# Patient Record
Sex: FEMALE | Race: BLACK OR AFRICAN AMERICAN | NOT HISPANIC OR LATINO | URBAN - METROPOLITAN AREA
[De-identification: names, ages, dates, MRNs, and addresses within clinical notes are randomized per-mention and may not be internally consistent; named-entity substitution may affect disease eponyms.]

---

## 2021-11-28 ENCOUNTER — INPATIENT (INPATIENT)
Facility: HOSPITAL | Age: 66
LOS: 4 days | Discharge: HOME | End: 2021-12-03
Attending: HOSPITALIST | Admitting: HOSPITALIST
Payer: MEDICARE

## 2021-11-28 VITALS
HEART RATE: 87 BPM | OXYGEN SATURATION: 100 % | RESPIRATION RATE: 16 BRPM | DIASTOLIC BLOOD PRESSURE: 88 MMHG | TEMPERATURE: 99 F | SYSTOLIC BLOOD PRESSURE: 156 MMHG

## 2021-11-28 DIAGNOSIS — Z89.411 ACQUIRED ABSENCE OF RIGHT GREAT TOE: Chronic | ICD-10-CM

## 2021-11-28 DIAGNOSIS — E78.5 HYPERLIPIDEMIA, UNSPECIFIED: ICD-10-CM

## 2021-11-28 DIAGNOSIS — H91.90 UNSPECIFIED HEARING LOSS, UNSPECIFIED EAR: ICD-10-CM

## 2021-11-28 DIAGNOSIS — R63.8 OTHER SYMPTOMS AND SIGNS CONCERNING FOOD AND FLUID INTAKE: ICD-10-CM

## 2021-11-28 DIAGNOSIS — I10 ESSENTIAL (PRIMARY) HYPERTENSION: ICD-10-CM

## 2021-11-28 DIAGNOSIS — K86.2 CYST OF PANCREAS: ICD-10-CM

## 2021-11-28 DIAGNOSIS — E11.9 TYPE 2 DIABETES MELLITUS WITHOUT COMPLICATIONS: ICD-10-CM

## 2021-11-28 LAB
ALBUMIN SERPL ELPH-MCNC: 3 G/DL — LOW (ref 3.3–5)
ALP SERPL-CCNC: 118 U/L — SIGNIFICANT CHANGE UP (ref 40–120)
ALT FLD-CCNC: 10 U/L — SIGNIFICANT CHANGE UP (ref 4–33)
ANION GAP SERPL CALC-SCNC: 9 MMOL/L — SIGNIFICANT CHANGE UP (ref 7–14)
APTT BLD: 31.4 SEC — SIGNIFICANT CHANGE UP (ref 27–36.3)
AST SERPL-CCNC: 14 U/L — SIGNIFICANT CHANGE UP (ref 4–32)
BILIRUB SERPL-MCNC: 0.3 MG/DL — SIGNIFICANT CHANGE UP (ref 0.2–1.2)
BUN SERPL-MCNC: 23 MG/DL — SIGNIFICANT CHANGE UP (ref 7–23)
CALCIUM SERPL-MCNC: 8.8 MG/DL — SIGNIFICANT CHANGE UP (ref 8.4–10.5)
CHLORIDE SERPL-SCNC: 106 MMOL/L — SIGNIFICANT CHANGE UP (ref 98–107)
CO2 SERPL-SCNC: 24 MMOL/L — SIGNIFICANT CHANGE UP (ref 22–31)
CREAT SERPL-MCNC: 1.11 MG/DL — SIGNIFICANT CHANGE UP (ref 0.5–1.3)
GLUCOSE SERPL-MCNC: 115 MG/DL — HIGH (ref 70–99)
HCT VFR BLD CALC: 24.7 % — LOW (ref 34.5–45)
HGB BLD-MCNC: 7.9 G/DL — LOW (ref 11.5–15.5)
INR BLD: 1.12 RATIO — SIGNIFICANT CHANGE UP (ref 0.88–1.16)
MCHC RBC-ENTMCNC: 24.8 PG — LOW (ref 27–34)
MCHC RBC-ENTMCNC: 32 GM/DL — SIGNIFICANT CHANGE UP (ref 32–36)
MCV RBC AUTO: 77.7 FL — LOW (ref 80–100)
NRBC # BLD: 0 /100 WBCS — SIGNIFICANT CHANGE UP
NRBC # FLD: 0 K/UL — SIGNIFICANT CHANGE UP
PLATELET # BLD AUTO: 283 K/UL — SIGNIFICANT CHANGE UP (ref 150–400)
POTASSIUM SERPL-MCNC: 3.5 MMOL/L — SIGNIFICANT CHANGE UP (ref 3.5–5.3)
POTASSIUM SERPL-SCNC: 3.5 MMOL/L — SIGNIFICANT CHANGE UP (ref 3.5–5.3)
PROT SERPL-MCNC: 6.9 G/DL — SIGNIFICANT CHANGE UP (ref 6–8.3)
PROTHROM AB SERPL-ACNC: 12.8 SEC — SIGNIFICANT CHANGE UP (ref 10.6–13.6)
RBC # BLD: 3.18 M/UL — LOW (ref 3.8–5.2)
RBC # FLD: 17 % — HIGH (ref 10.3–14.5)
SODIUM SERPL-SCNC: 139 MMOL/L — SIGNIFICANT CHANGE UP (ref 135–145)
WBC # BLD: 4.22 K/UL — SIGNIFICANT CHANGE UP (ref 3.8–10.5)
WBC # FLD AUTO: 4.22 K/UL — SIGNIFICANT CHANGE UP (ref 3.8–10.5)

## 2021-11-28 PROCEDURE — 93010 ELECTROCARDIOGRAM REPORT: CPT

## 2021-11-28 PROCEDURE — 70498 CT ANGIOGRAPHY NECK: CPT | Mod: 26,MA

## 2021-11-28 PROCEDURE — 99223 1ST HOSP IP/OBS HIGH 75: CPT

## 2021-11-28 PROCEDURE — 70496 CT ANGIOGRAPHY HEAD: CPT | Mod: 26,MA

## 2021-11-28 PROCEDURE — 99285 EMERGENCY DEPT VISIT HI MDM: CPT | Mod: 25

## 2021-11-28 RX ORDER — SODIUM CHLORIDE 9 MG/ML
1000 INJECTION, SOLUTION INTRAVENOUS
Refills: 0 | Status: DISCONTINUED | OUTPATIENT
Start: 2021-11-28 | End: 2021-12-03

## 2021-11-28 RX ORDER — METOPROLOL TARTRATE 50 MG
25 TABLET ORAL EVERY 12 HOURS
Refills: 0 | Status: DISCONTINUED | OUTPATIENT
Start: 2021-11-28 | End: 2021-12-03

## 2021-11-28 RX ORDER — ASPIRIN/CALCIUM CARB/MAGNESIUM 324 MG
81 TABLET ORAL DAILY
Refills: 0 | Status: DISCONTINUED | OUTPATIENT
Start: 2021-11-28 | End: 2021-12-03

## 2021-11-28 RX ORDER — DEXTROSE 50 % IN WATER 50 %
25 SYRINGE (ML) INTRAVENOUS ONCE
Refills: 0 | Status: DISCONTINUED | OUTPATIENT
Start: 2021-11-28 | End: 2021-12-03

## 2021-11-28 RX ORDER — ATORVASTATIN CALCIUM 80 MG/1
20 TABLET, FILM COATED ORAL AT BEDTIME
Refills: 0 | Status: DISCONTINUED | OUTPATIENT
Start: 2021-11-28 | End: 2021-12-03

## 2021-11-28 RX ORDER — HYDRALAZINE HCL 50 MG
25 TABLET ORAL EVERY 8 HOURS
Refills: 0 | Status: DISCONTINUED | OUTPATIENT
Start: 2021-11-28 | End: 2021-12-03

## 2021-11-28 RX ORDER — DEXTROSE 50 % IN WATER 50 %
12.5 SYRINGE (ML) INTRAVENOUS ONCE
Refills: 0 | Status: DISCONTINUED | OUTPATIENT
Start: 2021-11-28 | End: 2021-12-03

## 2021-11-28 RX ORDER — ENOXAPARIN SODIUM 100 MG/ML
40 INJECTION SUBCUTANEOUS DAILY
Refills: 0 | Status: DISCONTINUED | OUTPATIENT
Start: 2021-11-28 | End: 2021-12-03

## 2021-11-28 RX ORDER — THIAMINE MONONITRATE (VIT B1) 100 MG
100 TABLET ORAL DAILY
Refills: 0 | Status: DISCONTINUED | OUTPATIENT
Start: 2021-11-28 | End: 2021-12-03

## 2021-11-28 RX ORDER — POLYETHYLENE GLYCOL 3350 17 G/17G
17 POWDER, FOR SOLUTION ORAL DAILY
Refills: 0 | Status: DISCONTINUED | OUTPATIENT
Start: 2021-11-28 | End: 2021-12-03

## 2021-11-28 RX ORDER — ACETAMINOPHEN 500 MG
650 TABLET ORAL EVERY 6 HOURS
Refills: 0 | Status: DISCONTINUED | OUTPATIENT
Start: 2021-11-28 | End: 2021-12-03

## 2021-11-28 RX ORDER — INSULIN GLARGINE 100 [IU]/ML
10 INJECTION, SOLUTION SUBCUTANEOUS AT BEDTIME
Refills: 0 | Status: DISCONTINUED | OUTPATIENT
Start: 2021-11-28 | End: 2021-11-30

## 2021-11-28 RX ORDER — INSULIN LISPRO 100/ML
VIAL (ML) SUBCUTANEOUS
Refills: 0 | Status: DISCONTINUED | OUTPATIENT
Start: 2021-11-28 | End: 2021-11-30

## 2021-11-28 RX ORDER — GLUCAGON INJECTION, SOLUTION 0.5 MG/.1ML
1 INJECTION, SOLUTION SUBCUTANEOUS ONCE
Refills: 0 | Status: DISCONTINUED | OUTPATIENT
Start: 2021-11-28 | End: 2021-12-03

## 2021-11-28 RX ORDER — AMLODIPINE BESYLATE 2.5 MG/1
10 TABLET ORAL DAILY
Refills: 0 | Status: DISCONTINUED | OUTPATIENT
Start: 2021-11-28 | End: 2021-12-03

## 2021-11-28 RX ORDER — PANTOPRAZOLE SODIUM 20 MG/1
40 TABLET, DELAYED RELEASE ORAL
Refills: 0 | Status: DISCONTINUED | OUTPATIENT
Start: 2021-11-28 | End: 2021-12-03

## 2021-11-28 RX ORDER — DEXTROSE 50 % IN WATER 50 %
15 SYRINGE (ML) INTRAVENOUS ONCE
Refills: 0 | Status: DISCONTINUED | OUTPATIENT
Start: 2021-11-28 | End: 2021-12-03

## 2021-11-28 RX ORDER — FAMOTIDINE 10 MG/ML
20 INJECTION INTRAVENOUS DAILY
Refills: 0 | Status: DISCONTINUED | OUTPATIENT
Start: 2021-11-28 | End: 2021-12-03

## 2021-11-28 NOTE — H&P ADULT - PROBLEM SELECTOR PLAN 1
-bilateral hearing loss, tinnitus prior  -external ear exam benign, otoscopic exam w/out acute findings  -differentials include drug induced, possibly due to antibiotics for pancreatic drainage (daughter and pt unsure which antibiotic), vibratory hearing more intact than sensinoneural.   -obtain MR head w/ w/o iv and IAC w/ w/o iv (no metals in body creatinine clearance acceptable, patient aware) to rule out lesions.   -will need outpatient f/u w/ ENT for official audiogram and steroid use  -lisinopril can cause tinnitus 1-10% of cases, will hold for now -bilateral hearing loss, tinnitus prior  -external ear exam benign, otoscopic exam w/out acute findings  -differentials include drug induced, possibly due to antibiotics for pancreatic drainage (daughter and pt unsure which antibiotic), vibratory hearing more intact than sensinoneural.   -obtain MR head w/ w/o iv and IAC w/ w/o iv (no metals in body creatinine clearance acceptable, patient aware) to rule out lesions.   -will need outpatient f/u w/ ENT for official audiogram and steroid use  -lisinopril can cause tinnitus 1-10% of cases, will hold for now  -ordered for viral/rheumatologic workup per neurology, consent obtained for HIV/RPR

## 2021-11-28 NOTE — CONSULT NOTE ADULT - SUBJECTIVE AND OBJECTIVE BOX
Neurology Consultation  Thiago Vo, PGY-2      HPI: Patient is a 65yo F PMHx of DM       Otherwise denies fever, chills, headaches, vision changes, blurry vision, double vision, nausea, vomiting, hearing change, focal weakness, focal numbness, parasthesias, bowel/ bladder incontinence.      (Stroke only)  NIHSS:   MRS:   ICH:   ABCD2:    PAST MEDICAL & SURGICAL HISTORY:    FAMILY HISTORY:    SOCIAL HISTORY: no smoking, alcohol, drug use hx    MEDICATIONS (HOME):  Home Medications:    MEDICATIONS  (STANDING):    MEDICATIONS  (PRN):    ALLERGIES/INTOLERANCES:  Allergies  penicillin ([facility])    Intolerances    VITALS & EXAMINATION:  Vital Signs Last 24 Hrs  T(C): 37.1 (28 Nov 2021 11:59), Max: 37.1 (28 Nov 2021 11:59)  T(F): 98.7 (28 Nov 2021 11:59), Max: 98.7 (28 Nov 2021 11:59)  HR: 87 (28 Nov 2021 11:59) (87 - 87)  BP: 156/88 (28 Nov 2021 11:59) (156/88 - 156/88)  BP(mean): --  RR: 16 (28 Nov 2021 11:59) (16 - 16)  SpO2: 100% (28 Nov 2021 11:59) (100% - 100%)    General:  Constitutional: Female, appears stated age, in no apparent distress including pain  Head: Normocephalic & atraumatic; Eyes: clear sclera; Neck: supple  Extremities: No cyanosis, clubbing, or edema; Skin: No rashes, bruising, or discoloration.  Resp: breathing comfortably, normal rate    Neurological (>12):  MS: Awake, alert, oriented to person, place, situation, time. Normal affect. Follows all commands. Cooperative.   Language: Speech is clear, fluent, intact with normal tone/rate/ volume and good repetition,  comprehension, registration of words. No perseverance.     CNs: PERRL (R = 3mm, L = 3mm). VFF. EOMI no nystagmus. V1-3 intact to LT, well developed masseter muscles b/l. No facial asymmetry b/l, full eye closure strength b/l. Hearing grossly normal (rubbing fingers) b/l.  Gag reflex deferred.     Motor: Normal muscle bulk & tone. No noticeable tremor or seizure. No pronator drift.              Deltoid	Biceps	Triceps	   R	5	5	5	5		 	  L	5	5	5	5			  	H-Flex	K-Ext	D-Flex	P-Flex  R	5	5	5	5			 	   L	5	5	5	5		     Sensation: Intact to LT/PP/Temp/Vibration/Position b/l., Cortical: Extinction on DSS (neglect): none  Reflexes:              Biceps(C5)       BR(C6)     Triceps(C7)               Patellar(L4)        Plantar Resp  R	2	          2	             2		        2		    	Down   L	2	          2	             2		        2		 	Down     Coordination: No dysmetria to FTN  Gait: Normal Romberg. No postural instability. Normal stance and tandem gait.     LABORATORY:  CBC   Chem       LFTs   Coagulopathy   Lipid Panel   A1c   Cardiac enzymes     U/A   CSF  Other    STUDIES & IMAGING: (EEG, CT, MR, U/S, TTE/LATA): Neurology Consultation  Thiago Vo, PGY-2      Communicated via writing on phone  HPI: Patient is a 67yo F PMHx of DM, HTN, HLD, on ASAS 81mg.  possible hx pancreatitis/ HF? who presents to ED for acute onset hearing loss bilaterally. States on friday had a loud party with loud music. Was fine yesterday (LKN 9:30 PM 11/27/21 prior to going to bed when she was her baseline) and today AM woke up at 10 AM with bilateral hearing loss. No current ringing in ear, popping sound or fullness. No associated vertiginous symptoms. Has mild headache but denies nausea/ vomiting. No recent illnesses or ear infections. Had an episode of tinnitus in L ear 1 month ago that resolved. In addition reports having mild numbness of her fingertips that also started today AM when she woke up. However, denies focal weakness/ numbness of individual arms/legs.     (Stroke only)  NIHSS:   MRS: 0      PAST MEDICAL & SURGICAL HISTORY: HTN, DM     FAMILY HISTORY:    SOCIAL HISTORY: no smoking, alcohol, drug use hx    MEDICATIONS (HOME):  Home Medications:    MEDICATIONS  (STANDING):    MEDICATIONS  (PRN):    ALLERGIES/INTOLERANCES:  Allergies  penicillin ([facility])    Intolerances    VITALS & EXAMINATION:  Vital Signs Last 24 Hrs  T(C): 37.1 (28 Nov 2021 11:59), Max: 37.1 (28 Nov 2021 11:59)  T(F): 98.7 (28 Nov 2021 11:59), Max: 98.7 (28 Nov 2021 11:59)  HR: 87 (28 Nov 2021 11:59) (87 - 87)  BP: 156/88 (28 Nov 2021 11:59) (156/88 - 156/88)  BP(mean): --  RR: 16 (28 Nov 2021 11:59) (16 - 16)  SpO2: 100% (28 Nov 2021 11:59) (100% - 100%)    General:  Constitutional: Female, appears stated age, in no apparent distress including pain  Head: Normocephalic & atraumatic; Eyes: clear sclera; Neck: supple  Extremities: No cyanosis, clubbing, or edema; Skin: No rashes, bruising, or discoloration.  Resp: breathing comfortably, normal rate    Neurological (>12):  MS: Awake, alert, oriented to person, place, situation, time. Normal affect. Follows all commands. Cooperative.   Language: Speech is clear, fluent, intact with normal tone/rate/ volume and good repetition,  comprehension, registration of words. No perseverance.     CNs: PERRL (R = 3mm, L = 3mm). VFF. EOMI no nystagmus. V1-3 intact to LT, well developed masseter muscles b/l. No facial asymmetry b/l, full eye closure strength b/l. Hearing grossly normal (rubbing fingers) b/l.  Gag reflex deferred.     Motor: Normal muscle bulk & tone. No noticeable tremor or seizure. No pronator drift.              Deltoid	Biceps	Triceps	   R	5	5	5	5		 	  L	5	5	5	5			  	H-Flex	K-Ext	D-Flex	P-Flex  R	5	5	5	5			 	   L	5	5	5	5		     Sensation: Intact to LT/PP/Temp/Vibration/Position b/l., Cortical: Extinction on DSS (neglect): none  Reflexes:              Biceps(C5)       BR(C6)     Triceps(C7)               Patellar(L4)        Plantar Resp  R	2	          2	             2		        2		    	Down   L	2	          2	             2		        2		 	Down     Coordination: No dysmetria to FTN  Gait: Normal Romberg. No postural instability. Normal stance and tandem gait.     LABORATORY:  CBC   Chem       LFTs   Coagulopathy   Lipid Panel   A1c   Cardiac enzymes     U/A   CSF  Other    STUDIES & IMAGING: (EEG, CT, MR, U/S, TTE/LATA): Neurology Consultation  Thiago Vo, PGY-2      Communicated via writing on phone  HPI: Patient is a 67yo F PMHx of DM, HTN, HLD, on ASAS 81mg who presents to ED for acute onset hearing loss bilaterally. States on friday had a loud party with loud music. Was fine yesterday (LKN 9:30 PM 11/27/21 prior to going to bed when she was her baseline) and today AM woke up at 10 AM with bilateral hearing loss. No current ringing in ear, popping sound or fullness. No associated vertiginous symptoms. Has mild headache but denies nausea/ vomiting. No recent illnesses or ear infections. Had an episode of tinnitus in L ear 1 month ago that resolved. In addition reports having mild numbness of her fingertips that also started today AM when she woke up. However, denies focal weakness/ numbness of individual arms/legs.     (Stroke only)  NIHSS: 0  MRS: 0      PAST MEDICAL & SURGICAL HISTORY: HTN, DM     FAMILY HISTORY:     SOCIAL HISTORY: no smoking, alcohol, drug use hx    MEDICATIONS (HOME):  Home Medications: amlodipine, aspirin, atorvastatin, famotidine, hydralazine, insulin, lisinopril, metoprolol tart, omeprazole, terbinafine.     MEDICATIONS  (STANDING):    MEDICATIONS  (PRN):    ALLERGIES/INTOLERANCES:  Allergies  penicillin ([facility])    Intolerances    VITALS & EXAMINATION:  Vital Signs Last 24 Hrs  T(C): 37.1 (28 Nov 2021 11:59), Max: 37.1 (28 Nov 2021 11:59)  T(F): 98.7 (28 Nov 2021 11:59), Max: 98.7 (28 Nov 2021 11:59)  HR: 87 (28 Nov 2021 11:59) (87 - 87)  BP: 156/88 (28 Nov 2021 11:59) (156/88 - 156/88)  BP(mean): --  RR: 16 (28 Nov 2021 11:59) (16 - 16)  SpO2: 100% (28 Nov 2021 11:59) (100% - 100%)    General:  Constitutional: Female, appears stated age, in no apparent distress but very hard of hearing b/l   Head: Normocephalic & atraumatic; Eyes: clear sclera;    Extremities: No cyanosis, clubbing, or edema, amputated big toe R foot,   Resp: breathing comfortably, normal rate    Neurological (>12):  MS: Awake, alert, oriented to person, place, situation, time. Normal affect. Follows all commands. Cooperative.   Language: Speech is clear, fluent, loud due to being deaf. Good comprehension, registration of words. No perseverance.     CNs: PERRL (R = 3mm, L = 3mm). VFF to blink to threat. EOMI no nystagmus. V1-3 intact to LT, well developed masseter muscles b/l. No facial asymmetry b/l, full eye closure strength b/l.   Hearing absent bilaterally. With tuning fork on vertex of head and placed on mastoid on both sides, patient able to appreciate vibration and was within 1 second of noticing the vibration stopping correctly. Can barely appreciate the vibration when tuning fork placed 2 inches outside of outer ear.       Motor: Normal muscle bulk & tone. No noticeable tremor or seizure. No pronator drift.              Deltoid	Biceps	Triceps	   R	5	5	5	5		 	  L	5	5	5	5			  	H-Flex	K-Ext	D-Flex	P-Flex  R	5	5	5	5			 	   L	5	5	5	5		     Vibration intact on extremities particularly big toe on L. Proprioception appears possibly diminished.   Sensation: Intact to LT  b/l   Cortical: Extinction on DSS (neglect): none  Reflexes:              Biceps(C5)       BR(C6)     Triceps(C7)               Patellar(L4)        Plantar Resp  R	1	          1	             1		        0		    	Missing toe  L	1	          1             2		        0		 	Down   Coordination: No dysmetria to FTN     LABORATORY:                        7.9    4.22  )-----------( 283      ( 28 Nov 2021 15:34 )             24.7   11-28    139  |  106  |  23  ----------------------------<  115<H>  3.5   |  24  |  1.11    Ca    8.8      28 Nov 2021 15:34    TPro  6.9  /  Alb  3.0<L>  /  TBili  0.3  /  DBili  x   /  AST  14  /  ALT  10  /  AlkPhos  118  11-28    LFTs   Coagulopathy   Lipid Panel   A1c   Cardiac enzymes     U/A   CSF  Other    STUDIES & IMAGING: (EEG, CT, MR, U/S, TTE/LATA):     Neurology Consultation  Thiago Vo, PGY-2      Communicated via writing on phone  HPI: Patient is a 67yo F PMHx of DM, HTN, HLD, on ASAS 81mg who presents to ED for acute onset hearing loss bilaterally. States on friday had a loud party with loud music. Was fine yesterday (LKN 9:30 PM 11/27/21 prior to going to bed when she was her baseline) and today AM woke up at 10 AM with bilateral hearing loss. No current ringing in ear, popping sound or fullness. No associated vertiginous symptoms. Has mild headache but denies nausea/ vomiting. No recent illnesses or ear infections. Had an episode of tinnitus in L ear 1 month ago that resolved. In addition reports having mild numbness of her fingertips that also started today AM when she woke up. However, denies focal weakness/ numbness of individual arms/legs.     (Stroke only)  NIHSS: 0  MRS: 0      PAST MEDICAL & SURGICAL HISTORY: HTN, DM     SOCIAL HISTORY:  smoking hx 30 yrs ago, social drinking    MEDICATIONS (HOME):  Home Medications: amlodipine, aspirin, atorvastatin, famotidine, hydralazine, insulin, lisinopril, metoprolol tart, omeprazole, terbinafine.     MEDICATIONS  (STANDING):    MEDICATIONS  (PRN):    ALLERGIES/INTOLERANCES:  Allergies  penicillin ([facility])    Intolerances    VITALS & EXAMINATION:  Vital Signs Last 24 Hrs  T(C): 37.1 (28 Nov 2021 11:59), Max: 37.1 (28 Nov 2021 11:59)  T(F): 98.7 (28 Nov 2021 11:59), Max: 98.7 (28 Nov 2021 11:59)  HR: 87 (28 Nov 2021 11:59) (87 - 87)  BP: 156/88 (28 Nov 2021 11:59) (156/88 - 156/88)  BP(mean): --  RR: 16 (28 Nov 2021 11:59) (16 - 16)  SpO2: 100% (28 Nov 2021 11:59) (100% - 100%)    General:  Constitutional: Female, appears stated age, in no apparent distress but very hard of hearing b/l   Head: Normocephalic & atraumatic; Eyes: clear sclera;    Extremities: No cyanosis, clubbing, or edema, amputated big toe R foot,   Resp: breathing comfortably, normal rate    Neurological (>12):  MS: Awake, alert, oriented to person, place, situation, time. Normal affect. Follows all commands. Cooperative.   Language: Speech is clear, fluent, loud due to being deaf. Good comprehension, registration of words. No perseverance.     CNs: PERRL (R = 3mm, L = 3mm). VFF to blink to threat. EOMI no nystagmus. V1-3 intact to LT, well developed masseter muscles b/l. No facial asymmetry b/l, full eye closure strength b/l.   Hearing absent bilaterally. With tuning fork on vertex of head and placed on mastoid on both sides, patient able to appreciate vibration and was within 1 second of noticing the vibration stopping correctly. Can barely appreciate the vibration when tuning fork placed 2 inches outside of outer ear.       Motor: Normal muscle bulk & tone. No noticeable tremor or seizure. No pronator drift.              Deltoid	Biceps	Triceps	   R	5	5	5	5		 	  L	5	5	5	5			  	H-Flex	K-Ext	D-Flex	P-Flex  R	5	5	5	5			 	   L	5	5	5	5		     Vibration intact on extremities particularly big toe on L. Proprioception appears possibly diminished.   Sensation: Intact to LT  b/l   Cortical: Extinction on DSS (neglect): none  Reflexes:              Biceps(C5)       BR(C6)     Triceps(C7)               Patellar(L4)        Plantar Resp  R	1	          1	             1		        0		    	Missing toe  L	1	          1             2		        0		 	Down   Coordination: No dysmetria to FTN     LABORATORY:                        7.9    4.22  )-----------( 283      ( 28 Nov 2021 15:34 )             24.7   11-28    139  |  106  |  23  ----------------------------<  115<H>  3.5   |  24  |  1.11    Ca    8.8      28 Nov 2021 15:34    TPro  6.9  /  Alb  3.0<L>  /  TBili  0.3  /  DBili  x   /  AST  14  /  ALT  10  /  AlkPhos  118  11-28    LFTs   Coagulopathy   Lipid Panel   A1c   Cardiac enzymes     U/A   CSF  Other    STUDIES & IMAGING: (EEG, CT, MR, U/S, TTE/LATA):  < from: CT Venogram Brain w/ IV Cont (11.28.21 @ 16:32) >    EXAM:  CT IAC      EXAM:  CT ANGIO NECK (W)AW IC      EXAM:  CT ANGIO BRAIN (W)AW IC      EXAM:  CT VENOGRAM BRAIN (W)AW IC      PROCEDURE DATE:  Nov 28 2021     INTERPRETATION:  INDICATION: Bilateral hearing loss.    TECHNIQUE:  A thin section CT study of the head and  neck was conducted during rapid infusion of intravenous contrast (power injected).  In addition to the axial data, reformatted images were generated using a 3D workstation. Rapid AI was used to screen for hemorrhage.  100 cc Omnipaque 350 was administered. In addition, thin section posterior fossa imaging was conducted. Also CT venography was performed.    FINDINGS:    AORTIC ARCH no dissection or aneurysm dilatation is noted. Major vessel origins are patent.  COMMON CAROTID ARTERIES: Bilaterally patent with tortuosity  RIGHT BIFURCATION: There is posterior calcified plaque in the bulb with mild narrowing of the proximal internal carotid artery.  LEFT BIFURCATION: There is posterior wall plaque in the bulb and proximal internal carotid artery with mild narrowing of the internal carotid.  INTERNAL CAROTID ARTERIES: Bilaterally patent with tortuosity  VERTEBRALS bilaterally patent  MISCELLANEOUS:  Degenerative changes noted in the cervical spine    BRAIN   there are involutional changes in both hemispheres with mild volume loss. No acute infarct, hemorrhage, or space-occupying lesion is noted. No hydrocephalus or collections are identified. Brain stem and cerebellum are unremarkable. The internal auditory canals are unremarkable in appearance. No middle ear and mastoid disease is noted.    The cavernous and supraclinoid carotid arteries are bilaterally patent.    Both anterior cerebral arteries are patent with tortuosity. There is divergence of the proximal anterior cerebral arteries, but there is no visible aneurysm or space-occupying lesion.    Both middle cerebral arteries are patent. No M1 lesion is noted.    The vertebrobasilar system is widely patent. Posterior cerebral arteries are patent.    No enhancing mass is identified in the CP angle or IAC's.    Sagittal sinus is widely patent. Transverse and sigmoid sinuses are bilaterally widely patent. Deep venous system and cortical veins are unremarkable. There is no evidence of venous occlusive disease.    IMPRESSION:    1. Mild involutional changes in both hemispheres, with no acute abnormality, hemorrhage, or space-occupying lesion. No posterior fossa abnormality noted.  2. No IAC abnormality suggested. No middle ear and mastoid disease noted.  3. Atherosclerotic changes both carotid bifurcations in the neck. No significant stenosis, dissection, or occlusion noted. Patent vertebrals.  4. Widely patent arterial vasculature noted intracranially  5. There are no evidence of venous sinus occlusive disease. No sinus thrombosis suggested...    --- End of Report ---    MAIA PRESTON MD; Attending Radiologist  This document has been electronically signed. Nov 28 2021  4:48PM    < end of copied text >

## 2021-11-28 NOTE — H&P ADULT - HISTORY OF PRESENT ILLNESS
Patient is a 66 year old female hx of insulin dependant DM2, HTN, HLD, pancreatic pseudocyst w/ recent admission and drainage, iron deficiency anemia, who presents due to decreased hearing. She was at a loud party on Friday, and she noticed ringing and then decreased hearing (first left then right) 11/28/21 in the morning. She had ringing in the ear one month ago as well. 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 She doesn't have any recent changes in medications, and only occasionally takes aspirin. She was recently hospitalized due to pancreatic pseudocyst w/ drainage, anemia w/ endoscopy/c-scope per pt and daughter, and antibiotic use as well (family unsure). Denies fevers, chills, nausea, vomiting, diarrhea, sob, cp, visual changes, headaches, LE swelling, or skin rashes.     ED course: afebrile, HR 84, /88, RR 18, 100%.  CT head angio, CT auditory canal noncon negative for acute findings, no masses or fractures noted, no clinically important closure of vasculature noted

## 2021-11-28 NOTE — H&P ADULT - ATTENDING COMMENTS
66W hx of insulin dependant DM2, HTN, HLD, pancreatic pseudocyst w/ recent admission and drainage, iron deficiency anemia, who presents due to decreased hearing.     Pt seen and exmained    Hearing loss: Unclear etiology brain pathology vs CVA vs Acquired CMV and ?Terbinafine ( if pt uses). f/u MRI.     CMV infection: +IgM CMV, ID consult for recs

## 2021-11-28 NOTE — ED PROVIDER NOTE - CLINICAL SUMMARY MEDICAL DECISION MAKING FREE TEXT BOX
Patient presents to ed with sudden onset b/l hearing loss, no trauma no exacerbating event patient or family member can recall, no other neuro deficits, no URI symptoms. Patient well appearing, no hearing in either ear-otherwise neuro intact on exam, b/l ear canals and tms wnl, will check labs, ct brain/iac, neuro c/s reass.

## 2021-11-28 NOTE — ED PROVIDER NOTE - PROGRESS NOTE DETAILS
neuro has seen pt at bedside and suspects conductive hearing loss. Requesting ENT consult and specialized imaging. ENT paged and will see pt in ED. Renzo Noble MD. ent repaged. Renzo Noble MD. ent repaged. they state they will see the pt as soon as they can. pt still cannot hear. ct imaging noted.

## 2021-11-28 NOTE — H&P ADULT - NSHPLABSRESULTS_GEN_ALL_CORE
LABS:                         7.9    4.22  )-----------( 283      ( 28 Nov 2021 15:34 )             24.7     11-28    139  |  106  |  23  ----------------------------<  115<H>  3.5   |  24  |  1.11    Ca    8.8      28 Nov 2021 15:34    TPro  6.9  /  Alb  3.0<L>  /  TBili  0.3  /  DBili  x   /  AST  14  /  ALT  10  /  AlkPhos  118  11-28    PT/INR - ( 28 Nov 2021 15:35 )   PT: 12.8 sec;   INR: 1.12 ratio      PTT - ( 28 Nov 2021 15:35 )  PTT:31.4 sec      RADIOLOGY, EKG & ADDITIONAL TESTS: Reviewed.

## 2021-11-28 NOTE — H&P ADULT - ASSESSMENT
Patient is a 66 year old female hx of insulin dependant DM2, HTN, HLD, pancreatic pseudocyst w/ recent admission and drainage, iron deficiency anemia, who presents due to decreased hearing.

## 2021-11-28 NOTE — ED PROVIDER NOTE - NSICDXPASTMEDICALHX_GEN_ALL_CORE_FT
PAST MEDICAL HISTORY:  Bilateral cataracts     Fluid in pleural cavity associated with pancreatitis     Pancreatic pseudocyst/cyst s/p drain placement and removal    Type 2 diabetes mellitus

## 2021-11-28 NOTE — ED ADULT NURSE REASSESSMENT NOTE - NS ED NURSE REASSESS COMMENT FT1
At bedside with Neurologist, patient is neurologically intact expect for hearing, still can not hear. Patient has right toe amputated.

## 2021-11-28 NOTE — H&P ADULT - PROBLEM SELECTOR PLAN 2
-s/p drainage at prior hospital, and stent placement 1 month prior  -no leukocytosis or fevers  -abdomen soft, mildly distended, mild discomfort

## 2021-11-28 NOTE — H&P ADULT - PROBLEM SELECTOR PLAN 5
-is on lantus 14 units, can give 10 units here  -is on 4-6 units premeal, can order for moderate sliding scale  -CC diet    #MAGALY  -continue to monitor  -per pt and daughter egd/cscope unremarkable  -had 1 unit transfusion at prior hospitalization

## 2021-11-28 NOTE — H&P ADULT - NSHPPHYSICALEXAM_GEN_ALL_CORE
PHYSICAL EXAM:  GENERAL: NAD, well-developed  HEENT: ANGUS present, EOMI, no abnormalities noted on otoscopic exam, cone of light intact  Vibratory hearing> sensorineural  HEAD:  Atraumatic, Normocephalic  EYES: EOMI, PERRLA, conjunctiva and sclera clear  NECK: Supple, No JVD  CHEST/LUNG: Clear to auscultation bilaterally; No wheeze  HEART: Regular rate and rhythm; No murmurs, rubs, or gallops  ABDOMEN: Soft, Nontender, Nondistended; Bowel sounds present  EXTREMITIES:  2+ Peripheral Pulses, No clubbing, cyanosis, or edema  PSYCH: AAOx3  NEUROLOGY: non-focal  SKIN: first toe amputated

## 2021-11-28 NOTE — CONSULT NOTE ADULT - ATTENDING COMMENTS
Patient seen and examined - history as documented above - to our discussion this am - may have had ringing in her left ear beginning about one month ago but unable to hear since yesterday - Weber and Rinne as documented.  Await MRI brain.  Steroids for acute hearing loss.

## 2021-11-28 NOTE — ED ADULT NURSE NOTE - OBJECTIVE STATEMENT
Patient received to room 22. Patient family member at bedside stated she woke up this morning with a buzzing and could not hear. Patient can not hear a thing, has been using an IPAD to write. Patient denies chest pain, n/v. Patient PMH of stents. Patient VSS. Patient eyes are PERRLA. MD at [facility] for eval. Patient received to room 22. Patient family member at bedside stated she woke up this morning with a buzzing and could not hear. Patient can not hear a thing, has been using an IPAD to write. Patient denies chest pain, n/v. Patient PMH of stents. Patient VSS. Patient eyes are PERRLA. MD at beside for eval. denies ever, chills, headaches, vision changes, blurry vision, double vision, nausea, vomiting, hearing change, focal weakness, focal numbness, parasthesias, bowel/ bladder incontinence. Radha stated she went to a loud party with music on friday.

## 2021-11-28 NOTE — ED PROVIDER NOTE - NS ED ROS FT
ROS:  GENERAL: No fever, no chills  EYES: no change in vision  HEENT: no trouble swallowing, no trouble speaking, +hearing loss  CARDIAC: no chest pain  PULMONARY: no cough, no shortness of breath  GI: no abdominal pain, no nausea, no vomiting, no diarrhea, no constipation  : No dysuria, no frequency, no change in appearance, or odor of urine  SKIN: no rashes  NEURO: no headache, no weakness  MSK: No joint pain ROS:  GENERAL: No fever, no chills  EYES: no change in vision  HEENT: no trouble swallowing, no trouble speaking, +hearing loss +h/o tinnitus  CARDIAC: no chest pain  PULMONARY: no cough, no shortness of breath  GI: no abdominal pain, no nausea, no vomiting, no diarrhea, no constipation  : No dysuria, no frequency, no change in appearance, or odor of urine  SKIN: no rashes  NEURO: no headache, no weakness  MSK: No joint pain

## 2021-11-28 NOTE — CONSULT NOTE ADULT - ASSESSMENT
Vs 98.7F, HR87, /88, RR16, 100%RA           Vs 98.7F, HR87, /88, RR16, 100%RA      Initial recommendations:  [ ] CT head, CTA head/neck, CTV head  [ ] ENT evaluation  [ ] MRI brain and MR IAC  [ ] CBC,   INCOMPLETE, will update shortly    Patient is a 67yo F PMHx of DM, HTN, HLD, on ASAS 81mg who presents to ED for acute onset hearing loss bilaterally. LKN 11/27 9:30PM and woke up at 10AM with b/l hearing loss. Notes she attending party with loud music on friday, 2 days ago. Denies current tinnitus, popping sound, ear fullness, vertiginous symptoms, nausea, vomiting, recent illnesses/ ear infections. Has mild headache and mild numbness of her fingertips that also started today AM when she woke up. VS 98.7F, HR87, /88, RR16, 100%RA. Exam notable for hearing significantly diminished bilaterally. With tuning fork on vertex of head and placed on mastoid on both sides, patient able to appreciate vibration and was within 1 second of noticing the vibration stopping correctly. Can barely appreciate the vibration when tuning fork placed 2 inches outside of outer ear.       Impression: Acute onset of moderately to severely reduced hearing bilaterally particularly to reduced air conduction b/l rather than bone conduction. Lower suspicion for bilateral sensorineuronal hearing loss but still a consideration. Low suspicion for compressive mass or stroke as etiology given symptoms are bilateral and would be difficult to localize. Also in consideration is noise induced hearing loss and neuropathy from diabetes. Other differentials include infectious etiologies, ototoxic drugs, autoimmune).      Initial recommendations:  [ ] CT head, CTA head/neck, CTV head to urgently rule out a stroke although low clinical suspicion since patient otherwise has nonfocal deficits.   [ ] ENT evaluation  [ ] MRI brain and MR IAC w/ wo contrast  [ ] CBC w/ diff, CMP, ESR, CRP, a1c, lipid panel, ACE HIV, syphilis, lyme, TSH, FT4, TT3, ACE, Sjogren Ab, MINOO, ANCA, SPEP, UPEP, HSV, CMV     To be discussed with neurology attending and will be formally staffed during morning rounds. Recommendations will be finalized once signed by attending. Patient is a 65yo F PMHx of DM, HTN, HLD, on ASAS 81mg who presents to ED for acute onset hearing loss bilaterally. LKN 11/27 9:30PM and woke up at 10AM with b/l hearing loss. Notes she attending party with loud music on friday, 2 days ago. Denies current tinnitus, popping sound, ear fullness, vertiginous symptoms, nausea, vomiting, recent illnesses/ ear infections. Has mild headache and mild numbness of her fingertips that also started today AM when she woke up. VS 98.7F, HR87, /88, RR16, 100%RA. Exam notable for hearing significantly diminished bilaterally. With tuning fork on vertex of head and placed on mastoid on both sides, patient able to appreciate vibration and was within 1 second of noticing the vibration stopping correctly. Can barely appreciate the vibration when tuning fork placed 2 inches outside of outer ear.       Impression: Acute onset of moderately to severely reduced hearing bilaterally particularly to reduced air conduction b/l rather than bone conduction. Lower suspicion for bilateral sensorineuronal hearing loss but still a consideration. Low suspicion for compressive mass or stroke as etiology given symptoms are bilateral and would be difficult to localize. Also in consideration is noise induced hearing loss and neuropathy from diabetes. Other differentials include infectious etiologies, ototoxic drugs, autoimmune).      Recommendations:   [x] CTH, CTA H/N, CTV H unremarkable   [ ] ENT evaluation  [ ] MRI brain and MR IAC w/ wo contrast  [ ] CBC w/ diff, CMP, ESR, CRP, a1c, lipid panel, ACE HIV, syphilis, lyme, TSH, FT4, TT3, ACE, Sjogren Ab, MINOO, ANCA, SPEP, UPEP, HSV, CMV     To be discussed with neurology attending and will be formally staffed during morning rounds. Recommendations will be finalized once signed by attending.

## 2021-11-28 NOTE — ED PROVIDER NOTE - OBJECTIVE STATEMENT
65 y/o f presents to the ed with acute onset hearing loss. History taken from daughter at bedside and patient. Per daughter-this morning patient was at baseline soh-then noticed difficulty hearing in her left ear, followed by her right-with complete loss of hearing. Has been unable to hear at all since this morning. No other associated symptoms. Had ringing in her left ear approx 1m ago but nothing was found at the time. No fevers, chills, ha, cp, vision changes, focal weakness, numbness, imbalance, confusion, nasal congestion, coughing. no barotrauma, no inciting events. 65 y/o f presents to the ed with acute onset hearing loss. History taken from daughter at bedside and patient. Per daughter-this morning patient was at baseline soh-then noticed difficulty hearing in her left ear, followed by her right-with complete loss of hearing. Has been unable to hear at all since this morning. No other associated symptoms. Had ringing in her left ear approx 1m ago but nothing was found at the time. No fevers, chills, ha, cp, vision changes, focal weakness, numbness, imbalance, confusion, nasal congestion, coughing. no barotrauma, no inciting events. No current use of ototoxic medications.

## 2021-11-28 NOTE — ED PROVIDER NOTE - PHYSICAL EXAMINATION
GEN - NAD; well appearing; A+O x3   HEAD - NC/AT   EYES- PERRL, EOMI  ENT: Airway patent, mmm, Oral cavity and pharynx normal. No inflammation, swelling, exudate, or lesions.    NECK: Neck supple, non-tender without lymphadenopathy, no masses.  PULMONARY - CTA b/l, symmetric breath sounds.   CARDIAC -s1s2, RRR, no M,G,R  ABDOMEN - +BS, ND, NT, soft, no guarding, no rebound, no masses   BACK - no CVA tenderness, Normal  spine   EXTREMITIES - FROM, symmetric pulses, capillary refill < 2 seconds, no edema, foot with well healed surgical site s/p toe amputation  SKIN - no rash or bruising   NEUROLOGIC - alert, speech clear, oriented x 3, has no hearing at all in b/l ears, otherwise cns intact, 5/5 strength in all ext, sensation intact, f-n nl.  PSYCH -nl mood/affect, nl insight. GEN - NAD; well appearing; A+O x3   HEAD - NC/AT   EYES- PERRL, EOMI  ENT: Airway patent, mmm, Oral cavity and pharynx normal. No inflammation, swelling, exudate, or lesions.  Tms and ext canal wnl-no erythema, effusions, unable to hear b/l.  NECK: Neck supple, non-tender without lymphadenopathy, no masses.  PULMONARY - CTA b/l, symmetric breath sounds.   CARDIAC -s1s2, RRR, no M,G,R  ABDOMEN - +BS, ND, NT, soft, no guarding, no rebound, no masses   BACK - no CVA tenderness, Normal  spine   EXTREMITIES - FROM, symmetric pulses, capillary refill < 2 seconds, no edema, foot with well healed surgical site s/p toe amputation  SKIN - no rash or bruising   NEUROLOGIC - alert, speech clear, oriented x 3, has no hearing at all in b/l ears, otherwise cns intact, 5/5 strength in all ext, sensation intact, f-n nl.  PSYCH -nl mood/affect, nl insight.

## 2021-11-28 NOTE — H&P ADULT - PROBLEM SELECTOR PLAN 3
-c/w amlodipine and HLZ  -hold lisinopril in case this could be the cause of her hearing issues (though less likely) until collateral about inpatient antibiotic regimen is obtained

## 2021-11-28 NOTE — CONSULT NOTE ADULT - ASSESSMENT
65yo F PMHx of DM, HTN, HLD presents to ED with acute onset bilateral hearing loss which started today. Of note she attended a part with loud music recently. Ear exam unremarkable, no lesions seen on CT. Stroke workup negative.   - can follow up in ENT clinic with Dr. Samson within a week for formal audiogram; will determine need for steroids after audiogram  - we will coordinate appt  - discussed with chief resident 67yo F PMHx of DM, HTN, HLD presents to ED with acute onset bilateral hearing loss which started today. Of note she attended a part with loud music recently. Ear exam unremarkable, no lesions seen on CT. Stroke workup negative.   - can follow up in ENT clinic with Dr. Samson within a week for formal audiogram; will determine need for steroids after audiogram  - we will coordinate appt  - dispo per ED  - discussed with chief resident

## 2021-11-28 NOTE — CONSULT NOTE ADULT - SUBJECTIVE AND OBJECTIVE BOX
Reason for Consultation:  Requested by:    Patient is a 66y old  Female who presents with a chief complaint of   HPI:  67 y/o F PMHx of DM, HTN, HLD, on ASAS 81mg presents to the ed with acute onset hearing loss. Per daughter-this morning patient was at baseline soh-then noticed difficulty hearing in her left ear, followed by her right-with complete loss of hearing. Has been unable to hear at all since this morning. No other associated symptoms. Had ringing in her left ear approx 1m ago but nothing was found at the time. No fevers, chills, ha, cp, vision changes, focal weakness, numbness, confusion, nasal congestion, coughing. no barotrauma, no inciting events. No current use of ototoxic medications.    Per daughter, patient was at a party on friday night with loud music. Did not have any hearing loss on friday or saturday. Does report some dizziness when she walked to the bathroom earlier. No previous episodes of hearing loss. No surgeries in head/neck, no previous ear surgery, no instrumentation in ear. No bleeding or liquid discharge from ears.     Birth History:  PAST MEDICAL & SURGICAL HISTORY:  Type 2 diabetes mellitus    Bilateral cataracts    Fluid in pleural cavity associated with pancreatitis    Pancreatic pseudocyst/cyst  s/p drain placement and removal    History of amputation of right great toe      FAMILY HISTORY:      MEDICATIONS  (STANDING):    MEDICATIONS  (PRN):    Allergies    penicillin (Red Man Synd)    Intolerances        ROS negative except as noted above                          7.9    4.22  )-----------( 283      ( 28 Nov 2021 15:34 )             24.7     11-28    139  |  106  |  23  ----------------------------<  115<H>  3.5   |  24  |  1.11    Ca    8.8      28 Nov 2021 15:34    TPro  6.9  /  Alb  3.0<L>  /  TBili  0.3  /  DBili  x   /  AST  14  /  ALT  10  /  AlkPhos  118  11-28    Vital Signs Last 24 Hrs  T(C): 36.8 (28 Nov 2021 14:26), Max: 37.1 (28 Nov 2021 11:59)  T(F): 98.2 (28 Nov 2021 14:26), Max: 98.7 (28 Nov 2021 11:59)  HR: 84 (28 Nov 2021 20:30) (84 - 87)  BP: 159/88 (28 Nov 2021 20:30) (127/75 - 159/88)  BP(mean): --  RR: 18 (28 Nov 2021 20:30) (16 - 18)  SpO2: 100% (28 Nov 2021 20:30) (100% - 100%)      PHYSICAL EXAM:  Constitutional Normal: well nourished, well developed, non-dysmorphic, no acute distress    Psychiatric: age appropriate behavior, cooperative    Skin: no obvious skin lesions    Lymphatic: no cervical lymphadenopathy    Left EAC: Normal, No cerumen, no exostoses   Right EAC: Normal, No cerumen, no exostoses     Left Tympanic Membrane: small area posteroinferiorly with thinning of TM; otherwise Normal, Clear, No effusion or perforation  Right Tympanic Membrane: Normal, Clear, No effusion or perforation  [place]: unable to hear any sound  [place]in[place]: unable to hear any sound on right; air>bone on left    External Nose:  Normal, no structural deformities  		  Anterior Nasal Cavity:	Normal mucosa, no turbinate hypertrophy, straight septum  					  Oral Cavity:  Good dentition, tongue midline, no lesions or ulcerations    Tonsilar Size: 2+ bilaterally    Neck: No palpable lymphadenopathy    Pulmonary: No Acute Distress.     Neurologic: awake and alert; CN II-XII normal except for hearing loss; no nystagmus    CT head/IAC without contrast without any middle ear/inner ear pathology

## 2021-11-28 NOTE — ED ADULT NURSE NOTE - CHIEF COMPLAINT QUOTE
Pt c/o loss of hearing since this am.  Denies injury.  Pt with hx of DM Type 2.  Pt took 6 units of insulin at 930am without a meal.  Recent dosage change.  Apple Juice given at triage

## 2021-11-28 NOTE — ED PROVIDER NOTE - RECEIVING PHYSICIAN
“You can access the Follow[facility] Patient Portal, offered by [facility], by registering with the following website: [email or web address]”
dr. reeves

## 2021-11-29 LAB
A1C WITH ESTIMATED AVERAGE GLUCOSE RESULT: 9.2 % — HIGH (ref 4–5.6)
ALBUMIN SERPL ELPH-MCNC: 3.2 G/DL — LOW (ref 3.3–5)
ALP SERPL-CCNC: 124 U/L — HIGH (ref 40–120)
ALT FLD-CCNC: 9 U/L — SIGNIFICANT CHANGE UP (ref 4–33)
ANION GAP SERPL CALC-SCNC: 11 MMOL/L — SIGNIFICANT CHANGE UP (ref 7–14)
AST SERPL-CCNC: 14 U/L — SIGNIFICANT CHANGE UP (ref 4–32)
B BURGDOR C6 AB SER-ACNC: NEGATIVE — SIGNIFICANT CHANGE UP
B BURGDOR IGG+IGM SER-ACNC: 0.11 INDEX — SIGNIFICANT CHANGE UP (ref 0.01–0.89)
BASOPHILS # BLD AUTO: 0.02 K/UL — SIGNIFICANT CHANGE UP (ref 0–0.2)
BASOPHILS NFR BLD AUTO: 0.7 % — SIGNIFICANT CHANGE UP (ref 0–2)
BILIRUB SERPL-MCNC: 0.3 MG/DL — SIGNIFICANT CHANGE UP (ref 0.2–1.2)
BUN SERPL-MCNC: 20 MG/DL — SIGNIFICANT CHANGE UP (ref 7–23)
CALCIUM SERPL-MCNC: 9 MG/DL — SIGNIFICANT CHANGE UP (ref 8.4–10.5)
CHLORIDE SERPL-SCNC: 102 MMOL/L — SIGNIFICANT CHANGE UP (ref 98–107)
CHOLEST SERPL-MCNC: 236 MG/DL — HIGH
CMV IGM FLD-ACNC: 40.5 AU/ML — HIGH
CMV IGM SERPL QL: POSITIVE
CO2 SERPL-SCNC: 25 MMOL/L — SIGNIFICANT CHANGE UP (ref 22–31)
COVID-19 SPIKE DOMAIN AB INTERP: POSITIVE
COVID-19 SPIKE DOMAIN ANTIBODY RESULT: >250 U/ML — HIGH
CREAT SERPL-MCNC: 1.01 MG/DL — SIGNIFICANT CHANGE UP (ref 0.5–1.3)
CRP SERPL-MCNC: 18.3 MG/L — HIGH
DSDNA AB FLD-ACNC: <0.2 AI — SIGNIFICANT CHANGE UP
ENA SS-A AB FLD IA-ACNC: <0.2 AI — SIGNIFICANT CHANGE UP
EOSINOPHIL # BLD AUTO: 0.19 K/UL — SIGNIFICANT CHANGE UP (ref 0–0.5)
EOSINOPHIL NFR BLD AUTO: 7 % — HIGH (ref 0–6)
ERYTHROCYTE [SEDIMENTATION RATE] IN BLOOD: 105 MM/HR — HIGH (ref 4–25)
ESTIMATED AVERAGE GLUCOSE: 217 — SIGNIFICANT CHANGE UP
GLUCOSE SERPL-MCNC: 192 MG/DL — HIGH (ref 70–99)
HCT VFR BLD CALC: 24.2 % — LOW (ref 34.5–45)
HCV AB S/CO SERPL IA: 0.24 S/CO — SIGNIFICANT CHANGE UP (ref 0–0.99)
HCV AB SERPL-IMP: SIGNIFICANT CHANGE UP
HDLC SERPL-MCNC: 37 MG/DL — LOW
HGB BLD-MCNC: 7.6 G/DL — LOW (ref 11.5–15.5)
HIV 1+2 AB+HIV1 P24 AG SERPL QL IA: SIGNIFICANT CHANGE UP
HSV1 IGG SER-ACNC: 32 INDEX — HIGH
HSV1 IGG SERPL QL IA: POSITIVE
HSV2 IGG FLD-ACNC: 0.58 INDEX — SIGNIFICANT CHANGE UP
HSV2 IGG SERPL QL IA: NEGATIVE — SIGNIFICANT CHANGE UP
IANC: 1.19 K/UL — LOW (ref 1.5–8.5)
IMM GRANULOCYTES NFR BLD AUTO: 1.1 % — SIGNIFICANT CHANGE UP (ref 0–1.5)
LIPID PNL WITH DIRECT LDL SERPL: 161 MG/DL — HIGH
LYMPHOCYTES # BLD AUTO: 0.92 K/UL — LOW (ref 1–3.3)
LYMPHOCYTES # BLD AUTO: 34.1 % — SIGNIFICANT CHANGE UP (ref 13–44)
MCHC RBC-ENTMCNC: 24.4 PG — LOW (ref 27–34)
MCHC RBC-ENTMCNC: 31.4 GM/DL — LOW (ref 32–36)
MCV RBC AUTO: 77.6 FL — LOW (ref 80–100)
MONOCYTES # BLD AUTO: 0.35 K/UL — SIGNIFICANT CHANGE UP (ref 0–0.9)
MONOCYTES NFR BLD AUTO: 13 % — SIGNIFICANT CHANGE UP (ref 2–14)
NEUTROPHILS # BLD AUTO: 1.19 K/UL — LOW (ref 1.8–7.4)
NEUTROPHILS NFR BLD AUTO: 44.1 % — SIGNIFICANT CHANGE UP (ref 43–77)
NON HDL CHOLESTEROL: 199 MG/DL — HIGH
NRBC # BLD: 0 /100 WBCS — SIGNIFICANT CHANGE UP
NRBC # FLD: 0 K/UL — SIGNIFICANT CHANGE UP
PLATELET # BLD AUTO: 267 K/UL — SIGNIFICANT CHANGE UP (ref 150–400)
POTASSIUM SERPL-MCNC: 3.5 MMOL/L — SIGNIFICANT CHANGE UP (ref 3.5–5.3)
POTASSIUM SERPL-SCNC: 3.5 MMOL/L — SIGNIFICANT CHANGE UP (ref 3.5–5.3)
PROT SERPL-MCNC: 7 G/DL — SIGNIFICANT CHANGE UP (ref 6–8.3)
PROT SERPL-MCNC: 7 G/DL — SIGNIFICANT CHANGE UP (ref 6–8.3)
RBC # BLD: 3.12 M/UL — LOW (ref 3.8–5.2)
RBC # FLD: 17.2 % — HIGH (ref 10.3–14.5)
SARS-COV-2 IGG+IGM SERPL QL IA: >250 U/ML — HIGH
SARS-COV-2 IGG+IGM SERPL QL IA: POSITIVE
SARS-COV-2 RNA SPEC QL NAA+PROBE: SIGNIFICANT CHANGE UP
SODIUM SERPL-SCNC: 138 MMOL/L — SIGNIFICANT CHANGE UP (ref 135–145)
T PALLIDUM AB TITR SER: NEGATIVE — SIGNIFICANT CHANGE UP
T4 FREE SERPL-MCNC: 1 NG/DL — SIGNIFICANT CHANGE UP (ref 0.9–1.8)
T4/T3 UPTAKE INDEX SERPL: 1.03 TBI — SIGNIFICANT CHANGE UP (ref 0.8–1.3)
TRIGL SERPL-MCNC: 191 MG/DL — HIGH
TSH SERPL-MCNC: 3.31 UIU/ML — SIGNIFICANT CHANGE UP (ref 0.27–4.2)
WBC # BLD: 2.7 K/UL — LOW (ref 3.8–10.5)
WBC # FLD AUTO: 2.7 K/UL — LOW (ref 3.8–10.5)

## 2021-11-29 PROCEDURE — 99222 1ST HOSP IP/OBS MODERATE 55: CPT

## 2021-11-29 PROCEDURE — 84165 PROTEIN E-PHORESIS SERUM: CPT | Mod: 26

## 2021-11-29 PROCEDURE — 99233 SBSQ HOSP IP/OBS HIGH 50: CPT | Mod: GC

## 2021-11-29 RX ORDER — INFLUENZA VIRUS VACCINE 15; 15; 15; 15 UG/.5ML; UG/.5ML; UG/.5ML; UG/.5ML
0.7 SUSPENSION INTRAMUSCULAR ONCE
Refills: 0 | Status: DISCONTINUED | OUTPATIENT
Start: 2021-11-29 | End: 2021-12-03

## 2021-11-29 RX ADMIN — Medication 25 MILLIGRAM(S): at 13:47

## 2021-11-29 RX ADMIN — Medication 81 MILLIGRAM(S): at 12:50

## 2021-11-29 RX ADMIN — ENOXAPARIN SODIUM 40 MILLIGRAM(S): 100 INJECTION SUBCUTANEOUS at 12:51

## 2021-11-29 RX ADMIN — Medication 25 MILLIGRAM(S): at 21:55

## 2021-11-29 RX ADMIN — AMLODIPINE BESYLATE 10 MILLIGRAM(S): 2.5 TABLET ORAL at 05:40

## 2021-11-29 RX ADMIN — FAMOTIDINE 20 MILLIGRAM(S): 10 INJECTION INTRAVENOUS at 12:50

## 2021-11-29 RX ADMIN — Medication 6: at 17:52

## 2021-11-29 RX ADMIN — INSULIN GLARGINE 10 UNIT(S): 100 INJECTION, SOLUTION SUBCUTANEOUS at 00:40

## 2021-11-29 RX ADMIN — Medication 4: at 09:26

## 2021-11-29 RX ADMIN — Medication 25 MILLIGRAM(S): at 17:52

## 2021-11-29 RX ADMIN — Medication 100 MILLIGRAM(S): at 12:51

## 2021-11-29 RX ADMIN — Medication 2: at 13:42

## 2021-11-29 RX ADMIN — ATORVASTATIN CALCIUM 20 MILLIGRAM(S): 80 TABLET, FILM COATED ORAL at 21:55

## 2021-11-29 RX ADMIN — INSULIN GLARGINE 10 UNIT(S): 100 INJECTION, SOLUTION SUBCUTANEOUS at 21:55

## 2021-11-29 RX ADMIN — Medication 60 MILLIGRAM(S): at 12:46

## 2021-11-29 RX ADMIN — PANTOPRAZOLE SODIUM 40 MILLIGRAM(S): 20 TABLET, DELAYED RELEASE ORAL at 06:19

## 2021-11-29 RX ADMIN — POLYETHYLENE GLYCOL 3350 17 GRAM(S): 17 POWDER, FOR SOLUTION ORAL at 12:47

## 2021-11-29 RX ADMIN — Medication 25 MILLIGRAM(S): at 05:40

## 2021-11-29 NOTE — CONSULT NOTE ADULT - SUBJECTIVE AND OBJECTIVE BOX
HPI:  Patient is a 66 year old female hx of insulin dependant DM2, HTN, HLD, pancreatic pseudocyst w/ recent admission and drainage, iron deficiency anemia, who presents due to decreased hearing. She was at a loud party on Friday, and she noticed ringing and then decreased hearing (first left then right) 11/28/21 in the morning. She had ringing in the ear one month ago as well. 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 She doesn't have any recent changes in medications, and only occasionally takes aspirin. She was recently hospitalized due to pancreatic pseudocyst w/ drainage, anemia w/ endoscopy/c-scope per pt and daughter, and antibiotic use as well (family unsure). Denies fevers, chills, nausea, vomiting, diarrhea, sob, cp, visual changes, headaches, LE swelling, or skin rashes.     ED course: afebrile, HR 84, /88, RR 18, 100%.  CT head angio, CT auditory canal noncon negative for acute findings, no masses or fractures noted, no clinically important closure of vasculature noted       (28 Nov 2021 23:00)    Awoke yesterday hearing daughter listen to music but lost all hearing shortly after walked into living room.  No headache, fever, chills.    Hospitalized last month for endoscopy, stent (reports 3 procedures requiring anasthia).  received ceftriaxone (per discharge summary.      PAST MEDICAL & SURGICAL HISTORY:  Type 2 diabetes mellitus    Bilateral cataracts    Fluid in pleural cavity associated with pancreatitis    Pancreatic pseudocyst/cyst  s/p drain placement and removal    History of amputation of right great toe        Allergies    penicillin (Red Man Synd)    Intolerances        ANTIMICROBIALS:      OTHER MEDS:  acetaminophen     Tablet .. 650 milliGRAM(s) Oral every 6 hours PRN  amLODIPine   Tablet 10 milliGRAM(s) Oral daily  aspirin enteric coated 81 milliGRAM(s) Oral daily  atorvastatin 20 milliGRAM(s) Oral at bedtime  dextrose 40% Gel 15 Gram(s) Oral once  dextrose 5%. 1000 milliLiter(s) IV Continuous <Continuous>  dextrose 5%. 1000 milliLiter(s) IV Continuous <Continuous>  dextrose 50% Injectable 25 Gram(s) IV Push once  dextrose 50% Injectable 12.5 Gram(s) IV Push once  dextrose 50% Injectable 25 Gram(s) IV Push once  enoxaparin Injectable 40 milliGRAM(s) SubCutaneous daily  famotidine    Tablet 20 milliGRAM(s) Oral daily  glucagon  Injectable 1 milliGRAM(s) IntraMuscular once  hydrALAZINE 25 milliGRAM(s) Oral every 8 hours  influenza  Vaccine (HIGH DOSE) 0.7 milliLiter(s) IntraMuscular once  insulin glargine Injectable (LANTUS) 10 Unit(s) SubCutaneous at bedtime  insulin lispro (ADMELOG) corrective regimen sliding scale   SubCutaneous three times a day before meals  metoprolol tartrate 25 milliGRAM(s) Oral every 12 hours  pantoprazole    Tablet 40 milliGRAM(s) Oral before breakfast  polyethylene glycol 3350 17 Gram(s) Oral daily  predniSONE   Tablet 60 milliGRAM(s) Oral daily  thiamine 100 milliGRAM(s) Oral daily      SOCIAL HISTORY:    FAMILY HISTORY:      REVIEW OF SYSTEMS  [  ] ROS unobtainable because:    [x  ] All other systems negative except as noted below:	    Constitutional:  [ ] fever [ ] chills  [ ] weight loss  [ ] weakness  Skin:  [ ] rash [ ] phlebitis	  Eyes: [ ] icterus [ ] pain  [ ] discharge	  ENMT: [ ] sore throat  [ ] thrush [ ] ulcers [ ] exudates   hearing loss  Respiratory: [ ] dyspnea [ ] hemoptysis [ ] cough [ ] sputum	  Cardiovascular:  [ ] chest pain [ ] palpitations [ ] edema	  Gastrointestinal:  [ ] nausea [ ] vomiting [ ] diarrhea [ ] constipation [ ] pain	  Genitourinary:  [ ] dysuria [ ] frequency [ ] hematuria [ ] discharge [ ] flank pain  [ ] incontinence  Musculoskeletal:  [ ] myalgias [ ] arthralgias [ ] arthritis  [ ] back pain  Neurological:  [ ] headache [ ] seizures  [ ] confusion/altered mental status  Psychiatric:  [ ] anxiety [ ] depression	  Hematology/Lymphatics:  [ ] lymphadenopathy  Endocrine:  [ ] adrenal [ ] thyroid  Allergic/Immunologic:	 [ ] transplant [ ] seasonal    PHYSICAL EXAM:  Constitutional: Not in acute distress, sitting side of bed eating lunch, answering questions after reading on notepaper   Eyes: No icterus.  Neck: Supple  RS: Chest clear   CVS: S1, S2 heard.  Abdomen: Soft. No guarding/rigidity/tenderness.  Neuro: Alert, oriented to time/place/person   No focal abnormalities    Drug Dosing Weight  Height (cm): 162.6 (29 Nov 2021 05:34)  Weight (kg): 80.5 (29 Nov 2021 05:34)  BMI (kg/m2): 30.4 (29 Nov 2021 05:34)  BSA (m2): 1.86 (29 Nov 2021 05:34)    Vital Signs Last 24 Hrs  T(F): 98.4 (11-29-21 @ 17:54), Max: 98.7 (11-28-21 @ 11:59)    Vital Signs Last 24 Hrs  HR: 77 (11-29-21 @ 17:54) (75 - 88)  BP: 144/78 (11-29-21 @ 17:54) (134/75 - 168/98)  RR: 18 (11-29-21 @ 17:54)  SpO2: 98% (11-29-21 @ 17:54) (98% - 100%)  Wt(kg): --                          7.6    2.70  )-----------( 267      ( 29 Nov 2021 06:47 )             24.2       11-29    138  |  102  |  20  ----------------------------<  192<H>  3.5   |  25  |  1.01    Ca    9.0      29 Nov 2021 06:47    TPro  7.0  /  Alb  3.2<L>  /  TBili  0.3  /  DBili  x   /  AST  14  /  ALT  9   /  AlkPhos  124<H>  11-29          MICROBIOLOGY:    Cytomegalovirus IgM Antibody, Serum (11.29.21 @ 09:17)   CMV IgM Antibody: 40.5 AU/mL   CMV IgM Interpretation: Positive:HSV I Antibody IgG (11.29.21 @ 09:17)   Herpes simplex 1 IgG Ab Result: 32.00 Index   Herpes simplex 1 IgG Ab Interp: PositiveBorrelia burgdorferi IgG/IgM Antibodies (11.29.21 @ 09:17)   LYME IgG/IgM Antibodies Result: 0.11 Index   Lyme C6 Interpretation: Negative:Syphilis Screen (11.29.21 @ 09:17)   Treponema Pallidum Antibody Interpretation: NegativeHIV-1/2 Antigen/Antibody Screen by [facility] (11.29.21 @ 06:47)   HIV-1/2 Combo Result: Nonreact            RADIOLOGY:
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
## 2021-11-29 NOTE — CONSULT NOTE ADULT - ASSESSMENT
65 yo woman with diabetes recent hospitalization for w/u pancreatic cyst s/p endoscopic procedure x 3 who awoke 11/28 with sudden bilateral hearing loss.  Etiology unclear- medication related, autoimmune, vascular, malignant,  infectious (less likely).  HIV, syphilis screen, Lyme screen negative.  Serum CMV IgM + non specific, normal LFTs.  CMV is common cause of hearing loss in congenital infections, not in adults who are not immunocompromised.  Suggest:  await MRI  check serum CMV PCR    will follow    Yanci Garcia MD  Pager: [phone]  After 5 PM or weekends please call fellow on call or office [phone]

## 2021-11-29 NOTE — PROGRESS NOTE ADULT - ASSESSMENT
Patient is a 66 year old female hx of insulin dependant DM2, HTN, HLD, pancreatic pseudocyst w/ recent admission and drainage, iron deficiency anemia, who presents due to decreased hearing.  Patient is a 66 year old female hx of insulin dependant DM2, HTN, HLD, pancreatic pseudocyst w/ recent admission and drainage, iron deficiency anemia, who presents due to decreased hearing potentially 2/2 viral vs mass lesion vs stroke. CTA negative, pending MRI of head and IAC.

## 2021-11-29 NOTE — PROGRESS NOTE ADULT - PROBLEM SELECTOR PLAN 1
-bilateral hearing loss, tinnitus prior  -external ear exam benign, otoscopic exam w/out acute findings  -differentials include drug induced, possibly due to antibiotics for pancreatic drainage (daughter and pt unsure which antibiotic), vibratory hearing more intact than sensinoneural.   -obtain MR head w/ w/o iv and IAC w/ w/o iv (no metals in body creatinine clearance acceptable, patient aware) to rule out lesions.   -will need outpatient f/u w/ ENT for official audiogram and steroid use  -lisinopril can cause tinnitus 1-10% of cases, will hold for now  -ordered for viral/rheumatologic workup per neurology, consent obtained for HIV/RPR -bilateral hearing loss, tinnitus prior  -external ear exam benign, otoscopic exam w/out acute findings  -differentials include drug induced, possibly due to antibiotics for pancreatic drainage (daughter and pt unsure which antibiotic), vibratory hearing more intact than sensinoneural.   -obtain MR head w/ w/o iv and IAC w/ w/o iv (no metals in body creatinine clearance acceptable, patient aware) to rule out lesions.   -will need outpatient f/u w/ ENT for official audiogram and steroid use  -lisinopril can cause tinnitus 1-10% of cases, will hold for now  -ordered for viral/rheumatologic workup per neurology, consent obtained for HIV/RPR    PLAN  - HSV IgG and CMV IgM positive - ID consult obtained  - MRI of brain and IAC pending  - Started prednisone 60 (day 1/5) - per neurology recommendations, will adjust if necessary pending ID response

## 2021-11-29 NOTE — CHART NOTE - NSCHARTNOTEFT_GEN_A_CORE
Please see Neurology Attending Attestation to original consult note.    Patient seen and examined at bedside by neurology resident and Attending.    Recommend 5 days of prednisone 60mg daily with GI ppx.     d/w Dr. Ryder Please see Neurology Attending Attestation to original consult note.    Patient seen and examined at bedside by neurology resident and Attending.    Recommend 5 days of prednisone 60mg daily with GI ppx.     d/w Dr. Ryder    _______________    notified by team that patient + CMV IgM.    notified Dr. Ryder Neurology Attending.     At this time would continue steroids and would recommend ID consult for treatment CMV (ganciclovir, steroids, etc.).    Pending neuroimaging.     d/w primary team, Dr. Ryder Neurology Attending

## 2021-11-29 NOTE — PATIENT PROFILE ADULT - NSASFALLATTEMPTOOB_GEN_A_NUR
Attempted to call patients Camryn Shell and her  picked up the phone. Left a message with her  for POA to call the clinic back regarding the test results. Message Contents   Tequila Pruitt MD sent to [facility] Nurse Msg Pool      Â       Urine culture shows Escherichia coli UTI sensitive to Cipro. Â Please advise patient to complete the antibiotic course as prescribed at her urgent care visit. Associated Results     Result Notes for URINE, BACTERIAL CULTURE     Notes recorded by Tequila Pruitt MD on 6/26/2020 at 9:40 AM CDT  Urine culture shows Escherichia coli UTI sensitive to Cipro. Â Please advise patient to complete the antibiotic course as prescribed at her urgent care visit. Contains abnormal data URINE, BACTERIAL CULTURE   Order: [id number]   Status:  Final result   Visible to patient:  No (Not Released)   Dx:  Dizziness   Specimen Information: Urine, Catheterized - Straight   Â     REFLEXIVE URINE CULTURE >100,000 CFU/mL Escherichia coliAbnormal           Resulting Agency: [facility]   Susceptibility      Escherichia coli     TIFFANIE     AMOXICIL/CLAVULANATE 4 ug/mL Susceptible     AMPICILLIN 16 ug/mL Intermediate     AMPICILLIN/SULBACTAM 4 ug/mL Susceptible     AZTREONAM <=1 ug/mL Susceptible     CEFAZOLIN <=4 ug/mL1      CEFAZOLIN (URINE) <=4 ug/mL Susceptible2     CEFOXITIN <=4 ug/mL Susceptible     CEFTRIAXONE <=1 ug/mL Susceptible     CEFUROXIME - AXETIL 4 ug/mL Susceptible     CIPROFLOXACIN <=0.25 ug/mL Susceptible     GENTAMICIN <=1 ug/mL Susceptible     NITROFURANTOIN <=16 ug/mL Susceptible     PIPERACILLIN/TAZOBAC <=4 ug/mL Susceptible     TRIMETH / SULFA <=20 ug/mL Susceptible           1 A cefazolin TIFFANIE result of <=4 cannot be used to differentiate between susceptible and intermediate isolates. Â Consider alternative therapy based on clinical response and severity of infection. Â This result should be used when considering treatment for any infection other than an uncomplicated UTI.    2 If susceptible, cefazolin predicts activity for other oral cephalosporins (cefaclor, cefdinir, cefpodoxime, cefprozil, cefuroxime, cephalexin, and loracarbef) when used for uncomplicated UTIs due to E. coli, K. pneumo, and P. mirabilis. Specimen Collected: 06/24/20 17:54   Last Resulted: 06/26/20 09:13        Â Lab Flowsheet Â      Order Details Â      View Encounter Â      Lab and Collection Details Â      Routing Â      Result History Â              Other Results from This Encounter     Contains abnormal data COMPREHENSIVE METABOLIC PANEL   Order: [id number]   Status:  Final result   Visible to patient:  No (Inaccessible in Patient Portal)   Dx:  Dizziness    Ref Range & Units 2d ago 2mo ago   Fasting Status Hours 0  0    Sodium 135 - 145 mmol/L 136  137    Potassium 3.4 - 5.1 mmol/L 4.7  4.3    Chloride 98 - 107 mmol/L 101  102    Carbon Dioxide 21 - 32 mmol/L 24  28    Anion Gap 10 - 20 mmol/L 16  11    Glucose 65 - 99 mg/dL 228High   274High     BUN 6 - 20 mg/dL 26High   23High     Creatinine 0.51 - 0.95 mg/dL 1. 30High   1. 42High     Glomerular Filtration Rate >90 38Low   34Low  CM   Comment: eGFR 30-59 mL/min/1.73m2 = Moderate decrease in kidney function. Stage 3 CKD (chronic kidney disease) or moderate kidney disease. BUN/ Creatinine Ratio 7 - 25 20  16    Bilirubin, Total 0.2 - 1.0 mg/dL 0.9     GOT/AST <=37 Units/L 27     Alkaline Phosphatase 45 - 117 Units/L 41Low      Comment: Low Alkaline Phosphatase results may indicate hypophosphatasia, malnutrition, hypothyroidism, or other disease states. Correlate with clinical symptoms.    Albumin 3.6 - 5.1 g/dL 3.9     Protein, Total 6.4 - 8.2 g/dL 6.3Low      Globulin 2.0 - 4.0 g/dL 2.4     A/G Ratio 1.0 - 2.4 1.6     GPT/ALT <64 Units/L 20     Calcium 8.4 - 10.2 mg/dL 9.5  9.2    Resulting Agency  [facility]         Specimen Collected: 06/24/20 16:50   Last Resulted: 06/24/20 17:16        Â Lab Flowsheet Â      Order Details Â      View Encounter Â      Lab and Collection Details Â      Routing Â      Result History Â        CM=Additional commentsÂ Â          Contains abnormal data CBC WITH AUTOMATED DIFFERENTIAL (PERFORMABLE ONLY)   Order: [id number] - Part of Panel Order [id number]   Status:  Final result   Visible to patient:  No (Inaccessible in Patient Portal)   Dx:  Dizziness    Ref Range & Units 2d ago 3mo ago   WBC 4.2 - 11.0 K/mcL 2.7Low   4.9    RBC 4.00 - 5.20 mil/mcL 4.01  3.53Low     HGB 12.0 - 15.5 g/dL 12.1  10.0Low     HCT 36.0 - 46.5 % 37.8  30.9Low     MCV 78.0 - 100.0 fl 94.3  87.5    MCH 26.0 - 34.0 pg 30.2  28.3    MCHC 32.0 - 36.5 g/dL 32.0  32.4    RDW-CV 11.0 - 15.0 % 14.1  14.7     - 450 K/mcL 85Low   89Low     Neutrophil, Percent % 63  82    Lymphocytes, Percent % 27  8    Absolute Neutrophils 1.8 - 7.7 K/mcL 1.7Low   4.0    Absolute Lymphocytes 1.0 - 4.0 K/mcL 0.7Low   0.4Low     Absolute Mid Cells 0.1 - 1.1 K/mcL 0.3     Percent Mid Cells % 10     RDW-SD 39.0 - 50.0 fL 48.7  47.2    NRBC   0 R   Mono, Percent   9 R   Eosinophils, Percent   0 R   Basophils, Percent   0 R   Immature Granulocytes   1 R   Absolute Monocytes   0.4 R   Absolute Eosinophils    0.0Low  R   Absolute Basophils   0.0 R   Absolute Immmature Granulocytes   0.1 R   Resulting [address] [place] [address]      Narrative   Performed by: Neda Hickey   This is an appended report. Â These results have been appended to a previously verified report.       Specimen Collected: 06/24/20 16:50   Last Resulted: 06/24/20 17:07        Â Lab Flowsheet Â      Order Details Â      View Encounter Â      Lab and Collection Details Â      Routing Â      Result History Â        R=Reference range differs from displayed rangeÂ Â          Status of Other Orders     Expected   INSERT NON-INDWEL BLADDER CATH   06/24/20      Completed     POCT URINE DIP NON-AUTO  06/24/20   CBC WITH DIFFERENTIAL AbnormalAbnormal   06/24/20   ELECTROCARDIOGRAM 12-LEAD  06/24/20   POCT BLOOD SUGAR REAGENT STRIP AbnormalAbnormal   06/24/20      Canceled   POCT URINE DIP NON-AUTO   06/24/20   Reason: Error      Â    Current Medications      Disp Refills   donepezil (ARICEPT) 10 MG tablet 30 tablet 1   Start: 6/24/2020   Sig: Take 1 tablet by mouth at bedtime. Class: Eprescribe   Route: Oral   ciprofloxacin (CIPRO) 250 MG tablet 14 tablet 0   Start: 6/24/2020   End: 7/1/2020   Sig: Take 1 tablet by mouth 2 times daily for 7 days. Class: Eprescribe   Route: Oral   Insulin Lispro, 1 Unit Dial, (HUMALOG KWIKPEN) 100 UNIT/ML pen-injector 15 mL 3   Start: 4/14/2020   Sig: Inject 4 Units into the skin 3 times daily (before meals). Class: Script Not Printed   Route: Subcutaneous   insulin glargine (LANTUS SOLOSTAR) 100 UNIT/ML pen-injector 30 mL 1   Start: 4/14/2020   Sig: Inject 19 Units into the skin nightly. Class: Script Not Printed   Route: Subcutaneous   Cosign for Ordering: Accepted by Gibson Kidd MD on 4/14/2020 Â 1:31 PM   dextrose (GLUTOSE) 40 % Gel 5 Package 0   Start: 3/4/2020   Sig: Take 5.7944 Tubes by mouth as needed for Hypoglycemia. Route: Oral   nystatin-triamcinolone (MYCOLOG II) 868759-2.1 UNIT/GM-% ointment 30 g 0   Start: 3/4/2020   Sig: Apply topically 2 times daily. Apply affected skin folds   Route: Topical   Cyanocobalamin (VITAMIN B12 PO)     Sig: Take 2,500 mcg by mouth daily. Class: Historical Med   Route: Oral   Magnesium 400 MG Tab     Sig: Take 400 mg by mouth daily. Class: Historical Med   Route: Oral   BIOTIN PO     Sig: Take 1 tablet by mouth daily. Class: Historical Med   Route: Oral   Cholecalciferol (VITAMIN D3) 50 mcg (2,000 units) tablet     Sig: Take 50 mcg by mouth daily. Class: Historical Med   Notes to Pharmacy: 50 mcg = 2,000 units   Route: Oral   Bioflavonoid Products (BIOFLEX PO)     Sig: Take 1 capsule by mouth daily.    Class: Historical Med   Route: Oral   methIMAzole (TAPAZOLE) 5 MG tablet 90 tablet 3   Start: 2/25/2020   Sig: Take 1 tablet by mouth daily.   Class: Eprescribe   Notes to Pharmacy: New dose. Route: Oral   metoPROLOL tartrate (LOPRESSOR) 25 MG tablet 90 tablet 3   Start: 1/7/2020   Sig: Take 0.5 tablets by mouth every 12 hours. Class: Eprescribe   Route: Oral   atorvastatin (LIPITOR) 80 MG tablet 90 tablet 3   Start: 1/7/2020   Sig: Take 1 tablet by mouth daily. Class: Eprescribe   Route: Oral   furosemide (LASIX) 20 MG tablet 90 tablet 3   Start: 1/7/2020   Sig: Take 1 tablet by mouth as needed (for leg swelling). Class: Script Not Printed   Route: Oral   losartan (COZAAR) 50 MG tablet 90 tablet 3   Start: 1/7/2020   Sig: Take 1 tablet by mouth daily. Class: Eprescribe   Route: Oral   clindamycin (CLEOCIN) 300 MG capsule 6 capsule 2   Start: 8/30/2019   Sig: Take 600 mg 1 hour prior to dental procedures   Class: Eprescribe   vitamin E 400 UNIT capsule     Sig: Take 400 Units by mouth daily. Class: Historical Med   Route: Oral   aspirin (ASPIR-LOW) 81 MG EC tablet 30 tablet    Start: 2/6/2013   Sig: Take 1 tablet by mouth daily. Class: Historical Med   Route: Oral   Ferrous Sulfate (IRON) 325 (65 FE) MG TABS     Sig: Take 1 tablet by mouth daily as needed.     Class: Historical Med   Route: Oral no

## 2021-11-30 LAB
% ALBUMIN: 36.9 % — SIGNIFICANT CHANGE UP
% ALPHA 1: 4.9 % — SIGNIFICANT CHANGE UP
% ALPHA 2: 15.3 % — SIGNIFICANT CHANGE UP
% BETA: 14.7 % — SIGNIFICANT CHANGE UP
% GAMMA: 28.2 % — SIGNIFICANT CHANGE UP
ACE SERPL-CCNC: 50 U/L — SIGNIFICANT CHANGE UP (ref 14–82)
ALBUMIN SERPL ELPH-MCNC: 2.58 G/DL — LOW (ref 3.3–4.4)
ALBUMIN/GLOB SERPL ELPH: 0.6 RATIO — SIGNIFICANT CHANGE UP
ALPHA1 GLOB SERPL ELPH-MCNC: 0.34 G/DL — HIGH (ref 0.1–0.3)
ALPHA2 GLOB SERPL ELPH-MCNC: 1.1 G/DL — HIGH (ref 0.6–1)
ANION GAP SERPL CALC-SCNC: 15 MMOL/L — HIGH (ref 7–14)
B-GLOBULIN SERPL ELPH-MCNC: 1.03 G/DL — SIGNIFICANT CHANGE UP (ref 0.6–1.1)
BUN SERPL-MCNC: 33 MG/DL — HIGH (ref 7–23)
CALCIUM SERPL-MCNC: 9.2 MG/DL — SIGNIFICANT CHANGE UP (ref 8.4–10.5)
CHLORIDE SERPL-SCNC: 98 MMOL/L — SIGNIFICANT CHANGE UP (ref 98–107)
CO2 SERPL-SCNC: 21 MMOL/L — LOW (ref 22–31)
CREAT SERPL-MCNC: 1.16 MG/DL — SIGNIFICANT CHANGE UP (ref 0.5–1.3)
GAMMA GLOBULIN: 1.97 G/DL — HIGH (ref 0.7–1.7)
GLUCOSE SERPL-MCNC: 447 MG/DL — HIGH (ref 70–99)
HCT VFR BLD CALC: 30.2 % — LOW (ref 34.5–45)
HGB BLD-MCNC: 9.6 G/DL — LOW (ref 11.5–15.5)
MAGNESIUM SERPL-MCNC: 1.6 MG/DL — SIGNIFICANT CHANGE UP (ref 1.6–2.6)
MCHC RBC-ENTMCNC: 24.4 PG — LOW (ref 27–34)
MCHC RBC-ENTMCNC: 31.8 GM/DL — LOW (ref 32–36)
MCV RBC AUTO: 76.8 FL — LOW (ref 80–100)
NRBC # BLD: 0 /100 WBCS — SIGNIFICANT CHANGE UP
NRBC # FLD: 0 K/UL — SIGNIFICANT CHANGE UP
PHOSPHATE SERPL-MCNC: 4.9 MG/DL — HIGH (ref 2.5–4.5)
PLATELET # BLD AUTO: 337 K/UL — SIGNIFICANT CHANGE UP (ref 150–400)
POTASSIUM SERPL-MCNC: 4.1 MMOL/L — SIGNIFICANT CHANGE UP (ref 3.5–5.3)
POTASSIUM SERPL-SCNC: 4.1 MMOL/L — SIGNIFICANT CHANGE UP (ref 3.5–5.3)
PROT PATTERN SERPL ELPH-IMP: SIGNIFICANT CHANGE UP
PROT SERPL-MCNC: 7 G/DL — SIGNIFICANT CHANGE UP
RBC # BLD: 3.93 M/UL — SIGNIFICANT CHANGE UP (ref 3.8–5.2)
RBC # FLD: 16.9 % — HIGH (ref 10.3–14.5)
SODIUM SERPL-SCNC: 134 MMOL/L — LOW (ref 135–145)
WBC # BLD: 3.41 K/UL — LOW (ref 3.8–10.5)
WBC # FLD AUTO: 3.41 K/UL — LOW (ref 3.8–10.5)

## 2021-11-30 PROCEDURE — 99232 SBSQ HOSP IP/OBS MODERATE 35: CPT | Mod: GC

## 2021-11-30 RX ORDER — INSULIN LISPRO 100/ML
18 VIAL (ML) SUBCUTANEOUS ONCE
Refills: 0 | Status: COMPLETED | OUTPATIENT
Start: 2021-11-30 | End: 2021-11-30

## 2021-11-30 RX ORDER — INSULIN GLARGINE 100 [IU]/ML
12 INJECTION, SOLUTION SUBCUTANEOUS AT BEDTIME
Refills: 0 | Status: DISCONTINUED | OUTPATIENT
Start: 2021-11-30 | End: 2021-11-30

## 2021-11-30 RX ORDER — INSULIN LISPRO 100/ML
6 VIAL (ML) SUBCUTANEOUS EVERY 6 HOURS
Refills: 0 | Status: DISCONTINUED | OUTPATIENT
Start: 2021-11-30 | End: 2021-11-30

## 2021-11-30 RX ORDER — INSULIN LISPRO 100/ML
3 VIAL (ML) SUBCUTANEOUS
Refills: 0 | Status: DISCONTINUED | OUTPATIENT
Start: 2021-11-30 | End: 2021-11-30

## 2021-11-30 RX ORDER — INSULIN LISPRO 100/ML
15 VIAL (ML) SUBCUTANEOUS ONCE
Refills: 0 | Status: COMPLETED | OUTPATIENT
Start: 2021-11-30 | End: 2021-11-30

## 2021-11-30 RX ORDER — INSULIN LISPRO 100/ML
VIAL (ML) SUBCUTANEOUS AT BEDTIME
Refills: 0 | Status: DISCONTINUED | OUTPATIENT
Start: 2021-11-30 | End: 2021-11-30

## 2021-11-30 RX ORDER — INSULIN GLARGINE 100 [IU]/ML
20 INJECTION, SOLUTION SUBCUTANEOUS AT BEDTIME
Refills: 0 | Status: DISCONTINUED | OUTPATIENT
Start: 2021-11-30 | End: 2021-12-01

## 2021-11-30 RX ORDER — SODIUM CHLORIDE 9 MG/ML
1000 INJECTION INTRAMUSCULAR; INTRAVENOUS; SUBCUTANEOUS ONCE
Refills: 0 | Status: COMPLETED | OUTPATIENT
Start: 2021-11-30 | End: 2021-11-30

## 2021-11-30 RX ORDER — SODIUM CHLORIDE 9 MG/ML
1000 INJECTION INTRAMUSCULAR; INTRAVENOUS; SUBCUTANEOUS
Refills: 0 | Status: DISCONTINUED | OUTPATIENT
Start: 2021-11-30 | End: 2021-12-02

## 2021-11-30 RX ORDER — INSULIN GLARGINE 100 [IU]/ML
14 INJECTION, SOLUTION SUBCUTANEOUS AT BEDTIME
Refills: 0 | Status: DISCONTINUED | OUTPATIENT
Start: 2021-11-30 | End: 2021-11-30

## 2021-11-30 RX ORDER — INSULIN LISPRO 100/ML
VIAL (ML) SUBCUTANEOUS
Refills: 0 | Status: DISCONTINUED | OUTPATIENT
Start: 2021-11-30 | End: 2021-11-30

## 2021-11-30 RX ORDER — INSULIN LISPRO 100/ML
VIAL (ML) SUBCUTANEOUS EVERY 6 HOURS
Refills: 0 | Status: DISCONTINUED | OUTPATIENT
Start: 2021-11-30 | End: 2021-11-30

## 2021-11-30 RX ORDER — INSULIN LISPRO 100/ML
8 VIAL (ML) SUBCUTANEOUS
Refills: 0 | Status: DISCONTINUED | OUTPATIENT
Start: 2021-11-30 | End: 2021-12-01

## 2021-11-30 RX ORDER — INSULIN LISPRO 100/ML
VIAL (ML) SUBCUTANEOUS
Refills: 0 | Status: DISCONTINUED | OUTPATIENT
Start: 2021-11-30 | End: 2021-12-03

## 2021-11-30 RX ORDER — INSULIN LISPRO 100/ML
VIAL (ML) SUBCUTANEOUS AT BEDTIME
Refills: 0 | Status: DISCONTINUED | OUTPATIENT
Start: 2021-11-30 | End: 2021-12-03

## 2021-11-30 RX ADMIN — SODIUM CHLORIDE 75 MILLILITER(S): 9 INJECTION INTRAMUSCULAR; INTRAVENOUS; SUBCUTANEOUS at 22:47

## 2021-11-30 RX ADMIN — ENOXAPARIN SODIUM 40 MILLIGRAM(S): 100 INJECTION SUBCUTANEOUS at 12:07

## 2021-11-30 RX ADMIN — INSULIN GLARGINE 20 UNIT(S): 100 INJECTION, SOLUTION SUBCUTANEOUS at 22:46

## 2021-11-30 RX ADMIN — ATORVASTATIN CALCIUM 20 MILLIGRAM(S): 80 TABLET, FILM COATED ORAL at 22:47

## 2021-11-30 RX ADMIN — FAMOTIDINE 20 MILLIGRAM(S): 10 INJECTION INTRAVENOUS at 12:06

## 2021-11-30 RX ADMIN — Medication 25 MILLIGRAM(S): at 05:53

## 2021-11-30 RX ADMIN — Medication 18 UNIT(S): at 12:56

## 2021-11-30 RX ADMIN — Medication 2: at 22:47

## 2021-11-30 RX ADMIN — SODIUM CHLORIDE 75 MILLILITER(S): 9 INJECTION INTRAMUSCULAR; INTRAVENOUS; SUBCUTANEOUS at 16:19

## 2021-11-30 RX ADMIN — SODIUM CHLORIDE 1000 MILLILITER(S): 9 INJECTION INTRAMUSCULAR; INTRAVENOUS; SUBCUTANEOUS at 12:07

## 2021-11-30 RX ADMIN — Medication 15 UNIT(S): at 09:39

## 2021-11-30 RX ADMIN — Medication 81 MILLIGRAM(S): at 12:06

## 2021-11-30 RX ADMIN — AMLODIPINE BESYLATE 10 MILLIGRAM(S): 2.5 TABLET ORAL at 05:54

## 2021-11-30 RX ADMIN — Medication 100 MILLIGRAM(S): at 12:06

## 2021-11-30 RX ADMIN — PANTOPRAZOLE SODIUM 40 MILLIGRAM(S): 20 TABLET, DELAYED RELEASE ORAL at 06:15

## 2021-11-30 RX ADMIN — Medication 25 MILLIGRAM(S): at 16:20

## 2021-11-30 RX ADMIN — Medication 60 MILLIGRAM(S): at 05:53

## 2021-11-30 NOTE — PROVIDER CONTACT NOTE (MEDICATION) - ACTION/TREATMENT ORDERED:
Ongoing monitoring for this patient
Ongoing monitoring for this for this patient
Ongoing monitoring for this patient

## 2021-11-30 NOTE — PROGRESS NOTE ADULT - PROBLEM SELECTOR PLAN 1
-bilateral hearing loss, tinnitus prior  -external ear exam benign, otoscopic exam w/out acute findings  -differentials include drug induced, possibly due to antibiotics for pancreatic drainage (daughter and pt unsure which antibiotic), vibratory hearing more intact than sensinoneural.   -obtain MR head w/ w/o iv and IAC w/ w/o iv (no metals in body creatinine clearance acceptable, patient aware) to rule out lesions.   -will need outpatient f/u w/ ENT for official audiogram and steroid use  -lisinopril can cause tinnitus 1-10% of cases, will hold for now  -ordered for viral/rheumatologic workup per neurology, consent obtained for HIV/RPR    PLAN  - HSV IgG and CMV IgM positive - ID consult obtained  - MRI of brain and IAC pending  - Started prednisone 60 (day 1/5) - per neurology recommendations, will adjust if necessary pending ID response -bilateral hearing loss, tinnitus prior  -external ear exam benign, otoscopic exam w/out acute findings  -differentials include drug induced, possibly due to antibiotics for pancreatic drainage (daughter and pt unsure which antibiotic), vibratory hearing more intact than sensinoneural.   -obtain MR head w/ w/o iv and IAC w/ w/o iv (no metals in body creatinine clearance acceptable, patient aware) to rule out lesions.   -will need outpatient f/u w/ ENT for official audiogram and steroid use  -lisinopril can cause tinnitus 1-10% of cases, will hold for now  -ordered for viral/rheumatologic workup per neurology, consent obtained for HIV/RPR    PLAN  - HSV IgG and CMV IgM positive - ID consult obtained    -- pending MRI from ID; CMV serum level to be obtained with next labs  - MRI of brain and IAC pending  - c/w prednisone 60 (day 2/5) - per neurology recommendations, will adjust if necessary pending ID response

## 2021-11-30 NOTE — PROGRESS NOTE ADULT - PROBLEM SELECTOR PLAN 5
-is on lantus 14 units, can give 10 units here  -is on 4-6 units premeal, can order for moderate sliding scale  -CC diet    #MAGALY  -continue to monitor  -per pt and daughter egd/cscope unremarkable  -had 1 unit transfusion at prior hospitalization -is on lantus 14 units, can give 10 units here  -is on 4-6 units premeal, can order for moderate sliding scale  -CC diet    #MAGALY  -continue to monitor  -per pt and daughter egd/cscope unremarkable  -had 1 unit transfusion at prior hospitalization    PLAN  - sugars elevated to 400s today; likely i/s/o new steroid use as well  - increased Lantus to 12 units; adding on premeal Admelog 3 units   - continue moderate correctional scale

## 2021-11-30 NOTE — PROGRESS NOTE ADULT - PROBLEM SELECTOR PLAN 3
-c/w amlodipine and HLZ  -hold lisinopril in case this could be the cause of her hearing issues (though less likely) until collateral about inpatient antibiotic regimen is obtained -c/w amlodipine and HLZ  -hold lisinopril in case this could be the cause of her hearing issues (though less likely) until collateral about inpatient antibiotic regimen is obtained    PLAN  - blood pressures stable, but slightly uptrending, SBP 160s today; has room to increase Hydral if necessary

## 2021-11-30 NOTE — PROVIDER CONTACT NOTE (MEDICATION) - RECOMMENDATIONS
Dr Woods made aware. Awaiting Insulin order fo cover. Patient Now NPO with IV fluids
Dr Lindsay made aware. New order received to hold BP med. Patient educated on safety precautions
Dr Woods made aware. Awaiting Insulin order fo cover BS and meals

## 2021-11-30 NOTE — PROGRESS NOTE ADULT - PROBLEM SELECTOR PLAN 2
-s/p drainage at prior hospital, and stent placement 1 month prior  -no leukocytosis or fevers  -abdomen soft, mildly distended, mild discomfort -s/p drainage at prior hospital, and stent placement 1 month prior  -no leukocytosis or fevers  -abdomen soft, mildly distended, mild discomfort    PLAN  - pain mild, has PRN tylenol if needed

## 2021-11-30 NOTE — PROGRESS NOTE ADULT - SUBJECTIVE AND OBJECTIVE BOX
Mookie Márquez MD  PGY-1 Internal Medicine  Pager:  [phone] (ns) [phone] ([facility])  Available on Microsoft Teams  11-30-21 @ 07:35  _________________________________________________________________________________________________________________________________________    CC:      Patient is a 66y old  Female who presents with a chief complaint of Bilateral hearing loss (29 Nov 2021 19:52)        SUBJECTIVE:    NAEO.      _________________________________________________________________________________________________________________________________________    OBJECTIVE:    Vital Signs Last 24 Hrs  T(C): 36.6 (30 Nov 2021 05:46), Max: 36.9 (29 Nov 2021 13:45)  T(F): 97.8 (30 Nov 2021 05:46), Max: 98.5 (29 Nov 2021 13:45)  HR: 70 (30 Nov 2021 05:46) (70 - 77)  BP: 164/79 (30 Nov 2021 05:46) (134/75 - 164/79)  BP(mean): --  RR: 17 (30 Nov 2021 05:46) (17 - 18)  SpO2: 100% (30 Nov 2021 05:46) (98% - 100%)    I&O's Summary    29 Nov 2021 07:01  -  30 Nov 2021 07:00  --------------------------------------------------------  IN: 760 mL / OUT: 3 mL / NET: 757 mL        MEDICATIONS  (STANDING):  amLODIPine   Tablet 10 milliGRAM(s) Oral daily  aspirin enteric coated 81 milliGRAM(s) Oral daily  atorvastatin 20 milliGRAM(s) Oral at bedtime  dextrose 40% Gel 15 Gram(s) Oral once  dextrose 5%. 1000 milliLiter(s) (50 mL/Hr) IV Continuous <Continuous>  dextrose 5%. 1000 milliLiter(s) (100 mL/Hr) IV Continuous <Continuous>  dextrose 50% Injectable 25 Gram(s) IV Push once  dextrose 50% Injectable 12.5 Gram(s) IV Push once  dextrose 50% Injectable 25 Gram(s) IV Push once  enoxaparin Injectable 40 milliGRAM(s) SubCutaneous daily  famotidine    Tablet 20 milliGRAM(s) Oral daily  glucagon  Injectable 1 milliGRAM(s) IntraMuscular once  hydrALAZINE 25 milliGRAM(s) Oral every 8 hours  influenza  Vaccine (HIGH DOSE) 0.7 milliLiter(s) IntraMuscular once  insulin glargine Injectable (LANTUS) 10 Unit(s) SubCutaneous at bedtime  insulin lispro (ADMELOG) corrective regimen sliding scale   SubCutaneous three times a day before meals  metoprolol tartrate 25 milliGRAM(s) Oral every 12 hours  pantoprazole    Tablet 40 milliGRAM(s) Oral before breakfast  polyethylene glycol 3350 17 Gram(s) Oral daily  predniSONE   Tablet 60 milliGRAM(s) Oral daily  thiamine 100 milliGRAM(s) Oral daily    MEDICATIONS  (PRN):  acetaminophen     Tablet .. 650 milliGRAM(s) Oral every 6 hours PRN Temp greater or equal to 38C (100.4F), Mild Pain (1 - 3)        PHYSICAL EXAM:    GENERAL: Laying comfortably, NAD  EYES: EOMI, PERRL, no scleral icterus  NECK: No JVD  LUNG: Clear to auscultation bilaterally; No wheeze, crackles or rhonci  HEART: Regular rate and rhythm; No murmurs, rubs, or gallops  ABDOMEN: Soft, Nontender, Nondistended  EXTREMITIES:  No LE edema, 2+ Peripheral Pulses, No clubbing, cyanosis, or edema  PSYCH: AAOx3  NEUROLOGY: non-focal, strength 5/5 in all extremities, sensation intact  SKIN: No rashes or lesions      LABS:                            7.6    2.70  )-----------( 267      ( 29 Nov 2021 06:47 )             24.2     Auto Eosinophil # 0.19  / Auto Eosinophil % 7.0   / Auto Neutrophil # 1.19  / Auto Neutrophil % 44.1  / BANDS % x                            7.9    4.22  )-----------( 283      ( 28 Nov 2021 15:34 )             24.7     Auto Eosinophil # x     / Auto Eosinophil % x     / Auto Neutrophil # x     / Auto Neutrophil % x     / BANDS % x        11-29    138  |  102  |  20  ----------------------------<  192<H>  3.5   |  25  |  1.01  11-28    139  |  106  |  23  ----------------------------<  115<H>  3.5   |  24  |  1.11    Ca    9.0      29 Nov 2021 06:47  TPro  7.0  /  Alb  3.2<L>  /  TBili  0.3  /  DBili  x   /  AST  14  /  ALT  9   /  AlkPhos  124<H>  11-29  TPro  6.9  /  Alb  3.0<L>  /  TBili  0.3  /  DBili  x   /  AST  14  /  ALT  10  /  AlkPhos  118  11-28    PT/INR - ( 28 Nov 2021 15:35 )   PT: 12.8 sec;   INR: 1.12 ratio         PTT - ( 28 Nov 2021 15:35 )  PTT:31.4 sec          RADIOLOGY & ADDITIONAL TESTS:    Imaging Personally Reviewed:    Consultant(s) Notes Reviewed:      Care Discussed with Consultants/Other Providers:      _________________________________________________________________________________________________________________________________________  Mookie Márquez MD  PGY-1 Internal Medicine  Pager: [phone] (NS) [phone] ([facility])  Available on Microsoft Teams  30 Nov 2021 07:35         Mookie Márquez MD  PGY-1 Internal Medicine  Pager:  [phone] (ns) [phone] ([facility])  Available on [employer] Teams  11-30-21 @ 07:35  _________________________________________________________________________________________________________________________________________    CC:      Patient is a 66y old  Female who presents with a chief complaint of Bilateral hearing loss (29 Nov 2021 19:52)        SUBJECTIVE:    NAEO.    Patient states that she still cannot hear, has had no improvement. Is aware that her sugars are elevated and that we are adjusting her insulin doses and awaiting MRI. She has not had any new focal neurological symptoms, but has mild epigastric pain. This could be due to her prior pseudocyst drainage vs her GERD, whichshe is on a regimen for. Lantus and Admelog doses were adjusted this Am. Denies any f/c, no n/v/d/c, no SOB, no chest pain, no other pain except the epigastric as noted.    _________________________________________________________________________________________________________________________________________    OBJECTIVE:    Vital Signs Last 24 Hrs  T(C): 36.6 (30 Nov 2021 05:46), Max: 36.9 (29 Nov 2021 13:45)  T(F): 97.8 (30 Nov 2021 05:46), Max: 98.5 (29 Nov 2021 13:45)  HR: 70 (30 Nov 2021 05:46) (70 - 77)  BP: 164/79 (30 Nov 2021 05:46) (134/75 - 164/79)  BP(mean): --  RR: 17 (30 Nov 2021 05:46) (17 - 18)  SpO2: 100% (30 Nov 2021 05:46) (98% - 100%)    I&O's Summary    29 Nov 2021 07:01  -  30 Nov 2021 07:00  --------------------------------------------------------  IN: 760 mL / OUT: 3 mL / NET: 757 mL        MEDICATIONS  (STANDING):  amLODIPine   Tablet 10 milliGRAM(s) Oral daily  aspirin enteric coated 81 milliGRAM(s) Oral daily  atorvastatin 20 milliGRAM(s) Oral at bedtime  dextrose 40% Gel 15 Gram(s) Oral once  dextrose 5%. 1000 milliLiter(s) (50 mL/Hr) IV Continuous <Continuous>  dextrose 5%. 1000 milliLiter(s) (100 mL/Hr) IV Continuous <Continuous>  dextrose 50% Injectable 25 Gram(s) IV Push once  dextrose 50% Injectable 12.5 Gram(s) IV Push once  dextrose 50% Injectable 25 Gram(s) IV Push once  enoxaparin Injectable 40 milliGRAM(s) SubCutaneous daily  famotidine    Tablet 20 milliGRAM(s) Oral daily  glucagon  Injectable 1 milliGRAM(s) IntraMuscular once  hydrALAZINE 25 milliGRAM(s) Oral every 8 hours  influenza  Vaccine (HIGH DOSE) 0.7 milliLiter(s) IntraMuscular once  insulin glargine Injectable (LANTUS) 10 Unit(s) SubCutaneous at bedtime  insulin lispro (ADMELOG) corrective regimen sliding scale   SubCutaneous three times a day before meals  metoprolol tartrate 25 milliGRAM(s) Oral every 12 hours  pantoprazole    Tablet 40 milliGRAM(s) Oral before breakfast  polyethylene glycol 3350 17 Gram(s) Oral daily  predniSONE   Tablet 60 milliGRAM(s) Oral daily  thiamine 100 milliGRAM(s) Oral daily    MEDICATIONS  (PRN):  acetaminophen     Tablet .. 650 milliGRAM(s) Oral every 6 hours PRN Temp greater or equal to 38C (100.4F), Mild Pain (1 - 3)        PHYSICAL EXAM:    GENERAL: Laying comfortably, NAD  EYES: EOMI, PERRL, no scleral icterus  NECK: No JVD  LUNG: Clear to auscultation bilaterally; No wheeze, crackles or rhonci  HEART: Regular rate and rhythm; No murmurs, rubs, or gallops  ABDOMEN: Soft, Nontender, Nondistended, mild epigastric tenderness  EXTREMITIES:  No LE edema, 2+ Peripheral Pulses, No clubbing, cyanosis, or edema  PSYCH: AAOx3  NEUROLOGY: non-focal, strength 5/5 in all extremities, sensation intact, deafness bilaterally, mildly picks up if spoke directly into left ear  SKIN: No rashes or lesions      LABS:                            7.6    2.70  )-----------( 267      ( 29 Nov 2021 06:47 )             24.2     Auto Eosinophil # 0.19  / Auto Eosinophil % 7.0   / Auto Neutrophil # 1.19  / Auto Neutrophil % 44.1  / BANDS % x                            7.9    4.22  )-----------( 283      ( 28 Nov 2021 15:34 )             24.7     Auto Eosinophil # x     / Auto Eosinophil % x     / Auto Neutrophil # x     / Auto Neutrophil % x     / BANDS % x        11-29    138  |  102  |  20  ----------------------------<  192<H>  3.5   |  25  |  1.01  11-28    139  |  106  |  23  ----------------------------<  115<H>  3.5   |  24  |  1.11    Ca    9.0      29 Nov 2021 06:47  TPro  7.0  /  Alb  3.2<L>  /  TBili  0.3  /  DBili  x   /  AST  14  /  ALT  9   /  AlkPhos  124<H>  11-29  TPro  6.9  /  Alb  3.0<L>  /  TBili  0.3  /  DBili  x   /  AST  14  /  ALT  10  /  AlkPhos  118  11-28    PT/INR - ( 28 Nov 2021 15:35 )   PT: 12.8 sec;   INR: 1.12 ratio         PTT - ( 28 Nov 2021 15:35 )  PTT:31.4 sec          RADIOLOGY & ADDITIONAL TESTS:    MRI pending    _________________________________________________________________________________________________________________________________________  Mookie Márquez MD  PGY-1 Internal Medicine  Pager: [phone] (NS) [phone] ([facility])  Available on [employer] Teams  30 Nov 2021 07:35

## 2021-11-30 NOTE — PROVIDER CONTACT NOTE (MEDICATION) - ASSESSMENT
Patient complains of dizziness with BP medication on order
Patient alert and oriented x 4. No s/s of acute distress
Patient alert and oriented x 4. No s/s of acute distress

## 2021-11-30 NOTE — PROGRESS NOTE ADULT - ASSESSMENT
Patient is a 66 year old female hx of insulin dependant DM2, HTN, HLD, pancreatic pseudocyst w/ recent admission and drainage, iron deficiency anemia, who presents due to decreased hearing potentially 2/2 viral vs mass lesion vs stroke. CTA negative, pending MRI of head and IAC.

## 2021-12-01 LAB
ANA TITR SER: NEGATIVE — SIGNIFICANT CHANGE UP
AUTO DIFF PNL BLD: NEGATIVE — SIGNIFICANT CHANGE UP
C-ANCA SER-ACNC: NEGATIVE — SIGNIFICANT CHANGE UP
MPO AB + PR3 PNL SER: SIGNIFICANT CHANGE UP
P-ANCA SER-ACNC: NEGATIVE — SIGNIFICANT CHANGE UP

## 2021-12-01 PROCEDURE — 99223 1ST HOSP IP/OBS HIGH 75: CPT

## 2021-12-01 PROCEDURE — 99232 SBSQ HOSP IP/OBS MODERATE 35: CPT | Mod: GC

## 2021-12-01 PROCEDURE — 70553 MRI BRAIN STEM W/O & W/DYE: CPT | Mod: 26

## 2021-12-01 PROCEDURE — 99231 SBSQ HOSP IP/OBS SF/LOW 25: CPT

## 2021-12-01 RX ORDER — INSULIN LISPRO 100/ML
15 VIAL (ML) SUBCUTANEOUS
Refills: 0 | Status: DISCONTINUED | OUTPATIENT
Start: 2021-12-01 | End: 2021-12-02

## 2021-12-01 RX ORDER — INSULIN GLARGINE 100 [IU]/ML
28 INJECTION, SOLUTION SUBCUTANEOUS AT BEDTIME
Refills: 0 | Status: DISCONTINUED | OUTPATIENT
Start: 2021-12-01 | End: 2021-12-02

## 2021-12-01 RX ORDER — INSULIN GLARGINE 100 [IU]/ML
26 INJECTION, SOLUTION SUBCUTANEOUS AT BEDTIME
Refills: 0 | Status: DISCONTINUED | OUTPATIENT
Start: 2021-12-01 | End: 2021-12-01

## 2021-12-01 RX ORDER — INSULIN LISPRO 100/ML
12 VIAL (ML) SUBCUTANEOUS
Refills: 0 | Status: DISCONTINUED | OUTPATIENT
Start: 2021-12-01 | End: 2021-12-01

## 2021-12-01 RX ADMIN — FAMOTIDINE 20 MILLIGRAM(S): 10 INJECTION INTRAVENOUS at 11:40

## 2021-12-01 RX ADMIN — Medication 8 UNIT(S): at 09:08

## 2021-12-01 RX ADMIN — PANTOPRAZOLE SODIUM 40 MILLIGRAM(S): 20 TABLET, DELAYED RELEASE ORAL at 06:51

## 2021-12-01 RX ADMIN — Medication 25 MILLIGRAM(S): at 19:15

## 2021-12-01 RX ADMIN — ENOXAPARIN SODIUM 40 MILLIGRAM(S): 100 INJECTION SUBCUTANEOUS at 11:41

## 2021-12-01 RX ADMIN — AMLODIPINE BESYLATE 10 MILLIGRAM(S): 2.5 TABLET ORAL at 06:51

## 2021-12-01 RX ADMIN — Medication 81 MILLIGRAM(S): at 11:40

## 2021-12-01 RX ADMIN — Medication 25 MILLIGRAM(S): at 14:47

## 2021-12-01 RX ADMIN — Medication 15 UNIT(S): at 19:14

## 2021-12-01 RX ADMIN — ATORVASTATIN CALCIUM 20 MILLIGRAM(S): 80 TABLET, FILM COATED ORAL at 21:17

## 2021-12-01 RX ADMIN — INSULIN GLARGINE 28 UNIT(S): 100 INJECTION, SOLUTION SUBCUTANEOUS at 22:10

## 2021-12-01 RX ADMIN — Medication 10: at 09:08

## 2021-12-01 RX ADMIN — Medication 25 MILLIGRAM(S): at 06:51

## 2021-12-01 RX ADMIN — Medication 60 MILLIGRAM(S): at 06:51

## 2021-12-01 RX ADMIN — Medication 25 MILLIGRAM(S): at 21:17

## 2021-12-01 RX ADMIN — Medication 8: at 13:17

## 2021-12-01 RX ADMIN — POLYETHYLENE GLYCOL 3350 17 GRAM(S): 17 POWDER, FOR SOLUTION ORAL at 11:41

## 2021-12-01 RX ADMIN — Medication 100 MILLIGRAM(S): at 11:41

## 2021-12-01 RX ADMIN — Medication 12 UNIT(S): at 13:16

## 2021-12-01 NOTE — PROGRESS NOTE ADULT - PROBLEM SELECTOR PLAN 4
-c/w lipitor    #GERD  c/w famotidine and protonix (therapeutic interchange omeprazole) -c/w amlodipine and HLZ  -hold lisinopril in case this could be the cause of her hearing issues (though less likely) until collateral about inpatient antibiotic regimen is obtained    PLAN  - blood pressures stable, but slightly uptrending, SBP 160s today; has room to increase Hydral if necessary

## 2021-12-01 NOTE — PROGRESS NOTE ADULT - SUBJECTIVE AND OBJECTIVE BOX
Follow Up:  bilateral hearing loss    Interval History/ROS: no improvement in hearing    Allergies  penicillin ([facility])        ANTIMICROBIALS:      OTHER MEDS:  acetaminophen     Tablet .. 650 milliGRAM(s) Oral every 6 hours PRN  amLODIPine   Tablet 10 milliGRAM(s) Oral daily  aspirin enteric coated 81 milliGRAM(s) Oral daily  atorvastatin 20 milliGRAM(s) Oral at bedtime  dextrose 40% Gel 15 Gram(s) Oral once  dextrose 5%. 1000 milliLiter(s) IV Continuous <Continuous>  dextrose 5%. 1000 milliLiter(s) IV Continuous <Continuous>  dextrose 50% Injectable 25 Gram(s) IV Push once  dextrose 50% Injectable 12.5 Gram(s) IV Push once  dextrose 50% Injectable 25 Gram(s) IV Push once  enoxaparin Injectable 40 milliGRAM(s) SubCutaneous daily  famotidine    Tablet 20 milliGRAM(s) Oral daily  glucagon  Injectable 1 milliGRAM(s) IntraMuscular once  hydrALAZINE 25 milliGRAM(s) Oral every 8 hours  influenza  Vaccine (HIGH DOSE) 0.7 milliLiter(s) IntraMuscular once  insulin glargine Injectable (LANTUS) 28 Unit(s) SubCutaneous at bedtime  insulin lispro (ADMELOG) corrective regimen sliding scale   SubCutaneous three times a day before meals  insulin lispro (ADMELOG) corrective regimen sliding scale   SubCutaneous at bedtime  insulin lispro Injectable (ADMELOG) 15 Unit(s) SubCutaneous three times a day before meals  metoprolol tartrate 25 milliGRAM(s) Oral every 12 hours  pantoprazole    Tablet 40 milliGRAM(s) Oral before breakfast  polyethylene glycol 3350 17 Gram(s) Oral daily  predniSONE   Tablet 60 milliGRAM(s) Oral daily  sodium chloride 0.9%. 1000 milliLiter(s) IV Continuous <Continuous>  thiamine 100 milliGRAM(s) Oral daily      Vital Signs Last 24 Hrs  T(C): 36.8 (01 Dec 2021 15:07), Max: 36.9 (01 Dec 2021 06:51)  T(F): 98.3 (01 Dec 2021 15:07), Max: 98.4 (01 Dec 2021 06:51)  HR: 67 (01 Dec 2021 15:07) (67 - 117)  BP: 129/75 (01 Dec 2021 15:07) (88/57 - 129/75)  BP(mean): --  RR: 18 (01 Dec 2021 15:07) (17 - 18)  SpO2: 99% (01 Dec 2021 15:07) (99% - 100%)    PHYSICAL EXAM:  Constitutional: Not in acute distress  Eyes: No icterus.  Oral cavity: Clear, no lesions  Neck: Supple  RS: Chest clear to auscultation bilaterally. No wheeze/rhonchi/crepitations.  CVS: S1, S2 heard. Regular rate and rhythm. No murmurs/rubs/gallops.  Abdomen: Soft. No guarding/rigidity/tenderness.  : no perdomo  Skin: No lesions noted  Vascular: No evidence of phlebitis  Neuro: Alert, oriented to time/place/person   No focal abnormalities                          9.6    3.41  )-----------( 337      ( 30 Nov 2021 07:22 )             30.2       11-30    134<L>  |  98  |  33<H>  ----------------------------<  447<H>  4.1   |  21<L>  |  1.16    Ca    9.2      30 Nov 2021 07:22  Phos  4.9     11-30  Mg     1.60     11-30            MICROBIOLOGY:    CMV PCR ordered            RADIOLOGY:

## 2021-12-01 NOTE — PROGRESS NOTE ADULT - PROBLEM SELECTOR PLAN 5
-is on lantus 14 units, can give 10 units here  -is on 4-6 units premeal, can order for moderate sliding scale  -CC diet    #MAGALY  -continue to monitor  -per pt and daughter egd/cscope unremarkable  -had 1 unit transfusion at prior hospitalization    PLAN  - sugars elevated to 400s today; likely i/s/o new steroid use as well  - increased Lantus to 12 units; adding on premeal Admelog 3 units   - continue moderate correctional scale -c/w lipitor    #GERD  c/w famotidine and protonix (therapeutic interchange omeprazole)

## 2021-12-01 NOTE — PROGRESS NOTE ADULT - ASSESSMENT
67 yo woman with diabetes recent hospitalization for w/u pancreatic cyst s/p endoscopic procedure x 3 who awoke 11/28 with sudden bilateral hearing loss.  Etiology unclear- medication related, autoimmune, vascular, malignant,  infectious (less likely).    HIV, syphilis screen, Lyme screen negative.  Serum CMV IgM + non specific, normal LFTs.  CMV is common cause of hearing loss in congenital infections, not in adults who are not immunocompromised.    Suggest:  follow MRI (planned today)  follow serum CMV PCR (ordered)    will follow    Yanci Garcia MD  Pager: [phone]  After 5 PM or weekends please call fellow on call or office [phone]

## 2021-12-01 NOTE — PROGRESS NOTE ADULT - SUBJECTIVE AND OBJECTIVE BOX
Subjective:    INTERVAL HPI/OVERNIGHT EVENTS:   - Pending MRI, consent in chart  - endocrine consulted for uncontrolled hypergylcemia  - Communicated with patient via pencil and paper  - Patient Denies Fevers, Chills, Headache, CP, Palpitations, SOB, Abdominal Pain, Dysuria, N/V/D  No acute events overnight  Afebrile, hemodynamically stable     Telemetry:    T(F): 98.4 (12-01-21 @ 06:51), Max: 98.4 (12-01-21 @ 06:51)  HR: 117 (12-01-21 @ 06:51) (72 - 117)  BP: 115/75 (12-01-21 @ 06:51) (88/57 - 138/68)  RR: 17 (12-01-21 @ 06:51) (17 - 18)  SpO2: 100% (12-01-21 @ 06:51) (99% - 100%)  I&O's Summary    30 Nov 2021 07:01  -  01 Dec 2021 07:00  --------------------------------------------------------  IN: 1975 mL / OUT: 0 mL / NET: 1975 mL      Weight (kg): 80.5 (11-29-21 @ 05:34)    Medications:  acetaminophen     Tablet .. 650 milliGRAM(s) Oral every 6 hours PRN  amLODIPine   Tablet 10 milliGRAM(s) Oral daily  aspirin enteric coated 81 milliGRAM(s) Oral daily  atorvastatin 20 milliGRAM(s) Oral at bedtime  dextrose 40% Gel 15 Gram(s) Oral once  dextrose 5%. 1000 milliLiter(s) (50 mL/Hr) IV Continuous <Continuous>  dextrose 5%. 1000 milliLiter(s) (100 mL/Hr) IV Continuous <Continuous>  dextrose 50% Injectable 25 Gram(s) IV Push once  dextrose 50% Injectable 12.5 Gram(s) IV Push once  dextrose 50% Injectable 25 Gram(s) IV Push once  enoxaparin Injectable 40 milliGRAM(s) SubCutaneous daily  famotidine    Tablet 20 milliGRAM(s) Oral daily  glucagon  Injectable 1 milliGRAM(s) IntraMuscular once  hydrALAZINE 25 milliGRAM(s) Oral every 8 hours  influenza  Vaccine (HIGH DOSE) 0.7 milliLiter(s) IntraMuscular once  insulin glargine Injectable (LANTUS) 28 Unit(s) SubCutaneous at bedtime  insulin lispro (ADMELOG) corrective regimen sliding scale   SubCutaneous three times a day before meals  insulin lispro (ADMELOG) corrective regimen sliding scale   SubCutaneous at bedtime  insulin lispro Injectable (ADMELOG) 12 Unit(s) SubCutaneous three times a day before meals  metoprolol tartrate 25 milliGRAM(s) Oral every 12 hours  pantoprazole    Tablet 40 milliGRAM(s) Oral before breakfast  polyethylene glycol 3350 17 Gram(s) Oral daily  predniSONE   Tablet 60 milliGRAM(s) Oral daily  sodium chloride 0.9%. 1000 milliLiter(s) (75 mL/Hr) IV Continuous <Continuous>  thiamine 100 milliGRAM(s) Oral daily    PHYSICAL EXAM:  GENERAL: Laying comfortably, NAD  EYES: EOMI, PERRL, no scleral icterus  NECK: No JVD  LUNG: Clear to auscultation bilaterally; No wheeze, crackles or rhonci  HEART: Regular rate and rhythm; No murmurs, rubs, or gallops  ABDOMEN: Soft, Nontender, Nondistended  EXTREMITIES:  No LE edema, 2+ Peripheral Pulses, No clubbing, cyanosis, or edema  PSYCH: AAOx3  NEUROLOGY: non-focal, strength 5/5 in all extremities, sensation intact, deafness bilaterally,   SKIN: No rashes or lesions    Notable Labs:    Labs:                          9.6    3.41  )-----------( 337      ( 30 Nov 2021 07:22 )             30.2     11-30    134<L>  |  98  |  33<H>  ----------------------------<  447<H>  4.1   |  21<L>  |  1.16    Ca    9.2      30 Nov 2021 07:22  Phos  4.9     11-30  Mg     1.60     11-30          CAPILLARY BLOOD GLUCOSE      POCT Blood Glucose.: 336 mg/dL (01 Dec 2021 13:07)  POCT Blood Glucose.: 399 mg/dL (01 Dec 2021 08:55)  POCT Blood Glucose.: 265 mg/dL (30 Nov 2021 22:45)  POCT Blood Glucose.: 141 mg/dL (30 Nov 2021 17:56)                Micro:      RADIOLOGY & ADDITIONAL TESTS:  [facility]

## 2021-12-01 NOTE — CONSULT NOTE ADULT - ASSESSMENT
Patient is a 66 year old female hx of insulin dependant DM2, HTN, HLD, pancreatic pseudocyst w/ recent admission and drainage, iron deficiency anemia, who presents due to decreased hearing.  now on prednisone   a1c 9.2  pt with marked hyperglycemia inpt  endocrine called for further assistance     1. DM type 2  home regimen: Metformin, ?lirglutide (though recently stopped per the daughter), Lantus and lispro (per med recc, daughter confirms pt is on MDI and metformin)  currently pt is on steroids: prednisone 60mg  Assessment DM with marked hyperglycemia exacerbated by steroids   Plan:  check FSBG TID qac and hs  carb consistent diet   - Lantus  28 u qhs  -Humalog 15-15-15 units with meals. hold if NPO  -moderate dose SS TIDAC/qhs  -nutrition consult       Please note, if predinner glucose >300, hold dinner and therefore premeal insulin., give correction only at that time, check glucose in 2 hrs. if less <250 she can eat with premeal coverage only.  this will allow her to break the glucotoxicity     inform endocrine of hypoglycemia or persistent hyperglycemia episodes as changes in pts insulin regimen will need to be made.   notify endocrine if any plans to be NPO/diet changes as this will also affect insulin regimen.  *inform endocrine of any change or decrease in steroids as this will affect her insulin regimen*      dc planning:  pt is on basal bolus +metfomrin at home   final doses of insulin to be stermined based on inpt trend and steroid course  please note, pt was on lirglutide but potentially off, would hold this at dc given pancreatic psedocyst and assess outpt  per daughter pt was to set up care with endocrine soon (please confirm with daughter toni and reschedule appt if pt remains in the hospital)    2. HTN  goal BP in DM <130/80  management as per the primary team      3. HLD  pt is on liptor 20mg, , not at goal, consider increase outpt to higher intensity statin   low fat carb controlled diet       Clementine Masterson MD  Attending Physician   Department of Endocrinology, Diabetes and Metabolism     Pager  [phone] [please provide 10 digit call back number]  JODIE 9-5  [email or web address]  Nights and weekends: [phone]  Please note that this patient may be followed by a different provider tomorrow.   If no answer or after hours, please contact [phone].  For final dc reccomendations, please call [phone] or page the endocrine fellow on call.

## 2021-12-01 NOTE — PROGRESS NOTE ADULT - PROBLEM SELECTOR PLAN 1
-bilateral hearing loss, tinnitus prior  -external ear exam benign, otoscopic exam w/out acute findings  -differentials include drug induced, possibly due to antibiotics for pancreatic drainage (daughter and pt unsure which antibiotic), vibratory hearing more intact than sensinoneural.   -obtain MR head w/ w/o iv and IAC w/ w/o iv (no metals in body creatinine clearance acceptable, patient aware) to rule out lesions.   -will need outpatient f/u w/ ENT for official audiogram and steroid use  -lisinopril can cause tinnitus 1-10% of cases, will hold for now  -ordered for viral/rheumatologic workup per neurology, consent obtained for HIV/RPR    PLAN  - HSV IgG and CMV IgM positive - ID consult obtained    -- pending MRI from ID; CMV serum level to be obtained with next labs  - MRI of brain and IAC pending  - c/w prednisone 60 (day 2/5) - per neurology recommendations, will adjust if necessary pending ID response

## 2021-12-01 NOTE — PROGRESS NOTE ADULT - PROBLEM SELECTOR PLAN 3
-c/w amlodipine and HLZ  -hold lisinopril in case this could be the cause of her hearing issues (though less likely) until collateral about inpatient antibiotic regimen is obtained    PLAN  - blood pressures stable, but slightly uptrending, SBP 160s today; has room to increase Hydral if necessary -s/p drainage at prior hospital, and stent placement 1 month prior  -no leukocytosis or fevers  -abdomen soft, mildly distended, mild discomfort    PLAN  - pain mild, has PRN tylenol if needed

## 2021-12-01 NOTE — PROGRESS NOTE ADULT - PROBLEM SELECTOR PLAN 2
-s/p drainage at prior hospital, and stent placement 1 month prior  -no leukocytosis or fevers  -abdomen soft, mildly distended, mild discomfort    PLAN  - pain mild, has PRN tylenol if needed -is on lantus 20 units, Admelog 8 + Sliding scale    #MAGALY  -continue to monitor  -per pt and daughter egd/cscope unremarkable  -had 1 unit transfusion at prior hospitalization    PLAN  - sugars elevated to 400s today; likely i/s/o new steroid use as well  - increased Lantus to 20 units; adding on premeal Admelog 8 units   - continue moderate correctional scale    -Endocrine consulted for persistent hyperglycemia difficult to control. Will f/u recommendations -is on lantus 28 units, Admelog 15 + Mod Sliding scale    #MAGALY  -continue to monitor  -per pt and daughter egd/cscope unremarkable  -had 1 unit transfusion at prior hospitalization    PLAN  - sugars elevated to 400s today; likely i/s/o new steroid use as well  - increased Lantus to 28 units; adding on premeal Admelog 15 units   - continue moderate correctional scale    -Endocrine consulted for persistent hyperglycemia difficult to control. Will f/u recommendations  - Recs: since on steroids, this is a prandial issue re hyperglycemia.   - Patient's glucose is high due to increased snacking, encourage patient to stick to the meal, possibly avoid the carbs if possible while on steroids  - Will put in for 3am FS on 12/2 and NF can enter one time admelog based on bedtime corrective sliding scale.

## 2021-12-01 NOTE — CONSULT NOTE ADULT - SUBJECTIVE AND OBJECTIVE BOX
Reason For Consult: DM    HPI:  Patient is a 66 year old female hx of insulin dependant DM2, HTN, HLD, pancreatic pseudocyst w/ recent admission and drainage, iron deficiency anemia, who presents due to decreased hearing. She was at a loud party on Friday, and she noticed ringing and then decreased hearing (first left then right) 11/28/21 in the morning. She had ringing in the ear one month ago as well. 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 She doesn't have any recent changes in medications, and only occasionally takes aspirin. She was recently hospitalized due to pancreatic pseudocyst w/ drainage, anemia w/ endoscopy/c-scope per pt and daughter, and antibiotic use as well (family unsure). Denies fevers, chills, nausea, vomiting, diarrhea, sob, cp, visual changes, headaches, LE swelling, or skin rashes.     ED course: afebrile, HR 84, /88, RR 18, 100%.  CT head angio, CT auditory canal noncon negative for acute findings, no masses or fractures noted, no clinically important closure of vasculature noted    endocrine hx:  Patient is unable to take part in a history as she is very hard of hearing unable to answer questions related to her diabetes.  Called her daughter Renae for collateral information regarding her diabetes.  Patient has a history of diabetes since 2006 she follows at [facility] she was recently referred to endocrinology however has not seen them as of yet.  Per the daughter patient is on multiple day injection of insulin appears to be a basal bolus (per the med rec she is on Lantus 14 and lispro 4 to 6 units premeals), Metformin, liraglutide (this was recently discontinued per the daughter).  Patient checks fingersticks multiple times a day before meals also follows a carb controlled diet as per the daughter.  Inpatient her glucoses have ranged from 100s to 400s currently she is on prednisone.  Labs are notable for A1c of 9.2 TSH 3.31       PAST MEDICAL & SURGICAL HISTORY:  Type 2 diabetes mellitus    Bilateral cataracts    Fluid in pleural cavity associated with pancreatitis    Pancreatic pseudocyst/cyst  s/p drain placement and removal    History of amputation of right great toe        FAMILY HISTORY:  unable to ascertain     Social History:  lives with daughter     Outpatient Medications:  Home Medications:   * Patient Currently Takes Medications as of 28-Nov-2021 22:59 documented in Structured Notes  · 	amLODIPine 10 mg oral tablet: Last Dose Taken:  , 1 tab(s) orally once a day  · 	Lipitor 20 mg oral tablet: Last Dose Taken:  , 1 tab(s) orally once a day  · 	Lantus 100 units/mL subcutaneous solution: Last Dose Taken:  , 14 unit(s) subcutaneous once a day (at bedtime)  · 	insulin lispro 100 units/mL subcutaneous solution: Last Dose Taken:  , 4-6 unit(s) subcutaneous 3 times a day before meals  · 	omeprazole 40 mg oral delayed release capsule: Last Dose Taken:  , 1 cap(s) orally once a day  · 	famotidine 20 mg oral tablet: Last Dose Taken:  , 1 tab(s) orally once a day  · 	Metoprolol Tartrate 25 mg oral tablet: Last Dose Taken:  , 1 tab(s) orally 2 times a day  · 	MiraLax oral powder for reconstitution: Last Dose Taken:  , 17 gram(s) orally once a day  · 	Senna 8.6 mg oral tablet: Last Dose Taken:  , 1 tab(s) orally once a day (at bedtime)  · 	hydrALAZINE 25 mg oral tablet: Last Dose Taken:  , 1 tab(s) orally 3 times a day  · 	liraglutide 18 mg/3 mL subcutaneous solution: Last Dose Taken:  , 1 application subcutaneous once a day  · 	terbinafine 250 mg oral tablet: Last Dose Taken:  , 1 tab(s) orally once a day for 10 days then 20 days off repeat cycle 4 times  · 	metFORMIN 500 mg oral tablet, extended release: Last Dose Taken:  , 1 tab(s) orally once a day  · 	lisinopril 10 mg oral tablet: 1 tab(s) orally once a day      MEDICATIONS  (STANDING):  amLODIPine   Tablet 10 milliGRAM(s) Oral daily  aspirin enteric coated 81 milliGRAM(s) Oral daily  atorvastatin 20 milliGRAM(s) Oral at bedtime  dextrose 40% Gel 15 Gram(s) Oral once  dextrose 5%. 1000 milliLiter(s) (50 mL/Hr) IV Continuous <Continuous>  dextrose 5%. 1000 milliLiter(s) (100 mL/Hr) IV Continuous <Continuous>  dextrose 50% Injectable 25 Gram(s) IV Push once  dextrose 50% Injectable 12.5 Gram(s) IV Push once  dextrose 50% Injectable 25 Gram(s) IV Push once  enoxaparin Injectable 40 milliGRAM(s) SubCutaneous daily  famotidine    Tablet 20 milliGRAM(s) Oral daily  glucagon  Injectable 1 milliGRAM(s) IntraMuscular once  hydrALAZINE 25 milliGRAM(s) Oral every 8 hours  influenza  Vaccine (HIGH DOSE) 0.7 milliLiter(s) IntraMuscular once  insulin glargine Injectable (LANTUS) 28 Unit(s) SubCutaneous at bedtime  insulin lispro (ADMELOG) corrective regimen sliding scale   SubCutaneous three times a day before meals  insulin lispro (ADMELOG) corrective regimen sliding scale   SubCutaneous at bedtime  insulin lispro Injectable (ADMELOG) 12 Unit(s) SubCutaneous three times a day before meals  metoprolol tartrate 25 milliGRAM(s) Oral every 12 hours  pantoprazole    Tablet 40 milliGRAM(s) Oral before breakfast  polyethylene glycol 3350 17 Gram(s) Oral daily  predniSONE   Tablet 60 milliGRAM(s) Oral daily  sodium chloride 0.9%. 1000 milliLiter(s) (75 mL/Hr) IV Continuous <Continuous>  thiamine 100 milliGRAM(s) Oral daily    MEDICATIONS  (PRN):  acetaminophen     Tablet .. 650 milliGRAM(s) Oral every 6 hours PRN Temp greater or equal to 38C (100.4F), Mild Pain (1 - 3)      Allergies    penicillin ([place] [facility])    Intolerances      Review of Systems:      UNABLE TO OBTAIN    PHYSICAL EXAM:  VITALS: T(C): 36.9 (12-01-21 @ 06:51)  T(F): 98.4 (12-01-21 @ 06:51), Max: 98.4 (12-01-21 @ 06:51)  HR: 117 (12-01-21 @ 06:51) (72 - 117)  BP: 115/75 (12-01-21 @ 06:51) (88/57 - 138/68)  RR:  (17 - 18)  SpO2:  (99% - 100%)  Wt(kg): --  GENERAL: NAD, well-groomed, well-developed  EYES: No proptosis, no lid lag, anicteric  HEENT:  Atraumatic, Normocephalic, moist mucous membranes  THYROID: Normal size, no palpable nodules  RESPIRATORY: Clear to auscultation bilaterally; No rales, rhonchi, wheezing, or rubs  CARDIOVASCULAR: Regular rate and rhythm; No murmurs; no peripheral edema  GI: Soft, nontender, non distended, normal bowel sounds  SKIN: Dry, intact, No rashes or lesions  MUSCULOSKELETAL: Full range of motion, normal strength  NEURO: sensation intact, extraocular movements intact, no tremor, normal reflexes  PSYCH: Alert and oriented x 3, normal affect, normal mood    POCT Blood Glucose.: 336 mg/dL (12-01-21 @ 13:07)  POCT Blood Glucose.: 399 mg/dL (12-01-21 @ 08:55)  POCT Blood Glucose.: 265 mg/dL (11-30-21 @ 22:45)  POCT Blood Glucose.: 141 mg/dL (11-30-21 @ 17:56)  POCT Blood Glucose.: 429 mg/dL (11-30-21 @ 12:26)  POCT Blood Glucose.: 428 mg/dL (11-30-21 @ 12:20)  POCT Blood Glucose.: 426 mg/dL (11-30-21 @ 09:12)  POCT Blood Glucose.: 402 mg/dL (11-30-21 @ 09:09)  POCT Blood Glucose.: 303 mg/dL (11-29-21 @ 21:28)  POCT Blood Glucose.: 255 mg/dL (11-29-21 @ 17:47)  POCT Blood Glucose.: 181 mg/dL (11-29-21 @ 13:38)  POCT Blood Glucose.: 211 mg/dL (11-29-21 @ 09:20)  POCT Blood Glucose.: 207 mg/dL (11-29-21 @ 05:32)  POCT Blood Glucose.: 218 mg/dL (11-29-21 @ 03:25)  POCT Blood Glucose.: 219 mg/dL (11-28-21 @ 23:39)  POCT Blood Glucose.: 164 mg/dL (11-28-21 @ 20:29)                            9.6    3.41  )-----------( 337      ( 30 Nov 2021 07:22 )             30.2       11-30    134<L>  |  98  |  33<H>  ----------------------------<  447<H>  4.1   |  21<L>  |  1.16    EGFR if : 56<L>  EGFR if non : 49<L>    Ca    9.2      11-30  Mg     1.60     11-30  Phos  4.9     11-30    TPro  7.0  /  Alb  3.2<L>  /  TBili  0.3  /  DBili  x   /  AST  14  /  ALT  9   /  AlkPhos  124<H>  11-29      Thyroid Function Tests:  11-29 @ 06:47 TSH 3.31 FreeT4 1.0 T3 -- Anti TPO -- Anti Thyroglobulin Ab -- TSI --          11-29 Chol 236<H> Direct LDL -- LDL calculated 161<H> HDL 37<L> Trig 191<H>    Radiology:

## 2021-12-02 ENCOUNTER — TRANSCRIPTION ENCOUNTER (OUTPATIENT)
Age: 66
End: 2021-12-02

## 2021-12-02 DIAGNOSIS — N17.9 ACUTE KIDNEY FAILURE, UNSPECIFIED: ICD-10-CM

## 2021-12-02 LAB
ANION GAP SERPL CALC-SCNC: 9 MMOL/L — SIGNIFICANT CHANGE UP (ref 7–14)
B BURGDOR DNA SPEC QL NAA+PROBE: NEGATIVE — SIGNIFICANT CHANGE UP
BUN SERPL-MCNC: 48 MG/DL — HIGH (ref 7–23)
CALCIUM SERPL-MCNC: 8.7 MG/DL — SIGNIFICANT CHANGE UP (ref 8.4–10.5)
CHLORIDE SERPL-SCNC: 101 MMOL/L — SIGNIFICANT CHANGE UP (ref 98–107)
CO2 SERPL-SCNC: 22 MMOL/L — SIGNIFICANT CHANGE UP (ref 22–31)
CREAT SERPL-MCNC: 1.54 MG/DL — HIGH (ref 0.5–1.3)
GLUCOSE SERPL-MCNC: 223 MG/DL — HIGH (ref 70–99)
HCT VFR BLD CALC: 27.2 % — LOW (ref 34.5–45)
HGB BLD-MCNC: 8.5 G/DL — LOW (ref 11.5–15.5)
MAGNESIUM SERPL-MCNC: 1.6 MG/DL — SIGNIFICANT CHANGE UP (ref 1.6–2.6)
MCHC RBC-ENTMCNC: 24.3 PG — LOW (ref 27–34)
MCHC RBC-ENTMCNC: 31.3 GM/DL — LOW (ref 32–36)
MCV RBC AUTO: 77.7 FL — LOW (ref 80–100)
NRBC # BLD: 0 /100 WBCS — SIGNIFICANT CHANGE UP
NRBC # FLD: 0 K/UL — SIGNIFICANT CHANGE UP
OSMOLALITY UR: 564 MOSM/KG — SIGNIFICANT CHANGE UP (ref 50–1200)
PHOSPHATE SERPL-MCNC: 3.3 MG/DL — SIGNIFICANT CHANGE UP (ref 2.5–4.5)
PLATELET # BLD AUTO: 332 K/UL — SIGNIFICANT CHANGE UP (ref 150–400)
POTASSIUM SERPL-MCNC: 4.4 MMOL/L — SIGNIFICANT CHANGE UP (ref 3.5–5.3)
POTASSIUM SERPL-SCNC: 4.4 MMOL/L — SIGNIFICANT CHANGE UP (ref 3.5–5.3)
RBC # BLD: 3.5 M/UL — LOW (ref 3.8–5.2)
RBC # FLD: 17.3 % — HIGH (ref 10.3–14.5)
SODIUM SERPL-SCNC: 132 MMOL/L — LOW (ref 135–145)
WBC # BLD: 6.35 K/UL — SIGNIFICANT CHANGE UP (ref 3.8–10.5)
WBC # FLD AUTO: 6.35 K/UL — SIGNIFICANT CHANGE UP (ref 3.8–10.5)

## 2021-12-02 PROCEDURE — 99232 SBSQ HOSP IP/OBS MODERATE 35: CPT

## 2021-12-02 PROCEDURE — 99232 SBSQ HOSP IP/OBS MODERATE 35: CPT | Mod: GC

## 2021-12-02 RX ORDER — INSULIN GLARGINE 100 [IU]/ML
33 INJECTION, SOLUTION SUBCUTANEOUS AT BEDTIME
Refills: 0 | Status: DISCONTINUED | OUTPATIENT
Start: 2021-12-02 | End: 2021-12-03

## 2021-12-02 RX ORDER — INSULIN LISPRO 100/ML
20 VIAL (ML) SUBCUTANEOUS
Refills: 0 | Status: DISCONTINUED | OUTPATIENT
Start: 2021-12-02 | End: 2021-12-03

## 2021-12-02 RX ADMIN — Medication 15 UNIT(S): at 12:31

## 2021-12-02 RX ADMIN — Medication 25 MILLIGRAM(S): at 21:45

## 2021-12-02 RX ADMIN — INSULIN GLARGINE 33 UNIT(S): 100 INJECTION, SOLUTION SUBCUTANEOUS at 22:30

## 2021-12-02 RX ADMIN — Medication 650 MILLIGRAM(S): at 17:03

## 2021-12-02 RX ADMIN — ENOXAPARIN SODIUM 40 MILLIGRAM(S): 100 INJECTION SUBCUTANEOUS at 11:54

## 2021-12-02 RX ADMIN — Medication 20 UNIT(S): at 18:33

## 2021-12-02 RX ADMIN — ATORVASTATIN CALCIUM 20 MILLIGRAM(S): 80 TABLET, FILM COATED ORAL at 21:45

## 2021-12-02 RX ADMIN — Medication 6: at 09:31

## 2021-12-02 RX ADMIN — Medication 15 UNIT(S): at 09:31

## 2021-12-02 RX ADMIN — Medication 8: at 18:33

## 2021-12-02 RX ADMIN — Medication 650 MILLIGRAM(S): at 18:00

## 2021-12-02 RX ADMIN — Medication 81 MILLIGRAM(S): at 11:54

## 2021-12-02 RX ADMIN — Medication 25 MILLIGRAM(S): at 05:57

## 2021-12-02 RX ADMIN — FAMOTIDINE 20 MILLIGRAM(S): 10 INJECTION INTRAVENOUS at 11:55

## 2021-12-02 RX ADMIN — Medication 6: at 12:31

## 2021-12-02 RX ADMIN — Medication 25 MILLIGRAM(S): at 13:52

## 2021-12-02 RX ADMIN — Medication 60 MILLIGRAM(S): at 05:57

## 2021-12-02 RX ADMIN — PANTOPRAZOLE SODIUM 40 MILLIGRAM(S): 20 TABLET, DELAYED RELEASE ORAL at 07:04

## 2021-12-02 RX ADMIN — Medication 100 MILLIGRAM(S): at 12:04

## 2021-12-02 RX ADMIN — AMLODIPINE BESYLATE 10 MILLIGRAM(S): 2.5 TABLET ORAL at 05:57

## 2021-12-02 RX ADMIN — Medication 25 MILLIGRAM(S): at 18:28

## 2021-12-02 NOTE — DISCHARGE NOTE PROVIDER - PROVIDER TOKENS
PROVIDER:[TOKEN:[[id number]],FOLLOWUP:[1 week]] PROVIDER:[TOKEN:[[id number]],FOLLOWUP:[1 week]],PROVIDER:[TOKEN:[[id number]],FOLLOWUP:[Routine]]

## 2021-12-02 NOTE — PROVIDER CONTACT NOTE (OTHER) - ACTION/TREATMENT ORDERED:
Morning hydralazine held as per parameters. No new orders at this time. Will continue to monitor.
MD made aware. Discussed with MD, will retry labs in AM. Discussed pt still voiding.

## 2021-12-02 NOTE — PROGRESS NOTE ADULT - SUBJECTIVE AND OBJECTIVE BOX
Chief Complaint/Follow-up on: DM    Subjective:  pt is eating well  i wrote to her regarding her steroids, hyperglycemia and insulin  she states she is not snacking       MEDICATIONS  (STANDING):  amLODIPine   Tablet 10 milliGRAM(s) Oral daily  aspirin enteric coated 81 milliGRAM(s) Oral daily  atorvastatin 20 milliGRAM(s) Oral at bedtime  dextrose 40% Gel 15 Gram(s) Oral once  dextrose 5%. 1000 milliLiter(s) (50 mL/Hr) IV Continuous <Continuous>  dextrose 5%. 1000 milliLiter(s) (100 mL/Hr) IV Continuous <Continuous>  dextrose 50% Injectable 25 Gram(s) IV Push once  dextrose 50% Injectable 12.5 Gram(s) IV Push once  dextrose 50% Injectable 25 Gram(s) IV Push once  enoxaparin Injectable 40 milliGRAM(s) SubCutaneous daily  famotidine    Tablet 20 milliGRAM(s) Oral daily  glucagon  Injectable 1 milliGRAM(s) IntraMuscular once  hydrALAZINE 25 milliGRAM(s) Oral every 8 hours  influenza  Vaccine (HIGH DOSE) 0.7 milliLiter(s) IntraMuscular once  insulin glargine Injectable (LANTUS) 33 Unit(s) SubCutaneous at bedtime  insulin lispro (ADMELOG) corrective regimen sliding scale   SubCutaneous at bedtime  insulin lispro (ADMELOG) corrective regimen sliding scale   SubCutaneous three times a day before meals  insulin lispro Injectable (ADMELOG) 20 Unit(s) SubCutaneous three times a day before meals  metoprolol tartrate 25 milliGRAM(s) Oral every 12 hours  pantoprazole    Tablet 40 milliGRAM(s) Oral before breakfast  polyethylene glycol 3350 17 Gram(s) Oral daily  predniSONE   Tablet 60 milliGRAM(s) Oral daily  thiamine 100 milliGRAM(s) Oral daily    MEDICATIONS  (PRN):  acetaminophen     Tablet .. 650 milliGRAM(s) Oral every 6 hours PRN Temp greater or equal to 38C (100.4F), Mild Pain (1 - 3)      PHYSICAL EXAM:  VITALS: T(C): 36.6 (12-02-21 @ 15:18)  T(F): 97.8 (12-02-21 @ 15:18), Max: 98.5 (12-01-21 @ 21:01)  HR: 66 (12-02-21 @ 15:18) (63 - 69)  BP: 135/68 (12-02-21 @ 15:18) (130/68 - 142/72)  RR:  (17 - 18)  SpO2:  (99% - 100%)  Wt(kg): --  GENERAL: NAD, well-groomed, well-developed  RESPIRATORY: Clear to auscultation bilaterally; No rales, rhonchi, wheezing, or rubs  CARDIOVASCULAR: Regular rate and rhythm; No murmurs; no peripheral edema  GI: Soft, nontender, non distended, normal bowel sounds      POCT Blood Glucose.: 275 mg/dL (12-02-21 @ 12:22)  POCT Blood Glucose.: 286 mg/dL (12-02-21 @ 09:21)  POCT Blood Glucose.: 194 mg/dL (12-02-21 @ 03:22)  POCT Blood Glucose.: 127 mg/dL (12-01-21 @ 22:03)  POCT Blood Glucose.: 139 mg/dL (12-01-21 @ 19:07)  POCT Blood Glucose.: 336 mg/dL (12-01-21 @ 13:07)  POCT Blood Glucose.: 399 mg/dL (12-01-21 @ 08:55)  POCT Blood Glucose.: 265 mg/dL (11-30-21 @ 22:45)  POCT Blood Glucose.: 141 mg/dL (11-30-21 @ 17:56)  POCT Blood Glucose.: 429 mg/dL (11-30-21 @ 12:26)  POCT Blood Glucose.: 428 mg/dL (11-30-21 @ 12:20)  POCT Blood Glucose.: 426 mg/dL (11-30-21 @ 09:12)  POCT Blood Glucose.: 402 mg/dL (11-30-21 @ 09:09)  POCT Blood Glucose.: 303 mg/dL (11-29-21 @ 21:28)  POCT Blood Glucose.: 255 mg/dL (11-29-21 @ 17:47)    12-02    132<L>  |  101  |  48<H>  ----------------------------<  223<H>  4.4   |  22  |  1.54<H>    EGFR if : 40<L>  EGFR if non : 35<L>    Ca    8.7      12-02  Mg     1.60     12-02  Phos  3.3     12-02            Thyroid Function Tests:  11-29 @ 06:47 TSH 3.31 FreeT4 1.0 T3 -- Anti TPO -- Anti Thyroglobulin Ab -- TSI --

## 2021-12-02 NOTE — PROGRESS NOTE ADULT - PROBLEM SELECTOR PLAN 4
-c/w amlodipine and HLZ  -hold lisinopril in case this could be the cause of her hearing issues (though less likely) until collateral about inpatient antibiotic regimen is obtained    PLAN  - blood pressures stable, but slightly uptrending, SBP 160s today; has room to increase Hydral if necessary -c/w amlodipine and HLZ  -hold lisinopril in case this could be the cause of her hearing issues (though less likely) until collateral about inpatient antibiotic regimen is obtained    PLAN  - blood pressures stable, SBP 140s -s/p drainage at prior hospital, and stent placement 1 month prior  -no leukocytosis or fevers  -abdomen soft, mildly distended, mild discomfort    PLAN  - pain mild, has PRN tylenol if needed

## 2021-12-02 NOTE — DISCHARGE NOTE PROVIDER - NSDCMRMEDTOKEN_GEN_ALL_CORE_FT
amLODIPine 10 mg oral tablet: 1 tab(s) orally once a day  famotidine 20 mg oral tablet: 1 tab(s) orally once a day  hydrALAZINE 25 mg oral tablet: 1 tab(s) orally 3 times a day  insulin lispro 100 units/mL subcutaneous solution: 4-6 unit(s) subcutaneous 3 times a day before meals  Lantus 100 units/mL subcutaneous solution: 14 unit(s) subcutaneous once a day (at bedtime)  Lipitor 20 mg oral tablet: 1 tab(s) orally once a day  liraglutide 18 mg/3 mL subcutaneous solution: 1 application subcutaneous once a day  lisinopril 10 mg oral tablet: 1 tab(s) orally once a day  metFORMIN 500 mg oral tablet, extended release: 1 tab(s) orally once a day  Metoprolol Tartrate 25 mg oral tablet: 1 tab(s) orally 2 times a day  MiraLax oral powder for reconstitution: 17 gram(s) orally once a day  omeprazole 40 mg oral delayed release capsule: 1 cap(s) orally once a day  Senna 8.6 mg oral tablet: 1 tab(s) orally once a day (at bedtime)  terbinafine 250 mg oral tablet: 1 tab(s) orally once a day for 10 days then 20 days off repeat cycle 4 times   amLODIPine 10 mg oral tablet: 1 tab(s) orally once a day  aspirin 81 mg oral delayed release tablet: 1 tab(s) orally once a day  famotidine 20 mg oral tablet: 1 tab(s) orally once a day  HumaLOG 100 units/mL injectable solution: 8 unit(s) injectable 3 times a day BEFORE MEALS  hydrALAZINE 25 mg oral tablet: 1 tab(s) orally 3 times a day  Lantus 100 units/mL subcutaneous solution: 24 unit(s) subcutaneous once a day (at bedtime)   Lipitor 40 mg oral tablet: 1 tab(s) orally once a day  Metoprolol Tartrate 25 mg oral tablet: 1 tab(s) orally 2 times a day  MiraLax oral powder for reconstitution: 17 gram(s) orally once a day  omeprazole 40 mg oral delayed release capsule: 1 cap(s) orally once a day  Senna 8.6 mg oral tablet: 1 tab(s) orally once a day (at bedtime)  thiamine 100 mg oral tablet: 1 tab(s) orally once a day

## 2021-12-02 NOTE — DISCHARGE NOTE PROVIDER - CARE PROVIDER_API CALL
Maria Elena Samson)  Otolaryngology  [address] [place] [address]  Phone: [phone]  Fax: [phone]  Follow Up Time: 1 week   Maria Elena Samson)  Otolaryngology  [address] [place] [address]  Phone: [phone]  Fax: [phone]  Follow Up Time: 1 week    Russell Jimenes)  Neurology; Vascular Neurology  [address] [facility], [address]  [place] [address]  Phone: [phone]  Fax: [phone]  Follow Up Time: Routine

## 2021-12-02 NOTE — DISCHARGE NOTE PROVIDER - NSDCCPCAREPLAN_GEN_ALL_CORE_FT
PRINCIPAL DISCHARGE DIAGNOSIS  Diagnosis: Auditory impairment  Assessment and Plan of Treatment: You came into the hospital because you were experiencing new hearing loss in both ears. You were evaluated by our neurology, ENT, and infectious disease teams. You had CT scans which did not show any acute processes. You had an MRI of your brain which showed _____. Your infectious disease workup was _____. You were advised to follow up with Dr. Guerra / ENT for an outpatient audiogram. While you were in the hospital you were started on a 5 day course of steroids (11/30 - 12/4) in order to treat any inflammatory process that may have been occurring. Please continue to follow up with ENT for your audiogram. Please return to the hospital should you experience any altered mental status, changes in your vision, hearing, loss of consciousness, falls due to imbalance, or shortness of breath or chest pain. Please continue to take all your medications as prescribed.      SECONDARY DISCHARGE DIAGNOSES  Diagnosis: Steroid-induced hyperglycemia  Assessment and Plan of Treatment: You came into the hospital due to your hearing loss. While you were here, you received steroids as part of your treatment. This can cause an increase in your blood sugars, which we observed, and you had your insulin regimen while you were in the hospital adjusted in order to better manage your sugars. You were followed by our endocrinology team who made the final recommendations of ______. Please continue to take your medications as prescribed. Please continue to make appropriate lifestyle decision in order to manage your diabetes and sugars. Please return to the hospital should you experience severe low blood sugar, loss of consciousness, increase in urination combined with nausea, vomiting, sweating, or if you should have any chest pain, shortness of breath, or fevers >100.4     PRINCIPAL DISCHARGE DIAGNOSIS  Diagnosis: Auditory impairment  Assessment and Plan of Treatment: You came into the hospital because you were experiencing new hearing loss in both ears. You were evaluated by our neurology, ENT, and infectious disease teams. You had CT scans which did not show any acute processes. You had an MRI of your brain which showed no acute processes but a chronic lacunar infarction.. Your infectious disease workup showed a virus called CMV and a blood test was obtained to see if you had active infection. The ID team advised and you will be able to receive those final results from your outpatient physician for follow up.. You were advised to follow up with Dr. Guerra / ENT for an outpatient audiogram. While you were in the hospital you were started on a 5 day course of steroids (11/30 - 12/4) in order to treat any inflammatory process that may have been occurring. Please continue to follow up with ENT for your audiogram. Please return to the hospital should you experience any altered mental status, changes in your vision, hearing, loss of consciousness, falls due to imbalance, or shortness of breath or chest pain. Please continue to take all your medications as prescribed.      SECONDARY DISCHARGE DIAGNOSES  Diagnosis: Steroid-induced hyperglycemia  Assessment and Plan of Treatment: You came into the hospital due to your hearing loss. While you were here, you received steroids as part of your treatment. This can cause an increase in your blood sugars, which we observed, and you had your insulin regimen while you were in the hospital adjusted in order to better manage your sugars. You were followed by our endocrinology team who made the final recommendations of stopping your Metformin and Victoza, and also stopping your Lisinopril due to your kidney function. You are to start taking Lantus 24 units once a day and Admelog 8 units with your meals. Please continue to take your medications as prescribed. Please continue to make appropriate lifestyle decision in order to manage your diabetes and sugars. Please return to the hospital should you experience severe low blood sugar, loss of consciousness, increase in urination combined with nausea, vomiting, sweating, or if you should have any chest pain, shortness of breath, or fevers >100.4     PRINCIPAL DISCHARGE DIAGNOSIS  Diagnosis: Auditory impairment  Assessment and Plan of Treatment: You came into the hospital because you were experiencing new hearing loss in both ears. You were evaluated by our neurology, ENT, and infectious disease teams. You had CT scans which did not show any acute processes. You had an MRI of your brain which showed no acute processes but a chronic lacunar infarction (possible stroke). You were evaluated by neurology and advised to continue aspirin and atorvastatin to help reduce your cholesterol which can be a risk for strokes. Your atorvastatin has been increased from 20mg to 40mg. Please follow up with your primary care doctor for further recommendations regarding medication uptitration based on your toleratance. Please follow up with neurology following your discharge for further management. Your infectious disease workup showed a virus called CMV and a blood test was obtained to see if you had active infection. The ID team advised and you will be able to receive those final results from your outpatient physician for follow up.. You were advised to follow up with Dr. Guerra / ENT for an outpatient audiogram. While you were in the hospital you were started on a 5 day course of steroids (11/30 - 12/4) in order to treat any inflammatory process that may have been occurring. Please continue to follow up with ENT for your audiogram. Please return to the hospital should you experience any altered mental status, changes in your vision, hearing, loss of consciousness, falls due to imbalance, or shortness of breath or chest pain. Please continue to take all your medications as prescribed.      SECONDARY DISCHARGE DIAGNOSES  Diagnosis: Steroid-induced hyperglycemia  Assessment and Plan of Treatment: You came into the hospital due to your hearing loss. While you were here, you received steroids as part of your treatment. This can cause an increase in your blood sugars, which we observed, and you had your insulin regimen while you were in the hospital adjusted in order to better manage your sugars. You were followed by our endocrinology team who made the final recommendations of stopping your Metformin and Victoza, and also stopping your Lisinopril due to your kidney function. You are to start taking Lantus 24 units once a day and Admelog 8 units with your meals. Please continue to take your medications as prescribed. Please continue to make appropriate lifestyle decision in order to manage your diabetes and sugars. Please return to the hospital should you experience severe low blood sugar, loss of consciousness, increase in urination combined with nausea, vomiting, sweating, or if you should have any chest pain, shortness of breath, or fevers >100.4     PRINCIPAL DISCHARGE DIAGNOSIS  Diagnosis: Auditory impairment  Assessment and Plan of Treatment: You came into the hospital because you were experiencing new hearing loss in both ears. You were evaluated by our neurology, ENT, and infectious disease teams. You had CT scans which did not show any acute processes. You had an MRI of your brain which showed no acute processes but a chronic lacunar infarction (possible stroke). You were evaluated by neurology and advised to continue aspirin and atorvastatin to help reduce your cholesterol which can be a risk for strokes. Your atorvastatin has been increased from 20mg to 40mg. Please follow up with your primary care doctor for further recommendations regarding medication uptitration based on your toleratance. Please follow up with neurology following your discharge for further management. Your infectious disease workup showed a virus called CMV and a blood test was obtained to see if you had active infection. The ID team advised and you will be able to receive those final results from your outpatient physician for follow up. Please call Dr. Garcia for the results of your CMV testing and viral panel results. The phone number for her is [phone]. You were advised to follow up with Dr. Guerra / ENT for an outpatient audiogram. While you were in the hospital you were started on a 5 day course of steroids (11/30 - 12/4) in order to treat any inflammatory process that may have been occurring. Please continue to follow up with ENT for your audiogram. Please return to the hospital should you experience any altered mental status, changes in your vision, hearing, loss of consciousness, falls due to imbalance, or shortness of breath or chest pain. Please continue to take all your medications as prescribed.      SECONDARY DISCHARGE DIAGNOSES  Diagnosis: Steroid-induced hyperglycemia  Assessment and Plan of Treatment: You came into the hospital due to your hearing loss. While you were here, you received steroids as part of your treatment. This can cause an increase in your blood sugars, which we observed, and you had your insulin regimen while you were in the hospital adjusted in order to better manage your sugars. You were followed by our endocrinology team who made the final recommendations of stopping your Metformin and Victoza, and also stopping your Lisinopril due to your kidney function. You are to start taking Lantus 24 units once a day and Admelog 8 units with your meals. Please continue to take your medications as prescribed. Please continue to make appropriate lifestyle decision in order to manage your diabetes and sugars. Please return to the hospital should you experience severe low blood sugar, loss of consciousness, increase in urination combined with nausea, vomiting, sweating, or if you should have any chest pain, shortness of breath, or fevers >100.4

## 2021-12-02 NOTE — PROGRESS NOTE ADULT - PROBLEM SELECTOR PLAN 6
F-none  E-replete K<4 and Mag <2  N-CC/DASH diet  lovenox for DVT prophylaxis -c/w lipitor    #GERD  c/w famotidine and protonix (therapeutic interchange omeprazole)

## 2021-12-02 NOTE — DISCHARGE NOTE PROVIDER - NSDCCPTREATMENT_GEN_ALL_CORE_FT
PRINCIPAL PROCEDURE  Procedure: MRI brain  Findings and Treatment:        PRINCIPAL PROCEDURE  Procedure: MRI brain  Findings and Treatment: MRI BRAIN: No acute intracranial hemorrhage, acute ischemia, or abnormal intracranial enhancement.  Chronic lacunar infarct within the right medial cerebellar hemisphere and mild chronic white matter microvascular type changes.  MRI IAC: Unremarkable study.

## 2021-12-02 NOTE — CHART NOTE - NSCHARTNOTEFT_GEN_A_CORE
Discussed MRI findings with Dr. Almanzar. Poor quality MRI, no acute findings.    To likely repeat MRI study for better quality.    d/w Dr. Ryder Neurology Attending

## 2021-12-02 NOTE — PROGRESS NOTE ADULT - PROBLEM SELECTOR PLAN 1
-bilateral hearing loss, tinnitus prior  -external ear exam benign, otoscopic exam w/out acute findings  -differentials include drug induced, possibly due to antibiotics for pancreatic drainage (daughter and pt unsure which antibiotic), vibratory hearing more intact than sensinoneural.   -obtain MR head w/ w/o iv and IAC w/ w/o iv (no metals in body creatinine clearance acceptable, patient aware) to rule out lesions.   -will need outpatient f/u w/ ENT for official audiogram and steroid use  -lisinopril can cause tinnitus 1-10% of cases, will hold for now  -ordered for viral/rheumatologic workup per neurology, consent obtained for HIV/RPR    PLAN  - HSV IgG and CMV IgM positive - ID consult obtained    -- pending MRI from ID; CMV serum level to be obtained with next labs  - MRI of brain and IAC pending  - c/w prednisone 60 (day 2/5) - per neurology recommendations, will adjust if necessary pending ID response -bilateral hearing loss, tinnitus prior  -external ear exam benign, otoscopic exam w/out acute findings  -differentials include drug induced, possibly due to antibiotics for pancreatic drainage (daughter and pt unsure which antibiotic), vibratory hearing more intact than sensinoneural.   -obtain MR head w/ w/o iv and IAC w/ w/o iv (no metals in body creatinine clearance acceptable, patient aware) to rule out lesions.   -will need outpatient f/u w/ ENT for official audiogram and steroid use  -lisinopril can cause tinnitus 1-10% of cases, will hold for now  -ordered for viral/rheumatologic workup per neurology, consent obtained for HIV/RPR    PLAN  - HSV IgG and CMV IgM positive - ID consult obtained    -- pending MRI reading    -- pending read will determine ID and neuro input  - c/w prednisone 60 (day 3/5) - per neurology recommendations, will adjust if necessary pending ID response Patient has creatinine 1.54 increased from baseline ~1.0. Qualifying for MANDY. Patient voiding appropriately does not appear to be obstructive?    PLAN  - f/u urine Na, creatinine, osmolality  - f/u bladder scan to check for retention

## 2021-12-02 NOTE — PROGRESS NOTE ADULT - PROBLEM SELECTOR PLAN 5
-c/w lipitor    #GERD  c/w famotidine and protonix (therapeutic interchange omeprazole) -c/w amlodipine and HLZ  -hold lisinopril in case this could be the cause of her hearing issues (though less likely) until collateral about inpatient antibiotic regimen is obtained    PLAN  - blood pressures stable, SBP 140s

## 2021-12-02 NOTE — PROGRESS NOTE ADULT - ASSESSMENT
Patient is a 66 year old female hx of insulin dependant DM2, HTN, HLD, pancreatic pseudocyst w/ recent admission and drainage, iron deficiency anemia, who presents due to decreased hearing.  now on prednisone   a1c 9.2  pt with marked hyperglycemia inpt  endocrine called for further assistance     1. DM type 2  home regimen: Metformin, ?lirglutide (though recently stopped per the daughter), Lantus and lispro (per med recc, daughter confirms pt is on MDI and metformin)  currently pt is on steroids: prednisone 60mg  Assessment DM with marked hyperglycemia exacerbated by steroids   Plan:  check FSBG TID qac and hs  carb consistent diet   - Lantus  33 u qhs  -Humalog 20-20-20 units with meals. hold if NPO  -moderate dose SS TIDAC/qhs  -nutrition consult       Please note, if predinner glucose >300, hold dinner and therefore premeal insulin., give correction only at that time, check glucose in 2 hrs. if less <250 she can eat with premeal coverage only.  this will allow her to break the glucotoxicity     inform endocrine of hypoglycemia or persistent hyperglycemia episodes as changes in pts insulin regimen will need to be made.   notify endocrine if any plans to be NPO/diet changes as this will also affect insulin regimen.  *inform endocrine of any change or decrease in steroids as this will affect her insulin regimen*      dc planning:  pt is on basal bolus +metfomrin at home   final doses of insulin to be stermined based on inpt trend and steroid course  please note, pt was on lirglutide but potentially off, would hold this at dc given pancreatic psedocyst and assess outpt  per daughter pt was to set up care with endocrine soon (please confirm with daughter toni and reschedule appt if pt remains in the hospital)    2. HTN  goal BP in DM <130/80  management as per the primary team      3. HLD  pt is on liptor 20mg, , not at goal, consider increase outpt to higher intensity statin   low fat carb controlled diet       Clementine Masterson MD  Attending Physician   Department of Endocrinology, Diabetes and Metabolism     Pager  [phone] [please provide 10 digit call back number]  JODIE 9-5  [email or web address]  Nights and weekends: [phone]  Please note that this patient may be followed by a different provider tomorrow.   If no answer or after hours, please contact [phone].  For final dc reccomendations, please call [phone] or page the endocrine fellow on call.

## 2021-12-02 NOTE — DISCHARGE NOTE PROVIDER - CARE PROVIDERS DIRECT ADDRESSES
,[email or web address]_Unknown

## 2021-12-02 NOTE — PROVIDER CONTACT NOTE (OTHER) - BACKGROUND
Pt came in for b/l haering loss, but needed BMP for checking creatnine. Unable to get pt hardstick and refusing
Pt Fouzia admitted for hearing loss. Pmhx HTN, DM, pancreatic pseudocyst

## 2021-12-02 NOTE — PROGRESS NOTE ADULT - PROBLEM SELECTOR PLAN 3
-s/p drainage at prior hospital, and stent placement 1 month prior  -no leukocytosis or fevers  -abdomen soft, mildly distended, mild discomfort    PLAN  - pain mild, has PRN tylenol if needed -is on lantus 28 units, Admelog 15 + Mod Sliding scale    #MAGALY  -continue to monitor  -per pt and daughter egd/scope unremarkable  -had 1 unit transfusion at prior hospitalization    PLAN  - sugars elevated to 400s today; likely i/s/o new steroid use as well  - increased Lantus to 28 units; adding on premeal Admelog 15 units   - continue moderate correctional scale    -Endocrine consulted for persistent hyperglycemia difficult to control. Will f/u recommendations  - Recs: since on steroids, this is a prandial issue re hyperglycemia.   - Patient's glucose is high due to increased snacking, encourage patient to stick to the meal, possibly avoid the carbs if possible while on steroids  - 3 AM , BMP still has slightyl elevated sugars, endocrine is following  - confirm about endocrine outpatient appointment

## 2021-12-02 NOTE — PROGRESS NOTE ADULT - PROBLEM SELECTOR PLAN 2
-is on lantus 28 units, Admelog 15 + Mod Sliding scale    #MAGALY  -continue to monitor  -per pt and daughter egd/cscope unremarkable  -had 1 unit transfusion at prior hospitalization    PLAN  - sugars elevated to 400s today; likely i/s/o new steroid use as well  - increased Lantus to 28 units; adding on premeal Admelog 15 units   - continue moderate correctional scale    -Endocrine consulted for persistent hyperglycemia difficult to control. Will f/u recommendations  - Recs: since on steroids, this is a prandial issue re hyperglycemia.   - Patient's glucose is high due to increased snacking, encourage patient to stick to the meal, possibly avoid the carbs if possible while on steroids  - Will put in for 3am FS on 12/2 and NF can enter one time admelog based on bedtime corrective sliding scale. -is on lantus 28 units, Admelog 15 + Mod Sliding scale    #MAGALY  -continue to monitor  -per pt and daughter egd/cscope unremarkable  -had 1 unit transfusion at prior hospitalization    PLAN  - sugars elevated to 400s today; likely i/s/o new steroid use as well  - increased Lantus to 28 units; adding on premeal Admelog 15 units   - continue moderate correctional scale    -Endocrine consulted for persistent hyperglycemia difficult to control. Will f/u recommendations  - Recs: since on steroids, this is a prandial issue re hyperglycemia.   - Patient's glucose is high due to increased snacking, encourage patient to stick to the meal, possibly avoid the carbs if possible while on steroids  - 3 AM , BMP still has slightyl elevated sugars, endocrine is following -bilateral hearing loss, tinnitus prior  -external ear exam benign, otoscopic exam w/out acute findings  -differentials include drug induced, possibly due to antibiotics for pancreatic drainage (daughter and pt unsure which antibiotic), vibratory hearing more intact than sensinoneural.   -obtain MR head w/ w/o iv and IAC w/ w/o iv (no metals in body creatinine clearance acceptable, patient aware) to rule out lesions.   -will need outpatient f/u w/ ENT for official audiogram and steroid use  -lisinopril can cause tinnitus 1-10% of cases, will hold for now  -ordered for viral/rheumatologic workup per neurology, consent obtained for HIV/RPR    PLAN  - HSV IgG and CMV IgM positive - ID consult obtained    -- pending MRI reading    -- pending read will determine ID and neuro input - if negative can likely d/c if MANDY resolves/stable  - c/w prednisone 60 (day 3/5) - per neurology recommendations, will adjust if necessary pending ID response

## 2021-12-02 NOTE — PROGRESS NOTE ADULT - SUBJECTIVE AND OBJECTIVE BOX
Mookie Márquez MD  PGY-1 Internal Medicine  Pager:  [phone] (ns) [phone] ([facility])  Available on Microsoft Teams  12-02-21 @ 06:34  _________________________________________________________________________________________________________________________________________    CC:      Patient is a 66y old  Female who presents with a chief complaint of Bilateral hearing loss (01 Dec 2021 18:07)        SUBJECTIVE:    NAEO.      _________________________________________________________________________________________________________________________________________    OBJECTIVE:    Vital Signs Last 24 Hrs  T(C): 36.6 (02 Dec 2021 05:58), Max: 36.9 (01 Dec 2021 06:51)  T(F): 97.9 (02 Dec 2021 05:58), Max: 98.5 (01 Dec 2021 21:01)  HR: 69 (02 Dec 2021 05:58) (63 - 117)  BP: 142/72 (02 Dec 2021 05:58) (115/75 - 142/72)  BP(mean): --  RR: 17 (02 Dec 2021 05:58) (17 - 18)  SpO2: 100% (02 Dec 2021 05:58) (99% - 100%)    I&O's Summary    30 Nov 2021 07:01  -  01 Dec 2021 07:00  --------------------------------------------------------  IN: 1975 mL / OUT: 0 mL / NET: 1975 mL    01 Dec 2021 07:01  -  02 Dec 2021 06:34  --------------------------------------------------------  IN: 480 mL / OUT: 0 mL / NET: 480 mL        MEDICATIONS  (STANDING):  amLODIPine   Tablet 10 milliGRAM(s) Oral daily  aspirin enteric coated 81 milliGRAM(s) Oral daily  atorvastatin 20 milliGRAM(s) Oral at bedtime  dextrose 40% Gel 15 Gram(s) Oral once  dextrose 5%. 1000 milliLiter(s) (50 mL/Hr) IV Continuous <Continuous>  dextrose 5%. 1000 milliLiter(s) (100 mL/Hr) IV Continuous <Continuous>  dextrose 50% Injectable 25 Gram(s) IV Push once  dextrose 50% Injectable 12.5 Gram(s) IV Push once  dextrose 50% Injectable 25 Gram(s) IV Push once  enoxaparin Injectable 40 milliGRAM(s) SubCutaneous daily  famotidine    Tablet 20 milliGRAM(s) Oral daily  glucagon  Injectable 1 milliGRAM(s) IntraMuscular once  hydrALAZINE 25 milliGRAM(s) Oral every 8 hours  influenza  Vaccine (HIGH DOSE) 0.7 milliLiter(s) IntraMuscular once  insulin glargine Injectable (LANTUS) 28 Unit(s) SubCutaneous at bedtime  insulin lispro (ADMELOG) corrective regimen sliding scale   SubCutaneous three times a day before meals  insulin lispro (ADMELOG) corrective regimen sliding scale   SubCutaneous at bedtime  insulin lispro Injectable (ADMELOG) 15 Unit(s) SubCutaneous three times a day before meals  metoprolol tartrate 25 milliGRAM(s) Oral every 12 hours  pantoprazole    Tablet 40 milliGRAM(s) Oral before breakfast  polyethylene glycol 3350 17 Gram(s) Oral daily  predniSONE   Tablet 60 milliGRAM(s) Oral daily  sodium chloride 0.9%. 1000 milliLiter(s) (75 mL/Hr) IV Continuous <Continuous>  thiamine 100 milliGRAM(s) Oral daily    MEDICATIONS  (PRN):  acetaminophen     Tablet .. 650 milliGRAM(s) Oral every 6 hours PRN Temp greater or equal to 38C (100.4F), Mild Pain (1 - 3)        PHYSICAL EXAM:    GENERAL: Laying comfortably, NAD  EYES: EOMI, PERRL, no scleral icterus  NECK: No JVD  LUNG: Clear to auscultation bilaterally; No wheeze, crackles or rhonci  HEART: Regular rate and rhythm; No murmurs, rubs, or gallops  ABDOMEN: Soft, Nontender, Nondistended  EXTREMITIES:  No LE edema, 2+ Peripheral Pulses, No clubbing, cyanosis, or edema  PSYCH: AAOx3  NEUROLOGY: non-focal, strength 5/5 in all extremities, sensation intact  SKIN: No rashes or lesions      LABS:                            9.6    3.41  )-----------( 337      ( 30 Nov 2021 07:22 )             30.2     Auto Eosinophil # x     / Auto Eosinophil % x     / Auto Neutrophil # x     / Auto Neutrophil % x     / BANDS % x        11-30    134<L>  |  98  |  33<H>  ----------------------------<  447<H>  4.1   |  21<L>  |  1.16    Ca    9.2      30 Nov 2021 07:22  Mg     1.60     11-30  Phos  4.9     11-30              RADIOLOGY & ADDITIONAL TESTS:    Imaging Personally Reviewed:    Consultant(s) Notes Reviewed:      Care Discussed with Consultants/Other Providers:      _________________________________________________________________________________________________________________________________________  Mookie Márquez MD  PGY-1 Internal Medicine  Pager: [phone] (NS) [phone] ([facility])  Available on Microsoft Teams  02 Dec 2021 06:34         Mookie Márquez MD  PGY-1 Internal Medicine  Pager:  [phone] (ns) [phone] ([facility])  Available on Microsoft Teams  12-02-21 @ 06:34  _________________________________________________________________________________________________________________________________________    CC:      Patient is a 66y old  Female who presents with a chief complaint of Bilateral hearing loss (01 Dec 2021 18:07)        SUBJECTIVE:    NAEO.    Patient is doing well this morning. No acute complaints. Feels that her left ear is having more echoing and that she can hear herself now, and she is able to hear some words when I speak at moderate volume directly into the left ear. Right ear is not having any sensation still. Patient denies any f/c, no n/v/d/c, no SOB, no chest pain, no edema, no abdominal pain. Feels like she is doing well overall.   _________________________________________________________________________________________________________________________________________    OBJECTIVE:    Vital Signs Last 24 Hrs  T(C): 36.6 (02 Dec 2021 05:58), Max: 36.9 (01 Dec 2021 06:51)  T(F): 97.9 (02 Dec 2021 05:58), Max: 98.5 (01 Dec 2021 21:01)  HR: 69 (02 Dec 2021 05:58) (63 - 117)  BP: 142/72 (02 Dec 2021 05:58) (115/75 - 142/72)  BP(mean): --  RR: 17 (02 Dec 2021 05:58) (17 - 18)  SpO2: 100% (02 Dec 2021 05:58) (99% - 100%)    I&O's Summary    30 Nov 2021 07:01  -  01 Dec 2021 07:00  --------------------------------------------------------  IN: 1975 mL / OUT: 0 mL / NET: 1975 mL    01 Dec 2021 07:01  -  02 Dec 2021 06:34  --------------------------------------------------------  IN: 480 mL / OUT: 0 mL / NET: 480 mL        MEDICATIONS  (STANDING):  amLODIPine   Tablet 10 milliGRAM(s) Oral daily  aspirin enteric coated 81 milliGRAM(s) Oral daily  atorvastatin 20 milliGRAM(s) Oral at bedtime  dextrose 40% Gel 15 Gram(s) Oral once  dextrose 5%. 1000 milliLiter(s) (50 mL/Hr) IV Continuous <Continuous>  dextrose 5%. 1000 milliLiter(s) (100 mL/Hr) IV Continuous <Continuous>  dextrose 50% Injectable 25 Gram(s) IV Push once  dextrose 50% Injectable 12.5 Gram(s) IV Push once  dextrose 50% Injectable 25 Gram(s) IV Push once  enoxaparin Injectable 40 milliGRAM(s) SubCutaneous daily  famotidine    Tablet 20 milliGRAM(s) Oral daily  glucagon  Injectable 1 milliGRAM(s) IntraMuscular once  hydrALAZINE 25 milliGRAM(s) Oral every 8 hours  influenza  Vaccine (HIGH DOSE) 0.7 milliLiter(s) IntraMuscular once  insulin glargine Injectable (LANTUS) 28 Unit(s) SubCutaneous at bedtime  insulin lispro (ADMELOG) corrective regimen sliding scale   SubCutaneous three times a day before meals  insulin lispro (ADMELOG) corrective regimen sliding scale   SubCutaneous at bedtime  insulin lispro Injectable (ADMELOG) 15 Unit(s) SubCutaneous three times a day before meals  metoprolol tartrate 25 milliGRAM(s) Oral every 12 hours  pantoprazole    Tablet 40 milliGRAM(s) Oral before breakfast  polyethylene glycol 3350 17 Gram(s) Oral daily  predniSONE   Tablet 60 milliGRAM(s) Oral daily  sodium chloride 0.9%. 1000 milliLiter(s) (75 mL/Hr) IV Continuous <Continuous>  thiamine 100 milliGRAM(s) Oral daily    MEDICATIONS  (PRN):  acetaminophen     Tablet .. 650 milliGRAM(s) Oral every 6 hours PRN Temp greater or equal to 38C (100.4F), Mild Pain (1 - 3)        PHYSICAL EXAM:    GENERAL: Laying comfortably, NAD  EYES: EOMI, PERRL, no scleral icterus  NECK: No JVD  LUNG: Clear to auscultation bilaterally; No wheeze, crackles or rhonci  HEART: Regular rate and rhythm; No murmurs, rubs, or gallops  ABDOMEN: Soft, Nontender, Nondistended  EXTREMITIES:  No LE edema, 2+ Peripheral Pulses, No clubbing, cyanosis, or edema  PSYCH: AAOx3  NEUROLOGY: non-focal, strength 5/5 in all extremities, sensation intact, hearing impaired R > L, no hearing in the R ear, L ear moderately picking up sounds if spoken directly into her ear  SKIN: No rashes or lesions      Labs:                        8.5    6.35  )-----------( 332      ( 02 Dec 2021 07:24 )             27.2     Auto Eosinophil # x     / Auto Eosinophil % x     / Auto Neutrophil # x     / Auto Neutrophil % x     / BANDS % x        Hgb Trend: 8.5<--, 9.6<--, 7.6<--, 7.9<--    12-02    132<L>  |  101  |  48<H>  ----------------------------<  223<H>  4.4   |  22  |  1.54<H>    Ca    8.7      02 Dec 2021 07:24  Mg     1.60     12-02  Phos  3.3     12-02    Creatinine Trend: 1.54<--, 1.16<--, 1.01<--, 1.11<--      Labs result reviewed by me.  12-02-21 @ 11:22        RADIOLOGY & ADDITIONAL TESTS:    MRI read pending: prelim read small area of hyperattenuation noted centrally    _________________________________________________________________________________________________________________________________________  Mookie Márquez MD  PGY-1 Internal Medicine  Pager: [phone] (NS) [phone] ([facility])  Available on [employer] Teams  02 Dec 2021 06:34

## 2021-12-02 NOTE — DISCHARGE NOTE PROVIDER - HOSPITAL COURSE
Patient is a 66 year old female hx of insulin dependant DM2, HTN, HLD, pancreatic pseudocyst w/ recent admission and drainage, iron deficiency anemia, who presents due to decreased hearing. She was at a loud party on Friday, and she noticed ringing and then decreased hearing (first left then right) 11/28/21 in the morning. She had ringing in the ear one month ago as well. 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 She doesn't have any recent changes in medications, and only occasionally takes aspirin. She was recently hospitalized due to pancreatic pseudocyst w/ drainage, anemia w/ endoscopy/c-scope per pt and daughter, and antibiotic use as well (family unsure). Denies fevers, chills, nausea, vomiting, diarrhea, sob, cp, visual changes, headaches, LE swelling, or skin rashes.     ED course: afebrile, HR 84, /88, RR 18, 100%.  CT head angio, CT auditory canal noncon negative for acute findings, no masses or fractures noted, no clinically important closure of vasculature noted    Patient was admitted in order to pursue further workup with MRI. Neurology consult was obtained and recommended MRI along with autoimmune and infectious workup and ENT evaluation. ENT consult examined and determined it was unremarkable from their perspective and to pursue audiogram following discharge. Neurology decided to start prednisone 60mg for 5 day course on second day of admission. Additionally, infectious workup returned a positive CMV IgM and serum CMW showed _____. Patient was afebrile and had no white count throughout the admission. Patient was able to tolerate MRI, and imaging showed ________.    Her course was c/b steroid induced hyperglycaemia, with a mild DKA and elevated sugars. Patient insulin regimen was adjusted on the floors and patient FS improved. She completed her course of steroids on 12/4 and was stable from her insulin standpoint. Endocrinology was consulted in order to assist with management and made final recommendations of _______. Patient was advised to follow up outpatient with her endocrinologist for further maangement. Patient additionally had a mild MANDY noted on HD4. Urine studies showed _____.    It was at this point that patient was determined to be hemodynamically stable and pharmacologically optimised for discharge. Patient is a 66 year old female hx of insulin dependant DM2, HTN, HLD, pancreatic pseudocyst w/ recent admission and drainage, iron deficiency anemia, who presents due to decreased hearing. She was at a loud party on Friday, and she noticed ringing and then decreased hearing (first left then right) 11/28/21 in the morning. She had ringing in the ear one month ago as well. 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 She doesn't have any recent changes in medications, and only occasionally takes aspirin. She was recently hospitalized due to pancreatic pseudocyst w/ drainage, anemia w/ endoscopy/c-scope per pt and daughter, and antibiotic use as well (family unsure). Denies fevers, chills, nausea, vomiting, diarrhea, sob, cp, visual changes, headaches, LE swelling, or skin rashes.     ED course: afebrile, HR 84, /88, RR 18, 100%.  CT head angio, CT auditory canal noncon negative for acute findings, no masses or fractures noted, no clinically important closure of vasculature noted    Patient was admitted in order to pursue further workup with MRI. Neurology consult was obtained and recommended MRI along with autoimmune and infectious workup and ENT evaluation. ENT consult examined and determined it was unremarkable from their perspective and to pursue audiogram following discharge. Neurology decided to start prednisone 60mg for 5 day course on second day of admission. Additionally, infectious workup returned a positive CMV IgM and serum CMW was obtained. Your outpatient doctor will be notified of the results, as they are still pending as of your discharge. Patient was afebrile and had no white count throughout the admission. Patient was able to tolerate MRI, and imaging showed a chronic lacunar infarction, but not acute process that may have caused your hearing loss. You were advised to follow up with an outpatient audiologist as well after this MRI.    Her course was c/b steroid induced hyperglycaemia, with a mild DKA and elevated sugars. Patient insulin regimen was adjusted on the floors and patient FS improved. She completed her course of steroids on 12/4 and was stable from her insulin standpoint. Endocrinology was consulted in order to assist with management and made final recommendations of stopping your Metformin, Victoza, and starting insulin, with Lantus 24 units at night for long acting insulin and Admelog 8 units with meals for your short acting. Patient was advised to follow up outpatient with her endocrinologist for further maangement. Patient additionally had a mild MANDY noted on HD4. Urine studies were indicating that elevation in kidney lab values was likely due to a contrast induced kidney injury, and this resolved with fluids..    It was at this point that patient was determined to be hemodynamically stable and pharmacologically optimised for discharge.

## 2021-12-02 NOTE — DISCHARGE NOTE PROVIDER - NSDCFUADDAPPT_GEN_ALL_CORE_FT
Please continue to follow up with your primary care physician at [facility] for further management of your disease.     Please schedule an appointment with Dr. Samson for an audiogram to assess your hearing within a week of your discharge or as soon as possible.    Please continue to follow up with your endocrinologist outpatient in order to manage your diabetes medications. You have an appointment scheduled at [facility] clinic per your daughter 12/11. Please continue to follow up with your primary care physician at [facility], [place] for further management of your disease.     Please schedule an appointment with Dr. Samson for an audiogram to assess your hearing within a week of your discharge or as soon as possible. Please schedule an appointment with Dr. Jimenes's office to follow up regarding your infarct that was noted on your MRI.    Please continue to follow up with your endocrinologist outpatient in order to manage your diabetes medications. You have an appointment scheduled at [facility], [place] clinic per your daughter 12/11. Please continue to follow up with your primary care physician at [facility] for further management of your disease.     Please schedule an appointment with Dr. Samson for an audiogram to assess your hearing within a week of your discharge or as soon as possible. Please schedule an appointment with Dr. Jimenes's office to follow up regarding your infarct that was noted on your MRI.    Please continue to follow up with your endocrinologist outpatient in order to manage your diabetes medications. You have an appointment scheduled at [facility] clinic per your daughter 12/11.    Please call Dr. Garcia for the results of your CMV testing and viral panel results. The phone number for her is [phone]

## 2021-12-03 ENCOUNTER — TRANSCRIPTION ENCOUNTER (OUTPATIENT)
Age: 66
End: 2021-12-03

## 2021-12-03 VITALS
SYSTOLIC BLOOD PRESSURE: 116 MMHG | DIASTOLIC BLOOD PRESSURE: 68 MMHG | RESPIRATION RATE: 17 BRPM | HEART RATE: 65 BPM | OXYGEN SATURATION: 100 % | TEMPERATURE: 98 F

## 2021-12-03 DIAGNOSIS — E11.65 TYPE 2 DIABETES MELLITUS WITH HYPERGLYCEMIA: ICD-10-CM

## 2021-12-03 DIAGNOSIS — I10 ESSENTIAL (PRIMARY) HYPERTENSION: ICD-10-CM

## 2021-12-03 LAB
ANION GAP SERPL CALC-SCNC: 10 MMOL/L — SIGNIFICANT CHANGE UP (ref 7–14)
BUN SERPL-MCNC: 42 MG/DL — HIGH (ref 7–23)
CALCIUM SERPL-MCNC: 8.6 MG/DL — SIGNIFICANT CHANGE UP (ref 8.4–10.5)
CHLORIDE SERPL-SCNC: 105 MMOL/L — SIGNIFICANT CHANGE UP (ref 98–107)
CMV DNA CSF QL NAA+PROBE: SIGNIFICANT CHANGE UP
CO2 SERPL-SCNC: 21 MMOL/L — LOW (ref 22–31)
CREAT SERPL-MCNC: 1.38 MG/DL — HIGH (ref 0.5–1.3)
GLUCOSE SERPL-MCNC: 113 MG/DL — HIGH (ref 70–99)
MAGNESIUM SERPL-MCNC: 1.6 MG/DL — SIGNIFICANT CHANGE UP (ref 1.6–2.6)
PHOSPHATE SERPL-MCNC: 2.9 MG/DL — SIGNIFICANT CHANGE UP (ref 2.5–4.5)
POTASSIUM SERPL-MCNC: 4.3 MMOL/L — SIGNIFICANT CHANGE UP (ref 3.5–5.3)
POTASSIUM SERPL-SCNC: 4.3 MMOL/L — SIGNIFICANT CHANGE UP (ref 3.5–5.3)
SODIUM SERPL-SCNC: 136 MMOL/L — SIGNIFICANT CHANGE UP (ref 135–145)

## 2021-12-03 PROCEDURE — 99239 HOSP IP/OBS DSCHRG MGMT >30: CPT | Mod: GC

## 2021-12-03 PROCEDURE — 99232 SBSQ HOSP IP/OBS MODERATE 35: CPT

## 2021-12-03 RX ORDER — INSULIN LISPRO 100/ML
4 VIAL (ML) SUBCUTANEOUS
Qty: 0 | Refills: 0 | DISCHARGE

## 2021-12-03 RX ORDER — LIRAGLUTIDE 6 MG/ML
1 INJECTION SUBCUTANEOUS
Qty: 0 | Refills: 0 | DISCHARGE

## 2021-12-03 RX ORDER — INSULIN GLARGINE 100 [IU]/ML
14 INJECTION, SOLUTION SUBCUTANEOUS
Qty: 0 | Refills: 0 | DISCHARGE

## 2021-12-03 RX ORDER — ATORVASTATIN CALCIUM 80 MG/1
1 TABLET, FILM COATED ORAL
Qty: 0 | Refills: 0 | DISCHARGE

## 2021-12-03 RX ORDER — LISINOPRIL 2.5 MG/1
1 TABLET ORAL
Qty: 0 | Refills: 0 | DISCHARGE

## 2021-12-03 RX ORDER — INSULIN GLARGINE 100 [IU]/ML
24 INJECTION, SOLUTION SUBCUTANEOUS
Qty: 8 | Refills: 0
Start: 2021-12-03 | End: 2022-01-01

## 2021-12-03 RX ORDER — INSULIN LISPRO 100/ML
8 VIAL (ML) SUBCUTANEOUS
Qty: 8 | Refills: 0
Start: 2021-12-03 | End: 2022-01-01

## 2021-12-03 RX ORDER — THIAMINE MONONITRATE (VIT B1) 100 MG
1 TABLET ORAL
Qty: 0 | Refills: 0 | DISCHARGE
Start: 2021-12-03

## 2021-12-03 RX ORDER — ASPIRIN/CALCIUM CARB/MAGNESIUM 324 MG
1 TABLET ORAL
Qty: 0 | Refills: 0 | DISCHARGE
Start: 2021-12-03

## 2021-12-03 RX ORDER — ATORVASTATIN CALCIUM 80 MG/1
1 TABLET, FILM COATED ORAL
Qty: 30 | Refills: 0
Start: 2021-12-03 | End: 2022-01-01

## 2021-12-03 RX ORDER — TERBINAFINE HCL 250 MG
1 TABLET ORAL
Qty: 0 | Refills: 0 | DISCHARGE

## 2021-12-03 RX ORDER — SODIUM CHLORIDE 9 MG/ML
1000 INJECTION INTRAMUSCULAR; INTRAVENOUS; SUBCUTANEOUS ONCE
Refills: 0 | Status: COMPLETED | OUTPATIENT
Start: 2021-12-03 | End: 2021-12-03

## 2021-12-03 RX ORDER — METFORMIN HYDROCHLORIDE 850 MG/1
1 TABLET ORAL
Qty: 0 | Refills: 0 | DISCHARGE

## 2021-12-03 RX ADMIN — Medication 20 UNIT(S): at 13:16

## 2021-12-03 RX ADMIN — POLYETHYLENE GLYCOL 3350 17 GRAM(S): 17 POWDER, FOR SOLUTION ORAL at 13:18

## 2021-12-03 RX ADMIN — Medication 25 MILLIGRAM(S): at 13:18

## 2021-12-03 RX ADMIN — Medication 25 MILLIGRAM(S): at 06:11

## 2021-12-03 RX ADMIN — Medication 25 MILLIGRAM(S): at 06:12

## 2021-12-03 RX ADMIN — SODIUM CHLORIDE 1000 MILLILITER(S): 9 INJECTION INTRAMUSCULAR; INTRAVENOUS; SUBCUTANEOUS at 13:32

## 2021-12-03 RX ADMIN — FAMOTIDINE 20 MILLIGRAM(S): 10 INJECTION INTRAVENOUS at 11:38

## 2021-12-03 RX ADMIN — Medication 6: at 13:18

## 2021-12-03 RX ADMIN — Medication 60 MILLIGRAM(S): at 06:12

## 2021-12-03 RX ADMIN — AMLODIPINE BESYLATE 10 MILLIGRAM(S): 2.5 TABLET ORAL at 06:12

## 2021-12-03 RX ADMIN — PANTOPRAZOLE SODIUM 40 MILLIGRAM(S): 20 TABLET, DELAYED RELEASE ORAL at 06:12

## 2021-12-03 RX ADMIN — ENOXAPARIN SODIUM 40 MILLIGRAM(S): 100 INJECTION SUBCUTANEOUS at 11:38

## 2021-12-03 RX ADMIN — Medication 81 MILLIGRAM(S): at 11:38

## 2021-12-03 RX ADMIN — Medication 100 MILLIGRAM(S): at 11:38

## 2021-12-03 NOTE — PROGRESS NOTE ADULT - PROBLEM SELECTOR PLAN 2
-bilateral hearing loss, tinnitus prior  -external ear exam benign, otoscopic exam w/out acute findings  -differentials include drug induced, possibly due to antibiotics for pancreatic drainage (daughter and pt unsure which antibiotic), vibratory hearing more intact than sensinoneural.   -obtain MR head w/ w/o iv and IAC w/ w/o iv (no metals in body creatinine clearance acceptable, patient aware) to rule out lesions.   -will need outpatient f/u w/ ENT for official audiogram and steroid use  -lisinopril can cause tinnitus 1-10% of cases, will hold for now  -ordered for viral/rheumatologic workup per neurology, consent obtained for HIV/RPR    PLAN  - HSV IgG and CMV IgM positive - ID consult obtained    -- pending MRI reading    -- pending read will determine ID and neuro input - if negative can likely d/c if MANDY resolves/stable  - c/w prednisone 60 (day 3/5) - per neurology recommendations, will adjust if necessary pending ID response -bilateral hearing loss, tinnitus prior  -external ear exam benign, otoscopic exam w/out acute findings  -differentials include drug induced, possibly due to antibiotics for pancreatic drainage (daughter and pt unsure which antibiotic), vibratory hearing more intact than sensinoneural.   -obtain MR head w/ w/o iv and IAC w/ w/o iv (no metals in body creatinine clearance acceptable, patient aware) to rule out lesions.   -will need outpatient f/u w/ ENT for official audiogram and steroid use  -lisinopril can cause tinnitus 1-10% of cases, will hold for now  -ordered for viral/rheumatologic workup per neurology, consent obtained for HIV/RPR    PLAN  - HSV IgG and CMV IgM positive - ID consult obtained    -- MRI read shows chronic lacunar infarct - neurology advises to start atorvastatin and aspirin outpatient for elevated cholesterol and reduce risk    -- ID recommendation of RVP panel prior to d/c  - c/w prednisone 60 (day 5/5) - per neurology recommendations, will adjust if necessary pending ID response

## 2021-12-03 NOTE — PROGRESS NOTE ADULT - PROBLEM SELECTOR PLAN 5
-c/w amlodipine and HLZ  -hold lisinopril in case this could be the cause of her hearing issues (though less likely) until collateral about inpatient antibiotic regimen is obtained    PLAN  - blood pressures stable, SBP 140s -c/w amlodipine and HLZ  -hold lisinopril in case this could be the cause of her hearing issues (though less likely) until collateral about inpatient antibiotic regimen is obtained    PLAN  - blood pressures stable,  - 160s

## 2021-12-03 NOTE — PROGRESS NOTE ADULT - ATTENDING COMMENTS
66W hx of insulin dependant DM2, HTN, HLD, pancreatic pseudocyst w/ recent admission and drainage, iron deficiency anemia, who presents due to decreased hearing.       Hearing loss: Unclear etiology brain pathology vs CVA vs Acquired CMV and ?Terbinafine ( if pt uses). f/u MRI.     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
66W hx of insulin dependant DM2, HTN, HLD, pancreatic pseudocyst w/ recent admission and drainage, iron deficiency anemia, who presents due to decreased hearing.     MANDY: contrast induced, Cr improving    Hearing loss: Unclear etiology brain pathology vs CVA vs Acquired CMV. MRI shows abnormal enhancement, f/u NEuro recs. 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    Plan for  dc with f/u hearing office if not acute intervention per neurology  Spent 40 mins on dc .
Pt seen and examined 11/29    66W hx of insulin dependant DM2, HTN, HLD, pancreatic pseudocyst w/ recent admission and drainage, iron deficiency anemia, who presents due to decreased hearing.       Hearing loss: Unclear etiology brain pathology vs CVA vs Acquired CMV and ?Terbinafine ( if pt uses). f/u MRI.     CMV infection: +IgM CMV, ID consult for recs .
66W hx of insulin dependant DM2, HTN, HLD, pancreatic pseudocyst w/ recent admission and drainage, iron deficiency anemia, who presents due to decreased hearing.       Hearing loss: Unclear etiology brain pathology vs CVA vs Acquired CMV and ?Terbinafine ( if pt uses). f/u MRI.     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
66W hx of insulin dependant DM2, HTN, HLD, pancreatic pseudocyst w/ recent admission and drainage, iron deficiency anemia, who presents due to decreased hearing.     MANDY: ?contrast induced, will f/u repeat BMP to confirm elevation,    Hearing loss: Unclear etiology brain pathology vs CVA vs Acquired CMV and ?Terbinafine ( if pt uses). 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    Plan for  dc with hearing f/u if no acute findings on MRI and Cr is normal  Spent 40 mins on dc

## 2021-12-03 NOTE — DISCHARGE NOTE NURSING/CASE MANAGEMENT/SOCIAL WORK - NSDCFUADDAPPT_GEN_ALL_CORE_FT
Please continue to follow up with your primary care physician at [facility] for further management of your disease.     Please schedule an appointment with Dr. Samson for an audiogram to assess your hearing within a week of your discharge or as soon as possible. Please schedule an appointment with Dr. Jimenes's office to follow up regarding your infarct that was noted on your MRI.    Please continue to follow up with your endocrinologist outpatient in order to manage your diabetes medications. You have an appointment scheduled at [facility] clinic per your daughter 12/11.

## 2021-12-03 NOTE — PROGRESS NOTE ADULT - PROBLEM SELECTOR PLAN 1
Patient has creatinine 1.54 increased from baseline ~1.0. Qualifying for MANDY. Patient voiding appropriately does not appear to be obstructive?    PLAN  - f/u urine Na, creatinine, osmolality  - f/u bladder scan to check for retention Patient has creatinine 1.54 increased from baseline ~1.0. Qualifying for MANDY. Patient voiding appropriately does not appear to be obstructive?    PLAN  - FeNa 1.9% - intrinsic  - Mild elevation in Creatinine is likely 2/2 to contrast received from imaging  - 1L Bolus of fluid given  - Improvement in Cr seen 1.38 < 1.54 - stable  - no lisinopril on d/c

## 2021-12-03 NOTE — PROGRESS NOTE ADULT - ASSESSMENT
65 yo woman with diabetes recent hospitalization for w/u pancreatic cyst s/p endoscopic procedure x 3 who awoke 11/28 with sudden bilateral hearing loss.  Etiology unclear- medication related, autoimmune, vascular, malignant,  infectious (less likely).    HIV, syphilis screen, Lyme screen negative.  Serum CMV IgM + non specific, normal LFTs.  CMV is common cause of hearing loss in congenital infections, not in adults who are not immunocompromised.    Suggest:  check RVP for other viral etiologies  repeat CMV serology with IgM and IgG  follow serum CMV PCR (ordered)      Yanci Garcia MD  Pager: [phone]  After 5 PM or weekends please call fellow on call or office [phone]

## 2021-12-03 NOTE — PROGRESS NOTE ADULT - PROBLEM SELECTOR PLAN 7
F-none  E-replete K<4 and Mag <2  N-CC/DASH diet  lovenox for DVT prophylaxis
F-none  E-replete K<4 and Mag <2  N-CC/DASH diet  lovenox for DVT prophylaxis

## 2021-12-03 NOTE — PROGRESS NOTE ADULT - ASSESSMENT
Patient is a 65yo F PMHx of DM, HTN, HLD, on ASAS 81mg who presents to ED for acute onset hearing loss bilaterally. LKN 11/27 9:30PM and woke up at 10AM with b/l hearing loss. Notes she attending party with loud music on friday, 2 days ago. Denies current tinnitus, popping sound, ear fullness, vertiginous symptoms, nausea, vomiting, recent illnesses/ ear infections. Has mild headache and mild numbness of her fingertips that also started today AM when she woke up. VS 98.7F, HR87, /88, RR16, 100%RA. Exam notable for hearing significantly diminished bilaterally. With tuning fork on vertex of head and placed on mastoid on both sides, patient able to appreciate vibration and was within 1 second of noticing the vibration stopping correctly. Can barely appreciate the vibration when tuning fork placed 2 inches outside of outer ear.       Impression: bilateral hearing loss potentially in setting of + CMV infection, ruled out structural or other inflammatory causes rom MRI imaging.      Recommendations:   [x] CTH, CTA H/N, CTV H unremarkable   [ ] ENT evaluation  [x] MRI brain and MR IAC w/ wo contrast  [ ] ID follow up for CMV infection  [ ] aspirin 81mg daily if no contraindication  [ ] atorvastatin 80mg daily to titrate for LDL < 70  [ ] a1c, lipid profile  [ ] PT/OT as tolerated; ENT/appropriate therapy for hearing loss  [ ] upon discharge patient should follow up with ENT Dr. Maria Elena Samson   [phone]  [ ] may follow up with Dr. Jimenes for chronic infarct  [phone]    will sign off. please reconsult PRN.    pt seen and case d/w Neurology Attending Dr. Ryder

## 2021-12-03 NOTE — PROGRESS NOTE ADULT - REASON FOR ADMISSION
Bilateral hearing loss
No deformity or limitation of movement

## 2021-12-03 NOTE — DISCHARGE NOTE NURSING/CASE MANAGEMENT/SOCIAL WORK - PATIENT PORTAL LINK FT
You can access the FollowMyHealth Patient Portal offered by [facility] by registering at the following website: [email or web address]. By joining [employer]’s FollowMyHealth portal, you will also be able to view your health information using other applications (apps) compatible with our system.

## 2021-12-03 NOTE — PROGRESS NOTE ADULT - SUBJECTIVE AND OBJECTIVE BOX
Follow Up:  bilateral hearing loss    Interval History/ROS:  slight improvement in hearing left ear    Allergies  penicillin ([place] Synd)        ANTIMICROBIALS:      OTHER MEDS:  acetaminophen     Tablet .. 650 milliGRAM(s) Oral every 6 hours PRN  amLODIPine   Tablet 10 milliGRAM(s) Oral daily  aspirin enteric coated 81 milliGRAM(s) Oral daily  atorvastatin 20 milliGRAM(s) Oral at bedtime  dextrose 40% Gel 15 Gram(s) Oral once  dextrose 5%. 1000 milliLiter(s) IV Continuous <Continuous>  dextrose 5%. 1000 milliLiter(s) IV Continuous <Continuous>  dextrose 50% Injectable 25 Gram(s) IV Push once  dextrose 50% Injectable 12.5 Gram(s) IV Push once  dextrose 50% Injectable 25 Gram(s) IV Push once  enoxaparin Injectable 40 milliGRAM(s) SubCutaneous daily  famotidine    Tablet 20 milliGRAM(s) Oral daily  glucagon  Injectable 1 milliGRAM(s) IntraMuscular once  hydrALAZINE 25 milliGRAM(s) Oral every 8 hours  influenza  Vaccine (HIGH DOSE) 0.7 milliLiter(s) IntraMuscular once  insulin glargine Injectable (LANTUS) 28 Unit(s) SubCutaneous at bedtime  insulin lispro (ADMELOG) corrective regimen sliding scale   SubCutaneous three times a day before meals  insulin lispro (ADMELOG) corrective regimen sliding scale   SubCutaneous at bedtime  insulin lispro Injectable (ADMELOG) 15 Unit(s) SubCutaneous three times a day before meals  metoprolol tartrate 25 milliGRAM(s) Oral every 12 hours  pantoprazole    Tablet 40 milliGRAM(s) Oral before breakfast  polyethylene glycol 3350 17 Gram(s) Oral daily  predniSONE   Tablet 60 milliGRAM(s) Oral daily  sodium chloride 0.9%. 1000 milliLiter(s) IV Continuous <Continuous>  thiamine 100 milliGRAM(s) Oral daily    Vital Signs Last 24 Hrs  T(F): 98.5 (12-03-21 @ 15:19), Max: 98.6 (12-02-21 @ 21:51)  HR: 65 (12-03-21 @ 15:19)  BP: 116/68 (12-03-21 @ 15:19)  RR: 17 (12-03-21 @ 15:19)  SpO2: 100% (12-03-21 @ 15:19) (98% - 100%)    PHYSICAL EXAM:  Constitutional: Not in acute distress  Eyes: No icterus.  Oral cavity: Clear, no lesions  Neck: Supple  RS: Chest clear to auscultation bilaterally. No wheeze/rhonchi/crepitations.  CVS: S1, S2 heard. Regular rate and rhythm. No murmurs/rubs/gallops.  Abdomen: Soft. No guarding/rigidity/tenderness.  : no pedromo  Skin: No lesions noted  Neuro: Alert, oriented to time/place/person   No focal abnormalities                          9.6    3.41  )-----------( 337      ( 30 Nov 2021 07:22 )             30.2       11-30    134<L>  |  98  |  33<H>  ----------------------------<  447<H>  4.1   |  21<L>  |  1.16    Ca    9.2      30 Nov 2021 07:22  Phos  4.9     11-30  Mg     1.60     11-30        Cytomegalovirus IgM Antibody, Serum (11.29.21 @ 09:17)   CMV IgM Antibody: 40.5 AU/mL   CMV IgM Interpretation: Positive:     MICROBIOLOGY:    CMV PCR testing        RADIOLOGY:    ra< from: MR IAC w/wo IV Cont (12.01.21 @ 19:26) >    IMPRESSION:    MRI BRAIN: No acute intracranial hemorrhage, acute ischemia, or abnormal intracranial enhancement.    Chronic lacunar infarct within the right medial cerebellar hemisphere and mild chronic white matter microvascular type changes.    MRI IAC: Unremarkable study.    --- End of Report ---      < end of copied text >

## 2021-12-03 NOTE — PROGRESS NOTE ADULT - ASSESSMENT
66 year old female with DM2 (A1c 9.2%), HTN, HLD, pancreatic pseudocyst w/ recent admission and drainage, iron deficiency anemia, who presents due to decreased hearing. Patient with marked hyperglycemia inpatient, exacerbated by steroids. Endocrine called for further assistance     1. DM 2  A1c 9.2%, goal 7%  Home regimen: Metformin, Liraglutide (though recently stopped per the daughter), Lantus 14 units at bedtime and lispro (Humalog?) 4-6 units TID.  Daughter confirms pt is on MDI and Metformin  On steroid course inpatient - Prednisone 60mg daily - last dose today 12/3/21. Per primary team, no taper planned    While inpatient:  BG target 100-180 mg/dl  Continue Lantus 33 units SQ qHS  Continue Admelog 20 units TID before meals (Hold if NPO/not eating meal)  Admelog MODERATE dose correctional scales before meals and bedtime  Consistent carbohydrate diet, RD consult if possible while admitted   Check BG before meals and bedtime  Hypoglycemia protocol     Discharge Plan:  Patient on high doses of basal/bolus insulin here, due to hyperglycemia exacerbated by steroids. She will NOT be on steroids after discharge, per primary team. Home dosing is Lantus 14 units and Humalog 4-6 units TID, plus Metformin and Victoza (recently stopped), A1c above target at 9.2%, GFR 39  Recommend to STOP Metformin, STOP Victoza  Resume basal/bolus insulin PENS: Recommend Lantus 24 units SQ qHS and Humalog 8 units TID before meals (Hold if not eating meal)   Per daughter pt was to set up care with endocrine soon, if desiring to followup with [address], [address], [facility] [address], [phone]    2. HTN  Goal BP in DM <130/80  Management as per the primary team    3. HLD  Inpatient on Liptor 20mg, , not at goal  Per primary team, patient prescribed Lipitor 20 mg at home but is non adherent, poor tolerance. She has been tolerating here  Continue with this dose for now, continue low fat carb controlled diet, followup outpatient for repeat lipids and potential increase in statin dose    Tracey Alejo  Nurse Practitioner  Division of Endocrinology & Diabetes  In house pager #[phone]/long range pager #[phone]    If before 9AM or after 6PM, or on weekends/holidays, please call endocrine answering service for assistance ([phone]).  For nonurgent matters email [email or web address] for assistance.

## 2021-12-03 NOTE — PROGRESS NOTE ADULT - PROVIDER SPECIALTY LIST ADULT
Infectious Disease
Neurology
Endocrinology
Infectious Disease
Endocrinology
Internal Medicine

## 2021-12-03 NOTE — PROGRESS NOTE ADULT - SUBJECTIVE AND OBJECTIVE BOX
Chief Complaint: DM 2 with hyperglycemia exacerbated by steroids     History: Patient seen at bedside. Communicated through writing due to patient [place]. Patient is eating meals, no complaints   Last dose of Prednisone 60 mg given today  Per primary team, discharge planning    MEDICATIONS  (STANDING):  amLODIPine   Tablet 10 milliGRAM(s) Oral daily  aspirin enteric coated 81 milliGRAM(s) Oral daily  atorvastatin 20 milliGRAM(s) Oral at bedtime  dextrose 40% Gel 15 Gram(s) Oral once  dextrose 5%. 1000 milliLiter(s) (50 mL/Hr) IV Continuous <Continuous>  dextrose 5%. 1000 milliLiter(s) (100 mL/Hr) IV Continuous <Continuous>  dextrose 50% Injectable 25 Gram(s) IV Push once  dextrose 50% Injectable 12.5 Gram(s) IV Push once  dextrose 50% Injectable 25 Gram(s) IV Push once  enoxaparin Injectable 40 milliGRAM(s) SubCutaneous daily  famotidine    Tablet 20 milliGRAM(s) Oral daily  glucagon  Injectable 1 milliGRAM(s) IntraMuscular once  hydrALAZINE 25 milliGRAM(s) Oral every 8 hours  influenza  Vaccine (HIGH DOSE) 0.7 milliLiter(s) IntraMuscular once  insulin glargine Injectable (LANTUS) 33 Unit(s) SubCutaneous at bedtime  insulin lispro (ADMELOG) corrective regimen sliding scale   SubCutaneous three times a day before meals  insulin lispro (ADMELOG) corrective regimen sliding scale   SubCutaneous at bedtime  insulin lispro Injectable (ADMELOG) 20 Unit(s) SubCutaneous three times a day before meals  metoprolol tartrate 25 milliGRAM(s) Oral every 12 hours  pantoprazole    Tablet 40 milliGRAM(s) Oral before breakfast  polyethylene glycol 3350 17 Gram(s) Oral daily  thiamine 100 milliGRAM(s) Oral daily    MEDICATIONS  (PRN):  acetaminophen     Tablet .. 650 milliGRAM(s) Oral every 6 hours PRN Temp greater or equal to 38C (100.4F), Mild Pain (1 - 3)    Allergies  penicillin ([facility] Synd)    Review of Systems:  Cardiovascular: No chest pain  Respiratory: No SOB  GI: No nausea, vomiting  Endocrine: no hypoglycemia     PHYSICAL EXAM:  VITALS: T(C): 36.9 (12-03-21 @ 05:06)  T(F): 98.4 (12-03-21 @ 05:06), Max: 98.6 (12-02-21 @ 21:51)  HR: 68 (12-03-21 @ 05:06) (64 - 74)  BP: 151/78 (12-03-21 @ 05:06) (135/68 - 160/72)  RR:  (18 - 18)  SpO2:  (98% - 99%)  Wt(kg): --  GENERAL: NAD  EYES: No proptosis, no lid lag, anicteric  HEENT:  Atraumatic, Normocephalic  RESPIRATORY: unlabored respirations     CAPILLARY BLOOD GLUCOSE    POCT Blood Glucose.: 251 mg/dL (03 Dec 2021 12:24)  POCT Blood Glucose.: 121 mg/dL (03 Dec 2021 09:03)  POCT Blood Glucose.: 179 mg/dL (02 Dec 2021 22:26)  POCT Blood Glucose.: 302 mg/dL (02 Dec 2021 18:12)      12-03    136  |  105  |  42<H>  ----------------------------<  113<H>  4.3   |  21<L>  |  1.38<H>    EGFR if : 46<L>  EGFR if non : 39<L>    Ca    8.6      12-03  Mg     1.60     12-03  Phos  2.9     12-03        Thyroid Function Tests:  11-29 @ 06:47 TSH 3.31 FreeT4 1.0 T3 -- Anti TPO -- Anti Thyroglobulin Ab -- TSI --      A1C with Estimated Average Glucose Result: 9.2 % (11-29-21 @ 06:47)    Diet, Consistent Carbohydrate w/Evening Snack (11-30-21 @ 18:21)

## 2021-12-03 NOTE — PROGRESS NOTE ADULT - SUBJECTIVE AND OBJECTIVE BOX
SUBJECTIVE: patient seen and examined at bedside by Neurology Resident and Attending. still has hearing loss right worse than left. MRI brain and IAC reperformed.     MEDICATIONS (HOME):  Home Medications:  amLODIPine 10 mg oral tablet: 1 tab(s) orally once a day (28 Nov 2021 22:59)  aspirin 81 mg oral delayed release tablet: 1 tab(s) orally once a day (03 Dec 2021 12:36)  famotidine 20 mg oral tablet: 1 tab(s) orally once a day (28 Nov 2021 22:59)  hydrALAZINE 25 mg oral tablet: 1 tab(s) orally 3 times a day (28 Nov 2021 22:59)  Metoprolol Tartrate 25 mg oral tablet: 1 tab(s) orally 2 times a day (28 Nov 2021 22:59)  MiraLax oral powder for reconstitution: 17 gram(s) orally once a day (28 Nov 2021 22:59)  omeprazole 40 mg oral delayed release capsule: 1 cap(s) orally once a day (28 Nov 2021 22:59)  Senna 8.6 mg oral tablet: 1 tab(s) orally once a day (at bedtime) (28 Nov 2021 22:59)  thiamine 100 mg oral tablet: 1 tab(s) orally once a day (03 Dec 2021 12:36)    MEDICATIONS  (STANDING):  amLODIPine   Tablet 10 milliGRAM(s) Oral daily  aspirin enteric coated 81 milliGRAM(s) Oral daily  atorvastatin 20 milliGRAM(s) Oral at bedtime  dextrose 40% Gel 15 Gram(s) Oral once  dextrose 5%. 1000 milliLiter(s) (50 mL/Hr) IV Continuous <Continuous>  dextrose 5%. 1000 milliLiter(s) (100 mL/Hr) IV Continuous <Continuous>  dextrose 50% Injectable 25 Gram(s) IV Push once  dextrose 50% Injectable 12.5 Gram(s) IV Push once  dextrose 50% Injectable 25 Gram(s) IV Push once  enoxaparin Injectable 40 milliGRAM(s) SubCutaneous daily  famotidine    Tablet 20 milliGRAM(s) Oral daily  glucagon  Injectable 1 milliGRAM(s) IntraMuscular once  hydrALAZINE 25 milliGRAM(s) Oral every 8 hours  influenza  Vaccine (HIGH DOSE) 0.7 milliLiter(s) IntraMuscular once  insulin glargine Injectable (LANTUS) 33 Unit(s) SubCutaneous at bedtime  insulin lispro (ADMELOG) corrective regimen sliding scale   SubCutaneous three times a day before meals  insulin lispro (ADMELOG) corrective regimen sliding scale   SubCutaneous at bedtime  insulin lispro Injectable (ADMELOG) 20 Unit(s) SubCutaneous three times a day before meals  metoprolol tartrate 25 milliGRAM(s) Oral every 12 hours  pantoprazole    Tablet 40 milliGRAM(s) Oral before breakfast  polyethylene glycol 3350 17 Gram(s) Oral daily  thiamine 100 milliGRAM(s) Oral daily    MEDICATIONS  (PRN):  acetaminophen     Tablet .. 650 milliGRAM(s) Oral every 6 hours PRN Temp greater or equal to 38C (100.4F), Mild Pain (1 - 3)    ALLERGIES/INTOLERANCES:  Allergies  penicillin ([facility] Synd)    VITALS & EXAMINATION:  Vital Signs Last 24 Hrs  T(C): 36.9 (03 Dec 2021 05:06), Max: 37 (02 Dec 2021 21:51)  T(F): 98.4 (03 Dec 2021 05:06), Max: 98.6 (02 Dec 2021 21:51)  HR: 68 (03 Dec 2021 05:06) (64 - 74)  BP: 151/78 (03 Dec 2021 05:06) (135/68 - 160/72)  BP(mean): --  RR: 18 (03 Dec 2021 05:06) (18 - 18)  SpO2: 98% (03 Dec 2021 05:06) (98% - 99%)    Neurological (>12):  MS: Awake, alert, oriented to person, place, situation, time. Normal affect. Follows all commands. Cooperative.   Language: Speech is clear, fluent, loud due to being deaf. Good comprehension, registration of words. No perseverance.     CNs: PERRL (R = 3mm, L = 3mm). VFF to blink to threat. EOMI no nystagmus. V1-3 intact to LT, well developed masseter muscles b/l. No facial asymmetry b/l, full eye closure strength b/l.   Hearing absent bilaterally. With tuning fork on vertex of head and placed on mastoid on both sides, patient able to appreciate vibration and was within 1 second of noticing the vibration stopping correctly. Can barely appreciate the vibration when tuning fork placed 2 inches outside of outer ear.       Motor: no drift in any extremity    Sensation: Intact to LT  b/l     Coordination: No dysmetria to FTN     LABORATORY:  CBC                       8.5    6.35  )-----------( 332      ( 02 Dec 2021 07:24 )             27.2     Chem 12-03    136  |  105  |  42<H>  ----------------------------<  113<H>  4.3   |  21<L>  |  1.38<H>    Ca    8.6      03 Dec 2021 07:44  Phos  2.9     12-03  Mg     1.60     12-03      LFTs   Coagulopathy   Lipid Panel 11-29 Chol 236<H> LDL -- HDL 37<L> Trig 191<H>    STUDIES & IMAGING:  Studies (EKG, EEG, EMG, etc):     Radiology (XR, CT, MR, U/S, TTE/LATA):    MR Head and IAC w/wo IV Cont (12.01.21 @ 19:26)  IMPRESSION:    MRI BRAIN: No acute intracranial hemorrhage, acute ischemia, or abnormal intracranial enhancement.    Chronic lacunar infarct within the right medial cerebellar hemisphere and mild chronic white matter microvascular type changes.    MRI IAC: Unremarkable study.     CTH noncon, Angio Head & Neck, CTV w/ IV Cont (11.28.21 @ 16:32)   IMPRESSION:      1. Mild involutional changes in both hemispheres, with no acute abnormality, hemorrhage, or space-occupying lesion. No posterior fossa abnormality noted.  2. No IAC abnormality suggested. No middle ear and mastoid disease noted.  3. Atherosclerotic changes both carotid bifurcations in the neck. No significant stenosis, dissection, or occlusion noted. Patent vertebrals.  4. Widely patent arterial vasculature noted intracranially  5. There are no evidence of venous sinus occlusive disease. No sinus thrombosis suggested...

## 2021-12-03 NOTE — PROGRESS NOTE ADULT - PROBLEM SELECTOR PLAN 4
-s/p drainage at prior hospital, and stent placement 1 month prior  -no leukocytosis or fevers  -abdomen soft, mildly distended, mild discomfort    PLAN  - pain mild, has PRN tylenol if needed

## 2021-12-03 NOTE — DISCHARGE NOTE NURSING/CASE MANAGEMENT/SOCIAL WORK - NSDCPEFALRISK_GEN_ALL_CORE
For information on Fall & Injury Prevention, visit: [email or web address] OR  [email or web address] OR  https://www.cdc.gov/steadi/patient.html

## 2021-12-03 NOTE — PROGRESS NOTE ADULT - SUBJECTIVE AND OBJECTIVE BOX
Mookie Márquez MD  PGY-1 Internal Medicine  Pager:  [phone] (ns) [phone] ([facility])  Available on Microsoft Teams  12-03-21 @ 07:03  _________________________________________________________________________________________________________________________________________    CC:      Patient is a 66y old  Female who presents with a chief complaint of Bilateral hearing loss (02 Dec 2021 16:41)        SUBJECTIVE:    NAEO.      _________________________________________________________________________________________________________________________________________    OBJECTIVE:    Vital Signs Last 24 Hrs  T(C): 36.9 (03 Dec 2021 05:06), Max: 37 (02 Dec 2021 21:51)  T(F): 98.4 (03 Dec 2021 05:06), Max: 98.6 (02 Dec 2021 21:51)  HR: 68 (03 Dec 2021 05:06) (64 - 74)  BP: 151/78 (03 Dec 2021 05:06) (135/68 - 160/72)  BP(mean): --  RR: 18 (03 Dec 2021 05:06) (18 - 18)  SpO2: 98% (03 Dec 2021 05:06) (98% - 99%)    I&O's Summary      MEDICATIONS  (STANDING):  amLODIPine   Tablet 10 milliGRAM(s) Oral daily  aspirin enteric coated 81 milliGRAM(s) Oral daily  atorvastatin 20 milliGRAM(s) Oral at bedtime  dextrose 40% Gel 15 Gram(s) Oral once  dextrose 5%. 1000 milliLiter(s) (50 mL/Hr) IV Continuous <Continuous>  dextrose 5%. 1000 milliLiter(s) (100 mL/Hr) IV Continuous <Continuous>  dextrose 50% Injectable 25 Gram(s) IV Push once  dextrose 50% Injectable 12.5 Gram(s) IV Push once  dextrose 50% Injectable 25 Gram(s) IV Push once  enoxaparin Injectable 40 milliGRAM(s) SubCutaneous daily  famotidine    Tablet 20 milliGRAM(s) Oral daily  glucagon  Injectable 1 milliGRAM(s) IntraMuscular once  hydrALAZINE 25 milliGRAM(s) Oral every 8 hours  influenza  Vaccine (HIGH DOSE) 0.7 milliLiter(s) IntraMuscular once  insulin glargine Injectable (LANTUS) 33 Unit(s) SubCutaneous at bedtime  insulin lispro (ADMELOG) corrective regimen sliding scale   SubCutaneous at bedtime  insulin lispro (ADMELOG) corrective regimen sliding scale   SubCutaneous three times a day before meals  insulin lispro Injectable (ADMELOG) 20 Unit(s) SubCutaneous three times a day before meals  metoprolol tartrate 25 milliGRAM(s) Oral every 12 hours  pantoprazole    Tablet 40 milliGRAM(s) Oral before breakfast  polyethylene glycol 3350 17 Gram(s) Oral daily  predniSONE   Tablet 60 milliGRAM(s) Oral daily  thiamine 100 milliGRAM(s) Oral daily    MEDICATIONS  (PRN):  acetaminophen     Tablet .. 650 milliGRAM(s) Oral every 6 hours PRN Temp greater or equal to 38C (100.4F), Mild Pain (1 - 3)        PHYSICAL EXAM:    GENERAL: Laying comfortably, NAD  EYES: EOMI, PERRL, no scleral icterus  NECK: No JVD  LUNG: Clear to auscultation bilaterally; No wheeze, crackles or rhonci  HEART: Regular rate and rhythm; No murmurs, rubs, or gallops  ABDOMEN: Soft, Nontender, Nondistended  EXTREMITIES:  No LE edema, 2+ Peripheral Pulses, No clubbing, cyanosis, or edema  PSYCH: AAOx3  NEUROLOGY: non-focal, strength 5/5 in all extremities, sensation intact  SKIN: No rashes or lesions      LABS:                            8.5    6.35  )-----------( 332      ( 02 Dec 2021 07:24 )             27.2     Auto Eosinophil # x     / Auto Eosinophil % x     / Auto Neutrophil # x     / Auto Neutrophil % x     / BANDS % x        12-02    132<L>  |  101  |  48<H>  ----------------------------<  223<H>  4.4   |  22  |  1.54<H>    Ca    8.7      02 Dec 2021 07:24  Mg     1.60     12-02  Phos  3.3     12-02              RADIOLOGY & ADDITIONAL TESTS:    Imaging Personally Reviewed:    Consultant(s) Notes Reviewed:      Care Discussed with Consultants/Other Providers:      _________________________________________________________________________________________________________________________________________  Mookie Márquez MD  PGY-1 Internal Medicine  Pager: [phone] (NS) [phone] ([facility])  Available on Microsoft Teams  03 Dec 2021 07:03         Mookie Márquez MD  PGY-1 Internal Medicine  Pager:  [phone] (ns) [phone] ([facility])  Available on [employer] Teams  12-03-21 @ 07:03  _________________________________________________________________________________________________________________________________________    CC:      Patient is a 66y old  Female who presents with a chief complaint of Bilateral hearing loss (02 Dec 2021 16:41)        SUBJECTIVE:    NAEO.    Patient feels about the same today as she did yesterday. Minimal improve in the left ear, still able to partially hear loud voice if spoken directly into ear. Patient denies any f/c, no n/v/d/c, no SOB, no abdominal pain, no chest pain, no edema eating and drinking well, having appropriate urination and bowel movements.    _________________________________________________________________________________________________________________________________________    OBJECTIVE:    Vital Signs Last 24 Hrs  T(C): 36.9 (03 Dec 2021 05:06), Max: 37 (02 Dec 2021 21:51)  T(F): 98.4 (03 Dec 2021 05:06), Max: 98.6 (02 Dec 2021 21:51)  HR: 68 (03 Dec 2021 05:06) (64 - 74)  BP: 151/78 (03 Dec 2021 05:06) (135/68 - 160/72)  BP(mean): --  RR: 18 (03 Dec 2021 05:06) (18 - 18)  SpO2: 98% (03 Dec 2021 05:06) (98% - 99%)    I&O's Summary      MEDICATIONS  (STANDING):  amLODIPine   Tablet 10 milliGRAM(s) Oral daily  aspirin enteric coated 81 milliGRAM(s) Oral daily  atorvastatin 20 milliGRAM(s) Oral at bedtime  dextrose 40% Gel 15 Gram(s) Oral once  dextrose 5%. 1000 milliLiter(s) (50 mL/Hr) IV Continuous <Continuous>  dextrose 5%. 1000 milliLiter(s) (100 mL/Hr) IV Continuous <Continuous>  dextrose 50% Injectable 25 Gram(s) IV Push once  dextrose 50% Injectable 12.5 Gram(s) IV Push once  dextrose 50% Injectable 25 Gram(s) IV Push once  enoxaparin Injectable 40 milliGRAM(s) SubCutaneous daily  famotidine    Tablet 20 milliGRAM(s) Oral daily  glucagon  Injectable 1 milliGRAM(s) IntraMuscular once  hydrALAZINE 25 milliGRAM(s) Oral every 8 hours  influenza  Vaccine (HIGH DOSE) 0.7 milliLiter(s) IntraMuscular once  insulin glargine Injectable (LANTUS) 33 Unit(s) SubCutaneous at bedtime  insulin lispro (ADMELOG) corrective regimen sliding scale   SubCutaneous at bedtime  insulin lispro (ADMELOG) corrective regimen sliding scale   SubCutaneous three times a day before meals  insulin lispro Injectable (ADMELOG) 20 Unit(s) SubCutaneous three times a day before meals  metoprolol tartrate 25 milliGRAM(s) Oral every 12 hours  pantoprazole    Tablet 40 milliGRAM(s) Oral before breakfast  polyethylene glycol 3350 17 Gram(s) Oral daily  predniSONE   Tablet 60 milliGRAM(s) Oral daily  thiamine 100 milliGRAM(s) Oral daily    MEDICATIONS  (PRN):  acetaminophen     Tablet .. 650 milliGRAM(s) Oral every 6 hours PRN Temp greater or equal to 38C (100.4F), Mild Pain (1 - 3)        PHYSICAL EXAM:    GENERAL: Sitting comfortably, NAD  EYES: EOMI, PERRL, no scleral icterus  NECK: No JVD  LUNG: Clear to auscultation bilaterally; No wheeze, crackles or rhonci  HEART: Regular rate and rhythm; No murmurs, rubs, or gallops  ABDOMEN: Soft, Nontender, Nondistended  EXTREMITIES:  No LE edema, 2+ Peripheral Pulses, No clubbing, cyanosis, or edema  PSYCH: AAOx3  NEUROLOGY: non-focal, strength 5/5 in all extremities, sensation intact, limited hearing to loud stimuli in the left ear, no sound appreciation in the right ear  SKIN: No rashes or lesions    Labs:                        8.5    6.35  )-----------( 332      ( 02 Dec 2021 07:24 )             27.2     Auto Eosinophil # x     / Auto Eosinophil % x     / Auto Neutrophil # x     / Auto Neutrophil % x     / BANDS % x        Hgb Trend: 8.5<--, 9.6<--, 7.6<--, 7.9<--    12-03    136  |  105  |  42<H>  ----------------------------<  113<H>  4.3   |  21<L>  |  1.38<H>  12-02    132<L>  |  101  |  48<H>  ----------------------------<  223<H>  4.4   |  22  |  1.54<H>    Ca    8.6      03 Dec 2021 07:44  Mg     1.60     12-03  Phos  2.9     12-03    Creatinine Trend: 1.38<--, 1.54<--, 1.16<--, 1.01<--, 1.11<--      Labs result reviewed by me.  12-03-21 @ 16:34          RADIOLOGY & ADDITIONAL TESTS:    < from: MR IAC w/wo IV Cont (12.01.21 @ 19:26) >    MRI BRAIN: No acute intracranial hemorrhage, acute ischemia, or abnormal intracranial enhancement.    Chronic lacunar infarct within the right medial cerebellar hemisphere and mild chronic white matter microvascular type changes.    MRI IAC: Unremarkable study.    < end of copied text >        _________________________________________________________________________________________________________________________________________  Mookie Márquez MD  PGY-1 Internal Medicine  Pager: [phone] (NS) [phone] ([facility])  Available on [employer] Teams  03 Dec 2021 07:03

## 2021-12-03 NOTE — PROGRESS NOTE ADULT - PROBLEM SELECTOR PLAN 3
-is on lantus 28 units, Admelog 15 + Mod Sliding scale    #MAGALY  -continue to monitor  -per pt and daughter egd/scope unremarkable  -had 1 unit transfusion at prior hospitalization    PLAN  - sugars elevated to 400s today; likely i/s/o new steroid use as well  - increased Lantus to 28 units; adding on premeal Admelog 15 units   - continue moderate correctional scale    -Endocrine consulted for persistent hyperglycemia difficult to control. Will f/u recommendations  - Recs: since on steroids, this is a prandial issue re hyperglycemia.   - Patient's glucose is high due to increased snacking, encourage patient to stick to the meal, possibly avoid the carbs if possible while on steroids  - 3 AM , BMP still has slightyl elevated sugars, endocrine is following  - confirm about endocrine outpatient appointment -is on lantus 28 units, Admelog 15 + Mod Sliding scale    #MAGALY  -continue to monitor  -per pt and daughter egd/scope unremarkable  -had 1 unit transfusion at prior hospitalization    PLAN  - sugars improved today; final endo recommendations have been placed 28 units of Lantus with Admelog 8/8/8 for meals; do not take metformin at home, no more Victoza  - increased Lantus to 28 units; adding on premeal Admelog 15 units   - continue moderate correctional scale    -Endocrine consulted for persistent hyperglycemia difficult to control. Will f/u recommendations  - Recs: since on steroids, this is a prandial issue re hyperglycemia.   - Patient's glucose is high due to increased snacking, encourage patient to stick to the meal, possibly avoid the carbs if possible while on steroids  - 3 AM , BMP still has slightyl elevated sugars, endocrine is following  - confirm about endocrine outpatient appointment

## 2021-12-03 NOTE — PROGRESS NOTE ADULT - PROBLEM SELECTOR PLAN 6
-c/w lipitor    #GERD  c/w famotidine and protonix (therapeutic interchange omeprazole) -c/w lipitor --> increased dose from 20 to 40 for d/c    #GERD  c/w famotidine and protonix (therapeutic interchange omeprazole)

## 2021-12-04 LAB
CMV IGG FLD QL: >10 U/ML — HIGH
CMV IGG SERPL-IMP: POSITIVE
CMV IGM FLD-ACNC: 50 AU/ML — HIGH
CMV IGM SERPL QL: POSITIVE

## 2021-12-07 PROBLEM — E11.9 TYPE 2 DIABETES MELLITUS WITHOUT COMPLICATIONS: Chronic | Status: ACTIVE | Noted: 2021-11-28

## 2021-12-07 PROBLEM — K85.90 ACUTE PANCREATITIS WITHOUT NECROSIS OR INFECTION, UNSPECIFIED: Chronic | Status: ACTIVE | Noted: 2021-11-28

## 2021-12-07 PROBLEM — Z00.00 ENCOUNTER FOR PREVENTIVE HEALTH EXAMINATION: Status: ACTIVE | Noted: 2021-12-07

## 2021-12-07 PROBLEM — K86.2 CYST OF PANCREAS: Chronic | Status: ACTIVE | Noted: 2021-11-28

## 2021-12-07 PROBLEM — H26.9 UNSPECIFIED CATARACT: Chronic | Status: ACTIVE | Noted: 2021-11-28

## 2021-12-08 ENCOUNTER — APPOINTMENT (OUTPATIENT)
Dept: OTOLARYNGOLOGY | Facility: CLINIC | Age: 66
End: 2021-12-08
Payer: MEDICARE

## 2021-12-08 VITALS
BODY MASS INDEX: 29.37 KG/M2 | SYSTOLIC BLOOD PRESSURE: 188 MMHG | DIASTOLIC BLOOD PRESSURE: 89 MMHG | TEMPERATURE: 98 F | HEART RATE: 81 BPM | WEIGHT: 172 LBS | HEIGHT: 64 IN

## 2021-12-08 DIAGNOSIS — J34.2 DEVIATED NASAL SEPTUM: ICD-10-CM

## 2021-12-08 DIAGNOSIS — H93.12 TINNITUS, LEFT EAR: ICD-10-CM

## 2021-12-08 PROCEDURE — 31231 NASAL ENDOSCOPY DX: CPT

## 2021-12-08 PROCEDURE — 92557 COMPREHENSIVE HEARING TEST: CPT

## 2021-12-08 PROCEDURE — 92567 TYMPANOMETRY: CPT

## 2021-12-08 PROCEDURE — 99204 OFFICE O/P NEW MOD 45 MIN: CPT | Mod: 25

## 2021-12-08 RX ORDER — OMEPRAZOLE 40 MG/1
40 CAPSULE, DELAYED RELEASE ORAL
Refills: 0 | Status: ACTIVE | COMMUNITY

## 2021-12-08 RX ORDER — FAMOTIDINE 20 MG/1
20 TABLET, FILM COATED ORAL
Refills: 0 | Status: ACTIVE | COMMUNITY

## 2021-12-08 NOTE — HISTORY OF PRESENT ILLNESS
[No] : patient does not have a  history of radiation therapy [de-identified] : 67 yo female insukln dependent diabetic\par Patient here with her daughter complains that she woke up Sunday morning with hearing loss in both ears. She went to the ED, had a MRI and CT scan which came back wnl as per patients daughter. She was complaining a week before that she had tinnitus in the left ear. Pt has no ear pain, ear drainage, vertigo, nasal congestion, nasal discharge, epistaxis, sinus infections, facial pain, facial pressure, throat pain, dysphagia or fevers\par poss hearing loss prior to this acute progression of hearing loss\par \par  [Tinnitus] : tinnitus [Dizziness] : no dizziness [Hearing Loss] : hearing loss [Presbycusis] : presbycusis [Eustachian Tube Dysfunction] : no eustachian tube dysfunction [Cholesteatoma] : no cholesteatoma [Early Onset Hearing Loss] : no early onset hearing loss [Stroke] : no stroke [Nasal Congestion] : nasal congestion [Allergic Rhinitis] : no allergic rhinitis [Adenoidectomy] : no adenoidectomy [Allergies] : no allergies [Asthma] : no asthma [Hyperthyroidism] : no hyperthyroidism [Sialadenitis] : no sialadenitis [Hodgkin Disease] : no hodgkin disease [Non-Hodgkin Lymphoma] : no non-hodgkin lymphoma [None] : No risk factors have been identified. [Graves Disease] : no graves disease [Thyroid Cancer] : no thyroid cancer

## 2021-12-08 NOTE — DATA REVIEWED
[de-identified] : Hearing Test performed to evaluate the extent of hearing loss and  to explain pt's symptoms\par today's hearing test was personally reviewed and revealed\par RT: profound SNHL LT: severe to profound SNHL\par Type A tymps bilat [de-identified] : reported-wnl

## 2021-12-08 NOTE — REASON FOR VISIT
[Hearing Loss] : hearing loss [Tinnitus] : tinnitus [Vertigo] : vertigo [Initial Evaluation] : an initial evaluation for

## 2021-12-08 NOTE — END OF VISIT
[FreeTextEntry3] : I personally saw and examined BEVERLY MEJIA in detail. I spoke to HERMINIA Flores regarding the assessment and plan of care. I reviewed the above assessment and plan of care, and agree. I have made changes in changes in the body of the note where appropriate.I personally reviewed the HPI, PMH, FH, SH, ROS and medications/allergies. I have spoken to HERMINIA Flores regarding the history and have personally determined the assessment and plan of care, and documented this myself. I was present and participated in all key portions of the encounter and all procedures noted above. I have made changes in the body of the note where appropriate.\par \par Attesting Faculty: See Attending Signature Below \par \par \par  [Time Spent: ___ minutes] : I have spent [unfilled] minutes of time on the encounter.

## 2021-12-08 NOTE — PHYSICAL EXAM
[Nasal Endoscopy Performed] : nasal endoscopy was performed, see procedure section for findings [] : septum deviated to the right [Midline] : trachea located in midline position [Normal] : tympanic membranes are normal in both ears

## 2021-12-08 NOTE — ASSESSMENT
[FreeTextEntry1] : Patient wit hx of DM here with daughter complains that she lost hearing in both ears Sunday morning.\par  A week before she was complaining of tinnitus in the left ear. \par Had a workup in the ED which came back wnl.  \par -Hearing Test performed to evaluate the extent of hearing loss and to explain pt's symptoms\par Bilat sudden progression of SNHL L>R\par Rec IT steroids and refer to Dr Beyer for poss further w/ and tx\par Pt may need CI\par \par f/u in AM for IT steroids

## 2021-12-09 ENCOUNTER — APPOINTMENT (OUTPATIENT)
Dept: OTOLARYNGOLOGY | Facility: CLINIC | Age: 66
End: 2021-12-09
Payer: MEDICARE

## 2021-12-09 PROCEDURE — 69801 INCISE INNER EAR: CPT | Mod: 50

## 2021-12-09 PROCEDURE — 92504 EAR MICROSCOPY EXAMINATION: CPT | Mod: 59

## 2021-12-09 PROCEDURE — 69433 CREATE EARDRUM OPENING: CPT | Mod: 50

## 2021-12-09 PROCEDURE — 99213 OFFICE O/P EST LOW 20 MIN: CPT | Mod: 25

## 2021-12-09 NOTE — HISTORY OF PRESENT ILLNESS
[No] : patient does not have a  history of radiation therapy [Tinnitus] : tinnitus [Hearing Loss] : hearing loss [Presbycusis] : presbycusis [Nasal Congestion] : nasal congestion [None] : No risk factors have been identified. [de-identified] : 67 yo female insulin dependent diabetic\par Patient here with her daughter complains that she woke up Sunday morning with hearing loss in both ears. She went to the ED, had a MRI and CT scan which came back wnl as per patients daughter. She was complaining a week before that she had tinnitus in the left ear. Pt has no ear pain, ear drainage, vertigo, nasal congestion, nasal discharge, epistaxis, sinus infections, facial pain, facial pressure, throat pain, dysphagia or fevers\par poss hearing loss prior to this acute progression of hearing loss\par \par  [FreeTextEntry1] : 12/9/2021\par Patient here for intratympanic steroid injections for sudden hearing loss.  [Dizziness] : no dizziness [Eustachian Tube Dysfunction] : no eustachian tube dysfunction [Cholesteatoma] : no cholesteatoma [Early Onset Hearing Loss] : no early onset hearing loss [Stroke] : no stroke [Allergic Rhinitis] : no allergic rhinitis [Adenoidectomy] : no adenoidectomy [Allergies] : no allergies [Asthma] : no asthma [Hyperthyroidism] : no hyperthyroidism [Sialadenitis] : no sialadenitis [Hodgkin Disease] : no hodgkin disease [Non-Hodgkin Lymphoma] : no non-hodgkin lymphoma [Graves Disease] : no graves disease [Thyroid Cancer] : no thyroid cancer

## 2021-12-09 NOTE — REASON FOR VISIT
[Hearing Loss] : hearing loss [Tinnitus] : tinnitus [Vertigo] : vertigo [Subsequent Evaluation] : a subsequent evaluation for [Family Member] : family member

## 2021-12-09 NOTE — PROCEDURE
[Risk and Benefits Discussed] : The purpose, risks, discomforts, benefits and alternatives of the procedure have been explained to the patient including no treatment. [facility] [Cerumen Impaction] : Cerumen Impaction [Same] : same as the Pre Op Dx. [] : Removal of Cerumen [FreeTextEntry1] : Sudden Bilat SNHL x 6 days\par Insulin Dependent Diabetic [FreeTextEntry6] : the microscope was necessary for illumination and magnification\par wax removed bilat\par 1/2 cc Decadron instilled in both middle ear spaces\par tympanostomy tube-straight shank placed upside -down

## 2022-01-26 ENCOUNTER — APPOINTMENT (OUTPATIENT)
Dept: OTOLARYNGOLOGY | Facility: CLINIC | Age: 67
End: 2022-01-26
Payer: MEDICARE

## 2022-01-26 VITALS
HEIGHT: 64 IN | WEIGHT: 170 LBS | HEART RATE: 87 BPM | TEMPERATURE: 97.3 F | BODY MASS INDEX: 29.02 KG/M2 | SYSTOLIC BLOOD PRESSURE: 188 MMHG | DIASTOLIC BLOOD PRESSURE: 93 MMHG

## 2022-01-26 PROCEDURE — 99213 OFFICE O/P EST LOW 20 MIN: CPT

## 2022-01-26 PROCEDURE — 92557 COMPREHENSIVE HEARING TEST: CPT

## 2022-01-26 PROCEDURE — 92567 TYMPANOMETRY: CPT

## 2022-01-26 NOTE — ASSESSMENT
[FreeTextEntry1] : Patient wit hx of DM here with daughter for intratympanic steroid injections for bilateral sudden SNHL. On exam BMT in place and functional. \par \par Plan:\par -gave pt Blood work rx, strongly advised to get it done \par -Hearing Test performed to evaluate the extent of hearing loss and to explain pt's symptoms-\par sl improvement in SNHL in Left ear\par cont steroid otic drops\par -f/u Dr Beyer\par -possible  CI\par \par

## 2022-01-26 NOTE — END OF VISIT
[Time Spent: ___ minutes] : I have spent [unfilled] minutes of time on the encounter. [FreeTextEntry3] : I personally saw and examined BEVERLY MEJIA in detail. I spoke to HERMINIA Flores regarding the assessment and plan of care. I reviewed the above assessment and plan of care, and agree. I have made changes in changes in the body of the note where appropriate.I personally reviewed the HPI, PMH, FH, SH, ROS and medications/allergies. I have spoken to HERMINIA Flores regarding the history and have personally determined the assessment and plan of care, and documented this myself. I was present and participated in all key portions of the encounter and all procedures noted above. I have made changes in the body of the note where appropriate.\par \par Attesting Faculty: See Attending Signature Below \par \par \par

## 2022-01-26 NOTE — DATA REVIEWED
[de-identified] : Hearing Test performed to evaluate the extent of hearing loss and  to explain pt's symptoms\par today's hearing test was personally reviewed and revealed\par RT: stable/no change and LT: slight 10dB improvement seen\par Large ECV c/w patent PE tubes AU [de-identified] : reported-wnl

## 2022-01-26 NOTE — HISTORY OF PRESENT ILLNESS
[No] : patient does not have a  history of radiation therapy [Tinnitus] : tinnitus [Hearing Loss] : hearing loss [Presbycusis] : presbycusis [Nasal Congestion] : nasal congestion [None] : No risk factors have been identified. [de-identified] : 67 yo female insulin dependent diabetic\par Patient here with her daughter complains that she woke up Sunday morning with hearing loss in both ears. She went to the ED, had a MRI and CT scan which came back wnl as per patients daughter. She was complaining a week before that she had tinnitus in the left ear. Pt has no ear pain, ear drainage, vertigo, nasal congestion, nasal discharge, epistaxis, sinus infections, facial pain, facial pressure, throat pain, dysphagia or fevers\par poss hearing loss prior to this acute progression of hearing loss\par \par  [FreeTextEntry1] : 1/26/22:\par Pt presents for follow up with daughter, States there is minor improvement in hearing. She hears beats to music and certain sounds. When she talks she hears an echo. She is using steroid drops daily.  [Dizziness] : no dizziness [Eustachian Tube Dysfunction] : no eustachian tube dysfunction [Cholesteatoma] : no cholesteatoma [Early Onset Hearing Loss] : no early onset hearing loss [Stroke] : no stroke [Allergic Rhinitis] : no allergic rhinitis [Adenoidectomy] : no adenoidectomy [Allergies] : no allergies [Asthma] : no asthma [Hyperthyroidism] : no hyperthyroidism [Sialadenitis] : no sialadenitis [Hodgkin Disease] : no hodgkin disease [Non-Hodgkin Lymphoma] : no non-hodgkin lymphoma [Graves Disease] : no graves disease [Thyroid Cancer] : no thyroid cancer

## 2022-01-26 NOTE — PHYSICAL EXAM
[Nasal Endoscopy Performed] : nasal endoscopy was performed, see procedure section for findings [] : septum deviated to the right [Midline] : trachea located in midline position [Normal] : no rashes [de-identified] : BMT in place and functional

## 2022-01-26 NOTE — REASON FOR VISIT
[Subsequent Evaluation] : a subsequent evaluation for [Hearing Loss] : hearing loss [Tinnitus] : tinnitus [Vertigo] : vertigo [Family Member] : family member

## 2022-02-03 ENCOUNTER — INPATIENT (INPATIENT)
Facility: HOSPITAL | Age: 67
LOS: 7 days | Discharge: HOME | End: 2022-02-11
Attending: STUDENT IN AN ORGANIZED HEALTH CARE EDUCATION/TRAINING PROGRAM | Admitting: STUDENT IN AN ORGANIZED HEALTH CARE EDUCATION/TRAINING PROGRAM
Payer: MEDICARE

## 2022-02-03 VITALS
OXYGEN SATURATION: 100 % | HEART RATE: 89 BPM | TEMPERATURE: 100 F | SYSTOLIC BLOOD PRESSURE: 193 MMHG | HEIGHT: 64 IN | RESPIRATION RATE: 16 BRPM | DIASTOLIC BLOOD PRESSURE: 110 MMHG

## 2022-02-03 DIAGNOSIS — I10 ESSENTIAL (PRIMARY) HYPERTENSION: ICD-10-CM

## 2022-02-03 DIAGNOSIS — R05.9 COUGH, UNSPECIFIED: ICD-10-CM

## 2022-02-03 DIAGNOSIS — R06.00 DYSPNEA, UNSPECIFIED: ICD-10-CM

## 2022-02-03 DIAGNOSIS — Z89.411 ACQUIRED ABSENCE OF RIGHT GREAT TOE: Chronic | ICD-10-CM

## 2022-02-03 DIAGNOSIS — K21.9 GASTRO-ESOPHAGEAL REFLUX DISEASE WITHOUT ESOPHAGITIS: ICD-10-CM

## 2022-02-03 DIAGNOSIS — D64.9 ANEMIA, UNSPECIFIED: ICD-10-CM

## 2022-02-03 DIAGNOSIS — E11.9 TYPE 2 DIABETES MELLITUS WITHOUT COMPLICATIONS: ICD-10-CM

## 2022-02-03 DIAGNOSIS — Z29.9 ENCOUNTER FOR PROPHYLACTIC MEASURES, UNSPECIFIED: ICD-10-CM

## 2022-02-03 DIAGNOSIS — E78.5 HYPERLIPIDEMIA, UNSPECIFIED: ICD-10-CM

## 2022-02-03 LAB
ALBUMIN SERPL ELPH-MCNC: 2.9 G/DL — LOW (ref 3.3–5)
ALP SERPL-CCNC: 90 U/L — SIGNIFICANT CHANGE UP (ref 40–120)
ALT FLD-CCNC: 7 U/L — SIGNIFICANT CHANGE UP (ref 4–33)
ANION GAP SERPL CALC-SCNC: 11 MMOL/L — SIGNIFICANT CHANGE UP (ref 7–14)
APPEARANCE UR: CLEAR — SIGNIFICANT CHANGE UP
APTT BLD: 32.4 SEC — SIGNIFICANT CHANGE UP (ref 27–36.3)
AST SERPL-CCNC: 12 U/L — SIGNIFICANT CHANGE UP (ref 4–32)
BACTERIA # UR AUTO: ABNORMAL
BASOPHILS # BLD AUTO: 0.01 K/UL — SIGNIFICANT CHANGE UP (ref 0–0.2)
BASOPHILS NFR BLD AUTO: 0.2 % — SIGNIFICANT CHANGE UP (ref 0–2)
BILIRUB SERPL-MCNC: 0.2 MG/DL — SIGNIFICANT CHANGE UP (ref 0.2–1.2)
BILIRUB UR-MCNC: NEGATIVE — SIGNIFICANT CHANGE UP
BLD GP AB SCN SERPL QL: NEGATIVE — SIGNIFICANT CHANGE UP
BUN SERPL-MCNC: 25 MG/DL — HIGH (ref 7–23)
CALCIUM SERPL-MCNC: 8.4 MG/DL — SIGNIFICANT CHANGE UP (ref 8.4–10.5)
CHLORIDE SERPL-SCNC: 103 MMOL/L — SIGNIFICANT CHANGE UP (ref 98–107)
CK MB BLD-MCNC: 2.2 % — SIGNIFICANT CHANGE UP (ref 0–2.5)
CK MB CFR SERPL CALC: 1.7 NG/ML — SIGNIFICANT CHANGE UP
CK SERPL-CCNC: 78 U/L — SIGNIFICANT CHANGE UP (ref 25–170)
CO2 SERPL-SCNC: 24 MMOL/L — SIGNIFICANT CHANGE UP (ref 22–31)
COLOR SPEC: YELLOW — SIGNIFICANT CHANGE UP
COMMENT - URINE: SIGNIFICANT CHANGE UP
CREAT SERPL-MCNC: 1.08 MG/DL — SIGNIFICANT CHANGE UP (ref 0.5–1.3)
DIFF PNL FLD: ABNORMAL
EOSINOPHIL # BLD AUTO: 0.28 K/UL — SIGNIFICANT CHANGE UP (ref 0–0.5)
EOSINOPHIL NFR BLD AUTO: 5.8 % — SIGNIFICANT CHANGE UP (ref 0–6)
EPI CELLS # UR: 7 /HPF — HIGH (ref 0–5)
FLUAV AG NPH QL: SIGNIFICANT CHANGE UP
FLUBV AG NPH QL: SIGNIFICANT CHANGE UP
GLUCOSE BLDC GLUCOMTR-MCNC: 211 MG/DL — HIGH (ref 70–99)
GLUCOSE SERPL-MCNC: 75 MG/DL — SIGNIFICANT CHANGE UP (ref 70–99)
GLUCOSE UR QL: NEGATIVE — SIGNIFICANT CHANGE UP
HCT VFR BLD CALC: 21.9 % — LOW (ref 34.5–45)
HCT VFR BLD CALC: 23.2 % — LOW (ref 34.5–45)
HGB BLD-MCNC: 6.7 G/DL — CRITICAL LOW (ref 11.5–15.5)
HGB BLD-MCNC: 7.3 G/DL — LOW (ref 11.5–15.5)
HYALINE CASTS # UR AUTO: 1 /LPF — SIGNIFICANT CHANGE UP (ref 0–7)
IANC: 2.24 K/UL — SIGNIFICANT CHANGE UP (ref 1.5–8.5)
IMM GRANULOCYTES NFR BLD AUTO: 2.9 % — HIGH (ref 0–1.5)
INR BLD: 1.12 RATIO — SIGNIFICANT CHANGE UP (ref 0.88–1.16)
KETONES UR-MCNC: NEGATIVE — SIGNIFICANT CHANGE UP
LEUKOCYTE ESTERASE UR-ACNC: NEGATIVE — SIGNIFICANT CHANGE UP
LYMPHOCYTES # BLD AUTO: 1.69 K/UL — SIGNIFICANT CHANGE UP (ref 1–3.3)
LYMPHOCYTES # BLD AUTO: 34.8 % — SIGNIFICANT CHANGE UP (ref 13–44)
MCHC RBC-ENTMCNC: 23.8 PG — LOW (ref 27–34)
MCHC RBC-ENTMCNC: 24.5 PG — LOW (ref 27–34)
MCHC RBC-ENTMCNC: 30.6 GM/DL — LOW (ref 32–36)
MCHC RBC-ENTMCNC: 31.5 GM/DL — LOW (ref 32–36)
MCV RBC AUTO: 77.7 FL — LOW (ref 80–100)
MCV RBC AUTO: 77.9 FL — LOW (ref 80–100)
MONOCYTES # BLD AUTO: 0.5 K/UL — SIGNIFICANT CHANGE UP (ref 0–0.9)
MONOCYTES NFR BLD AUTO: 10.3 % — SIGNIFICANT CHANGE UP (ref 2–14)
NEUTROPHILS # BLD AUTO: 2.24 K/UL — SIGNIFICANT CHANGE UP (ref 1.8–7.4)
NEUTROPHILS NFR BLD AUTO: 46 % — SIGNIFICANT CHANGE UP (ref 43–77)
NITRITE UR-MCNC: NEGATIVE — SIGNIFICANT CHANGE UP
NRBC # BLD: 0 /100 WBCS — SIGNIFICANT CHANGE UP
NRBC # BLD: 0 /100 WBCS — SIGNIFICANT CHANGE UP
NRBC # FLD: 0 K/UL — SIGNIFICANT CHANGE UP
NRBC # FLD: 0 K/UL — SIGNIFICANT CHANGE UP
NT-PROBNP SERPL-SCNC: 4081 PG/ML — HIGH
PH UR: 6.5 — SIGNIFICANT CHANGE UP (ref 5–8)
PLATELET # BLD AUTO: 318 K/UL — SIGNIFICANT CHANGE UP (ref 150–400)
PLATELET # BLD AUTO: 327 K/UL — SIGNIFICANT CHANGE UP (ref 150–400)
POTASSIUM SERPL-MCNC: 3.5 MMOL/L — SIGNIFICANT CHANGE UP (ref 3.5–5.3)
POTASSIUM SERPL-SCNC: 3.5 MMOL/L — SIGNIFICANT CHANGE UP (ref 3.5–5.3)
PROT SERPL-MCNC: 7.8 G/DL — SIGNIFICANT CHANGE UP (ref 6–8.3)
PROT UR-MCNC: ABNORMAL
PROTHROM AB SERPL-ACNC: 12.7 SEC — SIGNIFICANT CHANGE UP (ref 10.6–13.6)
RBC # BLD: 2.82 M/UL — LOW (ref 3.8–5.2)
RBC # BLD: 2.98 M/UL — LOW (ref 3.8–5.2)
RBC # FLD: 19.5 % — HIGH (ref 10.3–14.5)
RBC # FLD: 19.5 % — HIGH (ref 10.3–14.5)
RBC CASTS # UR COMP ASSIST: 5 /HPF — HIGH (ref 0–4)
RH IG SCN BLD-IMP: POSITIVE — SIGNIFICANT CHANGE UP
RSV RNA NPH QL NAA+NON-PROBE: SIGNIFICANT CHANGE UP
SARS-COV-2 RNA SPEC QL NAA+PROBE: SIGNIFICANT CHANGE UP
SODIUM SERPL-SCNC: 138 MMOL/L — SIGNIFICANT CHANGE UP (ref 135–145)
SP GR SPEC: 1.02 — SIGNIFICANT CHANGE UP (ref 1–1.05)
TROPONIN T, HIGH SENSITIVITY RESULT: 35 NG/L — SIGNIFICANT CHANGE UP
TROPONIN T, HIGH SENSITIVITY RESULT: 44 NG/L — SIGNIFICANT CHANGE UP
UROBILINOGEN FLD QL: SIGNIFICANT CHANGE UP
WBC # BLD: 4.11 K/UL — SIGNIFICANT CHANGE UP (ref 3.8–10.5)
WBC # BLD: 4.86 K/UL — SIGNIFICANT CHANGE UP (ref 3.8–10.5)
WBC # FLD AUTO: 4.11 K/UL — SIGNIFICANT CHANGE UP (ref 3.8–10.5)
WBC # FLD AUTO: 4.86 K/UL — SIGNIFICANT CHANGE UP (ref 3.8–10.5)
WBC UR QL: 4 /HPF — SIGNIFICANT CHANGE UP (ref 0–5)

## 2022-02-03 PROCEDURE — 99285 EMERGENCY DEPT VISIT HI MDM: CPT

## 2022-02-03 PROCEDURE — 99223 1ST HOSP IP/OBS HIGH 75: CPT | Mod: GC

## 2022-02-03 PROCEDURE — 71046 X-RAY EXAM CHEST 2 VIEWS: CPT | Mod: 26

## 2022-02-03 RX ORDER — DEXTROSE 50 % IN WATER 50 %
25 SYRINGE (ML) INTRAVENOUS ONCE
Refills: 0 | Status: DISCONTINUED | OUTPATIENT
Start: 2022-02-03 | End: 2022-02-11

## 2022-02-03 RX ORDER — INSULIN LISPRO 100/ML
VIAL (ML) SUBCUTANEOUS AT BEDTIME
Refills: 0 | Status: DISCONTINUED | OUTPATIENT
Start: 2022-02-03 | End: 2022-02-11

## 2022-02-03 RX ORDER — INSULIN GLARGINE 100 [IU]/ML
20 INJECTION, SOLUTION SUBCUTANEOUS AT BEDTIME
Refills: 0 | Status: DISCONTINUED | OUTPATIENT
Start: 2022-02-03 | End: 2022-02-11

## 2022-02-03 RX ORDER — GLUCAGON INJECTION, SOLUTION 0.5 MG/.1ML
1 INJECTION, SOLUTION SUBCUTANEOUS ONCE
Refills: 0 | Status: DISCONTINUED | OUTPATIENT
Start: 2022-02-03 | End: 2022-02-11

## 2022-02-03 RX ORDER — FUROSEMIDE 40 MG
20 TABLET ORAL EVERY 12 HOURS
Refills: 0 | Status: DISCONTINUED | OUTPATIENT
Start: 2022-02-03 | End: 2022-02-04

## 2022-02-03 RX ORDER — PANTOPRAZOLE SODIUM 20 MG/1
40 TABLET, DELAYED RELEASE ORAL
Refills: 0 | Status: DISCONTINUED | OUTPATIENT
Start: 2022-02-03 | End: 2022-02-11

## 2022-02-03 RX ORDER — DEXTROSE 50 % IN WATER 50 %
15 SYRINGE (ML) INTRAVENOUS ONCE
Refills: 0 | Status: DISCONTINUED | OUTPATIENT
Start: 2022-02-03 | End: 2022-02-11

## 2022-02-03 RX ORDER — SODIUM CHLORIDE 9 MG/ML
1000 INJECTION, SOLUTION INTRAVENOUS
Refills: 0 | Status: DISCONTINUED | OUTPATIENT
Start: 2022-02-03 | End: 2022-02-11

## 2022-02-03 RX ORDER — AMLODIPINE BESYLATE 2.5 MG/1
10 TABLET ORAL DAILY
Refills: 0 | Status: DISCONTINUED | OUTPATIENT
Start: 2022-02-03 | End: 2022-02-11

## 2022-02-03 RX ORDER — DEXTROSE 50 % IN WATER 50 %
12.5 SYRINGE (ML) INTRAVENOUS ONCE
Refills: 0 | Status: DISCONTINUED | OUTPATIENT
Start: 2022-02-03 | End: 2022-02-11

## 2022-02-03 RX ORDER — THIAMINE MONONITRATE (VIT B1) 100 MG
100 TABLET ORAL DAILY
Refills: 0 | Status: DISCONTINUED | OUTPATIENT
Start: 2022-02-03 | End: 2022-02-11

## 2022-02-03 RX ORDER — IPRATROPIUM/ALBUTEROL SULFATE 18-103MCG
3 AEROSOL WITH ADAPTER (GRAM) INHALATION EVERY 6 HOURS
Refills: 0 | Status: DISCONTINUED | OUTPATIENT
Start: 2022-02-03 | End: 2022-02-11

## 2022-02-03 RX ORDER — ASPIRIN/CALCIUM CARB/MAGNESIUM 324 MG
81 TABLET ORAL DAILY
Refills: 0 | Status: DISCONTINUED | OUTPATIENT
Start: 2022-02-03 | End: 2022-02-11

## 2022-02-03 RX ORDER — HYDRALAZINE HCL 50 MG
25 TABLET ORAL THREE TIMES A DAY
Refills: 0 | Status: DISCONTINUED | OUTPATIENT
Start: 2022-02-03 | End: 2022-02-11

## 2022-02-03 RX ORDER — INSULIN LISPRO 100/ML
VIAL (ML) SUBCUTANEOUS
Refills: 0 | Status: DISCONTINUED | OUTPATIENT
Start: 2022-02-03 | End: 2022-02-11

## 2022-02-03 RX ORDER — LABETALOL HCL 100 MG
10 TABLET ORAL ONCE
Refills: 0 | Status: COMPLETED | OUTPATIENT
Start: 2022-02-03 | End: 2022-02-03

## 2022-02-03 RX ORDER — METOPROLOL TARTRATE 50 MG
25 TABLET ORAL
Refills: 0 | Status: DISCONTINUED | OUTPATIENT
Start: 2022-02-03 | End: 2022-02-11

## 2022-02-03 RX ADMIN — PANTOPRAZOLE SODIUM 40 MILLIGRAM(S): 20 TABLET, DELAYED RELEASE ORAL at 19:15

## 2022-02-03 RX ADMIN — Medication 25 MILLIGRAM(S): at 19:15

## 2022-02-03 RX ADMIN — Medication 10 MILLIGRAM(S): at 13:52

## 2022-02-03 RX ADMIN — INSULIN GLARGINE 20 UNIT(S): 100 INJECTION, SOLUTION SUBCUTANEOUS at 22:22

## 2022-02-03 RX ADMIN — AMLODIPINE BESYLATE 10 MILLIGRAM(S): 2.5 TABLET ORAL at 19:15

## 2022-02-03 RX ADMIN — Medication 25 MILLIGRAM(S): at 22:22

## 2022-02-03 NOTE — H&P ADULT - PROBLEM SELECTOR PLAN 3
- patient takes lantus 24U at bedtime and 8U humalog at home  - start with 20U lantus at bedtime and mod SSI  - titrate as needed

## 2022-02-03 NOTE — ED PROVIDER NOTE - EKG ADDITIONAL QUESTION - PERFORMED INDEPENDENT VISUALIZATION
Assessment/Plan:       Problem List Items Addressed This Visit        Respiratory    COPD (chronic obstructive pulmonary disease) (HCC)    MARRY (obstructive sleep apnea)       Cardiovascular and Mediastinum    CHF, chronic (HCC)    HTN (hypertension)    Peripheral arterial disease (HCC)    Relevant Orders    VAS lower limb venous duplex study, complete bilateral    Dilated cardiomyopathy ([facility] [address] )       Other    Alcohol abuse    Tobacco abuse      Other Visit Diagnoses     Hypothyroidism, unspecified type    -  Primary    Relevant Orders    TSH, 3rd generation    Pre-diabetes        Relevant Orders    HEMOGLOBIN A1C W/ EAG ESTIMATION    Comprehensive metabolic panel            Subjective:      Patient ID: Domi Rojas is a 45 y o  male  HPI     The patient presents today for follow-up, with problems addressed as outlined below:     Hypothyroidism- Currently taking levothyroxine 25 mcg daily, recent TSH 3 790  Will order repeat  Lab Results   Component Value Date    [id number] 3 790 (H) 09/04/2020      COPD/MARRY/tobacco use- Following closely with pulmonology, has a follow-up appointment scheduled 4/23  Breathing has been okay, has been having some days where he feels like he has phlegm in the chest, worse in the morning, once he coughs everything up feels better  No chest pain  Down to a pack a day, working on it  No chest pressure, no leg swelling  Systolic CHF- Following closely with cardiology, follow-up appointment 3/12  Baseline weight as per last hospitalization 265 lb, weight today 294 lb gradual weight gain but admits to poor diet and junk food, very sedentary  Weight 281 lb 1/21/2021  No recent swelling  He does admit to gaining weight in the winter typically  Otherwise feeling well  Doesn't weight himself on daily basis   He will double up on Lasix when he feels more short of breath, last time he did this was 2 weeks ago, not often but when he does this it occurs every 2-5 weeks, he admits to skipping the Lasix (due to it causing him to go to the bathroom frequently) skips a dose 1-2 per week and he notices that catches up with him 1-2 days later  Discussed importance of compliance with medication regimen and cardiology follow-up  EKG performed today which was discussed with cardiology, shows pacing and no acute changes  Per discussion with cardiology after appointment, he is overdue for device check  Will let patient know  - Alcohol use- Currently drinking 3 times per week, more than a 6 pack at a time  Encouraged to quit/cut back  - Pre-diabetes- A1c 5 8, will repeat  - History of peripheral vascular disease, in 2018 R CFA 50-75% stenosis, LLE normal, history of Buerger's disease? He has no leg pain with walking  Will repeat testing  The following portions of the patient's history were reviewed and updated as appropriate: allergies, current medications, past family history, past medical history, past social history, past surgical history and problem list     Review of Systems   Constitutional: Negative for chills and fever  HENT: Negative for congestion and sore throat  Respiratory: Negative for cough and shortness of breath  Cardiovascular: Negative for chest pain, palpitations and leg swelling  Neurological: Negative for dizziness, light-headedness and headaches  Objective:      /94   Pulse 95   Temp (!) 97 3 °F (36 3 °C)   Resp 20   Ht 6' 1" (1 854 m)   Wt 134 kg (294 lb 12 8 oz)   SpO2 98%   BMI 38 89 kg/m²          Physical Exam  Vitals signs reviewed  Constitutional:       General: He is not in acute distress  Appearance: Normal appearance  He is not ill-appearing, toxic-appearing or diaphoretic  HENT:      Head: Normocephalic and atraumatic  Mouth/Throat:      Comments: Mask in place  Eyes:      General:         Right eye: No discharge  Left eye: No discharge  Extraocular Movements: Extraocular movements intact  Conjunctiva/sclera: Conjunctivae normal    Neck:      Musculoskeletal: Normal range of motion and neck supple  No neck rigidity or muscular tenderness  Cardiovascular:      Rate and Rhythm: Normal rate and regular rhythm  Heart sounds: Normal heart sounds  No murmur  No friction rub  No gallop  Pulmonary:      Effort: Pulmonary effort is normal  No respiratory distress  Breath sounds: Normal breath sounds  No stridor  No wheezing or rhonchi  Skin:     General: Skin is warm  Findings: No erythema or rash  Neurological:      Mental Status: He is alert and oriented to person, place, and time     Psychiatric:         Mood and Affect: Mood normal          Behavior: Behavior normal            DO [facility] Yes

## 2022-02-03 NOTE — H&P ADULT - NSHPSOCIALHISTORY_GEN_ALL_CORE
Patient lives at home with her daughter. No tobacco use 30+ years. No alcohol use, no illicit drug use. Patient is independent with ADLs. Uses walker to assist her.

## 2022-02-03 NOTE — ED PROVIDER NOTE - NS ED ROS FT
CONSTITUTIONAL: No fever, weight loss, or fatigue  EYES: No eye pain, visual disturbances, or discharge  ENMT:  No difficulty hearing, tinnitus, vertigo; No sinus or throat pain  NECK: No pain or stiffness  RESPIRATORY: + cough, no wheezing, chills or hemoptysis; + shortness of breath  CARDIOVASCULAR: +chest pain, no palpitations, dizziness, or leg swelling  GASTROINTESTINAL: No abdominal or epigastric pain. No nausea, vomiting, or hematemesis; No diarrhea or constipation. No melena or hematochezia.  GENITOURINARY: No dysuria, frequency, hematuria, or incontinence  NEUROLOGICAL: No headaches, memory loss, loss of strength, numbness, or tremors  SKIN: No itching, burning, rashes, or lesions

## 2022-02-03 NOTE — ED ADULT NURSE NOTE - OBJECTIVE STATEMENT
Pt received to room 7 presents with SOB and cough that began a few days ago. Pt a&ox3, ambulatory with walker at baseline, pt hard of hearing in b/l ears. Pt skin intact, respirations even and unlabored, pt O2 sat is 100% RA, pt NSR on CM. Pt has hx of PNA, reports being unvaccinated. Pt daughter at bedside. 18g IV placed in left arm, labs drawn and sent, will continue to monitor.

## 2022-02-03 NOTE — H&P ADULT - NSHPREVIEWOFSYSTEMS_GEN_ALL_CORE
CONSTITUTIONAL:  No weight loss, fever, chills, weakness or fatigue.  HEENT:  Eyes:  No visual loss, blurred vision, double vision or yellow sclerae. Ears, Nose, Throat:  No hearing loss, sneezing, congestion, runny nose or sore throat.  SKIN:  No rash or itching.  CARDIOVASCULAR:  No chest pain, chest pressure or chest discomfort. No palpitations or edema.  RESPIRATORY:  No shortness of breath, cough or sputum.  GASTROINTESTINAL:  No anorexia, nausea, vomiting or diarrhea. No abdominal pain or blood.  GENITOURINARY:  No dysuria, hematuria, or increased urinary frequency.  NEUROLOGICAL:  No headache, dizziness, syncope, paralysis, ataxia, numbness or tingling in the extremities. No change in bowel or bladder control.  MUSCULOSKELETAL:  No muscle, back pain, joint pain or stiffness.  HEMATOLOGIC:  No anemia, bleeding or bruising.  LYMPHATICS:  No enlarged nodes. No history of splenectomy.  PSYCHIATRIC:  No history of depression or anxiety.  ENDOCRINOLOGIC:  No reports of sweating, cold or heat intolerance. No polyuria or polydipsia.  ALLERGIES:  No history of asthma, hives, eczema or rhinitis. CONSTITUTIONAL:  No weight loss. + fever, weakness/fatigue.  HEENT:  Eyes:  No visual loss, blurred vision, double vision or yellow sclerae. Ears, Nose, Throat:  + hearing loss bilaterally, + sore throat last week.  SKIN:  No rash or itching.  CARDIOVASCULAR:  + chest pain- mid sternal when she coughs. No palpitations or edema.  RESPIRATORY:  +shortness of breath, +cough, +sputum.  GASTROINTESTINAL:  No anorexia, nausea, vomiting or diarrhea. Patient noted blood in her BM last week. +abd pain occasionally on right side of abdomen   GENITOURINARY:  No dysuria, hematuria, or increased urinary frequency.  NEUROLOGICAL:  No headache, dizziness, syncope, paralysis, ataxia, numbness or tingling in the extremities. No change in bowel or bladder control. +light-headedness  MUSCULOSKELETAL:  No muscle, back pain, joint pain or stiffness.  HEMATOLOGIC:  +anemia  LYMPHATICS:  No enlarged nodes. No history of splenectomy.  PSYCHIATRIC:  No history of depression or anxiety.  ENDOCRINOLOGIC:  No reports of sweating, cold or heat intolerance. No polyuria or polydipsia.  ALLERGIES:  No history of asthma, hives, eczema or rhinitis.

## 2022-02-03 NOTE — H&P ADULT - PROBLEM SELECTOR PLAN 2
- likely in setting of dark blood in stool last week  - GI consulted, follow up recs  - IV protonix 40mg BID  - active type and screen

## 2022-02-03 NOTE — H&P ADULT - HISTORY OF PRESENT ILLNESS
65yo F with PMH of DM (on insulin), HTN, HLD, pancreatic pseudocyst (s/p stent placement and drainage late 2021), iron deficiency anemia, GERD who presents with progressive dyspnea and cough. Patient is hard of hearing so phone was used to write out questions. Patient's daughter also at bedside.     Patient had been progressively short of breath with cough over the past few days. Last week, she had a sore throat and was febrile to 101. She took Robitussin and cough drops to relieve sore throat. Shortness of breath has gotten worse over the past few days. Patient gets out of breath walking to bathroom, prior to last week she was able to walk more. Patient and daughter note that patient coughs more when laying down or sleeping. Slight chest pain mid-sternally that is worse with cough. No radiation.     Additionally, patient notes that she noticed some blood in her stool last week, but not this week.

## 2022-02-03 NOTE — H&P ADULT - PROBLEM SELECTOR PLAN 1
- Differential dx includes CHF exacerbation vs symptomatic anemia vs ACS vs post-viral. less likely PE, but would be a concern if patient develops hypoxemia  - TTE  - IV lasix 20 BID  - strict I/Os  - daily weights  - trend troponin, repeat EKG  - transfuse Hgb > 8, given concern for ?ACS  - robutussin and tessol pearls - Differential dx includes CHF exacerbation vs symptomatic anemia vs ACS vs post-viral. less likely PE, but would be a concern if patient develops hypoxemia  - TTE  - IV lasix 20 BID  - strict I/Os  - daily weights  - trend troponin, repeat EKG  - transfuse Hgb > 8, given concern for ?ACS  - robutussin and tessalon pearls for cough; PRN duoneb

## 2022-02-03 NOTE — ED PROVIDER NOTE - OBJECTIVE STATEMENT
Patient is a 67 y/o female w/ PMH of insulin dependant DM2, HTN, HLD, pancreatic pseudocyst w/ recent admission and drainage, iron deficiency anemia c/o few day history of increasing cough and SOB. Diagnosed w/ PNA 2 months ago. Patient is a 67 y/o female w/ PMH of insulin dependant DM2, HTN, HLD, pancreatic pseudocyst w/ recent admission and drainage, iron deficiency anemia c/o few day history of increasing cough and SOB. Pt also admits to mild localized chest pain worsened w/ coughing. Diagnosed w/ PNA 2 months ago. Patient is a 65 y/o female w/ PMH of insulin dependant DM2, HTN, HLD, pancreatic pseudocyst w/ recent admission and drainage, iron deficiency anemia c/o few day history of increasing cough and SOB, worsened w/ lying supine and movement. Pt also admits to mild localized chest pressure associated w/ coughing. Diagnosed w/ PNA 2 months ago.

## 2022-02-03 NOTE — H&P ADULT - NSHPPHYSICALEXAM_GEN_ALL_CORE
Vital Signs Last 24 Hrs  T(C): 36.3 (03 Feb 2022 15:56), Max: 37.6 (03 Feb 2022 10:16)  T(F): 97.4 (03 Feb 2022 15:56), Max: 99.6 (03 Feb 2022 10:16)  HR: 74 (03 Feb 2022 15:56) (72 - 89)  BP: 211/100 (03 Feb 2022 15:56) (184/86 - 211/100)  BP(mean): --  RR: 17 (03 Feb 2022 15:56) (16 - 18)  SpO2: 99% (03 Feb 2022 15:56) (99% - 100%)  CAPILLARY BLOOD GLUCOSE      POCT Blood Glucose.: 100 mg/dL (03 Feb 2022 10:19)    I&O's Summary      PHYSICAL EXAM: *************************  GENERAL: NAD, well-developed  HEAD:  Atraumatic, Normocephalic  EYES: EOMI, PERRLA, conjunctiva and sclera clear  NECK: Supple, No JVD  CHEST/LUNG: Clear to auscultation bilaterally; No wheeze  HEART: Regular rate and rhythm; No murmurs, rubs, or gallops  ABDOMEN: Soft, Nontender, Nondistended; Bowel sounds present  EXTREMITIES:  2+ Peripheral Pulses, No clubbing, cyanosis, or edema  PSYCH: AAOx3  NEUROLOGY: non-focal  SKIN: No rashes or lesions Vital Signs Last 24 Hrs  T(C): 36.3 (03 Feb 2022 15:56), Max: 37.6 (03 Feb 2022 10:16)  T(F): 97.4 (03 Feb 2022 15:56), Max: 99.6 (03 Feb 2022 10:16)  HR: 74 (03 Feb 2022 15:56) (72 - 89)  BP: 211/100 (03 Feb 2022 15:56) (184/86 - 211/100)  BP(mean): --  RR: 17 (03 Feb 2022 15:56) (16 - 18)  SpO2: 99% (03 Feb 2022 15:56) (99% - 100%)  CAPILLARY BLOOD GLUCOSE      POCT Blood Glucose.: 100 mg/dL (03 Feb 2022 10:19)    I&O's Summary      PHYSICAL EXAM:   GENERAL: NAD, sitting comfortably in bed  HEAD:  Atraumatic, Normocephalic; bilateral hearing loss  EYES: EOMI, PERRLA, anicteric pale sclera  NECK: Supple, No JVD appreciated  CHEST/LUNG: wheezes diffusely  HEART: normal rate and regular rhythm; No murmurs, rubs, or gallops  ABDOMEN: Soft, Nontender, slightly distended; Bowel sounds present  EXTREMITIES:  2+ Peripheral Pulses, No clubbing, cyanosis, or edema  PSYCH: AAOx3  NEUROLOGY: non-focal  SKIN: No rashes or lesions Vital Signs Last 24 Hrs  T(C): 36.3 (03 Feb 2022 15:56), Max: 37.6 (03 Feb 2022 10:16)  T(F): 97.4 (03 Feb 2022 15:56), Max: 99.6 (03 Feb 2022 10:16)  HR: 74 (03 Feb 2022 15:56) (72 - 89)  BP: 211/100 (03 Feb 2022 15:56) (184/86 - 211/100)  BP(mean): --  RR: 17 (03 Feb 2022 15:56) (16 - 18)  SpO2: 99% (03 Feb 2022 15:56) (99% - 100%)  CAPILLARY BLOOD GLUCOSE      POCT Blood Glucose.: 100 mg/dL (03 Feb 2022 10:19)    I&O's Summary      PHYSICAL EXAM:   GENERAL: NAD, sitting comfortably in bed  HEAD:  Atraumatic, Normocephalic; bilateral hearing loss  EYES: EOMI, PERRLA, anicteric pale sclera  NECK: Supple, No JVD appreciated  CHEST/LUNG: wheezes diffusely  HEART: normal rate and regular rhythm; No murmurs, rubs, or gallops  ABDOMEN: Soft, Nontender, slightly distended; Bowel sounds present  EXTREMITIES:  2+ Peripheral Pulses, No clubbing, cyanosis, or edema, toe amputation on right foot  PSYCH: AAOx3  NEUROLOGY: non-focal  SKIN: No rashes or lesions

## 2022-02-03 NOTE — ED PROVIDER NOTE - ATTENDING CONTRIBUTION TO CARE
Patient is a 67 y/o female w/ PMH of insulin dependant DM2, HTN, HLD, pancreatic pseudocyst w/ recent admission and drainage, iron deficiency anemia c/o few day history of increasing cough and SOB, worsened w/ lying supine and movement. Pt also admits to mild localized chest pressure associated w/ coughing. Diagnosed w/ PNA 2 months ago.

## 2022-02-03 NOTE — ED PROVIDER NOTE - PHYSICAL EXAMINATION
GENERAL: well appearing in no acute distress, non-toxic appearing  HEAD: normocephalic, atraumatic  HEENT: normal conjunctiva, oral mucosa moist, uvula midline, no tonsilar exudates, neck supple, no JVD  CARDIAC: regular rate and rhythm, normal S1S2, no appreciable murmurs, 2+ pulses in UE/LE b/l  PULM: Expiratory crackles and wheezes in all lung fields.  GI: abdomen nondistended, soft, nontender, no guarding, rebound tenderness  : no CVA tenderness b/l, no suprapubic tenderness  NEURO: no focal motor or sensory deficits, CN2-12 intact, normal speech, PERRLA, EOMI, normal gait, AAOx3  MSK: no peripheral edema, no calf tenderness b/l  SKIN: well-perfused, extremities warm, no visible rashes  PSYCH: appropriate mood and affect

## 2022-02-03 NOTE — H&P ADULT - PROBLEM SELECTOR PLAN 7
- DVT ppx: SCDs for now  - Diet: dash/carb controlled  - dispo: admitted to medicine for dyspnea and possible GI bleed

## 2022-02-03 NOTE — H&P ADULT - ASSESSMENT
67yo F with PMH of DM (on insulin), HTN, HLD, pancreatic pseudocyst (s/p stent placement and drainage late 2021), iron deficiency anemia, GERD who presents with progressive dyspnea and cough. Patient found to be anemic with Hgb 6.7 on admission s/p 1U PRBC transfusion.

## 2022-02-03 NOTE — H&P ADULT - NSHPLABSRESULTS_GEN_ALL_CORE
LABS:                        6.7    4.86  )-----------( 327      ( 2022 11:24 )             21.9         138  |  103  |  25<H>  ----------------------------<  75  3.5   |  24  |  1.08    Ca    8.4      2022 11:40    TPro  7.8  /  Alb  2.9<L>  /  TBili  0.2  /  DBili  x   /  AST  12  /  ALT  7   /  AlkPhos  90      PT/INR - ( 2022 12:32 )   PT: 12.7 sec;   INR: 1.12 ratio         PTT - ( 2022 12:32 )  PTT:32.4 sec      Urinalysis Basic - ( 2022 11:40 )    Color: Yellow / Appearance: Clear / S.018 / pH: x  Gluc: x / Ketone: Negative  / Bili: Negative / Urobili: <2 mg/dL   Blood: x / Protein: >600 mg/dL / Nitrite: Negative   Leuk Esterase: Negative / RBC: 5 /HPF / WBC 4 /HPF   Sq Epi: x / Non Sq Epi: 7 /HPF / Bacteria: Few        RADIOLOGY & ADDITIONAL TESTS:    Imaging Personally Reviewed:    Consultant(s) Notes Reviewed:      Care Discussed with Consultants/Other Providers: LABS:                        6.7    4.86  )-----------( 327      ( 2022 11:24 )             21.9         138  |  103  |  25<H>  ----------------------------<  75  3.5   |  24  |  1.08    Ca    8.4      2022 11:40    TPro  7.8  /  Alb  2.9<L>  /  TBili  0.2  /  DBili  x   /  AST  12  /  ALT  7   /  AlkPhos  90      PT/INR - ( 2022 12:32 )   PT: 12.7 sec;   INR: 1.12 ratio         PTT - ( 2022 12:32 )  PTT:32.4 sec      Urinalysis Basic - ( 2022 11:40 )    Color: Yellow / Appearance: Clear / S.018 / pH: x  Gluc: x / Ketone: Negative  / Bili: Negative / Urobili: <2 mg/dL   Blood: x / Protein: >600 mg/dL / Nitrite: Negative   Leuk Esterase: Negative / RBC: 5 /HPF / WBC 4 /HPF   Sq Epi: x / Non Sq Epi: 7 /HPF / Bacteria: Few        RADIOLOGY & ADDITIONAL TESTS:    Imaging Personally Reviewed:  2/3/22 CXR  FINDINGS:  No focal consolidation  There is no pneumothorax or pleural effusion.  The heart is normal in size.  No acute osseous abnormality.    IMPRESSION:  No focal consolidation    Consultant(s) Notes Reviewed:      Care Discussed with Consultants/Other Providers:

## 2022-02-03 NOTE — ED PROVIDER NOTE - CLINICAL SUMMARY MEDICAL DECISION MAKING FREE TEXT BOX
Patient is a 66 year old female w/ PMH PNA 2 months ago c/o few day history of SOB and cough. Will screen for PNA w/ CXR, screen for ACS w/ trops, screen for CHF w/ BNP. Will reassess. Patient is a 66 year old female w/ PMH PNA 2 months ago c/o few day history of SOB and cough. Will screen for PNA w/ CXR, screen for STEMI w/ EKG and CXR, screen for CHF w/ BNP. Will reassess.

## 2022-02-03 NOTE — H&P ADULT - ATTENDING COMMENTS
66 year old woman w/ history of HTN and diabetes presenting w/ dyspnea and cough. DDx includes CHF vs post viral infection vs ACS vs symptomatic anemia. Patient w/ hemoglobin 6.7 in setting of recent gastrointestinal bleeding. Exam notable for diffuse wheezes and bi-basilar crackles. Concern for new CHF. TTE pending. Diuresing w/ lasix 20mg IV BID. Transfusing to hemoglobin of 8. Resuming home antihypertensives in setting of uncontrolled BP, will up-titrate prn. GI Consulted recs pending. Will trend trops for ACS rule out and monitor on tele.     Rest of Plan As Detailed Above

## 2022-02-04 LAB
A1C WITH ESTIMATED AVERAGE GLUCOSE RESULT: 7.7 % — HIGH (ref 4–5.6)
ALBUMIN SERPL ELPH-MCNC: 2.7 G/DL — LOW (ref 3.3–5)
ALP SERPL-CCNC: 80 U/L — SIGNIFICANT CHANGE UP (ref 40–120)
ALT FLD-CCNC: 7 U/L — SIGNIFICANT CHANGE UP (ref 4–33)
ANION GAP SERPL CALC-SCNC: 11 MMOL/L — SIGNIFICANT CHANGE UP (ref 7–14)
AST SERPL-CCNC: 14 U/L — SIGNIFICANT CHANGE UP (ref 4–32)
BASOPHILS # BLD AUTO: 0.02 K/UL — SIGNIFICANT CHANGE UP (ref 0–0.2)
BASOPHILS NFR BLD AUTO: 0.5 % — SIGNIFICANT CHANGE UP (ref 0–2)
BILIRUB SERPL-MCNC: 0.3 MG/DL — SIGNIFICANT CHANGE UP (ref 0.2–1.2)
BUN SERPL-MCNC: 19 MG/DL — SIGNIFICANT CHANGE UP (ref 7–23)
CALCIUM SERPL-MCNC: 8.2 MG/DL — LOW (ref 8.4–10.5)
CHLORIDE SERPL-SCNC: 104 MMOL/L — SIGNIFICANT CHANGE UP (ref 98–107)
CO2 SERPL-SCNC: 23 MMOL/L — SIGNIFICANT CHANGE UP (ref 22–31)
CREAT SERPL-MCNC: 0.98 MG/DL — SIGNIFICANT CHANGE UP (ref 0.5–1.3)
CULTURE RESULTS: SIGNIFICANT CHANGE UP
EOSINOPHIL # BLD AUTO: 0.3 K/UL — SIGNIFICANT CHANGE UP (ref 0–0.5)
EOSINOPHIL NFR BLD AUTO: 8 % — HIGH (ref 0–6)
ESTIMATED AVERAGE GLUCOSE: 174 — SIGNIFICANT CHANGE UP
GLUCOSE BLDC GLUCOMTR-MCNC: 144 MG/DL — HIGH (ref 70–99)
GLUCOSE BLDC GLUCOMTR-MCNC: 178 MG/DL — HIGH (ref 70–99)
GLUCOSE BLDC GLUCOMTR-MCNC: 198 MG/DL — HIGH (ref 70–99)
GLUCOSE BLDC GLUCOMTR-MCNC: 199 MG/DL — HIGH (ref 70–99)
GLUCOSE BLDC GLUCOMTR-MCNC: 199 MG/DL — HIGH (ref 70–99)
GLUCOSE SERPL-MCNC: 174 MG/DL — HIGH (ref 70–99)
HCT VFR BLD CALC: 27.3 % — LOW (ref 34.5–45)
HGB BLD-MCNC: 8.5 G/DL — LOW (ref 11.5–15.5)
IANC: 1.67 K/UL — SIGNIFICANT CHANGE UP (ref 1.5–8.5)
IMM GRANULOCYTES NFR BLD AUTO: 2.4 % — HIGH (ref 0–1.5)
LYMPHOCYTES # BLD AUTO: 1.16 K/UL — SIGNIFICANT CHANGE UP (ref 1–3.3)
LYMPHOCYTES # BLD AUTO: 30.8 % — SIGNIFICANT CHANGE UP (ref 13–44)
MAGNESIUM SERPL-MCNC: 1.4 MG/DL — LOW (ref 1.6–2.6)
MCHC RBC-ENTMCNC: 24.6 PG — LOW (ref 27–34)
MCHC RBC-ENTMCNC: 31.1 GM/DL — LOW (ref 32–36)
MCV RBC AUTO: 78.9 FL — LOW (ref 80–100)
MONOCYTES # BLD AUTO: 0.53 K/UL — SIGNIFICANT CHANGE UP (ref 0–0.9)
MONOCYTES NFR BLD AUTO: 14.1 % — HIGH (ref 2–14)
NEUTROPHILS # BLD AUTO: 1.67 K/UL — LOW (ref 1.8–7.4)
NEUTROPHILS NFR BLD AUTO: 44.2 % — SIGNIFICANT CHANGE UP (ref 43–77)
NRBC # BLD: 0 /100 WBCS — SIGNIFICANT CHANGE UP
NRBC # FLD: 0 K/UL — SIGNIFICANT CHANGE UP
PHOSPHATE SERPL-MCNC: 3.7 MG/DL — SIGNIFICANT CHANGE UP (ref 2.5–4.5)
PLATELET # BLD AUTO: 294 K/UL — SIGNIFICANT CHANGE UP (ref 150–400)
POTASSIUM SERPL-MCNC: 4.1 MMOL/L — SIGNIFICANT CHANGE UP (ref 3.5–5.3)
POTASSIUM SERPL-SCNC: 4.1 MMOL/L — SIGNIFICANT CHANGE UP (ref 3.5–5.3)
PROT SERPL-MCNC: 7.1 G/DL — SIGNIFICANT CHANGE UP (ref 6–8.3)
RBC # BLD: 3.46 M/UL — LOW (ref 3.8–5.2)
RBC # FLD: 18.3 % — HIGH (ref 10.3–14.5)
SODIUM SERPL-SCNC: 138 MMOL/L — SIGNIFICANT CHANGE UP (ref 135–145)
SPECIMEN SOURCE: SIGNIFICANT CHANGE UP
TROPONIN T, HIGH SENSITIVITY RESULT: 36 NG/L — SIGNIFICANT CHANGE UP
WBC # BLD: 3.77 K/UL — LOW (ref 3.8–10.5)
WBC # FLD AUTO: 3.77 K/UL — LOW (ref 3.8–10.5)

## 2022-02-04 PROCEDURE — 93306 TTE W/DOPPLER COMPLETE: CPT | Mod: 26

## 2022-02-04 PROCEDURE — 99233 SBSQ HOSP IP/OBS HIGH 50: CPT | Mod: GC

## 2022-02-04 PROCEDURE — 99222 1ST HOSP IP/OBS MODERATE 55: CPT | Mod: GC

## 2022-02-04 RX ORDER — MAGNESIUM SULFATE 500 MG/ML
2 VIAL (ML) INJECTION ONCE
Refills: 0 | Status: COMPLETED | OUTPATIENT
Start: 2022-02-04 | End: 2022-02-04

## 2022-02-04 RX ORDER — INFLUENZA VIRUS VACCINE 15; 15; 15; 15 UG/.5ML; UG/.5ML; UG/.5ML; UG/.5ML
0.7 SUSPENSION INTRAMUSCULAR ONCE
Refills: 0 | Status: DISCONTINUED | OUTPATIENT
Start: 2022-02-04 | End: 2022-02-11

## 2022-02-04 RX ORDER — FUROSEMIDE 40 MG
40 TABLET ORAL EVERY 12 HOURS
Refills: 0 | Status: DISCONTINUED | OUTPATIENT
Start: 2022-02-04 | End: 2022-02-05

## 2022-02-04 RX ORDER — HYDRALAZINE HCL 50 MG
25 TABLET ORAL ONCE
Refills: 0 | Status: COMPLETED | OUTPATIENT
Start: 2022-02-04 | End: 2022-02-04

## 2022-02-04 RX ADMIN — Medication 25 MILLIGRAM(S): at 21:19

## 2022-02-04 RX ADMIN — Medication 81 MILLIGRAM(S): at 12:10

## 2022-02-04 RX ADMIN — Medication 25 MILLIGRAM(S): at 17:02

## 2022-02-04 RX ADMIN — Medication 25 MILLIGRAM(S): at 12:09

## 2022-02-04 RX ADMIN — Medication 3 MILLILITER(S): at 11:16

## 2022-02-04 RX ADMIN — Medication 100 MILLIGRAM(S): at 12:10

## 2022-02-04 RX ADMIN — Medication 20 MILLIGRAM(S): at 05:37

## 2022-02-04 RX ADMIN — PANTOPRAZOLE SODIUM 40 MILLIGRAM(S): 20 TABLET, DELAYED RELEASE ORAL at 17:02

## 2022-02-04 RX ADMIN — PANTOPRAZOLE SODIUM 40 MILLIGRAM(S): 20 TABLET, DELAYED RELEASE ORAL at 05:37

## 2022-02-04 RX ADMIN — Medication 25 MILLIGRAM(S): at 05:37

## 2022-02-04 RX ADMIN — AMLODIPINE BESYLATE 10 MILLIGRAM(S): 2.5 TABLET ORAL at 05:36

## 2022-02-04 RX ADMIN — Medication 2: at 12:09

## 2022-02-04 RX ADMIN — Medication 40 MILLIGRAM(S): at 17:01

## 2022-02-04 RX ADMIN — Medication 25 MILLIGRAM(S): at 02:58

## 2022-02-04 RX ADMIN — Medication 2: at 08:15

## 2022-02-04 RX ADMIN — Medication 25 GRAM(S): at 08:30

## 2022-02-04 RX ADMIN — INSULIN GLARGINE 20 UNIT(S): 100 INJECTION, SOLUTION SUBCUTANEOUS at 21:19

## 2022-02-04 NOTE — CONSULT NOTE ADULT - SUBJECTIVE AND OBJECTIVE BOX
Chief Complaint:  Patient is a 66y old  Female who presents with a chief complaint of Dyspnea (04 Feb 2022 07:06)      HPI:BEVERLY MEJIA is a 66y Female    PMHX/PSHX:  Type 2 diabetes mellitus    Bilateral cataracts    Fluid in pleural cavity associated with pancreatitis    Pancreatic pseudocyst/cyst    No significant past surgical history    History of amputation of right great toe      Allergies:  penicillin ([facility])      Home Medications: reviewed  Hospital Medications:  albuterol/ipratropium for Nebulization 3 milliLiter(s) Nebulizer every 6 hours PRN  amLODIPine   Tablet 10 milliGRAM(s) Oral daily  aspirin enteric coated 81 milliGRAM(s) Oral daily  dextrose 40% Gel 15 Gram(s) Oral once  dextrose 5%. 1000 milliLiter(s) IV Continuous <Continuous>  dextrose 5%. 1000 milliLiter(s) IV Continuous <Continuous>  dextrose 50% Injectable 25 Gram(s) IV Push once  dextrose 50% Injectable 12.5 Gram(s) IV Push once  dextrose 50% Injectable 25 Gram(s) IV Push once  furosemide   Injectable 40 milliGRAM(s) IV Push every 12 hours  glucagon  Injectable 1 milliGRAM(s) IntraMuscular once  guaiFENesin Oral Liquid (Sugar-Free) 100 milliGRAM(s) Oral every 6 hours PRN  hydrALAZINE 25 milliGRAM(s) Oral three times a day  influenza  Vaccine (HIGH DOSE) 0.7 milliLiter(s) IntraMuscular once  insulin glargine Injectable (LANTUS) 20 Unit(s) SubCutaneous at bedtime  insulin lispro (ADMELOG) corrective regimen sliding scale   SubCutaneous three times a day before meals  insulin lispro (ADMELOG) corrective regimen sliding scale   SubCutaneous at bedtime  metoprolol tartrate 25 milliGRAM(s) Oral two times a day  pantoprazole  Injectable 40 milliGRAM(s) IV Push two times a day  thiamine 100 milliGRAM(s) Oral daily      Social History:   Tob: Denies  EtOH: Denies  Illicit Drugs: Denies    Family history:    Denies family history of colon cancer/polyps, stomach cancer/polyps, pancreatic cancer/masses, liver cancer/disease, ovarian cancer and endometrial cancer.    ROS:   General:  No  fevers, chills, night sweats, fatigue  Eyes:  Good vision, no reported pain  ENT:  No sore throat, pain, runny nose  CV:  No pain, palpitations  Pulm:  No dyspnea, cough  GI:  See HPI, otherwise negative  :  No  incontinence, nocturia  Muscle:  No pain, weakness  Neuro:  No memory problems  Psych:  No insomnia, mood problems, depression  Endocrine:  No polyuria, polydipsia, cold/heat intolerance  Heme:  No petechiae, ecchymosis, easy bruisability  Skin:  No rash    PHYSICAL EXAM:   Vital Signs:  Vital Signs Last 24 Hrs  T(C): 36.9 (04 Feb 2022 05:34), Max: 37.3 (03 Feb 2022 19:00)  T(F): 98.4 (04 Feb 2022 05:34), Max: 99.2 (03 Feb 2022 19:00)  HR: 82 (04 Feb 2022 11:16) (72 - 84)  BP: 181/85 (04 Feb 2022 05:34) (180/95 - 211/100)  BP(mean): --  RR: 17 (04 Feb 2022 05:34) (16 - 19)  SpO2: 100% (04 Feb 2022 11:16) (98% - 100%)  Daily     Daily     GENERAL: no acute distress  NEURO: alert  HEENT: anicteric sclera, no conjunctival pallor appreciated  CHEST: no respiratory distress, no accessory muscle use  CARDIAC: regular rate, rhythm  ABDOMEN: soft, non-tender, non-distended, no rebound or guarding  EXTREMITIES: warm, well perfused, no edema  SKIN: no lesions noted    LABS: reviewed                        8.5    3.77  )-----------( 294      ( 04 Feb 2022 05:37 )             27.3     02-04    138  |  104  |  19  ----------------------------<  174<H>  4.1   |  23  |  0.98    Ca    8.2<L>      04 Feb 2022 05:37  Phos  3.7     02-04  Mg     1.40     02-04    TPro  7.1  /  Alb  2.7<L>  /  TBili  0.3  /  DBili  x   /  AST  14  /  ALT  7   /  AlkPhos  80  02-04    LIVER FUNCTIONS - ( 04 Feb 2022 05:37 )  Alb: 2.7 g/dL / Pro: 7.1 g/dL / ALK PHOS: 80 U/L / ALT: 7 U/L / AST: 14 U/L / GGT: x             Culture - Urine (collected 03 Feb 2022 11:19)  Source: Clean Catch Clean Catch (Midstream)  Final Report (04 Feb 2022 11:35):    <10,000 CFU/mL Normal Urogenital Elle        Diagnostic Studies: see sunrise for full report         Chief Complaint:  Patient is a 66y old  Female who presents with a chief complaint of Dyspnea (04 Feb 2022 07:06)      HPI:BEVERLY MEJIA is a 65yo F with PMH of DM (on insulin), HTN, HLD, pancreatic pseudocyst (s/p stent placement and drainage late 2021), iron deficiency anemia, GERD who presents with progressive dyspnea and cough fever 101 at home, noted to have hgb 6.7 and reportedly mentioned having dark stool 1 week PTA, patient denying dark stool, GI consulted for anemia evaluation in setting of dark stool. Note patient hard of hearing, so utilized writing to communicate.    Patient denying bloody red/black stools, no n/v/d or constipation, no vomiting bloody/black substance. No abdominal pain. Takes daily ASA, but otherwise denying nsaid use, drugs or alcohol. No fhx of cancer or GI disease. Reporting EGD/colon 2 mos PTA at [facility] ?[place] and says they were fine.     She's HDS with Hgb 6.7 on admission s/p 2 pRBCs which corrected to 8.5. No bleeding reported while inpatient. GI consulted for further evaluation. She is declining a MAMADOU and is not interested in any additional procedures, no EGD or colonoscopy. States she only came to the hospital because of her sob, cough and fever and does not want any GI evaluation.    PMHX/PSHX:  Type 2 diabetes mellitus    Bilateral cataracts    Fluid in pleural cavity associated with pancreatitis    Pancreatic pseudocyst/cyst    No significant past surgical history    History of amputation of right great toe      Allergies:  penicillin ([facility] Synd)      Home Medications: reviewed  Hospital Medications:  albuterol/ipratropium for Nebulization 3 milliLiter(s) Nebulizer every 6 hours PRN  amLODIPine   Tablet 10 milliGRAM(s) Oral daily  aspirin enteric coated 81 milliGRAM(s) Oral daily  dextrose 40% Gel 15 Gram(s) Oral once  dextrose 5%. 1000 milliLiter(s) IV Continuous <Continuous>  dextrose 5%. 1000 milliLiter(s) IV Continuous <Continuous>  dextrose 50% Injectable 25 Gram(s) IV Push once  dextrose 50% Injectable 12.5 Gram(s) IV Push once  dextrose 50% Injectable 25 Gram(s) IV Push once  furosemide   Injectable 40 milliGRAM(s) IV Push every 12 hours  glucagon  Injectable 1 milliGRAM(s) IntraMuscular once  guaiFENesin Oral Liquid (Sugar-Free) 100 milliGRAM(s) Oral every 6 hours PRN  hydrALAZINE 25 milliGRAM(s) Oral three times a day  influenza  Vaccine (HIGH DOSE) 0.7 milliLiter(s) IntraMuscular once  insulin glargine Injectable (LANTUS) 20 Unit(s) SubCutaneous at bedtime  insulin lispro (ADMELOG) corrective regimen sliding scale   SubCutaneous three times a day before meals  insulin lispro (ADMELOG) corrective regimen sliding scale   SubCutaneous at bedtime  metoprolol tartrate 25 milliGRAM(s) Oral two times a day  pantoprazole  Injectable 40 milliGRAM(s) IV Push two times a day  thiamine 100 milliGRAM(s) Oral daily      Social History:   Tob: former  EtOH: Denies  Illicit Drugs: Denies    Family history:    Denies family history of colon cancer/polyps, stomach cancer/polyps, pancreatic cancer/masses, liver cancer/disease, ovarian cancer and endometrial cancer.    ROS:   Complete and normal except as mentioned above.    PHYSICAL EXAM:   Vital Signs:  Vital Signs Last 24 Hrs  T(C): 36.9 (04 Feb 2022 05:34), Max: 37.3 (03 Feb 2022 19:00)  T(F): 98.4 (04 Feb 2022 05:34), Max: 99.2 (03 Feb 2022 19:00)  HR: 82 (04 Feb 2022 11:16) (72 - 84)  BP: 181/85 (04 Feb 2022 05:34) (180/95 - 211/100)  BP(mean): --  RR: 17 (04 Feb 2022 05:34) (16 - 19)  SpO2: 100% (04 Feb 2022 11:16) (98% - 100%)  Daily     Daily     GENERAL: no acute distress  NEURO: alert  HEENT: anicteric sclera, no conjunctival pallor appreciated  CHEST: no respiratory distress, no accessory muscle use  CARDIAC: regular rate, rhythm  ABDOMEN: soft, non-tender, non-distended, no rebound or guarding  EXTREMITIES: warm, well perfused, no edema  SKIN: no lesions noted    LABS: reviewed                        8.5    3.77  )-----------( 294      ( 04 Feb 2022 05:37 )             27.3     02-04    138  |  104  |  19  ----------------------------<  174<H>  4.1   |  23  |  0.98    Ca    8.2<L>      04 Feb 2022 05:37  Phos  3.7     02-04  Mg     1.40     02-04    TPro  7.1  /  Alb  2.7<L>  /  TBili  0.3  /  DBili  x   /  AST  14  /  ALT  7   /  AlkPhos  80  02-04    LIVER FUNCTIONS - ( 04 Feb 2022 05:37 )  Alb: 2.7 g/dL / Pro: 7.1 g/dL / ALK PHOS: 80 U/L / ALT: 7 U/L / AST: 14 U/L / GGT: x             Culture - Urine (collected 03 Feb 2022 11:19)  Source: Clean Catch Clean Catch (Midstream)  Final Report (04 Feb 2022 11:35):    <10,000 CFU/mL Normal Urogenital Elle        Diagnostic Studies: see sunrise for full report         HPI:BEVERLY MEJIA is a 67yo F with PMH of DM (on insulin), HTN, HLD, pancreatic pseudocyst (s/p stent placement and drainage late 2021), iron deficiency anemia, GERD who presents with progressive dyspnea and cough fever 101 at home, noted to have hgb 6.7 and reportedly mentioned having dark stool 1 week PTA, patient denying dark stool, GI consulted for anemia evaluation in setting of dark stool. Note patient hard of hearing, so utilized writing to communicate.    Patient denying bloody red/black stools, no n/v/d or constipation, no vomiting bloody/black substance. No abdominal pain. Takes daily ASA, but otherwise denying nsaid use, drugs or alcohol. No fhx of cancer or GI disease. Reporting EGD/colon 2 mos PTA at [facility] ?[place] and says they were fine.     She's HDS with Hgb 6.7 on admission s/p 2 pRBCs which corrected to 8.5. No bleeding reported while inpatient. GI consulted for further evaluation. She is declining a MAMADOU and is not interested in any additional procedures, no EGD or colonoscopy. States she only came to the hospital because of her sob, cough and fever and does not want any GI evaluation.    PMHX/PSHX:  Type 2 diabetes mellitus    Bilateral cataracts    Fluid in pleural cavity associated with pancreatitis    Pancreatic pseudocyst/cyst    No significant past surgical history    History of amputation of right great toe      Allergies:  penicillin ([facility] Synd)      Home Medications: reviewed  Hospital Medications:  albuterol/ipratropium for Nebulization 3 milliLiter(s) Nebulizer every 6 hours PRN  amLODIPine   Tablet 10 milliGRAM(s) Oral daily  aspirin enteric coated 81 milliGRAM(s) Oral daily  dextrose 40% Gel 15 Gram(s) Oral once  dextrose 5%. 1000 milliLiter(s) IV Continuous <Continuous>  dextrose 5%. 1000 milliLiter(s) IV Continuous <Continuous>  dextrose 50% Injectable 25 Gram(s) IV Push once  dextrose 50% Injectable 12.5 Gram(s) IV Push once  dextrose 50% Injectable 25 Gram(s) IV Push once  furosemide   Injectable 40 milliGRAM(s) IV Push every 12 hours  glucagon  Injectable 1 milliGRAM(s) IntraMuscular once  guaiFENesin Oral Liquid (Sugar-Free) 100 milliGRAM(s) Oral every 6 hours PRN  hydrALAZINE 25 milliGRAM(s) Oral three times a day  influenza  Vaccine (HIGH DOSE) 0.7 milliLiter(s) IntraMuscular once  insulin glargine Injectable (LANTUS) 20 Unit(s) SubCutaneous at bedtime  insulin lispro (ADMELOG) corrective regimen sliding scale   SubCutaneous three times a day before meals  insulin lispro (ADMELOG) corrective regimen sliding scale   SubCutaneous at bedtime  metoprolol tartrate 25 milliGRAM(s) Oral two times a day  pantoprazole  Injectable 40 milliGRAM(s) IV Push two times a day  thiamine 100 milliGRAM(s) Oral daily      Social History:   Tob: former  EtOH: Denies  Illicit Drugs: Denies    Family history:    Denies family history of colon cancer/polyps, stomach cancer/polyps, pancreatic cancer/masses, liver cancer/disease, ovarian cancer and endometrial cancer.    ROS:   Complete and normal except as mentioned above.    PHYSICAL EXAM:   Vital Signs:  Vital Signs Last 24 Hrs  T(C): 36.9 (04 Feb 2022 05:34), Max: 37.3 (03 Feb 2022 19:00)  T(F): 98.4 (04 Feb 2022 05:34), Max: 99.2 (03 Feb 2022 19:00)  HR: 82 (04 Feb 2022 11:16) (72 - 84)  BP: 181/85 (04 Feb 2022 05:34) (180/95 - 211/100)  BP(mean): --  RR: 17 (04 Feb 2022 05:34) (16 - 19)  SpO2: 100% (04 Feb 2022 11:16) (98% - 100%)  Daily     Daily     GENERAL: no acute distress  NEURO: alert  HEENT: anicteric sclera, no conjunctival pallor appreciated  CHEST: no respiratory distress, no accessory muscle use  CARDIAC: regular rate, rhythm  ABDOMEN: soft, non-tender, non-distended, no rebound or guarding  EXTREMITIES: warm, well perfused, no edema  SKIN: no lesions noted    LABS: reviewed                        8.5    3.77  )-----------( 294      ( 04 Feb 2022 05:37 )             27.3     02-04    138  |  104  |  19  ----------------------------<  174<H>  4.1   |  23  |  0.98    Ca    8.2<L>      04 Feb 2022 05:37  Phos  3.7     02-04  Mg     1.40     02-04    TPro  7.1  /  Alb  2.7<L>  /  TBili  0.3  /  DBili  x   /  AST  14  /  ALT  7   /  AlkPhos  80  02-04    LIVER FUNCTIONS - ( 04 Feb 2022 05:37 )  Alb: 2.7 g/dL / Pro: 7.1 g/dL / ALK PHOS: 80 U/L / ALT: 7 U/L / AST: 14 U/L / GGT: x             Culture - Urine (collected 03 Feb 2022 11:19)  Source: Clean Catch Clean Catch (Midstream)  Final Report (04 Feb 2022 11:35):    <10,000 CFU/mL Normal Urogenital Elle        Diagnostic Studies: see sunrise for full report

## 2022-02-04 NOTE — CONSULT NOTE ADULT - ASSESSMENT
#. Anemia, questionable dark stool - patient HDS, denying any bloody/black stools and declining MAMADOU and any endoscopic evaluation. States she had a normal EGD/colon 2 mos PTA at [facility] ?[place] and is not interested in any additional evaluation.   #. GERD  #. Reported fever of 101 at home - defer evaluation to primary    Recommendations:  - Patient declining endoscopic evaluation and is not interested GI evaluation of her, reporting recent EGD/colon 2 mos PTA  - Obtain OSH EGD/colon records [facility]  - Pursue medical management of anemia  - Consider other potential causes of anemia  - Check Ferritin/iron panel  - Trend Hgb, Transfuse if Hgb < 7  - Maintain active T&S  - If develops overt GIB/becomes HD unstable, obtain stat CTA and consult IR  - Rest of care per primary    Thank you for involving us in this patient's care. Please contact GI if have any questions or concerns.    Case discussed with Attending, Dr. Joy Flores MD  Gastroenterology/Hepatology Fellow, PGY-V    NON-URGENT CONSULTS:  Please email [email or web address]  [email or web address]  AT NIGHT AND ON WEEKENDS:  Contact on-call GI fellow via answering service ([phone]) from 5pm-8am and on weekends/holidays  MONDAY-FRIDAY 8AM-5PM:  Pager# [phone] ([facility])  GI Phone# [phone] ([facility]) BEVERLY MEJIA is a 67yo F with PMH of DM (on insulin), HTN, HLD, pancreatic pseudocyst (s/p stent placement and drainage late 2021), iron deficiency anemia, GERD who presents with progressive dyspnea and cough fever 101 at home, noted to have hgb 6.7 and reportedly mentioned having dark stool 1 week PTA, patient denying dark stool, GI consulted for anemia evaluation in setting of dark stool. Note patient hard of hearing, so utilized writing to communicate.    #. Anemia, questionable dark stool - patient HDS, denying any bloody/black stools and declining MAMADOU and any endoscopic evaluation. States she had a normal EGD/colon 2 mos PTA at [facility] ?[place] and is not interested in any additional GI evaluation.   #. GERD  #. Reported fever of 101 at home - defer evaluation to primary    Recommendations:  - Patient declining endoscopic evaluation and is not interested GI evaluation of her, reporting recent EGD/colon 2 mos PTA  - Obtain OSH EGD/colon records [facility] ?[place]  - Pursue medical management of anemia  - Consider other potential causes of anemia  - Check Ferritin/iron panel  - Trend Hgb, Transfuse if Hgb < 7  - Maintain active T&S  - Consider PPI IV BID while inpatient and transition to po daily in case of any potential gastric lesions  - If develops overt GIB/becomes HD unstable, obtain stat CTA and consult IR  - Rest of care per primary    Thank you for involving us in this patient's care. Please contact GI if have any questions or concerns.    Case discussed with Attending, Dr. Joy Flores MD  Gastroenterology/Hepatology Fellow, PGY-V    NON-URGENT CONSULTS:  Please email [email or web address]  [email or web address]  AT NIGHT AND ON WEEKENDS:  Contact on-call GI fellow via answering service ([phone]) from 5pm-8am and on weekends/holidays  MONDAY-FRIDAY 8AM-5PM:  Pager# [phone] ([facility])  GI Phone# [phone] ([facility]) BEVERLY MEJIA is a 67yo F with PMH of DM (on insulin), HTN, HLD, pancreatic pseudocyst (s/p stent placement and drainage late 2021), iron deficiency anemia, GERD who presents with progressive dyspnea and cough fever 101 at home, noted to have hgb 6.7 and reportedly mentioned having dark stool 1 week PTA, patient denying dark stool, GI consulted for anemia evaluation in setting of dark stool. Note patient hard of hearing, so utilized writing to communicate.    #. Anemia, questionable dark stool - patient HDS, denying any bloody/black stools and declining MAMADOU and any endoscopic evaluation. States she had a normal EGD/colon 2 mos PTA at [facility] ?[place] and is not interested in any additional GI evaluation.   #. GERD  #. Reported fever of 101 at home - defer evaluation to primary    Recommendations:  - Would confirm if/when patient had endoscopic evaluation given inconsistent reports  - Obtain OSH EGD/colon records [facility] ?[place]  - Pursue medical management of anemia  - Consider other potential causes of anemia  - Check Ferritin/iron panel  - Trend Hgb, Transfuse if Hgb < 7  - Maintain active T&S  - Consider PPI IV BID while inpatient and transition to po daily in case of any potential gastric lesions  - If develops overt GIB/becomes HD unstable, obtain stat CTA and consult IR  - Rest of care per primary    Thank you for involving us in this patient's care. Please contact GI if have any questions or concerns.    Case discussed with Attending, Dr. Joy Flores MD  Gastroenterology/Hepatology Fellow, PGY-V    NON-URGENT CONSULTS:  Please email [email or web address]  [email or web address]  AT NIGHT AND ON WEEKENDS:  Contact on-call GI fellow via answering service ([phone]) from 5pm-8am and on weekends/holidays  MONDAY-FRIDAY 8AM-5PM:  Pager# [phone] ([facility])  GI Phone# [phone] ([facility])

## 2022-02-04 NOTE — PROGRESS NOTE ADULT - SUBJECTIVE AND OBJECTIVE BOX
Charbel Morillo, PGY1    DATE OF SERVICE: 22 @ 07:07    Patient is a 66y old  Female who presents with a chief complaint of Dyspnea (2022 16:14)      SUBJECTIVE / OVERNIGHT EVENTS: Patient was hypertensive overnight and given extra dose of PO hydralazine 25.     MEDICATIONS  (STANDING):  amLODIPine   Tablet 10 milliGRAM(s) Oral daily  aspirin enteric coated 81 milliGRAM(s) Oral daily  dextrose 40% Gel 15 Gram(s) Oral once  dextrose 5%. 1000 milliLiter(s) (50 mL/Hr) IV Continuous <Continuous>  dextrose 5%. 1000 milliLiter(s) (100 mL/Hr) IV Continuous <Continuous>  dextrose 50% Injectable 25 Gram(s) IV Push once  dextrose 50% Injectable 12.5 Gram(s) IV Push once  dextrose 50% Injectable 25 Gram(s) IV Push once  furosemide   Injectable 20 milliGRAM(s) IV Push every 12 hours  glucagon  Injectable 1 milliGRAM(s) IntraMuscular once  hydrALAZINE 25 milliGRAM(s) Oral three times a day  influenza  Vaccine (HIGH DOSE) 0.7 milliLiter(s) IntraMuscular once  insulin glargine Injectable (LANTUS) 20 Unit(s) SubCutaneous at bedtime  insulin lispro (ADMELOG) corrective regimen sliding scale   SubCutaneous three times a day before meals  insulin lispro (ADMELOG) corrective regimen sliding scale   SubCutaneous at bedtime  metoprolol tartrate 25 milliGRAM(s) Oral two times a day  pantoprazole  Injectable 40 milliGRAM(s) IV Push two times a day  thiamine 100 milliGRAM(s) Oral daily    MEDICATIONS  (PRN):  albuterol/ipratropium for Nebulization 3 milliLiter(s) Nebulizer every 6 hours PRN Shortness of Breath and/or Wheezing  guaiFENesin Oral Liquid (Sugar-Free) 100 milliGRAM(s) Oral every 6 hours PRN Cough      Vital Signs Last 24 Hrs  T(C): 36.9 (2022 05:34), Max: 37.6 (2022 10:16)  T(F): 98.4 (2022 05:34), Max: 99.6 (2022 10:16)  HR: 82 (2022 05:34) (72 - 89)  BP: 181/85 (2022 05:34) (180/95 - 211/100)  BP(mean): --  RR: 17 (2022 05:34) (16 - 19)  SpO2: 100% (2022 05:34) (98% - 100%)  CAPILLARY BLOOD GLUCOSE      POCT Blood Glucose.: 199 mg/dL (2022 02:49)  POCT Blood Glucose.: 211 mg/dL (2022 21:47)  POCT Blood Glucose.: 100 mg/dL (2022 10:19)    I&O's Summary      PHYSICAL EXAM:  GENERAL: NAD, sitting comfortably in bed  HEAD:  Atraumatic, Normocephalic; bilateral hearing loss  EYES: EOMI, PERRLA, anicteric pale sclera  NECK: Supple, No JVD appreciated  CHEST/LUNG: wheezes diffusely  HEART: normal rate and regular rhythm; No murmurs, rubs, or gallops  ABDOMEN: Soft, Nontender, slightly distended; Bowel sounds present  EXTREMITIES:  2+ Peripheral Pulses, No clubbing, cyanosis, or edema, toe amputation on right foot  PSYCH: AAOx3  NEUROLOGY: non-focal  SKIN: No rashes or lesions    LABS:                        8.5    3.77  )-----------( 294      ( 2022 05:37 )             27.3     02-04    138  |  104  |  x   ----------------------------<  x   4.1   |  x   |  x     Ca    8.4      2022 11:40  Phos  3.7     02-04  Mg     1.40     02-04    TPro  7.8  /  Alb  2.9<L>  /  TBili  0.2  /  DBili  x   /  AST  12  /  ALT  7   /  AlkPhos  90  02-03    PT/INR - ( 2022 12:32 )   PT: 12.7 sec;   INR: 1.12 ratio         PTT - ( 2022 12:32 )  PTT:32.4 sec  CARDIAC MARKERS ( 2022 19:38 )  x     / x     / 78 U/L / x     / 1.7 ng/mL      Urinalysis Basic - ( 2022 11:40 )    Color: Yellow / Appearance: Clear / S.018 / pH: x  Gluc: x / Ketone: Negative  / Bili: Negative / Urobili: <2 mg/dL   Blood: x / Protein: >600 mg/dL / Nitrite: Negative   Leuk Esterase: Negative / RBC: 5 /HPF / WBC 4 /HPF   Sq Epi: x / Non Sq Epi: 7 /HPF / Bacteria: Few        RADIOLOGY & ADDITIONAL TESTS:    Imaging Personally Reviewed:    Consultant(s) Notes Reviewed:      Care Discussed with Consultants/Other Providers:   Charbel Morillo, PGY1    DATE OF SERVICE: 22 @ 07:07    Patient is a 66y old  Female who presents with a chief complaint of Dyspnea (2022 16:14)      SUBJECTIVE / OVERNIGHT EVENTS: Patient was hypertensive overnight and given extra dose of PO hydralazine 25. Patient notes that she feels little better today. No chest pain. Shortness of breath improving. Coughing up some white phlegm. Sometimes notes slight abd pain. No n/v.     MEDICATIONS  (STANDING):  amLODIPine   Tablet 10 milliGRAM(s) Oral daily  aspirin enteric coated 81 milliGRAM(s) Oral daily  dextrose 40% Gel 15 Gram(s) Oral once  dextrose 5%. 1000 milliLiter(s) (50 mL/Hr) IV Continuous <Continuous>  dextrose 5%. 1000 milliLiter(s) (100 mL/Hr) IV Continuous <Continuous>  dextrose 50% Injectable 25 Gram(s) IV Push once  dextrose 50% Injectable 12.5 Gram(s) IV Push once  dextrose 50% Injectable 25 Gram(s) IV Push once  furosemide   Injectable 20 milliGRAM(s) IV Push every 12 hours  glucagon  Injectable 1 milliGRAM(s) IntraMuscular once  hydrALAZINE 25 milliGRAM(s) Oral three times a day  influenza  Vaccine (HIGH DOSE) 0.7 milliLiter(s) IntraMuscular once  insulin glargine Injectable (LANTUS) 20 Unit(s) SubCutaneous at bedtime  insulin lispro (ADMELOG) corrective regimen sliding scale   SubCutaneous three times a day before meals  insulin lispro (ADMELOG) corrective regimen sliding scale   SubCutaneous at bedtime  metoprolol tartrate 25 milliGRAM(s) Oral two times a day  pantoprazole  Injectable 40 milliGRAM(s) IV Push two times a day  thiamine 100 milliGRAM(s) Oral daily    MEDICATIONS  (PRN):  albuterol/ipratropium for Nebulization 3 milliLiter(s) Nebulizer every 6 hours PRN Shortness of Breath and/or Wheezing  guaiFENesin Oral Liquid (Sugar-Free) 100 milliGRAM(s) Oral every 6 hours PRN Cough      Vital Signs Last 24 Hrs  T(C): 36.9 (2022 05:34), Max: 37.6 (2022 10:16)  T(F): 98.4 (2022 05:34), Max: 99.6 (2022 10:16)  HR: 82 (2022 05:34) (72 - 89)  BP: 181/85 (2022 05:34) (180/95 - 211/100)  BP(mean): --  RR: 17 (2022 05:34) (16 - 19)  SpO2: 100% (2022 05:34) (98% - 100%)  CAPILLARY BLOOD GLUCOSE      POCT Blood Glucose.: 199 mg/dL (2022 02:49)  POCT Blood Glucose.: 211 mg/dL (2022 21:47)  POCT Blood Glucose.: 100 mg/dL (2022 10:19)    I&O's Summary      PHYSICAL EXAM:  GENERAL: NAD, sitting comfortably in bed  HEAD:  Atraumatic, Normocephalic; bilateral hearing loss  EYES: EOMI, PERRLA, anicteric pale sclera  NECK: Supple, No JVD appreciated  CHEST/LUNG: wheezes diffusely  HEART: normal rate and regular rhythm; No murmurs, rubs, or gallops  ABDOMEN: Soft, Nontender, slightly distended; Bowel sounds present  EXTREMITIES:  2+ Peripheral Pulses, No clubbing, cyanosis, or edema, toe amputation on right foot  PSYCH: AAOx3  NEUROLOGY: non-focal  SKIN: No rashes or lesions    LABS:                        8.5    3.77  )-----------( 294      ( 2022 05:37 )             27.3     02-04    138  |  104  |  x   ----------------------------<  x   4.1   |  x   |  x     Ca    8.4      2022 11:40  Phos  3.7     02-04  Mg     1.40     02-04    TPro  7.8  /  Alb  2.9<L>  /  TBili  0.2  /  DBili  x   /  AST  12  /  ALT  7   /  AlkPhos  90  02-03    PT/INR - ( 2022 12:32 )   PT: 12.7 sec;   INR: 1.12 ratio         PTT - ( 2022 12:32 )  PTT:32.4 sec  CARDIAC MARKERS ( 2022 19:38 )  x     / x     / 78 U/L / x     / 1.7 ng/mL      Urinalysis Basic - ( 2022 11:40 )    Color: Yellow / Appearance: Clear / S.018 / pH: x  Gluc: x / Ketone: Negative  / Bili: Negative / Urobili: <2 mg/dL   Blood: x / Protein: >600 mg/dL / Nitrite: Negative   Leuk Esterase: Negative / RBC: 5 /HPF / WBC 4 /HPF   Sq Epi: x / Non Sq Epi: 7 /HPF / Bacteria: Few        RADIOLOGY & ADDITIONAL TESTS:    Imaging Personally Reviewed:    Consultant(s) Notes Reviewed:      Care Discussed with Consultants/Other Providers:

## 2022-02-04 NOTE — PATIENT PROFILE ADULT - FUNCTIONAL ASSESSMENT - DAILY ACTIVITY 5.
Render Post-Care Instructions In Note?: no Number Of Freeze-Thaw Cycles: 2 freeze-thaw cycles Post-Care Instructions: I reviewed with the patient in detail post-care instructions. Patient is to wear sunprotection, and avoid picking at any of the treated lesions. Pt may apply Vaseline to crusted or scabbing areas. Detail Level: Simple Consent: The patient's consent was obtained including but not limited to risks of crusting, scabbing, blistering, scarring, darker or lighter pigmentary change, recurrence, incomplete removal and infection. Duration Of Freeze Thaw-Cycle (Seconds): 3 4 = No assist / stand by assistance

## 2022-02-04 NOTE — PHYSICAL THERAPY INITIAL EVALUATION ADULT - PERTINENT HX OF CURRENT PROBLEM, REHAB EVAL
65 y/o Female with PMHx of DM (on insulin), HTN, HLD, pancreatic pseudocyst (s/p stent placement and drainage late 2021), iron deficiency anemia, GERD who presented with progressive dyspnea and cough. Patient found to be anemic with Hgb 6.7 on admission s/p 1U PRBC transfusion.

## 2022-02-04 NOTE — PHYSICAL THERAPY INITIAL EVALUATION ADULT - GAIT DISTANCE, PT EVAL
85 feet ;  pt also ambulated few steps without device and demonstrated mild imbalance- showed improved stability with walker, steady with walker 85 feet ;  pt also ambulated few steps without device and demonstrated mild imbalance- showed improved stability with walker, steady with walker. To assess gait with cane upon f/u session if appropriate.

## 2022-02-04 NOTE — CONSULT NOTE ADULT - ATTENDING COMMENTS
#Rectal bleeding: Reports of rectal bleeding week prior to admission. No recurrent or overt bleeding since admission. Patient inconsistent with her history; possibly had recent colonoscopy at OSH vs not in many years.   #Anemia  #SOB, cough  #Fever  #HTN: Poorly controlled with SBP 200s during hospitalization, slowly improving    --No immediate plans for endoscopic evaluation, but reasonable if not recently performed to evaluate reported bleeding and anemia. GI team will readdress endoscopic evaluation with patient as inconsistencies in follow up conversations this afternoon (initially declined endoscopic evaluation due to recent procedures, later in day stated she would consider colonoscopy +/- EGD if needed)  --Continue PPI for now  --Check iron studies  --Infectious workup per primary team  --Once patient medically optimized, endoscopic evaluation can be arranged if within patient's wishes

## 2022-02-04 NOTE — PROGRESS NOTE ADULT - PROBLEM SELECTOR PLAN 1
- Differential dx includes CHF exacerbation vs symptomatic anemia vs ACS vs post-viral. less likely PE, but would be a concern if patient develops hypoxemia  - TTE  - IV lasix 20 BID  - strict I/Os  - daily weights  - trend troponin, repeat EKG  - transfuse Hgb > 8, given concern for ?ACS  - robutussin and tessalon pearls for cough; PRN duoneb - Differential dx includes CHF exacerbation vs symptomatic anemia vs ACS vs post-viral. less likely PE, but would be a concern if patient develops hypoxemia  - TTE  - IV lasix 40 BID  - strict I/Os  - daily weights  - trend troponin, repeat EKG  - transfuse Hgb > 8, given concern for ?ACS  - Robitussin and tessalon pearls for cough; PRN duoneb

## 2022-02-05 ENCOUNTER — TRANSCRIPTION ENCOUNTER (OUTPATIENT)
Age: 67
End: 2022-02-05

## 2022-02-05 LAB
ALBUMIN SERPL ELPH-MCNC: 2.8 G/DL — LOW (ref 3.3–5)
ALP SERPL-CCNC: 80 U/L — SIGNIFICANT CHANGE UP (ref 40–120)
ALT FLD-CCNC: <5 U/L — SIGNIFICANT CHANGE UP (ref 4–33)
ANION GAP SERPL CALC-SCNC: 12 MMOL/L — SIGNIFICANT CHANGE UP (ref 7–14)
AST SERPL-CCNC: 12 U/L — SIGNIFICANT CHANGE UP (ref 4–32)
BASOPHILS # BLD AUTO: 0.02 K/UL — SIGNIFICANT CHANGE UP (ref 0–0.2)
BASOPHILS NFR BLD AUTO: 0.5 % — SIGNIFICANT CHANGE UP (ref 0–2)
BILIRUB SERPL-MCNC: 0.4 MG/DL — SIGNIFICANT CHANGE UP (ref 0.2–1.2)
BUN SERPL-MCNC: 24 MG/DL — HIGH (ref 7–23)
CALCIUM SERPL-MCNC: 8.9 MG/DL — SIGNIFICANT CHANGE UP (ref 8.4–10.5)
CHLORIDE SERPL-SCNC: 101 MMOL/L — SIGNIFICANT CHANGE UP (ref 98–107)
CO2 SERPL-SCNC: 24 MMOL/L — SIGNIFICANT CHANGE UP (ref 22–31)
CREAT SERPL-MCNC: 1.5 MG/DL — HIGH (ref 0.5–1.3)
EOSINOPHIL # BLD AUTO: 0.36 K/UL — SIGNIFICANT CHANGE UP (ref 0–0.5)
EOSINOPHIL NFR BLD AUTO: 8.8 % — HIGH (ref 0–6)
GLUCOSE BLDC GLUCOMTR-MCNC: 159 MG/DL — HIGH (ref 70–99)
GLUCOSE BLDC GLUCOMTR-MCNC: 165 MG/DL — HIGH (ref 70–99)
GLUCOSE BLDC GLUCOMTR-MCNC: 174 MG/DL — HIGH (ref 70–99)
GLUCOSE BLDC GLUCOMTR-MCNC: 191 MG/DL — HIGH (ref 70–99)
GLUCOSE SERPL-MCNC: 140 MG/DL — HIGH (ref 70–99)
HCT VFR BLD CALC: 28.6 % — LOW (ref 34.5–45)
HGB BLD-MCNC: 8.9 G/DL — LOW (ref 11.5–15.5)
IANC: 1.77 K/UL — SIGNIFICANT CHANGE UP (ref 1.5–8.5)
IMM GRANULOCYTES NFR BLD AUTO: 2 % — HIGH (ref 0–1.5)
LYMPHOCYTES # BLD AUTO: 1.13 K/UL — SIGNIFICANT CHANGE UP (ref 1–3.3)
LYMPHOCYTES # BLD AUTO: 27.8 % — SIGNIFICANT CHANGE UP (ref 13–44)
MAGNESIUM SERPL-MCNC: 1.6 MG/DL — SIGNIFICANT CHANGE UP (ref 1.6–2.6)
MCHC RBC-ENTMCNC: 24.7 PG — LOW (ref 27–34)
MCHC RBC-ENTMCNC: 31.1 GM/DL — LOW (ref 32–36)
MCV RBC AUTO: 79.2 FL — LOW (ref 80–100)
MONOCYTES # BLD AUTO: 0.71 K/UL — SIGNIFICANT CHANGE UP (ref 0–0.9)
MONOCYTES NFR BLD AUTO: 17.4 % — HIGH (ref 2–14)
NEUTROPHILS # BLD AUTO: 1.77 K/UL — LOW (ref 1.8–7.4)
NEUTROPHILS NFR BLD AUTO: 43.5 % — SIGNIFICANT CHANGE UP (ref 43–77)
NRBC # BLD: 0 /100 WBCS — SIGNIFICANT CHANGE UP
NRBC # FLD: 0 K/UL — SIGNIFICANT CHANGE UP
PHOSPHATE SERPL-MCNC: 4 MG/DL — SIGNIFICANT CHANGE UP (ref 2.5–4.5)
PLATELET # BLD AUTO: 315 K/UL — SIGNIFICANT CHANGE UP (ref 150–400)
POTASSIUM SERPL-MCNC: 4.1 MMOL/L — SIGNIFICANT CHANGE UP (ref 3.5–5.3)
POTASSIUM SERPL-SCNC: 4.1 MMOL/L — SIGNIFICANT CHANGE UP (ref 3.5–5.3)
PROT SERPL-MCNC: 6.9 G/DL — SIGNIFICANT CHANGE UP (ref 6–8.3)
RBC # BLD: 3.61 M/UL — LOW (ref 3.8–5.2)
RBC # FLD: 19 % — HIGH (ref 10.3–14.5)
SODIUM SERPL-SCNC: 137 MMOL/L — SIGNIFICANT CHANGE UP (ref 135–145)
WBC # BLD: 4.07 K/UL — SIGNIFICANT CHANGE UP (ref 3.8–10.5)
WBC # FLD AUTO: 4.07 K/UL — SIGNIFICANT CHANGE UP (ref 3.8–10.5)

## 2022-02-05 PROCEDURE — 71250 CT THORAX DX C-: CPT | Mod: 26

## 2022-02-05 PROCEDURE — 99233 SBSQ HOSP IP/OBS HIGH 50: CPT | Mod: GC

## 2022-02-05 PROCEDURE — 99222 1ST HOSP IP/OBS MODERATE 55: CPT

## 2022-02-05 RX ORDER — SODIUM CHLORIDE 9 MG/ML
1000 INJECTION, SOLUTION INTRAVENOUS
Refills: 0 | Status: DISCONTINUED | OUTPATIENT
Start: 2022-02-05 | End: 2022-02-09

## 2022-02-05 RX ORDER — MAGNESIUM SULFATE 500 MG/ML
2 VIAL (ML) INJECTION ONCE
Refills: 0 | Status: COMPLETED | OUTPATIENT
Start: 2022-02-05 | End: 2022-02-05

## 2022-02-05 RX ADMIN — Medication 25 MILLIGRAM(S): at 12:33

## 2022-02-05 RX ADMIN — SODIUM CHLORIDE 40 MILLILITER(S): 9 INJECTION, SOLUTION INTRAVENOUS at 21:04

## 2022-02-05 RX ADMIN — Medication 100 MILLIGRAM(S): at 12:32

## 2022-02-05 RX ADMIN — Medication 2: at 07:50

## 2022-02-05 RX ADMIN — Medication 2: at 17:08

## 2022-02-05 RX ADMIN — SODIUM CHLORIDE 40 MILLILITER(S): 9 INJECTION, SOLUTION INTRAVENOUS at 12:31

## 2022-02-05 RX ADMIN — Medication 25 MILLIGRAM(S): at 17:08

## 2022-02-05 RX ADMIN — AMLODIPINE BESYLATE 10 MILLIGRAM(S): 2.5 TABLET ORAL at 05:18

## 2022-02-05 RX ADMIN — PANTOPRAZOLE SODIUM 40 MILLIGRAM(S): 20 TABLET, DELAYED RELEASE ORAL at 17:08

## 2022-02-05 RX ADMIN — Medication 81 MILLIGRAM(S): at 12:32

## 2022-02-05 RX ADMIN — Medication 25 MILLIGRAM(S): at 05:18

## 2022-02-05 RX ADMIN — Medication 25 MILLIGRAM(S): at 21:04

## 2022-02-05 RX ADMIN — Medication 2: at 12:17

## 2022-02-05 RX ADMIN — Medication 25 MILLIGRAM(S): at 05:17

## 2022-02-05 RX ADMIN — Medication 25 GRAM(S): at 12:31

## 2022-02-05 RX ADMIN — Medication 100 MILLIGRAM(S): at 17:09

## 2022-02-05 RX ADMIN — Medication 100 MILLIGRAM(S): at 08:09

## 2022-02-05 RX ADMIN — Medication 40 MILLIGRAM(S): at 05:18

## 2022-02-05 RX ADMIN — INSULIN GLARGINE 20 UNIT(S): 100 INJECTION, SOLUTION SUBCUTANEOUS at 21:04

## 2022-02-05 RX ADMIN — PANTOPRAZOLE SODIUM 40 MILLIGRAM(S): 20 TABLET, DELAYED RELEASE ORAL at 05:18

## 2022-02-05 NOTE — PROGRESS NOTE ADULT - PROBLEM SELECTOR PLAN 2
- likely in setting of dark blood in stool last week  - GI consulted, follow up recs  - IV protonix 40mg BID  - active type and screen - likely in setting of dark blood in stool last week; Hgb stable after 2U PRBC day of admission  - GI consulted, follow up recs  - patient refusing GI scope  - IV protonix 40mg BID  - active type and screen

## 2022-02-05 NOTE — PROGRESS NOTE ADULT - SUBJECTIVE AND OBJECTIVE BOX
Charbel Morillo, PGY1    DATE OF SERVICE: 22 @ 06:49    Patient is a 66y old  Female who presents with a chief complaint of Dyspnea (2022 11:55)      SUBJECTIVE / OVERNIGHT EVENTS: No acute events overnight.     MEDICATIONS  (STANDING):  amLODIPine   Tablet 10 milliGRAM(s) Oral daily  aspirin enteric coated 81 milliGRAM(s) Oral daily  dextrose 40% Gel 15 Gram(s) Oral once  dextrose 5%. 1000 milliLiter(s) (50 mL/Hr) IV Continuous <Continuous>  dextrose 5%. 1000 milliLiter(s) (100 mL/Hr) IV Continuous <Continuous>  dextrose 50% Injectable 25 Gram(s) IV Push once  dextrose 50% Injectable 12.5 Gram(s) IV Push once  dextrose 50% Injectable 25 Gram(s) IV Push once  furosemide   Injectable 40 milliGRAM(s) IV Push every 12 hours  glucagon  Injectable 1 milliGRAM(s) IntraMuscular once  hydrALAZINE 25 milliGRAM(s) Oral three times a day  influenza  Vaccine (HIGH DOSE) 0.7 milliLiter(s) IntraMuscular once  insulin glargine Injectable (LANTUS) 20 Unit(s) SubCutaneous at bedtime  insulin lispro (ADMELOG) corrective regimen sliding scale   SubCutaneous three times a day before meals  insulin lispro (ADMELOG) corrective regimen sliding scale   SubCutaneous at bedtime  metoprolol tartrate 25 milliGRAM(s) Oral two times a day  pantoprazole  Injectable 40 milliGRAM(s) IV Push two times a day  thiamine 100 milliGRAM(s) Oral daily    MEDICATIONS  (PRN):  albuterol/ipratropium for Nebulization 3 milliLiter(s) Nebulizer every 6 hours PRN Shortness of Breath and/or Wheezing  guaiFENesin Oral Liquid (Sugar-Free) 100 milliGRAM(s) Oral every 6 hours PRN Cough      Vital Signs Last 24 Hrs  T(C): 37.2 (2022 05:16), Max: 37.4 (2022 12:08)  T(F): 98.9 (2022 05:16), Max: 99.3 (2022 12:08)  HR: 80 (2022 05:28) (75 - 84)  BP: 148/79 (2022 05:28) (142/76 - 168/79)  BP(mean): --  RR: 18 (2022 05:28) (17 - 19)  SpO2: 100% (2022 05:28) (97% - 100%)  CAPILLARY BLOOD GLUCOSE      POCT Blood Glucose.: 198 mg/dL (2022 20:55)  POCT Blood Glucose.: 144 mg/dL (2022 16:53)  POCT Blood Glucose.: 199 mg/dL (2022 11:48)  POCT Blood Glucose.: 178 mg/dL (2022 08:09)    I&O's Summary    2022 07:01  -  2022 06:49  --------------------------------------------------------  IN: 2140 mL / OUT: 1465 mL / NET: 675 mL        PHYSICAL EXAM:  GENERAL: NAD, sitting comfortably in bed  HEAD:  Atraumatic, Normocephalic; bilateral hearing loss  EYES: EOMI, PERRLA, anicteric pale sclera  NECK: Supple, No JVD appreciated  CHEST/LUNG: wheezes diffusely  HEART: normal rate and regular rhythm; No murmurs, rubs, or gallops  ABDOMEN: Soft, Nontender, slightly distended; Bowel sounds present  EXTREMITIES:  2+ Peripheral Pulses, No clubbing, cyanosis, or edema, toe amputation on right foot  PSYCH: AAOx3  NEUROLOGY: non-focal  SKIN: No rashes or lesions    LABS:                        8.5    3.77  )-----------( 294      ( 2022 05:37 )             27.3     02-04    138  |  104  |  19  ----------------------------<  174<H>  4.1   |  23  |  0.98    Ca    8.2<L>      2022 05:37  Phos  3.7     02-04  Mg     1.40     02-04    TPro  7.1  /  Alb  2.7<L>  /  TBili  0.3  /  DBili  x   /  AST  14  /  ALT  7   /  AlkPhos  80  02-04    PT/INR - ( 2022 12:32 )   PT: 12.7 sec;   INR: 1.12 ratio         PTT - ( 2022 12:32 )  PTT:32.4 sec  CARDIAC MARKERS ( 2022 19:38 )  x     / x     / 78 U/L / x     / 1.7 ng/mL      Urinalysis Basic - ( 2022 11:40 )    Color: Yellow / Appearance: Clear / S.018 / pH: x  Gluc: x / Ketone: Negative  / Bili: Negative / Urobili: <2 mg/dL   Blood: x / Protein: >600 mg/dL / Nitrite: Negative   Leuk Esterase: Negative / RBC: 5 /HPF / WBC 4 /HPF   Sq Epi: x / Non Sq Epi: 7 /HPF / Bacteria: Few        RADIOLOGY & ADDITIONAL TESTS:    Imaging Personally Reviewed:    Consultant(s) Notes Reviewed:      Care Discussed with Consultants/Other Providers:   Charbel Morillo, PGY1    DATE OF SERVICE: 22 @ 06:49    Patient is a 66y old  Female who presents with a chief complaint of Dyspnea (2022 11:55)      SUBJECTIVE / OVERNIGHT EVENTS: No acute events overnight. Patient notes that she was coughing overnight and had more phlegm. Felt like she was straining to cough. Patient denies chest pain, shortness of breath, n/v. Slight abd pain, but able to eat.     MEDICATIONS  (STANDING):  amLODIPine   Tablet 10 milliGRAM(s) Oral daily  aspirin enteric coated 81 milliGRAM(s) Oral daily  dextrose 40% Gel 15 Gram(s) Oral once  dextrose 5%. 1000 milliLiter(s) (50 mL/Hr) IV Continuous <Continuous>  dextrose 5%. 1000 milliLiter(s) (100 mL/Hr) IV Continuous <Continuous>  dextrose 50% Injectable 25 Gram(s) IV Push once  dextrose 50% Injectable 12.5 Gram(s) IV Push once  dextrose 50% Injectable 25 Gram(s) IV Push once  furosemide   Injectable 40 milliGRAM(s) IV Push every 12 hours  glucagon  Injectable 1 milliGRAM(s) IntraMuscular once  hydrALAZINE 25 milliGRAM(s) Oral three times a day  influenza  Vaccine (HIGH DOSE) 0.7 milliLiter(s) IntraMuscular once  insulin glargine Injectable (LANTUS) 20 Unit(s) SubCutaneous at bedtime  insulin lispro (ADMELOG) corrective regimen sliding scale   SubCutaneous three times a day before meals  insulin lispro (ADMELOG) corrective regimen sliding scale   SubCutaneous at bedtime  metoprolol tartrate 25 milliGRAM(s) Oral two times a day  pantoprazole  Injectable 40 milliGRAM(s) IV Push two times a day  thiamine 100 milliGRAM(s) Oral daily    MEDICATIONS  (PRN):  albuterol/ipratropium for Nebulization 3 milliLiter(s) Nebulizer every 6 hours PRN Shortness of Breath and/or Wheezing  guaiFENesin Oral Liquid (Sugar-Free) 100 milliGRAM(s) Oral every 6 hours PRN Cough      Vital Signs Last 24 Hrs  T(C): 37.2 (2022 05:16), Max: 37.4 (2022 12:08)  T(F): 98.9 (2022 05:16), Max: 99.3 (2022 12:08)  HR: 80 (2022 05:28) (75 - 84)  BP: 148/79 (2022 05:28) (142/76 - 168/79)  BP(mean): --  RR: 18 (2022 05:28) (17 - 19)  SpO2: 100% (2022 05:28) (97% - 100%)  CAPILLARY BLOOD GLUCOSE      POCT Blood Glucose.: 198 mg/dL (2022 20:55)  POCT Blood Glucose.: 144 mg/dL (2022 16:53)  POCT Blood Glucose.: 199 mg/dL (2022 11:48)  POCT Blood Glucose.: 178 mg/dL (2022 08:09)    I&O's Summary    2022 07:01  -  2022 06:49  --------------------------------------------------------  IN: 2140 mL / OUT: 1465 mL / NET: 675 mL        PHYSICAL EXAM:  GENERAL: NAD, sitting comfortably in bed  HEAD:  Atraumatic, Normocephalic; bilateral hearing loss  EYES: EOMI, PERRLA, anicteric pale sclera  NECK: Supple, No JVD appreciated  CHEST/LUNG: slight wheezes diffusely  HEART: normal rate and regular rhythm; No murmurs, rubs, or gallops  ABDOMEN: Soft, Nontender, slightly distended; Bowel sounds present  EXTREMITIES:  2+ Peripheral Pulses, No clubbing, cyanosis, or edema, toe amputation on right foot  PSYCH: AAOx3  NEUROLOGY: non-focal  SKIN: No rashes or lesions    LABS:                        8.5    3.77  )-----------( 294      ( 2022 05:37 )             27.3     02-    138  |  104  |  19  ----------------------------<  174<H>  4.1   |  23  |  0.98    Ca    8.2<L>      2022 05:37  Phos  3.7     02-04  Mg     1.40     02-04    TPro  7.1  /  Alb  2.7<L>  /  TBili  0.3  /  DBili  x   /  AST  14  /  ALT  7   /  AlkPhos  80  02-04    PT/INR - ( 2022 12:32 )   PT: 12.7 sec;   INR: 1.12 ratio         PTT - ( 2022 12:32 )  PTT:32.4 sec  CARDIAC MARKERS ( 2022 19:38 )  x     / x     / 78 U/L / x     / 1.7 ng/mL      Urinalysis Basic - ( 2022 11:40 )    Color: Yellow / Appearance: Clear / S.018 / pH: x  Gluc: x / Ketone: Negative  / Bili: Negative / Urobili: <2 mg/dL   Blood: x / Protein: >600 mg/dL / Nitrite: Negative   Leuk Esterase: Negative / RBC: 5 /HPF / WBC 4 /HPF   Sq Epi: x / Non Sq Epi: 7 /HPF / Bacteria: Few        RADIOLOGY & ADDITIONAL TESTS:    Imaging Personally Reviewed:    Consultant(s) Notes Reviewed:      Care Discussed with Consultants/Other Providers:   Charbel Morillo, PGY1    DATE OF SERVICE: 22 @ 06:49    Patient is a 66y old  Female who presents with a chief complaint of Dyspnea (2022 11:55)      SUBJECTIVE / OVERNIGHT EVENTS: No acute events overnight. Patient notes that she was coughing overnight and had more phlegm. Felt like she was straining to cough. Patient denies chest pain, shortness of breath, n/v. Slight abd pain, but able to eat.     Tele reviewed: NSR 70-80s w/RBBB    MEDICATIONS  (STANDING):  amLODIPine   Tablet 10 milliGRAM(s) Oral daily  aspirin enteric coated 81 milliGRAM(s) Oral daily  dextrose 40% Gel 15 Gram(s) Oral once  dextrose 5%. 1000 milliLiter(s) (50 mL/Hr) IV Continuous <Continuous>  dextrose 5%. 1000 milliLiter(s) (100 mL/Hr) IV Continuous <Continuous>  dextrose 50% Injectable 25 Gram(s) IV Push once  dextrose 50% Injectable 12.5 Gram(s) IV Push once  dextrose 50% Injectable 25 Gram(s) IV Push once  furosemide   Injectable 40 milliGRAM(s) IV Push every 12 hours  glucagon  Injectable 1 milliGRAM(s) IntraMuscular once  hydrALAZINE 25 milliGRAM(s) Oral three times a day  influenza  Vaccine (HIGH DOSE) 0.7 milliLiter(s) IntraMuscular once  insulin glargine Injectable (LANTUS) 20 Unit(s) SubCutaneous at bedtime  insulin lispro (ADMELOG) corrective regimen sliding scale   SubCutaneous three times a day before meals  insulin lispro (ADMELOG) corrective regimen sliding scale   SubCutaneous at bedtime  metoprolol tartrate 25 milliGRAM(s) Oral two times a day  pantoprazole  Injectable 40 milliGRAM(s) IV Push two times a day  thiamine 100 milliGRAM(s) Oral daily    MEDICATIONS  (PRN):  albuterol/ipratropium for Nebulization 3 milliLiter(s) Nebulizer every 6 hours PRN Shortness of Breath and/or Wheezing  guaiFENesin Oral Liquid (Sugar-Free) 100 milliGRAM(s) Oral every 6 hours PRN Cough      Vital Signs Last 24 Hrs  T(C): 37.2 (2022 05:16), Max: 37.4 (2022 12:08)  T(F): 98.9 (2022 05:16), Max: 99.3 (2022 12:08)  HR: 80 (2022 05:28) (75 - 84)  BP: 148/79 (2022 05:28) (142/76 - 168/79)  BP(mean): --  RR: 18 (2022 05:28) (17 - 19)  SpO2: 100% (2022 05:28) (97% - 100%)  CAPILLARY BLOOD GLUCOSE      POCT Blood Glucose.: 198 mg/dL (2022 20:55)  POCT Blood Glucose.: 144 mg/dL (2022 16:53)  POCT Blood Glucose.: 199 mg/dL (2022 11:48)  POCT Blood Glucose.: 178 mg/dL (2022 08:09)    I&O's Summary    2022 07:01  -  2022 06:49  --------------------------------------------------------  IN: 2140 mL / OUT: 1465 mL / NET: 675 mL        PHYSICAL EXAM:  GENERAL: NAD, sitting comfortably in bed  HEAD:  Atraumatic, Normocephalic; bilateral hearing loss  EYES: EOMI, PERRLA, anicteric pale sclera  NECK: Supple, No JVD appreciated  CHEST/LUNG: slight wheezes diffusely  HEART: normal rate and regular rhythm; No murmurs, rubs, or gallops  ABDOMEN: Soft, Nontender, slightly distended; Bowel sounds present  EXTREMITIES:  2+ Peripheral Pulses, No clubbing, cyanosis, or edema, toe amputation on right foot  PSYCH: AAOx3  NEUROLOGY: non-focal  SKIN: No rashes or lesions    LABS:                        8.5    3.77  )-----------( 294      ( 2022 05:37 )             27.3     02-04    138  |  104  |  19  ----------------------------<  174<H>  4.1   |  23  |  0.98    Ca    8.2<L>      2022 05:37  Phos  3.7     02-04  Mg     1.40     02-04    TPro  7.1  /  Alb  2.7<L>  /  TBili  0.3  /  DBili  x   /  AST  14  /  ALT  7   /  AlkPhos  80  02-04    PT/INR - ( 2022 12:32 )   PT: 12.7 sec;   INR: 1.12 ratio         PTT - ( 2022 12:32 )  PTT:32.4 sec  CARDIAC MARKERS ( 2022 19:38 )  x     / x     / 78 U/L / x     / 1.7 ng/mL      Urinalysis Basic - ( 2022 11:40 )    Color: Yellow / Appearance: Clear / S.018 / pH: x  Gluc: x / Ketone: Negative  / Bili: Negative / Urobili: <2 mg/dL   Blood: x / Protein: >600 mg/dL / Nitrite: Negative   Leuk Esterase: Negative / RBC: 5 /HPF / WBC 4 /HPF   Sq Epi: x / Non Sq Epi: 7 /HPF / Bacteria: Few        RADIOLOGY & ADDITIONAL TESTS:    Imaging Personally Reviewed:    Consultant(s) Notes Reviewed:      Care Discussed with Consultants/Other Providers:

## 2022-02-05 NOTE — CONSULT NOTE ADULT - ATTENDING COMMENTS
As above.  CT findings suggestive of sarcoidosis.  Arrange for bronchoscopy/EBUS for diagnosis.  Does not need to remain inpatient - if being planned for discharge, can be arranged as outpatient.

## 2022-02-05 NOTE — PROGRESS NOTE ADULT - PROBLEM SELECTOR PLAN 1
- Differential dx includes CHF exacerbation vs symptomatic anemia vs ACS vs post-viral. less likely PE, but would be a concern if patient develops hypoxemia  - TTE  - IV lasix 40 BID  - strict I/Os  - daily weights  - transfuse Hgb > 8, given concern for ?ACS  - Robitussin and tessalon pearls for cough; PRN duoneb - Differential dx includes CHF exacerbation vs symptomatic anemia vs ACS vs post-viral. less likely PE, but would be a concern if patient develops hypoxemia  - TTE with EF 61%, mild diastolic dysfunction  - hold lasix in setting of MANDY, Creatinine elvis to 1.5 from near 1.0  - strict I/Os  - daily weights  - transfuse Hgb > 8, given concern for ?ACS  - Robitussin and tessalon pearls for cough; PRN duoneb

## 2022-02-05 NOTE — CONSULT NOTE ADULT - SUBJECTIVE AND OBJECTIVE BOX
CHIEF COMPLAINT: dyspnea on exertion    HISTORY OF PRESENT ILLNESS    67 yo woman with past medical history of diabetes mellitus, hypertension, dyslipidemia, iron deficiency anemia and pancreatic pseudocyst s/p drainage 2021 presenting with progressive dyspnea on exertion, cough and sputum production. Found to have Hb 6.7 s/p transfusion of 2 U of packed red blood cells. Pulmonary service consulted given findings suggestive of sarcoidosis on chest CT. Denies fever, chills, weight loss, night sweats or exposure to sick contacts.       REVIEW OF SYSTEMS  Constitutional: no fever or chills, no weight loss or night sweats  Respiratory: as above  Cardiovascular: no chest pain, orthopnea, paroxysmal nocturnal dyspnea, lower extremity edema or palpitations  Gastrointestinal: no abdominal pain, nausea or vomiting  Genitourinary: no dysuria  Musculoskeletal: no joint pain or swelling  Skin: no rash  Neurologic: no focal weakness    PAST MEDICAL & SURGICAL HISTORY:  Type 2 diabetes mellitus  Bilateral cataracts  Fluid in pleural cavity associated with pancreatitis  Pancreatic pseudocyst/cyst s/p drain placement and removal  History of amputation of right great toe    FAMILY HISTORY  Negative for sarcoidosis    SOCIAL HISTORY  Tobacco use: former smoker, quit > 30 years ago  Alcohol use: occasional  Drug use: denies  No significant occupational exposures    MEDICATIONS  (STANDING):  amLODIPine   Tablet 10 milliGRAM(s) Oral daily  aspirin enteric coated 81 milliGRAM(s) Oral daily  dextrose 40% Gel 15 Gram(s) Oral once  dextrose 5%. 1000 milliLiter(s) (100 mL/Hr) IV Continuous <Continuous>  dextrose 5%. 1000 milliLiter(s) (50 mL/Hr) IV Continuous <Continuous>  dextrose 50% Injectable 25 Gram(s) IV Push once  dextrose 50% Injectable 12.5 Gram(s) IV Push once  dextrose 50% Injectable 25 Gram(s) IV Push once  glucagon  Injectable 1 milliGRAM(s) IntraMuscular once  hydrALAZINE 25 milliGRAM(s) Oral three times a day  influenza  Vaccine (HIGH DOSE) 0.7 milliLiter(s) IntraMuscular once  insulin glargine Injectable (LANTUS) 20 Unit(s) SubCutaneous at bedtime  insulin lispro (ADMELOG) corrective regimen sliding scale   SubCutaneous three times a day before meals  insulin lispro (ADMELOG) corrective regimen sliding scale   SubCutaneous at bedtime  lactated ringers. 1000 milliLiter(s) (40 mL/Hr) IV Continuous <Continuous>  metoprolol tartrate 25 milliGRAM(s) Oral two times a day  pantoprazole  Injectable 40 milliGRAM(s) IV Push two times a day  thiamine 100 milliGRAM(s) Oral daily    MEDICATIONS  (PRN):  albuterol/ipratropium for Nebulization 3 milliLiter(s) Nebulizer every 6 hours PRN Shortness of Breath and/or Wheezing  guaiFENesin Oral Liquid (Sugar-Free) 100 milliGRAM(s) Oral every 6 hours PRN Cough      ALLERGIES  Penicillin    OBJECTIVE    Vital Signs Last 24 Hrs  T(C): 36.7 (05 Feb 2022 20:29), Max: 37.2 (05 Feb 2022 04:38)  T(F): 98.1 (05 Feb 2022 20:29), Max: 98.9 (05 Feb 2022 04:38)  HR: 89 (05 Feb 2022 20:29) (67 - 89)  BP: 141/74 (05 Feb 2022 20:29) (136/65 - 162/76)  RR: 17 (05 Feb 2022 20:29) (17 - 19)  SpO2: 100% (05 Feb 2022 20:29) (99% - 100%)    PHYSICAL EXAM:  General: no acute distress  HEENT: normocephalic  Eyes: extraocular movements intact, pupils equal and reactive to light  Neck: supple  Respiratory: non labored breathing, decreased breath sounds in bases  Cardiovascular: normal rate, regular rhythm  Gastrointestinal: abdomen soft, non tender, non distended  Extremities: no lower extremity edema  Neurological: alert, oriented, no focal deficits  Psychiatric: cooperative    LABS                          8.9    4.07  )-----------( 315      ( 05 Feb 2022 07:05 )             28.6     02-05    137  |  101  |  24<H>  ----------------------------<  140<H>  4.1   |  24  |  1.50<H>    Ca    8.9      05 Feb 2022 07:05  Phos  4.0     02-05  Mg     1.60     02-05    TPro  6.9  /  Alb  2.8<L>  /  TBili  0.4  /  DBili  x   /  AST  12  /  ALT  <5  /  AlkPhos  80  02-05      RADIOLOGY    CT Chest No Cont (02.05.22 @ 09:00) >  Findings suggestive of sarcoidosis with micronodules in a perilymphatic  distribution and calcified lymph nodes in the mediastinum. A few other  solid nodules measuring up to 6 mm are indeterminate. Attention on follow-up.    Minimal patchy groundglass abnormality in the right apex, right middle lobe and lower lobe is nonspecific but may be indicative of infection in the right clinical setting. Recommend follow-up in 4-6 weeks to ensure resolution.    Partially imaged peripherally calcified 5.9 x 4.1 cm focus with internal  air in the region of the left upper quadrant. It is unclear if this is related to the pancreas or stomach. No prior imaging is available to assess for change. If clinically indicated, consider CT abdomen and pelvis with oral and IV contrast.    --- End of Report ---    < end of copied text >      Transthoracic Echocardiogram (02.04.22 @ 14:21) >  1. Mitral annular calcification, otherwise normal mitral valve. Minimal mitral regurgitation.  2. Normal left ventricular internal dimensions and wall thicknesses.  3. Normal left ventricular systolic function. No segmental wall motion abnormalities.  4. Mild diastolic dysfunction (Stage I).  5. The right ventricle is not well visualized; grossly normal right ventricular systolic function.    < end of copied text >

## 2022-02-05 NOTE — CONSULT NOTE ADULT - ASSESSMENT
65 yo woman with past medical history of diabetes mellitus, hypertension, dyslipidemia, iron deficiency anemia and pancreatic pseudocyst s/p drainage 2021 presenting with progressive dyspnea on exertion, cough and sputum production. Found to have Hb 6.7 s/p transfusion of 2 U of packed red blood cells. Pulmonary service consulted given findings suggestive of sarcoidosis on chest CT.    Imaging reviewed. She has pulmonary nodules in peribronchovascular distribution as well as calcified lymph nodes which can be seen in sarcoidosis. Calcified lymph nodes can also represent prior granulomatous infection.     Recommendations:  - Continue duonebs Q6h as needed  - Check quantiferon and ACE level  - Will discuss on Monday to arrange for bronchoscopy with EBUS   - Pulmonary function tests as an outpatient    --------------------------------------------------------  Bar Whaley, PGY-6  Pulmonary/Critical Care Fellow  Pager: [phone] (JODIE), [phone] (NS)

## 2022-02-06 LAB
ALBUMIN SERPL ELPH-MCNC: 2.6 G/DL — LOW (ref 3.3–5)
ALP SERPL-CCNC: 83 U/L — SIGNIFICANT CHANGE UP (ref 40–120)
ALT FLD-CCNC: 5 U/L — SIGNIFICANT CHANGE UP (ref 4–33)
ANION GAP SERPL CALC-SCNC: 8 MMOL/L — SIGNIFICANT CHANGE UP (ref 7–14)
AST SERPL-CCNC: 10 U/L — SIGNIFICANT CHANGE UP (ref 4–32)
BASOPHILS # BLD AUTO: 0.03 K/UL — SIGNIFICANT CHANGE UP (ref 0–0.2)
BASOPHILS NFR BLD AUTO: 0.8 % — SIGNIFICANT CHANGE UP (ref 0–2)
BILIRUB SERPL-MCNC: 0.3 MG/DL — SIGNIFICANT CHANGE UP (ref 0.2–1.2)
BUN SERPL-MCNC: 30 MG/DL — HIGH (ref 7–23)
CALCIUM SERPL-MCNC: 8.9 MG/DL — SIGNIFICANT CHANGE UP (ref 8.4–10.5)
CHLORIDE SERPL-SCNC: 103 MMOL/L — SIGNIFICANT CHANGE UP (ref 98–107)
CO2 SERPL-SCNC: 25 MMOL/L — SIGNIFICANT CHANGE UP (ref 22–31)
CREAT SERPL-MCNC: 1.24 MG/DL — SIGNIFICANT CHANGE UP (ref 0.5–1.3)
EOSINOPHIL # BLD AUTO: 0.35 K/UL — SIGNIFICANT CHANGE UP (ref 0–0.5)
EOSINOPHIL NFR BLD AUTO: 9.2 % — HIGH (ref 0–6)
GLUCOSE BLDC GLUCOMTR-MCNC: 132 MG/DL — HIGH (ref 70–99)
GLUCOSE BLDC GLUCOMTR-MCNC: 144 MG/DL — HIGH (ref 70–99)
GLUCOSE BLDC GLUCOMTR-MCNC: 176 MG/DL — HIGH (ref 70–99)
GLUCOSE BLDC GLUCOMTR-MCNC: 94 MG/DL — SIGNIFICANT CHANGE UP (ref 70–99)
GLUCOSE SERPL-MCNC: 92 MG/DL — SIGNIFICANT CHANGE UP (ref 70–99)
HCT VFR BLD CALC: 30.1 % — LOW (ref 34.5–45)
HGB BLD-MCNC: 9.4 G/DL — LOW (ref 11.5–15.5)
IANC: 1.58 K/UL — SIGNIFICANT CHANGE UP (ref 1.5–8.5)
IMM GRANULOCYTES NFR BLD AUTO: 1.3 % — SIGNIFICANT CHANGE UP (ref 0–1.5)
LYMPHOCYTES # BLD AUTO: 1.19 K/UL — SIGNIFICANT CHANGE UP (ref 1–3.3)
LYMPHOCYTES # BLD AUTO: 31.2 % — SIGNIFICANT CHANGE UP (ref 13–44)
MAGNESIUM SERPL-MCNC: 2 MG/DL — SIGNIFICANT CHANGE UP (ref 1.6–2.6)
MCHC RBC-ENTMCNC: 24.5 PG — LOW (ref 27–34)
MCHC RBC-ENTMCNC: 31.2 GM/DL — LOW (ref 32–36)
MCV RBC AUTO: 78.6 FL — LOW (ref 80–100)
MONOCYTES # BLD AUTO: 0.61 K/UL — SIGNIFICANT CHANGE UP (ref 0–0.9)
MONOCYTES NFR BLD AUTO: 16 % — HIGH (ref 2–14)
NEUTROPHILS # BLD AUTO: 1.58 K/UL — LOW (ref 1.8–7.4)
NEUTROPHILS NFR BLD AUTO: 41.5 % — LOW (ref 43–77)
NRBC # BLD: 0 /100 WBCS — SIGNIFICANT CHANGE UP
NRBC # FLD: 0 K/UL — SIGNIFICANT CHANGE UP
PHOSPHATE SERPL-MCNC: 4.2 MG/DL — SIGNIFICANT CHANGE UP (ref 2.5–4.5)
PLATELET # BLD AUTO: 307 K/UL — SIGNIFICANT CHANGE UP (ref 150–400)
POTASSIUM SERPL-MCNC: 3.8 MMOL/L — SIGNIFICANT CHANGE UP (ref 3.5–5.3)
POTASSIUM SERPL-SCNC: 3.8 MMOL/L — SIGNIFICANT CHANGE UP (ref 3.5–5.3)
PROT SERPL-MCNC: 7.3 G/DL — SIGNIFICANT CHANGE UP (ref 6–8.3)
RBC # BLD: 3.83 M/UL — SIGNIFICANT CHANGE UP (ref 3.8–5.2)
RBC # FLD: 19.4 % — HIGH (ref 10.3–14.5)
SODIUM SERPL-SCNC: 136 MMOL/L — SIGNIFICANT CHANGE UP (ref 135–145)
WBC # BLD: 3.81 K/UL — SIGNIFICANT CHANGE UP (ref 3.8–10.5)
WBC # FLD AUTO: 3.81 K/UL — SIGNIFICANT CHANGE UP (ref 3.8–10.5)

## 2022-02-06 PROCEDURE — 99233 SBSQ HOSP IP/OBS HIGH 50: CPT

## 2022-02-06 RX ADMIN — PANTOPRAZOLE SODIUM 40 MILLIGRAM(S): 20 TABLET, DELAYED RELEASE ORAL at 18:21

## 2022-02-06 RX ADMIN — Medication 3 MILLILITER(S): at 21:29

## 2022-02-06 RX ADMIN — Medication 25 MILLIGRAM(S): at 15:53

## 2022-02-06 RX ADMIN — Medication 25 MILLIGRAM(S): at 21:23

## 2022-02-06 RX ADMIN — Medication 25 MILLIGRAM(S): at 05:14

## 2022-02-06 RX ADMIN — Medication 100 MILLIGRAM(S): at 21:26

## 2022-02-06 RX ADMIN — PANTOPRAZOLE SODIUM 40 MILLIGRAM(S): 20 TABLET, DELAYED RELEASE ORAL at 05:14

## 2022-02-06 RX ADMIN — Medication 100 MILLIGRAM(S): at 11:58

## 2022-02-06 RX ADMIN — INSULIN GLARGINE 20 UNIT(S): 100 INJECTION, SOLUTION SUBCUTANEOUS at 21:23

## 2022-02-06 RX ADMIN — Medication 25 MILLIGRAM(S): at 18:21

## 2022-02-06 RX ADMIN — Medication 81 MILLIGRAM(S): at 11:58

## 2022-02-06 RX ADMIN — AMLODIPINE BESYLATE 10 MILLIGRAM(S): 2.5 TABLET ORAL at 05:14

## 2022-02-06 RX ADMIN — Medication 100 MILLIGRAM(S): at 11:56

## 2022-02-06 NOTE — PROGRESS NOTE ADULT - PROBLEM SELECTOR PLAN 1
- Differential dx includes CHF exacerbation vs symptomatic anemia vs ACS vs post-viral. less likely PE, but would be a concern if patient develops hypoxemia  - TTE with EF 61%, mild diastolic dysfunction  - hold lasix in setting of MANDY, Creatinine elvis to 1.5 from near 1.0  - strict I/Os  - daily weights  - transfuse Hgb > 8, given concern for ?ACS  - Robitussin and tessalon pearls for cough; PRN duoneb - Inititally, thought to be 2/2 CHF, though TTE not convincing  - CT Chest c/f sarcoidosis  - TTE with EF 61%, mild diastolic dysfunction  - hold lasix in setting of MANDY, Creatinine elvis to 1.5 from near 1.0  - strict I/Os  - daily weights  - transfuse Hgb > 8, given concern for ?ACS  - Robitussin and tessalon pearls for cough; PRN duoneb  - Pulm recs appreciated, will f/u add'l recs, possible EBUS this week - Inititally, thought to be 2/2 CHF, though TTE not convincing  - CT Chest c/f sarcoidosis  - TTE with EF 61%, mild diastolic dysfunction  - hold lasix in setting of MANDY, Creatinine elvis to 1.5 from near 1.0, now 1.2  - strict I/Os  - daily weights  - transfuse Hgb > 8  - Robitussin and tessalon pearls for cough; PRN duoneb  - Pulm recs appreciated, will f/u add'l recs, possible EBUS this week

## 2022-02-06 NOTE — DISCHARGE NOTE PROVIDER - NSDCCPCAREPLAN_GEN_ALL_CORE_FT
PRINCIPAL DISCHARGE DIAGNOSIS  Diagnosis: Dyspnea  Assessment and Plan of Treatment: You were admitted to the hospital due to your cough and shortness of breath. Initially, you were treated for possible heart failure and given IV diuretics (medications that help you urinate more fluid off). You were also given cough medicine and inhaler treatments. An ultrasound of your heart showed that your heart's pumping function was normal. We got a CT of your chest that showed findings consistent with sarcoidosis (a lung disease). We contacted the pulmonary team (lung experts) who recommended ....      SECONDARY DISCHARGE DIAGNOSES  Diagnosis: Anemia  Assessment and Plan of Treatment: When you were admitted, your blood counts (hemoglobin) was low. You were given 2 units of blood. The Gastroenterology (stomach doctors) saw you and you did not want to pursue any further imaging or scopes as inpatient. Your blood counts remained stable in the hospital and you did not have any more blood in your bowel movements. Please follow up with your Primary care provider and gastroenterologist.     PRINCIPAL DISCHARGE DIAGNOSIS  Diagnosis: Dyspnea  Assessment and Plan of Treatment: You were admitted to the hospital due to your cough and shortness of breath. Initially, you were treated for possible heart failure and given IV diuretics (medications that help you urinate more fluid off). You were also given cough medicine and inhaler treatments. An ultrasound of your heart showed that your heart's pumping function was normal. We got a CT of your chest that showed findings consistent with sarcoidosis (a lung disease). We contacted the pulmonary team (lung experts) who recommended a bronchoscopy as an outpatient. Dr Malone's pulmonary team will call you to schedule an appointment for the Bronchoscopy. If you do not hear from them by monday afternoon, please call the number provided to schedule an appointment.      SECONDARY DISCHARGE DIAGNOSES  Diagnosis: Type II diabetes mellitus  Assessment and Plan of Treatment: continue insulin. NOTE dose changes to insulin. Keep close record of fingersticks before meals and at bedtime and bring this log to your PCP follow up appt in 1-2 weeks    Diagnosis: Anemia  Assessment and Plan of Treatment: When you were admitted, your blood counts (hemoglobin) were low for which you were given 2 units of blood. The Gastroenterology (stomach doctors) saw you and you did not want to pursue any further imaging or scopes as inpatient. Your blood counts remained stable in the hospital and you did not have any more blood in your bowel movements. Please follow up with your Primary care provider and gastroenterologist in 1-2 weeks

## 2022-02-06 NOTE — DISCHARGE NOTE PROVIDER - NSDCFUSCHEDAPPT_GEN_ALL_CORE_FT
BEVERLY MEJIA ; 02/24/2022 ; NPP Otolaryng [address] [facility]  BEVERLY MEJIA ; 03/30/2022 ; NPP OtoLaryng [facility] BEVERLY MEJIA ; 02/17/2022 ; NPP Med Pulm [address] [facility]  BEVERLY MEJIA ; 02/24/2022 ; NPP Otolaryng [address] [facility]  BEVERLY MEJIA ; 03/30/2022 ; NPP OtoLaryng [address] [facility]

## 2022-02-06 NOTE — DISCHARGE NOTE PROVIDER - NSDCCPTREATMENT_GEN_ALL_CORE_FT
PRINCIPAL PROCEDURE  Procedure: CT chest wo con  Findings and Treatment: LUNGS AND AIRWAYS: Minimal tracheobronchial secretions.  Multiple   micronodules seen along the major and minor fissure suggestive of a   perilymphatic distribution, likely sarcoid given the concurrent finding   of calcified lymph nodes. A few other solitary nodules measuring up to 6   mm are seen, for example in the left lower lobe on series 2 image 54   which are indeterminate.  PLEURA: No pleural effusion.  MEDIASTINUM AND ANTONIO: Calcified mediastinal and hilar lymph nodes.  VESSELS: Coronary artery calcifications. Main pulmonary artery measures   up to 3.7 cm, finding which may reflect pulmonary hypertension.  HEART: Cardiomegaly. No pericardial effusion. Hypodense cardiac blood   pool, consistent with anemia.  CHEST WALL AND LOWER NECK: Within normal limits.  VISUALIZED UPPER ABDOMEN: Splenomegaly. Partially imaged peripherally   calcified 5.9 x 4.1 cm focus with internal air in the region of the left   upper quadrant. It is unclear if this is related to the pancreas or   stomach.  BONES: Dextrorotary scoliotic curvature of the thoracic spine. Multilevel   degenerative changes of the spine.  IMPRESSION:  Findings suggestive of sarcoidosis with micronodules in a perilymphatic   distribution and calcified lymph nodes in the mediastinum. A few other   solid nodules measuring up to 6 mm are indeterminate. Attention on   follow-up.  Minimal patchy groundglass abnormality in the right apex, right middle   lobe and lower lobe is nonspecific but may be indicative of infection in   the right clinical setting. Recommend follow-up in 4-6 weeks to ensure   resolution.  Partially imaged peripherally calcified 5.9 x 4.1 cm focus with internal   air in the region of the left upper quadrant. It is unclear if this is   related to the pancreas or stomach. No prior imaging is available to   assess for change. If clinically indicated, consider CT abdomen and   pelvis with oral and IV contrast.        SECONDARY PROCEDURE  Procedure: Transthoracic echo  Findings and Treatment: DIMENSIONS:  Dimensions:     Normal Values:  LA:     3.4 cm    2.0 - 4.0 cm  Ao:     3.1 cm    2.0 - 3.8 cm  SEPTUM: 0.8 cm    0.6 - 1.2 cm  PWT:    0.8 cm    0.6 - 1.1 cm  LVIDd:  5.4 cm    3.0 - 5.6 cm  LVIDs:  3.6 cm    1.8 - 4.0 cm  Derived Variables:  LVMI: 75 g/m2  RWT: 0.29  Fractional short: 33 %  Ejection Fraction (Mosher Rule): 61 %  ------------------------------------------------------------------------  OBSERVATIONS:  Mitral Valve: Mitral annular calcification, otherwise  normal mitral valve. Minimal mitral regurgitation.  Aortic Root: Normal aortic root.  Aortic Valve: Calcified trileaflet aortic valve with normal  opening.  Left Atrium: Normal left atrium.  LA volume index = 18  cc/m2.  Left Ventricle: Normal left ventricular systolic function.  No segmental wall motion abnormalities. Normal left  ventricular internal dimensions and wall thicknesses. Mild  diastolic dysfunction (Stage I).  Right Heart: Normal right atrium. The right ventricle is  not well visualized; grossly normal right ventricular  systolic function. Normal tricuspid valve.  Minimal  tricuspid regurgitation. Normal pulmonic valve.  Pericardium/PleuraNormal pericardium with no pericardial  effusion.  ------------------------------------------------------------------------  CONCLUSIONS:  1. Mitral annular calcification, otherwise normal mitral  valve. Minimal mitral regurgitation.  2. Normal left ventricular internal dimensions and wall  thicknesses.  3. Normal left ventricular systolic function. No segmental  wall motion abnormalities.  4. Mild diastolic dysfunction (Stage I).  5. The right ventricle is not well visualized; grossly  normal right ventricular systolic function.

## 2022-02-06 NOTE — DISCHARGE NOTE PROVIDER - CARE PROVIDER_API CALL
Pola Robles)  Critical Care Medicine; Internal Medicine; Pulmonary Disease  [address] [place] [address]  Phone: [phone]  Fax: [phone]  Follow Up Time:     Dr. Orozco, PCP, n  Phone: (   )    -  Fax: (   )    -  Follow Up Time:

## 2022-02-06 NOTE — PROGRESS NOTE ADULT - PROBLEM SELECTOR PLAN 2
- likely in setting of dark blood in stool last week; Hgb stable after 2U PRBC day of admission  - GI consulted, follow up recs  - patient refusing GI scope  - IV protonix 40mg BID  - active type and screen

## 2022-02-06 NOTE — DISCHARGE NOTE PROVIDER - NSDCMRMEDTOKEN_GEN_ALL_CORE_FT
amLODIPine 10 mg oral tablet: 1 tab(s) orally once a day  aspirin 81 mg oral delayed release tablet: 1 tab(s) orally once a day  famotidine 20 mg oral tablet: 1 tab(s) orally once a day  HumaLOG 100 units/mL injectable solution: 8 unit(s) injectable 3 times a day BEFORE MEALS  hydrALAZINE 25 mg oral tablet: 1 tab(s) orally 3 times a day  Lantus 100 units/mL subcutaneous solution: 24 unit(s) subcutaneous once a day (at bedtime)   Lipitor 40 mg oral tablet: 1 tab(s) orally once a day  Metoprolol Tartrate 25 mg oral tablet: 1 tab(s) orally 2 times a day  MiraLax oral powder for reconstitution: 17 gram(s) orally once a day  omeprazole 40 mg oral delayed release capsule: 1 cap(s) orally once a day  Senna 8.6 mg oral tablet: 1 tab(s) orally once a day (at bedtime)  thiamine 100 mg oral tablet: 1 tab(s) orally once a day   amLODIPine 10 mg oral tablet: 1 tab(s) orally once a day  aspirin 81 mg oral delayed release tablet: 1 tab(s) orally once a day  famotidine 20 mg oral tablet: 1 tab(s) orally once a day  guaiFENesin 100 mg/5 mL oral liquid: 5 milliliter(s) orally every 6 hours, As needed, Cough  HumaLOG 100 units/mL injectable solution: 4 unit(s) injectable 3 times a day  BEFORE MEALS   hydrALAZINE 25 mg oral tablet: 1 tab(s) orally 3 times a day  Lantus 100 units/mL subcutaneous solution: 20 unit(s) subcutaneous once a day (at bedtime)   Lipitor 40 mg oral tablet: 1 tab(s) orally once a day  Metoprolol Tartrate 25 mg oral tablet: 1 tab(s) orally 2 times a day  MiraLax oral powder for reconstitution: 17 gram(s) orally once a day  omeprazole 40 mg oral delayed release capsule: 1 cap(s) orally once a day  Senna 8.6 mg oral tablet: 1 tab(s) orally once a day (at bedtime)  thiamine 100 mg oral tablet: 1 tab(s) orally once a day   amLODIPine 10 mg oral tablet: 1 tab(s) orally once a day  aspirin 81 mg oral delayed release tablet: 1 tab(s) orally once a day  famotidine 20 mg oral tablet: 1 tab(s) orally once a day  guaiFENesin 100 mg/5 mL oral liquid: 5 milliliter(s) orally every 6 hours, As needed, Cough  HumaLOG 100 units/mL injectable solution: 4 unit(s) injectable 3 times a day  BEFORE MEALS   hydrALAZINE 25 mg oral tablet: 1 tab(s) orally 3 times a day  lancets: 1 application subcutaneous 4 times a day   Lantus 100 units/mL subcutaneous solution: 20 unit(s) subcutaneous once a day (at bedtime)   Lipitor 40 mg oral tablet: 1 tab(s) orally once a day  Metoprolol Tartrate 25 mg oral tablet: 1 tab(s) orally 2 times a day  MiraLax oral powder for reconstitution: 17 gram(s) orally once a day  omeprazole 40 mg oral delayed release capsule: 1 cap(s) orally once a day  Senna 8.6 mg oral tablet: 1 tab(s) orally once a day (at bedtime)  thiamine 100 mg oral tablet: 1 tab(s) orally once a day

## 2022-02-06 NOTE — DISCHARGE NOTE PROVIDER - HOSPITAL COURSE
HPI:  65yo F with PMH of DM (on insulin), HTN, HLD, pancreatic pseudocyst (s/p stent placement and drainage late 2021), iron deficiency anemia, GERD who presents with progressive dyspnea and cough. Patient is hard of hearing so phone was used to write out questions. Patient's daughter also at bedside.     Patient had been progressively short of breath with cough over the past few days. Last week, she had a sore throat and was febrile to 101. She took Robitussin and cough drops to relieve sore throat. Shortness of breath has gotten worse over the past few days. Patient gets out of breath walking to bathroom, prior to last week she was able to walk more. Patient and daughter note that patient coughs more when laying down or sleeping. Slight chest pain mid-sternally that is worse with cough. No radiation.     Additionally, patient notes that she noticed some blood in her stool last week, but not this week.      (03 Feb 2022 16:14)    Hospital course:  Patient was admitted to hospital for dyspnea and anemia. She was transfused with 2U PRBC. GI was consulted, but patient refused scope. Hgb remained stable after transfusion. FOr dyspnea, patient was initially started on diuretics. TTE showed normal ejection fraction. Diuresis was held in setting of developing MANDY. CT chest showed findings consistent with sarcoidosis. Pulmonary team was consulted and recommended .......     Dyspnea.   ·  Plan: - Inititally, thought to be 2/2 CHF, though TTE not convincing  - CT Chest c/f sarcoidosis  - TTE with EF 61%, mild diastolic dysfunction  - hold lasix in setting of MANDY, Creatinine elvis to 1.5, now at 1.3  - strict I/Os  - daily weights  - transfuse Hgb > 8  - Robitussin and tessalon pearls for cough; PRN duoneb  - Pulm recs appreciated  -EBUS as outpt    Anemia.    - likely in setting of dark blood in stool last week; Hgb stable after 2U PRBC day of admission  - GI consulted, follow up recs  - patient refusing GI scope  - IV protonix 40mg BID  - active type and screen.    Type II diabetes mellitus.    - patient takes lantus 24U at bedtime and 8U humalog at home  - On 20U lantus at bedtime and mod SSI  - titrate as needed.    Hypertension.   - c/w home hydralazine 25 TID  - c/w home amlodipine 10mg daily  - c/w metoprolol tartrate 25mg BID.    Hyperlipidemia.    - c/w atorvastatin 40mg daily.    GERD (gastroesophageal reflux disease).   - IV protonix 40mg BID.    Prophylactic measure.   - DVT ppx: SCDs for now      Dispo- home

## 2022-02-06 NOTE — PROGRESS NOTE ADULT - SUBJECTIVE AND OBJECTIVE BOX
Progress Note    BEVERLY MEJIA 66y (1955) Female [id number]  02-03-22 (3d)    Dr. Abhinav Shoemaker, EM/IM PGY2  Pager# [phone]    Chief Complaint: Dyspnea    Subjective:  No acute events overnight. Patient seen and examined at bedside.    PAST MEDICAL & SURGICAL HISTORY:  Type 2 diabetes mellitus [E11.9]    Bilateral cataracts [H26.9]    Fluid in pleural cavity associated with pancreatitis [K85.90]    Pancreatic pseudocyst/cyst [K86.2]  s/p drain placement and removal    No significant past surgical history [095830188]    History of amputation of right great toe [Z89.411]      albuterol/ipratropium for Nebulization 3 milliLiter(s) Nebulizer every 6 hours PRN  amLODIPine   Tablet 10 milliGRAM(s) Oral daily  aspirin enteric coated 81 milliGRAM(s) Oral daily  dextrose 40% Gel 15 Gram(s) Oral once  dextrose 5%. 1000 milliLiter(s) IV Continuous <Continuous>  dextrose 5%. 1000 milliLiter(s) IV Continuous <Continuous>  dextrose 50% Injectable 25 Gram(s) IV Push once  dextrose 50% Injectable 12.5 Gram(s) IV Push once  dextrose 50% Injectable 25 Gram(s) IV Push once  glucagon  Injectable 1 milliGRAM(s) IntraMuscular once  guaiFENesin Oral Liquid (Sugar-Free) 100 milliGRAM(s) Oral every 6 hours PRN  hydrALAZINE 25 milliGRAM(s) Oral three times a day  influenza  Vaccine (HIGH DOSE) 0.7 milliLiter(s) IntraMuscular once  insulin glargine Injectable (LANTUS) 20 Unit(s) SubCutaneous at bedtime  insulin lispro (ADMELOG) corrective regimen sliding scale   SubCutaneous three times a day before meals  insulin lispro (ADMELOG) corrective regimen sliding scale   SubCutaneous at bedtime  lactated ringers. 1000 milliLiter(s) IV Continuous <Continuous>  metoprolol tartrate 25 milliGRAM(s) Oral two times a day  pantoprazole  Injectable 40 milliGRAM(s) IV Push two times a day  thiamine 100 milliGRAM(s) Oral daily    Objective:  T(C): 37.1 (02-06-22 @ 04:29), Max: 37.1 (02-05-22 @ 17:06)  HR: 85 (02-06-22 @ 04:29) (67 - 89)  BP: 142/69 (02-06-22 @ 04:29) (136/65 - 160/80)  RR: 17 (02-06-22 @ 04:29) (17 - 19)  SpO2: 100% (02-06-22 @ 04:29) (99% - 100%)    Physical exam:  GENERAL: NAD, sitting comfortably in bed  HEAD:  Atraumatic, Normocephalic; bilateral hearing loss  EYES: EOMI, PERRLA, anicteric pale sclera  NECK: Supple, No JVD appreciated  CHEST/LUNG: slight wheezes diffusely  HEART: normal rate and regular rhythm; No murmurs, rubs, or gallops  ABDOMEN: Soft, Nontender, slightly distended; Bowel sounds present  EXTREMITIES:  2+ Peripheral Pulses, No clubbing, cyanosis, or edema, toe amputation on right foot  PSYCH: AAOx3  NEUROLOGY: non-focal  SKIN: No rashes or lesions      02-05-22 @ 07:01  -  02-06-22 @ 07:00  --------------------------------------------------------  IN: 2260 mL / OUT: 1975 mL / NET: 285 mL        CAPILLARY BLOOD GLUCOSE      (02-06 @ 07:10)                      9.4  3.81 )-----------( 307                 30.1    Neutrophils = 1.58 (41.5%)  Lymphocytes = 1.19 (31.2%)  Eosinophils = 0.35 (9.2%)  Basophils = 0.03 (0.8%)  Monocytes = 0.61 (16.0%)  Bands = --%    02-05    137  |  101  |  24<H>  ----------------------------<  140<H>  4.1   |  24  |  1.50<H>    Ca    8.9      05 Feb 2022 07:05  Phos  4.0     02-05  Mg     1.60     02-05    TPro  6.9  /  Alb  2.8<L>  /  TBili  0.4  /  DBili  x   /  AST  12  /  ALT  <5  /  AlkPhos  80  02-05        WBC Trend: 3.81<--, 4.07<--, 3.77<--    Hb Trend: 9.4<--, 8.9<--, 8.5<--, 7.3<--, 6.7<--        New imaging in last 24 hours:  Consult notes reviewed: Progress Note    BEVERLY MEJIA 66y (1955) Female [id number]  02-03-22 (3d)    Dr. Abhinav Shoemaker, EM/IM PGY2  Pager# [phone]    Chief Complaint: Dyspnea    Subjective:  No acute events overnight. Patient seen and examined at bedside. Continues to complain of chest pain with cough, denies chest pain at rest, denies LE edema above baseline, denies tachycardia or palpitations.     PAST MEDICAL & SURGICAL HISTORY:  Type 2 diabetes mellitus [E11.9]    Bilateral cataracts [H26.9]    Fluid in pleural cavity associated with pancreatitis [K85.90]    Pancreatic pseudocyst/cyst [K86.2]  s/p drain placement and removal    No significant past surgical history [[id number]]    History of amputation of right great toe [Z89.411]      albuterol/ipratropium for Nebulization 3 milliLiter(s) Nebulizer every 6 hours PRN  amLODIPine   Tablet 10 milliGRAM(s) Oral daily  aspirin enteric coated 81 milliGRAM(s) Oral daily  dextrose 40% Gel 15 Gram(s) Oral once  dextrose 5%. 1000 milliLiter(s) IV Continuous <Continuous>  dextrose 5%. 1000 milliLiter(s) IV Continuous <Continuous>  dextrose 50% Injectable 25 Gram(s) IV Push once  dextrose 50% Injectable 12.5 Gram(s) IV Push once  dextrose 50% Injectable 25 Gram(s) IV Push once  glucagon  Injectable 1 milliGRAM(s) IntraMuscular once  guaiFENesin Oral Liquid (Sugar-Free) 100 milliGRAM(s) Oral every 6 hours PRN  hydrALAZINE 25 milliGRAM(s) Oral three times a day  influenza  Vaccine (HIGH DOSE) 0.7 milliLiter(s) IntraMuscular once  insulin glargine Injectable (LANTUS) 20 Unit(s) SubCutaneous at bedtime  insulin lispro (ADMELOG) corrective regimen sliding scale   SubCutaneous three times a day before meals  insulin lispro (ADMELOG) corrective regimen sliding scale   SubCutaneous at bedtime  lactated ringers. 1000 milliLiter(s) IV Continuous <Continuous>  metoprolol tartrate 25 milliGRAM(s) Oral two times a day  pantoprazole  Injectable 40 milliGRAM(s) IV Push two times a day  thiamine 100 milliGRAM(s) Oral daily    Objective:  T(C): 37.1 (02-06-22 @ 04:29), Max: 37.1 (02-05-22 @ 17:06)  HR: 85 (02-06-22 @ 04:29) (67 - 89)  BP: 142/69 (02-06-22 @ 04:29) (136/65 - 160/80)  RR: 17 (02-06-22 @ 04:29) (17 - 19)  SpO2: 100% (02-06-22 @ 04:29) (99% - 100%)    Physical exam:  GENERAL: NAD, sitting comfortably in bed  HEAD:  Atraumatic, Normocephalic; bilateral hearing loss  EYES: EOMI, PERRLA, anicteric pale sclera  NECK: Supple, No JVD appreciated  CHEST/LUNG: slight wheezes diffusely  HEART: normal rate and regular rhythm; No murmurs, rubs, or gallops  ABDOMEN: Soft, Nontender, slightly distended; Bowel sounds present  EXTREMITIES:  2+ Peripheral Pulses, No clubbing, cyanosis, or edema, toe amputation on right foot  PSYCH: AAOx3  NEUROLOGY: non-focal  SKIN: No rashes or lesions      02-05-22 @ 07:01  -  02-06-22 @ 07:00  --------------------------------------------------------  IN: 2260 mL / OUT: 1975 mL / NET: 285 mL        CAPILLARY BLOOD GLUCOSE      (02-06 @ 07:10)                      9.4  3.81 )-----------( 307                 30.1    Neutrophils = 1.58 (41.5%)  Lymphocytes = 1.19 (31.2%)  Eosinophils = 0.35 (9.2%)  Basophils = 0.03 (0.8%)  Monocytes = 0.61 (16.0%)  Bands = --%    02-05    137  |  101  |  24<H>  ----------------------------<  140<H>  4.1   |  24  |  1.50<H>    Ca    8.9      05 Feb 2022 07:05  Phos  4.0     02-05  Mg     1.60     02-05    TPro  6.9  /  Alb  2.8<L>  /  TBili  0.4  /  DBili  x   /  AST  12  /  ALT  <5  /  AlkPhos  80  02-05        WBC Trend: 3.81<--, 4.07<--, 3.77<--    Hb Trend: 9.4<--, 8.9<--, 8.5<--, 7.3<--, 6.7<--        New imaging in last 24 hours:  Consult notes reviewed:

## 2022-02-06 NOTE — DISCHARGE NOTE PROVIDER - PROVIDER TOKENS
PROVIDER:[TOKEN:[[id number]]],FREE:[LAST:[Dr. Orozco, PCP],FIRST:[n],PHONE:[(   )    -],FAX:[(   )    -]]

## 2022-02-07 LAB
ACE SERPL-CCNC: 44 U/L — SIGNIFICANT CHANGE UP (ref 14–82)
ALBUMIN SERPL ELPH-MCNC: 2.9 G/DL — LOW (ref 3.3–5)
ALP SERPL-CCNC: 85 U/L — SIGNIFICANT CHANGE UP (ref 40–120)
ALT FLD-CCNC: 7 U/L — SIGNIFICANT CHANGE UP (ref 4–33)
ANION GAP SERPL CALC-SCNC: 11 MMOL/L — SIGNIFICANT CHANGE UP (ref 7–14)
APTT BLD: 31.8 SEC — SIGNIFICANT CHANGE UP (ref 27–36.3)
AST SERPL-CCNC: 11 U/L — SIGNIFICANT CHANGE UP (ref 4–32)
BASOPHILS # BLD AUTO: 0.02 K/UL — SIGNIFICANT CHANGE UP (ref 0–0.2)
BASOPHILS NFR BLD AUTO: 0.5 % — SIGNIFICANT CHANGE UP (ref 0–2)
BILIRUB SERPL-MCNC: 0.3 MG/DL — SIGNIFICANT CHANGE UP (ref 0.2–1.2)
BUN SERPL-MCNC: 35 MG/DL — HIGH (ref 7–23)
CALCIUM SERPL-MCNC: 9 MG/DL — SIGNIFICANT CHANGE UP (ref 8.4–10.5)
CHLORIDE SERPL-SCNC: 103 MMOL/L — SIGNIFICANT CHANGE UP (ref 98–107)
CO2 SERPL-SCNC: 23 MMOL/L — SIGNIFICANT CHANGE UP (ref 22–31)
CREAT SERPL-MCNC: 1.28 MG/DL — SIGNIFICANT CHANGE UP (ref 0.5–1.3)
EOSINOPHIL # BLD AUTO: 0.37 K/UL — SIGNIFICANT CHANGE UP (ref 0–0.5)
EOSINOPHIL NFR BLD AUTO: 10.1 % — HIGH (ref 0–6)
GLUCOSE BLDC GLUCOMTR-MCNC: 109 MG/DL — HIGH (ref 70–99)
GLUCOSE BLDC GLUCOMTR-MCNC: 128 MG/DL — HIGH (ref 70–99)
GLUCOSE BLDC GLUCOMTR-MCNC: 149 MG/DL — HIGH (ref 70–99)
GLUCOSE BLDC GLUCOMTR-MCNC: 170 MG/DL — HIGH (ref 70–99)
GLUCOSE SERPL-MCNC: 110 MG/DL — HIGH (ref 70–99)
HCT VFR BLD CALC: 29.1 % — LOW (ref 34.5–45)
HGB BLD-MCNC: 9.3 G/DL — LOW (ref 11.5–15.5)
IANC: 1.55 K/UL — SIGNIFICANT CHANGE UP (ref 1.5–8.5)
IMM GRANULOCYTES NFR BLD AUTO: 1.6 % — HIGH (ref 0–1.5)
INR BLD: 1.09 RATIO — SIGNIFICANT CHANGE UP (ref 0.88–1.16)
LYMPHOCYTES # BLD AUTO: 1.09 K/UL — SIGNIFICANT CHANGE UP (ref 1–3.3)
LYMPHOCYTES # BLD AUTO: 29.8 % — SIGNIFICANT CHANGE UP (ref 13–44)
MAGNESIUM SERPL-MCNC: 2 MG/DL — SIGNIFICANT CHANGE UP (ref 1.6–2.6)
MCHC RBC-ENTMCNC: 25.3 PG — LOW (ref 27–34)
MCHC RBC-ENTMCNC: 32 GM/DL — SIGNIFICANT CHANGE UP (ref 32–36)
MCV RBC AUTO: 79.1 FL — LOW (ref 80–100)
MONOCYTES # BLD AUTO: 0.57 K/UL — SIGNIFICANT CHANGE UP (ref 0–0.9)
MONOCYTES NFR BLD AUTO: 15.6 % — HIGH (ref 2–14)
NEUTROPHILS # BLD AUTO: 1.55 K/UL — LOW (ref 1.8–7.4)
NEUTROPHILS NFR BLD AUTO: 42.4 % — LOW (ref 43–77)
NRBC # BLD: 0 /100 WBCS — SIGNIFICANT CHANGE UP
NRBC # FLD: 0 K/UL — SIGNIFICANT CHANGE UP
PHOSPHATE SERPL-MCNC: 4.5 MG/DL — SIGNIFICANT CHANGE UP (ref 2.5–4.5)
PLATELET # BLD AUTO: 311 K/UL — SIGNIFICANT CHANGE UP (ref 150–400)
POTASSIUM SERPL-MCNC: 4.2 MMOL/L — SIGNIFICANT CHANGE UP (ref 3.5–5.3)
POTASSIUM SERPL-SCNC: 4.2 MMOL/L — SIGNIFICANT CHANGE UP (ref 3.5–5.3)
PROT SERPL-MCNC: 7.1 G/DL — SIGNIFICANT CHANGE UP (ref 6–8.3)
PROTHROM AB SERPL-ACNC: 12.5 SEC — SIGNIFICANT CHANGE UP (ref 10.6–13.6)
RBC # BLD: 3.68 M/UL — LOW (ref 3.8–5.2)
RBC # FLD: 19.3 % — HIGH (ref 10.3–14.5)
SODIUM SERPL-SCNC: 137 MMOL/L — SIGNIFICANT CHANGE UP (ref 135–145)
WBC # BLD: 3.66 K/UL — LOW (ref 3.8–10.5)
WBC # FLD AUTO: 3.66 K/UL — LOW (ref 3.8–10.5)

## 2022-02-07 PROCEDURE — 99233 SBSQ HOSP IP/OBS HIGH 50: CPT | Mod: GC

## 2022-02-07 RX ADMIN — Medication 25 MILLIGRAM(S): at 18:27

## 2022-02-07 RX ADMIN — PANTOPRAZOLE SODIUM 40 MILLIGRAM(S): 20 TABLET, DELAYED RELEASE ORAL at 18:27

## 2022-02-07 RX ADMIN — Medication 3 MILLILITER(S): at 11:11

## 2022-02-07 RX ADMIN — Medication 25 MILLIGRAM(S): at 16:08

## 2022-02-07 RX ADMIN — Medication 25 MILLIGRAM(S): at 05:31

## 2022-02-07 RX ADMIN — Medication 81 MILLIGRAM(S): at 16:07

## 2022-02-07 RX ADMIN — AMLODIPINE BESYLATE 10 MILLIGRAM(S): 2.5 TABLET ORAL at 05:32

## 2022-02-07 RX ADMIN — PANTOPRAZOLE SODIUM 40 MILLIGRAM(S): 20 TABLET, DELAYED RELEASE ORAL at 05:32

## 2022-02-07 RX ADMIN — Medication 100 MILLIGRAM(S): at 05:31

## 2022-02-07 RX ADMIN — Medication 100 MILLIGRAM(S): at 16:07

## 2022-02-07 RX ADMIN — Medication 100 MILLIGRAM(S): at 21:56

## 2022-02-07 RX ADMIN — Medication 2: at 16:45

## 2022-02-07 RX ADMIN — INSULIN GLARGINE 20 UNIT(S): 100 INJECTION, SOLUTION SUBCUTANEOUS at 21:56

## 2022-02-07 RX ADMIN — Medication 25 MILLIGRAM(S): at 21:56

## 2022-02-07 RX ADMIN — Medication 25 MILLIGRAM(S): at 05:32

## 2022-02-07 NOTE — PROGRESS NOTE ADULT - SUBJECTIVE AND OBJECTIVE BOX
CHIEF COMPLAINT:    Interval Events:    REVIEW OF SYSTEMS:  Constitutional: [ ] negative [ ] fevers [ ] chills [ ] weight loss [ ] weight gain  HEENT: [ ] negative [ ] dry eyes [ ] eye irritation [ ] postnasal drip [ ] nasal congestion  CV: [ ] negative  [ ] chest pain [ ] orthopnea [ ] palpitations [ ] murmur  Resp: [ ] negative [ ] cough [ ] shortness of breath [ ] dyspnea [ ] wheezing [ ] sputum [ ] hemoptysis  GI: [ ] negative [ ] nausea [ ] vomiting [ ] diarrhea [ ] constipation [ ] abd pain [ ] dysphagia   : [ ] negative [ ] dysuria [ ] nocturia [ ] hematuria [ ] increased urinary frequency  Musculoskeletal: [ ] negative [ ] back pain [ ] myalgias [ ] arthralgias [ ] fracture  Skin: [ ] negative [ ] rash [ ] itch  Neurological: [ ] negative [ ] headache [ ] dizziness [ ] syncope [ ] weakness [ ] numbness  Psychiatric: [ ] negative [ ] anxiety [ ] depression  Endocrine: [ ] negative [ ] diabetes [ ] thyroid problem  Hematologic/Lymphatic: [ ] negative [ ] anemia [ ] bleeding problem  Allergic/Immunologic: [ ] negative [ ] itchy eyes [ ] nasal discharge [ ] hives [ ] angioedema  [ ] All other systems negative  [ ] Unable to assess ROS because ________    OBJECTIVE:  ICU Vital Signs Last 24 Hrs  T(C): 36.6 (07 Feb 2022 12:11), Max: 36.9 (07 Feb 2022 05:19)  T(F): 97.8 (07 Feb 2022 12:11), Max: 98.4 (07 Feb 2022 05:19)  HR: 72 (07 Feb 2022 12:11) (71 - 83)  BP: 131/64 (07 Feb 2022 12:11) (131/64 - 157/71)  BP(mean): --  ABP: --  ABP(mean): --  RR: 18 (07 Feb 2022 12:11) (18 - 18)  SpO2: 100% (07 Feb 2022 12:11) (96% - 100%)        02-06 @ 07:01  -  02-07 @ 07:00  --------------------------------------------------------  IN: 1320 mL / OUT: 1370 mL / NET: -50 mL      CAPILLARY BLOOD GLUCOSE      POCT Blood Glucose.: 149 mg/dL (07 Feb 2022 12:09)      PHYSICAL EXAM:  General:   HEENT:   Lymph Nodes:  Neck:   Respiratory:   Cardiovascular:   Abdomen:   Extremities:   Skin:   Neurological:  Psychiatry:    HOSPITAL MEDICATIONS:  MEDICATIONS  (STANDING):  amLODIPine   Tablet 10 milliGRAM(s) Oral daily  aspirin enteric coated 81 milliGRAM(s) Oral daily  dextrose 40% Gel 15 Gram(s) Oral once  dextrose 5%. 1000 milliLiter(s) (50 mL/Hr) IV Continuous <Continuous>  dextrose 5%. 1000 milliLiter(s) (100 mL/Hr) IV Continuous <Continuous>  dextrose 50% Injectable 25 Gram(s) IV Push once  dextrose 50% Injectable 25 Gram(s) IV Push once  dextrose 50% Injectable 12.5 Gram(s) IV Push once  glucagon  Injectable 1 milliGRAM(s) IntraMuscular once  hydrALAZINE 25 milliGRAM(s) Oral three times a day  influenza  Vaccine (HIGH DOSE) 0.7 milliLiter(s) IntraMuscular once  insulin glargine Injectable (LANTUS) 20 Unit(s) SubCutaneous at bedtime  insulin lispro (ADMELOG) corrective regimen sliding scale   SubCutaneous three times a day before meals  insulin lispro (ADMELOG) corrective regimen sliding scale   SubCutaneous at bedtime  lactated ringers. 1000 milliLiter(s) (40 mL/Hr) IV Continuous <Continuous>  metoprolol tartrate 25 milliGRAM(s) Oral two times a day  pantoprazole  Injectable 40 milliGRAM(s) IV Push two times a day  thiamine 100 milliGRAM(s) Oral daily    MEDICATIONS  (PRN):  albuterol/ipratropium for Nebulization 3 milliLiter(s) Nebulizer every 6 hours PRN Shortness of Breath and/or Wheezing  guaiFENesin Oral Liquid (Sugar-Free) 100 milliGRAM(s) Oral every 6 hours PRN Cough      LABS:                        9.3    3.66  )-----------( 311      ( 07 Feb 2022 07:41 )             29.1     Hgb Trend: 9.3<--, 9.4<--, 8.9<--, 8.5<--, 7.3<--  02-07    137  |  103  |  35<H>  ----------------------------<  110<H>  4.2   |  23  |  1.28    Ca    9.0      07 Feb 2022 07:41  Phos  4.5     02-07  Mg     2.00     02-07    TPro  7.1  /  Alb  2.9<L>  /  TBili  0.3  /  DBili  x   /  AST  11  /  ALT  7   /  AlkPhos  85  02-07    Creatinine Trend: 1.28<--, 1.24<--, 1.50<--, 0.98<--, 1.08<--  PT/INR - ( 07 Feb 2022 07:41 )   PT: 12.5 sec;   INR: 1.09 ratio         PTT - ( 07 Feb 2022 07:41 )  PTT:31.8 sec          MICROBIOLOGY:       RADIOLOGY:  [ ] Reviewed and interpreted by me    PULMONARY FUNCTION TESTS:    EKG: CHIEF COMPLAINT: Shortness of breath    Interval Events: No events overnight. No dyspnea or hypoxemia    REVIEW OF SYSTEMS:  Constitutional: [ ] negative [ ] fevers [ ] chills [ ] weight loss [ ] weight gain  HEENT: [ ] negative [ ] dry eyes [ ] eye irritation [ ] postnasal drip [ ] nasal congestion  CV: [ ] negative  [ ] chest pain [ ] orthopnea [ ] palpitations [ ] murmur  Resp: [ ] negative [ ] cough [ ] shortness of breath [ ] dyspnea [ ] wheezing [ ] sputum [ ] hemoptysis  GI: [ ] negative [ ] nausea [ ] vomiting [ ] diarrhea [ ] constipation [ ] abd pain [ ] dysphagia   : [ ] negative [ ] dysuria [ ] nocturia [ ] hematuria [ ] increased urinary frequency  Musculoskeletal: [ ] negative [ ] back pain [ ] myalgias [ ] arthralgias [ ] fracture  Skin: [ ] negative [ ] rash [ ] itch  Neurological: [ ] negative [ ] headache [ ] dizziness [ ] syncope [ ] weakness [ ] numbness  Psychiatric: [ ] negative [ ] anxiety [ ] depression  Endocrine: [ ] negative [ ] diabetes [ ] thyroid problem  Hematologic/Lymphatic: [ ] negative [ ] anemia [ ] bleeding problem  Allergic/Immunologic: [ ] negative [ ] itchy eyes [ ] nasal discharge [ ] hives [ ] angioedema  [ x ] All other systems negative  [ ] Unable to assess ROS because ________    OBJECTIVE:  ICU Vital Signs Last 24 Hrs  T(C): 36.6 (07 Feb 2022 12:11), Max: 36.9 (07 Feb 2022 05:19)  T(F): 97.8 (07 Feb 2022 12:11), Max: 98.4 (07 Feb 2022 05:19)  HR: 72 (07 Feb 2022 12:11) (71 - 83)  BP: 131/64 (07 Feb 2022 12:11) (131/64 - 157/71)  BP(mean): --  ABP: --  ABP(mean): --  RR: 18 (07 Feb 2022 12:11) (18 - 18)  SpO2: 100% (07 Feb 2022 12:11) (96% - 100%)        02-06 @ 07:01  -  02-07 @ 07:00  --------------------------------------------------------  IN: 1320 mL / OUT: 1370 mL / NET: -50 mL      CAPILLARY BLOOD GLUCOSE      POCT Blood Glucose.: 149 mg/dL (07 Feb 2022 12:09)      PHYSICAL EXAM:  General: NAD, appears comfortable  Skin: Warm and dry, no rashes  Neuro: AAOx3, nonfocal  HEENT: PERRL, EOMI, no oral lesions  Neck: Full range of motion, no JVD  Lungs: Clear to ascultation bilatereally, no wheezes, rales, or rhonchi   Heart: Regular rate and rhythm, no murmurs  Abdomen: Normoactive bowl sounds, soft, nontender, nondistended  Extremities: No lower extremity tenderness, erythema, or edema   Psych: normal mood and affect      HOSPITAL MEDICATIONS:  MEDICATIONS  (STANDING):  amLODIPine   Tablet 10 milliGRAM(s) Oral daily  aspirin enteric coated 81 milliGRAM(s) Oral daily  dextrose 40% Gel 15 Gram(s) Oral once  dextrose 5%. 1000 milliLiter(s) (50 mL/Hr) IV Continuous <Continuous>  dextrose 5%. 1000 milliLiter(s) (100 mL/Hr) IV Continuous <Continuous>  dextrose 50% Injectable 25 Gram(s) IV Push once  dextrose 50% Injectable 25 Gram(s) IV Push once  dextrose 50% Injectable 12.5 Gram(s) IV Push once  glucagon  Injectable 1 milliGRAM(s) IntraMuscular once  hydrALAZINE 25 milliGRAM(s) Oral three times a day  influenza  Vaccine (HIGH DOSE) 0.7 milliLiter(s) IntraMuscular once  insulin glargine Injectable (LANTUS) 20 Unit(s) SubCutaneous at bedtime  insulin lispro (ADMELOG) corrective regimen sliding scale   SubCutaneous three times a day before meals  insulin lispro (ADMELOG) corrective regimen sliding scale   SubCutaneous at bedtime  lactated ringers. 1000 milliLiter(s) (40 mL/Hr) IV Continuous <Continuous>  metoprolol tartrate 25 milliGRAM(s) Oral two times a day  pantoprazole  Injectable 40 milliGRAM(s) IV Push two times a day  thiamine 100 milliGRAM(s) Oral daily    MEDICATIONS  (PRN):  albuterol/ipratropium for Nebulization 3 milliLiter(s) Nebulizer every 6 hours PRN Shortness of Breath and/or Wheezing  guaiFENesin Oral Liquid (Sugar-Free) 100 milliGRAM(s) Oral every 6 hours PRN Cough      LABS:                        9.3    3.66  )-----------( 311      ( 07 Feb 2022 07:41 )             29.1     Hgb Trend: 9.3<--, 9.4<--, 8.9<--, 8.5<--, 7.3<--  02-07    137  |  103  |  35<H>  ----------------------------<  110<H>  4.2   |  23  |  1.28    Ca    9.0      07 Feb 2022 07:41  Phos  4.5     02-07  Mg     2.00     02-07    TPro  7.1  /  Alb  2.9<L>  /  TBili  0.3  /  DBili  x   /  AST  11  /  ALT  7   /  AlkPhos  85  02-07    Creatinine Trend: 1.28<--, 1.24<--, 1.50<--, 0.98<--, 1.08<--  PT/INR - ( 07 Feb 2022 07:41 )   PT: 12.5 sec;   INR: 1.09 ratio         PTT - ( 07 Feb 2022 07:41 )  PTT:31.8 sec            EKG:

## 2022-02-07 NOTE — CONSULT NOTE ADULT - ASSESSMENT
65yo F with DM (on insulin), prior cerebellar stroke, HTN, HLD, pancreatic pseudocyst (s/p stent placement and drainage late 2021), iron deficiency anemia, GERD who presents with progressive dyspnea and cough.  outpatient of [facility] for stroke management  MRI 12/2021 with R cerebellar infarct   Impression: prior stroke was likely small vessel in nature.   - for secondary stroke prevention c/w asa 81mg daily  - should be on statin therapy with LDL goal <70 if no objection. lipitor 40   - c/w thiamine    - check FS, glucose control <180  - GI/DVT ppx  - Counseling on diet, exercise, and medication adherence was done  - Counseling on smoking cessation and alcohol consumption offered when appropriate.  - Pain assessed and judicious use of narcotics when appropriate was discussed.    - Stroke education given when appropriate.  - Importance of fall prevention discussed.   - Differential diagnosis and plan of care discussed with patient and/or family and primary team  - Thank you for allowing me to participate in the care of this patient. Call with questions.   - outpatient f/u for stroke when able   Russell Jimenes MD  Vascular Neurology  Office: [phone]

## 2022-02-07 NOTE — CONSULT NOTE ADULT - SUBJECTIVE AND OBJECTIVE BOX
Neurology Consult    Reason for Consult: Patient is a 66y old  Female who presents with a chief complaint of Dyspnea (06 Feb 2022 07:41) [place]       HPI:  67yo F with DM (on insulin), HTN, HLD, pancreatic pseudocyst (s/p stent placement and drainage late 2021), iron deficiency anemia, GERD who presents with progressive dyspnea and cough. Patient is hard of hearing so phone was used to write out questions. Patient's daughter also at bedside.     Patient had been progressively short of breath with cough over the past few days. Last week, she had a sore throat and was febrile to 101. She took Robitussin and cough drops to relieve sore throat. Shortness of breath has gotten worse over the past few days. Patient gets out of breath walking to bathroom, prior to last week she was able to walk more. Patient and daughter note that patient coughs more when laying down or sleeping. Slight chest pain mid-sternally that is worse with cough. No radiation.     Additionally, patient notes that she noticed some blood in her stool last week, but not this week.      (03 Feb 2022 16:14)       PAST MEDICAL & SURGICAL HISTORY:  Type 2 diabetes mellitus    Bilateral cataracts    Fluid in pleural cavity associated with pancreatitis    Pancreatic pseudocyst/cyst  s/p drain placement and removal    History of amputation of right great toe        Allergies: Allergies    penicillin ([facility] [place] [facility])    Intolerances        Social History: Denies toxic habits including tobacco, ETOH or illicit drugs.    Family History: FAMILY HISTORY:  . No family history of strokes    Medications: MEDICATIONS  (STANDING):  amLODIPine   Tablet 10 milliGRAM(s) Oral daily  aspirin enteric coated 81 milliGRAM(s) Oral daily  dextrose 40% Gel 15 Gram(s) Oral once  dextrose 5%. 1000 milliLiter(s) (50 mL/Hr) IV Continuous <Continuous>  dextrose 5%. 1000 milliLiter(s) (100 mL/Hr) IV Continuous <Continuous>  dextrose 50% Injectable 25 Gram(s) IV Push once  dextrose 50% Injectable 12.5 Gram(s) IV Push once  dextrose 50% Injectable 25 Gram(s) IV Push once  glucagon  Injectable 1 milliGRAM(s) IntraMuscular once  hydrALAZINE 25 milliGRAM(s) Oral three times a day  influenza  Vaccine (HIGH DOSE) 0.7 milliLiter(s) IntraMuscular once  insulin glargine Injectable (LANTUS) 20 Unit(s) SubCutaneous at bedtime  insulin lispro (ADMELOG) corrective regimen sliding scale   SubCutaneous three times a day before meals  insulin lispro (ADMELOG) corrective regimen sliding scale   SubCutaneous at bedtime  lactated ringers. 1000 milliLiter(s) (40 mL/Hr) IV Continuous <Continuous>  metoprolol tartrate 25 milliGRAM(s) Oral two times a day  pantoprazole  Injectable 40 milliGRAM(s) IV Push two times a day  thiamine 100 milliGRAM(s) Oral daily    MEDICATIONS  (PRN):  albuterol/ipratropium for Nebulization 3 milliLiter(s) Nebulizer every 6 hours PRN Shortness of Breath and/or Wheezing  guaiFENesin Oral Liquid (Sugar-Free) 100 milliGRAM(s) Oral every 6 hours PRN Cough      Review of Systems:  CONSTITUTIONAL:  No weight loss, fever, chills, weakness or fatigue.  HEENT:  Eyes:  No visual loss, blurred vision, double vision or yellow sclera. Ears, Nose, Throat:  +hearing loss, no  sneezing, congestion, runny nose or sore throat.  SKIN:  No rash or itching.  CARDIOVASCULAR:  No chest pain, chest pressure or chest discomfort. No palpitations or edema.  RESPIRATORY:  No shortness of breath, cough or sputum.  GASTROINTESTINAL:  No anorexia, nausea, vomiting or diarrhea. No abdominal pain or blood.  GENITOURINARY:  No burning on urination or incontinence   NEUROLOGICAL:  No headache, dizziness, syncope, paralysis, ataxia, numbness or tingling in the extremities. No change in bowel or bladder control. no limb weakness. no vision changes.   MUSCULOSKELETAL:  No muscle, back pain, joint pain or stiffness.  HEMATOLOGIC:  No anemia, bleeding or bruising.  LYMPHATICS:  No enlarged nodes. No history of splenectomy.  PSYCHIATRIC:  No history of depression or anxiety.  ENDOCRINOLOGIC:  No reports of sweating, cold or heat intolerance. No polyuria or polydipsia.      Vitals:  Vital Signs Last 24 Hrs  T(C): 36.9 (07 Feb 2022 05:19), Max: 36.9 (07 Feb 2022 05:19)  T(F): 98.4 (07 Feb 2022 05:19), Max: 98.4 (07 Feb 2022 05:19)  HR: 83 (07 Feb 2022 05:19) (70 - 83)  BP: 135/65 (07 Feb 2022 05:19) (124/65 - 157/71)  BP(mean): --  RR: 18 (07 Feb 2022 05:19) (18 - 18)  SpO2: 100% (07 Feb 2022 05:19) (96% - 100%)    General Exam:   General Appearance: Appropriately dressed and in no acute distress       Head: Normocephalic, atraumatic and no dysmorphic features  Ear, Nose, and Throat: Moist mucous membranes  CVS: S1S2+  Resp: No SOB, no wheeze or rhonchi  GI: soft NT/ND  Extremities: No edema or cyanosis  Skin: No bruises or rashes     Neurological Exam:  Mental Status: Awake, alert and oriented x 3.  Able to follow simple and complex verbal commands. Able to name and repeat. fluent speech. No obvious aphasia or dysarthria noted.   Cranial Nerves: PERRL, EOMI, VFFC, sensation V1-V3 intact,  no obvious facial asymmetry, equal elevation of palate, scm/trap 5/5, tongue is midline on protrusion. no obvious papilledema on fundoscopic exam. +[place]   Motor: Normal bulk, tone and strength throughout. Fine finger movements were intact and symmetric. no tremors or drift noted.    Sensation: Intact to light touch and pinprick throughout. no right/left confusion. no extinction to tactile on DSS.    Reflexes: 1+ throughout at biceps, brachioradialis, triceps, patellars and ankles bilaterally and equal. No clonus. R toe and L toe were both downgoing.  Coordination: No dysmetria on FNF   Gait:deferred     Data/Labs/Imaging which I personally reviewed.     Labs:     CBC Full  -  ( 07 Feb 2022 07:41 )  WBC Count : 3.66 K/uL  RBC Count : 3.68 M/uL  Hemoglobin : 9.3 g/dL  Hematocrit : 29.1 %  Platelet Count - Automated : 311 K/uL  Mean Cell Volume : 79.1 fL  Mean Cell Hemoglobin : 25.3 pg  Mean Cell Hemoglobin Concentration : 32.0 gm/dL  Auto Neutrophil # : 1.55 K/uL  Auto Lymphocyte # : 1.09 K/uL  Auto Monocyte # : 0.57 K/uL  Auto Eosinophil # : 0.37 K/uL  Auto Basophil # : 0.02 K/uL  Auto Neutrophil % : 42.4 %  Auto Lymphocyte % : 29.8 %  Auto Monocyte % : 15.6 %  Auto Eosinophil % : 10.1 %  Auto Basophil % : 0.5 %    02-07    137  |  103  |  35<H>  ----------------------------<  110<H>  4.2   |  23  |  1.28    Ca    9.0      07 Feb 2022 07:41  Phos  4.5     02-07  Mg     2.00     02-07    TPro  7.1  /  Alb  2.9<L>  /  TBili  0.3  /  DBili  x   /  AST  11  /  ALT  7   /  AlkPhos  85  02-07    LIVER FUNCTIONS - ( 07 Feb 2022 07:41 )  Alb: 2.9 g/dL / Pro: 7.1 g/dL / ALK PHOS: 85 U/L / ALT: 7 U/L / AST: 11 U/L / GGT: x           PT/INR - ( 07 Feb 2022 07:41 )   PT: 12.5 sec;   INR: 1.09 ratio         PTT - ( 07 Feb 2022 07:41 )  PTT:31.8 sec    < from: MR Head w/wo IV Cont (12.01.21 @ 19:26) >    EXAM:  MR BRAIN WAW IC      EXAM:  MR IAC ONLY WAW IC        PROCEDURE DATE:  Dec  1 2021         INTERPRETATION:  .    CLINICAL INFORMATION: Acute onset of bilateral hearing loss.    TECHNIQUE: Multiplanar multisequential MRI of the brain and internal auditory canals was acquired with and without the administration of IV gadolinium. 7.5 cc's of IV Gadavist was administered for the purposes of this examination. 0 cc were discarded.    COMPARISON: Prior CT examination of the internal auditory canals dated 11/20/2021. Prior CT angiograms/venogram examination of the head and neck dated 11/20/2021. Examination.    FINDINGS:    MRI BRAIN: A chronic lacunar infarct is seen within the right medial cerebellar hemisphere.    Multiple patchy confluent nonspecific T2/FLAIR hyperintense signal changes are noted throughout the bihemispheric white matter without associated mass effect or restricted diffusion.    There is no abnormal brain parenchymal or leptomeningeal enhancement.    Ventricular sizeand configuration is unremarkable. No abnormal extra-axial fluid collections are seen. Flow-voids are noted throughout the major intracranial vessels, on the T2 weighted images, consistent with their patency. The sellar area appears unremarkable.    Minimal scattered mucosal thickening is seen throughout the paranasal sinuses. The mastoid air cells are clear. The orbits appear unremarkable.    MRI IAC: There is no CP angle mass. The bilateral 7th and 8th cranial nerves appear unremarkable in course and morphology. No abnormal enhancement is seen. The inner ear structures are normally formed. Normal fluid signal is seen within the inner ear structures. The cochlea, vestibule, and semicircular canals appear unremarkable.    IMPRESSION:    MRI BRAIN: No acute intracranial hemorrhage, acute ischemia, or abnormal intracranial enhancement.    Chronic lacunar infarct within the right medial cerebellar hemisphere and mild chronic white matter microvascular type changes.    MRI IAC: Unremarkable study.    --- End of Report ---              LUCI RUEDA MD; Attending Radiologist  This document has been electronically signed. Dec  3 2021  8:46AM    < end of copied text >

## 2022-02-08 LAB
ANION GAP SERPL CALC-SCNC: 9 MMOL/L — SIGNIFICANT CHANGE UP (ref 7–14)
BUN SERPL-MCNC: 34 MG/DL — HIGH (ref 7–23)
CALCIUM SERPL-MCNC: 8.9 MG/DL — SIGNIFICANT CHANGE UP (ref 8.4–10.5)
CHLORIDE SERPL-SCNC: 106 MMOL/L — SIGNIFICANT CHANGE UP (ref 98–107)
CO2 SERPL-SCNC: 23 MMOL/L — SIGNIFICANT CHANGE UP (ref 22–31)
CREAT SERPL-MCNC: 1.26 MG/DL — SIGNIFICANT CHANGE UP (ref 0.5–1.3)
GLUCOSE BLDC GLUCOMTR-MCNC: 104 MG/DL — HIGH (ref 70–99)
GLUCOSE BLDC GLUCOMTR-MCNC: 157 MG/DL — HIGH (ref 70–99)
GLUCOSE BLDC GLUCOMTR-MCNC: 96 MG/DL — SIGNIFICANT CHANGE UP (ref 70–99)
GLUCOSE BLDC GLUCOMTR-MCNC: 98 MG/DL — SIGNIFICANT CHANGE UP (ref 70–99)
GLUCOSE SERPL-MCNC: 91 MG/DL — SIGNIFICANT CHANGE UP (ref 70–99)
HCT VFR BLD CALC: 28 % — LOW (ref 34.5–45)
HGB BLD-MCNC: 9 G/DL — LOW (ref 11.5–15.5)
MAGNESIUM SERPL-MCNC: 1.8 MG/DL — SIGNIFICANT CHANGE UP (ref 1.6–2.6)
MCHC RBC-ENTMCNC: 25.3 PG — LOW (ref 27–34)
MCHC RBC-ENTMCNC: 32.1 GM/DL — SIGNIFICANT CHANGE UP (ref 32–36)
MCV RBC AUTO: 78.7 FL — LOW (ref 80–100)
NRBC # BLD: 0 /100 WBCS — SIGNIFICANT CHANGE UP
NRBC # FLD: 0 K/UL — SIGNIFICANT CHANGE UP
PHOSPHATE SERPL-MCNC: 4.3 MG/DL — SIGNIFICANT CHANGE UP (ref 2.5–4.5)
PLATELET # BLD AUTO: 319 K/UL — SIGNIFICANT CHANGE UP (ref 150–400)
POTASSIUM SERPL-MCNC: 4 MMOL/L — SIGNIFICANT CHANGE UP (ref 3.5–5.3)
POTASSIUM SERPL-SCNC: 4 MMOL/L — SIGNIFICANT CHANGE UP (ref 3.5–5.3)
RBC # BLD: 3.56 M/UL — LOW (ref 3.8–5.2)
RBC # FLD: 19.2 % — HIGH (ref 10.3–14.5)
SODIUM SERPL-SCNC: 138 MMOL/L — SIGNIFICANT CHANGE UP (ref 135–145)
WBC # BLD: 3.89 K/UL — SIGNIFICANT CHANGE UP (ref 3.8–10.5)
WBC # FLD AUTO: 3.89 K/UL — SIGNIFICANT CHANGE UP (ref 3.8–10.5)

## 2022-02-08 PROCEDURE — 99232 SBSQ HOSP IP/OBS MODERATE 35: CPT

## 2022-02-08 RX ADMIN — Medication 100 MILLIGRAM(S): at 13:01

## 2022-02-08 RX ADMIN — Medication 25 MILLIGRAM(S): at 20:22

## 2022-02-08 RX ADMIN — AMLODIPINE BESYLATE 10 MILLIGRAM(S): 2.5 TABLET ORAL at 05:46

## 2022-02-08 RX ADMIN — Medication 81 MILLIGRAM(S): at 13:01

## 2022-02-08 RX ADMIN — INSULIN GLARGINE 20 UNIT(S): 100 INJECTION, SOLUTION SUBCUTANEOUS at 21:51

## 2022-02-08 RX ADMIN — PANTOPRAZOLE SODIUM 40 MILLIGRAM(S): 20 TABLET, DELAYED RELEASE ORAL at 17:05

## 2022-02-08 RX ADMIN — Medication 100 MILLIGRAM(S): at 20:21

## 2022-02-08 RX ADMIN — Medication 25 MILLIGRAM(S): at 17:05

## 2022-02-08 RX ADMIN — PANTOPRAZOLE SODIUM 40 MILLIGRAM(S): 20 TABLET, DELAYED RELEASE ORAL at 05:46

## 2022-02-08 RX ADMIN — Medication 25 MILLIGRAM(S): at 13:01

## 2022-02-08 RX ADMIN — Medication 25 MILLIGRAM(S): at 05:47

## 2022-02-08 RX ADMIN — Medication 25 MILLIGRAM(S): at 05:46

## 2022-02-08 NOTE — PROGRESS NOTE ADULT - PROBLEM SELECTOR PLAN 1
- Inititally, thought to be 2/2 CHF, though TTE not convincing  - CT Chest c/f sarcoidosis  - TTE with EF 61%, mild diastolic dysfunction  - hold lasix in setting of MANDY, Creatinine elvis to 1.5 from near 1.0, now 1.2  - strict I/Os  - daily weights  - transfuse Hgb > 8  - Robitussin and tessalon pearls for cough; PRN duoneb  - Pulm recs appreciated, will f/u add'l recs, possible EBUS for 2/10

## 2022-02-08 NOTE — CHART NOTE - NSCHARTNOTEFT_GEN_A_CORE
Bronchoscopy with EBUS-guided biopsy of lymph node, BAL, and possible transbronchial biopsy scheduled for 2/10 in the afternoon with interventional pulmonologist Dr. Robles. NPO after midnight 2/10. Check CBC and coags 2/9.     Renzo Horn MD  Fellow, Pulmonary and Critical Care Medicine  [facility]  Pager: [phone], [phone] ([facility])  Email: [email or web address] Bronchoscopy with EBUS-guided biopsy of lymph node, BAL, and possible transbronchial biopsy scheduled for 2/10 in the afternoon with interventional pulmonologist Dr. Robles. NPO after midnight 2/10. Check CBC and coags 2/9. Would restart DVT ppx but hold on day of procedure if lovenox or hold afternoon dose if HSQ.      Renzo Horn MD  Fellow, Pulmonary and Critical Care Medicine  [facility]  Pager: [phone], [phone] ([facility])  Email: [email or web address]

## 2022-02-08 NOTE — PROGRESS NOTE ADULT - SUBJECTIVE AND OBJECTIVE BOX
Yusuf Enriquez MD  Academic Hospitalist  Pager [phone]  Email: [email or web address]          PROGRESS NOTE:     Patient is a 66y old  Female who presents with a chief complaint of Dyspnea (07 Feb 2022 16:33)      SUBJECTIVE / OVERNIGHT EVENTS:  Patient seen and examined this morning. No acute changes.   ADDITIONAL REVIEW OF SYSTEMS:  Denies f/c/n/v    MEDICATIONS  (STANDING):  amLODIPine   Tablet 10 milliGRAM(s) Oral daily  aspirin enteric coated 81 milliGRAM(s) Oral daily  dextrose 40% Gel 15 Gram(s) Oral once  dextrose 5%. 1000 milliLiter(s) (50 mL/Hr) IV Continuous <Continuous>  dextrose 5%. 1000 milliLiter(s) (100 mL/Hr) IV Continuous <Continuous>  dextrose 50% Injectable 25 Gram(s) IV Push once  dextrose 50% Injectable 12.5 Gram(s) IV Push once  dextrose 50% Injectable 25 Gram(s) IV Push once  glucagon  Injectable 1 milliGRAM(s) IntraMuscular once  hydrALAZINE 25 milliGRAM(s) Oral three times a day  influenza  Vaccine (HIGH DOSE) 0.7 milliLiter(s) IntraMuscular once  insulin glargine Injectable (LANTUS) 20 Unit(s) SubCutaneous at bedtime  insulin lispro (ADMELOG) corrective regimen sliding scale   SubCutaneous three times a day before meals  insulin lispro (ADMELOG) corrective regimen sliding scale   SubCutaneous at bedtime  lactated ringers. 1000 milliLiter(s) (40 mL/Hr) IV Continuous <Continuous>  metoprolol tartrate 25 milliGRAM(s) Oral two times a day  pantoprazole  Injectable 40 milliGRAM(s) IV Push two times a day  thiamine 100 milliGRAM(s) Oral daily    MEDICATIONS  (PRN):  albuterol/ipratropium for Nebulization 3 milliLiter(s) Nebulizer every 6 hours PRN Shortness of Breath and/or Wheezing  guaiFENesin Oral Liquid (Sugar-Free) 100 milliGRAM(s) Oral every 6 hours PRN Cough      CAPILLARY BLOOD GLUCOSE      POCT Blood Glucose.: 104 mg/dL (08 Feb 2022 11:10)  POCT Blood Glucose.: 96 mg/dL (08 Feb 2022 07:44)  POCT Blood Glucose.: 128 mg/dL (07 Feb 2022 21:05)  POCT Blood Glucose.: 170 mg/dL (07 Feb 2022 16:45)    I&O's Summary    07 Feb 2022 07:01  -  08 Feb 2022 07:00  --------------------------------------------------------  IN: 250 mL / OUT: 0 mL / NET: 250 mL        PHYSICAL EXAM:  Vital Signs Last 24 Hrs  T(C): 36.7 (08 Feb 2022 12:02), Max: 36.7 (07 Feb 2022 21:51)  T(F): 98 (08 Feb 2022 12:02), Max: 98.1 (07 Feb 2022 21:51)  HR: 64 (08 Feb 2022 12:02) (64 - 96)  BP: 139/71 (08 Feb 2022 12:02) (129/62 - 154/71)  BP(mean): --  RR: 18 (08 Feb 2022 12:02) (16 - 18)  SpO2: 100% (08 Feb 2022 12:02) (98% - 100%)    CONSTITUTIONAL: NAD, well-developed, very hard of hearing  RESPIRATORY: Normal respiratory effort; slight wheezing bilaterally  CARDIOVASCULAR: Regular rate and rhythm, normal S1 and S2, no murmur/rub/gallop; No lower extremity edema; Peripheral pulses are 2+ bilaterally  ABDOMEN: Nontender to palpation, normoactive bowel sounds, no rebound/guarding; No hepatosplenomegaly  MUSCLOSKELETAL: no clubbing or cyanosis of digits; no joint swelling or tenderness to palpation  PSYCH: calm and very pleasant     LABS:                        9.0    3.89  )-----------( 319      ( 08 Feb 2022 06:47 )             28.0     02-08    138  |  106  |  34<H>  ----------------------------<  91  4.0   |  23  |  1.26    Ca    8.9      08 Feb 2022 06:47  Phos  4.3     02-08  Mg     1.80     02-08    TPro  7.1  /  Alb  2.9<L>  /  TBili  0.3  /  DBili  x   /  AST  11  /  ALT  7   /  AlkPhos  85  02-07    PT/INR - ( 07 Feb 2022 07:41 )   PT: 12.5 sec;   INR: 1.09 ratio         PTT - ( 07 Feb 2022 07:41 )  PTT:31.8 sec            RADIOLOGY & ADDITIONAL TESTS:  Results Reviewed:   Imaging Personally Reviewed:  Electrocardiogram Personally Reviewed:    COORDINATION OF CARE:  Care Discussed with Consultants/Other Providers [Y/N]:  Prior or Outpatient Records Reviewed [Y/N]:

## 2022-02-09 LAB
ANION GAP SERPL CALC-SCNC: 9 MMOL/L — SIGNIFICANT CHANGE UP (ref 7–14)
BUN SERPL-MCNC: 38 MG/DL — HIGH (ref 7–23)
CALCIUM SERPL-MCNC: 8.8 MG/DL — SIGNIFICANT CHANGE UP (ref 8.4–10.5)
CHLORIDE SERPL-SCNC: 105 MMOL/L — SIGNIFICANT CHANGE UP (ref 98–107)
CO2 SERPL-SCNC: 24 MMOL/L — SIGNIFICANT CHANGE UP (ref 22–31)
CREAT SERPL-MCNC: 1.3 MG/DL — SIGNIFICANT CHANGE UP (ref 0.5–1.3)
GAMMA INTERFERON BACKGROUND BLD IA-ACNC: 0.08 IU/ML — SIGNIFICANT CHANGE UP
GLUCOSE BLDC GLUCOMTR-MCNC: 109 MG/DL — HIGH (ref 70–99)
GLUCOSE BLDC GLUCOMTR-MCNC: 114 MG/DL — HIGH (ref 70–99)
GLUCOSE BLDC GLUCOMTR-MCNC: 118 MG/DL — HIGH (ref 70–99)
GLUCOSE BLDC GLUCOMTR-MCNC: 78 MG/DL — SIGNIFICANT CHANGE UP (ref 70–99)
GLUCOSE BLDC GLUCOMTR-MCNC: 82 MG/DL — SIGNIFICANT CHANGE UP (ref 70–99)
GLUCOSE BLDC GLUCOMTR-MCNC: 98 MG/DL — SIGNIFICANT CHANGE UP (ref 70–99)
GLUCOSE SERPL-MCNC: 67 MG/DL — LOW (ref 70–99)
HCT VFR BLD CALC: 28.4 % — LOW (ref 34.5–45)
HGB BLD-MCNC: 8.7 G/DL — LOW (ref 11.5–15.5)
M TB IFN-G BLD-IMP: NEGATIVE — SIGNIFICANT CHANGE UP
M TB IFN-G CD4+ BCKGRND COR BLD-ACNC: 0.01 IU/ML — SIGNIFICANT CHANGE UP
M TB IFN-G CD4+CD8+ BCKGRND COR BLD-ACNC: 0.01 IU/ML — SIGNIFICANT CHANGE UP
MAGNESIUM SERPL-MCNC: 1.7 MG/DL — SIGNIFICANT CHANGE UP (ref 1.6–2.6)
MCHC RBC-ENTMCNC: 24.4 PG — LOW (ref 27–34)
MCHC RBC-ENTMCNC: 30.6 GM/DL — LOW (ref 32–36)
MCV RBC AUTO: 79.6 FL — LOW (ref 80–100)
NRBC # BLD: 0 /100 WBCS — SIGNIFICANT CHANGE UP
NRBC # FLD: 0 K/UL — SIGNIFICANT CHANGE UP
PHOSPHATE SERPL-MCNC: 4.2 MG/DL — SIGNIFICANT CHANGE UP (ref 2.5–4.5)
PLATELET # BLD AUTO: 306 K/UL — SIGNIFICANT CHANGE UP (ref 150–400)
POTASSIUM SERPL-MCNC: 4.1 MMOL/L — SIGNIFICANT CHANGE UP (ref 3.5–5.3)
POTASSIUM SERPL-SCNC: 4.1 MMOL/L — SIGNIFICANT CHANGE UP (ref 3.5–5.3)
QUANT TB PLUS MITOGEN MINUS NIL: 4.41 IU/ML — SIGNIFICANT CHANGE UP
RBC # BLD: 3.57 M/UL — LOW (ref 3.8–5.2)
RBC # FLD: 19 % — HIGH (ref 10.3–14.5)
SARS-COV-2 RNA SPEC QL NAA+PROBE: SIGNIFICANT CHANGE UP
SODIUM SERPL-SCNC: 138 MMOL/L — SIGNIFICANT CHANGE UP (ref 135–145)
WBC # BLD: 3.73 K/UL — LOW (ref 3.8–10.5)
WBC # FLD AUTO: 3.73 K/UL — LOW (ref 3.8–10.5)

## 2022-02-09 PROCEDURE — 99232 SBSQ HOSP IP/OBS MODERATE 35: CPT

## 2022-02-09 PROCEDURE — 99232 SBSQ HOSP IP/OBS MODERATE 35: CPT | Mod: GC

## 2022-02-09 RX ADMIN — INSULIN GLARGINE 20 UNIT(S): 100 INJECTION, SOLUTION SUBCUTANEOUS at 21:59

## 2022-02-09 RX ADMIN — Medication 81 MILLIGRAM(S): at 12:10

## 2022-02-09 RX ADMIN — Medication 25 MILLIGRAM(S): at 06:02

## 2022-02-09 RX ADMIN — AMLODIPINE BESYLATE 10 MILLIGRAM(S): 2.5 TABLET ORAL at 06:02

## 2022-02-09 RX ADMIN — Medication 100 MILLIGRAM(S): at 12:10

## 2022-02-09 RX ADMIN — Medication 25 MILLIGRAM(S): at 13:45

## 2022-02-09 RX ADMIN — Medication 100 MILLIGRAM(S): at 10:29

## 2022-02-09 RX ADMIN — PANTOPRAZOLE SODIUM 40 MILLIGRAM(S): 20 TABLET, DELAYED RELEASE ORAL at 18:37

## 2022-02-09 RX ADMIN — Medication 25 MILLIGRAM(S): at 18:37

## 2022-02-09 RX ADMIN — Medication 25 MILLIGRAM(S): at 21:58

## 2022-02-09 RX ADMIN — PANTOPRAZOLE SODIUM 40 MILLIGRAM(S): 20 TABLET, DELAYED RELEASE ORAL at 06:02

## 2022-02-09 NOTE — PROGRESS NOTE ADULT - SUBJECTIVE AND OBJECTIVE BOX
Yusuf Enriquez MD  Academic Hospitalist  Pager [phone]  Email: [email or web address]          PROGRESS NOTE:     Patient is a 66y old  Female who presents with a chief complaint of Dyspnea (08 Feb 2022 14:33)      SUBJECTIVE / OVERNIGHT EVENTS:  Patient seen and examined this morning. She states that her cough has improved, pending EBUS tomorrow.  ADDITIONAL REVIEW OF SYSTEMS:  No f/c/n/v    MEDICATIONS  (STANDING):  amLODIPine   Tablet 10 milliGRAM(s) Oral daily  aspirin enteric coated 81 milliGRAM(s) Oral daily  dextrose 40% Gel 15 Gram(s) Oral once  dextrose 5%. 1000 milliLiter(s) (50 mL/Hr) IV Continuous <Continuous>  dextrose 5%. 1000 milliLiter(s) (100 mL/Hr) IV Continuous <Continuous>  dextrose 50% Injectable 25 Gram(s) IV Push once  dextrose 50% Injectable 12.5 Gram(s) IV Push once  dextrose 50% Injectable 25 Gram(s) IV Push once  glucagon  Injectable 1 milliGRAM(s) IntraMuscular once  hydrALAZINE 25 milliGRAM(s) Oral three times a day  influenza  Vaccine (HIGH DOSE) 0.7 milliLiter(s) IntraMuscular once  insulin glargine Injectable (LANTUS) 20 Unit(s) SubCutaneous at bedtime  insulin lispro (ADMELOG) corrective regimen sliding scale   SubCutaneous three times a day before meals  insulin lispro (ADMELOG) corrective regimen sliding scale   SubCutaneous at bedtime  metoprolol tartrate 25 milliGRAM(s) Oral two times a day  pantoprazole  Injectable 40 milliGRAM(s) IV Push two times a day  thiamine 100 milliGRAM(s) Oral daily    MEDICATIONS  (PRN):  albuterol/ipratropium for Nebulization 3 milliLiter(s) Nebulizer every 6 hours PRN Shortness of Breath and/or Wheezing  guaiFENesin Oral Liquid (Sugar-Free) 100 milliGRAM(s) Oral every 6 hours PRN Cough      CAPILLARY BLOOD GLUCOSE      POCT Blood Glucose.: 109 mg/dL (09 Feb 2022 11:47)  POCT Blood Glucose.: 82 mg/dL (09 Feb 2022 07:39)  POCT Blood Glucose.: 78 mg/dL (09 Feb 2022 07:37)  POCT Blood Glucose.: 157 mg/dL (08 Feb 2022 21:30)  POCT Blood Glucose.: 98 mg/dL (08 Feb 2022 16:53)    I&O's Summary    08 Feb 2022 07:01  -  09 Feb 2022 07:00  --------------------------------------------------------  IN: 200 mL / OUT: 450 mL / NET: -250 mL        PHYSICAL EXAM:  Vital Signs Last 24 Hrs  T(C): 36.6 (09 Feb 2022 10:30), Max: 36.8 (09 Feb 2022 05:45)  T(F): 97.8 (09 Feb 2022 10:30), Max: 98.2 (09 Feb 2022 05:45)  HR: 63 (09 Feb 2022 10:30) (63 - 75)  BP: 129/67 (09 Feb 2022 10:30) (129/67 - 142/69)  BP(mean): 82 (09 Feb 2022 10:30) (82 - 82)  RR: 16 (09 Feb 2022 10:30) (16 - 18)  SpO2: 100% (09 Feb 2022 10:30) (99% - 100%)    CONSTITUTIONAL: NAD, well-developed, very hard of hearing  RESPIRATORY: Normal respiratory effort; slight wheezing bilaterally  CARDIOVASCULAR: Regular rate and rhythm, normal S1 and S2, no murmur/rub/gallop; No lower extremity edema; Peripheral pulses are 2+ bilaterally  ABDOMEN: Nontender to palpation, normoactive bowel sounds, no rebound/guarding; No hepatosplenomegaly  MUSCLOSKELETAL: no clubbing or cyanosis of digits; no joint swelling or tenderness to palpation  PSYCH: calm and very pleasant       LABS:                        8.7    3.73  )-----------( 306      ( 09 Feb 2022 07:06 )             28.4     02-09    138  |  105  |  38<H>  ----------------------------<  67<L>  4.1   |  24  |  1.30    Ca    8.8      09 Feb 2022 07:06  Phos  4.2     02-09  Mg     1.70     02-09                  RADIOLOGY & ADDITIONAL TESTS:  Results Reviewed:   Imaging Personally Reviewed:  Electrocardiogram Personally Reviewed:    COORDINATION OF CARE:  Care Discussed with Consultants/Other Providers [Y/N]:  Prior or Outpatient Records Reviewed [Y/N]:

## 2022-02-09 NOTE — PROGRESS NOTE ADULT - SUBJECTIVE AND OBJECTIVE BOX
CHIEF COMPLAINT: Shortness of breath    Interval Events: No events overnight    REVIEW OF SYSTEMS:  Constitutional: [ ] negative [ ] fevers [ ] chills [ ] weight loss [ ] weight gain  HEENT: [ ] negative [ ] dry eyes [ ] eye irritation [ ] postnasal drip [ ] nasal congestion  CV: [ ] negative  [ ] chest pain [ ] orthopnea [ ] palpitations [ ] murmur  Resp: [ ] negative [ ] cough [ ] shortness of breath [ ] dyspnea [ ] wheezing [ ] sputum [ ] hemoptysis  GI: [ ] negative [ ] nausea [ ] vomiting [ ] diarrhea [ ] constipation [ ] abd pain [ ] dysphagia   : [ ] negative [ ] dysuria [ ] nocturia [ ] hematuria [ ] increased urinary frequency  Musculoskeletal: [ ] negative [ ] back pain [ ] myalgias [ ] arthralgias [ ] fracture  Skin: [ ] negative [ ] rash [ ] itch  Neurological: [ ] negative [ ] headache [ ] dizziness [ ] syncope [ ] weakness [ ] numbness  Psychiatric: [ ] negative [ ] anxiety [ ] depression  Endocrine: [ ] negative [ ] diabetes [ ] thyroid problem  Hematologic/Lymphatic: [ ] negative [ ] anemia [ ] bleeding problem  Allergic/Immunologic: [ ] negative [ ] itchy eyes [ ] nasal discharge [ ] hives [ ] angioedema  [ x] All other systems negative  [ ] Unable to assess ROS because ________    OBJECTIVE:  ICU Vital Signs Last 24 Hrs  T(C): 36.6 (09 Feb 2022 10:30), Max: 36.8 (09 Feb 2022 05:45)  T(F): 97.8 (09 Feb 2022 10:30), Max: 98.2 (09 Feb 2022 05:45)  HR: 63 (09 Feb 2022 10:30) (63 - 68)  BP: 129/67 (09 Feb 2022 10:30) (129/67 - 139/67)  BP(mean): 82 (09 Feb 2022 10:30) (82 - 82)  ABP: --  ABP(mean): --  RR: 16 (09 Feb 2022 10:30) (16 - 18)  SpO2: 100% (09 Feb 2022 10:30) (99% - 100%)        02-08 @ 07:01  -  02-09 @ 07:00  --------------------------------------------------------  IN: 200 mL / OUT: 450 mL / NET: -250 mL      CAPILLARY BLOOD GLUCOSE      POCT Blood Glucose.: 114 mg/dL (09 Feb 2022 16:44)      PHYSICAL EXAM:  General: NAD, appears comfortable  Skin: Warm and dry, no rashes  Neuro: AAOx3, nonfocal  HEENT: PERRL, EOMI, no oral lesions  Neck: Full range of motion, no JVD  Lungs: Clear to ascultation bilatereally, no wheezes, rales, or rhonchi   Heart: Regular rate and rhythm, no murmurs  Abdomen: Normoactive bowl sounds, soft, nontender, nondistended  Extremities: No lower extremity tenderness, erythema, or edema   Psych: normal mood and affect    HOSPITAL MEDICATIONS:  MEDICATIONS  (STANDING):  amLODIPine   Tablet 10 milliGRAM(s) Oral daily  aspirin enteric coated 81 milliGRAM(s) Oral daily  dextrose 40% Gel 15 Gram(s) Oral once  dextrose 5%. 1000 milliLiter(s) (50 mL/Hr) IV Continuous <Continuous>  dextrose 5%. 1000 milliLiter(s) (100 mL/Hr) IV Continuous <Continuous>  dextrose 50% Injectable 25 Gram(s) IV Push once  dextrose 50% Injectable 12.5 Gram(s) IV Push once  dextrose 50% Injectable 25 Gram(s) IV Push once  glucagon  Injectable 1 milliGRAM(s) IntraMuscular once  hydrALAZINE 25 milliGRAM(s) Oral three times a day  influenza  Vaccine (HIGH DOSE) 0.7 milliLiter(s) IntraMuscular once  insulin glargine Injectable (LANTUS) 20 Unit(s) SubCutaneous at bedtime  insulin lispro (ADMELOG) corrective regimen sliding scale   SubCutaneous three times a day before meals  insulin lispro (ADMELOG) corrective regimen sliding scale   SubCutaneous at bedtime  metoprolol tartrate 25 milliGRAM(s) Oral two times a day  pantoprazole  Injectable 40 milliGRAM(s) IV Push two times a day  thiamine 100 milliGRAM(s) Oral daily    MEDICATIONS  (PRN):  albuterol/ipratropium for Nebulization 3 milliLiter(s) Nebulizer every 6 hours PRN Shortness of Breath and/or Wheezing  guaiFENesin Oral Liquid (Sugar-Free) 100 milliGRAM(s) Oral every 6 hours PRN Cough      LABS:                        8.7    3.73  )-----------( 306      ( 09 Feb 2022 07:06 )             28.4     Hgb Trend: 8.7<--, 9.0<--, 9.3<--, 9.4<--, 8.9<--  02-09    138  |  105  |  38<H>  ----------------------------<  67<L>  4.1   |  24  |  1.30    Ca    8.8      09 Feb 2022 07:06  Phos  4.2     02-09  Mg     1.70     02-09      Creatinine Trend: 1.30<--, 1.26<--, 1.28<--, 1.24<--, 1.50<--, 0.98<--            MICROBIOLOGY:       RADIOLOGY:  [ ] Reviewed and interpreted by me    PULMONARY FUNCTION TESTS:    EKG:

## 2022-02-09 NOTE — PROGRESS NOTE ADULT - PROBLEM SELECTOR PLAN 1
- Inititally, thought to be 2/2 CHF, though TTE not convincing  - CT Chest c/f sarcoidosis  - TTE with EF 61%, mild diastolic dysfunction  - hold lasix in setting of MANDY, Creatinine elvis to 1.5, now at 1.3  - strict I/Os  - daily weights  - transfuse Hgb > 8  - Robitussin and tessalon pearls for cough; PRN duoneb  - Pulm recs appreciated, will f/u add'l recs, possible EBUS for 2/10

## 2022-02-10 LAB
ANION GAP SERPL CALC-SCNC: 12 MMOL/L — SIGNIFICANT CHANGE UP (ref 7–14)
BUN SERPL-MCNC: 41 MG/DL — HIGH (ref 7–23)
CALCIUM SERPL-MCNC: 9.2 MG/DL — SIGNIFICANT CHANGE UP (ref 8.4–10.5)
CHLORIDE SERPL-SCNC: 107 MMOL/L — SIGNIFICANT CHANGE UP (ref 98–107)
CO2 SERPL-SCNC: 22 MMOL/L — SIGNIFICANT CHANGE UP (ref 22–31)
CREAT SERPL-MCNC: 1.44 MG/DL — HIGH (ref 0.5–1.3)
GAMMA INTERFERON BACKGROUND BLD IA-ACNC: 0.08 IU/ML — SIGNIFICANT CHANGE UP
GLUCOSE BLDC GLUCOMTR-MCNC: 100 MG/DL — HIGH (ref 70–99)
GLUCOSE BLDC GLUCOMTR-MCNC: 135 MG/DL — HIGH (ref 70–99)
GLUCOSE BLDC GLUCOMTR-MCNC: 242 MG/DL — HIGH (ref 70–99)
GLUCOSE BLDC GLUCOMTR-MCNC: 282 MG/DL — HIGH (ref 70–99)
GLUCOSE BLDC GLUCOMTR-MCNC: 63 MG/DL — LOW (ref 70–99)
GLUCOSE BLDC GLUCOMTR-MCNC: 72 MG/DL — SIGNIFICANT CHANGE UP (ref 70–99)
GLUCOSE BLDC GLUCOMTR-MCNC: 82 MG/DL — SIGNIFICANT CHANGE UP (ref 70–99)
GLUCOSE SERPL-MCNC: 53 MG/DL — CRITICAL LOW (ref 70–99)
HCT VFR BLD CALC: 29.4 % — LOW (ref 34.5–45)
HGB BLD-MCNC: 9.5 G/DL — LOW (ref 11.5–15.5)
M TB IFN-G BLD-IMP: NEGATIVE — SIGNIFICANT CHANGE UP
M TB IFN-G CD4+ BCKGRND COR BLD-ACNC: 0.01 IU/ML — SIGNIFICANT CHANGE UP
M TB IFN-G CD4+CD8+ BCKGRND COR BLD-ACNC: 0.01 IU/ML — SIGNIFICANT CHANGE UP
MAGNESIUM SERPL-MCNC: 1.7 MG/DL — SIGNIFICANT CHANGE UP (ref 1.6–2.6)
MCHC RBC-ENTMCNC: 25.6 PG — LOW (ref 27–34)
MCHC RBC-ENTMCNC: 32.3 GM/DL — SIGNIFICANT CHANGE UP (ref 32–36)
MCV RBC AUTO: 79.2 FL — LOW (ref 80–100)
NRBC # BLD: 0 /100 WBCS — SIGNIFICANT CHANGE UP
NRBC # FLD: 0 K/UL — SIGNIFICANT CHANGE UP
PHOSPHATE SERPL-MCNC: 4.4 MG/DL — SIGNIFICANT CHANGE UP (ref 2.5–4.5)
PLATELET # BLD AUTO: 356 K/UL — SIGNIFICANT CHANGE UP (ref 150–400)
POTASSIUM SERPL-MCNC: 4.4 MMOL/L — SIGNIFICANT CHANGE UP (ref 3.5–5.3)
POTASSIUM SERPL-SCNC: 4.4 MMOL/L — SIGNIFICANT CHANGE UP (ref 3.5–5.3)
QUANT TB PLUS MITOGEN MINUS NIL: 4.41 IU/ML — SIGNIFICANT CHANGE UP
RBC # BLD: 3.71 M/UL — LOW (ref 3.8–5.2)
RBC # FLD: 19.3 % — HIGH (ref 10.3–14.5)
SODIUM SERPL-SCNC: 141 MMOL/L — SIGNIFICANT CHANGE UP (ref 135–145)
WBC # BLD: 5.48 K/UL — SIGNIFICANT CHANGE UP (ref 3.8–10.5)
WBC # FLD AUTO: 5.48 K/UL — SIGNIFICANT CHANGE UP (ref 3.8–10.5)

## 2022-02-10 PROCEDURE — 99232 SBSQ HOSP IP/OBS MODERATE 35: CPT

## 2022-02-10 PROCEDURE — 99233 SBSQ HOSP IP/OBS HIGH 50: CPT | Mod: GC

## 2022-02-10 RX ORDER — DEXTROSE 50 % IN WATER 50 %
50 SYRINGE (ML) INTRAVENOUS ONCE
Refills: 0 | Status: COMPLETED | OUTPATIENT
Start: 2022-02-10 | End: 2022-02-10

## 2022-02-10 RX ORDER — SODIUM CHLORIDE 9 MG/ML
1000 INJECTION, SOLUTION INTRAVENOUS
Refills: 0 | Status: DISCONTINUED | OUTPATIENT
Start: 2022-02-10 | End: 2022-02-11

## 2022-02-10 RX ORDER — INSULIN LISPRO 100/ML
4 VIAL (ML) SUBCUTANEOUS
Qty: 1 | Refills: 0
Start: 2022-02-10 | End: 2022-03-11

## 2022-02-10 RX ORDER — INSULIN GLARGINE 100 [IU]/ML
20 INJECTION, SOLUTION SUBCUTANEOUS
Qty: 1 | Refills: 0
Start: 2022-02-10 | End: 2022-03-11

## 2022-02-10 RX ORDER — DEXTROSE 50 % IN WATER 50 %
25 SYRINGE (ML) INTRAVENOUS ONCE
Refills: 0 | Status: COMPLETED | OUTPATIENT
Start: 2022-02-10 | End: 2022-02-10

## 2022-02-10 RX ADMIN — Medication 25 MILLIGRAM(S): at 05:10

## 2022-02-10 RX ADMIN — AMLODIPINE BESYLATE 10 MILLIGRAM(S): 2.5 TABLET ORAL at 05:10

## 2022-02-10 RX ADMIN — Medication 25 MILLIGRAM(S): at 18:09

## 2022-02-10 RX ADMIN — Medication 100 MILLIGRAM(S): at 12:24

## 2022-02-10 RX ADMIN — Medication 100 MILLIGRAM(S): at 21:36

## 2022-02-10 RX ADMIN — INSULIN GLARGINE 20 UNIT(S): 100 INJECTION, SOLUTION SUBCUTANEOUS at 21:36

## 2022-02-10 RX ADMIN — PANTOPRAZOLE SODIUM 40 MILLIGRAM(S): 20 TABLET, DELAYED RELEASE ORAL at 18:08

## 2022-02-10 RX ADMIN — SODIUM CHLORIDE 50 MILLILITER(S): 9 INJECTION, SOLUTION INTRAVENOUS at 13:42

## 2022-02-10 RX ADMIN — Medication 81 MILLIGRAM(S): at 21:37

## 2022-02-10 RX ADMIN — Medication 25 MILLIGRAM(S): at 12:24

## 2022-02-10 RX ADMIN — Medication 25 MILLIGRAM(S): at 21:37

## 2022-02-10 RX ADMIN — Medication 50 MILLILITER(S): at 08:10

## 2022-02-10 RX ADMIN — PANTOPRAZOLE SODIUM 40 MILLIGRAM(S): 20 TABLET, DELAYED RELEASE ORAL at 05:10

## 2022-02-10 RX ADMIN — Medication 25 GRAM(S): at 13:55

## 2022-02-10 NOTE — PROGRESS NOTE ADULT - PROBLEM SELECTOR PLAN 1
- Inititally, thought to be 2/2 CHF, though TTE not convincing  - CT Chest c/f sarcoidosis  - TTE with EF 61%, mild diastolic dysfunction  - hold lasix in setting of MANDY, Creatinine elvis to 1.5, now at 1.3  - strict I/Os  - daily weights  - transfuse Hgb > 8  - Robitussin and tessalon pearls for cough; PRN duoneb  - Pulm recs appreciated, will f/u add'l recs, pending EBUS today

## 2022-02-10 NOTE — CHART NOTE - NSCHARTNOTEFT_GEN_A_CORE
Patient was scheduled for EBUS. Developed hypoglycemia at 1300 and received 1 amp of Dw50%. Procedure was delayed as only one fingerstick glucose was documented after hypoglycemia event and as per OR protocol at least 2 need to be documented. Unfortunately given delay, procedure can not be performed today. Patient can be discharge home with pulmonary follow up and will schedule procedure as an outpatient. Primary team notified.     --------------------------------------------------------  Bar Whaley, PGY-6  Pulmonary/Critical Care Fellow  Pager: [phone] ([facility]), [phone] ([facility]) Patient was scheduled for EBUS. Developed hypoglycemia at 1300 and received 1 amp of Dw50%. Procedure was delayed as only one fingerstick glucose was documented after hypoglycemia event and as per OR protocol at least 2 need to be documented. Labile sugars ranging from , requiring dextrose pushes. Unfortunately given significant delay and labile sugars, and patient received lantus last night, and requirement for general anesthesia, procedure cacelled today. Patient can be discharge home with pulmonary follow up and will schedule procedure as an outpatient. Primary team notified. Discussed with patient and daughter.     --------------------------------------------------------  Bar Whaley, PGY-6  Pulmonary/Critical Care Fellow  Pager: [phone] ([facility]), [phone] (NS) Patient was scheduled for elective EBUS to assess chronic mediastinal adenopathy with calcified nodes. Developed hypoglycemia at 1300 and received 1 amp of Dw50%. Procedure was delayed as only one fingerstick glucose was documented after hypoglycemia event and as per OR protocol at least 2 need to be documented. Labile sugars ranging from , requiring dextrose pushes. Unfortunately given significant delay and labile sugars, and patient received lantus last night, and requirement for general anesthesia, procedure cacelled today. Patient can be discharge home with pulmonary follow up and will schedule procedure as an outpatient. Primary team notified. Discussed with patient and daughter.     --------------------------------------------------------  Bar Whaley, PGY-6  Pulmonary/Critical Care Fellow  Pager: [phone] ([facility]), [phone] ([facility])

## 2022-02-10 NOTE — PROGRESS NOTE ADULT - SUBJECTIVE AND OBJECTIVE BOX
Yusuf Enriquez MD  Academic Hospitalist  Pager [phone]  Email: [email or web address]          PROGRESS NOTE:     Patient is a 66y old  Female who presents with a chief complaint of Dyspnea (09 Feb 2022 17:32)      SUBJECTIVE / OVERNIGHT EVENTS:  Patient seen and examined this morning. Pending EBUS today.   ADDITIONAL REVIEW OF SYSTEMS:  No f/c/n/v    MEDICATIONS  (STANDING):  amLODIPine   Tablet 10 milliGRAM(s) Oral daily  aspirin enteric coated 81 milliGRAM(s) Oral daily  dextrose 40% Gel 15 Gram(s) Oral once  dextrose 5%. 1000 milliLiter(s) (50 mL/Hr) IV Continuous <Continuous>  dextrose 5%. 1000 milliLiter(s) (100 mL/Hr) IV Continuous <Continuous>  dextrose 50% Injectable 25 Gram(s) IV Push once  dextrose 50% Injectable 12.5 Gram(s) IV Push once  dextrose 50% Injectable 25 Gram(s) IV Push once  glucagon  Injectable 1 milliGRAM(s) IntraMuscular once  hydrALAZINE 25 milliGRAM(s) Oral three times a day  influenza  Vaccine (HIGH DOSE) 0.7 milliLiter(s) IntraMuscular once  insulin glargine Injectable (LANTUS) 20 Unit(s) SubCutaneous at bedtime  insulin lispro (ADMELOG) corrective regimen sliding scale   SubCutaneous three times a day before meals  insulin lispro (ADMELOG) corrective regimen sliding scale   SubCutaneous at bedtime  metoprolol tartrate 25 milliGRAM(s) Oral two times a day  pantoprazole  Injectable 40 milliGRAM(s) IV Push two times a day  thiamine 100 milliGRAM(s) Oral daily    MEDICATIONS  (PRN):  albuterol/ipratropium for Nebulization 3 milliLiter(s) Nebulizer every 6 hours PRN Shortness of Breath and/or Wheezing  guaiFENesin Oral Liquid (Sugar-Free) 100 milliGRAM(s) Oral every 6 hours PRN Cough      CAPILLARY BLOOD GLUCOSE      POCT Blood Glucose.: 82 mg/dL (10 Feb 2022 11:16)  POCT Blood Glucose.: 72 mg/dL (10 Feb 2022 07:44)  POCT Blood Glucose.: 118 mg/dL (09 Feb 2022 21:57)  POCT Blood Glucose.: 98 mg/dL (09 Feb 2022 21:09)  POCT Blood Glucose.: 114 mg/dL (09 Feb 2022 16:44)    I&O's Summary    09 Feb 2022 07:01  -  10 Feb 2022 07:00  --------------------------------------------------------  IN: 400 mL / OUT: 900 mL / NET: -500 mL        PHYSICAL EXAM:  Vital Signs Last 24 Hrs  T(C): 36.8 (10 Feb 2022 11:26), Max: 37.1 (10 Feb 2022 05:05)  T(F): 98.2 (10 Feb 2022 11:26), Max: 98.8 (10 Feb 2022 05:05)  HR: 65 (10 Feb 2022 11:26) (65 - 73)  BP: 144/75 (10 Feb 2022 11:26) (134/77 - 144/75)  BP(mean): 89 (09 Feb 2022 18:30) (89 - 89)  RR: 18 (10 Feb 2022 11:26) (18 - 18)  SpO2: 100% (10 Feb 2022 11:26) (97% - 100%)    CONSTITUTIONAL: NAD, well-developed, very hard of hearing  RESPIRATORY: Normal respiratory effort; slight wheezing bilaterally  CARDIOVASCULAR: Regular rate and rhythm, normal S1 and S2, no murmur/rub/gallop; No lower extremity edema; Peripheral pulses are 2+ bilaterally  ABDOMEN: Nontender to palpation, normoactive bowel sounds, no rebound/guarding; No hepatosplenomegaly  MUSCLOSKELETAL: no clubbing or cyanosis of digits; no joint swelling or tenderness to palpation  PSYCH: calm and very pleasant     LABS:                        9.5    5.48  )-----------( 356      ( 10 Feb 2022 06:39 )             29.4     02-10    141  |  107  |  41<H>  ----------------------------<  53<LL>  4.4   |  22  |  1.44<H>    Ca    9.2      10 Feb 2022 06:39  Phos  4.4     02-10  Mg     1.70     02-10                  RADIOLOGY & ADDITIONAL TESTS:  Results Reviewed:   Imaging Personally Reviewed:  Electrocardiogram Personally Reviewed:    COORDINATION OF CARE:  Care Discussed with Consultants/Other Providers [Y/N]:  Prior or Outpatient Records Reviewed [Y/N]:

## 2022-02-10 NOTE — DIETITIAN INITIAL EVALUATION ADULT. - ADD RECOMMEND
1. Resume PO diet once clinically indicates;    2. Once PO diet resumes, monitor PO diet tolerance;       3. Will recommend to add PO supplement: Glucerna shake - 2x daily to diet rx, once PO diet resumes;      4. Honor food preferences;      5. Monitor labs, hydration status;

## 2022-02-10 NOTE — DIETITIAN INITIAL EVALUATION ADULT. - OTHER INFO
Pt 67 yo female with PMHx of DM, HTN, HLD, pancreatic pseudocyst (s/p stent placement and drainage late 2021), iron deficiency anemia, GERD presented with progressive dyspnea and cough; Pt found to be anemic with Hgb 6.7; Pt s/p 2U PRBC transfusion - per chart review.     At time of visit, Pt awake, alert; Pt Deaf. Pt NPO past midnight for planed procedure "bronchoscopy" today, per nurse. Pt's appetite usually not that well reported. Pt denied having any chewing or swallowing difficulty; no report of diarrhea of abdominal pain/discomfort at present. Pt C/O vomiting "phlegm" last night. Add anti-emetic medication PRN, per MD discretion.      Per Pt, her height: 64" & her weight: 169#; Pt with unintended weight loss of ~10# but Pt could not confirm the time frame. Will recommend to add PO supplement: Glucerna shake - 2x daily to diet rx, once PO diet resumes. Case discussed with nurse. RDN remains available, Pt made aware.

## 2022-02-10 NOTE — PROGRESS NOTE ADULT - ATTENDING COMMENTS
Bronchoscopy/EBUS cancelled due to hypoglycemia.  Will schedule as outpatient.   No contraindication to discharge from pulmonary perspective.   Outpatient follow up @ [address] [place] [address]. [phone]. 2/17 @ 3:15 PM.
Planned for bronchoscopy/EBUS scheduled for Thursday, 7/10.  Keep NPO after midnight on Wednesday.
Planned for bronchoscopy/EBUS scheduled for tomorrow.  Keep NPO after midnight.  COVID PCR pending from today.
66 year old woman w/ history of HTN and diabetes presenting w/ dyspnea and cough. DDx includes CHF vs post viral infection vs ACS vs symptomatic anemia vs COPD. Patient w/ hemoglobin 6.7 in setting of recent gastrointestinal bleeding. Now s/p 2u PRBC hemoglobin 8.5. Gastroenterology consulted, patient currently opting out of scope. Treating medically w/ PPI 40mg BID. Patient s/p TTE today only w/ mild diastolic dysfunction. BNP ~4000 on admission. Currently diuresing w/ IV lasix, can likely de-escalate diuretic tomorrow. Anticipate non-cardiac component of presenting symptoms given non-significant TTE findings. May ultimately require outpatient PFTs and pulm follow up. Noted to have MANDY today, suspect this may be from overdiuresis, improved with IVFs. Will cont to hold lasix, cont fluids and trend Cr for now. CT chest c/f sarcoidosis, pulm consulted and considering bronch/EBUS, f/u  QuantiFeron and ACE level     Plan discussed with family at bedside     Rest of Plan As Detailed Above
66 year old woman w/ history of HTN and diabetes presenting w/ dyspnea and cough. DDx includes CHF vs post viral infection vs ACS vs symptomatic anemia vs COPD. Patient w/ hemoglobin 6.7 in setting of recent gastrointestinal bleeding. Now s/p 2u PRBC hemoglobin 8.5. Gastroenterology consulted, patient currently opting out of scope. Treating medically w/ PPI 40mg BID. Patient s/p TTE today only w/ mild diastolic dysfunction. BNP ~4000 on admission. Currently diuresing w/ IV lasix, can likely de-escalate diuretic tomorrow. Anticipate non-cardiac component of presenting symptoms given non-significant TTE findings. CT chest ordered for further eval. May ultimately require outpatient PFTs and pulm follow up.     Rest of Plan As Detailed Above     Anticipate D'c Sunday/Monday Pending Dyspnea Workup
66 year old woman w/ history of HTN and diabetes presenting w/ dyspnea and cough. DDx includes CHF vs post viral infection vs ACS vs symptomatic anemia vs COPD. Patient w/ hemoglobin 6.7 in setting of recent gastrointestinal bleeding. Now s/p 2u PRBC hemoglobin 8.5. Gastroenterology consulted, patient currently opting out of scope. Treating medically w/ PPI 40mg BID. Patient s/p TTE today only w/ mild diastolic dysfunction. BNP ~4000 on admission. Currently diuresing w/ IV lasix, can likely de-escalate diuretic tomorrow. Anticipate non-cardiac component of presenting symptoms given non-significant TTE findings. May ultimately require outpatient PFTs and pulm follow up. Noted to have MANDY today, suspect this may be from overdiuresis. Will hold lasix and start fluids, trend Cr for now. CT chest c/f sarcoidosis, will consult pulm    Plan discussed with family at bedside     Rest of Plan As Detailed Above

## 2022-02-10 NOTE — PROVIDER CONTACT NOTE (HYPOGLYCEMIA EVENT) - NS PROVIDER CONTACT BACKGROUND-HYPO
Age: 66y    Gender: Female    POCT Blood Glucose:  100 mg/dL (02-10-22 @ 15:15)  282 mg/dL (02-10-22 @ 13:58)  63 mg/dL (02-10-22 @ 13:33)  82 mg/dL (02-10-22 @ 11:16)  72 mg/dL (02-10-22 @ 07:44)  118 mg/dL (02-09-22 @ 21:57)  98 mg/dL (02-09-22 @ 21:09)  114 mg/dL (02-09-22 @ 16:44)      eMAR:  dextrose 50% Injectable   50 milliLiter(s) IV Push (02-10-22 @ 08:10)    dextrose 50% Injectable   25 Gram(s) IV Push (02-10-22 @ 13:55)    insulin glargine Injectable (LANTUS)   20 Unit(s) SubCutaneous (02-09-22 @ 21:59)

## 2022-02-10 NOTE — DIETITIAN INITIAL EVALUATION ADULT. - PERTINENT LABORATORY DATA
(210) H/H 9.5/29.4 L, BUN 41 H, Creat 1.44 H, Glu 53 L;    (2/7) Albumin 2.9 L;     (2/4) HbA1c 7.7% H

## 2022-02-11 ENCOUNTER — TRANSCRIPTION ENCOUNTER (OUTPATIENT)
Age: 67
End: 2022-02-11

## 2022-02-11 VITALS
DIASTOLIC BLOOD PRESSURE: 70 MMHG | SYSTOLIC BLOOD PRESSURE: 128 MMHG | RESPIRATION RATE: 17 BRPM | HEART RATE: 70 BPM | TEMPERATURE: 98 F | OXYGEN SATURATION: 100 %

## 2022-02-11 LAB
GLUCOSE BLDC GLUCOMTR-MCNC: 141 MG/DL — HIGH (ref 70–99)
GLUCOSE BLDC GLUCOMTR-MCNC: 72 MG/DL — SIGNIFICANT CHANGE UP (ref 70–99)

## 2022-02-11 PROCEDURE — 99239 HOSP IP/OBS DSCHRG MGMT >30: CPT

## 2022-02-11 RX ORDER — HYDRALAZINE HCL 50 MG
1 TABLET ORAL
Qty: 0 | Refills: 0 | DISCHARGE

## 2022-02-11 RX ORDER — AMLODIPINE BESYLATE 2.5 MG/1
1 TABLET ORAL
Qty: 30 | Refills: 0
Start: 2022-02-11 | End: 2022-03-12

## 2022-02-11 RX ORDER — METOPROLOL TARTRATE 50 MG
1 TABLET ORAL
Qty: 0 | Refills: 0 | DISCHARGE

## 2022-02-11 RX ORDER — INSULIN LISPRO 100/ML
VIAL (ML) SUBCUTANEOUS
Refills: 0 | Status: DISCONTINUED | OUTPATIENT
Start: 2022-02-11 | End: 2022-02-11

## 2022-02-11 RX ORDER — INSULIN GLARGINE 100 [IU]/ML
20 INJECTION, SOLUTION SUBCUTANEOUS AT BEDTIME
Refills: 0 | Status: DISCONTINUED | OUTPATIENT
Start: 2022-02-11 | End: 2022-02-11

## 2022-02-11 RX ORDER — AMLODIPINE BESYLATE 2.5 MG/1
1 TABLET ORAL
Qty: 0 | Refills: 0 | DISCHARGE

## 2022-02-11 RX ORDER — METOPROLOL TARTRATE 50 MG
1 TABLET ORAL
Qty: 60 | Refills: 0
Start: 2022-02-11 | End: 2022-03-12

## 2022-02-11 RX ORDER — INSULIN LISPRO 100/ML
VIAL (ML) SUBCUTANEOUS AT BEDTIME
Refills: 0 | Status: DISCONTINUED | OUTPATIENT
Start: 2022-02-11 | End: 2022-02-11

## 2022-02-11 RX ORDER — HYDRALAZINE HCL 50 MG
1 TABLET ORAL
Qty: 90 | Refills: 0
Start: 2022-02-11 | End: 2022-03-12

## 2022-02-11 RX ADMIN — Medication 100 MILLIGRAM(S): at 13:24

## 2022-02-11 RX ADMIN — Medication 25 MILLIGRAM(S): at 05:53

## 2022-02-11 RX ADMIN — AMLODIPINE BESYLATE 10 MILLIGRAM(S): 2.5 TABLET ORAL at 05:53

## 2022-02-11 RX ADMIN — Medication 25 MILLIGRAM(S): at 13:24

## 2022-02-11 RX ADMIN — Medication 81 MILLIGRAM(S): at 13:24

## 2022-02-11 RX ADMIN — PANTOPRAZOLE SODIUM 40 MILLIGRAM(S): 20 TABLET, DELAYED RELEASE ORAL at 05:52

## 2022-02-11 NOTE — PROGRESS NOTE ADULT - PROBLEM SELECTOR PLAN 4
- c/w home hydralazine 25 TID  - c/w home amlodipine 10mg daily  - c/w metoprolol tartrate 25mg BID

## 2022-02-11 NOTE — PROGRESS NOTE ADULT - PROBLEM SELECTOR PLAN 7
- DVT ppx: SCDs for now  - Diet: dash/carb controlled  - dispo: admitted to medicine for dyspnea and possible GI bleed
- DVT ppx: SCDs for now  - Diet: dash/carb controlled  - dispo: admitted to medicine for dyspnea and possible GI bleed. Pending EBUS
- DVT ppx: SCDs for now  - Diet: dash/carb controlled  - dispo: admitted to medicine for dyspnea and possible GI bleed. Pending EBUS- now to be done as outpatient.
- DVT ppx: SCDs for now  - Diet: dash/carb controlled  - dispo: admitted to medicine for dyspnea and possible GI bleed. Pending EBUS
- DVT ppx: SCDs for now  - Diet: dash/carb controlled  - dispo: admitted to medicine for dyspnea and possible GI bleed

## 2022-02-11 NOTE — DISCHARGE NOTE NURSING/CASE MANAGEMENT/SOCIAL WORK - PATIENT PORTAL LINK FT
You can access the FollowMyHealth Patient Portal offered by [facility] by registering at the following website: [email or web address]. By joining [employer]’s FollowMyHealth portal, you will also be able to view your health information using other applications (apps) compatible with our system.

## 2022-02-11 NOTE — PROGRESS NOTE ADULT - PROVIDER SPECIALTY LIST ADULT
Pulmonology
Hospitalist
Internal Medicine
Hospitalist

## 2022-02-11 NOTE — DISCHARGE NOTE NURSING/CASE MANAGEMENT/SOCIAL WORK - NSDCPEFALRISK_GEN_ALL_CORE
For information on Fall & Injury Prevention, visit: [email or web address] OR  [email or web address] OR  https://www.cdc.gov/steadi/patient.html

## 2022-02-11 NOTE — PROGRESS NOTE ADULT - PROBLEM SELECTOR PROBLEM 3
Type II diabetes mellitus

## 2022-02-11 NOTE — PROGRESS NOTE ADULT - SUBJECTIVE AND OBJECTIVE BOX
Yusuf Enriquez MD  Academic Hospitalist  Pager [phone]  Email: [email or web address]          PROGRESS NOTE:     Patient is a 66y old  Female who presents with a chief complaint of Dyspnea (10 Feb 2022 18:16)      SUBJECTIVE / OVERNIGHT EVENTS:  Patient seen and examined this morning. Awaiting discharge.   ADDITIONAL REVIEW OF SYSTEMS:  No f/c/n/v    MEDICATIONS  (STANDING):  amLODIPine   Tablet 10 milliGRAM(s) Oral daily  aspirin enteric coated 81 milliGRAM(s) Oral daily  hydrALAZINE 25 milliGRAM(s) Oral three times a day  influenza  Vaccine (HIGH DOSE) 0.7 milliLiter(s) IntraMuscular once  insulin glargine Injectable (LANTUS) 20 Unit(s) SubCutaneous at bedtime  insulin lispro (ADMELOG) corrective regimen sliding scale   SubCutaneous three times a day before meals  insulin lispro (ADMELOG) corrective regimen sliding scale   SubCutaneous at bedtime  metoprolol tartrate 25 milliGRAM(s) Oral two times a day  pantoprazole  Injectable 40 milliGRAM(s) IV Push two times a day  thiamine 100 milliGRAM(s) Oral daily    MEDICATIONS  (PRN):  albuterol/ipratropium for Nebulization 3 milliLiter(s) Nebulizer every 6 hours PRN Shortness of Breath and/or Wheezing  guaiFENesin Oral Liquid (Sugar-Free) 100 milliGRAM(s) Oral every 6 hours PRN Cough      CAPILLARY BLOOD GLUCOSE      POCT Blood Glucose.: 141 mg/dL (11 Feb 2022 11:20)  POCT Blood Glucose.: 72 mg/dL (11 Feb 2022 07:32)  POCT Blood Glucose.: 242 mg/dL (10 Feb 2022 21:28)  POCT Blood Glucose.: 135 mg/dL (10 Feb 2022 16:52)  POCT Blood Glucose.: 100 mg/dL (10 Feb 2022 15:15)  POCT Blood Glucose.: 282 mg/dL (10 Feb 2022 13:58)  POCT Blood Glucose.: 63 mg/dL (10 Feb 2022 13:33)    I&O's Summary    10 Feb 2022 07:01  -  11 Feb 2022 07:00  --------------------------------------------------------  IN: 1090 mL / OUT: 1850 mL / NET: -760 mL        PHYSICAL EXAM:  Vital Signs Last 24 Hrs  T(C): 36.9 (11 Feb 2022 05:53), Max: 36.9 (11 Feb 2022 05:53)  T(F): 98.4 (11 Feb 2022 05:53), Max: 98.4 (11 Feb 2022 05:53)  HR: 64 (11 Feb 2022 05:53) (64 - 70)  BP: 134/75 (11 Feb 2022 05:53) (134/70 - 138/78)  BP(mean): --  RR: 18 (11 Feb 2022 05:53) (16 - 18)  SpO2: 100% (11 Feb 2022 05:53) (98% - 100%)    CONSTITUTIONAL: NAD, well-developed, very hard of hearing  RESPIRATORY: Normal respiratory effort; slight wheezing bilaterally  CARDIOVASCULAR: Regular rate and rhythm, normal S1 and S2, no murmur/rub/gallop; No lower extremity edema; Peripheral pulses are 2+ bilaterally  ABDOMEN: Nontender to palpation, normoactive bowel sounds, no rebound/guarding; No hepatosplenomegaly  MUSCLOSKELETAL: no clubbing or cyanosis of digits; no joint swelling or tenderness to palpation  PSYCH: calm and very pleasant   LABS:                        9.5    5.48  )-----------( 356      ( 10 Feb 2022 06:39 )             29.4     02-10    141  |  107  |  41<H>  ----------------------------<  53<LL>  4.4   |  22  |  1.44<H>    Ca    9.2      10 Feb 2022 06:39  Phos  4.4     02-10  Mg     1.70     02-10                  RADIOLOGY & ADDITIONAL TESTS:  Results Reviewed:   Imaging Personally Reviewed:  Electrocardiogram Personally Reviewed:    COORDINATION OF CARE:  Care Discussed with Consultants/Other Providers [Y/N]:  Prior or Outpatient Records Reviewed [Y/N]:

## 2022-02-11 NOTE — PROGRESS NOTE ADULT - PROBLEM SELECTOR PLAN 3
- patient takes lantus 24U at bedtime and 8U humalog at home  - start with 20U lantus at bedtime and mod SSI  - titrate as needed
- patient takes lantus 24U at bedtime and 8U humalog at home  - On 20U lantus at bedtime and mod SSI  - titrate as needed  Yesterday hypoglycemia episode, EBUS therefore deferred as outpatient. Patient has follow up appointment with pulmonary.
- patient takes lantus 24U at bedtime and 8U humalog at home  - On 20U lantus at bedtime and mod SSI  - titrate as needed  - FS this morning at 72, lantus administered, but admelog on hold. Will continue to monitor FS, d/w the nurse
- patient takes lantus 24U at bedtime and 8U humalog at home  - On 20U lantus at bedtime and mod SSI  - titrate as needed

## 2022-02-11 NOTE — PROGRESS NOTE ADULT - PROBLEM SELECTOR PLAN 1
- Inititally, thought to be 2/2 CHF, though TTE not convincing  - CT Chest c/f sarcoidosis  - TTE with EF 61%, mild diastolic dysfunction  - hold lasix in setting of MANDY, Creatinine elvis to 1.5, now at 1.3  - strict I/Os  - daily weights  - transfuse Hgb > 8  - Robitussin and tessalon pearls for cough; PRN duoneb  - Pulm recs appreciated, EBUS deferred because of hypoglycemia episode

## 2022-02-11 NOTE — PROGRESS NOTE ADULT - PROBLEM SELECTOR PLAN 6
- IV protonix 40mg BID

## 2022-02-11 NOTE — PROGRESS NOTE ADULT - ASSESSMENT
66F with HTN, HLD, DM, iron deficiency anemia and pancreatic pseudocyst s/p drainage 2021 presenting with progressive dyspnea on exertion, cough and sputum production. Found to have Hb 6.7 s/p transfusion of 2 U of packed red blood cells. Pulmonary service consulted given findings suggestive of sarcoidosis on chest CT, planned for bronchoscopy with EBUS-guided biopsy of mediastinal lymph nodes on tomorrow at 3pm.    Problems:  Mediastinal lymphadenopathy    Recommendations:  - NPO after midnight 2/10   - Check CBC and coags 2/10 in AM  - Can continue aspirin if necessary  - COVID screening test in lab    Renzo Horn MD  Fellow, Pulmonary and Critical Care Medicine  [facility]  Pager: [phone], [phone] ([facility])  Email: [email or web address]      
66F with HTN, HLD, DM, iron deficiency anemia and pancreatic pseudocyst s/p drainage 2021 presenting with progressive dyspnea on exertion, cough and sputum production. Found to have Hb 6.7 s/p transfusion of 2 U of packed red blood cells. Pulmonary service consulted given findings suggestive of sarcoidosis on chest CT, was planned for bronchoscopy with EBUS-guided biopsy of mediastinal lymph nodes today 2/10 but was canceled due labile fingersticks. The EBUS will be done outpatient, no pulmonary contraindication to discharge.     Problems:  Mediastinal lymphadenopathy    Recommendations:  - Pt to follow up with pulmonologist Dr. Quick 2/17 at 3:15 pm. The office is located at [address] [place] [address], [address]. Number [phone].  - Presurgical testing to be arranged at pulmonary visit with Dr. Quick  - She will have EBUS the next week with Dr. Pola Horn MD  Fellow, Pulmonary and Critical Care Medicine  [facility]  Pager: [phone], [phone] ([facility])  Email: [email or web address]      
67yo F with PMH of DM (on insulin), HTN, HLD, pancreatic pseudocyst (s/p stent placement and drainage late 2021), iron deficiency anemia, GERD who presents with progressive dyspnea and cough. Patient found to be anemic with Hgb 6.7 on admission s/p 2U PRBC transfusion. 
67yo F with PMH of DM (on insulin), HTN, HLD, pancreatic pseudocyst (s/p stent placement and drainage late 2021), iron deficiency anemia, GERD who presents with progressive dyspnea and cough. Patient found to be anemic with Hgb 6.7 on admission s/p 2U PRBC transfusion. 
65yo F with PMH of DM (on insulin), HTN, HLD, pancreatic pseudocyst (s/p stent placement and drainage late 2021), iron deficiency anemia, GERD who presents with progressive dyspnea and cough. Patient found to be anemic with Hgb 6.7 on admission s/p 2U PRBC transfusion. 
67yo F with PMH of DM (on insulin), HTN, HLD, pancreatic pseudocyst (s/p stent placement and drainage late 2021), iron deficiency anemia, GERD who presents with progressive dyspnea and cough. Patient found to be anemic with Hgb 6.7 on admission s/p 2U PRBC transfusion. 
66F with HTN, HLD, DM, iron deficiency anemia and pancreatic pseudocyst s/p drainage 2021 presenting with progressive dyspnea on exertion, cough and sputum production. Found to have Hb 6.7 s/p transfusion of 2 U of packed red blood cells. Pulmonary service consulted given findings suggestive of sarcoidosis on chest CT, planned for bronchoscopy with EBUS-guided biopsy of mediastinal lymph nodes on Thursday 2/10.    Problems:  Sarcoidosis    Recommendations:  - NPO after midnight 2/10 for EBUS  - Check CBC and coags 2/9  - Can continue aspirin    Renzo Horn MD  Fellow, Pulmonary and Critical Care Medicine  [facility]  Pager: [phone], [phone] ([facility])  Email: [email or web address]      
65yo F with PMH of DM (on insulin), HTN, HLD, pancreatic pseudocyst (s/p stent placement and drainage late 2021), iron deficiency anemia, GERD who presents with progressive dyspnea and cough. Patient found to be anemic with Hgb 6.7 on admission s/p 2U PRBC transfusion. 

## 2022-02-14 DIAGNOSIS — Z20.822 ENCOUNTER FOR PREPROCEDURAL LABORATORY EXAMINATION: ICD-10-CM

## 2022-02-14 DIAGNOSIS — Z01.812 ENCOUNTER FOR PREPROCEDURAL LABORATORY EXAMINATION: ICD-10-CM

## 2022-02-16 ENCOUNTER — OUTPATIENT (OUTPATIENT)
Dept: OUTPATIENT SERVICES | Facility: HOSPITAL | Age: 67
LOS: 1 days | End: 2022-02-16

## 2022-02-16 VITALS
OXYGEN SATURATION: 98 % | HEART RATE: 63 BPM | SYSTOLIC BLOOD PRESSURE: 138 MMHG | HEIGHT: 61.5 IN | TEMPERATURE: 97 F | WEIGHT: 169.98 LBS | RESPIRATION RATE: 18 BRPM | DIASTOLIC BLOOD PRESSURE: 78 MMHG

## 2022-02-16 DIAGNOSIS — R91.1 SOLITARY PULMONARY NODULE: ICD-10-CM

## 2022-02-16 DIAGNOSIS — E11.9 TYPE 2 DIABETES MELLITUS WITHOUT COMPLICATIONS: ICD-10-CM

## 2022-02-16 DIAGNOSIS — Z88.0 ALLERGY STATUS TO PENICILLIN: ICD-10-CM

## 2022-02-16 DIAGNOSIS — Z90.49 ACQUIRED ABSENCE OF OTHER SPECIFIED PARTS OF DIGESTIVE TRACT: Chronic | ICD-10-CM

## 2022-02-16 DIAGNOSIS — Z98.891 HISTORY OF UTERINE SCAR FROM PREVIOUS SURGERY: Chronic | ICD-10-CM

## 2022-02-16 DIAGNOSIS — Z89.411 ACQUIRED ABSENCE OF RIGHT GREAT TOE: Chronic | ICD-10-CM

## 2022-02-16 DIAGNOSIS — I10 ESSENTIAL (PRIMARY) HYPERTENSION: ICD-10-CM

## 2022-02-16 RX ORDER — SENNA PLUS 8.6 MG/1
1 TABLET ORAL
Qty: 0 | Refills: 0 | DISCHARGE

## 2022-02-16 RX ORDER — FAMOTIDINE 10 MG/ML
1 INJECTION INTRAVENOUS
Qty: 0 | Refills: 0 | DISCHARGE

## 2022-02-16 RX ORDER — POLYETHYLENE GLYCOL 3350 17 G/17G
17 POWDER, FOR SOLUTION ORAL
Qty: 0 | Refills: 0 | DISCHARGE

## 2022-02-16 RX ORDER — OMEPRAZOLE 10 MG/1
1 CAPSULE, DELAYED RELEASE ORAL
Qty: 0 | Refills: 0 | DISCHARGE

## 2022-02-16 NOTE — H&P PST ADULT - NSICDXPASTMEDICALHX_GEN_ALL_CORE_FT
PAST MEDICAL HISTORY:  Bilateral cataracts     Fluid in pleural cavity associated with pancreatitis     Pancreatic pseudocyst/cyst s/p drain placement and removal    Type 2 diabetes mellitus      PAST MEDICAL HISTORY:  Bilateral cataracts     Fluid in pleural cavity associated with pancreatitis     HLD (hyperlipidemia)     HTN (hypertension)     Pancreatic pseudocyst/cyst s/p drain placement and removal    Type 2 diabetes mellitus

## 2022-02-16 NOTE — H&P PST ADULT - HISTORY OF PRESENT ILLNESS
67 y/o F with PMH of DM (on insulin), HTN, HLD, pancreatic pseudocyst, iron deficiency anemia, GERD who presents with progressive dyspnea and cough. Patient is hard of hearing 65 y/o F with PMH of DM (on insulin), HTN, HLD, pancreatic pseudocyst, iron deficiency anemia, GERD, [place] presents to PST for pre op evaluation with pre op diagnosis: solitary pulmonary nodule in preparation for endobronchial ultrasound bronchoscopy, transbronchial biopsy

## 2022-02-16 NOTE — H&P PST ADULT - FUNCTIONAL ASSESSMENT - DAILY ACTIVITY ASSESSMENT TYPE
Localized Dermabrasion Text: The patient was draped in routine manner.  Localized dermabrasion using 3 x 17 mm wire brush was performed in routine manner to papillary dermis. This spot dermabrasion is being performed to complete skin cancer reconstruction. It also will eliminate the other sun damaged precancerous cells that are known to be part of the regional effect of a lifetime's worth of sun exposure. This localized dermabrasion is therapeutic and should not be considered cosmetic in any regard. Localized Dermabrasion With Wire Brush Text: The patient was draped in routine manner.  Localized dermabrasion using 3 x 17 mm wire brush was performed in routine manner to papillary dermis. This spot dermabrasion is being performed to complete skin cancer reconstruction. It also will eliminate the other sun damaged precancerous cells that are known to be part of the regional effect of a lifetime's worth of sun exposure. This localized dermabrasion is therapeutic and should not be considered cosmetic in any regard. Admission

## 2022-02-16 NOTE — H&P PST ADULT - PROBLEM SELECTOR PLAN 1
Schedule for endobronchial ultrasound bronchoscopy, transbronchial biopsy tentatively on 02/23/2022. Pre op instructions, famotidine given and explained. Pt verbalized understanding.  Pt confirmed schedule appt for covid test pre op

## 2022-02-16 NOTE — H&P PST ADULT - NEGATIVE OPHTHALMOLOGIC SYMPTOMS
no lacrimation L/no lacrimation R/no blurred vision L/no blurred vision R/no pain L/no pain R/no irritation L/no irritation R/no loss of vision L/no loss of vision R

## 2022-02-16 NOTE — H&P PST ADULT - HEARING DISTURBANCE
" woke up after Thanksgiving of 2021 and couldn't hear" as per amisha/loss "woke up after Thanksgiving of 2021 and couldn't hear" as per amisha/loss

## 2022-02-16 NOTE — H&P PST ADULT - NSICDXPASTSURGICALHX_GEN_ALL_CORE_FT
PAST SURGICAL HISTORY:  History of amputation of right great toe      PAST SURGICAL HISTORY:  H/O:  section     History of amputation of right great toe 2018    History of laparoscopic cholecystectomy

## 2022-02-16 NOTE — H&P PST ADULT - PROBLEM SELECTOR PLAN 4
Pt was instructed to take Amlodipine , Hydralazine, metoprolol with a sip of water A.M of surgery. Pt verbalized understanding.

## 2022-02-16 NOTE — H&P PST ADULT - PROBLEM SELECTOR PLAN 2
Monitor BS on day of surgery. Pt was instructed to decrease dose of detemir by 20 % the night before surgery.. Also instructed to hold Metformin 24 hours pre op. Instructed not to take any diabetes medications on day of surgery. Pt verbalized understanding.

## 2022-02-16 NOTE — H&P PST ADULT - MUSCULOSKELETAL
details… detailed exam no joint erythema/no joint warmth/no calf tenderness/decreased ROM due to pain

## 2022-02-17 ENCOUNTER — APPOINTMENT (OUTPATIENT)
Dept: PULMONOLOGY | Facility: CLINIC | Age: 67
End: 2022-02-17
Payer: MEDICARE

## 2022-02-17 VITALS
DIASTOLIC BLOOD PRESSURE: 78 MMHG | TEMPERATURE: 97.8 F | HEIGHT: 64 IN | WEIGHT: 143 LBS | SYSTOLIC BLOOD PRESSURE: 154 MMHG | HEART RATE: 75 BPM | BODY MASS INDEX: 24.41 KG/M2 | OXYGEN SATURATION: 98 %

## 2022-02-17 PROBLEM — I10 ESSENTIAL (PRIMARY) HYPERTENSION: Chronic | Status: ACTIVE | Noted: 2022-02-16

## 2022-02-17 PROBLEM — E78.5 HYPERLIPIDEMIA, UNSPECIFIED: Chronic | Status: ACTIVE | Noted: 2022-02-16

## 2022-02-17 PROCEDURE — 99214 OFFICE O/P EST MOD 30 MIN: CPT

## 2022-02-17 NOTE — HISTORY OF PRESENT ILLNESS
[Former] : former [TextBox_4] : 66 year old female with diabetes, hypertension, hyperlipidemia, iron deficiency anemia, pancreatic pseudocyst s/p drainage recently admitted to [facility] (2/3/22 - 2/11/22) for dyspnea on exertion, cough and sputum production.\par \par Found to be anemic with Hgb 6.7 and given 2 units PRBC. Findings on CT chest suggestive of sarcoidosis so pulmonary team was consulted. Former smoker, quit 30 + years prior. Planned for inpatient bronchoscopy/EBUS but case delayed and cancelled due to hypoglycemia. Now scheduled as outpatient on 2/23. Had pre-surgical testing yesterday. COVID PCR scheduled for 2/19.\par \par Patient reports improved dyspnea on exertion and cough. \par \par Patient is [place]. To communicate, used phone to type and patient reads and is able to speak back.

## 2022-02-17 NOTE — ASSESSMENT
[FreeTextEntry1] : 66 year old female with CT findings suggestive of possible sarcoidosis.\par - Bronchoscopy with EBUS/TBNA scheduled for next week\par - Follow up after procedures\par - PFT prior to next visit.\par \par Have not taken flu, pneumococcal and COVID vaccination.\par States she will discuss with her daughter.

## 2022-02-17 NOTE — PHYSICAL EXAM
[No Acute Distress] : no acute distress [Normal Oropharynx] : normal oropharynx [No Neck Mass] : no neck mass [Normal Appearance] : normal appearance [Normal Rate/Rhythm] : normal rate/rhythm [Normal S1, S2] : normal s1, s2 [No Murmurs] : no murmurs [No Resp Distress] : no resp distress [Clear to Auscultation Bilaterally] : clear to auscultation bilaterally [No Abnormalities] : no abnormalities [Benign] : benign [No Clubbing] : no clubbing [No Cyanosis] : no cyanosis [No Edema] : no edema [FROM] : FROM [Normal Color/ Pigmentation] : normal color/ pigmentation [No Focal Deficits] : no focal deficits [Oriented x3] : oriented x3 [TextBox_99] : Walks with cane

## 2022-02-20 LAB
HCT VFR BLD CALC: 30.3 % — LOW (ref 34.5–45)
HGB BLD-MCNC: 8.9 G/DL — LOW (ref 11.5–15.5)
MCHC RBC-ENTMCNC: 24.7 PG — LOW (ref 27–34)
MCHC RBC-ENTMCNC: 29.4 GM/DL — LOW (ref 32–36)
MCV RBC AUTO: 84.2 FL — SIGNIFICANT CHANGE UP (ref 80–100)
PLATELET # BLD AUTO: 272 K/UL — SIGNIFICANT CHANGE UP (ref 150–400)
RBC # BLD: 3.6 M/UL — LOW (ref 3.8–5.2)
RBC # FLD: 19.5 % — HIGH (ref 10.3–14.5)
WBC # BLD: 4.61 K/UL — SIGNIFICANT CHANGE UP (ref 3.8–10.5)
WBC # FLD AUTO: 4.61 K/UL — SIGNIFICANT CHANGE UP (ref 3.8–10.5)

## 2022-02-22 ENCOUNTER — TRANSCRIPTION ENCOUNTER (OUTPATIENT)
Age: 67
End: 2022-02-22

## 2022-02-22 NOTE — ASU PATIENT PROFILE, ADULT - NSICDXPASTMEDICALHX_GEN_ALL_CORE_FT
PAST MEDICAL HISTORY:  Bilateral cataracts     Fluid in pleural cavity associated with pancreatitis     HLD (hyperlipidemia)     HTN (hypertension)     Pancreatic pseudocyst/cyst s/p drain placement and removal    Type 2 diabetes mellitus

## 2022-02-22 NOTE — ASU PATIENT PROFILE, ADULT - BARIATRIC
Quality 131: Pain Assessment And Follow-Up: Pain assessment using a standardized tool is documented as negative, no follow-up plan required Quality 111:Pneumonia Vaccination Status For Older Adults: Pneumococcal Vaccination not Administered or Previously Received, Reason not Otherwise Specified no Detail Level: Detailed Quality 226: Preventive Care And Screening: Tobacco Use: Screening And Cessation Intervention: Patient screened for tobacco and never smoked

## 2022-02-22 NOTE — ASU PATIENT PROFILE, ADULT - FALL HARM RISK - UNIVERSAL INTERVENTIONS
Bed in lowest position, wheels locked, appropriate side rails in place/Call bell, personal items and telephone in reach/Instruct patient to call for assistance before getting out of bed or chair/Non-slip footwear when patient is out of bed/[place] to call system/Physically safe environment - no spills, clutter or unnecessary equipment/Purposeful Proactive Rounding/Room/bathroom lighting operational, light cord in reach

## 2022-02-22 NOTE — ASU PATIENT PROFILE, ADULT - NSICDXPASTSURGICALHX_GEN_ALL_CORE_FT
PAST SURGICAL HISTORY:  H/O:  section     History of amputation of right great toe 2018    History of laparoscopic cholecystectomy

## 2022-02-23 ENCOUNTER — APPOINTMENT (OUTPATIENT)
Dept: PULMONOLOGY | Facility: HOSPITAL | Age: 67
End: 2022-02-23

## 2022-02-23 ENCOUNTER — RESULT REVIEW (OUTPATIENT)
Age: 67
End: 2022-02-23

## 2022-02-23 ENCOUNTER — OUTPATIENT (OUTPATIENT)
Dept: OUTPATIENT SERVICES | Facility: HOSPITAL | Age: 67
LOS: 1 days | Discharge: HOME | End: 2022-02-23
Payer: MEDICARE

## 2022-02-23 VITALS
DIASTOLIC BLOOD PRESSURE: 76 MMHG | RESPIRATION RATE: 18 BRPM | SYSTOLIC BLOOD PRESSURE: 145 MMHG | HEART RATE: 80 BPM | OXYGEN SATURATION: 100 %

## 2022-02-23 VITALS
DIASTOLIC BLOOD PRESSURE: 67 MMHG | RESPIRATION RATE: 16 BRPM | TEMPERATURE: 100 F | HEIGHT: 61.5 IN | WEIGHT: 169.98 LBS | OXYGEN SATURATION: 99 % | SYSTOLIC BLOOD PRESSURE: 137 MMHG | HEART RATE: 66 BPM

## 2022-02-23 DIAGNOSIS — Z90.49 ACQUIRED ABSENCE OF OTHER SPECIFIED PARTS OF DIGESTIVE TRACT: Chronic | ICD-10-CM

## 2022-02-23 DIAGNOSIS — R91.1 SOLITARY PULMONARY NODULE: ICD-10-CM

## 2022-02-23 DIAGNOSIS — Z98.891 HISTORY OF UTERINE SCAR FROM PREVIOUS SURGERY: Chronic | ICD-10-CM

## 2022-02-23 DIAGNOSIS — Z89.411 ACQUIRED ABSENCE OF RIGHT GREAT TOE: Chronic | ICD-10-CM

## 2022-02-23 LAB
B PERT IGG+IGM PNL SER: ABNORMAL
COLOR FLD: ABNORMAL
FLUID SEGMENTED GRANULOCYTES: 8 % — SIGNIFICANT CHANGE UP
GLUCOSE BLDV-MCNC: 147 MG/DL — HIGH (ref 70–99)
GRAM STN FLD: SIGNIFICANT CHANGE UP
LYMPHOCYTES # FLD: 13 % — SIGNIFICANT CHANGE UP
MONOS+MACROS # FLD: 29 % — SIGNIFICANT CHANGE UP
NIGHT BLUE STAIN TISS: SIGNIFICANT CHANGE UP
OTHER CELLS FLD MANUAL: 50 % — SIGNIFICANT CHANGE UP
RCV VOL RI: SIGNIFICANT CHANGE UP CELLS/UL (ref 0–5)
SPECIMEN SOURCE FLD: SIGNIFICANT CHANGE UP
SPECIMEN SOURCE: SIGNIFICANT CHANGE UP
SPECIMEN SOURCE: SIGNIFICANT CHANGE UP
TOTAL NUCLEATED CELL COUNT, BODY FLUID: SIGNIFICANT CHANGE UP CELLS/UL (ref 0–5)
TUBE TYPE: SIGNIFICANT CHANGE UP

## 2022-02-23 PROCEDURE — 88112 CYTOPATH CELL ENHANCE TECH: CPT | Mod: 26,59

## 2022-02-23 PROCEDURE — 31652 BRONCH EBUS SAMPLNG 1/2 NODE: CPT | Mod: GC

## 2022-02-23 PROCEDURE — 31645 BRNCHSC W/THER ASPIR 1ST: CPT | Mod: GC

## 2022-02-23 PROCEDURE — 88305 TISSUE EXAM BY PATHOLOGIST: CPT | Mod: 26

## 2022-02-23 PROCEDURE — 88173 CYTOPATH EVAL FNA REPORT: CPT | Mod: 26

## 2022-02-23 PROCEDURE — 31624 DX BRONCHOSCOPE/LAVAGE: CPT | Mod: GC

## 2022-02-23 RX ORDER — SODIUM CHLORIDE 9 MG/ML
1000 INJECTION, SOLUTION INTRAVENOUS
Refills: 0 | Status: DISCONTINUED | OUTPATIENT
Start: 2022-02-23 | End: 2022-02-23

## 2022-02-23 NOTE — ASU DISCHARGE PLAN (ADULT/PEDIATRIC) - NS MD DC FALL RISK RISK
For information on Fall & Injury Prevention, visit: [email or web address] OR  [email or web address] OR  https://www.cdc.gov/steadi/patient.html

## 2022-02-23 NOTE — ASU DISCHARGE PLAN (ADULT/PEDIATRIC) - CALL YOUR DOCTOR IF YOU HAVE ANY OF THE FOLLOWING:
Increased shortness of breath, continued coughing of blood./Bleeding that does not stop Increased shortness of breath, continued coughing of blood./Bleeding that does not stop/Fever greater than (need to indicate Fahrenheit or Celsius)/Wound/Surgical Site with redness, or foul smelling discharge or pus/Nausea and vomiting that does not stop/Unable to urinate

## 2022-02-23 NOTE — ASU DISCHARGE PLAN (ADULT/PEDIATRIC) - FOLLOW UP APPOINTMENTS
[facility] may also call Recovery Room (PACU) 24/7 @ [phone]/[facility]

## 2022-02-23 NOTE — ASU DISCHARGE PLAN (ADULT/PEDIATRIC) - ASU DC SPECIAL INSTRUCTIONSFT
Patients may experience fever on the night of the procedure. Patient's may cough up specks of blood for 1-2 days after the procedure. If the bleeding is large or persistent please contact your pulmonologist. If your fever is persistent, please contact your pulmonologist. In case of sudden chest pain or trouble breathing, please go to your nearest emergency room and contact your pulmonologist.

## 2022-02-23 NOTE — BRIEF OPERATIVE NOTE - OPERATION/FINDINGS
Flexible bronchoscopy with BAL, EBUS-FNA and FNB of stations 7 and 4R was performed. Samples were adequate for cell block. Patient tolerated the procedure well.

## 2022-02-24 ENCOUNTER — APPOINTMENT (OUTPATIENT)
Dept: OTOLARYNGOLOGY | Facility: CLINIC | Age: 67
End: 2022-02-24

## 2022-02-25 LAB
CULTURE RESULTS: SIGNIFICANT CHANGE UP
NON-GYNECOLOGICAL CYTOLOGY STUDY: SIGNIFICANT CHANGE UP
NON-GYNECOLOGICAL CYTOLOGY STUDY: SIGNIFICANT CHANGE UP
SPECIMEN SOURCE: SIGNIFICANT CHANGE UP
TM INTERPRETATION: SIGNIFICANT CHANGE UP

## 2022-03-14 ENCOUNTER — APPOINTMENT (OUTPATIENT)
Dept: PULMONOLOGY | Facility: CLINIC | Age: 67
End: 2022-03-14

## 2022-03-26 LAB
CULTURE RESULTS: SIGNIFICANT CHANGE UP
SPECIMEN SOURCE: SIGNIFICANT CHANGE UP

## 2022-03-30 ENCOUNTER — APPOINTMENT (OUTPATIENT)
Dept: OTOLARYNGOLOGY | Facility: CLINIC | Age: 67
End: 2022-03-30

## 2022-03-31 ENCOUNTER — APPOINTMENT (OUTPATIENT)
Dept: PULMONOLOGY | Facility: CLINIC | Age: 67
End: 2022-03-31

## 2022-04-12 ENCOUNTER — APPOINTMENT (OUTPATIENT)
Dept: PULMONOLOGY | Facility: CLINIC | Age: 67
End: 2022-04-12

## 2022-04-16 LAB
CULTURE RESULTS: SIGNIFICANT CHANGE UP
SPECIMEN SOURCE: SIGNIFICANT CHANGE UP

## 2022-06-03 ENCOUNTER — INPATIENT (INPATIENT)
Facility: HOSPITAL | Age: 67
LOS: 4 days | Discharge: HOME HEALTH | End: 2022-06-08
Attending: INTERNAL MEDICINE | Admitting: INTERNAL MEDICINE
Payer: MEDICARE

## 2022-06-03 DIAGNOSIS — Z90.49 ACQUIRED ABSENCE OF OTHER SPECIFIED PARTS OF DIGESTIVE TRACT: Chronic | ICD-10-CM

## 2022-06-03 DIAGNOSIS — Z98.891 HISTORY OF UTERINE SCAR FROM PREVIOUS SURGERY: Chronic | ICD-10-CM

## 2022-06-03 DIAGNOSIS — Z89.411 ACQUIRED ABSENCE OF RIGHT GREAT TOE: Chronic | ICD-10-CM

## 2022-06-03 PROCEDURE — 99291 CRITICAL CARE FIRST HOUR: CPT

## 2022-06-03 PROCEDURE — 99053 MED SERV 10PM-8AM 24 HR FAC: CPT

## 2022-06-04 VITALS
HEIGHT: 61.5 IN | SYSTOLIC BLOOD PRESSURE: 146 MMHG | HEART RATE: 113 BPM | TEMPERATURE: 98 F | OXYGEN SATURATION: 100 % | RESPIRATION RATE: 25 BRPM | DIASTOLIC BLOOD PRESSURE: 101 MMHG

## 2022-06-04 DIAGNOSIS — E11.9 TYPE 2 DIABETES MELLITUS WITHOUT COMPLICATIONS: ICD-10-CM

## 2022-06-04 DIAGNOSIS — I16.1 HYPERTENSIVE EMERGENCY: ICD-10-CM

## 2022-06-04 DIAGNOSIS — I50.9 HEART FAILURE, UNSPECIFIED: ICD-10-CM

## 2022-06-04 DIAGNOSIS — Z29.9 ENCOUNTER FOR PROPHYLACTIC MEASURES, UNSPECIFIED: ICD-10-CM

## 2022-06-04 DIAGNOSIS — J81.0 ACUTE PULMONARY EDEMA: ICD-10-CM

## 2022-06-04 DIAGNOSIS — N17.9 ACUTE KIDNEY FAILURE, UNSPECIFIED: ICD-10-CM

## 2022-06-04 DIAGNOSIS — D64.9 ANEMIA, UNSPECIFIED: ICD-10-CM

## 2022-06-04 LAB
ALBUMIN SERPL ELPH-MCNC: 3.2 G/DL — LOW (ref 3.3–5)
ALP SERPL-CCNC: 119 U/L — SIGNIFICANT CHANGE UP (ref 40–120)
ALT FLD-CCNC: 13 U/L — SIGNIFICANT CHANGE UP (ref 4–33)
ANION GAP SERPL CALC-SCNC: 12 MMOL/L — SIGNIFICANT CHANGE UP (ref 7–14)
ANION GAP SERPL CALC-SCNC: 13 MMOL/L — SIGNIFICANT CHANGE UP (ref 7–14)
AST SERPL-CCNC: 20 U/L — SIGNIFICANT CHANGE UP (ref 4–32)
B PERT DNA SPEC QL NAA+PROBE: SIGNIFICANT CHANGE UP
B PERT+PARAPERT DNA PNL SPEC NAA+PROBE: SIGNIFICANT CHANGE UP
BASOPHILS # BLD AUTO: 0.03 K/UL — SIGNIFICANT CHANGE UP (ref 0–0.2)
BASOPHILS NFR BLD AUTO: 0.5 % — SIGNIFICANT CHANGE UP (ref 0–2)
BILIRUB SERPL-MCNC: 0.3 MG/DL — SIGNIFICANT CHANGE UP (ref 0.2–1.2)
BORDETELLA PARAPERTUSSIS (RAPRVP): SIGNIFICANT CHANGE UP
BUN SERPL-MCNC: 41 MG/DL — HIGH (ref 7–23)
BUN SERPL-MCNC: 44 MG/DL — HIGH (ref 7–23)
C PNEUM DNA SPEC QL NAA+PROBE: SIGNIFICANT CHANGE UP
CALCIUM SERPL-MCNC: 8.4 MG/DL — SIGNIFICANT CHANGE UP (ref 8.4–10.5)
CALCIUM SERPL-MCNC: 8.7 MG/DL — SIGNIFICANT CHANGE UP (ref 8.4–10.5)
CHLORIDE SERPL-SCNC: 106 MMOL/L — SIGNIFICANT CHANGE UP (ref 98–107)
CHLORIDE SERPL-SCNC: 107 MMOL/L — SIGNIFICANT CHANGE UP (ref 98–107)
CK MB BLD-MCNC: 3.1 % — HIGH (ref 0–2.5)
CK MB CFR SERPL CALC: 5 NG/ML — HIGH
CK SERPL-CCNC: 160 U/L — SIGNIFICANT CHANGE UP (ref 25–170)
CO2 SERPL-SCNC: 19 MMOL/L — LOW (ref 22–31)
CO2 SERPL-SCNC: 22 MMOL/L — SIGNIFICANT CHANGE UP (ref 22–31)
CREAT ?TM UR-MCNC: 68 MG/DL — SIGNIFICANT CHANGE UP
CREAT SERPL-MCNC: 1.8 MG/DL — HIGH (ref 0.5–1.3)
CREAT SERPL-MCNC: 2.33 MG/DL — HIGH (ref 0.5–1.3)
EGFR: 22 ML/MIN/1.73M2 — LOW
EGFR: 30 ML/MIN/1.73M2 — LOW
EOSINOPHIL # BLD AUTO: 0.3 K/UL — SIGNIFICANT CHANGE UP (ref 0–0.5)
EOSINOPHIL NFR BLD AUTO: 5.1 % — SIGNIFICANT CHANGE UP (ref 0–6)
FLUAV SUBTYP SPEC NAA+PROBE: SIGNIFICANT CHANGE UP
FLUBV RNA SPEC QL NAA+PROBE: SIGNIFICANT CHANGE UP
GLUCOSE SERPL-MCNC: 172 MG/DL — HIGH (ref 70–99)
GLUCOSE SERPL-MCNC: 191 MG/DL — HIGH (ref 70–99)
HADV DNA SPEC QL NAA+PROBE: SIGNIFICANT CHANGE UP
HCOV 229E RNA SPEC QL NAA+PROBE: SIGNIFICANT CHANGE UP
HCOV HKU1 RNA SPEC QL NAA+PROBE: SIGNIFICANT CHANGE UP
HCOV NL63 RNA SPEC QL NAA+PROBE: SIGNIFICANT CHANGE UP
HCOV OC43 RNA SPEC QL NAA+PROBE: SIGNIFICANT CHANGE UP
HCT VFR BLD CALC: 28.4 % — LOW (ref 34.5–45)
HGB BLD-MCNC: 8.6 G/DL — LOW (ref 11.5–15.5)
HMPV RNA SPEC QL NAA+PROBE: SIGNIFICANT CHANGE UP
HPIV1 RNA SPEC QL NAA+PROBE: SIGNIFICANT CHANGE UP
HPIV2 RNA SPEC QL NAA+PROBE: SIGNIFICANT CHANGE UP
HPIV3 RNA SPEC QL NAA+PROBE: SIGNIFICANT CHANGE UP
HPIV4 RNA SPEC QL NAA+PROBE: SIGNIFICANT CHANGE UP
IANC: 4.1 K/UL — SIGNIFICANT CHANGE UP (ref 1.8–7.4)
IMM GRANULOCYTES NFR BLD AUTO: 0.5 % — SIGNIFICANT CHANGE UP (ref 0–1.5)
LYMPHOCYTES # BLD AUTO: 0.96 K/UL — LOW (ref 1–3.3)
LYMPHOCYTES # BLD AUTO: 16.3 % — SIGNIFICANT CHANGE UP (ref 13–44)
M PNEUMO DNA SPEC QL NAA+PROBE: SIGNIFICANT CHANGE UP
MAGNESIUM SERPL-MCNC: 1.4 MG/DL — LOW (ref 1.6–2.6)
MCHC RBC-ENTMCNC: 25.4 PG — LOW (ref 27–34)
MCHC RBC-ENTMCNC: 30.3 GM/DL — LOW (ref 32–36)
MCV RBC AUTO: 84 FL — SIGNIFICANT CHANGE UP (ref 80–100)
MONOCYTES # BLD AUTO: 0.46 K/UL — SIGNIFICANT CHANGE UP (ref 0–0.9)
MONOCYTES NFR BLD AUTO: 7.8 % — SIGNIFICANT CHANGE UP (ref 2–14)
NEUTROPHILS # BLD AUTO: 4.1 K/UL — SIGNIFICANT CHANGE UP (ref 1.8–7.4)
NEUTROPHILS NFR BLD AUTO: 69.8 % — SIGNIFICANT CHANGE UP (ref 43–77)
NRBC # BLD: 0 /100 WBCS — SIGNIFICANT CHANGE UP
NRBC # FLD: 0 K/UL — SIGNIFICANT CHANGE UP
NT-PROBNP SERPL-SCNC: HIGH PG/ML
OSMOLALITY UR: 440 MOSM/KG — SIGNIFICANT CHANGE UP (ref 50–1200)
PHOSPHATE SERPL-MCNC: 4.4 MG/DL — SIGNIFICANT CHANGE UP (ref 2.5–4.5)
PLATELET # BLD AUTO: 235 K/UL — SIGNIFICANT CHANGE UP (ref 150–400)
POTASSIUM SERPL-MCNC: 3.8 MMOL/L — SIGNIFICANT CHANGE UP (ref 3.5–5.3)
POTASSIUM SERPL-MCNC: 3.9 MMOL/L — SIGNIFICANT CHANGE UP (ref 3.5–5.3)
POTASSIUM SERPL-SCNC: 3.8 MMOL/L — SIGNIFICANT CHANGE UP (ref 3.5–5.3)
POTASSIUM SERPL-SCNC: 3.9 MMOL/L — SIGNIFICANT CHANGE UP (ref 3.5–5.3)
PROT SERPL-MCNC: 7.3 G/DL — SIGNIFICANT CHANGE UP (ref 6–8.3)
RAPID RVP RESULT: DETECTED
RBC # BLD: 3.38 M/UL — LOW (ref 3.8–5.2)
RBC # FLD: 16.2 % — HIGH (ref 10.3–14.5)
RSV RNA SPEC QL NAA+PROBE: SIGNIFICANT CHANGE UP
RV+EV RNA SPEC QL NAA+PROBE: DETECTED
SARS-COV-2 RNA SPEC QL NAA+PROBE: SIGNIFICANT CHANGE UP
SODIUM SERPL-SCNC: 139 MMOL/L — SIGNIFICANT CHANGE UP (ref 135–145)
SODIUM SERPL-SCNC: 140 MMOL/L — SIGNIFICANT CHANGE UP (ref 135–145)
SODIUM UR-SCNC: 88 MMOL/L — SIGNIFICANT CHANGE UP
TROPONIN T, HIGH SENSITIVITY RESULT: 70 NG/L — CRITICAL HIGH
TROPONIN T, HIGH SENSITIVITY RESULT: 78 NG/L — CRITICAL HIGH
TROPONIN T, HIGH SENSITIVITY RESULT: 81 NG/L — CRITICAL HIGH
UUN UR-MCNC: 457.5 MG/DL — SIGNIFICANT CHANGE UP
WBC # BLD: 5.88 K/UL — SIGNIFICANT CHANGE UP (ref 3.8–10.5)
WBC # FLD AUTO: 5.88 K/UL — SIGNIFICANT CHANGE UP (ref 3.8–10.5)

## 2022-06-04 PROCEDURE — 99291 CRITICAL CARE FIRST HOUR: CPT | Mod: GC

## 2022-06-04 PROCEDURE — 99223 1ST HOSP IP/OBS HIGH 75: CPT

## 2022-06-04 PROCEDURE — 71046 X-RAY EXAM CHEST 2 VIEWS: CPT | Mod: 26

## 2022-06-04 PROCEDURE — 93010 ELECTROCARDIOGRAM REPORT: CPT

## 2022-06-04 PROCEDURE — 93308 TTE F-UP OR LMTD: CPT | Mod: 26

## 2022-06-04 RX ORDER — ATORVASTATIN CALCIUM 80 MG/1
20 TABLET, FILM COATED ORAL AT BEDTIME
Refills: 0 | Status: DISCONTINUED | OUTPATIENT
Start: 2022-06-04 | End: 2022-06-08

## 2022-06-04 RX ORDER — SODIUM CHLORIDE 9 MG/ML
1000 INJECTION, SOLUTION INTRAVENOUS
Refills: 0 | Status: DISCONTINUED | OUTPATIENT
Start: 2022-06-04 | End: 2022-06-08

## 2022-06-04 RX ORDER — GLUCAGON INJECTION, SOLUTION 0.5 MG/.1ML
1 INJECTION, SOLUTION SUBCUTANEOUS ONCE
Refills: 0 | Status: DISCONTINUED | OUTPATIENT
Start: 2022-06-04 | End: 2022-06-08

## 2022-06-04 RX ORDER — INSULIN LISPRO 100/ML
VIAL (ML) SUBCUTANEOUS AT BEDTIME
Refills: 0 | Status: DISCONTINUED | OUTPATIENT
Start: 2022-06-04 | End: 2022-06-08

## 2022-06-04 RX ORDER — NITROGLYCERIN 6.5 MG
0.4 CAPSULE, EXTENDED RELEASE ORAL ONCE
Refills: 0 | Status: COMPLETED | OUTPATIENT
Start: 2022-06-04 | End: 2022-06-04

## 2022-06-04 RX ORDER — DEXTROSE 50 % IN WATER 50 %
12.5 SYRINGE (ML) INTRAVENOUS ONCE
Refills: 0 | Status: DISCONTINUED | OUTPATIENT
Start: 2022-06-04 | End: 2022-06-08

## 2022-06-04 RX ORDER — HYDRALAZINE HCL 50 MG
25 TABLET ORAL DAILY
Refills: 0 | Status: DISCONTINUED | OUTPATIENT
Start: 2022-06-04 | End: 2022-06-05

## 2022-06-04 RX ORDER — AMLODIPINE BESYLATE 2.5 MG/1
10 TABLET ORAL DAILY
Refills: 0 | Status: DISCONTINUED | OUTPATIENT
Start: 2022-06-04 | End: 2022-06-08

## 2022-06-04 RX ORDER — DEXTROSE 50 % IN WATER 50 %
25 SYRINGE (ML) INTRAVENOUS ONCE
Refills: 0 | Status: DISCONTINUED | OUTPATIENT
Start: 2022-06-04 | End: 2022-06-08

## 2022-06-04 RX ORDER — DEXTROSE 50 % IN WATER 50 %
15 SYRINGE (ML) INTRAVENOUS ONCE
Refills: 0 | Status: DISCONTINUED | OUTPATIENT
Start: 2022-06-04 | End: 2022-06-08

## 2022-06-04 RX ORDER — ACETAMINOPHEN 500 MG
975 TABLET ORAL ONCE
Refills: 0 | Status: COMPLETED | OUTPATIENT
Start: 2022-06-04 | End: 2022-06-04

## 2022-06-04 RX ORDER — IPRATROPIUM/ALBUTEROL SULFATE 18-103MCG
3 AEROSOL WITH ADAPTER (GRAM) INHALATION EVERY 6 HOURS
Refills: 0 | Status: DISCONTINUED | OUTPATIENT
Start: 2022-06-04 | End: 2022-06-06

## 2022-06-04 RX ORDER — INSULIN LISPRO 100/ML
VIAL (ML) SUBCUTANEOUS
Refills: 0 | Status: DISCONTINUED | OUTPATIENT
Start: 2022-06-04 | End: 2022-06-08

## 2022-06-04 RX ORDER — METOPROLOL TARTRATE 50 MG
25 TABLET ORAL EVERY 24 HOURS
Refills: 0 | Status: DISCONTINUED | OUTPATIENT
Start: 2022-06-04 | End: 2022-06-07

## 2022-06-04 RX ORDER — INFLUENZA VIRUS VACCINE 15; 15; 15; 15 UG/.5ML; UG/.5ML; UG/.5ML; UG/.5ML
0.7 SUSPENSION INTRAMUSCULAR ONCE
Refills: 0 | Status: DISCONTINUED | OUTPATIENT
Start: 2022-06-04 | End: 2022-06-08

## 2022-06-04 RX ORDER — ASPIRIN/CALCIUM CARB/MAGNESIUM 324 MG
81 TABLET ORAL DAILY
Refills: 0 | Status: DISCONTINUED | OUTPATIENT
Start: 2022-06-04 | End: 2022-06-08

## 2022-06-04 RX ORDER — FUROSEMIDE 40 MG
40 TABLET ORAL ONCE
Refills: 0 | Status: COMPLETED | OUTPATIENT
Start: 2022-06-04 | End: 2022-06-04

## 2022-06-04 RX ORDER — INSULIN GLARGINE 100 [IU]/ML
24 INJECTION, SOLUTION SUBCUTANEOUS AT BEDTIME
Refills: 0 | Status: DISCONTINUED | OUTPATIENT
Start: 2022-06-04 | End: 2022-06-08

## 2022-06-04 RX ORDER — MAGNESIUM SULFATE 500 MG/ML
1 VIAL (ML) INJECTION ONCE
Refills: 0 | Status: COMPLETED | OUTPATIENT
Start: 2022-06-04 | End: 2022-06-04

## 2022-06-04 RX ORDER — NITROGLYCERIN 6.5 MG
1 CAPSULE, EXTENDED RELEASE ORAL ONCE
Refills: 0 | Status: COMPLETED | OUTPATIENT
Start: 2022-06-04 | End: 2022-06-04

## 2022-06-04 RX ORDER — ENOXAPARIN SODIUM 100 MG/ML
40 INJECTION SUBCUTANEOUS EVERY 24 HOURS
Refills: 0 | Status: DISCONTINUED | OUTPATIENT
Start: 2022-06-04 | End: 2022-06-08

## 2022-06-04 RX ADMIN — INSULIN GLARGINE 24 UNIT(S): 100 INJECTION, SOLUTION SUBCUTANEOUS at 22:16

## 2022-06-04 RX ADMIN — Medication 3 MILLILITER(S): at 22:22

## 2022-06-04 RX ADMIN — Medication 3 MILLILITER(S): at 15:15

## 2022-06-04 RX ADMIN — ATORVASTATIN CALCIUM 20 MILLIGRAM(S): 80 TABLET, FILM COATED ORAL at 22:23

## 2022-06-04 RX ADMIN — Medication 1: at 12:58

## 2022-06-04 RX ADMIN — Medication 0.4 MILLIGRAM(S): at 03:28

## 2022-06-04 RX ADMIN — Medication 1: at 18:18

## 2022-06-04 RX ADMIN — Medication 1 INCH(S): at 15:00

## 2022-06-04 RX ADMIN — Medication 25 MILLIGRAM(S): at 11:19

## 2022-06-04 RX ADMIN — Medication 100 GRAM(S): at 18:53

## 2022-06-04 RX ADMIN — Medication 25 MILLIGRAM(S): at 11:18

## 2022-06-04 RX ADMIN — AMLODIPINE BESYLATE 10 MILLIGRAM(S): 2.5 TABLET ORAL at 11:18

## 2022-06-04 RX ADMIN — Medication 40 MILLIGRAM(S): at 02:20

## 2022-06-04 RX ADMIN — Medication 1 INCH(S): at 03:47

## 2022-06-04 RX ADMIN — Medication 975 MILLIGRAM(S): at 02:39

## 2022-06-04 RX ADMIN — ENOXAPARIN SODIUM 40 MILLIGRAM(S): 100 INJECTION SUBCUTANEOUS at 11:19

## 2022-06-04 RX ADMIN — Medication 81 MILLIGRAM(S): at 11:18

## 2022-06-04 NOTE — H&P ADULT - HISTORY OF PRESENT ILLNESS
65 y/o F with PMH HTN, HLD, DM2, pancreatic pseudocyst, iron deficiency anemia who presents for worsening shortness of breath.  65 y/o F with PMH HTN, HLD, DM2, pancreatic pseudocyst, iron deficiency anemia who presents for worsening shortness of breath. Patient is also very hard of hearing. She reports she came into the hospital after experiencing progressively worsening shortness of breath over the past two nights. She finally came to the hospital because she said she could not breathe. She was also experiencing chest pain which she says was under her left breast. She says that they did an ultrasound and put the breathing machine on her which made her feel much better. Currently, she feels she is not having SOB and chest pain has resolved. At home, she says she intermittently has chest pain but it is not associated with exercise. She does not take her blood pressure at home but she does take her medications every day. She denies dizziness/lightheadedness. No nausea/vomiting, abdominal pain, diarrhea/constipation. All other ROS negative.

## 2022-06-04 NOTE — H&P ADULT - PROBLEM SELECTOR PLAN 3
Hx of DM. On Detemir 24U and premeal 6-8U.   - F/u A1C  - Lantus 24U qhs   - ISS   - Add pre-meal as necessary

## 2022-06-04 NOTE — CONSULT NOTE ADULT - NS ATTEND AMEND GEN_ALL_CORE FT
Combination of Enterovirus/HTN likely triggering flash pulmonary edema, now euvolemic  Would increase Hydralazine to 50 TID, continue Amlodipine and Metoprolol  Can hold further diuresis  Prior TTE was normal several months ago, follow up today's

## 2022-06-04 NOTE — ED ADULT NURSE NOTE - CHIEF COMPLAINT QUOTE
Pt [place] c/o SOB, chest pain with inspiration, L. sided pain. Pt tachypneic in triage, 96% on room air, placed on 2L of O2 for comfort. PMHx diabetes, pancreatic cyst, htn, hld

## 2022-06-04 NOTE — ED PROVIDER NOTE - ATTENDING CONTRIBUTION TO CARE
Attending note:   After face to face evaluation of this patient, I concur with above noted hx, pe, and care plan for this patient.  Antonio: 66 yof with hx of HTN, DM, sarcoidosis complaining of few days of SOB and left sided chest pain. Pt  notes that she feel SOB worse with exertion and lying flat. Also with cough but that is chronic. Denies fever, nausea, vomiting or abd pain. Pt's MIGUELANGEL states that she had "heart failure" in the past but it was due to pancreas issue but now had surgery for it. Pt became tachypneic when moving around and desat 90% when off O2 and moving, +resp distress, +wheeze, no JVD, RRR but tachy, abd soft with hernia and no tn, no CVAt, right more than left pitting edema.  Pt with CHF vs PE given tachycardia vs PNA less likely as she is afebrile. CXR shows no focal consolidation but POCUS shows small effusion pericardial, bilateral pleural, and B lines. More concern for CHF. Pt placed in BIPAP and feels better.

## 2022-06-04 NOTE — CONSULT NOTE ADULT - SUBJECTIVE AND OBJECTIVE BOX
CHIEF COMPLAINT: SOB    HPI: 66F [place] of with PMH significant for HTN, HLD, IDDM, MAGALY, sarcoidosis, pancreatic pseudocyst s/p drainage p/w SOB and right-sided CP x 1 day. Patient also reports cough, which is chronic. Denies radiation of chest pain, palpitations, fevers/chills, n/v/d, abdominal pain, headache.     In the ED, patient tachycardic to 110's, hypertensive to 207/122, and tachypneic on 2L NC (satting 100%). Labs notable for BUN/Cr 41/1.8 (b/l Cr 1.2), trop 70, proBNP 30k. RVP positive for entero/rhinovirus. CXR wnl. POC ultrasound in ED show b/l pulmonary edema. Pt received IV lasix 40mg x 1 and was placed on BiPAP for increased WOB. Additionally, she got SLNG x 1 and nitro paste for HTN, with improvement of SBP to 130-150's. Upon evaluation, patient appears very comfortable on BiPAP, reporting significant improvement in respiratory discomfort.     PAST MEDICAL & SURGICAL HISTORY:  Type 2 diabetes mellitus  Bilateral cataracts  Fluid in pleural cavity associated with pancreatitis  Pancreatic pseudocyst/cyst  s/p drain placement and removal  HTN (hypertension)  HLD (hyperlipidemia)      History of amputation of right great toe        H/O:  section        History of laparoscopic cholecystectomy          FAMILY HISTORY:      SOCIAL HISTORY:    Allergies  penicillin ([place] [facility])    Intolerances        HOME MEDICATIONS:    Review of Systems:     Constitutional: No weakness, fever, chills     Eyes: No blindness, no eye pain    ENT: No throat pain, no rhinorrhea     Neck: No pain or stiffness     Respiratory: +cough, shortness of breath     Cardiovascular: No palpitations +CP    Gastrointestinal: No abdominal or epigastric pain. No nausea, vomiting, or diarrhea     Genitourinary: No dysuria, no change in frequency, no hematuria     Neurological: No numbness or weakness      Skin: No itching, burning, rashes, or lesions     Psych: No depression or anxiety       OBJECTIVE:  ICU Vital Signs Last 24 Hrs  T(C): 37 (2022 03:40), Max: 37 (2022 03:40)  T(F): 98.6 (2022 03:40), Max: 98.6 (2022 03:40)  HR: 81 (2022 03:40) (78 - 113)  BP: 138/95 (2022 03:40) (138/95 - 207/122)  RR: 22 (2022 03:40) (22 - 25)  SpO2: 98% (2022 03:40) (98% - 100%)        CAPILLARY BLOOD GLUCOSE  POCT Blood Glucose.: 183 mg/dL (2022 00:07)      Physical Exam:     General: No acute distress, well-appearing    Eyes: PERRL, EOMI     ENT: MMM, no oropharyngeal lesions or erythema appreciated     Pulm: No increased WOB. B/l crackles. No wheezing. BiPAP.     CV: RRR. S1&S2+. No M/R/G appreciated.     Abdomen: +BS. Soft, NTND. No organomegaly.     MSK: Nml ROM    Extremities: BLE edema     Neuro: A&Ox2, no focal deficits     Skin: Warm and dry. No visible rash.     HOSPITAL MEDICATIONS:      LABS:                        8.6    5.88  )-----------( 235      ( 2022 00:50 )             28.4     06-04    139  |  107  |  41<H>  ----------------------------<  191<H>  3.8   |  19<L>  |  1.80<H>    Ca    8.7      2022 00:50    TPro  7.3  /  Alb  3.2<L>  /  TBili  0.3  /  DBili  x   /  AST  20  /  ALT  13  /  AlkPhos  119  06-04              MICROBIOLOGY:     RADIOLOGY:  [ ] Reviewed and interpreted by me    EKG:

## 2022-06-04 NOTE — ED PROVIDER NOTE - PHYSICAL EXAMINATION
Physical Exam:  Gen: NAD, AOx3, non-toxic appearing  HEENT: EOMI, MMM  Lung: +diffuse expiratory wheezing, +tachypnea,  speaking in short sentences   CV: Rapid rate, RR, no murmurs, rubs or gallops  Abd: soft, NT, distended abdomen with reducible umbilical hernia, no guarding, no rigidity, no rebound tenderness  MSK: no visible deformities, ROM normal in UE/LE  Neuro: No focal sensory or motor deficits  Skin: Warm, well perfused, no rash, RLE w/ 2+ pitting edema, LLE with 1+ pitting edema   Mima Zepeda D.O. -Resident

## 2022-06-04 NOTE — CONSULT NOTE ADULT - ASSESSMENT
66F [place] of with PMH significant for HTN, HLD, IDDM, MAGALY, sarcoidosis, pancreatic pseudocyst s/p drainage p/w SOB and right-sided CP x 1 day. Pt with HTN urgency, MANDY, volume overloaded, and entero/rhinovirus positive. MICU consulted for new BiPAP requirement.     #Dyspnea   -In setting of HTN urgency/flash pulmonary edema, improved with lasix and BiPAP.   -Patient very comfortable on current BiPAP settings, would continue for now   -Would recommend obtaining formal TTE during this hospitalization as her last TTE in 2/2022 showed normal LVSF and mild diastolic HF.     Patient not a MICU candidate at this time, please reconsult as needed.       Dimple Joyner, PGY-2   Available on [employer] Teams.

## 2022-06-04 NOTE — H&P ADULT - NSHPLABSRESULTS_GEN_ALL_CORE
.  LABS:                         8.6    5.88  )-----------( 235      ( 04 Jun 2022 00:50 )             28.4     06-04    139  |  107  |  41<H>  ----------------------------<  191<H>  3.8   |  19<L>  |  1.80<H>    Ca    8.7      04 Jun 2022 00:50    TPro  7.3  /  Alb  3.2<L>  /  TBili  0.3  /  DBili  x   /  AST  20  /  ALT  13  /  AlkPhos  119  06-04            Serum Pro-Brain Natriuretic Peptide: [id number] pg/mL (06-04 @ 00:50)        RADIOLOGY, EKG & ADDITIONAL TESTS: Reviewed.

## 2022-06-04 NOTE — CONSULT NOTE ADULT - SUBJECTIVE AND OBJECTIVE BOX
67 y/o F with PMH HTN, HLD, DM2, pancreatic pseudocyst, iron deficiency anemia who presents for worsening shortness of breath found to have flash pulmonary edema 2/2 hypertensive emergency + entero/rhinovirus.   Cardiology called for elevated troponin    67 y/o F with PMH HTN, HLD, DM2, pancreatic pseudocyst, iron deficiency anemia   HISTORY OF PRESENT ILLNESS:  Patient is a 66y old  Female who presents with a chief complaint of SOB (2022 10:02)    HPI:  67 y/o F with PMH HTN, HLD, DM2, pancreatic pseudocyst, iron deficiency anemia, hard of hearing presents for worsening shortness of breath found to have flash pulmonary edema 2/2 hypertensive emergency + entero/rhinovirus.  Cardiology called for elevated troponin.         Allergies  penicillin ([facility] Synd)        MEDICATIONS:  amLODIPine   Tablet 10 milliGRAM(s) Oral daily  aspirin enteric coated 81 milliGRAM(s) Oral daily  enoxaparin Injectable 40 milliGRAM(s) SubCutaneous every 24 hours  hydrALAZINE 25 milliGRAM(s) Oral daily  metoprolol tartrate 25 milliGRAM(s) Oral every 24 hours  albuterol/ipratropium for Nebulization 3 milliLiter(s) Nebulizer every 6 hours  atorvastatin 20 milliGRAM(s) Oral at bedtime  dextrose 50% Injectable 25 Gram(s) IV Push once  dextrose 50% Injectable 12.5 Gram(s) IV Push once  dextrose 50% Injectable 25 Gram(s) IV Push once  dextrose Oral Gel 15 Gram(s) Oral once PRN  glucagon  Injectable 1 milliGRAM(s) IntraMuscular once  insulin glargine Injectable (LANTUS) 24 Unit(s) SubCutaneous at bedtime  insulin lispro (ADMELOG) corrective regimen sliding scale   SubCutaneous three times a day before meals  insulin lispro (ADMELOG) corrective regimen sliding scale   SubCutaneous at bedtime  dextrose 5%. 1000 milliLiter(s) IV Continuous <Continuous>  dextrose 5%. 1000 milliLiter(s) IV Continuous <Continuous>  influenza  Vaccine (HIGH DOSE) 0.7 milliLiter(s) IntraMuscular once      PAST MEDICAL & SURGICAL HISTORY:  Type 2 diabetes mellitus  Bilateral cataracts  Fluid in pleural cavity associated with pancreatitis  Pancreatic pseudocyst/cyst  s/p drain placement and removal  HTN (hypertension)  HLD (hyperlipidemia)  History of amputation of right great toe    H/O:  section    History of laparoscopic cholecystectomy    FAMILY HISTORY:      SOCIAL HISTORY:      Smoker: [ ] Active [ ] never  [ ] previous  Alcohol:  [ ] social [ ] daily [ ] never  Lives with:   Occupation:    REVIEW OF SYSTEMS  See HPI. Otherwise, 10 point ROS done and otherwise negative.  >>> <<<    PHYSICAL EXAM:  T(C): 36.6 (22 @ 17:30), Max: 37 (22 @ 03:40)  HR: 83 (22 @ 17:30) (78 - 113)  BP: 157/87 (22 @ 17:30) (138/95 - 207/122)  RR: 19 (22 @ 17:30) (19 - 25)  SpO2: 100% (22 @ 17:30) (97% - 100%)  Wt(kg): --    Appearance: Normal	  HEENT:   Normal oral mucosa, PERRL, EOMI	  Lymphatic: No lymphadenopathy  Cardiovascular: Normal S1 S2, No JVD, No murmurs, No edema  Respiratory: Lungs clear to auscultation	  Psychiatry: A & O x 3, Mood & affect appropriate  Gastrointestinal:  Soft, Non-tender, + BS	  Skin: No rashes, No ecchymoses, No cyanosis	  Neurologic: Non-focal, A&Ox3, nonfocal, TONEY x 4  Extremities: Normal range of motion, No clubbing, cyanosis or edema  Vascular: Peripheral pulses palpable 2+ bilaterally    I&O's Summary    2022 07:01  -  2022 19:41  --------------------------------------------------------  IN: 0 mL / OUT: 400 mL / NET: -400 mL      	 	  LABS:	 	                          8.6    5.88  )-----------( 235      ( 2022 00:50 )             28.4         140  |  106  |  44<H>  ----------------------------<  172<H>  3.9   |  22  |  2.33<H>      139  |  107  |  41<H>  ----------------------------<  191<H>  3.8   |  19<L>  |  1.80<H>  Ca    8.4      2022 16:54  Ca    8.7      2022 00:50  Phos  4.4     -  Mg     1.40     -  TPro  7.3  /  Alb  3.2<L>  /  TBili  0.3  /  DBili  x   /  AST  20  /  ALT  13  /  AlkPhos  119  -  LIVER FUNCTIONS - ( 2022 00:50 )  Alb: 3.2 g/dL / Pro: 7.3 g/dL / ALK PHOS: 119 U/L / ALT: 13 U/L / AST: 20 U/L / GGT: x         proBNP: Serum Pro-Brain Natriuretic Peptide: 83911 pg/mL ( @ 00:50)  Lipid Profile:   HgA1c:   TSH:   	 65 y/o F with PMH HTN, HLD, DM2, pancreatic pseudocyst, iron deficiency anemia   HISTORY OF PRESENT ILLNESS:  Patient is a 66y old  Female who presents with a chief complaint of SOB (2022 10:02)    HPI:  65 y/o F with PMH HTN, HLD, DM2, pancreatic pseudocyst, iron deficiency anemia, hard of hearing presents for worsening shortness of breath found to have flash pulmonary edema 2/2 hypertensive emergency + entero/rhinovirus.  Cardiology called for elevated troponin. Pt denies chest pain. EKG         Allergies  penicillin ([facility] Synd)        MEDICATIONS:  amLODIPine   Tablet 10 milliGRAM(s) Oral daily  aspirin enteric coated 81 milliGRAM(s) Oral daily  enoxaparin Injectable 40 milliGRAM(s) SubCutaneous every 24 hours  hydrALAZINE 25 milliGRAM(s) Oral daily  metoprolol tartrate 25 milliGRAM(s) Oral every 24 hours  albuterol/ipratropium for Nebulization 3 milliLiter(s) Nebulizer every 6 hours  atorvastatin 20 milliGRAM(s) Oral at bedtime  dextrose 50% Injectable 25 Gram(s) IV Push once  dextrose 50% Injectable 12.5 Gram(s) IV Push once  dextrose 50% Injectable 25 Gram(s) IV Push once  dextrose Oral Gel 15 Gram(s) Oral once PRN  glucagon  Injectable 1 milliGRAM(s) IntraMuscular once  insulin glargine Injectable (LANTUS) 24 Unit(s) SubCutaneous at bedtime  insulin lispro (ADMELOG) corrective regimen sliding scale   SubCutaneous three times a day before meals  insulin lispro (ADMELOG) corrective regimen sliding scale   SubCutaneous at bedtime  dextrose 5%. 1000 milliLiter(s) IV Continuous <Continuous>  dextrose 5%. 1000 milliLiter(s) IV Continuous <Continuous>  influenza  Vaccine (HIGH DOSE) 0.7 milliLiter(s) IntraMuscular once      PAST MEDICAL & SURGICAL HISTORY:  Type 2 diabetes mellitus  Bilateral cataracts  Fluid in pleural cavity associated with pancreatitis  Pancreatic pseudocyst/cyst  s/p drain placement and removal  HTN (hypertension)  HLD (hyperlipidemia)  History of amputation of right great toe    H/O:  section    History of laparoscopic cholecystectomy    FAMILY HISTORY:      SOCIAL HISTORY:      Smoker: [ ] Active [ ] never  [ ] previous  Alcohol:  [ ] social [ ] daily [ ] never  Lives with:   Occupation:    REVIEW [employer]  See HPI. Otherwise, 10 point ROS done and otherwise negative.  >>> <<<    PHYSICAL EXAM:  T(C): 36.6 (22 @ 17:30), Max: 37 (22 @ 03:40)  HR: 83 (22 @ 17:30) (78 - 113)  BP: 157/87 (22 @ 17:30) (138/95 - 207/122)  RR: 19 (22 @ 17:30) (19 - 25)  SpO2: 100% (22 @ 17:30) (97% - 100%)  Wt(kg): --    Appearance: Normal	  HEENT:   Normal oral mucosa, PERRL, EOMI	  Lymphatic: No lymphadenopathy  Cardiovascular: Normal S1 S2, No JVD, No murmurs, No edema  Respiratory: Lungs clear to auscultation	  Psychiatry: A & O x 3, Mood & affect appropriate  Gastrointestinal:  Soft, Non-tender, + BS	  Skin: No rashes, No ecchymoses, No cyanosis	  Neurologic: Non-focal, A&Ox3, nonfocal, TONEY x 4  Extremities: Normal range of motion, No clubbing, cyanosis or edema  Vascular: Peripheral pulses palpable 2+ bilaterally    I&O's Summary    2022 07:01  -  2022 19:41  --------------------------------------------------------  IN: 0 mL / OUT: 400 mL / NET: -400 mL      	 	  LABS:	 	                          8.6    5.88  )-----------( 235      ( 2022 00:50 )             28.4         140  |  106  |  44<H>  ----------------------------<  172<H>  3.9   |  22  |  2.33<H>      139  |  107  |  41<H>  ----------------------------<  191<H>  3.8   |  19<L>  |  1.80<H>  Ca    8.4      2022 16:54  Ca    8.7      2022 00:50  Phos  4.4     -  Mg     1.40     -  TPro  7.3  /  Alb  3.2<L>  /  TBili  0.3  /  DBili  x   /  AST  20  /  ALT  13  /  AlkPhos  119  -  LIVER FUNCTIONS - ( 2022 00:50 )  Alb: 3.2 g/dL / Pro: 7.3 g/dL / ALK PHOS: 119 U/L / ALT: 13 U/L / AST: 20 U/L / GGT: x         proBNP: Serum Pro-Brain Natriuretic Peptide: 56754 pg/mL ( @ 00:50)  Lipid Profile:   HgA1c:   TSH:   	 67 y/o F with PMH HTN, HLD, DM2, pancreatic pseudocyst, iron deficiency anemia   HISTORY OF PRESENT ILLNESS:  Patient is a 66y old  Female who presents with a chief complaint of SOB (2022 10:02)    HPI:  67 y/o F with PMH HTN, HLD, DM2, pancreatic pseudocyst, iron deficiency anemia, hard of hearing presents for worsening shortness of breath found to have flash pulmonary edema 2/2 hypertensive emergency + entero/rhinovirus.  Cardiology called for elevated troponin.       Allergies  penicillin ([facility] Synd)        MEDICATIONS:  amLODIPine   Tablet 10 milliGRAM(s) Oral daily  aspirin enteric coated 81 milliGRAM(s) Oral daily  enoxaparin Injectable 40 milliGRAM(s) SubCutaneous every 24 hours  hydrALAZINE 25 milliGRAM(s) Oral daily  metoprolol tartrate 25 milliGRAM(s) Oral every 24 hours  albuterol/ipratropium for Nebulization 3 milliLiter(s) Nebulizer every 6 hours  atorvastatin 20 milliGRAM(s) Oral at bedtime  dextrose 50% Injectable 25 Gram(s) IV Push once  dextrose 50% Injectable 12.5 Gram(s) IV Push once  dextrose 50% Injectable 25 Gram(s) IV Push once  dextrose Oral Gel 15 Gram(s) Oral once PRN  glucagon  Injectable 1 milliGRAM(s) IntraMuscular once  insulin glargine Injectable (LANTUS) 24 Unit(s) SubCutaneous at bedtime  insulin lispro (ADMELOG) corrective regimen sliding scale   SubCutaneous three times a day before meals  insulin lispro (ADMELOG) corrective regimen sliding scale   SubCutaneous at bedtime  dextrose 5%. 1000 milliLiter(s) IV Continuous <Continuous>  dextrose 5%. 1000 milliLiter(s) IV Continuous <Continuous>  influenza  Vaccine (HIGH DOSE) 0.7 milliLiter(s) IntraMuscular once      PAST MEDICAL & SURGICAL HISTORY:  Type 2 diabetes mellitus  Bilateral cataracts  Fluid in pleural cavity associated with pancreatitis  Pancreatic pseudocyst/cyst  s/p drain placement and removal  HTN (hypertension)  HLD (hyperlipidemia)  History of amputation of right great toe    H/O:  section    History of laparoscopic cholecystectomy    FAMILY HISTORY:      SOCIAL HISTORY:      Smoker: [ ] Active [ ] never  [ ] previous  Alcohol:  [ ] social [ ] daily [ ] never  Lives with:   Occupation:    REVIEW [employer]  See HPI. Otherwise, 10 point ROS done and otherwise negative.  >>> <<<    PHYSICAL EXAM:  T(C): 36.6 (22 @ 17:30), Max: 37 (22 @ 03:40)  HR: 83 (22 @ 17:30) (78 - 113)  BP: 157/87 (22 @ 17:30) (138/95 - 207/122)  RR: 19 (22 @ 17:30) (19 - 25)  SpO2: 100% (22 @ 17:30) (97% - 100%)  Wt(kg): --    Appearance: Normal	  HEENT:   Normal oral mucosa, PERRL, EOMI	  Lymphatic: No lymphadenopathy  Cardiovascular: Normal S1 S2, No JVD, No murmurs, No edema  Respiratory: Lungs clear to auscultation	  Psychiatry: A & O x 3, Mood & affect appropriate  Gastrointestinal:  Soft, Non-tender, + BS	  Skin: No rashes, No ecchymoses, No cyanosis	  Neurologic: Non-focal, A&Ox3, nonfocal, TONEY x 4  Extremities: Normal range of motion, No clubbing, cyanosis or edema  Vascular: Peripheral pulses palpable 2+ bilaterally    I&O's Summary    2022 07:01  -  2022 19:41  --------------------------------------------------------  IN: 0 mL / OUT: 400 mL / NET: -400 mL      	 	  LABS:	 	                          8.6    5.88  )-----------( 235      ( 2022 00:50 )             28.4         140  |  106  |  44<H>  ----------------------------<  172<H>  3.9   |  22  |  2.33<H>      139  |  107  |  41<H>  ----------------------------<  191<H>  3.8   |  19<L>  |  1.80<H>  Ca    8.4      2022 16:54  Ca    8.7      2022 00:50  Phos  4.4     -  Mg     1.40     -  TPro  7.3  /  Alb  3.2<L>  /  TBili  0.3  /  DBili  x   /  AST  20  /  ALT  13  /  AlkPhos  119  -  LIVER FUNCTIONS - ( 2022 00:50 )  Alb: 3.2 g/dL / Pro: 7.3 g/dL / ALK PHOS: 119 U/L / ALT: 13 U/L / AST: 20 U/L / GGT: x         proBNP: Serum Pro-Brain Natriuretic Peptide: 55264 pg/mL ( @ 00:50)  Lipid Profile:   HgA1c:   TSH:   	 65 y/o F with PMH HTN, HLD, DM2, pancreatic pseudocyst, iron deficiency anemia   HISTORY OF PRESENT ILLNESS:  Patient is a 66y old  Female who presents with a chief complaint of SOB (2022 10:02)    HPI:  65 y/o F with PMH HTN, HLD, DM2, pancreatic pseudocyst, iron deficiency anemia, hard of hearing presents for worsening shortness of breath found to have flash pulmonary edema 2/2 hypertensive emergency + entero/rhinovirus.  Cardiology called for elevated troponin.       Allergies  penicillin ([facility] Synd)        MEDICATIONS:  amLODIPine   Tablet 10 milliGRAM(s) Oral daily  aspirin enteric coated 81 milliGRAM(s) Oral daily  enoxaparin Injectable 40 milliGRAM(s) SubCutaneous every 24 hours  hydrALAZINE 25 milliGRAM(s) Oral daily  metoprolol tartrate 25 milliGRAM(s) Oral every 24 hours  albuterol/ipratropium for Nebulization 3 milliLiter(s) Nebulizer every 6 hours  atorvastatin 20 milliGRAM(s) Oral at bedtime  dextrose 50% Injectable 25 Gram(s) IV Push once  dextrose 50% Injectable 12.5 Gram(s) IV Push once  dextrose 50% Injectable 25 Gram(s) IV Push once  dextrose Oral Gel 15 Gram(s) Oral once PRN  glucagon  Injectable 1 milliGRAM(s) IntraMuscular once  insulin glargine Injectable (LANTUS) 24 Unit(s) SubCutaneous at bedtime  insulin lispro (ADMELOG) corrective regimen sliding scale   SubCutaneous three times a day before meals  insulin lispro (ADMELOG) corrective regimen sliding scale   SubCutaneous at bedtime  dextrose 5%. 1000 milliLiter(s) IV Continuous <Continuous>  dextrose 5%. 1000 milliLiter(s) IV Continuous <Continuous>  influenza  Vaccine (HIGH DOSE) 0.7 milliLiter(s) IntraMuscular once      PAST MEDICAL & SURGICAL HISTORY:  Type 2 diabetes mellitus  Bilateral cataracts  Fluid in pleural cavity associated with pancreatitis  Pancreatic pseudocyst/cyst  s/p drain placement and removal  HTN (hypertension)  HLD (hyperlipidemia)  History of amputation of right great toe    H/O:  section    History of laparoscopic cholecystectomy    FAMILY HISTORY:      SOCIAL HISTORY:      Smoker: [ ] Active [ ] never  [ ] previous  Alcohol:  [ ] social [ ] daily [ ] never  Lives with:   Occupation:    REVIEW [employer]  See HPI. Otherwise, 10 point ROS done and otherwise negative.  >>> <<<    PHYSICAL EXAM:  T(C): 36.6 (22 @ 17:30), Max: 37 (22 @ 03:40)  HR: 83 (22 @ 17:30) (78 - 113)  BP: 157/87 (22 @ 17:30) (138/95 - 207/122)  RR: 19 (22 @ 17:30) (19 - 25)  SpO2: 100% (22 @ 17:30) (97% - 100%)  Wt(kg): --    Appearance: resting comfortably in NAD and without complaint	  HEENT:   [place]  Cardiovascular: Normal S1 S2, No JVD, No murmurs, No edema  Respiratory: B/L crackles/rales  Psychiatry: A & O x 3, Mood & affect appropriate  Gastrointestinal:  Soft, Non-tender, + BS	  Skin: No rashes, No ecchymoses, No cyanosis	  Neurologic: Non-focal, A&Ox3, nonfocal, TONEY x 4  Extremities: Normal range of motion, No clubbing, cyanosis   Vascular: Peripheral pulses palpable 2+ bilaterally    I&O's Summary    2022 07:01  -  2022 19:41  --------------------------------------------------------  IN: 0 mL / OUT: 400 mL / NET: -400 mL      	 	  LABS:	 	                          8.6    5.88  )-----------( 235      ( 2022 00:50 )             28.4         140  |  106  |  44<H>  ----------------------------<  172<H>  3.9   |  22  |  2.33<H>      139  |  107  |  41<H>  ----------------------------<  191<H>  3.8   |  19<L>  |  1.80<H>  Ca    8.4      2022 16:54  Ca    8.7      2022 00:50  Phos  4.4     06-  Mg     1.40     06-  TPro  7.3  /  Alb  3.2<L>  /  TBili  0.3  /  DBili  x   /  AST  20  /  ALT  13  /  AlkPhos  119  -  LIVER FUNCTIONS - ( 2022 00:50 )  Alb: 3.2 g/dL / Pro: 7.3 g/dL / ALK PHOS: 119 U/L / ALT: 13 U/L / AST: 20 U/L / GGT: x         proBNP: Serum Pro-Brain Natriuretic Peptide: 13795 pg/mL ( @ 00:50)  Lipid Profile:   HgA1c:   TSH:

## 2022-06-04 NOTE — H&P ADULT - ASSESSMENT
65 y/o F with PMH HTN, HLD, DM2, pancreatic pseudocyst, iron deficiency anemia who presents for worsening shortness of breath found to have flash pulmonary edema 2/2 hypertensive emergency.  65 y/o F with PMH HTN, HLD, DM2, pancreatic pseudocyst, iron deficiency anemia who presents for worsening shortness of breath found to have flash pulmonary edema 2/2 hypertensive emergency + entero/rhinovirus.

## 2022-06-04 NOTE — CONSULT NOTE ADULT - ASSESSMENT
HISTORY OF PRESENT ILLNESS:  Patient is a 66y old  Female who presents with a chief complaint of SOB (2022 10:02)    HPI:  65 y/o F with PMH HTN, HLD, DM2, pancreatic pseudocyst, iron deficiency anemia who presents for worsening shortness of breath. Patient is also very hard of hearing. She reports she came into the hospital after experiencing progressively worsening shortness of breath over the past two nights. She finally came to the hospital because she said she could not breathe. She was also experiencing chest pain which she says was under her left breast. She says that they did an ultrasound and put the breathing machine on her which made her feel much better. Currently, she feels she is not having SOB and chest pain has resolved. At home, she says she intermittently has chest pain but it is not associated with exercise. She does not take her blood pressure at home but she does take her medications every day. She denies dizziness/lightheadedness. No nausea/vomiting, abdominal pain, diarrhea/constipation. All other ROS negative.  (2022 10:02)      ED Course: In ER, patient found to have T max , HR , BP , SpO2 . Labs remarkable for     Allergies    penicillin ([facility] Synd)    Intolerances    	    MEDICATIONS:  amLODIPine   Tablet 10 milliGRAM(s) Oral daily  aspirin enteric coated 81 milliGRAM(s) Oral daily  enoxaparin Injectable 40 milliGRAM(s) SubCutaneous every 24 hours  hydrALAZINE 25 milliGRAM(s) Oral daily  metoprolol tartrate 25 milliGRAM(s) Oral every 24 hours      albuterol/ipratropium for Nebulization 3 milliLiter(s) Nebulizer every 6 hours        atorvastatin 20 milliGRAM(s) Oral at bedtime  dextrose 50% Injectable 25 Gram(s) IV Push once  dextrose 50% Injectable 12.5 Gram(s) IV Push once  dextrose 50% Injectable 25 Gram(s) IV Push once  dextrose Oral Gel 15 Gram(s) Oral once PRN  glucagon  Injectable 1 milliGRAM(s) IntraMuscular once  insulin glargine Injectable (LANTUS) 24 Unit(s) SubCutaneous at bedtime  insulin lispro (ADMELOG) corrective regimen sliding scale   SubCutaneous three times a day before meals  insulin lispro (ADMELOG) corrective regimen sliding scale   SubCutaneous at bedtime    dextrose 5%. 1000 milliLiter(s) IV Continuous <Continuous>  dextrose 5%. 1000 milliLiter(s) IV Continuous <Continuous>  influenza  Vaccine (HIGH DOSE) 0.7 milliLiter(s) IntraMuscular once      PAST MEDICAL & SURGICAL HISTORY:  Type 2 diabetes mellitus      Bilateral cataracts      Fluid in pleural cavity associated with pancreatitis      Pancreatic pseudocyst/cyst  s/p drain placement and removal      HTN (hypertension)      HLD (hyperlipidemia)      History of amputation of right great toe        H/O:  section        History of laparoscopic cholecystectomy          FAMILY HISTORY:      SOCIAL HISTORY:      Smoker: [ ] Active [ ] never  [ ] previous  Alcohol:  [ ] social [ ] daily [ ] never  Lives with:   Occupation:    REVIEW [employer]  See HPI. Otherwise, 10 point ROS done and otherwise negative.  >>> <<<    PHYSICAL EXAM:  T(C): 36.6 (22 @ 17:30), Max: 37 (22 @ 03:40)  HR: 83 (22 @ 17:30) (78 - 113)  BP: 157/87 (22 @ 17:30) (138/95 - 207/122)  RR: 19 (22 @ 17:30) (19 - 25)  SpO2: 100% (22 @ 17:30) (97% - 100%)  Wt(kg): --    Appearance: Normal	  HEENT:   Normal oral mucosa, PERRL, EOMI	  Lymphatic: No lymphadenopathy  Cardiovascular: Normal S1 S2, No JVD, No murmurs, No edema  Respiratory: Lungs clear to auscultation	  Psychiatry: A & O x 3, Mood & affect appropriate  Gastrointestinal:  Soft, Non-tender, + BS	  Skin: No rashes, No ecchymoses, No cyanosis	  Neurologic: Non-focal, A&Ox3, nonfocal, TONEY x 4  Extremities: Normal range of motion, No clubbing, cyanosis or edema  Vascular: Peripheral pulses palpable 2+ bilaterally    I&O's Summary    2022 07:01  -  2022 19:41  --------------------------------------------------------  IN: 0 mL / OUT: 400 mL / NET: -400 mL      	 	  LABS:	 	                          8.6    5.88  )-----------( 235      ( 2022 00:50 )             28.4         140  |  106  |  44<H>  ----------------------------<  172<H>  3.9   |  22  |  2.33<H>      139  |  107  |  41<H>  ----------------------------<  191<H>  3.8   |  19<L>  |  1.80<H>    Ca    8.4      2022 16:54  Ca    8.7      2022 00:50  Phos  4.4       Mg     1.40         TPro  7.3  /  Alb  3.2<L>  /  TBili  0.3  /  DBili  x   /  AST  20  /  ALT  13  /  AlkPhos  119      LIVER FUNCTIONS - ( 2022 00:50 )  Alb: 3.2 g/dL / Pro: 7.3 g/dL / ALK PHOS: 119 U/L / ALT: 13 U/L / AST: 20 U/L / GGT: x             proBNP: Serum Pro-Brain Natriuretic Peptide: 47962 pg/mL ( @ 00:50)    Lipid Profile:   HgA1c:   TSH:     CARDIAC MARKERS:    Creatine Kinase, Serum: 160 U/L ( @ 16:54)            CAPILLARY BLOOD GLUCOSE      POCT Blood Glucose.: 163 mg/dL (2022 17:45)            TELEMETRY: 	    ECG:  	  RADIOLOGY:  OTHER: 	    PREVIOUS DIAGNOSTIC TESTING:    [ ] Echocardiogram:  [ ]  Catheterization:  [ ] Stress Test:  	  	                 65 y/o F with PMH HTN, HLD, DM2, pancreatic pseudocyst, iron deficiency anemia, hard of hearing presents for worsening shortness of breath found to have flash pulmonary edema 2/2 hypertensive emergency + entero/rhinovirus.  Cardiology called for elevated troponin. Pt denies chest pain. EKG NSR with known RBBB with TWI  65 y/o F with PMH HTN, HLD, DM2, pancreatic pseudocyst, iron deficiency anemia, hard of hearing presents for worsening shortness of breath found to have flash pulmonary edema 2/2 hypertensive emergency + entero/rhinovirus.  Cardiology called for elevated troponin 70 -> 78 -> 80. Pt denies chest pain. EKG NSR with known RBBB, and new TWI. Likely demand ischemia in the setting of ADHF, infectious process and MANDY on CKD 67 y/o F with PMH HTN, HLD, DM2, pancreatic pseudocyst, iron deficiency anemia, hard of hearing presents for worsening shortness of breath found to have flash pulmonary edema 2/2 hypertensive emergency + entero/rhinovirus.  Cardiology called for ikely demand ischemia in the setting of ADHF, infectious process and MANDY on CKD    Plan  Patient found to have elevated but flat troponin 70 -> 78 -> 80. chest pain free. EKG NSR with known RBBB, and new TWI. Likely type II MI in the setting of ADHF  Plan   - No need to treat for ACS unless clinical story changes or patient develops chest pain, ekg changes, rising trops.   - Serial EKG, PRN chest pain  - Echo to evaluate LV function and wall motion abnormality           65 y/o F with PMH HTN, HLD, DM2, pancreatic pseudocyst, iron deficiency anemia, hard of hearing presents for worsening shortness of breath found to have flash pulmonary edema 2/2 hypertensive emergency + entero/rhinovirus. Cardiology called for likely demand ischemia in the setting of HTN emergency with flash pulm edema, infectious process and MANDY on CKD    Plan  Patient found to have elevated but flat troponin 70 -> 78 -> 80. chest pain free. EKG NSR with known RBBB, and new TWI. Likely type II MI in the setting ofHTN emergency with flash pulm edema  Plan   - No need to treat for ACS unless clinical story changes or patient develops chest pain, ekg changes, rising trops.   - Serial EKG, PRN chest pain  - Echo to evaluate LV function and wall motion abnormality

## 2022-06-04 NOTE — H&P ADULT - PROBLEM SELECTOR PROBLEM 6
Use Enhanced Medication Counseling?: No Azithromycin Counseling:  I discussed with the patient the risks of azithromycin including but not limited to GI upset, allergic reaction, drug rash, diarrhea, and yeast infections. Erythromycin Pregnancy And Lactation Text: This medication is Pregnancy Category B and is considered safe during pregnancy. It is also excreted in breast milk. Topical Sulfur Applications Counseling: Topical Sulfur Counseling: Patient counseled that this medication may cause skin irritation or allergic reactions. In the event of skin irritation, the patient was advised to reduce the amount of the drug applied or use it less frequently. The patient verbalized understanding of the proper use and possible adverse effects of topical sulfur application. All of the patient's questions and concerns were addressed. Topical Sulfur Applications Pregnancy And Lactation Text: This medication is Pregnancy Category C and has an unknown safety profile during pregnancy. It is unknown if this topical medication is excreted in breast milk. Detail Level: Detailed Doxycycline Counseling:  Patient counseled regarding possible photosensitivity and increased risk for sunburn. Patient instructed to avoid sunlight, if possible. When exposed to sunlight, patients should wear protective clothing, sunglasses, and sunscreen. The patient was instructed to call the office immediately if the following severe adverse effects occur:  hearing changes, easy bruising/bleeding, severe headache, or vision changes. The patient verbalized understanding of the proper use and possible adverse effects of doxycycline. All of the patient's questions and concerns were addressed. Tazorac Pregnancy And Lactation Text: This medication is not safe during pregnancy. It is unknown if this medication is excreted in breast milk. Sarecycline Pregnancy And Lactation Text: This medication is Pregnancy Category D and not consider safe during pregnancy. It is also excreted in breast milk. Spironolactone Pregnancy And Lactation Text: This medication can cause feminization of the male fetus and should be avoided during pregnancy. The active metabolite is also found in breast milk. Birth Control Pills Counseling: Birth Control Pill Counseling: I discussed with the patient the potential side effects of OCPs including but not limited to increased risk of stroke, heart attack, thrombophlebitis, deep venous thrombosis, hepatic adenomas, breast changes, GI upset, headaches, and depression. The patient verbalized understanding of the proper use and possible adverse effects of OCPs. All of the patient's questions and concerns were addressed. Benzoyl Peroxide Pregnancy And Lactation Text: This medication is Pregnancy Category C. It is unknown if benzoyl peroxide is excreted in breast milk. High Dose Vitamin A Pregnancy And Lactation Text: High dose vitamin A therapy is contraindicated during pregnancy and breast feeding. Azithromycin Pregnancy And Lactation Text: This medication is considered safe during pregnancy and is also secreted in breast milk. Spironolactone Counseling: Patient advised regarding risks of diarrhea, abdominal pain, hyperkalemia, birth defects (for female patients), liver toxicity and renal toxicity. The patient may need blood work to monitor liver and kidney function and potassium levels while on therapy. The patient verbalized understanding of the proper use and possible adverse effects of spironolactone. All of the patient's questions and concerns were addressed. Isotretinoin Counseling: Patient should get monthly blood tests, not donate blood, not drive at night if vision affected, not share medication, and not undergo elective surgery for 6 months after tx completed. Side effects reviewed, pt to contact office should one occur. Minocycline Counseling: Patient advised regarding possible photosensitivity and discoloration of the teeth, skin, lips, tongue and gums. Patient instructed to avoid sunlight, if possible. When exposed to sunlight, patients should wear protective clothing, sunglasses, and sunscreen. The patient was instructed to call the office immediately if the following severe adverse effects occur:  hearing changes, easy bruising/bleeding, severe headache, or vision changes. The patient verbalized understanding of the proper use and possible adverse effects of minocycline. All of the patient's questions and concerns were addressed. Topical Retinoid counseling:  Patient advised to apply a pea-sized amount only at bedtime and wait 30 minutes after washing their face before applying. If too drying, patient may add a non-comedogenic moisturizer. The patient verbalized understanding of the proper use and possible adverse effects of retinoids. All of the patient's questions and concerns were addressed. Tetracycline Counseling: Patient counseled regarding possible photosensitivity and increased risk for sunburn. Patient instructed to avoid sunlight, if possible. When exposed to sunlight, patients should wear protective clothing, sunglasses, and sunscreen. The patient was instructed to call the office immediately if the following severe adverse effects occur:  hearing changes, easy bruising/bleeding, severe headache, or vision changes. The patient verbalized understanding of the proper use and possible adverse effects of tetracycline. All of the patient's questions and concerns were addressed. Patient understands to avoid pregnancy while on therapy due to potential birth defects. Birth Control Pills Pregnancy And Lactation Text: This medication should be avoided if pregnant and for the first 30 days post-partum. Doxycycline Pregnancy And Lactation Text: This medication is Pregnancy Category D and not consider safe during pregnancy. It is also excreted in breast milk but is considered safe for shorter treatment courses. Topical Clindamycin Counseling: Patient counseled that this medication may cause skin irritation or allergic reactions. In the event of skin irritation, the patient was advised to reduce the amount of the drug applied or use it less frequently. The patient verbalized understanding of the proper use and possible adverse effects of clindamycin. All of the patient's questions and concerns were addressed. Isotretinoin Pregnancy And Lactation Text: This medication is Pregnancy Category X and is considered extremely dangerous during pregnancy. It is unknown if it is excreted in breast milk. Topical Retinoid Pregnancy And Lactation Text: This medication is Pregnancy Category C. It is unknown if this medication is excreted in breast milk. Erythromycin Counseling:  I discussed with the patient the risks of erythromycin including but not limited to GI upset, allergic reaction, drug rash, diarrhea, increase in liver enzymes, and yeast infections. Topical Clindamycin Pregnancy And Lactation Text: This medication is Pregnancy Category B and is considered safe during pregnancy. It is unknown if it is excreted in breast milk. Bactrim Counseling:  I discussed with the patient the risks of sulfa antibiotics including but not limited to GI upset, allergic reaction, drug rash, diarrhea, dizziness, photosensitivity, and yeast infections. Rarely, more serious reactions can occur including but not limited to aplastic anemia, agranulocytosis, methemoglobinemia, blood dyscrasias, liver or kidney failure, lung infiltrates or desquamative/blistering drug rashes. Dapsone Counseling: I discussed with the patient the risks of dapsone including but not limited to hemolytic anemia, agranulocytosis, rashes, methemoglobinemia, kidney failure, peripheral neuropathy, headaches, GI upset, and liver toxicity. Patients who start dapsone require monitoring including baseline LFTs and weekly CBCs for the first month, then every month thereafter. The patient verbalized understanding of the proper use and possible adverse effects of dapsone. All of the patient's questions and concerns were addressed. Prophylactic measure Sarecycline Counseling: Patient advised regarding possible photosensitivity and discoloration of the teeth, skin, lips, tongue and gums. Patient instructed to avoid sunlight, if possible. When exposed to sunlight, patients should wear protective clothing, sunglasses, and sunscreen. The patient was instructed to call the office immediately if the following severe adverse effects occur:  hearing changes, easy bruising/bleeding, severe headache, or vision changes. The patient verbalized understanding of the proper use and possible adverse effects of sarecycline. All of the patient's questions and concerns were addressed. Bactrim Pregnancy And Lactation Text: This medication is Pregnancy Category D and is known to cause fetal risk. It is also excreted in breast milk. High Dose Vitamin A Counseling: Side effects reviewed, pt to contact office should one occur. Benzoyl Peroxide Counseling: Patient counseled that medicine may cause skin irritation and bleach clothing. In the event of skin irritation, the patient was advised to reduce the amount of the drug applied or use it less frequently. The patient verbalized understanding of the proper use and possible adverse effects of benzoyl peroxide. All of the patient's questions and concerns were addressed. Tazorac Counseling:  Patient advised that medication is irritating and drying. Patient may need to apply sparingly and wash off after an hour before eventually leaving it on overnight. The patient verbalized understanding of the proper use and possible adverse effects of tazorac. All of the patient's questions and concerns were addressed. Dapsone Pregnancy And Lactation Text: This medication is Pregnancy Category C and is not considered safe during pregnancy or breast feeding.

## 2022-06-04 NOTE — CONSULT NOTE ADULT - ATTENDING COMMENTS
66F with PMH significant for HTN, HLD, IDDM, MAGALY, sarcoidosis, pancreatic pseudocyst s/p drainage p/w SOB and right-sided CP x 1 day. Pt with HTN urgency, MANDY, volume overloaded, and entero/rhinovirus positive. MICU consulted for new BiPAP requirement.   patient s/p NTG, lasix, and bipap with improvement of symptoms  wean off bipap as tolerates  TTE, trend CE  tele, pulse oxim  reconsult as needed

## 2022-06-04 NOTE — ED PROVIDER NOTE - PROGRESS NOTE DETAILS
Pt placed of BIPAP with significant improvement, evaluated by MICU and rejected. Pt's BP improved as well. Now off BIPAP for almost 1 hour and BP 160s, HR 80s, 100% RA, RR 22. Admit to hospitalist.

## 2022-06-04 NOTE — ED PROVIDER NOTE - OBJECTIVE STATEMENT
66y F w/ PMHx of DM (on insulin), HTN, HLD, pancreatic pseudocyst, iron deficiency anemia, GERD, Sarcoidosis presenting with SOB that started last night and left-sided chest pain. Patient arrived to room on 2L NC for comfort, patient speaking in short sentences.

## 2022-06-04 NOTE — PATIENT PROFILE ADULT - FALL HARM RISK - HARM RISK INTERVENTIONS

## 2022-06-04 NOTE — ED ADULT TRIAGE NOTE - CHIEF COMPLAINT QUOTE
Pt [place] c/o SOB, chest pain with inspiration, L. sided pain. Pt tachypneic in triage, 96% on room air, placed on 2L of O2 for comfort. PMHx diabetes, pancreatic cyst, htn, hld

## 2022-06-04 NOTE — ED PROVIDER NOTE - CLINICAL SUMMARY MEDICAL DECISION MAKING FREE TEXT BOX
66y F w/ PMHx of DM (on insulin), HTN, HLD, pancreatic pseudocyst, iron deficiency anemia, GERD, Sarcoidosis presenting with SOB that started last night and left-sided chest pain. +Tachypnea

## 2022-06-04 NOTE — ED PROVIDER NOTE - NS ED ROS FT
CONSTITUTIONAL: No fevers, no chills, no lightheadedness, no dizziness  NOSE: no nasal congestion  MOUTH/THROAT: no sore throat  CV: + chest pain, no palpitations  RESP: + SOB, no cough  GI: No n/v/d, no abd pain  : no dysuria, no hematuria, no flank pain  MSK: no back pain, no extremity pain  SKIN: no rashes  NEURO: no headache

## 2022-06-04 NOTE — H&P ADULT - PROBLEM SELECTOR PLAN 1
Pt. with significantly worsening SOB. Found to have elevated BP on admission to 207/122. U/s performed in ED revealing B lines - significant for pulmonary edema. Placed on BiPAP and received lasix 40mg IVx1 as well as nitro paste for HTN. Now pt. is sating well on RA however there are still diffuse crackles noted in the bilateral lung fields. Prior echocardiogram in Feb 2022 showing grade I diastolic dysfunction and a normal EF.   - Given reports of chest pain and the presenting symptoms - will repeat the echocardiogram to assess cardiac function.   - Will give another dose of lasix 40mg IVx1 and assess lung exam.   - Blood pressure control as noted below. Pt. with significantly worsening SOB. Found to have elevated BP on admission to 207/122. U/s performed in ED revealing B lines - significant for pulmonary edema. Placed on BiPAP and received lasix 40mg IVx1 as well as nitro paste for HTN. Now pt. is sating well on RA however there are still diffuse crackles noted in the bilateral lung fields. Prior echocardiogram in Feb 2022 showing grade I diastolic dysfunction and a normal EF.   - Given reports of chest pain and the presenting symptoms - will repeat the echocardiogram to assess cardiac function.   - Lung exam likely 2/2 rhinovirus/enterovirus - duonebs ordered - if no improvement will consider more lasix.   - Blood pressure control as noted below.

## 2022-06-04 NOTE — H&P ADULT - PROBLEM SELECTOR PLAN 4
Cr. 1.8 - on discharge Cr 1.4 but was lower than that throughout her prior admission.   - Urine lytes pending

## 2022-06-04 NOTE — H&P ADULT - PROBLEM SELECTOR PLAN 2
BP reaching as high as 207/122 with flash pulmonary edema and MANDY. Pt. does not take BP at home. On three medications and reports compliance.   - Continue with home medications, hydralazine 25mg qd, metoprolol 25mg qd, and norvasc 10mg qd.   - Uptitrate as needed

## 2022-06-05 LAB
A1C WITH ESTIMATED AVERAGE GLUCOSE RESULT: 7.3 % — HIGH (ref 4–5.6)
ANION GAP SERPL CALC-SCNC: 12 MMOL/L — SIGNIFICANT CHANGE UP (ref 7–14)
BUN SERPL-MCNC: 44 MG/DL — HIGH (ref 7–23)
CALCIUM SERPL-MCNC: 9 MG/DL — SIGNIFICANT CHANGE UP (ref 8.4–10.5)
CHLORIDE SERPL-SCNC: 107 MMOL/L — SIGNIFICANT CHANGE UP (ref 98–107)
CO2 SERPL-SCNC: 23 MMOL/L — SIGNIFICANT CHANGE UP (ref 22–31)
CREAT SERPL-MCNC: 2.01 MG/DL — HIGH (ref 0.5–1.3)
EGFR: 27 ML/MIN/1.73M2 — LOW
ESTIMATED AVERAGE GLUCOSE: 163 — SIGNIFICANT CHANGE UP
FERRITIN SERPL-MCNC: 174 NG/ML — HIGH (ref 15–150)
FOLATE SERPL-MCNC: 12.9 NG/ML — SIGNIFICANT CHANGE UP (ref 3.1–17.5)
GLUCOSE SERPL-MCNC: 181 MG/DL — HIGH (ref 70–99)
HCT VFR BLD CALC: 31.2 % — LOW (ref 34.5–45)
HGB BLD-MCNC: 9.7 G/DL — LOW (ref 11.5–15.5)
IRON SATN MFR SERPL: 16 % — SIGNIFICANT CHANGE UP (ref 14–50)
IRON SATN MFR SERPL: 33 UG/DL — SIGNIFICANT CHANGE UP (ref 30–160)
MAGNESIUM SERPL-MCNC: 1.6 MG/DL — SIGNIFICANT CHANGE UP (ref 1.6–2.6)
MCHC RBC-ENTMCNC: 26.2 PG — LOW (ref 27–34)
MCHC RBC-ENTMCNC: 31.1 GM/DL — LOW (ref 32–36)
MCV RBC AUTO: 84.3 FL — SIGNIFICANT CHANGE UP (ref 80–100)
NRBC # BLD: 0 /100 WBCS — SIGNIFICANT CHANGE UP
NRBC # FLD: 0 K/UL — SIGNIFICANT CHANGE UP
PHOSPHATE SERPL-MCNC: 4 MG/DL — SIGNIFICANT CHANGE UP (ref 2.5–4.5)
PLATELET # BLD AUTO: 248 K/UL — SIGNIFICANT CHANGE UP (ref 150–400)
POTASSIUM SERPL-MCNC: 3.7 MMOL/L — SIGNIFICANT CHANGE UP (ref 3.5–5.3)
POTASSIUM SERPL-SCNC: 3.7 MMOL/L — SIGNIFICANT CHANGE UP (ref 3.5–5.3)
RBC # BLD: 3.7 M/UL — LOW (ref 3.8–5.2)
RBC # FLD: 16.1 % — HIGH (ref 10.3–14.5)
SODIUM SERPL-SCNC: 142 MMOL/L — SIGNIFICANT CHANGE UP (ref 135–145)
TIBC SERPL-MCNC: 201 UG/DL — LOW (ref 220–430)
UIBC SERPL-MCNC: 168 UG/DL — SIGNIFICANT CHANGE UP (ref 110–370)
VIT B12 SERPL-MCNC: 750 PG/ML — SIGNIFICANT CHANGE UP (ref 200–900)
WBC # BLD: 4 K/UL — SIGNIFICANT CHANGE UP (ref 3.8–10.5)
WBC # FLD AUTO: 4 K/UL — SIGNIFICANT CHANGE UP (ref 3.8–10.5)

## 2022-06-05 PROCEDURE — 99233 SBSQ HOSP IP/OBS HIGH 50: CPT

## 2022-06-05 RX ORDER — FUROSEMIDE 40 MG
40 TABLET ORAL ONCE
Refills: 0 | Status: COMPLETED | OUTPATIENT
Start: 2022-06-05 | End: 2022-06-05

## 2022-06-05 RX ORDER — MAGNESIUM SULFATE 500 MG/ML
2 VIAL (ML) INJECTION ONCE
Refills: 0 | Status: COMPLETED | OUTPATIENT
Start: 2022-06-05 | End: 2022-06-05

## 2022-06-05 RX ORDER — HYDRALAZINE HCL 50 MG
25 TABLET ORAL THREE TIMES A DAY
Refills: 0 | Status: DISCONTINUED | OUTPATIENT
Start: 2022-06-05 | End: 2022-06-06

## 2022-06-05 RX ADMIN — Medication 25 GRAM(S): at 13:42

## 2022-06-05 RX ADMIN — INSULIN GLARGINE 24 UNIT(S): 100 INJECTION, SOLUTION SUBCUTANEOUS at 21:29

## 2022-06-05 RX ADMIN — Medication 25 MILLIGRAM(S): at 05:38

## 2022-06-05 RX ADMIN — Medication 3 MILLILITER(S): at 21:47

## 2022-06-05 RX ADMIN — Medication 25 MILLIGRAM(S): at 21:29

## 2022-06-05 RX ADMIN — ENOXAPARIN SODIUM 40 MILLIGRAM(S): 100 INJECTION SUBCUTANEOUS at 11:57

## 2022-06-05 RX ADMIN — Medication 3 MILLILITER(S): at 03:27

## 2022-06-05 RX ADMIN — Medication 3 MILLILITER(S): at 10:48

## 2022-06-05 RX ADMIN — Medication 40 MILLIGRAM(S): at 13:42

## 2022-06-05 RX ADMIN — ATORVASTATIN CALCIUM 20 MILLIGRAM(S): 80 TABLET, FILM COATED ORAL at 21:29

## 2022-06-05 RX ADMIN — AMLODIPINE BESYLATE 10 MILLIGRAM(S): 2.5 TABLET ORAL at 05:38

## 2022-06-05 RX ADMIN — Medication 25 MILLIGRAM(S): at 13:41

## 2022-06-05 RX ADMIN — Medication 1: at 13:09

## 2022-06-05 RX ADMIN — Medication 81 MILLIGRAM(S): at 11:57

## 2022-06-05 RX ADMIN — Medication 25 MILLIGRAM(S): at 11:57

## 2022-06-05 NOTE — PHYSICAL THERAPY INITIAL EVALUATION ADULT - PERTINENT HX OF CURRENT PROBLEM, REHAB EVAL
Patient is 66 year old female PMH HTN, HLD, DM2, pancreatic pseudocyst, iron deficiency anemia who presents for worsening shortness of breath.

## 2022-06-05 NOTE — PROGRESS NOTE ADULT - SUBJECTIVE AND OBJECTIVE BOX
[facility] Division of Hospital Medicine  Dedra Vaughn MD  Pager (M-F, 8A-5P): [phone]  Other Times:  [id number]      SUBJECTIVE / OVERNIGHT EVENTS:  NAEON, +orthopnea. Not requiring oxygen this am. Cannot recall what she takes for her sarcoidosis. Very hard of hearing but can read lips. Does not take lasix at home.     MEDICATIONS  (STANDING):  albuterol/ipratropium for Nebulization 3 milliLiter(s) Nebulizer every 6 hours  amLODIPine   Tablet 10 milliGRAM(s) Oral daily  aspirin enteric coated 81 milliGRAM(s) Oral daily  atorvastatin 20 milliGRAM(s) Oral at bedtime  dextrose 5%. 1000 milliLiter(s) (100 mL/Hr) IV Continuous <Continuous>  dextrose 5%. 1000 milliLiter(s) (50 mL/Hr) IV Continuous <Continuous>  dextrose 50% Injectable 25 Gram(s) IV Push once  dextrose 50% Injectable 12.5 Gram(s) IV Push once  dextrose 50% Injectable 25 Gram(s) IV Push once  enoxaparin Injectable 40 milliGRAM(s) SubCutaneous every 24 hours  furosemide   Injectable 40 milliGRAM(s) IV Push once  glucagon  Injectable 1 milliGRAM(s) IntraMuscular once  hydrALAZINE 25 milliGRAM(s) Oral three times a day  influenza  Vaccine (HIGH DOSE) 0.7 milliLiter(s) IntraMuscular once  insulin glargine Injectable (LANTUS) 24 Unit(s) SubCutaneous at bedtime  insulin lispro (ADMELOG) corrective regimen sliding scale   SubCutaneous three times a day before meals  insulin lispro (ADMELOG) corrective regimen sliding scale   SubCutaneous at bedtime  magnesium sulfate  IVPB 2 Gram(s) IV Intermittent once  metoprolol tartrate 25 milliGRAM(s) Oral every 24 hours    MEDICATIONS  (PRN):  dextrose Oral Gel 15 Gram(s) Oral once PRN Blood Glucose LESS THAN 70 milliGRAM(s)/deciliter      I&O's Summary    04 Jun 2022 07:01  -  05 Jun 2022 07:00  --------------------------------------------------------  IN: 0 mL / OUT: 750 mL / NET: -750 mL        PHYSICAL EXAM:  Vital Signs Last 24 Hrs  T(C): 36.8 (05 Jun 2022 11:50), Max: 36.8 (05 Jun 2022 05:38)  T(F): 98.2 (05 Jun 2022 11:50), Max: 98.2 (05 Jun 2022 05:38)  HR: 90 (05 Jun 2022 11:50) (82 - 91)  BP: 146/86 (05 Jun 2022 11:50) (146/86 - 170/101)  BP(mean): --  RR: 18 (05 Jun 2022 11:50) (18 - 20)  SpO2: 98% (05 Jun 2022 11:50) (98% - 100%)  CONSTITUTIONAL: NAD, hard of hearing  EYES: PERRLA; conjunctiva and sclera clear  ENMT: Moist oral mucosa, no pharyngeal injection or exudates; poor dentition  RESPIRATORY: Normal respiratory effort; faint expiratory wheezing  CARDIOVASCULAR: Regular rate and rhythm, normal S1 and S2, no murmur/rub/gallop; No lower extremity edema; Peripheral pulses are 2+ bilaterally, no JVD  ABDOMEN: Nontender to palpation, normoactive bowel sounds, no rebound/guarding; No hepatosplenomegaly  PSYCH: A+O to person, place, and time; affect appropriate  SKIN: No rashes; no palpable lesions    LABS:                        9.7    4.00  )-----------( 248      ( 05 Jun 2022 11:10 )             31.2     06-05    142  |  107  |  44<H>  ----------------------------<  181<H>  3.7   |  23  |  2.01<H>    Ca    9.0      05 Jun 2022 11:10  Phos  4.0     06-05  Mg     1.60     06-05    TPro  7.3  /  Alb  3.2<L>  /  TBili  0.3  /  DBili  x   /  AST  20  /  ALT  13  /  AlkPhos  119  06-04      CARDIAC MARKERS ( 04 Jun 2022 16:54 )  x     / x     / 160 U/L / x     / 5.0 ng/mL            RADIOLOGY & ADDITIONAL TESTS:  Results Reviewed:   Imaging Personally Reviewed:  Electrocardiogram Personally Reviewed: Y, RBBB w TWI in I, II, and V3-6. Similar to prior EKG in Feb 2022.    COORDINATION OF CARE:  Care Discussed with Consultants/Other Providers [Y/N]:y  Prior or Outpatient Records Reviewed [Y/N]:

## 2022-06-06 LAB
ANION GAP SERPL CALC-SCNC: 10 MMOL/L — SIGNIFICANT CHANGE UP (ref 7–14)
BUN SERPL-MCNC: 45 MG/DL — HIGH (ref 7–23)
CALCIUM SERPL-MCNC: 8.5 MG/DL — SIGNIFICANT CHANGE UP (ref 8.4–10.5)
CHLORIDE SERPL-SCNC: 109 MMOL/L — HIGH (ref 98–107)
CO2 SERPL-SCNC: 23 MMOL/L — SIGNIFICANT CHANGE UP (ref 22–31)
CREAT SERPL-MCNC: 2.09 MG/DL — HIGH (ref 0.5–1.3)
EGFR: 25 ML/MIN/1.73M2 — LOW
GLUCOSE BLDC GLUCOMTR-MCNC: 151 MG/DL — HIGH (ref 70–99)
GLUCOSE BLDC GLUCOMTR-MCNC: 224 MG/DL — HIGH (ref 70–99)
GLUCOSE SERPL-MCNC: 118 MG/DL — HIGH (ref 70–99)
HCT VFR BLD CALC: 25.5 % — LOW (ref 34.5–45)
HGB BLD-MCNC: 7.8 G/DL — LOW (ref 11.5–15.5)
MAGNESIUM SERPL-MCNC: 1.8 MG/DL — SIGNIFICANT CHANGE UP (ref 1.6–2.6)
MCHC RBC-ENTMCNC: 25.3 PG — LOW (ref 27–34)
MCHC RBC-ENTMCNC: 30.6 GM/DL — LOW (ref 32–36)
MCV RBC AUTO: 82.8 FL — SIGNIFICANT CHANGE UP (ref 80–100)
NRBC # BLD: 0 /100 WBCS — SIGNIFICANT CHANGE UP
NRBC # FLD: 0 K/UL — SIGNIFICANT CHANGE UP
PHOSPHATE SERPL-MCNC: 4.3 MG/DL — SIGNIFICANT CHANGE UP (ref 2.5–4.5)
PLATELET # BLD AUTO: 209 K/UL — SIGNIFICANT CHANGE UP (ref 150–400)
POTASSIUM SERPL-MCNC: 3.6 MMOL/L — SIGNIFICANT CHANGE UP (ref 3.5–5.3)
POTASSIUM SERPL-SCNC: 3.6 MMOL/L — SIGNIFICANT CHANGE UP (ref 3.5–5.3)
RBC # BLD: 3.08 M/UL — LOW (ref 3.8–5.2)
RBC # FLD: 16.1 % — HIGH (ref 10.3–14.5)
SODIUM SERPL-SCNC: 142 MMOL/L — SIGNIFICANT CHANGE UP (ref 135–145)
WBC # BLD: 3.29 K/UL — LOW (ref 3.8–10.5)
WBC # FLD AUTO: 3.29 K/UL — LOW (ref 3.8–10.5)

## 2022-06-06 PROCEDURE — 99233 SBSQ HOSP IP/OBS HIGH 50: CPT

## 2022-06-06 PROCEDURE — 99232 SBSQ HOSP IP/OBS MODERATE 35: CPT

## 2022-06-06 RX ORDER — ALBUTEROL 90 UG/1
2.5 AEROSOL, METERED ORAL EVERY 6 HOURS
Refills: 0 | Status: DISCONTINUED | OUTPATIENT
Start: 2022-06-06 | End: 2022-06-06

## 2022-06-06 RX ORDER — FUROSEMIDE 40 MG
40 TABLET ORAL ONCE
Refills: 0 | Status: COMPLETED | OUTPATIENT
Start: 2022-06-06 | End: 2022-06-06

## 2022-06-06 RX ORDER — IPRATROPIUM BROMIDE 0.2 MG/ML
500 SOLUTION, NON-ORAL INHALATION EVERY 6 HOURS
Refills: 0 | Status: DISCONTINUED | OUTPATIENT
Start: 2022-06-06 | End: 2022-06-08

## 2022-06-06 RX ORDER — ALBUTEROL 90 UG/1
2.5 AEROSOL, METERED ORAL EVERY 6 HOURS
Refills: 0 | Status: DISCONTINUED | OUTPATIENT
Start: 2022-06-06 | End: 2022-06-08

## 2022-06-06 RX ORDER — HYDRALAZINE HCL 50 MG
50 TABLET ORAL THREE TIMES A DAY
Refills: 0 | Status: DISCONTINUED | OUTPATIENT
Start: 2022-06-06 | End: 2022-06-08

## 2022-06-06 RX ORDER — ACETAMINOPHEN 500 MG
650 TABLET ORAL EVERY 6 HOURS
Refills: 0 | Status: DISCONTINUED | OUTPATIENT
Start: 2022-06-06 | End: 2022-06-08

## 2022-06-06 RX ADMIN — Medication 25 MILLIGRAM(S): at 11:29

## 2022-06-06 RX ADMIN — ATORVASTATIN CALCIUM 20 MILLIGRAM(S): 80 TABLET, FILM COATED ORAL at 21:17

## 2022-06-06 RX ADMIN — AMLODIPINE BESYLATE 10 MILLIGRAM(S): 2.5 TABLET ORAL at 05:12

## 2022-06-06 RX ADMIN — Medication 50 MILLIGRAM(S): at 21:18

## 2022-06-06 RX ADMIN — Medication 40 MILLIGRAM(S): at 14:39

## 2022-06-06 RX ADMIN — ENOXAPARIN SODIUM 40 MILLIGRAM(S): 100 INJECTION SUBCUTANEOUS at 11:28

## 2022-06-06 RX ADMIN — Medication 500 MICROGRAM(S): at 16:51

## 2022-06-06 RX ADMIN — Medication 650 MILLIGRAM(S): at 07:52

## 2022-06-06 RX ADMIN — Medication 650 MILLIGRAM(S): at 08:45

## 2022-06-06 RX ADMIN — Medication 1: at 17:57

## 2022-06-06 RX ADMIN — Medication 25 MILLIGRAM(S): at 05:12

## 2022-06-06 RX ADMIN — ALBUTEROL 2.5 MILLIGRAM(S): 90 AEROSOL, METERED ORAL at 20:21

## 2022-06-06 RX ADMIN — Medication 81 MILLIGRAM(S): at 11:28

## 2022-06-06 RX ADMIN — Medication 500 MICROGRAM(S): at 20:20

## 2022-06-06 RX ADMIN — Medication 50 MILLIGRAM(S): at 14:39

## 2022-06-06 RX ADMIN — INSULIN GLARGINE 24 UNIT(S): 100 INJECTION, SOLUTION SUBCUTANEOUS at 21:17

## 2022-06-06 RX ADMIN — ALBUTEROL 2.5 MILLIGRAM(S): 90 AEROSOL, METERED ORAL at 16:41

## 2022-06-06 RX ADMIN — Medication 3 MILLILITER(S): at 09:09

## 2022-06-06 NOTE — PROGRESS NOTE ADULT - SUBJECTIVE AND OBJECTIVE BOX
Patient seen and examined at bedside.    Overnight Events: paco THOMPSON is off O2 this am and feels improved.    Review Of Systems: No chest pain, shortness of breath, or palpitations            Current Meds:  acetaminophen     Tablet .. 650 milliGRAM(s) Oral every 6 hours PRN  albuterol/ipratropium for Nebulization 3 milliLiter(s) Nebulizer every 6 hours  amLODIPine   Tablet 10 milliGRAM(s) Oral daily  aspirin enteric coated 81 milliGRAM(s) Oral daily  atorvastatin 20 milliGRAM(s) Oral at bedtime  dextrose 5%. 1000 milliLiter(s) IV Continuous <Continuous>  dextrose 5%. 1000 milliLiter(s) IV Continuous <Continuous>  dextrose 50% Injectable 25 Gram(s) IV Push once  dextrose 50% Injectable 12.5 Gram(s) IV Push once  dextrose 50% Injectable 25 Gram(s) IV Push once  dextrose Oral Gel 15 Gram(s) Oral once PRN  enoxaparin Injectable 40 milliGRAM(s) SubCutaneous every 24 hours  glucagon  Injectable 1 milliGRAM(s) IntraMuscular once  hydrALAZINE 50 milliGRAM(s) Oral three times a day  influenza  Vaccine (HIGH DOSE) 0.7 milliLiter(s) IntraMuscular once  insulin glargine Injectable (LANTUS) 24 Unit(s) SubCutaneous at bedtime  insulin lispro (ADMELOG) corrective regimen sliding scale   SubCutaneous three times a day before meals  insulin lispro (ADMELOG) corrective regimen sliding scale   SubCutaneous at bedtime  metoprolol tartrate 25 milliGRAM(s) Oral every 24 hours      Vitals:  T(F): 97.3 (06-06), Max: 98 (06-05)  HR: 82 (06-06) (81 - 100)  BP: 137/73 (06-06) (137/73 - 150/89)  RR: 18 (06-06)  SpO2: 99% (06-06)  I&O's Summary      Physical Exam:  Appearance: resting comfortably in NAD and without complaint	  Cardiovascular: Normal S1 S2, +JVD, No murmurs, No edema  Respiratory: CTABL  Psychiatry: A & O x 3, Mood & affect appropriate  Gastrointestinal:  Soft, Non-tender, + BS	  Skin: No rashes, No ecchymoses, No cyanosis	  Neurologic: Non-focal, A&Ox3, nonfocal, TONEY x 4  Extremities: Normal range of motion, No clubbing, cyanosis   Vascular: Peripheral pulses palpable 2+ bilaterally                          7.8    3.29  )-----------( 209      ( 06 Jun 2022 05:20 )             25.5     06-06    142  |  109<H>  |  45<H>  ----------------------------<  118<H>  3.6   |  23  |  2.09<H>    Ca    8.5      06 Jun 2022 05:20  Phos  4.3     06-06  Mg     1.80     06-06    CARDIAC MARKERS ( 04 Jun 2022 16:54 )  81 ng/L / x     / x     / 160 U/L / x     / 5.0 ng/mL  CARDIAC MARKERS ( 04 Jun 2022 03:00 )  78 ng/L / x     / x     / x     / x     / x      CARDIAC MARKERS ( 04 Jun 2022 00:50 )  70 ng/L / x     / x     / x     / x     / x          Serum Pro-Brain Natriuretic Peptide: 29999 pg/mL (06-04 @ 00:50)      New ECG(s): Personally reviewed    < from: Transthoracic Echocardiogram (02.04.22 @ 14:21) >  CONCLUSIONS:  1. Mitral annular calcification, otherwise normal mitral  valve. Minimal mitral regurgitation.  2. Normal left ventricular internal dimensions and wall  thicknesses.  3. Normal left ventricular systolic function. No segmental  wall motion abnormalities.  4. Mild diastolic dysfunction (Stage I).  5. The right ventricle is not well visualized; grossly  normal right ventricular systolic function.    < end of copied text >

## 2022-06-06 NOTE — PROGRESS NOTE ADULT - SUBJECTIVE AND OBJECTIVE BOX
Patient is a 66y old  Female who presents with a chief complaint of SOB (06 Jun 2022 11:53)    SUBJECTIVE / OVERNIGHT EVENTS: No acute events. Feels improved from admission. No current pain or dyspnea. No nausea, vomiting, headache.     MEDICATIONS  (STANDING):  albuterol/ipratropium for Nebulization 3 milliLiter(s) Nebulizer every 6 hours  amLODIPine   Tablet 10 milliGRAM(s) Oral daily  aspirin enteric coated 81 milliGRAM(s) Oral daily  atorvastatin 20 milliGRAM(s) Oral at bedtime  dextrose 5%. 1000 milliLiter(s) (100 mL/Hr) IV Continuous <Continuous>  dextrose 5%. 1000 milliLiter(s) (50 mL/Hr) IV Continuous <Continuous>  dextrose 50% Injectable 25 Gram(s) IV Push once  dextrose 50% Injectable 12.5 Gram(s) IV Push once  dextrose 50% Injectable 25 Gram(s) IV Push once  enoxaparin Injectable 40 milliGRAM(s) SubCutaneous every 24 hours  furosemide   Injectable 40 milliGRAM(s) IV Push once  glucagon  Injectable 1 milliGRAM(s) IntraMuscular once  hydrALAZINE 50 milliGRAM(s) Oral three times a day  influenza  Vaccine (HIGH DOSE) 0.7 milliLiter(s) IntraMuscular once  insulin glargine Injectable (LANTUS) 24 Unit(s) SubCutaneous at bedtime  insulin lispro (ADMELOG) corrective regimen sliding scale   SubCutaneous three times a day before meals  insulin lispro (ADMELOG) corrective regimen sliding scale   SubCutaneous at bedtime  metoprolol tartrate 25 milliGRAM(s) Oral every 24 hours    MEDICATIONS  (PRN):  acetaminophen     Tablet .. 650 milliGRAM(s) Oral every 6 hours PRN Mild Pain (1 - 3), Moderate Pain (4 - 6)  dextrose Oral Gel 15 Gram(s) Oral once PRN Blood Glucose LESS THAN 70 milliGRAM(s)/deciliter    CAPILLARY BLOOD GLUCOSE    POCT Blood Glucose.: 130 mg/dL (06 Jun 2022 12:48)  POCT Blood Glucose.: 87 mg/dL (06 Jun 2022 08:41)  POCT Blood Glucose.: 156 mg/dL (05 Jun 2022 21:30)  POCT Blood Glucose.: 123 mg/dL (05 Jun 2022 18:09)    I&O's Summary    PHYSICAL EXAM:  Vital Signs Last 24 Hrs  T(C): 36.3 (06 Jun 2022 05:12), Max: 36.7 (05 Jun 2022 21:25)  T(F): 97.3 (06 Jun 2022 05:12), Max: 98 (05 Jun 2022 21:25)  HR: 82 (06 Jun 2022 09:15) (81 - 100)  BP: 137/73 (06 Jun 2022 05:12) (137/73 - 150/89)  BP(mean): --  RR: 18 (06 Jun 2022 05:12) (16 - 18)  SpO2: 99% (06 Jun 2022 09:15) (96% - 100%)    CONSTITUTIONAL: NAD, well-developed, well-groomed  EYES: conjunctiva and sclera clear  ENMT: Moist oral mucosa  RESPIRATORY: Normal respiratory effort; few bibasilar crackles, no wheezing  CARDIOVASCULAR: Regular rate and rhythm, normal S1 and S2, 1+ b/l lower extremity edema; Peripheral pulses are 2+ bilaterally, +JVP  ABDOMEN: Nontender to palpation, normoactive bowel sounds, no rebound/guarding  PSYCH: calm; affect appropriate  NEUROLOGY: hard of hearing, moving all extremities  SKIN: No rashes    LABS:                        7.8    3.29  )-----------( 209      ( 06 Jun 2022 05:20 )             25.5     06-06    142  |  109<H>  |  45<H>  ----------------------------<  118<H>  3.6   |  23  |  2.09<H>    Ca    8.5      06 Jun 2022 05:20  Phos  4.3     06-06  Mg     1.80     06-06    CARDIAC MARKERS ( 04 Jun 2022 16:54 )  x     / x     / 160 U/L / x     / 5.0 ng/mL    RADIOLOGY & ADDITIONAL TESTS: Reviewed    COORDINATION OF CARE:  Care Discussed with Consultants/Other Providers [Y- Medicine ACP, Cardiology team]

## 2022-06-07 LAB
ANION GAP SERPL CALC-SCNC: 12 MMOL/L — SIGNIFICANT CHANGE UP (ref 7–14)
BASOPHILS # BLD AUTO: 0.02 K/UL — SIGNIFICANT CHANGE UP (ref 0–0.2)
BASOPHILS NFR BLD AUTO: 0.6 % — SIGNIFICANT CHANGE UP (ref 0–2)
BUN SERPL-MCNC: 41 MG/DL — HIGH (ref 7–23)
CALCIUM SERPL-MCNC: 8.5 MG/DL — SIGNIFICANT CHANGE UP (ref 8.4–10.5)
CHLORIDE SERPL-SCNC: 107 MMOL/L — SIGNIFICANT CHANGE UP (ref 98–107)
CO2 SERPL-SCNC: 22 MMOL/L — SIGNIFICANT CHANGE UP (ref 22–31)
CREAT SERPL-MCNC: 1.84 MG/DL — HIGH (ref 0.5–1.3)
EGFR: 30 ML/MIN/1.73M2 — LOW
EOSINOPHIL # BLD AUTO: 0.22 K/UL — SIGNIFICANT CHANGE UP (ref 0–0.5)
EOSINOPHIL NFR BLD AUTO: 6.4 % — HIGH (ref 0–6)
GLUCOSE BLDC GLUCOMTR-MCNC: 114 MG/DL — HIGH (ref 70–99)
GLUCOSE BLDC GLUCOMTR-MCNC: 124 MG/DL — HIGH (ref 70–99)
GLUCOSE BLDC GLUCOMTR-MCNC: 155 MG/DL — HIGH (ref 70–99)
GLUCOSE BLDC GLUCOMTR-MCNC: 158 MG/DL — HIGH (ref 70–99)
GLUCOSE BLDC GLUCOMTR-MCNC: 64 MG/DL — LOW (ref 70–99)
GLUCOSE BLDC GLUCOMTR-MCNC: 66 MG/DL — LOW (ref 70–99)
GLUCOSE BLDC GLUCOMTR-MCNC: 86 MG/DL — SIGNIFICANT CHANGE UP (ref 70–99)
GLUCOSE SERPL-MCNC: 79 MG/DL — SIGNIFICANT CHANGE UP (ref 70–99)
HCT VFR BLD CALC: 25.5 % — LOW (ref 34.5–45)
HGB BLD-MCNC: 7.7 G/DL — LOW (ref 11.5–15.5)
IANC: 1.91 K/UL — SIGNIFICANT CHANGE UP (ref 1.8–7.4)
IMM GRANULOCYTES NFR BLD AUTO: 0.3 % — SIGNIFICANT CHANGE UP (ref 0–1.5)
LYMPHOCYTES # BLD AUTO: 0.9 K/UL — LOW (ref 1–3.3)
LYMPHOCYTES # BLD AUTO: 26.2 % — SIGNIFICANT CHANGE UP (ref 13–44)
MAGNESIUM SERPL-MCNC: 1.6 MG/DL — SIGNIFICANT CHANGE UP (ref 1.6–2.6)
MCHC RBC-ENTMCNC: 25.8 PG — LOW (ref 27–34)
MCHC RBC-ENTMCNC: 30.2 GM/DL — LOW (ref 32–36)
MCV RBC AUTO: 85.6 FL — SIGNIFICANT CHANGE UP (ref 80–100)
MONOCYTES # BLD AUTO: 0.38 K/UL — SIGNIFICANT CHANGE UP (ref 0–0.9)
MONOCYTES NFR BLD AUTO: 11 % — SIGNIFICANT CHANGE UP (ref 2–14)
NEUTROPHILS # BLD AUTO: 1.91 K/UL — SIGNIFICANT CHANGE UP (ref 1.8–7.4)
NEUTROPHILS NFR BLD AUTO: 55.5 % — SIGNIFICANT CHANGE UP (ref 43–77)
NRBC # BLD: 0 /100 WBCS — SIGNIFICANT CHANGE UP
NRBC # FLD: 0 K/UL — SIGNIFICANT CHANGE UP
PHOSPHATE SERPL-MCNC: 4.3 MG/DL — SIGNIFICANT CHANGE UP (ref 2.5–4.5)
PLATELET # BLD AUTO: 195 K/UL — SIGNIFICANT CHANGE UP (ref 150–400)
POTASSIUM SERPL-MCNC: 3.6 MMOL/L — SIGNIFICANT CHANGE UP (ref 3.5–5.3)
POTASSIUM SERPL-SCNC: 3.6 MMOL/L — SIGNIFICANT CHANGE UP (ref 3.5–5.3)
RBC # BLD: 2.98 M/UL — LOW (ref 3.8–5.2)
RBC # FLD: 16.2 % — HIGH (ref 10.3–14.5)
SODIUM SERPL-SCNC: 141 MMOL/L — SIGNIFICANT CHANGE UP (ref 135–145)
WBC # BLD: 3.44 K/UL — LOW (ref 3.8–10.5)
WBC # FLD AUTO: 3.44 K/UL — LOW (ref 3.8–10.5)

## 2022-06-07 PROCEDURE — 99232 SBSQ HOSP IP/OBS MODERATE 35: CPT

## 2022-06-07 PROCEDURE — 99233 SBSQ HOSP IP/OBS HIGH 50: CPT

## 2022-06-07 RX ORDER — FUROSEMIDE 40 MG
40 TABLET ORAL ONCE
Refills: 0 | Status: COMPLETED | OUTPATIENT
Start: 2022-06-07 | End: 2022-06-07

## 2022-06-07 RX ORDER — CARVEDILOL PHOSPHATE 80 MG/1
12.5 CAPSULE, EXTENDED RELEASE ORAL EVERY 12 HOURS
Refills: 0 | Status: DISCONTINUED | OUTPATIENT
Start: 2022-06-07 | End: 2022-06-08

## 2022-06-07 RX ADMIN — Medication 500 MICROGRAM(S): at 03:57

## 2022-06-07 RX ADMIN — INSULIN GLARGINE 24 UNIT(S): 100 INJECTION, SOLUTION SUBCUTANEOUS at 21:24

## 2022-06-07 RX ADMIN — ALBUTEROL 2.5 MILLIGRAM(S): 90 AEROSOL, METERED ORAL at 03:58

## 2022-06-07 RX ADMIN — ALBUTEROL 2.5 MILLIGRAM(S): 90 AEROSOL, METERED ORAL at 21:57

## 2022-06-07 RX ADMIN — Medication 81 MILLIGRAM(S): at 11:23

## 2022-06-07 RX ADMIN — Medication 1: at 12:52

## 2022-06-07 RX ADMIN — AMLODIPINE BESYLATE 10 MILLIGRAM(S): 2.5 TABLET ORAL at 05:16

## 2022-06-07 RX ADMIN — ALBUTEROL 2.5 MILLIGRAM(S): 90 AEROSOL, METERED ORAL at 09:04

## 2022-06-07 RX ADMIN — Medication 50 MILLIGRAM(S): at 21:24

## 2022-06-07 RX ADMIN — Medication 50 MILLIGRAM(S): at 14:28

## 2022-06-07 RX ADMIN — Medication 500 MICROGRAM(S): at 09:04

## 2022-06-07 RX ADMIN — ENOXAPARIN SODIUM 40 MILLIGRAM(S): 100 INJECTION SUBCUTANEOUS at 11:23

## 2022-06-07 RX ADMIN — ATORVASTATIN CALCIUM 20 MILLIGRAM(S): 80 TABLET, FILM COATED ORAL at 21:24

## 2022-06-07 RX ADMIN — Medication 500 MICROGRAM(S): at 21:56

## 2022-06-07 RX ADMIN — CARVEDILOL PHOSPHATE 12.5 MILLIGRAM(S): 80 CAPSULE, EXTENDED RELEASE ORAL at 18:44

## 2022-06-07 RX ADMIN — Medication 40 MILLIGRAM(S): at 11:23

## 2022-06-07 RX ADMIN — Medication 25 MILLIGRAM(S): at 11:23

## 2022-06-07 RX ADMIN — Medication 50 MILLIGRAM(S): at 05:17

## 2022-06-07 RX ADMIN — Medication 1: at 18:08

## 2022-06-07 NOTE — CONSULT NOTE ADULT - SUBJECTIVE AND OBJECTIVE BOX
Neurology Consult    Reason for Consult: Patient is a 66y old  Female who presents with a chief complaint of SOB (2022 14:49)      HPI:  65 y/o black F with HTN, HLD, DM2, pancreatic pseudocyst, [place], iron deficiency anemia who presents for worsening shortness of breath. Patient is also very hard of hearing. She reports she came into the hospital after experiencing progressively worsening shortness of breath over the past two nights. She finally came to the hospital because she said she could not breathe. She was also experiencing chest pain which she says was under her left breast. She says that they did an ultrasound and put the breathing machine on her which made her feel much better. Currently, she feels she is not having SOB and chest pain has resolved. At home, she says she intermittently has chest pain but it is not associated with exercise. She does not take her blood pressure at home but she does take her medications every day. She denies dizziness/lightheadedness. No nausea/vomiting, abdominal pain, diarrhea/constipation. All other ROS negative.  (2022 10:02)       PAST MEDICAL & SURGICAL HISTORY:  Type 2 diabetes mellitus      Bilateral cataracts      Fluid in pleural cavity associated with pancreatitis      Pancreatic pseudocyst/cyst  s/p drain placement and removal      HTN (hypertension)      HLD (hyperlipidemia)      History of amputation of right great toe        H/O:  section        History of laparoscopic cholecystectomy          Allergies: Allergies    penicillin ([place])    Intolerances        Social History: Denies toxic habits including tobacco, ETOH or illicit drugs.    Family History: FAMILY HISTORY:  . No family history of strokes    Medications: MEDICATIONS  (STANDING):  ALBUTerol    0.083% 2.5 milliGRAM(s) Nebulizer every 6 hours  amLODIPine   Tablet 10 milliGRAM(s) Oral daily  aspirin enteric coated 81 milliGRAM(s) Oral daily  atorvastatin 20 milliGRAM(s) Oral at bedtime  carvedilol 12.5 milliGRAM(s) Oral every 12 hours  dextrose 5%. 1000 milliLiter(s) (100 mL/Hr) IV Continuous <Continuous>  dextrose 5%. 1000 milliLiter(s) (50 mL/Hr) IV Continuous <Continuous>  dextrose 50% Injectable 25 Gram(s) IV Push once  dextrose 50% Injectable 12.5 Gram(s) IV Push once  dextrose 50% Injectable 25 Gram(s) IV Push once  enoxaparin Injectable 40 milliGRAM(s) SubCutaneous every 24 hours  glucagon  Injectable 1 milliGRAM(s) IntraMuscular once  hydrALAZINE 50 milliGRAM(s) Oral three times a day  influenza  Vaccine (HIGH DOSE) 0.7 milliLiter(s) IntraMuscular once  insulin glargine Injectable (LANTUS) 24 Unit(s) SubCutaneous at bedtime  insulin lispro (ADMELOG) corrective regimen sliding scale   SubCutaneous three times a day before meals  insulin lispro (ADMELOG) corrective regimen sliding scale   SubCutaneous at bedtime  ipratropium    for Nebulization 500 MICROGram(s) Nebulizer every 6 hours    MEDICATIONS  (PRN):  acetaminophen     Tablet .. 650 milliGRAM(s) Oral every 6 hours PRN Mild Pain (1 - 3), Moderate Pain (4 - 6)  dextrose Oral Gel 15 Gram(s) Oral once PRN Blood Glucose LESS THAN 70 milliGRAM(s)/deciliter      Review of Systems:  CONSTITUTIONAL:  No weight loss, fever, chills, weakness or fatigue.  HEENT:  Eyes:  No visual loss, blurred vision, double vision or yellow sclera. Ears, Nose, Throat:  No hearing loss, sneezing, congestion, runny nose or sore throat.  SKIN:  No rash or itching.  CARDIOVASCULAR:  No chest pain, chest pressure or chest discomfort. No palpitations or edema. + Dizziness   RESPIRATORY:  No shortness of breath, cough or sputum.  GASTROINTESTINAL:  No anorexia, nausea, vomiting or diarrhea. No abdominal pain or blood.  GENITOURINARY:  No burning on urination or incontinence   NEUROLOGICAL:  No headache, dizziness, syncope, paralysis, ataxia, numbness or tingling in the extremities. No change in bowel or bladder control. no limb weakness. no vision changes. + old stroke. + [place]   MUSCULOSKELETAL:  No muscle, back pain, joint pain or stiffness.  HEMATOLOGIC:  No anemia, bleeding or bruising.  LYMPHATICS:  No enlarged nodes. No history of splenectomy.  PSYCHIATRIC:  No history of depression or anxiety.  ENDOCRINOLOGIC:  No reports of sweating, cold or heat intolerance. No polyuria or polydipsia.      Vitals:  Vital Signs Last 24 Hrs  T(C): 36.7 (2022 14:20), Max: 36.8 (2022 21:17)  T(F): 98.1 (2022 14:20), Max: 98.3 (2022 21:17)  HR: 82 (2022 18:45) (80 - 94)  BP: 136/77 (2022 18:45) (115/80 - 159/88)  BP(mean): --  RR: 18 (2022 14:20) (17 - 18)  SpO2: 98% (2022 14:20) (98% - 100%)    Orthostatic VS    General Exam:   General Appearance: Appropriately dressed and in no acute distress       Head: Normocephalic, atraumatic and no dysmorphic features  Ear, Nose, and Throat: Moist mucous membranes  CVS: S1S2+  Resp: No SOB, no wheeze or rhonchi  GI: soft NT/ND  Extremities: No edema or cyanosis R great toe amputation   Skin: No bruises or rashes     Neurological Exam:  Mental Status: Awake, alert and oriented x 2.  Able to follow simple and complex verbal commands. Able to name and repeat. fluent speech. No obvious aphasia or dysarthria noted.    Cranial Nerves: PERRL, EOMI, VFFC, sensation V1-V3 intact,  no obvious facial asymmetry, equal elevation of palate, scm/trap 5/5, tongue is midline on protrusion. no obvious papilledema on fundoscopic exam. [place]   Motor: Normal bulk, tone and strength throughout. Fine finger movements were intact and symmetric. no tremors or drift noted.    Sensation: Intact to light touch and pinprick throughout. no right/left confusion. no extinction to tactile on DSS.   Reflexes: 1+ throughout at biceps, brachioradialis, triceps, patellars and ankles bilaterally and equal. No clonus. R toe and L toe were both downgoing.  Coordination: No dysmetria on FNF   Gait: deferred     Data/Labs/Imaging which I personally reviewed.     Labs:     CBC Full  -  ( 2022 07:40 )  WBC Count : 3.44 K/uL  RBC Count : 2.98 M/uL  Hemoglobin : 7.7 g/dL  Hematocrit : 25.5 %  Platelet Count - Automated : 195 K/uL  Mean Cell Volume : 85.6 fL  Mean Cell Hemoglobin : 25.8 pg  Mean Cell Hemoglobin Concentration : 30.2 gm/dL  Auto Neutrophil # : 1.91 K/uL  Auto Lymphocyte # : 0.90 K/uL  Auto Monocyte # : 0.38 K/uL  Auto Eosinophil # : 0.22 K/uL  Auto Basophil # : 0.02 K/uL  Auto Neutrophil % : 55.5 %  Auto Lymphocyte % : 26.2 %  Auto Monocyte % : 11.0 %  Auto Eosinophil % : 6.4 %  Auto Basophil % : 0.6 %        141  |  107  |  41<H>  ----------------------------<  79  3.6   |  22  |  1.84<H>    Ca    8.5      2022 07:40  Phos  4.3     06-07  Mg     1.60     06-07      `1< from: POCUS ED TTE 2D F/U, Limited w/o Cont. (22 @ 03:25) >    Procedure was performed in the Emergency Department by a credentialed   Emergency Medicine Attending Physician    EXAM:  ER TTE LIMITED      ORDER COMMENTS:      PROCEDURE DATE:  2022    FOCUSED ED ULTRASOUND REPORT          INTERPRETATION:  Indication: SOB    Findings: A focused transthoracic cardiac ultrasound examination was   performed.  A Small pericardial effusion was visualized.  The heart was was mildly hypodynamic.  There is no RV dilation or strain.  TAPSE 2cm    There were B lines in both anterior lung fields and small pleural   effusions bilaterally, right greater than left.    IMPRESSION:  Small Pericardial Effusion with no evidence of tamponade.  Low normal to mildly hypodynamic LV function.  No evidence of RV strain or dilation.    Lung ultrasound shows evidence of fluid overload.    --- End of Report ---            MELANIE REESE ATTENDING EM PHYSICIAN  This document has been electronically signed. 2022  3:28AM    < end of copied text >          MR Head and IAC w/wo IV Cont (21 @ 19:26)  IMPRESSION:    MRI BRAIN: No acute intracranial hemorrhage, acute ischemia, or abnormal intracranial enhancement.    Chronic lacunar infarct within the right medial cerebellar hemisphere and mild chronic white matter microvascular type changes.    MRI IAC: Unremarkable study.     CTH noncon, Angio Head & Neck, CTV w/ IV Cont (21 @ 16:32)   IMPRESSION:      1. Mild involutional changes in both hemispheres, with no acute abnormality, hemorrhage, or space-occupying lesion. No posterior fossa abnormality noted.  2. No IAC abnormality suggested. No middle ear and mastoid disease noted.  3. Atherosclerotic changes both carotid bifurcations in the neck. No significant stenosis, dissection, or occlusion noted. Patent vertebrals.  4. Widely patent arterial vasculature noted intracranially  5. There are no evidence of venous sinus occlusive disease. No sinus thrombosis suggested...

## 2022-06-07 NOTE — PROVIDER CONTACT NOTE (HYPOGLYCEMIA EVENT) - NS PROVIDER CONTACT BACKGROUND-HYPO
Age: 66y    Gender: Female    POCT Blood Glucose:  158 mg/dL (06-07-22 @ 12:26)  114 mg/dL (06-07-22 @ 09:37)  86 mg/dL (06-07-22 @ 09:08)  64 mg/dL (06-07-22 @ 08:43)  66 mg/dL (06-07-22 @ 08:41)  224 mg/dL (06-06-22 @ 21:14)  151 mg/dL (06-06-22 @ 17:39)      eMAR:atorvastatin   20 milliGRAM(s) Oral (06-06-22 @ 21:17)    insulin glargine Injectable (LANTUS)   24 Unit(s) SubCutaneous (06-06-22 @ 21:17)    insulin lispro (ADMELOG) corrective regimen sliding scale   1 Unit(s) SubCutaneous (06-07-22 @ 12:52)   1 Unit(s) SubCutaneous (06-06-22 @ 17:57)

## 2022-06-07 NOTE — CONSULT NOTE ADULT - ASSESSMENT
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  - Pain assessed and judicious use of narcotics when appropriate was discussed.    - Stroke education given when appropriate.  - Importance of fall prevention discussed.   - Differential diagnosis and plan of care discussed with patient and/or family and primary team  - Thank you for allowing me to participate in the care of this patient. Call with questions.   - outpatient f/u for old stroke on discharge   Russell Jimenes MD  Vascular Neurology  Office: [phone]

## 2022-06-07 NOTE — PROGRESS NOTE ADULT - SUBJECTIVE AND OBJECTIVE BOX
Patient seen and examined at bedside.    Overnight Events:     Review Of Systems: No chest pain, shortness of breath, or palpitations            Current Meds:  acetaminophen     Tablet .. 650 milliGRAM(s) Oral every 6 hours PRN  ALBUTerol    0.083% 2.5 milliGRAM(s) Nebulizer every 6 hours  amLODIPine   Tablet 10 milliGRAM(s) Oral daily  aspirin enteric coated 81 milliGRAM(s) Oral daily  atorvastatin 20 milliGRAM(s) Oral at bedtime  dextrose 5%. 1000 milliLiter(s) IV Continuous <Continuous>  dextrose 5%. 1000 milliLiter(s) IV Continuous <Continuous>  dextrose 50% Injectable 25 Gram(s) IV Push once  dextrose 50% Injectable 12.5 Gram(s) IV Push once  dextrose 50% Injectable 25 Gram(s) IV Push once  dextrose Oral Gel 15 Gram(s) Oral once PRN  enoxaparin Injectable 40 milliGRAM(s) SubCutaneous every 24 hours  glucagon  Injectable 1 milliGRAM(s) IntraMuscular once  hydrALAZINE 50 milliGRAM(s) Oral three times a day  influenza  Vaccine (HIGH DOSE) 0.7 milliLiter(s) IntraMuscular once  insulin glargine Injectable (LANTUS) 24 Unit(s) SubCutaneous at bedtime  insulin lispro (ADMELOG) corrective regimen sliding scale   SubCutaneous three times a day before meals  insulin lispro (ADMELOG) corrective regimen sliding scale   SubCutaneous at bedtime  ipratropium    for Nebulization 500 MICROGram(s) Nebulizer every 6 hours  metoprolol tartrate 25 milliGRAM(s) Oral every 24 hours      Vitals:  T(F): 98.1 (06-07), Max: 98.3 (06-06)  HR: 92 (06-07) (72 - 92)  BP: 159/83 (06-07) (131/68 - 159/88)  RR: 18 (06-07)  SpO2: 99% (06-07)  I&O's Summary      Physical Exam:  Appearance: resting comfortably in NAD and without complaint	  Cardiovascular: Normal S1 S2, +JVD, improved from yesterday, No murmurs, No edema  Respiratory: CTABL  Psychiatry: A & O x 3, Mood & affect appropriate  Gastrointestinal:  Soft, Non-tender, + BS	  Skin: No rashes, No ecchymoses, No cyanosis	  Neurologic: Non-focal, A&Ox3, nonfocal, TONEY x 4  Extremities: Normal range of motion, No clubbing, cyanosis   Vascular: Peripheral pulses palpable 2+ bilaterally                          7.7    3.44  )-----------( 195      ( 07 Jun 2022 07:40 )             25.5     06-07    141  |  107  |  41<H>  ----------------------------<  79  3.6   |  22  |  1.84<H>    Ca    8.5      07 Jun 2022 07:40  Phos  4.3     06-07  Mg     1.60     06-07        CARDIAC MARKERS ( 04 Jun 2022 16:54 )  81 ng/L / x     / x     / 160 U/L / x     / 5.0 ng/mL  CARDIAC MARKERS ( 04 Jun 2022 03:00 )  78 ng/L / x     / x     / x     / x     / x      CARDIAC MARKERS ( 04 Jun 2022 00:50 )  70 ng/L / x     / x     / x     / x     / x          Serum Pro-Brain Natriuretic Peptide: [id number] pg/mL (06-04 @ 00:50)      New ECG(s): Personally reviewed    < from: Transthoracic Echocardiogram (02.04.22 @ 14:21) >  CONCLUSIONS:  1. Mitral annular calcification, otherwise normal mitral  valve. Minimal mitral regurgitation.  2. Normal left ventricular internal dimensions and wall  thicknesses.  3. Normal left ventricular systolic function. No segmental  wall motion abnormalities.  4. Mild diastolic dysfunction (Stage I).  5. The right ventricle is not well visualized; grossly  normal right ventricular systolic function.    < end of copied text >   Patient seen and examined at bedside.    Overnight Events: NAEON    Review Of Systems: No chest pain, shortness of breath, or palpitations            Current Meds:  acetaminophen     Tablet .. 650 milliGRAM(s) Oral every 6 hours PRN  ALBUTerol    0.083% 2.5 milliGRAM(s) Nebulizer every 6 hours  amLODIPine   Tablet 10 milliGRAM(s) Oral daily  aspirin enteric coated 81 milliGRAM(s) Oral daily  atorvastatin 20 milliGRAM(s) Oral at bedtime  dextrose 5%. 1000 milliLiter(s) IV Continuous <Continuous>  dextrose 5%. 1000 milliLiter(s) IV Continuous <Continuous>  dextrose 50% Injectable 25 Gram(s) IV Push once  dextrose 50% Injectable 12.5 Gram(s) IV Push once  dextrose 50% Injectable 25 Gram(s) IV Push once  dextrose Oral Gel 15 Gram(s) Oral once PRN  enoxaparin Injectable 40 milliGRAM(s) SubCutaneous every 24 hours  glucagon  Injectable 1 milliGRAM(s) IntraMuscular once  hydrALAZINE 50 milliGRAM(s) Oral three times a day  influenza  Vaccine (HIGH DOSE) 0.7 milliLiter(s) IntraMuscular once  insulin glargine Injectable (LANTUS) 24 Unit(s) SubCutaneous at bedtime  insulin lispro (ADMELOG) corrective regimen sliding scale   SubCutaneous three times a day before meals  insulin lispro (ADMELOG) corrective regimen sliding scale   SubCutaneous at bedtime  ipratropium    for Nebulization 500 MICROGram(s) Nebulizer every 6 hours  metoprolol tartrate 25 milliGRAM(s) Oral every 24 hours      Vitals:  T(F): 98.1 (06-07), Max: 98.3 (06-06)  HR: 92 (06-07) (72 - 92)  BP: 159/83 (06-07) (131/68 - 159/88)  RR: 18 (06-07)  SpO2: 99% (06-07)  I&O's Summary      Physical Exam:  Appearance: resting comfortably in NAD and without complaint	  Cardiovascular: Normal S1 S2, +JVD, improved from yesterday, No murmurs, No edema  Respiratory: CTABL  Psychiatry: A & O x 3, Mood & affect appropriate  Gastrointestinal:  Soft, Non-tender, + BS	  Skin: No rashes, No ecchymoses, No cyanosis	  Neurologic: Non-focal, A&Ox3, nonfocal, TONEY x 4  Extremities: Normal range of motion, No clubbing, cyanosis   Vascular: Peripheral pulses palpable 2+ bilaterally                          7.7    3.44  )-----------( 195      ( 07 Jun 2022 07:40 )             25.5     06-07    141  |  107  |  41<H>  ----------------------------<  79  3.6   |  22  |  1.84<H>    Ca    8.5      07 Jun 2022 07:40  Phos  4.3     06-07  Mg     1.60     06-07        CARDIAC MARKERS ( 04 Jun 2022 16:54 )  81 ng/L / x     / x     / 160 U/L / x     / 5.0 ng/mL  CARDIAC MARKERS ( 04 Jun 2022 03:00 )  78 ng/L / x     / x     / x     / x     / x      CARDIAC MARKERS ( 04 Jun 2022 00:50 )  70 ng/L / x     / x     / x     / x     / x          Serum Pro-Brain Natriuretic Peptide: [id number] pg/mL (06-04 @ 00:50)      New ECG(s): Personally reviewed    < from: Transthoracic Echocardiogram (02.04.22 @ 14:21) >  CONCLUSIONS:  1. Mitral annular calcification, otherwise normal mitral  valve. Minimal mitral regurgitation.  2. Normal left ventricular internal dimensions and wall  thicknesses.  3. Normal left ventricular systolic function. No segmental  wall motion abnormalities.  4. Mild diastolic dysfunction (Stage I).  5. The right ventricle is not well visualized; grossly  normal right ventricular systolic function.    < end of copied text >

## 2022-06-07 NOTE — PROGRESS NOTE ADULT - SUBJECTIVE AND OBJECTIVE BOX
Patient is a 66y old  Female who presents with a chief complaint of SOB (07 Jun 2022 08:54)    SUBJECTIVE / OVERNIGHT EVENTS: No acute events. Slight dizziness earlier when blood glucose found to be in 60s, improved after juice. Dizziness resolved. Feels comfortable at this time, no shortness of breath, chest pain or other discomfort. Leg swelling is improving.     MEDICATIONS  (STANDING):  ALBUTerol    0.083% 2.5 milliGRAM(s) Nebulizer every 6 hours  amLODIPine   Tablet 10 milliGRAM(s) Oral daily  aspirin enteric coated 81 milliGRAM(s) Oral daily  atorvastatin 20 milliGRAM(s) Oral at bedtime  dextrose 5%. 1000 milliLiter(s) (100 mL/Hr) IV Continuous <Continuous>  dextrose 5%. 1000 milliLiter(s) (50 mL/Hr) IV Continuous <Continuous>  dextrose 50% Injectable 25 Gram(s) IV Push once  dextrose 50% Injectable 12.5 Gram(s) IV Push once  dextrose 50% Injectable 25 Gram(s) IV Push once  enoxaparin Injectable 40 milliGRAM(s) SubCutaneous every 24 hours  glucagon  Injectable 1 milliGRAM(s) IntraMuscular once  hydrALAZINE 50 milliGRAM(s) Oral three times a day  influenza  Vaccine (HIGH DOSE) 0.7 milliLiter(s) IntraMuscular once  insulin glargine Injectable (LANTUS) 24 Unit(s) SubCutaneous at bedtime  insulin lispro (ADMELOG) corrective regimen sliding scale   SubCutaneous three times a day before meals  insulin lispro (ADMELOG) corrective regimen sliding scale   SubCutaneous at bedtime  ipratropium    for Nebulization 500 MICROGram(s) Nebulizer every 6 hours  metoprolol tartrate 25 milliGRAM(s) Oral every 24 hours    MEDICATIONS  (PRN):  acetaminophen     Tablet .. 650 milliGRAM(s) Oral every 6 hours PRN Mild Pain (1 - 3), Moderate Pain (4 - 6)  dextrose Oral Gel 15 Gram(s) Oral once PRN Blood Glucose LESS THAN 70 milliGRAM(s)/deciliter    CAPILLARY BLOOD GLUCOSE    POCT Blood Glucose.: 158 mg/dL (07 Jun 2022 12:26)  POCT Blood Glucose.: 114 mg/dL (07 Jun 2022 09:37)  POCT Blood Glucose.: 86 mg/dL (07 Jun 2022 09:08)  POCT Blood Glucose.: 64 mg/dL (07 Jun 2022 08:43)  POCT Blood Glucose.: 66 mg/dL (07 Jun 2022 08:41)  POCT Blood Glucose.: 224 mg/dL (06 Jun 2022 21:14)  POCT Blood Glucose.: 151 mg/dL (06 Jun 2022 17:39)    I&O's Summary    PHYSICAL EXAM:  Vital Signs Last 24 Hrs  T(C): 36.7 (07 Jun 2022 11:15), Max: 36.8 (06 Jun 2022 21:17)  T(F): 98.1 (07 Jun 2022 11:15), Max: 98.3 (06 Jun 2022 21:17)  HR: 94 (07 Jun 2022 11:15) (80 - 94)  BP: 115/80 (07 Jun 2022 11:15) (115/80 - 159/88)  BP(mean): --  RR: 18 (07 Jun 2022 11:15) (17 - 18)  SpO2: 99% (07 Jun 2022 11:15) (98% - 100%)    CONSTITUTIONAL: NAD, well-developed, well-groomed  EYES: conjunctiva and sclera clear  ENMT: Moist oral mucosa  RESPIRATORY: Normal respiratory effort; clear bilaterally  CARDIOVASCULAR: Regular rate and rhythm, normal S1 and S2, trace b/l lower extremity edema; Peripheral pulses are 2+ bilaterally  ABDOMEN: Nontender to palpation, normoactive bowel sounds, no rebound/guarding  PSYCH: calm; affect appropriate  NEUROLOGY: hard of hearing, moving all extremities  SKIN: No rashes    LABS:                        7.7    3.44  )-----------( 195      ( 07 Jun 2022 07:40 )             25.5     06-07    141  |  107  |  41<H>  ----------------------------<  79  3.6   |  22  |  1.84<H>    Ca    8.5      07 Jun 2022 07:40  Phos  4.3     06-07  Mg     1.60     06-07      RADIOLOGY & ADDITIONAL TESTS: Reviewed    COORDINATION OF CARE:  Care Discussed with Consultants/Other Providers [Y- Medicine ACP]

## 2022-06-08 ENCOUNTER — TRANSCRIPTION ENCOUNTER (OUTPATIENT)
Age: 67
End: 2022-06-08

## 2022-06-08 VITALS
OXYGEN SATURATION: 98 % | RESPIRATION RATE: 18 BRPM | TEMPERATURE: 98 F | SYSTOLIC BLOOD PRESSURE: 142 MMHG | HEART RATE: 78 BPM | DIASTOLIC BLOOD PRESSURE: 84 MMHG

## 2022-06-08 LAB
ANION GAP SERPL CALC-SCNC: 10 MMOL/L — SIGNIFICANT CHANGE UP (ref 7–14)
BASOPHILS # BLD AUTO: 0.02 K/UL — SIGNIFICANT CHANGE UP (ref 0–0.2)
BASOPHILS NFR BLD AUTO: 0.6 % — SIGNIFICANT CHANGE UP (ref 0–2)
BUN SERPL-MCNC: 45 MG/DL — HIGH (ref 7–23)
CALCIUM SERPL-MCNC: 8.6 MG/DL — SIGNIFICANT CHANGE UP (ref 8.4–10.5)
CHLORIDE SERPL-SCNC: 105 MMOL/L — SIGNIFICANT CHANGE UP (ref 98–107)
CO2 SERPL-SCNC: 22 MMOL/L — SIGNIFICANT CHANGE UP (ref 22–31)
CREAT SERPL-MCNC: 2.33 MG/DL — HIGH (ref 0.5–1.3)
EGFR: 22 ML/MIN/1.73M2 — LOW
EOSINOPHIL # BLD AUTO: 0.27 K/UL — SIGNIFICANT CHANGE UP (ref 0–0.5)
EOSINOPHIL NFR BLD AUTO: 7.8 % — HIGH (ref 0–6)
GLUCOSE BLDC GLUCOMTR-MCNC: 140 MG/DL — HIGH (ref 70–99)
GLUCOSE BLDC GLUCOMTR-MCNC: 152 MG/DL — HIGH (ref 70–99)
GLUCOSE BLDC GLUCOMTR-MCNC: 96 MG/DL — SIGNIFICANT CHANGE UP (ref 70–99)
GLUCOSE SERPL-MCNC: 98 MG/DL — SIGNIFICANT CHANGE UP (ref 70–99)
HCT VFR BLD CALC: 25 % — LOW (ref 34.5–45)
HGB BLD-MCNC: 7.7 G/DL — LOW (ref 11.5–15.5)
IANC: 1.69 K/UL — LOW (ref 1.8–7.4)
IMM GRANULOCYTES NFR BLD AUTO: 0.9 % — SIGNIFICANT CHANGE UP (ref 0–1.5)
LYMPHOCYTES # BLD AUTO: 1.05 K/UL — SIGNIFICANT CHANGE UP (ref 1–3.3)
LYMPHOCYTES # BLD AUTO: 30.5 % — SIGNIFICANT CHANGE UP (ref 13–44)
MAGNESIUM SERPL-MCNC: 1.6 MG/DL — SIGNIFICANT CHANGE UP (ref 1.6–2.6)
MCHC RBC-ENTMCNC: 26 PG — LOW (ref 27–34)
MCHC RBC-ENTMCNC: 30.8 GM/DL — LOW (ref 32–36)
MCV RBC AUTO: 84.5 FL — SIGNIFICANT CHANGE UP (ref 80–100)
MONOCYTES # BLD AUTO: 0.38 K/UL — SIGNIFICANT CHANGE UP (ref 0–0.9)
MONOCYTES NFR BLD AUTO: 11 % — SIGNIFICANT CHANGE UP (ref 2–14)
NEUTROPHILS # BLD AUTO: 1.69 K/UL — LOW (ref 1.8–7.4)
NEUTROPHILS NFR BLD AUTO: 49.2 % — SIGNIFICANT CHANGE UP (ref 43–77)
NRBC # BLD: 0 /100 WBCS — SIGNIFICANT CHANGE UP
NRBC # FLD: 0 K/UL — SIGNIFICANT CHANGE UP
PHOSPHATE SERPL-MCNC: 4.7 MG/DL — HIGH (ref 2.5–4.5)
PLATELET # BLD AUTO: 226 K/UL — SIGNIFICANT CHANGE UP (ref 150–400)
POTASSIUM SERPL-MCNC: 5.2 MMOL/L — SIGNIFICANT CHANGE UP (ref 3.5–5.3)
POTASSIUM SERPL-SCNC: 5.2 MMOL/L — SIGNIFICANT CHANGE UP (ref 3.5–5.3)
RBC # BLD: 2.96 M/UL — LOW (ref 3.8–5.2)
RBC # FLD: 16.1 % — HIGH (ref 10.3–14.5)
SODIUM SERPL-SCNC: 137 MMOL/L — SIGNIFICANT CHANGE UP (ref 135–145)
WBC # BLD: 3.44 K/UL — LOW (ref 3.8–10.5)
WBC # FLD AUTO: 3.44 K/UL — LOW (ref 3.8–10.5)

## 2022-06-08 PROCEDURE — 99238 HOSP IP/OBS DSCHRG MGMT 30/<: CPT

## 2022-06-08 PROCEDURE — 99232 SBSQ HOSP IP/OBS MODERATE 35: CPT

## 2022-06-08 PROCEDURE — 93306 TTE W/DOPPLER COMPLETE: CPT | Mod: 26

## 2022-06-08 RX ORDER — FUROSEMIDE 40 MG
40 TABLET ORAL DAILY
Refills: 0 | Status: DISCONTINUED | OUTPATIENT
Start: 2022-06-08 | End: 2022-06-08

## 2022-06-08 RX ORDER — CARVEDILOL PHOSPHATE 80 MG/1
1 CAPSULE, EXTENDED RELEASE ORAL
Qty: 60 | Refills: 0
Start: 2022-06-08 | End: 2022-07-07

## 2022-06-08 RX ORDER — METOPROLOL TARTRATE 50 MG
1 TABLET ORAL
Qty: 0 | Refills: 0 | DISCHARGE

## 2022-06-08 RX ORDER — HYDRALAZINE HCL 50 MG
1 TABLET ORAL
Qty: 0 | Refills: 0 | DISCHARGE

## 2022-06-08 RX ORDER — FUROSEMIDE 40 MG
1 TABLET ORAL
Qty: 7 | Refills: 0
Start: 2022-06-08 | End: 2022-06-14

## 2022-06-08 RX ORDER — METFORMIN HYDROCHLORIDE 850 MG/1
1 TABLET ORAL
Qty: 0 | Refills: 0 | DISCHARGE

## 2022-06-08 RX ORDER — HYDRALAZINE HCL 50 MG
1 TABLET ORAL
Qty: 90 | Refills: 0
Start: 2022-06-08 | End: 2022-07-07

## 2022-06-08 RX ADMIN — Medication 500 MICROGRAM(S): at 03:51

## 2022-06-08 RX ADMIN — AMLODIPINE BESYLATE 10 MILLIGRAM(S): 2.5 TABLET ORAL at 05:01

## 2022-06-08 RX ADMIN — ENOXAPARIN SODIUM 40 MILLIGRAM(S): 100 INJECTION SUBCUTANEOUS at 13:47

## 2022-06-08 RX ADMIN — CARVEDILOL PHOSPHATE 12.5 MILLIGRAM(S): 80 CAPSULE, EXTENDED RELEASE ORAL at 05:01

## 2022-06-08 RX ADMIN — ALBUTEROL 2.5 MILLIGRAM(S): 90 AEROSOL, METERED ORAL at 15:54

## 2022-06-08 RX ADMIN — Medication 50 MILLIGRAM(S): at 05:01

## 2022-06-08 RX ADMIN — Medication 500 MICROGRAM(S): at 15:54

## 2022-06-08 RX ADMIN — Medication 81 MILLIGRAM(S): at 13:47

## 2022-06-08 RX ADMIN — CARVEDILOL PHOSPHATE 12.5 MILLIGRAM(S): 80 CAPSULE, EXTENDED RELEASE ORAL at 18:13

## 2022-06-08 RX ADMIN — Medication 1: at 18:11

## 2022-06-08 RX ADMIN — ALBUTEROL 2.5 MILLIGRAM(S): 90 AEROSOL, METERED ORAL at 03:51

## 2022-06-08 RX ADMIN — Medication 50 MILLIGRAM(S): at 13:47

## 2022-06-08 NOTE — DISCHARGE NOTE PROVIDER - PROVIDER TOKENS
FREE:[LAST:[Primary Care Doctor],PHONE:[(   )    -],FAX:[(   )    -]] FREE:[LAST:[Primary Care Doctor],PHONE:[(   )    -],FAX:[(   )    -]],FREE:[LAST:[Godwin],FIRST:[Sandra],PHONE:[phone]:[(   )    -]] FREE:[LAST:[Lina],FIRST:[Sandra],PHONE:[phone]:[(   )    -],SCHEDULEDAPPT:[06/10/2022],SCHEDULEDAPPTTIME:[09:20 AM]]

## 2022-06-08 NOTE — PROGRESS NOTE ADULT - PROBLEM SELECTOR PLAN 1
Pt. with significantly worsening SOB. Found to have elevated BP on admission to 207/122. U/s performed in ED revealing B lines - significant for pulmonary edema. Placed on BiPAP and received lasix 40mg IVx1 as well as nitro paste for HTN. Now pt. is sating well on RA however there are still diffuse crackles noted in the bilateral lung fields. Prior echocardiogram in Feb 2022 showing grade I diastolic dysfunction and a normal EF.   - F/u TTE  - Lung exam likely 2/2 rhinovirus/enterovirus - duonebs ordered   - Re-dose lasix 40 mg IV x once and assess UOP (pt lasix naive based on chart review and doesn't seem to have heard of this med)  - Blood pressure control as noted below.
Pt. with significantly worsening SOB. Found to have elevated BP on admission to 207/122. U/s performed in ED revealing B lines - significant for pulmonary edema. Placed on BiPAP and received lasix 40mg IVx1 as well as nitro paste for HTN. Prior echocardiogram in Feb 2022 showing grade I diastolic dysfunction and a normal EF.     - Symptomatically improving with diuresis and BP control  - F/u TTE  - C/w lasix 40mg IV again today, f/u BMP in AM  - Blood pressure control as noted below.
Pt. with significantly worsening SOB. Found to have elevated BP on admission to 207/122. U/s performed in ED revealing B lines - significant for pulmonary edema. Placed on BiPAP and received lasix 40mg IVx1 as well as nitro paste for HTN. Prior echocardiogram in Feb 2022 showing grade I diastolic dysfunction and a normal EF.     - Symptomatically improving with diuresis and BP control. Now transitioned to PO lasix as per Cardiology.   - F/u TTE  - Blood pressure control as noted below.
Pt. with significantly worsening SOB. Found to have elevated BP on admission to 207/122. U/s performed in ED revealing B lines - significant for pulmonary edema. Placed on BiPAP and received lasix 40mg IVx1 as well as nitro paste for HTN. Prior echocardiogram in Feb 2022 showing grade I diastolic dysfunction and a normal EF.     - Symptomatically improving with diuresis and BP control  - F/u TTE  - C/w lasix 40mg IV again today, f/u BMP in AM  - Blood pressure control as noted below.

## 2022-06-08 NOTE — DISCHARGE NOTE PROVIDER - NSDCMRMEDTOKEN_GEN_ALL_CORE_FT
amLODIPine 10 mg oral tablet: 1 tab(s) orally once a day  aspirin 81 mg oral delayed release tablet: 1 tab(s) orally once a day  atorvastatin 20 mg oral tablet: 1 tab(s) orally once a day  dexamethasone 0.1% ophthalmic suspension: 5 drop(s) to each affected ear once a day  hydrALAZINE 25 mg oral tablet: 1 tab(s) orally once a day  insulin detemir 100 units/mL subcutaneous solution: 24 unit(s) subcutaneous once a day (at bedtime)  insulin lispro 100 units/mL subcutaneous solution: 8-12 units subcutaneous 3 times a day before meals.  metFORMIN 500 mg oral tablet: 1 tab(s) orally 2 times a day  metoprolol tartrate 25 mg oral tablet: 1 tab(s) orally once a day   amLODIPine 10 mg oral tablet: 1 tab(s) orally once a day  aspirin 81 mg oral delayed release tablet: 1 tab(s) orally once a day  atorvastatin 20 mg oral tablet: 1 tab(s) orally once a day  carvedilol 25 mg oral tablet: 1 tab(s) orally 2 times a day   dexamethasone 0.1% ophthalmic suspension: 5 drop(s) to each affected ear once a day  furosemide 40 mg oral tablet: 1 tab(s) orally once a day  hydrALAZINE 50 mg oral tablet: 1 tab(s) orally 3 times a day  insulin detemir 100 units/mL subcutaneous solution: 24 unit(s) subcutaneous once a day (at bedtime)  insulin lispro 100 units/mL subcutaneous solution: 8-12 units subcutaneous 3 times a day before meals.

## 2022-06-08 NOTE — DISCHARGE NOTE NURSING/CASE MANAGEMENT/SOCIAL WORK - NSDCFUADDAPPT_GEN_ALL_CORE_FT
You have an appointment with Dr. Sandra Mills on Dolores 10, 2022 at 9:20 AM. Address: [address] [place] [address]. [place] [address]  Phone number: [phone]    Please follow up with cardiology clinic. To make an appointment, please call [phone] for further workup.

## 2022-06-08 NOTE — PROGRESS NOTE ADULT - ATTENDING COMMENTS
volume status is improved, off supplemental O2  remains with elevated JVP and LE edema  continue IV furosemide
volume status is improved, remains off supplemental O2  transition to PO furosemide  increase carvedilol for better BP control for HFpEF
volume status is improved, remains off supplemental O2  remains with mildly elevated JVP and trace LE edema  continue IV furosemide for now

## 2022-06-08 NOTE — DISCHARGE NOTE PROVIDER - NSDCCPCAREPLAN_GEN_ALL_CORE_FT
PRINCIPAL DISCHARGE DIAGNOSIS  Diagnosis: Acute CHF  Assessment and Plan of Treatment: Low salt diet, fluid restriction to 1500 ml daily, monitor your fluid intake and weight daily, exercise as tolerated 30 minutes daily, and follow up with your physician within 1 to 2 weeks. If you notice to have lower extremity swelling, increase in work of breathing, shortness of breath, requiring more than 2 pillows to sleep, and/or a weight gain of 5 pounds in one week or weight gain of 3 pounds in 3 days, please follow up with your PCP and/or cardiologist for further workup.      SECONDARY DISCHARGE DIAGNOSES  Diagnosis: Diabetes mellitus  Assessment and Plan of Treatment: Monitor finger sticks pre-meal and bedtime, low salt, fat and carbohydrate diet, minimize glucose intake.  Exercise daily for at least 30 minutes and weight loss.  Follow up with primary care physician and endocrinologist for routine Hemoglobin A1C checks and management.  Follow up with your ophthalmologist for routine yearly vision exams.    Diagnosis: MANDY (acute kidney injury)  Assessment and Plan of Treatment: Continue blood pressure, cholesterol and diabetic medications. Goal of hemoglobin A1C (HgbA1C) < 7%.  Avoid nephrotoxic drugs such as nonsteroidal anti-inflammatory agents (NSAIDs).   Please follow up with your primary care doctor to monitor your kidney function, continue with low protein and potassium diet. If you notice a decrease in your urine output, please follow up with your primary care doctor.    Diagnosis: Hypertensive emergency  Assessment and Plan of Treatment: Low sodium and fat diet, continue anti-hypertensive medications, and follow up with primary care physician. If you feel an intense headache that is not going away, facial droop, difficulty swallowing, decrease in being able to move one side of your body, please go to the nearest emergency department.     PRINCIPAL DISCHARGE DIAGNOSIS  Diagnosis: Acute CHF  Assessment and Plan of Treatment: Low salt diet, fluid restriction to 1500 ml daily, monitor your fluid intake and weight daily, exercise as tolerated 30 minutes daily, and follow up with your physician within 1 to 2 weeks. If you notice to have lower extremity swelling, increase in work of breathing, shortness of breath, requiring more than 2 pillows to sleep, and/or a weight gain of 5 pounds in one week or weight gain of 3 pounds in 3 days, please follow up with your PCP and/or cardiologist for further workup.      SECONDARY DISCHARGE DIAGNOSES  Diagnosis: Diabetes mellitus  Assessment and Plan of Treatment: Monitor finger sticks pre-meal and bedtime, low salt, fat and carbohydrate diet, minimize glucose intake.  Exercise daily for at least 30 minutes and weight loss.  Follow up with primary care physician and endocrinologist for routine Hemoglobin A1C checks and management.  Follow up with your ophthalmologist for routine yearly vision exams. Metormin was stopped due to your kidney levels being elevated. Please follow up with your PCP for further follow up .    Diagnosis: MANDY (acute kidney injury)  Assessment and Plan of Treatment: Continue blood pressure, cholesterol and diabetic medications. Goal of hemoglobin A1C (HgbA1C) < 7%.  Avoid nephrotoxic drugs such as nonsteroidal anti-inflammatory agents (NSAIDs).   Please follow up with your primary care doctor to monitor your kidney function, continue with low protein and potassium diet. If you notice a decrease in your urine output, please follow up with your primary care doctor.    Diagnosis: Hypertensive emergency  Assessment and Plan of Treatment: Low sodium and fat diet, continue anti-hypertensive medications, and follow up with primary care physician. If you feel an intense headache that is not going away, facial droop, difficulty swallowing, decrease in being able to move one side of your body, please go to the nearest emergency department.

## 2022-06-08 NOTE — PROGRESS NOTE ADULT - NSPROGADDITIONALINFOA_GEN_ALL_CORE
Daughter Ailyn updated by telephone [phone] at patient's request
Maggie Ailyn updated by telephone [phone] on 6/7, all questions answered
Maggie Robertson updated by telephone [phone] on 6/8, all questions answered  Tentative plan for DC home today pending TTE results (in progress)

## 2022-06-08 NOTE — PROGRESS NOTE ADULT - PROBLEM SELECTOR PLAN 3
Hx of DM. On Detemir 24U and premeal 6-8U.   - F/u A1C--> 7.8  - Lantus 24U qhs   - ISS   - Add pre-meal as necessary

## 2022-06-08 NOTE — PROGRESS NOTE ADULT - SUBJECTIVE AND OBJECTIVE BOX
Patient seen and examined at bedside.    Overnight Events: JAY, patient said her vision is poor this am.     Review Of Systems: No chest pain, shortness of breath, or palpitations            Current Meds:  acetaminophen     Tablet .. 650 milliGRAM(s) Oral every 6 hours PRN  ALBUTerol    0.083% 2.5 milliGRAM(s) Nebulizer every 6 hours  amLODIPine   Tablet 10 milliGRAM(s) Oral daily  aspirin enteric coated 81 milliGRAM(s) Oral daily  atorvastatin 20 milliGRAM(s) Oral at bedtime  carvedilol 12.5 milliGRAM(s) Oral every 12 hours  dextrose 5%. 1000 milliLiter(s) IV Continuous <Continuous>  dextrose 5%. 1000 milliLiter(s) IV Continuous <Continuous>  dextrose 50% Injectable 25 Gram(s) IV Push once  dextrose 50% Injectable 12.5 Gram(s) IV Push once  dextrose 50% Injectable 25 Gram(s) IV Push once  dextrose Oral Gel 15 Gram(s) Oral once PRN  enoxaparin Injectable 40 milliGRAM(s) SubCutaneous every 24 hours  glucagon  Injectable 1 milliGRAM(s) IntraMuscular once  hydrALAZINE 50 milliGRAM(s) Oral three times a day  influenza  Vaccine (HIGH DOSE) 0.7 milliLiter(s) IntraMuscular once  insulin glargine Injectable (LANTUS) 24 Unit(s) SubCutaneous at bedtime  insulin lispro (ADMELOG) corrective regimen sliding scale   SubCutaneous three times a day before meals  insulin lispro (ADMELOG) corrective regimen sliding scale   SubCutaneous at bedtime  ipratropium    for Nebulization 500 MICROGram(s) Nebulizer every 6 hours      Vitals:  T(F): 98.5 (06-08), Max: 98.5 (06-08)  HR: 79 (06-08) (79 - 94)  BP: 134/74 (06-08) (115/80 - 138/85)  RR: 18 (06-08)  SpO2: 100% (06-08)  I&O's Summary      Physical Exam:  Appearance: resting comfortably in NAD and without complaint	  Cardiovascular: Normal S1 S2, minimal JVD while sitting upright, No murmurs, No edema  Respiratory: CTABL  Psychiatry: A & O x 3, Mood & affect appropriate  Gastrointestinal:  Soft, Non-tender, + BS	  Skin: No rashes, No ecchymoses, No cyanosis	  Neurologic: Non-focal, A&Ox3, nonfocal, TONEY x 4  Extremities: Normal range of motion, No clubbing, cyanosis   Vascular: Peripheral pulses palpable 2+ bilaterally                          7.7    3.44  )-----------( 226      ( 08 Jun 2022 04:50 )             25.0     06-08    137  |  105  |  45<H>  ----------------------------<  98  5.2   |  22  |  2.33<H>    Ca    8.6      08 Jun 2022 04:50  Phos  4.7     06-08  Mg     1.60     06-08        CARDIAC MARKERS ( 04 Jun 2022 16:54 )  81 ng/L / x     / x     / 160 U/L / x     / 5.0 ng/mL  CARDIAC MARKERS ( 04 Jun 2022 03:00 )  78 ng/L / x     / x     / x     / x     / x      CARDIAC MARKERS ( 04 Jun 2022 00:50 )  70 ng/L / x     / x     / x     / x     / x          Serum Pro-Brain Natriuretic Peptide: 41121 pg/mL (06-04 @ 00:50)        New ECG(s): Personally reviewed    < from: Transthoracic Echocardiogram (02.04.22 @ 14:21) >  CONCLUSIONS:  1. Mitral annular calcification, otherwise normal mitral  valve. Minimal mitral regurgitation.  2. Normal left ventricular internal dimensions and wall  thicknesses.  3. Normal left ventricular systolic function. No segmental  wall motion abnormalities.  4. Mild diastolic dysfunction (Stage I).  5. The right ventricle is not well visualized; grossly  normal right ventricular systolic function.    < end of copied text >

## 2022-06-08 NOTE — PROGRESS NOTE ADULT - PROBLEM SELECTOR PLAN 6
DVT ppx: lovenox 40mg qd.

## 2022-06-08 NOTE — PROGRESS NOTE ADULT - PROBLEM SELECTOR PLAN 5
No signs of bleeding, not requiring transfusion at this time  - trend hg closely
Hgb 8.6, stable. No signs of bleeding. Appears to be at baseline.   - Can get ferritin, iron profile, b12, folate if not yet done in Feb
No signs of bleeding, not requiring transfusion at this time  - hg stable, repeat outpatient
No signs of bleeding, not requiring transfusion at this time  - trend hg closely

## 2022-06-08 NOTE — PROGRESS NOTE ADULT - ASSESSMENT
65 y/o F with PMH HTN, HLD, DM2, pancreatic pseudocyst, presented with SOB found to have flash pulmonary edema 2/2 hypertensive emergency + entero/rhinovirus.     #Flash pulmonary edema  #HTN emergency  - denies ever having chest pain, trop plateaued, EKG with NSR, known RBBB, and new TWI likely in the setting of demand  - off O2, lungs are CTABL, but still with +JVP  - c/w IV lasix 40 mg daily to maintain net -1L  - will f/u repeat TTE, TTE from 2/2022 showed normal LVEF, mild diastolic dysfunction  - c/w metoprolol tartrate 25 BID  - c/w amlodipine 10 daily  - c/w hydralazine 50 TID  
65 y/o F with PMH HTN, HLD, DM2, pancreatic pseudocyst, presented with SOB found to have flash pulmonary edema 2/2 hypertensive emergency + entero/rhinovirus.     #Flash pulmonary edema  #HTN emergency  - denies ever having chest pain, trop plateaued, EKG with NSR, known RBBB, and new TWI likely in the setting of demand  - off O2, lungs are CTABL, minimal JVP but was sitting upright this morning  - would switch to PO lasix 40 today to keep net even  - will f/u repeat TTE, TTE from 2/2022 showed normal LVEF, mild diastolic dysfunction  - c/w carvedilol 12.5 BID, can increase to 25 BID starting tomorrow am  - c/w amlodipine 10 daily  - c/w hydralazine 50 TID
65 y/o F with PMH HTN, HLD, DM2, pancreatic pseudocyst, presented with SOB found to have flash pulmonary edema 2/2 hypertensive emergency + entero/rhinovirus.     #Flash pulmonary edema  #HTN emergency  - denies ever having chest pain, trop plateaued, EKG with NSR, known RBBB, and new TWI likely in the setting of demand  - off O2, lungs are CTABL, but still with +JVP, improved from yesterday  - c/w IV lasix 40 mg daily to maintain net -1L  - will f/u repeat TTE, TTE from 2/2022 showed normal LVEF, mild diastolic dysfunction  - c/w metoprolol tartrate 25 BID, can consider switching to carvedilol 12.5 BID for pm dose for more BP control  - c/w amlodipine 10 daily  - c/w hydralazine 50 TID
67 y/o F with PMH HTN, HLD, DM2, pancreatic pseudocyst, ?sarcoid (EBUS in Feb 2022, not on sarcoid therapy she says), iron deficiency anemia who presented with dyspnea iso likely flash pulmonary edema 2/2 hypertensive emergency + entero/rhinovirus and signs of volume overload. 
67 y/o F with PMH HTN, HLD, DM2, pancreatic pseudocyst, ?sarcoid (EBUS in Feb 2022, not on sarcoid therapy she says), iron deficiency anemia who presented with dyspnea iso likely flash pulmonary edema 2/2 hypertensive emergency + entero/rhinovirus and signs of volume overload. 
67 y/o F with PMH HTN, HLD, DM2, pancreatic pseudocyst, ?sarcoid (EBUS in Feb 2022, not on sarcoid therapy she says), iron deficiency anemia who presents for worsening shortness of breath found to have flash pulmonary edema 2/2 hypertensive emergency + entero/rhinovirus. Briefly on BIPAP on admission. Responding to IV lasix 40 mg.
67 y/o F with PMH HTN, HLD, DM2, pancreatic pseudocyst, ?sarcoid (EBUS in Feb 2022, not on sarcoid therapy she says), iron deficiency anemia who presented with dyspnea iso likely flash pulmonary edema 2/2 hypertensive emergency + entero/rhinovirus and signs of volume overload.

## 2022-06-08 NOTE — DISCHARGE NOTE NURSING/CASE MANAGEMENT/SOCIAL WORK - NSDCPEFALRISK_GEN_ALL_CORE
For information on Fall & Injury Prevention, visit: [email or web address] OR  [email or web address] OR  https://www.cdc.gov/steadi/patient.html

## 2022-06-08 NOTE — DISCHARGE NOTE PROVIDER - HOSPITAL COURSE
65 y/o F with PMH HTN, HLD, DM2, pancreatic pseudocyst, ?sarcoid (EBUS in Feb 2022, not on sarcoid therapy she says), iron deficiency anemia who presented with dyspnea iso likely flash pulmonary edema 2/2 hypertensive emergency + entero/rhinovirus and signs of volume overload. Patient is found to have an elevated BP on admission with US showing B lines which is significant for pulmonary edema. IV push lasix 40 daily for diuresis now transitioned to PO lasix. Echo pending _----- . Orthostatics are negative. Hydralazine is increased to 25 mg TID , metoprolol 25 mg qd, and norvasc 10 mg qd for hypertension which is now resolved. MANDY was noted during this admission due to possible cardiorenal with IV Lasix 40 mg.     Case discussed with  ___ on ___. Patient is medically cleared and stable for discharge. 67 y/o F with PMH HTN, HLD, DM2, pancreatic pseudocyst, ?sarcoid (EBUS in Feb 2022, not on sarcoid therapy she says), iron deficiency anemia who presented with dyspnea iso likely flash pulmonary edema 2/2 hypertensive emergency + entero/rhinovirus and signs of volume overload. Patient is found to have an elevated BP on admission with US showing B lines which is significant for pulmonary edema. IV push lasix 40 daily for diuresis now transitioned to PO lasix. Echo shows EF 62%. Normal left ventricular internal dimensions and wall thicknesses. Grossly normal left ventricular systolic function.Mild diastolic dysfunction (Stage I). Normal right ventricular size and function. Orthostatics are negative. Hydralazine is increased to 25 mg TID , metoprolol 25 mg qd, and norvasc 10 mg qd for hypertension which is now resolved. MANDY was noted during this admission due to possible cardiorenal with IV Lasix 40 mg.     Case discussed with Dr. Weeks on June 8, 2022. Patient is medically cleared and stable for discharge. 65 y/o F with PMH HTN, HLD, DM2, pancreatic pseudocyst, ?sarcoid (EBUS in Feb 2022, not on sarcoid therapy she says), iron deficiency anemia who presented with dyspnea iso likely flash pulmonary edema 2/2 hypertensive emergency + entero/rhinovirus and signs of volume overload. Patient is found to have an elevated BP on admission with US showing B lines which is significant for pulmonary edema. IV push lasix 40 daily for diuresis now transitioned to PO lasix. Echo shows EF 62%. Normal left ventricular internal dimensions and wall thicknesses. Grossly normal left ventricular systolic function.Mild diastolic dysfunction (Stage I). Normal right ventricular size and function. Orthostatics are negative. Hydralazine is increased to 25 mg TID , metoprolol 25 mg qd, and norvasc 10 mg qd for hypertension which is now resolved. MANDY was noted during this admission due to possible cardiorenal  Patient was diuresed with IV Lasix 40 mg and now transitioned to PO lasix 40 mg daily.     Case discussed with Dr. Weeks on June 8, 2022. Patient is medically cleared and stable for discharge.

## 2022-06-08 NOTE — PROGRESS NOTE ADULT - PROBLEM SELECTOR PLAN 2
BP reaching as high as 207/122 with flash pulmonary edema and MANDY. Pt. does not take BP at home. On three medications and reports compliance. Orthostatics neg.  - Continue with home medications, hydralazine 25mg qd--> incr to TID on 6/5 given persistently elevated BP, metoprolol 25mg qd, and norvasc 10mg qd.  - overall bp range improving, plan to discharge on this medication regimen with close outpatient follow up
BP reaching as high as 207/122 with flash pulmonary edema and MANDY. Pt. does not take BP at home. On three medications and reports compliance. Orthostatics neg.  - Continue with home medications, hydralazine 25mg qd--> incr to TID on 6/5 given persistently elevated BP, metoprolol 25mg qd, and norvasc 10mg qd.
BP reaching as high as 207/122 with flash pulmonary edema and MANDY. Pt. does not take BP at home. On three medications and reports compliance. Orthostatics neg.  - Continue with home medications, hydralazine 25mg qd--> incr to TID on 6/5 given persistently elevated BP, metoprolol 25mg qd, and norvasc 10mg qd.
BP reaching as high as 207/122 with flash pulmonary edema and MANDY. Pt. does not take BP at home. On three medications and reports compliance. Orthostatics neg.  - Continue with home medications, hydralazine 25mg qd--> incr to TID on 6/5 given persistently elevated BP, metoprolol 25mg qd, and norvasc 10mg qd.  - overall bp range improving, continue to trend

## 2022-06-08 NOTE — DISCHARGE NOTE PROVIDER - NSDCFUSCHEDAPPT_GEN_ALL_CORE_FT
[facility] Physician [facility]  Med Pulm [address]  Scheduled Appointment: 07/20/2022    Monica Quick  [facility] Pulm [address]  Scheduled Appointment: 07/20/2022    Angel Luis Beyer  [facility]  [address] [address]  Scheduled Appointment: 08/04/2022

## 2022-06-08 NOTE — PROGRESS NOTE ADULT - PROBLEM SELECTOR PLAN 4
Cr 1.8 -> 2.3 -> 2.01 -> 2.09 -> 1.84 ->2.33 today. IV lasix discontinued, now transitioned to PO today.   Cr 1.44 on Feb 10, 2022    - Suspect possibly cardiorenal component, diuresing as above. Repeat BMP in one week with outpatient providers. If remains elevated, consider renal outpatient evaluation at that time.
Cr. 1.8 - on discharge Cr 1.4 but was lower than that throughout her prior admission.   - Urine lytes pending  - Cr improved after lasix
Cr 1.8 -> 2.3 -> 2.01 -> 2.09 -> 1.84 today    Cr 1.44 on Feb 10, 2022    - Suspect possibly cardiorenal component, diuresing as above with improvement, continue to trend
Cr 1.8 -> 2.3 -> 2.01 -> 2.09 today    Cr 1.44 on Feb 10, 2022    - Suspect possibly cardiorenal component, diuresing as above given continued +JVD, LE edema, bibasilar crackles, trend BMP closely

## 2022-06-08 NOTE — DISCHARGE NOTE NURSING/CASE MANAGEMENT/SOCIAL WORK - PATIENT PORTAL LINK FT
You can access the FollowMyHealth Patient Portal offered by [facility] by registering at the following website: [email or web address]. By joining [facility]’s FollowMyHealth portal, you will also be able to view your health information using other applications (apps) compatible with our system.

## 2022-06-08 NOTE — PROGRESS NOTE ADULT - SUBJECTIVE AND OBJECTIVE BOX
Patient is a 66y old  Female who presents with a chief complaint of SOB (08 Jun 2022 10:12)    SUBJECTIVE / OVERNIGHT EVENTS: No acute events. Feeling well today. No chest pain, shortness of breath, nausea, vomiting. Leg swelling improving.     MEDICATIONS  (STANDING):  ALBUTerol    0.083% 2.5 milliGRAM(s) Nebulizer every 6 hours  amLODIPine   Tablet 10 milliGRAM(s) Oral daily  aspirin enteric coated 81 milliGRAM(s) Oral daily  atorvastatin 20 milliGRAM(s) Oral at bedtime  carvedilol 12.5 milliGRAM(s) Oral every 12 hours  dextrose 5%. 1000 milliLiter(s) (100 mL/Hr) IV Continuous <Continuous>  dextrose 5%. 1000 milliLiter(s) (50 mL/Hr) IV Continuous <Continuous>  dextrose 50% Injectable 25 Gram(s) IV Push once  dextrose 50% Injectable 12.5 Gram(s) IV Push once  dextrose 50% Injectable 25 Gram(s) IV Push once  enoxaparin Injectable 40 milliGRAM(s) SubCutaneous every 24 hours  furosemide    Tablet 40 milliGRAM(s) Oral daily  glucagon  Injectable 1 milliGRAM(s) IntraMuscular once  hydrALAZINE 50 milliGRAM(s) Oral three times a day  influenza  Vaccine (HIGH DOSE) 0.7 milliLiter(s) IntraMuscular once  insulin glargine Injectable (LANTUS) 24 Unit(s) SubCutaneous at bedtime  insulin lispro (ADMELOG) corrective regimen sliding scale   SubCutaneous three times a day before meals  insulin lispro (ADMELOG) corrective regimen sliding scale   SubCutaneous at bedtime  ipratropium    for Nebulization 500 MICROGram(s) Nebulizer every 6 hours    MEDICATIONS  (PRN):  acetaminophen     Tablet .. 650 milliGRAM(s) Oral every 6 hours PRN Mild Pain (1 - 3), Moderate Pain (4 - 6)  dextrose Oral Gel 15 Gram(s) Oral once PRN Blood Glucose LESS THAN 70 milliGRAM(s)/deciliter    CAPILLARY BLOOD GLUCOSE    POCT Blood Glucose.: 140 mg/dL (08 Jun 2022 12:37)  POCT Blood Glucose.: 96 mg/dL (08 Jun 2022 08:38)  POCT Blood Glucose.: 124 mg/dL (07 Jun 2022 21:15)  POCT Blood Glucose.: 155 mg/dL (07 Jun 2022 17:56)    I&O's Summary    PHYSICAL EXAM:  Vital Signs Last 24 Hrs  T(C): 36.9 (08 Jun 2022 05:00), Max: 36.9 (08 Jun 2022 05:00)  T(F): 98.5 (08 Jun 2022 05:00), Max: 98.5 (08 Jun 2022 05:00)  HR: 79 (08 Jun 2022 05:00) (79 - 92)  BP: 134/74 (08 Jun 2022 05:00) (124/70 - 138/85)  BP(mean): --  RR: 18 (08 Jun 2022 05:00) (17 - 18)  SpO2: 100% (08 Jun 2022 05:00) (98% - 100%)    CONSTITUTIONAL: NAD, well-developed, well-groomed  EYES: conjunctiva and sclera clear  ENMT: Moist oral mucosa  RESPIRATORY: Normal respiratory effort; clear bilaterally  CARDIOVASCULAR: Regular rate and rhythm, normal S1 and S2, trace b/l lower extremity edema; Peripheral pulses are 2+ bilaterally  ABDOMEN: Nontender to palpation, normoactive bowel sounds, no rebound/guarding  PSYCH: calm; affect appropriate  NEUROLOGY: hard of hearing, moving all extremities  SKIN: No rashes    LABS:                        7.7    3.44  )-----------( 226      ( 08 Jun 2022 04:50 )             25.0     06-08    137  |  105  |  45<H>  ----------------------------<  98  5.2   |  22  |  2.33<H>    Ca    8.6      08 Jun 2022 04:50  Phos  4.7     06-08  Mg     1.60     06-08        RADIOLOGY & ADDITIONAL TESTS: Reviewed    COORDINATION OF CARE:  Care Discussed with Consultants/Other Providers [[facility] ACP]

## 2022-06-08 NOTE — DISCHARGE NOTE PROVIDER - CARE PROVIDER_API CALL
Primary Care Doctor,   Phone: (   )    -  Fax: (   )    -  Follow Up Time:    Primary Care Doctor,   Phone: (   )    -  Fax: (   )    -  Follow Up Time:     Sandra Connelly  Phone: [phone]  Fax: (   )    -  Follow Up Time:    Sandra Mills  Phone: [phone]  Fax: (   )    -  Scheduled Appointment: 06/10/2022 09:20 AM

## 2022-06-08 NOTE — PROGRESS NOTE ADULT - PROBLEM SELECTOR PROBLEM 4
MANDY (acute kidney injury)

## 2022-06-08 NOTE — DISCHARGE NOTE PROVIDER - NSDCFUADDAPPT_GEN_ALL_CORE_FT
You have an appointment with Dr. Sandra Mills on Dolores 10, 2022 at 9:20 AM. Address: [address] [place] [address]. [place] [address]  Phone number: [phone]    Please follow up with cardiology clinic. To make an appointment, please call [phone] for further workup.

## 2022-07-12 ENCOUNTER — INPATIENT (INPATIENT)
Facility: HOSPITAL | Age: 67
LOS: 23 days | Discharge: NURSING, REHAB OR LONG-TERM CARE | End: 2022-08-05
Attending: INTERNAL MEDICINE | Admitting: INTERNAL MEDICINE

## 2022-07-12 VITALS
DIASTOLIC BLOOD PRESSURE: 66 MMHG | HEIGHT: 64 IN | TEMPERATURE: 98 F | HEART RATE: 57 BPM | SYSTOLIC BLOOD PRESSURE: 139 MMHG | OXYGEN SATURATION: 98 % | RESPIRATION RATE: 18 BRPM

## 2022-07-12 DIAGNOSIS — Z90.49 ACQUIRED ABSENCE OF OTHER SPECIFIED PARTS OF DIGESTIVE TRACT: Chronic | ICD-10-CM

## 2022-07-12 DIAGNOSIS — Z89.411 ACQUIRED ABSENCE OF RIGHT GREAT TOE: Chronic | ICD-10-CM

## 2022-07-12 DIAGNOSIS — Z98.891 HISTORY OF UTERINE SCAR FROM PREVIOUS SURGERY: Chronic | ICD-10-CM

## 2022-07-12 LAB
ALBUMIN SERPL ELPH-MCNC: 3.3 G/DL — SIGNIFICANT CHANGE UP (ref 3.3–5)
ALP SERPL-CCNC: 131 U/L — HIGH (ref 40–120)
ALT FLD-CCNC: 21 U/L — SIGNIFICANT CHANGE UP (ref 4–33)
ANION GAP SERPL CALC-SCNC: 9 MMOL/L — SIGNIFICANT CHANGE UP (ref 7–14)
AST SERPL-CCNC: 23 U/L — SIGNIFICANT CHANGE UP (ref 4–32)
BASE EXCESS BLDV CALC-SCNC: -2.2 MMOL/L — LOW (ref -2–3)
BILIRUB SERPL-MCNC: <0.2 MG/DL — SIGNIFICANT CHANGE UP (ref 0.2–1.2)
BLOOD GAS VENOUS COMPREHENSIVE RESULT: SIGNIFICANT CHANGE UP
BUN SERPL-MCNC: 49 MG/DL — HIGH (ref 7–23)
CALCIUM SERPL-MCNC: 8.9 MG/DL — SIGNIFICANT CHANGE UP (ref 8.4–10.5)
CHLORIDE BLDV-SCNC: 114 MMOL/L — HIGH (ref 96–108)
CHLORIDE SERPL-SCNC: 112 MMOL/L — HIGH (ref 98–107)
CO2 BLDV-SCNC: 26.8 MMOL/L — HIGH (ref 22–26)
CO2 SERPL-SCNC: 24 MMOL/L — SIGNIFICANT CHANGE UP (ref 22–31)
CREAT SERPL-MCNC: 1.65 MG/DL — HIGH (ref 0.5–1.3)
EGFR: 34 ML/MIN/1.73M2 — LOW
FLUAV AG NPH QL: SIGNIFICANT CHANGE UP
FLUBV AG NPH QL: SIGNIFICANT CHANGE UP
GAS PNL BLDV: 142 MMOL/L — SIGNIFICANT CHANGE UP (ref 136–145)
GAS PNL BLDV: SIGNIFICANT CHANGE UP
GLUCOSE BLDV-MCNC: 122 MG/DL — HIGH (ref 70–99)
GLUCOSE SERPL-MCNC: 113 MG/DL — HIGH (ref 70–99)
HCO3 BLDV-SCNC: 25 MMOL/L — SIGNIFICANT CHANGE UP (ref 22–29)
HCT VFR BLD CALC: 28.2 % — LOW (ref 34.5–45)
HCT VFR BLDA CALC: 26 % — LOW (ref 34.5–46.5)
HGB BLD CALC-MCNC: SIGNIFICANT CHANGE UP G/DL (ref 11.5–15.5)
HGB BLD-MCNC: 8.3 G/DL — LOW (ref 11.5–15.5)
LACTATE BLDV-MCNC: 0.4 MMOL/L — LOW (ref 0.5–2)
LIDOCAIN IGE QN: 7 U/L — SIGNIFICANT CHANGE UP (ref 7–60)
MAGNESIUM SERPL-MCNC: 1.9 MG/DL — SIGNIFICANT CHANGE UP (ref 1.6–2.6)
MCHC RBC-ENTMCNC: 26 PG — LOW (ref 27–34)
MCHC RBC-ENTMCNC: 29.4 GM/DL — LOW (ref 32–36)
MCV RBC AUTO: 88.4 FL — SIGNIFICANT CHANGE UP (ref 80–100)
NRBC # BLD: 0 /100 WBCS — SIGNIFICANT CHANGE UP
NRBC # FLD: 0 K/UL — SIGNIFICANT CHANGE UP
PCO2 BLDV: 56 MMHG — HIGH (ref 39–42)
PH BLDV: 7.26 — LOW (ref 7.32–7.43)
PLATELET # BLD AUTO: 193 K/UL — SIGNIFICANT CHANGE UP (ref 150–400)
PO2 BLDV: 53 MMHG — SIGNIFICANT CHANGE UP
POTASSIUM BLDV-SCNC: 4.9 MMOL/L — SIGNIFICANT CHANGE UP (ref 3.5–5.1)
POTASSIUM SERPL-MCNC: 4.8 MMOL/L — SIGNIFICANT CHANGE UP (ref 3.5–5.3)
POTASSIUM SERPL-SCNC: 4.8 MMOL/L — SIGNIFICANT CHANGE UP (ref 3.5–5.3)
PROT SERPL-MCNC: 6.9 G/DL — SIGNIFICANT CHANGE UP (ref 6–8.3)
RBC # BLD: 3.19 M/UL — LOW (ref 3.8–5.2)
RBC # FLD: 16.7 % — HIGH (ref 10.3–14.5)
RSV RNA NPH QL NAA+NON-PROBE: SIGNIFICANT CHANGE UP
SAO2 % BLDV: SIGNIFICANT CHANGE UP %
SARS-COV-2 RNA SPEC QL NAA+PROBE: SIGNIFICANT CHANGE UP
SODIUM SERPL-SCNC: 145 MMOL/L — SIGNIFICANT CHANGE UP (ref 135–145)
WBC # BLD: 2.58 K/UL — LOW (ref 3.8–10.5)
WBC # FLD AUTO: 2.58 K/UL — LOW (ref 3.8–10.5)

## 2022-07-12 PROCEDURE — 99285 EMERGENCY DEPT VISIT HI MDM: CPT

## 2022-07-12 PROCEDURE — 74177 CT ABD & PELVIS W/CONTRAST: CPT | Mod: 26,MA

## 2022-07-12 PROCEDURE — 71045 X-RAY EXAM CHEST 1 VIEW: CPT | Mod: 26

## 2022-07-12 PROCEDURE — 71260 CT THORAX DX C+: CPT | Mod: 26,MA

## 2022-07-12 RX ORDER — FUROSEMIDE 40 MG
40 TABLET ORAL ONCE
Refills: 0 | Status: COMPLETED | OUTPATIENT
Start: 2022-07-12 | End: 2022-07-12

## 2022-07-12 RX ADMIN — Medication 40 MILLIGRAM(S): at 21:41

## 2022-07-12 NOTE — ED ADULT NURSE NOTE - OBJECTIVE STATEMENT
Patient received in ED room 3. Patient is A&Ox4, [place], and ambulatory with cane at baseline, however A&Ox2, confused and unable to ambulate at this time. Pt not reliable historian at this time. Daughter at bedside. Daughter reports pt has been confused, lethargic and mumbling for the past 2 days. Patient states pt appears to look "drunk". Also c/o of bilateral lower extremity edema of the legs and feet. 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 Noted to have a distended and tender abdomen with left lower quadrant pain, aggravated by palpation. Noted to belly breathe as well. o2 sat 96% RA. MD at bedside at this time discussing possible interventions. Awaiting order. Placed on bedside cardiac monitor - NSR. HR = 60. #22 placed in right hand. Labs drawn and sent as ordered. Covid swab sent. Bed in lowest position, wheels locked, call bell in reach, safety precautions maintained. Awaiting CT and xray.

## 2022-07-12 NOTE — ED ADULT NURSE REASSESSMENT NOTE - NS ED NURSE REASSESS COMMENT FT1
Handoff receive pt in bed, alert to self, NAD, 20g to right hand no signs of infiltration, safety maintained, pt re oriented, will monitor pt.

## 2022-07-12 NOTE — ED ADULT NURSE NOTE - NSFALLRSKASSESSDT_ED_ALL_ED
Patient was admitted earlier this morning with severe right buttock/hip pain.  See H&P for full details.  S/p elective RAMOS on 6/27 with Dr Sotelo.  CT head showed large gluteal hematoma.  This occurred while being on anticoagulation for treatment of PE.  She stated that she has a remote history of a provoked PE after hysterectomy.  She thinks she had issues with DVTs in the past as well.  She is tearful in still complaining of severe pain.  She worked with therapy today.  Consented to a blood transfusion if needed.  She is currently hemodynamically stable.  Continue to monitor CBC.  Unclear etiology of her leukocytosis, may be reactive.  She is afebrile.  Monitor off antibiotics.  Will consult Hematology for management of anticoagulation given her history of PE.  Goal discharge tomorrow if pain is controlled and hemoglobin remains stable.  
12-Jul-2022 18:14

## 2022-07-12 NOTE — ED ADULT NURSE NOTE - NSIMPLEMENTINTERV_GEN_ALL_ED
Implemented All Fall Risk Interventions:  [place] to call system. Call bell, personal items and telephone within reach. Instruct patient to call for assistance. Room bathroom lighting operational. Non-slip footwear when patient is off stretcher. Physically safe environment: no spills, clutter or unnecessary equipment. Stretcher in lowest position, wheels locked, appropriate side rails in place. Provide visual cue, wrist band, yellow gown, etc. Monitor gait and stability. Monitor for mental status changes and reorient to person, place, and time. Review medications for side effects contributing to fall risk. Reinforce activity limits and safety measures with patient and family.

## 2022-07-12 NOTE — ED ADULT TRIAGE NOTE - CHIEF COMPLAINT QUOTE
Patient has c/o feet swelling and short of breath. Pt is stomach breathing and pt has history of pancreatitis.  Pt is very weak and appears to be sleepy.

## 2022-07-13 DIAGNOSIS — A41.89 OTHER SPECIFIED SEPSIS: ICD-10-CM

## 2022-07-13 DIAGNOSIS — N18.1 CHRONIC KIDNEY DISEASE, STAGE 1: ICD-10-CM

## 2022-07-13 DIAGNOSIS — N18.2 CHRONIC KIDNEY DISEASE, STAGE 2 (MILD): ICD-10-CM

## 2022-07-13 DIAGNOSIS — Z29.9 ENCOUNTER FOR PROPHYLACTIC MEASURES, UNSPECIFIED: ICD-10-CM

## 2022-07-13 DIAGNOSIS — G93.41 METABOLIC ENCEPHALOPATHY: ICD-10-CM

## 2022-07-13 DIAGNOSIS — R68.0 HYPOTHERMIA, NOT ASSOCIATED WITH LOW ENVIRONMENTAL TEMPERATURE: ICD-10-CM

## 2022-07-13 DIAGNOSIS — I50.31 ACUTE DIASTOLIC (CONGESTIVE) HEART FAILURE: ICD-10-CM

## 2022-07-13 DIAGNOSIS — I50.9 HEART FAILURE, UNSPECIFIED: ICD-10-CM

## 2022-07-13 LAB
ALBUMIN SERPL ELPH-MCNC: 3.2 G/DL — LOW (ref 3.3–5)
ALP SERPL-CCNC: 126 U/L — HIGH (ref 40–120)
ALT FLD-CCNC: 23 U/L — SIGNIFICANT CHANGE UP (ref 4–33)
ANION GAP SERPL CALC-SCNC: 11 MMOL/L — SIGNIFICANT CHANGE UP (ref 7–14)
ANION GAP SERPL CALC-SCNC: 12 MMOL/L — SIGNIFICANT CHANGE UP (ref 7–14)
ANISOCYTOSIS BLD QL: SLIGHT — SIGNIFICANT CHANGE UP
AST SERPL-CCNC: 27 U/L — SIGNIFICANT CHANGE UP (ref 4–32)
BASE EXCESS BLDV CALC-SCNC: -4.6 MMOL/L — LOW (ref -2–3)
BASOPHILS # BLD AUTO: 0 K/UL — SIGNIFICANT CHANGE UP (ref 0–0.2)
BASOPHILS NFR BLD AUTO: 0 % — SIGNIFICANT CHANGE UP (ref 0–2)
BILIRUB SERPL-MCNC: <0.2 MG/DL — SIGNIFICANT CHANGE UP (ref 0.2–1.2)
BLOOD GAS ARTERIAL COMPREHENSIVE RESULT: SIGNIFICANT CHANGE UP
BLOOD GAS ARTERIAL COMPREHENSIVE RESULT: SIGNIFICANT CHANGE UP
BLOOD GAS VENOUS COMPREHENSIVE RESULT: SIGNIFICANT CHANGE UP
BUN SERPL-MCNC: 46 MG/DL — HIGH (ref 7–23)
BUN SERPL-MCNC: 48 MG/DL — HIGH (ref 7–23)
BURR CELLS BLD QL SMEAR: PRESENT — SIGNIFICANT CHANGE UP
CALCIUM SERPL-MCNC: 8.5 MG/DL — SIGNIFICANT CHANGE UP (ref 8.4–10.5)
CALCIUM SERPL-MCNC: 8.7 MG/DL — SIGNIFICANT CHANGE UP (ref 8.4–10.5)
CHLORIDE BLDV-SCNC: 114 MMOL/L — HIGH (ref 96–108)
CHLORIDE SERPL-SCNC: 110 MMOL/L — HIGH (ref 98–107)
CHLORIDE SERPL-SCNC: 111 MMOL/L — HIGH (ref 98–107)
CK MB BLD-MCNC: 3.8 % — HIGH (ref 0–2.5)
CK MB CFR SERPL CALC: 9.1 NG/ML — HIGH
CK SERPL-CCNC: 241 U/L — HIGH (ref 25–170)
CO2 BLDV-SCNC: 26.1 MMOL/L — HIGH (ref 22–26)
CO2 SERPL-SCNC: 21 MMOL/L — LOW (ref 22–31)
CO2 SERPL-SCNC: 23 MMOL/L — SIGNIFICANT CHANGE UP (ref 22–31)
CREAT SERPL-MCNC: 1.57 MG/DL — HIGH (ref 0.5–1.3)
CREAT SERPL-MCNC: 1.67 MG/DL — HIGH (ref 0.5–1.3)
DACRYOCYTES BLD QL SMEAR: SLIGHT — SIGNIFICANT CHANGE UP
EGFR: 33 ML/MIN/1.73M2 — LOW
EGFR: 36 ML/MIN/1.73M2 — LOW
EOSINOPHIL # BLD AUTO: 0.09 K/UL — SIGNIFICANT CHANGE UP (ref 0–0.5)
EOSINOPHIL NFR BLD AUTO: 3.5 % — SIGNIFICANT CHANGE UP (ref 0–6)
GAS PNL BLDV: 141 MMOL/L — SIGNIFICANT CHANGE UP (ref 136–145)
GIANT PLATELETS BLD QL SMEAR: PRESENT — SIGNIFICANT CHANGE UP
GLUCOSE BLDC GLUCOMTR-MCNC: 102 MG/DL — HIGH (ref 70–99)
GLUCOSE BLDC GLUCOMTR-MCNC: 122 MG/DL — HIGH (ref 70–99)
GLUCOSE BLDC GLUCOMTR-MCNC: 53 MG/DL — CRITICAL LOW (ref 70–99)
GLUCOSE BLDC GLUCOMTR-MCNC: 54 MG/DL — CRITICAL LOW (ref 70–99)
GLUCOSE BLDC GLUCOMTR-MCNC: 78 MG/DL — SIGNIFICANT CHANGE UP (ref 70–99)
GLUCOSE BLDV-MCNC: 128 MG/DL — HIGH (ref 70–99)
GLUCOSE SERPL-MCNC: 124 MG/DL — HIGH (ref 70–99)
GLUCOSE SERPL-MCNC: 142 MG/DL — HIGH (ref 70–99)
HCO3 BLDV-SCNC: 24 MMOL/L — SIGNIFICANT CHANGE UP (ref 22–29)
HCT VFR BLD CALC: 28.8 % — LOW (ref 34.5–45)
HCT VFR BLDA CALC: 26 % — LOW (ref 34.5–46.5)
HGB BLD CALC-MCNC: 8.5 G/DL — LOW (ref 11.5–15.5)
HGB BLD-MCNC: 8.2 G/DL — LOW (ref 11.5–15.5)
HYPOCHROMIA BLD QL: SIGNIFICANT CHANGE UP
IANC: 1.71 K/UL — LOW (ref 1.8–7.4)
LACTATE BLDV-MCNC: 0.4 MMOL/L — LOW (ref 0.5–2)
LYMPHOCYTES # BLD AUTO: 0.44 K/UL — LOW (ref 1–3.3)
LYMPHOCYTES # BLD AUTO: 16.8 % — SIGNIFICANT CHANGE UP (ref 13–44)
MAGNESIUM SERPL-MCNC: 1.8 MG/DL — SIGNIFICANT CHANGE UP (ref 1.6–2.6)
MAGNESIUM SERPL-MCNC: 1.8 MG/DL — SIGNIFICANT CHANGE UP (ref 1.6–2.6)
MANUAL SMEAR VERIFICATION: SIGNIFICANT CHANGE UP
MCHC RBC-ENTMCNC: 25.5 PG — LOW (ref 27–34)
MCHC RBC-ENTMCNC: 28.5 GM/DL — LOW (ref 32–36)
MCV RBC AUTO: 89.4 FL — SIGNIFICANT CHANGE UP (ref 80–100)
METAMYELOCYTES # FLD: 2.7 % — HIGH (ref 0–1)
MONOCYTES # BLD AUTO: 0.11 K/UL — SIGNIFICANT CHANGE UP (ref 0–0.9)
MONOCYTES NFR BLD AUTO: 4.4 % — SIGNIFICANT CHANGE UP (ref 2–14)
MYELOCYTES NFR BLD: 1.8 % — HIGH (ref 0–0)
NEUTROPHILS # BLD AUTO: 1.82 K/UL — SIGNIFICANT CHANGE UP (ref 1.8–7.4)
NEUTROPHILS NFR BLD AUTO: 69.9 % — SIGNIFICANT CHANGE UP (ref 43–77)
PCO2 BLDV: 66 MMHG — HIGH (ref 39–42)
PH BLDV: 7.17 — LOW (ref 7.32–7.43)
PHOSPHATE SERPL-MCNC: 5.1 MG/DL — HIGH (ref 2.5–4.5)
PHOSPHATE SERPL-MCNC: 5.6 MG/DL — HIGH (ref 2.5–4.5)
PLAT MORPH BLD: ABNORMAL
PLATELET # BLD AUTO: 177 K/UL — SIGNIFICANT CHANGE UP (ref 150–400)
PLATELET COUNT - ESTIMATE: NORMAL — SIGNIFICANT CHANGE UP
PO2 BLDV: 58 MMHG — SIGNIFICANT CHANGE UP
POIKILOCYTOSIS BLD QL AUTO: SIGNIFICANT CHANGE UP
POTASSIUM BLDV-SCNC: 4.7 MMOL/L — SIGNIFICANT CHANGE UP (ref 3.5–5.1)
POTASSIUM SERPL-MCNC: 4.7 MMOL/L — SIGNIFICANT CHANGE UP (ref 3.5–5.3)
POTASSIUM SERPL-MCNC: 4.9 MMOL/L — SIGNIFICANT CHANGE UP (ref 3.5–5.3)
POTASSIUM SERPL-SCNC: 4.7 MMOL/L — SIGNIFICANT CHANGE UP (ref 3.5–5.3)
POTASSIUM SERPL-SCNC: 4.9 MMOL/L — SIGNIFICANT CHANGE UP (ref 3.5–5.3)
PROCALCITONIN SERPL-MCNC: 0.06 NG/ML — SIGNIFICANT CHANGE UP (ref 0.02–0.1)
PROT SERPL-MCNC: 6.8 G/DL — SIGNIFICANT CHANGE UP (ref 6–8.3)
RBC # BLD: 3.22 M/UL — LOW (ref 3.8–5.2)
RBC # FLD: 16.8 % — HIGH (ref 10.3–14.5)
RBC BLD AUTO: ABNORMAL
SAO2 % BLDV: 88.4 % — SIGNIFICANT CHANGE UP
SCHISTOCYTES BLD QL AUTO: SLIGHT — SIGNIFICANT CHANGE UP
SODIUM SERPL-SCNC: 143 MMOL/L — SIGNIFICANT CHANGE UP (ref 135–145)
SODIUM SERPL-SCNC: 145 MMOL/L — SIGNIFICANT CHANGE UP (ref 135–145)
TROPONIN T, HIGH SENSITIVITY RESULT: 50 NG/L — SIGNIFICANT CHANGE UP
TSH SERPL-MCNC: 3.37 UIU/ML — SIGNIFICANT CHANGE UP (ref 0.27–4.2)
VARIANT LYMPHS # BLD: 0.9 % — SIGNIFICANT CHANGE UP (ref 0–6)
WBC # BLD: 2.6 K/UL — LOW (ref 3.8–10.5)
WBC # FLD AUTO: 2.6 K/UL — LOW (ref 3.8–10.5)

## 2022-07-13 PROCEDURE — 99223 1ST HOSP IP/OBS HIGH 75: CPT

## 2022-07-13 PROCEDURE — 71045 X-RAY EXAM CHEST 1 VIEW: CPT | Mod: 26

## 2022-07-13 PROCEDURE — 31500 INSERT EMERGENCY AIRWAY: CPT | Mod: GC

## 2022-07-13 RX ORDER — GLUCAGON INJECTION, SOLUTION 0.5 MG/.1ML
1 INJECTION, SOLUTION SUBCUTANEOUS ONCE
Refills: 0 | Status: DISCONTINUED | OUTPATIENT
Start: 2022-07-13 | End: 2022-07-29

## 2022-07-13 RX ORDER — INSULIN LISPRO 100/ML
0 VIAL (ML) SUBCUTANEOUS
Qty: 0 | Refills: 0 | DISCHARGE

## 2022-07-13 RX ORDER — DEXTROSE 50 % IN WATER 50 %
12.5 SYRINGE (ML) INTRAVENOUS ONCE
Refills: 0 | Status: COMPLETED | OUTPATIENT
Start: 2022-07-13 | End: 2022-07-13

## 2022-07-13 RX ORDER — MEROPENEM 1 G/30ML
1000 INJECTION INTRAVENOUS EVERY 12 HOURS
Refills: 0 | Status: COMPLETED | OUTPATIENT
Start: 2022-07-13 | End: 2022-07-20

## 2022-07-13 RX ORDER — DEXTROSE 50 % IN WATER 50 %
25 SYRINGE (ML) INTRAVENOUS ONCE
Refills: 0 | Status: DISCONTINUED | OUTPATIENT
Start: 2022-07-13 | End: 2022-08-05

## 2022-07-13 RX ORDER — SODIUM CHLORIDE 9 MG/ML
1000 INJECTION, SOLUTION INTRAVENOUS
Refills: 0 | Status: DISCONTINUED | OUTPATIENT
Start: 2022-07-13 | End: 2022-07-16

## 2022-07-13 RX ORDER — INSULIN DETEMIR 100/ML (3)
24 INSULIN PEN (ML) SUBCUTANEOUS
Qty: 0 | Refills: 0 | DISCHARGE

## 2022-07-13 RX ORDER — ACETAMINOPHEN 500 MG
650 TABLET ORAL EVERY 6 HOURS
Refills: 0 | Status: DISCONTINUED | OUTPATIENT
Start: 2022-07-13 | End: 2022-08-05

## 2022-07-13 RX ORDER — CHLORHEXIDINE GLUCONATE 213 G/1000ML
1 SOLUTION TOPICAL
Refills: 0 | Status: DISCONTINUED | OUTPATIENT
Start: 2022-07-13 | End: 2022-07-16

## 2022-07-13 RX ORDER — FENTANYL CITRATE 50 UG/ML
0.5 INJECTION INTRAVENOUS
Qty: 2500 | Refills: 0 | Status: DISCONTINUED | OUTPATIENT
Start: 2022-07-13 | End: 2022-07-15

## 2022-07-13 RX ORDER — ATORVASTATIN CALCIUM 80 MG/1
20 TABLET, FILM COATED ORAL AT BEDTIME
Refills: 0 | Status: DISCONTINUED | OUTPATIENT
Start: 2022-07-13 | End: 2022-08-05

## 2022-07-13 RX ORDER — LANOLIN ALCOHOL/MO/W.PET/CERES
3 CREAM (GRAM) TOPICAL AT BEDTIME
Refills: 0 | Status: DISCONTINUED | OUTPATIENT
Start: 2022-07-13 | End: 2022-07-13

## 2022-07-13 RX ORDER — NOREPINEPHRINE BITARTRATE/D5W 8 MG/250ML
0.05 PLASTIC BAG, INJECTION (ML) INTRAVENOUS
Qty: 8 | Refills: 0 | Status: DISCONTINUED | OUTPATIENT
Start: 2022-07-13 | End: 2022-07-14

## 2022-07-13 RX ORDER — FENTANYL CITRATE 50 UG/ML
100 INJECTION INTRAVENOUS ONCE
Refills: 0 | Status: DISCONTINUED | OUTPATIENT
Start: 2022-07-13 | End: 2022-07-13

## 2022-07-13 RX ORDER — ERTAPENEM SODIUM 1 G/1
1000 INJECTION, POWDER, LYOPHILIZED, FOR SOLUTION INTRAMUSCULAR; INTRAVENOUS EVERY 24 HOURS
Refills: 0 | Status: DISCONTINUED | OUTPATIENT
Start: 2022-07-13 | End: 2022-07-13

## 2022-07-13 RX ORDER — CHLORHEXIDINE GLUCONATE 213 G/1000ML
15 SOLUTION TOPICAL EVERY 12 HOURS
Refills: 0 | Status: DISCONTINUED | OUTPATIENT
Start: 2022-07-13 | End: 2022-07-16

## 2022-07-13 RX ORDER — DEXTROSE 50 % IN WATER 50 %
12.5 SYRINGE (ML) INTRAVENOUS ONCE
Refills: 0 | Status: DISCONTINUED | OUTPATIENT
Start: 2022-07-13 | End: 2022-08-05

## 2022-07-13 RX ORDER — AMIODARONE HYDROCHLORIDE 400 MG/1
150 TABLET ORAL ONCE
Refills: 0 | Status: DISCONTINUED | OUTPATIENT
Start: 2022-07-13 | End: 2022-07-13

## 2022-07-13 RX ORDER — DEXTROSE 50 % IN WATER 50 %
15 SYRINGE (ML) INTRAVENOUS ONCE
Refills: 0 | Status: DISCONTINUED | OUTPATIENT
Start: 2022-07-13 | End: 2022-08-05

## 2022-07-13 RX ORDER — INSULIN GLARGINE 100 [IU]/ML
10 INJECTION, SOLUTION SUBCUTANEOUS AT BEDTIME
Refills: 0 | Status: DISCONTINUED | OUTPATIENT
Start: 2022-07-13 | End: 2022-07-14

## 2022-07-13 RX ORDER — INSULIN GLARGINE 100 [IU]/ML
10 INJECTION, SOLUTION SUBCUTANEOUS ONCE
Refills: 0 | Status: DISCONTINUED | OUTPATIENT
Start: 2022-07-13 | End: 2022-07-13

## 2022-07-13 RX ORDER — MIDAZOLAM HYDROCHLORIDE 1 MG/ML
6 INJECTION, SOLUTION INTRAMUSCULAR; INTRAVENOUS ONCE
Refills: 0 | Status: DISCONTINUED | OUTPATIENT
Start: 2022-07-13 | End: 2022-07-13

## 2022-07-13 RX ORDER — ASPIRIN/CALCIUM CARB/MAGNESIUM 324 MG
81 TABLET ORAL DAILY
Refills: 0 | Status: DISCONTINUED | OUTPATIENT
Start: 2022-07-13 | End: 2022-07-14

## 2022-07-13 RX ORDER — HEPARIN SODIUM 5000 [USP'U]/ML
5000 INJECTION INTRAVENOUS; SUBCUTANEOUS EVERY 12 HOURS
Refills: 0 | Status: DISCONTINUED | OUTPATIENT
Start: 2022-07-13 | End: 2022-07-26

## 2022-07-13 RX ORDER — FUROSEMIDE 40 MG
40 TABLET ORAL
Refills: 0 | Status: DISCONTINUED | OUTPATIENT
Start: 2022-07-13 | End: 2022-07-14

## 2022-07-13 RX ORDER — INSULIN LISPRO 100/ML
VIAL (ML) SUBCUTANEOUS EVERY 6 HOURS
Refills: 0 | Status: DISCONTINUED | OUTPATIENT
Start: 2022-07-13 | End: 2022-07-21

## 2022-07-13 RX ORDER — PROPOFOL 10 MG/ML
10 INJECTION, EMULSION INTRAVENOUS
Qty: 1000 | Refills: 0 | Status: DISCONTINUED | OUTPATIENT
Start: 2022-07-13 | End: 2022-07-14

## 2022-07-13 RX ADMIN — FENTANYL CITRATE 100 MICROGRAM(S): 50 INJECTION INTRAVENOUS at 09:00

## 2022-07-13 RX ADMIN — MIDAZOLAM HYDROCHLORIDE 6 MILLIGRAM(S): 1 INJECTION, SOLUTION INTRAMUSCULAR; INTRAVENOUS at 09:00

## 2022-07-13 RX ADMIN — Medication 12.5 GRAM(S): at 23:25

## 2022-07-13 RX ADMIN — HEPARIN SODIUM 5000 UNIT(S): 5000 INJECTION INTRAVENOUS; SUBCUTANEOUS at 06:08

## 2022-07-13 RX ADMIN — Medication 40 MILLIGRAM(S): at 06:08

## 2022-07-13 RX ADMIN — HEPARIN SODIUM 5000 UNIT(S): 5000 INJECTION INTRAVENOUS; SUBCUTANEOUS at 17:51

## 2022-07-13 RX ADMIN — MEROPENEM 100 MILLIGRAM(S): 1 INJECTION INTRAVENOUS at 06:39

## 2022-07-13 RX ADMIN — CHLORHEXIDINE GLUCONATE 15 MILLILITER(S): 213 SOLUTION TOPICAL at 17:51

## 2022-07-13 RX ADMIN — Medication 40 MILLIGRAM(S): at 16:27

## 2022-07-13 RX ADMIN — MEROPENEM 100 MILLIGRAM(S): 1 INJECTION INTRAVENOUS at 17:51

## 2022-07-13 NOTE — PROGRESS NOTE ADULT - ASSESSMENT
ASSESSMENT AND PLAN:  68 y/o F with pmhx of HTN, HLD, sarcoidosis, CHF, presented to the ED for new onset AMS. She is also found to have pleural effusion, elevated BNP and LE edema concerning for CHF exacerbation. Admit for metabolic encephalopathy and CHF exacerbation. Intubated 7/13 for AMS    ===========NEURO===========    ===========RESP===========    ===========CV===========    ===========GI===========    ===========RENAL/===========    ===========ID===========    ===========ENDO===========    ===========HEME===========    ===========ETHICS===========     ASSESSMENT AND PLAN:  66 y/o F with pmhx of HTN, HLD, sarcoidosis, CHF, presented to the ED for new onset AMS. She is also found to have pleural effusion, elevated BNP and LE edema concerning for CHF exacerbation. Admit for metabolic encephalopathy and CHF exacerbation. Intubated  for AMS      Neuro:    #AMS - structural vs metabolic vs infectious   could be in the setting of hypercapneic resp failure   TSH: wnl   infectious workup as below   - r/o structural causes with CT head pending   - f/u cortisol   - currently sedated on prop & fent  - neuro following, recs appreciated    Resp:    #Acute Hypercapneic respiratory failure   AB.17/64/130/23/98.6/-5.3 prior to BiPAP   - intubated  am  - improvement in acidosis, hypercapnia s/p intubation    #Bilateral pleural effusions i/s/o HF exacerbation   CT Chest with new large right and small left pleural effusions with adjacent compressive atelectasis including atelectasis of the majority of the right lower lobe   - IV Lasix 40 BID   - Monitor O2 sat  - Monitor RR     CV:    #HFpEF   BNP on arrival: 4021 with evidence of fluid overload with pleural effusions, b/l pitting edema   Echo (): EF: 62%; Mild diastolic dysfunction. Normal right ventricular size and function   EKG: RBBB (present from prior EKG's). no acute ischemic findings; trop: 50   c/w IV lasix BID   Monitor lytes and replete PRN   Strict I&O  Daily weights   - on levo gtt following intubation & sedation, wean as tolerated    #HTN   on amlodipine, carvedilol and hydralazine at home   hold home meds given acutely decompensated state and restart as needed     GI:     Diet: NPO   Monitor BM's  Serial abdominal exams    Renal/:    #CKD Stage 2  Baseline Scr (): 2.33  On admission, SCr at 1.6  Monitor UO  Monitor SCr  - check urine lytes in setting of metabolic acidosis, lactate wnl      ID:     #Encephalopathy possibly 2/2 sepsis - hypothermic to 90.4 with leukopenia   f/u blood cx, ua, urine cx  procal: neg  CT abd and pelvis: no infectious source   c/w meropenem ( - )  Monitor temp, persistent hypothermia  Monitor WBC   if mental status does not improve, might need LP     Endo:     #T2DM   on detemir 20 U at home   while NPO, monitor FSG   c/w ISS   c/w lantus 10 U  Last A1C 2022 7.3  f/u cortisol to r/o adrenal insufficiency as cause of AMS, hypothermia     Heme:     DVT Ppx: Heparin     Ethics: pending GOC discussion with family.

## 2022-07-13 NOTE — CHART NOTE - NSCHARTNOTEFT_GEN_A_CORE
The patient is again hypothermic for the second read despite being on a bear hugger.     [facility] at bedside, evaluated the patient. They recommended BiPAP for now before intubation. They think this is more sepsis related than CHF exacerbation.    Procal came back it is 0.6.    A ABg was obtained before starting BiPAP, pCO2 The patient is again hypothermic for the second read despite being on a bear hugger.     [facility] at bedside, evaluated the patient. They recommended BiPAP for now before intubation. They think this is more sepsis related than CHF exacerbation.    Procal came back it is 0.6.    An ABG was obtained before starting BiPAP, pCO2 64, pH 7.1, Bicarb 21. I am concerned for new onset respiratory acidosis in the setting of CHF exacerbation and possibly undiagnosed FORTUNATO.    I spoke with the daughter again and updated her on the situation. She did not want the patient to be intubated until she arrives to bedside. I also told her that there may be a possibility that she may not improve on the bipap and eventually may need intubation to secure airway. She expressed understanding. I told her that we will monitor closely while she is on the biPAP for improvement.    [facility] consult note to follow. Will recheck ABG in 30 minutes. The patient is again hypothermic for the second read despite being on a bear hugger.     [facility] at bedside, evaluated the patient. They recommended BiPAP for now before intubation. They think this is more sepsis related than CHF exacerbation.    Procal came back it is 0.6.    An ABG was obtained before starting BiPAP, pCO2 64, pH 7.1, Bicarb 21. I am concerned for new onset respiratory acidosis in the setting of CHF exacerbation and possibly undiagnosed FORTUNATO.    I spoke with the daughter again and updated her on the situation. She did not want the patient to be intubated until she arrives to bedside. I also told her that there may be a possibility that she may not improve on the bipap and eventually may need intubation to secure airway. She expressed understanding. I told her that we will monitor closely while she is on the biPAP for improvement. No prior hx of FORTUNATO diagnosis, does not use CPAP machine at home.    [facility] consult note to follow. Will recheck ABG in 30 minutes. The patient is again hypothermic for the second read despite being on a bear hugger. She was also more lethargic and difficult to arouse than usual compared to at admission.    [facility] at bedside, evaluated the patient. They recommended BiPAP for now before intubation. They think this is more sepsis related than CHF exacerbation.    Procal came back it is 0.6.    An ABG was obtained before starting BiPAP, pCO2 64, pH 7.1, Bicarb 21. I am concerned for new onset respiratory acidosis in the setting of CHF exacerbation and possibly undiagnosed FORTUNATO.    I spoke with the daughter again and updated her on the situation. She did not want the patient to be intubated until she arrives to bedside. I also told her that there may be a possibility that she may not improve on the bipap and eventually may need intubation to secure airway. She expressed understanding. I told her that we will monitor closely while she is on the biPAP for improvement. No prior hx of FORTUNATO diagnosis, does not use CPAP machine at home.    [facility] consult note to follow. Will recheck ABG in 30 minutes.

## 2022-07-13 NOTE — ED PROVIDER NOTE - ATTENDING CONTRIBUTION TO CARE
DR. BLOCH, ATTENDING MD-  I performed a face to face bedside interview with patient regarding history of present illness, review of symptoms and past medical history. I completed an independent physical exam.  I have discussed patient's plan of care with the resident.  Patient with sob, abd pain, weakness, heent no jaundice, heart s1s2, lungs clear abd mild upper abd tenderness, pos zarina lower extrem, concern for recurrent pancreatitis, chf, labs, ct abd reassess

## 2022-07-13 NOTE — CONSULT NOTE ADULT - SUBJECTIVE AND OBJECTIVE BOX
Neurology Consult    Reason for Consult: Patient is a 67y old  Female who presents with a chief complaint of AMs, shortness of breath (2022 05:00)      HPI:  This is a 68 y/o F with pmhx of HTN, HLD, sarcoidosis, CHF, presented to the ED for new onset AMS. The patient is hard of hearing, difficult to obtain history and is also very lethargic. Hx obtained from daughter over the phone. As per daughter, the patient yesterday was at her baseline and can communicate as usual. However at midnight, the patient suddenly became altered, confused and daughter noted she was slurring his speech. She was incoherent, and hallucinating, saying there is a cat present and is asking for her aunt who lives far away. She would also be very sleepy and does not do her usual activities. The daughter was concerned for a stroke. Denies facial droop. The patient also has symptoms of leg swelling and shortness of breath since yesterday. The patient known to have frequent admission for CHF exacerbations but is unable to follow up with outpatient cardiologist and as per daughter, not on diuretics at home (however the order rec shows that it was prescribed recently). Denies chest pain. No fevers at home. (2022 05:00)       PAST MEDICAL & SURGICAL HISTORY:  Type 2 diabetes mellitus      Bilateral cataracts      Fluid in pleural cavity associated with pancreatitis      Pancreatic pseudocyst/cyst  s/p drain placement and removal      HTN (hypertension)      HLD (hyperlipidemia)      History of amputation of right great toe        H/O:  section        History of laparoscopic cholecystectomy          Allergies: Allergies    penicillin ([facility] [employer] Synd)    Intolerances        Social History: Denies toxic habits including tobacco, ETOH or illicit drugs.    Family History: FAMILY HISTORY:  No pertinent family history in first degree relatives    . No family history of strokes    Medications: MEDICATIONS  (STANDING):  aspirin enteric coated 81 milliGRAM(s) Oral daily  atorvastatin 20 milliGRAM(s) Oral at bedtime  chlorhexidine 4% Liquid 1 Application(s) Topical <User Schedule>  dextrose 5%. 1000 milliLiter(s) (100 mL/Hr) IV Continuous <Continuous>  dextrose 5%. 1000 milliLiter(s) (50 mL/Hr) IV Continuous <Continuous>  dextrose 50% Injectable 25 Gram(s) IV Push once  dextrose 50% Injectable 12.5 Gram(s) IV Push once  dextrose 50% Injectable 25 Gram(s) IV Push once  fentaNYL   Infusion. 0.5 MICROgram(s)/kG/Hr (4.4 mL/Hr) IV Continuous <Continuous>  furosemide   Injectable 40 milliGRAM(s) IV Push two times a day  glucagon  Injectable 1 milliGRAM(s) IntraMuscular once  heparin   Injectable 5000 Unit(s) SubCutaneous every 12 hours  insulin glargine Injectable (LANTUS) 10 Unit(s) SubCutaneous at bedtime  insulin lispro (ADMELOG) corrective regimen sliding scale   SubCutaneous every 6 hours  meropenem  IVPB 1000 milliGRAM(s) IV Intermittent every 12 hours  norepinephrine Infusion 0.05 MICROgram(s)/kG/Min (8.25 mL/Hr) IV Continuous <Continuous>  propofol Infusion 10 MICROgram(s)/kG/Min (5.28 mL/Hr) IV Continuous <Continuous>    MEDICATIONS  (PRN):  acetaminophen     Tablet .. 650 milliGRAM(s) Oral every 6 hours PRN Temp greater or equal to 38C (100.4F), Mild Pain (1 - 3)  dextrose Oral Gel 15 Gram(s) Oral once PRN Blood Glucose LESS THAN 70 milliGRAM(s)/deciliter      Review of Systems:  unable given mental status, intubation       Vitals:  Vital Signs Last 24 Hrs  T(C): 32.6 (2022 11:00), Max: 37.1 (2022 00:38)  T(F): 90.7 (2022 11:00), Max: 98.7 (2022 00:38)  HR: 46 (2022 11:08) (43 - 61)  BP: 137/80 (2022 11:00) (95/52 - 159/78)  BP(mean): 94 (2022 11:00) (73 - 103)  RR: 15 (2022 11:00) (15 - 20)  SpO2: 100% (2022 11:08) (93% - 100%)    Parameters below as of 2022 11:00  Patient On (Oxygen Delivery Method): ventilator    O2 Concentration (%): 30    General Exam:   General Appearance: Appropriately dressed and in no acute distress       Head: Normocephalic, atraumatic and no dysmorphic features  Ear, Nose, and Throat: Moist mucous membranes +ETT   CVS: S1S2+  Resp: No SOB, no wheeze or rhonchi  GI: soft NT/ND  Extremities:  toe amputation noted   Skin: No bruises or rashes     Neurological Exam: (on fentanyl)   Mental Status: eyes closed, no verbal, not [id number] . intubated   Cranial Nerves: PERRL, EOMI, VFFC, sensation V1-V3 intact,  no obvious facial asymmetry, equal elevation of palate, scm/trap 5/5, tongue is midline on protrusion. no obvious papilledema on fundoscopic exam. hearing is grossly intact.   Motor: TONEY distally   Sensation: withdraws x 4  Reflexes: 1+ throughout at biceps, brachioradialis, triceps, patellars and ankles bilaterally and equal. No clonus. R toe and L toe were both downgoing.  Coordination: unable   Gait:  unable     Data/Labs/Imaging which I personally reviewed.     Labs:     CBC Full  -  ( 2022 04:10 )  WBC Count : 2.60 K/uL  RBC Count : 3.22 M/uL  Hemoglobin : 8.2 g/dL  Hematocrit : 28.8 %  Platelet Count - Automated : 177 K/uL  Mean Cell Volume : 89.4 fL  Mean Cell Hemoglobin : 25.5 pg  Mean Cell Hemoglobin Concentration : 28.5 gm/dL  Auto Neutrophil # : 1.82 K/uL  Auto Lymphocyte # : 0.44 K/uL  Auto Monocyte # : 0.11 K/uL  Auto Eosinophil # : 0.09 K/uL  Auto Basophil # : 0.00 K/uL  Auto Neutrophil % : 69.9 %  Auto Lymphocyte % : 16.8 %  Auto Monocyte % : 4.4 %  Auto Eosinophil % : 3.5 %  Auto Basophil % : 0.0 %        145  |  111<H>  |  48<H>  ----------------------------<  142<H>  4.9   |  23  |  1.67<H>    Ca    8.5      2022 07:12  Phos  5.6     07-  Mg     1.80         TPro  6.8  /  Alb  3.2<L>  /  TBili  <0.2  /  DBili  x   /  AST  27  /  ALT  23  /  AlkPhos  126<H>      LIVER FUNCTIONS - ( 2022 04:10 )  Alb: 3.2 g/dL / Pro: 6.8 g/dL / ALK PHOS: 126 U/L / ALT: 23 U/L / AST: 27 U/L / GGT: x           < from: MR IAC w/wo IV Cont (21 @ 19:26) >    EXAM:  MR BRAIN WAW IC      EXAM:  MR IAC ONLY WAW IC        PROCEDURE DATE:  Dec  1 2021         INTERPRETATION:  .    CLINICAL INFORMATION: Acute onset of bilateral hearing loss.    TECHNIQUE: Multiplanar multisequential MRI of the brain and internal auditory canals was acquired with and without the administration of IV gadolinium. 7.5 cc's of IV Gadavist was administered for the purposes of this examination. 0 cc were discarded.    COMPARISON: Prior CT examination of the internal auditory canals dated 2021. Prior CT angiograms/venogram examination of the head and neck dated 2021. Examination.    FINDINGS:    MRI BRAIN: A chronic lacunar infarct is seen within the right medial cerebellar hemisphere.    Multiple patchy confluent nonspecific T2/FLAIR hyperintense signal changes are noted throughout the bihemispheric white matter without associated mass effect or restricted diffusion.    There is no abnormal brain parenchymal or leptomeningeal enhancement.    Ventricular sizeand configuration is unremarkable. No abnormal extra-axial fluid collections are seen. Flow-voids are noted throughout the major intracranial vessels, on the T2 weighted images, consistent with their patency. The sellar area appears unremarkable.    Minimal scattered mucosal thickening is seen throughout the paranasal sinuses. The mastoid air cells are clear. The orbits appear unremarkable.    MRI IAC: There is no CP angle mass. The bilateral 7th and 8th cranial nerves appear unremarkable in course and morphology. No abnormal enhancement is seen. The inner ear structures are normally formed. Normal fluid signal is seen within the inner ear structures. The cochlea, vestibule, and semicircular canals appear unremarkable.    IMPRESSION:    MRI BRAIN: No acute intracranial hemorrhage, acute ischemia, or abnormal intracranial enhancement.    Chronic lacunar infarct within the right medial cerebellar hemisphere and mild chronic white matter microvascular type changes.    MRI IAC: Unremarkable study.    --- End of Report ---              LUCI RUEDA MD; Attending Radiologist  This document has been electronically signed. Dec  3 2021  8:46AM    < end of copied text >      < from: CT Chest w/ IV Cont (22 @ 22:58) >    ACC: [id number] EXAM:  CT ABDOMEN AND PELVIS IC                        ACC: [id number] EXAM:  CT CHEST IC                          PROCEDURE DATE:  2022          INTERPRETATION:  CLINICAL INFORMATION: Pleural effusion. Rule out   empyema. Abdominal tenderness. History of pancreatitis. Rule out   infection or obstruction.    COMPARISON: CT chest 2022.    CONTRAST/COMPLICATIONS:  IV Contrast: IV contrast documented in associated exam (accession   [id number]), Omnipaque 350 (accession [id number])  64 cc administered   6 cc   discarded  Oral Contrast: NONE  Complications: None reported at time of study completion    PROCEDURE:  CT of the Chest, Abdomen and Pelvis was performed.  Sagittal and coronal reformats were performed.    FINDINGS:  CHEST:  LUNGS AND LARGE AIRWAYS: Compressive atelectasis of the right upper,   middle and bilateral lower lobes including atelectasis of the majority of   the right lower lobe. Subsegmental atelectasis in the left upper lobe.  PLEURA: New large right andsmall left pleural effusions. No evidence of   an empyema. Fluid in the minor fissure.  VESSELS: Atherosclerotic changes of the aorta and coronary arteries.  HEART: Heart size is enlarged. No pericardial effusion.  MEDIASTINUM AND ANTONIO: No lymphadenopathy. Redemonstration of calcified   mediastinal lymph nodes.  CHEST WALL AND LOWER NECK: 7 mm right thyroid lobe nodule. Generalized   anasarca.    ABDOMEN AND PELVIS:  LIVER: Within normal limits.  BILE DUCTS: Normal caliber.  GALLBLADDER: Cholecystectomy.  SPLEEN: Within normal limits.  PANCREAS: Atrophic.  ADRENALS: Within normal limits.  KIDNEYS/URETERS: No renal stones or hydronephrosis.    BLADDER: Within normal limits.  REPRODUCTIVE ORGANS: Atrophic and retroflexed uterus. No adnexal masses.    BOWEL: Redemonstration of an outpouching with wall calcification and   intraluminal fluid and gas measuring 4.9 x 4.6 cm emanating from the   posterior gastric body likely representing a large diverticulum. No bowel   obstruction or inflammation. Colonic diverticulosis without   diverticulitis. Appendix within normal limits.  PERITONEUM: Small volume free pelvic fluid.  VESSELS: Atherosclerotic changes.  RETROPERITONEUM/LYMPH NODES: No lymphadenopathy.  ABDOMINAL WALL: Generalized anasarca.  BONES: Degenerative changes of the spine. Mild S-shaped thoracolumbar   scoliosis.    IMPRESSION:  1. New large right and small left pleural effusions with adjacent   compressive atelectasis including atelectasis of the majority of the   right lowerlobe.  2. No bowel obstruction or inflammation.      --- End of Report ---          MARTA HART MD; Resident Radiologist  This document has been electronically signed.  CHINTAN WHITFIELD MD; Attending Radiologist  This document has been electronically signed. 2022 12:32AM    < end of copied text >

## 2022-07-13 NOTE — H&P ADULT - NSHPPHYSICALEXAM_GEN_ALL_CORE
PHYSICAL EXAM:  VITALS: Vital Signs Last 24 Hrs  T(C): 32.4 (13 Jul 2022 03:43), Max: 37.1 (13 Jul 2022 00:38)  T(F): 90.4 (13 Jul 2022 03:43), Max: 98.7 (13 Jul 2022 00:38)  HR: 51 (13 Jul 2022 03:43) (51 - 61)  BP: 140/69 (13 Jul 2022 03:43) (139/66 - 153/87)  BP(mean): --  RR: 17 (13 Jul 2022 03:43) (17 - 19)  SpO2: 97% (13 Jul 2022 03:43) (93% - 98%)    Parameters below as of 13 Jul 2022 03:43  Patient On (Oxygen Delivery Method): room air      GENERAL: NAD, well-developed, well-nourished, confused, lethargic appearing  HEAD:  Atraumatic, Normocephalic  EYES: EOMI, PERRL, conjunctiva and sclera clear  ENT: Moist Mucus Membranes present, no ulcers appreciated  NECK: Supple, + JVD  CHEST/LUNG: Clear to auscultation bilaterally; No wheezes, rales or rhonchi, no accessory muscle use  HEART: Regular rate and rhythm; No murmurs, rubs, or gallops, (+)S1, S2  ABDOMEN: Soft, + tender to palpation, + distended; Normal Bowel sounds   EXTREMITIES:  2+ Peripheral Pulses, No clubbing, cyanosis, +2 pitting edema b/l  PSYCH: cannot assess due to lethergy  NEUROLOGY: AAOx2, non-focal  SKIN: No rashes or lesions

## 2022-07-13 NOTE — ED PROVIDER NOTE - PHYSICAL EXAMINATION
Physical Exam:  General: NAD, Conversive  Eyes: EOMI, Conjunctia and sclera clear  Neck: No JVD  Lungs: Intermittent diffuse rhonchi, B/L decreased BS at bases  Heart: Normal S1, S2, no murmurs  Abdomen: Soft, tender to palpation over B/L Upper, nondistended, no CVA tenderness  Extremities: 2+ peripheral pulses, no edema  Psych: AAO X3  Neurologic: Non-focal

## 2022-07-13 NOTE — ED PROVIDER NOTE - OBJECTIVE STATEMENT
67 Y F66 y/o F with PMH HTN, HLD, DM2, pancreatic pseudocyst, sarcoid iron deficiency anemia who presented with dyspnea. Per daughter states since yesterday experiencing worsening SOB, increased LE swelling, consistent with past admission. States in setting of prior normal ECHO only short course of lasix provided, with no continued diuretic given, started on carvedilol. Appointment with cardiologist on august 1st. Denies any fever, chills, abdominal pain, nausea, vomiting

## 2022-07-13 NOTE — SWALLOW BEDSIDE ASSESSMENT ADULT - COMMENTS
H&P "66 y/o F with pmhx of HTN, HLD, sarcoidosis, CHF, presented to the ED for new onset AMS. The patient is hard of hearing, difficult to obtain history and is also very lethargic. Hx obtained from daughter over the phone. As per daughter, the patient yesterday was at her baseline and can communicate as usual. However at midnight, the patient suddenly became altered, confused and daughter noted she was slurring his speech. She was incoherent, and hallucinating, saying there is a cat present and is asking for her aunt who lives far away. She would also be very sleepy and does not do her usual activities. The daughter was concerned for a stroke. Denies facial droop. The patient also has symptoms of leg swelling and shortness of breath since yesterday. The patient known to have frequent admission for CHF exacerbations but is unable to follow up with outpatient cardiologist and as per daughter, not on diuretics at home (however the order rec shows that it was prescribed recently). Denies chest pain. No fevers at home."    Attempted to see patient at bedside, however, patient now intubated. Reconsult this service as patient becomes medically optimized (i.e. extubated) to assess swallow function. H&P "66 y/o F with pmhx of HTN, HLD, sarcoidosis, CHF, presented to the ED for new onset AMS. The patient is hard of hearing, difficult to obtain history and is also very lethargic. Hx obtained from daughter over the phone. As per daughter, the patient yesterday was at her baseline and can communicate as usual. However at midnight, the patient suddenly became altered, confused and daughter noted she was slurring his speech. She was incoherent, and hallucinating, saying there is a cat present and is asking for her aunt who lives far away. She would also be very sleepy and does not do her usual activities. The daughter was concerned for a stroke. Denies facial droop. The patient also has symptoms of leg swelling and shortness of breath since yesterday. The patient known to have frequent admission for CHF exacerbations but is unable to follow up with outpatient cardiologist and as per daughter, not on diuretics at home (however the order rec shows that it was prescribed recently). Denies chest pain. No fevers at home."    patient seen at bedside, however, patient now intubated, therefore swallow assessment deferred. Reconsult this service as patient becomes medically optimized (i.e. extubated) to assess swallow function.

## 2022-07-13 NOTE — PATIENT PROFILE ADULT - FALL HARM RISK - HARM RISK INTERVENTIONS

## 2022-07-13 NOTE — H&P ADULT - HISTORY OF PRESENT ILLNESS
This is a 66 y/o F with pmhx of HTN, HLD, sarcoidosis, CHF, presented to the ED for new onset AMS. The patient is hard of hearing, difficult to obtain history and is also very lethargic. Hx obtained from daughter over the phone. As per daughter, the patient yesterday was at her baseline and can communicate as usual. However at midnight, the patient suddenly became altered, confused and daughter noted she was slurring his speech. She was incoherent, and hallucinating, saying there is a cat present and is asking for her aunt who lives far away. She would also be very sleepy and does not do her usual activities. The daughter was concerned for a stroke. Denies facial droop. The patient also has symptoms of leg swelling and shortness of breath since yesterday. The patient known to have frequent admission for CHF exacerbations but is unable to follow up with outpatient cardiologist and as per daughter, not on diuretics at home (however the order rec shows that it was prescribed recently). Denies chest pain. No fevers at home.

## 2022-07-13 NOTE — CONSULT NOTE ADULT - ASSESSMENT
66 y/o  AAF with HTN, HLD, sarcoidosis, CHF, DM presented to the ED for new onset AMS. Patient suddenly became altered, confused and daughter noted she was slurring his speech. She was incoherent, and hallucinating, saying there is a cat present and is asking for her aunt who lives far away.  The patient known to have frequent admission for CHF exacerbations eventually intubated for hypoxic respiratory failure and now in MICU   MRI brain/IAC from 2021 with old R cerebellar infarct ; CTA h/N that time unremarkable. , A1c 9.2%  CT C/A with new large pleural effusions   o/e intubated and sedated but ME     Impression: 1) AMS 2/2 hypercapneic resp failure 2) prior cerebellar stroke likely small vessel   - now on fentanyl, TONEY. need to monitor exam off sedation ; off prop   - asa 81 and statin therapy for secondary stroke prevention LDL goal < 70    - now on meropenum  - was on levo now off   - CTH still pending   - respiratory per ICU and pulmo   - on IV lasix now    - Hemoglobin A1c and lipid panel  - TTE  - telemetry  - PT/OT/SS/SLP, OOBC  - check FS, glucose control <180  - GI/DVT ppx  - Counseling on diet, exercise, and medication adherence was done  - Counseling on smoking cessation and alcohol consumption offered when appropriate.  - Pain assessed and judicious use of narcotics when appropriate was discussed.    - Stroke education given when appropriate.  - Importance of fall prevention discussed.   - Differential diagnosis and plan of care discussed with patient and/or family and primary team  - Thank you for allowing me to participate in the care of this patient. Call with questions.   Russell Jimenes MD  Vascular Neurology  Office: [phone]

## 2022-07-13 NOTE — PROGRESS NOTE ADULT - SUBJECTIVE AND OBJECTIVE BOX
OVERNIGHT EVENTS:    SUBJECTIVE: Patient seen and examined at bedside. Patient denies fever, chills, SOB, chest pain, N/V/D.    OBJECTIVE:    VITAL SIGNS:  ICU Vital Signs Last 24 Hrs  T(C): 32.8 (13 Jul 2022 12:00), Max: 37.1 (13 Jul 2022 00:38)  T(F): 91 (13 Jul 2022 12:00), Max: 98.7 (13 Jul 2022 00:38)  HR: 50 (13 Jul 2022 15:00) (38 - 61)  BP: 120/86 (13 Jul 2022 15:00) (95/52 - 159/78)  BP(mean): 97 (13 Jul 2022 15:00) (73 - 103)  ABP: --  ABP(mean): --  RR: 15 (13 Jul 2022 15:00) (15 - 20)  SpO2: 98% (13 Jul 2022 15:00) (93% - 100%)    O2 Parameters below as of 13 Jul 2022 15:00  Patient On (Oxygen Delivery Method): ventilator          Mode: AC/ CMV (Assist Control/ Continuous Mandatory Ventilation), RR (machine): 15, TV (machine): 400, FiO2: 0.3, PEEP: 5, MAP: 11, PIP: 32    07-13 @ 07:01  -  07-13 @ 16:03  --------------------------------------------------------  IN: 275 mL / OUT: 270 mL / NET: 5 mL        =================PHYSICAL EXAM=================    GENERAL: NAD  HEAD:  Atraumatic, Normocephalic  EYES: EOMI, PERRL, conjunctiva and sclera clear  ENT: Moist Mucus Membranes present, no ulcers appreciated  NECK: Supple, + JVD  CHEST/LUNG: Intubated. Clear to auscultation bilaterally; No wheezes, rales or rhonchi, no accessory muscle use  HEART: Regular rate and rhythm; No murmurs, rubs, or gallops, (+)S1, S2  ABDOMEN: Soft, + tender to palpation, + distended; Normal Bowel sounds   EXTREMITIES:  2+ Peripheral Pulses, No clubbing, cyanosis, +2 pitting edema b/l  PSYCH: Cannot assess  NEUROLOGY: Sedated  SKIN: No rashes or lesions    =================================================    LABS:                        8.2    2.60  )-----------( 177      ( 13 Jul 2022 04:10 )             28.8     Auto Eosinophil # 0.09  / Auto Eosinophil % 3.5   / Auto Neutrophil # 1.82  / Auto Neutrophil % 69.9  / BANDS % x                            8.3    2.58  )-----------( 193      ( 12 Jul 2022 17:47 )             28.2     Auto Eosinophil # x     / Auto Eosinophil % x     / Auto Neutrophil # x     / Auto Neutrophil % x     / BANDS % x        07-13    145  |  111<H>  |  48<H>  ----------------------------<  142<H>  4.9   |  23  |  1.67<H>  07-13    143  |  110<H>  |  46<H>  ----------------------------<  124<H>  4.7   |  21<L>  |  1.57<H>  07-12    145  |  112<H>  |  49<H>  ----------------------------<  113<H>  4.8   |  24  |  1.65<H>    Ca    8.5      13 Jul 2022 07:12  Mg     1.80     07-13  Phos  5.6     07-13  TPro  6.8  /  Alb  3.2<L>  /  TBili  <0.2  /  DBili  x   /  AST  27  /  ALT  23  /  AlkPhos  126<H>  07-13  TPro  6.9  /  Alb  3.3  /  TBili  <0.2  /  DBili  x   /  AST  23  /  ALT  21  /  AlkPhos  131<H>  07-12      CARDIAC MARKERS ( 13 Jul 2022 04:10 )  x     / x     / 241 U/L / x     / 9.1 ng/mL        Mode: AC/ CMV (Assist Control/ Continuous Mandatory Ventilation), RR (machine): 15, TV (machine): 400, FiO2: 0.3, PEEP: 5, MAP: 11, PIP: 32         MEDICATIONS:  MEDICATIONS  (STANDING):  aspirin enteric coated 81 milliGRAM(s) Oral daily  atorvastatin 20 milliGRAM(s) Oral at bedtime  chlorhexidine 0.12% Liquid 15 milliLiter(s) Oral Mucosa every 12 hours  chlorhexidine 4% Liquid 1 Application(s) Topical <User Schedule>  dextrose 5%. 1000 milliLiter(s) (100 mL/Hr) IV Continuous <Continuous>  dextrose 5%. 1000 milliLiter(s) (50 mL/Hr) IV Continuous <Continuous>  dextrose 50% Injectable 25 Gram(s) IV Push once  dextrose 50% Injectable 12.5 Gram(s) IV Push once  dextrose 50% Injectable 25 Gram(s) IV Push once  fentaNYL   Infusion. 0.5 MICROgram(s)/kG/Hr (4.4 mL/Hr) IV Continuous <Continuous>  furosemide   Injectable 40 milliGRAM(s) IV Push two times a day  glucagon  Injectable 1 milliGRAM(s) IntraMuscular once  heparin   Injectable 5000 Unit(s) SubCutaneous every 12 hours  insulin glargine Injectable (LANTUS) 10 Unit(s) SubCutaneous at bedtime  insulin lispro (ADMELOG) corrective regimen sliding scale   SubCutaneous every 6 hours  meropenem  IVPB 1000 milliGRAM(s) IV Intermittent every 12 hours  norepinephrine Infusion 0.05 MICROgram(s)/kG/Min (8.25 mL/Hr) IV Continuous <Continuous>  propofol Infusion 10 MICROgram(s)/kG/Min (5.28 mL/Hr) IV Continuous <Continuous>    MEDICATIONS  (PRN):  acetaminophen     Tablet .. 650 milliGRAM(s) Oral every 6 hours PRN Temp greater or equal to 38C (100.4F), Mild Pain (1 - 3)  dextrose Oral Gel 15 Gram(s) Oral once PRN Blood Glucose LESS THAN 70 milliGRAM(s)/deciliter      ALLERGIES:  Allergies    penicillin (Red Man Synd)    Intolerances        LABS:                        8.2    2.60  )-----------( 177      ( 13 Jul 2022 04:10 )             28.8     07-13    145  |  111<H>  |  48<H>  ----------------------------<  142<H>  4.9   |  23  |  1.67<H>    Ca    8.5      13 Jul 2022 07:12  Phos  5.6     07-13  Mg     1.80     07-13    TPro  6.8  /  Alb  3.2<L>  /  TBili  <0.2  /  DBili  x   /  AST  27  /  ALT  23  /  AlkPhos  126<H>  07-13          CAPILLARY BLOOD GLUCOSE      POCT Blood Glucose.: 122 mg/dL (13 Jul 2022 03:37)      RADIOLOGY & ADDITIONAL TESTS: Reviewed.

## 2022-07-13 NOTE — H&P ADULT - PROBLEM SELECTOR PLAN 3
Assessment:  - patient found to have an episode of hypothermia and is leukopenic  - unclear etiology, could be vital sign error but she is cool to touch as per RN  - will need sepsis work up as a potential cause of AMS, will also work up for hypothyroidism    Plan:  - f/u TSH level  - send blood cultures, UA/UCx  - start empiric meropenem for now, can d/c once infection has been ruled out  - f/u procalcitonin level  - ID consult  - consider tapping pleural effusion to r/o empyema?

## 2022-07-13 NOTE — CHART NOTE - NSCHARTNOTEFT_GEN_A_CORE
MICU Accept Note    CHIEF COMPLAINT: AMS     HPI / INTERVAL HISTORY:    At the time of interview, unable to obtain history from patient due to AMS. Per HPI "66 y/o F with pmhx of HTN, HLD, sarcoidosis, HFpEF (EF: 62%), presented to the ED for new onset AMS. The patient is hard of hearing, difficult to obtain history and is also very lethargic. Hx obtained from daughter over the phone. As per daughter, the patient yesterday was at her baseline and can communicate as usual. However at midnight, the patient suddenly became altered, confused and daughter noted she was slurring his speech. She was incoherent, and hallucinating, saying there is a cat present and is asking for her aunt who lives far away. She would also be very sleepy and does not do her usual activities. The daughter was concerned for a stroke. Denies facial droop. The patient also has symptoms of leg swelling and shortness of breath since yesterday. The patient known to have frequent admission for CHF exacerbations but is unable to follow up with outpatient cardiologist and as per daughter, not on diuretics at home (however the order rec shows that it was prescribed recently). Denies chest pain. No fevers at home."     Per primary team, pt was noted to be more lethargic. Code Stroke was called; per neuro, metabolic encephalopathy was more likely. Also noted to be hypothermic despite being on the tracy hugger. VBG showed hypercapneic respiratory failure. Pt was placed on BiPAP without improvement in mental status. Accepted to MICU for hypercapneic respiratory failure       PAST MEDICAL & SURGICAL HISTORY:  Type 2 diabetes mellitus      Bilateral cataracts      Fluid in pleural cavity associated with pancreatitis      Pancreatic pseudocyst/cyst  s/p drain placement and removal      HTN (hypertension)      HLD (hyperlipidemia)      History of amputation of right great toe        H/O:  section        History of laparoscopic cholecystectomy          FAMILY HISTORY:  No pertinent family history in first degree relatives        SOCIAL HISTORY:  Per HPI, "Denies ETOH use, former tobacco use, Denies illicit drug use"    HOME MEDICATIONS:      Allergies    penicillin ([facility])    Intolerances          REVIEW OF SYSTEMS:  Unable to assess ROS due to poor mental status     OBJECTIVE:    VITALS:   T(C): 32.5 (22 @ 05:22), Max: 37.1 (22 @ 00:38)  HR: 50 (22 @ 06:44) (50 - 61)  BP: 111/66 (22 @ 05:22) (111/66 - 153/87)  RR: 18 (22 @ 05:22) (17 - 19)  SpO2: 100% (22 @ 06:44) (93% - 100%)    GENERAL: lethargic, arousable only with sternal rub, on BiPAP   HEAD:  Atraumatic, Normocephalic  EYES: conjunctiva and sclera clear  ENT: Moist mucous membranes  CHEST/LUNG: Clear to auscultation bilaterally; No rales, rhonchi, wheezing, or rubs. Unlabored respirations  HEART: Regular rate and rhythm; No murmurs, rubs, or gallops  ABDOMEN: BSx4; Soft, + discomfort noted on palpation of the abdomen   EXTREMITIES:  2+ Peripheral Pulses, + cold extremities, + 2+ peripheral edema upto the knee  NERVOUS SYSTEM: lethargic, only arousable with sternal rub   SKIN: No rashes or lesions      CAPILLARY BLOOD GLUCOSE      POCT Blood Glucose.: 122 mg/dL (2022 03:37)        HOSPITAL MEDICATIONS:  MEDICATIONS  (STANDING):  aspirin enteric coated 81 milliGRAM(s) Oral daily  atorvastatin 20 milliGRAM(s) Oral at bedtime  furosemide   Injectable 40 milliGRAM(s) IV Push two times a day  heparin   Injectable 5000 Unit(s) SubCutaneous every 12 hours  meropenem  IVPB 1000 milliGRAM(s) IV Intermittent every 12 hours    MEDICATIONS  (PRN):  acetaminophen     Tablet .. 650 milliGRAM(s) Oral every 6 hours PRN Temp greater or equal to 38C (100.4F), Mild Pain (1 - 3)  melatonin 3 milliGRAM(s) Oral at bedtime PRN Insomnia      LABS:                        8.2    2.60  )-----------( 177      ( 2022 04:10 )             28.8     Hgb Trend: 8.2<--, 8.3<--      143  |  110<H>  |  46<H>  ----------------------------<  124<H>  4.7   |  21<L>  |  1.57<H>    Ca    8.7      2022 04:10  Phos  5.1       Mg     1.80         TPro  6.8  /  Alb  3.2<L>  /  TBili  <0.2  /  DBili  x   /  AST  27  /  ALT  23  /  AlkPhos  126<H>      Creatinine Trend: 1.57<--, 1.65<--      Arterial Blood Gas:   @ 06:40  7.17/64/130/23/98.6/-5.3  ABG lactate: --    Venous Blood Gas:   @ 04:10  7.17/66/58/24/88.4  VBG Lactate: 0.4  Venous Blood Gas:   @ 17:47  7.26/56/53/25/TNP  VBG Lactate: 0.4      MICROBIOLOGY:     RADIOLOGY & ADDITIONAL TESTS:    CT Chest, Abd & Pelvis:  FINDINGS:  CHEST:  LUNGS AND LARGE AIRWAYS: Compressive atelectasis of the right upper, middle and bilateral lower lobes including atelectasis of the majority of the right lower lobe. Subsegmental atelectasis in the left upper lobe.  PLEURA: New large right and small left pleural effusions. No evidence of an empyema. Fluid in the minor fissure.  VESSELS: Atherosclerotic changes of the aorta and coronary arteries.  HEART: Heart size is enlarged. No pericardial effusion.  MEDIASTINUM AND ANTONIO: No lymphadenopathy. Redemonstration of calcified mediastinal lymph nodes.  CHEST WALL AND LOWER NECK: 7 mm right thyroid lobe nodule. Generalized anasarca.    ABDOMEN AND PELVIS:  LIVER: Within normal limits.  BILE DUCTS: Normal caliber.  GALLBLADDER: Cholecystectomy.  SPLEEN: Within normal limits.  PANCREAS: Atrophic.  ADRENALS: Within normal limits.  KIDNEYS/URETERS: No renal stones or hydronephrosis.    BLADDER: Within normal limits.  REPRODUCTIVE ORGANS: Atrophic and retroflexed uterus. No adnexal masses.    BOWEL: Redemonstration of an outpouching with wall calcification and intraluminal fluid and gas measuring 4.9 x 4.6 cm emanating from the posterior gastric body likely representing a large diverticulum. No bowel obstruction or inflammation. Colonic diverticulosis without diverticulitis. Appendix within normal limits.  PERITONEUM: Small volume free pelvic fluid.  VESSELS: Atherosclerotic changes.  RETROPERITONEUM/LYMPH NODES: No lymphadenopathy.  ABDOMINAL WALL: Generalized anasarca.  BONES: Degenerative changes of the spine. Mild S-shaped thoracolumbar scoliosis.    IMPRESSION:  1. New large right and small left pleural effusions with adjacent compressive atelectasis including atelectasis of the majority of the right lower lobe.  2. No bowel obstruction or inflammation.    ASSESSMENT AND PLAN:     66 y/o F with pmhx of HTN, HLD, sarcoidosis, HFpEF (EF: 62%), presented to the ED with AMS admitted to MICU for acute hypercapneic respiratory failure     Neuro:    #AMS - structural vs metabolic vs infectious   could be in the setting of hypercapneic resp failure   TSH: wnl   infectious workup as below   r/o structural causes with CT head pending   f/u cortisol     Resp:    #Acute Hypercapneic respiratory failure   AB.17/64/130/23/98.6/-5.3 prior to BiPAP   On BiPAP currently, f/u repeat ABG   might be a component of FORTUNATO     #Bilateral pleural effusions i/s/o HF exacerbation   CT Chest with new large right and small left pleural effusions with adjacent compressive atelectasis including atelectasis of the majority of the right lower lobe   IV Lasix 40 BID   Evaluate for empyema     CV:    #HTN   on amlodipine, carvedilol and hydralazine at home   hold home meds       GI        Renal/        ID        Endo        Heme         Ethics MICU Accept Note    CHIEF COMPLAINT: AMS     HPI / INTERVAL HISTORY:    At the time of interview, unable to obtain history from patient due to AMS. Per HPI "66 y/o F with pmhx of HTN, HLD, sarcoidosis, HFpEF (EF: 62%), presented to the ED for new onset AMS. The patient is hard of hearing, difficult to obtain history and is also very lethargic. Hx obtained from daughter over the phone. As per daughter, the patient yesterday was at her baseline and can communicate as usual. However at midnight, the patient suddenly became altered, confused and daughter noted she was slurring his speech. She was incoherent, and hallucinating, saying there is a cat present and is asking for her aunt who lives far away. She would also be very sleepy and does not do her usual activities. The daughter was concerned for a stroke. Denies facial droop. The patient also has symptoms of leg swelling and shortness of breath since yesterday. The patient known to have frequent admission for CHF exacerbations but is unable to follow up with outpatient cardiologist and as per daughter, not on diuretics at home (however the order rec shows that it was prescribed recently). Denies chest pain. No fevers at home."     Per primary team, pt was noted to be more lethargic. Code Stroke was called; per neuro, metabolic encephalopathy was more likely. Also noted to be hypothermic despite being on the tracy hugger. VBG showed hypercapneic respiratory failure. Pt was placed on BiPAP without improvement in mental status. Accepted to MICU for hypercapneic respiratory failure       PAST MEDICAL & SURGICAL HISTORY:  Type 2 diabetes mellitus      Bilateral cataracts      Fluid in pleural cavity associated with pancreatitis      Pancreatic pseudocyst/cyst  s/p drain placement and removal      HTN (hypertension)      HLD (hyperlipidemia)      History of amputation of right great toe        H/O:  section        History of laparoscopic cholecystectomy          FAMILY HISTORY:  No pertinent family history in first degree relatives        SOCIAL HISTORY:  Per HPI, "Denies ETOH use, former tobacco use, Denies illicit drug use"    HOME MEDICATIONS:      Allergies    penicillin ([facility])    Intolerances          REVIEW OF SYSTEMS:  Unable to assess ROS due to poor mental status     OBJECTIVE:    VITALS:   T(C): 32.5 (22 @ 05:22), Max: 37.1 (22 @ 00:38)  HR: 50 (22 @ 06:44) (50 - 61)  BP: 111/66 (22 @ 05:22) (111/66 - 153/87)  RR: 18 (22 @ 05:22) (17 - 19)  SpO2: 100% (22 @ 06:44) (93% - 100%)    GENERAL: lethargic, arousable only with sternal rub, on BiPAP   HEAD:  Atraumatic, Normocephalic  EYES: conjunctiva and sclera clear  ENT: Moist mucous membranes  CHEST/LUNG: Clear to auscultation bilaterally; No rales, rhonchi, wheezing, or rubs. Unlabored respirations  HEART: Regular rate and rhythm; No murmurs, rubs, or gallops  ABDOMEN: BSx4; Soft, + discomfort noted on palpation of the abdomen   EXTREMITIES:  2+ Peripheral Pulses, + cold extremities, + 2+ peripheral edema upto the knee  NERVOUS SYSTEM: lethargic, only arousable with sternal rub   SKIN: No rashes or lesions      CAPILLARY BLOOD GLUCOSE      POCT Blood Glucose.: 122 mg/dL (2022 03:37)        HOSPITAL MEDICATIONS:  MEDICATIONS  (STANDING):  aspirin enteric coated 81 milliGRAM(s) Oral daily  atorvastatin 20 milliGRAM(s) Oral at bedtime  furosemide   Injectable 40 milliGRAM(s) IV Push two times a day  heparin   Injectable 5000 Unit(s) SubCutaneous every 12 hours  meropenem  IVPB 1000 milliGRAM(s) IV Intermittent every 12 hours    MEDICATIONS  (PRN):  acetaminophen     Tablet .. 650 milliGRAM(s) Oral every 6 hours PRN Temp greater or equal to 38C (100.4F), Mild Pain (1 - 3)  melatonin 3 milliGRAM(s) Oral at bedtime PRN Insomnia      LABS:                        8.2    2.60  )-----------( 177      ( 2022 04:10 )             28.8     Hgb Trend: 8.2<--, 8.3<--      143  |  110<H>  |  46<H>  ----------------------------<  124<H>  4.7   |  21<L>  |  1.57<H>    Ca    8.7      2022 04:10  Phos  5.1       Mg     1.80         TPro  6.8  /  Alb  3.2<L>  /  TBili  <0.2  /  DBili  x   /  AST  27  /  ALT  23  /  AlkPhos  126<H>      Creatinine Trend: 1.57<--, 1.65<--      Arterial Blood Gas:   @ 06:40  7.17/64/130/23/98.6/-5.3  ABG lactate: --    Venous Blood Gas:   @ 04:10  7.17/66/58/24/88.4  VBG Lactate: 0.4  Venous Blood Gas:   @ 17:47  7.26/56/53/25/TNP  VBG Lactate: 0.4      MICROBIOLOGY:     RADIOLOGY & ADDITIONAL TESTS:    CT Chest, Abd & Pelvis:  FINDINGS:  CHEST:  LUNGS AND LARGE AIRWAYS: Compressive atelectasis of the right upper, middle and bilateral lower lobes including atelectasis of the majority of the right lower lobe. Subsegmental atelectasis in the left upper lobe.  PLEURA: New large right and small left pleural effusions. No evidence of an empyema. Fluid in the minor fissure.  VESSELS: Atherosclerotic changes of the aorta and coronary arteries.  HEART: Heart size is enlarged. No pericardial effusion.  MEDIASTINUM AND ANTONIO: No lymphadenopathy. Redemonstration of calcified mediastinal lymph nodes.  CHEST WALL AND LOWER NECK: 7 mm right thyroid lobe nodule. Generalized anasarca.    ABDOMEN AND PELVIS:  LIVER: Within normal limits.  BILE DUCTS: Normal caliber.  GALLBLADDER: Cholecystectomy.  SPLEEN: Within normal limits.  PANCREAS: Atrophic.  ADRENALS: Within normal limits.  KIDNEYS/URETERS: No renal stones or hydronephrosis.    BLADDER: Within normal limits.  REPRODUCTIVE ORGANS: Atrophic and retroflexed uterus. No adnexal masses.    BOWEL: Redemonstration of an outpouching with wall calcification and intraluminal fluid and gas measuring 4.9 x 4.6 cm emanating from the posterior gastric body likely representing a large diverticulum. No bowel obstruction or inflammation. Colonic diverticulosis without diverticulitis. Appendix within normal limits.  PERITONEUM: Small volume free pelvic fluid.  VESSELS: Atherosclerotic changes.  RETROPERITONEUM/LYMPH NODES: No lymphadenopathy.  ABDOMINAL WALL: Generalized anasarca.  BONES: Degenerative changes of the spine. Mild S-shaped thoracolumbar scoliosis.    IMPRESSION:  1. New large right and small left pleural effusions with adjacent compressive atelectasis including atelectasis of the majority of the right lower lobe.  2. No bowel obstruction or inflammation.    ASSESSMENT AND PLAN:     66 y/o F with HTN, HLD, sarcoidosis, HFpEF (EF: 62%), presented to the ED with AMS admitted to MICU for acute hypercapneic respiratory failure     Neuro:    #AMS - structural vs metabolic vs infectious   could be in the setting of hypercapneic resp failure   TSH: wnl   infectious workup as below   r/o structural causes with CT head pending   f/u cortisol   neuro checks q4  aspiration precautions  seizure precautions    Resp:    #Acute Hypercapneic respiratory failure   AB.17/64/130/23/98.6/-5.3 prior to BiPAP   On BiPAP currently, f/u repeat ABG   might be a component of FORTUNATO     #Bilateral pleural effusions i/s/o HF exacerbation   CT Chest with new large right and small left pleural effusions with adjacent compressive atelectasis including atelectasis of the majority of the right lower lobe   IV Lasix 40 BID   Monitor O2 sat  Monitor RR     CV:    #HFpEF   BNP on arrival: 4021 with evidence of fluid overload with pleural effusions, b/l pitting edema   Echo (): EF: 62%; Mild diastolic dysfunction. Normal right ventricular size and function   EKG: RBBB (present from prior EKG's). no acute ischemic findings; trop: 50   c/w IV lasix BID   Monitor lytes and replete PRN   Strict I&O  Daily weights     #HTN   on amlodipine, carvedilol and hydralazine at home   hold home meds given acutely decompensated state and restart as needed     GI:     Diet: NPO   Monitor BM's  Serial abdominal exams    Renal/:    #CKD Stage 2  Baseline Scr (): 2.33  On admission, SCr at 1.6  Monitor UO  Monitor SCr      ID:     #Encephalopathy possibly 2/2 sepsis - hypothermic to 90.4 with leukopenia   f/u blood cx, ua, urine cx  procal: neg  CT abd and pelvis: no infectious source   consider switching meropenem to zosyn, vanc   Monitor temp  Monitor WBC   if mental status does not improve, might need LP     Endo:     #T2DM   on detemir 20 U at home   while NPO, monitor FSG   c/w ISS   c/w lantus 10 U  f/u A1c   f/u cortisol to r/o adrenal insufficiency as cause of AMS, hypothermia     Heme:     DVT Ppx: Heparin     Ethics: pending [facility] discussion with family MICU Accept Note    CHIEF COMPLAINT: AMS     HPI / INTERVAL HISTORY:    At the time of interview, unable to obtain history from patient due to AMS. Per HPI "66 y/o F with pmhx of HTN, HLD, sarcoidosis, HFpEF (EF: 62%), presented to the ED for new onset AMS. The patient is hard of hearing, difficult to obtain history and is also very lethargic. Hx obtained from daughter over the phone. As per daughter, the patient yesterday was at her baseline and can communicate as usual. However at midnight, the patient suddenly became altered, confused and daughter noted she was slurring his speech. She was incoherent, and hallucinating, saying there is a cat present and is asking for her aunt who lives far away. She would also be very sleepy and does not do her usual activities. The daughter was concerned for a stroke. Denies facial droop. The patient also has symptoms of leg swelling and shortness of breath since yesterday. The patient known to have frequent admission for CHF exacerbations but is unable to follow up with outpatient cardiologist and as per daughter, not on diuretics at home (however the order rec shows that it was prescribed recently). Denies chest pain. No fevers at home."     Per primary team, pt was noted to be more lethargic. Code Stroke was called; per neuro, metabolic encephalopathy was more likely. Also noted to be hypothermic despite being on the tracy hugger. VBG showed hypercapneic respiratory failure. Pt was placed on BiPAP without improvement in mental status. Accepted to MICU for hypercapneic respiratory failure       PAST MEDICAL & SURGICAL HISTORY:  Type 2 diabetes mellitus      Bilateral cataracts      Fluid in pleural cavity associated with pancreatitis      Pancreatic pseudocyst/cyst  s/p drain placement and removal      HTN (hypertension)      HLD (hyperlipidemia)      History of amputation of right great toe        H/O:  section        History of laparoscopic cholecystectomy          FAMILY HISTORY:  No pertinent family history in first degree relatives        SOCIAL HISTORY:  Per HPI, "Denies ETOH use, former tobacco use, Denies illicit drug use"    HOME MEDICATIONS:      Allergies    penicillin ([employer] [facility])    Intolerances          REVIEW OF SYSTEMS:  Unable to assess ROS due to poor mental status     OBJECTIVE:    VITALS:   T(C): 32.5 (22 @ 05:22), Max: 37.1 (22 @ 00:38)  HR: 50 (22 @ 06:44) (50 - 61)  BP: 111/66 (22 @ 05:22) (111/66 - 153/87)  RR: 18 (22 @ 05:22) (17 - 19)  SpO2: 100% (22 @ 06:44) (93% - 100%)    GENERAL: lethargic, arousable only with sternal rub, on BiPAP   HEAD:  Atraumatic, Normocephalic  EYES: conjunctiva and sclera clear  ENT: Moist mucous membranes  CHEST/LUNG: Clear to auscultation bilaterally; No rales, rhonchi, wheezing, or rubs. Unlabored respirations  HEART: Regular rate and rhythm; No murmurs, rubs, or gallops  ABDOMEN: BSx4; Soft, + discomfort noted on palpation of the abdomen   EXTREMITIES:  2+ Peripheral Pulses, + cold extremities, + 2+ peripheral edema upto the knee  NERVOUS SYSTEM: lethargic, only arousable with sternal rub   SKIN: No rashes or lesions      CAPILLARY BLOOD GLUCOSE      POCT Blood Glucose.: 122 mg/dL (2022 03:37)        HOSPITAL MEDICATIONS:  MEDICATIONS  (STANDING):  aspirin enteric coated 81 milliGRAM(s) Oral daily  atorvastatin 20 milliGRAM(s) Oral at bedtime  furosemide   Injectable 40 milliGRAM(s) IV Push two times a day  heparin   Injectable 5000 Unit(s) SubCutaneous every 12 hours  meropenem  IVPB 1000 milliGRAM(s) IV Intermittent every 12 hours    MEDICATIONS  (PRN):  acetaminophen     Tablet .. 650 milliGRAM(s) Oral every 6 hours PRN Temp greater or equal to 38C (100.4F), Mild Pain (1 - 3)  melatonin 3 milliGRAM(s) Oral at bedtime PRN Insomnia      LABS:                        8.2    2.60  )-----------( 177      ( 2022 04:10 )             28.8     Hgb Trend: 8.2<--, 8.3<--      143  |  110<H>  |  46<H>  ----------------------------<  124<H>  4.7   |  21<L>  |  1.57<H>    Ca    8.7      2022 04:10  Phos  5.1       Mg     1.80         TPro  6.8  /  Alb  3.2<L>  /  TBili  <0.2  /  DBili  x   /  AST  27  /  ALT  23  /  AlkPhos  126<H>      Creatinine Trend: 1.57<--, 1.65<--      Arterial Blood Gas:   @ 06:40  7.17/64/130/23/98.6/-5.3  ABG lactate: --    Venous Blood Gas:   @ 04:10  7.17/66/58/24/88.4  VBG Lactate: 0.4  Venous Blood Gas:   @ 17:47  7.26/56/53/25/TNP  VBG Lactate: 0.4      MICROBIOLOGY:     RADIOLOGY & ADDITIONAL TESTS:    CT Chest, Abd & Pelvis:  FINDINGS:  CHEST:  LUNGS AND LARGE AIRWAYS: Compressive atelectasis of the right upper, middle and bilateral lower lobes including atelectasis of the majority of the right lower lobe. Subsegmental atelectasis in the left upper lobe.  PLEURA: New large right and small left pleural effusions. No evidence of an empyema. Fluid in the minor fissure.  VESSELS: Atherosclerotic changes of the aorta and coronary arteries.  HEART: Heart size is enlarged. No pericardial effusion.  MEDIASTINUM AND ANTONIO: No lymphadenopathy. Redemonstration of calcified mediastinal lymph nodes.  CHEST WALL AND LOWER NECK: 7 mm right thyroid lobe nodule. Generalized anasarca.    ABDOMEN AND PELVIS:  LIVER: Within normal limits.  BILE DUCTS: Normal caliber.  GALLBLADDER: Cholecystectomy.  SPLEEN: Within normal limits.  PANCREAS: Atrophic.  ADRENALS: Within normal limits.  KIDNEYS/URETERS: No renal stones or hydronephrosis.    BLADDER: Within normal limits.  REPRODUCTIVE ORGANS: Atrophic and retroflexed uterus. No adnexal masses.    BOWEL: Redemonstration of an outpouching with wall calcification and intraluminal fluid and gas measuring 4.9 x 4.6 cm emanating from the posterior gastric body likely representing a large diverticulum. No bowel obstruction or inflammation. Colonic diverticulosis without diverticulitis. Appendix within normal limits.  PERITONEUM: Small volume free pelvic fluid.  VESSELS: Atherosclerotic changes.  RETROPERITONEUM/LYMPH NODES: No lymphadenopathy.  ABDOMINAL WALL: Generalized anasarca.  BONES: Degenerative changes of the spine. Mild S-shaped thoracolumbar scoliosis.    IMPRESSION:  1. New large right and small left pleural effusions with adjacent compressive atelectasis including atelectasis of the majority of the right lower lobe.  2. No bowel obstruction or inflammation.    ASSESSMENT AND PLAN:     66 y/o F with HTN, HLD, sarcoidosis, HFpEF (EF: 62%), presented to the ED with AMS admitted to MICU for acute hypercapneic respiratory failure     Neuro:    #AMS - structural vs metabolic vs infectious   could be in the setting of hypercapneic resp failure   TSH: wnl   infectious workup as below   r/o structural causes with CT head pending   f/u cortisol   neuro checks q4  aspiration precautions  seizure precautions    Resp:    #Acute Hypercapneic respiratory failure   AB.17/64/130/23/98.6/-5.3 prior to BiPAP   On BiPAP currently, f/u repeat ABG   might be a component of FORTUNATO     #Bilateral pleural effusions i/s/o HF exacerbation   CT Chest with new large right and small left pleural effusions with adjacent compressive atelectasis including atelectasis of the majority of the right lower lobe   IV Lasix 40 BID   Monitor O2 sat  Monitor RR     CV:    #HFpEF   BNP on arrival: 4021 with evidence of fluid overload with pleural effusions, b/l pitting edema   Echo (): EF: 62%; Mild diastolic dysfunction. Normal right ventricular size and function   EKG: RBBB (present from prior EKG's). no acute ischemic findings; trop: 50   c/w IV lasix BID   Monitor lytes and replete PRN   Strict I&O  Daily weights     #HTN   on amlodipine, carvedilol and hydralazine at home   hold home meds given acutely decompensated state and restart as needed     GI:     Diet: NPO   Monitor BM's  Serial abdominal exams    Renal/:    #CKD Stage 2  Baseline Scr (): 2.33  On admission, SCr at 1.6  Monitor UO  Monitor SCr      ID:     #Encephalopathy possibly 2/2 sepsis - hypothermic to 90.4 with leukopenia   f/u blood cx, ua, urine cx  procal: neg  CT abd and pelvis: no infectious source   c/w meropenem ( - )  Monitor temp  Monitor WBC   if mental status does not improve, might need LP     Endo:     #T2DM   on detemir 20 U at home   while NPO, monitor FSG   c/w ISS   c/w lantus 10 U  f/u A1c   f/u cortisol to r/o adrenal insufficiency as cause of AMS, hypothermia     Heme:     DVT Ppx: Heparin     Ethics: pending [facility] discussion with family

## 2022-07-13 NOTE — H&P ADULT - NSHPREVIEWOFSYSTEMS_GEN_ALL_CORE
REVIEW OF SYSTEMS:  AS PER DAUGHTER  CONSTITUTIONAL: No weakness, fevers or chills  EYES/ENT: No visual changes;  No dysphagia; No sore throat; No rhinorrhea; No sinus pain/pressure  NECK: No pain or stiffness  RESPIRATORY: No cough, wheezing, hemoptysis; No shortness of breath  CARDIOVASCULAR: No chest pain or palpitations; + lower extremity edema b/l  GASTROINTESTINAL: No abdominal or epigastric pain. No nausea, vomiting, or hematemesis; No diarrhea or constipation. No melena or hematochezia.  GENITOURINARY: No dysuria, frequency or hematuria  NEUROLOGICAL: No numbness or weakness  MSK: ambulates with walker  SKIN: No itching, burning, rashes, or lesions   All other review of systems is negative unless indicated above.

## 2022-07-13 NOTE — H&P ADULT - PROBLEM SELECTOR PLAN 1
Assessment:  - patient presented for new onset CHF exacerbation  - BNP 4021, CT chest show pleural effusions  - physical exam show significant LE swelling, JVD, + B lines on POCUS  - Echo from last month showed EF 62% with mild dystolic dysfunction    Plan:  - start lasix 40mg IV push BID  - no need for repeat echo at this time  - strict I/O, fluid restrict 800cc/day  - cardiology consult  - advise follow up appt with cardiologist for close monitoring Assessment:  - patient presented for new onset CHF exacerbation  - BNP 4021, CT chest show pleural effusions  - physical exam show significant LE swelling, JVD, + B lines on POCUS  - Echo from last month showed EF 62% with mild dystolic dysfunction    Plan:  - start lasix 40mg IV push BID  - no need for repeat echo at this time  - strict I/O, fluid restrict 800cc/day. Murphy placed because primafit is not available, would recommend primafit once she gets a room  - cardiology consult  - advise follow up appt with cardiologist for close monitoring

## 2022-07-13 NOTE — ED PROVIDER NOTE - PROGRESS NOTE DETAILS
Cedrick Kate MD:  CT imaging negative for acute surgical pathology, bloodwork consistent with CHF exacerbation, will admit for further diuretic and cardiology evaluation.

## 2022-07-13 NOTE — H&P ADULT - ASSESSMENT
66 y/o F with pmhx of HTN, HLD, sarcoidosis, CHF, presented to the ED for new onset AMS. She is also found to have pleural effusion, elevated BNP and LE edema concerning for CHF exacerbation. Admit for metabolic encephalopathy and CHF exacerbation.

## 2022-07-13 NOTE — H&P ADULT - PROBLEM SELECTOR PLAN 6
- heparin subq for dvt ppx  - spoken with daughter extensively for history and diagnosis, treatment, expressed understanding.

## 2022-07-13 NOTE — H&P ADULT - PROBLEM SELECTOR PLAN 2
Assessment:  - patient also presented to the ED acutely confused  - daughter mentioned acute change in mental status at midnight with associated slurring of speech  - concerning for stroke, however need to r/o infectious etiology and correct CHF exacerbation  - stroke code called at bedside after I spoken to daughter who mentioned slurring of speech. Neurology at bedside assessed. Felt that the etiology of her symptoms is more from metabolic encephalopathy however cannot definitely rule out stroke at this time  - MRI last year on [facility] showed chronic lacunar infarcts    Plan:  - will obtain CT head urgently. If stroke is negative on CT, will obtian MRI (patient has no metal parts in her body but does have plastic tubes in her ear placed 6 months ago and they are plastic as per daughter)  - sepsis work up as below  - consider neurology consult if stroke is found  - neuro checks Q8hr Assessment:  - patient also presented to the ED acutely confused  - daughter mentioned acute change in mental status at midnight with associated slurring of speech  - concerning for stroke, however need to r/o infectious etiology and correct CHF exacerbation  - stroke code called at bedside after I spoken to daughter who mentioned slurring of speech. Neurology at bedside assessed. Felt that the etiology of her symptoms is more from metabolic encephalopathy however cannot definitely rule out stroke at this time  - MRI last year on [facility] showed chronic lacunar infarcts    Plan:  - will obtain CT head urgently. If stroke is negative on CT, will obtian MRI (patient has no metal parts in her body but does have plastic tubes in her ear placed 6 months ago and they are plastic as per daughter)  - sepsis work up as below  - consider neurology consult if stroke is found  - neuro checks Q8hr  - the patient is also having worsening respiratory acidosis, will start bipap, which would also help with CHF exacerbation

## 2022-07-13 NOTE — ED PROVIDER NOTE - CLINICAL SUMMARY MEDICAL DECISION MAKING FREE TEXT BOX
67 Y F presenting with sob, LE swelling, primary suspicion for CHF exacerbation, concomittent concern for SBO vs. pancreatic pathology given history and tender upper abdomen, common labs, CT Chest abdomen pelvis, lasix, re-assess

## 2022-07-14 LAB
ALBUMIN SERPL ELPH-MCNC: 3.1 G/DL — LOW (ref 3.3–5)
ALP SERPL-CCNC: 116 U/L — SIGNIFICANT CHANGE UP (ref 40–120)
ALT FLD-CCNC: 25 U/L — SIGNIFICANT CHANGE UP (ref 4–33)
ANION GAP SERPL CALC-SCNC: 11 MMOL/L — SIGNIFICANT CHANGE UP (ref 7–14)
APPEARANCE UR: ABNORMAL
AST SERPL-CCNC: 42 U/L — HIGH (ref 4–32)
BILIRUB SERPL-MCNC: 0.2 MG/DL — SIGNIFICANT CHANGE UP (ref 0.2–1.2)
BILIRUB UR-MCNC: NEGATIVE — SIGNIFICANT CHANGE UP
BLOOD GAS ARTERIAL - LYTES,HGB,ICA,LACT RESULT: SIGNIFICANT CHANGE UP
BUN SERPL-MCNC: 53 MG/DL — HIGH (ref 7–23)
CALCIUM SERPL-MCNC: 8.3 MG/DL — LOW (ref 8.4–10.5)
CHLORIDE SERPL-SCNC: 110 MMOL/L — HIGH (ref 98–107)
CO2 SERPL-SCNC: 21 MMOL/L — LOW (ref 22–31)
COLOR SPEC: YELLOW — SIGNIFICANT CHANGE UP
CORTIS AM PEAK SERPL-MCNC: 7.1 UG/DL — SIGNIFICANT CHANGE UP (ref 6–18.4)
CREAT SERPL-MCNC: 2.39 MG/DL — HIGH (ref 0.5–1.3)
DIFF PNL FLD: ABNORMAL
EGFR: 22 ML/MIN/1.73M2 — LOW
GLUCOSE BLDC GLUCOMTR-MCNC: 100 MG/DL — HIGH (ref 70–99)
GLUCOSE BLDC GLUCOMTR-MCNC: 55 MG/DL — LOW (ref 70–99)
GLUCOSE BLDC GLUCOMTR-MCNC: 63 MG/DL — LOW (ref 70–99)
GLUCOSE BLDC GLUCOMTR-MCNC: 69 MG/DL — LOW (ref 70–99)
GLUCOSE BLDC GLUCOMTR-MCNC: 80 MG/DL — SIGNIFICANT CHANGE UP (ref 70–99)
GLUCOSE SERPL-MCNC: 56 MG/DL — LOW (ref 70–99)
GLUCOSE UR QL: NEGATIVE — SIGNIFICANT CHANGE UP
HCT VFR BLD CALC: 26.2 % — LOW (ref 34.5–45)
HGB BLD-MCNC: 7.8 G/DL — LOW (ref 11.5–15.5)
KETONES UR-MCNC: ABNORMAL
LEUKOCYTE ESTERASE UR-ACNC: ABNORMAL
MAGNESIUM SERPL-MCNC: 1.6 MG/DL — SIGNIFICANT CHANGE UP (ref 1.6–2.6)
MCHC RBC-ENTMCNC: 25.5 PG — LOW (ref 27–34)
MCHC RBC-ENTMCNC: 29.8 GM/DL — LOW (ref 32–36)
MCV RBC AUTO: 85.6 FL — SIGNIFICANT CHANGE UP (ref 80–100)
NITRITE UR-MCNC: NEGATIVE — SIGNIFICANT CHANGE UP
NRBC # BLD: 0 /100 WBCS — SIGNIFICANT CHANGE UP
NRBC # FLD: 0 K/UL — SIGNIFICANT CHANGE UP
PH UR: 6 — SIGNIFICANT CHANGE UP (ref 5–8)
PHOSPHATE SERPL-MCNC: 4.5 MG/DL — SIGNIFICANT CHANGE UP (ref 2.5–4.5)
PLATELET # BLD AUTO: 183 K/UL — SIGNIFICANT CHANGE UP (ref 150–400)
POTASSIUM SERPL-MCNC: 4.6 MMOL/L — SIGNIFICANT CHANGE UP (ref 3.5–5.3)
POTASSIUM SERPL-SCNC: 4.6 MMOL/L — SIGNIFICANT CHANGE UP (ref 3.5–5.3)
PROT SERPL-MCNC: 6.1 G/DL — SIGNIFICANT CHANGE UP (ref 6–8.3)
PROT UR-MCNC: ABNORMAL
RBC # BLD: 3.06 M/UL — LOW (ref 3.8–5.2)
RBC # FLD: 17.1 % — HIGH (ref 10.3–14.5)
RBC CASTS # UR COMP ASSIST: 5 /HPF — HIGH (ref 0–4)
SODIUM SERPL-SCNC: 142 MMOL/L — SIGNIFICANT CHANGE UP (ref 135–145)
SP GR SPEC: 1.03 — SIGNIFICANT CHANGE UP (ref 1–1.05)
UROBILINOGEN FLD QL: ABNORMAL
WBC # BLD: 5.75 K/UL — SIGNIFICANT CHANGE UP (ref 3.8–10.5)
WBC # FLD AUTO: 5.75 K/UL — SIGNIFICANT CHANGE UP (ref 3.8–10.5)
WBC UR QL: >50 /HPF — SIGNIFICANT CHANGE UP (ref 0–5)

## 2022-07-14 PROCEDURE — 70450 CT HEAD/BRAIN W/O DYE: CPT | Mod: 26

## 2022-07-14 RX ORDER — BUMETANIDE 0.25 MG/ML
2 INJECTION INTRAMUSCULAR; INTRAVENOUS ONCE
Refills: 0 | Status: COMPLETED | OUTPATIENT
Start: 2022-07-14 | End: 2022-07-14

## 2022-07-14 RX ORDER — SODIUM CHLORIDE 9 MG/ML
500 INJECTION, SOLUTION INTRAVENOUS ONCE
Refills: 0 | Status: COMPLETED | OUTPATIENT
Start: 2022-07-14 | End: 2022-07-14

## 2022-07-14 RX ORDER — SODIUM CHLORIDE 9 MG/ML
1000 INJECTION, SOLUTION INTRAVENOUS ONCE
Refills: 0 | Status: COMPLETED | OUTPATIENT
Start: 2022-07-14 | End: 2022-07-14

## 2022-07-14 RX ORDER — ASPIRIN/CALCIUM CARB/MAGNESIUM 324 MG
81 TABLET ORAL DAILY
Refills: 0 | Status: DISCONTINUED | OUTPATIENT
Start: 2022-07-14 | End: 2022-08-05

## 2022-07-14 RX ORDER — MIDAZOLAM HYDROCHLORIDE 1 MG/ML
2 INJECTION, SOLUTION INTRAMUSCULAR; INTRAVENOUS ONCE
Refills: 0 | Status: DISCONTINUED | OUTPATIENT
Start: 2022-07-14 | End: 2022-07-14

## 2022-07-14 RX ORDER — SODIUM CHLORIDE 9 MG/ML
1000 INJECTION, SOLUTION INTRAVENOUS
Refills: 0 | Status: DISCONTINUED | OUTPATIENT
Start: 2022-07-14 | End: 2022-07-16

## 2022-07-14 RX ORDER — DEXTROSE 50 % IN WATER 50 %
25 SYRINGE (ML) INTRAVENOUS ONCE
Refills: 0 | Status: COMPLETED | OUTPATIENT
Start: 2022-07-14 | End: 2022-07-14

## 2022-07-14 RX ORDER — MAGNESIUM SULFATE 500 MG/ML
2 VIAL (ML) INJECTION ONCE
Refills: 0 | Status: COMPLETED | OUTPATIENT
Start: 2022-07-14 | End: 2022-07-14

## 2022-07-14 RX ADMIN — SODIUM CHLORIDE 60 MILLILITER(S): 9 INJECTION, SOLUTION INTRAVENOUS at 14:38

## 2022-07-14 RX ADMIN — BUMETANIDE 2 MILLIGRAM(S): 0.25 INJECTION INTRAMUSCULAR; INTRAVENOUS at 05:49

## 2022-07-14 RX ADMIN — HEPARIN SODIUM 5000 UNIT(S): 5000 INJECTION INTRAVENOUS; SUBCUTANEOUS at 17:02

## 2022-07-14 RX ADMIN — FENTANYL CITRATE 4.4 MICROGRAM(S)/KG/HR: 50 INJECTION INTRAVENOUS at 08:24

## 2022-07-14 RX ADMIN — SODIUM CHLORIDE 60 MILLILITER(S): 9 INJECTION, SOLUTION INTRAVENOUS at 19:20

## 2022-07-14 RX ADMIN — Medication 25 GRAM(S): at 08:24

## 2022-07-14 RX ADMIN — Medication 25 MILLILITER(S): at 14:59

## 2022-07-14 RX ADMIN — ATORVASTATIN CALCIUM 20 MILLIGRAM(S): 80 TABLET, FILM COATED ORAL at 21:20

## 2022-07-14 RX ADMIN — CHLORHEXIDINE GLUCONATE 15 MILLILITER(S): 213 SOLUTION TOPICAL at 17:02

## 2022-07-14 RX ADMIN — MIDAZOLAM HYDROCHLORIDE 2 MILLIGRAM(S): 1 INJECTION, SOLUTION INTRAMUSCULAR; INTRAVENOUS at 21:20

## 2022-07-14 RX ADMIN — HEPARIN SODIUM 5000 UNIT(S): 5000 INJECTION INTRAVENOUS; SUBCUTANEOUS at 05:25

## 2022-07-14 RX ADMIN — SODIUM CHLORIDE 30 MILLILITER(S): 9 INJECTION, SOLUTION INTRAVENOUS at 12:22

## 2022-07-14 RX ADMIN — Medication 81 MILLIGRAM(S): at 11:21

## 2022-07-14 RX ADMIN — FENTANYL CITRATE 4.4 MICROGRAM(S)/KG/HR: 50 INJECTION INTRAVENOUS at 19:19

## 2022-07-14 RX ADMIN — SODIUM CHLORIDE 500 MILLILITER(S): 9 INJECTION, SOLUTION INTRAVENOUS at 01:59

## 2022-07-14 RX ADMIN — SODIUM CHLORIDE 1000 MILLILITER(S): 9 INJECTION, SOLUTION INTRAVENOUS at 12:22

## 2022-07-14 RX ADMIN — CHLORHEXIDINE GLUCONATE 1 APPLICATION(S): 213 SOLUTION TOPICAL at 05:42

## 2022-07-14 RX ADMIN — MEROPENEM 100 MILLIGRAM(S): 1 INJECTION INTRAVENOUS at 17:02

## 2022-07-14 RX ADMIN — SODIUM CHLORIDE 500 MILLILITER(S): 9 INJECTION, SOLUTION INTRAVENOUS at 14:59

## 2022-07-14 RX ADMIN — MEROPENEM 100 MILLIGRAM(S): 1 INJECTION INTRAVENOUS at 05:25

## 2022-07-14 RX ADMIN — Medication 25 MILLILITER(S): at 05:40

## 2022-07-14 RX ADMIN — CHLORHEXIDINE GLUCONATE 15 MILLILITER(S): 213 SOLUTION TOPICAL at 05:25

## 2022-07-14 NOTE — PROGRESS NOTE ADULT - ASSESSMENT
ASSESSMENT AND PLAN:  68 y/o F with pmhx of HTN, HLD, sarcoidosis, CHF, presented to the ED for new onset AMS. She is also found to have pleural effusion, elevated BNP and LE edema concerning for CHF exacerbation. Admit for metabolic encephalopathy and CHF exacerbation. Intubated  for AMS      Neuro:    #AMS - structural vs metabolic vs infectious   could be in the setting of hypercapneic resp failure   TSH: wnl   infectious workup as below   - r/o structural causes with CT head pending   - f/u cortisol   - currently sedated on prop & fent  - neuro following, recs appreciated    Resp:    #Acute Hypercapneic respiratory failure   AB.17/64/130/23/98.6/-5.3 prior to BiPAP   - intubated  am  - improvement in acidosis, hypercapnia s/p intubation    #Bilateral pleural effusions i/s/o HF exacerbation   CT Chest with new large right and small left pleural effusions with adjacent compressive atelectasis including atelectasis of the majority of the right lower lobe   - IV Lasix 40 BID   - Monitor O2 sat  - Monitor RR     CV:    #HFpEF   BNP on arrival: 4021 with evidence of fluid overload with pleural effusions, b/l pitting edema   Echo (): EF: 62%; Mild diastolic dysfunction. Normal right ventricular size and function   EKG: RBBB (present from prior EKG's). no acute ischemic findings; trop: 50   c/w IV lasix BID   Monitor lytes and replete PRN   Strict I&O  Daily weights   - on levo gtt following intubation & sedation, wean as tolerated    #HTN   on amlodipine, carvedilol and hydralazine at home   hold home meds given acutely decompensated state and restart as needed     GI:     Diet: NPO   Monitor BM's  Serial abdominal exams    Renal/:    #CKD Stage 2  Baseline Scr (): 2.33  On admission, SCr at 1.6  Monitor UO  Monitor SCr  - check urine lytes in setting of metabolic acidosis, lactate wnl      ID:     #Encephalopathy possibly 2/2 sepsis - hypothermic to 90.4 with leukopenia   f/u blood cx, ua, urine cx  procal: neg  CT abd and pelvis: no infectious source   c/w meropenem ( - )  Monitor temp, persistent hypothermia  Monitor WBC   if mental status does not improve, might need LP     Endo:     #T2DM   on detemir 20 U at home   while NPO, monitor FSG   c/w ISS   c/w lantus 10 U  Last A1C 2022 7.3  f/u cortisol to r/o adrenal insufficiency as cause of AMS, hypothermia     Heme:     DVT Ppx: Heparin     Ethics: pending [facility] discussion with family. ASSESSMENT AND PLAN:  68 y/o F with pmhx of HTN, HLD, sarcoidosis, CHF, presented to the ED for new onset AMS. She is also found to have pleural effusion, elevated BNP and LE edema concerning for CHF exacerbation. Admit for metabolic encephalopathy and CHF exacerbation. Intubated  for AMS      Neuro:    #AMS - structural vs metabolic vs infectious   could be in the setting of hypercapneic resp failure   TSH: wnl   infectious workup as below   -  CTH negative for acute bleed or major vessel ischemic stroke  - initially sedated on prop & fent. Now weaned off prop.  - wean fent today, ctm mental status, may be possible to extubate today  - neuro following, recs appreciated    Resp:    #Acute Hypercapneic respiratory failure   AB.17/64/130/23/98.6/-5.3 prior to BiPAP   - intubated  am  - improvement in acidosis, hypercapnia s/p intubation  - repeat ABG today    #Bilateral pleural effusions i/s/o HF exacerbation   CT Chest with new large right and small left pleural effusions with adjacent compressive atelectasis including atelectasis of the majority of the right lower lobe   - Lasix, Bumex both attempted, with poor UOP in response to either  - Monitor O2 sat  - Monitor RR     CV:    #HFpEF   BNP on arrival: 4021 with evidence of fluid overload with pleural effusions, b/l pitting edema   Echo (): EF: 62%; Mild diastolic dysfunction. Normal right ventricular size and function   EKG: RBBB (present from prior EKG's). no acute ischemic findings; trop: 50   Monitor lytes and replete PRN   Strict I&O  Daily weights   - initially on levo following intubation & sedation  - now off levo, HDS  - f/u echo  - POCUS showed relatively small IVC, pt not appearing to be volume overloaded. Ok to give 1 L LR    #HTN   on amlodipine, carvedilol and hydralazine at home   hold home meds given acutely decompensated state and restart as needed     GI:     Diet: NPO w/ TFs   Monitor BMs  - hold TFs today in preparation for possible extubation      Renal/:    #CKD Stage 2  Baseline Scr (): 2.33  On admission, SCr at 1.6  Monitor UO  Monitor SCr  - Cr increased to 2.39 on 7/14 from 1.67 on , however pt's baseline around 2-2.3  - check urine lytes in setting of metabolic acidosis, lactate wnl      ID:     #Encephalopathy possibly 2/2 sepsis - hypothermic to 90.4 with leukopenia   procal: neg  CT abd and pelvis: no infectious source   c/w meropenem ( - )  - improved temps  am, no hypothermia overnight  -  blood cx 2/2 ngtd  -  ua positive leukest, negative nitrites, elevated WBCs  - f/u  urine cx      Endo:     #T2DM   #Hypoglycemia  on detemir 20 U at home   Last A1C 2022 7.3  - am cortisol 7.1 wnl  - has had persistent hypoglycemia despite TFs, finger sticks in 50s.   - Per family, pt has had intermittent hypoglycemia over past month despite coming down on daily insulin and maintaining PO intake  - Possible component of CKD & poor insulin clearance vs new onset endogenous insulin excess  - start D5LR 30 cc/hr while holding TFs   - f/u c-peptide    Heme:     DVT Ppx: Heparin     Ethics: pending [place] discussion with family.

## 2022-07-14 NOTE — PROGRESS NOTE ADULT - SUBJECTIVE AND OBJECTIVE BOX
Neurology Progress Note    S: Patient seen and examined in ICU. still on fentanyl.     Medication:  acetaminophen     Tablet .. 650 milliGRAM(s) Oral every 6 hours PRN  aspirin  chewable 81 milliGRAM(s) Oral daily  atorvastatin 20 milliGRAM(s) Oral at bedtime  chlorhexidine 0.12% Liquid 15 milliLiter(s) Oral Mucosa every 12 hours  chlorhexidine 4% Liquid 1 Application(s) Topical <User Schedule>  dextrose 5% + lactated ringers. 1000 milliLiter(s) IV Continuous <Continuous>  dextrose 5%. 1000 milliLiter(s) IV Continuous <Continuous>  dextrose 5%. 1000 milliLiter(s) IV Continuous <Continuous>  dextrose 50% Injectable 25 Gram(s) IV Push once  dextrose 50% Injectable 12.5 Gram(s) IV Push once  dextrose 50% Injectable 25 Gram(s) IV Push once  dextrose Oral Gel 15 Gram(s) Oral once PRN  fentaNYL   Infusion. 0.5 MICROgram(s)/kG/Hr IV Continuous <Continuous>  glucagon  Injectable 1 milliGRAM(s) IntraMuscular once  heparin   Injectable 5000 Unit(s) SubCutaneous every 12 hours  insulin lispro (ADMELOG) corrective regimen sliding scale   SubCutaneous every 6 hours  meropenem  IVPB 1000 milliGRAM(s) IV Intermittent every 12 hours      Vitals:  Vital Signs Last 24 Hrs  T(C): 37 (2022 12:00), Max: 37.3 (2022 08:00)  T(F): 98.6 (2022 12:00), Max: 99.2 (2022 08:00)  HR: 71 (2022 12:00) (46 - 76)  BP: 135/82 (2022 12:00) (98/63 - 135/82)  BP(mean): 98 (2022 12:00) (73 - 98)  RR: 15 (2022 12:00) (15 - 20)  SpO2: 98% (2022 12:00) (96% - 100%)    Parameters below as of 2022 12:00  Patient On (Oxygen Delivery Method): ventilator    O2 Concentration (%): 40    General Exam:   General Appearance: Appropriately dressed and in no acute distress       Head: Normocephalic, atraumatic and no dysmorphic features  Ear, Nose, and Throat: Moist mucous membranes +ETT   CVS: S1S2+  Resp: No SOB, no wheeze or rhonchi  GI: soft NT/ND  Extremities:  toe amputation noted   Skin: No bruises or rashes     Neurological Exam: (on fentanyl)   Mental Status: eyes closed, no verbal, not [id number] . intubated   Cranial Nerves: PERRL, EOMI, VFFC, sensation V1-V3 intact,  no obvious facial asymmetry, equal elevation of palate, scm/trap 5/5, tongue is midline on protrusion. no obvious papilledema on fundoscopic exam. hearing is grossly intact.   Motor: TONEY distally   Sensation: withdraws x 4  Reflexes: 1+ throughout at biceps, brachioradialis, triceps, patellars and ankles bilaterally and equal. No clonus. R toe and L toe were both downgoing.  Coordination: unable   Gait:  unable     I personally reviewed the below data/images/labs:      CBC Full  -  ( 2022 04:00 )  WBC Count : 5.75 K/uL  RBC Count : 3.06 M/uL  Hemoglobin : 7.8 g/dL  Hematocrit : 26.2 %  Platelet Count - Automated : 183 K/uL  Mean Cell Volume : 85.6 fL  Mean Cell Hemoglobin : 25.5 pg  Mean Cell Hemoglobin Concentration : 29.8 gm/dL  Auto Neutrophil # : x  Auto Lymphocyte # : x  Auto Monocyte # : x  Auto Eosinophil # : x  Auto Basophil # : x  Auto Neutrophil % : x  Auto Lymphocyte % : x  Auto Monocyte % : x  Auto Eosinophil % : x  Auto Basophil % : x        142  |  110<H>  |  53<H>  ----------------------------<  56<L>  4.6   |  21<L>  |  2.39<H>    Ca    8.3<L>      2022 04:00  Phos  4.5     -  Mg     1.60         TPro  6.1  /  Alb  3.1<L>  /  TBili  0.2  /  DBili  x   /  AST  42<H>  /  ALT  25  /  AlkPhos  116      LIVER FUNCTIONS - ( 2022 04:00 )  Alb: 3.1 g/dL / Pro: 6.1 g/dL / ALK PHOS: 116 U/L / ALT: 25 U/L / AST: 42 U/L / GGT: x             Urinalysis Basic - ( 2022 09:00 )    Color: Yellow / Appearance: Slightly Turbid / S.027 / pH: x  Gluc: x / Ketone: Trace  / Bili: Negative / Urobili: 3 mg/dL   Blood: x / Protein: 300 mg/dL / Nitrite: Negative   Leuk Esterase: Large / RBC: 5 /HPF / WBC >50 /HPF   Sq Epi: x / Non Sq Epi: x / Bacteria: x        EXAM:  MR BRAIN WAW IC      EXAM:  MR IAC ONLY WAW IC        PROCEDURE DATE:  Dec  1 2021         INTERPRETATION:  .    CLINICAL INFORMATION: Acute onset of bilateral hearing loss.    TECHNIQUE: Multiplanar multisequential MRI of the brain and internal auditory canals was acquired with and without the administration of IV gadolinium. 7.5 cc's of IV Gadavist was administered for the purposes of this examination. 0 cc were discarded.    COMPARISON: Prior CT examination of the internal auditory canals dated 2021. Prior CT angiograms/venogram examination of the head and neck dated 2021. Examination.    FINDINGS:    MRI BRAIN: A chronic lacunar infarct is seen within the right medial cerebellar hemisphere.    Multiple patchy confluent nonspecific T2/FLAIR hyperintense signal changes are noted throughout the bihemispheric white matter without associated mass effect or restricted diffusion.    There is no abnormal brain parenchymal or leptomeningeal enhancement.    Ventricular sizeand configuration is unremarkable. No abnormal extra-axial fluid collections are seen. Flow-voids are noted throughout the major intracranial vessels, on the T2 weighted images, consistent with their patency. The sellar area appears unremarkable.    Minimal scattered mucosal thickening is seen throughout the paranasal sinuses. The mastoid air cells are clear. The orbits appear unremarkable.    MRI IAC: There is no CP angle mass. The bilateral 7th and 8th cranial nerves appear unremarkable in course and morphology. No abnormal enhancement is seen. The inner ear structures are normally formed. Normal fluid signal is seen within the inner ear structures. The cochlea, vestibule, and semicircular canals appear unremarkable.    IMPRESSION:    MRI BRAIN: No acute intracranial hemorrhage, acute ischemia, or abnormal intracranial enhancement.    Chronic lacunar infarct within the right medial cerebellar hemisphere and mild chronic white matter microvascular type changes.    MRI IAC: Unremarkable study.    --- End of Report ---              LUCI RUEDA MD; Attending Radiologist  This document has been electronically signed. Dec  3 2021  8:46AM    < end of copied text >      < from: CT Chest w/ IV Cont (22 @ 22:58) >    ACC: [id number] EXAM:  CT ABDOMEN AND PELVIS IC                        ACC: [id number] EXAM:  CT CHEST IC                          PROCEDURE DATE:  2022          INTERPRETATION:  CLINICAL INFORMATION: Pleural effusion. Rule out   empyema. Abdominal tenderness. History of pancreatitis. Rule out   infection or obstruction.    COMPARISON: CT chest 2022.    CONTRAST/COMPLICATIONS:  IV Contrast: IV contrast documented in associated exam (accession   [id number]), Omnipaque 350 (accession [id number])  64 cc administered   6 cc   discarded  Oral Contrast: NONE  Complications: None reported at time of study completion    PROCEDURE:  CT of the Chest, Abdomen and Pelvis was performed.  Sagittal and coronal reformats were performed.    FINDINGS:  CHEST:  LUNGS AND LARGE AIRWAYS: Compressive atelectasis of the right upper,   middle and bilateral lower lobes including atelectasis of the majority of   the right lower lobe. Subsegmental atelectasis in the left upper lobe.  PLEURA: New large right andsmall left pleural effusions. No evidence of   an empyema. Fluid in the minor fissure.  VESSELS: Atherosclerotic changes of the aorta and coronary arteries.  HEART: Heart size is enlarged. No pericardial effusion.  MEDIASTINUM AND ANTONIO: No lymphadenopathy. Redemonstration of calcified   mediastinal lymph nodes.  CHEST WALL AND LOWER NECK: 7 mm right thyroid lobe nodule. Generalized   anasarca.    ABDOMEN AND PELVIS:  LIVER: Within normal limits.  BILE DUCTS: Normal caliber.  GALLBLADDER: Cholecystectomy.  SPLEEN: Within normal limits.  PANCREAS: Atrophic.  ADRENALS: Within normal limits.  KIDNEYS/URETERS: No renal stones or hydronephrosis.    BLADDER: Within normal limits.  REPRODUCTIVE ORGANS: Atrophic and retroflexed uterus. No adnexal masses.    BOWEL: Redemonstration of an outpouching with wall calcification and   intraluminal fluid and gas measuring 4.9 x 4.6 cm emanating from the   posterior gastric body likely representing a large diverticulum. No bowel   obstruction or inflammation. Colonic diverticulosis without   diverticulitis. Appendix within normal limits.  PERITONEUM: Small volume free pelvic fluid.  VESSELS: Atherosclerotic changes.  RETROPERITONEUM/LYMPH NODES: No lymphadenopathy.  ABDOMINAL WALL: Generalized anasarca.  BONES: Degenerative changes of the spine. Mild S-shaped thoracolumbar   scoliosis.    IMPRESSION:  1. New large right and small left pleural effusions with adjacent   compressive atelectasis including atelectasis of the majority of the   right lowerlobe.  2. No bowel obstruction or inflammation.      --- End of Report ---          MARTA HART MD; Resident Radiologist  This document has been electronically signed.  CHINTAN WHITFIELD MD; Attending Radiologist  This document has been electronically signed. 2022 12:32AM    < end of copied text >         < from: CT Head No Cont (22 @ 00:03) >    ACC: [id number] EXAM:  CT BRAIN                          PROCEDURE DATE:  2022          INTERPRETATION:  INDICATIONS:  Alteration of  Consciousness    TECHNIQUE:  Serial axial images were obtained from the skull base to the   vertex without intravenous contrast. Sagittal and Coronal reformats were   performed    COMPARISON EXAMINATION: None.    FINDINGS:  VENTRICLES AND SULCI:  Normal.  INTRA-AXIAL:  No mass, blood or abnormal attenuation is seen.  EXTRA-AXIAL:  No mass or collection is seen.  VISUALIZED SINUSES:  Clear.  VISUALIZED MASTOIDS:  Clear.  CALVARIUM:  Normal.  MISCELLANEOUS:  None.    IMPRESSION:  No evidence of intracranial hemorrhage, no evidence of major   vessel distribution infarct    --- End of Report ---            MOLLY TOTH MD; Attending Radiologist  This document has been electronically signed. 2022 10:19AM    < end of copied text >

## 2022-07-14 NOTE — PROVIDER CONTACT NOTE (MEDICATION) - SITUATION
Blood sugar 426mg/dL with no sliding scale order pre breakfast
Patient came to the nurses station and stated that bad people and spirits are coming in and out of her room and asked if staffs can prey for her, RNs agreed to pray for her from the nurses station, she then refused to go in her room and she has been offered to stay in the quiet room instead, she initially agreed but shortly after refused stating that the spirits are in the quiet room as well. PRN Haldol and Atarax were offered but she refused. She eventually walked back to her room. Will continue to monitor.   
Blood sugar 429mg/dL with no sliding scale order pre breakfast
BP 88/57 HR 93
Principal Discharge DX:	Generalized abdominal pain

## 2022-07-14 NOTE — PROGRESS NOTE ADULT - SUBJECTIVE AND OBJECTIVE BOX
OVERNIGHT EVENTS:    SUBJECTIVE: Patient seen and examined at bedside. Patient denies fever, chills, SOB, chest pain, N/V/D.    OBJECTIVE:    VITAL SIGNS:  ICU Vital Signs Last 24 Hrs  T(C): 36.5 (14 Jul 2022 04:00), Max: 36.9 (14 Jul 2022 00:00)  T(F): 97.7 (14 Jul 2022 04:00), Max: 98.4 (14 Jul 2022 00:00)  HR: 62 (14 Jul 2022 05:00) (38 - 76)  BP: 100/59 (14 Jul 2022 05:00) (95/52 - 159/78)  BP(mean): 73 (14 Jul 2022 05:00) (73 - 103)  ABP: --  ABP(mean): --  RR: 15 (14 Jul 2022 05:00) (15 - 20)  SpO2: 99% (14 Jul 2022 05:00) (96% - 100%)    O2 Parameters below as of 14 Jul 2022 06:00  Patient On (Oxygen Delivery Method): ventilator    O2 Concentration (%): 40      Mode: AC/ CMV (Assist Control/ Continuous Mandatory Ventilation), RR (machine): 15, TV (machine): 400, FiO2: 40, PEEP: 5, ITime: 0.8, MAP: 11, PIP: 32    07-13 @ 07:01  -  07-14 @ 06:37  --------------------------------------------------------  IN: 1201 mL / OUT: 360 mL / NET: 841 mL        =================PHYSICAL EXAM=================    GENERAL: Laying comfortably, NAD  EYES: EOMI, PERRL, no scleral icterus  NECK: No JVD  LUNG: Clear to auscultation bilaterally; No wheeze, crackles or rhonci  HEART: Regular rate and rhythm; No murmurs, rubs, or gallops  ABDOMEN: Soft, Nontender, Nondistended  EXTREMITIES:  No LE edema, 2+ Peripheral Pulses, No clubbing, cyanosis, or edema  PSYCH: AAOx3  NEUROLOGY: non-focal, strength 5/5 in all extremities, sensation intact  SKIN: No rashes or lesions    =================================================    LABS:                        7.8    5.75  )-----------( 183      ( 14 Jul 2022 04:00 )             26.2     Auto Eosinophil # x     / Auto Eosinophil % x     / Auto Neutrophil # x     / Auto Neutrophil % x     / BANDS % x                            8.2    2.60  )-----------( 177      ( 13 Jul 2022 04:10 )             28.8     Auto Eosinophil # 0.09  / Auto Eosinophil % 3.5   / Auto Neutrophil # 1.82  / Auto Neutrophil % 69.9  / BANDS % x                            8.3    2.58  )-----------( 193      ( 12 Jul 2022 17:47 )             28.2     Auto Eosinophil # x     / Auto Eosinophil % x     / Auto Neutrophil # x     / Auto Neutrophil % x     / BANDS % x        07-14    142  |  110<H>  |  53<H>  ----------------------------<  56<L>  4.6   |  21<L>  |  2.39<H>  07-13    145  |  111<H>  |  48<H>  ----------------------------<  142<H>  4.9   |  23  |  1.67<H>  07-13    143  |  110<H>  |  46<H>  ----------------------------<  124<H>  4.7   |  21<L>  |  1.57<H>    Ca    8.3<L>      14 Jul 2022 04:00  Mg     1.60     07-14  Phos  4.5     07-14  TPro  6.1  /  Alb  3.1<L>  /  TBili  0.2  /  DBili  x   /  AST  42<H>  /  ALT  25  /  AlkPhos  116  07-14  TPro  6.8  /  Alb  3.2<L>  /  TBili  <0.2  /  DBili  x   /  AST  27  /  ALT  23  /  AlkPhos  126<H>  07-13  TPro  6.9  /  Alb  3.3  /  TBili  <0.2  /  DBili  x   /  AST  23  /  ALT  21  /  AlkPhos  131<H>  07-12      CARDIAC MARKERS ( 13 Jul 2022 04:10 )  x     / x     / 241 U/L / x     / 9.1 ng/mL        Mode: AC/ CMV (Assist Control/ Continuous Mandatory Ventilation), RR (machine): 15, TV (machine): 400, FiO2: 40, PEEP: 5, ITime: 0.8, MAP: 11, PIP: 32         MEDICATIONS:  MEDICATIONS  (STANDING):  aspirin  chewable 81 milliGRAM(s) Oral daily  atorvastatin 20 milliGRAM(s) Oral at bedtime  chlorhexidine 0.12% Liquid 15 milliLiter(s) Oral Mucosa every 12 hours  chlorhexidine 4% Liquid 1 Application(s) Topical <User Schedule>  dextrose 5%. 1000 milliLiter(s) (100 mL/Hr) IV Continuous <Continuous>  dextrose 5%. 1000 milliLiter(s) (50 mL/Hr) IV Continuous <Continuous>  dextrose 50% Injectable 25 Gram(s) IV Push once  dextrose 50% Injectable 12.5 Gram(s) IV Push once  dextrose 50% Injectable 25 Gram(s) IV Push once  fentaNYL   Infusion. 0.5 MICROgram(s)/kG/Hr (4.4 mL/Hr) IV Continuous <Continuous>  glucagon  Injectable 1 milliGRAM(s) IntraMuscular once  heparin   Injectable 5000 Unit(s) SubCutaneous every 12 hours  insulin glargine Injectable (LANTUS) 10 Unit(s) SubCutaneous at bedtime  insulin lispro (ADMELOG) corrective regimen sliding scale   SubCutaneous every 6 hours  meropenem  IVPB 1000 milliGRAM(s) IV Intermittent every 12 hours    MEDICATIONS  (PRN):  acetaminophen     Tablet .. 650 milliGRAM(s) Oral every 6 hours PRN Temp greater or equal to 38C (100.4F), Mild Pain (1 - 3)  dextrose Oral Gel 15 Gram(s) Oral once PRN Blood Glucose LESS THAN 70 milliGRAM(s)/deciliter      ALLERGIES:  Allergies    penicillin ([facility] Synd)    Intolerances        LABS:                        7.8    5.75  )-----------( 183      ( 14 Jul 2022 04:00 )             26.2     07-14    142  |  110<H>  |  53<H>  ----------------------------<  56<L>  4.6   |  21<L>  |  2.39<H>    Ca    8.3<L>      14 Jul 2022 04:00  Phos  4.5     07-14  Mg     1.60     07-14    TPro  6.1  /  Alb  3.1<L>  /  TBili  0.2  /  DBili  x   /  AST  42<H>  /  ALT  25  /  AlkPhos  116  07-14          CAPILLARY BLOOD GLUCOSE      POCT Blood Glucose.: 100 mg/dL (14 Jul 2022 05:53)      RADIOLOGY & ADDITIONAL TESTS: Reviewed. OVERNIGHT EVENTS: Low glucose (finger sticks in 50s), requiring D50 amps. Holding Lantus. Weaned off levo, weaned off prop.    SUBJECTIVE: Patient seen and examined at bedside. Intubated, on fentanyl gtt but significantly more alert today (opening eyes to voice, attempting to follow commands).    OBJECTIVE:    VITAL SIGNS:  ICU Vital Signs Last 24 Hrs  T(C): 36.5 (14 Jul 2022 04:00), Max: 36.9 (14 Jul 2022 00:00)  T(F): 97.7 (14 Jul 2022 04:00), Max: 98.4 (14 Jul 2022 00:00)  HR: 62 (14 Jul 2022 05:00) (38 - 76)  BP: 100/59 (14 Jul 2022 05:00) (95/52 - 159/78)  BP(mean): 73 (14 Jul 2022 05:00) (73 - 103)  ABP: --  ABP(mean): --  RR: 15 (14 Jul 2022 05:00) (15 - 20)  SpO2: 99% (14 Jul 2022 05:00) (96% - 100%)    O2 Parameters below as of 14 Jul 2022 06:00  Patient On (Oxygen Delivery Method): ventilator    O2 Concentration (%): 40      Mode: AC/ CMV (Assist Control/ Continuous Mandatory Ventilation), RR (machine): 15, TV (machine): 400, FiO2: 40, PEEP: 5, ITime: 0.8, MAP: 11, PIP: 32    07-13 @ 07:01  -  07-14 @ 06:37  --------------------------------------------------------  IN: 1201 mL / OUT: 360 mL / NET: 841 mL        =================PHYSICAL EXAM=================    GENERAL: NAD  HEAD:  Atraumatic, Normocephalic  EYES: PERRL, conjunctiva and sclera clear  ENT: Moist Mucus Membranes present, no ulcers appreciated  NECK: Supple, + JVD  CHEST/LUNG: Intubated. Clear to auscultation bilaterally; No wheezes, rales or rhonchi  HEART: Regular rate and rhythm; No murmurs, rubs, or gallops, (+)S1, S2  ABDOMEN: Soft, + distended; Normal Bowel sounds   EXTREMITIES:  2+ Peripheral Pulses, No clubbing, cyanosis, +1 pitting edema b/l  PSYCH: Cannot assess  NEUROLOGY: Opening eyes to voice. Attempting to follow commands such as squeeze my fingers  SKIN: No rashes or lesions    =================================================    LABS:                        7.8    5.75  )-----------( 183      ( 14 Jul 2022 04:00 )             26.2     Auto Eosinophil # x     / Auto Eosinophil % x     / Auto Neutrophil # x     / Auto Neutrophil % x     / BANDS % x                            8.2    2.60  )-----------( 177      ( 13 Jul 2022 04:10 )             28.8     Auto Eosinophil # 0.09  / Auto Eosinophil % 3.5   / Auto Neutrophil # 1.82  / Auto Neutrophil % 69.9  / BANDS % x                            8.3    2.58  )-----------( 193      ( 12 Jul 2022 17:47 )             28.2     Auto Eosinophil # x     / Auto Eosinophil % x     / Auto Neutrophil # x     / Auto Neutrophil % x     / BANDS % x        07-14    142  |  110<H>  |  53<H>  ----------------------------<  56<L>  4.6   |  21<L>  |  2.39<H>  07-13    145  |  111<H>  |  48<H>  ----------------------------<  142<H>  4.9   |  23  |  1.67<H>  07-13    143  |  110<H>  |  46<H>  ----------------------------<  124<H>  4.7   |  21<L>  |  1.57<H>    Ca    8.3<L>      14 Jul 2022 04:00  Mg     1.60     07-14  Phos  4.5     07-14  TPro  6.1  /  Alb  3.1<L>  /  TBili  0.2  /  DBili  x   /  AST  42<H>  /  ALT  25  /  AlkPhos  116  07-14  TPro  6.8  /  Alb  3.2<L>  /  TBili  <0.2  /  DBili  x   /  AST  27  /  ALT  23  /  AlkPhos  126<H>  07-13  TPro  6.9  /  Alb  3.3  /  TBili  <0.2  /  DBili  x   /  AST  23  /  ALT  21  /  AlkPhos  131<H>  07-12      CARDIAC MARKERS ( 13 Jul 2022 04:10 )  x     / x     / 241 U/L / x     / 9.1 ng/mL        Mode: AC/ CMV (Assist Control/ Continuous Mandatory Ventilation), RR (machine): 15, TV (machine): 400, FiO2: 40, PEEP: 5, ITime: 0.8, MAP: 11, PIP: 32         MEDICATIONS:  MEDICATIONS  (STANDING):  aspirin  chewable 81 milliGRAM(s) Oral daily  atorvastatin 20 milliGRAM(s) Oral at bedtime  chlorhexidine 0.12% Liquid 15 milliLiter(s) Oral Mucosa every 12 hours  chlorhexidine 4% Liquid 1 Application(s) Topical <User Schedule>  dextrose 5%. 1000 milliLiter(s) (100 mL/Hr) IV Continuous <Continuous>  dextrose 5%. 1000 milliLiter(s) (50 mL/Hr) IV Continuous <Continuous>  dextrose 50% Injectable 25 Gram(s) IV Push once  dextrose 50% Injectable 12.5 Gram(s) IV Push once  dextrose 50% Injectable 25 Gram(s) IV Push once  fentaNYL   Infusion. 0.5 MICROgram(s)/kG/Hr (4.4 mL/Hr) IV Continuous <Continuous>  glucagon  Injectable 1 milliGRAM(s) IntraMuscular once  heparin   Injectable 5000 Unit(s) SubCutaneous every 12 hours  insulin glargine Injectable (LANTUS) 10 Unit(s) SubCutaneous at bedtime  insulin lispro (ADMELOG) corrective regimen sliding scale   SubCutaneous every 6 hours  meropenem  IVPB 1000 milliGRAM(s) IV Intermittent every 12 hours    MEDICATIONS  (PRN):  acetaminophen     Tablet .. 650 milliGRAM(s) Oral every 6 hours PRN Temp greater or equal to 38C (100.4F), Mild Pain (1 - 3)  dextrose Oral Gel 15 Gram(s) Oral once PRN Blood Glucose LESS THAN 70 milliGRAM(s)/deciliter      ALLERGIES:  Allergies    penicillin ([facility] Synd)    Intolerances        LABS:                        7.8    5.75  )-----------( 183      ( 14 Jul 2022 04:00 )             26.2     07-14    142  |  110<H>  |  53<H>  ----------------------------<  56<L>  4.6   |  21<L>  |  2.39<H>    Ca    8.3<L>      14 Jul 2022 04:00  Phos  4.5     07-14  Mg     1.60     07-14    TPro  6.1  /  Alb  3.1<L>  /  TBili  0.2  /  DBili  x   /  AST  42<H>  /  ALT  25  /  AlkPhos  116  07-14          CAPILLARY BLOOD GLUCOSE      POCT Blood Glucose.: 100 mg/dL (14 Jul 2022 05:53)      RADIOLOGY & ADDITIONAL TESTS: Reviewed.    7/13 CT Head non con  FINDINGS:  VENTRICLES AND SULCI:  Normal.  INTRA-AXIAL:  No mass, blood or abnormal attenuation is seen.  EXTRA-AXIAL:  No mass or collection is seen.  VISUALIZED SINUSES:  Clear.  VISUALIZED MASTOIDS:  Clear.  CALVARIUM:  Normal.  MISCELLANEOUS:  None.    IMPRESSION:  No evidence of intracranial hemorrhage, no evidence of major   vessel distribution infarct

## 2022-07-14 NOTE — DIETITIAN INITIAL EVALUATION ADULT - OTHER INFO
68 y/o female with pmhx of HTN, HLD, sarcoidosis, CHF, presented to the ED for new onset AMS. She is also found to have pleural effusion, elevated BNP and LE edema concerning for CHF exacerbation. Admitted for metabolic encephalopathy and CHF exacerbation. Intubated 7/13 for AMS and sedated on fentanyl. Pt receiving TF which is meeting estimated calorie need but not estimated protein need for obesity. She has hx of DM, however has been experiencing episodes of hypoglycemia; Glucerna 1.5 not warranted given hypoglycemic episodes. Suggest changing TF to Vital HP @ goal rate of 50 mL/hr x 24 hrs which will offer pt 1200 kcals, 105 gms protein, 1003 mL free H2O in 1200 mL total volume. To better meet pt's estimated protein need, recommend adding No Carb Prosource x 2/day which will provide a daily protein amount of 135 gms. Enteral recommendation will give pt 14 kcals/kg and 1.5 gms protein/kg (TF+Prosource) of dosing weight. Extra protein may ameliorate edema which is presently 1+ generalized. RDN services to remain available as needed.

## 2022-07-14 NOTE — PROGRESS NOTE ADULT - ASSESSMENT
66 y/o  AAF with HTN, HLD, sarcoidosis, CHF, DM presented to the ED for new onset AMS. Patient suddenly became altered, confused and daughter noted she was slurring his speech. She was incoherent, and hallucinating, saying there is a cat present and is asking for her aunt who lives far away.  The patient known to have frequent admission for CHF exacerbations eventually intubated for hypoxic respiratory failure and now in MICU   MRI brain/IAC from 2021 with old R cerebellar infarct ; CTA h/N that time unremarkable. , A1c 9.2%  CT C/A with new large pleural effusions   CTH unremarakble   A1c 7.3  TTE was done   o/e intubated and sedated but TONEY     Impression: 1) AMS 2/2 hypercapneic resp failure 2) prior cerebellar stroke likely small vessel   - now on fentanyl, TONEY. need to monitor exam off sedation ; off prop ; TONEY and followign at time   - asa 81 and statin therapy for secondary stroke prevention LDL goal < 70    - now on meropenum  - was on levo now off   - respiratory per ICU and pulmo   - on IV lasix now    - telemetry  - PT/OT/SS/SLP, OOBC  - check FS, glucose control <180  - GI/DVT ppx  - Thank you for allowing me to participate in the care of this patient. Call with questions.   Russell Jimenes MD  Vascular Neurology  Office: [phone]

## 2022-07-14 NOTE — DIETITIAN INITIAL EVALUATION ADULT - NS FNS DIET ORDER
Diet, NPO with Tube Feed:   Tube Feeding Modality: Orogastric  Jevity 1.2 Hugh (JEVITY1.2RTH)  Total Volume for 24 Hours (mL): 960  Continuous  Starting Tube Feed Rate {mL per Hour}: 10  Increase Tube Feed Rate by (mL): 10     Every 6 hours  Until Goal Tube Feed Rate (mL per Hour): 40  Tube Feed Duration (in Hours): 24  Tube Feed Start Time: 01:30 (07-14-22 @ 01:04)

## 2022-07-14 NOTE — DIETITIAN INITIAL EVALUATION ADULT - NSFNSGIIOFT_GEN_A_CORE
07-13-22 @ 07:01  -  07-14-22 @ 07:00  --------------------------------------------------------  OUT:  Total OUT: 0 mL    Total NET: 60 mL      07-14-22 @ 07:01  -  07-14-22 @ 15:26  --------------------------------------------------------  OUT:  Total OUT: 0 mL    Total NET: 50 mL

## 2022-07-14 NOTE — DIETITIAN INITIAL EVALUATION ADULT - PERTINENT MEDS FT
MEDICATIONS  (STANDING):  aspirin  chewable 81 milliGRAM(s) Oral daily  atorvastatin 20 milliGRAM(s) Oral at bedtime  chlorhexidine 0.12% Liquid 15 milliLiter(s) Oral Mucosa every 12 hours  chlorhexidine 4% Liquid 1 Application(s) Topical <User Schedule>  dextrose 5% + lactated ringers. 1000 milliLiter(s) (60 mL/Hr) IV Continuous <Continuous>  dextrose 5%. 1000 milliLiter(s) (100 mL/Hr) IV Continuous <Continuous>  dextrose 5%. 1000 milliLiter(s) (50 mL/Hr) IV Continuous <Continuous>  dextrose 50% Injectable 25 Gram(s) IV Push once  dextrose 50% Injectable 12.5 Gram(s) IV Push once  dextrose 50% Injectable 25 Gram(s) IV Push once  fentaNYL   Infusion. 0.5 MICROgram(s)/kG/Hr (4.4 mL/Hr) IV Continuous <Continuous>  glucagon  Injectable 1 milliGRAM(s) IntraMuscular once  heparin   Injectable 5000 Unit(s) SubCutaneous every 12 hours  insulin lispro (ADMELOG) corrective regimen sliding scale   SubCutaneous every 6 hours  meropenem  IVPB 1000 milliGRAM(s) IV Intermittent every 12 hours    MEDICATIONS  (PRN):  acetaminophen     Tablet .. 650 milliGRAM(s) Oral every 6 hours PRN Temp greater or equal to 38C (100.4F), Mild Pain (1 - 3)  dextrose Oral Gel 15 Gram(s) Oral once PRN Blood Glucose LESS THAN 70 milliGRAM(s)/deciliter

## 2022-07-14 NOTE — DIETITIAN INITIAL EVALUATION ADULT - BODY MASS INDEX
Surgery Consult    Subjective:      Bay Childs is a 46 y.o. male transferred to the ICU overnight with end stage hepatitis C cirrhosis and HIV + status. Dr Arvin Denver asked me to review his CT which was just completed showing proximal SBO. Pt is intubated and unresponsive.     Past Medical History:   Diagnosis Date    Cir[facility]osis ([facility]     Hepatitis B     treated and as of 6/29/16 pt states tests are negative:  Dr Avis Acevedo HIV (human immunodeficiency virus infection) ([facility]     HIV infection ([facility] Dx: Kassie Acevedo Liver failure [facility]) approx 2008    as of 6/29/16 pt denies any problems with liver     Past Surgical History:   Procedure Laterality Date    COLONOSCOPY N/A 5/18/2016    COLONOSCOPY performed by Chayo Garcia MD at [facility] AMBULATORY OR      Family History   Problem Relation Age of Onset    Dementia Mother     Cancer Father      Throat Cancer    Liver Disease Father      Social History     Social History    Marital status: SINGLE     Spouse name: N/A    Number of children: N/A    Years of education: N/A     Social History Main Topics    Smoking status: Current Every Day Smoker     Packs/day: 0.50     Years: 31.00     Types: Cigarettes    Smokeless tobacco: Never Used    Alcohol use No      Comment: quit approx 2008    Drug use: Yes     Special: Marijuana      Comment: as of 6/29/16 due to HIV per pt; smokes weekly when he gets nauseated    Sexual activity: Yes     Birth control/ protection: Condom     Other Topics Concern    None     Social History Narrative      Current Facility-Administered Medications   Medication Dose Route Frequency Provider Last Rate Last Dose    promethazine (PHENERGAN) 25 mg in 0.9% sodium chloride 50 mL IVPB  25 mg IntraVENous Q6H PRN Vinayak Shah  mL/hr at 07/16/17 0256 25 mg at 07/16/17 0256    mupirocin (BACTROBAN) 2 % ointment   Both Nostrils BID Eric Childress MD        morphine injection 0.52 mg  0.52 mg IntraVENous Q4H PRN Jasvir Salas MD        levoFLOXacin [facility]) 750 mg in D5W IVPB  750 mg IntraVENous Q24H Shelbi Bautista  mL/hr at 07/16/17 0745 750 mg at 07/16/17 0745    vasopressin (VASOSTRICT) 20 Units in 0.9% sodium chloride 100 mL infusion  0.01-0.04 Units/min IntraVENous TITRATE Charly Fitch MD 12 mL/hr at 07/16/17 0734 0.04 Units/min at 07/16/17 0734    sodium bicarbonate 8.4 % (1 mEq/mL) injection             PHENYLephrine (NEOSYNEPHRINE) 10 mg/mL injection             PHENYLephrine (NEOSYNEPHRINE) 30,000 mcg in 0.9% sodium chloride 250 mL infusion   mcg/min IntraVENous TITRATE Jasvir Salas  mL/hr at 07/16/17 0800 300 mcg/min at 07/16/17 0800    sodium bicarbonate 8.4 % (1 mEq/mL) injection             sodium bicarbonate 8.4 % (1 mEq/mL) injection             propofol (DIPRIVAN) 10 mg/mL injection             succinylcholine (ANECTINE) 20 mg/mL injection             NOREPINEPHrine (LEVOPHED) 16 mg/250 ml D5W infusion  2-200 mcg/min IntraVENous TITRATE Charly Fitch .5 mL/hr at 07/16/17 0809 200 mcg/min at 07/16/17 0809    piperacillin-tazobactam (ZOSYN) 3.375 g in 0.9% sodium chloride (MBP/ADV) 100 mL  3.375 g IntraVENous Q8H Charly Fitch MD        DOPamine (INTROPIN) 800 mg/250 mL (3,200 mcg/mL) infusion  0-20 mcg/kg/min IntraVENous TITRATE Charly Fitch MD 5.6 mL/hr at 07/16/17 0904 5 mcg/kg/min at 07/16/17 0904    melatonin tablet 3 mg  3 mg Oral QHS PRN Javi Leon MD   3 mg at 07/15/17 2127    0.9% sodium chloride infusion  75 mL/hr IntraVENous CONTINUOUS Jasvir Salas MD 75 mL/hr at 07/16/17 0256 75 mL/hr at 07/16/17 0256    acetylcysteine (ACETADOTE) 6,300 mg in dextrose 5% 1,000 mL infusion  100 mg/kg IntraVENous CONTINUOUS Maddie Aase., MD 64.5 mL/hr at 07/15/17 2333 6,300 mg at 07/15/17 2333    therapeutic multivitamin (THERAGRAN) tablet 1 Tab  1 Tab Oral DAILY Eric Maravilla MD   1 Tab at 07/15/17 5377    ondansetron (Hellen Perez) injection 4 mg  4 mg IntraVENous Q6H PRN Fabian Thompson MD   4 mg at 07/15/17 2131    oxyCODONE IR (ROXICODONE) tablet 5 mg  5 mg Oral Q4H PRN Fabian Thompson MD   5 mg at 17 1612    emtricitabine-tenofovir (TDF) (TRUVADA) 200-300 mg per tablet 1 Tab  1 Tab Oral DAILY Lexis Cho MD   1 Tab at 07/15/17 7989    raltegravir (ISENTRESS) tablet 400 mg  400 mg Oral BID Lexis Cho MD   400 mg at 07/15/17 2127        No Known Allergies    Review of Systems:  Review of systems not obtained due to patient factors.     Objective:      Patient Vitals for the past 8 hrs:   BP Temp Pulse Resp SpO2   17 0830 (!) 44/20 - (!) 158 (!) 31 -   17 0805 (!) 46/24 - (!) 166 (!) 36 -   17 0800 (!) 42/16 - (!) 167 (!) 35 -   17 0745 102/77 99.4 °F (37.4 °C) (!) 164 (!) 33 98 %   17 0735 (!) 72/58 - (!) 166 29 -   17 0727 - - (!) 166 (!) 35 -   17 0725 (!) 50/25 - (!) 166 22 (!) 5 %   17 0700 (!) 78/39 - (!) 158 27 -   17 0355 (!) 84/46 - (!) 148 - -   17 0318 100/66 97.5 °F (36.4 °C) (!) 149 (!) 40 93 %   17 0243 108/63 97.4 °F (36.3 °C) (!) 137 16 94 %       Temp (24hrs), Av.9 °F (36.6 °C), Min:97.4 °F (36.3 °C), Max:99.4 °F (37.4 °C)      Physical Exam:  GENERAL: jaundiced, diffusely mottled appearing, LUNG: clear to auscultation bilaterally, HEART: regular rate and rhythm, S1, S2 normal, no murmur, click, rub or gallop, ABDOMEN: distended and tense, EXTREMITIES:  extremities normal, atraumatic, no cyanosis or edema    Assessment:     Hospital Problems  Date Reviewed: 7/10/2017          Codes Class Noted POA    Jaundice ICD-10-CM: R17  ICD-9-CM: 782.4  7/10/2017 Yes        Coagulopathy ([facility] ICD-10-CM: D68.9  ICD-9-CM: 286.9  7/10/2017 Yes        HIV (human immunodeficiency virus infection) ICD-10-CM: Z21  ICD-9-CM: V08  7/15/2011 Yes        Viral hepatitis B chronic ([facility] ICD-10-CM: B18.1  ICD-9-CM: 070.32  7/15/2011 Yes              Plan: CT shows mild dilated proximal small bowel up to area of small bowel wall thickening, also with some colonic wall thickening. Massive ascites and evidence of inflammatory stranding involving omentum and mesentery. Most consistent with SBP. Recommended IR paracentesis for diagnosis and treatment, however pt has further decompensated and now appears terminal.  Really nothing to offer from surgical standpoint.       Signed By: Cassandra Pearce MD, [facility]    July 16, 2017 33.2

## 2022-07-14 NOTE — DIETITIAN INITIAL EVALUATION ADULT - ADD RECOMMEND
1. Monitor weights, labs, BM's, skin integrity, enteral tolerance and edema. 2. Follow pt as per protocol.

## 2022-07-15 LAB
ALBUMIN SERPL ELPH-MCNC: 2.7 G/DL — LOW (ref 3.3–5)
ALP SERPL-CCNC: 108 U/L — SIGNIFICANT CHANGE UP (ref 40–120)
ALT FLD-CCNC: 31 U/L — SIGNIFICANT CHANGE UP (ref 4–33)
ANION GAP SERPL CALC-SCNC: 10 MMOL/L — SIGNIFICANT CHANGE UP (ref 7–14)
AST SERPL-CCNC: 78 U/L — HIGH (ref 4–32)
BASE EXCESS BLDV CALC-SCNC: -2.3 MMOL/L — LOW (ref -2–3)
BASOPHILS # BLD AUTO: 0.01 K/UL — SIGNIFICANT CHANGE UP (ref 0–0.2)
BASOPHILS NFR BLD AUTO: 0.3 % — SIGNIFICANT CHANGE UP (ref 0–2)
BILIRUB SERPL-MCNC: 0.2 MG/DL — SIGNIFICANT CHANGE UP (ref 0.2–1.2)
BLOOD GAS VENOUS COMPREHENSIVE RESULT: SIGNIFICANT CHANGE UP
BUN SERPL-MCNC: 50 MG/DL — HIGH (ref 7–23)
C PEPTIDE SERPL-MCNC: 0.9 NG/ML — LOW (ref 1.1–4.4)
CALCIUM SERPL-MCNC: 8.2 MG/DL — LOW (ref 8.4–10.5)
CHLORIDE BLDV-SCNC: 113 MMOL/L — HIGH (ref 96–108)
CHLORIDE SERPL-SCNC: 111 MMOL/L — HIGH (ref 98–107)
CO2 BLDV-SCNC: 23.5 MMOL/L — SIGNIFICANT CHANGE UP (ref 22–26)
CO2 SERPL-SCNC: 22 MMOL/L — SIGNIFICANT CHANGE UP (ref 22–31)
CREAT SERPL-MCNC: 2.95 MG/DL — HIGH (ref 0.5–1.3)
EGFR: 17 ML/MIN/1.73M2 — LOW
EOSINOPHIL # BLD AUTO: 0.12 K/UL — SIGNIFICANT CHANGE UP (ref 0–0.5)
EOSINOPHIL NFR BLD AUTO: 3.6 % — SIGNIFICANT CHANGE UP (ref 0–6)
GAS PNL BLDV: 140 MMOL/L — SIGNIFICANT CHANGE UP (ref 136–145)
GLUCOSE BLDC GLUCOMTR-MCNC: 115 MG/DL — HIGH (ref 70–99)
GLUCOSE BLDC GLUCOMTR-MCNC: 200 MG/DL — HIGH (ref 70–99)
GLUCOSE BLDC GLUCOMTR-MCNC: 215 MG/DL — HIGH (ref 70–99)
GLUCOSE BLDC GLUCOMTR-MCNC: 268 MG/DL — HIGH (ref 70–99)
GLUCOSE BLDC GLUCOMTR-MCNC: 93 MG/DL — SIGNIFICANT CHANGE UP (ref 70–99)
GLUCOSE BLDV-MCNC: 96 MG/DL — SIGNIFICANT CHANGE UP (ref 70–99)
GLUCOSE SERPL-MCNC: 99 MG/DL — SIGNIFICANT CHANGE UP (ref 70–99)
HCO3 BLDV-SCNC: 22 MMOL/L — SIGNIFICANT CHANGE UP (ref 22–29)
HCT VFR BLD CALC: 24.6 % — LOW (ref 34.5–45)
HCT VFR BLD CALC: 25.8 % — LOW (ref 34.5–45)
HCT VFR BLDA CALC: 23 % — LOW (ref 34.5–46.5)
HGB BLD CALC-MCNC: 7.5 G/DL — LOW (ref 11.5–15.5)
HGB BLD-MCNC: 7.4 G/DL — LOW (ref 11.5–15.5)
HGB BLD-MCNC: 7.8 G/DL — LOW (ref 11.5–15.5)
IANC: 1.79 K/UL — LOW (ref 1.8–7.4)
IMM GRANULOCYTES NFR BLD AUTO: 0.9 % — SIGNIFICANT CHANGE UP (ref 0–1.5)
LACTATE BLDV-MCNC: 0.5 MMOL/L — SIGNIFICANT CHANGE UP (ref 0.5–2)
LYMPHOCYTES # BLD AUTO: 0.82 K/UL — LOW (ref 1–3.3)
LYMPHOCYTES # BLD AUTO: 24.7 % — SIGNIFICANT CHANGE UP (ref 13–44)
MAGNESIUM SERPL-MCNC: 2 MG/DL — SIGNIFICANT CHANGE UP (ref 1.6–2.6)
MCHC RBC-ENTMCNC: 25.7 PG — LOW (ref 27–34)
MCHC RBC-ENTMCNC: 25.9 PG — LOW (ref 27–34)
MCHC RBC-ENTMCNC: 30.1 GM/DL — LOW (ref 32–36)
MCHC RBC-ENTMCNC: 30.2 GM/DL — LOW (ref 32–36)
MCV RBC AUTO: 84.9 FL — SIGNIFICANT CHANGE UP (ref 80–100)
MCV RBC AUTO: 86 FL — SIGNIFICANT CHANGE UP (ref 80–100)
MONOCYTES # BLD AUTO: 0.55 K/UL — SIGNIFICANT CHANGE UP (ref 0–0.9)
MONOCYTES NFR BLD AUTO: 16.6 % — HIGH (ref 2–14)
NEUTROPHILS # BLD AUTO: 1.79 K/UL — LOW (ref 1.8–7.4)
NEUTROPHILS NFR BLD AUTO: 53.9 % — SIGNIFICANT CHANGE UP (ref 43–77)
NRBC # BLD: 0 /100 WBCS — SIGNIFICANT CHANGE UP
NRBC # BLD: 0 /100 WBCS — SIGNIFICANT CHANGE UP
NRBC # FLD: 0 K/UL — SIGNIFICANT CHANGE UP
NRBC # FLD: 0 K/UL — SIGNIFICANT CHANGE UP
PCO2 BLDV: 37 MMHG — LOW (ref 39–42)
PH BLDV: 7.39 — SIGNIFICANT CHANGE UP (ref 7.32–7.43)
PHOSPHATE SERPL-MCNC: 4.4 MG/DL — SIGNIFICANT CHANGE UP (ref 2.5–4.5)
PLATELET # BLD AUTO: 163 K/UL — SIGNIFICANT CHANGE UP (ref 150–400)
PLATELET # BLD AUTO: 163 K/UL — SIGNIFICANT CHANGE UP (ref 150–400)
PO2 BLDV: 69 MMHG — SIGNIFICANT CHANGE UP
POTASSIUM BLDV-SCNC: 4.3 MMOL/L — SIGNIFICANT CHANGE UP (ref 3.5–5.1)
POTASSIUM SERPL-MCNC: 4.3 MMOL/L — SIGNIFICANT CHANGE UP (ref 3.5–5.3)
POTASSIUM SERPL-SCNC: 4.3 MMOL/L — SIGNIFICANT CHANGE UP (ref 3.5–5.3)
PROT SERPL-MCNC: 5.7 G/DL — LOW (ref 6–8.3)
RBC # BLD: 2.86 M/UL — LOW (ref 3.8–5.2)
RBC # BLD: 3.04 M/UL — LOW (ref 3.8–5.2)
RBC # FLD: 17 % — HIGH (ref 10.3–14.5)
RBC # FLD: 17.1 % — HIGH (ref 10.3–14.5)
SAO2 % BLDV: 95.5 % — SIGNIFICANT CHANGE UP
SODIUM SERPL-SCNC: 143 MMOL/L — SIGNIFICANT CHANGE UP (ref 135–145)
WBC # BLD: 3.32 K/UL — LOW (ref 3.8–10.5)
WBC # BLD: 3.54 K/UL — LOW (ref 3.8–10.5)
WBC # FLD AUTO: 3.32 K/UL — LOW (ref 3.8–10.5)
WBC # FLD AUTO: 3.54 K/UL — LOW (ref 3.8–10.5)

## 2022-07-15 PROCEDURE — 93306 TTE W/DOPPLER COMPLETE: CPT | Mod: 26

## 2022-07-15 RX ORDER — SODIUM CHLORIDE 9 MG/ML
1000 INJECTION, SOLUTION INTRAVENOUS ONCE
Refills: 0 | Status: COMPLETED | OUTPATIENT
Start: 2022-07-15 | End: 2022-07-15

## 2022-07-15 RX ORDER — HYDRALAZINE HCL 50 MG
10 TABLET ORAL ONCE
Refills: 0 | Status: COMPLETED | OUTPATIENT
Start: 2022-07-15 | End: 2022-07-15

## 2022-07-15 RX ORDER — DEXMEDETOMIDINE HYDROCHLORIDE IN 0.9% SODIUM CHLORIDE 4 UG/ML
0.2 INJECTION INTRAVENOUS
Qty: 400 | Refills: 0 | Status: DISCONTINUED | OUTPATIENT
Start: 2022-07-15 | End: 2022-07-16

## 2022-07-15 RX ORDER — HYDRALAZINE HCL 50 MG
50 TABLET ORAL THREE TIMES A DAY
Refills: 0 | Status: DISCONTINUED | OUTPATIENT
Start: 2022-07-15 | End: 2022-07-16

## 2022-07-15 RX ORDER — COSYNTROPIN 0.25 MG/ML
0.25 INJECTION, SOLUTION INTRAVENOUS ONCE
Refills: 0 | Status: COMPLETED | OUTPATIENT
Start: 2022-07-16 | End: 2022-07-16

## 2022-07-15 RX ORDER — AMLODIPINE BESYLATE 2.5 MG/1
10 TABLET ORAL DAILY
Refills: 0 | Status: DISCONTINUED | OUTPATIENT
Start: 2022-07-15 | End: 2022-08-05

## 2022-07-15 RX ADMIN — AMLODIPINE BESYLATE 10 MILLIGRAM(S): 2.5 TABLET ORAL at 22:06

## 2022-07-15 RX ADMIN — MEROPENEM 100 MILLIGRAM(S): 1 INJECTION INTRAVENOUS at 06:13

## 2022-07-15 RX ADMIN — DEXMEDETOMIDINE HYDROCHLORIDE IN 0.9% SODIUM CHLORIDE 4.4 MICROGRAM(S)/KG/HR: 4 INJECTION INTRAVENOUS at 02:45

## 2022-07-15 RX ADMIN — Medication 81 MILLIGRAM(S): at 11:26

## 2022-07-15 RX ADMIN — CHLORHEXIDINE GLUCONATE 1 APPLICATION(S): 213 SOLUTION TOPICAL at 06:14

## 2022-07-15 RX ADMIN — CHLORHEXIDINE GLUCONATE 15 MILLILITER(S): 213 SOLUTION TOPICAL at 18:07

## 2022-07-15 RX ADMIN — DEXMEDETOMIDINE HYDROCHLORIDE IN 0.9% SODIUM CHLORIDE 4.4 MICROGRAM(S)/KG/HR: 4 INJECTION INTRAVENOUS at 08:03

## 2022-07-15 RX ADMIN — Medication 50 MILLIGRAM(S): at 13:58

## 2022-07-15 RX ADMIN — Medication 10 MILLIGRAM(S): at 18:07

## 2022-07-15 RX ADMIN — HEPARIN SODIUM 5000 UNIT(S): 5000 INJECTION INTRAVENOUS; SUBCUTANEOUS at 06:14

## 2022-07-15 RX ADMIN — SODIUM CHLORIDE 1000 MILLILITER(S): 9 INJECTION, SOLUTION INTRAVENOUS at 09:28

## 2022-07-15 RX ADMIN — FENTANYL CITRATE 4.4 MICROGRAM(S)/KG/HR: 50 INJECTION INTRAVENOUS at 08:03

## 2022-07-15 RX ADMIN — ATORVASTATIN CALCIUM 20 MILLIGRAM(S): 80 TABLET, FILM COATED ORAL at 22:07

## 2022-07-15 RX ADMIN — Medication 2: at 19:04

## 2022-07-15 RX ADMIN — CHLORHEXIDINE GLUCONATE 15 MILLILITER(S): 213 SOLUTION TOPICAL at 06:14

## 2022-07-15 RX ADMIN — MEROPENEM 100 MILLIGRAM(S): 1 INJECTION INTRAVENOUS at 18:06

## 2022-07-15 RX ADMIN — Medication 50 MILLIGRAM(S): at 22:07

## 2022-07-15 RX ADMIN — Medication 10 MILLIGRAM(S): at 15:44

## 2022-07-15 RX ADMIN — Medication 1: at 13:02

## 2022-07-15 RX ADMIN — HEPARIN SODIUM 5000 UNIT(S): 5000 INJECTION INTRAVENOUS; SUBCUTANEOUS at 18:07

## 2022-07-15 NOTE — PROGRESS NOTE ADULT - SUBJECTIVE AND OBJECTIVE BOX
Neurology Progress Note    S: Patient seen and examined in ICU. still on fentanyl. precedex added     Medication:  MEDICATIONS  (STANDING):  aspirin  chewable 81 milliGRAM(s) Oral daily  atorvastatin 20 milliGRAM(s) Oral at bedtime  chlorhexidine 0.12% Liquid 15 milliLiter(s) Oral Mucosa every 12 hours  chlorhexidine 4% Liquid 1 Application(s) Topical <User Schedule>  dexMEDEtomidine Infusion 0.2 MICROgram(s)/kG/Hr (4.4 mL/Hr) IV Continuous <Continuous>  dextrose 5% + lactated ringers. 1000 milliLiter(s) (60 mL/Hr) IV Continuous <Continuous>  dextrose 5%. 1000 milliLiter(s) (100 mL/Hr) IV Continuous <Continuous>  dextrose 5%. 1000 milliLiter(s) (50 mL/Hr) IV Continuous <Continuous>  dextrose 50% Injectable 25 Gram(s) IV Push once  dextrose 50% Injectable 12.5 Gram(s) IV Push once  dextrose 50% Injectable 25 Gram(s) IV Push once  fentaNYL   Infusion. 0.5 MICROgram(s)/kG/Hr (4.4 mL/Hr) IV Continuous <Continuous>  glucagon  Injectable 1 milliGRAM(s) IntraMuscular once  heparin   Injectable 5000 Unit(s) SubCutaneous every 12 hours  hydrALAZINE 50 milliGRAM(s) Oral three times a day  insulin lispro (ADMELOG) corrective regimen sliding scale   SubCutaneous every 6 hours  meropenem  IVPB 1000 milliGRAM(s) IV Intermittent every 12 hours    MEDICATIONS  (PRN):  acetaminophen     Tablet .. 650 milliGRAM(s) Oral every 6 hours PRN Temp greater or equal to 38C (100.4F), Mild Pain (1 - 3)  dextrose Oral Gel 15 Gram(s) Oral once PRN Blood Glucose LESS THAN 70 milliGRAM(s)/deciliter      Vitals:    ICU Vital Signs Last 24 Hrs  T(C): 36.4 (15 Jul 2022 12:00), Max: 37.6 (15 Jul 2022 04:00)  T(F): 97.5 (15 Jul 2022 12:00), Max: 99.6 (15 Jul 2022 04:00)  HR: 56 (15 Jul 2022 14:00) (56 - 70)  BP: 185/91 (15 Jul 2022 14:00) (101/87 - 185/91)  BP(mean): 116 (15 Jul 2022 14:00) (80 - 116)  ABP: --  ABP(mean): --  RR: 15 (15 Jul 2022 14:00) (13 - 15)  SpO2: 99% (15 Jul 2022 14:00) (96% - 100%)    O2 Parameters below as of 15 Jul 2022 08:00  Patient On (Oxygen Delivery Method): ventilator  O2 Flow (L/min): 30          General Exam:   General Appearance: Appropriately dressed and in no acute distress       Head: Normocephalic, atraumatic and no dysmorphic features  Ear, Nose, and Throat: Moist mucous membranes +ETT   CVS: S1S2+  Resp: No SOB, no wheeze or rhonchi  GI: soft NT/ND  Extremities:  toe amputation noted   Skin: No bruises or rashes     Neurological Exam: (on fentanyl)   Mental Status: eyes closed, no verbal, not [id number] . intubated   Cranial Nerves: PERRL, EOMI, VFFC, sensation V1-V3 intact,  no obvious facial asymmetry, equal elevation of palate, scm/trap 5/5, tongue is midline on protrusion. no obvious papilledema on fundoscopic exam. hearing is grossly intact.   Motor: TONEY distally   Sensation: withdraws x 4  Reflexes: 1+ throughout at biceps, brachioradialis, triceps, patellars and ankles bilaterally and equal. No clonus. R toe and L toe were both downgoing.  Coordination: unable   Gait:  unable     I personally reviewed the below data/images/labs:    CBC Full  -  ( 15 Jul 2022 10:20 )  WBC Count : 3.32 K/uL  RBC Count : 3.04 M/uL  Hemoglobin : 7.8 g/dL  Hematocrit : 25.8 %  Platelet Count - Automated : 163 K/uL  Mean Cell Volume : 84.9 fL  Mean Cell Hemoglobin : 25.7 pg  Mean Cell Hemoglobin Concentration : 30.2 gm/dL  Auto Neutrophil # : 1.79 K/uL  Auto Lymphocyte # : 0.82 K/uL  Auto Monocyte # : 0.55 K/uL  Auto Eosinophil # : 0.12 K/uL  Auto Basophil # : 0.01 K/uL  Auto Neutrophil % : 53.9 %  Auto Lymphocyte % : 24.7 %  Auto Monocyte % : 16.6 %  Auto Eosinophil % : 3.6 %  Auto Basophil % : 0.3 %    07-15    143  |  111<H>  |  50<H>  ----------------------------<  99  4.3   |  22  |  2.95<H>    Ca    8.2<L>      15 Jul 2022 03:28  Phos  4.4     07-15  Mg     2.00     07-15    TPro  5.7<L>  /  Alb  2.7<L>  /  TBili  0.2  /  DBili  x   /  AST  78<H>  /  ALT  31  /  AlkPhos  108  07-15    Urinalysis Basic - ( 2022 09:00 )    Color: Yellow / Appearance: Slightly Turbid / S.027 / pH: x  Gluc: x / Ketone: Trace  / Bili: Negative / Urobili: 3 mg/dL   Blood: x / Protein: 300 mg/dL / Nitrite: Negative   Leuk Esterase: Large / RBC: 5 /HPF / WBC >50 /HPF   Sq Epi: x / Non Sq Epi: x / Bacteria: x        EXAM:  MR BRAIN WAW IC      EXAM:  MR IAC ONLY WAW IC        PROCEDURE DATE:  Dec  1 2021         INTERPRETATION:  .    CLINICAL INFORMATION: Acute onset of bilateral hearing loss.    TECHNIQUE: Multiplanar multisequential MRI of the brain and internal auditory canals was acquired with and without the administration of IV gadolinium. 7.5 cc's of IV Gadavist was administered for the purposes of this examination. 0 cc were discarded.    COMPARISON: Prior CT examination of the internal auditory canals dated 2021. Prior CT angiograms/venogram examination of the head and neck dated 2021. Examination.    FINDINGS:    MRI BRAIN: A chronic lacunar infarct is seen within the right medial cerebellar hemisphere.    Multiple patchy confluent nonspecific T2/FLAIR hyperintense signal changes are noted throughout the bihemispheric white matter without associated mass effect or restricted diffusion.    There is no abnormal brain parenchymal or leptomeningeal enhancement.    Ventricular sizeand configuration is unremarkable. No abnormal extra-axial fluid collections are seen. Flow-voids are noted throughout the major intracranial vessels, on the T2 weighted images, consistent with their patency. The sellar area appears unremarkable.    Minimal scattered mucosal thickening is seen throughout the paranasal sinuses. The mastoid air cells are clear. The orbits appear unremarkable.    MRI IAC: There is no CP angle mass. The bilateral 7th and 8th cranial nerves appear unremarkable in course and morphology. No abnormal enhancement is seen. The inner ear structures are normally formed. Normal fluid signal is seen within the inner ear structures. The cochlea, vestibule, and semicircular canals appear unremarkable.    IMPRESSION:    MRI BRAIN: No acute intracranial hemorrhage, acute ischemia, or abnormal intracranial enhancement.    Chronic lacunar infarct within the right medial cerebellar hemisphere and mild chronic white matter microvascular type changes.    MRI IAC: Unremarkable study.    --- End of Report ---              LUCI RUEDA MD; Attending Radiologist  This document has been electronically signed. Dec  3 2021  8:46AM    < end of copied text >      < from: CT Chest w/ IV Cont (22 @ 22:58) >    ACC: [id number] EXAM:  CT ABDOMEN AND PELVIS IC                        ACC: [id number] EXAM:  CT CHEST IC                          PROCEDURE DATE:  2022          INTERPRETATION:  CLINICAL INFORMATION: Pleural effusion. Rule out   empyema. Abdominal tenderness. History of pancreatitis. Rule out   infection or obstruction.    COMPARISON: CT chest 2022.    CONTRAST/COMPLICATIONS:  IV Contrast: IV contrast documented in associated exam (accession   [id number]), Omnipaque 350 (accession [id number])  64 cc administered   6 cc   discarded  Oral Contrast: NONE  Complications: None reported at time of study completion    PROCEDURE:  CT of the Chest, Abdomen and Pelvis was performed.  Sagittal and coronal reformats were performed.    FINDINGS:  CHEST:  LUNGS AND LARGE AIRWAYS: Compressive atelectasis of the right upper,   middle and bilateral lower lobes including atelectasis of the majority of   the right lower lobe. Subsegmental atelectasis in the left upper lobe.  PLEURA: New large right andsmall left pleural effusions. No evidence of   an empyema. Fluid in the minor fissure.  VESSELS: Atherosclerotic changes of the aorta and coronary arteries.  HEART: Heart size is enlarged. No pericardial effusion.  MEDIASTINUM AND ANTONIO: No lymphadenopathy. Redemonstration of calcified   mediastinal lymph nodes.  CHEST WALL AND LOWER NECK: 7 mm right thyroid lobe nodule. Generalized   anasarca.    ABDOMEN AND PELVIS:  LIVER: Within normal limits.  BILE DUCTS: Normal caliber.  GALLBLADDER: Cholecystectomy.  SPLEEN: Within normal limits.  PANCREAS: Atrophic.  ADRENALS: Within normal limits.  KIDNEYS/URETERS: No renal stones or hydronephrosis.    BLADDER: Within normal limits.  REPRODUCTIVE ORGANS: Atrophic and retroflexed uterus. No adnexal masses.    BOWEL: Redemonstration of an outpouching with wall calcification and   intraluminal fluid and gas measuring 4.9 x 4.6 cm emanating from the   posterior gastric body likely representing a large diverticulum. No bowel   obstruction or inflammation. Colonic diverticulosis without   diverticulitis. Appendix within normal limits.  PERITONEUM: Small volume free pelvic fluid.  VESSELS: Atherosclerotic changes.  RETROPERITONEUM/LYMPH NODES: No lymphadenopathy.  ABDOMINAL WALL: Generalized anasarca.  BONES: Degenerative changes of the spine. Mild S-shaped thoracolumbar   scoliosis.    IMPRESSION:  1. New large right and small left pleural effusions with adjacent   compressive atelectasis including atelectasis of the majority of the   right lowerlobe.  2. No bowel obstruction or inflammation.      --- End of Report ---          MARTA HART MD; Resident Radiologist  This document has been electronically signed.  CHINTAN WHITFIELD MD; Attending Radiologist  This document has been electronically signed. 2022 12:32AM    < end of copied text >         < from: CT Head No Cont (22 @ 00:03) >    ACC: [id number] EXAM:  CT BRAIN                          PROCEDURE DATE:  2022          INTERPRETATION:  INDICATIONS:  Alteration of  Consciousness    TECHNIQUE:  Serial axial images were obtained from the skull base to the   vertex without intravenous contrast. Sagittal and Coronal reformats were   performed    COMPARISON EXAMINATION: None.    FINDINGS:  VENTRICLES AND SULCI:  Normal.  INTRA-AXIAL:  No mass, blood or abnormal attenuation is seen.  EXTRA-AXIAL:  No mass or collection is seen.  VISUALIZED SINUSES:  Clear.  VISUALIZED MASTOIDS:  Clear.  CALVARIUM:  Normal.  MISCELLANEOUS:  None.    IMPRESSION:  No evidence of intracranial hemorrhage, no evidence of major   vessel distribution infarct    --- End of Report ---            MOLLY TOTH MD; Attending Radiologist  This document has been electronically signed. 2022 10:19AM    < end of copied text >

## 2022-07-15 NOTE — PROGRESS NOTE ADULT - ASSESSMENT
ASSESSMENT AND PLAN:  66 y/o F with pmhx of HTN, HLD, sarcoidosis, CHF, presented to the ED for new onset AMS. She is also found to have pleural effusion, elevated BNP and LE edema concerning for CHF exacerbation. Admit for metabolic encephalopathy and CHF exacerbation. Intubated  for AMS      Neuro:    #AMS - structural vs metabolic vs infectious   could be in the setting of hypercapneic resp failure   TSH: wnl   infectious workup as below   -  CTH negative for acute bleed or major vessel ischemic stroke  - initially sedated on prop & fent. Now weaned off prop.  - wean fent today, ctm mental status, may be possible to extubate today  - neuro following, recs appreciated    Resp:    #Acute Hypercapneic respiratory failure   AB.17/64/130/23/98.6/-5.3 prior to BiPAP   - intubated  am  - improvement in acidosis, hypercapnia s/p intubation  - repeat ABG today    #Bilateral pleural effusions i/s/o HF exacerbation   CT Chest with new large right and small left pleural effusions with adjacent compressive atelectasis including atelectasis of the majority of the right lower lobe   - Lasix, Bumex both attempted, with poor UOP in response to either  - Monitor O2 sat  - Monitor RR     CV:    #HFpEF   BNP on arrival: 4021 with evidence of fluid overload with pleural effusions, b/l pitting edema   Echo (): EF: 62%; Mild diastolic dysfunction. Normal right ventricular size and function   EKG: RBBB (present from prior EKG's). no acute ischemic findings; trop: 50   Monitor lytes and replete PRN   Strict I&O  Daily weights   - initially on levo following intubation & sedation  - now off levo, HDS  - f/u echo  - POCUS showed relatively small IVC, pt not appearing to be volume overloaded. Ok to give 1 L LR    #HTN   on amlodipine, carvedilol and hydralazine at home   hold home meds given acutely decompensated state and restart as needed     GI:     Diet: NPO w/ TFs   Monitor BMs  - hold TFs today in preparation for possible extubation      Renal/:    #CKD Stage 2  Baseline Scr (): 2.33  On admission, SCr at 1.6  Monitor UO  Monitor SCr  - Cr increased to 2.39 on 7/14 from 1.67 on , however pt's baseline around 2-2.3  - check urine lytes in setting of metabolic acidosis, lactate wnl      ID:     #Encephalopathy possibly 2/2 sepsis - hypothermic to 90.4 with leukopenia   procal: neg  CT abd and pelvis: no infectious source   c/w meropenem ( - )  - improved temps  am, no hypothermia overnight  -  blood cx 2/2 ngtd  -  ua positive leukest, negative nitrites, elevated WBCs  - f/u  urine cx      Endo:     #T2DM   #Hypoglycemia  on detemir 20 U at home   Last A1C 2022 7.3  - am cortisol 7.1 wnl  - has had persistent hypoglycemia despite TFs, finger sticks in 50s.   - Per family, pt has had intermittent hypoglycemia over past month despite coming down on daily insulin and maintaining PO intake  - Possible component of CKD & poor insulin clearance vs new onset endogenous insulin excess  - start D5LR 30 cc/hr while holding TFs   - f/u c-peptide    Heme:     DVT Ppx: Heparin     Ethics: pending [facility] discussion with family. ASSESSMENT AND PLAN:  66 y/o F with pmhx of HTN, HLD, sarcoidosis, CHF, presented to the ED for new onset AMS. She is also found to have pleural effusion, elevated BNP and LE edema concerning for CHF exacerbation. Admit for metabolic encephalopathy and CHF exacerbation. Intubated  for AMS      Neuro:    #AMS - structural vs metabolic vs infectious   could be in the setting of hypercapneic resp failure   TSH: wnl   infectious workup as below   -  CTH negative for acute bleed or major vessel ischemic stroke  - initially sedated on prop & fent. Now weaned off prop.  - wean fent today, ctm mental status, may be possible to extubate today  - required restraints, precedex 7/15 am due to agitation, pulling at tube  - neuro following, recs appreciated    Resp:    #Acute Hypercapneic respiratory failure   AB.17/64/130/23/98.6/-5.3 prior to BiPAP   - intubated   - improvement in acidosis, hypercapnia s/p intubation  - failed CPAP trial  & 7/15 while on fent, no respiratory drive (1 breath in 60 s)  - Will reattempt CPAP after weaning fent      #Bilateral pleural effusions i/s/o HF exacerbation   CT Chest with new large right and small left pleural effusions with adjacent compressive atelectasis including atelectasis of the majority of the right lower lobe   - Lasix, Bumex both attempted, with poor UOP in response to either  - Monitor O2 sat  - Monitor RR     CV:    #HFpEF   BNP on arrival: 4021 with evidence of fluid overload with pleural effusions, b/l pitting edema   Echo (): EF: 62%; Mild diastolic dysfunction. Normal right ventricular size and function   EKG: RBBB (present from prior EKG's). no acute ischemic findings; trop: 50   Monitor lytes and replete PRN   Strict I&O  Daily weights   - initially on levo following intubation & sedation  - now off levo  - f/u echo  - POCUS showed relatively small IVC, pt not appearing to be volume overloaded, intermittent fluid challenge w/ LR boluses    #HTN   on amlodipine, carvedilol and hydralazine at home   - SBPs to 190s on 7/15, restarted home hydralazine 50 q8h      GI:     Diet: NPO w/ TFs   Monitor BMs  - hold TFs today in preparation for possible extubation      Renal/:    #CKD Stage 2  Baseline Scr (): 2.33  On admission, SCr at 1.6  Monitor UO  Monitor SCr  - Cr increased to 2.39 on  from 1.67 on , however pt's baseline around 2-2.3  - check urine lytes in setting of metabolic acidosis, lactate wnl      ID:     #Encephalopathy possibly 2/2 sepsis - hypothermic to 90.4 with leukopenia   procal: neg  CT abd and pelvis: no infectious source   c/w meropenem ( - )  - improved temps  am, no hypothermia overnight  -  blood cx 2/2 ngtd  -  ua positive leukest, negative nitrites, elevated WBCs  - f/u MRSA swab      Endo:     #T2DM   #Hypoglycemia  on detemir 20 U at home   Last A1C 2022 7.3  - am cortisol 7.1 wnl  - has had persistent hypoglycemia despite TFs, finger sticks in 50s.   - Per family, pt has had intermittent hypoglycemia over past month despite coming down on daily insulin and maintaining PO intake  - Possible component of CKD & poor insulin clearance vs new onset endogenous insulin excess  - start D5LR 60 cc/hr while holding TFs. Improved glucose  - f/u c-peptide    Heme:     DVT Ppx: Heparin     Ethics: pending [facility] discussion with family.

## 2022-07-15 NOTE — PROGRESS NOTE ADULT - ASSESSMENT
66 y/o  AAF with HTN, HLD, sarcoidosis, CHF, DM presented to the ED for new onset AMS. Patient suddenly became altered, confused and daughter noted she was slurring his speech. She was incoherent, and hallucinating, saying there is a cat present and is asking for her aunt who lives far away.  The patient known to have frequent admission for CHF exacerbations eventually intubated for hypoxic respiratory failure and now in MICU   MRI brain/IAC from 2021 with old R cerebellar infarct ; CTA h/N that time unremarkable. , A1c 9.2%  CT C/A with new large pleural effusions   CTH unremarakble   A1c 7.3  TTE was done   o/e intubated and sedated but TONEY     Impression: 1) AMS 2/2 hypercapneic resp failure 2) prior cerebellar stroke likely small vessel   - now on fentanyl and now precedex added , TONEY. need to monitor exam off sedation ; off prop ; TONEY and followign at time   - asa 81 and statin therapy for secondary stroke prevention LDL goal < 70    - now on meropenum  - was on levo now off   - respiratory per ICU and pulmo   - on IV lasix now    - telemetry  - PT/OT/SS/SLP, OOBC  - check FS, glucose control <180  - GI/DVT ppx  - Thank you for allowing me to participate in the care of this patient. Call with questions.   Russell Jimenes MD  Vascular Neurology  Office: [phone]

## 2022-07-15 NOTE — PROGRESS NOTE ADULT - SUBJECTIVE AND OBJECTIVE BOX
OVERNIGHT EVENTS:    SUBJECTIVE: Patient seen and examined at bedside. Patient denies fever, chills, SOB, chest pain, N/V/D.    OBJECTIVE:    VITAL SIGNS:  ICU Vital Signs Last 24 Hrs  T(C): 37.6 (15 Jul 2022 04:00), Max: 37.6 (15 Jul 2022 04:00)  T(F): 99.6 (15 Jul 2022 04:00), Max: 99.6 (15 Jul 2022 04:00)  HR: 61 (15 Jul 2022 06:00) (61 - 71)  BP: 128/90 (15 Jul 2022 06:00) (101/87 - 166/82)  BP(mean): 100 (15 Jul 2022 06:00) (76 - 107)  ABP: --  ABP(mean): --  RR: 15 (15 Jul 2022 06:00) (13 - 17)  SpO2: 99% (15 Jul 2022 06:00) (96% - 100%)    O2 Parameters below as of 2022 23:00  Patient On (Oxygen Delivery Method): ventilator    O2 Concentration (%): 30      Mode: AC/ CMV (Assist Control/ Continuous Mandatory Ventilation), RR (machine): 15, TV (machine): 400, FiO2: 40, PEEP: 5, ITime: 0.64, MAP: 9, PIP: 27     @ : @ 07:00  --------------------------------------------------------  IN: 1413 mL / OUT: 375 mL / NET: 1038 mL     @ 07:15 @ 06:39  --------------------------------------------------------  IN: 3794 mL / OUT: 985 mL / NET: 2809 mL        =================PHYSICAL EXAM=================    GENERAL: NAD  HEAD:  Atraumatic, Normocephalic  EYES: PERRL, conjunctiva and sclera clear  ENT: Moist Mucus Membranes present, no ulcers appreciated  NECK: Supple, + JVD  CHEST/LUNG: Intubated. Clear to auscultation bilaterally; No wheezes, rales or rhonchi  HEART: Regular rate and rhythm; No murmurs, rubs, or gallops, (+)S1, S2  ABDOMEN: Soft, + distended; Normal Bowel sounds   EXTREMITIES:  2+ Peripheral Pulses, No clubbing, cyanosis, +1 pitting edema b/l  PSYCH: Cannot assess  NEUROLOGY: Opening eyes to voice. Attempting to follow commands such as squeeze my fingers  SKIN: No rashes or lesions      =================================================    LABS:                        7.4    3.54  )-----------( 163      ( 15 Jul 2022 03:28 )             24.6     Auto Eosinophil # x     / Auto Eosinophil % x     / Auto Neutrophil # x     / Auto Neutrophil % x     / BANDS % x                            7.8    5.75  )-----------( 183      ( 2022 04:00 )             26.2     Auto Eosinophil # x     / Auto Eosinophil % x     / Auto Neutrophil # x     / Auto Neutrophil % x     / BANDS % x        07-15    143  |  111<H>  |  50<H>  ----------------------------<  99  4.3   |  22  |  2.95<H>  07    142  |  110<H>  |  53<H>  ----------------------------<  56<L>  4.6   |  21<L>  |  2.39<H>  07    145  |  111<H>  |  48<H>  ----------------------------<  142<H>  4.9   |  23  |  1.67<H>    Ca    8.2<L>      15 Jul 2022 03:28  Mg     2.00     07-15  Phos  4.4     -15  TPro  5.7<L>  /  Alb  2.7<L>  /  TBili  0.2  /  DBili  x   /  AST  78<H>  /  ALT  31  /  AlkPhos  108  07-15  TPro  6.1  /  Alb  3.1<L>  /  TBili  0.2  /  DBili  x   /  AST  42<H>  /  ALT  25  /  AlkPhos  116  07-14          Urinalysis Basic - ( 2022 09:00 )    Color: Yellow / Appearance: Slightly Turbid / S.027 / pH: x  Gluc: x / Ketone: Trace  / Bili: Negative / Urobili: 3 mg/dL   Blood: x / Protein: 300 mg/dL / Nitrite: Negative   Leuk Esterase: Large / RBC: 5 /HPF / WBC >50 /HPF   Sq Epi: x / Non Sq Epi: x / Bacteria: x      Mode: AC/ CMV (Assist Control/ Continuous Mandatory Ventilation), RR (machine): 15, TV (machine): 400, FiO2: 40, PEEP: 5, ITime: 0.64, MAP: 9, PIP: 27         MEDICATIONS:  MEDICATIONS  (STANDING):  aspirin  chewable 81 milliGRAM(s) Oral daily  atorvastatin 20 milliGRAM(s) Oral at bedtime  chlorhexidine 0.12% Liquid 15 milliLiter(s) Oral Mucosa every 12 hours  chlorhexidine 4% Liquid 1 Application(s) Topical <User Schedule>  dexMEDEtomidine Infusion 0.2 MICROgram(s)/kG/Hr (4.4 mL/Hr) IV Continuous <Continuous>  dextrose 5% + lactated ringers. 1000 milliLiter(s) (60 mL/Hr) IV Continuous <Continuous>  dextrose 5%. 1000 milliLiter(s) (100 mL/Hr) IV Continuous <Continuous>  dextrose 5%. 1000 milliLiter(s) (50 mL/Hr) IV Continuous <Continuous>  dextrose 50% Injectable 25 Gram(s) IV Push once  dextrose 50% Injectable 12.5 Gram(s) IV Push once  dextrose 50% Injectable 25 Gram(s) IV Push once  fentaNYL   Infusion. 0.5 MICROgram(s)/kG/Hr (4.4 mL/Hr) IV Continuous <Continuous>  glucagon  Injectable 1 milliGRAM(s) IntraMuscular once  heparin   Injectable 5000 Unit(s) SubCutaneous every 12 hours  insulin lispro (ADMELOG) corrective regimen sliding scale   SubCutaneous every 6 hours  meropenem  IVPB 1000 milliGRAM(s) IV Intermittent every 12 hours    MEDICATIONS  (PRN):  acetaminophen     Tablet .. 650 milliGRAM(s) Oral every 6 hours PRN Temp greater or equal to 38C (100.4F), Mild Pain (1 - 3)  dextrose Oral Gel 15 Gram(s) Oral once PRN Blood Glucose LESS THAN 70 milliGRAM(s)/deciliter      ALLERGIES:  Allergies    penicillin ([employer] [facility] Synd)    Intolerances        LABS:                        7.4    3.54  )-----------( 163      ( 15 Jul 2022 03:28 )             24.6     07-15    143  |  111<H>  |  50<H>  ----------------------------<  99  4.3   |  22  |  2.95<H>    Ca    8.2<L>      15 Jul 2022 03:28  Phos  4.4     07-15  Mg     2.00     07-15    TPro  5.7<L>  /  Alb  2.7<L>  /  TBili  0.2  /  DBili  x   /  AST  78<H>  /  ALT  31  /  AlkPhos  108  07-15      Urinalysis Basic - ( 2022 09:00 )    Color: Yellow / Appearance: Slightly Turbid / S.027 / pH: x  Gluc: x / Ketone: Trace  / Bili: Negative / Urobili: 3 mg/dL   Blood: x / Protein: 300 mg/dL / Nitrite: Negative   Leuk Esterase: Large / RBC: 5 /HPF / WBC >50 /HPF   Sq Epi: x / Non Sq Epi: x / Bacteria: x        CAPILLARY BLOOD GLUCOSE      POCT Blood Glucose.: 115 mg/dL (15 Jul 2022 06:08)      RADIOLOGY & ADDITIONAL TESTS: Reviewed. OVERNIGHT EVENTS: Restless, pulling at tube overnight. Precedex gtt started. Afebrile    SUBJECTIVE: Patient seen and examined at bedside. Intubated, sedated    OBJECTIVE:     VITAL SIGNS:  ICU Vital Signs Last 24 Hrs  T(C): 37.6 (15 Jul 2022 04:00), Max: 37.6 (15 Jul 2022 04:00)  T(F): 99.6 (15 Jul 2022 04:00), Max: 99.6 (15 Jul 2022 04:00)  HR: 61 (15 Jul 2022 06:00) (61 - 71)  BP: 128/90 (15 Jul 2022 06:00) (101/87 - 166/82)  BP(mean): 100 (15 Jul 2022 06:00) (76 - 107)  ABP: --  ABP(mean): --  RR: 15 (15 Jul 2022 06:00) (13 - 17)  SpO2: 99% (15 Jul 2022 06:00) (96% - 100%)    O2 Parameters below as of 2022 23:00  Patient On (Oxygen Delivery Method): ventilator    O2 Concentration (%): 30      Mode: AC/ CMV (Assist Control/ Continuous Mandatory Ventilation), RR (machine): 15, TV (machine): 400, FiO2: 40, PEEP: 5, ITime: 0.64, MAP: 9, PIP: 27     @ 07: @ 07:00  --------------------------------------------------------  IN: 1413 mL / OUT: 375 mL / NET: 1038 mL     @ 07:15 @ 06:39  --------------------------------------------------------  IN: 3794 mL / OUT: 985 mL / NET: 2809 mL        =================PHYSICAL EXAM=================    GENERAL: NAD  HEAD:  Atraumatic, Normocephalic  EYES: PERRL, conjunctiva and sclera clear  ENT: Moist Mucus Membranes present, no ulcers appreciated  NECK: Supple, + JVD  CHEST/LUNG: Intubated. Clear to auscultation bilaterally; No wheezes, rales or rhonchi  HEART: Regular rate and rhythm; No murmurs, rubs, or gallops, (+)S1, S2  ABDOMEN: Soft, + distended; Normal Bowel sounds   EXTREMITIES:  2+ Peripheral Pulses, No clubbing, cyanosis, +1 pitting edema b/l  PSYCH: Cannot assess  NEUROLOGY: Opening eyes to voice. Attempting to follow commands such as squeeze my fingers  SKIN: No rashes or lesions      =================================================    LABS:                        7.4    3.54  )-----------( 163      ( 15 Jul 2022 03:28 )             24.6     Auto Eosinophil # x     / Auto Eosinophil % x     / Auto Neutrophil # x     / Auto Neutrophil % x     / BANDS % x                            7.8    5.75  )-----------( 183      ( 2022 04:00 )             26.2     Auto Eosinophil # x     / Auto Eosinophil % x     / Auto Neutrophil # x     / Auto Neutrophil % x     / BANDS % x        07-15    143  |  111<H>  |  50<H>  ----------------------------<  99  4.3   |  22  |  2.95<H>      142  |  110<H>  |  53<H>  ----------------------------<  56<L>  4.6   |  21<L>  |  2.39<H>  07    145  |  111<H>  |  48<H>  ----------------------------<  142<H>  4.9   |  23  |  1.67<H>    Ca    8.2<L>      15 Jul 2022 03:28  Mg     2.00     07-15  Phos  4.4     07-15  TPro  5.7<L>  /  Alb  2.7<L>  /  TBili  0.2  /  DBili  x   /  AST  78<H>  /  ALT  31  /  AlkPhos  108  07-15  TPro  6.1  /  Alb  3.1<L>  /  TBili  0.2  /  DBili  x   /  AST  42<H>  /  ALT  25  /  AlkPhos  116  07-14          Urinalysis Basic - ( 2022 09:00 )    Color: Yellow / Appearance: Slightly Turbid / S.027 / pH: x  Gluc: x / Ketone: Trace  / Bili: Negative / Urobili: 3 mg/dL   Blood: x / Protein: 300 mg/dL / Nitrite: Negative   Leuk Esterase: Large / RBC: 5 /HPF / WBC >50 /HPF   Sq Epi: x / Non Sq Epi: x / Bacteria: x      Mode: AC/ CMV (Assist Control/ Continuous Mandatory Ventilation), RR (machine): 15, TV (machine): 400, FiO2: 40, PEEP: 5, ITime: 0.64, MAP: 9, PIP: 27         MEDICATIONS:  MEDICATIONS  (STANDING):  aspirin  chewable 81 milliGRAM(s) Oral daily  atorvastatin 20 milliGRAM(s) Oral at bedtime  chlorhexidine 0.12% Liquid 15 milliLiter(s) Oral Mucosa every 12 hours  chlorhexidine 4% Liquid 1 Application(s) Topical <User Schedule>  dexMEDEtomidine Infusion 0.2 MICROgram(s)/kG/Hr (4.4 mL/Hr) IV Continuous <Continuous>  dextrose 5% + lactated ringers. 1000 milliLiter(s) (60 mL/Hr) IV Continuous <Continuous>  dextrose 5%. 1000 milliLiter(s) (100 mL/Hr) IV Continuous <Continuous>  dextrose 5%. 1000 milliLiter(s) (50 mL/Hr) IV Continuous <Continuous>  dextrose 50% Injectable 25 Gram(s) IV Push once  dextrose 50% Injectable 12.5 Gram(s) IV Push once  dextrose 50% Injectable 25 Gram(s) IV Push once  fentaNYL   Infusion. 0.5 MICROgram(s)/kG/Hr (4.4 mL/Hr) IV Continuous <Continuous>  glucagon  Injectable 1 milliGRAM(s) IntraMuscular once  heparin   Injectable 5000 Unit(s) SubCutaneous every 12 hours  insulin lispro (ADMELOG) corrective regimen sliding scale   SubCutaneous every 6 hours  meropenem  IVPB 1000 milliGRAM(s) IV Intermittent every 12 hours    MEDICATIONS  (PRN):  acetaminophen     Tablet .. 650 milliGRAM(s) Oral every 6 hours PRN Temp greater or equal to 38C (100.4F), Mild Pain (1 - 3)  dextrose Oral Gel 15 Gram(s) Oral once PRN Blood Glucose LESS THAN 70 milliGRAM(s)/deciliter      ALLERGIES:  Allergies    penicillin ([facility] Synd)    Intolerances        LABS:                        7.4    3.54  )-----------( 163      ( 15 Jul 2022 03:28 )             24.6     07-15    143  |  111<H>  |  50<H>  ----------------------------<  99  4.3   |  22  |  2.95<H>    Ca    8.2<L>      15 Jul 2022 03:28  Phos  4.4     07-15  Mg     2.00     07-15    TPro  5.7<L>  /  Alb  2.7<L>  /  TBili  0.2  /  DBili  x   /  AST  78<H>  /  ALT  31  /  AlkPhos  108  07-15      Urinalysis Basic - ( 2022 09:00 )    Color: Yellow / Appearance: Slightly Turbid / S.027 / pH: x  Gluc: x / Ketone: Trace  / Bili: Negative / Urobili: 3 mg/dL   Blood: x / Protein: 300 mg/dL / Nitrite: Negative   Leuk Esterase: Large / RBC: 5 /HPF / WBC >50 /HPF   Sq Epi: x / Non Sq Epi: x / Bacteria: x        CAPILLARY BLOOD GLUCOSE      POCT Blood Glucose.: 115 mg/dL (15 Jul 2022 06:08)      RADIOLOGY & ADDITIONAL TESTS: Reviewed. OVERNIGHT EVENTS: Restless, pulling at tube overnight. Precedex gtt started. Afebrile    SUBJECTIVE: Patient seen and examined at bedside. Intubated, sedated    OBJECTIVE:     VITAL SIGNS:  ICU Vital Signs Last 24 Hrs  T(C): 37.6 (15 Jul 2022 04:00), Max: 37.6 (15 Jul 2022 04:00)  T(F): 99.6 (15 Jul 2022 04:00), Max: 99.6 (15 Jul 2022 04:00)  HR: 61 (15 Jul 2022 06:00) (61 - 71)  BP: 128/90 (15 Jul 2022 06:00) (101/87 - 166/82)  BP(mean): 100 (15 Jul 2022 06:00) (76 - 107)  ABP: --  ABP(mean): --  RR: 15 (15 Jul 2022 06:00) (13 - 17)  SpO2: 99% (15 Jul 2022 06:00) (96% - 100%)    O2 Parameters below as of 2022 23:00  Patient On (Oxygen Delivery Method): ventilator    O2 Concentration (%): 30      Mode: AC/ CMV (Assist Control/ Continuous Mandatory Ventilation), RR (machine): 15, TV (machine): 400, FiO2: 40, PEEP: 5, ITime: 0.64, MAP: 9, PIP: 27     @ 07: @ 07:00  --------------------------------------------------------  IN: 1413 mL / OUT: 375 mL / NET: 1038 mL     @ 07:15 @ 06:39  --------------------------------------------------------  IN: 3794 mL / OUT: 985 mL / NET: 2809 mL        =================PHYSICAL EXAM=================    GENERAL: NAD  HEAD:  Atraumatic, Normocephalic  EYES: PERRL, conjunctiva and sclera clear  ENT: Moist Mucus Membranes present, no ulcers appreciated  NECK: Supple, + JVD  CHEST/LUNG: Intubated. Clear to auscultation bilaterally; No wheezes, rales or rhonchi  HEART: Regular rate and rhythm; No murmurs, rubs, or gallops, (+)S1, S2  ABDOMEN: Soft, + distended; Normal Bowel sounds   EXTREMITIES:  2+ Peripheral Pulses, No clubbing, cyanosis, trace edema b/l  PSYCH: Cannot assess  NEUROLOGY: Shaking head to voice, not opening eyes or attempting to follow commands.  SKIN: No rashes or lesions      =================================================    LABS:                        7.4    3.54  )-----------( 163      ( 15 Jul 2022 03:28 )             24.6     Auto Eosinophil # x     / Auto Eosinophil % x     / Auto Neutrophil # x     / Auto Neutrophil % x     / BANDS % x                            7.8    5.75  )-----------( 183      ( 2022 04:00 )             26.2     Auto Eosinophil # x     / Auto Eosinophil % x     / Auto Neutrophil # x     / Auto Neutrophil % x     / BANDS % x        07-15    143  |  111<H>  |  50<H>  ----------------------------<  99  4.3   |  22  |  2.95<H>  07    142  |  110<H>  |  53<H>  ----------------------------<  56<L>  4.6   |  21<L>  |  2.39<H>  07    145  |  111<H>  |  48<H>  ----------------------------<  142<H>  4.9   |  23  |  1.67<H>    Ca    8.2<L>      15 Jul 2022 03:28  Mg     2.00     07-15  Phos  4.4     07-15  TPro  5.7<L>  /  Alb  2.7<L>  /  TBili  0.2  /  DBili  x   /  AST  78<H>  /  ALT  31  /  AlkPhos  108  07-15  TPro  6.1  /  Alb  3.1<L>  /  TBili  0.2  /  DBili  x   /  AST  42<H>  /  ALT  25  /  AlkPhos  116  07-14          Urinalysis Basic - ( 2022 09:00 )    Color: Yellow / Appearance: Slightly Turbid / S.027 / pH: x  Gluc: x / Ketone: Trace  / Bili: Negative / Urobili: 3 mg/dL   Blood: x / Protein: 300 mg/dL / Nitrite: Negative   Leuk Esterase: Large / RBC: 5 /HPF / WBC >50 /HPF   Sq Epi: x / Non Sq Epi: x / Bacteria: x      Mode: AC/ CMV (Assist Control/ Continuous Mandatory Ventilation), RR (machine): 15, TV (machine): 400, FiO2: 40, PEEP: 5, ITime: 0.64, MAP: 9, PIP: 27         MEDICATIONS:  MEDICATIONS  (STANDING):  aspirin  chewable 81 milliGRAM(s) Oral daily  atorvastatin 20 milliGRAM(s) Oral at bedtime  chlorhexidine 0.12% Liquid 15 milliLiter(s) Oral Mucosa every 12 hours  chlorhexidine 4% Liquid 1 Application(s) Topical <User Schedule>  dexMEDEtomidine Infusion 0.2 MICROgram(s)/kG/Hr (4.4 mL/Hr) IV Continuous <Continuous>  dextrose 5% + lactated ringers. 1000 milliLiter(s) (60 mL/Hr) IV Continuous <Continuous>  dextrose 5%. 1000 milliLiter(s) (100 mL/Hr) IV Continuous <Continuous>  dextrose 5%. 1000 milliLiter(s) (50 mL/Hr) IV Continuous <Continuous>  dextrose 50% Injectable 25 Gram(s) IV Push once  dextrose 50% Injectable 12.5 Gram(s) IV Push once  dextrose 50% Injectable 25 Gram(s) IV Push once  fentaNYL   Infusion. 0.5 MICROgram(s)/kG/Hr (4.4 mL/Hr) IV Continuous <Continuous>  glucagon  Injectable 1 milliGRAM(s) IntraMuscular once  heparin   Injectable 5000 Unit(s) SubCutaneous every 12 hours  insulin lispro (ADMELOG) corrective regimen sliding scale   SubCutaneous every 6 hours  meropenem  IVPB 1000 milliGRAM(s) IV Intermittent every 12 hours    MEDICATIONS  (PRN):  acetaminophen     Tablet .. 650 milliGRAM(s) Oral every 6 hours PRN Temp greater or equal to 38C (100.4F), Mild Pain (1 - 3)  dextrose Oral Gel 15 Gram(s) Oral once PRN Blood Glucose LESS THAN 70 milliGRAM(s)/deciliter      ALLERGIES:  Allergies    penicillin ([facility] Synd)    Intolerances        LABS:                        7.4    3.54  )-----------( 163      ( 15 Jul 2022 03:28 )             24.6     07-15    143  |  111<H>  |  50<H>  ----------------------------<  99  4.3   |  22  |  2.95<H>    Ca    8.2<L>      15 Jul 2022 03:28  Phos  4.4     07-15  Mg     2.00     07-15    TPro  5.7<L>  /  Alb  2.7<L>  /  TBili  0.2  /  DBili  x   /  AST  78<H>  /  ALT  31  /  AlkPhos  108  07-15      Urinalysis Basic - ( 2022 09:00 )    Color: Yellow / Appearance: Slightly Turbid / S.027 / pH: x  Gluc: x / Ketone: Trace  / Bili: Negative / Urobili: 3 mg/dL   Blood: x / Protein: 300 mg/dL / Nitrite: Negative   Leuk Esterase: Large / RBC: 5 /HPF / WBC >50 /HPF   Sq Epi: x / Non Sq Epi: x / Bacteria: x        CAPILLARY BLOOD GLUCOSE      POCT Blood Glucose.: 115 mg/dL (15 Jul 2022 06:08)      RADIOLOGY & ADDITIONAL TESTS: Reviewed. OVERNIGHT EVENTS: Restless, pulling at tube overnight. Precedex gtt started, still on fent. Afebrile    SUBJECTIVE: Patient seen and examined at bedside. Intubated, sedated    OBJECTIVE:     VITAL SIGNS:  ICU Vital Signs Last 24 Hrs  T(C): 37.6 (15 Jul 2022 04:00), Max: 37.6 (15 Jul 2022 04:00)  T(F): 99.6 (15 Jul 2022 04:00), Max: 99.6 (15 Jul 2022 04:00)  HR: 61 (15 Jul 2022 06:00) (61 - 71)  BP: 128/90 (15 Jul 2022 06:00) (101/87 - 166/82)  BP(mean): 100 (15 Jul 2022 06:00) (76 - 107)  ABP: --  ABP(mean): --  RR: 15 (15 Jul 2022 06:00) (13 - 17)  SpO2: 99% (15 Jul 2022 06:00) (96% - 100%)    O2 Parameters below as of 2022 23:00  Patient On (Oxygen Delivery Method): ventilator    O2 Concentration (%): 30      Mode: AC/ CMV (Assist Control/ Continuous Mandatory Ventilation), RR (machine): 15, TV (machine): 400, FiO2: 40, PEEP: 5, ITime: 0.64, MAP: 9, PIP: 27     @ 07:  -  14 @ 07:00  --------------------------------------------------------  IN: 1413 mL / OUT: 375 mL / NET: 1038 mL     @ 07:  -  15 @ 06:39  --------------------------------------------------------  IN: 3794 mL / OUT: 985 mL / NET: 2809 mL        =================PHYSICAL EXAM=================    GENERAL: NAD  HEAD:  Atraumatic, Normocephalic  EYES: PERRL, conjunctiva and sclera clear  ENT: Moist Mucus Membranes present, no ulcers appreciated  NECK: Supple, + JVD  CHEST/LUNG: Intubated. Clear to auscultation bilaterally; No wheezes, rales or rhonchi  HEART: Regular rate and rhythm; No murmurs, rubs, or gallops, (+)S1, S2  ABDOMEN: Soft, + distended; Normal Bowel sounds   EXTREMITIES:  2+ Peripheral Pulses, No clubbing, cyanosis, trace edema b/l  PSYCH: Cannot assess  NEUROLOGY: Shaking head to voice, not opening eyes or attempting to follow commands.  SKIN: No rashes or lesions      =================================================    LABS:                        7.4    3.54  )-----------( 163      ( 15 Jul 2022 03:28 )             24.6     Auto Eosinophil # x     / Auto Eosinophil % x     / Auto Neutrophil # x     / Auto Neutrophil % x     / BANDS % x                            7.8    5.75  )-----------( 183      ( 2022 04:00 )             26.2     Auto Eosinophil # x     / Auto Eosinophil % x     / Auto Neutrophil # x     / Auto Neutrophil % x     / BANDS % x        07-15    143  |  111<H>  |  50<H>  ----------------------------<  99  4.3   |  22  |  2.95<H>      142  |  110<H>  |  53<H>  ----------------------------<  56<L>  4.6   |  21<L>  |  2.39<H>  07    145  |  111<H>  |  48<H>  ----------------------------<  142<H>  4.9   |  23  |  1.67<H>    Ca    8.2<L>      15 Jul 2022 03:28  Mg     2.00     07-15  Phos  4.4     07-15  TPro  5.7<L>  /  Alb  2.7<L>  /  TBili  0.2  /  DBili  x   /  AST  78<H>  /  ALT  31  /  AlkPhos  108  07-15  TPro  6.1  /  Alb  3.1<L>  /  TBili  0.2  /  DBili  x   /  AST  42<H>  /  ALT  25  /  AlkPhos  116  07-14          Urinalysis Basic - ( 2022 09:00 )    Color: Yellow / Appearance: Slightly Turbid / S.027 / pH: x  Gluc: x / Ketone: Trace  / Bili: Negative / Urobili: 3 mg/dL   Blood: x / Protein: 300 mg/dL / Nitrite: Negative   Leuk Esterase: Large / RBC: 5 /HPF / WBC >50 /HPF   Sq Epi: x / Non Sq Epi: x / Bacteria: x      Mode: AC/ CMV (Assist Control/ Continuous Mandatory Ventilation), RR (machine): 15, TV (machine): 400, FiO2: 40, PEEP: 5, ITime: 0.64, MAP: 9, PIP: 27         MEDICATIONS:  MEDICATIONS  (STANDING):  aspirin  chewable 81 milliGRAM(s) Oral daily  atorvastatin 20 milliGRAM(s) Oral at bedtime  chlorhexidine 0.12% Liquid 15 milliLiter(s) Oral Mucosa every 12 hours  chlorhexidine 4% Liquid 1 Application(s) Topical <User Schedule>  dexMEDEtomidine Infusion 0.2 MICROgram(s)/kG/Hr (4.4 mL/Hr) IV Continuous <Continuous>  dextrose 5% + lactated ringers. 1000 milliLiter(s) (60 mL/Hr) IV Continuous <Continuous>  dextrose 5%. 1000 milliLiter(s) (100 mL/Hr) IV Continuous <Continuous>  dextrose 5%. 1000 milliLiter(s) (50 mL/Hr) IV Continuous <Continuous>  dextrose 50% Injectable 25 Gram(s) IV Push once  dextrose 50% Injectable 12.5 Gram(s) IV Push once  dextrose 50% Injectable 25 Gram(s) IV Push once  fentaNYL   Infusion. 0.5 MICROgram(s)/kG/Hr (4.4 mL/Hr) IV Continuous <Continuous>  glucagon  Injectable 1 milliGRAM(s) IntraMuscular once  heparin   Injectable 5000 Unit(s) SubCutaneous every 12 hours  insulin lispro (ADMELOG) corrective regimen sliding scale   SubCutaneous every 6 hours  meropenem  IVPB 1000 milliGRAM(s) IV Intermittent every 12 hours    MEDICATIONS  (PRN):  acetaminophen     Tablet .. 650 milliGRAM(s) Oral every 6 hours PRN Temp greater or equal to 38C (100.4F), Mild Pain (1 - 3)  dextrose Oral Gel 15 Gram(s) Oral once PRN Blood Glucose LESS THAN 70 milliGRAM(s)/deciliter      ALLERGIES:  Allergies    penicillin ([employer] Man Synd)    Intolerances        LABS:                        7.4    3.54  )-----------( 163      ( 15 Jul 2022 03:28 )             24.6     07-15    143  |  111<H>  |  50<H>  ----------------------------<  99  4.3   |  22  |  2.95<H>    Ca    8.2<L>      15 Jul 2022 03:28  Phos  4.4     07-15  Mg     2.00     07-15    TPro  5.7<L>  /  Alb  2.7<L>  /  TBili  0.2  /  DBili  x   /  AST  78<H>  /  ALT  31  /  AlkPhos  108  07-15      Urinalysis Basic - ( 2022 09:00 )    Color: Yellow / Appearance: Slightly Turbid / S.027 / pH: x  Gluc: x / Ketone: Trace  / Bili: Negative / Urobili: 3 mg/dL   Blood: x / Protein: 300 mg/dL / Nitrite: Negative   Leuk Esterase: Large / RBC: 5 /HPF / WBC >50 /HPF   Sq Epi: x / Non Sq Epi: x / Bacteria: x        CAPILLARY BLOOD GLUCOSE      POCT Blood Glucose.: 115 mg/dL (15 Jul 2022 06:08)      RADIOLOGY & ADDITIONAL TESTS: Reviewed.

## 2022-07-15 NOTE — CHART NOTE - NSCHARTNOTEFT_GEN_A_CORE
Brief Endocrinology Note:    This is a 66 yo F /w a PMH OF DM2 (A1c 7.3% on this admission), CHF, sarcoidosis who presents with AMS c/b hypercapnic respiratory failure and CHF exacerbation requiring intubation. Patient remains intubated and on tube feeds and during this time patient has been having frequent hypoglycemic events down into the 50s-60s. Patient has not received any basal or correctional insulin during this time. MICU team started patient in D5W 50cc/hr and FSG improving. BP stable off of pressors. Per MICU team, patient has been having hypoglycemia at home despite frequently decreasing her basal insulin dosing.     Labs notable for borderline AM cortisol of 7.1    Assessment:  Concern for adrenal insufficiency as cause for hypoglycemia     Plan:  Please perform an ACTH stimulation test for borderline AM cortisol levels to rule out adrenal insufficiency    Immediately before the test, please draw an AM serum cortisol level and an ACTH level   After initial labs are drawn, please give 250mcg of cosyntropin IV   Please repeat AM serum cortisol level 30 and 60 minutes after the injection of cosyntropin  PLEASE NOTE THAT ON OUR LAB IN ORDER TO RECEIVE THE CORTISOL LEVELS, AN AM CORTISOL MUST BE DRAWN TO RECEIVE SAME DAY RESULTS  Continue with D5W   FSG every 4 hours  please obtain TSH, FT4  can keep low insulin correction scale, since patient may be hyperglycemic on D5W  noted low c-peptide, if patient develops HAGMA, will need to evaluate for DKA    Full consult in AM    Case discussed /w Dr. Bela Aguirre MD  Endocrine Fellow  Can be reached via teams. For follow up questions, discharge recommendations, or new consults, please call answering service at [phone] (weekdays); [phone] (nights/weekends)

## 2022-07-16 DIAGNOSIS — I10 ESSENTIAL (PRIMARY) HYPERTENSION: ICD-10-CM

## 2022-07-16 DIAGNOSIS — E27.40 UNSPECIFIED ADRENOCORTICAL INSUFFICIENCY: ICD-10-CM

## 2022-07-16 DIAGNOSIS — E78.5 HYPERLIPIDEMIA, UNSPECIFIED: ICD-10-CM

## 2022-07-16 DIAGNOSIS — E16.2 HYPOGLYCEMIA, UNSPECIFIED: ICD-10-CM

## 2022-07-16 DIAGNOSIS — N17.9 ACUTE KIDNEY FAILURE, UNSPECIFIED: ICD-10-CM

## 2022-07-16 DIAGNOSIS — E11.9 TYPE 2 DIABETES MELLITUS WITHOUT COMPLICATIONS: ICD-10-CM

## 2022-07-16 DIAGNOSIS — J96.01 ACUTE RESPIRATORY FAILURE WITH HYPOXIA: ICD-10-CM

## 2022-07-16 LAB
ALBUMIN SERPL ELPH-MCNC: 2.7 G/DL — LOW (ref 3.3–5)
ALP SERPL-CCNC: 115 U/L — SIGNIFICANT CHANGE UP (ref 40–120)
ALT FLD-CCNC: 28 U/L — SIGNIFICANT CHANGE UP (ref 4–33)
ANION GAP SERPL CALC-SCNC: 10 MMOL/L — SIGNIFICANT CHANGE UP (ref 7–14)
AST SERPL-CCNC: 52 U/L — HIGH (ref 4–32)
BASOPHILS # BLD AUTO: 0.02 K/UL — SIGNIFICANT CHANGE UP (ref 0–0.2)
BASOPHILS NFR BLD AUTO: 0.5 % — SIGNIFICANT CHANGE UP (ref 0–2)
BILIRUB SERPL-MCNC: 0.3 MG/DL — SIGNIFICANT CHANGE UP (ref 0.2–1.2)
BLD GP AB SCN SERPL QL: NEGATIVE — SIGNIFICANT CHANGE UP
BUN SERPL-MCNC: 50 MG/DL — HIGH (ref 7–23)
CALCIUM SERPL-MCNC: 8.5 MG/DL — SIGNIFICANT CHANGE UP (ref 8.4–10.5)
CHLORIDE SERPL-SCNC: 108 MMOL/L — HIGH (ref 98–107)
CO2 SERPL-SCNC: 22 MMOL/L — SIGNIFICANT CHANGE UP (ref 22–31)
CORTIS AM PEAK SERPL-MCNC: 16.1 UG/DL — SIGNIFICANT CHANGE UP (ref 6–18.4)
CORTIS AM PEAK SERPL-MCNC: 22.4 UG/DL — HIGH (ref 6–18.4)
CORTIS AM PEAK SERPL-MCNC: 24.7 UG/DL — HIGH (ref 6–18.4)
CREAT SERPL-MCNC: 2.68 MG/DL — HIGH (ref 0.5–1.3)
EGFR: 19 ML/MIN/1.73M2 — LOW
EOSINOPHIL # BLD AUTO: 0.17 K/UL — SIGNIFICANT CHANGE UP (ref 0–0.5)
EOSINOPHIL NFR BLD AUTO: 4.1 % — SIGNIFICANT CHANGE UP (ref 0–6)
GAS PNL BLDV: SIGNIFICANT CHANGE UP
GLUCOSE BLDC GLUCOMTR-MCNC: 160 MG/DL — HIGH (ref 70–99)
GLUCOSE BLDC GLUCOMTR-MCNC: 163 MG/DL — HIGH (ref 70–99)
GLUCOSE BLDC GLUCOMTR-MCNC: 183 MG/DL — HIGH (ref 70–99)
GLUCOSE BLDC GLUCOMTR-MCNC: 190 MG/DL — HIGH (ref 70–99)
GLUCOSE BLDC GLUCOMTR-MCNC: 296 MG/DL — HIGH (ref 70–99)
GLUCOSE SERPL-MCNC: 276 MG/DL — HIGH (ref 70–99)
HCT VFR BLD CALC: 27.5 % — LOW (ref 34.5–45)
HGB BLD-MCNC: 8.4 G/DL — LOW (ref 11.5–15.5)
IANC: 2.84 K/UL — SIGNIFICANT CHANGE UP (ref 1.8–7.4)
IMM GRANULOCYTES NFR BLD AUTO: 0.7 % — SIGNIFICANT CHANGE UP (ref 0–1.5)
INR BLD: 1.18 RATIO — HIGH (ref 0.88–1.16)
LYMPHOCYTES # BLD AUTO: 0.58 K/UL — LOW (ref 1–3.3)
LYMPHOCYTES # BLD AUTO: 14.1 % — SIGNIFICANT CHANGE UP (ref 13–44)
MAGNESIUM SERPL-MCNC: 2 MG/DL — SIGNIFICANT CHANGE UP (ref 1.6–2.6)
MCHC RBC-ENTMCNC: 25.9 PG — LOW (ref 27–34)
MCHC RBC-ENTMCNC: 30.5 GM/DL — LOW (ref 32–36)
MCV RBC AUTO: 84.9 FL — SIGNIFICANT CHANGE UP (ref 80–100)
MONOCYTES # BLD AUTO: 0.46 K/UL — SIGNIFICANT CHANGE UP (ref 0–0.9)
MONOCYTES NFR BLD AUTO: 11.2 % — SIGNIFICANT CHANGE UP (ref 2–14)
MRSA PCR RESULT.: SIGNIFICANT CHANGE UP
NEUTROPHILS # BLD AUTO: 2.84 K/UL — SIGNIFICANT CHANGE UP (ref 1.8–7.4)
NEUTROPHILS NFR BLD AUTO: 69.4 % — SIGNIFICANT CHANGE UP (ref 43–77)
NRBC # BLD: 0 /100 WBCS — SIGNIFICANT CHANGE UP
NRBC # FLD: 0 K/UL — SIGNIFICANT CHANGE UP
PHOSPHATE SERPL-MCNC: 4.9 MG/DL — HIGH (ref 2.5–4.5)
PLATELET # BLD AUTO: 180 K/UL — SIGNIFICANT CHANGE UP (ref 150–400)
POTASSIUM SERPL-MCNC: 4.6 MMOL/L — SIGNIFICANT CHANGE UP (ref 3.5–5.3)
POTASSIUM SERPL-SCNC: 4.6 MMOL/L — SIGNIFICANT CHANGE UP (ref 3.5–5.3)
PROT SERPL-MCNC: 6 G/DL — SIGNIFICANT CHANGE UP (ref 6–8.3)
PROTHROM AB SERPL-ACNC: 13.7 SEC — HIGH (ref 10.5–13.4)
RBC # BLD: 3.24 M/UL — LOW (ref 3.8–5.2)
RBC # FLD: 16.9 % — HIGH (ref 10.3–14.5)
RH IG SCN BLD-IMP: POSITIVE — SIGNIFICANT CHANGE UP
S AUREUS DNA NOSE QL NAA+PROBE: DETECTED
SODIUM SERPL-SCNC: 140 MMOL/L — SIGNIFICANT CHANGE UP (ref 135–145)
WBC # BLD: 4.1 K/UL — SIGNIFICANT CHANGE UP (ref 3.8–10.5)
WBC # FLD AUTO: 4.1 K/UL — SIGNIFICANT CHANGE UP (ref 3.8–10.5)

## 2022-07-16 PROCEDURE — 71045 X-RAY EXAM CHEST 1 VIEW: CPT | Mod: 26

## 2022-07-16 PROCEDURE — 99291 CRITICAL CARE FIRST HOUR: CPT | Mod: GC

## 2022-07-16 PROCEDURE — 74019 RADEX ABDOMEN 2 VIEWS: CPT | Mod: 26

## 2022-07-16 PROCEDURE — 99223 1ST HOSP IP/OBS HIGH 75: CPT

## 2022-07-16 RX ORDER — SODIUM CHLORIDE 9 MG/ML
1000 INJECTION, SOLUTION INTRAVENOUS
Refills: 0 | Status: DISCONTINUED | OUTPATIENT
Start: 2022-07-16 | End: 2022-07-16

## 2022-07-16 RX ORDER — METOCLOPRAMIDE HCL 10 MG
10 TABLET ORAL ONCE
Refills: 0 | Status: COMPLETED | OUTPATIENT
Start: 2022-07-16 | End: 2022-07-16

## 2022-07-16 RX ORDER — METOCLOPRAMIDE HCL 10 MG
10 TABLET ORAL ONCE
Refills: 0 | Status: DISCONTINUED | OUTPATIENT
Start: 2022-07-16 | End: 2022-07-16

## 2022-07-16 RX ORDER — HYDRALAZINE HCL 50 MG
75 TABLET ORAL THREE TIMES A DAY
Refills: 0 | Status: DISCONTINUED | OUTPATIENT
Start: 2022-07-16 | End: 2022-07-23

## 2022-07-16 RX ORDER — ONDANSETRON 8 MG/1
4 TABLET, FILM COATED ORAL ONCE
Refills: 0 | Status: COMPLETED | OUTPATIENT
Start: 2022-07-16 | End: 2022-07-16

## 2022-07-16 RX ADMIN — SODIUM CHLORIDE 60 MILLILITER(S): 9 INJECTION, SOLUTION INTRAVENOUS at 07:54

## 2022-07-16 RX ADMIN — Medication 3: at 00:00

## 2022-07-16 RX ADMIN — Medication 3: at 06:42

## 2022-07-16 RX ADMIN — CHLORHEXIDINE GLUCONATE 1 APPLICATION(S): 213 SOLUTION TOPICAL at 05:23

## 2022-07-16 RX ADMIN — HEPARIN SODIUM 5000 UNIT(S): 5000 INJECTION INTRAVENOUS; SUBCUTANEOUS at 05:36

## 2022-07-16 RX ADMIN — ONDANSETRON 4 MILLIGRAM(S): 8 TABLET, FILM COATED ORAL at 07:15

## 2022-07-16 RX ADMIN — Medication 10 MILLIGRAM(S): at 19:54

## 2022-07-16 RX ADMIN — ATORVASTATIN CALCIUM 20 MILLIGRAM(S): 80 TABLET, FILM COATED ORAL at 21:50

## 2022-07-16 RX ADMIN — MEROPENEM 100 MILLIGRAM(S): 1 INJECTION INTRAVENOUS at 05:37

## 2022-07-16 RX ADMIN — DEXMEDETOMIDINE HYDROCHLORIDE IN 0.9% SODIUM CHLORIDE 4.4 MICROGRAM(S)/KG/HR: 4 INJECTION INTRAVENOUS at 07:53

## 2022-07-16 RX ADMIN — AMLODIPINE BESYLATE 10 MILLIGRAM(S): 2.5 TABLET ORAL at 05:36

## 2022-07-16 RX ADMIN — Medication 50 MILLIGRAM(S): at 05:36

## 2022-07-16 RX ADMIN — MEROPENEM 100 MILLIGRAM(S): 1 INJECTION INTRAVENOUS at 18:43

## 2022-07-16 RX ADMIN — COSYNTROPIN 0.25 MILLIGRAM(S): 0.25 INJECTION, SOLUTION INTRAVENOUS at 07:59

## 2022-07-16 RX ADMIN — HEPARIN SODIUM 5000 UNIT(S): 5000 INJECTION INTRAVENOUS; SUBCUTANEOUS at 18:47

## 2022-07-16 RX ADMIN — Medication 75 MILLIGRAM(S): at 22:07

## 2022-07-16 RX ADMIN — CHLORHEXIDINE GLUCONATE 15 MILLILITER(S): 213 SOLUTION TOPICAL at 05:23

## 2022-07-16 NOTE — PROGRESS NOTE ADULT - ASSESSMENT
68 y/o F with PMH of HTN, HLD, sarcoidosis, CHF, presented to the ED for new onset AMS. She is also found to have pleural effusion, elevated BNP and LE edema concerning for CHF exacerbation. Admit for metabolic encephalopathy and CHF exacerbation. Intubated  for AMS      Neuro:    #AMS - structural vs metabolic vs infectious   could be in the setting of hypercapneic resp failure   TSH: wnl   infectious workup as below   -  CTH negative for acute bleed or major vessel ischemic stroke  - initially sedated on prop & fent. Now weaned off prop.  - wean fent today, ctm mental status, may be possible to extubate today  - required restraints, precedex 7/15 am due to agitation, pulling at tube  - neuro following, recs appreciated    Resp:    #Acute Hypercapneic respiratory failure   AB.17/64/130/23/98.6/-5.3 prior to BiPAP   - intubated   - improvement in acidosis, hypercapnia s/p intubation  - failed CPAP trial  & 7/15 while on fent, no respiratory drive (1 breath in 60 s)  - Will reattempt CPAP after weaning fent      #Bilateral pleural effusions i/s/o HF exacerbation   CT Chest with new large right and small left pleural effusions with adjacent compressive atelectasis including atelectasis of the majority of the right lower lobe   - Lasix, Bumex both attempted, with poor UOP in response to either  - Monitor O2 sat  - Monitor RR     CV:    #HFpEF   BNP on arrival: 4021 with evidence of fluid overload with pleural effusions, b/l pitting edema   Echo (): EF: 62%; Mild diastolic dysfunction. Normal right ventricular size and function   EKG: RBBB (present from prior EKG's). no acute ischemic findings; trop: 50   Monitor lytes and replete PRN   Strict I&O  Daily weights   - initially on levo following intubation & sedation  - now off levo  - f/u echo  - POCUS showed relatively small IVC, pt not appearing to be volume overloaded, intermittent fluid challenge w/ LR boluses    #HTN   on amlodipine, carvedilol and hydralazine at home   - SBPs to 190s on 7/15, restarted home hydralazine 50 q8h      GI:     Diet: NPO w/ TFs   Monitor BMs  - hold TFs today in preparation for possible extubation      Renal/:    #CKD Stage 2  Baseline Scr (): 2.33  On admission, SCr at 1.6  Monitor UO  Monitor SCr  - Cr increased to 2.39 on  from 1.67 on , however pt's baseline around 2-2.3  - check urine lytes in setting of metabolic acidosis, lactate wnl      ID:     #Encephalopathy possibly 2/2 sepsis - hypothermic to 90.4 with leukopenia   procal: neg  CT abd and pelvis: no infectious source   c/w meropenem ( - )  - improved temps  am, no hypothermia overnight  -  blood cx 2/2 ngtd  -  ua positive leukest, negative nitrites, elevated WBCs  - f/u MRSA swab      Endo:     #T2DM   #Hypoglycemia  on detemir 20 U at home   Last A1C 2022 7.3  - am cortisol 7.1 wnl  - has had persistent hypoglycemia despite TFs, finger sticks in 50s.   - Per family, pt has had intermittent hypoglycemia over past month despite coming down on daily insulin and maintaining PO intake  - Possible component of CKD & poor insulin clearance vs new onset endogenous insulin excess  - start D5LR 60 cc/hr while holding TFs. Improved glucose  - f/u c-peptide    Heme:     DVT Ppx: Heparin     Ethics: pending [facility] discussion with family.   66 y/o F with PMH of HTN, HLD, sarcoidosis, CHF, presented to the ED for new onset AMS. She is also found to have pleural effusion, elevated BNP and LE edema concerning for CHF exacerbation. Admit for metabolic encephalopathy and CHF exacerbation. Intubated  for AMS. She was responding to commands on , more awake and alert, and was extubated, now on nasal cannula      Neuro:    #AMS - structural vs metabolic vs infectious   could be in the setting of hypercapneic resp failure   TSH: wnl   infectious workup as below   - mental status improving today , following commands, subsequently extubated now on nasal cannula  -  CTH negative for acute bleed or major vessel ischemic stroke  - initially sedated on prop & fent. Now weaned off prop.  - required restraints, precedex 7/15 am due to agitation    Resp:    #Acute Hypercapneic respiratory failure   AB.41/35/135/22   - intubated , extubated   - improvement in acidosis, hypercapnia s/p intubation  - mental status improving      #Bilateral pleural effusions i/s/o HF exacerbation   CT Chest with new large right and small left pleural effusions with adjacent compressive atelectasis including atelectasis of the majority of the right lower lobe   - Lasix, Bumex both attempted, with poor UOP in response to either, subsequently given IVF bolus challenge with improvement in UOP  - Monitor O2 sat  - Monitor RR     CV:    #HFpEF   BNP on arrival: 4021 with evidence of fluid overload with pleural effusions, b/l pitting edema   Echo (): EF: 62%; Mild diastolic dysfunction. Normal right ventricular size and function   EKG: RBBB (present from prior EKG's). no acute ischemic findings; trop: 50   Monitor lytes and replete PRN   Strict I&O  Daily weights   - initially on levo following intubation & sedation  - now off levo  - 7/15 TTE: moderate LV systolic dysfunction, moderate diastolic dysfunction, RV enlargement with decreased RV systolic dysfunction  - POCUS showed relatively 1.5 in IVC, pt not appearing to be volume overloaded, intermittent fluid challenge w/ LR boluses    #HTN   on amlodipine, carvedilol and hydralazine at home   - SBPs to 190s on 7/15, increased hydralazine dose to 75mg q8h      GI:     Diet: NPO w/ TFs   Monitor BMs  - hold TFs today in preparation for possible extubation      Renal/:    #CKD Stage 2  Baseline Scr (): 2.33  On admission, SCr at 1.6  Monitor UO  Monitor SCr  - Cr now 2.68, pt's baseline around 2-2.3  - check urine lytes in setting of metabolic acidosis, lactate wnl      ID:     #Encephalopathy possibly 2/2 sepsis - hypothermic to 90.4 with leukopenia   procal: neg  CT abd and pelvis: no infectious source   c/w meropenem ( - )  - improved temps  am, no hypothermia overnight  -  blood cx 2/2 ngtd  -  ua positive leukest, negative nitrites, elevated WBCs  - f/u MRSA swab      Endo:     #T2DM   #Hypoglycemia  on detemir 20 U at home   Last A1C 2022 7.3  - am cortisol 7.1 wnl  - has had persistent hypoglycemia despite TFs, finger sticks in 50s.   - Per family, pt has had intermittent hypoglycemia over past month despite coming down on daily insulin and maintaining PO intake  - Possible component of CKD & poor insulin clearance vs new onset endogenous insulin excess  - stop D5 per endo reccs, continue with correctional scale insulin admelog 6qh  - f/u c-peptide in AM    Heme:     DVT Ppx: Heparin     Ethics: pending [facility] discussion with family.

## 2022-07-16 NOTE — PROGRESS NOTE ADULT - SUBJECTIVE AND OBJECTIVE BOX
INTERVAL HPI/OVERNIGHT EVENTS:  TTE showing systolic dysfunction. Precedex drip decreased to 0.7.    SUBJECTIVE: Patient seen and examined at bedside.       VITAL SIGNS:  ICU Vital Signs Last 24 Hrs  T(C): 36.7 (2022 01:00), Max: 36.8 (15 Jul 2022 08:00)  T(F): 98.1 (2022 01:00), Max: 98.2 (15 Jul 2022 08:00)  HR: 77 (2022 06:58) (56 - 79)  BP: 178/94 (2022 06:00) (140/70 - 194/86)  BP(mean): 118 (2022 06:00) (91 - 130)  ABP: --  ABP(mean): --  RR: 21 (2022 06:00) (13 - 21)  SpO2: 98% (2022 06:58) (97% - 100%)    O2 Parameters below as of 2022 06:00  Patient On (Oxygen Delivery Method): ventilator    O2 Concentration (%): 30      Mode: CPAP with PS, FiO2: 30, PEEP: 5, PS: 8, MAP: 7, PIP: 16  Plateau pressure:   P/F ratio:     07-15 @ 07:01  -  07-16 @ 07:00  --------------------------------------------------------  IN: 2816 mL / OUT: 2030 mL / NET: 786 mL      CAPILLARY BLOOD GLUCOSE      POCT Blood Glucose.: 296 mg/dL (2022 06:30)    ECG:    PHYSICAL EXAM:    General:   HEENT:   Neck:   Respiratory:   Cardiovascular:   Abdomen:   Extremities:  Neurological:    MEDICATIONS:  MEDICATIONS  (STANDING):  amLODIPine   Tablet 10 milliGRAM(s) Oral daily  aspirin  chewable 81 milliGRAM(s) Oral daily  atorvastatin 20 milliGRAM(s) Oral at bedtime  chlorhexidine 0.12% Liquid 15 milliLiter(s) Oral Mucosa every 12 hours  chlorhexidine 4% Liquid 1 Application(s) Topical <User Schedule>  cosyntropin Injectable 0.25 milliGRAM(s) IV Push once  dexMEDEtomidine Infusion 0.2 MICROgram(s)/kG/Hr (4.4 mL/Hr) IV Continuous <Continuous>  dextrose 5% + lactated ringers. 1000 milliLiter(s) (60 mL/Hr) IV Continuous <Continuous>  dextrose 5%. 1000 milliLiter(s) (100 mL/Hr) IV Continuous <Continuous>  dextrose 5%. 1000 milliLiter(s) (50 mL/Hr) IV Continuous <Continuous>  dextrose 50% Injectable 25 Gram(s) IV Push once  dextrose 50% Injectable 12.5 Gram(s) IV Push once  dextrose 50% Injectable 25 Gram(s) IV Push once  glucagon  Injectable 1 milliGRAM(s) IntraMuscular once  heparin   Injectable 5000 Unit(s) SubCutaneous every 12 hours  hydrALAZINE 50 milliGRAM(s) Oral three times a day  insulin lispro (ADMELOG) corrective regimen sliding scale   SubCutaneous every 6 hours  meropenem  IVPB 1000 milliGRAM(s) IV Intermittent every 12 hours    MEDICATIONS  (PRN):  acetaminophen     Tablet .. 650 milliGRAM(s) Oral every 6 hours PRN Temp greater or equal to 38C (100.4F), Mild Pain (1 - 3)  dextrose Oral Gel 15 Gram(s) Oral once PRN Blood Glucose LESS THAN 70 milliGRAM(s)/deciliter      ALLERGIES:  Allergies    penicillin ([facility] Synd)    Intolerances        LABS:                        8.4    4.10  )-----------( 180      ( 2022 00:15 )             27.5     07-16    140  |  108<H>  |  50<H>  ----------------------------<  276<H>  4.6   |  22  |  2.68<H>    Ca    8.5      2022 00:15  Phos  4.9     07-16  Mg     2.00     07-16    TPro  6.0  /  Alb  2.7<L>  /  TBili  0.3  /  DBili  x   /  AST  52<H>  /  ALT  28  /  AlkPhos  115  07-16    PT/INR - ( 2022 00:40 )   PT: 13.7 sec;   INR: 1.18 ratio           Urinalysis Basic - ( 2022 09:00 )    Color: Yellow / Appearance: Slightly Turbid / S.027 / pH: x  Gluc: x / Ketone: Trace  / Bili: Negative / Urobili: 3 mg/dL   Blood: x / Protein: 300 mg/dL / Nitrite: Negative   Leuk Esterase: Large / RBC: 5 /HPF / WBC >50 /HPF   Sq Epi: x / Non Sq Epi: x / Bacteria: x        RADIOLOGY & ADDITIONAL TESTS: Reviewed.   INTERVAL HPI/OVERNIGHT EVENTS:  TTE showing systolic dysfunction. Precedex drip decreased to 0.7. 3 episodes of bilious vomiting this afternoon, given 1 dose of zofran. Extubated, on nasal cannula, following commands.    SUBJECTIVE: Patient seen and examined at bedside.       VITAL SIGNS:  ICU Vital Signs Last 24 Hrs  T(C): 36.7 (2022 01:00), Max: 36.8 (15 Jul 2022 08:00)  T(F): 98.1 (2022 01:00), Max: 98.2 (15 Jul 2022 08:00)  HR: 77 (2022 06:58) (56 - 79)  BP: 178/94 (2022 06:00) (140/70 - 194/86)  BP(mean): 118 (2022 06:00) (91 - 130)  ABP: --  ABP(mean): --  RR: 21 (2022 06:00) (13 - 21)  SpO2: 98% (2022 06:58) (97% - 100%)    O2 Parameters below as of 2022 06:00  Patient On (Oxygen Delivery Method): ventilator    O2 Concentration (%): 30      Mode: CPAP with PS, FiO2: 30, PEEP: 5, PS: 8, MAP: 7, PIP: 16  Plateau pressure:   P/F ratio:     07-15 @ 07:01  -  07-16 @ 07:00  --------------------------------------------------------  IN: 2816 mL / OUT: 2030 mL / NET: 786 mL      CAPILLARY BLOOD GLUCOSE      POCT Blood Glucose.: 296 mg/dL (2022 06:30)    ECG:    PHYSICAL EXAM:    General: NAD, awake  HEENT: atraumatic  Neck: supple  Respiratory: on nasal cannula, breathing comfortably  Cardiovascular:  RRR  Abdomen: soft, NT, ND  Extremities: 2+ peripheral pulses, trace edema b/l  Neurological: responds to commands, awake, opening eyes. hard of hearing    MEDICATIONS:  MEDICATIONS  (STANDING):  amLODIPine   Tablet 10 milliGRAM(s) Oral daily  aspirin  chewable 81 milliGRAM(s) Oral daily  atorvastatin 20 milliGRAM(s) Oral at bedtime  chlorhexidine 0.12% Liquid 15 milliLiter(s) Oral Mucosa every 12 hours  chlorhexidine 4% Liquid 1 Application(s) Topical <User Schedule>  cosyntropin Injectable 0.25 milliGRAM(s) IV Push once  dexMEDEtomidine Infusion 0.2 MICROgram(s)/kG/Hr (4.4 mL/Hr) IV Continuous <Continuous>  dextrose 5% + lactated ringers. 1000 milliLiter(s) (60 mL/Hr) IV Continuous <Continuous>  dextrose 5%. 1000 milliLiter(s) (100 mL/Hr) IV Continuous <Continuous>  dextrose 5%. 1000 milliLiter(s) (50 mL/Hr) IV Continuous <Continuous>  dextrose 50% Injectable 25 Gram(s) IV Push once  dextrose 50% Injectable 12.5 Gram(s) IV Push once  dextrose 50% Injectable 25 Gram(s) IV Push once  glucagon  Injectable 1 milliGRAM(s) IntraMuscular once  heparin   Injectable 5000 Unit(s) SubCutaneous every 12 hours  hydrALAZINE 50 milliGRAM(s) Oral three times a day  insulin lispro (ADMELOG) corrective regimen sliding scale   SubCutaneous every 6 hours  meropenem  IVPB 1000 milliGRAM(s) IV Intermittent every 12 hours    MEDICATIONS  (PRN):  acetaminophen     Tablet .. 650 milliGRAM(s) Oral every 6 hours PRN Temp greater or equal to 38C (100.4F), Mild Pain (1 - 3)  dextrose Oral Gel 15 Gram(s) Oral once PRN Blood Glucose LESS THAN 70 milliGRAM(s)/deciliter      ALLERGIES:  Allergies    penicillin ([employer] [facility] Synd)    Intolerances        LABS:                        8.4    4.10  )-----------( 180      ( 2022 00:15 )             27.5     07-16    140  |  108<H>  |  50<H>  ----------------------------<  276<H>  4.6   |  22  |  2.68<H>    Ca    8.5      2022 00:15  Phos  4.9     07-16  Mg     2.00     07-16    TPro  6.0  /  Alb  2.7<L>  /  TBili  0.3  /  DBili  x   /  AST  52<H>  /  ALT  28  /  AlkPhos  115  07-16    PT/INR - ( 2022 00:40 )   PT: 13.7 sec;   INR: 1.18 ratio           Urinalysis Basic - ( 2022 09:00 )    Color: Yellow / Appearance: Slightly Turbid / S.027 / pH: x  Gluc: x / Ketone: Trace  / Bili: Negative / Urobili: 3 mg/dL   Blood: x / Protein: 300 mg/dL / Nitrite: Negative   Leuk Esterase: Large / RBC: 5 /HPF / WBC >50 /HPF   Sq Epi: x / Non Sq Epi: x / Bacteria: x        RADIOLOGY & ADDITIONAL TESTS: Reviewed.

## 2022-07-16 NOTE — CHART NOTE - NSCHARTNOTEFT_GEN_A_CORE
MICU Transfer Note    Transfer from: MICU  Transfer to:  ( X ) Medicine    (  ) Telemetry    (  ) RCU    (  ) Palliative    (  ) Stroke Unit    (  ) _______________    Accepting physician:      MICU COURSE:  66 y/o F with pmhx of HTN, HLD, sarcoidosis, CHF, presented to the ED for new onset AMS. She is also found to have pleural effusion, elevated BNP and LE edema concerning for CHF exacerbation. Admit for metabolic encephalopathy and CHF exacerbation. Intubated  for AMS    : pt intubated due to AMS; Urine lytes; MRSA swb; Cortisol level; I/O ; Pending CTH   propofol gtt; levo gtt   : ABG ; Decrease fentanyl ; CPAP ; hold feeds ; monitor fingersticks ; D5&LR 30cc/hr ; U lytes; C-peptide;   7/15: LR bolus ; DC urine culture; Wean down fentanyl gtt; F/u CBC w diff; F/u MRSA swab; Endo C/s   : (On pressure support since 5am) Extubate today ; Call RT and get a BIPAP (For extubation) ;  Keep Meropenem until extubation and ask patient about her penicillin allergy; increase Hydral 75mg q8 ; Decrease D5 to 30cc.   - Turn off D5         ASSESSMENT & PLAN:   66 y/o F with PMH of HTN, HLD, sarcoidosis, CHF, presented to the ED for new onset AMS. She is also found to have pleural effusion, elevated BNP and LE edema concerning for CHF exacerbation. Admit for metabolic encephalopathy and CHF exacerbation. Intubated  for AMS. She was responding to commands on , more awake and alert, and was extubated, now on nasal cannula      Neuro:    #AMS - structural vs metabolic vs infectious   could be in the setting of hypercapneic resp failure   TSH: wnl   infectious workup as below   - mental status improving today , following commands, subsequently extubated now on nasal cannula  -  CTH negative for acute bleed or major vessel ischemic stroke  - initially sedated on prop & fent. Now weaned off prop.  - required restraints, precedex 715 am due to agitation    Resp:    #Acute Hypercapneic respiratory failure   AB.41/35/135/22   - intubated , extubated   - improvement in acidosis, hypercapnia s/p intubation  - mental status improving      #Bilateral pleural effusions i/s/o HF exacerbation   CT Chest with new large right and small left pleural effusions with adjacent compressive atelectasis including atelectasis of the majority of the right lower lobe   - Lasix, Bumex both attempted, with poor UOP in response to either, subsequently given IVF bolus challenge with improvement in UOP  - Monitor O2 sat  - Monitor RR     CV:    #HFpEF   BNP on arrival: 4021 with evidence of fluid overload with pleural effusions, b/l pitting edema   Echo (): EF: 62%; Mild diastolic dysfunction. Normal right ventricular size and function   EKG: RBBB (present from prior EKG's). no acute ischemic findings; trop: 50   Monitor lytes and replete PRN   Strict I&O  Daily weights   - initially on levo following intubation & sedation  - now off levo  - 7/15 TTE: moderate LV systolic dysfunction, moderate diastolic dysfunction, RV enlargement with decreased RV systolic dysfunction  - POCUS showed relatively 1.5 in IVC, pt not appearing to be volume overloaded, intermittent fluid challenge w/ LR boluses    #HTN   on amlodipine, carvedilol and hydralazine at home   - SBPs to 190s on 7/15, increased hydralazine dose to 75mg q8h      GI:     Diet: NPO w/ TFs   Monitor BMs  - hold TFs today in preparation for possible extubation      Renal/:    #CKD Stage 2  Baseline Scr (): 2.33  On admission, SCr at 1.6  Monitor UO  Monitor SCr  - Cr now 2.68, pt's baseline around 2-2.3  - check urine lytes in setting of metabolic acidosis, lactate wnl      ID:     #Encephalopathy possibly 2/2 sepsis - hypothermic to 90.4 with leukopenia   procal: neg  CT abd and pelvis: no infectious source   c/w meropenem ( - )  - improved temps  am, no hypothermia overnight  -  blood cx 2/2 ngtd  -  ua positive leukest, negative nitrites, elevated WBCs  - f/u MRSA swab      Endo:     #T2DM   #Hypoglycemia  on detemir 20 U at home   Last A1C 2022 7.3  - am cortisol 7.1 wnl  - has had persistent hypoglycemia despite TFs, finger sticks in 50s.   - Per family, pt has had intermittent hypoglycemia over past month despite coming down on daily insulin and maintaining PO intake  - Possible component of CKD & poor insulin clearance vs new onset endogenous insulin excess  - stop D5 per endo reccs, continue with correctional scale insulin admelog 6qh  - f/u c-peptide in AM    Heme:     DVT Ppx: Heparin     Ethics: pending GOC discussion with family.        For Follow-Up:    AM C-peptide  Nausea management with zofran (or alternative agents if QTc remains elevated)  wean off oxygen at tolerated        Vital Signs Last 24 Hrs  T(C): 36.6 (2022 16:00), Max: 37.3 (2022 08:00)  T(F): 97.9 (2022 16:00), Max: 99.1 (2022 08:00)  HR: 101 (2022 16:00) (57 - 101)  BP: 159/85 (2022 16:00) (135/84 - 178/94)  BP(mean): 108 (2022 16:00) (91 - 134)  RR: 17 (2022 16:00) (12 - 21)  SpO2: 98% (2022 16:00) (95% - 100%)    Parameters below as of 2022 08:00  Patient On (Oxygen Delivery Method): CPAP 5/5    O2 Concentration (%): 30  I&O's Summary    15 Jul 2022 07:01  -  2022 07:00  --------------------------------------------------------  IN: 2816 mL / OUT: 2030 mL / NET: 786 mL    2022 07:01  -  2022 18:16  --------------------------------------------------------  IN: 251 mL / OUT: 325 mL / NET: -74 mL          MEDICATIONS  (STANDING):  amLODIPine   Tablet 10 milliGRAM(s) Oral daily  aspirin  chewable 81 milliGRAM(s) Oral daily  atorvastatin 20 milliGRAM(s) Oral at bedtime  chlorhexidine 4% Liquid 1 Application(s) Topical <User Schedule>  dexMEDEtomidine Infusion 0.2 MICROgram(s)/kG/Hr (4.4 mL/Hr) IV Continuous <Continuous>  dextrose 50% Injectable 25 Gram(s) IV Push once  dextrose 50% Injectable 12.5 Gram(s) IV Push once  dextrose 50% Injectable 25 Gram(s) IV Push once  glucagon  Injectable 1 milliGRAM(s) IntraMuscular once  heparin   Injectable 5000 Unit(s) SubCutaneous every 12 hours  hydrALAZINE 75 milliGRAM(s) Oral three times a day  insulin lispro (ADMELOG) corrective regimen sliding scale   SubCutaneous every 6 hours  meropenem  IVPB 1000 milliGRAM(s) IV Intermittent every 12 hours    MEDICATIONS  (PRN):  acetaminophen     Tablet .. 650 milliGRAM(s) Oral every 6 hours PRN Temp greater or equal to 38C (100.4F), Mild Pain (1 - 3)  dextrose Oral Gel 15 Gram(s) Oral once PRN Blood Glucose LESS THAN 70 milliGRAM(s)/deciliter        LABS                                            8.4                   Neurophils% (auto):   69.4   ( @ 00:15):    4.10 )-----------(180          Lymphocytes% (auto):  14.1                                          27.5                   Eosinphils% (auto):   4.1      Manual%: Neutrophils x    ; Lymphocytes x    ; Eosinophils x    ; Bands%: x    ; Blasts x                                    140    |  108    |  50                  Calcium: 8.5   / iCa: x      ( @ 00:15)    ----------------------------<  276       Magnesium: 2.00                             4.6     |  22     |  2.68             Phosphorous: 4.9      TPro  6.0    /  Alb  2.7    /  TBili  0.3    /  DBili  x      /  AST  52     /  ALT  28     /  AlkPhos  115    2022 00:15    (  @ 00:40 )   PT: 13.7 sec;   INR: 1.18 ratio  aPTT: x MICU Transfer Note    Transfer from: MICU  Transfer to:  ( X ) Medicine    (  ) Telemetry    (  ) RCU    (  ) Palliative    (  ) Stroke Unit    (  ) _______________    Accepting physician: Reva      MICU COURSE:  68 y/o F with pmhx of HTN, HLD, sarcoidosis, CHF, presented to the ED for new onset AMS. She is also found to have pleural effusion, elevated BNP and LE edema concerning for CHF exacerbation. Admit for metabolic encephalopathy and CHF exacerbation. Intubated  for AMS    : pt intubated due to AMS; Urine lytes; MRSA swb; Cortisol level; I/O ; Pending CTH   propofol gtt; levo gtt   : ABG ; Decrease fentanyl ; CPAP ; hold feeds ; monitor fingersticks ; D5&LR 30cc/hr ; U lytes; C-peptide;   7/15: LR bolus ; DC urine culture; Wean down fentanyl gtt; F/u CBC w diff; F/u MRSA swab; Endo C/s   : (On pressure support since 5am) Extubate today ; Call RT and get a BIPAP (For extubation) ;  Keep Meropenem until extubation and ask patient about her penicillin allergy; increase Hydral 75mg q8 ; Decrease D5 to 30cc.   - Turn off D5         ASSESSMENT & PLAN:   68 y/o F with PMH of HTN, HLD, sarcoidosis, CHF, presented to the ED for new onset AMS. She is also found to have pleural effusion, elevated BNP and LE edema concerning for CHF exacerbation. Admit for metabolic encephalopathy and CHF exacerbation. Intubated  for AMS. She was responding to commands on , more awake and alert, and was extubated, now on nasal cannula      Neuro:    #AMS - structural vs metabolic vs infectious   could be in the setting of hypercapneic resp failure   TSH: wnl   infectious workup as below   - mental status improving today , following commands, subsequently extubated now on nasal cannula  -  CTH negative for acute bleed or major vessel ischemic stroke  - initially sedated on prop & fent. Now weaned off prop.  - required restraints, precedex 715 am due to agitation    Resp:    #Acute Hypercapneic respiratory failure   AB.41/35/135/22   - intubated , extubated   - improvement in acidosis, hypercapnia s/p intubation  - mental status improving      #Bilateral pleural effusions i/s/o HF exacerbation   CT Chest with new large right and small left pleural effusions with adjacent compressive atelectasis including atelectasis of the majority of the right lower lobe   - Lasix, Bumex both attempted, with poor UOP in response to either, subsequently given IVF bolus challenge with improvement in UOP  - Monitor O2 sat  - Monitor RR     CV:    #HFpEF   BNP on arrival: 4021 with evidence of fluid overload with pleural effusions, b/l pitting edema   Echo (): EF: 62%; Mild diastolic dysfunction. Normal right ventricular size and function   EKG: RBBB (present from prior EKG's). no acute ischemic findings; trop: 50   Monitor lytes and replete PRN   Strict I&O  Daily weights   - initially on levo following intubation & sedation  - now off levo  - 7/15 TTE: moderate LV systolic dysfunction, moderate diastolic dysfunction, RV enlargement with decreased RV systolic dysfunction  - POCUS showed relatively 1.5 in IVC, pt not appearing to be volume overloaded, intermittent fluid challenge w/ LR boluses    #HTN   on amlodipine, carvedilol and hydralazine at home   - SBPs to 190s on 7/15, increased hydralazine dose to 75mg q8h      GI:     Diet: NPO w/ TFs   Monitor BMs  - hold TFs today in preparation for possible extubation      Renal/:    #CKD Stage 2  Baseline Scr (): 2.33  On admission, SCr at 1.6  Monitor UO  Monitor SCr  - Cr now 2.68, pt's baseline around 2-2.3  - check urine lytes in setting of metabolic acidosis, lactate wnl      ID:     #Encephalopathy possibly 2/2 sepsis - hypothermic to 90.4 with leukopenia   procal: neg  CT abd and pelvis: no infectious source   c/w meropenem ( - )  - improved temps  am, no hypothermia overnight  -  blood cx 2/2 ngtd  -  ua positive leukest, negative nitrites, elevated WBCs  - f/u MRSA swab      Endo:     #T2DM   #Hypoglycemia  on detemir 20 U at home   Last A1C 2022 7.3  - am cortisol 7.1 wnl  - has had persistent hypoglycemia despite TFs, finger sticks in 50s.   - Per family, pt has had intermittent hypoglycemia over past month despite coming down on daily insulin and maintaining PO intake  - Possible component of CKD & poor insulin clearance vs new onset endogenous insulin excess  - stop D5 per endo reccs, continue with correctional scale insulin admelog 6qh  - f/u c-peptide in AM    Heme:     DVT Ppx: Heparin     Ethics: pending GOC discussion with family.        For Follow-Up:    AM C-peptide  Nausea management with zofran (or alternative agents if QTc remains elevated)  wean off oxygen at tolerated        Vital Signs Last 24 Hrs  T(C): 36.6 (2022 16:00), Max: 37.3 (2022 08:00)  T(F): 97.9 (2022 16:00), Max: 99.1 (2022 08:00)  HR: 101 (2022 16:00) (57 - 101)  BP: 159/85 (2022 16:00) (135/84 - 178/94)  BP(mean): 108 (2022 16:00) (91 - 134)  RR: 17 (2022 16:00) (12 - 21)  SpO2: 98% (2022 16:00) (95% - 100%)    Parameters below as of 2022 08:00  Patient On (Oxygen Delivery Method): CPAP 5/5    O2 Concentration (%): 30  I&O's Summary    15 Jul 2022 07:01  -  2022 07:00  --------------------------------------------------------  IN: 2816 mL / OUT: 2030 mL / NET: 786 mL    2022 07:01  -  2022 18:16  --------------------------------------------------------  IN: 251 mL / OUT: 325 mL / NET: -74 mL          MEDICATIONS  (STANDING):  amLODIPine   Tablet 10 milliGRAM(s) Oral daily  aspirin  chewable 81 milliGRAM(s) Oral daily  atorvastatin 20 milliGRAM(s) Oral at bedtime  chlorhexidine 4% Liquid 1 Application(s) Topical <User Schedule>  dexMEDEtomidine Infusion 0.2 MICROgram(s)/kG/Hr (4.4 mL/Hr) IV Continuous <Continuous>  dextrose 50% Injectable 25 Gram(s) IV Push once  dextrose 50% Injectable 12.5 Gram(s) IV Push once  dextrose 50% Injectable 25 Gram(s) IV Push once  glucagon  Injectable 1 milliGRAM(s) IntraMuscular once  heparin   Injectable 5000 Unit(s) SubCutaneous every 12 hours  hydrALAZINE 75 milliGRAM(s) Oral three times a day  insulin lispro (ADMELOG) corrective regimen sliding scale   SubCutaneous every 6 hours  meropenem  IVPB 1000 milliGRAM(s) IV Intermittent every 12 hours    MEDICATIONS  (PRN):  acetaminophen     Tablet .. 650 milliGRAM(s) Oral every 6 hours PRN Temp greater or equal to 38C (100.4F), Mild Pain (1 - 3)  dextrose Oral Gel 15 Gram(s) Oral once PRN Blood Glucose LESS THAN 70 milliGRAM(s)/deciliter        LABS                                            8.4                   Neurophils% (auto):   69.4   ( @ 00:15):    4.10 )-----------(180          Lymphocytes% (auto):  14.1                                          27.5                   Eosinphils% (auto):   4.1      Manual%: Neutrophils x    ; Lymphocytes x    ; Eosinophils x    ; Bands%: x    ; Blasts x                                    140    |  108    |  50                  Calcium: 8.5   / iCa: x      ( @ 00:15)    ----------------------------<  276       Magnesium: 2.00                             4.6     |  22     |  2.68             Phosphorous: 4.9      TPro  6.0    /  Alb  2.7    /  TBili  0.3    /  DBili  x      /  AST  52     /  ALT  28     /  AlkPhos  115    2022 00:15    (  @ 00:40 )   PT: 13.7 sec;   INR: 1.18 ratio  aPTT: x

## 2022-07-16 NOTE — CONSULT NOTE ADULT - ASSESSMENT
This is a 66 yo F /w a PMH OF DM2 (A1c 7.3% on this admission), CHF, sarcoidosis who presents with AMS c/b hypercapnic respiratory failure and CHF exacerbation requiring intubation.    Consulted for: Persistent hypoglycemia    #Hypoglycemia  Noted on day of admission, possibly related to home doses of insulin that patient was taking and poor PO intake  Concern for AI as below   Now on dextrose fluids with hyperglycemia    Recs:   -Stop dextrose fluids and monitor FS  -stop low dose correctional scale for now and monitor FS    #R/o Adrenal Insufficiency  AM cortisol returned 7.1. ACTH stimulation performed showed robust response (22 and 24 at 30 minute and 60 minute frances respectively). A value > 18 rules out primary AI (not necessarily secondary AI)  TFTs are wnl  Very unlikely to have adrenal insufficiency given elevated BP requiring antihypertensives    Recs:   -Would hold off on any steroids at this time  -Can f/u ACTH in the lab     #T2DM, controlled  A1c 7.3%. Goal < 7%  Inpatient FS goal 140-180  Home regimen:   Recs:   -Hold standing insulin at this time  -Hold low dose correctional scale at this time - stopping the dextrose may cause FS to go down and would not want to give additional insulin for now based on values while on dextrose.   -If FS are elevated off of dextrose, will resume at least correctional insulin. Patient is also on 24 hr tube feeds  For d/c:   -  -Can fu with PCP for DM care    #HTN  BP Goal < 130/80  Continue management per primary team    #HLD  LDL goal < 70  Statin if no contraindications    Taya Kearns MD  Endocrine Fellow  Can be reached via teams.     For all urgent matters, can call answering service at [phone] (weekdays); [phone] (nights/weekends)  Any non-urgent consults or questions can be emailed to [email or web address] or [email or web address]     This is a 68 yo F /w a PMH OF DM2 (A1c 7.3% on this admission), CHF, sarcoidosis who presents with AMS c/b hypercapnic respiratory failure and CHF exacerbation requiring intubation.    Consulted for: Persistent hypoglycemia    #Hypoglycemia  Noted on day of admission, possibly related to home doses of insulin that patient was taking and poor PO intake  Concern for AI as below   Now on dextrose fluids with hyperglycemia    Recs:   -Stop dextrose fluids and monitor FS  -stop low dose correctional scale for now and monitor FS    #R/o Adrenal Insufficiency  AM cortisol returned 7.1. ACTH stimulation performed showed robust response (22 and 24 at 30 minute and 60 minute frances respectively). A value > 18 rules out primary AI (not necessarily secondary AI)  TFTs are wnl  Very unlikely to have adrenal insufficiency given elevated BP requiring antihypertensives    Recs:   -Would hold off on any steroids at this time  -Can f/u ACTH in the lab     #T2DM, controlled  A1c 7.3%. Goal < 7%  Inpatient FS goal 140-180  Home regimen:   Recs:   -Hold standing insulin at this time  -Hold low dose correctional scale at this time - stopping the dextrose may cause FS to go down and would not want to give additional insulin for now based on values while on dextrose.   -If FS are elevated off of dextrose, will resume at least correctional insulin. Patient is also on 24 hr tube feeds  -C-peptide noted to be low at 0.9 - drawn when glucoses were in the 80s-90s  For d/c:   -  -Can fu with PCP for DM care    #HTN  BP Goal < 130/80  Continue management per primary team    #HLD  LDL goal < 70  Statin if no contraindications    Taya Kearns MD  Endocrine Fellow  Can be reached via teams.     For all urgent matters, can call answering service at [phone] (weekdays); [phone] (nights/weekends)  Any non-urgent consults or questions can be emailed to [email or web address] or [email or web address]     This is a 68 yo F /w a PMH OF DM2 (A1c 7.3% on this admission), CHF, sarcoidosis who presents with AMS c/b hypercapnic respiratory failure and CHF exacerbation requiring intubation.    Consulted for: Persistent hypoglycemia    #Hypoglycemia  Noted on day of admission, possibly related to home doses of insulin that patient was taking, poor PO intake, and low GFR  Concern for AI as below   Now on dextrose fluids with hyperglycemia    Recs:   -Stop dextrose fluids and monitor FS  -stop low dose correctional scale for now and monitor FS    #R/o Adrenal Insufficiency  AM cortisol returned 7.1. ACTH stimulation performed showed robust response (22 and 24 at 30 minute and 60 minute frances respectively). A value > 18 rules out primary AI (not necessarily secondary AI)  TFTs are wnl  Very unlikely to have adrenal insufficiency given elevated BP requiring antihypertensives    Recs:   -Would hold off on any steroids at this time  -Can f/u ACTH in the lab     #T2DM, controlled  A1c 7.3%. Goal < 7%  Inpatient FS goal 140-180  Home regimen:   Recs:   -Hold standing insulin at this time  -Hold low dose correctional scale at this time - stopping the dextrose may cause FS to go down and would not want to give additional insulin for now based on values while on dextrose.   -If FS are elevated off of dextrose, will resume at least correctional insulin. Patient is also on 24 hr tube feeds  -C-peptide noted to be low at 0.9 - drawn when glucoses were in the 80s-90s  For d/c:   -  -Can fu with PCP for DM care    #HTN  BP Goal < 130/80  Continue management per primary team    #HLD  LDL goal < 70  Statin if no contraindications    Taya Kearns MD  Endocrine Fellow  Can be reached via teams.     For all urgent matters, can call answering service at [phone] (weekdays); [phone] (nights/weekends)  Any non-urgent consults or questions can be emailed to [email or web address] or [email or web address]     This is a 66 yo F /w a PMH OF DM2 (A1c 7.3% on this admission), CHF, sarcoidosis who presents with AMS c/b hypercapnic respiratory failure and CHF exacerbation requiring intubation.    Consulted for: Persistent hypoglycemia    #Hypoglycemia  Noted on day of admission, possibly related to home doses of insulin that patient was taking, poor PO intake, and low GFR  Concern for AI as below   Now on dextrose fluids with hyperglycemia    Recs:   -Stop dextrose fluids and monitor FS  -stop low dose correctional scale for now and monitor FS    #R/o Adrenal Insufficiency  AM cortisol returned 7.1. ACTH stimulation performed showed robust response (22 and 24 at 30 minute and 60 minute frances respectively). A value > 18 rules out primary AI (not necessarily secondary AI)  TFTs are wnl  Very unlikely to have adrenal insufficiency given elevated BP requiring antihypertensives    Recs:   -Would hold off on any steroids at this time  -Can f/u ACTH in the lab     #T2DM, controlled  A1c 7.3%. Goal < 7%  Inpatient FS goal 140-180  Home regimen:   Recs:   -Hold standing insulin at this time  -Hold low dose correctional scale at this time - stopping the dextrose may cause FS to go down and would not want to give additional insulin for now based on values while on dextrose.   -If FS are elevated off of dextrose, will resume at least correctional insulin. Patient is also on 24 hr tube feeds  -C-peptide noted to be low at 0.9 - drawn when glucoses were in the 80s-90s  For d/c:   -will determine need fo insulin. Limited DM agents upon d/c because of low GFR  -Can fu with PCP for DM care    #HTN  BP Goal < 130/80  Continue management per primary team    #HLD  LDL goal < 70  Statin if no contraindications    Taya Kearns MD  Endocrine Fellow  Can be reached via teams.     For all urgent matters, can call answering service at [phone] (weekdays); [phone] (nights/weekends)  Any non-urgent consults or questions can be emailed to [email or web address] or [email or web address]     This is a 66 yo F /w a PMH OF DM2 (A1c 7.3% on this admission), CHF, sarcoidosis who presents with AMS c/b hypercapnic respiratory failure and CHF exacerbation requiring intubation.    Consulted for: Persistent hypoglycemia    #Hypoglycemia  Noted on day of admission, possibly related to home doses of insulin that patient was taking, poor PO intake, and low GFR  Concern for AI as below   Now on dextrose fluids with hyperglycemia    Recs:   -Stop dextrose fluids and monitor FS  -start low dose correctional scale for now and monitor FS    #R/o Adrenal Insufficiency  AM cortisol returned 7.1. ACTH stimulation performed showed robust response (22 and 24 at 30 minute and 60 minute frances respectively). A value > 18 rules out primary AI (not necessarily secondary AI)  TFTs are wnl  Very unlikely to have adrenal insufficiency given elevated BP requiring antihypertensives    Recs:   -Would hold off on any steroids at this time  -Can f/u ACTH in the lab     #T2DM, controlled  A1c 7.3%. Goal < 7%  Inpatient FS goal 140-180  Home regimen: Lantus 20 units at night  Recs:   -Hold standing insulin at this time  -Start low dose correctional insulin qAc and low dose correctional insulin qHS.   -Patient is also on 24 hr tube feeds  -C-peptide noted to be low at 0.9 - drawn when glucoses were in the 80s-90s  -Repeat C-peptide with next lab draw  For d/c:   -will determine need fo insulin. Limited DM agents upon d/c because of low GFR  -Can fu with PCP for DM care    #HTN  BP Goal < 130/80  Continue management per primary team    #HLD  LDL goal < 70  Statin if no contraindications    Taya Kearns MD  Endocrine Fellow  Can be reached via teams.     For all urgent matters, can call answering service at [phone] (weekdays); [phone] (nights/weekends)  Any non-urgent consults or questions can be emailed to [email or web address] or [email or web address]     This is a 66 yo F /w a PMH OF DM2 (A1c 7.3% on this admission), CHF, sarcoidosis who presents with AMS c/b hypercapnic respiratory failure and CHF exacerbation requiring intubation.    Consulted for: Persistent hypoglycemia    #Hypoglycemia  Noted on day of admission, possibly related to home doses of insulin that patient was taking, poor PO intake, and low GFR  Concern for AI as below -->ruled out  Now on dextrose fluids with hyperglycemia    Recs:   -Stop dextrose fluids and monitor FS  -start low dose correctional scale for now and monitor FS    #R/o Adrenal Insufficiency  AM cortisol returned 7.1. ACTH stimulation performed showed robust response (22 and 24 at 30 minute and 60 minute frances respectively). A value > 18 rules out primary AI (not necessarily secondary AI). Repeat AM cortisol was normal at 16.1. Pt does not have adrenal insufficiency.  TFTs are wnl  Very unlikely to have adrenal insufficiency given elevated BP requiring antihypertensives    Recs:   -Would hold off on any steroids at this time  -Can f/u ACTH in the lab     #T2DM, controlled  A1c 7.3%. Goal < 7%  Inpatient FS goal 140-180  Home regimen: Lantus 20 units at night  Recs:   -Hold standing insulin at this time, monitor off dextrose fluids. If persistently >180s will need insulin (depending on PO status either NPH vs. lantus/admelog)  -Start low dose correctional insulin qAc and low dose correctional insulin qHS.   -Patient is also on 24 hr tube feeds  -C-peptide noted to be low at 0.9 - drawn when glucoses were in the 80s-90s which is appropriate  -Repeat C-peptide with next lab draw with BMP  For d/c:   -will determine need for insulin. Limited DM agents upon d/c because of low GFR  -Can fu with PCP for DM care    #HTN  BP Goal < 130/80  Continue management per primary team    #HLD  LDL goal < 70  Statin if no contraindications    Taya Kearns MD  Endocrine Fellow  Can be reached via teams.     For all urgent matters, can call answering service at [phone] (weekdays); [phone] (nights/weekends)  Any non-urgent consults or questions can be emailed to [email or web address] or [email or web address]

## 2022-07-16 NOTE — CONSULT NOTE ADULT - SUBJECTIVE AND OBJECTIVE BOX
HPI:  This is a 66 yo F /w a PMH OF DM2 (A1c 7.3% on this admission), CHF, sarcoidosis who presents with AMS c/b hypercapnic respiratory failure and CHF exacerbation requiring intubation.    Consulted for: Persistent hypoglycemia    Patient has a hx of T2DM on __  A1c 7.3%  Endocrinologist   Has been having persistent hypoglycemia here that was unexplained initially as patient was not receiving insulin.   AM cortisol returned intermediate at 7.1. Patient is s/p ACTH stim test with robust response.   She was started on dextrose fluids and FS are now elevated in the 200s.     PAST MEDICAL & SURGICAL HISTORY:  Type 2 diabetes mellitus      Bilateral cataracts      Fluid in pleural cavity associated with pancreatitis      Pancreatic pseudocyst/cyst  s/p drain placement and removal      HTN (hypertension)      HLD (hyperlipidemia)      History of amputation of right great toe        H/O:  section        History of laparoscopic cholecystectomy          FAMILY HISTORY:  No pertinent family history in first degree relatives        Social History:    Home Medications:  aspirin 81 mg oral delayed release tablet: 1 tab(s) orally once a day (2022 04:57)  atorvastatin 20 mg oral tablet: 1 tab(s) orally once a day (2022 04:57)  dexamethasone 0.1% ophthalmic suspension: 5 drop(s) to each affected ear once a day (2022 04:57)  insulin detemir 100 units/mL subcutaneous solution: 20 unit(s) subcutaneous once a day (at bedtime) (2022 04:57)      MEDICATIONS  (STANDING):  amLODIPine   Tablet 10 milliGRAM(s) Oral daily  aspirin  chewable 81 milliGRAM(s) Oral daily  atorvastatin 20 milliGRAM(s) Oral at bedtime  chlorhexidine 0.12% Liquid 15 milliLiter(s) Oral Mucosa every 12 hours  chlorhexidine 4% Liquid 1 Application(s) Topical <User Schedule>  dexMEDEtomidine Infusion 0.2 MICROgram(s)/kG/Hr (4.4 mL/Hr) IV Continuous <Continuous>  dextrose 5% + lactated ringers. 1000 milliLiter(s) (30 mL/Hr) IV Continuous <Continuous>  dextrose 5%. 1000 milliLiter(s) (100 mL/Hr) IV Continuous <Continuous>  dextrose 5%. 1000 milliLiter(s) (50 mL/Hr) IV Continuous <Continuous>  dextrose 50% Injectable 25 Gram(s) IV Push once  dextrose 50% Injectable 12.5 Gram(s) IV Push once  dextrose 50% Injectable 25 Gram(s) IV Push once  glucagon  Injectable 1 milliGRAM(s) IntraMuscular once  heparin   Injectable 5000 Unit(s) SubCutaneous every 12 hours  hydrALAZINE 75 milliGRAM(s) Oral three times a day  insulin lispro (ADMELOG) corrective regimen sliding scale   SubCutaneous every 6 hours  meropenem  IVPB 1000 milliGRAM(s) IV Intermittent every 12 hours    MEDICATIONS  (PRN):  acetaminophen     Tablet .. 650 milliGRAM(s) Oral every 6 hours PRN Temp greater or equal to 38C (100.4F), Mild Pain (1 - 3)  dextrose Oral Gel 15 Gram(s) Oral once PRN Blood Glucose LESS THAN 70 milliGRAM(s)/deciliter      Allergies    penicillin ([facility] Synd)    Intolerances      Review of Systems: unable to obtain    PHYSICAL EXAM:  VITALS: T(C): 37.3 (22 @ 08:00)  T(F): 99.1 (22 @ 08:00), Max: 99.1 (22 @ 08:00)  HR: 74 (22 @ 10:00) (56 - 79)  BP: 165/88 (22 @ 10:00) (140/70 - 194/86)  RR:  (12 - 21)  SpO2:  (95% - 100%)  Wt(kg): --  GENERAL: NAD, intubated  EYES: No proptosis, no lid lag, anicteric  THYROID: Normal size, no palpable nodules  RESPIRATORY: Clear to auscultation bilaterally  CARDIOVASCULAR: Regular rate and rhythm  GI: Soft, nontender, non distended  EXT: b/l feet without wounds; 2+ pulses    POCT Blood Glucose.: 296 mg/dL (22 @ 06:30)  POCT Blood Glucose.: 268 mg/dL (07-15-22 @ 23:58)  POCT Blood Glucose.: 215 mg/dL (07-15-22 @ 17:39)  POCT Blood Glucose.: 200 mg/dL (07-15-22 @ 11:19)  POCT Blood Glucose.: 115 mg/dL (07-15-22 @ 06:08)  POCT Blood Glucose.: 93 mg/dL (07-15-22 @ 00:29)  POCT Blood Glucose.: 80 mg/dL (22 @ 16:55)  POCT Blood Glucose.: 69 mg/dL (22 @ 14:03)  POCT Blood Glucose.: 63 mg/dL (22 @ 11:13)  POCT Blood Glucose.: 100 mg/dL (22 @ 05:53)  POCT Blood Glucose.: 55 mg/dL (22 @ 05:32)  POCT Blood Glucose.: 102 mg/dL (22 @ 23:38)  POCT Blood Glucose.: 54 mg/dL (22 @ 23:01)  POCT Blood Glucose.: 53 mg/dL (22 @ 22:59)  POCT Blood Glucose.: 78 mg/dL (22 @ 16:04)                            8.4    4.10  )-----------( 180      ( 2022 00:15 )             27.5           140  |  108<H>  |  50<H>  ----------------------------<  276<H>  4.6   |  22  |  2.68<H>    eGFR: 19<L>    Ca    8.5        Mg     2.00       Phos  4.9         TPro  6.0  /  Alb  2.7<L>  /  TBili  0.3  /  DBili  x   /  AST  52<H>  /  ALT  28  /  AlkPhos  115        Thyroid Function Tests:   @ 04:10 TSH 3.37 FreeT4 -- T3 -- Anti TPO -- Anti Thyroglobulin Ab -- TSI --      A1C with Estimated Average Glucose Result: 7.3 % (22 @ 06:00)  A1C with Estimated Average Glucose Result: 7.7 % (22 @ 05:37)  A1C with Estimated Average Glucose Result: 9.2 % (21 @ 06:47)          Radiology:                HPI:  This is a 66 yo F /w a PMH OF DM2 (A1c 7.3% on this admission), CHF, sarcoidosis who presents with AMS c/b hypercapnic respiratory failure and CHF exacerbation requiring intubation.    Consulted for: Persistent hypoglycemia    Patient has a hx of T2DM  Was taking Lantus 24 units at night, Humalog 7-9 units before meals. Has only been taking Lantus 20 units at night for the past couple weeks. Last took it the night prior to admission.   Was on Metformin and Victoza, stopped in 2021 upon d/c from [facility].   A1c 7.3%  No Endocrinologist   Has been having persistent hypoglycemia here that was unexplained initially as patient was not receiving insulin.   AM cortisol returned intermediate at 7.1. Patient is s/p ACTH stim test with robust response.   She was started on dextrose fluids and FS are now elevated in the 200s.     PAST MEDICAL & SURGICAL HISTORY:  Type 2 diabetes mellitus      Bilateral cataracts      Fluid in pleural cavity associated with pancreatitis      Pancreatic pseudocyst/cyst  s/p drain placement and removal      HTN (hypertension)      HLD (hyperlipidemia)      History of amputation of right great toe        H/O:  section        History of laparoscopic cholecystectomy          FAMILY HISTORY:  No pertinent family history in first degree relatives        Social History:    Home Medications:  aspirin 81 mg oral delayed release tablet: 1 tab(s) orally once a day (2022 04:57)  atorvastatin 20 mg oral tablet: 1 tab(s) orally once a day (2022 04:57)  dexamethasone 0.1% ophthalmic suspension: 5 drop(s) to each affected ear once a day (2022 04:57)  insulin detemir 100 units/mL subcutaneous solution: 20 unit(s) subcutaneous once a day (at bedtime) (2022 04:57)      MEDICATIONS  (STANDING):  amLODIPine   Tablet 10 milliGRAM(s) Oral daily  aspirin  chewable 81 milliGRAM(s) Oral daily  atorvastatin 20 milliGRAM(s) Oral at bedtime  chlorhexidine 0.12% Liquid 15 milliLiter(s) Oral Mucosa every 12 hours  chlorhexidine 4% Liquid 1 Application(s) Topical <User Schedule>  dexMEDEtomidine Infusion 0.2 MICROgram(s)/kG/Hr (4.4 mL/Hr) IV Continuous <Continuous>  dextrose 5% + lactated ringers. 1000 milliLiter(s) (30 mL/Hr) IV Continuous <Continuous>  dextrose 5%. 1000 milliLiter(s) (100 mL/Hr) IV Continuous <Continuous>  dextrose 5%. 1000 milliLiter(s) (50 mL/Hr) IV Continuous <Continuous>  dextrose 50% Injectable 25 Gram(s) IV Push once  dextrose 50% Injectable 12.5 Gram(s) IV Push once  dextrose 50% Injectable 25 Gram(s) IV Push once  glucagon  Injectable 1 milliGRAM(s) IntraMuscular once  heparin   Injectable 5000 Unit(s) SubCutaneous every 12 hours  hydrALAZINE 75 milliGRAM(s) Oral three times a day  insulin lispro (ADMELOG) corrective regimen sliding scale   SubCutaneous every 6 hours  meropenem  IVPB 1000 milliGRAM(s) IV Intermittent every 12 hours    MEDICATIONS  (PRN):  acetaminophen     Tablet .. 650 milliGRAM(s) Oral every 6 hours PRN Temp greater or equal to 38C (100.4F), Mild Pain (1 - 3)  dextrose Oral Gel 15 Gram(s) Oral once PRN Blood Glucose LESS THAN 70 milliGRAM(s)/deciliter      Allergies    penicillin ([facility] Synd)    Intolerances      Review of Systems: unable to obtain    PHYSICAL EXAM:  VITALS: T(C): 37.3 (22 @ 08:00)  T(F): 99.1 (22 @ 08:00), Max: 99.1 (22 @ 08:00)  HR: 74 (22 @ 10:00) (56 - 79)  BP: 165/88 (22 @ 10:00) (140/70 - 194/86)  RR:  (12 - 21)  SpO2:  (95% - 100%)  Wt(kg): --  GENERAL: NAD, intubated  EYES: No proptosis, no lid lag, anicteric  THYROID: Normal size, no palpable nodules  RESPIRATORY: Clear to auscultation bilaterally  CARDIOVASCULAR: Regular rate and rhythm  GI: Soft, nontender, non distended  EXT: b/l feet without wounds; 2+ pulses    POCT Blood Glucose.: 296 mg/dL (22 @ 06:30)  POCT Blood Glucose.: 268 mg/dL (07-15-22 @ 23:58)  POCT Blood Glucose.: 215 mg/dL (07-15-22 @ 17:39)  POCT Blood Glucose.: 200 mg/dL (07-15-22 @ 11:19)  POCT Blood Glucose.: 115 mg/dL (07-15-22 @ 06:08)  POCT Blood Glucose.: 93 mg/dL (07-15-22 @ 00:29)  POCT Blood Glucose.: 80 mg/dL (22 @ 16:55)  POCT Blood Glucose.: 69 mg/dL (22 @ 14:03)  POCT Blood Glucose.: 63 mg/dL (22 @ 11:13)  POCT Blood Glucose.: 100 mg/dL (22 @ 05:53)  POCT Blood Glucose.: 55 mg/dL (22 @ 05:32)  POCT Blood Glucose.: 102 mg/dL (22 @ 23:38)  POCT Blood Glucose.: 54 mg/dL (22 @ 23:01)  POCT Blood Glucose.: 53 mg/dL (22 @ 22:59)  POCT Blood Glucose.: 78 mg/dL (22 @ 16:04)                            8.4    4.10  )-----------( 180      ( 2022 00:15 )             27.5           140  |  108<H>  |  50<H>  ----------------------------<  276<H>  4.6   |  22  |  2.68<H>    eGFR: 19<L>    Ca    8.5        Mg     2.00       Phos  4.9         TPro  6.0  /  Alb  2.7<L>  /  TBili  0.3  /  DBili  x   /  AST  52<H>  /  ALT  28  /  AlkPhos  115        Thyroid Function Tests:   @ 04:10 TSH 3.37 FreeT4 -- T3 -- Anti TPO -- Anti Thyroglobulin Ab -- TSI --      A1C with Estimated Average Glucose Result: 7.3 % (22 @ 06:00)  A1C with Estimated Average Glucose Result: 7.7 % (22 @ 05:37)  A1C with Estimated Average Glucose Result: 9.2 % (21 @ 06:47)          Radiology:                HPI:  This is a 68 yo F /w a PMH OF DM2 (A1c 7.3% on this admission), CHF, sarcoidosis who presents with AMS c/b hypercapnic respiratory failure and CHF exacerbation requiring intubation.    Consulted for: Persistent hypoglycemia  History obtained from patient's daughter    Patient has a hx of T2DM  Admitted to [facility] in , and was discharged with Lantus 24 units at night, Humalog 7-9 units before meals.   Has been having more hypoglycemic episodes in the past several months so her PCP told her to take Lantus 20 units qhS and only take Humalog if her FS are above 200.   Has only been taking Lantus 20 units at night for the past couple weeks. Last took it the night prior to admission.   Was on Metformin and Victoza, stopped in 2021 upon d/c from [facility].     A1c 7.3%  No Endocrinologist   Has been having persistent hypoglycemia here that was unexplained initially as patient was not receiving insulin.   AM cortisol returned intermediate at 7.1. Patient is s/p ACTH stim test with robust response.   She was started on dextrose fluids and FS are now elevated in the 200s.     PAST MEDICAL & SURGICAL HISTORY:  Type 2 diabetes mellitus      Bilateral cataracts      Fluid in pleural cavity associated with pancreatitis      Pancreatic pseudocyst/cyst  s/p drain placement and removal      HTN (hypertension)      HLD (hyperlipidemia)      History of amputation of right great toe        H/O:  section        History of laparoscopic cholecystectomy          FAMILY HISTORY:  DM in parents        Social History: No tobacco use    Home Medications:  aspirin 81 mg oral delayed release tablet: 1 tab(s) orally once a day (2022 04:57)  atorvastatin 20 mg oral tablet: 1 tab(s) orally once a day (2022 04:57)  dexamethasone 0.1% ophthalmic suspension: 5 drop(s) to each affected ear once a day (2022 04:57)  insulin detemir 100 units/mL subcutaneous solution: 20 unit(s) subcutaneous once a day (at bedtime) (2022 04:57)      MEDICATIONS  (STANDING):  amLODIPine   Tablet 10 milliGRAM(s) Oral daily  aspirin  chewable 81 milliGRAM(s) Oral daily  atorvastatin 20 milliGRAM(s) Oral at bedtime  chlorhexidine 0.12% Liquid 15 milliLiter(s) Oral Mucosa every 12 hours  chlorhexidine 4% Liquid 1 Application(s) Topical <User Schedule>  dexMEDEtomidine Infusion 0.2 MICROgram(s)/kG/Hr (4.4 mL/Hr) IV Continuous <Continuous>  dextrose 5% + lactated ringers. 1000 milliLiter(s) (30 mL/Hr) IV Continuous <Continuous>  dextrose 5%. 1000 milliLiter(s) (100 mL/Hr) IV Continuous <Continuous>  dextrose 5%. 1000 milliLiter(s) (50 mL/Hr) IV Continuous <Continuous>  dextrose 50% Injectable 25 Gram(s) IV Push once  dextrose 50% Injectable 12.5 Gram(s) IV Push once  dextrose 50% Injectable 25 Gram(s) IV Push once  glucagon  Injectable 1 milliGRAM(s) IntraMuscular once  heparin   Injectable 5000 Unit(s) SubCutaneous every 12 hours  hydrALAZINE 75 milliGRAM(s) Oral three times a day  insulin lispro (ADMELOG) corrective regimen sliding scale   SubCutaneous every 6 hours  meropenem  IVPB 1000 milliGRAM(s) IV Intermittent every 12 hours    MEDICATIONS  (PRN):  acetaminophen     Tablet .. 650 milliGRAM(s) Oral every 6 hours PRN Temp greater or equal to 38C (100.4F), Mild Pain (1 - 3)  dextrose Oral Gel 15 Gram(s) Oral once PRN Blood Glucose LESS THAN 70 milliGRAM(s)/deciliter      Allergies    penicillin ([facility] Synd)    Intolerances      Review of Systems: unable to obtain    PHYSICAL EXAM:  VITALS: T(C): 37.3 (22 @ 08:00)  T(F): 99.1 (22 @ 08:00), Max: 99.1 (22 @ 08:00)  HR: 74 (22 @ 10:00) (56 - 79)  BP: 165/88 (22 @ 10:00) (140/70 - 194/86)  RR:  (12 - 21)  SpO2:  (95% - 100%)  Wt(kg): --  GENERAL: NAD, on BiPAP  EYES: No proptosis, no lid lag, anicteric  THYROID: Normal size, no palpable nodules  RESPIRATORY: Clear to auscultation bilaterally  CARDIOVASCULAR: Regular rate and rhythm  GI: Soft, nontender, non distended  EXT: b/l feet without wounds; 2+ pulses    POCT Blood Glucose.: 296 mg/dL (22 @ 06:30)  POCT Blood Glucose.: 268 mg/dL (07-15-22 @ 23:58)  POCT Blood Glucose.: 215 mg/dL (07-15-22 @ 17:39)  POCT Blood Glucose.: 200 mg/dL (07-15-22 @ 11:19)  POCT Blood Glucose.: 115 mg/dL (07-15-22 @ 06:08)  POCT Blood Glucose.: 93 mg/dL (07-15-22 @ 00:29)  POCT Blood Glucose.: 80 mg/dL (22 @ 16:55)  POCT Blood Glucose.: 69 mg/dL (22 @ 14:03)  POCT Blood Glucose.: 63 mg/dL (22 @ 11:13)  POCT Blood Glucose.: 100 mg/dL (22 @ 05:53)  POCT Blood Glucose.: 55 mg/dL (22 @ 05:32)  POCT Blood Glucose.: 102 mg/dL (22 @ 23:38)  POCT Blood Glucose.: 54 mg/dL (22 @ 23:01)  POCT Blood Glucose.: 53 mg/dL (22 @ 22:59)  POCT Blood Glucose.: 78 mg/dL (22 @ 16:04)                            8.4    4.10  )-----------( 180      ( 2022 00:15 )             27.5           140  |  108<H>  |  50<H>  ----------------------------<  276<H>  4.6   |  22  |  2.68<H>    eGFR: 19<L>    Ca    8.5        Mg     2.00       Phos  4.9         TPro  6.0  /  Alb  2.7<L>  /  TBili  0.3  /  DBili  x   /  AST  52<H>  /  ALT  28  /  AlkPhos  115        Thyroid Function Tests:   @ 04:10 TSH 3.37 FreeT4 -- T3 -- Anti TPO -- Anti Thyroglobulin Ab -- TSI --      A1C with Estimated Average Glucose Result: 7.3 % (22 @ 06:00)  A1C with Estimated Average Glucose Result: 7.7 % (22 @ 05:37)  A1C with Estimated Average Glucose Result: 9.2 % (21 @ 06:47)          Radiology:                HPI:  This is a 66 yo F /w a PMH OF DM2 (A1c 7.3% on this admission), CHF, sarcoidosis who presents with AMS c/b hypercapnic respiratory failure and CHF exacerbation requiring intubation.    Consulted for: Persistent hypoglycemia  History obtained from patient's daughter    Patient has a hx of T2DM  Admitted to [facility] in , and was discharged with Lantus 24 units at night, Humalog 7-9 units before meals.   Has been having more hypoglycemic episodes in the past several months so her PCP told her to take Lantus 20 units qhS and only take Humalog if her FS are above 200.   Has only been taking Lantus 20 units at night for the past couple weeks. Last took it the night prior to admission.   Was on Metformin and Victoza, stopped in 2021 upon d/c from [facility].     A1c 7.3%  No Endocrinologist   Has been having persistent hypoglycemia here that was unexplained initially as patient was not receiving insulin.   AM cortisol returned intermediate at 7.1. Patient is s/p ACTH stim test with robust response where repeat baseline AM cortisol was normal at 16.1.  She was started on dextrose fluids and FS are now elevated in the 200s.     PAST MEDICAL & SURGICAL HISTORY:  Type 2 diabetes mellitus      Bilateral cataracts      Fluid in pleural cavity associated with pancreatitis      Pancreatic pseudocyst/cyst  s/p drain placement and removal      HTN (hypertension)      HLD (hyperlipidemia)      History of amputation of right great toe        H/O:  section        History of laparoscopic cholecystectomy          FAMILY HISTORY:  DM in parents        Social History: No tobacco use    Home Medications:  aspirin 81 mg oral delayed release tablet: 1 tab(s) orally once a day (2022 04:57)  atorvastatin 20 mg oral tablet: 1 tab(s) orally once a day (2022 04:57)  dexamethasone 0.1% ophthalmic suspension: 5 drop(s) to each affected ear once a day (2022 04:57)  insulin detemir 100 units/mL subcutaneous solution: 20 unit(s) subcutaneous once a day (at bedtime) (2022 04:57)      MEDICATIONS  (STANDING):  amLODIPine   Tablet 10 milliGRAM(s) Oral daily  aspirin  chewable 81 milliGRAM(s) Oral daily  atorvastatin 20 milliGRAM(s) Oral at bedtime  chlorhexidine 0.12% Liquid 15 milliLiter(s) Oral Mucosa every 12 hours  chlorhexidine 4% Liquid 1 Application(s) Topical <User Schedule>  dexMEDEtomidine Infusion 0.2 MICROgram(s)/kG/Hr (4.4 mL/Hr) IV Continuous <Continuous>  dextrose 5% + lactated ringers. 1000 milliLiter(s) (30 mL/Hr) IV Continuous <Continuous>  dextrose 5%. 1000 milliLiter(s) (100 mL/Hr) IV Continuous <Continuous>  dextrose 5%. 1000 milliLiter(s) (50 mL/Hr) IV Continuous <Continuous>  dextrose 50% Injectable 25 Gram(s) IV Push once  dextrose 50% Injectable 12.5 Gram(s) IV Push once  dextrose 50% Injectable 25 Gram(s) IV Push once  glucagon  Injectable 1 milliGRAM(s) IntraMuscular once  heparin   Injectable 5000 Unit(s) SubCutaneous every 12 hours  hydrALAZINE 75 milliGRAM(s) Oral three times a day  insulin lispro (ADMELOG) corrective regimen sliding scale   SubCutaneous every 6 hours  meropenem  IVPB 1000 milliGRAM(s) IV Intermittent every 12 hours    MEDICATIONS  (PRN):  acetaminophen     Tablet .. 650 milliGRAM(s) Oral every 6 hours PRN Temp greater or equal to 38C (100.4F), Mild Pain (1 - 3)  dextrose Oral Gel 15 Gram(s) Oral once PRN Blood Glucose LESS THAN 70 milliGRAM(s)/deciliter      Allergies    penicillin ([place] [facility] Synd)    Intolerances      Review of Systems: unable to obtain    PHYSICAL EXAM:  VITALS: T(C): 37.3 (22 @ 08:00)  T(F): 99.1 (22 @ 08:00), Max: 99.1 (22 @ 08:00)  HR: 74 (22 @ 10:00) (56 - 79)  BP: 165/88 (22 @ 10:00) (140/70 - 194/86)  RR:  (12 - 21)  SpO2:  (95% - 100%)  Wt(kg): --  GENERAL: NAD, on BiPAP  EYES: No proptosis, no lid lag, anicteric  THYROID: Normal size, no palpable nodules  RESPIRATORY: Clear to auscultation bilaterally  CARDIOVASCULAR: Regular rate and rhythm  GI: Soft, nontender, non distended  EXT: b/l feet without wounds; 2+ pulses    POCT Blood Glucose.: 296 mg/dL (22 @ 06:30)  POCT Blood Glucose.: 268 mg/dL (07-15-22 @ 23:58)  POCT Blood Glucose.: 215 mg/dL (07-15-22 @ 17:39)  POCT Blood Glucose.: 200 mg/dL (07-15-22 @ 11:19)  POCT Blood Glucose.: 115 mg/dL (07-15-22 @ 06:08)  POCT Blood Glucose.: 93 mg/dL (07-15-22 @ 00:29)  POCT Blood Glucose.: 80 mg/dL (22 @ 16:55)  POCT Blood Glucose.: 69 mg/dL (22 @ 14:03)  POCT Blood Glucose.: 63 mg/dL (22 @ 11:13)  POCT Blood Glucose.: 100 mg/dL (22 @ 05:53)  POCT Blood Glucose.: 55 mg/dL (22 @ 05:32)  POCT Blood Glucose.: 102 mg/dL (22 @ 23:38)  POCT Blood Glucose.: 54 mg/dL (22 @ 23:01)  POCT Blood Glucose.: 53 mg/dL (22 @ 22:59)  POCT Blood Glucose.: 78 mg/dL (22 @ 16:04)                            8.4    4.10  )-----------( 180      ( 2022 00:15 )             27.5           140  |  108<H>  |  50<H>  ----------------------------<  276<H>  4.6   |  22  |  2.68<H>    eGFR: 19<L>    Ca    8.5        Mg     2.00       Phos  4.9         TPro  6.0  /  Alb  2.7<L>  /  TBili  0.3  /  DBili  x   /  AST  52<H>  /  ALT  28  /  AlkPhos  115        Thyroid Function Tests:   @ 04:10 TSH 3.37 FreeT4 -- T3 -- Anti TPO -- Anti Thyroglobulin Ab -- TSI --      A1C with Estimated Average Glucose Result: 7.3 % (22 @ 06:00)  A1C with Estimated Average Glucose Result: 7.7 % (22 @ 05:37)  A1C with Estimated Average Glucose Result: 9.2 % (21 @ 06:47)          Radiology:

## 2022-07-16 NOTE — PROGRESS NOTE ADULT - ATTENDING COMMENTS
Patient is a 66 yo F w/ HTN, HLD, sarcoidosis, HF who was initially admitted on 7/13 after p/w AMS and ADHF. Patient was intubated 7/13 for AMS. Course c/b MANDY.    #Encephalopathy - Likely 2/2 UTI. Positive UA on admission but no cultures sent. Improved, now following commands  - c/w Meropenem for now, can deescalate after clarifying abx allergies    #Acute hypoxemic respiratory failure - 2/2 ADHF and encephalopathy  - Extubate today to BIPAP then wean as tolerated    #Hypoglycemia - Acutely likely 2/2 infection, improved  - Wean D5 as tolerated  - Monitor FSG  - ACTH stim test    #MANDY - likely 2/2 over diuresis  - Monitor creatinine and UOP  - Restart diuretics when improved    Kevin Julien MD  Pulmonary & Critical Care

## 2022-07-17 DIAGNOSIS — I50.32 CHRONIC DIASTOLIC (CONGESTIVE) HEART FAILURE: ICD-10-CM

## 2022-07-17 DIAGNOSIS — I10 ESSENTIAL (PRIMARY) HYPERTENSION: ICD-10-CM

## 2022-07-17 DIAGNOSIS — R41.82 ALTERED MENTAL STATUS, UNSPECIFIED: ICD-10-CM

## 2022-07-17 DIAGNOSIS — J90 PLEURAL EFFUSION, NOT ELSEWHERE CLASSIFIED: ICD-10-CM

## 2022-07-17 DIAGNOSIS — J96.02 ACUTE RESPIRATORY FAILURE WITH HYPERCAPNIA: ICD-10-CM

## 2022-07-17 DIAGNOSIS — E11.9 TYPE 2 DIABETES MELLITUS WITHOUT COMPLICATIONS: ICD-10-CM

## 2022-07-17 DIAGNOSIS — I46.9 CARDIAC ARREST, CAUSE UNSPECIFIED: ICD-10-CM

## 2022-07-17 LAB
ACTH SER-ACNC: 76.4 PG/ML — HIGH (ref 7.2–63.3)
ALBUMIN SERPL ELPH-MCNC: 2.4 G/DL — LOW (ref 3.3–5)
ALBUMIN SERPL ELPH-MCNC: 2.4 G/DL — LOW (ref 3.3–5)
ALBUMIN SERPL ELPH-MCNC: 2.7 G/DL — LOW (ref 3.3–5)
ALP SERPL-CCNC: 105 U/L — SIGNIFICANT CHANGE UP (ref 40–120)
ALP SERPL-CCNC: 123 U/L — HIGH (ref 40–120)
ALP SERPL-CCNC: 142 U/L — HIGH (ref 40–120)
ALT FLD-CCNC: 30 U/L — SIGNIFICANT CHANGE UP (ref 4–33)
ALT FLD-CCNC: 51 U/L — HIGH (ref 4–33)
ALT FLD-CCNC: 52 U/L — HIGH (ref 4–33)
ANION GAP SERPL CALC-SCNC: 15 MMOL/L — HIGH (ref 7–14)
ANION GAP SERPL CALC-SCNC: 16 MMOL/L — HIGH (ref 7–14)
ANION GAP SERPL CALC-SCNC: 19 MMOL/L — HIGH (ref 7–14)
APPEARANCE UR: ABNORMAL
APTT BLD: 47.8 SEC — HIGH (ref 27–36.3)
AST SERPL-CCNC: 102 U/L — HIGH (ref 4–32)
AST SERPL-CCNC: 49 U/L — HIGH (ref 4–32)
AST SERPL-CCNC: 84 U/L — HIGH (ref 4–32)
BACTERIA # UR AUTO: ABNORMAL
BASE EXCESS BLDV CALC-SCNC: -6.1 MMOL/L — LOW (ref -2–3)
BASOPHILS # BLD AUTO: 0.01 K/UL — SIGNIFICANT CHANGE UP (ref 0–0.2)
BASOPHILS # BLD AUTO: 0.02 K/UL — SIGNIFICANT CHANGE UP (ref 0–0.2)
BASOPHILS NFR BLD AUTO: 0.1 % — SIGNIFICANT CHANGE UP (ref 0–2)
BASOPHILS NFR BLD AUTO: 0.3 % — SIGNIFICANT CHANGE UP (ref 0–2)
BILIRUB SERPL-MCNC: 0.2 MG/DL — SIGNIFICANT CHANGE UP (ref 0.2–1.2)
BILIRUB SERPL-MCNC: 0.3 MG/DL — SIGNIFICANT CHANGE UP (ref 0.2–1.2)
BILIRUB SERPL-MCNC: 0.4 MG/DL — SIGNIFICANT CHANGE UP (ref 0.2–1.2)
BILIRUB UR-MCNC: NEGATIVE — SIGNIFICANT CHANGE UP
BLD GP AB SCN SERPL QL: NEGATIVE — SIGNIFICANT CHANGE UP
BLOOD GAS ARTERIAL - LYTES,HGB,ICA,LACT RESULT: SIGNIFICANT CHANGE UP
BLOOD GAS VENOUS COMPREHENSIVE RESULT: SIGNIFICANT CHANGE UP
BUN SERPL-MCNC: 52 MG/DL — HIGH (ref 7–23)
BUN SERPL-MCNC: 52 MG/DL — HIGH (ref 7–23)
BUN SERPL-MCNC: 58 MG/DL — HIGH (ref 7–23)
C PEPTIDE SERPL-MCNC: 5 NG/ML — HIGH (ref 1.1–4.4)
CALCIUM SERPL-MCNC: 8.5 MG/DL — SIGNIFICANT CHANGE UP (ref 8.4–10.5)
CALCIUM SERPL-MCNC: 8.5 MG/DL — SIGNIFICANT CHANGE UP (ref 8.4–10.5)
CALCIUM SERPL-MCNC: 8.8 MG/DL — SIGNIFICANT CHANGE UP (ref 8.4–10.5)
CHLORIDE BLDV-SCNC: 110 MMOL/L — HIGH (ref 96–108)
CHLORIDE SERPL-SCNC: 106 MMOL/L — SIGNIFICANT CHANGE UP (ref 98–107)
CHLORIDE SERPL-SCNC: 108 MMOL/L — HIGH (ref 98–107)
CHLORIDE SERPL-SCNC: 109 MMOL/L — HIGH (ref 98–107)
CO2 BLDV-SCNC: 19.5 MMOL/L — LOW (ref 22–26)
CO2 SERPL-SCNC: 17 MMOL/L — LOW (ref 22–31)
CO2 SERPL-SCNC: 18 MMOL/L — LOW (ref 22–31)
CO2 SERPL-SCNC: 20 MMOL/L — LOW (ref 22–31)
COLOR SPEC: ABNORMAL
COMMENT - URINE: SIGNIFICANT CHANGE UP
CREAT ?TM UR-MCNC: 227 MG/DL — SIGNIFICANT CHANGE UP
CREAT SERPL-MCNC: 3.14 MG/DL — HIGH (ref 0.5–1.3)
CREAT SERPL-MCNC: 3.25 MG/DL — HIGH (ref 0.5–1.3)
CREAT SERPL-MCNC: 3.49 MG/DL — HIGH (ref 0.5–1.3)
DIFF PNL FLD: ABNORMAL
EGFR: 14 ML/MIN/1.73M2 — LOW
EGFR: 15 ML/MIN/1.73M2 — LOW
EGFR: 16 ML/MIN/1.73M2 — LOW
EOSINOPHIL # BLD AUTO: 0.03 K/UL — SIGNIFICANT CHANGE UP (ref 0–0.5)
EOSINOPHIL # BLD AUTO: 0.04 K/UL — SIGNIFICANT CHANGE UP (ref 0–0.5)
EOSINOPHIL NFR BLD AUTO: 0.4 % — SIGNIFICANT CHANGE UP (ref 0–6)
EOSINOPHIL NFR BLD AUTO: 0.5 % — SIGNIFICANT CHANGE UP (ref 0–6)
EPI CELLS # UR: 5 /HPF — SIGNIFICANT CHANGE UP (ref 0–5)
GAS PNL BLDV: 141 MMOL/L — SIGNIFICANT CHANGE UP (ref 136–145)
GLUCOSE BLDC GLUCOMTR-MCNC: 187 MG/DL — HIGH (ref 70–99)
GLUCOSE BLDC GLUCOMTR-MCNC: 207 MG/DL — HIGH (ref 70–99)
GLUCOSE BLDC GLUCOMTR-MCNC: 227 MG/DL — HIGH (ref 70–99)
GLUCOSE BLDC GLUCOMTR-MCNC: 272 MG/DL — HIGH (ref 70–99)
GLUCOSE BLDV-MCNC: 200 MG/DL — HIGH (ref 70–99)
GLUCOSE SERPL-MCNC: 186 MG/DL — HIGH (ref 70–99)
GLUCOSE SERPL-MCNC: 200 MG/DL — HIGH (ref 70–99)
GLUCOSE SERPL-MCNC: 240 MG/DL — HIGH (ref 70–99)
GLUCOSE UR QL: NEGATIVE — SIGNIFICANT CHANGE UP
GRAN CASTS # UR COMP ASSIST: 2 /LPF — HIGH
HCO3 BLDV-SCNC: 18 MMOL/L — LOW (ref 22–29)
HCT VFR BLD CALC: 27.9 % — LOW (ref 34.5–45)
HCT VFR BLD CALC: 28.7 % — LOW (ref 34.5–45)
HCT VFR BLD CALC: 28.7 % — LOW (ref 34.5–45)
HCT VFR BLDA CALC: 26 % — LOW (ref 34.5–46.5)
HGB BLD CALC-MCNC: 8.6 G/DL — LOW (ref 11.5–15.5)
HGB BLD-MCNC: 8.3 G/DL — LOW (ref 11.5–15.5)
HGB BLD-MCNC: 8.3 G/DL — LOW (ref 11.5–15.5)
HGB BLD-MCNC: 8.6 G/DL — LOW (ref 11.5–15.5)
HYALINE CASTS # UR AUTO: 2 /LPF — SIGNIFICANT CHANGE UP (ref 0–7)
IANC: 5.2 K/UL — SIGNIFICANT CHANGE UP (ref 1.8–7.4)
IANC: 5.65 K/UL — SIGNIFICANT CHANGE UP (ref 1.8–7.4)
IMM GRANULOCYTES NFR BLD AUTO: 1 % — SIGNIFICANT CHANGE UP (ref 0–1.5)
IMM GRANULOCYTES NFR BLD AUTO: 4 % — HIGH (ref 0–1.5)
INR BLD: 1.14 RATIO — SIGNIFICANT CHANGE UP (ref 0.88–1.16)
KETONES UR-MCNC: ABNORMAL
LACTATE BLDV-MCNC: 1.4 MMOL/L — SIGNIFICANT CHANGE UP (ref 0.5–2)
LEUKOCYTE ESTERASE UR-ACNC: ABNORMAL
LYMPHOCYTES # BLD AUTO: 0.8 K/UL — LOW (ref 1–3.3)
LYMPHOCYTES # BLD AUTO: 0.87 K/UL — LOW (ref 1–3.3)
LYMPHOCYTES # BLD AUTO: 11 % — LOW (ref 13–44)
LYMPHOCYTES # BLD AUTO: 12.5 % — LOW (ref 13–44)
MAGNESIUM SERPL-MCNC: 2 MG/DL — SIGNIFICANT CHANGE UP (ref 1.6–2.6)
MCHC RBC-ENTMCNC: 25.2 PG — LOW (ref 27–34)
MCHC RBC-ENTMCNC: 25.5 PG — LOW (ref 27–34)
MCHC RBC-ENTMCNC: 25.7 PG — LOW (ref 27–34)
MCHC RBC-ENTMCNC: 28.9 GM/DL — LOW (ref 32–36)
MCHC RBC-ENTMCNC: 29.7 GM/DL — LOW (ref 32–36)
MCHC RBC-ENTMCNC: 30 GM/DL — LOW (ref 32–36)
MCV RBC AUTO: 84.5 FL — SIGNIFICANT CHANGE UP (ref 80–100)
MCV RBC AUTO: 85.7 FL — SIGNIFICANT CHANGE UP (ref 80–100)
MCV RBC AUTO: 88 FL — SIGNIFICANT CHANGE UP (ref 80–100)
MONOCYTES # BLD AUTO: 0.55 K/UL — SIGNIFICANT CHANGE UP (ref 0–0.9)
MONOCYTES # BLD AUTO: 0.73 K/UL — SIGNIFICANT CHANGE UP (ref 0–0.9)
MONOCYTES NFR BLD AUTO: 10 % — SIGNIFICANT CHANGE UP (ref 2–14)
MONOCYTES NFR BLD AUTO: 7.9 % — SIGNIFICANT CHANGE UP (ref 2–14)
NEUTROPHILS # BLD AUTO: 5.2 K/UL — SIGNIFICANT CHANGE UP (ref 1.8–7.4)
NEUTROPHILS # BLD AUTO: 5.65 K/UL — SIGNIFICANT CHANGE UP (ref 1.8–7.4)
NEUTROPHILS NFR BLD AUTO: 74.9 % — SIGNIFICANT CHANGE UP (ref 43–77)
NEUTROPHILS NFR BLD AUTO: 77.4 % — HIGH (ref 43–77)
NITRITE UR-MCNC: NEGATIVE — SIGNIFICANT CHANGE UP
NRBC # BLD: 0 /100 WBCS — SIGNIFICANT CHANGE UP
NRBC # FLD: 0.02 K/UL — HIGH
NRBC # FLD: 0.03 K/UL — HIGH
NRBC # FLD: 0.04 K/UL — HIGH
OSMOLALITY UR: 365 MOSM/KG — SIGNIFICANT CHANGE UP (ref 50–1200)
PCO2 BLDV: 32 MMHG — LOW (ref 39–42)
PH BLDV: 7.37 — SIGNIFICANT CHANGE UP (ref 7.32–7.43)
PH UR: 6 — SIGNIFICANT CHANGE UP (ref 5–8)
PHOSPHATE SERPL-MCNC: 5.9 MG/DL — HIGH (ref 2.5–4.5)
PHOSPHATE SERPL-MCNC: 6.7 MG/DL — HIGH (ref 2.5–4.5)
PHOSPHATE SERPL-MCNC: 6.9 MG/DL — HIGH (ref 2.5–4.5)
PLATELET # BLD AUTO: 224 K/UL — SIGNIFICANT CHANGE UP (ref 150–400)
PLATELET # BLD AUTO: 227 K/UL — SIGNIFICANT CHANGE UP (ref 150–400)
PLATELET # BLD AUTO: 239 K/UL — SIGNIFICANT CHANGE UP (ref 150–400)
PO2 BLDV: 61 MMHG — SIGNIFICANT CHANGE UP
POTASSIUM BLDV-SCNC: 4.9 MMOL/L — SIGNIFICANT CHANGE UP (ref 3.5–5.1)
POTASSIUM SERPL-MCNC: 4.7 MMOL/L — SIGNIFICANT CHANGE UP (ref 3.5–5.3)
POTASSIUM SERPL-MCNC: 4.9 MMOL/L — SIGNIFICANT CHANGE UP (ref 3.5–5.3)
POTASSIUM SERPL-MCNC: 5.1 MMOL/L — SIGNIFICANT CHANGE UP (ref 3.5–5.3)
POTASSIUM SERPL-SCNC: 4.7 MMOL/L — SIGNIFICANT CHANGE UP (ref 3.5–5.3)
POTASSIUM SERPL-SCNC: 4.9 MMOL/L — SIGNIFICANT CHANGE UP (ref 3.5–5.3)
POTASSIUM SERPL-SCNC: 5.1 MMOL/L — SIGNIFICANT CHANGE UP (ref 3.5–5.3)
PROT SERPL-MCNC: 6 G/DL — SIGNIFICANT CHANGE UP (ref 6–8.3)
PROT SERPL-MCNC: 6.5 G/DL — SIGNIFICANT CHANGE UP (ref 6–8.3)
PROT SERPL-MCNC: 6.6 G/DL — SIGNIFICANT CHANGE UP (ref 6–8.3)
PROT UR-MCNC: ABNORMAL
PROTHROM AB SERPL-ACNC: 13.3 SEC — SIGNIFICANT CHANGE UP (ref 10.5–13.4)
RBC # BLD: 3.26 M/UL — LOW (ref 3.8–5.2)
RBC # BLD: 3.3 M/UL — LOW (ref 3.8–5.2)
RBC # BLD: 3.35 M/UL — LOW (ref 3.8–5.2)
RBC # FLD: 16.3 % — HIGH (ref 10.3–14.5)
RBC # FLD: 16.5 % — HIGH (ref 10.3–14.5)
RBC # FLD: 16.6 % — HIGH (ref 10.3–14.5)
RBC CASTS # UR COMP ASSIST: SIGNIFICANT CHANGE UP /HPF (ref 0–4)
RH IG SCN BLD-IMP: POSITIVE — SIGNIFICANT CHANGE UP
SAO2 % BLDV: 89.6 % — SIGNIFICANT CHANGE UP
SODIUM SERPL-SCNC: 140 MMOL/L — SIGNIFICANT CHANGE UP (ref 135–145)
SODIUM SERPL-SCNC: 144 MMOL/L — SIGNIFICANT CHANGE UP (ref 135–145)
SODIUM SERPL-SCNC: 144 MMOL/L — SIGNIFICANT CHANGE UP (ref 135–145)
SODIUM UR-SCNC: 26 MMOL/L — SIGNIFICANT CHANGE UP
SP GR SPEC: 1.03 — SIGNIFICANT CHANGE UP (ref 1–1.05)
UROBILINOGEN FLD QL: ABNORMAL
UUN UR-MCNC: 231 MG/DL — SIGNIFICANT CHANGE UP
WBC # BLD: 6.95 K/UL — SIGNIFICANT CHANGE UP (ref 3.8–10.5)
WBC # BLD: 7.3 K/UL — SIGNIFICANT CHANGE UP (ref 3.8–10.5)
WBC # BLD: 7.46 K/UL — SIGNIFICANT CHANGE UP (ref 3.8–10.5)
WBC # FLD AUTO: 6.95 K/UL — SIGNIFICANT CHANGE UP (ref 3.8–10.5)
WBC # FLD AUTO: 7.3 K/UL — SIGNIFICANT CHANGE UP (ref 3.8–10.5)
WBC # FLD AUTO: 7.46 K/UL — SIGNIFICANT CHANGE UP (ref 3.8–10.5)
WBC UR QL: SIGNIFICANT CHANGE UP /HPF (ref 0–5)

## 2022-07-17 PROCEDURE — 99291 CRITICAL CARE FIRST HOUR: CPT | Mod: GC

## 2022-07-17 PROCEDURE — 71045 X-RAY EXAM CHEST 1 VIEW: CPT | Mod: 26

## 2022-07-17 PROCEDURE — 70450 CT HEAD/BRAIN W/O DYE: CPT | Mod: 26

## 2022-07-17 PROCEDURE — 95720 EEG PHY/QHP EA INCR W/VEEG: CPT

## 2022-07-17 RX ORDER — FENTANYL CITRATE 50 UG/ML
100 INJECTION INTRAVENOUS ONCE
Refills: 0 | Status: DISCONTINUED | OUTPATIENT
Start: 2022-07-17 | End: 2022-07-17

## 2022-07-17 RX ORDER — PROPOFOL 10 MG/ML
10 INJECTION, EMULSION INTRAVENOUS
Qty: 1000 | Refills: 0 | Status: DISCONTINUED | OUTPATIENT
Start: 2022-07-17 | End: 2022-07-20

## 2022-07-17 RX ORDER — MIDAZOLAM HYDROCHLORIDE 1 MG/ML
2 INJECTION, SOLUTION INTRAMUSCULAR; INTRAVENOUS ONCE
Refills: 0 | Status: DISCONTINUED | OUTPATIENT
Start: 2022-07-17 | End: 2022-07-17

## 2022-07-17 RX ORDER — NOREPINEPHRINE BITARTRATE/D5W 8 MG/250ML
0.05 PLASTIC BAG, INJECTION (ML) INTRAVENOUS
Qty: 8 | Refills: 0 | Status: DISCONTINUED | OUTPATIENT
Start: 2022-07-17 | End: 2022-07-20

## 2022-07-17 RX ORDER — CHLORHEXIDINE GLUCONATE 213 G/1000ML
15 SOLUTION TOPICAL EVERY 12 HOURS
Refills: 0 | Status: DISCONTINUED | OUTPATIENT
Start: 2022-07-17 | End: 2022-07-19

## 2022-07-17 RX ORDER — CHLORHEXIDINE GLUCONATE 213 G/1000ML
1 SOLUTION TOPICAL
Refills: 0 | Status: DISCONTINUED | OUTPATIENT
Start: 2022-07-17 | End: 2022-07-23

## 2022-07-17 RX ORDER — FENTANYL CITRATE 50 UG/ML
0.5 INJECTION INTRAVENOUS
Qty: 2500 | Refills: 0 | Status: DISCONTINUED | OUTPATIENT
Start: 2022-07-17 | End: 2022-07-20

## 2022-07-17 RX ADMIN — MEROPENEM 100 MILLIGRAM(S): 1 INJECTION INTRAVENOUS at 17:33

## 2022-07-17 RX ADMIN — FENTANYL CITRATE 100 MICROGRAM(S): 50 INJECTION INTRAVENOUS at 08:20

## 2022-07-17 RX ADMIN — HEPARIN SODIUM 5000 UNIT(S): 5000 INJECTION INTRAVENOUS; SUBCUTANEOUS at 06:51

## 2022-07-17 RX ADMIN — Medication 8.25 MICROGRAM(S)/KG/MIN: at 17:22

## 2022-07-17 RX ADMIN — PROPOFOL 5.28 MICROGRAM(S)/KG/MIN: 10 INJECTION, EMULSION INTRAVENOUS at 17:27

## 2022-07-17 RX ADMIN — MEROPENEM 100 MILLIGRAM(S): 1 INJECTION INTRAVENOUS at 06:51

## 2022-07-17 RX ADMIN — PROPOFOL 5.28 MICROGRAM(S)/KG/MIN: 10 INJECTION, EMULSION INTRAVENOUS at 21:46

## 2022-07-17 RX ADMIN — Medication 1: at 23:08

## 2022-07-17 RX ADMIN — Medication 2: at 17:34

## 2022-07-17 RX ADMIN — ATORVASTATIN CALCIUM 20 MILLIGRAM(S): 80 TABLET, FILM COATED ORAL at 21:47

## 2022-07-17 RX ADMIN — CHLORHEXIDINE GLUCONATE 1 APPLICATION(S): 213 SOLUTION TOPICAL at 21:47

## 2022-07-17 RX ADMIN — FENTANYL CITRATE 100 MICROGRAM(S): 50 INJECTION INTRAVENOUS at 08:45

## 2022-07-17 RX ADMIN — FENTANYL CITRATE 100 MICROGRAM(S): 50 INJECTION INTRAVENOUS at 08:30

## 2022-07-17 RX ADMIN — Medication 8.25 MICROGRAM(S)/KG/MIN: at 21:46

## 2022-07-17 RX ADMIN — CHLORHEXIDINE GLUCONATE 15 MILLILITER(S): 213 SOLUTION TOPICAL at 17:26

## 2022-07-17 RX ADMIN — FENTANYL CITRATE 100 MICROGRAM(S): 50 INJECTION INTRAVENOUS at 08:35

## 2022-07-17 RX ADMIN — Medication 650 MILLIGRAM(S): at 22:17

## 2022-07-17 RX ADMIN — MIDAZOLAM HYDROCHLORIDE 2 MILLIGRAM(S): 1 INJECTION, SOLUTION INTRAMUSCULAR; INTRAVENOUS at 08:39

## 2022-07-17 RX ADMIN — HEPARIN SODIUM 5000 UNIT(S): 5000 INJECTION INTRAVENOUS; SUBCUTANEOUS at 17:23

## 2022-07-17 RX ADMIN — Medication 650 MILLIGRAM(S): at 21:47

## 2022-07-17 RX ADMIN — AMLODIPINE BESYLATE 10 MILLIGRAM(S): 2.5 TABLET ORAL at 06:51

## 2022-07-17 NOTE — CHART NOTE - NSCHARTNOTEFT_GEN_A_CORE
MICU Accept Note    CHIEF COMPLAINT: Cardiac arrest     HPI / INTERVAL HISTORY:     66 y/o F with pmhx of HTN, HLD, sarcoidosis, CHF, presented to the ED for new onset AMS. She was also found to have pleural effusion, elevated BNP and LE edema concerning for CHF exacerbation. Admitted to ICU initially for metabolic encephalopathy s/p intubation for airway protection (-). MICU course was c/b hypoglycemia with workup neg for adrenal insufficiency. Course c/b septic shock with no source (on meropenem since ).     Was extubated on , was comfortable on RA with good mental status and was sent to the floors. On  AM pt was found to be agonally breathing and RRT was called. Pt went into PEA arrest, ROSC achieved after 4 mins, epix1 . Intubated by anesthesia. Was started on levo 0.1 and prop. Prior to leaving unit patient was waking up with high peak pressures on vent, she received 2mg of versed and became more synchronous with ventilator.  Upon arrival to CTI patient's blood pressures started to decrease despite increasing levo and started to become bradycardic. O2 sat remained at 100%. Went into PEA arrest again, epinephrine x1 given and ROSC was achieved at 4 mins.       PAST MEDICAL & SURGICAL HISTORY:  Type 2 diabetes mellitus      Bilateral cataracts      Fluid in pleural cavity associated with pancreatitis      Pancreatic pseudocyst/cyst  s/p drain placement and removal      HTN (hypertension)      HLD (hyperlipidemia)      History of amputation of right great toe        H/O:  section        History of laparoscopic cholecystectomy          FAMILY HISTORY:  No pertinent family history in first degree relatives        SOCIAL HISTORY:  No alcohol use, recreational drug use.     HOME MEDICATIONS:      Allergies    penicillin ([facility])    Intolerances          REVIEW OF SYSTEMS:  Unable to assess ROS because of mental status     OBJECTIVE:  ICU Vital Signs Last 24 Hrs  T(C): 36.7 (2022 20:00), Max: 36.7 (2022 20:00)  T(F): 98.1 (2022 20:00), Max: 98.1 (2022 20:00)  HR: 98 (2022 23:42) (71 - 101)  BP: 156/88 (2022 23:42) (135/84 - 183/85)  BP(mean): 108 (2022 20:00) (88 - 131)  ABP: --  ABP(mean): --  RR: 18 (2022 23:42) (12 - 21)  SpO2: 98% (2022 20:00) (97% - 100%)    O2 Parameters below as of 2022 23:42  Patient On (Oxygen Delivery Method): nasal cannula  O2 Flow (L/min): 3            -16 @ 07:01  -  07-17 @ 07:00  --------------------------------------------------------  IN: 351 mL / OUT: 545 mL / NET: -194 mL      CAPILLARY BLOOD GLUCOSE      POCT Blood Glucose.: 227 mg/dL (2022 07:27)      PHYSICAL EXAM:    GENERAL: intubated, sedated, OG tube in place   HEAD:  Atraumatic, Normocephalic  EYES: Conjunctiva and sclera clear  ENT: Moist mucous membranes  NECK: Supple, No JVD  CHEST/LUNG: Clear to auscultation bilaterally;   HEART: Regular rate and rhythm; No murmurs, rubs, or gallops  ABDOMEN: BSx4; distended   EXTREMITIES:  2+ Peripheral Pulses, brisk capillary refill. No clubbing, cyanosis, or edema  NERVOUS SYSTEM: sedated   SKIN: No rashes or lesions      HOSPITAL MEDICATIONS:  MEDICATIONS  (STANDING):  amLODIPine   Tablet 10 milliGRAM(s) Oral daily  aspirin  chewable 81 milliGRAM(s) Oral daily  atorvastatin 20 milliGRAM(s) Oral at bedtime  chlorhexidine 4% Liquid 1 Application(s) Topical <User Schedule>  dextrose 50% Injectable 25 Gram(s) IV Push once  dextrose 50% Injectable 12.5 Gram(s) IV Push once  dextrose 50% Injectable 25 Gram(s) IV Push once  fentaNYL    Injectable 100 MICROGram(s) IV Push once  fentaNYL    Injectable 100 MICROGram(s) IV Push once  fentaNYL   Infusion. 0.5 MICROgram(s)/kG/Hr (4.4 mL/Hr) IV Continuous <Continuous>  glucagon  Injectable 1 milliGRAM(s) IntraMuscular once  heparin   Injectable 5000 Unit(s) SubCutaneous every 12 hours  hydrALAZINE 75 milliGRAM(s) Oral three times a day  insulin lispro (ADMELOG) corrective regimen sliding scale   SubCutaneous every 6 hours  meropenem  IVPB 1000 milliGRAM(s) IV Intermittent every 12 hours  midazolam Injectable 2 milliGRAM(s) IV Push once    MEDICATIONS  (PRN):  acetaminophen     Tablet .. 650 milliGRAM(s) Oral every 6 hours PRN Temp greater or equal to 38C (100.4F), Mild Pain (1 - 3)  dextrose Oral Gel 15 Gram(s) Oral once PRN Blood Glucose LESS THAN 70 milliGRAM(s)/deciliter      LABS:                        8.3    7.30  )-----------( 224      ( 2022 06:33 )             28.7     Hgb Trend: 8.3<--, 8.4<--, 7.8<--, 7.4<--, 7.8<--  07-    144  |  109<H>  |  52<H>  ----------------------------<  186<H>  5.1   |  20<L>  |  3.14<H>    Ca    8.8      2022 06:33  Phos  6.7     07-17  Mg     2.00     07-17    TPro  6.5  /  Alb  2.4<L>  /  TBili  0.3  /  DBili  x   /  AST  49<H>  /  ALT  30  /  AlkPhos  105  07-17    Creatinine Trend: 3.14<--, 2.68<--, 2.95<--, 2.39<--, 1.67<--, 1.57<--  PT/INR - ( 2022 00:40 )   PT: 13.7 sec;   INR: 1.18 ratio               Venous Blood Gas:   @ 00:40  7.36/43/45/24/75.7  VBG Lactate: 0.7      MICROBIOLOGY:     RADIOLOGY & ADDITIONAL TESTS:        ASSESSMENT & PLAN:   66 y/o F with PMH of HTN, HLD, sarcoidosis, CHF was intially admitted to MICU after being intubated for airway protection. Went into PEA arrest on the floors with ROSC achieved after total 8 mins s/p intubation     Neuro:    #Metabolic Encephalopathy - structural vs metabolic vs infectious   CT Head (on arrival): neg  TSH: wnl   AM cortisol: wnl   infectious workup as below   after extubation, mental status improved greatly  now s/p apneic event on the floor with PEA arrest   f/u EEG   f/u CT head     Resp:    #Intubated s/p PEA arrest - ? aspiration event   - F/u ABG after intubation   - SAT's as tolerated  - infectious workup as below     #Bilateral pleural effusions i/s/o HF exacerbation   CT Chest on admission with new large right and small left pleural effusions with adjacent compressive atelectasis including atelectasis of the majority of the right lower lobe   - Lasix, Bumex both attempted, with poor UOP in response to either, subsequently given IVF bolus challenge with improvement in UOP  - Monitor O2 sat  - Monitor RR     CV:    #s/p PEA arrest   On  AM pt was found to be agonally breathing and RRT was called. Pt went into PEA arrest, ROSC achieved after 4 mins, epix1 . Intubated by anesthesia. Was started on levo 0.1 and prop. Prior to leaving unit patient was waking up with high peak pressures on vent, she received 2mg of versed and became more synchronous with ventilator.  Upon arrival to CTI patient's blood pressures and started to become bradycardic. O2 sat remained at 100%. Went into PEA arrest again W CPR was restarted and rhythm was again PEA, epinephrine x1 given and ROSC was achieved at 4 mins.   could be 2/2 aspiration event: infectious workup as below     #Shock, likely septic  on levo   wean as tolerated to MAP >65   infectious w/u as below     #HFpEF   BNP on arrival: 4021 with evidence of fluid overload with pleural effusions, b/l pitting edema   Echo (): moderate LV systolic dysfunction, moderate diastolic dysfunction, RV enlargement with decreased RV systolic dysfunction  EKG: RBBB (present from prior EKG's). no acute ischemic findings; trop: 50   Strict I&O  Monitor lytes and replete PRN   Daily weights     #HTN   on amlodipine, carvedilol and hydralazine at home   hold antihypertensives given shock state and restart as needed     GI:     Diet:   Has OG tube in place  TF to be started   Monitor BMs      Renal/:    #MANDY on CKD Stage 2  Baseline Scr (): 2.33  SCr at 3.14   Monitor UO  Monitor SCr  Repeat urine lytes     ID:     #Shock, likely septic - no source found   procal: neg  CT abd and pelvis: no infectious source   initial blood cx (): NGTD   F/U repeat blood cx   c/w meropenem ( - )  Monitor temp  Monitor WBC     Endo:     #T2DM   #Hypoglycemia: adrenal insufficiency workup neg   on detemir 20 U at home   Last A1C 2022 7.3  Monitor FSG   c/w ISS   endocrine following     Heme:     DVT Ppx: Heparin subQ    Ethics: FULL CODE MICU Accept Note    CHIEF COMPLAINT: Cardiac arrest     HPI / INTERVAL HISTORY:     68 y/o F with pmhx of HTN, HLD, sarcoidosis, CHF, presented to the ED for new onset AMS. She was also found to have pleural effusion, elevated BNP and LE edema concerning for CHF exacerbation. Admitted to ICU initially for metabolic encephalopathy s/p intubation for airway protection (-). MICU course was c/b hypoglycemia with workup neg for adrenal insufficiency. Course c/b septic shock with no source (on meropenem since ).     Was extubated on , was comfortable on RA with good mental status and was sent to the floors. On  AM pt was found to be agonally breathing and RRT was called. Pt went into PEA arrest, ROSC achieved after 4 mins, epix1 . Intubated by anesthesia. Was started on levo 0.1 and prop. Prior to leaving unit patient was waking up with high peak pressures on vent, she received 2mg of versed and became more synchronous with ventilator.  Upon arrival to CTI patient's blood pressures started to decrease despite increasing levo and started to become bradycardic. O2 sat remained at 100%. Went into PEA arrest again, epinephrine x1 given and ROSC was achieved at 4 mins.       PAST MEDICAL & SURGICAL HISTORY:  Type 2 diabetes mellitus      Bilateral cataracts      Fluid in pleural cavity associated with pancreatitis      Pancreatic pseudocyst/cyst  s/p drain placement and removal      HTN (hypertension)      HLD (hyperlipidemia)      History of amputation of right great toe        H/O:  section        History of laparoscopic cholecystectomy          FAMILY HISTORY:  No pertinent family history in first degree relatives        SOCIAL HISTORY:  No alcohol use, recreational drug use.     HOME MEDICATIONS:      Allergies    penicillin ([facility])    Intolerances          REVIEW OF SYSTEMS:  Unable to assess ROS because of mental status     OBJECTIVE:  ICU Vital Signs Last 24 Hrs  T(C): 36.7 (2022 20:00), Max: 36.7 (2022 20:00)  T(F): 98.1 (2022 20:00), Max: 98.1 (2022 20:00)  HR: 98 (2022 23:42) (71 - 101)  BP: 156/88 (2022 23:42) (135/84 - 183/85)  BP(mean): 108 (2022 20:00) (88 - 131)  ABP: --  ABP(mean): --  RR: 18 (2022 23:42) (12 - 21)  SpO2: 98% (2022 20:00) (97% - 100%)    O2 Parameters below as of 2022 23:42  Patient On (Oxygen Delivery Method): nasal cannula  O2 Flow (L/min): 3            -16 @ 07:01  -  07-17 @ 07:00  --------------------------------------------------------  IN: 351 mL / OUT: 545 mL / NET: -194 mL      CAPILLARY BLOOD GLUCOSE      POCT Blood Glucose.: 227 mg/dL (2022 07:27)      PHYSICAL EXAM:    GENERAL: intubated, sedated, OG tube in place   HEAD:  Atraumatic, Normocephalic  EYES: Conjunctiva and sclera clear  ENT: Moist mucous membranes  NECK: Supple, No JVD  CHEST/LUNG: Clear to auscultation bilaterally;   HEART: Regular rate and rhythm; No murmurs, rubs, or gallops  ABDOMEN: BSx4; distended   EXTREMITIES:  2+ Peripheral Pulses, brisk capillary refill. No clubbing, cyanosis, or edema  NERVOUS SYSTEM: sedated   SKIN: No rashes or lesions      HOSPITAL MEDICATIONS:  MEDICATIONS  (STANDING):  amLODIPine   Tablet 10 milliGRAM(s) Oral daily  aspirin  chewable 81 milliGRAM(s) Oral daily  atorvastatin 20 milliGRAM(s) Oral at bedtime  chlorhexidine 4% Liquid 1 Application(s) Topical <User Schedule>  dextrose 50% Injectable 25 Gram(s) IV Push once  dextrose 50% Injectable 12.5 Gram(s) IV Push once  dextrose 50% Injectable 25 Gram(s) IV Push once  fentaNYL    Injectable 100 MICROGram(s) IV Push once  fentaNYL    Injectable 100 MICROGram(s) IV Push once  fentaNYL   Infusion. 0.5 MICROgram(s)/kG/Hr (4.4 mL/Hr) IV Continuous <Continuous>  glucagon  Injectable 1 milliGRAM(s) IntraMuscular once  heparin   Injectable 5000 Unit(s) SubCutaneous every 12 hours  hydrALAZINE 75 milliGRAM(s) Oral three times a day  insulin lispro (ADMELOG) corrective regimen sliding scale   SubCutaneous every 6 hours  meropenem  IVPB 1000 milliGRAM(s) IV Intermittent every 12 hours  midazolam Injectable 2 milliGRAM(s) IV Push once    MEDICATIONS  (PRN):  acetaminophen     Tablet .. 650 milliGRAM(s) Oral every 6 hours PRN Temp greater or equal to 38C (100.4F), Mild Pain (1 - 3)  dextrose Oral Gel 15 Gram(s) Oral once PRN Blood Glucose LESS THAN 70 milliGRAM(s)/deciliter      LABS:                        8.3    7.30  )-----------( 224      ( 2022 06:33 )             28.7     Hgb Trend: 8.3<--, 8.4<--, 7.8<--, 7.4<--, 7.8<--      144  |  109<H>  |  52<H>  ----------------------------<  186<H>  5.1   |  20<L>  |  3.14<H>    Ca    8.8      2022 06:33  Phos  6.7     07-  Mg     2.00         TPro  6.5  /  Alb  2.4<L>  /  TBili  0.3  /  DBili  x   /  AST  49<H>  /  ALT  30  /  AlkPhos  105      Creatinine Trend: 3.14<--, 2.68<--, 2.95<--, 2.39<--, 1.67<--, 1.57<--  PT/INR - ( 2022 00:40 )   PT: 13.7 sec;   INR: 1.18 ratio               Venous Blood Gas:   @ 00:40  7.36/43/45/24/75.7  VBG Lactate: 0.7      MICROBIOLOGY:     RADIOLOGY & ADDITIONAL TESTS:        ASSESSMENT & PLAN:   68 y/o F with PMH of HTN, HLD, sarcoidosis, CHF was intially admitted to MICU after being intubated for airway protection was extubated successfully . On  am, found in agonal breathing and subsequently pulseless. Code Blue called, ROSC achieved, pt re-intubated. Second PEA code called upon arrival to the CTICU, ROSC achieved.     Neuro:    #Metabolic Encephalopathy - structural vs metabolic vs infectious   CT Head (on arrival): neg  TSH: wnl   AM cortisol: wnl   infectious workup as below   after extubation, mental status improved greatly  now s/p apneic event on the floor with PEA arrest   f/u EEG   f/u CT head     Resp:    #Intubated s/p PEA arrest - ? aspiration event   - F/u ABG after intubation   - SAT's as tolerated  - infectious workup as below     #Bilateral pleural effusions i/s/o HF exacerbation   CT Chest on admission with new large right and small left pleural effusions with adjacent compressive atelectasis including atelectasis of the majority of the right lower lobe   - Lasix, Bumex both attempted, with poor UOP in response to either, subsequently given IVF bolus challenge with improvement in UOP  - Monitor O2 sat  - Monitor RR     CV:    #s/p PEA arrest   On  AM pt was found to be agonally breathing and RRT was called. Pt went into PEA arrest, ROSC achieved after 4 mins, epix1 . Intubated by anesthesia. Was started on levo 0.1 and prop. Prior to leaving unit patient was waking up with high peak pressures on vent, she received 2mg of versed and became more synchronous with ventilator.  Upon arrival to CTI patient's blood pressures and started to become bradycardic. O2 sat remained at 100%. Went into PEA arrest again W CPR was restarted and rhythm was again PEA, epinephrine x1 given and ROSC was achieved at 4 mins.   could be 2/2 aspiration event: infectious workup as below     #Shock, likely septic  on levo   wean as tolerated to MAP >65   infectious w/u as below     #HFpEF   BNP on arrival: 4021 with evidence of fluid overload with pleural effusions, b/l pitting edema   Echo (): moderate LV systolic dysfunction, moderate diastolic dysfunction, RV enlargement with decreased RV systolic dysfunction  EKG: RBBB (present from prior EKG's). no acute ischemic findings; trop: 50   Strict I&O  Monitor lytes and replete PRN   Daily weights     #HTN   on amlodipine, carvedilol and hydralazine at home   hold antihypertensives given shock state and restart as needed     GI:     Diet:   Has OG tube in place  TF to be started   Monitor BMs      Renal/:    #MANDY on CKD Stage 2  Baseline Scr (): 2.33  SCr at 3.14   Monitor UO  Monitor SCr  Repeat urine lytes     ID:     #Shock, likely septic - no source found   procal: neg  CT abd and pelvis: no infectious source   initial blood cx (): NGTD   F/U repeat blood cx   c/w meropenem ( - )  Monitor temp  Monitor WBC     Endo:     #T2DM   #Hypoglycemia: adrenal insufficiency workup neg   on detemir 20 U at home   Last A1C 2022 7.3  Monitor FSG   c/w ISS   endocrine following     Heme:     DVT Ppx: Heparin subQ    Ethics: FULL CODE MICU Accept Note    CHIEF COMPLAINT: Cardiac arrest     HPI / INTERVAL HISTORY:     68 y/o F with pmhx of HTN, HLD, sarcoidosis, CHF, presented to the ED for new onset AMS. She was also found to have pleural effusion, elevated BNP and LE edema concerning for CHF exacerbation. Admitted to ICU initially for metabolic encephalopathy s/p intubation for airway protection (-). MICU course was c/b hypoglycemia with workup neg for adrenal insufficiency. Course c/b septic shock with no source (on meropenem since ).     Was extubated on , was comfortable on RA with good mental status and was sent to the floors. On  AM pt was found to be agonally breathing and RRT was called. Pt went into PEA arrest, ROSC achieved after 4 mins, epix1 . Intubated by anesthesia. Was started on levo 0.1 and prop. Prior to leaving unit patient was waking up with high peak pressures on vent, she received 2mg of versed and became more synchronous with ventilator.  Upon arrival to CTI patient's blood pressures started to decrease despite increasing levo and started to become bradycardic. O2 sat remained at 100%. Went into PEA arrest again, epinephrine x1 given and ROSC was achieved at 4 mins.       PAST MEDICAL & SURGICAL HISTORY:  Type 2 diabetes mellitus      Bilateral cataracts      Fluid in pleural cavity associated with pancreatitis      Pancreatic pseudocyst/cyst  s/p drain placement and removal      HTN (hypertension)      HLD (hyperlipidemia)      History of amputation of right great toe        H/O:  section        History of laparoscopic cholecystectomy          FAMILY HISTORY:  No pertinent family history in first degree relatives        SOCIAL HISTORY:  No alcohol use, recreational drug use.     HOME MEDICATIONS:      Allergies    penicillin ([facility] Synd)    Intolerances          REVIEW OF SYSTEMS:  Unable to assess ROS because of mental status     OBJECTIVE:  ICU Vital Signs Last 24 Hrs  T(C): 36.7 (2022 20:00), Max: 36.7 (2022 20:00)  T(F): 98.1 (2022 20:00), Max: 98.1 (2022 20:00)  HR: 98 (2022 23:42) (71 - 101)  BP: 156/88 (2022 23:42) (135/84 - 183/85)  BP(mean): 108 (2022 20:00) (88 - 131)  ABP: --  ABP(mean): --  RR: 18 (2022 23:42) (12 - 21)  SpO2: 98% (2022 20:00) (97% - 100%)    O2 Parameters below as of 2022 23:42  Patient On (Oxygen Delivery Method): nasal cannula  O2 Flow (L/min): 3            -16 @ 07:01  -  07-17 @ 07:00  --------------------------------------------------------  IN: 351 mL / OUT: 545 mL / NET: -194 mL      CAPILLARY BLOOD GLUCOSE      POCT Blood Glucose.: 227 mg/dL (2022 07:27)      PHYSICAL EXAM:    GENERAL: intubated, sedated, OG tube in place   HEAD:  Atraumatic, Normocephalic  EYES: Conjunctiva and sclera clear  ENT: Moist mucous membranes  NECK: Supple, No JVD  CHEST/LUNG: Clear to auscultation bilaterally;   HEART: Regular rate and rhythm; No murmurs, rubs, or gallops  ABDOMEN: BSx4; distended   EXTREMITIES:  2+ Peripheral Pulses, brisk capillary refill. No clubbing, cyanosis, or edema  NERVOUS SYSTEM: sedated   SKIN: No rashes or lesions      HOSPITAL MEDICATIONS:  MEDICATIONS  (STANDING):  amLODIPine   Tablet 10 milliGRAM(s) Oral daily  aspirin  chewable 81 milliGRAM(s) Oral daily  atorvastatin 20 milliGRAM(s) Oral at bedtime  chlorhexidine 4% Liquid 1 Application(s) Topical <User Schedule>  dextrose 50% Injectable 25 Gram(s) IV Push once  dextrose 50% Injectable 12.5 Gram(s) IV Push once  dextrose 50% Injectable 25 Gram(s) IV Push once  fentaNYL    Injectable 100 MICROGram(s) IV Push once  fentaNYL    Injectable 100 MICROGram(s) IV Push once  fentaNYL   Infusion. 0.5 MICROgram(s)/kG/Hr (4.4 mL/Hr) IV Continuous <Continuous>  glucagon  Injectable 1 milliGRAM(s) IntraMuscular once  heparin   Injectable 5000 Unit(s) SubCutaneous every 12 hours  hydrALAZINE 75 milliGRAM(s) Oral three times a day  insulin lispro (ADMELOG) corrective regimen sliding scale   SubCutaneous every 6 hours  meropenem  IVPB 1000 milliGRAM(s) IV Intermittent every 12 hours  midazolam Injectable 2 milliGRAM(s) IV Push once    MEDICATIONS  (PRN):  acetaminophen     Tablet .. 650 milliGRAM(s) Oral every 6 hours PRN Temp greater or equal to 38C (100.4F), Mild Pain (1 - 3)  dextrose Oral Gel 15 Gram(s) Oral once PRN Blood Glucose LESS THAN 70 milliGRAM(s)/deciliter      LABS:                        8.3    7.30  )-----------( 224      ( 2022 06:33 )             28.7     Hgb Trend: 8.3<--, 8.4<--, 7.8<--, 7.4<--, 7.8<--      144  |  109<H>  |  52<H>  ----------------------------<  186<H>  5.1   |  20<L>  |  3.14<H>    Ca    8.8      2022 06:33  Phos  6.7     07-  Mg     2.00         TPro  6.5  /  Alb  2.4<L>  /  TBili  0.3  /  DBili  x   /  AST  49<H>  /  ALT  30  /  AlkPhos  105      Creatinine Trend: 3.14<--, 2.68<--, 2.95<--, 2.39<--, 1.67<--, 1.57<--  PT/INR - ( 2022 00:40 )   PT: 13.7 sec;   INR: 1.18 ratio               Venous Blood Gas:   @ 00:40  7.36/43/45/24/75.7  VBG Lactate: 0.7      MICROBIOLOGY:     RADIOLOGY & ADDITIONAL TESTS:        ASSESSMENT & PLAN:   68 y/o F with PMH of HTN, HLD, sarcoidosis, CHF was intially admitted to MICU after being intubated for airway protection was extubated successfully . On  am, found in agonal breathing and subsequently pulseless. Code Blue called, ROSC achieved, pt re-intubated. Second PEA code called upon arrival to the CTICU, ROSC achieved.     Neuro:    #Metabolic Encephalopathy - structural vs metabolic vs infectious   CT Head (on arrival): neg  TSH: wnl   AM cortisol: wnl   infectious workup as below   after extubation, mental status improved greatly  now s/p apneic event on the floor with PEA arrest   f/u EEG   f/u CT head     Resp:    #Intubated s/p PEA arrest - ? aspiration event   - F/u ABG after intubation   - SAT's as tolerated  - infectious workup as below     #Bilateral pleural effusions i/s/o HF exacerbation   CT Chest on admission with new large right and small left pleural effusions with adjacent compressive atelectasis including atelectasis of the majority of the right lower lobe   - Lasix, Bumex both attempted, with poor UOP in response to either, subsequently given IVF bolus challenge with improvement in UOP  - Monitor O2 sat  - Monitor RR     CV:    #s/p PEA arrest   On  AM pt was found to be agonally breathing and RRT was called. Pt went into PEA arrest, ROSC achieved after 4 mins, epix1 . Intubated by anesthesia. Was started on levo 0.1 and prop. Prior to leaving unit patient was waking up with high peak pressures on vent, she received 2mg of versed and became more synchronous with ventilator.  Upon arrival to CTI patient's blood pressures and started to become bradycardic. O2 sat remained at 100%. Went into PEA arrest again W CPR was restarted and rhythm was again PEA, epinephrine x1 given and ROSC was achieved at 4 mins.   could be 2/2 aspiration event: infectious workup as below     #Shock, likely septic  on levo   wean as tolerated to MAP >65   infectious w/u as below     #HFpEF   BNP on arrival: 4021 with evidence of fluid overload with pleural effusions, b/l pitting edema   Echo (): moderate LV systolic dysfunction, moderate diastolic dysfunction, RV enlargement with decreased RV systolic dysfunction  EKG: RBBB (present from prior EKG's). no acute ischemic findings; trop: 50   Strict I&O  Monitor lytes and replete PRN   Daily weights     #HTN   on amlodipine, carvedilol and hydralazine at home   hold antihypertensives given shock state and restart as needed     GI:     Diet:   Has OG tube in place  TF to be started   Monitor BMs      Renal/:    #MANDY on CKD Stage 2  Baseline Scr (): 2.33  SCr at 3.14   Monitor UO  Monitor SCr  Repeat urine lytes     ID:     #Shock, likely septic - no source found   procal: neg  CT abd and pelvis: no infectious source   initial blood cx (): NGTD   F/U repeat blood cx   c/w meropenem ( - )  Monitor temp  Monitor WBC     Endo:     #T2DM   #Hypoglycemia: adrenal insufficiency workup neg   on detemir 20 U at home   Last A1C 2022 7.3  Monitor FSG   c/w ISS   endocrine following     Heme:     DVT Ppx: Heparin subQ    Ethics: FULL CODE    **ATTENDING ATTESTATION**    I have personally seen and examined the patient.  I fully participated in the care of this patient.  I have made amendments to the documentation where necessary, and agree with the history, physical exam, and plan as documented by the Resident.     Patient is a 68 yo F w/ HTN, HLD, sarcoidosis, HF who was initially admitted on  after p/w AMS and ADHF. Patient was intubated  for AMS. Course c/b MANDY. Successfully extubated on  and transferred to floors. Course c/b by floor team discovering patient agonally breathing and unresponsive, c/b PEA arrest x 2, intubated at RRT.    #Acute hypoxemic respiratory failure - 2/2 possible aspiration vs waxing waning encephalopathy, rule out seizures. ?Intermittent hypoglycemia? Interval CTH with no acute changes  - Already on empiric Meropenem which will cover aspiration PNA as well, patient with witnessed emesis during code  - Wean sedation to assess mental status  - EEG to rule out seizures  - MRI given recurring encephalopathy of unclear etiology  - FSG q6h for now to evaluate for intermittent hypoglycemia    #Cardiac Arrest - PEA 2/2 likely hypoxemia 2/2 aspiration    #Encephalopathy - Seizures vs intermittent hypoglycemia? Patient had return to normal MS after extubation. CTH with no acute findings.   - Wean sedation to assess mental status  - EEG to rule out seizures  - MRI given recurring encephalopathy of unclear etiology  - FSG q6h for now to evaluate for intermittent hypoglycemia     #Hypoglycemia - Acutely likely 2/2 infection, which was improved. Chronic course of unclear etiology. ACTH stim test unrevealing. C Peptide previously low now high 3 days apart?  - Monitor FSG q6h  - Endocrine f/u    #MANDY - likely ATN 2/2 arrest  - Monitor creatinine and UOP  - Restart diuretics when improved  - Avoid nephrotoxic agents    Kevin Julien MD  Pulmonary & Critical Care .     Prognosis guarded. Discussed care and plan with family at bedside, answered all questions and emotional support provided.    Patient is critically ill, requiring critical care services.     Attending: I have personally and independently provided 45 minutes of critical care services.  This excludes any time spent on separate procedures or teaching.

## 2022-07-17 NOTE — PROGRESS NOTE ADULT - SUBJECTIVE AND OBJECTIVE BOX
Neurology Progress Note    S: Patient seen and examined in ICU. RRT this AM intubated. c/f aspiration PNA now on propofol     Medication:  MEDICATIONS  (STANDING):  amLODIPine   Tablet 10 milliGRAM(s) Oral daily  aspirin  chewable 81 milliGRAM(s) Oral daily  atorvastatin 20 milliGRAM(s) Oral at bedtime  chlorhexidine 4% Liquid 1 Application(s) Topical <User Schedule>  dextrose 50% Injectable 25 Gram(s) IV Push once  dextrose 50% Injectable 12.5 Gram(s) IV Push once  dextrose 50% Injectable 25 Gram(s) IV Push once  fentaNYL    Injectable 100 MICROGram(s) IV Push once  fentaNYL    Injectable 100 MICROGram(s) IV Push once  fentaNYL   Infusion. 0.5 MICROgram(s)/kG/Hr (4.4 mL/Hr) IV Continuous <Continuous>  glucagon  Injectable 1 milliGRAM(s) IntraMuscular once  heparin   Injectable 5000 Unit(s) SubCutaneous every 12 hours  hydrALAZINE 75 milliGRAM(s) Oral three times a day  insulin lispro (ADMELOG) corrective regimen sliding scale   SubCutaneous every 6 hours  meropenem  IVPB 1000 milliGRAM(s) IV Intermittent every 12 hours  midazolam Injectable 2 milliGRAM(s) IV Push once    MEDICATIONS  (PRN):  acetaminophen     Tablet .. 650 milliGRAM(s) Oral every 6 hours PRN Temp greater or equal to 38C (100.4F), Mild Pain (1 - 3)  dextrose Oral Gel 15 Gram(s) Oral once PRN Blood Glucose LESS THAN 70 milliGRAM(s)/deciliter      Vitals:    ICU Vital Signs Last 24 Hrs  T(C): 36.7 (2022 20:00), Max: 36.7 (2022 20:00)  T(F): 98.1 (2022 20:00), Max: 98.1 (2022 20:00)  HR: 98 (2022 23:42) (71 - 101)  BP: 156/88 (2022 23:42) (135/84 - 183/85)  BP(mean): 108 (2022 20:00) (88 - 131)  ABP: --  ABP(mean): --  RR: 18 (2022 23:42) (12 - 21)  SpO2: 98% (2022 20:00) (97% - 100%)    O2 Parameters below as of 2022 23:42  Patient On (Oxygen Delivery Method): nasal cannula  O2 Flow (L/min): 3                General Exam:   General Appearance: Appropriately dressed and in no acute distress       Head: Normocephalic, atraumatic and no dysmorphic features  Ear, Nose, and Throat: Moist mucous membranes +ETT   CVS: S1S2+  Resp: No SOB, no wheeze or rhonchi  GI: soft NT/ND  Extremities:  toe amputation noted   Skin: No bruises or rashes     Neurological Exam: (on propfol, sedated )   Mental Status: eyes closed, no verbal, not following . intubated   Cranial Nerves: PERRL, EOMI, VFFC, sensation V1-V3 intact,  no obvious facial asymmetry, equal elevation of palate, scm/trap 5/5, tongue is midline on protrusion. no obvious papilledema on fundoscopic exam. hearing is grossly intact.   Motor: TONEY distally   Sensation: withdraws x 4  Reflexes: 1+ throughout at biceps, brachioradialis, triceps, patellars and ankles bilaterally and equal. No clonus. R toe and L toe were both downgoing.  Coordination: unable   Gait:  unable     I personally reviewed the below data/images/labs:    CBC Full  -  ( 2022 09:08 )  WBC Count : 6.95 K/uL  RBC Count : 3.35 M/uL  Hemoglobin : 8.6 g/dL  Hematocrit : 28.7 %  Platelet Count - Automated : 227 K/uL  Mean Cell Volume : 85.7 fL  Mean Cell Hemoglobin : 25.7 pg  Mean Cell Hemoglobin Concentration : 30.0 gm/dL  Auto Neutrophil # : x  Auto Lymphocyte # : x  Auto Monocyte # : x  Auto Eosinophil # : x  Auto Basophil # : x  Auto Neutrophil % : x  Auto Lymphocyte % : x  Auto Monocyte % : x  Auto Eosinophil % : x  Auto Basophil % : x        144  |  109<H>  |  52<H>  ----------------------------<  186<H>  5.1   |  20<L>  |  3.14<H>    Ca    8.8      2022 06:33  Phos  6.7     07-  Mg     2.00     -    TPro  6.5  /  Alb  2.4<L>  /  TBili  0.3  /  DBili  x   /  AST  49<H>  /  ALT  30  /  AlkPhos  105  07-17      Urinalysis Basic - ( 2022 09:00 )    Color: Yellow / Appearance: Slightly Turbid / S.027 / pH: x  Gluc: x / Ketone: Trace  / Bili: Negative / Urobili: 3 mg/dL   Blood: x / Protein: 300 mg/dL / Nitrite: Negative   Leuk Esterase: Large / RBC: 5 /HPF / WBC >50 /HPF   Sq Epi: x / Non Sq Epi: x / Bacteria: x        EXAM:  MR BRAIN WAW IC      EXAM:  MR IAC ONLY WAW IC        PROCEDURE DATE:  Dec  1 2021         INTERPRETATION:  .    CLINICAL INFORMATION: Acute onset of bilateral hearing loss.    TECHNIQUE: Multiplanar multisequential MRI of the brain and internal auditory canals was acquired with and without the administration of IV gadolinium. 7.5 cc's of IV Gadavist was administered for the purposes of this examination. 0 cc were discarded.    COMPARISON: Prior CT examination of the internal auditory canals dated 2021. Prior CT angiograms/venogram examination of the head and neck dated 2021. Examination.    FINDINGS:    MRI BRAIN: A chronic lacunar infarct is seen within the right medial cerebellar hemisphere.    Multiple patchy confluent nonspecific T2/FLAIR hyperintense signal changes are noted throughout the bihemispheric white matter without associated mass effect or restricted diffusion.    There is no abnormal brain parenchymal or leptomeningeal enhancement.    Ventricular sizeand configuration is unremarkable. No abnormal extra-axial fluid collections are seen. Flow-voids are noted throughout the major intracranial vessels, on the T2 weighted images, consistent with their patency. The sellar area appears unremarkable.    Minimal scattered mucosal thickening is seen throughout the paranasal sinuses. The mastoid air cells are clear. The orbits appear unremarkable.    MRI IAC: There is no CP angle mass. The bilateral 7th and 8th cranial nerves appear unremarkable in course and morphology. No abnormal enhancement is seen. The inner ear structures are normally formed. Normal fluid signal is seen within the inner ear structures. The cochlea, vestibule, and semicircular canals appear unremarkable.    IMPRESSION:    MRI BRAIN: No acute intracranial hemorrhage, acute ischemia, or abnormal intracranial enhancement.    Chronic lacunar infarct within the right medial cerebellar hemisphere and mild chronic white matter microvascular type changes.    MRI IAC: Unremarkable study.    --- End of Report ---              LUCI RUEDA MD; Attending Radiologist  This document has been electronically signed. Dec  3 2021  8:46AM    < end of copied text >      < from: CT Chest w/ IV Cont (22 @ 22:58) >    ACC: [id number] EXAM:  CT ABDOMEN AND PELVIS IC                        ACC: [id number] EXAM:  CT CHEST IC                          PROCEDURE DATE:  2022          INTERPRETATION:  CLINICAL INFORMATION: Pleural effusion. Rule out   empyema. Abdominal tenderness. History of pancreatitis. Rule out   infection or obstruction.    COMPARISON: CT chest 2022.    CONTRAST/COMPLICATIONS:  IV Contrast: IV contrast documented in associated exam (accession   [id number]), Omnipaque 350 (accession [id number])  64 cc administered   6 cc   discarded  Oral Contrast: NONE  Complications: None reported at time of study completion    PROCEDURE:  CT of the Chest, Abdomen and Pelvis was performed.  Sagittal and coronal reformats were performed.    FINDINGS:  CHEST:  LUNGS AND LARGE AIRWAYS: Compressive atelectasis of the right upper,   middle and bilateral lower lobes including atelectasis of the majority of   the right lower lobe. Subsegmental atelectasis in the left upper lobe.  PLEURA: New large right andsmall left pleural effusions. No evidence of   an empyema. Fluid in the minor fissure.  VESSELS: Atherosclerotic changes of the aorta and coronary arteries.  HEART: Heart size is enlarged. No pericardial effusion.  MEDIASTINUM AND ANTONIO: No lymphadenopathy. Redemonstration of calcified   mediastinal lymph nodes.  CHEST WALL AND LOWER NECK: 7 mm right thyroid lobe nodule. Generalized   anasarca.    ABDOMEN AND PELVIS:  LIVER: Within normal limits.  BILE DUCTS: Normal caliber.  GALLBLADDER: Cholecystectomy.  SPLEEN: Within normal limits.  PANCREAS: Atrophic.  ADRENALS: Within normal limits.  KIDNEYS/URETERS: No renal stones or hydronephrosis.    BLADDER: Within normal limits.  REPRODUCTIVE ORGANS: Atrophic and retroflexed uterus. No adnexal masses.    BOWEL: Redemonstration of an outpouching with wall calcification and   intraluminal fluid and gas measuring 4.9 x 4.6 cm emanating from the   posterior gastric body likely representing a large diverticulum. No bowel   obstruction or inflammation. Colonic diverticulosis without   diverticulitis. Appendix within normal limits.  PERITONEUM: Small volume free pelvic fluid.  VESSELS: Atherosclerotic changes.  RETROPERITONEUM/LYMPH NODES: No lymphadenopathy.  ABDOMINAL WALL: Generalized anasarca.  BONES: Degenerative changes of the spine. Mild S-shaped thoracolumbar   scoliosis.    IMPRESSION:  1. New large right and small left pleural effusions with adjacent   compressive atelectasis including atelectasis of the majority of the   right lowerlobe.  2. No bowel obstruction or inflammation.      --- End of Report ---          MARTA HART MD; Resident Radiologist  This document has been electronically signed.  CHINTAN WHITFIELD MD; Attending Radiologist  This document has been electronically signed. 2022 12:32AM    < end of copied text >         < from: CT Head No Cont (22 @ 00:03) >    ACC: [id number] EXAM:  CT BRAIN                          PROCEDURE DATE:  2022          INTERPRETATION:  INDICATIONS:  Alteration of  Consciousness    TECHNIQUE:  Serial axial images were obtained from the skull base to the   vertex without intravenous contrast. Sagittal and Coronal reformats were   performed    COMPARISON EXAMINATION: None.    FINDINGS:  VENTRICLES AND SULCI:  Normal.  INTRA-AXIAL:  No mass, blood or abnormal attenuation is seen.  EXTRA-AXIAL:  No mass or collection is seen.  VISUALIZED SINUSES:  Clear.  VISUALIZED MASTOIDS:  Clear.  CALVARIUM:  Normal.  MISCELLANEOUS:  None.    IMPRESSION:  No evidence of intracranial hemorrhage, no evidence of major   vessel distribution infarct    --- End of Report ---            MOLLY TOTH MD; Attending Radiologist  This document has been electronically signed. 2022 10:19AM    < end of copied text >

## 2022-07-17 NOTE — PROGRESS NOTE ADULT - ASSESSMENT
66 y/o  AAF with HTN, HLD, sarcoidosis, CHF, DM presented to the ED for new onset AMS. Patient suddenly became altered, confused and daughter noted she was slurring his speech. She was incoherent, and hallucinating, saying there is a cat present and is asking for her aunt who lives far away.  The patient known to have frequent admission for CHF exacerbations eventually intubated for hypoxic respiratory failure and now in MICU   MRI brain/IAC from 2021 with old R cerebellar infarct ; CTA h/N that time unremarkable. , A1c 9.2%  CT C/A with new large pleural effusions   CTH unremarakble   A1c 7.3  TTE was done   7/17 AM RRT, intubated, c/f aspiration PNA   o/e intubated and sedated but TONEY     Impression: 1) AMS 2/2 hypercapneic resp failure 2) prior cerebellar stroke likely small vessel   - now on propofol, sedated. need exam off sedation. now TONEY/ withdraws x4  - asa 81 and statin therapy for secondary stroke prevention LDL goal < 70    - now on meropenum, c/f aspiration PNA  - vent per ICU   - respiratory per ICU and pulmo   - telemetry  - PT/OT/SS/SLP, OOBC  - check FS, glucose control <180  - GI/DVT ppx  - Thank you for allowing me to participate in the care of this patient. Call with questions.   Russell Jimenes MD  Vascular Neurology  Office: [phone]

## 2022-07-17 NOTE — PROGRESS NOTE ADULT - SUBJECTIVE AND OBJECTIVE BOX
Anesthesia called for emergency intubation.  On arrival, pt on BMV with 100% FiO2 .  Pt obtunded with agonal breathing. No meds, Glidescope x 1,  3 blade, 7___ cuffed ETT passed without trauma, + ETCO2 via EasyCap, + BBS, taped at ____21 cm, teeth intact, no complications. CXR and vent to be ordered by the team

## 2022-07-17 NOTE — PROGRESS NOTE ADULT - PROBLEM SELECTOR PLAN 6
on detemir 20 U at home   Last A1C 6/2022 7.3  - am cortisol 7.1 wnl  - has had persistent hypoglycemia despite TFs, finger sticks in 50s.   - Per family, pt has had intermittent hypoglycemia over past month despite coming down on daily insulin and maintaining PO intake  - Possible component of CKD & poor insulin clearance vs new onset endogenous insulin excess  - stop D5 per endo reccs, continue with correctional scale insulin admelog 6qh  - f/u c-peptide in AM on detemir 20 U at home   Last A1C 6/2022 7.3  - am cortisol 7.1 wnl  - has had persistent hypoglycemia despite TFs, finger sticks in 50s.   - Per family, pt has had intermittent hypoglycemia over past month despite coming down on daily insulin and maintaining PO intake  - Possible component of CKD & poor insulin clearance vs new onset endogenous insulin excess  - stop D5 per endo reccs, continue with correctional scale insulin admelog 6qh  - f/u c-peptide in AM  - endo consulted for persistent hypoglycemia - Recs:   -Stop dextrose fluids and monitor FS  -start low dose correctional scale for now and monitor FS  - 24 tube feeds  - repeat C peptide (noted to be low at 0.9

## 2022-07-17 NOTE — CHART NOTE - NSCHARTNOTEFT_GEN_A_CORE
Endocrine follow up   See complete consult from 7/16 for full plan of care  chart reviewed, patient now re-intubated     DM  -Can consider increasing correctional scale from low to moderate every 6 hours   -If enteral feeding is started, will need to add basal insulin     AI  -Ruled out  -Please re-check ACTH tomorrow as may have been collected post cosyntropin administration     endocrine service to follow   [phone]

## 2022-07-17 NOTE — CHART NOTE - NSCHARTNOTEFT_GEN_A_CORE
[facility] ACCEPT NOTE    Please refer to MICU transfer note for full details.    Briefly, this is a 67F with sarcoidosis, CHF who presented for AMS and CHF exacerbation. intubated for AMS, briefly on pressors for vasoplegia (off since ). had some hypoglycemia requiring D5LR now resolved. on antibiotics for sepsis without source. extubated  AM and transfer to floors.       ASSESSMENT & PLAN:     66 y/o F with PMH of HTN, HLD, sarcoidosis, CHF, presented to the ED for new onset AMS. She is also found to have pleural effusion, elevated BNP and LE edema concerning for CHF exacerbation. Admit for metabolic encephalopathy and CHF exacerbation. Intubated  for AMS. She was responding to commands on , more awake and alert, and was extubated, now on nasal cannula      Neuro:    #AMS - structural vs metabolic vs infectious   could be in the setting of hypercapneic resp failure   TSH: wnl   infectious workup as below   - mental status improving , following commands, subsequently extubated now on nasal cannula  -  CTH negative for acute bleed or major vessel ischemic stroke  - initially sedated on prop & fent. Now weaned off prop.  - required restraints, precedex 7/15 am due to agitation now off    Resp:    #Acute Hypercapnic respiratory failure - likely 2/2 CHF  AB.41/35/135/22   - intubated , extubated   - improvement in acidosis, hypercapnia s/p intubation  - mental status improving      #Bilateral pleural effusions i/s/o HF exacerbation   CT Chest with new large right and small left pleural effusions with adjacent compressive atelectasis including atelectasis of the majority of the right lower lobe   - Lasix, Bumex both attempted, with poor UOP in response to either, subsequently given IVF bolus challenge with improvement in UOP  - Monitor O2 sat  - Monitor RR     CV:    #HFpEF   BNP on arrival: 4021 with evidence of fluid overload with pleural effusions, b/l pitting edema   Echo (): EF: 62%; Mild diastolic dysfunction. Normal right ventricular size and function   EKG: RBBB (present from prior EKG's). no acute ischemic findings; trop: 50   Monitor lytes and replete PRN   Strict I&O  Daily weights   - initially on levo following intubation & sedation  - now off levo  - 7/15 TTE: moderate LV systolic dysfunction, moderate diastolic dysfunction, RV enlargement with decreased RV systolic dysfunction  - POCUS showed relatively 1.5 in IVC, pt not appearing to be volume overloaded, intermittent fluid challenge w/ LR boluses    #HTN   on amlodipine, carvedilol and hydralazine at home   - SBPs to 190s on 7/15, increased hydralazine dose to 75mg q8h      GI:     Diet: soft and bite sized  Monitor BMs  f/u AXR r/o SBO      Renal/:    #CKD Stage 2  Baseline Scr (): 2.33  On admission, SCr at 1.6  Monitor UO  Monitor SCr  - Cr now 2.68, pt's baseline around 2-2.3  - check urine lytes in setting of metabolic acidosis, lactate wnl      ID:     #Encephalopathy possibly 2/2 sepsis - hypothermic to 90.4 with leukopenia   procal: neg  CT abd and pelvis: no infectious source   c/w meropenem ( - )  - improved temps  am, no hypothermia overnight  -  blood cx 2/2 ngtd  -  ua positive leukest, negative nitrites, elevated WBCs  - f/u MRSA swab      Endo:     #T2DM   #Hypoglycemia  on detemir 20 U at home   Last A1C 2022 7.3  - am cortisol 7.1 wnl  - has had persistent hypoglycemia despite TFs, finger sticks in 50s.   - Per family, pt has had intermittent hypoglycemia over past month despite coming down on daily insulin and maintaining PO intake  - Possible component of CKD & poor insulin clearance vs new onset endogenous insulin excess  - stop D5 per endo reccs, continue with correctional scale insulin admelog 6qh  - f/u c-peptide in AM    Heme:     DVT Ppx: Heparin     Ethics: pending GOC discussion with family.          FOR FOLLOW UP:  [ ] ekg for qtc  [ ] cxr/axr r/o SBO (no BMs)  [ ] c-peptide and endo eval for hypoglycemia        Sarabjit Moran MD  PGY-3 | Internal Medicine  [phone] / [id number]        Vital Signs Last 24 Hrs  T(C): 36.7 (2022 20:00), Max: 37.3 (2022 08:00)  T(F): 98.1 (2022 20:00), Max: 99.1 (2022 08:00)  HR: 94 (2022 20:00) (57 - 101)  BP: 177/82 (2022 20:00) (135/84 - 183/85)  BP(mean): 108 (2022 20:00) (88 - 134)  RR: 16 (2022 20:00) (12 - 21)  SpO2: 98% (2022 20:00) (95% - 100%)    Parameters below as of 2022 20:00  Patient On (Oxygen Delivery Method): nasal cannula  O2 Flow (L/min): 3      Physical Exam  CONST:     NAD, well-developed; appears stated age  RESP :        Normal respiratory effort   CARDIO:     RRR  ABD:           Soft, mildly distended, nontender; no R/G   NEURO:      Unable to verbalize (likely due to recent intubation), TONEY x4  SKIN:          No rashes; no palpable lesions; axillae not dry    Vital Signs Last 24 Hrs  T(F): 98.1, Max: 99.1 (22 @ 08:00)  HR: 94 (57 - 101)  BP: 177/82 (135/84 - 183/85)  RR: 16 (12 - 21)  SpO2: 98% (95% - 100%)        I&O's Summary    15 Jul 2022 07:01  -  2022 07:00  --------------------------------------------------------  IN: 2816 mL / OUT: 2030 mL / NET: 786 mL    2022 07:01  -  2022 00:40  --------------------------------------------------------  IN: 351 mL / OUT: 545 mL / NET: -194 mL      Allergies    penicillin ([place])    Intolerances      MEDICATIONS  (STANDING):  amLODIPine   Tablet 10 milliGRAM(s) Oral daily  aspirin  chewable 81 milliGRAM(s) Oral daily  atorvastatin 20 milliGRAM(s) Oral at bedtime  dextrose 50% Injectable 25 Gram(s) IV Push once  dextrose 50% Injectable 12.5 Gram(s) IV Push once  dextrose 50% Injectable 25 Gram(s) IV Push once  glucagon  Injectable 1 milliGRAM(s) IntraMuscular once  heparin   Injectable 5000 Unit(s) SubCutaneous every 12 hours  hydrALAZINE 75 milliGRAM(s) Oral three times a day  insulin lispro (ADMELOG) corrective regimen sliding scale   SubCutaneous every 6 hours  meropenem  IVPB 1000 milliGRAM(s) IV Intermittent every 12 hours    MEDICATIONS  (PRN):  acetaminophen     Tablet .. 650 milliGRAM(s) Oral every 6 hours PRN Temp greater or equal to 38C (100.4F), Mild Pain (1 - 3)  dextrose Oral Gel 15 Gram(s) Oral once PRN Blood Glucose LESS THAN 70 milliGRAM(s)/deciliter                                  8.4    4.10  )-----------( 180      ( 2022 00:15 )             27.5     07-16    140  |  108<H>  |  50<H>  ----------------------------<  276<H>  4.6   |  22  |  2.68<H>    Ca    8.5      2022 00:15  Phos  4.9     07-16  Mg     2.00     -16    TPro  6.0  /  Alb  2.7<L>  /  TBili  0.3  /  DBili  x   /  AST  52<H>  /  ALT  28  /  AlkPhos  115  07-16    PT/INR - ( 2022 00:40 )   PT: 13.7 sec;   INR: 1.18 ratio

## 2022-07-17 NOTE — PROGRESS NOTE ADULT - PROBLEM SELECTOR PLAN 4
#Bilateral pleural effusions i/s/o HF exacerbation   CT Chest with new large right and small left pleural effusions with adjacent compressive atelectasis including atelectasis of the majority of the right lower lobe   - Lasix, Bumex both attempted, with poor UOP in response to either, subsequently given IVF bolus challenge with improvement in UOP  - Monitor O2 sat  - Monitor RR

## 2022-07-17 NOTE — CHART NOTE - NSCHARTNOTEFT_GEN_A_CORE
MICU Accept Note    CHIEF COMPLAINT:     HPI / INTERVAL HISTORY:        PAST MEDICAL & SURGICAL HISTORY:  Type 2 diabetes mellitus      Bilateral cataracts      Fluid in pleural cavity associated with pancreatitis      Pancreatic pseudocyst/cyst  s/p drain placement and removal      HTN (hypertension)      HLD (hyperlipidemia)      History of amputation of right great toe        H/O:  section        History of laparoscopic cholecystectomy          FAMILY HISTORY:  No pertinent family history in first degree relatives        SOCIAL HISTORY:  Smoking:   Substance Use:   EtOH Use:   Marital Status:   Sexual History:   Occupation:  Recent Travel:  Country of Birth:   Advance Directives:     HOME MEDICATIONS:      Allergies    penicillin ([facility])    Intolerances          REVIEW OF SYSTEMS:  Constitutional: No fevers, chills, weight loss, weight gain  HEENT: No vision problems, eye pain, nasal congestion, rhinorrhea, sore throat, dysphagia  CV: No chest pain, orthopnea, palpitations  Resp: No cough, dyspnea, wheezing, hemoptysis  GI: No nausea, vomiting, diarrhea, constipation, abdominal pain  : [ ] dysuria [ ] nocturia [ ] hematuria [ ] increased urinary frequency  Musculoskeletal: [ ] back pain [ ] myalgias [ ] arthralgias [ ] fracture  Skin: [ ] rash [ ] itch  Neurological: [ ] headache [ ] dizziness [ ] syncope [ ] weakness [ ] numbness  Psychiatric: [ ] anxiety [ ] depression  Endocrine: [ ] diabetes [ ] thyroid problem  Hematologic/Lymphatic: [ ] anemia [ ] bleeding problem  Allergic/Immunologic: [ ] itchy eyes [ ] nasal discharge [ ] hives [ ] angioedema  [ ] All other systems negative  [ ] Unable to assess ROS because ________    OBJECTIVE:  ICU Vital Signs Last 24 Hrs  T(C): 36.7 (2022 20:00), Max: 36.7 (2022 20:00)  T(F): 98.1 (2022 20:00), Max: 98.1 (2022 20:00)  HR: 88 (2022 08:05) (71 - 101)  BP: 156/88 (2022 23:42) (135/84 - 183/85)  BP(mean): 108 (2022 20:00) (88 - 131)  ABP: --  ABP(mean): --  RR: 18 (2022 23:42) (12 - 21)  SpO2: 100% (2022 08:05) (97% - 100%)    O2 Parameters below as of 2022 23:42  Patient On (Oxygen Delivery Method): nasal cannula  O2 Flow (L/min): 3        Mode: AC/ CMV (Assist Control/ Continuous Mandatory Ventilation), RR (machine): 20, TV (machine): 420, FiO2: 80, PEEP: 5, MAP: 17, PIP: 38    07-16 @ 07:01  -  07-17 @ 07:00  --------------------------------------------------------  IN: 351 mL / OUT: 545 mL / NET: -194 mL      CAPILLARY BLOOD GLUCOSE      POCT Blood Glucose.: 227 mg/dL (2022 07:27)      =================PHYSICAL EXAM=================    GENERAL: Laying comfortably, NAD  EYES: EOMI, PERRL, no scleral icterus  NECK: No JVD  LUNG: Clear to auscultation bilaterally; No wheeze, crackles or rhonci  HEART: Regular rate and rhythm; No murmurs, rubs, or gallops  ABDOMEN: Soft, Nontender, Nondistended  EXTREMITIES:  No LE edema, 2+ Peripheral Pulses, No clubbing, cyanosis, or edema  PSYCH: AAOx3  NEUROLOGY: non-focal, strength 5/5 in all extremities, sensation intact  SKIN: No rashes or lesions    =================================================    LABS:                        8.6    6.95  )-----------( 227      ( 2022 09:08 )             28.7     Auto Eosinophil # 0.03  / Auto Eosinophil % 0.4   / Auto Neutrophil # 5.20  / Auto Neutrophil % 74.9  / BANDS % x                            8.3    7.30  )-----------( 224      ( 2022 06:33 )             28.7     Auto Eosinophil # 0.04  / Auto Eosinophil % 0.5   / Auto Neutrophil # 5.65  / Auto Neutrophil % 77.4  / BANDS % x                            8.4    4.10  )-----------( 180      ( 2022 00:15 )             27.5     Auto Eosinophil # 0.17  / Auto Eosinophil % 4.1   / Auto Neutrophil # 2.84  / Auto Neutrophil % 69.4  / BANDS % x        07    144  |  109<H>  |  52<H>  ----------------------------<  186<H>  5.1   |  20<L>  |  3.14<H>      140  |  108<H>  |  50<H>  ----------------------------<  276<H>  4.6   |  22  |  2.68<H>    Ca    8.8      2022 06:33  Mg     2.00       Phos  6.7       TPro  6.5  /  Alb  2.4<L>  /  TBili  0.3  /  DBili  x   /  AST  49<H>  /  ALT  30  /  AlkPhos  105    TPro  6.0  /  Alb  2.7<L>  /  TBili  0.3  /  DBili  x   /  AST  52<H>  /  ALT  28  /  AlkPhos  115  -16    PT/INR - ( 2022 09:08 )   PT: 13.3 sec;   INR: 1.14 ratio         PTT - ( 2022 09:08 )  PTT:47.8 sec        Mode: AC/ CMV (Assist Control/ Continuous Mandatory Ventilation), RR (machine): 20, TV (machine): 420, FiO2: 80, PEEP: 5, MAP: 17, PIP: 38    LINES:     HOSPITAL MEDICATIONS:  MEDICATIONS  (STANDING):  amLODIPine   Tablet 10 milliGRAM(s) Oral daily  aspirin  chewable 81 milliGRAM(s) Oral daily  atorvastatin 20 milliGRAM(s) Oral at bedtime  chlorhexidine 0.12% Liquid 15 milliLiter(s) Oral Mucosa every 12 hours  chlorhexidine 4% Liquid 1 Application(s) Topical <User Schedule>  dextrose 50% Injectable 25 Gram(s) IV Push once  dextrose 50% Injectable 12.5 Gram(s) IV Push once  dextrose 50% Injectable 25 Gram(s) IV Push once  fentaNYL    Injectable 100 MICROGram(s) IV Push once  fentaNYL    Injectable 100 MICROGram(s) IV Push once  fentaNYL   Infusion. 0.5 MICROgram(s)/kG/Hr (4.4 mL/Hr) IV Continuous <Continuous>  glucagon  Injectable 1 milliGRAM(s) IntraMuscular once  heparin   Injectable 5000 Unit(s) SubCutaneous every 12 hours  hydrALAZINE 75 milliGRAM(s) Oral three times a day  insulin lispro (ADMELOG) corrective regimen sliding scale   SubCutaneous every 6 hours  meropenem  IVPB 1000 milliGRAM(s) IV Intermittent every 12 hours  midazolam Injectable 2 milliGRAM(s) IV Push once    MEDICATIONS  (PRN):  acetaminophen     Tablet .. 650 milliGRAM(s) Oral every 6 hours PRN Temp greater or equal to 38C (100.4F), Mild Pain (1 - 3)  dextrose Oral Gel 15 Gram(s) Oral once PRN Blood Glucose LESS THAN 70 milliGRAM(s)/deciliter      LABS:                        8.6    6.95  )-----------( 227      ( 2022 09:08 )             28.7     Hgb Trend: 8.6<--, 8.3<--, 8.4<--, 7.8<--, 7.4<--      144  |  109<H>  |  52<H>  ----------------------------<  186<H>  5.1   |  20<L>  |  3.14<H>    Ca    8.8      2022 06:33  Phos  6.7       Mg     2.00         TPro  6.5  /  Alb  2.4<L>  /  TBili  0.3  /  DBili  x   /  AST  49<H>  /  ALT  30  /  AlkPhos  105      Creatinine Trend: 3.14<--, 2.68<--, 2.95<--, 2.39<--, 1.67<--, 1.57<--  PT/INR - ( 2022 09:08 )   PT: 13.3 sec;   INR: 1.14 ratio         PTT - ( 2022 09:08 )  PTT:47.8 sec    Arterial Blood Gas:   @ 09:08  7.30/39/70/19/93.5/-6.7  ABG lactate: --    Venous Blood Gas:   @ 00:40  7.36/43/45/24/75.7  VBG Lactate: 0.7      MICROBIOLOGY:     RADIOLOGY & ADDITIONAL TESTS:      ASSESSMENT AND PLAN:  68 y/o F with PMH of HTN, HLD, sarcoidosis, CHF, presented to the ED for new onset AMS. She is also found to have pleural effusion, elevated BNP and LE edema concerning for CHF exacerbation. Admit for metabolic encephalopathy and CHF exacerbation. Intubated, sedated  for AMS. Extubated on  with improved mental status off of sedation, transferred out of MICU. On  am, found in agonal breathing and subsequently pulseless. Code called, ROSC achieved, pt re-intubated. Second PEA code called upon arrival to the CTICU, ROSC achieved.      ===========NEURO===========  #AMS - structural vs metabolic vs infectious   could be in the setting of hypercapneic resp failure   TSH: wnl   infectious workup as below   -  CTH negative for acute bleed or major vessel ischemic stroke  - initially sedated on prop & fent, weaned off as of 7/15  - required restraints, precedex 7/15 am due to agitation  - mental status improved on , pt able to follow commands, subsequently extubated and tolerating NC  - RRT called  am for PEA arrest and pt brought back to MICU  - CTH pending  - sedated on propofol      ===========RESP===========  #Acute Hypercapneic respiratory failure   AB.41/35/135/22   - intubated   - extubated   - improvement in acidosis, hypercapnia s/p intubation  - re-intubated on  following PEA arrest      #Bilateral pleural effusions i/s/o HF exacerbation   CT Chest with new large right and small left pleural effusions with adjacent compressive atelectasis including atelectasis of the majority of the right lower lobe   - Lasix, Bumex both attempted, with poor UOP in response to either, subsequently given IVF bolus challenge with improvement in UOP  - Monitor O2 sat  - Monitor RR       ===========CV===========    ===========GI===========    ===========RENAL/===========    ===========ID===========    ===========ENDO===========    ===========HEME===========    ===========ETHICS===========          CV:    #HFpEF   BNP on arrival: 4021 with evidence of fluid overload with pleural effusions, b/l pitting edema   Echo (): EF: 62%; Mild diastolic dysfunction. Normal right ventricular size and function   EKG: RBBB (present from prior EKG's). no acute ischemic findings; trop: 50   Monitor lytes and replete PRN   Strict I&O  Daily weights   - initially on levo following intubation & sedation  - now off levo  - 7/15 TTE: moderate LV systolic dysfunction, moderate diastolic dysfunction, RV enlargement with decreased RV systolic dysfunction  - POCUS showed relatively 1.5 in IVC, pt not appearing to be volume overloaded, intermittent fluid challenge w/ LR boluses    #HTN   on amlodipine, carvedilol and hydralazine at home   - SBPs to 190s on 7/15, increased hydralazine dose to 75mg q8h      GI:     Diet: NPO w/ TFs   Monitor BMs  - hold TFs today in preparation for possible extubation      Renal/:    #CKD Stage 2  Baseline Scr (): 2.33  On admission, SCr at 1.6  Monitor UO  Monitor SCr  - Cr now 2.68, pt's baseline around 2-2.3  - check urine lytes in setting of metabolic acidosis, lactate wnl      ID:     #Encephalopathy possibly 2/2 sepsis - hypothermic to 90.4 with leukopenia   procal: neg  CT abd and pelvis: no infectious source   c/w meropenem (7/13 - )  - improved temps  am, no hypothermia overnight  -  blood cx 2/2 ngtd  -  ua positive leukest, negative nitrites, elevated WBCs  - f/u MRSA swab      Endo:     #T2DM   #Hypoglycemia  on detemir 20 U at home   Last A1C 2022 7.3  - am cortisol 7.1 wnl  - has had persistent hypoglycemia despite TFs, finger sticks in 50s.   - Per family, pt has had intermittent hypoglycemia over past month despite coming down on daily insulin and maintaining PO intake  - Possible component of CKD & poor insulin clearance vs new onset endogenous insulin excess  - stop D5 per endo reccs, continue with correctional scale insulin admelog 6qh  - f/u c-peptide in AM    Heme:     DVT Ppx: Heparin     Ethics: pending GOC discussion with family. MICU Accept Note    CHIEF COMPLAINT:     HPI / INTERVAL HISTORY:        PAST MEDICAL & SURGICAL HISTORY:  Type 2 diabetes mellitus      Bilateral cataracts      Fluid in pleural cavity associated with pancreatitis      Pancreatic pseudocyst/cyst  s/p drain placement and removal      HTN (hypertension)      HLD (hyperlipidemia)      History of amputation of right great toe        H/O:  section        History of laparoscopic cholecystectomy          FAMILY HISTORY:  No pertinent family history in first degree relatives        SOCIAL HISTORY:  Smoking:   Substance Use:   EtOH Use:   Marital Status:   Sexual History:   Occupation:  Recent Travel:  Country of Birth:   Advance Directives:     HOME MEDICATIONS:      Allergies    penicillin ([facility])    Intolerances          REVIEW OF SYSTEMS:  Constitutional: No fevers, chills, weight loss, weight gain  HEENT: No vision problems, eye pain, nasal congestion, rhinorrhea, sore throat, dysphagia  CV: No chest pain, orthopnea, palpitations  Resp: No cough, dyspnea, wheezing, hemoptysis  GI: No nausea, vomiting, diarrhea, constipation, abdominal pain  : [ ] dysuria [ ] nocturia [ ] hematuria [ ] increased urinary frequency  Musculoskeletal: [ ] back pain [ ] myalgias [ ] arthralgias [ ] fracture  Skin: [ ] rash [ ] itch  Neurological: [ ] headache [ ] dizziness [ ] syncope [ ] weakness [ ] numbness  Psychiatric: [ ] anxiety [ ] depression  Endocrine: [ ] diabetes [ ] thyroid problem  Hematologic/Lymphatic: [ ] anemia [ ] bleeding problem  Allergic/Immunologic: [ ] itchy eyes [ ] nasal discharge [ ] hives [ ] angioedema  [ ] All other systems negative  [ ] Unable to assess ROS because ________    OBJECTIVE:  ICU Vital Signs Last 24 Hrs  T(C): 36.7 (2022 20:00), Max: 36.7 (2022 20:00)  T(F): 98.1 (2022 20:00), Max: 98.1 (2022 20:00)  HR: 88 (2022 08:05) (71 - 101)  BP: 156/88 (2022 23:42) (135/84 - 183/85)  BP(mean): 108 (2022 20:00) (88 - 131)  ABP: --  ABP(mean): --  RR: 18 (2022 23:42) (12 - 21)  SpO2: 100% (2022 08:05) (97% - 100%)    O2 Parameters below as of 2022 23:42  Patient On (Oxygen Delivery Method): nasal cannula  O2 Flow (L/min): 3        Mode: AC/ CMV (Assist Control/ Continuous Mandatory Ventilation), RR (machine): 20, TV (machine): 420, FiO2: 80, PEEP: 5, MAP: 17, PIP: 38    07-16 @ 07:01  -  07-17 @ 07:00  --------------------------------------------------------  IN: 351 mL / OUT: 545 mL / NET: -194 mL      CAPILLARY BLOOD GLUCOSE      POCT Blood Glucose.: 227 mg/dL (2022 07:27)      =================PHYSICAL EXAM=================    GENERAL: Laying comfortably, NAD  EYES: EOMI, PERRL, no scleral icterus  NECK: No JVD  LUNG: Clear to auscultation bilaterally; No wheeze, crackles or rhonci  HEART: Regular rate and rhythm; No murmurs, rubs, or gallops  ABDOMEN: Soft, Nontender, Nondistended  EXTREMITIES:  No LE edema, 2+ Peripheral Pulses, No clubbing, cyanosis, or edema  PSYCH: AAOx3  NEUROLOGY: non-focal, strength 5/5 in all extremities, sensation intact  SKIN: No rashes or lesions    =================================================    LABS:                        8.6    6.95  )-----------( 227      ( 2022 09:08 )             28.7     Auto Eosinophil # 0.03  / Auto Eosinophil % 0.4   / Auto Neutrophil # 5.20  / Auto Neutrophil % 74.9  / BANDS % x                            8.3    7.30  )-----------( 224      ( 2022 06:33 )             28.7     Auto Eosinophil # 0.04  / Auto Eosinophil % 0.5   / Auto Neutrophil # 5.65  / Auto Neutrophil % 77.4  / BANDS % x                            8.4    4.10  )-----------( 180      ( 2022 00:15 )             27.5     Auto Eosinophil # 0.17  / Auto Eosinophil % 4.1   / Auto Neutrophil # 2.84  / Auto Neutrophil % 69.4  / BANDS % x        07    144  |  109<H>  |  52<H>  ----------------------------<  186<H>  5.1   |  20<L>  |  3.14<H>      140  |  108<H>  |  50<H>  ----------------------------<  276<H>  4.6   |  22  |  2.68<H>    Ca    8.8      2022 06:33  Mg     2.00       Phos  6.7       TPro  6.5  /  Alb  2.4<L>  /  TBili  0.3  /  DBili  x   /  AST  49<H>  /  ALT  30  /  AlkPhos  105    TPro  6.0  /  Alb  2.7<L>  /  TBili  0.3  /  DBili  x   /  AST  52<H>  /  ALT  28  /  AlkPhos  115  -16    PT/INR - ( 2022 09:08 )   PT: 13.3 sec;   INR: 1.14 ratio         PTT - ( 2022 09:08 )  PTT:47.8 sec        Mode: AC/ CMV (Assist Control/ Continuous Mandatory Ventilation), RR (machine): 20, TV (machine): 420, FiO2: 80, PEEP: 5, MAP: 17, PIP: 38    LINES:     HOSPITAL MEDICATIONS:  MEDICATIONS  (STANDING):  amLODIPine   Tablet 10 milliGRAM(s) Oral daily  aspirin  chewable 81 milliGRAM(s) Oral daily  atorvastatin 20 milliGRAM(s) Oral at bedtime  chlorhexidine 0.12% Liquid 15 milliLiter(s) Oral Mucosa every 12 hours  chlorhexidine 4% Liquid 1 Application(s) Topical <User Schedule>  dextrose 50% Injectable 25 Gram(s) IV Push once  dextrose 50% Injectable 12.5 Gram(s) IV Push once  dextrose 50% Injectable 25 Gram(s) IV Push once  fentaNYL    Injectable 100 MICROGram(s) IV Push once  fentaNYL    Injectable 100 MICROGram(s) IV Push once  fentaNYL   Infusion. 0.5 MICROgram(s)/kG/Hr (4.4 mL/Hr) IV Continuous <Continuous>  glucagon  Injectable 1 milliGRAM(s) IntraMuscular once  heparin   Injectable 5000 Unit(s) SubCutaneous every 12 hours  hydrALAZINE 75 milliGRAM(s) Oral three times a day  insulin lispro (ADMELOG) corrective regimen sliding scale   SubCutaneous every 6 hours  meropenem  IVPB 1000 milliGRAM(s) IV Intermittent every 12 hours  midazolam Injectable 2 milliGRAM(s) IV Push once    MEDICATIONS  (PRN):  acetaminophen     Tablet .. 650 milliGRAM(s) Oral every 6 hours PRN Temp greater or equal to 38C (100.4F), Mild Pain (1 - 3)  dextrose Oral Gel 15 Gram(s) Oral once PRN Blood Glucose LESS THAN 70 milliGRAM(s)/deciliter      LABS:                        8.6    6.95  )-----------( 227      ( 2022 09:08 )             28.7     Hgb Trend: 8.6<--, 8.3<--, 8.4<--, 7.8<--, 7.4<--      144  |  109<H>  |  52<H>  ----------------------------<  186<H>  5.1   |  20<L>  |  3.14<H>    Ca    8.8      2022 06:33  Phos  6.7       Mg     2.00         TPro  6.5  /  Alb  2.4<L>  /  TBili  0.3  /  DBili  x   /  AST  49<H>  /  ALT  30  /  AlkPhos  105      Creatinine Trend: 3.14<--, 2.68<--, 2.95<--, 2.39<--, 1.67<--, 1.57<--  PT/INR - ( 2022 09:08 )   PT: 13.3 sec;   INR: 1.14 ratio         PTT - ( 2022 09:08 )  PTT:47.8 sec    Arterial Blood Gas:   @ 09:08  7.30/39/70/19/93.5/-6.7  ABG lactate: --    Venous Blood Gas:   @ 00:40  7.36/43/45/24/75.7  VBG Lactate: 0.7      MICROBIOLOGY:     RADIOLOGY & ADDITIONAL TESTS:      ASSESSMENT AND PLAN:  66 y/o F with PMH of HTN, HLD, sarcoidosis, CHF, presented to the ED for new onset AMS. She is also found to have pleural effusion, elevated BNP and LE edema concerning for CHF exacerbation. Admit for metabolic encephalopathy and CHF exacerbation. Intubated, sedated  for AMS. Extubated on  with improved mental status off of sedation, transferred out of MICU. On  am, found in agonal breathing and subsequently pulseless. Code called, ROSC achieved, pt re-intubated. Second PEA code called upon arrival to the CTICU, ROSC achieved.      ===========NEURO===========  #AMS - structural vs metabolic vs infectious   could be in the setting of hypercapneic resp failure   TSH: wnl   infectious workup as below   -  CTH negative for acute bleed or major vessel ischemic stroke  - initially sedated on prop & fent, weaned off as of 7/15  - required restraints, precedex 7/15 am due to agitation  - mental status improved on , pt able to follow commands, subsequently extubated and tolerating NC  - RRT called  am for PEA arrest and pt brought back to MICU  - CTH pending  - sedated on propofol      ===========RESP===========  #Acute Hypercapneic respiratory failure   AB.41/35/135/22   - intubated   - extubated   - improvement in acidosis, hypercapnia s/p intubation  - re-intubated on  following PEA arrest      #Bilateral pleural effusions i/s/o HF exacerbation   CT Chest with new large right and small left pleural effusions with adjacent compressive atelectasis including atelectasis of the majority of the right lower lobe   - Lasix, Bumex both attempted, with poor UOP in response to either, subsequently given IVF bolus challenge with improvement in UOP  - Monitor O2 sat  - Monitor RR       ===========CV===========  #HFpEF   BNP on arrival: 4021 with evidence of fluid overload with pleural effusions, b/l pitting edema   Echo (): EF: 62%; Mild diastolic dysfunction. Normal right ventricular size and function   EKG: RBBB (present from prior EKG's). no acute ischemic findings; trop: 50   Monitor lytes and replete PRN   Strict I&O  Daily weights   - initially on levo following intubation & sedation  - now off levo  - 7/15 TTE: moderate LV systolic dysfunction, moderate diastolic dysfunction, RV enlargement with decreased RV systolic dysfunction  - POCUS showed relatively 1.5 in IVC, pt not appearing to be volume overloaded, intermittent fluid challenge w/ LR boluses    #HTN   on amlodipine, carvedilol and hydralazine at home   - SBPs to 190s on 7/15, increased hydralazine dose to 75mg q8h        ===========GI===========  Diet: NPO w/ TFs   Monitor BMs    ===========RENAL/===========  #CKD Stage 2  Baseline Scr (): 2.33  On admission, SCr at 1.6  Monitor UO  Monitor SCr  - Cr now 2.68, pt's baseline around 2-2.3  - check urine lytes in setting of metabolic acidosis, lactate wnl      ===========ID===========  #Encephalopathy possibly 2/2 sepsis - hypothermic to 90.4 with leukopenia   procal: neg  CT abd and pelvis: no infectious source   c/w meropenem ( - )  - improved temps  am, no hypothermia overnight  -  blood cx 2/2 ngtd  -  ua positive leukest, negative nitrites, elevated WBCs  - MRSA neg      ===========ENDO===========  #T2DM   #Hypoglycemia  on detemir 20 U at home   Last A1C 2022 7.3  - am cortisol 7.1 wnl  - has had persistent hypoglycemia despite TFs, finger sticks in 50s.   - Per family, pt has had intermittent hypoglycemia over past month despite coming down on daily insulin and maintaining PO intake  - Possible component of CKD & poor insulin clearance vs new onset endogenous insulin excess  - stop D5 per endo reccs, continue with correctional scale insulin admelog 6qh  - f/u c-peptide in AM      ===========HEME===========  #DVT Ppx  - SQ Heparin     ===========ETHICS===========  Code status: full code MICU Accept Note    CHIEF COMPLAINT:     HPI / INTERVAL HISTORY:  Ms Aurora Fragoso is a 68 y/o F with pmhx of HTN, HLD, sarcoidosis, CHF, presented  to the ED for new onset AMS. She was found to have pleural effusion, elevated BNP and LE edema concerning for CHF exacerbation. Admitted for metabolic encephalopathy and CHF exacerbation. Intubated  for AMS, extubated on  and transferred to floor. Rapid was called at 7:45 am on  for agonal breathing, pt found to be pulseless. Code called, ROSC achieved, pt re-intubated and transferred back to MICU (bed in CTICU). Second code called shortly after transfer for PEA arrest, ROSC achieved.      PAST MEDICAL & SURGICAL HISTORY:  Type 2 diabetes mellitus      Bilateral cataracts      Fluid in pleural cavity associated with pancreatitis      Pancreatic pseudocyst/cyst  s/p drain placement and removal      HTN (hypertension)      HLD (hyperlipidemia)      History of amputation of right great toe        H/O:  section        History of laparoscopic cholecystectomy          FAMILY HISTORY:  No pertinent family history in first degree relatives        Allergies    penicillin ([employer] [facility])    Intolerances          REVIEW OF SYSTEMS:  Unable to assess    OBJECTIVE:  ICU Vital Signs Last 24 Hrs  T(C): 36.7 (2022 20:00), Max: 36.7 (2022 20:00)  T(F): 98.1 (2022 20:00), Max: 98.1 (2022 20:00)  HR: 88 (2022 08:05) (71 - 101)  BP: 156/88 (2022 23:42) (135/84 - 183/85)  BP(mean): 108 (2022 20:00) (88 - 131)  ABP: --  ABP(mean): --  RR: 18 (2022 23:42) (12 - 21)  SpO2: 100% (2022 08:05) (97% - 100%)    O2 Parameters below as of 2022 23:42  Patient On (Oxygen Delivery Method): nasal cannula  O2 Flow (L/min): 3        Mode: AC/ CMV (Assist Control/ Continuous Mandatory Ventilation), RR (machine): 20, TV (machine): 420, FiO2: 80, PEEP: 5, MAP: 17, PIP: 38    07-16 @ 07:01  -  -17 @ 07:00  --------------------------------------------------------  IN: 351 mL / OUT: 545 mL / NET: -194 mL      CAPILLARY BLOOD GLUCOSE      POCT Blood Glucose.: 227 mg/dL (2022 07:27)      =================PHYSICAL EXAM=================    GENERAL: NAD  HEAD:  Atraumatic, Normocephalic  EYES: PERRL, conjunctiva and sclera clear  ENT: Moist Mucus Membranes present, no ulcers appreciated  NECK: Supple, + JVD  CHEST/LUNG: Intubated. Clear to auscultation bilaterally; No wheezes, rales or rhonchi  HEART: Regular rate and rhythm; No murmurs, rubs, or gallops, (+)S1, S2  ABDOMEN: Soft, + distended; Normal Bowel sounds   EXTREMITIES:  2+ Peripheral Pulses, No clubbing, cyanosis, trace edema b/l  PSYCH: Cannot assess  NEUROLOGY: Not responsive to voice or noxious stim, sedated on propofol  SKIN: No rashes or lesions    =================================================    LABS:                        8.6    6.95  )-----------( 227      ( 2022 09:08 )             28.7     Auto Eosinophil # 0.03  / Auto Eosinophil % 0.4   / Auto Neutrophil # 5.20  / Auto Neutrophil % 74.9  / BANDS % x                            8.3    7.30  )-----------( 224      ( 2022 06:33 )             28.7     Auto Eosinophil # 0.04  / Auto Eosinophil % 0.5   / Auto Neutrophil # 5.65  / Auto Neutrophil % 77.4  / BANDS % x                            8.4    4.10  )-----------( 180      ( 2022 00:15 )             27.5     Auto Eosinophil # 0.17  / Auto Eosinophil % 4.1   / Auto Neutrophil # 2.84  / Auto Neutrophil % 69.4  / BANDS % x            144  |  109<H>  |  52<H>  ----------------------------<  186<H>  5.1   |  20<L>  |  3.14<H>      140  |  108<H>  |  50<H>  ----------------------------<  276<H>  4.6   |  22  |  2.68<H>    Ca    8.8      2022 06:33  Mg     2.00       Phos  6.7       TPro  6.5  /  Alb  2.4<L>  /  TBili  0.3  /  DBili  x   /  AST  49<H>  /  ALT  30  /  AlkPhos  105    TPro  6.0  /  Alb  2.7<L>  /  TBili  0.3  /  DBili  x   /  AST  52<H>  /  ALT  28  /  AlkPhos  115      PT/INR - ( 2022 09:08 )   PT: 13.3 sec;   INR: 1.14 ratio         PTT - ( 2022 09:08 )  PTT:47.8 sec        Mode: AC/ CMV (Assist Control/ Continuous Mandatory Ventilation), RR (machine): 20, TV (machine): 420, FiO2: 80, PEEP: 5, MAP: 17, PIP: 38    LINES:     HOSPITAL MEDICATIONS:  MEDICATIONS  (STANDING):  amLODIPine   Tablet 10 milliGRAM(s) Oral daily  aspirin  chewable 81 milliGRAM(s) Oral daily  atorvastatin 20 milliGRAM(s) Oral at bedtime  chlorhexidine 0.12% Liquid 15 milliLiter(s) Oral Mucosa every 12 hours  chlorhexidine 4% Liquid 1 Application(s) Topical <User Schedule>  dextrose 50% Injectable 25 Gram(s) IV Push once  dextrose 50% Injectable 12.5 Gram(s) IV Push once  dextrose 50% Injectable 25 Gram(s) IV Push once  fentaNYL    Injectable 100 MICROGram(s) IV Push once  fentaNYL    Injectable 100 MICROGram(s) IV Push once  fentaNYL   Infusion. 0.5 MICROgram(s)/kG/Hr (4.4 mL/Hr) IV Continuous <Continuous>  glucagon  Injectable 1 milliGRAM(s) IntraMuscular once  heparin   Injectable 5000 Unit(s) SubCutaneous every 12 hours  hydrALAZINE 75 milliGRAM(s) Oral three times a day  insulin lispro (ADMELOG) corrective regimen sliding scale   SubCutaneous every 6 hours  meropenem  IVPB 1000 milliGRAM(s) IV Intermittent every 12 hours  midazolam Injectable 2 milliGRAM(s) IV Push once    MEDICATIONS  (PRN):  acetaminophen     Tablet .. 650 milliGRAM(s) Oral every 6 hours PRN Temp greater or equal to 38C (100.4F), Mild Pain (1 - 3)  dextrose Oral Gel 15 Gram(s) Oral once PRN Blood Glucose LESS THAN 70 milliGRAM(s)/deciliter      LABS:                        8.6    6.95  )-----------( 227      ( 2022 09:08 )             28.7     Hgb Trend: 8.6<--, 8.3<--, 8.4<--, 7.8<--, 7.4<--      144  |  109<H>  |  52<H>  ----------------------------<  186<H>  5.1   |  20<L>  |  3.14<H>    Ca    8.8      2022 06:33  Phos  6.7       Mg     2.00         TPro  6.5  /  Alb  2.4<L>  /  TBili  0.3  /  DBili  x   /  AST  49<H>  /  ALT  30  /  AlkPhos  105      Creatinine Trend: 3.14<--, 2.68<--, 2.95<--, 2.39<--, 1.67<--, 1.57<--  PT/INR - ( 2022 09:08 )   PT: 13.3 sec;   INR: 1.14 ratio         PTT - ( 2022 09:08 )  PTT:47.8 sec    Arterial Blood Gas:   @ 09:08  7.30/39/70/19/93.5/-6.7  ABG lactate: --    Venous Blood Gas:   @ 00:40  7.36/43/45/24/75.7  VBG Lactate: 0.7      MICROBIOLOGY:     RADIOLOGY & ADDITIONAL TESTS:      ASSESSMENT AND PLAN:  68 y/o F with PMH of HTN, HLD, sarcoidosis, CHF, presented to the ED for new onset AMS. She is also found to have pleural effusion, elevated BNP and LE edema concerning for CHF exacerbation. Admit for metabolic encephalopathy and CHF exacerbation. Intubated, sedated  for AMS. Extubated on  with improved mental status off of sedation, transferred out of MICU. On  am, found in agonal breathing and subsequently pulseless. Code called, ROSC achieved, pt re-intubated. Second PEA code called upon arrival to the CTICU, ROSC achieved.      ===========NEURO===========  #AMS - structural vs metabolic vs infectious   could be in the setting of hypercapneic resp failure   TSH: wnl   infectious workup as below   -  CTH negative for acute bleed or major vessel ischemic stroke  - initially sedated on prop & fent, weaned off as of 7/15  - required restraints, precedex 7/15 am due to agitation  - mental status improved on , pt able to follow commands, subsequently extubated and tolerating NC  - RRT called  am for PEA arrest and pt brought back to MICU  - CTH pending  - sedated on propofol      ===========RESP===========  #Acute Hypercapneic respiratory failure   AB.41/35/135/22   - intubated   - extubated   - improvement in acidosis, hypercapnia s/p intubation  - re-intubated on  following PEA arrest      #Bilateral pleural effusions i/s/o HF exacerbation   CT Chest with new large right and small left pleural effusions with adjacent compressive atelectasis including atelectasis of the majority of the right lower lobe   - Lasix, Bumex both attempted, with poor UOP in response to either, subsequently given IVF bolus challenge with improvement in UOP  - Monitor O2 sat  - Monitor RR       ===========CV===========  #HFpEF   BNP on arrival: 4021 with evidence of fluid overload with pleural effusions, b/l pitting edema   Echo (): EF: 62%; Mild diastolic dysfunction. Normal right ventricular size and function   EKG: RBBB (present from prior EKG's). no acute ischemic findings; trop: 50   Monitor lytes and replete PRN   Strict I&O  Daily weights   - initially on levo following intubation & sedation  - now off levo  - 7/15 TTE: moderate LV systolic dysfunction, moderate diastolic dysfunction, RV enlargement with decreased RV systolic dysfunction  - POCUS showed relatively 1.5 in IVC, pt not appearing to be volume overloaded, intermittent fluid challenge w/ LR boluses    #HTN   on amlodipine, carvedilol and hydralazine at home   - SBPs to 190s on 7/15, increased hydralazine dose to 75mg q8h        ===========GI===========  Diet: NPO w/ TFs   Monitor BMs    ===========RENAL/===========  #MANDY  #CKD Stage 2  Baseline Scr (): 2.33  On admission, SCr at 1.6  - Cr uptrending, 3.25 as of   - increasing Phos to 6.9 as of   - Strict I/Os      ===========ID===========  #Encephalopathy possibly 2/2 sepsis - hypothermic to 90.4 with leukopenia   procal: neg  CT abd and pelvis: no infectious source   c/w meropenem ( - )  - improved temps  am, no hypothermia overnight  -  blood cx 2/2 ngtd  -  ua positive leukest, negative nitrites, elevated WBCs  - MRSA neg      ===========ENDO===========  #T2DM   #Hypoglycemia  on detemir 20 U at home   Last A1C 2022 7.3  - am cortisol 7.1 wnl  - has had persistent hypoglycemia despite TFs, finger sticks in 50s.   - Per family, pt has had intermittent hypoglycemia over past month despite coming down on daily insulin and maintaining PO intake  - Possible component of CKD & poor insulin clearance vs new onset endogenous insulin excess  - stop D5 per endo reccs, continue with correctional scale insulin admelog 6qh  - f/u c-peptide in AM      ===========HEME===========  #DVT Ppx  - SQ Heparin     ===========ETHICS===========  Code status: full code MICU Accept Note    CHIEF COMPLAINT:     HPI / INTERVAL HISTORY:  Ms Aurora Fragoso is a 68 y/o F with pmhx of HTN, HLD, sarcoidosis, CHF, presented  to the ED for new onset AMS. She was found to have pleural effusion, elevated BNP and LE edema concerning for CHF exacerbation. Admitted for metabolic encephalopathy and CHF exacerbation. Intubated  for AMS, extubated on  and transferred to floor. Rapid was called at 7:45 am on  for agonal breathing, pt found to be pulseless. Code called, ROSC achieved, pt re-intubated and transferred back to MICU (bed in CTICU). Second code called shortly after transfer for PEA arrest, ROSC achieved.      PAST MEDICAL & SURGICAL HISTORY:  Type 2 diabetes mellitus      Bilateral cataracts      Fluid in pleural cavity associated with pancreatitis      Pancreatic pseudocyst/cyst  s/p drain placement and removal      HTN (hypertension)      HLD (hyperlipidemia)      History of amputation of right great toe        H/O:  section        History of laparoscopic cholecystectomy          FAMILY HISTORY:  No pertinent family history in first degree relatives        Allergies    penicillin ([facility] [place] [facility])    Intolerances          REVIEW OF SYSTEMS:  Unable to assess    OBJECTIVE:  ICU Vital Signs Last 24 Hrs  T(C): 36.7 (2022 20:00), Max: 36.7 (2022 20:00)  T(F): 98.1 (2022 20:00), Max: 98.1 (2022 20:00)  HR: 88 (2022 08:05) (71 - 101)  BP: 156/88 (2022 23:42) (135/84 - 183/85)  BP(mean): 108 (2022 20:00) (88 - 131)  ABP: --  ABP(mean): --  RR: 18 (2022 23:42) (12 - 21)  SpO2: 100% (2022 08:05) (97% - 100%)    O2 Parameters below as of 2022 23:42  Patient On (Oxygen Delivery Method): nasal cannula  O2 Flow (L/min): 3        Mode: AC/ CMV (Assist Control/ Continuous Mandatory Ventilation), RR (machine): 20, TV (machine): 420, FiO2: 80, PEEP: 5, MAP: 17, PIP: 38    07-16 @ 07:01  -  -17 @ 07:00  --------------------------------------------------------  IN: 351 mL / OUT: 545 mL / NET: -194 mL      CAPILLARY BLOOD GLUCOSE      POCT Blood Glucose.: 227 mg/dL (2022 07:27)      =================PHYSICAL EXAM=================    GENERAL: NAD  HEAD:  Atraumatic, Normocephalic  EYES: PERRL, conjunctiva and sclera clear  ENT: Moist Mucus Membranes present, no ulcers appreciated  NECK: Supple, + JVD  CHEST/LUNG: Intubated. Clear to auscultation bilaterally; No wheezes, rales or rhonchi  HEART: Regular rate and rhythm; No murmurs, rubs, or gallops, (+)S1, S2  ABDOMEN: Soft, + distended; Normal Bowel sounds   EXTREMITIES:  2+ Peripheral Pulses, No clubbing, cyanosis, trace edema b/l  PSYCH: Cannot assess  NEUROLOGY: Not responsive to voice or noxious stim, sedated on propofol  SKIN: No rashes or lesions    =================================================    LABS:                        8.6    6.95  )-----------( 227      ( 2022 09:08 )             28.7     Auto Eosinophil # 0.03  / Auto Eosinophil % 0.4   / Auto Neutrophil # 5.20  / Auto Neutrophil % 74.9  / BANDS % x                            8.3    7.30  )-----------( 224      ( 2022 06:33 )             28.7     Auto Eosinophil # 0.04  / Auto Eosinophil % 0.5   / Auto Neutrophil # 5.65  / Auto Neutrophil % 77.4  / BANDS % x                            8.4    4.10  )-----------( 180      ( 2022 00:15 )             27.5     Auto Eosinophil # 0.17  / Auto Eosinophil % 4.1   / Auto Neutrophil # 2.84  / Auto Neutrophil % 69.4  / BANDS % x            144  |  109<H>  |  52<H>  ----------------------------<  186<H>  5.1   |  20<L>  |  3.14<H>      140  |  108<H>  |  50<H>  ----------------------------<  276<H>  4.6   |  22  |  2.68<H>    Ca    8.8      2022 06:33  Mg     2.00       Phos  6.7       TPro  6.5  /  Alb  2.4<L>  /  TBili  0.3  /  DBili  x   /  AST  49<H>  /  ALT  30  /  AlkPhos  105    TPro  6.0  /  Alb  2.7<L>  /  TBili  0.3  /  DBili  x   /  AST  52<H>  /  ALT  28  /  AlkPhos  115      PT/INR - ( 2022 09:08 )   PT: 13.3 sec;   INR: 1.14 ratio         PTT - ( 2022 09:08 )  PTT:47.8 sec        Mode: AC/ CMV (Assist Control/ Continuous Mandatory Ventilation), RR (machine): 20, TV (machine): 420, FiO2: 80, PEEP: 5, MAP: 17, PIP: 38    LINES:     HOSPITAL MEDICATIONS:  MEDICATIONS  (STANDING):  amLODIPine   Tablet 10 milliGRAM(s) Oral daily  aspirin  chewable 81 milliGRAM(s) Oral daily  atorvastatin 20 milliGRAM(s) Oral at bedtime  chlorhexidine 0.12% Liquid 15 milliLiter(s) Oral Mucosa every 12 hours  chlorhexidine 4% Liquid 1 Application(s) Topical <User Schedule>  dextrose 50% Injectable 25 Gram(s) IV Push once  dextrose 50% Injectable 12.5 Gram(s) IV Push once  dextrose 50% Injectable 25 Gram(s) IV Push once  fentaNYL    Injectable 100 MICROGram(s) IV Push once  fentaNYL    Injectable 100 MICROGram(s) IV Push once  fentaNYL   Infusion. 0.5 MICROgram(s)/kG/Hr (4.4 mL/Hr) IV Continuous <Continuous>  glucagon  Injectable 1 milliGRAM(s) IntraMuscular once  heparin   Injectable 5000 Unit(s) SubCutaneous every 12 hours  hydrALAZINE 75 milliGRAM(s) Oral three times a day  insulin lispro (ADMELOG) corrective regimen sliding scale   SubCutaneous every 6 hours  meropenem  IVPB 1000 milliGRAM(s) IV Intermittent every 12 hours  midazolam Injectable 2 milliGRAM(s) IV Push once    MEDICATIONS  (PRN):  acetaminophen     Tablet .. 650 milliGRAM(s) Oral every 6 hours PRN Temp greater or equal to 38C (100.4F), Mild Pain (1 - 3)  dextrose Oral Gel 15 Gram(s) Oral once PRN Blood Glucose LESS THAN 70 milliGRAM(s)/deciliter      LABS:                        8.6    6.95  )-----------( 227      ( 2022 09:08 )             28.7     Hgb Trend: 8.6<--, 8.3<--, 8.4<--, 7.8<--, 7.4<--      144  |  109<H>  |  52<H>  ----------------------------<  186<H>  5.1   |  20<L>  |  3.14<H>    Ca    8.8      2022 06:33  Phos  6.7       Mg     2.00         TPro  6.5  /  Alb  2.4<L>  /  TBili  0.3  /  DBili  x   /  AST  49<H>  /  ALT  30  /  AlkPhos  105      Creatinine Trend: 3.14<--, 2.68<--, 2.95<--, 2.39<--, 1.67<--, 1.57<--  PT/INR - ( 2022 09:08 )   PT: 13.3 sec;   INR: 1.14 ratio         PTT - ( 2022 09:08 )  PTT:47.8 sec    Arterial Blood Gas:   @ 09:08  7.30/39/70/19/93.5/-6.7  ABG lactate: --    Venous Blood Gas:   @ 00:40  7.36/43/45/24/75.7  VBG Lactate: 0.7      MICROBIOLOGY:     RADIOLOGY & ADDITIONAL TESTS:      ASSESSMENT AND PLAN:  68 y/o F with PMH of HTN, HLD, sarcoidosis, CHF, presented to the ED for new onset AMS. She is also found to have pleural effusion, elevated BNP and LE edema concerning for CHF exacerbation. Admit for metabolic encephalopathy and CHF exacerbation. Intubated, sedated  for AMS. Extubated on  with improved mental status off of sedation, transferred out of MICU. On  am, found in agonal breathing and subsequently pulseless. Code called, ROSC achieved, pt re-intubated. Second PEA code called upon arrival to the CTICU, ROSC achieved.      ===========NEURO===========  #AMS - structural vs metabolic vs infectious   could be in the setting of hypercapneic resp failure   TSH: wnl   infectious workup as below   -  CTH negative for acute bleed or major vessel ischemic stroke  - initially sedated on prop & fent, weaned off as of 7/15  - required restraints, precedex 7/15 am due to agitation  - mental status improved on , pt able to follow commands, subsequently extubated and tolerating NC  - RRT called  am for PEA arrest and pt brought back to MICU  - CTH pending  - sedated on propofol      ===========RESP===========  #Acute Hypercapneic respiratory failure   AB.41/35/135/22   - intubated   - extubated   - improvement in acidosis, hypercapnia s/p intubation  - re-intubated on  following PEA arrest      #Bilateral pleural effusions i/s/o HF exacerbation   CT Chest with new large right and small left pleural effusions with adjacent compressive atelectasis including atelectasis of the majority of the right lower lobe   - Lasix, Bumex both attempted, with poor UOP in response to either, subsequently given IVF bolus challenge with improvement in UOP  - Monitor O2 sat  - Monitor RR       ===========CV===========  #HFpEF   BNP on arrival: 4021 with evidence of fluid overload with pleural effusions, b/l pitting edema   Echo (): EF: 62%; Mild diastolic dysfunction. Normal right ventricular size and function   EKG: RBBB (present from prior EKG's). no acute ischemic findings; trop: 50   Monitor lytes and replete PRN   Strict I&O  Daily weights   - initially on levo following intubation & sedation  - now off levo  - 7/15 TTE: moderate LV systolic dysfunction, moderate diastolic dysfunction, RV enlargement with decreased RV systolic dysfunction. Compared with previous echo 22, biventricular function declined significantly  - POCUS showed relatively 1.5 in IVC, pt not appearing to be volume overloaded, intermittent fluid challenge w/ LR boluses    #HTN   on amlodipine, carvedilol and hydralazine at home   - SBPs to 190s on 7/15, increased hydralazine dose to 75mg q8h        ===========GI===========  Diet: NPO w/ TFs   Monitor BMs    ===========RENAL/===========  #MANDY  #CKD Stage 2  Baseline Scr (): 2.33  On admission, SCr at 1.6  - Cr uptrending, 3.25 as of   - increasing Phos to 6.9 as of   - Strict I/Os      ===========ID===========  #Encephalopathy possibly 2/2 sepsis - hypothermic to 90.4 with leukopenia   procal: neg  CT abd and pelvis: no infectious source   c/w meropenem ( - )  - improved temps  am, no hypothermia overnight  -  blood cx 2/2 ngtd  -  ua positive leukest, negative nitrites, elevated WBCs  - MRSA neg      ===========ENDO===========  #T2DM   #Hypoglycemia  on detemir 20 U at home   Last A1C 2022 7.3  - am cortisol 7.1 wnl  - has had persistent hypoglycemia despite TFs, finger sticks in 50s.   - Per family, pt has had intermittent hypoglycemia over past month despite coming down on daily insulin and maintaining PO intake  - Possible component of CKD & poor insulin clearance vs new onset endogenous insulin excess  - stop D5 per endo reccs, continue with correctional scale insulin admelog 6qh  - f/u c-peptide in AM      ===========HEME===========  #DVT Ppx  - SQ Heparin     ===========ETHICS===========  Code status: full code

## 2022-07-17 NOTE — PROGRESS NOTE ADULT - PROBLEM SELECTOR PLAN 5
Baseline Scr (6/22): 2.33  On admission, SCr at 1.6  Monitor UO  Monitor SCr  - Cr now 2.68, pt's baseline around 2-2.3  - check urine lytes in setting of metabolic acidosis, lactate wnl

## 2022-07-17 NOTE — PROGRESS NOTE ADULT - SUBJECTIVE AND OBJECTIVE BOX
OVERNIGHT EVENTS: No acute overnight events.      SUBJECTIVE:       MEDICATIONS  (STANDING):  amLODIPine   Tablet 10 milliGRAM(s) Oral daily  aspirin  chewable 81 milliGRAM(s) Oral daily  atorvastatin 20 milliGRAM(s) Oral at bedtime  dextrose 50% Injectable 25 Gram(s) IV Push once  dextrose 50% Injectable 12.5 Gram(s) IV Push once  dextrose 50% Injectable 25 Gram(s) IV Push once  glucagon  Injectable 1 milliGRAM(s) IntraMuscular once  heparin   Injectable 5000 Unit(s) SubCutaneous every 12 hours  hydrALAZINE 75 milliGRAM(s) Oral three times a day  insulin lispro (ADMELOG) corrective regimen sliding scale   SubCutaneous every 6 hours  meropenem  IVPB 1000 milliGRAM(s) IV Intermittent every 12 hours    MEDICATIONS  (PRN):  acetaminophen     Tablet .. 650 milliGRAM(s) Oral every 6 hours PRN Temp greater or equal to 38C (100.4F), Mild Pain (1 - 3)  dextrose Oral Gel 15 Gram(s) Oral once PRN Blood Glucose LESS THAN 70 milliGRAM(s)/deciliter        T(F): 98.1 (07-16-22 @ 20:00), Max: 99.1 (07-16-22 @ 08:00)  HR: 94 (07-16-22 @ 20:00) (71 - 101)  BP: 177/82 (07-16-22 @ 20:00) (135/84 - 183/85)  BP(mean): 108 (07-16-22 @ 20:00) (88 - 134)  RR: 16 (07-16-22 @ 20:00) (12 - 21)  SpO2: 98% (07-16-22 @ 20:00) (95% - 100%)    PHYSICAL EXAM:     GENERAL: NAD, lying in bed comfortably  HEAD:  Atraumatic, Normocephalic  EYES: EOMI, PERRLA, conjunctiva and sclera clear, no nystagmus noted  ENT: Moist mucous membranes,   NECK: Supple, No JVD, trachea midline  CHEST/LUNG: Clear to auscultation bilaterally; No rales, rhonchi, wheezing, or rubs. Unlabored respirations  HEART: Regular rate and rhythm; No murmurs, rubs, or gallops, normal S1/S2  ABDOMEN: normal bowel sounds; Soft, nontender, nondistended, no organomegaly   EXTREMITIES:  2+ Peripheral Pulses, brisk capillary refill. No clubbing, cyanosis, or edema  MSK: No gross deformities noted   Neurological:  A&Ox3, no focal deficits   SKIN: No rashes or lesions  PSYCH: Normal mood, affect     TELEMETRY:    LABS:                        8.4    4.10  )-----------( 180      ( 16 Jul 2022 00:15 )             27.5     07-16    140  |  108<H>  |  50<H>  ----------------------------<  276<H>  4.6   |  22  |  2.68<H>    Ca    8.5      16 Jul 2022 00:15  Phos  4.9     07-16  Mg     2.00     07-16    TPro  6.0  /  Alb  2.7<L>  /  TBili  0.3  /  DBili  x   /  AST  52<H>  /  ALT  28  /  AlkPhos  115  07-16        PT/INR - ( 16 Jul 2022 00:40 )   PT: 13.7 sec;   INR: 1.18 ratio             Creatinine Trend: 2.68<--, 2.95<--, 2.39<--, 1.67<--, 1.57<--, 1.65<--  I&O's Summary    15 Jul 2022 07:01  -  16 Jul 2022 07:00  --------------------------------------------------------  IN: 2816 mL / OUT: 2030 mL / NET: 786 mL    16 Jul 2022 07:01  -  17 Jul 2022 06:51  --------------------------------------------------------  IN: 351 mL / OUT: 545 mL / NET: -194 mL      BNP    RADIOLOGY & ADDITIONAL STUDIES:

## 2022-07-17 NOTE — RAPID RESPONSE TEAM SUMMARY - NSADDTLFINDINGSRRT_GEN_ALL_CORE
Upon arrival patient was pulseless. CPR was begun. Pulse check rhythm was PEA, patient received Epi x1 and ROSC was achieved at 2nd pulse check. Anesthesia intubated patient (unable to get pulse ox reading until after intubation). Patient was started on levophed 0.1, IVF, and propofol. Patient was accepted to MICU (bed in CTI). Prior to leaving unit patient was waking up with high peak pressures on vent, she received 2mg of versed and became more synchronous with ventilator.     Upon arrival to CTI patient's blood pressures and HR started to decrease. O2 sat remained at 100%. Levophed was increased to 0.3 then 0.5 but patient lost pulse again. CPR was restarted and rhythm was again PEA, epinephrine x1 given and ROSC was achieved. ETT position was confirmed with bronchoscopy. MICU team arrived and assumed care.

## 2022-07-17 NOTE — PROGRESS NOTE ADULT - PROBLEM SELECTOR PLAN 1
AMS attributed to structural vs metabolic vs infectious and iso hypercapneic resp failure   Encephalopathy possibly 2/2 sepsis - hypothermic to 90.4 with leukopenia --> normal temp since 7/14 7/14 - extubated, following commands, now on nasal cannula, required restraints  7/13 - CTH negative for acute bleed or ischemic stroke, improved AMS  7/13 blood cx 2/2 ngtd  7/14 ua positive leukest, negative nitrites, elevated WBCs  CT abd and pelvis: no infectious source   c/w meropenem (7/13 - )  TSH: wnl   infectious workup as below   procal: neg    - f/u MRSA swab

## 2022-07-17 NOTE — PROGRESS NOTE ADULT - ASSESSMENT
68 y/o F with PMH of HTN, HLD, sarcoidosis, CHF, presented to the ED for new onset AMS. She is also found to have pleural effusion, elevated BNP and LE edema concerning for CHF exacerbation. Admit for metabolic encephalopathy and CHF exacerbation. Intubated 7/13 for AMS. She was responding to commands on 7/14, more awake and alert, and was extubated, now on nasal cannula

## 2022-07-17 NOTE — PROGRESS NOTE ADULT - PROBLEM SELECTOR PLAN 7
on amlodipine, carvedilol and hydralazine at home   - SBPs to 190s on 7/15, increased hydralazine dose to 75mg q8h

## 2022-07-17 NOTE — PROGRESS NOTE ADULT - PROBLEM SELECTOR PLAN 3
BNP on arrival: 4021 with evidence of fluid overload with pleural effusions, b/l pitting edema   Echo (6/22): EF: 62%; Mild diastolic dysfunction. Normal right ventricular size and function   EKG: RBBB (present from prior EKG's). no acute ischemic findings; trop: 50   7/15 TTE: moderate LV systolic dysfunction, moderate diastolic dysfunction, RV enlargement with decreased RV systolic dysfunction  Monitor lytes and replete PRN   Strict I&O  Daily weights     - POCUS showed relatively 1.5 in IVC, pt not appearing to be volume overloaded, intermittent fluid challenge w/ LR boluses

## 2022-07-18 LAB
ACTH SER-ACNC: 62.6 PG/ML — SIGNIFICANT CHANGE UP (ref 7.2–63.3)
ALBUMIN SERPL ELPH-MCNC: 2.5 G/DL — LOW (ref 3.3–5)
ALP SERPL-CCNC: 113 U/L — SIGNIFICANT CHANGE UP (ref 40–120)
ALT FLD-CCNC: 47 U/L — HIGH (ref 4–33)
ANION GAP SERPL CALC-SCNC: 16 MMOL/L — HIGH (ref 7–14)
AST SERPL-CCNC: 65 U/L — HIGH (ref 4–32)
BILIRUB SERPL-MCNC: 0.3 MG/DL — SIGNIFICANT CHANGE UP (ref 0.2–1.2)
BLOOD GAS ARTERIAL COMPREHENSIVE RESULT: SIGNIFICANT CHANGE UP
BUN SERPL-MCNC: 58 MG/DL — HIGH (ref 7–23)
CALCIUM SERPL-MCNC: 8.4 MG/DL — SIGNIFICANT CHANGE UP (ref 8.4–10.5)
CHLORIDE SERPL-SCNC: 106 MMOL/L — SIGNIFICANT CHANGE UP (ref 98–107)
CO2 SERPL-SCNC: 20 MMOL/L — LOW (ref 22–31)
CREAT SERPL-MCNC: 3.69 MG/DL — HIGH (ref 0.5–1.3)
CULTURE RESULTS: SIGNIFICANT CHANGE UP
CULTURE RESULTS: SIGNIFICANT CHANGE UP
EGFR: 13 ML/MIN/1.73M2 — LOW
GLUCOSE BLDC GLUCOMTR-MCNC: 146 MG/DL — HIGH (ref 70–99)
GLUCOSE BLDC GLUCOMTR-MCNC: 165 MG/DL — HIGH (ref 70–99)
GLUCOSE BLDC GLUCOMTR-MCNC: 172 MG/DL — HIGH (ref 70–99)
GLUCOSE BLDC GLUCOMTR-MCNC: 172 MG/DL — HIGH (ref 70–99)
GLUCOSE SERPL-MCNC: 171 MG/DL — HIGH (ref 70–99)
HCT VFR BLD CALC: 27 % — LOW (ref 34.5–45)
HGB BLD-MCNC: 8.3 G/DL — LOW (ref 11.5–15.5)
MAGNESIUM SERPL-MCNC: 2.1 MG/DL — SIGNIFICANT CHANGE UP (ref 1.6–2.6)
MCHC RBC-ENTMCNC: 25.5 PG — LOW (ref 27–34)
MCHC RBC-ENTMCNC: 30.7 GM/DL — LOW (ref 32–36)
MCV RBC AUTO: 83.1 FL — SIGNIFICANT CHANGE UP (ref 80–100)
NRBC # BLD: 0 /100 WBCS — SIGNIFICANT CHANGE UP
NRBC # FLD: 0.02 K/UL — HIGH
PHOSPHATE SERPL-MCNC: 5.4 MG/DL — HIGH (ref 2.5–4.5)
PLATELET # BLD AUTO: 206 K/UL — SIGNIFICANT CHANGE UP (ref 150–400)
POTASSIUM SERPL-MCNC: 4.6 MMOL/L — SIGNIFICANT CHANGE UP (ref 3.5–5.3)
POTASSIUM SERPL-SCNC: 4.6 MMOL/L — SIGNIFICANT CHANGE UP (ref 3.5–5.3)
PROT SERPL-MCNC: 5.9 G/DL — LOW (ref 6–8.3)
RBC # BLD: 3.25 M/UL — LOW (ref 3.8–5.2)
RBC # FLD: 16.2 % — HIGH (ref 10.3–14.5)
SODIUM SERPL-SCNC: 142 MMOL/L — SIGNIFICANT CHANGE UP (ref 135–145)
SPECIMEN SOURCE: SIGNIFICANT CHANGE UP
SPECIMEN SOURCE: SIGNIFICANT CHANGE UP
WBC # BLD: 6.03 K/UL — SIGNIFICANT CHANGE UP (ref 3.8–10.5)
WBC # FLD AUTO: 6.03 K/UL — SIGNIFICANT CHANGE UP (ref 3.8–10.5)

## 2022-07-18 PROCEDURE — 74018 RADEX ABDOMEN 1 VIEW: CPT | Mod: 26

## 2022-07-18 PROCEDURE — 95720 EEG PHY/QHP EA INCR W/VEEG: CPT

## 2022-07-18 PROCEDURE — 99291 CRITICAL CARE FIRST HOUR: CPT | Mod: GC

## 2022-07-18 RX ORDER — MUPIROCIN 20 MG/G
1 OINTMENT TOPICAL EVERY 12 HOURS
Refills: 0 | Status: DISCONTINUED | OUTPATIENT
Start: 2022-07-18 | End: 2022-07-18

## 2022-07-18 RX ORDER — DEXMEDETOMIDINE HYDROCHLORIDE IN 0.9% SODIUM CHLORIDE 4 UG/ML
0.5 INJECTION INTRAVENOUS
Qty: 400 | Refills: 0 | Status: DISCONTINUED | OUTPATIENT
Start: 2022-07-18 | End: 2022-07-20

## 2022-07-18 RX ORDER — BUMETANIDE 0.25 MG/ML
4 INJECTION INTRAMUSCULAR; INTRAVENOUS ONCE
Refills: 0 | Status: COMPLETED | OUTPATIENT
Start: 2022-07-18 | End: 2022-07-18

## 2022-07-18 RX ORDER — MUPIROCIN 20 MG/G
1 OINTMENT TOPICAL EVERY 12 HOURS
Refills: 0 | Status: COMPLETED | OUTPATIENT
Start: 2022-07-18 | End: 2022-07-23

## 2022-07-18 RX ORDER — BUMETANIDE 0.25 MG/ML
2 INJECTION INTRAMUSCULAR; INTRAVENOUS ONCE
Refills: 0 | Status: DISCONTINUED | OUTPATIENT
Start: 2022-07-18 | End: 2022-07-18

## 2022-07-18 RX ADMIN — Medication 8.25 MICROGRAM(S)/KG/MIN: at 08:31

## 2022-07-18 RX ADMIN — HEPARIN SODIUM 5000 UNIT(S): 5000 INJECTION INTRAVENOUS; SUBCUTANEOUS at 06:03

## 2022-07-18 RX ADMIN — Medication 1: at 19:02

## 2022-07-18 RX ADMIN — Medication 1: at 23:42

## 2022-07-18 RX ADMIN — MEROPENEM 100 MILLIGRAM(S): 1 INJECTION INTRAVENOUS at 18:49

## 2022-07-18 RX ADMIN — CHLORHEXIDINE GLUCONATE 1 APPLICATION(S): 213 SOLUTION TOPICAL at 06:03

## 2022-07-18 RX ADMIN — HEPARIN SODIUM 5000 UNIT(S): 5000 INJECTION INTRAVENOUS; SUBCUTANEOUS at 18:50

## 2022-07-18 RX ADMIN — CHLORHEXIDINE GLUCONATE 15 MILLILITER(S): 213 SOLUTION TOPICAL at 18:49

## 2022-07-18 RX ADMIN — MUPIROCIN 1 APPLICATION(S): 20 OINTMENT TOPICAL at 18:50

## 2022-07-18 RX ADMIN — Medication 1: at 06:25

## 2022-07-18 RX ADMIN — MEROPENEM 100 MILLIGRAM(S): 1 INJECTION INTRAVENOUS at 06:02

## 2022-07-18 RX ADMIN — PROPOFOL 5.28 MICROGRAM(S)/KG/MIN: 10 INJECTION, EMULSION INTRAVENOUS at 08:31

## 2022-07-18 RX ADMIN — BUMETANIDE 132 MILLIGRAM(S): 0.25 INJECTION INTRAMUSCULAR; INTRAVENOUS at 18:49

## 2022-07-18 RX ADMIN — FENTANYL CITRATE 4.4 MICROGRAM(S)/KG/HR: 50 INJECTION INTRAVENOUS at 08:32

## 2022-07-18 RX ADMIN — CHLORHEXIDINE GLUCONATE 15 MILLILITER(S): 213 SOLUTION TOPICAL at 06:03

## 2022-07-18 NOTE — CHART NOTE - NSCHARTNOTEFT_GEN_A_CORE
Endocrine follow up   See complete consult from 7/16 for full plan of care  chart reviewed, patient was re-intubated and sedated     DM  -Glucose now trending better - but patient is NPO  -ICU goal 140-180 mg/dL  -Would continue Admelog LOW correctional scale every 6 hours at this time   -Can consider increasing correctional scale from low to moderate every 6 hours if glucose trends over 200 mg/dL  -If enteral feeding is started, will need to add basal insulin/NPH based regimen     AI  -Ruled out  -Please follow up ACTH as may have been collected post cosyntropin administration and not accurate     endocrine service to follow   [phone]

## 2022-07-18 NOTE — PROGRESS NOTE ADULT - SUBJECTIVE AND OBJECTIVE BOX
Neurology Progress Note    S: Patient seen and examined in ICU.  c/f aspiration PNA now on propofol and fentanyl     Medication:    MEDICATIONS  (STANDING):  amLODIPine   Tablet 10 milliGRAM(s) Oral daily  aspirin  chewable 81 milliGRAM(s) Oral daily  atorvastatin 20 milliGRAM(s) Oral at bedtime  chlorhexidine 0.12% Liquid 15 milliLiter(s) Oral Mucosa every 12 hours  chlorhexidine 4% Liquid 1 Application(s) Topical <User Schedule>  dextrose 50% Injectable 12.5 Gram(s) IV Push once  dextrose 50% Injectable 25 Gram(s) IV Push once  dextrose 50% Injectable 25 Gram(s) IV Push once  fentaNYL   Infusion. 0.5 MICROgram(s)/kG/Hr (4.4 mL/Hr) IV Continuous <Continuous>  glucagon  Injectable 1 milliGRAM(s) IntraMuscular once  heparin   Injectable 5000 Unit(s) SubCutaneous every 12 hours  hydrALAZINE 75 milliGRAM(s) Oral three times a day  insulin lispro (ADMELOG) corrective regimen sliding scale   SubCutaneous every 6 hours  meropenem  IVPB 1000 milliGRAM(s) IV Intermittent every 12 hours  norepinephrine Infusion 0.05 MICROgram(s)/kG/Min (8.25 mL/Hr) IV Continuous <Continuous>  propofol Infusion 10 MICROgram(s)/kG/Min (5.28 mL/Hr) IV Continuous <Continuous>    MEDICATIONS  (PRN):  acetaminophen     Tablet .. 650 milliGRAM(s) Oral every 6 hours PRN Temp greater or equal to 38C (100.4F), Mild Pain (1 - 3)  dextrose Oral Gel 15 Gram(s) Oral once PRN Blood Glucose LESS THAN 70 milliGRAM(s)/deciliter      Vitals:      ICU Vital Signs Last 24 Hrs  T(C): 36.5 (2022 08:00), Max: 38.5 (2022 20:00)  T(F): 97.7 (2022 08:00), Max: 101.3 (2022 20:00)  HR: 65 (2022 10:10) (43 - 93)  BP: 104/67 (2022 09:00) (70/49 - 168/89)  BP(mean): 79 (2022 09:00) (56 - 119)  ABP: --  ABP(mean): --  RR: 20 (2022 09:00) (0 - 22)  SpO2: 100% (2022 10:10) (100% - 100%)    O2 Parameters below as of 2022 09:00  Patient On (Oxygen Delivery Method): ventilator    O2 Concentration (%): 60                  General Exam:   General Appearance: Appropriately dressed and in no acute distress       Head: Normocephalic, atraumatic and no dysmorphic features  Ear, Nose, and Throat: Moist mucous membranes +ETT   CVS: S1S2+  Resp: No SOB, no wheeze or rhonchi  GI: soft NT/ND  Extremities:  toe amputation noted   Skin: No bruises or rashes     Neurological Exam: (on propfol/fentanyl, sedated )   Mental Status: eyes closed, no verbal, not following . intubated   Cranial Nerves: PERRL, EOMI, VFFC, sensation V1-V3 intact,  no obvious facial asymmetry, equal elevation of palate, scm/trap 5/5, tongue is midline on protrusion. no obvious papilledema on fundoscopic exam. hearing is grossly intact.   Motor: TONEY distally   Sensation: withdraws x 4  Reflexes: 1+ throughout at biceps, brachioradialis, triceps, patellars and ankles bilaterally and equal. No clonus. R toe and L toe were both downgoing.  Coordination: unable   Gait:  unable     I personally reviewed the below data/images/labs:    CBC Full  -  ( 2022 04:00 )  WBC Count : 6.03 K/uL  RBC Count : 3.25 M/uL  Hemoglobin : 8.3 g/dL  Hematocrit : 27.0 %  Platelet Count - Automated : 206 K/uL  Mean Cell Volume : 83.1 fL  Mean Cell Hemoglobin : 25.5 pg  Mean Cell Hemoglobin Concentration : 30.7 gm/dL  Auto Neutrophil # : x  Auto Lymphocyte # : x  Auto Monocyte # : x  Auto Eosinophil # : x  Auto Basophil # : x  Auto Neutrophil % : x  Auto Lymphocyte % : x  Auto Monocyte % : x  Auto Eosinophil % : x  Auto Basophil % : x    -18    142  |  106  |  58<H>  ----------------------------<  171<H>  4.6   |  20<L>  |  3.69<H>    Ca    8.4      2022 04:00  Phos  5.4     07-18  Mg     2.10         TPro  5.9<L>  /  Alb  2.5<L>  /  TBili  0.3  /  DBili  x   /  AST  65<H>  /  ALT  47<H>  /  AlkPhos  113        Urinalysis Basic - ( 2022 09:00 )    Color: Yellow / Appearance: Slightly Turbid / S.027 / pH: x  Gluc: x / Ketone: Trace  / Bili: Negative / Urobili: 3 mg/dL   Blood: x / Protein: 300 mg/dL / Nitrite: Negative   Leuk Esterase: Large / RBC: 5 /HPF / WBC >50 /HPF   Sq Epi: x / Non Sq Epi: x / Bacteria: x        EXAM:  MR BRAIN WAW IC      EXAM:  MR IAC ONLY WAW IC        PROCEDURE DATE:  Dec  1 2021         INTERPRETATION:  .    CLINICAL INFORMATION: Acute onset of bilateral hearing loss.    TECHNIQUE: Multiplanar multisequential MRI of the brain and internal auditory canals was acquired with and without the administration of IV gadolinium. 7.5 cc's of IV Gadavist was administered for the purposes of this examination. 0 cc were discarded.    COMPARISON: Prior CT examination of the internal auditory canals dated 2021. Prior CT angiograms/venogram examination of the head and neck dated 2021. Examination.    FINDINGS:    MRI BRAIN: A chronic lacunar infarct is seen within the right medial cerebellar hemisphere.    Multiple patchy confluent nonspecific T2/FLAIR hyperintense signal changes are noted throughout the bihemispheric white matter without associated mass effect or restricted diffusion.    There is no abnormal brain parenchymal or leptomeningeal enhancement.    Ventricular sizeand configuration is unremarkable. No abnormal extra-axial fluid collections are seen. Flow-voids are noted throughout the major intracranial vessels, on the T2 weighted images, consistent with their patency. The sellar area appears unremarkable.    Minimal scattered mucosal thickening is seen throughout the paranasal sinuses. The mastoid air cells are clear. The orbits appear unremarkable.    MRI IAC: There is no CP angle mass. The bilateral 7th and 8th cranial nerves appear unremarkable in course and morphology. No abnormal enhancement is seen. The inner ear structures are normally formed. Normal fluid signal is seen within the inner ear structures. The cochlea, vestibule, and semicircular canals appear unremarkable.    IMPRESSION:    MRI BRAIN: No acute intracranial hemorrhage, acute ischemia, or abnormal intracranial enhancement.    Chronic lacunar infarct within the right medial cerebellar hemisphere and mild chronic white matter microvascular type changes.    MRI IAC: Unremarkable study.    --- End of Report ---              LUCI RUEDA MD; Attending Radiologist  This document has been electronically signed. Dec  3 2021  8:46AM    < end of copied text >      < from: CT Chest w/ IV Cont (22 @ 22:58) >    ACC: [id number] EXAM:  CT ABDOMEN AND PELVIS IC                        ACC: [id number] EXAM:  CT CHEST IC                          PROCEDURE DATE:  2022          INTERPRETATION:  CLINICAL INFORMATION: Pleural effusion. Rule out   empyema. Abdominal tenderness. History of pancreatitis. Rule out   infection or obstruction.    COMPARISON: CT chest 2022.    CONTRAST/COMPLICATIONS:  IV Contrast: IV contrast documented in associated exam (accession   [id number]), Omnipaque 350 (accession [id number])  64 cc administered   6 cc   discarded  Oral Contrast: NONE  Complications: None reported at time of study completion    PROCEDURE:  CT of the Chest, Abdomen and Pelvis was performed.  Sagittal and coronal reformats were performed.    FINDINGS:  CHEST:  LUNGS AND LARGE AIRWAYS: Compressive atelectasis of the right upper,   middle and bilateral lower lobes including atelectasis of the majority of   the right lower lobe. Subsegmental atelectasis in the left upper lobe.  PLEURA: New large right andsmall left pleural effusions. No evidence of   an empyema. Fluid in the minor fissure.  VESSELS: Atherosclerotic changes of the aorta and coronary arteries.  HEART: Heart size is enlarged. No pericardial effusion.  MEDIASTINUM AND ANTONIO: No lymphadenopathy. Redemonstration of calcified   mediastinal lymph nodes.  CHEST WALL AND LOWER NECK: 7 mm right thyroid lobe nodule. Generalized   anasarca.    ABDOMEN AND PELVIS:  LIVER: Within normal limits.  BILE DUCTS: Normal caliber.  GALLBLADDER: Cholecystectomy.  SPLEEN: Within normal limits.  PANCREAS: Atrophic.  ADRENALS: Within normal limits.  KIDNEYS/URETERS: No renal stones or hydronephrosis.    BLADDER: Within normal limits.  REPRODUCTIVE ORGANS: Atrophic and retroflexed uterus. No adnexal masses.    BOWEL: Redemonstration of an outpouching with wall calcification and   intraluminal fluid and gas measuring 4.9 x 4.6 cm emanating from the   posterior gastric body likely representing a large diverticulum. No bowel   obstruction or inflammation. Colonic diverticulosis without   diverticulitis. Appendix within normal limits.  PERITONEUM: Small volume free pelvic fluid.  VESSELS: Atherosclerotic changes.  RETROPERITONEUM/LYMPH NODES: No lymphadenopathy.  ABDOMINAL WALL: Generalized anasarca.  BONES: Degenerative changes of the spine. Mild S-shaped thoracolumbar   scoliosis.    IMPRESSION:  1. New large right and small left pleural effusions with adjacent   compressive atelectasis including atelectasis of the majority of the   right lowerlobe.  2. No bowel obstruction or inflammation.      --- End of Report ---          MARTA HART MD; Resident Radiologist  This document has been electronically signed.  CHINTAN WHITFIELD MD; Attending Radiologist  This document has been electronically signed. 2022 12:32AM    < end of copied text >         < from: CT Head No Cont (22 @ 00:03) >    ACC: [id number] EXAM:  CT BRAIN                          PROCEDURE DATE:  2022          INTERPRETATION:  INDICATIONS:  Alteration of  Consciousness    TECHNIQUE:  Serial axial images were obtained from the skull base to the   vertex without intravenous contrast. Sagittal and Coronal reformats were   performed    COMPARISON EXAMINATION: None.    FINDINGS:  VENTRICLES AND SULCI:  Normal.  INTRA-AXIAL:  No mass, blood or abnormal attenuation is seen.  EXTRA-AXIAL:  No mass or collection is seen.  VISUALIZED SINUSES:  Clear.  VISUALIZED MASTOIDS:  Clear.  CALVARIUM:  Normal.  MISCELLANEOUS:  None.    IMPRESSION:  No evidence of intracranial hemorrhage, no evidence of major   vessel distribution infarct    --- End of Report ---            MOLLY TOTH MD; Attending Radiologist  This document has been electronically signed. 2022 10:19AM    < end of copied text >

## 2022-07-18 NOTE — PROGRESS NOTE ADULT - ASSESSMENT
66 y/o F with PMH of HTN, HLD, sarcoidosis, CHF was intially admitted to MICU after being intubated for airway protection was extubated successfully . On 7/17 am, found in agonal breathing and subsequently pulseless. Code Blue called, ROSC achieved, pt re-intubated. Second PEA code called upon arrival to the CTICU, ROSC achieved.     Neuro:  #Metabolic Encephalopathy - structural vs metabolic vs infectious   - CT Head (on arrival): neg  - TSH: wnl   - AM cortisol: wnl   - infectious workup as below   - after extubation, mental status improved greatly  - now s/p apneic event on the floor with 2x PEA arrest (8 min down time in total)  - f/u EEG   - f/u CT head, MRI    Resp:  #Intubated s/p PEA arrest - ? aspiration event   - SAT's as tolerated  - infectious workup as below     #Bilateral pleural effusions i/s/o HF exacerbation   CT Chest on admission with new large right and small left pleural effusions with adjacent compressive atelectasis including atelectasis of the majority of the right lower lobe   - Lasix, Bumex both attempted, with poor UOP in response to either, subsequently given IVF bolus challenge with improvement in UOP  - Monitor O2 sat  - Monitor RR     CV:  #s/p PEA arrest x2  On 7/17 AM pt was found to be agonally breathing and RRT was called. Pt went into PEA arrest, ROSC achieved after 4 mins, epix1 . Intubated by anesthesia. Was started on levo 0.1 and prop. Prior to leaving unit patient was waking up with high peak pressures on vent, she received 2mg of versed and became more synchronous with ventilator.  Upon arrival to CTI patient's blood pressures and started to become bradycardic. O2 sat remained at 100%. Went into PEA arrest again W CPR was restarted and rhythm was again PEA, epinephrine x1 given and ROSC was achieved at 4 mins.   Could be 2/2 aspiration event.    #Shock, likely septic  - on levo   - wean as tolerated to MAP >65     #HFpEF   - BNP on arrival: 4021 with evidence of fluid overload with pleural effusions, b/l pitting edema   - Echo (7/22): moderate LV systolic dysfunction, moderate diastolic dysfunction, RV enlargement with decreased RV systolic dysfunction  - EKG: RBBB (present from prior EKG's). no acute ischemic findings; trop: 50   - Strict I&O:   - Monitor lytes and replete PRN   - Daily weights:     #HTN   - On amlodipine, carvedilol and hydralazine at home. Hold antihypertensives given shock state and restart as needed     GI:     Diet:   Has OG tube in place  TF to be started   Monitor BMs      Renal/:    #MANDY on CKD Stage 2  Baseline Scr (6/22): 2.33  SCr at 3.14   Monitor UO  Monitor SCr  Repeat urine lytes   Impression:     ID:     #Shock, likely septic - no source found   - Procal: neg  - CT abd and pelvis: no infectious source   - Initial blood cx (7/13): NGTD  F/U repeat blood cx   - c/w meropenem (7/13 - )  - Monitor temp  - Monitor WBC     Endo:     #T2DM   #Hypoglycemia: adrenal insufficiency workup neg   - On detemir 20 U at home   - Last A1C 6/2022 7.3  - Monitor FSG   - c/w ISS   - Endocrine following     Heme:     DVT Ppx: Heparin subQ    Ethics: FULL CODE 66 y/o F with PMH of HTN, HLD, sarcoidosis, CHF was intially admitted to MICU after being intubated for airway protection was extubated successfully . On 7/17 am, found in agonal breathing and subsequently pulseless. Code Blue called, ROSC achieved, pt re-intubated. Second PEA code called upon arrival to the CTICU, ROSC achieved.     Neuro:  #Metabolic Encephalopathy - structural vs metabolic vs infectious   - CT Head with no change  - TSH: wnl   - AM cortisol: wnl   - infectious workup as below   - now s/p apneic event on the floor with 2x PEA arrest (8 min down time in total).   - EEG: Abnormal EEG study. Moderate nonspecific diffuse or multifocal cerebral dysfunction. No epileptiform pattern or clear seizure seen. - But was on propofol  - Wean fent and prop. Start precedex.   - f/u MRI  - Neuroprognostication if not waking up    Resp:  #Intubated s/p PEA arrest - ? aspiration event   - SAT's as tolerated  - infectious workup as below   - Repeat sputum culture    #Bilateral pleural effusions i/s/o HF exacerbation   CT Chest on admission with new large right and small left pleural effusions with adjacent compressive atelectasis including atelectasis of the majority of the right lower lobe   - Lasix, Bumex both attempted, with poor UOP in response to either, subsequently given IVF bolus challenge with improvement in UOP  - Monitor O2 sat  - Monitor RR   - Repeat ABG    CV:  #s/p PEA arrest x2  On 7/17 AM pt was found to be agonally breathing and RRT was called. Pt went into PEA arrest, ROSC achieved after 4 mins, epix1 . Intubated by anesthesia. Was started on levo 0.1 and prop. Prior to leaving unit patient was waking up with high peak pressures on vent, she received 2mg of versed and became more synchronous with ventilator.  Upon arrival to CTI patient's blood pressures and started to become bradycardic. O2 sat remained at 100%. Went into PEA arrest again W CPR was restarted and rhythm was again PEA, epinephrine x1 given and ROSC was achieved at 4 mins.   Could be 2/2 aspiration event.    #Shock, likely septic  - on levo   - wean as tolerated to MAP >65     #HFpEF   - BNP on arrival: 4021 with evidence of fluid overload with pleural effusions, b/l pitting edema   - Echo (7/22): moderate LV systolic dysfunction, moderate diastolic dysfunction, RV enlargement with decreased RV systolic dysfunction  - EKG: RBBB (present from prior EKG's). no acute ischemic findings;  - Strict I&O:   - Monitor lytes and replete PRN   - Daily weights:     #HTN   - On amlodipine, carvedilol and hydralazine at home. Hold antihypertensives given shock state and restart as needed     GI:     Diet:   Has OG tube in place  TF to be started   Monitor BMs  - Abdominal x-ray with non-obstructive gas pattern.    Renal/:    #MANDY on CKD Stage 2  Baseline Scr (6/22): 2.33  SCr at 3.69    Monitor UO  Monitor SCr  Urine Na 26  Urine Creatinine 227  Urine Urea 231  Fena    - 4 Bumex to diurese    ID:     #Shock, likely septic - no source found   - Procal: neg  - CT abd and pelvis: no infectious source   - Initial blood cx (7/13): NGTD  F/U repeat blood cx   - c/w meropenem (7/13 - 7/20)  - Monitor temp  - Monitor WBC     Endo:     #T2DM   #Hypoglycemia: adrenal insufficiency workup neg   - On detemir 20 U at home   - Last A1C 6/2022 7.3  - Monitor FSG   - c/w ISS   - Endocrine following     Heme:     DVT Ppx: Heparin subQ    Ethics: FULL CODE 68 y/o F with PMH of HTN, HLD, sarcoidosis, CHF was intially admitted to MICU after being intubated for airway protection was extubated successfully . On 7/17 am, found in agonal breathing and subsequently pulseless. Code Blue called, ROSC achieved, pt re-intubated. Second PEA code called upon arrival to the CTICU, ROSC achieved.     Neuro:  #Metabolic Encephalopathy - structural vs metabolic vs infectious   - CT Head with no change  - TSH: wnl   - AM cortisol: wnl   - infectious workup as below   - now s/p apneic event on the floor with 2x PEA arrest (8 min down time in total).   - EEG: Abnormal EEG study. Moderate nonspecific diffuse or multifocal cerebral dysfunction. No epileptiform pattern or clear seizure seen. - But was on propofol  - Wean fent and prop. Start precedex.   - f/u MRI  - Neuroprognostication if not waking up    Resp:  #Intubated s/p PEA arrest - ? aspiration event   - SAT's as tolerated  - infectious workup as below   - Repeat sputum culture    #Bilateral pleural effusions i/s/o HF exacerbation   CT Chest on admission with new large right and small left pleural effusions with adjacent compressive atelectasis including atelectasis of the majority of the right lower lobe   - Lasix, Bumex both attempted, with poor UOP in response to either, subsequently given IVF bolus challenge with improvement in UOP  - Monitor O2 sat  - Monitor RR   - Repeat ABG    CV:  #s/p PEA arrest x2  On 7/17 AM pt was found to be agonally breathing and RRT was called. Pt went into PEA arrest, ROSC achieved after 4 mins, epix1 . Intubated by anesthesia. Was started on levo 0.1 and prop. Prior to leaving unit patient was waking up with high peak pressures on vent, she received 2mg of versed and became more synchronous with ventilator.  Upon arrival to CTI patient's blood pressures and started to become bradycardic. O2 sat remained at 100%. Went into PEA arrest again W CPR was restarted and rhythm was again PEA, epinephrine x1 given and ROSC was achieved at 4 mins.   Could be 2/2 aspiration event.    #Shock, likely septic  - on levo   - wean as tolerated to MAP >65     #HFpEF   - BNP on arrival: 4021 with evidence of fluid overload with pleural effusions, b/l pitting edema   - Echo (7/22): moderate LV systolic dysfunction, moderate diastolic dysfunction, RV enlargement with decreased RV systolic dysfunction  - EKG: RBBB (present from prior EKG's). no acute ischemic findings;  - Strict I&O:   - Monitor lytes and replete PRN   - Daily weights:     #HTN   - On amlodipine, carvedilol and hydralazine at home. Hold antihypertensives given shock state and restart as needed     GI:     Diet:   Has OG tube in place  TF to be started   Monitor BMs  - Abdominal x-ray with non-obstructive gas pattern.    Renal/:    #MANDY on CKD Stage 2  Baseline Scr (6/22): 2.33  SCr at 3.69    Monitor UO  Monitor SCr  Urine Na 26 Serum 142  Urine Creatinine 227 Serum 3.6  Urine Urea 231 BUN 58  Fena 0.3% pre renal  Furea 6.3% pre-renal      - 4 Bumex to diurese    ID:     #Shock, likely septic - no source found   - Procal: neg  - CT abd and pelvis: no infectious source   - Initial blood cx (7/13): NGTD  F/U repeat blood cx   - c/w meropenem (7/13 - 7/20)  - Monitor temp  - Monitor WBC     Endo:     #T2DM   #Hypoglycemia: adrenal insufficiency workup neg   - On detemir 20 U at home   - Last A1C 6/2022 7.3  - Monitor FSG   - c/w ISS   - Endocrine following     Heme:     DVT Ppx: Heparin subQ    Ethics: FULL CODE

## 2022-07-18 NOTE — EEG REPORT - NS EEG TEXT BOX
[facility]  Comprehensive Epilepsy Center  Report of Continuous Video EEG    [facility]: [address], [place] [address], Phone [phone]  [place] Office: [address], [address], [place] [address] Phone [phone]    [facility]: [address] [place] [address], Phone [phone]  [place] Office: [address] [place] [address], Phone [phone]    Patient Name: BEVERLY MEJIA    Age: 67 year, : 1955  Patient ID: -, MRN #: -, Snowden: CTI [id number] -  Referring Physician: -  EEG #: [id number]    Study Time/Date: 12:46:45 PM on 2022  	  End Time/Date: 0800 on 2022          			   Duration: 19H 15min    Study Information:    EEG Recording Technique:  The patient underwent continuous Video-EEG monitoring, using Telemetry System hardware on the XLTek Digital System. EEG and video data were stored on a computer hard drive with important events saved in digital archive files. The material was reviewed by a physician (electroencephalographer / epileptologist) on a daily basis. Esteban and seizure detection algorithms were utilized and reviewed. An EEG Technician attended to the patient, and was available throughout daytime work hours.  The epilepsy center neurologist was available in person or on call 24-hours per day.    EEG Placement and Labeling of Electrodes:  The EEG was performed utilizing 20 channel referential EEG connections (coronal over temporal over parasagittal montage) using all standard 10-20 electrode placements with EKG, with additional electrodes placed in the inferior temporal region using the modified 10-10 montage electrode placements for elective admissions, or if deemed necessary. Recording was at a sampling rate of 256 samples per second per channel. Time synchronized digital video recording was done simultaneously with EEG recording. A low light infrared camera was used for low light recording.     History:   VEEG AT BEDSIDE CTI 09  68 YEAR OLD FEMALE  COR: INTUBATED  P/W: HEART FAILURE  PMH:HLD, HTN, BILATERAL CATARACTS, PANCREATIC PSEUDOCYST/CYST DM2, FLUID INPLEURAL CAVITY ASSOCIATED WIHT PANCREATITIS  HV:NOT COMPLETED DUE TO AGE PANDEMIC  PHOTIC:COMPLETED  A&OX INTUBATED  CONCERN FOR SEIZURE        Pertinent Medication  GLUCAGON INJECTABLE  TYLENOL  NORVASC  APRESOLINE  DIPRIVAN  LEVIPHED  SUBLIMAZE    Interpretation:    Daily EEG Visual Analysis    Findings: The background was continuous and reactive.   No posterior dominant rhythm seen.  Background predominantly consisted of theta, delta and faster activities.    Focal Slowing:   None were present.    Sleep Background:  Absence of state change.    Other Non-Epileptiform Findings:  None were present.    Interictal Epileptiform Activity:   None were present.    Events:  Clinical events: None  Seizures: None recorded.    Activation Procedures:   Hyperventilation was not performed.    Photic stimulation was not performed.     Artifacts:  Intermittent myogenic and movement artifacts were noted.    EEG Summary / Classification:  Abnormal   - Moderate generalized slowing.    EEG Impression / Clinical Correlate:  Abnormal EEG study.  Moderate nonspecific diffuse or multifocal cerebral dysfunction.   No epileptiform pattern or clear seizure seen.    **In absence of additional clinical concerns, recommend consideration for discontinuation of current EEG study with reconnection in future if warranted.    Cisco Soto MD  EEG/Epilepsy Attending

## 2022-07-18 NOTE — PROGRESS NOTE ADULT - ATTENDING COMMENTS
67 F with sarcoidosis, CHF, htn, hld here with AMS and acute hypoxemic and hypercapnic respiratory failure due to ADHF, MANDY requiring intubation, extubated, tx to floor then had agonal breathing and cardiac arrest of unclear etiology with resultant acute hypoxemic and hypercapnic respiratory failure requiring reintubation.    Patient spontaneously moving all limbs as sedation is weaned.  MANDY worsening  - wean sedation as tolerated, f/u EEG, assess mental status; unclear if she had a seizure or stroke as cause of agonal breathing  - continue with vent for acute hypoxemic and hypercapnic respiratory failure   - monitor sugars given concern for hypoglycemia  - concern she may have had an aspiration pneumonia leading to her arrest, continue with broad abx, f/u sputum cultures  - MANDY worsening, likely ATN, will diurese given concern for pulmonary edema, too, repeat BMP this evening  - f/u endocrine reccs for hypoglycemia

## 2022-07-18 NOTE — PROGRESS NOTE ADULT - SUBJECTIVE AND OBJECTIVE BOX
INTERVAL HPI/OVERNIGHT EVENTS: None    SUBJECTIVE: Patient seen and examined at bedside.       VITAL SIGNS:  ICU Vital Signs Last 24 Hrs  T(C): 37.4 (2022 04:00), Max: 38.5 (2022 20:00)  T(F): 99.3 (2022 04:00), Max: 101.3 (2022 20:00)  HR: 69 (2022 07:00) (43 - 97)  BP: 144/73 (2022 07:00) (70/49 - 180/82)  BP(mean): 93 (2022 07:00) (56 - 158)  ABP: --  ABP(mean): --  RR: 20 (2022 07:00) (0 - 22)  SpO2: 100% (2022 07:00) (96% - 100%)    O2 Parameters below as of 2022 07:00  Patient On (Oxygen Delivery Method): ventilator    O2 Concentration (%): 60      Mode: AC/ CMV (Assist Control/ Continuous Mandatory Ventilation), RR (machine): 20, TV (machine): 420, FiO2: 60, PEEP: 10, ITime: 0.81, MAP: 15, PIP: 28  Plateau pressure:   P/F ratio:     17 @ 07:01  -   @ 07:00  --------------------------------------------------------  IN: 1718.3 mL / OUT: 1392 mL / NET: 326.3 mL      CAPILLARY BLOOD GLUCOSE      POCT Blood Glucose.: 172 mg/dL (2022 06:17)      PHYSICAL EXAM:     VITALS:   T(C): 37.4 (22 @ 04:00), Max: 38.5 (22 @ 20:00)  HR: 69 (22 @ 07:00) (43 - 97)  BP: 144/73 (22 @ 07:00) (70/49 - 180/82)  RR: 20 (22 @ 07:00) (0 - 22)  SpO2: 100% (22 @ 07:00) (96% - 100%)    GENERAL: NAD, lying in bed comfortably  HEAD:  Atraumatic, Normocephalic  EYES: EOMI, PERRLA, conjunctiva and sclera clear  ENT: Moist mucous membranes  NECK: Supple, No JVD  CHEST/LUNG: CTABL; No rales, rhonchi, wheezing, or rubs. Unlabored respirations  HEART: RRR. No M/R/G  ABDOMEN: Soft, nontender, non-distended, normoactive BS. No hepatomegaly  EXTREMITIES:  2+ Peripheral Pulses, brisk capillary refill. No clubbing, cyanosis, or edema  NERVOUS SYSTEM:  Alert & Oriented X3, speech clear. No deficits, CN II-XII intact. Normal sensation   MSK: FROM all 4 extremities, full and equal strength  PSYCH: Normal affect, normal speech, normal behavior  SKIN: No rashes or lesions      MEDICATIONS:  MEDICATIONS  (STANDING):  amLODIPine   Tablet 10 milliGRAM(s) Oral daily  aspirin  chewable 81 milliGRAM(s) Oral daily  atorvastatin 20 milliGRAM(s) Oral at bedtime  chlorhexidine 0.12% Liquid 15 milliLiter(s) Oral Mucosa every 12 hours  chlorhexidine 4% Liquid 1 Application(s) Topical <User Schedule>  dextrose 50% Injectable 12.5 Gram(s) IV Push once  dextrose 50% Injectable 25 Gram(s) IV Push once  dextrose 50% Injectable 25 Gram(s) IV Push once  fentaNYL   Infusion. 0.5 MICROgram(s)/kG/Hr (4.4 mL/Hr) IV Continuous <Continuous>  glucagon  Injectable 1 milliGRAM(s) IntraMuscular once  heparin   Injectable 5000 Unit(s) SubCutaneous every 12 hours  hydrALAZINE 75 milliGRAM(s) Oral three times a day  insulin lispro (ADMELOG) corrective regimen sliding scale   SubCutaneous every 6 hours  meropenem  IVPB 1000 milliGRAM(s) IV Intermittent every 12 hours  norepinephrine Infusion 0.05 MICROgram(s)/kG/Min (8.25 mL/Hr) IV Continuous <Continuous>  propofol Infusion 10 MICROgram(s)/kG/Min (5.28 mL/Hr) IV Continuous <Continuous>    MEDICATIONS  (PRN):  acetaminophen     Tablet .. 650 milliGRAM(s) Oral every 6 hours PRN Temp greater or equal to 38C (100.4F), Mild Pain (1 - 3)  dextrose Oral Gel 15 Gram(s) Oral once PRN Blood Glucose LESS THAN 70 milliGRAM(s)/deciliter      ALLERGIES:  Allergies    penicillin (Red Man Synd)    Intolerances        LABS:                        8.3    6.03  )-----------( 206      ( 2022 04:00 )             27.0     07-18    142  |  106  |  58<H>  ----------------------------<  171<H>  4.6   |  20<L>  |  3.69<H>    Ca    8.4      2022 04:00  Phos  5.4     07-18  Mg     2.10     07-18    TPro  5.9<L>  /  Alb  2.5<L>  /  TBili  0.3  /  DBili  x   /  AST  65<H>  /  ALT  47<H>  /  AlkPhos  113  07-18    PT/INR - ( 2022 09:08 )   PT: 13.3 sec;   INR: 1.14 ratio         PTT - ( 2022 09:08 )  PTT:47.8 sec  Urinalysis Basic - ( 2022 18:26 )    Color: Orange / Appearance: Turbid / S.027 / pH: x  Gluc: x / Ketone: Trace  / Bili: Negative / Urobili: 3 mg/dL   Blood: x / Protein: 300 mg/dL / Nitrite: Negative   Leuk Esterase: Large / RBC: 4-6 /HPF / WBC 25-30 /HPF   Sq Epi: x / Non Sq Epi: 5 /HPF / Bacteria: Moderate        IMAGING:    EKG:   INTERVAL HPI/OVERNIGHT EVENTS: Bradycardic to 20's 3x. Also was febrile to 101.3, perdomo was removed.    SUBJECTIVE: Patient seen and examined at bedside. Retracts in response to pain but does not open eyes. Not following commands.      VITAL SIGNS:  ICU Vital Signs Last 24 Hrs  T(C): 37.4 (2022 04:00), Max: 38.5 (2022 20:00)  T(F): 99.3 (2022 04:00), Max: 101.3 (2022 20:00)  HR: 69 (2022 07:00) (43 - 97)  BP: 144/73 (2022 07:00) (70/49 - 180/82)  BP(mean): 93 (2022 07:00) (56 - 158)  ABP: --  ABP(mean): --  RR: 20 (2022 07:00) (0 - 22)  SpO2: 100% (:00) (96% - 100%)    O2 Parameters below as of 2022 07:00  Patient On (Oxygen Delivery Method): ventilator    O2 Concentration (%): 60      Mode: AC/ CMV (Assist Control/ Continuous Mandatory Ventilation), RR (machine): 20, TV (machine): 420, FiO2: 60, PEEP: 10, ITime: 0.81, MAP: 15, PIP: 28  Plateau pressure:   P/F ratio:      @ 07:01  -  18 @ 07:00  --------------------------------------------------------  IN: 1718.3 mL / OUT: 1392 mL / NET: 326.3 mL      CAPILLARY BLOOD GLUCOSE      POCT Blood Glucose.: 172 mg/dL (2022 06:17)      PHYSICAL EXAM:     VITALS:   T(C): 37.4 (22 @ 04:00), Max: 38.5 (22 @ 20:00)  HR: 69 (22 @ 07:00) (43 - 97)  BP: 144/73 (22 @ 07:00) (70/49 - 180/82)  RR: 20 (22 @ 07:00) (0 - 22)  SpO2: 100% (22 @ 07:00) (96% - 100%)    GENERAL: Intubated  HEAD:  Atraumatic, Normocephalic. EEG leads on  EYES: Pupils reactive  NECK: No JVD  CHEST/LUNG: CTABL; No wheezing or rhonchi. Copious thick, tan secretions  HEART: RRR. No murmurs  ABDOMEN: Soft, nontender, non-distended, normoactive BS.  EXTREMITIES:  2+ Peripheral Pulses, brisk capillary refill.  NERVOUS SYSTEM:  Unable to assess  PSYCH: Unable to assess  SKIN: No rashes or lesions      MEDICATIONS:  MEDICATIONS  (STANDING):  amLODIPine   Tablet 10 milliGRAM(s) Oral daily  aspirin  chewable 81 milliGRAM(s) Oral daily  atorvastatin 20 milliGRAM(s) Oral at bedtime  chlorhexidine 0.12% Liquid 15 milliLiter(s) Oral Mucosa every 12 hours  chlorhexidine 4% Liquid 1 Application(s) Topical <User Schedule>  dextrose 50% Injectable 12.5 Gram(s) IV Push once  dextrose 50% Injectable 25 Gram(s) IV Push once  dextrose 50% Injectable 25 Gram(s) IV Push once  fentaNYL   Infusion. 0.5 MICROgram(s)/kG/Hr (4.4 mL/Hr) IV Continuous <Continuous>  glucagon  Injectable 1 milliGRAM(s) IntraMuscular once  heparin   Injectable 5000 Unit(s) SubCutaneous every 12 hours  hydrALAZINE 75 milliGRAM(s) Oral three times a day  insulin lispro (ADMELOG) corrective regimen sliding scale   SubCutaneous every 6 hours  meropenem  IVPB 1000 milliGRAM(s) IV Intermittent every 12 hours  norepinephrine Infusion 0.05 MICROgram(s)/kG/Min (8.25 mL/Hr) IV Continuous <Continuous>  propofol Infusion 10 MICROgram(s)/kG/Min (5.28 mL/Hr) IV Continuous <Continuous>    MEDICATIONS  (PRN):  acetaminophen     Tablet .. 650 milliGRAM(s) Oral every 6 hours PRN Temp greater or equal to 38C (100.4F), Mild Pain (1 - 3)  dextrose Oral Gel 15 Gram(s) Oral once PRN Blood Glucose LESS THAN 70 milliGRAM(s)/deciliter      ALLERGIES:  Allergies    penicillin (Red Man Synd)    Intolerances        LABS:                        8.3    6.03  )-----------( 206      ( 2022 04:00 )             27.0     07-18    142  |  106  |  58<H>  ----------------------------<  171<H>  4.6   |  20<L>  |  3.69<H>    Ca    8.4      2022 04:00  Phos  5.4       Mg     2.10     0718    TPro  5.9<L>  /  Alb  2.5<L>  /  TBili  0.3  /  DBili  x   /  AST  65<H>  /  ALT  47<H>  /  AlkPhos  113  18    PT/INR - ( 2022 09:08 )   PT: 13.3 sec;   INR: 1.14 ratio         PTT - ( 2022 09:08 )  PTT:47.8 sec  Urinalysis Basic - ( 2022 18:26 )    Color: Orange / Appearance: Turbid / S.027 / pH: x  Gluc: x / Ketone: Trace  / Bili: Negative / Urobili: 3 mg/dL   Blood: x / Protein: 300 mg/dL / Nitrite: Negative   Leuk Esterase: Large / RBC: 4-6 /HPF / WBC 25-30 /HPF   Sq Epi: x / Non Sq Epi: 5 /HPF / Bacteria: Moderate    Imaging:  XR abdomen: Nonobstructive bowel gas pattern.     Head : IMPRESSION:  Age-appropriate involutional change. Microvascular ischemic  disease as identified on the prior. No significant interval change from   2022

## 2022-07-18 NOTE — PROGRESS NOTE ADULT - ASSESSMENT
68 y/o  AAF with HTN, HLD, sarcoidosis, CHF, DM presented to the ED for new onset AMS. Patient suddenly became altered, confused and daughter noted she was slurring his speech. She was incoherent, and hallucinating, saying there is a cat present and is asking for her aunt who lives far away.  The patient known to have frequent admission for CHF exacerbations eventually intubated for hypoxic respiratory failure and now in MICU   MRI brain/IAC from 2021 with old R cerebellar infarct ; CTA h/N that time unremarkable. , A1c 9.2%  CT C/A with new large pleural effusions   CTH unremarakble   A1c 7.3  TTE was done   7/17 AM RRT, intubated, c/f aspiration PNA   o/e intubated and sedated but TONEY     Impression: 1) AMS 2/2 hypercapneic resp failure 2) prior cerebellar stroke likely small vessel   - now on propofol/fentanyl, sedated. need exam off sedation. now TONEY/ withdraws x4  - asa 81 and statin therapy for secondary stroke prevention LDL goal < 70    - now on meropenum, c/f aspiration PNA  - vent per ICU   - respiratory per ICU and pulmo   - telemetry  - PT/OT/SS/SLP, OOBC  - check FS, glucose control <180  - GI/DVT ppx  - Thank you for allowing me to participate in the care of this patient. Call with questions.   -remainin per ICU  Russell Jimenes MD  Vascular Neurology  Office: [phone]

## 2022-07-19 LAB
ALBUMIN SERPL ELPH-MCNC: 2.6 G/DL — LOW (ref 3.3–5)
ALP SERPL-CCNC: 107 U/L — SIGNIFICANT CHANGE UP (ref 40–120)
ALT FLD-CCNC: 43 U/L — HIGH (ref 4–33)
ANION GAP SERPL CALC-SCNC: 17 MMOL/L — HIGH (ref 7–14)
AST SERPL-CCNC: 53 U/L — HIGH (ref 4–32)
BILIRUB SERPL-MCNC: 0.3 MG/DL — SIGNIFICANT CHANGE UP (ref 0.2–1.2)
BLOOD GAS ARTERIAL COMPREHENSIVE RESULT: SIGNIFICANT CHANGE UP
BLOOD GAS ARTERIAL COMPREHENSIVE RESULT: SIGNIFICANT CHANGE UP
BUN SERPL-MCNC: 65 MG/DL — HIGH (ref 7–23)
CALCIUM SERPL-MCNC: 8.4 MG/DL — SIGNIFICANT CHANGE UP (ref 8.4–10.5)
CHLORIDE SERPL-SCNC: 105 MMOL/L — SIGNIFICANT CHANGE UP (ref 98–107)
CO2 SERPL-SCNC: 22 MMOL/L — SIGNIFICANT CHANGE UP (ref 22–31)
CREAT SERPL-MCNC: 3.72 MG/DL — HIGH (ref 0.5–1.3)
EGFR: 13 ML/MIN/1.73M2 — LOW
GLUCOSE BLDC GLUCOMTR-MCNC: 113 MG/DL — HIGH (ref 70–99)
GLUCOSE BLDC GLUCOMTR-MCNC: 133 MG/DL — HIGH (ref 70–99)
GLUCOSE BLDC GLUCOMTR-MCNC: 154 MG/DL — HIGH (ref 70–99)
GLUCOSE BLDC GLUCOMTR-MCNC: 156 MG/DL — HIGH (ref 70–99)
GLUCOSE SERPL-MCNC: 128 MG/DL — HIGH (ref 70–99)
HCT VFR BLD CALC: 24.3 % — LOW (ref 34.5–45)
HGB BLD-MCNC: 7.6 G/DL — LOW (ref 11.5–15.5)
MAGNESIUM SERPL-MCNC: 2.2 MG/DL — SIGNIFICANT CHANGE UP (ref 1.6–2.6)
MCHC RBC-ENTMCNC: 25.8 PG — LOW (ref 27–34)
MCHC RBC-ENTMCNC: 31.3 GM/DL — LOW (ref 32–36)
MCV RBC AUTO: 82.4 FL — SIGNIFICANT CHANGE UP (ref 80–100)
NRBC # BLD: 0 /100 WBCS — SIGNIFICANT CHANGE UP
NRBC # FLD: 0 K/UL — SIGNIFICANT CHANGE UP
PHOSPHATE SERPL-MCNC: 5.7 MG/DL — HIGH (ref 2.5–4.5)
PLATELET # BLD AUTO: 161 K/UL — SIGNIFICANT CHANGE UP (ref 150–400)
POTASSIUM SERPL-MCNC: 4.4 MMOL/L — SIGNIFICANT CHANGE UP (ref 3.5–5.3)
POTASSIUM SERPL-SCNC: 4.4 MMOL/L — SIGNIFICANT CHANGE UP (ref 3.5–5.3)
PROT SERPL-MCNC: 5.7 G/DL — LOW (ref 6–8.3)
RBC # BLD: 2.95 M/UL — LOW (ref 3.8–5.2)
RBC # FLD: 16 % — HIGH (ref 10.3–14.5)
SARS-COV-2 RNA SPEC QL NAA+PROBE: SIGNIFICANT CHANGE UP
SODIUM SERPL-SCNC: 144 MMOL/L — SIGNIFICANT CHANGE UP (ref 135–145)
WBC # BLD: 4.78 K/UL — SIGNIFICANT CHANGE UP (ref 3.8–10.5)
WBC # FLD AUTO: 4.78 K/UL — SIGNIFICANT CHANGE UP (ref 3.8–10.5)

## 2022-07-19 PROCEDURE — 95718 EEG PHYS/QHP 2-12 HR W/VEEG: CPT

## 2022-07-19 PROCEDURE — 99291 CRITICAL CARE FIRST HOUR: CPT | Mod: GC

## 2022-07-19 RX ORDER — BUMETANIDE 0.25 MG/ML
4 INJECTION INTRAMUSCULAR; INTRAVENOUS ONCE
Refills: 0 | Status: COMPLETED | OUTPATIENT
Start: 2022-07-19 | End: 2022-07-19

## 2022-07-19 RX ORDER — ACETAMINOPHEN 500 MG
1000 TABLET ORAL ONCE
Refills: 0 | Status: COMPLETED | OUTPATIENT
Start: 2022-07-19 | End: 2022-07-19

## 2022-07-19 RX ORDER — CHLORHEXIDINE GLUCONATE 213 G/1000ML
15 SOLUTION TOPICAL EVERY 12 HOURS
Refills: 0 | Status: DISCONTINUED | OUTPATIENT
Start: 2022-07-19 | End: 2022-07-20

## 2022-07-19 RX ADMIN — BUMETANIDE 132 MILLIGRAM(S): 0.25 INJECTION INTRAMUSCULAR; INTRAVENOUS at 18:23

## 2022-07-19 RX ADMIN — Medication 1000 MILLIGRAM(S): at 19:00

## 2022-07-19 RX ADMIN — CHLORHEXIDINE GLUCONATE 15 MILLILITER(S): 213 SOLUTION TOPICAL at 17:42

## 2022-07-19 RX ADMIN — MUPIROCIN 1 APPLICATION(S): 20 OINTMENT TOPICAL at 17:42

## 2022-07-19 RX ADMIN — MEROPENEM 100 MILLIGRAM(S): 1 INJECTION INTRAVENOUS at 17:43

## 2022-07-19 RX ADMIN — CHLORHEXIDINE GLUCONATE 15 MILLILITER(S): 213 SOLUTION TOPICAL at 05:08

## 2022-07-19 RX ADMIN — CHLORHEXIDINE GLUCONATE 1 APPLICATION(S): 213 SOLUTION TOPICAL at 06:06

## 2022-07-19 RX ADMIN — Medication 75 MILLIGRAM(S): at 23:12

## 2022-07-19 RX ADMIN — Medication 1: at 23:12

## 2022-07-19 RX ADMIN — MEROPENEM 100 MILLIGRAM(S): 1 INJECTION INTRAVENOUS at 05:08

## 2022-07-19 RX ADMIN — Medication 1: at 18:31

## 2022-07-19 RX ADMIN — ATORVASTATIN CALCIUM 20 MILLIGRAM(S): 80 TABLET, FILM COATED ORAL at 23:12

## 2022-07-19 RX ADMIN — Medication 400 MILLIGRAM(S): at 18:22

## 2022-07-19 RX ADMIN — MUPIROCIN 1 APPLICATION(S): 20 OINTMENT TOPICAL at 05:09

## 2022-07-19 RX ADMIN — HEPARIN SODIUM 5000 UNIT(S): 5000 INJECTION INTRAVENOUS; SUBCUTANEOUS at 05:08

## 2022-07-19 RX ADMIN — HEPARIN SODIUM 5000 UNIT(S): 5000 INJECTION INTRAVENOUS; SUBCUTANEOUS at 17:43

## 2022-07-19 NOTE — PROGRESS NOTE ADULT - ASSESSMENT
68 y/o  AAF with HTN, HLD, sarcoidosis, CHF, DM presented to the ED for new onset AMS. Patient suddenly became altered, confused and daughter noted she was slurring his speech. She was incoherent, and hallucinating, saying there is a cat present and is asking for her aunt who lives far away.  The patient known to have frequent admission for CHF exacerbations eventually intubated for hypoxic respiratory failure and now in MICU   MRI brain/IAC from 2021 with old R cerebellar infarct ; CTA h/N that time unremarkable. , A1c 9.2%  CT C/A with new large pleural effusions   CTH unremarakble   A1c 7.3  TTE was done   7/17 AM RRT, intubated, c/f aspiration PNA   o/e intubated and sedated but TONEY     Impression: 1) AMS 2/2 hypercapneic resp failure 2) prior cerebellar stroke likely small vessel   -  on propofol/fentanyl, sedated. need exam off sedation. now TONEY/ withdraws x4  - asa 81 and statin therapy for secondary stroke prevention LDL goal < 70    - now on meropenum, c/f aspiration PNA  - vent per ICU , wean as tolerated   - respiratory per ICU and pulmo   - telemetry  - PT/OT/SS/SLP, OOBC  - check FS, glucose control <180  - GI/DVT ppx  - Thank you for allowing me to participate in the care of this patient. Call with questions.   -remainin per ICU  Russell Jimenes MD  Vascular Neurology  Office: [phone] 68 y/o  AAF with HTN, HLD, sarcoidosis, CHF, DM presented to the ED for new onset AMS. Patient suddenly became altered, confused and daughter noted she was slurring his speech. She was incoherent, and hallucinating, saying there is a cat present and is asking for her aunt who lives far away.  The patient known to have frequent admission for CHF exacerbations eventually intubated for hypoxic respiratory failure and now in MICU   MRI brain/IAC from 2021 with old R cerebellar infarct ; CTA h/N that time unremarkable. , A1c 9.2%  CT C/A with new large pleural effusions   CTH unremarakble   A1c 7.3  TTE was done   7/17 AM RRT, intubated, c/f aspiration PNA   7/19 eeg with slowing but no seizures   o/e intubated and sedated but TONEY     Impression: 1) AMS 2/2 hypercapneic resp failure 2) prior cerebellar stroke likely small vessel   -  on propofol/fentanyl, sedated. need exam off sedation. now TONEY/ withdraws x4  - asa 81 and statin therapy for secondary stroke prevention LDL goal < 70    - now on meropenum, c/f aspiration PNA  - vent per ICU , wean as tolerated   - respiratory per ICU and pulmo   - telemetry  - PT/OT/SS/SLP, OOBC  - check FS, glucose control <180  - GI/DVT ppx  - Thank you for allowing me to participate in the care of this patient. Call with questions.   -remainin per ICU  Russell Jimenes MD  Vascular Neurology  Office: [phone]

## 2022-07-19 NOTE — PROGRESS NOTE ADULT - ATTENDING COMMENTS
67 F with sarcoidosis, CHF, htn, hld here with AMS and acute hypoxemic and hypercapnic respiratory failure due to ADHF, MANDY requiring intubation, extubated, tx to floor then had agonal breathing and cardiac arrest of unclear etiology with resultant acute hypoxemic and hypercapnic respiratory failure requiring reintubation.    continues to wake up when off sedation, somewhat following commands but is definitely awake    - keep off sedation as tolerated  - continue with vent for acute hypoxemic and hypercapnic respiratory failure, PS trials as tolerated  - monitor sugars given concern for hypoglycemia  - concern she may have had an aspiration pneumonia leading to her arrest, continue with broad abx, f/u sputum cultures  - MANDY worsening though suspect cardiorenal syndrome, will continue to keep net negative  - f/u endocrine reccs for hypoglycemia .     Critically ill patient requiring frequent bedside visits with therapy changes.

## 2022-07-19 NOTE — PROGRESS NOTE ADULT - SUBJECTIVE AND OBJECTIVE BOX
INTERVAL HPI/OVERNIGHT EVENTS:    SUBJECTIVE: Patient seen and examined at bedside.       VITAL SIGNS:  ICU Vital Signs Last 24 Hrs  T(C): 35.9 (2022 04:00), Max: 37.2 (2022 20:00)  T(F): 96.7 (2022 04:00), Max: 98.9 (2022 20:00)  HR: 70 (2022 07:07) (52 - 75)  BP: 126/81 (2022 07:00) (94/72 - 160/92)  BP(mean): 93 (2022 07:00) (79 - 112)  ABP: --  ABP(mean): --  RR: 14 (2022 07:00) (14 - 20)  SpO2: 100% (2022 07:07) (100% - 100%)    O2 Parameters below as of 2022 07:00  Patient On (Oxygen Delivery Method): ventilator    O2 Concentration (%): 30      Mode: AC/ CMV (Assist Control/ Continuous Mandatory Ventilation), RR (machine): 14, TV (machine): 380, FiO2: 30, PEEP: 10, ITime: 0.61, MAP: 15, PIP: 32  Plateau pressure:   P/F ratio:      @ 07:01  -   @ 07:00  --------------------------------------------------------  IN: 745 mL / OUT: 2000 mL / NET: -1255 mL      CAPILLARY BLOOD GLUCOSE      POCT Blood Glucose.: 133 mg/dL (2022 05:38)      PHYSICAL EXAM:     VITALS:   T(C): 35.9 (22 @ 04:00), Max: 37.2 (22 @ 20:00)  HR: 70 (22 @ 07:07) (52 - 75)  BP: 126/81 (22 @ 07:00) (94/72 - 160/92)  RR: 14 (22 @ 07:00) (14 - 20)  SpO2: 100% (22 @ 07:07) (100% - 100%)    GENERAL: NAD, lying in bed comfortably  HEAD:  Atraumatic, Normocephalic  EYES: EOMI, PERRLA, conjunctiva and sclera clear  ENT: Moist mucous membranes  NECK: Supple, No JVD  CHEST/LUNG: CTABL; No rales, rhonchi, wheezing, or rubs. Unlabored respirations  HEART: RRR. No M/R/G  ABDOMEN: Soft, nontender, non-distended, normoactive BS. No hepatomegaly  EXTREMITIES:  2+ Peripheral Pulses, brisk capillary refill. No clubbing, cyanosis, or edema  NERVOUS SYSTEM:  Alert & Oriented X3, speech clear. No deficits, CN II-XII intact. Normal sensation   MSK: FROM all 4 extremities, full and equal strength  PSYCH: Normal affect, normal speech, normal behavior  SKIN: No rashes or lesions      MEDICATIONS:  MEDICATIONS  (STANDING):  amLODIPine   Tablet 10 milliGRAM(s) Oral daily  aspirin  chewable 81 milliGRAM(s) Oral daily  atorvastatin 20 milliGRAM(s) Oral at bedtime  chlorhexidine 0.12% Liquid 15 milliLiter(s) Oral Mucosa every 12 hours  chlorhexidine 4% Liquid 1 Application(s) Topical <User Schedule>  dexMEDEtomidine Infusion 0.5 MICROgram(s)/kG/Hr (11 mL/Hr) IV Continuous <Continuous>  dextrose 50% Injectable 25 Gram(s) IV Push once  dextrose 50% Injectable 12.5 Gram(s) IV Push once  dextrose 50% Injectable 25 Gram(s) IV Push once  fentaNYL   Infusion. 0.5 MICROgram(s)/kG/Hr (4.4 mL/Hr) IV Continuous <Continuous>  glucagon  Injectable 1 milliGRAM(s) IntraMuscular once  heparin   Injectable 5000 Unit(s) SubCutaneous every 12 hours  hydrALAZINE 75 milliGRAM(s) Oral three times a day  insulin lispro (ADMELOG) corrective regimen sliding scale   SubCutaneous every 6 hours  meropenem  IVPB 1000 milliGRAM(s) IV Intermittent every 12 hours  mupirocin 2% Ointment 1 Application(s) Topical every 12 hours  norepinephrine Infusion 0.05 MICROgram(s)/kG/Min (8.25 mL/Hr) IV Continuous <Continuous>  propofol Infusion 10 MICROgram(s)/kG/Min (5.28 mL/Hr) IV Continuous <Continuous>    MEDICATIONS  (PRN):  acetaminophen     Tablet .. 650 milliGRAM(s) Oral every 6 hours PRN Temp greater or equal to 38C (100.4F), Mild Pain (1 - 3)  dextrose Oral Gel 15 Gram(s) Oral once PRN Blood Glucose LESS THAN 70 milliGRAM(s)/deciliter      ALLERGIES:  Allergies    penicillin (Red Man Synd)    Intolerances        LABS:                        7.6    4.78  )-----------( 161      ( 2022 02:00 )             24.3     07-    144  |  105  |  65<H>  ----------------------------<  128<H>  4.4   |  22  |  3.72<H>    Ca    8.4      2022 02:00  Phos  5.7     07-  Mg     2.20         TPro  5.7<L>  /  Alb  2.6<L>  /  TBili  0.3  /  DBili  x   /  AST  53<H>  /  ALT  43<H>  /  AlkPhos  107  07-19    PT/INR - ( 2022 09:08 )   PT: 13.3 sec;   INR: 1.14 ratio         PTT - ( 2022 09:08 )  PTT:47.8 sec  Urinalysis Basic - ( 2022 18:26 )    Color: Orange / Appearance: Turbid / S.027 / pH: x  Gluc: x / Ketone: Trace  / Bili: Negative / Urobili: 3 mg/dL   Blood: x / Protein: 300 mg/dL / Nitrite: Negative   Leuk Esterase: Large / RBC: 4-6 /HPF / WBC 25-30 /HPF   Sq Epi: x / Non Sq Epi: 5 /HPF / Bacteria: Moderate        IMAGING:    EKG: INTERVAL HPI/OVERNIGHT EVENTS: None    SUBJECTIVE: Patient seen and examined at bedside. This am was still not following commands. Arousable to voice. Responds to pain and trying to removed tube. Apneic on CPAP  This afternoon, in the presence of family, she followed commands. Was able to squeeze hand and wiggle toes. Not apneic and breathing at rate of 10.      VITAL SIGNS:  ICU Vital Signs Last 24 Hrs  T(C): 35.9 (2022 04:00), Max: 37.2 (2022 20:00)  T(F): 96.7 (2022 04:00), Max: 98.9 (2022 20:00)  HR: 70 (2022 07:07) (52 - 75)  BP: 126/81 (2022 07:00) (94/72 - 160/92)  BP(mean): 93 (2022 07:00) (79 - 112)  ABP: --  ABP(mean): --  RR: 14 (2022 07:00) (14 - 20)  SpO2: 100% (2022 07:07) (100% - 100%)    O2 Parameters below as of 2022 07:00  Patient On (Oxygen Delivery Method): ventilator    O2 Concentration (%): 30      Mode: AC/ CMV (Assist Control/ Continuous Mandatory Ventilation), RR (machine): 14, TV (machine): 380, FiO2: 30, PEEP: 10, ITime: 0.61, MAP: 15, PIP: 32  Plateau pressure:   P/F ratio:      @ 07:01  -   @ 07:00  --------------------------------------------------------  IN: 745 mL / OUT: 2000 mL / NET: -1255 mL      CAPILLARY BLOOD GLUCOSE      POCT Blood Glucose.: 133 mg/dL (2022 05:38)      PHYSICAL EXAM:     VITALS:   T(C): 35.9 (22 @ 04:00), Max: 37.2 (22 @ 20:00)  HR: 70 (22 @ 07:07) (52 - 75)  BP: 126/81 (22 @ 07:00) (94/72 - 160/92)  RR: 14 (22 @ 07:00) (14 - 20)  SpO2: 100% (22 @ 07:07) (100% - 100%)    GENERAL: Intubated  HEAD:  Atraumatic, Normocephalic.   EYES: Pupils reactive  NECK: No JVD  CHEST/LUNG: CTABL; No wheezing or rhonchi. Copious clear oral secretions.   HEART: RRR. No murmurs  ABDOMEN: Soft, nontender, non-distended, normoactive BS.  EXTREMITIES:  2+ Peripheral Pulses, brisk capillary refill. +Subaxillary moisture. Brisk subclavian skin. +1 pitting edema  NERVOUS SYSTEM:  Unable to assess  PSYCH: Unable to assess  SKIN: No rashes or lesions        MEDICATIONS:  MEDICATIONS  (STANDING):  amLODIPine   Tablet 10 milliGRAM(s) Oral daily  aspirin  chewable 81 milliGRAM(s) Oral daily  atorvastatin 20 milliGRAM(s) Oral at bedtime  chlorhexidine 0.12% Liquid 15 milliLiter(s) Oral Mucosa every 12 hours  chlorhexidine 4% Liquid 1 Application(s) Topical <User Schedule>  dexMEDEtomidine Infusion 0.5 MICROgram(s)/kG/Hr (11 mL/Hr) IV Continuous <Continuous>  dextrose 50% Injectable 25 Gram(s) IV Push once  dextrose 50% Injectable 12.5 Gram(s) IV Push once  dextrose 50% Injectable 25 Gram(s) IV Push once  fentaNYL   Infusion. 0.5 MICROgram(s)/kG/Hr (4.4 mL/Hr) IV Continuous <Continuous>  glucagon  Injectable 1 milliGRAM(s) IntraMuscular once  heparin   Injectable 5000 Unit(s) SubCutaneous every 12 hours  hydrALAZINE 75 milliGRAM(s) Oral three times a day  insulin lispro (ADMELOG) corrective regimen sliding scale   SubCutaneous every 6 hours  meropenem  IVPB 1000 milliGRAM(s) IV Intermittent every 12 hours  mupirocin 2% Ointment 1 Application(s) Topical every 12 hours  norepinephrine Infusion 0.05 MICROgram(s)/kG/Min (8.25 mL/Hr) IV Continuous <Continuous>  propofol Infusion 10 MICROgram(s)/kG/Min (5.28 mL/Hr) IV Continuous <Continuous>    MEDICATIONS  (PRN):  acetaminophen     Tablet .. 650 milliGRAM(s) Oral every 6 hours PRN Temp greater or equal to 38C (100.4F), Mild Pain (1 - 3)  dextrose Oral Gel 15 Gram(s) Oral once PRN Blood Glucose LESS THAN 70 milliGRAM(s)/deciliter      ALLERGIES:  Allergies    penicillin (Red Man Synd)    Intolerances        LABS:                        7.6    4.78  )-----------( 161      ( 2022 02:00 )             24.3     07-19    144  |  105  |  65<H>  ----------------------------<  128<H>  4.4   |  22  |  3.72<H>    Ca    8.4      2022 02:00  Phos  5.7     07-  Mg     2.20     07-19    TPro  5.7<L>  /  Alb  2.6<L>  /  TBili  0.3  /  DBili  x   /  AST  53<H>  /  ALT  43<H>  /  AlkPhos  107  07-19    PT/INR - ( 2022 09:08 )   PT: 13.3 sec;   INR: 1.14 ratio         PTT - ( 2022 09:08 )  PTT:47.8 sec  Urinalysis Basic - ( 2022 18:26 )    Color: Orange / Appearance: Turbid / S.027 / pH: x  Gluc: x / Ketone: Trace  / Bili: Negative / Urobili: 3 mg/dL   Blood: x / Protein: 300 mg/dL / Nitrite: Negative   Leuk Esterase: Large / RBC: 4-6 /HPF / WBC 25-30 /HPF   Sq Epi: x / Non Sq Epi: 5 /HPF / Bacteria: Moderate    ACTH 62 on  - wnl

## 2022-07-19 NOTE — EEG REPORT - NS EEG TEXT BOX
[facility]  Comprehensive Epilepsy Center  Report of Continuous Video EEG    [facility]: [address], [place] [address], Phone [phone]  [place] [facility]: [address], [address], [place] [address] Phone [phone]    [facility]: [address] [place] [address], Phone [phone]  [place] [facility]: [address] [place] [address], Phone [phone]    Patient Name: BEVERLY MEJIA    Age: 67 year, : 1955  Patient ID: -, MRN #: -, Snowden: CTI [id number] -  Referring Physician: -  EEG #: [id number]    Study Time/Date: 8AM on 2022  	  End Time/Date: 12:43 on 2022          			   Duration: 4:41 hours    Study Information:    EEG Recording Technique:  The patient underwent continuous Video-EEG monitoring, using Telemetry System hardware on the XLTek Digital System. EEG and video data were stored on a computer hard drive with important events saved in digital archive files. The material was reviewed by a physician (electroencephalographer / epileptologist) on a daily basis. Esteban and seizure detection algorithms were utilized and reviewed. An EEG Technician attended to the patient, and was available throughout daytime work hours.  The epilepsy center neurologist was available in person or on call 24-hours per day.    EEG Placement and Labeling of Electrodes:  The EEG was performed utilizing 20 channel referential EEG connections (coronal over temporal over parasagittal montage) using all standard 10-20 electrode placements with EKG, with additional electrodes placed in the inferior temporal region using the modified 10-10 montage electrode placements for elective admissions, or if deemed necessary. Recording was at a sampling rate of 256 samples per second per channel. Time synchronized digital video recording was done simultaneously with EEG recording. A low light infrared camera was used for low light recording.     History:   VEEG AT BEDSIDE CTI 09  68 YEAR OLD FEMALE  COR: INTUBATED  P/W: HEART FAILURE  PMH:HLD, HTN, BILATERAL CATARACTS, PANCREATIC PSEUDOCYST/CYST DM2, FLUID INPLEURAL CAVITY ASSOCIATED WIHT PANCREATITIS  HV:NOT COMPLETED DUE TO AGE PANDEMIC  PHOTIC:COMPLETED  A&OX INTUBATED  CONCERN FOR SEIZURE        Pertinent Medication  GLUCAGON INJECTABLE  TYLENOL  NORVASC  APRESOLINE  DIPRIVAN  LEVIPHED  SUBLIMAZE    Interpretation:    Daily EEG Visual Analysis    Findings: The background was continuous, symmetric and reactive.   No posterior dominant rhythm seen.  Background predominantly consisted of theta, delta and faster activities.    Focal Slowing:   None were present.    Sleep Background:  Absence of state change.    Other Non-Epileptiform Findings:  None were present.    Interictal Epileptiform Activity:   None were present.    Events:  Clinical events: None  Seizures: None recorded.    Activation Procedures:   Hyperventilation was not performed.    Photic stimulation was not performed.     Artifacts:  Intermittent myogenic and movement artifacts were noted.    ECG:    Heart rate on average during recording 60-70 BPM    EEG Summary / Classification:  Abnormal   - Moderate generalized slowing.    EEG Impression / Clinical Correlate:  Abnormal EEG study.  Moderate nonspecific diffuse or multifocal cerebral dysfunction.   No epileptiform pattern or clear seizure seen.    This is a prelim report only, pending review with attending prior to finalization.    Panda Bird M.D., epilepsy fellow    ------------------------------------     EEG Reading Room: [phone]     On Call Service After Hours: [phone]  [facility]  Comprehensive Epilepsy Center  Report of Continuous Video EEG    [facility]: [address], [place] [address], Phone [phone]  [place] [facility]: [address], [address], [place] [address] Phone [phone]    [facility]: [address] [place] [address], Phone [phone]  [place] Office: [address] [place] [address], Phone [phone]    Patient Name: BEVERLY MEJIA    Age: 67 year, : 1955  Patient ID: -, MRN #: -, Snowden: CTI [id number] -  Referring Physician: -  EEG #: [id number]    Study Time/Date: 8AM on 2022  	  End Time/Date: 12:43 on 2022          			   Duration: 4:41 hours    Study Information:    EEG Recording Technique:  The patient underwent continuous Video-EEG monitoring, using Telemetry System hardware on the XLTek Digital System. EEG and video data were stored on a computer hard drive with important events saved in digital archive files. The material was reviewed by a physician (electroencephalographer / epileptologist) on a daily basis. Esteban and seizure detection algorithms were utilized and reviewed. An EEG Technician attended to the patient, and was available throughout daytime work hours.  The epilepsy center neurologist was available in person or on call 24-hours per day.    EEG Placement and Labeling of Electrodes:  The EEG was performed utilizing 20 channel referential EEG connections (coronal over temporal over parasagittal montage) using all standard 10-20 electrode placements with EKG, with additional electrodes placed in the inferior temporal region using the modified 10-10 montage electrode placements for elective admissions, or if deemed necessary. Recording was at a sampling rate of 256 samples per second per channel. Time synchronized digital video recording was done simultaneously with EEG recording. A low light infrared camera was used for low light recording.     History:   VEEG AT BEDSIDE CTI 09  68 YEAR OLD FEMALE  COR: INTUBATED  P/W: HEART FAILURE  PMH:HLD, HTN, BILATERAL CATARACTS, PANCREATIC PSEUDOCYST/CYST DM2, FLUID INPLEURAL CAVITY ASSOCIATED WIHT PANCREATITIS  HV:NOT COMPLETED DUE TO AGE PANDEMIC  PHOTIC:COMPLETED  A&OX INTUBATED  CONCERN FOR SEIZURE        Pertinent Medication  GLUCAGON INJECTABLE  TYLENOL  NORVASC  APRESOLINE  DIPRIVAN  LEVIPHED  SUBLIMAZE    Interpretation:    Daily EEG Visual Analysis    Findings: The background was continuous, symmetric and reactive.   No posterior dominant rhythm seen.  Background predominantly consisted of theta, delta and faster activities.    Focal Slowing:   None were present.    Sleep Background:  Absence of state change.    Other Non-Epileptiform Findings:  None were present.    Interictal Epileptiform Activity:   None were present.    Events:  Clinical events: None  Seizures: None recorded.    Activation Procedures:   Hyperventilation was not performed.    Photic stimulation was not performed.     Artifacts:  Intermittent myogenic and movement artifacts were noted.    ECG:    Heart rate on average during recording 60-70 BPM    EEG Summary / Classification:  Abnormal   - Moderate generalized slowing.    EEG Impression / Clinical Correlate:  Abnormal EEG study.  Moderate nonspecific diffuse or multifocal cerebral dysfunction.   No epileptiform pattern or clear seizure seen.      Panda Bird M.D., epilepsy fellow    Cisco Soto MD  EEG/Epilepsy Attending   ------------------------------------     EEG Reading Room: [phone]     On Call Service After Hours: [phone]

## 2022-07-19 NOTE — PROGRESS NOTE ADULT - ASSESSMENT
68 y/o F with PMH of HTN, HLD, sarcoidosis, CHF was intially admitted to MICU after being intubated for airway protection was extubated successfully . On 7/17 am, found in agonal breathing and subsequently pulseless. Code Blue called, ROSC achieved, pt re-intubated. Second PEA code called upon arrival to the CTICU, ROSC achieved.     Neuro:  #Metabolic Encephalopathy - structural vs metabolic vs infectious   - CT Head with no change  - TSH: wnl   - AM cortisol: wnl   - infectious workup as below   - now s/p apneic event on the floor with 2x PEA arrest (8 min down time in total).   - EEG: Abnormal EEG study. Moderate nonspecific diffuse or multifocal cerebral dysfunction. No epileptiform pattern or clear seizure seen. - But was on propofol  - Wean fent and prop. Start precedex.   - f/u MRI  - Neuroprognostication if not waking up    Resp:  #Intubated s/p PEA arrest - ? aspiration event   - SAT's as tolerated  - infectious workup as below   - Repeat sputum culture    #Bilateral pleural effusions i/s/o HF exacerbation   CT Chest on admission with new large right and small left pleural effusions with adjacent compressive atelectasis including atelectasis of the majority of the right lower lobe   - Lasix, Bumex both attempted, with poor UOP in response to either, subsequently given IVF bolus challenge with improvement in UOP  - Monitor O2 sat  - Monitor RR   - Repeat ABG    CV:  #s/p PEA arrest x2  On 7/17 AM pt was found to be agonally breathing and RRT was called. Pt went into PEA arrest, ROSC achieved after 4 mins, epix1 . Intubated by anesthesia. Was started on levo 0.1 and prop. Prior to leaving unit patient was waking up with high peak pressures on vent, she received 2mg of versed and became more synchronous with ventilator.  Upon arrival to CTI patient's blood pressures and started to become bradycardic. O2 sat remained at 100%. Went into PEA arrest again W CPR was restarted and rhythm was again PEA, epinephrine x1 given and ROSC was achieved at 4 mins.   Could be 2/2 aspiration event.    #Shock, likely septic  - on levo   - wean as tolerated to MAP >65     #HFpEF   - BNP on arrival: 4021 with evidence of fluid overload with pleural effusions, b/l pitting edema   - Echo (7/22): moderate LV systolic dysfunction, moderate diastolic dysfunction, RV enlargement with decreased RV systolic dysfunction  - EKG: RBBB (present from prior EKG's). no acute ischemic findings;  - Strict I&O:   - Monitor lytes and replete PRN   - Daily weights:     #HTN   - On amlodipine, carvedilol and hydralazine at home. Hold antihypertensives given shock state and restart as needed     GI:     Diet:   Has OG tube in place  TF to be started   Monitor BMs  - Abdominal x-ray with non-obstructive gas pattern.    Renal/:    #MANDY on CKD Stage 2  Baseline Scr (6/22): 2.33  SCr at 3.69    Monitor UO  Monitor SCr  Urine Na 26 Serum 142  Urine Creatinine 227 Serum 3.6  Urine Urea 231 BUN 58  Fena 0.3% pre renal  Furea 6.3% pre-renal      - 4 Bumex to diurese    ID:     #Shock, likely septic - no source found   - Procal: neg  - CT abd and pelvis: no infectious source   - Initial blood cx (7/13): NGTD  F/U repeat blood cx   - c/w meropenem (7/13 - 7/20)  - Monitor temp  - Monitor WBC     Endo:     #T2DM   #Hypoglycemia: adrenal insufficiency workup neg   - On detemir 20 U at home   - Last A1C 6/2022 7.3  - Monitor FSG   - c/w ISS   - Endocrine following     Heme:     DVT Ppx: Heparin subQ    Ethics: FULL CODE 66 y/o F with PMH of HTN, HLD, sarcoidosis, CHF was intially admitted to MICU after being intubated for airway protection was extubated successfully . On 7/17 am, found in agonal breathing and subsequently pulseless. Code Blue called, ROSC achieved, pt re-intubated. Second PEA code called upon arrival to the CTICU, ROSC achieved.     Neuro:  #Metabolic Encephalopathy - structural vs metabolic vs infectious   - CT Head with no change  - TSH: wnl   - AM cortisol: wnl   - infectious workup as below   - now s/p apneic event on the floor with 2x PEA arrest (8 min down time in total).   - EEG: Abnormal EEG study. Moderate nonspecific diffuse or multifocal cerebral dysfunction. No epileptiform pattern or clear seizure seen. - (was on propofol)  - Weaned fent and prop. Start precedex.     Resp:  #Intubated s/p PEA arrest - ? aspiration event   - SAT's as tolerated  - infectious workup as below   - f/u sputum culture    #Bilateral pleural effusions i/s/o HF exacerbation   CT Chest on admission with new large right and small left pleural effusions with adjacent compressive atelectasis including atelectasis of the majority of the right lower lobe   - Lasix, Bumex both attempted, with poor UOP in response to either, subsequently given IVF bolus challenge with improvement in UOP  - Monitor O2 sat  - Monitor RR     CV:  #s/p PEA arrest x2  On 7/17 AM pt was found to be agonally breathing and RRT was called. Pt went into PEA arrest, ROSC achieved after 4 mins, epix1 . Intubated by anesthesia. Was started on levo 0.1 and prop. Prior to leaving unit patient was waking up with high peak pressures on vent, she received 2mg of versed and became more synchronous with ventilator.  Upon arrival to CTI patient's blood pressures and started to become bradycardic. O2 sat remained at 100%. Went into PEA arrest again W CPR was restarted and rhythm was again PEA, epinephrine x1 given and ROSC was achieved at 4 mins.   Could be 2/2 aspiration event.    #Shock, likely cardiogenic  - off levo   - wean as tolerated to MAP >65     #HFpEF   - BNP on arrival: 4021 with evidence of fluid overload with pleural effusions, b/l pitting edema   - Echo (6/22) Mild diastolic dysfunction, EF 62%  - Echo (7/22): moderate LV systolic dysfunction, moderate diastolic dysfunction, RV enlargement with decreased RV systolic dysfunction  - EKG: RBBB (present from prior EKG's). no acute ischemic findings;  - Strict I&O  - Monitor lytes and replete PRN   - Daily weights:     #HTN   - On amlodipine, carvedilol and hydralazine at home. Hold antihypertensives given shock state and restart as needed     GI:     Diet:   Has OG tube in place  TF held for possible extubation    Monitor BMs  - Abdominal x-ray with non-obstructive gas pattern.    Renal/:  #MANDY on CKD Stage 2  Baseline Scr (6/22): 2.33  SCr at 3.72   Monitor UO  Monitor SCr  Urine Na 26 Serum 142  Urine Creatinine 227 Serum 3.6  Urine Urea 231 BUN 58  Fena 0.3% pre renal  Furea 6.3% pre-renal  - 7/18: 4 Bumex     ID:     #Shock, likely septic - no source found   - Procal: neg  - CT abd and pelvis: no infectious source   - Initial blood cx (7/13): NGTD   -Blood cx (7/17):   - c/w meropenem (7/13 - 7/20)  - Monitor temp  - Monitor WBC     Endo:     #T2DM   #Hypoglycemia: adrenal insufficiency workup neg   - On detemir 20 U at home   - Last A1C 6/2022 7.3  - Monitor FSG   - c/w ISS q6  - Endocrine following     Heme:     DVT Ppx: Heparin subQ    Ethics: FULL CODE 66 y/o F with PMH of HTN, HLD, sarcoidosis, CHF was intially admitted to MICU after being intubated for airway protection was extubated successfully . On 7/17 am, found in agonal breathing and subsequently pulseless. Code Blue called, ROSC achieved, pt re-intubated. Second PEA code called upon arrival to the CTICU, ROSC achieved.     Neuro:  #Metabolic Encephalopathy - structural vs metabolic vs infectious   - TSH: wnl   - AM cortisol: wnl   - infectious workup as below   - now s/p apneic event on the floor with 2x PEA arrest (8 min down time in total).   - 7/17: CT Head with no change  - EEG: Abnormal EEG study. Moderate nonspecific diffuse or multifocal cerebral dysfunction. No epileptiform pattern or clear seizure seen. - (was on propofol)  - Still requiring fent and prop. Weaned off fent and prop.  - Will hold CT/MRI brain for now. Consider if no change in mental status off of sedation    Resp:  #Intubated s/p PEA arrest - ? aspiration event   - SAT's as tolerated  - infectious workup as below   - f/u sputum culture  - 7/19 apneic at time during CPAP, at other times breathing at rate of 10 in presence of family.    #Bilateral pleural effusions i/s/o HF exacerbation   CT Chest on admission with new large right and small left pleural effusions with adjacent compressive atelectasis including atelectasis of the majority of the right lower lobe   - Monitor O2 sat  - Monitor RR     CV:  #s/p PEA arrest x2  - Possible aspiration event  - off levo    #HFpEF   - BNP on arrival: 4021 with evidence of fluid overload with pleural effusions, b/l pitting edema   - Echo (6/22) Mild diastolic dysfunction, EF 62%, normal RV size function  - Echo (7/22): moderate LV systolic dysfunction, moderate diastolic dysfunction, RV enlargement with decreased RV systolic dysfunction  - EKG: RBBB (present from prior EKG's). no acute ischemic findings;  - Strict I&O  - Monitor lytes and replete PRN   - Daily weights    #HTN   - On amlodipine, carvedilol and hydralazine at home. Hold antihypertensives given shock state and restart as needed     GI:   - Diet:   - OG tube in place with large output.  - Hold feeds for now    Monitor BMs  - Abdominal x-ray with non-obstructive gas pattern 7/18.    Renal/:  #MANDY on CKD Stage 2 - pre-renal based on lytes.  Baseline Scr (6/22): 2.33  SCr at 3.72   Monitor UO  Monitor SCr  - 7/18: 4 Bumex- improvement in UOP   - 7/19: 4 Bumex    ID:   #Shock, likely septic - no source found   - Procal: neg  - CT abd and pelvis: no infectious source   - Initial blood cx (7/13): NGTD   - Blood cx (7/17): NGTD  - f/u urine-sputum culture  - c/w meropenem (7/13 - 7/20)  - Monitor temp  - Monitor WBC     Endo:     #T2DM - Controlled  - On detemir 20 U at home   - Last A1C 6/2022 7.3  - Monitor FSG  - c/w ISS q6 - got 2 units overnight    #Hypoglycemia: adrenal insufficiency workup neg    - Endocrine following     Heme:     DVT Ppx: Heparin subQ    Ethics: FULL CODE

## 2022-07-19 NOTE — EEG REPORT - NS EEG TEXT BOX
[facility]  Comprehensive Epilepsy Center  Report of Continuous Video EEG    [facility]: [address], [place] [address], Phone [phone]  [place] Office: [address], [address], [place] [address] Phone [phone]    [facility]: [address] [place] [address], Phone [phone]  [place] Office: [address] [place] [address], Phone [phone]    Patient Name: BEVERLY MEJIA    Age: 67 year, : 1955  Patient ID: -, MRN #: -, Snowden: CTI [id number] -  Referring Physician: -  EEG #: [id number]    Study Time/Date: 8AM on 2022  	  End Time/Date: 0800 on 2022          			   Duration:24H    Study Information:    EEG Recording Technique:  The patient underwent continuous Video-EEG monitoring, using Telemetry System hardware on the XLTek Digital System. EEG and video data were stored on a computer hard drive with important events saved in digital archive files. The material was reviewed by a physician (electroencephalographer / epileptologist) on a daily basis. Esteban and seizure detection algorithms were utilized and reviewed. An EEG Technician attended to the patient, and was available throughout daytime work hours.  The epilepsy center neurologist was available in person or on call 24-hours per day.    EEG Placement and Labeling of Electrodes:  The EEG was performed utilizing 20 channel referential EEG connections (coronal over temporal over parasagittal montage) using all standard 10-20 electrode placements with EKG, with additional electrodes placed in the inferior temporal region using the modified 10-10 montage electrode placements for elective admissions, or if deemed necessary. Recording was at a sampling rate of 256 samples per second per channel. Time synchronized digital video recording was done simultaneously with EEG recording. A low light infrared camera was used for low light recording.     History:   VEEG AT BEDSIDE CTI 09  68 YEAR OLD FEMALE  COR: INTUBATED  P/W: HEART FAILURE  PMH:HLD, HTN, BILATERAL CATARACTS, PANCREATIC PSEUDOCYST/CYST DM2, FLUID INPLEURAL CAVITY ASSOCIATED WIHT PANCREATITIS  HV:NOT COMPLETED DUE TO AGE PANDEMIC  PHOTIC:COMPLETED  A&OX INTUBATED  CONCERN FOR SEIZURE        Pertinent Medication  GLUCAGON INJECTABLE  TYLENOL  NORVASC  APRESOLINE  DIPRIVAN  LEVIPHED  SUBLIMAZE    Interpretation:    Daily EEG Visual Analysis    Findings: The background was continuous and reactive.   No posterior dominant rhythm seen.  Background predominantly consisted of theta, delta and faster activities.    Focal Slowing:   None were present.    Sleep Background:  Absence of state change.    Other Non-Epileptiform Findings:  None were present.    Interictal Epileptiform Activity:   None were present.    Events:  Clinical events: None  Seizures: None recorded.    Activation Procedures:   Hyperventilation was not performed.    Photic stimulation was not performed.     Artifacts:  Intermittent myogenic and movement artifacts were noted.    EEG Summary / Classification:  Abnormal   - Moderate generalized slowing.    EEG Impression / Clinical Correlate:  Abnormal EEG study.  Moderate nonspecific diffuse or multifocal cerebral dysfunction.   No epileptiform pattern or clear seizure seen.    **In absence of additional clinical concerns, recommend consideration for discontinuation of current EEG study with reconnection in future if warranted.    Cisco Soto MD  EEG/Epilepsy Attending

## 2022-07-20 ENCOUNTER — APPOINTMENT (OUTPATIENT)
Dept: PULMONOLOGY | Facility: CLINIC | Age: 67
End: 2022-07-20

## 2022-07-20 DIAGNOSIS — I50.810 RIGHT HEART FAILURE, UNSPECIFIED: ICD-10-CM

## 2022-07-20 LAB
ALBUMIN SERPL ELPH-MCNC: 2.5 G/DL — LOW (ref 3.3–5)
ALP SERPL-CCNC: 106 U/L — SIGNIFICANT CHANGE UP (ref 40–120)
ALT FLD-CCNC: 45 U/L — HIGH (ref 4–33)
ANION GAP SERPL CALC-SCNC: 13 MMOL/L — SIGNIFICANT CHANGE UP (ref 7–14)
AST SERPL-CCNC: 49 U/L — HIGH (ref 4–32)
BILIRUB SERPL-MCNC: 0.2 MG/DL — SIGNIFICANT CHANGE UP (ref 0.2–1.2)
BLD GP AB SCN SERPL QL: NEGATIVE — SIGNIFICANT CHANGE UP
BLOOD GAS ARTERIAL - LYTES,HGB,ICA,LACT RESULT: SIGNIFICANT CHANGE UP
BUN SERPL-MCNC: 63 MG/DL — HIGH (ref 7–23)
CALCIUM SERPL-MCNC: 8.5 MG/DL — SIGNIFICANT CHANGE UP (ref 8.4–10.5)
CHLORIDE SERPL-SCNC: 106 MMOL/L — SIGNIFICANT CHANGE UP (ref 98–107)
CO2 SERPL-SCNC: 27 MMOL/L — SIGNIFICANT CHANGE UP (ref 22–31)
CREAT SERPL-MCNC: 3.21 MG/DL — HIGH (ref 0.5–1.3)
EGFR: 15 ML/MIN/1.73M2 — LOW
GLUCOSE BLDC GLUCOMTR-MCNC: 120 MG/DL — HIGH (ref 70–99)
GLUCOSE BLDC GLUCOMTR-MCNC: 120 MG/DL — HIGH (ref 70–99)
GLUCOSE BLDC GLUCOMTR-MCNC: 127 MG/DL — HIGH (ref 70–99)
GLUCOSE BLDC GLUCOMTR-MCNC: 131 MG/DL — HIGH (ref 70–99)
GLUCOSE SERPL-MCNC: 116 MG/DL — HIGH (ref 70–99)
HCT VFR BLD CALC: 25.3 % — LOW (ref 34.5–45)
HGB BLD-MCNC: 7.4 G/DL — LOW (ref 11.5–15.5)
MCHC RBC-ENTMCNC: 25 PG — LOW (ref 27–34)
MCHC RBC-ENTMCNC: 29.2 GM/DL — LOW (ref 32–36)
MCV RBC AUTO: 85.5 FL — SIGNIFICANT CHANGE UP (ref 80–100)
NIGHT BLUE STAIN TISS: SIGNIFICANT CHANGE UP
NRBC # BLD: 0 /100 WBCS — SIGNIFICANT CHANGE UP
NRBC # FLD: 0 K/UL — SIGNIFICANT CHANGE UP
PLATELET # BLD AUTO: 168 K/UL — SIGNIFICANT CHANGE UP (ref 150–400)
POTASSIUM SERPL-MCNC: 4.1 MMOL/L — SIGNIFICANT CHANGE UP (ref 3.5–5.3)
POTASSIUM SERPL-SCNC: 4.1 MMOL/L — SIGNIFICANT CHANGE UP (ref 3.5–5.3)
PROT SERPL-MCNC: 6 G/DL — SIGNIFICANT CHANGE UP (ref 6–8.3)
RBC # BLD: 2.96 M/UL — LOW (ref 3.8–5.2)
RBC # FLD: 16 % — HIGH (ref 10.3–14.5)
RH IG SCN BLD-IMP: POSITIVE — SIGNIFICANT CHANGE UP
SODIUM SERPL-SCNC: 146 MMOL/L — HIGH (ref 135–145)
SPECIMEN SOURCE: SIGNIFICANT CHANGE UP
WBC # BLD: 4.93 K/UL — SIGNIFICANT CHANGE UP (ref 3.8–10.5)
WBC # FLD AUTO: 4.93 K/UL — SIGNIFICANT CHANGE UP (ref 3.8–10.5)

## 2022-07-20 PROCEDURE — 99291 CRITICAL CARE FIRST HOUR: CPT | Mod: GC

## 2022-07-20 PROCEDURE — 99232 SBSQ HOSP IP/OBS MODERATE 35: CPT

## 2022-07-20 RX ORDER — LABETALOL HCL 100 MG
5 TABLET ORAL ONCE
Refills: 0 | Status: COMPLETED | OUTPATIENT
Start: 2022-07-20 | End: 2022-07-20

## 2022-07-20 RX ORDER — HYDRALAZINE HCL 50 MG
10 TABLET ORAL ONCE
Refills: 0 | Status: COMPLETED | OUTPATIENT
Start: 2022-07-20 | End: 2022-07-20

## 2022-07-20 RX ORDER — LABETALOL HCL 100 MG
5 TABLET ORAL EVERY 12 HOURS
Refills: 0 | Status: DISCONTINUED | OUTPATIENT
Start: 2022-07-20 | End: 2022-07-21

## 2022-07-20 RX ADMIN — MUPIROCIN 1 APPLICATION(S): 20 OINTMENT TOPICAL at 18:05

## 2022-07-20 RX ADMIN — Medication 10 MILLIGRAM(S): at 14:32

## 2022-07-20 RX ADMIN — Medication 5 MILLIGRAM(S): at 23:49

## 2022-07-20 RX ADMIN — CHLORHEXIDINE GLUCONATE 1 APPLICATION(S): 213 SOLUTION TOPICAL at 05:39

## 2022-07-20 RX ADMIN — HEPARIN SODIUM 5000 UNIT(S): 5000 INJECTION INTRAVENOUS; SUBCUTANEOUS at 17:55

## 2022-07-20 RX ADMIN — MUPIROCIN 1 APPLICATION(S): 20 OINTMENT TOPICAL at 05:12

## 2022-07-20 RX ADMIN — MEROPENEM 100 MILLIGRAM(S): 1 INJECTION INTRAVENOUS at 05:12

## 2022-07-20 RX ADMIN — Medication 81 MILLIGRAM(S): at 11:39

## 2022-07-20 RX ADMIN — CHLORHEXIDINE GLUCONATE 15 MILLILITER(S): 213 SOLUTION TOPICAL at 05:11

## 2022-07-20 RX ADMIN — Medication 75 MILLIGRAM(S): at 10:15

## 2022-07-20 RX ADMIN — Medication 5 MILLIGRAM(S): at 17:55

## 2022-07-20 RX ADMIN — AMLODIPINE BESYLATE 10 MILLIGRAM(S): 2.5 TABLET ORAL at 05:11

## 2022-07-20 RX ADMIN — HEPARIN SODIUM 5000 UNIT(S): 5000 INJECTION INTRAVENOUS; SUBCUTANEOUS at 05:11

## 2022-07-20 RX ADMIN — Medication 75 MILLIGRAM(S): at 05:11

## 2022-07-20 NOTE — PROGRESS NOTE ADULT - ATTENDING COMMENTS
67 F with sarcoidosis, CHF, htn, hld here with AMS and acute hypoxemic and hypercapnic respiratory failure due to ADHF, MANDY requiring intubation, extubated, tx to floor then had agonal breathing and cardiac arrest of unclear etiology with resultant acute hypoxemic and hypercapnic respiratory failure requiring reintubation.    continues to wake up when off sedation, somewhat following commands but is definitely awake  tolerating PS trials; extubated to NIPPV and ABG acceptable      - monitor sugars given concern for hypoglycemia  - concern she may have had an aspiration pneumonia leading to her arrest, continue with broad abx, f/u sputum cultures  - MANDY worsening though suspect cardiorenal syndrome, will hold off on diuretics today, will control BP though given concern for flash pulmonary edema  - f/u endocrine reccs for hypoglycemia    Critically ill patient requiring frequent bedside visits with therapy changes.

## 2022-07-20 NOTE — PROGRESS NOTE ADULT - SUBJECTIVE AND OBJECTIVE BOX
Neurology Progress Note    S: Patient seen and examined in ICU.  c/f aspiration PNA now on propofol.  EEG neg for seizures. mental status improvecd     Medication:    MEDICATIONS  (STANDING):  amLODIPine   Tablet 10 milliGRAM(s) Oral daily  aspirin  chewable 81 milliGRAM(s) Oral daily  atorvastatin 20 milliGRAM(s) Oral at bedtime  chlorhexidine 0.12% Liquid 15 milliLiter(s) Oral Mucosa every 12 hours  chlorhexidine 4% Liquid 1 Application(s) Topical <User Schedule>  dexMEDEtomidine Infusion 0.5 MICROgram(s)/kG/Hr (11 mL/Hr) IV Continuous <Continuous>  dextrose 50% Injectable 25 Gram(s) IV Push once  dextrose 50% Injectable 12.5 Gram(s) IV Push once  dextrose 50% Injectable 25 Gram(s) IV Push once  fentaNYL   Infusion. 0.5 MICROgram(s)/kG/Hr (4.4 mL/Hr) IV Continuous <Continuous>  glucagon  Injectable 1 milliGRAM(s) IntraMuscular once  heparin   Injectable 5000 Unit(s) SubCutaneous every 12 hours  hydrALAZINE 75 milliGRAM(s) Oral three times a day  insulin lispro (ADMELOG) corrective regimen sliding scale   SubCutaneous every 6 hours  mupirocin 2% Ointment 1 Application(s) Topical every 12 hours  norepinephrine Infusion 0.05 MICROgram(s)/kG/Min (8.25 mL/Hr) IV Continuous <Continuous>  propofol Infusion 10 MICROgram(s)/kG/Min (5.28 mL/Hr) IV Continuous <Continuous>    MEDICATIONS  (PRN):  acetaminophen     Tablet .. 650 milliGRAM(s) Oral every 6 hours PRN Temp greater or equal to 38C (100.4F), Mild Pain (1 - 3)  dextrose Oral Gel 15 Gram(s) Oral once PRN Blood Glucose LESS THAN 70 milliGRAM(s)/deciliter      Vitals:      ICU Vital Signs Last 24 Hrs  T(C): 37.1 (2022 08:00), Max: 37.1 (2022 08:00)  T(F): 98.8 (2022 08:00), Max: 98.8 (2022 08:00)  HR: 64 (2022 10:58) (62 - 94)  BP: 178/75 (2022 10:00) (117/65 - 178/75)  BP(mean): 104 (2022 10:00) (80 - 107)  ABP: --  ABP(mean): --  RR: 18 (2022 10:29) (12 - 22)  SpO2: 100% (2022 10:58) (99% - 100%)    O2 Parameters below as of 2022 10:00      O2 Concentration (%): 30              General Exam:   General Appearance: Appropriately dressed and in no acute distress       Head: Normocephalic, atraumatic and no dysmorphic features  Ear, Nose, and Throat: Moist mucous membranes +ETT   CVS: S1S2+  Resp: No SOB, no wheeze or rhonchi  GI: soft NT/ND  Extremities:  toe amputation noted   Skin: No bruises or rashes     Neurological Exam: (on propfol sedated )   Mental Status: eyes closed, no verbal, not following . intubated   Cranial Nerves: PERRL, EOMI, VFFC, sensation V1-V3 intact,  no obvious facial asymmetry, equal elevation of palate, scm/trap 5/5, tongue is midline on protrusion. no obvious papilledema on fundoscopic exam. +Yuhaaviatam   Motor: TONEY  at least 4/5 now.  in mittens in uppers   Sensation: withdraws x 4  Reflexes: 1+ throughout at biceps, brachioradialis, triceps, patellars and ankles bilaterally and equal. No clonus. R toe and L toe were both downgoing.  Coordination: unable   Gait:  unable     I personally reviewed the below data/images/labs:    CBC Full  -  ( 2022 02:20 )  WBC Count : 4.93 K/uL  RBC Count : 2.96 M/uL  Hemoglobin : 7.4 g/dL  Hematocrit : 25.3 %  Platelet Count - Automated : 168 K/uL  Mean Cell Volume : 85.5 fL  Mean Cell Hemoglobin : 25.0 pg  Mean Cell Hemoglobin Concentration : 29.2 gm/dL  Auto Neutrophil # : x  Auto Lymphocyte # : x  Auto Monocyte # : x  Auto Eosinophil # : x  Auto Basophil # : x  Auto Neutrophil % : x  Auto Lymphocyte % : x  Auto Monocyte % : x  Auto Eosinophil % : x  Auto Basophil % : x    07-20    146<H>  |  106  |  63<H>  ----------------------------<  116<H>  4.1   |  27  |  3.21<H>    Ca    8.5      2022 02:20  Phos  5.7     07-19  Mg     2.20     07-19    TPro  6.0  /  Alb  2.5<L>  /  TBili  0.2  /  DBili  x   /  AST  49<H>  /  ALT  45<H>  /  AlkPhos  106  07-20          Urinalysis Basic - ( 2022 09:00 )    Color: Yellow / Appearance: Slightly Turbid / S.027 / pH: x  Gluc: x / Ketone: Trace  / Bili: Negative / Urobili: 3 mg/dL   Blood: x / Protein: 300 mg/dL / Nitrite: Negative   Leuk Esterase: Large / RBC: 5 /HPF / WBC >50 /HPF   Sq Epi: x / Non Sq Epi: x / Bacteria: x        EXAM:  MR BRAIN WAW IC      EXAM:  MR IAC ONLY WAW IC        PROCEDURE DATE:  Dec  1 2021         INTERPRETATION:  .    CLINICAL INFORMATION: Acute onset of bilateral hearing loss.    TECHNIQUE: Multiplanar multisequential MRI of the brain and internal auditory canals was acquired with and without the administration of IV gadolinium. 7.5 cc's of IV Gadavist was administered for the purposes of this examination. 0 cc were discarded.    COMPARISON: Prior CT examination of the internal auditory canals dated 2021. Prior CT angiograms/venogram examination of the head and neck dated 2021. Examination.    FINDINGS:    MRI BRAIN: A chronic lacunar infarct is seen within the right medial cerebellar hemisphere.    Multiple patchy confluent nonspecific T2/FLAIR hyperintense signal changes are noted throughout the bihemispheric white matter without associated mass effect or restricted diffusion.    There is no abnormal brain parenchymal or leptomeningeal enhancement.    Ventricular sizeand configuration is unremarkable. No abnormal extra-axial fluid collections are seen. Flow-voids are noted throughout the major intracranial vessels, on the T2 weighted images, consistent with their patency. The sellar area appears unremarkable.    Minimal scattered mucosal thickening is seen throughout the paranasal sinuses. The mastoid air cells are clear. The orbits appear unremarkable.    MRI IAC: There is no CP angle mass. The bilateral 7th and 8th cranial nerves appear unremarkable in course and morphology. No abnormal enhancement is seen. The inner ear structures are normally formed. Normal fluid signal is seen within the inner ear structures. The cochlea, vestibule, and semicircular canals appear unremarkable.    IMPRESSION:    MRI BRAIN: No acute intracranial hemorrhage, acute ischemia, or abnormal intracranial enhancement.    Chronic lacunar infarct within the right medial cerebellar hemisphere and mild chronic white matter microvascular type changes.    MRI IAC: Unremarkable study.    --- End of Report ---              LUCI RUEDA MD; Attending Radiologist  This document has been electronically signed. Dec  3 2021  8:46AM    < end of copied text >      < from: CT Chest w/ IV Cont (22 @ 22:58) >    ACC: [id number] EXAM:  CT ABDOMEN AND PELVIS IC                        ACC: [id number] EXAM:  CT CHEST IC                          PROCEDURE DATE:  2022          INTERPRETATION:  CLINICAL INFORMATION: Pleural effusion. Rule out   empyema. Abdominal tenderness. History of pancreatitis. Rule out   infection or obstruction.    COMPARISON: CT chest 2022.    CONTRAST/COMPLICATIONS:  IV Contrast: IV contrast documented in associated exam (accession   [id number]), Omnipaque 350 (accession [id number])  64 cc administered   6 cc   discarded  Oral Contrast: NONE  Complications: None reported at time of study completion    PROCEDURE:  CT of the Chest, Abdomen and Pelvis was performed.  Sagittal and coronal reformats were performed.    FINDINGS:  CHEST:  LUNGS AND LARGE AIRWAYS: Compressive atelectasis of the right upper,   middle and bilateral lower lobes including atelectasis of the majority of   the right lower lobe. Subsegmental atelectasis in the left upper lobe.  PLEURA: New large right andsmall left pleural effusions. No evidence of   an empyema. Fluid in the minor fissure.  VESSELS: Atherosclerotic changes of the aorta and coronary arteries.  HEART: Heart size is enlarged. No pericardial effusion.  MEDIASTINUM AND ANTONIO: No lymphadenopathy. Redemonstration of calcified   mediastinal lymph nodes.  CHEST WALL AND LOWER NECK: 7 mm right thyroid lobe nodule. Generalized   anasarca.    ABDOMEN AND PELVIS:  LIVER: Within normal limits.  BILE DUCTS: Normal caliber.  GALLBLADDER: Cholecystectomy.  SPLEEN: Within normal limits.  PANCREAS: Atrophic.  ADRENALS: Within normal limits.  KIDNEYS/URETERS: No renal stones or hydronephrosis.    BLADDER: Within normal limits.  REPRODUCTIVE ORGANS: Atrophic and retroflexed uterus. No adnexal masses.    BOWEL: Redemonstration of an outpouching with wall calcification and   intraluminal fluid and gas measuring 4.9 x 4.6 cm emanating from the   posterior gastric body likely representing a large diverticulum. No bowel   obstruction or inflammation. Colonic diverticulosis without   diverticulitis. Appendix within normal limits.  PERITONEUM: Small volume free pelvic fluid.  VESSELS: Atherosclerotic changes.  RETROPERITONEUM/LYMPH NODES: No lymphadenopathy.  ABDOMINAL WALL: Generalized anasarca.  BONES: Degenerative changes of the spine. Mild S-shaped thoracolumbar   scoliosis.    IMPRESSION:  1. New large right and small left pleural effusions with adjacent   compressive atelectasis including atelectasis of the majority of the   right lowerlobe.  2. No bowel obstruction or inflammation.      --- End of Report ---          MARTA HART MD; Resident Radiologist  This document has been electronically signed.  CHINTAN WHITFIELD MD; Attending Radiologist  This document has been electronically signed. 2022 12:32AM    < end of copied text >         < from: CT Head No Cont (22 @ 00:03) >    ACC: [id number] EXAM:  CT BRAIN                          PROCEDURE DATE:  2022          INTERPRETATION:  INDICATIONS:  Alteration of  Consciousness    TECHNIQUE:  Serial axial images were obtained from the skull base to the   vertex without intravenous contrast. Sagittal and Coronal reformats were   performed    COMPARISON EXAMINATION: None.    FINDINGS:  VENTRICLES AND SULCI:  Normal.  INTRA-AXIAL:  No mass, blood or abnormal attenuation is seen.  EXTRA-AXIAL:  No mass or collection is seen.  VISUALIZED SINUSES:  Clear.  VISUALIZED MASTOIDS:  Clear.  CALVARIUM:  Normal.  MISCELLANEOUS:  None.    IMPRESSION:  No evidence of intracranial hemorrhage, no evidence of major   vessel distribution infarct    --- End of Report ---            MOLLY TOTH MD; Attending Radiologist  This document has been electronically signed. 2022 10:19AM    < end of copied text >  EEG Summary / Classification:  Abnormal   - Moderate generalized slowing.    EEG Impression / Clinical Correlate:  Abnormal EEG study.  Moderate nonspecific diffuse or multifocal cerebral dysfunction.   No epileptiform pattern or clear seizure seen.    **In absence of additional clinical concerns, recommend consideration for discontinuation of current EEG study with reconnection in future if warranted.

## 2022-07-20 NOTE — PROGRESS NOTE ADULT - SUBJECTIVE AND OBJECTIVE BOX
Chief Complaint: Type 2 DM     History: Pt s/p extubation; sleepy but arousable. P is currently npo    MEDICATIONS  (STANDING):  amLODIPine   Tablet 10 milliGRAM(s) Oral daily  aspirin  chewable 81 milliGRAM(s) Oral daily  atorvastatin 20 milliGRAM(s) Oral at bedtime  chlorhexidine 4% Liquid 1 Application(s) Topical <User Schedule>  dextrose 50% Injectable 25 Gram(s) IV Push once  dextrose 50% Injectable 12.5 Gram(s) IV Push once  dextrose 50% Injectable 25 Gram(s) IV Push once  glucagon  Injectable 1 milliGRAM(s) IntraMuscular once  heparin   Injectable 5000 Unit(s) SubCutaneous every 12 hours  hydrALAZINE 75 milliGRAM(s) Oral three times a day  insulin lispro (ADMELOG) corrective regimen sliding scale   SubCutaneous every 6 hours  labetalol Injectable 5 milliGRAM(s) IV Push every 12 hours  mupirocin 2% Ointment 1 Application(s) Topical every 12 hours  propofol Infusion 10 MICROgram(s)/kG/Min (5.28 mL/Hr) IV Continuous <Continuous>    MEDICATIONS  (PRN):  acetaminophen     Tablet .. 650 milliGRAM(s) Oral every 6 hours PRN Temp greater or equal to 38C (100.4F), Mild Pain (1 - 3)  dextrose Oral Gel 15 Gram(s) Oral once PRN Blood Glucose LESS THAN 70 milliGRAM(s)/deciliter      Allergies: penicillin ([place] Synd)    Review of Systems:  UNABLE TO OBTAIN    PHYSICAL EXAM:  VITALS: T(C): 36.9 (07-20-22 @ 16:00)  T(F): 98.4 (07-20-22 @ 16:00), Max: 98.9 (07-20-22 @ 12:00)  HR: 78 (07-20-22 @ 19:00) (61 - 84)  BP: 152/71 (07-20-22 @ 19:00) (122/59 - 178/75)  RR:  (12 - 23)  SpO2:  (99% - 100%)  Wt(kg): --  GENERAL: NAD, well-groomed, well-developed  RESPIRATORY: No labored breathing; s/p extubation on bipap  GI: Soft, nontender, non distended    CAPILLARY BLOOD GLUCOSE  POCT Blood Glucose.: 127 mg/dL (20 Jul 2022 17:50)  POCT Blood Glucose.: 120 mg/dL (20 Jul 2022 11:36)  POCT Blood Glucose.: 131 mg/dL (20 Jul 2022 05:09)  POCT Blood Glucose.: 154 mg/dL (19 Jul 2022 23:09)    A1C with Estimated Average Glucose (06.05.22 @ 06:00)    A1C with Estimated Average Glucose Result: 7.3      07-20    146<H>  |  106  |  63<H>  ----------------------------<  116<H>  4.1   |  27  |  3.21<H>    eGFR: 15<L>    Ca    8.5      07-20  Mg     2.20     07-19  Phos  5.7     07-19    TPro  6.0  /  Alb  2.5<L>  /  TBili  0.2  /  DBili  x   /  AST  49<H>  /  ALT  45<H>  /  AlkPhos  106  07-20          Thyroid Function Tests:  07-13 @ 04:10 TSH 3.37 FreeT4 -- T3 -- Anti TPO -- Anti Thyroglobulin Ab -- TSI --        Diet, NPO:   Except Medications (07-17-22 @ 20:53) [Active]

## 2022-07-20 NOTE — PROGRESS NOTE ADULT - SUBJECTIVE AND OBJECTIVE BOX
INTERVAL HPI/OVERNIGHT EVENTS:    SUBJECTIVE: Patient seen and examined at bedside.       VITAL SIGNS:  ICU Vital Signs Last 24 Hrs  T(C): 36.9 (20 Jul 2022 04:00), Max: 37 (20 Jul 2022 00:00)  T(F): 98.5 (20 Jul 2022 04:00), Max: 98.6 (20 Jul 2022 00:00)  HR: 64 (20 Jul 2022 06:00) (62 - 94)  BP: 139/67 (20 Jul 2022 06:00) (117/65 - 176/77)  BP(mean): 88 (20 Jul 2022 06:00) (80 - 107)  ABP: --  ABP(mean): --  RR: 16 (20 Jul 2022 06:00) (12 - 22)  SpO2: 100% (20 Jul 2022 06:00) (99% - 100%)    O2 Parameters below as of 20 Jul 2022 06:00      O2 Concentration (%): 30      Mode: CPAP with PS, FiO2: 30, PEEP: 10, PS: 10, MAP: 10, PIP: 20  Plateau pressure:   P/F ratio:     07-18 @ 07:01  -  07-19 @ 07:00  --------------------------------------------------------  IN: 804.8 mL / OUT: 2000 mL / NET: -1195.2 mL    07-19 @ 07:01  -  07-20 @ 06:46  --------------------------------------------------------  IN: 664.3 mL / OUT: 3355 mL / NET: -2690.7 mL      CAPILLARY BLOOD GLUCOSE      POCT Blood Glucose.: 131 mg/dL (20 Jul 2022 05:09)      PHYSICAL EXAM:     VITALS:   T(C): 36.9 (07-20-22 @ 04:00), Max: 37 (07-20-22 @ 00:00)  HR: 64 (07-20-22 @ 06:00) (62 - 94)  BP: 139/67 (07-20-22 @ 06:00) (117/65 - 176/77)  RR: 16 (07-20-22 @ 06:00) (12 - 22)  SpO2: 100% (07-20-22 @ 06:00) (99% - 100%)    GENERAL: Intubated, responsive to pain. Mimics commands and follow commands of children  HEAD:  Atraumatic, Normocephalic.   EYES: Pupils reactive  NECK: No JVD  CHEST/LUNG: CTABL; No wheezing or rhonchi. Copious clear oral secretions.   HEART: RRR. No murmurs  ABDOMEN: Soft, nontender, non-distended, normoactive BS.  EXTREMITIES:  2+ Peripheral Pulses, brisk capillary refill. +Subaxillary moisture. Brisk subclavian skin. +1 pitting edema  NERVOUS SYSTEM: Grossly moves all extremities  PSYCH: Unable to assess  SKIN: No rashes or lesions      MEDICATIONS:  MEDICATIONS  (STANDING):  amLODIPine   Tablet 10 milliGRAM(s) Oral daily  aspirin  chewable 81 milliGRAM(s) Oral daily  atorvastatin 20 milliGRAM(s) Oral at bedtime  chlorhexidine 0.12% Liquid 15 milliLiter(s) Oral Mucosa every 12 hours  chlorhexidine 4% Liquid 1 Application(s) Topical <User Schedule>  dexMEDEtomidine Infusion 0.5 MICROgram(s)/kG/Hr (11 mL/Hr) IV Continuous <Continuous>  dextrose 50% Injectable 25 Gram(s) IV Push once  dextrose 50% Injectable 12.5 Gram(s) IV Push once  dextrose 50% Injectable 25 Gram(s) IV Push once  fentaNYL   Infusion. 0.5 MICROgram(s)/kG/Hr (4.4 mL/Hr) IV Continuous <Continuous>  glucagon  Injectable 1 milliGRAM(s) IntraMuscular once  heparin   Injectable 5000 Unit(s) SubCutaneous every 12 hours  hydrALAZINE 75 milliGRAM(s) Oral three times a day  insulin lispro (ADMELOG) corrective regimen sliding scale   SubCutaneous every 6 hours  mupirocin 2% Ointment 1 Application(s) Topical every 12 hours  norepinephrine Infusion 0.05 MICROgram(s)/kG/Min (8.25 mL/Hr) IV Continuous <Continuous>  propofol Infusion 10 MICROgram(s)/kG/Min (5.28 mL/Hr) IV Continuous <Continuous>    MEDICATIONS  (PRN):  acetaminophen     Tablet .. 650 milliGRAM(s) Oral every 6 hours PRN Temp greater or equal to 38C (100.4F), Mild Pain (1 - 3)  dextrose Oral Gel 15 Gram(s) Oral once PRN Blood Glucose LESS THAN 70 milliGRAM(s)/deciliter      ALLERGIES:  Allergies    penicillin ([employer] [facility] Synd)    Intolerances        LABS:                        7.4    4.93  )-----------( 168      ( 20 Jul 2022 02:20 )             25.3     07-20    146<H>  |  106  |  63<H>  ----------------------------<  116<H>  4.1   |  27  |  3.21<H>    Ca    8.5      20 Jul 2022 02:20  Phos  5.7     07-19  Mg     2.20     07-19    TPro  6.0  /  Alb  2.5<L>  /  TBili  0.2  /  DBili  x   /  AST  49<H>  /  ALT  45<H>  /  AlkPhos  106  07-20          IMAGING:    EKG:   INTERVAL HPI/OVERNIGHT EVENTS: Was on PSV all through the night. Rate of ~17.    SUBJECTIVE: Patient seen and examined at bedside. Is following commands and answering questions by moving her head. Extuabated during rounds to BIPAP. Speech and swallow eval ordered.       VITAL SIGNS:  ICU Vital Signs Last 24 Hrs  T(C): 36.9 (20 Jul 2022 04:00), Max: 37 (20 Jul 2022 00:00)  T(F): 98.5 (20 Jul 2022 04:00), Max: 98.6 (20 Jul 2022 00:00)  HR: 64 (20 Jul 2022 06:00) (62 - 94)  BP: 139/67 (20 Jul 2022 06:00) (117/65 - 176/77)  BP(mean): 88 (20 Jul 2022 06:00) (80 - 107)  ABP: --  ABP(mean): --  RR: 16 (20 Jul 2022 06:00) (12 - 22)  SpO2: 100% (20 Jul 2022 06:00) (99% - 100%)    O2 Parameters below as of 20 Jul 2022 06:00      O2 Concentration (%): 30      Mode: CPAP with PS, FiO2: 30, PEEP: 10, PS: 10, MAP: 10, PIP: 20  Plateau pressure:   P/F ratio:     07-18 @ 07:01  -  07-19 @ 07:00  --------------------------------------------------------  IN: 804.8 mL / OUT: 2000 mL / NET: -1195.2 mL    07-19 @ 07:01  -  07-20 @ 06:46  --------------------------------------------------------  IN: 664.3 mL / OUT: 3355 mL / NET: -2690.7 mL      CAPILLARY BLOOD GLUCOSE      POCT Blood Glucose.: 131 mg/dL (20 Jul 2022 05:09)      PHYSICAL EXAM:     VITALS:   T(C): 36.9 (07-20-22 @ 04:00), Max: 37 (07-20-22 @ 00:00)  HR: 64 (07-20-22 @ 06:00) (62 - 94)  BP: 139/67 (07-20-22 @ 06:00) (117/65 - 176/77)  RR: 16 (07-20-22 @ 06:00) (12 - 22)  SpO2: 100% (07-20-22 @ 06:00) (99% - 100%)    GENERAL: Now on BIBAP, comfortable. Answering questions and following commands.   HEAD:  Atraumatic, Normocephalic.   EYES: Pupils reactive  NECK: No JVD  CHEST/LUNG: CTABL; No wheezing or rhonchi. Clear oral secretions decreasing  HEART: RRR. No murmurs  ABDOMEN: Soft, nontender, non-distended, normoactive BS.  EXTREMITIES:  2+ Peripheral Pulses, brisk capillary refill. +Subaxillary moisture. Brisk subclavian skin. +1 pitting edema  NERVOUS SYSTEM: Grossly moves all extremities  PSYCH: Unable to assess  SKIN: No rashes or lesions      MEDICATIONS:  MEDICATIONS  (STANDING):  amLODIPine   Tablet 10 milliGRAM(s) Oral daily  aspirin  chewable 81 milliGRAM(s) Oral daily  atorvastatin 20 milliGRAM(s) Oral at bedtime  chlorhexidine 0.12% Liquid 15 milliLiter(s) Oral Mucosa every 12 hours  chlorhexidine 4% Liquid 1 Application(s) Topical <User Schedule>  dexMEDEtomidine Infusion 0.5 MICROgram(s)/kG/Hr (11 mL/Hr) IV Continuous <Continuous>  dextrose 50% Injectable 25 Gram(s) IV Push once  dextrose 50% Injectable 12.5 Gram(s) IV Push once  dextrose 50% Injectable 25 Gram(s) IV Push once  fentaNYL   Infusion. 0.5 MICROgram(s)/kG/Hr (4.4 mL/Hr) IV Continuous <Continuous>  glucagon  Injectable 1 milliGRAM(s) IntraMuscular once  heparin   Injectable 5000 Unit(s) SubCutaneous every 12 hours  hydrALAZINE 75 milliGRAM(s) Oral three times a day  insulin lispro (ADMELOG) corrective regimen sliding scale   SubCutaneous every 6 hours  mupirocin 2% Ointment 1 Application(s) Topical every 12 hours  norepinephrine Infusion 0.05 MICROgram(s)/kG/Min (8.25 mL/Hr) IV Continuous <Continuous>  propofol Infusion 10 MICROgram(s)/kG/Min (5.28 mL/Hr) IV Continuous <Continuous>    MEDICATIONS  (PRN):  acetaminophen     Tablet .. 650 milliGRAM(s) Oral every 6 hours PRN Temp greater or equal to 38C (100.4F), Mild Pain (1 - 3)  dextrose Oral Gel 15 Gram(s) Oral once PRN Blood Glucose LESS THAN 70 milliGRAM(s)/deciliter      ALLERGIES:  Allergies    penicillin ([facility] Synd)    Intolerances        LABS:                        7.4    4.93  )-----------( 168      ( 20 Jul 2022 02:20 )             25.3     07-20    146<H>  |  106  |  63<H>  ----------------------------<  116<H>  4.1   |  27  |  3.21<H>    Ca    8.5      20 Jul 2022 02:20  Phos  5.7     07-19  Mg     2.20     07-19    TPro  6.0  /  Alb  2.5<L>  /  TBili  0.2  /  DBili  x   /  AST  49<H>  /  ALT  45<H>  /  AlkPhos  106  07-20

## 2022-07-20 NOTE — PROGRESS NOTE ADULT - ASSESSMENT
66 y/o  AAF with HTN, HLD, sarcoidosis, CHF, DM presented to the ED for new onset AMS. Patient suddenly became altered, confused and daughter noted she was slurring his speech. She was incoherent, and hallucinating, saying there is a cat present and is asking for her aunt who lives far away.  The patient known to have frequent admission for CHF exacerbations eventually intubated for hypoxic respiratory failure and now in MICU   MRI brain/IAC from 2021 with old R cerebellar infarct ; CTA h/N that time unremarkable. , A1c 9.2%  CT C/A with new large pleural effusions   CTH unremarakble   A1c 7.3  TTE was done   7/17 AM RRT, intubated, c/f aspiration PNA   7/19 eeg with slowing but no seizures   o/e intubated and sedated but TONEY now at least 4/5 in all extremeties. in mittens now on CPAP     Impression: 1) AMS 2/2 hypercapneic resp failure 2) prior cerebellar stroke likely small vessel   -  on propofol, sedated.  wean as tolerated   - now on CPAP wean and extubate as tolerated   - asa 81 and statin therapy for secondary stroke prevention LDL goal < 70    - now on meropenum, c/f aspiration PNA  - vent per ICU , wean as tolerated   - respiratory per ICU and pulmo   - telemetry  - PT/OT/SS/SLP, OOBC  - check FS, glucose control <180  - GI/DVT ppx  - Thank you for allowing me to participate in the care of this patient. Call with questions.   -remainin per ICU  Russell Jimenes MD  Vascular Neurology  Office: [phone]

## 2022-07-20 NOTE — PROGRESS NOTE ADULT - ASSESSMENT
66 y/o F with PMH of HTN, HLD, sarcoidosis, CHF was intially admitted to MICU after being intubated for airway protection was extubated successfully . On 7/17 am, found in agonal breathing and subsequently pulseless. Code Blue called, ROSC achieved, pt re-intubated. Second PEA code called upon arrival to the CTICU, ROSC achieved.     Neuro:  #Metabolic Encephalopathy - structural vs metabolic vs infectious   - TSH: wnl   - AM cortisol: wnl   - infectious workup as below   - now s/p apneic event on the floor with 2x PEA arrest (8 min down time in total).   - 7/17: CT Head with no change  - EEG: Abnormal EEG study. Moderate nonspecific diffuse or multifocal cerebral dysfunction. No epileptiform pattern or clear seizure seen. - (was on propofol)  - Still requiring fent and prop. Weaned off fent and prop.  - Will hold CT/MRI brain for now. Consider if no change in mental status off of sedation    Resp:  #Intubated s/p PEA arrest - ? aspiration event   - SAT's as tolerated  - infectious workup as below   - f/u sputum culture  - 7/19 apneic at time during CPAP, at other times breathing at rate of 10 in presence of family.    #Bilateral pleural effusions i/s/o HF exacerbation   CT Chest on admission with new large right and small left pleural effusions with adjacent compressive atelectasis including atelectasis of the majority of the right lower lobe   - Monitor O2 sat  - Monitor RR     CV:  #s/p PEA arrest x2  - Possible aspiration event  - off levo    #HFpEF   - BNP on arrival: 4021 with evidence of fluid overload with pleural effusions, b/l pitting edema   - Echo (6/22) Mild diastolic dysfunction, EF 62%, normal RV size function  - Echo (7/22): moderate LV systolic dysfunction, moderate diastolic dysfunction, RV enlargement with decreased RV systolic dysfunction  - EKG: RBBB (present from prior EKG's). no acute ischemic findings;  - Strict I&O  - Monitor lytes and replete PRN   - Daily weights    #HTN   - On amlodipine, carvedilol and hydralazine at home. Hold antihypertensives given shock state and restart as needed     GI:   - Diet:   - OG tube in place with large output.  - Hold feeds for now    Monitor BMs  - Abdominal x-ray with non-obstructive gas pattern 7/18.    Renal/:  #MANDY on CKD Stage 2 - pre-renal based on lytes.  Baseline Scr (6/22): 2.33  SCr at 3.72   Monitor UO  Monitor SCr  - 7/18: 4 Bumex- improvement in UOP   - 7/19: 4 Bumex    ID:   #Shock, likely septic - no source found   - Procal: neg  - CT abd and pelvis: no infectious source   - Initial blood cx (7/13): NGTD   - Blood cx (7/17): NGTD  - f/u urine-sputum culture  - c/w meropenem (7/13 - 7/20)  - Monitor temp  - Monitor WBC     Endo:     #T2DM - Controlled  - On detemir 20 U at home   - Last A1C 6/2022 7.3  - Monitor FSG  - c/w ISS q6 - got 2 units overnight    #Hypoglycemia: adrenal insufficiency workup neg    - Endocrine following     Heme:     DVT Ppx: Heparin subQ    Ethics: FULL CODE 68 y/o F with PMH of HTN, HLD, sarcoidosis, CHF was intially admitted to MICU after being intubated for airway protection was extubated successfully . On 7/17 am, found in agonal breathing and subsequently pulseless. Code Blue called, ROSC achieved, pt re-intubated. Second PEA code called upon arrival to the CTICU, ROSC achieved.     Neuro:  #Metabolic Encephalopathy - resolving. Now extubated and following commands.     Resp:  #BIPAP  - Wean as tolerated  - infectious workup as below   - f/u sputum culture    #Bilateral pleural effusions i/s/o HF exacerbation   CT Chest on admission with new large right and small left pleural effusions with adjacent compressive atelectasis including atelectasis of the majority of the right lower lobe   - Monitor O2 sat  - Monitor RR     CV:  #s/p PEA arrest x2  - Possible aspiration event    #HFpEF   - BNP on arrival: 4021 with evidence of fluid overload with pleural effusions, b/l pitting edema   - Echo (6/22) Mild diastolic dysfunction, EF 62%, normal RV size function  - Echo (7/22): moderate LV systolic dysfunction, moderate diastolic dysfunction, RV enlargement with decreased RV systolic dysfunction  - EKG: RBBB (present from prior EKG's). no acute ischemic findings;  - Strict I&O  - Monitor lytes and replete PRN   - Daily weights    #HTN   - On amlodipine, carvedilol and hydralazine at home. Hold antihypertensives given shock state and restart as needed. Given 1 hydralazine push during extubation.     GI:   - Diet:   - NPO until speech and swallow eval.    Monitor BMs  - Abdominal x-ray with non-obstructive gas pattern 7/18.    Renal/:  #MANYD on CKD Stage 2 - pre-renal based on lytes.  Baseline Scr (6/22): 2.33  SCr at 3.21, downtrending  Monitor UO  Monitor SCr  - 7/18: 4 Bumex- improvement in UOP   - 7/19: 4 Bumex  - Good urine output. Will hold diuretics today.    ID:   #Shock, likely septic - no source found   - CT abd and pelvis: no infectious source   - Initial blood cx (7/13): NGTD   - Blood cx (7/17): NGTD  - f/u urine-sputum culture  - c/w meropenem (7/13 - 7/20)  - Monitor temp  - Monitor WBC     Endo:     #T2DM - Controlled  - Will restart diet and re-evaluated insulin needs post s/s    #Hypoglycemia: adrenal insufficiency workup neg  - Endocrine following     Heme:   DVT Ppx: Heparin subQ    Ethics: FULL CODE

## 2022-07-20 NOTE — PROGRESS NOTE ADULT - ASSESSMENT
This is a 68 yo F /w a PMH OF DM2 (A1c 7.3% on this admission), CHF, sarcoidosis who presents with AMS c/b hypercapnic respiratory failure and CHF exacerbation requiring intubation.    Consulted for: Persistent hypoglycemia    #Hypoglycemia  Noted on day of admission, possibly related to home doses of insulin that patient was taking, poor PO intake, and low GFR  Concern for AI as below -->ruled out  s/p dextrose fluids   resolved      #R/o Adrenal Insufficiency  AM cortisol returned 7.1. ACTH stimulation performed showed robust response (22 and 24 at 30 minute and 60 minute frances respectively). A value > 18 rules out primary AI (not necessarily secondary AI). Repeat AM cortisol was normal at 16.1. Pt does not have adrenal insufficiency.  TFTs are wnl  Stim Test results ruled out adrenal insufficiency  No need for steroids at this time     #T2DM, controlled  A1c 7.3%. Goal < 7%  Inpatient FS goal 140-180  Home regimen: Lantus 20 units at night  Recs:   -Hold standing insulin at this time  -Continue low dose correctional insulin qAc and low dose correctional insulin qHS.   -C-peptide noted to be low at 0.9 - drawn when glucoses were in the 80s-90s which is appropriate; repeat 5.0  -Please notify Endocrine when diet resumed as pt may need adjustment to insulin regimen   -FS q 6 hours while NPO   -Hypoglycemia Protocol     For d/c:   -will determine need for insulin. Limited DM agents upon d/c because of low GFR  -Can fu with PCP for DM care  -Ensure patient has working glucometer, test strips, lancets, alcohol pads, and BD beverley pen needles  -Please also prescribe glucose tabs, Baqsimi nasal spray or glucagon emergency kit for hypoglycemia risk     #HTN  BP Goal < 130/80  Continue management per primary team    #HLD  LDL goal < 70  Statin if no contraindications    D/w MARTELL Penaloza  Nurse Practitioner  Division of Endocrinology & Diabetes  In house pager #[phone]    If before 9AM or after 6PM, or on weekends/holidays, please call endocrine answering service for assistance ([phone]).For nonurgent matters email [email or web address] for assistance.

## 2022-07-21 LAB
ALBUMIN SERPL ELPH-MCNC: 2.7 G/DL — LOW (ref 3.3–5)
ALP SERPL-CCNC: 103 U/L — SIGNIFICANT CHANGE UP (ref 40–120)
ALT FLD-CCNC: 27 U/L — SIGNIFICANT CHANGE UP (ref 4–33)
ANION GAP SERPL CALC-SCNC: 13 MMOL/L — SIGNIFICANT CHANGE UP (ref 7–14)
AST SERPL-CCNC: 29 U/L — SIGNIFICANT CHANGE UP (ref 4–32)
BASE EXCESS BLDV CALC-SCNC: 4.2 MMOL/L — HIGH (ref -2–3)
BILIRUB SERPL-MCNC: 0.3 MG/DL — SIGNIFICANT CHANGE UP (ref 0.2–1.2)
BLOOD GAS VENOUS COMPREHENSIVE RESULT: SIGNIFICANT CHANGE UP
BUN SERPL-MCNC: 58 MG/DL — HIGH (ref 7–23)
CALCIUM SERPL-MCNC: 8.6 MG/DL — SIGNIFICANT CHANGE UP (ref 8.4–10.5)
CHLORIDE BLDV-SCNC: 113 MMOL/L — HIGH (ref 96–108)
CHLORIDE SERPL-SCNC: 108 MMOL/L — HIGH (ref 98–107)
CO2 BLDV-SCNC: 31.2 MMOL/L — HIGH (ref 22–26)
CO2 SERPL-SCNC: 27 MMOL/L — SIGNIFICANT CHANGE UP (ref 22–31)
CREAT SERPL-MCNC: 2.53 MG/DL — HIGH (ref 0.5–1.3)
CULTURE RESULTS: SIGNIFICANT CHANGE UP
EGFR: 20 ML/MIN/1.73M2 — LOW
GAS PNL BLDV: 149 MMOL/L — HIGH (ref 136–145)
GLUCOSE BLDC GLUCOMTR-MCNC: 107 MG/DL — HIGH (ref 70–99)
GLUCOSE BLDC GLUCOMTR-MCNC: 120 MG/DL — HIGH (ref 70–99)
GLUCOSE BLDC GLUCOMTR-MCNC: 120 MG/DL — HIGH (ref 70–99)
GLUCOSE BLDC GLUCOMTR-MCNC: 141 MG/DL — HIGH (ref 70–99)
GLUCOSE BLDV-MCNC: 112 MG/DL — HIGH (ref 70–99)
GLUCOSE SERPL-MCNC: 112 MG/DL — HIGH (ref 70–99)
HCO3 BLDV-SCNC: 30 MMOL/L — HIGH (ref 22–29)
HCT VFR BLD CALC: 27.7 % — LOW (ref 34.5–45)
HCT VFR BLDA CALC: 25 % — LOW (ref 34.5–46.5)
HGB BLD CALC-MCNC: 8.3 G/DL — LOW (ref 11.5–15.5)
HGB BLD-MCNC: 8.1 G/DL — LOW (ref 11.5–15.5)
LACTATE BLDV-MCNC: 0.8 MMOL/L — SIGNIFICANT CHANGE UP (ref 0.5–2)
MAGNESIUM SERPL-MCNC: 2.1 MG/DL — SIGNIFICANT CHANGE UP (ref 1.6–2.6)
MCHC RBC-ENTMCNC: 25.7 PG — LOW (ref 27–34)
MCHC RBC-ENTMCNC: 29.2 GM/DL — LOW (ref 32–36)
MCV RBC AUTO: 87.9 FL — SIGNIFICANT CHANGE UP (ref 80–100)
NRBC # BLD: 0 /100 WBCS — SIGNIFICANT CHANGE UP
NRBC # FLD: 0 K/UL — SIGNIFICANT CHANGE UP
PCO2 BLDV: 49 MMHG — HIGH (ref 39–42)
PH BLDV: 7.39 — SIGNIFICANT CHANGE UP (ref 7.32–7.43)
PHOSPHATE SERPL-MCNC: 5.5 MG/DL — HIGH (ref 2.5–4.5)
PLATELET # BLD AUTO: 193 K/UL — SIGNIFICANT CHANGE UP (ref 150–400)
PO2 BLDV: 68 MMHG — SIGNIFICANT CHANGE UP
POTASSIUM BLDV-SCNC: 4.2 MMOL/L — SIGNIFICANT CHANGE UP (ref 3.5–5.1)
POTASSIUM SERPL-MCNC: 4.1 MMOL/L — SIGNIFICANT CHANGE UP (ref 3.5–5.3)
POTASSIUM SERPL-SCNC: 4.1 MMOL/L — SIGNIFICANT CHANGE UP (ref 3.5–5.3)
PROT SERPL-MCNC: 6.2 G/DL — SIGNIFICANT CHANGE UP (ref 6–8.3)
RBC # BLD: 3.15 M/UL — LOW (ref 3.8–5.2)
RBC # FLD: 15.8 % — HIGH (ref 10.3–14.5)
SAO2 % BLDV: 93.8 % — SIGNIFICANT CHANGE UP
SODIUM SERPL-SCNC: 148 MMOL/L — HIGH (ref 135–145)
SPECIMEN SOURCE: SIGNIFICANT CHANGE UP
WBC # BLD: 5.31 K/UL — SIGNIFICANT CHANGE UP (ref 3.8–10.5)
WBC # FLD AUTO: 5.31 K/UL — SIGNIFICANT CHANGE UP (ref 3.8–10.5)

## 2022-07-21 PROCEDURE — 93306 TTE W/DOPPLER COMPLETE: CPT | Mod: 26

## 2022-07-21 PROCEDURE — 99291 CRITICAL CARE FIRST HOUR: CPT | Mod: GC

## 2022-07-21 PROCEDURE — 99232 SBSQ HOSP IP/OBS MODERATE 35: CPT

## 2022-07-21 RX ORDER — CARVEDILOL PHOSPHATE 80 MG/1
25 CAPSULE, EXTENDED RELEASE ORAL EVERY 12 HOURS
Refills: 0 | Status: DISCONTINUED | OUTPATIENT
Start: 2022-07-21 | End: 2022-08-05

## 2022-07-21 RX ORDER — FUROSEMIDE 40 MG
40 TABLET ORAL ONCE
Refills: 0 | Status: COMPLETED | OUTPATIENT
Start: 2022-07-21 | End: 2022-07-21

## 2022-07-21 RX ORDER — INSULIN LISPRO 100/ML
VIAL (ML) SUBCUTANEOUS
Refills: 0 | Status: DISCONTINUED | OUTPATIENT
Start: 2022-07-21 | End: 2022-07-22

## 2022-07-21 RX ADMIN — Medication 81 MILLIGRAM(S): at 11:13

## 2022-07-21 RX ADMIN — ATORVASTATIN CALCIUM 20 MILLIGRAM(S): 80 TABLET, FILM COATED ORAL at 22:04

## 2022-07-21 RX ADMIN — CHLORHEXIDINE GLUCONATE 1 APPLICATION(S): 213 SOLUTION TOPICAL at 05:07

## 2022-07-21 RX ADMIN — MUPIROCIN 1 APPLICATION(S): 20 OINTMENT TOPICAL at 16:20

## 2022-07-21 RX ADMIN — HEPARIN SODIUM 5000 UNIT(S): 5000 INJECTION INTRAVENOUS; SUBCUTANEOUS at 05:08

## 2022-07-21 RX ADMIN — MUPIROCIN 1 APPLICATION(S): 20 OINTMENT TOPICAL at 05:08

## 2022-07-21 RX ADMIN — Medication 75 MILLIGRAM(S): at 22:04

## 2022-07-21 RX ADMIN — CARVEDILOL PHOSPHATE 25 MILLIGRAM(S): 80 CAPSULE, EXTENDED RELEASE ORAL at 16:20

## 2022-07-21 RX ADMIN — HEPARIN SODIUM 5000 UNIT(S): 5000 INJECTION INTRAVENOUS; SUBCUTANEOUS at 16:20

## 2022-07-21 RX ADMIN — Medication 5 MILLIGRAM(S): at 05:08

## 2022-07-21 RX ADMIN — Medication 40 MILLIGRAM(S): at 16:20

## 2022-07-21 RX ADMIN — Medication 75 MILLIGRAM(S): at 11:13

## 2022-07-21 NOTE — PHYSICAL THERAPY INITIAL EVALUATION ADULT - GROSSLY INTACT, SENSORY
Grossly Intact
scheduled for b/l myringotomy and tubes, nasopharyngoscopy, adenoidectomy on 3/29/17 with Dr. Callahan at [facility].

## 2022-07-21 NOTE — PROGRESS NOTE ADULT - SUBJECTIVE AND OBJECTIVE BOX
Chief Complaint:     History:    MEDICATIONS  (STANDING):  amLODIPine   Tablet 10 milliGRAM(s) Oral daily  aspirin  chewable 81 milliGRAM(s) Oral daily  atorvastatin 20 milliGRAM(s) Oral at bedtime  carvedilol 25 milliGRAM(s) Oral every 12 hours  chlorhexidine 4% Liquid 1 Application(s) Topical <User Schedule>  dextrose 50% Injectable 25 Gram(s) IV Push once  dextrose 50% Injectable 12.5 Gram(s) IV Push once  dextrose 50% Injectable 25 Gram(s) IV Push once  glucagon  Injectable 1 milliGRAM(s) IntraMuscular once  heparin   Injectable 5000 Unit(s) SubCutaneous every 12 hours  hydrALAZINE 75 milliGRAM(s) Oral three times a day  insulin lispro (ADMELOG) corrective regimen sliding scale   SubCutaneous every 6 hours  mupirocin 2% Ointment 1 Application(s) Topical every 12 hours    MEDICATIONS  (PRN):  acetaminophen     Tablet .. 650 milliGRAM(s) Oral every 6 hours PRN Temp greater or equal to 38C (100.4F), Mild Pain (1 - 3)  dextrose Oral Gel 15 Gram(s) Oral once PRN Blood Glucose LESS THAN 70 milliGRAM(s)/deciliter      Allergies: penicillin ([employer] Synd)      Review of Systems:  HEENT: No pain  Cardiovascular: No chest pain, palpitations  Respiratory: No SOB, no cough  GI: No nausea, vomiting, abdominal pain  Endocrine: no polyuria, polydipsia      PHYSICAL EXAM:  VITALS: T(C): 36.3 (07-21-22 @ 16:00)  T(F): 97.4 (07-21-22 @ 16:00), Max: 98.9 (07-21-22 @ 12:00)  HR: 79 (07-21-22 @ 18:00) (73 - 90)  BP: 128/69 (07-21-22 @ 18:00) (128/69 - 182/86)  RR:  (16 - 27)  SpO2:  (95% - 100%)  Wt(kg): --  GENERAL: NAD, well-groomed, well-developed  RESPIRATORY: No labored breathing   GI: Soft, nontender, non distended  PSYCH: Alert and oriented x 3, normal affect, normal mood      CAPILLARY BLOOD GLUCOSE  POCT Blood Glucose.: 120 mg/dL (21 Jul 2022 16:53)  POCT Blood Glucose.: 120 mg/dL (21 Jul 2022 11:56)  POCT Blood Glucose.: 107 mg/dL (21 Jul 2022 04:59)  POCT Blood Glucose.: 120 mg/dL (20 Jul 2022 23:42)    A1C with Estimated Average Glucose (06.05.22 @ 06:00)    A1C with Estimated Average Glucose Result: 7.3    07-21    148<H>  |  108<H>  |  58<H>  ----------------------------<  112<H>  4.1   |  27  |  2.53<H>    eGFR: 20<L>    Ca    8.6      07-21  Mg     2.10     07-21  Phos  5.5     07-21    TPro  6.2  /  Alb  2.7<L>  /  TBili  0.3  /  DBili  x   /  AST  29  /  ALT  27  /  AlkPhos  103  07-21      Thyroid Function Tests:  07-13 @ 04:10 TSH 3.37 FreeT4 -- T3 -- Anti TPO -- Anti Thyroglobulin Ab -- TSI --      Diet, Pureed:   Supplement Feeding Modality:  Oral  Glucerna Shake Cans or Servings Per Day:  1       Frequency:  Three Times a day (07-21-22 @ 17:34) [Active]                     Chief Complaint: T2DM, controlled; Hypoglycemia     History: Pt seen at bedside. Pt getting echocardiogram at time of visit. Pt has no signs of nausea and vomiting /any signs of hypoglycemia. Pt tolerating oral intake. Pt NPO at time of visit.    MEDICATIONS  (STANDING):  amLODIPine   Tablet 10 milliGRAM(s) Oral daily  aspirin  chewable 81 milliGRAM(s) Oral daily  atorvastatin 20 milliGRAM(s) Oral at bedtime  carvedilol 25 milliGRAM(s) Oral every 12 hours  chlorhexidine 4% Liquid 1 Application(s) Topical <User Schedule>  dextrose 50% Injectable 25 Gram(s) IV Push once  dextrose 50% Injectable 12.5 Gram(s) IV Push once  dextrose 50% Injectable 25 Gram(s) IV Push once  glucagon  Injectable 1 milliGRAM(s) IntraMuscular once  heparin   Injectable 5000 Unit(s) SubCutaneous every 12 hours  hydrALAZINE 75 milliGRAM(s) Oral three times a day  insulin lispro (ADMELOG) corrective regimen sliding scale   SubCutaneous every 6 hours  mupirocin 2% Ointment 1 Application(s) Topical every 12 hours    MEDICATIONS  (PRN):  acetaminophen     Tablet .. 650 milliGRAM(s) Oral every 6 hours PRN Temp greater or equal to 38C (100.4F), Mild Pain (1 - 3)  dextrose Oral Gel 15 Gram(s) Oral once PRN Blood Glucose LESS THAN 70 milliGRAM(s)/deciliter      Allergies: penicillin ([employer] [place] Synd)      Review of Systems:  HEENT: No pain  Cardiovascular: No chest pain, palpitations  Respiratory: No SOB, no cough  GI: No nausea, vomiting, abdominal pain  Endocrine: no polyuria, polydipsia      PHYSICAL EXAM:  VITALS: T(C): 36.3 (07-21-22 @ 16:00)  T(F): 97.4 (07-21-22 @ 16:00), Max: 98.9 (07-21-22 @ 12:00)  HR: 79 (07-21-22 @ 18:00) (73 - 90)  BP: 128/69 (07-21-22 @ 18:00) (128/69 - 182/86)  RR:  (16 - 27)  SpO2:  (95% - 100%)  Wt(kg): --  GENERAL: NAD, well-groomed, well-developed  RESPIRATORY: No labored breathing   GI: Soft, nontender, non distended  PSYCH: Alert and oriented x 3, normal affect, normal mood      CAPILLARY BLOOD GLUCOSE  POCT Blood Glucose.: 120 mg/dL (21 Jul 2022 16:53)  POCT Blood Glucose.: 120 mg/dL (21 Jul 2022 11:56)  POCT Blood Glucose.: 107 mg/dL (21 Jul 2022 04:59)  POCT Blood Glucose.: 120 mg/dL (20 Jul 2022 23:42)    A1C with Estimated Average Glucose (06.05.22 @ 06:00)    A1C with Estimated Average Glucose Result: 7.3    07-21    148<H>  |  108<H>  |  58<H>  ----------------------------<  112<H>  4.1   |  27  |  2.53<H>    eGFR: 20<L>    Ca    8.6      07-21  Mg     2.10     07-21  Phos  5.5     07-21    TPro  6.2  /  Alb  2.7<L>  /  TBili  0.3  /  DBili  x   /  AST  29  /  ALT  27  /  AlkPhos  103  07-21      Thyroid Function Tests:  07-13 @ 04:10 TSH 3.37 FreeT4 -- T3 -- Anti TPO -- Anti Thyroglobulin Ab -- TSI --      Diet, Pureed:   Supplement Feeding Modality:  Oral  Glucerna Shake Cans or Servings Per Day:  1       Frequency:  Three Times a day (07-21-22 @ 17:34) [Active]

## 2022-07-21 NOTE — SWALLOW BEDSIDE ASSESSMENT ADULT - COMMENTS
Neurology note 7/20 "68 yo F /w a PMH OF DM2 (A1c 7.3% on this admission), CHF, sarcoidosis who presents with AMS c/b hypercapnic respiratory failure and CHF exacerbation requiring intubation."    Patient seen at bedside this AM in CTI for an initial assessment of the swallow function, at which time patient was alert. Patient reluctant to participate in oral mech/accept PO trials, however, when given encouragement accepted puree and thin liquids only. Patient minimally verbalized and able to gesture via head nodding for wants/needs. Patient receiving supplemental O2 via nasal cannula.

## 2022-07-21 NOTE — PROGRESS NOTE ADULT - SUBJECTIVE AND OBJECTIVE BOX
INTERVAL HPI/OVERNIGHT EVENTS:    SUBJECTIVE: Patient seen and examined at bedside.       VITAL SIGNS:  ICU Vital Signs Last 24 Hrs  T(C): 36.9 (21 Jul 2022 08:00), Max: 37.2 (20 Jul 2022 12:00)  T(F): 98.4 (21 Jul 2022 08:00), Max: 98.9 (20 Jul 2022 12:00)  HR: 83 (21 Jul 2022 09:00) (61 - 90)  BP: 162/75 (21 Jul 2022 09:00) (126/63 - 182/86)  BP(mean): 102 (21 Jul 2022 09:00) (82 - 114)  ABP: --  ABP(mean): --  RR: 22 (21 Jul 2022 09:00) (16 - 24)  SpO2: 100% (21 Jul 2022 09:00) (95% - 100%)    O2 Parameters below as of 21 Jul 2022 09:00  Patient On (Oxygen Delivery Method): nasal cannula  O2 Flow (L/min): 2          Plateau pressure:   P/F ratio:     07-20 @ 07:01  -  07-21 @ 07:00  --------------------------------------------------------  IN: 77 mL / OUT: 1602 mL / NET: -1525 mL    07-21 @ 07:01  -  07-21 @ 11:15  --------------------------------------------------------  IN: 0 mL / OUT: 495 mL / NET: -495 mL      CAPILLARY BLOOD GLUCOSE      POCT Blood Glucose.: 107 mg/dL (21 Jul 2022 04:59)      PHYSICAL EXAM:     VITALS:   T(C): 36.9 (07-21-22 @ 08:00), Max: 37.2 (07-20-22 @ 12:00)  HR: 83 (07-21-22 @ 09:00) (61 - 90)  BP: 162/75 (07-21-22 @ 09:00) (126/63 - 182/86)  RR: 22 (07-21-22 @ 09:00) (16 - 24)  SpO2: 100% (07-21-22 @ 09:00) (95% - 100%)    GENERAL: NAD, lying in bed comfortably  HEAD:  Atraumatic, Normocephalic  EYES: EOMI, PERRLA, conjunctiva and sclera clear  ENT: Moist mucous membranes  NECK: Supple, No JVD  CHEST/LUNG: CTABL; No rales, rhonchi, wheezing, or rubs. Unlabored respirations  HEART: RRR. No M/R/G  ABDOMEN: Soft, nontender, non-distended, normoactive BS. No hepatomegaly  EXTREMITIES:  2+ Peripheral Pulses, brisk capillary refill. No clubbing, cyanosis, or edema  NERVOUS SYSTEM:  Alert & Oriented X3, speech clear. No deficits, CN II-XII intact. Normal sensation   MSK: FROM all 4 extremities, full and equal strength  PSYCH: Normal affect, normal speech, normal behavior  SKIN: No rashes or lesions      MEDICATIONS:  MEDICATIONS  (STANDING):  amLODIPine   Tablet 10 milliGRAM(s) Oral daily  aspirin  chewable 81 milliGRAM(s) Oral daily  atorvastatin 20 milliGRAM(s) Oral at bedtime  chlorhexidine 4% Liquid 1 Application(s) Topical <User Schedule>  dextrose 50% Injectable 25 Gram(s) IV Push once  dextrose 50% Injectable 12.5 Gram(s) IV Push once  dextrose 50% Injectable 25 Gram(s) IV Push once  glucagon  Injectable 1 milliGRAM(s) IntraMuscular once  heparin   Injectable 5000 Unit(s) SubCutaneous every 12 hours  hydrALAZINE 75 milliGRAM(s) Oral three times a day  insulin lispro (ADMELOG) corrective regimen sliding scale   SubCutaneous every 6 hours  labetalol Injectable 5 milliGRAM(s) IV Push every 12 hours  mupirocin 2% Ointment 1 Application(s) Topical every 12 hours    MEDICATIONS  (PRN):  acetaminophen     Tablet .. 650 milliGRAM(s) Oral every 6 hours PRN Temp greater or equal to 38C (100.4F), Mild Pain (1 - 3)  dextrose Oral Gel 15 Gram(s) Oral once PRN Blood Glucose LESS THAN 70 milliGRAM(s)/deciliter      ALLERGIES:  Allergies    penicillin ([facility] Synd)    Intolerances        LABS:                        8.1    5.31  )-----------( 193      ( 21 Jul 2022 04:00 )             27.7     07-21    148<H>  |  108<H>  |  58<H>  ----------------------------<  112<H>  4.1   |  27  |  2.53<H>    Ca    8.6      21 Jul 2022 04:00  Phos  5.5     07-21  Mg     2.10     07-21    TPro  6.2  /  Alb  2.7<L>  /  TBili  0.3  /  DBili  x   /  AST  29  /  ALT  27  /  AlkPhos  103  07-21          IMAGING:    EKG: INTERVAL HPI/OVERNIGHT EVENTS: Weaned to nasal cannula overnight.     SUBJECTIVE: Patient seen and examined at bedside. Is answering questions by nodding head and can move extremities. Comfortable on NC. Has passed speech and swallow. Denies pain      VITAL SIGNS:  ICU Vital Signs Last 24 Hrs  T(C): 36.9 (21 Jul 2022 08:00), Max: 37.2 (20 Jul 2022 12:00)  T(F): 98.4 (21 Jul 2022 08:00), Max: 98.9 (20 Jul 2022 12:00)  HR: 83 (21 Jul 2022 09:00) (61 - 90)  BP: 162/75 (21 Jul 2022 09:00) (126/63 - 182/86)  BP(mean): 102 (21 Jul 2022 09:00) (82 - 114)  ABP: --  ABP(mean): --  RR: 22 (21 Jul 2022 09:00) (16 - 24)  SpO2: 100% (21 Jul 2022 09:00) (95% - 100%)    O2 Parameters below as of 21 Jul 2022 09:00  Patient On (Oxygen Delivery Method): nasal cannula  O2 Flow (L/min): 2      Plateau pressure:   P/F ratio:     07-20 @ 07:01  -  07-21 @ 07:00  --------------------------------------------------------  IN: 77 mL / OUT: 1602 mL / NET: -1525 mL    07-21 @ 07:01  -  07-21 @ 11:15  --------------------------------------------------------  IN: 0 mL / OUT: 495 mL / NET: -495 mL      CAPILLARY BLOOD GLUCOSE      POCT Blood Glucose.: 107 mg/dL (21 Jul 2022 04:59)      PHYSICAL EXAM:     VITALS:   T(C): 36.9 (07-21-22 @ 08:00), Max: 37.2 (07-20-22 @ 12:00)  HR: 83 (07-21-22 @ 09:00) (61 - 90)  BP: 162/75 (07-21-22 @ 09:00) (126/63 - 182/86)  RR: 22 (07-21-22 @ 09:00) (16 - 24)  SpO2: 100% (07-21-22 @ 09:00) (95% - 100%)    GENERAL: Now on 3 L nasal cannula, comfortable. Answering questions and following commands.   HEAD:  Atraumatic, Normocephalic.   EYES: Pupils reactive  NECK: No JVD  CHEST/LUNG: CTABL; No wheezing or rhonchi.  HEART: RRR. No murmurs  ABDOMEN: Soft, nontender, non-distended, normoactive BS.  EXTREMITIES: Pain with sternal rub (s/p compressions), able to move all extremities.  NERVOUS SYSTEM: Grossly moves all extremities  PSYCH: Unable to assess  SKIN: No rashes or lesions      MEDICATIONS:  MEDICATIONS  (STANDING):  amLODIPine   Tablet 10 milliGRAM(s) Oral daily  aspirin  chewable 81 milliGRAM(s) Oral daily  atorvastatin 20 milliGRAM(s) Oral at bedtime  chlorhexidine 4% Liquid 1 Application(s) Topical <User Schedule>  dextrose 50% Injectable 25 Gram(s) IV Push once  dextrose 50% Injectable 12.5 Gram(s) IV Push once  dextrose 50% Injectable 25 Gram(s) IV Push once  glucagon  Injectable 1 milliGRAM(s) IntraMuscular once  heparin   Injectable 5000 Unit(s) SubCutaneous every 12 hours  hydrALAZINE 75 milliGRAM(s) Oral three times a day  insulin lispro (ADMELOG) corrective regimen sliding scale   SubCutaneous every 6 hours  labetalol Injectable 5 milliGRAM(s) IV Push every 12 hours  mupirocin 2% Ointment 1 Application(s) Topical every 12 hours    MEDICATIONS  (PRN):  acetaminophen     Tablet .. 650 milliGRAM(s) Oral every 6 hours PRN Temp greater or equal to 38C (100.4F), Mild Pain (1 - 3)  dextrose Oral Gel 15 Gram(s) Oral once PRN Blood Glucose LESS THAN 70 milliGRAM(s)/deciliter      ALLERGIES:  Allergies    penicillin ([facility] Synd)    Intolerances        LABS:                        8.1    5.31  )-----------( 193      ( 21 Jul 2022 04:00 )             27.7     07-21    148<H>  |  108<H>  |  58<H>  ----------------------------<  112<H>  4.1   |  27  |  2.53<H>    Ca    8.6      21 Jul 2022 04:00  Phos  5.5     07-21  Mg     2.10     07-21    TPro  6.2  /  Alb  2.7<L>  /  TBili  0.3  /  DBili  x   /  AST  29  /  ALT  27  /  AlkPhos  103  07-21      ECHO:    CONCLUSIONS:  1. Normal trileaflet aortic valve.  2. Moderately dilated left atrium.  LA volume index = 43  cc/m2.  3. Normal left ventricular systolic function. No segmental  wall motion abnormalities.  4. Normal right ventricular size and function.  5. Bilateral pleural effusions.    LVEF about 65% INTERVAL HPI/OVERNIGHT EVENTS: Weaned to nasal cannula overnight.     SUBJECTIVE: Patient seen and examined at bedside. Is answering questions by nodding head and can move extremities. Comfortable on NC. Has passed speech and swallow. Denies pain      VITAL SIGNS:  ICU Vital Signs Last 24 Hrs  T(C): 36.9 (21 Jul 2022 08:00), Max: 37.2 (20 Jul 2022 12:00)  T(F): 98.4 (21 Jul 2022 08:00), Max: 98.9 (20 Jul 2022 12:00)  HR: 83 (21 Jul 2022 09:00) (61 - 90)  BP: 162/75 (21 Jul 2022 09:00) (126/63 - 182/86)  BP(mean): 102 (21 Jul 2022 09:00) (82 - 114)  ABP: --  ABP(mean): --  RR: 22 (21 Jul 2022 09:00) (16 - 24)  SpO2: 100% (21 Jul 2022 09:00) (95% - 100%)    O2 Parameters below as of 21 Jul 2022 09:00  Patient On (Oxygen Delivery Method): nasal cannula  O2 Flow (L/min): 2      Plateau pressure:   P/F ratio:     07-20 @ 07:01  -  07-21 @ 07:00  --------------------------------------------------------  IN: 77 mL / OUT: 1602 mL / NET: -1525 mL    07-21 @ 07:01  -  07-21 @ 11:15  --------------------------------------------------------  IN: 0 mL / OUT: 495 mL / NET: -495 mL      CAPILLARY BLOOD GLUCOSE      POCT Blood Glucose.: 107 mg/dL (21 Jul 2022 04:59)      PHYSICAL EXAM:     VITALS:   T(C): 36.9 (07-21-22 @ 08:00), Max: 37.2 (07-20-22 @ 12:00)  HR: 83 (07-21-22 @ 09:00) (61 - 90)  BP: 162/75 (07-21-22 @ 09:00) (126/63 - 182/86)  RR: 22 (07-21-22 @ 09:00) (16 - 24)  SpO2: 100% (07-21-22 @ 09:00) (95% - 100%)    GENERAL: Now on 3 L nasal cannula, comfortable. Answering questions and following commands.   HEAD:  Atraumatic, Normocephalic.   EYES: Pupils reactive  NECK: No JVD  CHEST/LUNG: CTABL; No wheezing or rhonchi.  HEART: RRR. No murmurs  ABDOMEN: Soft, nontender, non-distended, normoactive BS.  EXTREMITIES: Pain with sternal rub (s/p compressions), able to move all extremities.  NERVOUS SYSTEM: Grossly moves all extremities  PSYCH: Unable to assess  SKIN: No rashes or lesions      MEDICATIONS:  MEDICATIONS  (STANDING):  amLODIPine   Tablet 10 milliGRAM(s) Oral daily  aspirin  chewable 81 milliGRAM(s) Oral daily  atorvastatin 20 milliGRAM(s) Oral at bedtime  chlorhexidine 4% Liquid 1 Application(s) Topical <User Schedule>  dextrose 50% Injectable 25 Gram(s) IV Push once  dextrose 50% Injectable 12.5 Gram(s) IV Push once  dextrose 50% Injectable 25 Gram(s) IV Push once  glucagon  Injectable 1 milliGRAM(s) IntraMuscular once  heparin   Injectable 5000 Unit(s) SubCutaneous every 12 hours  hydrALAZINE 75 milliGRAM(s) Oral three times a day  insulin lispro (ADMELOG) corrective regimen sliding scale   SubCutaneous every 6 hours  labetalol Injectable 5 milliGRAM(s) IV Push every 12 hours  mupirocin 2% Ointment 1 Application(s) Topical every 12 hours    MEDICATIONS  (PRN):  acetaminophen     Tablet .. 650 milliGRAM(s) Oral every 6 hours PRN Temp greater or equal to 38C (100.4F), Mild Pain (1 - 3)  dextrose Oral Gel 15 Gram(s) Oral once PRN Blood Glucose LESS THAN 70 milliGRAM(s)/deciliter      ALLERGIES:  Allergies    penicillin ([facility] Synd)    Intolerances        LABS:                        8.1    5.31  )-----------( 193      ( 21 Jul 2022 04:00 )             27.7     07-21    148<H>  |  108<H>  |  58<H>  ----------------------------<  112<H>  4.1   |  27  |  2.53<H>    Ca    8.6      21 Jul 2022 04:00  Phos  5.5     07-21  Mg     2.10     07-21    TPro  6.2  /  Alb  2.7<L>  /  TBili  0.3  /  DBili  x   /  AST  29  /  ALT  27  /  AlkPhos  103  07-21      ECHO:    CONCLUSIONS:  1. Normal trileaflet aortic valve.  2. Moderately dilated left atrium.  LA volume index = 43  cc/m2.  3. Normal left ventricular systolic function. No segmental  wall motion abnormalities.  4. Normal right ventricular size and function.  5. Bilateral pleural effusions.    LVEF about 65%
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
## 2022-07-21 NOTE — PHYSICAL THERAPY INITIAL EVALUATION ADULT - LEVEL OF INDEPENDENCE: GAIT, REHAB EVAL
due to patient not participating in evaluation, following commands intermittently. To be assessed/unable to perform

## 2022-07-21 NOTE — PROGRESS NOTE ADULT - ATTENDING COMMENTS
67 F with sarcoidosis, CHF, htn, hld here with AMS and acute hypoxemic and hypercapnic respiratory failure due to ADHF, MANDY requiring intubation, extubated, tx to floor then had agonal breathing and cardiac arrest of unclear etiology with resultant acute hypoxemic and hypercapnic respiratory failure requiring reintubation.    mentating well today  c/w nippv qhs and prn  - monitor sugars given concern for hypoglycemia  - concern she may have had an aspiration pneumonia leading to her arrest, continue with broad abx, f/u sputum cultures  - MANDY improving so will diurese more to keep net negative  - bp control for essential htn  - f/u endocrine reccs for hypoglycemia    Critically ill patient requiring frequent bedside visits with therapy changes.

## 2022-07-21 NOTE — PROGRESS NOTE ADULT - SUBJECTIVE AND OBJECTIVE BOX
Neurology Progress Note    S: Patient seen and examined in ICU.   EEG neg for seizures. mental status improved. extubated     Medication:    MEDICATIONS  (STANDING):  amLODIPine   Tablet 10 milliGRAM(s) Oral daily  aspirin  chewable 81 milliGRAM(s) Oral daily  atorvastatin 20 milliGRAM(s) Oral at bedtime  chlorhexidine 4% Liquid 1 Application(s) Topical <User Schedule>  dextrose 50% Injectable 25 Gram(s) IV Push once  dextrose 50% Injectable 12.5 Gram(s) IV Push once  dextrose 50% Injectable 25 Gram(s) IV Push once  glucagon  Injectable 1 milliGRAM(s) IntraMuscular once  heparin   Injectable 5000 Unit(s) SubCutaneous every 12 hours  hydrALAZINE 75 milliGRAM(s) Oral three times a day  insulin lispro (ADMELOG) corrective regimen sliding scale   SubCutaneous every 6 hours  labetalol Injectable 5 milliGRAM(s) IV Push every 12 hours  mupirocin 2% Ointment 1 Application(s) Topical every 12 hours    MEDICATIONS  (PRN):  acetaminophen     Tablet .. 650 milliGRAM(s) Oral every 6 hours PRN Temp greater or equal to 38C (100.4F), Mild Pain (1 - 3)  dextrose Oral Gel 15 Gram(s) Oral once PRN Blood Glucose LESS THAN 70 milliGRAM(s)/deciliter      Vitals:    ICU Vital Signs Last 24 Hrs  T(C): 36.9 (2022 08:00), Max: 37.2 (2022 12:00)  T(F): 98.4 (2022 08:00), Max: 98.9 (2022 12:00)  HR: 83 (2022 09:00) (61 - 90)  BP: 162/75 (2022 09:00) (126/63 - 182/86)  BP(mean): 102 (2022 09:00) (82 - 114)  ABP: --  ABP(mean): --  RR: 22 (2022 09:00) (16 - 24)  SpO2: 100% (2022 09:00) (95% - 100%)    O2 Parameters below as of 2022 09:00  Patient On (Oxygen Delivery Method): nasal cannula  O2 Flow (L/min): 2         General Exam:   General Appearance: Appropriately dressed and in no acute distress       Head: Normocephalic, atraumatic and no dysmorphic features  Ear, Nose, and Throat: Moist mucous membranes +ETT   CVS: S1S2+  Resp: No SOB, no wheeze or rhonchi  GI: soft NT/ND  Extremities:  toe amputation noted   Skin: No bruises or rashes     Neurological Exam: (on propfol sedated )   Mental Status: eyes closed, no verbal, not following . intubated   Cranial Nerves: PERRL, EOMI, VFFC, sensation V1-V3 intact,  no obvious facial asymmetry, equal elevation of palate, scm/trap 5/5, tongue is midline on protrusion. no obvious papilledema on fundoscopic exam. +Resighini   Motor: TONEY  at least 4/5 now.  in mittens in uppers   Sensation: withdraws x 4  Reflexes: 1+ throughout at biceps, brachioradialis, triceps, patellars and ankles bilaterally and equal. No clonus. R toe and L toe were both downgoing.  Coordination: unable   Gait:  unable     I personally reviewed the below data/images/labs:    CBC Full  -  ( 2022 04:00 )  WBC Count : 5.31 K/uL  RBC Count : 3.15 M/uL  Hemoglobin : 8.1 g/dL  Hematocrit : 27.7 %  Platelet Count - Automated : 193 K/uL  Mean Cell Volume : 87.9 fL  Mean Cell Hemoglobin : 25.7 pg  Mean Cell Hemoglobin Concentration : 29.2 gm/dL  Auto Neutrophil # : x  Auto Lymphocyte # : x  Auto Monocyte # : x  Auto Eosinophil # : x  Auto Basophil # : x  Auto Neutrophil % : x  Auto Lymphocyte % : x  Auto Monocyte % : x  Auto Eosinophil % : x  Auto Basophil % : x            148<H>  |  108<H>  |  58<H>  ----------------------------<  112<H>  4.1   |  27  |  2.53<H>    Ca    8.6      2022 04:00  Phos  5.5     -  Mg     2.10         TPro  6.2  /  Alb  2.7<L>  /  TBili  0.3  /  DBili  x   /  AST  29  /  ALT  27  /  AlkPhos  103          Urinalysis Basic - ( 2022 09:00 )    Color: Yellow / Appearance: Slightly Turbid / S.027 / pH: x  Gluc: x / Ketone: Trace  / Bili: Negative / Urobili: 3 mg/dL   Blood: x / Protein: 300 mg/dL / Nitrite: Negative   Leuk Esterase: Large / RBC: 5 /HPF / WBC >50 /HPF   Sq Epi: x / Non Sq Epi: x / Bacteria: x        EXAM:  MR BRAIN WAW IC      EXAM:  MR IAC ONLY WAW IC        PROCEDURE DATE:  Dec  1 2021         INTERPRETATION:  .    CLINICAL INFORMATION: Acute onset of bilateral hearing loss.    TECHNIQUE: Multiplanar multisequential MRI of the brain and internal auditory canals was acquired with and without the administration of IV gadolinium. 7.5 cc's of IV Gadavist was administered for the purposes of this examination. 0 cc were discarded.    COMPARISON: Prior CT examination of the internal auditory canals dated 2021. Prior CT angiograms/venogram examination of the head and neck dated 2021. Examination.    FINDINGS:    MRI BRAIN: A chronic lacunar infarct is seen within the right medial cerebellar hemisphere.    Multiple patchy confluent nonspecific T2/FLAIR hyperintense signal changes are noted throughout the bihemispheric white matter without associated mass effect or restricted diffusion.    There is no abnormal brain parenchymal or leptomeningeal enhancement.    Ventricular sizeand configuration is unremarkable. No abnormal extra-axial fluid collections are seen. Flow-voids are noted throughout the major intracranial vessels, on the T2 weighted images, consistent with their patency. The sellar area appears unremarkable.    Minimal scattered mucosal thickening is seen throughout the paranasal sinuses. The mastoid air cells are clear. The orbits appear unremarkable.    MRI IAC: There is no CP angle mass. The bilateral 7th and 8th cranial nerves appear unremarkable in course and morphology. No abnormal enhancement is seen. The inner ear structures are normally formed. Normal fluid signal is seen within the inner ear structures. The cochlea, vestibule, and semicircular canals appear unremarkable.    IMPRESSION:    MRI BRAIN: No acute intracranial hemorrhage, acute ischemia, or abnormal intracranial enhancement.    Chronic lacunar infarct within the right medial cerebellar hemisphere and mild chronic white matter microvascular type changes.    MRI IAC: Unremarkable study.    --- End of Report ---              LUCI RUEDA MD; Attending Radiologist  This document has been electronically signed. Dec  3 2021  8:46AM    < end of copied text >      < from: CT Chest w/ IV Cont (22 @ 22:58) >    ACC: [id number] EXAM:  CT ABDOMEN AND PELVIS IC                        ACC: [id number] EXAM:  CT CHEST IC                          PROCEDURE DATE:  2022          INTERPRETATION:  CLINICAL INFORMATION: Pleural effusion. Rule out   empyema. Abdominal tenderness. History of pancreatitis. Rule out   infection or obstruction.    COMPARISON: CT chest 2022.    CONTRAST/COMPLICATIONS:  IV Contrast: IV contrast documented in associated exam (accession   [id number]), Omnipaque 350 (accession [id number])  64 cc administered   6 cc   discarded  Oral Contrast: NONE  Complications: None reported at time of study completion    PROCEDURE:  CT of the Chest, Abdomen and Pelvis was performed.  Sagittal and coronal reformats were performed.    FINDINGS:  CHEST:  LUNGS AND LARGE AIRWAYS: Compressive atelectasis of the right upper,   middle and bilateral lower lobes including atelectasis of the majority of   the right lower lobe. Subsegmental atelectasis in the left upper lobe.  PLEURA: New large right andsmall left pleural effusions. No evidence of   an empyema. Fluid in the minor fissure.  VESSELS: Atherosclerotic changes of the aorta and coronary arteries.  HEART: Heart size is enlarged. No pericardial effusion.  MEDIASTINUM AND ANTONIO: No lymphadenopathy. Redemonstration of calcified   mediastinal lymph nodes.  CHEST WALL AND LOWER NECK: 7 mm right thyroid lobe nodule. Generalized   anasarca.    ABDOMEN AND PELVIS:  LIVER: Within normal limits.  BILE DUCTS: Normal caliber.  GALLBLADDER: Cholecystectomy.  SPLEEN: Within normal limits.  PANCREAS: Atrophic.  ADRENALS: Within normal limits.  KIDNEYS/URETERS: No renal stones or hydronephrosis.    BLADDER: Within normal limits.  REPRODUCTIVE ORGANS: Atrophic and retroflexed uterus. No adnexal masses.    BOWEL: Redemonstration of an outpouching with wall calcification and   intraluminal fluid and gas measuring 4.9 x 4.6 cm emanating from the   posterior gastric body likely representing a large diverticulum. No bowel   obstruction or inflammation. Colonic diverticulosis without   diverticulitis. Appendix within normal limits.  PERITONEUM: Small volume free pelvic fluid.  VESSELS: Atherosclerotic changes.  RETROPERITONEUM/LYMPH NODES: No lymphadenopathy.  ABDOMINAL WALL: Generalized anasarca.  BONES: Degenerative changes of the spine. Mild S-shaped thoracolumbar   scoliosis.    IMPRESSION:  1. New large right and small left pleural effusions with adjacent   compressive atelectasis including atelectasis of the majority of the   right lowerlobe.  2. No bowel obstruction or inflammation.      --- End of Report ---          MARTA HART MD; Resident Radiologist  This document has been electronically signed.  CHINTAN WHITFIELD MD; Attending Radiologist  This document has been electronically signed. 2022 12:32AM    < end of copied text >         < from: CT Head No Cont (22 @ 00:03) >    ACC: [id number] EXAM:  CT BRAIN                          PROCEDURE DATE:  2022          INTERPRETATION:  INDICATIONS:  Alteration of  Consciousness    TECHNIQUE:  Serial axial images were obtained from the skull base to the   vertex without intravenous contrast. Sagittal and Coronal reformats were   performed    COMPARISON EXAMINATION: None.    FINDINGS:  VENTRICLES AND SULCI:  Normal.  INTRA-AXIAL:  No mass, blood or abnormal attenuation is seen.  EXTRA-AXIAL:  No mass or collection is seen.  VISUALIZED SINUSES:  Clear.  VISUALIZED MASTOIDS:  Clear.  CALVARIUM:  Normal.  MISCELLANEOUS:  None.    IMPRESSION:  No evidence of intracranial hemorrhage, no evidence of major   vessel distribution infarct    --- End of Report ---            MOLLY TOTH MD; Attending Radiologist  This document has been electronically signed. 2022 10:19AM    < end of copied text >  EEG Summary / Classification:  Abnormal   - Moderate generalized slowing.    EEG Impression / Clinical Correlate:  Abnormal EEG study.  Moderate nonspecific diffuse or multifocal cerebral dysfunction.   No epileptiform pattern or clear seizure seen.    **In absence of additional clinical concerns, recommend consideration for discontinuation of current EEG study with reconnection in future if warranted.

## 2022-07-21 NOTE — PROGRESS NOTE ADULT - ASSESSMENT
68 y/o F with PMH of HTN, HLD, sarcoidosis, CHF was intially admitted to MICU after being intubated for airway protection was extubated successfully . On 7/17 am, found in agonal breathing and subsequently pulseless. Code Blue called, ROSC achieved, pt re-intubated. Second PEA code called upon arrival to the CTICU, ROSC achieved.     Neuro:  #Metabolic Encephalopathy - resolving. Now extubated and following commands.     Resp:  #BIPAP  - Wean as tolerated  - infectious workup as below   - f/u sputum culture    #Bilateral pleural effusions i/s/o HF exacerbation   CT Chest on admission with new large right and small left pleural effusions with adjacent compressive atelectasis including atelectasis of the majority of the right lower lobe   - Monitor O2 sat  - Monitor RR     CV:  #s/p PEA arrest x2  - Possible aspiration event    #HFpEF   - BNP on arrival: 4021 with evidence of fluid overload with pleural effusions, b/l pitting edema   - Echo (6/22) Mild diastolic dysfunction, EF 62%, normal RV size function  - Echo (7/22): moderate LV systolic dysfunction, moderate diastolic dysfunction, RV enlargement with decreased RV systolic dysfunction  - EKG: RBBB (present from prior EKG's). no acute ischemic findings;  - Strict I&O  - Monitor lytes and replete PRN   - Daily weights    #HTN   - On amlodipine, carvedilol and hydralazine at home. Hold antihypertensives given shock state and restart as needed. Given 1 hydralazine push during extubation.     GI:   - Diet:   - NPO until speech and swallow eval.    Monitor BMs  - Abdominal x-ray with non-obstructive gas pattern 7/18.    Renal/:  #MANDY on CKD Stage 2 - pre-renal based on lytes.  Baseline Scr (6/22): 2.33  SCr at 3.21, downtrending  Monitor UO  Monitor SCr  - 7/18: 4 Bumex- improvement in UOP   - 7/19: 4 Bumex  - Good urine output. Will hold diuretics today.    ID:   #Shock, likely septic - no source found   - CT abd and pelvis: no infectious source   - Initial blood cx (7/13): NGTD   - Blood cx (7/17): NGTD  - f/u urine-sputum culture  - c/w meropenem (7/13 - 7/20)  - Monitor temp  - Monitor WBC     Endo:     #T2DM - Controlled  - Will restart diet and re-evaluated insulin needs post s/s    #Hypoglycemia: adrenal insufficiency workup neg  - Endocrine following     Heme:   DVT Ppx: Heparin subQ    Ethics: FULL CODE 66 y/o F with PMH of HTN, HLD, sarcoidosis, CHF was intially admitted to MICU after being intubated for airway protection was extubated successfully . On 7/17 am, found in agonal breathing and subsequently pulseless. Code Blue called, ROSC achieved, pt re-intubated. Second PEA code called upon arrival to the CTICU, ROSC achieved.     Neuro:  #Metabolic Encephalopathy - resolving. Now extubated and following commands.     Resp:  # Nasal Cannula  - Wean as tolerated  - infectious workup as below   - f/u sputum culture    #Bilateral pleural effusions i/s/o HF exacerbation   - CT Chest on admission with new large right and small left pleural effusions with adjacent compressive atelectasis including atelectasis of the majority of the right lower lobe   - Diuretics    CV:  #s/p PEA arrest x2  - Possible aspiration event    #HFpEF   - BNP on arrival: 4021 with evidence of fluid overload with pleural effusions, b/l pitting edema   - EKG: RBBB (present from prior EKG's). no acute ischemic findings;  - Echo (6/22) Mild diastolic dysfunction, EF 62%, normal RV size function  - Echo (7/15/22): moderate LV systolic dysfunction, moderate diastolic dysfunction, RV enlargement with decreased RV systolic dysfunction  - Repeat Echo 7/21/22: Much improved as above.  - Strict I&O  - 40 lasix PO    #HTN   - Resume home anti-hypertensives. Hydralazine, amlodipine, carvedilol.    GI:   - Diet: Puree  - Abdominal x-ray with non-obstructive gas pattern 7/18.    Renal/:  #MANDY on CKD Stage 2 - pre-renal based on lytes.  Baseline Scr (6/22): 2.33  SCr at 2.53, downtrending  Monitor UO  - A little hypernatremia today but should resolve with resumption of diet.     ID:   #Shock, likely septic - no source found - resolved  - CT abd and pelvis: no infectious source   - Initial blood cx (7/13): NGTD   - Blood cx (7/17): NGTD  - f/u urine/sputum culture  - s/p meropenem (7/13 - 7/20)    Endo:     #T2DM - Controlled  - Will restart diet and re-evaluated insulin needs    #Hypoglycemia: adrenal insufficiency workup neg  - Endocrine following     Heme:   DVT Ppx: Heparin subQ    Ethics: FULL CODE 66 y/o F with PMH of HTN, HLD, sarcoidosis, CHF was intially admitted to MICU after being intubated for airway protection was extubated successfully . On 7/17 am, found in agonal breathing and subsequently pulseless. Code Blue called, ROSC achieved, pt re-intubated. Second PEA code called upon arrival to the CTICU, ROSC achieved.     Neuro:  #Metabolic Encephalopathy - resolving. Now extubated and following commands.     Resp:  # Nasal Cannula  - Wean as tolerated  - infectious workup as below   - f/u sputum culture    #Bilateral pleural effusions i/s/o HF exacerbation   - CT Chest on admission with new large right and small left pleural effusions with adjacent compressive atelectasis including atelectasis of the majority of the right lower lobe   - Still present on 7/21 echo  - Diuretics    CV:  #s/p PEA arrest x2  - Possible aspiration event    #HFpEF   - BNP on arrival: 4021 with evidence of fluid overload with pleural effusions, b/l pitting edema   - EKG: RBBB (present from prior EKG's). no acute ischemic findings;  - Echo (6/22) Mild diastolic dysfunction, EF 62%, normal RV size function  - Echo (7/15/22): moderate LV systolic dysfunction, moderate diastolic dysfunction, RV enlargement with decreased RV systolic dysfunction  - Repeat Echo 7/21/22: Much improved as above.  - Strict I&O  - 40 lasix PO    #HTN   - Resume home anti-hypertensives. Hydralazine, amlodipine, carvedilol.    GI:   - Diet: Puree  - Abdominal x-ray with non-obstructive gas pattern 7/18.    Renal/:  #MANDY on CKD Stage 2 - pre-renal based on lytes.  Baseline Scr (6/22): 2.33  SCr at 2.53, downtrending  Monitor UO  - A little hypernatremia today but should resolve with resumption of diet.     ID:   #Shock, likely septic - no source found - resolved  - CT abd and pelvis: no infectious source   - Initial blood cx (7/13): NGTD   - Blood cx (7/17): NGTD  - f/u urine/sputum culture  - s/p meropenem (7/13 - 7/20)    Endo:     #T2DM - Controlled  - Will restart diet and re-evaluated insulin needs    #Hypoglycemia: adrenal insufficiency workup neg  - Endocrine following     Heme:   DVT Ppx: Heparin subQ    Ethics: FULL CODE 66 y/o F with PMH of HTN, HLD, sarcoidosis, CHF was intially admitted to MICU after being intubated for airway protection was extubated successfully . On 7/17 am, found in agonal breathing and subsequently pulseless. Code Blue called, ROSC achieved, pt re-intubated. Second PEA code called upon arrival to the CTICU, ROSC achieved.     Neuro:  #Metabolic Encephalopathy - resolving. Now extubated and following commands.     Resp:  # Nasal Cannula  - Wean as tolerated  - infectious workup as below   - f/u sputum culture    #Bilateral pleural effusions i/s/o HF exacerbation   - CT Chest on admission with new large right and small left pleural effusions with adjacent compressive atelectasis including atelectasis of the majority of the right lower lobe   - Still present on 7/21 echo  - Diuretics    CV:  #s/p PEA arrest x2  - Possible aspiration event    #HFpEF   - BNP on arrival: 4021 with evidence of fluid overload with pleural effusions, b/l pitting edema   - EKG: RBBB (present from prior EKG's). no acute ischemic findings;  - Echo (6/22) Mild diastolic dysfunction, EF 62%, normal RV size function  - Echo (7/15/22): moderate LV systolic dysfunction, moderate diastolic dysfunction, RV enlargement with decreased RV systolic dysfunction  - Repeat Echo 7/21/22: Much improved as above.  - Strict I&O  - 40 lasix PO    #HTN   - Resume home anti-hypertensives. Hydralazine, amlodipine, carvedilol.    GI:   - Diet: Puree  - Abdominal x-ray with non-obstructive gas pattern 7/18.    Renal/:  #MANDY on CKD Stage 2 - pre-renal based on lytes.  Baseline Scr (6/22): 2.33  SCr at 2.53, downtrending  Monitor UO  - A little hypernatremia today but should resolve with resumption of diet.   Murphy in place    ID:   #Shock, likely septic - no source found - resolved  - CT abd and pelvis: no infectious source   - Initial blood cx (7/13): NGTD   - Blood cx (7/17): NGTD  - f/u urine/sputum culture  - s/p meropenem (7/13 - 7/20)    Endo:     #T2DM - Controlled  - Will restart diet and re-evaluated insulin needs    #Hypoglycemia: adrenal insufficiency workup neg  - Endocrine following     Heme:   DVT Ppx: Heparin subQ    Ethics: FULL CODE

## 2022-07-21 NOTE — PHYSICAL THERAPY INITIAL EVALUATION ADULT - ADDITIONAL COMMENTS
Patient poor historian, social history obtained from chart. Please see care coordination note for further details. Patient ambulated with walker at baseline.

## 2022-07-21 NOTE — PHYSICAL THERAPY INITIAL EVALUATION ADULT - PERTINENT HX OF CURRENT PROBLEM, REHAB EVAL
66 y/o F with pmhx of HTN, HLD, sarcoidosis, CHF, presented to the ED for new onset AMS. She is also found to have pleural effusion, elevated BNP and LE edema concerning for CHF exacerbation. Admit for metabolic encephalopathy and CHF exacerbation

## 2022-07-21 NOTE — PROGRESS NOTE ADULT - ASSESSMENT
66 y/o  AAF with HTN, HLD, sarcoidosis, CHF, DM presented to the ED for new onset AMS. Patient suddenly became altered, confused and daughter noted she was slurring his speech. She was incoherent, and hallucinating, saying there is a cat present and is asking for her aunt who lives far away.  The patient known to have frequent admission for CHF exacerbations eventually intubated for hypoxic respiratory failure and now in MICU   MRI brain/IAC from 2021 with old R cerebellar infarct ; CTA h/N that time unremarkable. , A1c 9.2%  CT C/A with new large pleural effusions   CTH unremarakble   A1c 7.3  TTE was done   7/17 AM RRT, intubated, c/f aspiration PNA   7/19 eeg with slowing but no seizures   7/21 now extubated TONEY at least 4/5      Impression: 1) AMS 2/2 hypercapneic resp failure 2) prior cerebellar stroke likely small vessel   - now off sedation and extubated   - asa 81 and statin therapy for secondary stroke prevention LDL goal < 70    - was on meropenum, c/f aspiration PNA; now off   - respiratory per ICU and pulmo   - telemetry  - PT/OT/SS/SLP, OOBC  - check FS, glucose control <180  - GI/DVT ppx  - Thank you for allowing me to participate in the care of this patient. Call with questions.   -remainin per ICU, possible downgrade if medically cleared   Russell Jimenes MD  Vascular Neurology  Office: [phone]

## 2022-07-21 NOTE — PHYSICAL THERAPY INITIAL EVALUATION ADULT - DISCHARGE DISPOSITION, PT EVAL
anticipated discharge to rehab facility to address current functional limitation to optimize safety to allow pt. to reach their optimal level of function. Please continue to follow pending further mobility assessment./rehabilitation facility

## 2022-07-21 NOTE — CHART NOTE - NSCHARTNOTEFT_GEN_A_CORE
Patient assessed by this RDN on 7/14, now for critical care nutrition follow up. Spoke with pt and obtained subjective information from extensive chart review.     Current Diet : Diet, Pureed (07-21-22 @ 11:14)    Current Weight: 88.4 kg (7/20), 87.4 kg (7/19), 89.6 kg (7/16), 85 kg (7/15)  Height (cm): 162.6   Dosing Weight (kg): 88   BMI (kg/m2): 33.3     Nutrition Interval Events: Pt was extubated 7/16, transferred to floor, then noted with agonal breathing and cardiac arrest of unclear etiology with resultant acute hypoxemic and hypercapnic respiratory failure requiring reintubation 7/17 and transfer to CTICU under MICU medical management. Pt now extubated again 7/19. Pt had been NPO or CLD > 5 days this admission. SLP evaluation earlier today recommended Puree solids and Thin liquids. Pt eating small amounts of food now and verbalized preferences. Given hx of DM, suggest Glucerna Therapeutic Nutrition Shake 240mls 2x daily (440kcals, 20g protein) to enhance oral intake. No BMs presently - pt noted with hypoactive bowel sounds with last BM 7/17. Weight trend reflective of edema but there is concern for weight loss as current wt is stable to admission wt and pt has 1+ generalized, 2+ L/R legs and 3+ L/R hand edema. Pt presents at severe risk for malnutrition based on NPO/CLD >5 days this admission with now 2+ extremity fluid accumulation. Encourage and monitor oral intake, especially of nutrition supplement. RDN services to remain available as needed.     __________________ Pertinent Medications__________________   MEDICATIONS  (STANDING):  amLODIPine   Tablet 10 milliGRAM(s) Oral daily  aspirin  chewable 81 milliGRAM(s) Oral daily  atorvastatin 20 milliGRAM(s) Oral at bedtime  carvedilol 25 milliGRAM(s) Oral every 12 hours  chlorhexidine 4% Liquid 1 Application(s) Topical <User Schedule>  dextrose 50% Injectable 25 Gram(s) IV Push once  dextrose 50% Injectable 12.5 Gram(s) IV Push once  dextrose 50% Injectable 25 Gram(s) IV Push once  glucagon  Injectable 1 milliGRAM(s) IntraMuscular once  heparin   Injectable 5000 Unit(s) SubCutaneous every 12 hours  hydrALAZINE 75 milliGRAM(s) Oral three times a day  insulin lispro (ADMELOG) corrective regimen sliding scale   SubCutaneous every 6 hours  mupirocin 2% Ointment 1 Application(s) Topical every 12 hours      __________________ Pertinent Labs__________________   07-21 Na148 mmol/L<H> Glu 112 mg/dL<H> K+ 4.1 mmol/L Cr  2.53 mg/dL<H> BUN 58 mg/dL<H> 07-21 Phos 5.5 mg/dL<H> 07-21 Alb 2.7 g/dL<L>        Skin: Intact    Estimated Needs:   [x] no change since previous assessment      Previous Nutrition Diagnosis:     Inadequate Protein-Energy Intake     Nutrition Diagnosis is [x] ongoing       New Nutrition Diagnosis:     Severe Malnutrition         Related to: acute illness  As evidenced by: NPO/CLD > 5 days this admission with 2+ extremity fluid accumulation        Goal(s):  1. Patient to meet > 75% estimated energy needs    Recommendations:   1. Glucerna Therapeutic Nutrition Shake 240mls 2x daily (440kcals, 20g protein).    Monitoring and Evaluation:   1. Monitor weights, labs, BMs, skin integrity, PO intake and edema.  2. RD services to remain available. Request obtaining frequent weights to assess trend and determine adequacy of PO intake. Oral supplementation for additional calories and protein.

## 2022-07-21 NOTE — PROGRESS NOTE ADULT - ASSESSMENT
This is a 68 yo F /w a PMH OF DM2 (A1c 7.3% on this admission), CHF, sarcoidosis who presents with AMS c/b hypercapnic respiratory failure and CHF exacerbation requiring intubation.    Consulted for: Persistent hypoglycemia    #Hypoglycemia  Noted on day of admission, possibly related to home doses of insulin that patient was taking, poor PO intake, and low GFR  Concern for AI as below -->ruled out  s/p dextrose fluids   resolved      #R/o Adrenal Insufficiency  AM cortisol returned 7.1. ACTH stimulation performed showed robust response (22 and 24 at 30 minute and 60 minute frances respectively). A value > 18 rules out primary AI (not necessarily secondary AI). Repeat AM cortisol was normal at 16.1. Pt does not have adrenal insufficiency.  TFTs are wnl  Stim Test results ruled out adrenal insufficiency  No need for steroids at this time     #T2DM, controlled  A1c 7.3%. Goal < 7%  Inpatient FS goal 140-180  Home regimen: Lantus 20 units at night  Recs:   -Hold standing insulin at this time  -Continue low dose correctional insulin qAc and low dose correctional insulin qHS.   -C-peptide noted to be low at 0.9 - drawn when glucoses were in the 80s-90s which is appropriate; repeat 5.0  -Pureed diet started this evening will trend glucose and adjust insulin regimen if glucose above goal ; Please add consistent carbohydrate   -FS q 6 hours while NPO   -Hypoglycemia Protocol     For d/c:   -will determine need for insulin. Limited DM agents upon d/c because of low GFR  -Can fu with PCP for DM care  -Ensure patient has working glucometer, test strips, lancets, alcohol pads, and BD beverley pen needles  -Please also prescribe glucose tabs, Baqsimi nasal spray or glucagon emergency kit for hypoglycemia risk     #HTN  BP Goal < 130/80  Continue management per primary team    #HLD  LDL goal < 70  Statin if no contraindications    D/w MARTELL Penaloza  Nurse Practitioner  Division of Endocrinology & Diabetes  In house pager #[phone]    If before 9AM or after 6PM, or on weekends/holidays, please call endocrine answering service for assistance ([phone]).For nonurgent matters email [email or web address] for assistance.    room air

## 2022-07-22 LAB
ALBUMIN SERPL ELPH-MCNC: 2.5 G/DL — LOW (ref 3.3–5)
ALP SERPL-CCNC: 99 U/L — SIGNIFICANT CHANGE UP (ref 40–120)
ALT FLD-CCNC: 22 U/L — SIGNIFICANT CHANGE UP (ref 4–33)
ANION GAP SERPL CALC-SCNC: 18 MMOL/L — HIGH (ref 7–14)
AST SERPL-CCNC: 23 U/L — SIGNIFICANT CHANGE UP (ref 4–32)
B-OH-BUTYR SERPL-SCNC: 3.1 MMOL/L — HIGH (ref 0–0.4)
BILIRUB SERPL-MCNC: 0.2 MG/DL — SIGNIFICANT CHANGE UP (ref 0.2–1.2)
BUN SERPL-MCNC: 48 MG/DL — HIGH (ref 7–23)
CALCIUM SERPL-MCNC: 8.8 MG/DL — SIGNIFICANT CHANGE UP (ref 8.4–10.5)
CHLORIDE SERPL-SCNC: 108 MMOL/L — HIGH (ref 98–107)
CO2 SERPL-SCNC: 25 MMOL/L — SIGNIFICANT CHANGE UP (ref 22–31)
CREAT SERPL-MCNC: 1.94 MG/DL — HIGH (ref 0.5–1.3)
CULTURE RESULTS: SIGNIFICANT CHANGE UP
CULTURE RESULTS: SIGNIFICANT CHANGE UP
EGFR: 28 ML/MIN/1.73M2 — LOW
GLUCOSE BLDC GLUCOMTR-MCNC: 124 MG/DL — HIGH (ref 70–99)
GLUCOSE BLDC GLUCOMTR-MCNC: 150 MG/DL — HIGH (ref 70–99)
GLUCOSE BLDC GLUCOMTR-MCNC: 153 MG/DL — HIGH (ref 70–99)
GLUCOSE BLDC GLUCOMTR-MCNC: 153 MG/DL — HIGH (ref 70–99)
GLUCOSE BLDC GLUCOMTR-MCNC: 162 MG/DL — HIGH (ref 70–99)
GLUCOSE SERPL-MCNC: 137 MG/DL — HIGH (ref 70–99)
HCT VFR BLD CALC: 30.3 % — LOW (ref 34.5–45)
HGB BLD-MCNC: 9 G/DL — LOW (ref 11.5–15.5)
MAGNESIUM SERPL-MCNC: 1.9 MG/DL — SIGNIFICANT CHANGE UP (ref 1.6–2.6)
MCHC RBC-ENTMCNC: 25.6 PG — LOW (ref 27–34)
MCHC RBC-ENTMCNC: 29.7 GM/DL — LOW (ref 32–36)
MCV RBC AUTO: 86.3 FL — SIGNIFICANT CHANGE UP (ref 80–100)
NRBC # BLD: 0 /100 WBCS — SIGNIFICANT CHANGE UP
NRBC # FLD: 0 K/UL — SIGNIFICANT CHANGE UP
PHOSPHATE SERPL-MCNC: 4.6 MG/DL — HIGH (ref 2.5–4.5)
PLATELET # BLD AUTO: 234 K/UL — SIGNIFICANT CHANGE UP (ref 150–400)
POTASSIUM SERPL-MCNC: 4 MMOL/L — SIGNIFICANT CHANGE UP (ref 3.5–5.3)
POTASSIUM SERPL-SCNC: 4 MMOL/L — SIGNIFICANT CHANGE UP (ref 3.5–5.3)
PROT SERPL-MCNC: 6.3 G/DL — SIGNIFICANT CHANGE UP (ref 6–8.3)
RBC # BLD: 3.51 M/UL — LOW (ref 3.8–5.2)
RBC # FLD: 16 % — HIGH (ref 10.3–14.5)
SODIUM SERPL-SCNC: 151 MMOL/L — HIGH (ref 135–145)
SPECIMEN SOURCE: SIGNIFICANT CHANGE UP
SPECIMEN SOURCE: SIGNIFICANT CHANGE UP
WBC # BLD: 5.76 K/UL — SIGNIFICANT CHANGE UP (ref 3.8–10.5)
WBC # FLD AUTO: 5.76 K/UL — SIGNIFICANT CHANGE UP (ref 3.8–10.5)

## 2022-07-22 PROCEDURE — 99291 CRITICAL CARE FIRST HOUR: CPT | Mod: GC

## 2022-07-22 PROCEDURE — 99232 SBSQ HOSP IP/OBS MODERATE 35: CPT

## 2022-07-22 RX ORDER — SODIUM CHLORIDE 9 MG/ML
1000 INJECTION, SOLUTION INTRAVENOUS
Refills: 0 | Status: DISCONTINUED | OUTPATIENT
Start: 2022-07-22 | End: 2022-07-23

## 2022-07-22 RX ORDER — INSULIN GLARGINE 100 [IU]/ML
5 INJECTION, SOLUTION SUBCUTANEOUS ONCE
Refills: 0 | Status: COMPLETED | OUTPATIENT
Start: 2022-07-22 | End: 2022-07-22

## 2022-07-22 RX ORDER — INSULIN LISPRO 100/ML
VIAL (ML) SUBCUTANEOUS AT BEDTIME
Refills: 0 | Status: DISCONTINUED | OUTPATIENT
Start: 2022-07-22 | End: 2022-07-22

## 2022-07-22 RX ORDER — INSULIN LISPRO 100/ML
VIAL (ML) SUBCUTANEOUS
Refills: 0 | Status: DISCONTINUED | OUTPATIENT
Start: 2022-07-22 | End: 2022-07-22

## 2022-07-22 RX ORDER — FUROSEMIDE 40 MG
40 TABLET ORAL DAILY
Refills: 0 | Status: DISCONTINUED | OUTPATIENT
Start: 2022-07-22 | End: 2022-07-25

## 2022-07-22 RX ADMIN — CARVEDILOL PHOSPHATE 25 MILLIGRAM(S): 80 CAPSULE, EXTENDED RELEASE ORAL at 05:39

## 2022-07-22 RX ADMIN — MUPIROCIN 1 APPLICATION(S): 20 OINTMENT TOPICAL at 05:39

## 2022-07-22 RX ADMIN — Medication 1: at 17:44

## 2022-07-22 RX ADMIN — Medication 40 MILLIGRAM(S): at 17:46

## 2022-07-22 RX ADMIN — ATORVASTATIN CALCIUM 20 MILLIGRAM(S): 80 TABLET, FILM COATED ORAL at 22:04

## 2022-07-22 RX ADMIN — Medication 650 MILLIGRAM(S): at 21:02

## 2022-07-22 RX ADMIN — SODIUM CHLORIDE 50 MILLILITER(S): 9 INJECTION, SOLUTION INTRAVENOUS at 17:47

## 2022-07-22 RX ADMIN — AMLODIPINE BESYLATE 10 MILLIGRAM(S): 2.5 TABLET ORAL at 05:39

## 2022-07-22 RX ADMIN — INSULIN GLARGINE 5 UNIT(S): 100 INJECTION, SOLUTION SUBCUTANEOUS at 17:44

## 2022-07-22 RX ADMIN — Medication 1: at 11:47

## 2022-07-22 RX ADMIN — HEPARIN SODIUM 5000 UNIT(S): 5000 INJECTION INTRAVENOUS; SUBCUTANEOUS at 05:39

## 2022-07-22 RX ADMIN — Medication 75 MILLIGRAM(S): at 05:38

## 2022-07-22 RX ADMIN — Medication 81 MILLIGRAM(S): at 12:01

## 2022-07-22 RX ADMIN — Medication 650 MILLIGRAM(S): at 20:01

## 2022-07-22 RX ADMIN — HEPARIN SODIUM 5000 UNIT(S): 5000 INJECTION INTRAVENOUS; SUBCUTANEOUS at 17:43

## 2022-07-22 RX ADMIN — CARVEDILOL PHOSPHATE 25 MILLIGRAM(S): 80 CAPSULE, EXTENDED RELEASE ORAL at 17:44

## 2022-07-22 RX ADMIN — MUPIROCIN 1 APPLICATION(S): 20 OINTMENT TOPICAL at 17:43

## 2022-07-22 RX ADMIN — Medication 75 MILLIGRAM(S): at 13:33

## 2022-07-22 RX ADMIN — SODIUM CHLORIDE 50 MILLILITER(S): 9 INJECTION, SOLUTION INTRAVENOUS at 20:01

## 2022-07-22 RX ADMIN — Medication 75 MILLIGRAM(S): at 22:04

## 2022-07-22 NOTE — OCCUPATIONAL THERAPY INITIAL EVALUATION ADULT - GENERAL OBSERVATIONS, REHAB EVAL
Patient received semisupine in bed in NAD. Cleared to participate in OT evaluation per ELZBIETA [place]. +tele. +2L O2 via NC. +flexiseal. +Murphy.

## 2022-07-22 NOTE — PROGRESS NOTE ADULT - ASSESSMENT
66 y/o F with PMH of HTN, HLD, sarcoidosis, CHF was intially admitted to MICU after being intubated for airway protection was extubated successfully . On 7/17 am, found in agonal breathing and subsequently pulseless. Code Blue called, ROSC achieved, pt re-intubated. Second PEA code called upon arrival to the CTICU, ROSC achieved.     Neuro:  #Metabolic Encephalopathy - resolving. Now extubated and following commands.     Resp:  # Nasal Cannula  - Wean as tolerated  - infectious workup as below   - f/u sputum culture    #Bilateral pleural effusions i/s/o HF exacerbation   - CT Chest on admission with new large right and small left pleural effusions with adjacent compressive atelectasis including atelectasis of the majority of the right lower lobe   - Still present on 7/21 echo  - Diuretics    CV:  #s/p PEA arrest x2  - Possible aspiration event    #HFpEF   - BNP on arrival: 4021 with evidence of fluid overload with pleural effusions, b/l pitting edema   - EKG: RBBB (present from prior EKG's). no acute ischemic findings;  - Echo (6/22) Mild diastolic dysfunction, EF 62%, normal RV size function  - Echo (7/15/22): moderate LV systolic dysfunction, moderate diastolic dysfunction, RV enlargement with decreased RV systolic dysfunction  - Repeat Echo 7/21/22: Much improved as above.  - Strict I&O  - 40 lasix PO    #HTN   - Resume home anti-hypertensives. Hydralazine, amlodipine, carvedilol.    GI:   - Diet: Puree  - Abdominal x-ray with non-obstructive gas pattern 7/18.    Renal/:  #MANDY on CKD Stage 2 - pre-renal based on lytes.  Baseline Scr (6/22): 2.33  SCr at 2.53, downtrending  Monitor UO  - A little hypernatremia today but should resolve with resumption of diet.   Murphy in place    ID:   #Shock, likely septic - no source found - resolved  - CT abd and pelvis: no infectious source   - Initial blood cx (7/13): NGTD   - Blood cx (7/17): NGTD  - f/u urine/sputum culture  - s/p meropenem (7/13 - 7/20)    Endo:     #T2DM - Controlled  - Will restart diet and re-evaluated insulin needs    #Hypoglycemia: adrenal insufficiency workup neg  - Endocrine following     Heme:   DVT Ppx: Heparin subQ    Ethics: FULL CODE 66 y/o F with PMH of HTN, HLD, sarcoidosis, CHF was intially admitted to MICU after being intubated for airway protection was extubated successfully . On 7/17 am, found in agonal breathing and subsequently pulseless. Code Blue called, ROSC achieved, pt re-intubated. Second PEA code called upon arrival to the CTICU, ROSC achieved.     Neuro:  #Metabolic Encephalopathy - resolving. Now extubated and following commands.    Resp:  # Nasal Cannula  - Wean as tolerated  - infectious workup as below     #Bilateral pleural effusions i/s/o HF exacerbation   - CT Chest on admission with new large right and small left pleural effusions with adjacent compressive atelectasis including atelectasis of the majority of the right lower lobe   - Still present on 7/21 echo  - Diuretics    CV:  #s/p PEA arrest x2  - Possible aspiration event    #HFpEF   - BNP on arrival: 4021 with evidence of fluid overload with pleural effusions, b/l pitting edema   - EKG: RBBB (present from prior EKG's). no acute ischemic findings;  - Echo (6/22) Mild diastolic dysfunction, EF 62%, normal RV size function  - Echo (7/15/22): moderate LV systolic dysfunction, moderate diastolic dysfunction, RV enlargement with decreased RV systolic dysfunction  - Repeat Echo 7/21/22: EF 65% with normal biventricular systolic function  - Strict I&O  - 40 lasix PO qd standing    #HTN   - Continue home anti-hypertensives. Hydralazine, amlodipine, carvedilol.    GI:   - Diet: Puree  - Abdominal x-ray with non-obstructive gas pattern 7/18.    Renal/:  #MANDY on CKD Stage 2 - pre-renal based on lytes.  Baseline Scr (6/22): 2.33  SCr at 1.94, downtrending  Monitor UO    #Hypernatremia  - Free water 300q6.  - Should improve with diet   Murphy in place    ID:   #Shock, likely septic - no source found - resolved  - CT abd and pelvis: no infectious source   - Initial blood cx (7/13): NGTD   - Blood cx (7/17): NGTD  - f/u urine/sputum culture  - s/p meropenem (7/13 - 7/20)    #Urine culture with 10,000-49,000 candida glabrata.  - NTD    Endo:     #T2DM - Controlled  - Endocrine following. Admelog sliding scale    #Hypoglycemia: adrenal insufficiency workup neg  - Endocrine following     Heme:   DVT Ppx: Heparin subQ    Ethics: FULL CODE 66 y/o F with PMH of HTN, HLD, sarcoidosis, CHF was intially admitted to MICU after being intubated for airway protection was extubated successfully . On 7/17 am, found in agonal breathing and subsequently pulseless. Code Blue called, ROSC achieved, pt re-intubated. Second PEA code called upon arrival to the CTICU, ROSC achieved.     Neuro:  #Metabolic Encephalopathy - resolving. Now extubated and following commands.    Resp:  # Nasal Cannula  - Wean as tolerated  - infectious workup as below     #Bilateral pleural effusions i/s/o HF exacerbation   - CT Chest on admission with new large right and small left pleural effusions with adjacent compressive atelectasis including atelectasis of the majority of the right lower lobe   - Still present on 7/21 echo  - Lasix    CV:  #s/p PEA arrest x2  - Possible aspiration event    #HFpEF   - BNP on arrival: 4021 with evidence of fluid overload with pleural effusions, b/l pitting edema   - EKG: RBBB (present from prior EKG's). no acute ischemic findings;  - Echo (6/22) Mild diastolic dysfunction, EF 62%, normal RV size function  - Echo (7/15/22): moderate LV systolic dysfunction, moderate diastolic dysfunction, RV enlargement with decreased RV systolic dysfunction  - Repeat Echo 7/21/22: EF 65% with normal biventricular systolic function  - Strict I&O  - 40 lasix PO qd standing    #HTN   - Continue home anti-hypertensives. Hydralazine, amlodipine, carvedilol.    GI:   - Diet: Puree  - Abdominal x-ray with non-obstructive gas pattern 7/18.    Renal/:  #MANDY on CKD Stage 2 - pre-renal based on lytes.  Baseline Scr (6/22): 2.33  SCr at 1.94, downtrending  Monitor UO    #Hypernatremia  - Free water 300q6.  - Should improve with diet   Murphy in place    ID:   #Shock, likely septic - no source found - resolved  - CT abd and pelvis: no infectious source   - Initial blood cx (7/13): NGTD   - Blood cx (7/17): NGTD  - f/u urine/sputum culture  - s/p meropenem (7/13 - 7/20)    #Urine culture with 10,000-49,000 candida glabrata.  - NTD    Endo:     #T2DM - Controlled  - Endocrine following. Admelog sliding scale    #Hypoglycemia: adrenal insufficiency workup neg  - Endocrine following     Heme:   DVT Ppx: Heparin subQ    Ethics: FULL CODE 68 y/o F with PMH of HTN, HLD, sarcoidosis, CHF was intially admitted to MICU after being intubated for airway protection was extubated successfully . On 7/17 am, found in agonal breathing and subsequently pulseless. Code Blue called, ROSC achieved, pt re-intubated. Second PEA code called upon arrival to the CTICU, ROSC achieved.     Neuro:  #Metabolic Encephalopathy - resolving. Now extubated and following commands.    Resp:  # Nasal Cannula  - Wean as tolerated  - infectious workup as below     #Bilateral pleural effusions i/s/o HF exacerbation   - CT Chest on admission with new large right and small left pleural effusions with adjacent compressive atelectasis including atelectasis of the majority of the right lower lobe   - Still present on 7/21 echo  - Lasix    CV:  #s/p PEA arrest x2  - Possible aspiration event    #HFpEF   - BNP on arrival: 4021 with evidence of fluid overload with pleural effusions, b/l pitting edema   - EKG: RBBB (present from prior EKG's). no acute ischemic findings;  - Echo (6/22) Mild diastolic dysfunction, EF 62%, normal RV size function  - Echo (7/15/22): moderate LV systolic dysfunction, moderate diastolic dysfunction, RV enlargement with decreased RV systolic dysfunction  - Repeat Echo 7/21/22: EF 65% with normal biventricular systolic function  - Strict I&O  - 40 lasix PO qd standing    #HTN   - Continue home anti-hypertensives. Hydralazine, amlodipine, carvedilol.    GI:   - Diet: Puree  - Abdominal x-ray with non-obstructive gas pattern 7/18.    Renal/:  #MANDY on CKD Stage 2 - pre-renal based on lytes.  Baseline Scr (6/22): 2.33  SCr at 1.94, downtrending  Monitor UO    #Hypernatremia  - Should improve with diet   Murphy in place    ID:   #Shock, likely septic - no source found - resolved  - CT abd and pelvis: no infectious source   - Initial blood cx (7/13): NGTD   - Blood cx (7/17): NGTD  - f/u urine/sputum culture  - s/p meropenem (7/13 - 7/20)    #Urine culture with 10,000-49,000 candida glabrata.  - NTD    Endo:     #T2DM - Controlled  - Endocrine following. Admelog sliding scale    #Hypoglycemia: adrenal insufficiency workup neg  - Endocrine following     Heme:   DVT Ppx: Heparin subQ    Ethics: FULL CODE

## 2022-07-22 NOTE — OCCUPATIONAL THERAPY INITIAL EVALUATION ADULT - PERTINENT HX OF CURRENT PROBLEM, REHAB EVAL
67 year old female with history of HTN, HLD, sarcoidosis, CHF, presented to the ED for new onset AMS. Also found to have pleural effusion, elevated BNP and LE edema concerning for CHF exacerbation. Intubated 7/13, extubated 7/16 and transferred to floor. Code blue called on 7/17 am, PEA, ROSC achieved. Re-intubated and re-admitted to MICU. Code called upon transfer to CTICU, PEA, ROSC achieved. Now extubated on 7/20.

## 2022-07-22 NOTE — PROGRESS NOTE ADULT - SUBJECTIVE AND OBJECTIVE BOX
INTERVAL HPI/OVERNIGHT EVENTS:    SUBJECTIVE: Patient seen and examined at bedside.       VITAL SIGNS:  ICU Vital Signs Last 24 Hrs  T(C): 36.1 (22 Jul 2022 04:00), Max: 37.2 (21 Jul 2022 12:00)  T(F): 97 (22 Jul 2022 04:00), Max: 98.9 (21 Jul 2022 12:00)  HR: 83 (22 Jul 2022 06:00) (78 - 90)  BP: 170/86 (22 Jul 2022 06:00) (128/69 - 184/98)  BP(mean): 109 (22 Jul 2022 06:00) (87 - 123)  ABP: --  ABP(mean): --  RR: 21 (22 Jul 2022 06:00) (17 - 27)  SpO2: 100% (22 Jul 2022 06:00) (99% - 100%)    O2 Parameters below as of 22 Jul 2022 06:00  Patient On (Oxygen Delivery Method): nasal cannula  O2 Flow (L/min): 2          Plateau pressure:   P/F ratio:     07-21 @ 07:01  -  07-22 @ 07:00  --------------------------------------------------------  IN: 600 mL / OUT: 2645 mL / NET: -2045 mL      CAPILLARY BLOOD GLUCOSE      POCT Blood Glucose.: 141 mg/dL (21 Jul 2022 22:09)      PHYSICAL EXAM:     VITALS:   T(C): 36.1 (07-22-22 @ 04:00), Max: 37.2 (07-21-22 @ 12:00)  HR: 83 (07-22-22 @ 06:00) (78 - 90)  BP: 170/86 (07-22-22 @ 06:00) (128/69 - 184/98)  RR: 21 (07-22-22 @ 06:00) (17 - 27)  SpO2: 100% (07-22-22 @ 06:00) (99% - 100%)    GENERAL: NAD, lying in bed comfortably  HEAD:  Atraumatic, Normocephalic  EYES: EOMI, PERRLA, conjunctiva and sclera clear  ENT: Moist mucous membranes  NECK: Supple, No JVD  CHEST/LUNG: CTABL; No rales, rhonchi, wheezing, or rubs. Unlabored respirations  HEART: RRR. No M/R/G  ABDOMEN: Soft, nontender, non-distended, normoactive BS. No hepatomegaly  EXTREMITIES:  2+ Peripheral Pulses, brisk capillary refill. No clubbing, cyanosis, or edema  NERVOUS SYSTEM:  Alert & Oriented X3, speech clear. No deficits, CN II-XII intact. Normal sensation   MSK: FROM all 4 extremities, full and equal strength  PSYCH: Normal affect, normal speech, normal behavior  SKIN: No rashes or lesions      MEDICATIONS:  MEDICATIONS  (STANDING):  amLODIPine   Tablet 10 milliGRAM(s) Oral daily  aspirin  chewable 81 milliGRAM(s) Oral daily  atorvastatin 20 milliGRAM(s) Oral at bedtime  carvedilol 25 milliGRAM(s) Oral every 12 hours  chlorhexidine 4% Liquid 1 Application(s) Topical <User Schedule>  dextrose 50% Injectable 25 Gram(s) IV Push once  dextrose 50% Injectable 12.5 Gram(s) IV Push once  dextrose 50% Injectable 25 Gram(s) IV Push once  glucagon  Injectable 1 milliGRAM(s) IntraMuscular once  heparin   Injectable 5000 Unit(s) SubCutaneous every 12 hours  hydrALAZINE 75 milliGRAM(s) Oral three times a day  insulin lispro (ADMELOG) corrective regimen sliding scale   SubCutaneous Before meals and at bedtime  mupirocin 2% Ointment 1 Application(s) Topical every 12 hours    MEDICATIONS  (PRN):  acetaminophen     Tablet .. 650 milliGRAM(s) Oral every 6 hours PRN Temp greater or equal to 38C (100.4F), Mild Pain (1 - 3)  dextrose Oral Gel 15 Gram(s) Oral once PRN Blood Glucose LESS THAN 70 milliGRAM(s)/deciliter      ALLERGIES:  Allergies    penicillin ([facility] Synd)    Intolerances        LABS:                        9.0    5.76  )-----------( 234      ( 22 Jul 2022 01:35 )             30.3     07-22    151<H>  |  108<H>  |  48<H>  ----------------------------<  137<H>  4.0   |  25  |  1.94<H>    Ca    8.8      22 Jul 2022 01:35  Phos  4.6     07-22  Mg     1.90     07-22    TPro  6.3  /  Alb  2.5<L>  /  TBili  0.2  /  DBili  x   /  AST  23  /  ALT  22  /  AlkPhos  99  07-22          IMAGING:    EKG: INTERVAL HPI/OVERNIGHT EVENTS: None    SUBJECTIVE: Patient seen and examined at bedside. Talks to writer, saying hello. Drank coffee with assistance and water from a straw. Got first dose of amlodipine this am.      VITAL SIGNS:  ICU Vital Signs Last 24 Hrs  T(C): 36.1 (22 Jul 2022 04:00), Max: 37.2 (21 Jul 2022 12:00)  T(F): 97 (22 Jul 2022 04:00), Max: 98.9 (21 Jul 2022 12:00)  HR: 83 (22 Jul 2022 06:00) (78 - 90)  BP: 170/86 (22 Jul 2022 06:00) (128/69 - 184/98)  BP(mean): 109 (22 Jul 2022 06:00) (87 - 123)  ABP: --  ABP(mean): --  RR: 21 (22 Jul 2022 06:00) (17 - 27)  SpO2: 100% (22 Jul 2022 06:00) (99% - 100%)    O2 Parameters below as of 22 Jul 2022 06:00  Patient On (Oxygen Delivery Method): nasal cannula  O2 Flow (L/min): 2        07-21 @ 07:01  -  07-22 @ 07:00  --------------------------------------------------------  IN: 600 mL / OUT: 2645 mL / NET: -2045 mL      CAPILLARY BLOOD GLUCOSE      POCT Blood Glucose.: 141 mg/dL (21 Jul 2022 22:09)      PHYSICAL EXAM:     VITALS:   T(C): 36.1 (07-22-22 @ 04:00), Max: 37.2 (07-21-22 @ 12:00)  HR: 83 (07-22-22 @ 06:00) (78 - 90)  BP: 170/86 (07-22-22 @ 06:00) (128/69 - 184/98)  RR: 21 (07-22-22 @ 06:00) (17 - 27)  SpO2: 100% (07-22-22 @ 06:00) (99% - 100%)    GENERAL: Now on 1 L nasal cannula, comfortable. Answering questions and following commands.   HEAD:  Atraumatic, Normocephalic.   EYES: Pupils reactive  NECK: No JVD  CHEST/LUNG: CTABL; No wheezing or rhonchi.  HEART: RRR. No murmurs  ABDOMEN: Soft, nontender, non-distended, normoactive BS.  EXTREMITIES: Pain with sternal rub (s/p compressions), able to move all extremities.  NERVOUS SYSTEM: Grossly moves all extremities  SKIN: No rashes or lesions        MEDICATIONS:  MEDICATIONS  (STANDING):  amLODIPine   Tablet 10 milliGRAM(s) Oral daily  aspirin  chewable 81 milliGRAM(s) Oral daily  atorvastatin 20 milliGRAM(s) Oral at bedtime  carvedilol 25 milliGRAM(s) Oral every 12 hours  chlorhexidine 4% Liquid 1 Application(s) Topical <User Schedule>  dextrose 50% Injectable 25 Gram(s) IV Push once  dextrose 50% Injectable 12.5 Gram(s) IV Push once  dextrose 50% Injectable 25 Gram(s) IV Push once  glucagon  Injectable 1 milliGRAM(s) IntraMuscular once  heparin   Injectable 5000 Unit(s) SubCutaneous every 12 hours  hydrALAZINE 75 milliGRAM(s) Oral three times a day  insulin lispro (ADMELOG) corrective regimen sliding scale   SubCutaneous Before meals and at bedtime  mupirocin 2% Ointment 1 Application(s) Topical every 12 hours    MEDICATIONS  (PRN):  acetaminophen     Tablet .. 650 milliGRAM(s) Oral every 6 hours PRN Temp greater or equal to 38C (100.4F), Mild Pain (1 - 3)  dextrose Oral Gel 15 Gram(s) Oral once PRN Blood Glucose LESS THAN 70 milliGRAM(s)/deciliter      ALLERGIES:  Allergies    penicillin ([facility] Man Synd)    Intolerances        LABS:                        9.0    5.76  )-----------( 234      ( 22 Jul 2022 01:35 )             30.3     07-22    151<H>  |  108<H>  |  48<H>  ----------------------------<  137<H>  4.0   |  25  |  1.94<H>    Ca    8.8      22 Jul 2022 01:35  Phos  4.6     07-22  Mg     1.90     07-22    TPro  6.3  /  Alb  2.5<L>  /  TBili  0.2  /  DBili  x   /  AST  23  /  ALT  22  /  AlkPhos  99  07-22

## 2022-07-22 NOTE — PROGRESS NOTE ADULT - ATTENDING COMMENTS
67 F with sarcoidosis, CHF, htn, hld here with AMS and acute hypoxemic and hypercapnic respiratory failure due to ADHF, MANDY requiring intubation, extubated, tx to floor then had agonal breathing and cardiac arrest of unclear etiology with resultant acute hypoxemic and hypercapnic respiratory failure requiring reintubation.    continues to mentate well, moving all limbs spontaneously    c/w nippv qhs and prn  - monitor sugars given concern for hypoglycemia  - concern she may have had an aspiration pneumonia leading to her arrest, continue with broad abx, f/u sputum cultures  - MANDY improving so will diurese more to keep net negative  - bp control for essential htn  - f/u endocrine reccs for hypoglycemia    Critically ill patient requiring frequent bedside visits with therapy changes.

## 2022-07-22 NOTE — PROGRESS NOTE ADULT - ASSESSMENT
This is a 66 yo F /w a PMH OF DM2 (A1c 7.3% on this admission), CHF, sarcoidosis who presents with AMS c/b hypercapnic respiratory failure and CHF exacerbation requiring intubation.    Consulted for: Persistent hypoglycemia    #Hypoglycemia  Noted on day of admission, possibly related to home doses of insulin that patient was taking, poor PO intake, and low GFR  Concern for AI as below -->ruled out  s/p dextrose fluids   resolved      #R/o Adrenal Insufficiency  AM cortisol returned 7.1. ACTH stimulation performed showed robust response (22 and 24 at 30 minute and 60 minute frances respectively). A value > 18 rules out primary AI (not necessarily secondary AI). Repeat AM cortisol was normal at 16.1. Pt does not have adrenal insufficiency.  TFTs are wnl  Stim Test results ruled out adrenal insufficiency  No need for steroids at this time     #T2DM, controlled  A1c 7.3%. Goal < 7%  Home regimen: Lantus 20 units at night  Recs:   -While in the ICU FS goal 140-180  -Noted with Acidosis this is likely due to starvation and renal related; BHB 3.1 elevated; recommending to start dextrose 5% at 50ml/hr and give Lantus 5 units sq stat and continue daily; consider holding dextrose if glucose above 200 x2   -hold standing admelog premeal for now   -Continue Admelog low dose correctional insulin qAc and low dose correctional insulin qHS.   -C-peptide noted to be low at 0.9 - drawn when glucoses were in the 80s-90s which is appropriate; repeat 5.0  -Consistent carbohydrate diet   -FS q 6 hours while NPO   -Hypoglycemia Protocol       For d/c:   -will determine need for insulin. Limited DM agents upon d/c because of low GFR  -Can fu with PCP for DM care  -Ensure patient has working glucometer, test strips, lancets, alcohol pads, and BD beverley pen needles  -Please also prescribe glucose tabs, Baqsimi nasal spray or glucagon emergency kit for hypoglycemia risk   -Please follow up with podiatry and opthalmology as an outpt    #HTN  BP Goal < 130/80  Continue management per primary team    #HLD  LDL goal < 70  Lipitor 20mg P.O. daily   Statin if no contraindications    Metabolic Acidosis   -Noted with Acidosis this is likely due to starvation and renal related; BHB 3.1 elevated; recommending to start dextrose 5% at 50ml/hr and give Lantus 5 units sq stat and continue daily; consider holding dextrose if glucose above 200 x2   -Trend anion gap and BHB     D/w MICU Spectra [id number] and Dr. Bela Penaloza  Nurse Practitioner  Division of Endocrinology & Diabetes  In house pager #[phone]    If before 9AM or after 6PM, or on weekends/holidays, please call endocrine answering service for assistance ([phone]).For nonurgent matters email [email or web address] for assistance.    This is a 66 yo F /w a PMH OF DM2 (A1c 7.3% on this admission), CHF, sarcoidosis who presents with AMS c/b hypercapnic respiratory failure and CHF exacerbation requiring intubation.    Consulted for: Persistent hypoglycemia    #Hypoglycemia  Noted on day of admission, possibly related to home doses of insulin that patient was taking, poor PO intake, and low GFR  Concern for AI as below -->ruled out  s/p dextrose fluids   resolved      #R/o Adrenal Insufficiency  AM cortisol returned 7.1. ACTH stimulation performed showed robust response (22 and 24 at 30 minute and 60 minute frances respectively). A value > 18 rules out primary AI (not necessarily secondary AI). Repeat AM cortisol was normal at 16.1. Pt does not have adrenal insufficiency.  TFTs are wnl  Stim Test results ruled out adrenal insufficiency  No need for steroids at this time     #T2DM, controlled  A1c 7.3%. Goal < 7%  Home regimen: Lantus 20 units at night  Recs:   -While in the ICU FS goal 140-180  -Noted with Acidosis this is likely due to starvation and renal related; BHB 3.1 elevated; recommending to start dextrose 5% at 50ml/hr and give Lantus 5 units sq stat and continue daily; consider holding dextrose if glucose above 200 x2   -hold standing admelog premeal for now   -Continue Admelog low dose correctional insulin qAc and low dose correctional insulin qHS.   -C-peptide noted to be low at 0.9 - drawn when glucoses were in the 80s-90s which is appropriate; repeat 5.0  -Consistent carbohydrate diet   -FS q 6 hours while NPO   -Hypoglycemia Protocol       For d/c:   -will determine need for insulin. Limited DM agents upon d/c because of low GFR  -Can fu with PCP for DM care  -Ensure patient has working glucometer, test strips, lancets, alcohol pads, and BD beverley pen needles  -Please also prescribe glucose tabs, Baqsimi nasal spray or glucagon emergency kit for hypoglycemia risk   -Please follow up with podiatry and opthalmology as an outpt    #HTN  BP Goal < 130/80  Coreg 25mg P.O. BID, Hydralazine 75mg TID, Amlodipine 10mg Daily  Can consider adding ace or arb if bp not at goal and no contraindication   Continue management per primary team    #HLD  LDL goal < 70  Lipitor 20mg P.O. daily   Statin if no contraindications    #Metabolic Acidosis   -Noted with Acidosis this is likely due to starvation and renal related; BHB 3.1 elevated; recommending to start dextrose 5% at 50ml/hr and give Lantus 5 units sq stat and continue daily; consider holding dextrose if glucose above 200 x2   -Trend anion gap and BHB     D/w MICU Spectra [id number] and Dr. Bela Penaloza  Nurse Practitioner  Division of Endocrinology & Diabetes  In house pager #[phone]    If before 9AM or after 6PM, or on weekends/holidays, please call endocrine answering service for assistance ([phone]).For nonurgent matters email [email or web address] for assistance.    This is a 66 yo F /w a PMH OF DM2 (A1c 7.3% on this admission), CHF, sarcoidosis who presents with AMS c/b hypercapnic respiratory failure and CHF exacerbation requiring intubation.    Consulted for: Persistent hypoglycemia    #Hypoglycemia  Noted on day of admission, possibly related to home doses of insulin that patient was taking, poor PO intake, and low GFR  Concern for AI as below -->ruled out  s/p dextrose fluids   resolved      #R/o Adrenal Insufficiency  AM cortisol returned 7.1. ACTH stimulation performed showed robust response (22 and 24 at 30 minute and 60 minute frances respectively). A value > 18 rules out primary AI (not necessarily secondary AI). Repeat AM cortisol was normal at 16.1. Pt does not have adrenal insufficiency.  TFTs are wnl  Stim Test results ruled out adrenal insufficiency  No need for steroids at this time     #T2DM, controlled  A1c 7.3%. Goal < 7%  Home regimen: Lantus 20 units at night  Recs:   -While in the ICU FS goal 140-180  -Noted with Acidosis this is likely due to starvation and renal related; BHB 3.1 elevated; recommending to start dextrose 5% at 50ml/hr and give Lantus 5 units sq stat and continue daily; consider holding dextrose if glucose above 200 x2 ; lantus dose given at 5pm d/w resident should be ordered daily at 5pm   -hold standing admelog premeal for now   -Continue Admelog low dose correctional insulin qAc and seperate order low dose correctional insulin qHS.   -C-peptide noted to be low at 0.9 - drawn when glucoses were in the 80s-90s which is appropriate; repeat 5.0  -Consistent carbohydrate diet   -FS q 6 hours while NPO   -Hypoglycemia Protocol       For d/c:   -will determine need for insulin. Limited DM agents upon d/c because of low GFR  -Can fu with PCP for DM care  -Ensure patient has working glucometer, test strips, lancets, alcohol pads, and BD beverley pen needles  -Please also prescribe glucose tabs, Baqsimi nasal spray or glucagon emergency kit for hypoglycemia risk   -Please follow up with podiatry and opthalmology as an outpt    #HTN  BP Goal < 130/80  Coreg 25mg P.O. BID, Hydralazine 75mg TID, Amlodipine 10mg Daily  Can consider adding ace or arb if bp not at goal and no contraindication   Continue management per primary team    #HLD  LDL goal < 70  Lipitor 20mg P.O. daily   Statin if no contraindications    #Metabolic Acidosis   -Noted with Acidosis this is likely due to starvation and renal related; BHB 3.1 elevated; recommending to start dextrose 5% at 50ml/hr and give Lantus 5 units sq stat and continue daily; consider holding dextrose if glucose above 200 x2   -Trend anion gap and BHB     D/w MICU Spectra [id number] and Dr. Bela Penaloza  Nurse Practitioner  Division of Endocrinology & Diabetes  In house pager #[phone]    If before 9AM or after 6PM, or on weekends/holidays, please call endocrine answering service for assistance ([phone]).For nonurgent matters email [email or web address] for assistance.

## 2022-07-22 NOTE — OCCUPATIONAL THERAPY INITIAL EVALUATION ADULT - ADDITIONAL COMMENTS
Patient unable to provide social history due to confusion, [place] ([place]), inconsistently answering questions. Per chart review, patient lives in a private home with her daughter and son in law, 3 steps to enter. 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 Used a cane for functional mobility, owns a shower chair.

## 2022-07-22 NOTE — OCCUPATIONAL THERAPY INITIAL EVALUATION ADULT - DIAGNOSIS, OT EVAL
s/p AMS, s/p CHF exacerbation, s/p code blue with ROSC; decreased functional mobility, decreased ADL performance

## 2022-07-22 NOTE — PROGRESS NOTE ADULT - ASSESSMENT
66 y/o  AAF with HTN, HLD, sarcoidosis, CHF, DM presented to the ED for new onset AMS. Patient suddenly became altered, confused and daughter noted she was slurring his speech. She was incoherent, and hallucinating, saying there is a cat present and is asking for her aunt who lives far away.  The patient known to have frequent admission for CHF exacerbations eventually intubated for hypoxic respiratory failure and now in MICU   MRI brain/IAC from 2021 with old R cerebellar infarct ; CTA h/N that time unremarkable. , A1c 9.2%  CT C/A with new large pleural effusions   CTH unremarakble   A1c 7.3  TTE was done   7/17 AM RRT, intubated, c/f aspiration PNA   7/19 eeg with slowing but no seizures   7/21 now extubated TONEY at least 4/5 7/22 follows commands, able to name objection (pen)   Impression: 1) AMS 2/2 hypercapneic resp failure 2) prior cerebellar stroke likely small vessel   - now off sedation and extubated, plan for step down    - asa 81 and statin therapy for secondary stroke prevention LDL goal < 70    - was on meropenum, c/f aspiration PNA; now off   - respiratory per ICU and pulmo   - telemetry  - PT/OT/SS/SLP, OOBC  - check FS, glucose control <180  - GI/DVT ppx  - Thank you for allowing me to participate in the care of this patient. Call with questions.   -remainin per ICU, possible downgrade if medically cleared   Russell Jimenes MD  Vascular Neurology  Office: [phone]

## 2022-07-22 NOTE — PROGRESS NOTE ADULT - SUBJECTIVE AND OBJECTIVE BOX
Chief Complaint: Type 2 DM with hypoglycemia     History: Pt seen at bedside. Pt with fair appetite. Pt tolerating oral intake. Pt denies nausea and vomiting/any signs of hypoglycemia.    MEDICATIONS  (STANDING):  amLODIPine   Tablet 10 milliGRAM(s) Oral daily  aspirin  chewable 81 milliGRAM(s) Oral daily  atorvastatin 20 milliGRAM(s) Oral at bedtime  carvedilol 25 milliGRAM(s) Oral every 12 hours  chlorhexidine 4% Liquid 1 Application(s) Topical <User Schedule>  dextrose 50% Injectable 25 Gram(s) IV Push once  dextrose 50% Injectable 12.5 Gram(s) IV Push once  dextrose 50% Injectable 25 Gram(s) IV Push once  furosemide    Tablet 40 milliGRAM(s) Oral daily  glucagon  Injectable 1 milliGRAM(s) IntraMuscular once  heparin   Injectable 5000 Unit(s) SubCutaneous every 12 hours  hydrALAZINE 75 milliGRAM(s) Oral three times a day  insulin lispro (ADMELOG) corrective regimen sliding scale   SubCutaneous Before meals and at bedtime  mupirocin 2% Ointment 1 Application(s) Topical every 12 hours    MEDICATIONS  (PRN):  acetaminophen     Tablet .. 650 milliGRAM(s) Oral every 6 hours PRN Temp greater or equal to 38C (100.4F), Mild Pain (1 - 3)  dextrose Oral Gel 15 Gram(s) Oral once PRN Blood Glucose LESS THAN 70 milliGRAM(s)/deciliter      Allergies: penicillin ([employer])    Review of Systems:  HEENT: No pain  Cardiovascular: No chest pain, palpitations  Respiratory: No SOB, no cough  GI: No nausea, vomiting, abdominal pain  Endocrine: no polyuria, polydipsia    PHYSICAL EXAM:  VITALS: T(C): 36.4 (07-22-22 @ 12:00)  T(F): 97.6 (07-22-22 @ 12:00), Max: 98 (07-21-22 @ 20:00)  HR: 84 (07-22-22 @ 13:00) (73 - 90)  BP: 174/86 (07-22-22 @ 13:00) (125/75 - 184/98)  RR:  (17 - 27)  SpO2:  (92% - 100%)  Wt(kg): --  GENERAL: NAD, well-groomed, well-developed  RESPIRATORY: No labored breathing   GI: Soft, nontender, non distended  PSYCH: Alert and oriented x 3, sleepy but arousable     CAPILLARY BLOOD GLUCOSE  POCT Blood Glucose.: 153 mg/dL (22 Jul 2022 11:41)  POCT Blood Glucose.: 124 mg/dL (22 Jul 2022 08:10)  POCT Blood Glucose.: 141 mg/dL (21 Jul 2022 22:09)  POCT Blood Glucose.: 120 mg/dL (21 Jul 2022 16:53)    A1C with Estimated Average Glucose (06.05.22 @ 06:00)    A1C with Estimated Average Glucose Result: 7.3  07-22    151<H>  |  108<H>  |  48<H>  ----------------------------<  137<H>  4.0   |  25  |  1.94<H>    eGFR: 28<L>    Ca    8.8      07-22  Mg     1.90     07-22  Phos  4.6     07-22    TPro  6.3  /  Alb  2.5<L>  /  TBili  0.2  /  DBili  x   /  AST  23  /  ALT  22  /  AlkPhos  99  07-22    Thyroid Function Tests:  07-13 @ 04:10 TSH 3.37 FreeT4 -- T3 -- Anti TPO -- Anti Thyroglobulin Ab -- TSI --    Diet, Pureed:   Consistent Carbohydrate No Snacks (CSTCHO)  Supplement Feeding Modality:  Oral  Glucerna Shake Cans or Servings Per Day:  1       Frequency:  Three Times a day (07-21-22 @ 18:24) [Active]

## 2022-07-22 NOTE — PROGRESS NOTE ADULT - SUBJECTIVE AND OBJECTIVE BOX
Neurology Progress Note    S: Patient seen and examined in ICU.   EEG neg for seizures. mental status improved.  pending floor bed     Medication:    MEDICATIONS  (STANDING):  amLODIPine   Tablet 10 milliGRAM(s) Oral daily  aspirin  chewable 81 milliGRAM(s) Oral daily  atorvastatin 20 milliGRAM(s) Oral at bedtime  carvedilol 25 milliGRAM(s) Oral every 12 hours  chlorhexidine 4% Liquid 1 Application(s) Topical <User Schedule>  dextrose 50% Injectable 25 Gram(s) IV Push once  dextrose 50% Injectable 12.5 Gram(s) IV Push once  dextrose 50% Injectable 25 Gram(s) IV Push once  glucagon  Injectable 1 milliGRAM(s) IntraMuscular once  heparin   Injectable 5000 Unit(s) SubCutaneous every 12 hours  hydrALAZINE 75 milliGRAM(s) Oral three times a day  insulin lispro (ADMELOG) corrective regimen sliding scale   SubCutaneous Before meals and at bedtime  mupirocin 2% Ointment 1 Application(s) Topical every 12 hours    MEDICATIONS  (PRN):  acetaminophen     Tablet .. 650 milliGRAM(s) Oral every 6 hours PRN Temp greater or equal to 38C (100.4F), Mild Pain (1 - 3)  dextrose Oral Gel 15 Gram(s) Oral once PRN Blood Glucose LESS THAN 70 milliGRAM(s)/deciliter    Vitals:      ICU Vital Signs Last 24 Hrs  T(C): 36.4 (2022 08:00), Max: 37.2 (2022 12:00)  T(F): 97.5 (2022 08:00), Max: 98.9 (2022 12:00)  HR: 80 (2022 10:00) (73 - 90)  BP: 139/80 (2022 10:00) (125/75 - 184/98)  BP(mean): 98 (2022 10:00) (87 - 123)  ABP: --  ABP(mean): --  RR: 25 (2022 10:00) (17 - 27)  SpO2: 100% (2022 10:00) (99% - 100%)    O2 Parameters below as of 2022 10:00  Patient On (Oxygen Delivery Method): nasal cannula  O2 Flow (L/min): 2               General Exam:   General Appearance: Appropriately dressed and in no acute distress       Head: Normocephalic, atraumatic and no dysmorphic features  Ear, Nose, and Throat: Moist mucous membranes +ETT   CVS: S1S2+  Resp: No SOB, no wheeze or rhonchi  GI: soft NT/ND  Extremities:  toe amputation noted   Skin: No bruises or rashes     Neurological Exam:    Mental Status:  AAOx1, able to follow simple verbal commands. answers questions appropriately , able to name pen when prompted   Cranial Nerves: PERRL, EOMI, VFFC, sensation V1-V3 intact,  no obvious facial asymmetry, equal elevation of palate, scm/trap 5/5, tongue is midline on protrusion. no obvious papilledema on fundoscopic exam. +Quechan   Motor: TONEY  at least 4/5 now.  in mittens in uppers   Sensation: withdraws x 4  Reflexes: 1+ throughout at biceps, brachioradialis, triceps, patellars and ankles bilaterally and equal. No clonus. R toe and L toe were both downgoing.  Coordination: unable   Gait:  unable     I personally reviewed the below data/images/labs:    CBC Full  -  ( 2022 01:35 )  WBC Count : 5.76 K/uL  RBC Count : 3.51 M/uL  Hemoglobin : 9.0 g/dL  Hematocrit : 30.3 %  Platelet Count - Automated : 234 K/uL  Mean Cell Volume : 86.3 fL  Mean Cell Hemoglobin : 25.6 pg  Mean Cell Hemoglobin Concentration : 29.7 gm/dL  Auto Neutrophil # : x  Auto Lymphocyte # : x  Auto Monocyte # : x  Auto Eosinophil # : x  Auto Basophil # : x  Auto Neutrophil % : x  Auto Lymphocyte % : x  Auto Monocyte % : x  Auto Eosinophil % : x  Auto Basophil % : x          151<H>  |  108<H>  |  48<H>  ----------------------------<  137<H>  4.0   |  25  |  1.94<H>    Ca    8.8      2022 01:35  Phos  4.6       Mg     1.90         TPro  6.3  /  Alb  2.5<L>  /  TBili  0.2  /  DBili  x   /  AST  23  /  ALT  22  /  AlkPhos  99            Urinalysis Basic - ( 2022 09:00 )    Color: Yellow / Appearance: Slightly Turbid / S.027 / pH: x  Gluc: x / Ketone: Trace  / Bili: Negative / Urobili: 3 mg/dL   Blood: x / Protein: 300 mg/dL / Nitrite: Negative   Leuk Esterase: Large / RBC: 5 /HPF / WBC >50 /HPF   Sq Epi: x / Non Sq Epi: x / Bacteria: x        EXAM:  MR BRAIN WAW IC      EXAM:  MR IAC ONLY WAW IC        PROCEDURE DATE:  Dec  1 2021         INTERPRETATION:  .    CLINICAL INFORMATION: Acute onset of bilateral hearing loss.    TECHNIQUE: Multiplanar multisequential MRI of the brain and internal auditory canals was acquired with and without the administration of IV gadolinium. 7.5 cc's of IV Gadavist was administered for the purposes of this examination. 0 cc were discarded.    COMPARISON: Prior CT examination of the internal auditory canals dated 2021. Prior CT angiograms/venogram examination of the head and neck dated 2021. Examination.    FINDINGS:    MRI BRAIN: A chronic lacunar infarct is seen within the right medial cerebellar hemisphere.    Multiple patchy confluent nonspecific T2/FLAIR hyperintense signal changes are noted throughout the bihemispheric white matter without associated mass effect or restricted diffusion.    There is no abnormal brain parenchymal or leptomeningeal enhancement.    Ventricular sizeand configuration is unremarkable. No abnormal extra-axial fluid collections are seen. Flow-voids are noted throughout the major intracranial vessels, on the T2 weighted images, consistent with their patency. The sellar area appears unremarkable.    Minimal scattered mucosal thickening is seen throughout the paranasal sinuses. The mastoid air cells are clear. The orbits appear unremarkable.    MRI IAC: There is no CP angle mass. The bilateral 7th and 8th cranial nerves appear unremarkable in course and morphology. No abnormal enhancement is seen. The inner ear structures are normally formed. Normal fluid signal is seen within the inner ear structures. The cochlea, vestibule, and semicircular canals appear unremarkable.    IMPRESSION:    MRI BRAIN: No acute intracranial hemorrhage, acute ischemia, or abnormal intracranial enhancement.    Chronic lacunar infarct within the right medial cerebellar hemisphere and mild chronic white matter microvascular type changes.    MRI IAC: Unremarkable study.    --- End of Report ---              LUCI RUEDA MD; Attending Radiologist  This document has been electronically signed. Dec  3 2021  8:46AM    < end of copied text >      < from: CT Chest w/ IV Cont (22 @ 22:58) >    ACC: [id number] EXAM:  CT ABDOMEN AND PELVIS IC                        ACC: [id number] EXAM:  CT CHEST IC                          PROCEDURE DATE:  2022          INTERPRETATION:  CLINICAL INFORMATION: Pleural effusion. Rule out   empyema. Abdominal tenderness. History of pancreatitis. Rule out   infection or obstruction.    COMPARISON: CT chest 2022.    CONTRAST/COMPLICATIONS:  IV Contrast: IV contrast documented in associated exam (accession   [id number]), Omnipaque 350 (accession [id number])  64 cc administered   6 cc   discarded  Oral Contrast: NONE  Complications: None reported at time of study completion    PROCEDURE:  CT of the Chest, Abdomen and Pelvis was performed.  Sagittal and coronal reformats were performed.    FINDINGS:  CHEST:  LUNGS AND LARGE AIRWAYS: Compressive atelectasis of the right upper,   middle and bilateral lower lobes including atelectasis of the majority of   the right lower lobe. Subsegmental atelectasis in the left upper lobe.  PLEURA: New large right andsmall left pleural effusions. No evidence of   an empyema. Fluid in the minor fissure.  VESSELS: Atherosclerotic changes of the aorta and coronary arteries.  HEART: Heart size is enlarged. No pericardial effusion.  MEDIASTINUM AND ANTONIO: No lymphadenopathy. Redemonstration of calcified   mediastinal lymph nodes.  CHEST WALL AND LOWER NECK: 7 mm right thyroid lobe nodule. Generalized   anasarca.    ABDOMEN AND PELVIS:  LIVER: Within normal limits.  BILE DUCTS: Normal caliber.  GALLBLADDER: Cholecystectomy.  SPLEEN: Within normal limits.  PANCREAS: Atrophic.  ADRENALS: Within normal limits.  KIDNEYS/URETERS: No renal stones or hydronephrosis.    BLADDER: Within normal limits.  REPRODUCTIVE ORGANS: Atrophic and retroflexed uterus. No adnexal masses.    BOWEL: Redemonstration of an outpouching with wall calcification and   intraluminal fluid and gas measuring 4.9 x 4.6 cm emanating from the   posterior gastric body likely representing a large diverticulum. No bowel   obstruction or inflammation. Colonic diverticulosis without   diverticulitis. Appendix within normal limits.  PERITONEUM: Small volume free pelvic fluid.  VESSELS: Atherosclerotic changes.  RETROPERITONEUM/LYMPH NODES: No lymphadenopathy.  ABDOMINAL WALL: Generalized anasarca.  BONES: Degenerative changes of the spine. Mild S-shaped thoracolumbar   scoliosis.    IMPRESSION:  1. New large right and small left pleural effusions with adjacent   compressive atelectasis including atelectasis of the majority of the   right lowerlobe.  2. No bowel obstruction or inflammation.      --- End of Report ---          MARTA HART MD; Resident Radiologist  This document has been electronically signed.  CHINTAN WHITFIELD MD; Attending Radiologist  This document has been electronically signed. 2022 12:32AM    < end of copied text >         < from: CT Head No Cont (22 @ 00:03) >    ACC: [id number] EXAM:  CT BRAIN                          PROCEDURE DATE:  2022          INTERPRETATION:  INDICATIONS:  Alteration of  Consciousness    TECHNIQUE:  Serial axial images were obtained from the skull base to the   vertex without intravenous contrast. Sagittal and Coronal reformats were   performed    COMPARISON EXAMINATION: None.    FINDINGS:  VENTRICLES AND SULCI:  Normal.  INTRA-AXIAL:  No mass, blood or abnormal attenuation is seen.  EXTRA-AXIAL:  No mass or collection is seen.  VISUALIZED SINUSES:  Clear.  VISUALIZED MASTOIDS:  Clear.  CALVARIUM:  Normal.  MISCELLANEOUS:  None.    IMPRESSION:  No evidence of intracranial hemorrhage, no evidence of major   vessel distribution infarct    --- End of Report ---            MOLLY TOTH MD; Attending Radiologist  This document has been electronically signed. 2022 10:19AM    < end of copied text >  EEG Summary / Classification:  Abnormal   - Moderate generalized slowing.    EEG Impression / Clinical Correlate:  Abnormal EEG study.  Moderate nonspecific diffuse or multifocal cerebral dysfunction.   No epileptiform pattern or clear seizure seen.    **In absence of additional clinical concerns, recommend consideration for discontinuation of current EEG study with reconnection in future if warranted.

## 2022-07-23 DIAGNOSIS — A41.9 SEPSIS, UNSPECIFIED ORGANISM: ICD-10-CM

## 2022-07-23 DIAGNOSIS — E87.2 ACIDOSIS: ICD-10-CM

## 2022-07-23 LAB
ALBUMIN SERPL ELPH-MCNC: 2.5 G/DL — LOW (ref 3.3–5)
ALP SERPL-CCNC: 85 U/L — SIGNIFICANT CHANGE UP (ref 40–120)
ALT FLD-CCNC: 13 U/L — SIGNIFICANT CHANGE UP (ref 4–33)
ANION GAP SERPL CALC-SCNC: 8 MMOL/L — SIGNIFICANT CHANGE UP (ref 7–14)
AST SERPL-CCNC: 19 U/L — SIGNIFICANT CHANGE UP (ref 4–32)
B-OH-BUTYR SERPL-SCNC: 0.7 MMOL/L — HIGH (ref 0–0.4)
BASOPHILS # BLD AUTO: 0.02 K/UL — SIGNIFICANT CHANGE UP (ref 0–0.2)
BASOPHILS NFR BLD AUTO: 0.3 % — SIGNIFICANT CHANGE UP (ref 0–2)
BILIRUB SERPL-MCNC: 0.3 MG/DL — SIGNIFICANT CHANGE UP (ref 0.2–1.2)
BUN SERPL-MCNC: 34 MG/DL — HIGH (ref 7–23)
CALCIUM SERPL-MCNC: 8.4 MG/DL — SIGNIFICANT CHANGE UP (ref 8.4–10.5)
CHLORIDE SERPL-SCNC: 103 MMOL/L — SIGNIFICANT CHANGE UP (ref 98–107)
CO2 SERPL-SCNC: 29 MMOL/L — SIGNIFICANT CHANGE UP (ref 22–31)
CREAT SERPL-MCNC: 1.34 MG/DL — HIGH (ref 0.5–1.3)
EGFR: 43 ML/MIN/1.73M2 — LOW
EOSINOPHIL # BLD AUTO: 0.29 K/UL — SIGNIFICANT CHANGE UP (ref 0–0.5)
EOSINOPHIL NFR BLD AUTO: 4.7 % — SIGNIFICANT CHANGE UP (ref 0–6)
GLUCOSE BLDC GLUCOMTR-MCNC: 127 MG/DL — HIGH (ref 70–99)
GLUCOSE BLDC GLUCOMTR-MCNC: 128 MG/DL — HIGH (ref 70–99)
GLUCOSE BLDC GLUCOMTR-MCNC: 143 MG/DL — HIGH (ref 70–99)
GLUCOSE BLDC GLUCOMTR-MCNC: 144 MG/DL — HIGH (ref 70–99)
GLUCOSE BLDC GLUCOMTR-MCNC: 149 MG/DL — HIGH (ref 70–99)
GLUCOSE BLDC GLUCOMTR-MCNC: 163 MG/DL — HIGH (ref 70–99)
GLUCOSE SERPL-MCNC: 139 MG/DL — HIGH (ref 70–99)
HCT VFR BLD CALC: 28.2 % — LOW (ref 34.5–45)
HGB BLD-MCNC: 8.6 G/DL — LOW (ref 11.5–15.5)
IANC: 4.24 K/UL — SIGNIFICANT CHANGE UP (ref 1.8–7.4)
IMM GRANULOCYTES NFR BLD AUTO: 2.1 % — HIGH (ref 0–1.5)
LYMPHOCYTES # BLD AUTO: 1.02 K/UL — SIGNIFICANT CHANGE UP (ref 1–3.3)
LYMPHOCYTES # BLD AUTO: 16.4 % — SIGNIFICANT CHANGE UP (ref 13–44)
MCHC RBC-ENTMCNC: 26 PG — LOW (ref 27–34)
MCHC RBC-ENTMCNC: 30.5 GM/DL — LOW (ref 32–36)
MCV RBC AUTO: 85.2 FL — SIGNIFICANT CHANGE UP (ref 80–100)
MONOCYTES # BLD AUTO: 0.52 K/UL — SIGNIFICANT CHANGE UP (ref 0–0.9)
MONOCYTES NFR BLD AUTO: 8.4 % — SIGNIFICANT CHANGE UP (ref 2–14)
NEUTROPHILS # BLD AUTO: 4.24 K/UL — SIGNIFICANT CHANGE UP (ref 1.8–7.4)
NEUTROPHILS NFR BLD AUTO: 68.1 % — SIGNIFICANT CHANGE UP (ref 43–77)
NRBC # BLD: 0 /100 WBCS — SIGNIFICANT CHANGE UP
NRBC # FLD: 0 K/UL — SIGNIFICANT CHANGE UP
PLATELET # BLD AUTO: 249 K/UL — SIGNIFICANT CHANGE UP (ref 150–400)
POTASSIUM SERPL-MCNC: 3.5 MMOL/L — SIGNIFICANT CHANGE UP (ref 3.5–5.3)
POTASSIUM SERPL-SCNC: 3.5 MMOL/L — SIGNIFICANT CHANGE UP (ref 3.5–5.3)
PROT SERPL-MCNC: 6 G/DL — SIGNIFICANT CHANGE UP (ref 6–8.3)
RBC # BLD: 3.31 M/UL — LOW (ref 3.8–5.2)
RBC # FLD: 15.7 % — HIGH (ref 10.3–14.5)
SODIUM SERPL-SCNC: 140 MMOL/L — SIGNIFICANT CHANGE UP (ref 135–145)
WBC # BLD: 6.22 K/UL — SIGNIFICANT CHANGE UP (ref 3.8–10.5)
WBC # FLD AUTO: 6.22 K/UL — SIGNIFICANT CHANGE UP (ref 3.8–10.5)

## 2022-07-23 PROCEDURE — 99233 SBSQ HOSP IP/OBS HIGH 50: CPT

## 2022-07-23 PROCEDURE — 99232 SBSQ HOSP IP/OBS MODERATE 35: CPT

## 2022-07-23 RX ORDER — HYDRALAZINE HCL 50 MG
100 TABLET ORAL THREE TIMES A DAY
Refills: 0 | Status: DISCONTINUED | OUTPATIENT
Start: 2022-07-23 | End: 2022-08-05

## 2022-07-23 RX ORDER — INSULIN LISPRO 100/ML
VIAL (ML) SUBCUTANEOUS
Refills: 0 | Status: DISCONTINUED | OUTPATIENT
Start: 2022-07-23 | End: 2022-08-05

## 2022-07-23 RX ORDER — POTASSIUM CHLORIDE 20 MEQ
40 PACKET (EA) ORAL ONCE
Refills: 0 | Status: COMPLETED | OUTPATIENT
Start: 2022-07-23 | End: 2022-07-23

## 2022-07-23 RX ORDER — HYDRALAZINE HCL 50 MG
75 TABLET ORAL ONCE
Refills: 0 | Status: COMPLETED | OUTPATIENT
Start: 2022-07-23 | End: 2022-07-23

## 2022-07-23 RX ORDER — INSULIN LISPRO 100/ML
VIAL (ML) SUBCUTANEOUS AT BEDTIME
Refills: 0 | Status: DISCONTINUED | OUTPATIENT
Start: 2022-07-23 | End: 2022-08-05

## 2022-07-23 RX ORDER — INSULIN GLARGINE 100 [IU]/ML
2 INJECTION, SOLUTION SUBCUTANEOUS AT BEDTIME
Refills: 0 | Status: DISCONTINUED | OUTPATIENT
Start: 2022-07-23 | End: 2022-07-26

## 2022-07-23 RX ADMIN — SODIUM CHLORIDE 50 MILLILITER(S): 9 INJECTION, SOLUTION INTRAVENOUS at 01:02

## 2022-07-23 RX ADMIN — Medication 75 MILLIGRAM(S): at 13:18

## 2022-07-23 RX ADMIN — ATORVASTATIN CALCIUM 20 MILLIGRAM(S): 80 TABLET, FILM COATED ORAL at 22:58

## 2022-07-23 RX ADMIN — Medication 100 MILLIGRAM(S): at 22:57

## 2022-07-23 RX ADMIN — Medication 40 MILLIGRAM(S): at 05:22

## 2022-07-23 RX ADMIN — CARVEDILOL PHOSPHATE 25 MILLIGRAM(S): 80 CAPSULE, EXTENDED RELEASE ORAL at 05:22

## 2022-07-23 RX ADMIN — Medication 40 MILLIEQUIVALENT(S): at 12:43

## 2022-07-23 RX ADMIN — Medication 81 MILLIGRAM(S): at 12:41

## 2022-07-23 RX ADMIN — HEPARIN SODIUM 5000 UNIT(S): 5000 INJECTION INTRAVENOUS; SUBCUTANEOUS at 05:22

## 2022-07-23 RX ADMIN — CARVEDILOL PHOSPHATE 25 MILLIGRAM(S): 80 CAPSULE, EXTENDED RELEASE ORAL at 18:22

## 2022-07-23 RX ADMIN — HEPARIN SODIUM 5000 UNIT(S): 5000 INJECTION INTRAVENOUS; SUBCUTANEOUS at 18:22

## 2022-07-23 RX ADMIN — Medication 75 MILLIGRAM(S): at 05:22

## 2022-07-23 RX ADMIN — AMLODIPINE BESYLATE 10 MILLIGRAM(S): 2.5 TABLET ORAL at 05:22

## 2022-07-23 RX ADMIN — INSULIN GLARGINE 2 UNIT(S): 100 INJECTION, SOLUTION SUBCUTANEOUS at 23:11

## 2022-07-23 RX ADMIN — MUPIROCIN 1 APPLICATION(S): 20 OINTMENT TOPICAL at 05:23

## 2022-07-23 NOTE — PROGRESS NOTE ADULT - PROBLEM SELECTOR PLAN 6
A1c 7.3%. Goal < 7%  Home regimen: Lantus 20 units at night  -c/b starvation ketosis, s/p IV dextrose with improvement in BHB   -Monitor PO intake and f/u endo for insulin recs

## 2022-07-23 NOTE — PROGRESS NOTE ADULT - PROBLEM SELECTOR PLAN 3
MANDY on CKD Stage 2  Baseline Scr (6/22): 2.33  SCr 1.34   Monitor UO  DC fluids d/t hypertension and b/l UE edema

## 2022-07-23 NOTE — PROGRESS NOTE ADULT - PROBLEM SELECTOR PLAN 5
Unclear source - resolved  - CT abd and pelvis: no infectious source   - Initial blood cx (7/13): NGTD   - Blood cx (7/17): NGTD  - s/p meropenem (7/13 - 7/20)

## 2022-07-23 NOTE — PROGRESS NOTE ADULT - PROBLEM SELECTOR PLAN 1
resolving. Now extubated and following commands.  EEG with slowing but no seizures   Per neuro AMS 2/2 hypercapneic resp failure, prior cerebellar stroke likely small vessel

## 2022-07-23 NOTE — PROGRESS NOTE ADULT - SUBJECTIVE AND OBJECTIVE BOX
Chief Complaint: DM 2    History: Patient seen at bedside. Eating small amount of PO. No nausea/vomiting, no hypoglycemia   S/p administration of Lantus and dextrose IVF overnight, labs improved this AM: AG 8, BHB 0.7    MEDICATIONS  (STANDING):  amLODIPine   Tablet 10 milliGRAM(s) Oral daily  aspirin  chewable 81 milliGRAM(s) Oral daily  atorvastatin 20 milliGRAM(s) Oral at bedtime  carvedilol 25 milliGRAM(s) Oral every 12 hours  dextrose 50% Injectable 25 Gram(s) IV Push once  dextrose 50% Injectable 12.5 Gram(s) IV Push once  dextrose 50% Injectable 25 Gram(s) IV Push once  furosemide    Tablet 40 milliGRAM(s) Oral daily  glucagon  Injectable 1 milliGRAM(s) IntraMuscular once  heparin   Injectable 5000 Unit(s) SubCutaneous every 12 hours  hydrALAZINE 100 milliGRAM(s) Oral three times a day    MEDICATIONS  (PRN):  acetaminophen     Tablet .. 650 milliGRAM(s) Oral every 6 hours PRN Temp greater or equal to 38C (100.4F), Mild Pain (1 - 3)  dextrose Oral Gel 15 Gram(s) Oral once PRN Blood Glucose LESS THAN 70 milliGRAM(s)/deciliter    Allergies  penicillin ([facility] [place] [facility])    Review of Systems:  Cardiovascular: No chest pain  Respiratory: No SOB  GI: No nausea, vomiting  Endocrine: no hypoglycemia     PHYSICAL EXAM:  VITALS: T(C): 37.1 (07-23-22 @ 10:20)  T(F): 98.7 (07-23-22 @ 10:20), Max: 98.7 (07-23-22 @ 10:20)  HR: 85 (07-23-22 @ 10:20) (75 - 87)  BP: 186/98 (07-23-22 @ 10:20) (150/97 - 186/98)  RR:  (18 - 26)  SpO2:  (95% - 100%)  Wt(kg): --  GENERAL: NAD  EYES: No proptosis, no lid lag, anicteric  HEENT:  Atraumatic, Normocephalic, moist mucous membranes  RESPIRATORY: unlabored respirations     CAPILLARY BLOOD GLUCOSE    POCT Blood Glucose.: 149 mg/dL (23 Jul 2022 16:38)  POCT Blood Glucose.: 163 mg/dL (23 Jul 2022 11:29)  POCT Blood Glucose.: 143 mg/dL (23 Jul 2022 05:15)  POCT Blood Glucose.: 144 mg/dL (23 Jul 2022 00:56)  POCT Blood Glucose.: 150 mg/dL (22 Jul 2022 21:59)  POCT Blood Glucose.: 162 mg/dL (22 Jul 2022 17:42)      07-23    140  |  103  |  34<H>  ----------------------------<  139<H>  3.5   |  29  |  1.34<H>    eGFR: 43<L>    Ca    8.4      07-23  Mg     1.90     07-22  Phos  4.6     07-22    TPro  6.0  /  Alb  2.5<L>  /  TBili  0.3  /  DBili  x   /  AST  19  /  ALT  13  /  AlkPhos  85  07-23      Thyroid Function Tests:  07-13 @ 04:10 TSH 3.37 FreeT4 -- T3 -- Anti TPO -- Anti Thyroglobulin Ab -- TSI --      A1C with Estimated Average Glucose Result: 7.3 % (06-05-22 @ 06:00)  A1C with Estimated Average Glucose Result: 7.7 % (02-04-22 @ 05:37)  A1C with Estimated Average Glucose Result: 9.2 % (11-29-21 @ 06:47)    Diet, Pureed:   Consistent Carbohydrate No Snacks (CSTCHO)  Supplement Feeding Modality:  Oral  Glucerna Shake Cans or Servings Per Day:  1       Frequency:  Three Times a day (07-21-22 @ 18:24)

## 2022-07-23 NOTE — PROGRESS NOTE ADULT - ASSESSMENT
This is a 66 yo F /w a PMH OF DM2 (A1c 7.3% on this admission), CHF, sarcoidosis who presents with AMS c/b hypercapnic respiratory failure and CHF exacerbation requiring intubation. Endocrine consulted for: Persistent hypoglycemia    1. Hypoglycemia  Noted on day of admission, possibly related to home doses of insulin that patient was taking, poor PO intake, and low GFR  Concern for AI as below -->ruled out  Hypoglycemia now resolved     2. R/o Adrenal Insufficiency  AM cortisol returned 7.1. ACTH stimulation performed showed robust response (22 and 24 at 30 minute and 60 minute frances respectively). A value > 18 rules out primary AI (not necessarily secondary AI).   Repeat AM cortisol was normal at 16.1.   TFTs are WDL  AI ruled out, hypoglycemia not related to adrenal cause, no need for steroids at this time     3. Metabolic Acidosis   Initial acidosis likely due to starvation and renal related; BHB 3.1 elevated; was given D5 IVF overnight, PO diet started and Lantus 5 units administered   BG stable, AG/BHB significantly improved today  Continue to monitor     4. T2DM, controlled  A1c 7.3%. Goal < 7%  Home regimen: Lantus 20 units at night  Cpeptide 0.9, repeat 5.0    While inpatient:   BG target 100-180 mg/dl   Recommend to restart Admelog low dose correctional scale before meals, low dose at bedtime  No Admelog pre-meal insulin for now  Will reduce Lantus now that she is off dextrose fluids - recommend Lantus 2 units SQ qHS starting tonight   Consistent carbohydrate diet   Check BG before meals and bedtime  Hypoglycemia protocol     Discharge Plan:   TBD based on insulin requirements, may be able to resume basal insulin alone (at adjusted dose)  Note oral agents limited in ESRD  Ensure patient has working glucometer, test strips, lancets, alcohol pads  Please also prescribe glucose tabs, Baqsimi nasal or Glucagon emergency kit for hypoglycemia risk   Follow up with PCP, podiatry and opthalmology as an outpatient    5. HTN  BP Goal < 130/80  Above goal on Coreg, Hydralazine, Amlodipine   Hydralazine increased today, continue management per primary team    6. HLD  LDL goal < 70  Continue Atorvastatin 20 mg daily if no contraindications     Tracey Alejo  Nurse Practitioner  Division of Endocrinology & Diabetes  In house pager #[phone]/long range pager #[phone]    If before 9AM or after 6PM, or on weekends/holidays, please call endocrine answering service for assistance ([phone]).  For nonurgent matters email [email or web address] for assistance.

## 2022-07-23 NOTE — PROGRESS NOTE ADULT - PROBLEM SELECTOR PLAN 2
BNP on arrival: 4021 with evidence of fluid overload with pleural effusions, b/l pitting edema   - EKG: RBBB (present from prior EKG's). no acute ischemic findings;  - Echo (6/22) Mild diastolic dysfunction, EF 62%, normal RV size function  - Echo (7/15/22): moderate LV systolic dysfunction, moderate diastolic dysfunction, RV enlargement with decreased RV systolic dysfunction  - Repeat Echo 7/21/22: EF 65% with normal biventricular systolic function  - Strict I&O  - 40 lasix PO qd

## 2022-07-23 NOTE — PROGRESS NOTE ADULT - ASSESSMENT
67F with PMH of HTN, HLD, sarcoidosis, CHF p/w AMS, admitted with metabolic encephalopathy, hypercarbic resp failure, CHF exacerbation. Intubated 7/13 for AMS and extubated 7/14. Course c/b PEA arrest on 7/17. Code Blue called, ROSC achieved, pt re-intubated. Second PEA code called upon arrival to the CTICU, ROSC achieved.   Pt transferred to [facility] on 7/23 after being extubated

## 2022-07-23 NOTE — PROGRESS NOTE ADULT - SUBJECTIVE AND OBJECTIVE BOX
[facility] Division of Hospital Medicine  Shanonwild TovarelDO  Pager (M-F, 8A-5P): [phone]      Patient is a 67y old  Female who presents with a chief complaint of AMs, shortness of breath (22 Jul 2022 14:31)      SUBJECTIVE / OVERNIGHT EVENTS: Pt seen at bedside, transferred from MICU early this morning. drinking glucerna, has no complaints  ADDITIONAL REVIEW OF SYSTEMS: no cp/sob     MEDICATIONS  (STANDING):  amLODIPine   Tablet 10 milliGRAM(s) Oral daily  aspirin  chewable 81 milliGRAM(s) Oral daily  atorvastatin 20 milliGRAM(s) Oral at bedtime  carvedilol 25 milliGRAM(s) Oral every 12 hours  dextrose 50% Injectable 25 Gram(s) IV Push once  dextrose 50% Injectable 12.5 Gram(s) IV Push once  dextrose 50% Injectable 25 Gram(s) IV Push once  furosemide    Tablet 40 milliGRAM(s) Oral daily  glucagon  Injectable 1 milliGRAM(s) IntraMuscular once  heparin   Injectable 5000 Unit(s) SubCutaneous every 12 hours  hydrALAZINE 75 milliGRAM(s) Oral three times a day    MEDICATIONS  (PRN):  acetaminophen     Tablet .. 650 milliGRAM(s) Oral every 6 hours PRN Temp greater or equal to 38C (100.4F), Mild Pain (1 - 3)  dextrose Oral Gel 15 Gram(s) Oral once PRN Blood Glucose LESS THAN 70 milliGRAM(s)/deciliter      CAPILLARY BLOOD GLUCOSE      POCT Blood Glucose.: 163 mg/dL (23 Jul 2022 11:29)  POCT Blood Glucose.: 143 mg/dL (23 Jul 2022 05:15)  POCT Blood Glucose.: 144 mg/dL (23 Jul 2022 00:56)  POCT Blood Glucose.: 150 mg/dL (22 Jul 2022 21:59)  POCT Blood Glucose.: 162 mg/dL (22 Jul 2022 17:42)  POCT Blood Glucose.: 153 mg/dL (22 Jul 2022 16:32)    I&O's Summary    22 Jul 2022 07:01  -  23 Jul 2022 07:00  --------------------------------------------------------  IN: 1405 mL / OUT: 1815 mL / NET: -410 mL        PHYSICAL EXAM:  Vital Signs Last 24 Hrs  T(C): 37.1 (23 Jul 2022 10:20), Max: 37.1 (23 Jul 2022 10:20)  T(F): 98.7 (23 Jul 2022 10:20), Max: 98.7 (23 Jul 2022 10:20)  HR: 85 (23 Jul 2022 10:20) (75 - 88)  BP: 186/98 (23 Jul 2022 10:20) (150/97 - 186/98)  BP(mean): 106 (23 Jul 2022 00:00) (100 - 114)  RR: 18 (23 Jul 2022 10:20) (18 - 26)  SpO2: 99% (23 Jul 2022 10:20) (94% - 100%)    Parameters below as of 23 Jul 2022 10:20  Patient On (Oxygen Delivery Method): room air      CONSTITUTIONAL: NAD, well-appearing, sitting in bed   HEENT: very [place], EOMI, MMM  RESPIRATORY: Normal respiratory effort; decreased breath sounds at bases   CARDIOVASCULAR: Regular rate and rhythm, normal S1 and S2, no murmurs  ABDOMEN: Nontender to palpation, normoactive bowel sounds, soft, +rectal tube  : +perdomo   MUSKULOSKELETAL:  no clubbing or cyanosis of digits; no joint swelling or tenderness to palpation  PSYCH: calm, cooperative   NEUROLOGY: CN 2-12 are intact and symmetric; no gross sensory deficits;   SKIN: No rashes; no palpable lesions    LABS:                        8.6    6.22  )-----------( 249      ( 23 Jul 2022 07:45 )             28.2     07-23    140  |  103  |  34<H>  ----------------------------<  139<H>  3.5   |  29  |  1.34<H>    Ca    8.4      23 Jul 2022 07:45  Phos  4.6     07-22  Mg     1.90     07-22    TPro  6.0  /  Alb  2.5<L>  /  TBili  0.3  /  DBili  x   /  AST  19  /  ALT  13  /  AlkPhos  85  07-23                RADIOLOGY & ADDITIONAL TESTS:  Results Reviewed:   Imaging Personally Reviewed:  Electrocardiogram Personally Reviewed:    COORDINATION OF CARE:  Care Discussed with Consultants/Other Providers [Y/N]: Y  Prior or Outpatient Records Reviewed [Y/N]: Y

## 2022-07-23 NOTE — CHART NOTE - NSCHARTNOTEFT_GEN_A_CORE
MAR Accept Note  Transfer to:  Medicine   Accepting Attending Physician:  Dr. Cisneros   Assigned Room:  [address] [place] [address]    Patient seen and examined.   Labs and data reviewed.   No findings precluding transfer of service.       HPI/MICU COURSE:   Please refer to MICU transfer note for full details. Briefly, this is a 66 y/o F with pmhx of HTN, HLD, sarcoidosis, CHF, presented to the ED for new onset AMS. She was also found to have pleural effusion, elevated BNP and LE edema concerning for CHF exacerbation. Admitted to ICU initially for metabolic encephalopathy s/p intubation for airway protection (7/13-7/16). MICU course was c/b hypoglycemia with workup neg for adrenal insufficiency. Course c/b septic shock with no source (started on meropenem).     Was extubated on 7/16, was comfortable on RA with good mental status and was sent to the floors. On 7/17 AM pt was found to be agonally breathing and RRT was called. Pt went into PEA arrest, ROSC achieved after 4 mins, epix1 . Intubated by anesthesia. Was started on levo 0.1 and prop. Prior to leaving unit patient was waking up with high peak pressures on vent, she received 2mg of versed and became more synchronous with ventilator.  Upon arrival to CTI patient's blood pressures started to decrease despite increasing levo and started to become bradycardic. O2 sat remained at 100%. Went into PEA arrest again, epinephrine x1 given and ROSC was achieved at 4 mins. Likely from aspiration event?    MICU Course:  Patient slowly weaned off of sedation. Extubated to BIPAP and weaned to NC. Able to follow commands and answer questions. Completed meropenem. Of note, patient is hard of hearing and understands better if spoken to without a mask.  Hypoglycemia likely from poor PO intake, endo involved, currently on D5+LR       FOR FOLLOW-UP:  [ ] hypoglycemia  [ ] monitor BP  [ ] monitor mental status     Flor Prieto, PGY-3

## 2022-07-24 DIAGNOSIS — D86.9 SARCOIDOSIS, UNSPECIFIED: ICD-10-CM

## 2022-07-24 LAB
ALBUMIN SERPL ELPH-MCNC: 2.5 G/DL — LOW (ref 3.3–5)
ALP SERPL-CCNC: 82 U/L — SIGNIFICANT CHANGE UP (ref 40–120)
ALT FLD-CCNC: 12 U/L — SIGNIFICANT CHANGE UP (ref 4–33)
ANION GAP SERPL CALC-SCNC: 7 MMOL/L — SIGNIFICANT CHANGE UP (ref 7–14)
ANION GAP SERPL CALC-SCNC: 8 MMOL/L — SIGNIFICANT CHANGE UP (ref 7–14)
AST SERPL-CCNC: 19 U/L — SIGNIFICANT CHANGE UP (ref 4–32)
BILIRUB SERPL-MCNC: 0.3 MG/DL — SIGNIFICANT CHANGE UP (ref 0.2–1.2)
BUN SERPL-MCNC: 26 MG/DL — HIGH (ref 7–23)
BUN SERPL-MCNC: 26 MG/DL — HIGH (ref 7–23)
CALCIUM SERPL-MCNC: 8 MG/DL — LOW (ref 8.4–10.5)
CALCIUM SERPL-MCNC: 8.2 MG/DL — LOW (ref 8.4–10.5)
CHLORIDE SERPL-SCNC: 101 MMOL/L — SIGNIFICANT CHANGE UP (ref 98–107)
CHLORIDE SERPL-SCNC: 99 MMOL/L — SIGNIFICANT CHANGE UP (ref 98–107)
CO2 SERPL-SCNC: 28 MMOL/L — SIGNIFICANT CHANGE UP (ref 22–31)
CO2 SERPL-SCNC: 28 MMOL/L — SIGNIFICANT CHANGE UP (ref 22–31)
CREAT SERPL-MCNC: 1.27 MG/DL — SIGNIFICANT CHANGE UP (ref 0.5–1.3)
CREAT SERPL-MCNC: 1.29 MG/DL — SIGNIFICANT CHANGE UP (ref 0.5–1.3)
EGFR: 45 ML/MIN/1.73M2 — LOW
EGFR: 46 ML/MIN/1.73M2 — LOW
GLUCOSE BLDC GLUCOMTR-MCNC: 124 MG/DL — HIGH (ref 70–99)
GLUCOSE BLDC GLUCOMTR-MCNC: 127 MG/DL — HIGH (ref 70–99)
GLUCOSE BLDC GLUCOMTR-MCNC: 129 MG/DL — HIGH (ref 70–99)
GLUCOSE BLDC GLUCOMTR-MCNC: 188 MG/DL — HIGH (ref 70–99)
GLUCOSE SERPL-MCNC: 124 MG/DL — HIGH (ref 70–99)
GLUCOSE SERPL-MCNC: 124 MG/DL — HIGH (ref 70–99)
HCT VFR BLD CALC: 29.2 % — LOW (ref 34.5–45)
HGB BLD-MCNC: 8.6 G/DL — LOW (ref 11.5–15.5)
MAGNESIUM SERPL-MCNC: 1.6 MG/DL — SIGNIFICANT CHANGE UP (ref 1.6–2.6)
MAGNESIUM SERPL-MCNC: 1.6 MG/DL — SIGNIFICANT CHANGE UP (ref 1.6–2.6)
MCHC RBC-ENTMCNC: 25.7 PG — LOW (ref 27–34)
MCHC RBC-ENTMCNC: 29.5 GM/DL — LOW (ref 32–36)
MCV RBC AUTO: 87.4 FL — SIGNIFICANT CHANGE UP (ref 80–100)
NRBC # BLD: 0 /100 WBCS — SIGNIFICANT CHANGE UP
NRBC # FLD: 0 K/UL — SIGNIFICANT CHANGE UP
PHOSPHATE SERPL-MCNC: 2.6 MG/DL — SIGNIFICANT CHANGE UP (ref 2.5–4.5)
PHOSPHATE SERPL-MCNC: 2.7 MG/DL — SIGNIFICANT CHANGE UP (ref 2.5–4.5)
PLATELET # BLD AUTO: 248 K/UL — SIGNIFICANT CHANGE UP (ref 150–400)
POTASSIUM SERPL-MCNC: 3.7 MMOL/L — SIGNIFICANT CHANGE UP (ref 3.5–5.3)
POTASSIUM SERPL-MCNC: 3.8 MMOL/L — SIGNIFICANT CHANGE UP (ref 3.5–5.3)
POTASSIUM SERPL-SCNC: 3.7 MMOL/L — SIGNIFICANT CHANGE UP (ref 3.5–5.3)
POTASSIUM SERPL-SCNC: 3.8 MMOL/L — SIGNIFICANT CHANGE UP (ref 3.5–5.3)
PROT SERPL-MCNC: 6 G/DL — SIGNIFICANT CHANGE UP (ref 6–8.3)
RBC # BLD: 3.34 M/UL — LOW (ref 3.8–5.2)
RBC # FLD: 15.4 % — HIGH (ref 10.3–14.5)
SODIUM SERPL-SCNC: 134 MMOL/L — LOW (ref 135–145)
SODIUM SERPL-SCNC: 137 MMOL/L — SIGNIFICANT CHANGE UP (ref 135–145)
WBC # BLD: 5.19 K/UL — SIGNIFICANT CHANGE UP (ref 3.8–10.5)
WBC # FLD AUTO: 5.19 K/UL — SIGNIFICANT CHANGE UP (ref 3.8–10.5)

## 2022-07-24 PROCEDURE — 99233 SBSQ HOSP IP/OBS HIGH 50: CPT

## 2022-07-24 PROCEDURE — 71045 X-RAY EXAM CHEST 1 VIEW: CPT | Mod: 26

## 2022-07-24 RX ORDER — FUROSEMIDE 40 MG
40 TABLET ORAL DAILY
Refills: 0 | Status: DISCONTINUED | OUTPATIENT
Start: 2022-07-24 | End: 2022-07-26

## 2022-07-24 RX ADMIN — ATORVASTATIN CALCIUM 20 MILLIGRAM(S): 80 TABLET, FILM COATED ORAL at 21:41

## 2022-07-24 RX ADMIN — INSULIN GLARGINE 2 UNIT(S): 100 INJECTION, SOLUTION SUBCUTANEOUS at 21:43

## 2022-07-24 RX ADMIN — Medication 100 MILLIGRAM(S): at 21:42

## 2022-07-24 RX ADMIN — CARVEDILOL PHOSPHATE 25 MILLIGRAM(S): 80 CAPSULE, EXTENDED RELEASE ORAL at 17:40

## 2022-07-24 RX ADMIN — Medication 40 MILLIGRAM(S): at 06:17

## 2022-07-24 RX ADMIN — Medication 100 MILLIGRAM(S): at 11:56

## 2022-07-24 RX ADMIN — Medication 100 MILLIGRAM(S): at 06:18

## 2022-07-24 RX ADMIN — CARVEDILOL PHOSPHATE 25 MILLIGRAM(S): 80 CAPSULE, EXTENDED RELEASE ORAL at 06:17

## 2022-07-24 RX ADMIN — HEPARIN SODIUM 5000 UNIT(S): 5000 INJECTION INTRAVENOUS; SUBCUTANEOUS at 17:41

## 2022-07-24 RX ADMIN — Medication 81 MILLIGRAM(S): at 11:57

## 2022-07-24 RX ADMIN — AMLODIPINE BESYLATE 10 MILLIGRAM(S): 2.5 TABLET ORAL at 06:18

## 2022-07-24 RX ADMIN — HEPARIN SODIUM 5000 UNIT(S): 5000 INJECTION INTRAVENOUS; SUBCUTANEOUS at 06:18

## 2022-07-24 RX ADMIN — Medication 40 MILLIGRAM(S): at 11:57

## 2022-07-24 NOTE — PROGRESS NOTE ADULT - ASSESSMENT
67F with PMH of HTN, HLD, sarcoidosis, CHF p/w AMS, admitted with metabolic encephalopathy, hypercarbic resp failure, CHF exacerbation. Intubated 7/13 for AMS and extubated 7/14. Course c/b PEA arrest on 7/17. Code Blue called, ROSC achieved, pt re-intubated. Second PEA code called upon arrival to the CTICU, ROSC achieved.   Pt transferred to [facility] on 7/23 after being extubated

## 2022-07-24 NOTE — PROGRESS NOTE ADULT - PROBLEM SELECTOR PLAN 3
Unclear history, per last admission pt was supposed to get EBUS and bronch outpt  -Unclear if she followed up

## 2022-07-24 NOTE — PROGRESS NOTE ADULT - PROBLEM SELECTOR PLAN 7
A1c 7.3%. Goal < 7%  Home regimen: Lantus 20 units at night  -c/b starvation ketosis, s/p IV dextrose with improvement in BHB   -Monitor PO intake and f/u endo for insulin recs  -AI ruled out per endo

## 2022-07-24 NOTE — PROGRESS NOTE ADULT - PROBLEM SELECTOR PLAN 2
Resolved  extubated and following commands.  EEG with slowing but no seizures   Per neuro AMS 2/2 hypercapneic resp failure, prior cerebellar stroke likely small vessel

## 2022-07-24 NOTE — PROGRESS NOTE ADULT - SUBJECTIVE AND OBJECTIVE BOX
[facility] Division of Hospital Medicine  Shanonaletaaria VoDO  Pager (M-F, 8A-5P): [phone]      Patient is a 67y old  Female who presents with a chief complaint of AMs, shortness of breath (23 Jul 2022 17:31)      SUBJECTIVE / OVERNIGHT EVENTS: no overnight events   ADDITIONAL REVIEW OF SYSTEMS:    MEDICATIONS  (STANDING):  amLODIPine   Tablet 10 milliGRAM(s) Oral daily  aspirin  chewable 81 milliGRAM(s) Oral daily  atorvastatin 20 milliGRAM(s) Oral at bedtime  carvedilol 25 milliGRAM(s) Oral every 12 hours  dextrose 50% Injectable 25 Gram(s) IV Push once  dextrose 50% Injectable 12.5 Gram(s) IV Push once  dextrose 50% Injectable 25 Gram(s) IV Push once  furosemide    Tablet 40 milliGRAM(s) Oral daily  glucagon  Injectable 1 milliGRAM(s) IntraMuscular once  heparin   Injectable 5000 Unit(s) SubCutaneous every 12 hours  hydrALAZINE 100 milliGRAM(s) Oral three times a day  insulin glargine Injectable (LANTUS) 2 Unit(s) SubCutaneous at bedtime  insulin lispro (ADMELOG) corrective regimen sliding scale   SubCutaneous three times a day before meals  insulin lispro (ADMELOG) corrective regimen sliding scale   SubCutaneous at bedtime    MEDICATIONS  (PRN):  acetaminophen     Tablet .. 650 milliGRAM(s) Oral every 6 hours PRN Temp greater or equal to 38C (100.4F), Mild Pain (1 - 3)  dextrose Oral Gel 15 Gram(s) Oral once PRN Blood Glucose LESS THAN 70 milliGRAM(s)/deciliter      CAPILLARY BLOOD GLUCOSE      POCT Blood Glucose.: 129 mg/dL (24 Jul 2022 07:35)  POCT Blood Glucose.: 128 mg/dL (23 Jul 2022 23:07)  POCT Blood Glucose.: 127 mg/dL (23 Jul 2022 21:22)  POCT Blood Glucose.: 149 mg/dL (23 Jul 2022 16:38)  POCT Blood Glucose.: 163 mg/dL (23 Jul 2022 11:29)    I&O's Summary    23 Jul 2022 07:01  -  24 Jul 2022 07:00  --------------------------------------------------------  IN: 550 mL / OUT: 1500 mL / NET: -950 mL        PHYSICAL EXAM:  Vital Signs Last 24 Hrs  T(C): 36.9 (24 Jul 2022 06:14), Max: 37.1 (23 Jul 2022 10:20)  T(F): 98.5 (24 Jul 2022 06:14), Max: 98.8 (23 Jul 2022 22:22)  HR: 83 (24 Jul 2022 06:14) (78 - 85)  BP: 165/88 (24 Jul 2022 06:14) (165/88 - 186/98)  BP(mean): --  RR: 18 (24 Jul 2022 06:14) (17 - 19)  SpO2: 98% (24 Jul 2022 06:14) (98% - 100%)    Parameters below as of 24 Jul 2022 06:14  Patient On (Oxygen Delivery Method): nasal cannula  O2 Flow (L/min): 2    CONSTITUTIONAL: NAD, well-developed, well-groomed  EYES: EOMI; conjunctiva and sclera clear  ENMT: Moist oral mucosa, no pharyngeal injection or exudates; normal dentition  NECK: Supple, no palpable masses; no thyromegaly  RESPIRATORY: Normal respiratory effort; lungs are clear to auscultation bilaterally  CARDIOVASCULAR: Regular rate and rhythm, normal S1 and S2, no murmur/rub/gallop; No lower extremity edema; Peripheral pulses are 2+ bilaterally  ABDOMEN: Nontender to palpation, normoactive bowel sounds, no rebound/guarding; No hepatosplenomegaly  MUSKULOSKELETAL:  no clubbing or cyanosis of digits; no joint swelling or tenderness to palpation  PSYCH: A+O to person, place, and time; affect appropriate  NEUROLOGY: CN 2-12 are intact and symmetric; no gross sensory deficits;   SKIN: No rashes; no palpable lesions    LABS:                        8.6    6.22  )-----------( 249      ( 23 Jul 2022 07:45 )             28.2     07-23    140  |  103  |  34<H>  ----------------------------<  139<H>  3.5   |  29  |  1.34<H>    Ca    8.4      23 Jul 2022 07:45    TPro  6.0  /  Alb  2.5<L>  /  TBili  0.3  /  DBili  x   /  AST  19  /  ALT  13  /  AlkPhos  85  07-23                RADIOLOGY & ADDITIONAL TESTS:  Results Reviewed:   Imaging Personally Reviewed:  Electrocardiogram Personally Reviewed:    COORDINATION OF CARE:  Care Discussed with Consultants/Other Providers [Y/N]: Y  Prior or Outpatient Records Reviewed [Y/N]: Y   [facility] Division of Hospital Medicine  Shanonaletaaria VoDO  Pager (M-F, 8A-5P): [phone]      Patient is a 67y old  Female who presents with a chief complaint of AMs, shortness of breath (23 Jul 2022 17:31)      SUBJECTIVE / OVERNIGHT EVENTS: Passed TOV, today pt reports productive cough, per nursing was tolerating PO diet.   ADDITIONAL REVIEW OF SYSTEMS: no cp/sob     MEDICATIONS  (STANDING):  amLODIPine   Tablet 10 milliGRAM(s) Oral daily  aspirin  chewable 81 milliGRAM(s) Oral daily  atorvastatin 20 milliGRAM(s) Oral at bedtime  carvedilol 25 milliGRAM(s) Oral every 12 hours  dextrose 50% Injectable 25 Gram(s) IV Push once  dextrose 50% Injectable 12.5 Gram(s) IV Push once  dextrose 50% Injectable 25 Gram(s) IV Push once  furosemide    Tablet 40 milliGRAM(s) Oral daily  glucagon  Injectable 1 milliGRAM(s) IntraMuscular once  heparin   Injectable 5000 Unit(s) SubCutaneous every 12 hours  hydrALAZINE 100 milliGRAM(s) Oral three times a day  insulin glargine Injectable (LANTUS) 2 Unit(s) SubCutaneous at bedtime  insulin lispro (ADMELOG) corrective regimen sliding scale   SubCutaneous three times a day before meals  insulin lispro (ADMELOG) corrective regimen sliding scale   SubCutaneous at bedtime    MEDICATIONS  (PRN):  acetaminophen     Tablet .. 650 milliGRAM(s) Oral every 6 hours PRN Temp greater or equal to 38C (100.4F), Mild Pain (1 - 3)  dextrose Oral Gel 15 Gram(s) Oral once PRN Blood Glucose LESS THAN 70 milliGRAM(s)/deciliter      CAPILLARY BLOOD GLUCOSE      POCT Blood Glucose.: 129 mg/dL (24 Jul 2022 07:35)  POCT Blood Glucose.: 128 mg/dL (23 Jul 2022 23:07)  POCT Blood Glucose.: 127 mg/dL (23 Jul 2022 21:22)  POCT Blood Glucose.: 149 mg/dL (23 Jul 2022 16:38)  POCT Blood Glucose.: 163 mg/dL (23 Jul 2022 11:29)    I&O's Summary    23 Jul 2022 07:01  -  24 Jul 2022 07:00  --------------------------------------------------------  IN: 550 mL / OUT: 1500 mL / NET: -950 mL        PHYSICAL EXAM:  Vital Signs Last 24 Hrs  T(C): 36.9 (24 Jul 2022 06:14), Max: 37.1 (23 Jul 2022 10:20)  T(F): 98.5 (24 Jul 2022 06:14), Max: 98.8 (23 Jul 2022 22:22)  HR: 83 (24 Jul 2022 06:14) (78 - 85)  BP: 165/88 (24 Jul 2022 06:14) (165/88 - 186/98)  BP(mean): --  RR: 18 (24 Jul 2022 06:14) (17 - 19)  SpO2: 98% (24 Jul 2022 06:14) (98% - 100%)    Parameters below as of 24 Jul 2022 06:14  Patient On (Oxygen Delivery Method): nasal cannula  O2 Flow (L/min): 2    CONSTITUTIONAL: NAD, sitting in bed   HEENT: very [place], EOMI, MMM  RESPIRATORY: +tachypneic, poor inspirator effort, overtly clear    CARDIOVASCULAR: Regular rate and rhythm, normal S1 and S2, no murmurs  ABDOMEN: Nontender to palpation, normoactive bowel sounds, soft, +rectal tube  : no perdomo   MUSKULOSKELETAL:  no clubbing or cyanosis of digits; no joint swelling or tenderness to palpation  PSYCH: calm, cooperative   NEUROLOGY: CN 2-12 are intact and symmetric; no gross sensory deficits;   SKIN: No rashes; no palpable lesions      LABS:                        8.6    6.22  )-----------( 249      ( 23 Jul 2022 07:45 )             28.2     07-23    140  |  103  |  34<H>  ----------------------------<  139<H>  3.5   |  29  |  1.34<H>    Ca    8.4      23 Jul 2022 07:45    TPro  6.0  /  Alb  2.5<L>  /  TBili  0.3  /  DBili  x   /  AST  19  /  ALT  13  /  AlkPhos  85  07-23                RADIOLOGY & ADDITIONAL TESTS:  Results Reviewed:   Imaging Personally Reviewed:  Electrocardiogram Personally Reviewed:    COORDINATION OF CARE:  Care Discussed with Consultants/Other Providers [Y/N]: Y  Prior or Outpatient Records Reviewed [Y/N]: Y

## 2022-07-24 NOTE — PROGRESS NOTE ADULT - PROBLEM SELECTOR PLAN 5
BP elevated  -c/w Hydralazine, increased to 100mg TID   -c/w amlodipine  -c/w carvedilol  -Changed Lasix to IV 40mg qD

## 2022-07-24 NOTE — PROGRESS NOTE ADULT - PROBLEM SELECTOR PLAN 1
BNP on arrival: 4021 with evidence of fluid overload with pleural effusions, b/l pitting edema   - EKG: RBBB (present from prior EKG's). no acute ischemic findings;  - Echo (6/22) Mild diastolic dysfunction, EF 62%, normal RV size function  - Echo (7/15/22): moderate LV systolic dysfunction, moderate diastolic dysfunction, RV enlargement with decreased RV systolic dysfunction  - Repeat Echo 7/21/22: EF 65% with normal biventricular systolic function  - Strict I&O  - pt w/hx of sarcoidosis -- now with mild tachypnea and productive cough, check CXR and Change Lasix to IV 40mg qD and assess daily. Consult pulm in AM, unclear if pt followed up for outpt Bronch and EBUS since last admission in Feb.

## 2022-07-25 DIAGNOSIS — J90 PLEURAL EFFUSION, NOT ELSEWHERE CLASSIFIED: ICD-10-CM

## 2022-07-25 DIAGNOSIS — Z29.9 ENCOUNTER FOR PROPHYLACTIC MEASURES, UNSPECIFIED: ICD-10-CM

## 2022-07-25 DIAGNOSIS — A04.72 ENTEROCOLITIS DUE TO CLOSTRIDIUM DIFFICILE, NOT SPECIFIED AS RECURRENT: ICD-10-CM

## 2022-07-25 LAB
ALBUMIN SERPL ELPH-MCNC: 2.7 G/DL — LOW (ref 3.3–5)
ALP SERPL-CCNC: 89 U/L — SIGNIFICANT CHANGE UP (ref 40–120)
ALT FLD-CCNC: 16 U/L — SIGNIFICANT CHANGE UP (ref 4–33)
ANION GAP SERPL CALC-SCNC: 10 MMOL/L — SIGNIFICANT CHANGE UP (ref 7–14)
AST SERPL-CCNC: 23 U/L — SIGNIFICANT CHANGE UP (ref 4–32)
BILIRUB SERPL-MCNC: 0.3 MG/DL — SIGNIFICANT CHANGE UP (ref 0.2–1.2)
BUN SERPL-MCNC: 25 MG/DL — HIGH (ref 7–23)
C DIFF BY PCR RESULT: DETECTED
C DIFF TOX GENS STL QL NAA+PROBE: SIGNIFICANT CHANGE UP
CALCIUM SERPL-MCNC: 8.3 MG/DL — LOW (ref 8.4–10.5)
CHLORIDE SERPL-SCNC: 99 MMOL/L — SIGNIFICANT CHANGE UP (ref 98–107)
CO2 SERPL-SCNC: 28 MMOL/L — SIGNIFICANT CHANGE UP (ref 22–31)
CREAT SERPL-MCNC: 1.28 MG/DL — SIGNIFICANT CHANGE UP (ref 0.5–1.3)
EGFR: 46 ML/MIN/1.73M2 — LOW
FERRITIN SERPL-MCNC: 212 NG/ML — HIGH (ref 15–150)
GLUCOSE BLDC GLUCOMTR-MCNC: 147 MG/DL — HIGH (ref 70–99)
GLUCOSE BLDC GLUCOMTR-MCNC: 178 MG/DL — HIGH (ref 70–99)
GLUCOSE BLDC GLUCOMTR-MCNC: 188 MG/DL — HIGH (ref 70–99)
GLUCOSE BLDC GLUCOMTR-MCNC: 197 MG/DL — HIGH (ref 70–99)
GLUCOSE BLDC GLUCOMTR-MCNC: 200 MG/DL — HIGH (ref 70–99)
GLUCOSE SERPL-MCNC: 168 MG/DL — HIGH (ref 70–99)
HCT VFR BLD CALC: 29.3 % — LOW (ref 34.5–45)
HGB BLD-MCNC: 8.7 G/DL — LOW (ref 11.5–15.5)
IRON SATN MFR SERPL: 17 % — SIGNIFICANT CHANGE UP (ref 14–50)
IRON SATN MFR SERPL: 31 UG/DL — SIGNIFICANT CHANGE UP (ref 30–160)
MAGNESIUM SERPL-MCNC: 1.5 MG/DL — LOW (ref 1.6–2.6)
MCHC RBC-ENTMCNC: 25.3 PG — LOW (ref 27–34)
MCHC RBC-ENTMCNC: 29.7 GM/DL — LOW (ref 32–36)
MCV RBC AUTO: 85.2 FL — SIGNIFICANT CHANGE UP (ref 80–100)
NRBC # BLD: 0 /100 WBCS — SIGNIFICANT CHANGE UP
NRBC # FLD: 0 K/UL — SIGNIFICANT CHANGE UP
PHOSPHATE SERPL-MCNC: 3 MG/DL — SIGNIFICANT CHANGE UP (ref 2.5–4.5)
PLATELET # BLD AUTO: 243 K/UL — SIGNIFICANT CHANGE UP (ref 150–400)
POTASSIUM SERPL-MCNC: 3.7 MMOL/L — SIGNIFICANT CHANGE UP (ref 3.5–5.3)
POTASSIUM SERPL-SCNC: 3.7 MMOL/L — SIGNIFICANT CHANGE UP (ref 3.5–5.3)
PROT SERPL-MCNC: 6.4 G/DL — SIGNIFICANT CHANGE UP (ref 6–8.3)
RBC # BLD: 3.44 M/UL — LOW (ref 3.8–5.2)
RBC # FLD: 15.2 % — HIGH (ref 10.3–14.5)
SARS-COV-2 RNA SPEC QL NAA+PROBE: SIGNIFICANT CHANGE UP
SODIUM SERPL-SCNC: 137 MMOL/L — SIGNIFICANT CHANGE UP (ref 135–145)
TIBC SERPL-MCNC: 179 UG/DL — LOW (ref 220–430)
UIBC SERPL-MCNC: 148 UG/DL — SIGNIFICANT CHANGE UP (ref 110–370)
WBC # BLD: 5.5 K/UL — SIGNIFICANT CHANGE UP (ref 3.8–10.5)
WBC # FLD AUTO: 5.5 K/UL — SIGNIFICANT CHANGE UP (ref 3.8–10.5)

## 2022-07-25 PROCEDURE — 99232 SBSQ HOSP IP/OBS MODERATE 35: CPT

## 2022-07-25 PROCEDURE — 99233 SBSQ HOSP IP/OBS HIGH 50: CPT

## 2022-07-25 PROCEDURE — 99223 1ST HOSP IP/OBS HIGH 75: CPT | Mod: GC

## 2022-07-25 RX ORDER — LANOLIN ALCOHOL/MO/W.PET/CERES
3 CREAM (GRAM) TOPICAL AT BEDTIME
Refills: 0 | Status: DISCONTINUED | OUTPATIENT
Start: 2022-07-25 | End: 2022-07-29

## 2022-07-25 RX ORDER — VANCOMYCIN HCL 1 G
125 VIAL (EA) INTRAVENOUS EVERY 6 HOURS
Refills: 0 | Status: COMPLETED | OUTPATIENT
Start: 2022-07-25 | End: 2022-08-04

## 2022-07-25 RX ORDER — SODIUM CHLORIDE 9 MG/ML
500 INJECTION, SOLUTION INTRAVENOUS
Refills: 0 | Status: DISCONTINUED | OUTPATIENT
Start: 2022-07-25 | End: 2022-07-26

## 2022-07-25 RX ADMIN — Medication 1: at 17:12

## 2022-07-25 RX ADMIN — Medication 1: at 08:30

## 2022-07-25 RX ADMIN — Medication 125 MILLIGRAM(S): at 18:29

## 2022-07-25 RX ADMIN — Medication 100 MILLIGRAM(S): at 12:18

## 2022-07-25 RX ADMIN — Medication 100 MILLIGRAM(S): at 05:35

## 2022-07-25 RX ADMIN — Medication 125 MILLIGRAM(S): at 23:32

## 2022-07-25 RX ADMIN — HEPARIN SODIUM 5000 UNIT(S): 5000 INJECTION INTRAVENOUS; SUBCUTANEOUS at 17:11

## 2022-07-25 RX ADMIN — Medication 81 MILLIGRAM(S): at 12:18

## 2022-07-25 RX ADMIN — SODIUM CHLORIDE 125 MILLILITER(S): 9 INJECTION, SOLUTION INTRAVENOUS at 20:58

## 2022-07-25 RX ADMIN — CARVEDILOL PHOSPHATE 25 MILLIGRAM(S): 80 CAPSULE, EXTENDED RELEASE ORAL at 05:37

## 2022-07-25 RX ADMIN — Medication 40 MILLIGRAM(S): at 05:38

## 2022-07-25 RX ADMIN — INSULIN GLARGINE 2 UNIT(S): 100 INJECTION, SOLUTION SUBCUTANEOUS at 20:40

## 2022-07-25 RX ADMIN — HEPARIN SODIUM 5000 UNIT(S): 5000 INJECTION INTRAVENOUS; SUBCUTANEOUS at 05:41

## 2022-07-25 RX ADMIN — CARVEDILOL PHOSPHATE 25 MILLIGRAM(S): 80 CAPSULE, EXTENDED RELEASE ORAL at 17:12

## 2022-07-25 RX ADMIN — AMLODIPINE BESYLATE 10 MILLIGRAM(S): 2.5 TABLET ORAL at 05:37

## 2022-07-25 RX ADMIN — Medication 100 MILLIGRAM(S): at 21:35

## 2022-07-25 RX ADMIN — ATORVASTATIN CALCIUM 20 MILLIGRAM(S): 80 TABLET, FILM COATED ORAL at 20:40

## 2022-07-25 RX ADMIN — Medication 3 MILLIGRAM(S): at 23:31

## 2022-07-25 NOTE — PROGRESS NOTE ADULT - ASSESSMENT
This is a 68 yo F /w a PMH OF DM2 (A1c 7.3% on this admission), CHF, sarcoidosis who presents with AMS c/b hypercapnic respiratory failure and CHF exacerbation requiring intubation. Endocrine consulted for: Persistent hypoglycemia  Home Regimen: Lantus 20units sq qhs     1. Hypoglycemia  Noted on day of admission, possibly related to home doses of insulin that patient was taking, poor PO intake, and low GFR  Concern for AI as below -->ruled out  Hypoglycemia now resolved     2. R/o Adrenal Insufficiency  AM cortisol returned 7.1. ACTH stimulation performed showed robust response (22 and 24 at 30 minute and 60 minute frances respectively). A value > 18 rules out primary AI (not necessarily secondary AI).   Repeat AM cortisol was normal at 16.1.   TFTs are WDL  AI ruled out, hypoglycemia not related to adrenal cause, no need for steroids at this time     3. Metabolic Acidosis   Initial acidosis likely due to starvation and renal related; BHB 3.1 elevated; was given D5 IVF overnight, PO diet started and Lantus 5 units administered   BG stable, AG/BHB significantly improved today  Continue to deanne  Resolved     4. T2DM, controlled  A1c 7.3%. Goal < 7%  Home regimen: Lantus 20 units at night  Cpeptide 0.9, repeat 5.0    While inpatient:   BG target 100-180 mg/dl; Glucose stable today   Continue Lantus 2 units SQ qHS  No Admelog pre-meal insulin for now  Continue Admelog low dose correctional scale before meals, low dose at bedtime  Check BG before meals and bedtime  Consistent carbohydrate diet   Hypoglycemia protocol     Discharge Plan:   TBD based on insulin requirements, may be able to resume basal insulin alone (at adjusted dose)  Note oral agents limited in ESRD  Ensure patient has working glucometer, test strips, lancets, alcohol pads  Please also prescribe glucose tabs, Baqsimi nasal or Glucagon emergency kit for hypoglycemia risk   Follow up with PCP, podiatry and opthalmology as an outpatient      5. HTN  BP Goal < 130/80  Above goal on Coreg, Hydralazine, Amlodipine   Hydralazine increased today, continue management per primary team    6. HLD  LDL goal < 70  Continue Atorvastatin 20 mg daily if no contraindications     Sofia Penaloza  Nurse Practitioner  Division of Endocrinology & Diabetes  In house pager #[phone]    If before 9AM or after 6PM, or on weekends/holidays, please call endocrine answering service for assistance ([phone]).For nonurgent matters email [email or web address] for assistance.

## 2022-07-25 NOTE — PROGRESS NOTE ADULT - PROBLEM SELECTOR PLAN 2
- EEG with slowing but no seizures   - per neuro AMS 2/2 hypercapneic resp failure, prior cerebellar stroke likely small vessel Presented with R>L effusion, xray on 7/24 w/ L effusion vs infiltrate  - on RA   -pulm consulted   - s/p meropenum 7/13-7/20 Presented with R>L effusion, x-ray on 7/24 w/ L effusion vs infiltrate  - currently on RA  - s/p IV diuresis, remains on Lasix 40 IV daily   - s/p meropenum 7/13-7/20  - pulm consulted

## 2022-07-25 NOTE — PROGRESS NOTE ADULT - SUBJECTIVE AND OBJECTIVE BOX
Patient is a 67y old  Female who presents with a chief complaint of AMs, shortness of breath    SUBJECTIVE / OVERNIGHT EVENTS:    Denies CP, SOB, f/c/n/v    MEDICATIONS  (STANDING):  amLODIPine   Tablet 10 milliGRAM(s) Oral daily  aspirin  chewable 81 milliGRAM(s) Oral daily  atorvastatin 20 milliGRAM(s) Oral at bedtime  carvedilol 25 milliGRAM(s) Oral every 12 hours  furosemide   Injectable 40 milliGRAM(s) IV Push daily  glucagon  Injectable 1 milliGRAM(s) IntraMuscular once  heparin   Injectable 5000 Unit(s) SubCutaneous every 12 hours  hydrALAZINE 100 milliGRAM(s) Oral three times a day  insulin glargine Injectable (LANTUS) 2 Unit(s) SubCutaneous at bedtime  insulin lispro (ADMELOG) corrective regimen sliding scale   SubCutaneous three times a day before meals  insulin lispro (ADMELOG) corrective regimen sliding scale   SubCutaneous at bedtime    MEDICATIONS  (PRN):  acetaminophen     Tablet .. 650 milliGRAM(s) Oral every 6 hours PRN Temp greater or equal to 38C (100.4F), Mild Pain (1 - 3)  dextrose Oral Gel 15 Gram(s) Oral once PRN Blood Glucose LESS THAN 70 milliGRAM(s)/deciliter    T(C): 36.6 (07-25-22 @ 12:08), Max: 36.8 (07-24-22 @ 17:37)  HR: 68 (07-25-22 @ 12:08) (68 - 79)  BP: 127/70 (07-25-22 @ 12:08) (127/70 - 153/73)  RR: 17 (07-25-22 @ 12:08) (17 - 17)  SpO2: 97% (07-25-22 @ 12:08) (97% - 100%)    CAPILLARY BLOOD GLUCOSE  POCT Blood Glucose.: 147 mg/dL (25 Jul 2022 11:47)  POCT Blood Glucose.: 188 mg/dL (25 Jul 2022 08:29)  POCT Blood Glucose.: 178 mg/dL (25 Jul 2022 07:27)  POCT Blood Glucose.: 188 mg/dL (24 Jul 2022 21:31)  POCT Blood Glucose.: 127 mg/dL (24 Jul 2022 16:50)    I&O's Summary    24 Jul 2022 07:01 - 25 Jul 2022 07:00  --------------------------------------------------------  IN: 200 mL / OUT: 2450 mL / NET: -2250 mL    25 Jul 2022 07:01  -  25 Jul 2022 13:09  --------------------------------------------------------  IN: 200 mL / OUT: 365 mL / NET: -165 mL    PHYSICAL EXAM:  GENERAL: obese, on 2L  CHEST/LUNG: decreased BS, no wheeze   HEART: Regular rate and rhythm; No murmurs, rubs, or gallops  ABDOMEN: Soft, Nontender, Nondistended; Bowel sounds present  EXTREMITIES:   warm and well perfused, No clubbing, cyanosis, or edema  PSYCH: AAOx3, very [place]  NEUROLOGY: non-focal  SKIN: blister on skin on upper/thigh RLE    LABS:                        8.7    5.50  )-----------( 243      ( 25 Jul 2022 06:31 )             29.3     07-25    137  |  99  |  25<H>  ----------------------------<  168<H>  3.7   |  28  |  1.28    Ca    8.3<L>      25 Jul 2022 06:31  Phos  3.0     07-25  Mg     1.50     07-25    TPro  6.4  /  Alb  2.7<L>  /  TBili  0.3  /  DBili  x   /  AST  23  /  ALT  16  /  AlkPhos  89  07-25    Microbiology: Culture Results:   Culture is being performed. (07-19 @ 16:36)  Culture Results:   10,000 - 49,000 CFU/mL Candida glabrata "Susceptibilities not performed" (07-19 @ 01:45)    Consultant(s) Notes Reviewed:    Care Discussed with Consultants/Other Providers:

## 2022-07-25 NOTE — PROGRESS NOTE ADULT - PROBLEM SELECTOR PLAN 1
BNP on arrival: 4021 with evidence of fluid overload with pleural effusions, b/l pitting edema   - EKG: RBBB. no acute ischemic findings  - TTE (6/22) Mild diastolic dysfunction, EF 62%, normal RV size function  - TTE (7/15/22): moderate LV systolic dysfunction, moderate diastolic dysfunction, RV enlargement with decreased RV systolic dysfunction  - TTE  (7/21/22): EF 65% with normal biventricular systolic function  - c/w lasix 40 IV daily   - daily weights and I&Os Had rectal tube and diarrhea c diff sent 7/24 +  - start PO vanco x 10 days (7/25-) Had rectal tube and diarrhea c diff sent 7/24 +, s/p recent abx exposure while admitted   - PO vanco x 10 days (7/25-)  - monitor stool consistency

## 2022-07-25 NOTE — PROGRESS NOTE ADULT - PROBLEM SELECTOR PLAN 5
- c/w hydralazine 100mg TID, amlodipine 10 mg daily & carvedilol 25 mg BID  - c/w Lasix to IV 40mg qD Unclear history, per last admission pt was supposed to get EBUS and bronch outpt  - unclear if she followed up  - pt w/hx of sarcoidosis will need pulm in AM, unclear if pt followed up for outpt Bronch and EBUS since last admission in Feb. Saw pulm in Feb s/p bronch, daughter states confirmed diagnosis and was due for continued OP f/u   - f/u pulm

## 2022-07-25 NOTE — PROGRESS NOTE ADULT - SUBJECTIVE AND OBJECTIVE BOX
Neurology Progress Note    S: Patient seen and examined on floor.   EEG neg for seizures. mental status improved.       Medication:  MEDICATIONS  (STANDING):  amLODIPine   Tablet 10 milliGRAM(s) Oral daily  aspirin  chewable 81 milliGRAM(s) Oral daily  atorvastatin 20 milliGRAM(s) Oral at bedtime  carvedilol 25 milliGRAM(s) Oral every 12 hours  dextrose 50% Injectable 25 Gram(s) IV Push once  dextrose 50% Injectable 12.5 Gram(s) IV Push once  dextrose 50% Injectable 25 Gram(s) IV Push once  furosemide   Injectable 40 milliGRAM(s) IV Push daily  glucagon  Injectable 1 milliGRAM(s) IntraMuscular once  heparin   Injectable 5000 Unit(s) SubCutaneous every 12 hours  hydrALAZINE 100 milliGRAM(s) Oral three times a day  insulin glargine Injectable (LANTUS) 2 Unit(s) SubCutaneous at bedtime  insulin lispro (ADMELOG) corrective regimen sliding scale   SubCutaneous three times a day before meals  insulin lispro (ADMELOG) corrective regimen sliding scale   SubCutaneous at bedtime    MEDICATIONS  (PRN):  acetaminophen     Tablet .. 650 milliGRAM(s) Oral every 6 hours PRN Temp greater or equal to 38C (100.4F), Mild Pain (1 - 3)  dextrose Oral Gel 15 Gram(s) Oral once PRN Blood Glucose LESS THAN 70 milliGRAM(s)/deciliter        Vital:   Vital Signs Last 24 Hrs  T(C): 36.5 (22 @ 05:30), Max: 37.1 (22 @ 11:59)  T(F): 97.7 (22 @ 05:30), Max: 98.7 (22 @ 11:59)  HR: 74 (22 @ 05:45) (74 - 82)  BP: 145/70 (22 @ 05:45) (129/68 - 153/73)  BP(mean): --  RR: 17 (22 @ 05:45) (17 - 17)  SpO2: 100% (22 @ 05:45) (98% - 100%)             General Exam:   General Appearance: Appropriately dressed and in no acute distress       Head: Normocephalic, atraumatic and no dysmorphic features  Ear, Nose, and Throat: Moist mucous membranes +ETT   CVS: S1S2+  Resp: No SOB, no wheeze or rhonchi  GI: soft NT/ND  Extremities:  toe amputation noted   Skin: No bruises or rashes     Neurological Exam:    Mental Status:  AAOx1, able to follow simple verbal commands. answers questions appropriately , able to name pen when prompted   Cranial Nerves: PERRL, EOMI, VFFC, sensation V1-V3 intact,  no obvious facial asymmetry, equal elevation of palate, scm/trap 5/5, tongue is midline on protrusion. no obvious papilledema on fundoscopic exam. +Jamul   Motor: TONEY  at least 4/5 now.  in mittens in uppers   Sensation: withdraws x 4  Reflexes: 1+ throughout at biceps, brachioradialis, triceps, patellars and ankles bilaterally and equal. No clonus. R toe and L toe were both downgoing.  Coordination: unable   Gait:  unable     I personally reviewed the below data/images/labs:    CBC Full  -  ( 2022 06:31 )  WBC Count : 5.50 K/uL  RBC Count : 3.44 M/uL  Hemoglobin : 8.7 g/dL  Hematocrit : 29.3 %  Platelet Count - Automated : 243 K/uL  Mean Cell Volume : 85.2 fL  Mean Cell Hemoglobin : 25.3 pg  Mean Cell Hemoglobin Concentration : 29.7 gm/dL  Auto Neutrophil # : x  Auto Lymphocyte # : x  Auto Monocyte # : x  Auto Eosinophil # : x  Auto Basophil # : x  Auto Neutrophil % : x  Auto Lymphocyte % : x  Auto Monocyte % : x  Auto Eosinophil % : x  Auto Basophil % : x    07-25    137  |  99  |  25<H>  ----------------------------<  168<H>  3.7   |  28  |  1.28    Ca    8.3<L>      2022 06:31  Phos  3.0     07-25  Mg     1.50     07-25    TPro  6.4  /  Alb  2.7<L>  /  TBili  0.3  /  DBili  x   /  AST  23  /  ALT  16  /  AlkPhos  89  07-25        Urinalysis Basic - ( 2022 09:00 )    Color: Yellow / Appearance: Slightly Turbid / S.027 / pH: x  Gluc: x / Ketone: Trace  / Bili: Negative / Urobili: 3 mg/dL   Blood: x / Protein: 300 mg/dL / Nitrite: Negative   Leuk Esterase: Large / RBC: 5 /HPF / WBC >50 /HPF   Sq Epi: x / Non Sq Epi: x / Bacteria: x        EXAM:  MR BRAIN WAW IC      EXAM:  MR IAC ONLY WAW IC        PROCEDURE DATE:  Dec  1 2021         INTERPRETATION:  .    CLINICAL INFORMATION: Acute onset of bilateral hearing loss.    TECHNIQUE: Multiplanar multisequential MRI of the brain and internal auditory canals was acquired with and without the administration of IV gadolinium. 7.5 cc's of IV Gadavist was administered for the purposes of this examination. 0 cc were discarded.    COMPARISON: Prior CT examination of the internal auditory canals dated 2021. Prior CT angiograms/venogram examination of the head and neck dated 2021. Examination.    FINDINGS:    MRI BRAIN: A chronic lacunar infarct is seen within the right medial cerebellar hemisphere.    Multiple patchy confluent nonspecific T2/FLAIR hyperintense signal changes are noted throughout the bihemispheric white matter without associated mass effect or restricted diffusion.    There is no abnormal brain parenchymal or leptomeningeal enhancement.    Ventricular sizeand configuration is unremarkable. No abnormal extra-axial fluid collections are seen. Flow-voids are noted throughout the major intracranial vessels, on the T2 weighted images, consistent with their patency. The sellar area appears unremarkable.    Minimal scattered mucosal thickening is seen throughout the paranasal sinuses. The mastoid air cells are clear. The orbits appear unremarkable.    MRI IAC: There is no CP angle mass. The bilateral 7th and 8th cranial nerves appear unremarkable in course and morphology. No abnormal enhancement is seen. The inner ear structures are normally formed. Normal fluid signal is seen within the inner ear structures. The cochlea, vestibule, and semicircular canals appear unremarkable.    IMPRESSION:    MRI BRAIN: No acute intracranial hemorrhage, acute ischemia, or abnormal intracranial enhancement.    Chronic lacunar infarct within the right medial cerebellar hemisphere and mild chronic white matter microvascular type changes.    MRI IAC: Unremarkable study.    --- End of Report ---              LUCI RUEDA MD; Attending Radiologist  This document has been electronically signed. Dec  3 2021  8:46AM    < end of copied text >      < from: CT Chest w/ IV Cont (22 @ 22:58) >    ACC: [id number] EXAM:  CT ABDOMEN AND PELVIS IC                        ACC: [id number] EXAM:  CT CHEST IC                          PROCEDURE DATE:  2022          INTERPRETATION:  CLINICAL INFORMATION: Pleural effusion. Rule out   empyema. Abdominal tenderness. History of pancreatitis. Rule out   infection or obstruction.    COMPARISON: CT chest 2022.    CONTRAST/COMPLICATIONS:  IV Contrast: IV contrast documented in associated exam (accession   [id number]), Omnipaque 350 (accession [id number])  64 cc administered   6 cc   discarded  Oral Contrast: NONE  Complications: None reported at time of study completion    PROCEDURE:  CT of the Chest, Abdomen and Pelvis was performed.  Sagittal and coronal reformats were performed.    FINDINGS:  CHEST:  LUNGS AND LARGE AIRWAYS: Compressive atelectasis of the right upper,   middle and bilateral lower lobes including atelectasis of the majority of   the right lower lobe. Subsegmental atelectasis in the left upper lobe.  PLEURA: New large right andsmall left pleural effusions. No evidence of   an empyema. Fluid in the minor fissure.  VESSELS: Atherosclerotic changes of the aorta and coronary arteries.  HEART: Heart size is enlarged. No pericardial effusion.  MEDIASTINUM AND ANTONIO: No lymphadenopathy. Redemonstration of calcified   mediastinal lymph nodes.  CHEST WALL AND LOWER NECK: 7 mm right thyroid lobe nodule. Generalized   anasarca.    ABDOMEN AND PELVIS:  LIVER: Within normal limits.  BILE DUCTS: Normal caliber.  GALLBLADDER: Cholecystectomy.  SPLEEN: Within normal limits.  PANCREAS: Atrophic.  ADRENALS: Within normal limits.  KIDNEYS/URETERS: No renal stones or hydronephrosis.    BLADDER: Within normal limits.  REPRODUCTIVE ORGANS: Atrophic and retroflexed uterus. No adnexal masses.    BOWEL: Redemonstration of an outpouching with wall calcification and   intraluminal fluid and gas measuring 4.9 x 4.6 cm emanating from the   posterior gastric body likely representing a large diverticulum. No bowel   obstruction or inflammation. Colonic diverticulosis without   diverticulitis. Appendix within normal limits.  PERITONEUM: Small volume free pelvic fluid.  VESSELS: Atherosclerotic changes.  RETROPERITONEUM/LYMPH NODES: No lymphadenopathy.  ABDOMINAL WALL: Generalized anasarca.  BONES: Degenerative changes of the spine. Mild S-shaped thoracolumbar   scoliosis.    IMPRESSION:  1. New large right and small left pleural effusions with adjacent   compressive atelectasis including atelectasis of the majority of the   right lowerlobe.  2. No bowel obstruction or inflammation.      --- End of Report ---          MARTA HART MD; Resident Radiologist  This document has been electronically signed.  CHINTAN WHITFIELD MD; Attending Radiologist  This document has been electronically signed. 2022 12:32AM    < end of copied text >         < from: CT Head No Cont (22 @ 00:03) >    ACC: [id number] EXAM:  CT BRAIN                          PROCEDURE DATE:  2022          INTERPRETATION:  INDICATIONS:  Alteration of  Consciousness    TECHNIQUE:  Serial axial images were obtained from the skull base to the   vertex without intravenous contrast. Sagittal and Coronal reformats were   performed    COMPARISON EXAMINATION: None.    FINDINGS:  VENTRICLES AND SULCI:  Normal.  INTRA-AXIAL:  No mass, blood or abnormal attenuation is seen.  EXTRA-AXIAL:  No mass or collection is seen.  VISUALIZED SINUSES:  Clear.  VISUALIZED MASTOIDS:  Clear.  CALVARIUM:  Normal.  MISCELLANEOUS:  None.    IMPRESSION:  No evidence of intracranial hemorrhage, no evidence of major   vessel distribution infarct    --- End of Report ---            MOLLY TOTH MD; Attending Radiologist  This document has been electronically signed. 2022 10:19AM    < end of copied text >  EEG Summary / Classification:  Abnormal   - Moderate generalized slowing.    EEG Impression / Clinical Correlate:  Abnormal EEG study.  Moderate nonspecific diffuse or multifocal cerebral dysfunction.   No epileptiform pattern or clear seizure seen.    **In absence of additional clinical concerns, recommend consideration for discontinuation of current EEG study with reconnection in future if warranted.

## 2022-07-25 NOTE — PROGRESS NOTE ADULT - PROBLEM SELECTOR PLAN 4
Cr max 3.72  - Cr 1.28 now  - monitor Cr & renally dose meds - EEG with slowing but no seizures   - per neuro AMS 2/2 hypercapneic resp failure, prior cerebellar stroke likely small vessel - EEG with slowing but no seizures   - per neuro AMS 2/2 hypercapneic resp failure, prior cerebellar stroke likely small vessel  - MS now better - EEG with slowing but no seizures   - CTH neg  - per neuro AMS 2/2 hypercapnic respiratory failure, prior cerebellar stroke likely small vessel  - MS now better

## 2022-07-25 NOTE — PROGRESS NOTE ADULT - PROBLEM SELECTOR PLAN 8
A1c 7.3%. Goal < 7%; Home regimen: lantus 20 units at night  - AI ruled out per endo  - f/u endo recs  - Lantus 2 units A1c 7.3%. Goal < 7%; Home regimen: lantus 20 units at night  - AI ruled out per endo s/p stim  - f/u endo recs  - Lantus 2 units A1c 7.3%. Goal < 7%;  home regimen: lantus 20 units at night; had episodes of hypoglyemia while in ICU  - AI ruled out per endo s/p stim  - on Lantus 2 units  - endo following

## 2022-07-25 NOTE — CONSULT NOTE ADULT - ASSESSMENT
68 y/o F with pmhx of HTN, HLD, sarcoidosis, CHF, presented to the ED for new onset AMS and fluid overload c/f CHF exacerbation s/p ICU admission 7/13-7/16 with intubation. Course complicated by PEA arrest 7/16 and readmission to ICU where she had second PEA arrest thought to be due to hypoxemia from aspiration. Completed 7d course meropenem and extubated, ICU stay c/b hypoglycemia from poor PO intake requiring D5 LR. Now on floor with new CXR 7/24 showing progression of consolidation vs effusion of left lung.    Recommendations       68 y/o F with pmhx of HTN, HLD, sarcoidosis, CHF, presented to the ED for new onset AMS and fluid overload c/f CHF exacerbation s/p ICU admission - with intubation. Course complicated by PEA arrest  and readmission to ICU where she had second PEA arrest thought to be due to hypoxemia from aspiration. Completed 7d course meropenem and extubated, ICU stay c/b hypoglycemia from poor PO intake requiring D5 LR. Now on floor with new CXR  showing progression of consolidation vs effusion of left lung.    Recommendations  - small pleural effusion seen on bedside US, not amenable to drainage; also signs that could be c/w atelectatic lung  - incentive spirometry  - OOB to chair  - ambulation with PT if able  - chest PT  - sarcoid workup as outpatient, please arrange outpatient pulmonary follow up    Prior to discharge:  Please email [email or web address] to set up an appointment. Include patient's name, , MRN, and contact information in the email.    Pulmonary/[facility] Clinic  [address] [place] [id number] [phone]

## 2022-07-25 NOTE — PROGRESS NOTE ADULT - ASSESSMENT
67F HTN, HLD, sarcoidosis, HFpEF p/w AMS admitted with metabolic encephalopathy, hypercarbic resp failure, CHF exacerbation. Intubated 7/13 for AMS and extubated 7/14  (s/p s/p meropenem 7/13 - 7/20) course c/b PEA arrest on 7/17 pt re-intubated s/p second PEA arrest in [facility]ICU now transferred to [facility] on 7/23 s/p being extubated.   67F [place], HTN, HLD, sarcoidosis, HFpEF p/w lethargy/SOB/swelling/AMS admitted with metabolic encephalopathy due to acute hypercarbic respiratory failure in context of CHF exacerbation. s/p intubation and MICU admission 7/13 for hypercapnia/airway protection was extubated 7/14  (s/p empiric meropenem 7/13 - 7/20 for concern for sepsis given hypothermia/AMS however neg infectious w/u) course c/b PEA arrest on 7/17 pt re-intubated s/p second PEA arrest when arrived to CTICU now transferred to medicine on 7/23 s/p being extubated.    Course now c/b c diff.  67F [place], HTN, HLD, sarcoidosis, HFpEF p/w lethargy/SOB/swelling/AMS admitted with metabolic encephalopathy due to acute hypercarbic respiratory failure in context of CHF exacerbation. s/p intubation and MICU admission 7/13 for hypercapnia/airway protection was extubated 7/16  (s/p empiric meropenem 7/13 - 7/20 for concern for sepsis given hypothermia/AMS however neg infectious w/u) course c/b PEA arrest on 7/17 pt re-intubated s/p second PEA arrest when arrived to CTICU now transferred to medicine on 7/23 s/p being extubated.    Course now c/b c diff.

## 2022-07-25 NOTE — CONSULT NOTE ADULT - SUBJECTIVE AND OBJECTIVE BOX
CHIEF COMPLAINT:Patient is a 67y old  Female who presents with a chief complaint of AMs, shortness of breath (2022 08:13)      HPI:  Briefly, this is a 68 y/o F with pmhx of HTN, HLD, sarcoidosis, CHF, presented to the ED for new onset AMS. She was also found to have pleural effusion, elevated BNP and LE edema concerning for CHF exacerbation. Admitted to ICU initially for metabolic encephalopathy s/p intubation for airway protection (-). MICU course was c/b hypoglycemia with workup neg for adrenal insufficiency. Course c/b septic shock with no source (started on meropenem).     Was extubated on , was comfortable on RA with good mental status and was sent to the floors. On  AM pt was found to be agonally breathing and RRT was called. Pt went into PEA arrest, ROSC achieved after 4 mins, epix1 . Intubated by anesthesia. Was started on levo 0.1 and prop. Prior to leaving unit patient was waking up with high peak pressures on vent, she received 2mg of versed and became more synchronous with ventilator.  Upon arrival to CTI patient's blood pressures started to decrease despite increasing levo and started to become bradycardic. O2 sat remained at 100%. Went into PEA arrest again, epinephrine x1 given and ROSC was achieved at 4 mins. Likely from aspiration event?    MICU Course:  Patient slowly weaned off of sedation. Extubated to BIPAP and weaned to NC. Able to follow commands and answer questions. Completed meropenem. Of note, patient is hard of hearing and understands better if spoken to without a mask.  Hypoglycemia likely from poor PO intake, endo involved, currently on D5+LR     Transferred out of MICU .  CXR on  showing progression of effusion vs infiltrate. On room air, hemodynamically stable.      PAST MEDICAL & SURGICAL HISTORY:  Type 2 diabetes mellitus      Bilateral cataracts      Fluid in pleural cavity associated with pancreatitis      Pancreatic pseudocyst/cyst  s/p drain placement and removal      HTN (hypertension)      HLD (hyperlipidemia)      History of amputation of right great toe        H/O:  section        History of laparoscopic cholecystectomy          FAMILY HISTORY:  No pertinent family history in first degree relatives        SOCIAL HISTORY:  Smoking: [ ] Never Smoked [ ] Former Smoker (__ packs x ___ years) [ ] Current Smoker  (__ packs x ___ years)  Substance Use: [ ] Never Used [ ] Used ____  EtOH Use:  Marital Status: [ ] Single [ ]  [ ]  [ ]   Sexual History:   Occupation:  Recent Travel:  Country of Birth:  Advance Directives:    Allergies    penicillin ([employer])    Intolerances        HOME MEDICATIONS:  Home Medications:  aspirin 81 mg oral delayed release tablet: 1 tab(s) orally once a day (2022 04:57)  atorvastatin 20 mg oral tablet: 1 tab(s) orally once a day (2022 04:57)  dexamethasone 0.1% ophthalmic suspension: 5 drop(s) to each affected ear once a day (2022 04:57)  insulin detemir 100 units/mL subcutaneous solution: 20 unit(s) subcutaneous once a day (at bedtime) (2022 04:57)      REVIEW OF SYSTEMS:  Constitutional: [ ] negative [ ] fevers [ ] chills [ ] weight loss [ ] weight gain  HEENT: [ ] negative [ ] dry eyes [ ] eye irritation [ ] postnasal drip [ ] nasal congestion  CV: [ ] negative  [ ] chest pain [ ] orthopnea [ ] palpitations [ ] murmur  Resp: [ ] negative [ ] cough [ ] shortness of breath [ ] dyspnea [ ] wheezing [ ] sputum [ ] hemoptysis  GI: [ ] negative [ ] nausea [ ] vomiting [ ] diarrhea [ ] constipation [ ] abd pain [ ] dysphagia   : [ ] negative [ ] dysuria [ ] nocturia [ ] hematuria [ ] increased urinary frequency  Musculoskeletal: [ ] negative [ ] back pain [ ] myalgias [ ] arthralgias [ ] fracture  Skin: [ ] negative [ ] rash [ ] itch  Neurological: [ ] negative [ ] headache [ ] dizziness [ ] syncope [ ] weakness [ ] numbness  Psychiatric: [ ] negative [ ] anxiety [ ] depression  Endocrine: [ ] negative [ ] diabetes [ ] thyroid problem  Hematologic/Lymphatic: [ ] negative [ ] anemia [ ] bleeding problem  Allergic/Immunologic: [ ] negative [ ] itchy eyes [ ] nasal discharge [ ] hives [ ] angioedema  [ ] All other systems negative  [ ] Unable to assess ROS because ________    OBJECTIVE:  ICU Vital Signs Last 24 Hrs  T(C): 36.6 (2022 12:08), Max: 36.8 (2022 17:37)  T(F): 97.8 (2022 12:08), Max: 98.3 (2022 17:37)  HR: 68 (2022 12:08) (68 - 79)  BP: 127/70 (2022 12:08) (127/70 - 153/73)  BP(mean): --  ABP: --  ABP(mean): --  RR: 17 (2022 12:08) (17 - 17)  SpO2: 97% (2022 12:08) (97% - 100%)    O2 Parameters below as of 2022 12:08  Patient On (Oxygen Delivery Method): room air               @ :  -   @ 07:00  --------------------------------------------------------  IN: 200 mL / OUT: 2450 mL / NET: -2250 mL     @ 07:  -   @ 15:42  --------------------------------------------------------  IN: 200 mL / OUT: 365 mL / NET: -165 mL      CAPILLARY BLOOD GLUCOSE      POCT Blood Glucose.: 147 mg/dL (2022 11:47)      PHYSICAL EXAM:  GENERAL: NAD, well-groomed, well-developed  HEAD:  Atraumatic, Normocephalic  EYES: EOMI, PERRLA, conjunctiva and sclera clear  ENMT: No tonsillar erythema, exudates, or enlargement; Moist mucous membranes, Good dentition, No lesions  NECK: Supple, No JVD, Normal thyroid  CHEST/LUNG: Clear to auscultation bilaterally; No rales, rhonchi, wheezing, or rubs  HEART: Regular rate and rhythm; No murmurs, rubs, or gallops  ABDOMEN: Soft, Nontender, Nondistended; Bowel sounds present  VASCULAR:  2+ Peripheral Pulses, No clubbing, cyanosis, or edema  LYMPH: No lymphadenopathy noted  SKIN: No rashes or lesions  NERVOUS SYSTEM:  Alert & Oriented X3, Good concentration; Motor Strength 5/5 B/L upper and lower extremities; DTRs 2+ intact and symmetric    HOSPITAL MEDICATIONS:  Standing Meds:  amLODIPine   Tablet 10 milliGRAM(s) Oral daily  aspirin  chewable 81 milliGRAM(s) Oral daily  atorvastatin 20 milliGRAM(s) Oral at bedtime  carvedilol 25 milliGRAM(s) Oral every 12 hours  dextrose 50% Injectable 25 Gram(s) IV Push once  dextrose 50% Injectable 12.5 Gram(s) IV Push once  dextrose 50% Injectable 25 Gram(s) IV Push once  furosemide   Injectable 40 milliGRAM(s) IV Push daily  glucagon  Injectable 1 milliGRAM(s) IntraMuscular once  heparin   Injectable 5000 Unit(s) SubCutaneous every 12 hours  hydrALAZINE 100 milliGRAM(s) Oral three times a day  insulin glargine Injectable (LANTUS) 2 Unit(s) SubCutaneous at bedtime  insulin lispro (ADMELOG) corrective regimen sliding scale   SubCutaneous three times a day before meals  insulin lispro (ADMELOG) corrective regimen sliding scale   SubCutaneous at bedtime  vancomycin    Solution 125 milliGRAM(s) Oral every 6 hours      PRN Meds:  acetaminophen     Tablet .. 650 milliGRAM(s) Oral every 6 hours PRN  dextrose Oral Gel 15 Gram(s) Oral once PRN      LABS:    The Labs were reviewed by me   The Radiology was reviewed by me    EKG tracing reviewed by me        137  |  99  |  25<H>  ----------------------------<  168<H>  3.7   |  28  |  1.28      134<L>  |  99  |  26<H>  ----------------------------<  124<H>  3.7   |  28  |  1.29      140  |  103  |  34<H>  ----------------------------<  139<H>  3.5   |  29  |  1.34<H>    Ca    8.3<L>      2022 06:31  Ca    8.0<L>      2022 11:05  Ca    8.4      2022 07:45  Phos  3.0     07-  Mg     1.50         TPro  6.4  /  Alb  2.7<L>  /  TBili  0.3  /  DBili  x   /  AST  23  /  ALT  16  /  AlkPhos  89  07-25  TPro  6.0  /  Alb  2.5<L>  /  TBili  0.3  /  DBili  x   /  AST  19  /  ALT  12  /  AlkPhos  82  07-24  TPro  6.0  /  Alb  2.5<L>  /  TBili  0.3  /  DBili  x   /  AST  19  /  ALT  13  /  AlkPhos  85  07-23    Magnesium, Serum: 1.50 mg/dL (22 @ 06:31)  Magnesium, Serum: 1.60 mg/dL (22 @ 11:05)  Magnesium, Serum: 1.60 mg/dL (22 @ 11:05)    Phosphorus Level, Serum: 3.0 mg/dL (22 @ 06:31)  Phosphorus Level, Serum: 2.7 mg/dL (22 @ 11:05)  Phosphorus Level, Serum: 2.6 mg/dL (22 @ 11:05)                                              8.7    5.50  )-----------( 243      ( 2022 06:31 )             29.3                         8.6    5.19  )-----------( 248      ( 2022 11:05 )             29.2                         8.6    6.22  )-----------( 249      ( 2022 07:45 )             28.2     CAPILLARY BLOOD GLUCOSE      POCT Blood Glucose.: 147 mg/dL (2022 11:47)  POCT Blood Glucose.: 188 mg/dL (2022 08:29)  POCT Blood Glucose.: 178 mg/dL (2022 07:27)  POCT Blood Glucose.: 188 mg/dL (2022 21:31)  POCT Blood Glucose.: 127 mg/dL (2022 16:50)        MICROBIOLOGY:       RADIOLOGY:  [ ] Reviewed and interpreted by me    PULMONARY FUNCTION TESTS:    EKG:   CHIEF COMPLAINT:Patient is a 67y old  Female who presents with a chief complaint of AMs, shortness of breath (2022 08:13)      HPI:  Briefly, this is a 66 y/o F with pmhx of HTN, HLD, sarcoidosis, CHF, presented to the ED for new onset AMS. She was also found to have pleural effusion, elevated BNP and LE edema concerning for CHF exacerbation. Admitted to ICU initially for metabolic encephalopathy s/p intubation for airway protection (-). MICU course was c/b hypoglycemia with workup neg for adrenal insufficiency. Course c/b septic shock with no source (started on meropenem).     Was extubated on , was comfortable on RA with good mental status and was sent to the floors. On  AM pt was found to be agonally breathing and RRT was called. Pt went into PEA arrest, ROSC achieved after 4 mins, epix1 . Intubated by anesthesia. Was started on levo 0.1 and prop. Prior to leaving unit patient was waking up with high peak pressures on vent, she received 2mg of versed and became more synchronous with ventilator.  Upon arrival to CTI patient's blood pressures started to decrease despite increasing levo and started to become bradycardic. O2 sat remained at 100%. Went into PEA arrest again, epinephrine x1 given and ROSC was achieved at 4 mins. Likely from aspiration event?    MICU Course:  Patient slowly weaned off of sedation. Extubated to BIPAP and weaned to NC. Able to follow commands and answer questions. Completed meropenem. Of note, patient is hard of hearing and understands better if spoken to without a mask.  Hypoglycemia likely from poor PO intake, endo involved, currently on D5+LR     Floor course:  Transferred out of MICU .  CXR on  showing progression of effusion vs infiltrate. On room air, hemodynamically stable.  Has been receiving lasix 40 mg IVP daily, PO vanc for c diff.      PAST MEDICAL & SURGICAL HISTORY:  Type 2 diabetes mellitus      Bilateral cataracts      Fluid in pleural cavity associated with pancreatitis      Pancreatic pseudocyst/cyst  s/p drain placement and removal      HTN (hypertension)      HLD (hyperlipidemia)      History of amputation of right great toe        H/O:  section        History of laparoscopic cholecystectomy          FAMILY HISTORY:  No pertinent family history in first degree relatives        SOCIAL HISTORY:  Smoking: [ ] Never Smoked [ ] Former Smoker (__ packs x ___ years) [ ] Current Smoker  (__ packs x ___ years)  Substance Use: [ ] Never Used [ ] Used ____  EtOH Use:  Marital Status: [ ] Single [ ]  [ ]  [ ]   Sexual History:   Occupation:  Recent Travel:  Country of Birth:  Advance Directives:    Allergies    penicillin ([employer])    Intolerances        HOME MEDICATIONS:  Home Medications:  aspirin 81 mg oral delayed release tablet: 1 tab(s) orally once a day (2022 04:57)  atorvastatin 20 mg oral tablet: 1 tab(s) orally once a day (2022 04:57)  dexamethasone 0.1% ophthalmic suspension: 5 drop(s) to each affected ear once a day (2022 04:57)  insulin detemir 100 units/mL subcutaneous solution: 20 unit(s) subcutaneous once a day (at bedtime) (2022 04:57)      REVIEW OF SYSTEMS:  Constitutional: [ ] negative [ ] fevers [ ] chills [ ] weight loss [ ] weight gain  HEENT: [ ] negative [ ] dry eyes [ ] eye irritation [ ] postnasal drip [ ] nasal congestion  CV: [ ] negative  [ ] chest pain [ ] orthopnea [ ] palpitations [ ] murmur  Resp: [ ] negative [ ] cough [ ] shortness of breath [ ] dyspnea [ ] wheezing [ ] sputum [ ] hemoptysis  GI: [ ] negative [ ] nausea [ ] vomiting [ ] diarrhea [ ] constipation [ ] abd pain [ ] dysphagia   : [ ] negative [ ] dysuria [ ] nocturia [ ] hematuria [ ] increased urinary frequency  Musculoskeletal: [ ] negative [ ] back pain [ ] myalgias [ ] arthralgias [ ] fracture  Skin: [ ] negative [ ] rash [ ] itch  Neurological: [ ] negative [ ] headache [ ] dizziness [ ] syncope [ ] weakness [ ] numbness  Psychiatric: [ ] negative [ ] anxiety [ ] depression  Endocrine: [ ] negative [ ] diabetes [ ] thyroid problem  Hematologic/Lymphatic: [ ] negative [ ] anemia [ ] bleeding problem  Allergic/Immunologic: [ ] negative [ ] itchy eyes [ ] nasal discharge [ ] hives [ ] angioedema  [ ] All other systems negative  [ ] Unable to assess ROS because ________    OBJECTIVE:  ICU Vital Signs Last 24 Hrs  T(C): 36.6 (2022 12:08), Max: 36.8 (2022 17:37)  T(F): 97.8 (2022 12:08), Max: 98.3 (2022 17:37)  HR: 68 (2022 12:08) (68 - 79)  BP: 127/70 (2022 12:08) (127/70 - 153/73)  BP(mean): --  ABP: --  ABP(mean): --  RR: 17 (2022 12:08) (17 - 17)  SpO2: 97% (2022 12:08) (97% - 100%)    O2 Parameters below as of 2022 12:08  Patient On (Oxygen Delivery Method): room air               @ 07:  -   @ 07:00  --------------------------------------------------------  IN: 200 mL / OUT: 2450 mL / NET: -2250 mL     @ 07:  -   @ 15:42  --------------------------------------------------------  IN: 200 mL / OUT: 365 mL / NET: -165 mL      CAPILLARY BLOOD GLUCOSE      POCT Blood Glucose.: 147 mg/dL (2022 11:47)      PHYSICAL EXAM:  GENERAL: NAD, well-groomed, well-developed  HEAD:  Atraumatic, Normocephalic  EYES: EOMI, PERRLA, conjunctiva and sclera clear  ENMT: No tonsillar erythema, exudates, or enlargement; Moist mucous membranes, Good dentition, No lesions  NECK: Supple, No JVD, Normal thyroid  CHEST/LUNG: Clear to auscultation bilaterally; No rales, rhonchi, wheezing, or rubs  HEART: Regular rate and rhythm; No murmurs, rubs, or gallops  ABDOMEN: Soft, Nontender, Nondistended; Bowel sounds present  VASCULAR:  2+ Peripheral Pulses, No clubbing, cyanosis, or edema  LYMPH: No lymphadenopathy noted  SKIN: No rashes or lesions  NERVOUS SYSTEM:  Alert & Oriented X3, Good concentration; Motor Strength 5/5 B/L upper and lower extremities; DTRs 2+ intact and symmetric    HOSPITAL MEDICATIONS:  Standing Meds:  amLODIPine   Tablet 10 milliGRAM(s) Oral daily  aspirin  chewable 81 milliGRAM(s) Oral daily  atorvastatin 20 milliGRAM(s) Oral at bedtime  carvedilol 25 milliGRAM(s) Oral every 12 hours  dextrose 50% Injectable 25 Gram(s) IV Push once  dextrose 50% Injectable 12.5 Gram(s) IV Push once  dextrose 50% Injectable 25 Gram(s) IV Push once  furosemide   Injectable 40 milliGRAM(s) IV Push daily  glucagon  Injectable 1 milliGRAM(s) IntraMuscular once  heparin   Injectable 5000 Unit(s) SubCutaneous every 12 hours  hydrALAZINE 100 milliGRAM(s) Oral three times a day  insulin glargine Injectable (LANTUS) 2 Unit(s) SubCutaneous at bedtime  insulin lispro (ADMELOG) corrective regimen sliding scale   SubCutaneous three times a day before meals  insulin lispro (ADMELOG) corrective regimen sliding scale   SubCutaneous at bedtime  vancomycin    Solution 125 milliGRAM(s) Oral every 6 hours      PRN Meds:  acetaminophen     Tablet .. 650 milliGRAM(s) Oral every 6 hours PRN  dextrose Oral Gel 15 Gram(s) Oral once PRN      LABS:    The Labs were reviewed by me   The Radiology was reviewed by me    EKG tracing reviewed by me        137  |  99  |  25<H>  ----------------------------<  168<H>  3.7   |  28  |  1.28      134<L>  |  99  |  26<H>  ----------------------------<  124<H>  3.7   |  28  |  1.29      140  |  103  |  34<H>  ----------------------------<  139<H>  3.5   |  29  |  1.34<H>    Ca    8.3<L>      2022 06:31  Ca    8.0<L>      2022 11:05  Ca    8.4      2022 07:45  Phos  3.0       Mg     1.50         TPro  6.4  /  Alb  2.7<L>  /  TBili  0.3  /  DBili  x   /  AST  23  /  ALT  16  /  AlkPhos  89    TPro  6.0  /  Alb  2.5<L>  /  TBili  0.3  /  DBili  x   /  AST  19  /  ALT  12  /  AlkPhos  82  0724  TPro  6.0  /  Alb  2.5<L>  /  TBili  0.3  /  DBili  x   /  AST  19  /  ALT  13  /  AlkPhos  85  0723    Magnesium, Serum: 1.50 mg/dL (22 @ 06:31)  Magnesium, Serum: 1.60 mg/dL (22 @ 11:05)  Magnesium, Serum: 1.60 mg/dL (22 @ 11:05)    Phosphorus Level, Serum: 3.0 mg/dL (22 @ 06:31)  Phosphorus Level, Serum: 2.7 mg/dL (22 @ 11:05)  Phosphorus Level, Serum: 2.6 mg/dL (22 @ 11:05)                                              8.7    5.50  )-----------( 243      ( 2022 06:31 )             29.3                         8.6    5.19  )-----------( 248      ( 2022 11:05 )             29.2                         8.6    6.22  )-----------( 249      ( 2022 07:45 )             28.2     CAPILLARY BLOOD GLUCOSE      POCT Blood Glucose.: 147 mg/dL (2022 11:47)  POCT Blood Glucose.: 188 mg/dL (2022 08:29)  POCT Blood Glucose.: 178 mg/dL (2022 07:27)  POCT Blood Glucose.: 188 mg/dL (2022 21:31)  POCT Blood Glucose.: 127 mg/dL (2022 16:50)        MICROBIOLOGY:       RADIOLOGY:  [ ] Reviewed and interpreted by me    PULMONARY FUNCTION TESTS:    EKG:   CHIEF COMPLAINT:Patient is a 67y old  Female who presents with a chief complaint of AMs, shortness of breath (2022 08:13)      HPI:  Briefly, this is a 68 y/o F with pmhx of HTN, HLD, sarcoidosis, CHF, presented to the ED for new onset AMS. She was also found to have pleural effusion, elevated BNP and LE edema concerning for CHF exacerbation. Admitted to ICU initially for metabolic encephalopathy s/p intubation for airway protection (-). MICU course was c/b hypoglycemia with workup neg for adrenal insufficiency. Course c/b septic shock with no source (started on meropenem).     Was extubated on , was comfortable on RA with good mental status and was sent to the floors. On  AM pt was found to be agonally breathing and RRT was called. Pt went into PEA arrest, ROSC achieved after 4 mins, epix1 . Intubated by anesthesia. Was started on levo 0.1 and prop. Prior to leaving unit patient was waking up with high peak pressures on vent, she received 2mg of versed and became more synchronous with ventilator.  Upon arrival to CTI patient's blood pressures started to decrease despite increasing levo and started to become bradycardic. O2 sat remained at 100%. Went into PEA arrest again, epinephrine x1 given and ROSC was achieved at 4 mins. Likely from aspiration event?    MICU Course:  Patient slowly weaned off of sedation. Extubated to BIPAP and weaned to NC. Able to follow commands and answer questions. Completed meropenem. Of note, patient is hard of hearing and understands better if spoken to without a mask.  Hypoglycemia likely from poor PO intake, endo involved, currently on D5+LR     Floor course:  Transferred out of MICU .  CXR on  showing progression of effusion vs infiltrate. On room air, hemodynamically stable.  Has been receiving lasix 40 mg IVP daily, PO vanc for c diff.    She is unable to provide significant history but denies SOB.        PAST MEDICAL & SURGICAL HISTORY:  Type 2 diabetes mellitus      Bilateral cataracts      Fluid in pleural cavity associated with pancreatitis      Pancreatic pseudocyst/cyst  s/p drain placement and removal      HTN (hypertension)      HLD (hyperlipidemia)      History of amputation of right great toe        H/O:  section        History of laparoscopic cholecystectomy          FAMILY HISTORY:  No pertinent family history in first degree relatives        SOCIAL HISTORY:  Smoking: [ ] Never Smoked [ ] Former Smoker (__ packs x ___ years) [ ] Current Smoker  (__ packs x ___ years)  Substance Use: [ ] Never Used [ ] Used ____  EtOH Use:  Marital Status: [ ] Single [ ]  [ ]  [ ]   Sexual History:   Occupation:  Recent Travel:  Country of Birth:  Advance Directives:    Allergies    penicillin ([employer])    Intolerances        HOME MEDICATIONS:  Home Medications:  aspirin 81 mg oral delayed release tablet: 1 tab(s) orally once a day (2022 04:57)  atorvastatin 20 mg oral tablet: 1 tab(s) orally once a day (2022 04:57)  dexamethasone 0.1% ophthalmic suspension: 5 drop(s) to each affected ear once a day (2022 04:57)  insulin detemir 100 units/mL subcutaneous solution: 20 unit(s) subcutaneous once a day (at bedtime) (2022 04:57)      REVIEW OF SYSTEMS:  Constitutional: [ ] negative [ ] fevers [ ] chills [ ] weight loss [ ] weight gain  HEENT: [ ] negative [ ] dry eyes [ ] eye irritation [ ] postnasal drip [ ] nasal congestion  CV: [ ] negative  [ ] chest pain [ ] orthopnea [ ] palpitations [ ] murmur  Resp: [ ] negative [ ] cough [ ] shortness of breath [ ] dyspnea [ ] wheezing [ ] sputum [ ] hemoptysis  GI: [ ] negative [ ] nausea [ ] vomiting [ ] diarrhea [ ] constipation [ ] abd pain [ ] dysphagia   : [ ] negative [ ] dysuria [ ] nocturia [ ] hematuria [ ] increased urinary frequency  Musculoskeletal: [ ] negative [ ] back pain [ ] myalgias [ ] arthralgias [ ] fracture  Skin: [ ] negative [ ] rash [ ] itch  Neurological: [ ] negative [ ] headache [ ] dizziness [ ] syncope [ ] weakness [ ] numbness  Psychiatric: [ ] negative [ ] anxiety [ ] depression  Endocrine: [ ] negative [ ] diabetes [ ] thyroid problem  Hematologic/Lymphatic: [ ] negative [ ] anemia [ ] bleeding problem  Allergic/Immunologic: [ ] negative [ ] itchy eyes [ ] nasal discharge [ ] hives [ ] angioedema  [x] All other systems negative      OBJECTIVE:  ICU Vital Signs Last 24 Hrs  T(C): 36.6 (2022 12:08), Max: 36.8 (2022 17:37)  T(F): 97.8 (2022 12:08), Max: 98.3 (2022 17:37)  HR: 68 (2022 12:08) (68 - 79)  BP: 127/70 (2022 12:08) (127/70 - 153/73)  BP(mean): --  ABP: --  ABP(mean): --  RR: 17 (2022 12:08) (17 - 17)  SpO2: 97% (2022 12:08) (97% - 100%)    O2 Parameters below as of 2022 12:08  Patient On (Oxygen Delivery Method): room air               @ 07:  -   @ 07:00  --------------------------------------------------------  IN: 200 mL / OUT: 2450 mL / NET: -2250 mL     @ 07:  -   @ 15:42  --------------------------------------------------------  IN: 200 mL / OUT: 365 mL / NET: -165 mL      CAPILLARY BLOOD GLUCOSE      POCT Blood Glucose.: 147 mg/dL (2022 11:47)      PHYSICAL EXAM:  GENERAL: NAD  HEAD:  Atraumatic, Normocephalic  EYES: EOMI, PERRLA, conjunctiva and sclera clear  NECK: Supple  CHEST/LUNG: Clear to auscultation bilaterally; No rales, rhonchi, wheezing, or rubs  HEART: Regular rate and rhythm; No murmurs, rubs, or gallops  ABDOMEN: Soft, Nontender, Nondistended; Bowel sounds present  VASCULAR: No clubbing, cyanosis, or edema  SKIN: No rashes or lesions  NERVOUS SYSTEM: no gross deficits    HOSPITAL MEDICATIONS:  Standing Meds:  amLODIPine   Tablet 10 milliGRAM(s) Oral daily  aspirin  chewable 81 milliGRAM(s) Oral daily  atorvastatin 20 milliGRAM(s) Oral at bedtime  carvedilol 25 milliGRAM(s) Oral every 12 hours  dextrose 50% Injectable 25 Gram(s) IV Push once  dextrose 50% Injectable 12.5 Gram(s) IV Push once  dextrose 50% Injectable 25 Gram(s) IV Push once  furosemide   Injectable 40 milliGRAM(s) IV Push daily  glucagon  Injectable 1 milliGRAM(s) IntraMuscular once  heparin   Injectable 5000 Unit(s) SubCutaneous every 12 hours  hydrALAZINE 100 milliGRAM(s) Oral three times a day  insulin glargine Injectable (LANTUS) 2 Unit(s) SubCutaneous at bedtime  insulin lispro (ADMELOG) corrective regimen sliding scale   SubCutaneous three times a day before meals  insulin lispro (ADMELOG) corrective regimen sliding scale   SubCutaneous at bedtime  vancomycin    Solution 125 milliGRAM(s) Oral every 6 hours      PRN Meds:  acetaminophen     Tablet .. 650 milliGRAM(s) Oral every 6 hours PRN  dextrose Oral Gel 15 Gram(s) Oral once PRN      LABS:    The Labs were reviewed by me   The Radiology was reviewed by me    EKG tracing reviewed by me        137  |  99  |  25<H>  ----------------------------<  168<H>  3.7   |  28  |  1.28      134<L>  |  99  |  26<H>  ----------------------------<  124<H>  3.7   |  28  |  1.29      140  |  103  |  34<H>  ----------------------------<  139<H>  3.5   |  29  |  1.34<H>    Ca    8.3<L>      2022 06:31  Ca    8.0<L>      2022 11:05  Ca    8.4      2022 07:45  Phos  3.0     07-25  Mg     1.50         TPro  6.4  /  Alb  2.7<L>  /  TBili  0.3  /  DBili  x   /  AST  23  /  ALT  16  /  AlkPhos  89    TPro  6.0  /  Alb  2.5<L>  /  TBili  0.3  /  DBili  x   /  AST  19  /  ALT  12  /  AlkPhos  82  24  TPro  6.0  /  Alb  2.5<L>  /  TBili  0.3  /  DBili  x   /  AST  19  /  ALT  13  /  AlkPhos  85      Magnesium, Serum: 1.50 mg/dL (22 @ 06:31)  Magnesium, Serum: 1.60 mg/dL (22 @ 11:05)  Magnesium, Serum: 1.60 mg/dL (22 @ 11:05)    Phosphorus Level, Serum: 3.0 mg/dL (22 @ 06:31)  Phosphorus Level, Serum: 2.7 mg/dL (22 @ 11:05)  Phosphorus Level, Serum: 2.6 mg/dL (22 @ 11:05)                                              8.7    5.50  )-----------( 243      ( 2022 06:31 )             29.3                         8.6    5.19  )-----------( 248      ( 2022 11:05 )             29.2                         8.6    6.22  )-----------( 249      ( 2022 07:45 )             28.2     CAPILLARY BLOOD GLUCOSE      POCT Blood Glucose.: 147 mg/dL (2022 11:47)  POCT Blood Glucose.: 188 mg/dL (2022 08:29)  POCT Blood Glucose.: 178 mg/dL (2022 07:27)  POCT Blood Glucose.: 188 mg/dL (2022 21:31)  POCT Blood Glucose.: 127 mg/dL (2022 16:50)        MICROBIOLOGY:       RADIOLOGY:  [ ] Reviewed and interpreted by me    PULMONARY FUNCTION TESTS:    EKG:

## 2022-07-25 NOTE — PROGRESS NOTE ADULT - PROBLEM SELECTOR PLAN 7
SQH - c/w hydralazine 100mg TID, amlodipine 10 mg daily & carvedilol 25 mg BID  - c/w Lasix to IV 40mg qD - c/w hydralazine 100mg TID, amlodipine 10 mg daily & carvedilol 25 mg BID  - c/w Lasix to IV 40mg daily

## 2022-07-25 NOTE — PROGRESS NOTE ADULT - PROBLEM SELECTOR PLAN 6
A1c 7.3%. Goal < 7%; Home regimen: Lantus 20 units at night  - AI ruled out per endo  - f/u endo recs  - lantus 2 units Cr max 3.72  - Cr 1.28 now  - monitor Cr & renally dose meds

## 2022-07-25 NOTE — CHART NOTE - NSCHARTNOTEFT_GEN_A_CORE
: Dr. Rouse and Dr. Chambers    INDICATION: Left lung with effusion vs atelectasis    PROCEDURE:  [ ] LIMITED ECHO  [x ] LIMITED CHEST  [ ] LIMITED RETROPERITONEAL  [ ] LIMITED ABDOMINAL  [ ] LIMITED DVT  [ ] NEEDLE GUIDANCE VASCULAR  [ ] NEEDLE GUIDANCE THORACENTESIS  [ ] NEEDLE GUIDANCE PARACENTESIS  [ ] NEEDLE GUIDANCE PERICARDIOCENTESIS  [ ] OTHER    FINDINGS: A- line predominate on the anterior with consolidation of the lung towards the base. Trace effusion noted.       INTERPRETATION: Atelectatic lung vs pneumonia. Ultrasound does not suggest large pleural effusion.

## 2022-07-25 NOTE — PROGRESS NOTE ADULT - PROBLEM SELECTOR PLAN 3
Unclear history, per last admission pt was supposed to get EBUS and bronch outpt  - unclear if she followed up  - pt w/hx of sarcoidosis will need pulm in AM, unclear if pt followed up for outpt Bronch and EBUS since last admission in Feb. BNP on arrival: 4021 with evidence of fluid overload with pleural effusions, b/l pitting edema   - TTE (7/15/22): moderate LV systolic dysfunction, moderate diastolic dysfunction, RV enlargement with decreased RV systolic dysfunction  - TTE  (7/21/22): EF 65% with normal biventricular systolic function  - c/w lasix 40 IV daily   - daily weights and I&Os BNP on arrival: 4021 with evidence of fluid overload with pleural effusions, b/l pitting edema   - TTE (7/15/22): moderate LV systolic dysfunction, moderate diastolic dysfunction, RV enlargement with decreased RV systolic dysfunction  - TTE  (7/21/22): EF 65% with normal biventricular systolic function  - c/w Lasix 40 IV daily   - daily weights and strict I&Os BNP on arrival: 4021 with evidence of fluid overload with pleural effusions, b/l pitting edema seems given diuresis and fluids (IVC not c/w overload in ICU however intubated at that time)  - TTE (7/15/22): moderate LV systolic dysfunction, moderate diastolic dysfunction, RV enlargement with decreased RV systolic dysfunction  - TTE  (7/21/22): EF 65% with normal biventricular systolic function  - c/w Lasix 40 IV daily   - daily weights and strict I&Os

## 2022-07-25 NOTE — PROGRESS NOTE ADULT - ASSESSMENT
66 y/o  AAF with HTN, HLD, sarcoidosis, CHF, DM presented to the ED for new onset AMS. Patient suddenly became altered, confused and daughter noted she was slurring his speech. She was incoherent, and hallucinating, saying there is a cat present and is asking for her aunt who lives far away.  The patient known to have frequent admission for CHF exacerbations eventually intubated for hypoxic respiratory failure and now in MICU   MRI brain/IAC from 2021 with old R cerebellar infarct ; CTA h/N that time unremarkable. , A1c 9.2%  CT C/A with new large pleural effusions   CTH unremarakble   A1c 7.3  TTE was done   7/17 AM RRT, intubated, c/f aspiration PNA   7/19 eeg with slowing but no seizures   7/21 now extubated TONEY at least 4/5    7/22 follows commands, able to name objection (pen)   on floor, neuro exam and mental status near [facility] .  Impression: 1) AMS 2/2 hypercapneic resp failure 2) prior cerebellar stroke likely small vessel   - now off sedation and extubated, plan for step down    - asa 81 and statin therapy for secondary stroke prevention LDL goal < 70    - was on meropenum, c/f aspiration PNA; now off   - respiratory per ICU and pulmo   - h/o sarcoid, f/u pulmo   - telemetry  - PT/OT/SS/SLP, OOBC  - check FS, glucose control <180  - GI/DVT ppx  - Thank you for allowing me to participate in the care of this patient. Call with questions.       Russell Jimenes MD  Vascular Neurology  Office: [phone]

## 2022-07-25 NOTE — PROGRESS NOTE ADULT - SUBJECTIVE AND OBJECTIVE BOX
Chief Complaint: Type 2 DM    History: Pt seen at bedside. Hard of hearing. Pt tolerating oral intake. Pt denies nausea and vomiting/any signs of hypoglycemia. Pt reports an adequate diet     MEDICATIONS  (STANDING):  amLODIPine   Tablet 10 milliGRAM(s) Oral daily  aspirin  chewable 81 milliGRAM(s) Oral daily  atorvastatin 20 milliGRAM(s) Oral at bedtime  carvedilol 25 milliGRAM(s) Oral every 12 hours  dextrose 50% Injectable 25 Gram(s) IV Push once  dextrose 50% Injectable 12.5 Gram(s) IV Push once  dextrose 50% Injectable 25 Gram(s) IV Push once  furosemide   Injectable 40 milliGRAM(s) IV Push daily  glucagon  Injectable 1 milliGRAM(s) IntraMuscular once  heparin   Injectable 5000 Unit(s) SubCutaneous every 12 hours  hydrALAZINE 100 milliGRAM(s) Oral three times a day  insulin glargine Injectable (LANTUS) 2 Unit(s) SubCutaneous at bedtime  insulin lispro (ADMELOG) corrective regimen sliding scale   SubCutaneous three times a day before meals  insulin lispro (ADMELOG) corrective regimen sliding scale   SubCutaneous at bedtime  vancomycin    Solution 125 milliGRAM(s) Oral every 6 hours    MEDICATIONS  (PRN):  acetaminophen     Tablet .. 650 milliGRAM(s) Oral every 6 hours PRN Temp greater or equal to 38C (100.4F), Mild Pain (1 - 3)  dextrose Oral Gel 15 Gram(s) Oral once PRN Blood Glucose LESS THAN 70 milliGRAM(s)/deciliter      Allergies: penicillin ([place] Synd)    Review of Systems:  HEENT: No pain  Cardiovascular: No chest pain, palpitations  Respiratory: No SOB, no cough  GI: No nausea, vomiting, abdominal pain  Endocrine: no polyuria, polydipsia    PHYSICAL EXAM:  VITALS: T(C): 36.6 (07-25-22 @ 12:08)  T(F): 97.8 (07-25-22 @ 12:08), Max: 98.3 (07-24-22 @ 17:37)  HR: 68 (07-25-22 @ 12:08) (68 - 79)  BP: 127/70 (07-25-22 @ 12:08) (127/70 - 153/73)  RR:  (17 - 17)  SpO2:  (97% - 100%)  Wt(kg): --  GENERAL: NAD, well-groomed, well-developed  RESPIRATORY: No labored breathing   GI: Soft, nontender, non distended  PSYCH: Alert and oriented x 3, normal affect, normal mood      CAPILLARY BLOOD GLUCOSE  POCT Blood Glucose.: 200 mg/dL (25 Jul 2022 16:32)  POCT Blood Glucose.: 147 mg/dL (25 Jul 2022 11:47)  POCT Blood Glucose.: 188 mg/dL (25 Jul 2022 08:29)  POCT Blood Glucose.: 178 mg/dL (25 Jul 2022 07:27)  POCT Blood Glucose.: 188 mg/dL (24 Jul 2022 21:31)  POCT Blood Glucose.: 127 mg/dL (24 Jul 2022 16:50)    A1C with Estimated Average Glucose (06.05.22 @ 06:00)    A1C with Estimated Average Glucose Result: 7.3      07-25    137  |  99  |  25<H>  ----------------------------<  168<H>  3.7   |  28  |  1.28    eGFR: 46<L>    Ca    8.3<L>      07-25  Mg     1.50     07-25  Phos  3.0     07-25    TPro  6.4  /  Alb  2.7<L>  /  TBili  0.3  /  DBili  x   /  AST  23  /  ALT  16  /  AlkPhos  89  07-25      Thyroid Function Tests:  07-13 @ 04:10 TSH 3.37 FreeT4 -- T3 -- Anti TPO -- Anti Thyroglobulin Ab -- TSI --      Diet, Pureed:   Consistent Carbohydrate No Snacks (CSTCHO)  Supplement Feeding Modality:  Oral  Glucerna Shake Cans or Servings Per Day:  1       Frequency:  Three Times a day (07-21-22 @ 18:24) [Active]

## 2022-07-26 DIAGNOSIS — D64.9 ANEMIA, UNSPECIFIED: ICD-10-CM

## 2022-07-26 LAB
ANION GAP SERPL CALC-SCNC: 7 MMOL/L — SIGNIFICANT CHANGE UP (ref 7–14)
BASE EXCESS BLDV CALC-SCNC: 6.3 MMOL/L — HIGH (ref -2–3)
BLOOD GAS VENOUS COMPREHENSIVE RESULT: SIGNIFICANT CHANGE UP
BUN SERPL-MCNC: 26 MG/DL — HIGH (ref 7–23)
C3 SERPL-MCNC: 128 MG/DL — SIGNIFICANT CHANGE UP (ref 90–180)
C4 SERPL-MCNC: 29 MG/DL — SIGNIFICANT CHANGE UP (ref 10–40)
CALCIUM SERPL-MCNC: 8.3 MG/DL — LOW (ref 8.4–10.5)
CHLORIDE BLDV-SCNC: 102 MMOL/L — SIGNIFICANT CHANGE UP (ref 96–108)
CHLORIDE SERPL-SCNC: 100 MMOL/L — SIGNIFICANT CHANGE UP (ref 98–107)
CO2 BLDV-SCNC: 34.6 MMOL/L — HIGH (ref 22–26)
CO2 SERPL-SCNC: 29 MMOL/L — SIGNIFICANT CHANGE UP (ref 22–31)
CREAT SERPL-MCNC: 1.29 MG/DL — SIGNIFICANT CHANGE UP (ref 0.5–1.3)
EGFR: 45 ML/MIN/1.73M2 — LOW
GAS PNL BLDV: 137 MMOL/L — SIGNIFICANT CHANGE UP (ref 136–145)
GLUCOSE BLDC GLUCOMTR-MCNC: 114 MG/DL — HIGH (ref 70–99)
GLUCOSE BLDC GLUCOMTR-MCNC: 130 MG/DL — HIGH (ref 70–99)
GLUCOSE BLDC GLUCOMTR-MCNC: 134 MG/DL — HIGH (ref 70–99)
GLUCOSE BLDC GLUCOMTR-MCNC: 156 MG/DL — HIGH (ref 70–99)
GLUCOSE BLDV-MCNC: 156 MG/DL — HIGH (ref 70–99)
GLUCOSE SERPL-MCNC: 159 MG/DL — HIGH (ref 70–99)
HAPTOGLOB SERPL-MCNC: 196 MG/DL — SIGNIFICANT CHANGE UP (ref 34–200)
HBV SURFACE AG SER-ACNC: SIGNIFICANT CHANGE UP
HCO3 BLDV-SCNC: 33 MMOL/L — HIGH (ref 22–29)
HCT VFR BLD CALC: 29.7 % — LOW (ref 34.5–45)
HCT VFR BLDA CALC: 27 % — LOW (ref 34.5–46.5)
HGB BLD CALC-MCNC: 8.9 G/DL — LOW (ref 11.5–15.5)
HGB BLD-MCNC: 8.9 G/DL — LOW (ref 11.5–15.5)
LACTATE BLDV-MCNC: 0.6 MMOL/L — SIGNIFICANT CHANGE UP (ref 0.5–2)
LDH SERPL L TO P-CCNC: 293 U/L — HIGH (ref 135–225)
MAGNESIUM SERPL-MCNC: 1.5 MG/DL — LOW (ref 1.6–2.6)
MCHC RBC-ENTMCNC: 25.7 PG — LOW (ref 27–34)
MCHC RBC-ENTMCNC: 30 GM/DL — LOW (ref 32–36)
MCV RBC AUTO: 85.8 FL — SIGNIFICANT CHANGE UP (ref 80–100)
NRBC # BLD: 0 /100 WBCS — SIGNIFICANT CHANGE UP
NRBC # FLD: 0 K/UL — SIGNIFICANT CHANGE UP
PCO2 BLDV: 58 MMHG — HIGH (ref 39–42)
PH BLDV: 7.36 — SIGNIFICANT CHANGE UP (ref 7.32–7.43)
PHOSPHATE SERPL-MCNC: 3.4 MG/DL — SIGNIFICANT CHANGE UP (ref 2.5–4.5)
PLATELET # BLD AUTO: 219 K/UL — SIGNIFICANT CHANGE UP (ref 150–400)
PO2 BLDV: 59 MMHG — SIGNIFICANT CHANGE UP
POTASSIUM BLDV-SCNC: 3.6 MMOL/L — SIGNIFICANT CHANGE UP (ref 3.5–5.1)
POTASSIUM SERPL-MCNC: 3.6 MMOL/L — SIGNIFICANT CHANGE UP (ref 3.5–5.3)
POTASSIUM SERPL-SCNC: 3.6 MMOL/L — SIGNIFICANT CHANGE UP (ref 3.5–5.3)
RBC # BLD: 3.46 M/UL — LOW (ref 3.8–5.2)
RBC # BLD: 3.46 M/UL — LOW (ref 3.8–5.2)
RBC # FLD: 15.1 % — HIGH (ref 10.3–14.5)
RETICS #: 60.2 K/UL — SIGNIFICANT CHANGE UP (ref 25–125)
RETICS/RBC NFR: 1.7 % — SIGNIFICANT CHANGE UP (ref 0.5–2.5)
SAO2 % BLDV: 89.3 % — SIGNIFICANT CHANGE UP
SODIUM SERPL-SCNC: 136 MMOL/L — SIGNIFICANT CHANGE UP (ref 135–145)
WBC # BLD: 3.86 K/UL — SIGNIFICANT CHANGE UP (ref 3.8–10.5)
WBC # FLD AUTO: 3.86 K/UL — SIGNIFICANT CHANGE UP (ref 3.8–10.5)

## 2022-07-26 PROCEDURE — 99232 SBSQ HOSP IP/OBS MODERATE 35: CPT

## 2022-07-26 PROCEDURE — 99233 SBSQ HOSP IP/OBS HIGH 50: CPT

## 2022-07-26 RX ORDER — INSULIN GLARGINE 100 [IU]/ML
2 INJECTION, SOLUTION SUBCUTANEOUS AT BEDTIME
Refills: 0 | Status: DISCONTINUED | OUTPATIENT
Start: 2022-07-26 | End: 2022-08-05

## 2022-07-26 RX ORDER — FUROSEMIDE 40 MG
40 TABLET ORAL DAILY
Refills: 0 | Status: DISCONTINUED | OUTPATIENT
Start: 2022-07-27 | End: 2022-07-30

## 2022-07-26 RX ORDER — INSULIN GLARGINE 100 [IU]/ML
7 INJECTION, SOLUTION SUBCUTANEOUS AT BEDTIME
Refills: 0 | Status: DISCONTINUED | OUTPATIENT
Start: 2022-07-26 | End: 2022-07-26

## 2022-07-26 RX ORDER — ENOXAPARIN SODIUM 100 MG/ML
40 INJECTION SUBCUTANEOUS EVERY 24 HOURS
Refills: 0 | Status: DISCONTINUED | OUTPATIENT
Start: 2022-07-26 | End: 2022-08-05

## 2022-07-26 RX ORDER — INSULIN GLARGINE 100 [IU]/ML
5 INJECTION, SOLUTION SUBCUTANEOUS AT BEDTIME
Refills: 0 | Status: DISCONTINUED | OUTPATIENT
Start: 2022-07-26 | End: 2022-07-26

## 2022-07-26 RX ORDER — MAGNESIUM SULFATE 500 MG/ML
1 VIAL (ML) INJECTION ONCE
Refills: 0 | Status: COMPLETED | OUTPATIENT
Start: 2022-07-26 | End: 2022-07-26

## 2022-07-26 RX ORDER — LANOLIN ALCOHOL/MO/W.PET/CERES
6 CREAM (GRAM) TOPICAL AT BEDTIME
Refills: 0 | Status: DISCONTINUED | OUTPATIENT
Start: 2022-07-26 | End: 2022-08-05

## 2022-07-26 RX ADMIN — Medication 125 MILLIGRAM(S): at 13:52

## 2022-07-26 RX ADMIN — CARVEDILOL PHOSPHATE 25 MILLIGRAM(S): 80 CAPSULE, EXTENDED RELEASE ORAL at 22:49

## 2022-07-26 RX ADMIN — Medication 81 MILLIGRAM(S): at 13:52

## 2022-07-26 RX ADMIN — Medication 1: at 08:11

## 2022-07-26 RX ADMIN — Medication 40 MILLIGRAM(S): at 06:47

## 2022-07-26 RX ADMIN — Medication 100 MILLIGRAM(S): at 13:54

## 2022-07-26 RX ADMIN — Medication 6 MILLIGRAM(S): at 22:48

## 2022-07-26 RX ADMIN — Medication 125 MILLIGRAM(S): at 06:36

## 2022-07-26 RX ADMIN — Medication 100 GRAM(S): at 13:53

## 2022-07-26 RX ADMIN — Medication 100 MILLIGRAM(S): at 22:49

## 2022-07-26 RX ADMIN — ENOXAPARIN SODIUM 40 MILLIGRAM(S): 100 INJECTION SUBCUTANEOUS at 13:51

## 2022-07-26 RX ADMIN — ATORVASTATIN CALCIUM 20 MILLIGRAM(S): 80 TABLET, FILM COATED ORAL at 22:50

## 2022-07-26 RX ADMIN — AMLODIPINE BESYLATE 10 MILLIGRAM(S): 2.5 TABLET ORAL at 13:54

## 2022-07-26 RX ADMIN — INSULIN GLARGINE 2 UNIT(S): 100 INJECTION, SOLUTION SUBCUTANEOUS at 22:43

## 2022-07-26 NOTE — PROGRESS NOTE ADULT - PROBLEM SELECTOR PLAN 9
A1c 7.3%. Goal < 7%;  home regimen: lantus 20 units at night; had episodes of hypoglyemia while in ICU  - AI ruled out per endo s/p stim  - on Lantus 2-->7 units  - endo following A1c 7.3%. Goal < 7%;  home regimen: lantus 20 units at night; had episodes of hypoglyemia while in ICU  - AI ruled out per endo s/p stim in context of hypoglycemia   - on Lantus 2  - endo following

## 2022-07-26 NOTE — PROGRESS NOTE ADULT - ASSESSMENT
67F [place], HTN, HLD, sarcoidosis, HFpEF p/w lethargy/SOB/swelling/AMS admitted with metabolic encephalopathy due to acute hypercarbic respiratory failure in context of CHF exacerbation. s/p intubation and MICU admission 7/13 for hypercapnia/airway protection was extubated 7/16  (s/p empiric meropenem 7/13 - 7/20 for concern for sepsis given hypothermia/AMS however neg infectious w/u) course c/b PEA arrest on 7/17 pt re-intubated s/p second PEA arrest when arrived to CTICU now transferred to medicine on 7/23 s/p being extubated.    Course now c/b c diff.

## 2022-07-26 NOTE — PROGRESS NOTE ADULT - PROBLEM SELECTOR PLAN 1
Had rectal tube and diarrhea c diff sent 7/24 +, s/p recent abx exposure while admitted   - PO vanco x 10 days (7/25-), D2  - monitor stool consistency

## 2022-07-26 NOTE — PROGRESS NOTE ADULT - PROBLEM SELECTOR PLAN 2
Presented with R>L effusion, x-ray on 7/24 w/ L effusion vs infiltrate, pulm did POCUS on 7/25 no large left effusion  - currently on RA  - s/p IV diuresis, remains on Lasix 40 IV daily   - s/p meropenum 7/13-7/20  - pulm consult appreciated Presented with R>L effusion, x-ray on 7/24 w/ L effusion vs infiltrate, pulm did POCUS on 7/25 no large left effusion  - currently on RA  - s/p IV diuresis, remains on Lasix 40 IV daily transiton to 40 mg PO daily   - s/p meropenum 7/13-7/20  - pulm consult appreciated  - c/w incentive spirometry

## 2022-07-26 NOTE — PROGRESS NOTE ADULT - SUBJECTIVE AND OBJECTIVE BOX
Patient is a 67y old  Female who presents with a chief complaint of AMs, shortness of breath    SUBJECTIVE / OVERNIGHT EVENTS:    [place], was sleeping on awaking seemed confused stated she need to get to work and asking for french fries, was A&Ox3  per RN also had stated in am when getting blood draw that it was raining in the room  No other complaints illicited--no CP, SOB, f/c/n/v,  unclear re: BMs/diarrhea      MEDICATIONS  (STANDING):  amLODIPine   Tablet 10 milliGRAM(s) Oral daily  aspirin  chewable 81 milliGRAM(s) Oral daily  atorvastatin 20 milliGRAM(s) Oral at bedtime  carvedilol 25 milliGRAM(s) Oral every 12 hours  furosemide   Injectable 40 milliGRAM(s) IV Push daily  glucagon  Injectable 1 milliGRAM(s) IntraMuscular once  heparin   Injectable 5000 Unit(s) SubCutaneous every 12 hours  hydrALAZINE 100 milliGRAM(s) Oral three times a day  insulin glargine Injectable (LANTUS) 7 Unit(s) SubCutaneous at bedtime  insulin lispro (ADMELOG) corrective regimen sliding scale   SubCutaneous three times a day before meals  insulin lispro (ADMELOG) corrective regimen sliding scale   SubCutaneous at bedtime  lactated ringers. 500 milliLiter(s) (125 mL/Hr) IV Continuous <Continuous>  magnesium sulfate  IVPB 1 Gram(s) IV Intermittent once  vancomycin    Solution 125 milliGRAM(s) Oral every 6 hours    MEDICATIONS  (PRN):  acetaminophen     Tablet .. 650 milliGRAM(s) Oral every 6 hours PRN Temp greater or equal to 38C (100.4F), Mild Pain (1 - 3)  dextrose Oral Gel 15 Gram(s) Oral once PRN Blood Glucose LESS THAN 70 milliGRAM(s)/deciliter  melatonin 3 milliGRAM(s) Oral at bedtime PRN Insomnia    T(C): 36.9 (07-26-22 @ 06:40), Max: 36.9 (07-26-22 @ 06:40)  HR: 68 (07-26-22 @ 06:40) (65 - 70)  BP: 138/70 (07-26-22 @ 06:40) (118/67 - 138/70)  RR: 18 (07-26-22 @ 06:40) (18 - 18)  SpO2: 96% (07-26-22 @ 06:40) (96% - 100%)    CAPILLARY BLOOD GLUCOSE  POCT Blood Glucose.: 130 mg/dL (26 Jul 2022 11:52)  POCT Blood Glucose.: 156 mg/dL (26 Jul 2022 07:42)  POCT Blood Glucose.: 197 mg/dL (25 Jul 2022 20:36)  POCT Blood Glucose.: 200 mg/dL (25 Jul 2022 16:32)    I&O's Summary    25 Jul 2022 07:01  -  26 Jul 2022 07:00  --------------------------------------------------------  IN: 400 mL / OUT: 1023 mL / NET: -623 mL    PHYSICAL EXAM:  GENERAL: obese, on RA  CHEST/LUNG: decreased BS, no wheeze   HEART: Regular rate and rhythm; No murmurs, rubs, or gallops  ABDOMEN: Soft, Nontender, Nondistended; Bowel sounds present  EXTREMITIES:   warm and well perfused, No clubbing, cyanosis, or edema  PSYCH: AAOx3, very [place]  NEUROLOGY: non-focal  SKIN: blister on skin on upper/thigh RLE    LABS:                        8.9    3.86  )-----------( 219      ( 26 Jul 2022 07:01 )             29.7     07-26    136  |  100  |  26<H>  ----------------------------<  159<H>  3.6   |  29  |  1.29    Ca    8.3<L>      26 Jul 2022 07:01  Phos  3.4     07-26  Mg     1.50     07-26    TPro  6.4  /  Alb  2.7<L>  /  TBili  0.3  /  DBili  x   /  AST  23  /  ALT  16  /  AlkPhos  89  07-25    Microbiology: Culture Results:   Culture is being performed. (07-19 @ 16:36)    VBG 07-26 @ 05:50  pH: 7.36/pCO2: 58/pO2: 59/HCO3: 33/lactate: 0.6    Consultant(s) Notes Reviewed:  pulm   Care Discussed with Consultants/Other Providers: pulm fellow

## 2022-07-26 NOTE — PROGRESS NOTE ADULT - PROBLEM SELECTOR PLAN 4
- EEG with slowing but no seizures   - CTH neg  - per neuro AMS 2/2 hypercapnic respiratory failure, prior cerebellar stroke likely small vessel  - MS was better, this am pt confused/delirious, VBG w/ mild compensated hypercapnia discussed w/ pulm re: how to assess need for overnight CPAP/bipap etc

## 2022-07-26 NOTE — PROGRESS NOTE ADULT - SUBJECTIVE AND OBJECTIVE BOX
Chief Complaint:  T2DM, controlled; Hypoglycemia     History: Pt seen at bedside. Daughter at bedside. Pt sleeping at time of visit. Daughter requested for pt not to be awake. As per RN and daughter pt has no signs of nausea and vomiting/any signs of hypoglycemia. Daughter and RN report a fair appetite    MEDICATIONS  (STANDING):  amLODIPine   Tablet 10 milliGRAM(s) Oral daily  aspirin  chewable 81 milliGRAM(s) Oral daily  atorvastatin 20 milliGRAM(s) Oral at bedtime  carvedilol 25 milliGRAM(s) Oral every 12 hours  dextrose 50% Injectable 25 Gram(s) IV Push once  dextrose 50% Injectable 12.5 Gram(s) IV Push once  dextrose 50% Injectable 25 Gram(s) IV Push once  enoxaparin Injectable 40 milliGRAM(s) SubCutaneous every 24 hours  glucagon  Injectable 1 milliGRAM(s) IntraMuscular once  hydrALAZINE 100 milliGRAM(s) Oral three times a day  insulin glargine Injectable (LANTUS) 5 Unit(s) SubCutaneous at bedtime  insulin lispro (ADMELOG) corrective regimen sliding scale   SubCutaneous three times a day before meals  insulin lispro (ADMELOG) corrective regimen sliding scale   SubCutaneous at bedtime  vancomycin    Solution 125 milliGRAM(s) Oral every 6 hours    MEDICATIONS  (PRN):  acetaminophen     Tablet .. 650 milliGRAM(s) Oral every 6 hours PRN Temp greater or equal to 38C (100.4F), Mild Pain (1 - 3)  dextrose Oral Gel 15 Gram(s) Oral once PRN Blood Glucose LESS THAN 70 milliGRAM(s)/deciliter  melatonin 3 milliGRAM(s) Oral at bedtime PRN Insomnia      Allergies: Penicillin ([place] Synd)      Review of Systems:  HEENT: No pain  Cardiovascular: No chest pain, palpitations  Respiratory: No SOB, no cough  GI: No nausea, vomiting, abdominal pain  Endocrine: no polyuria, polydipsia    PHYSICAL EXAM:  VITALS: T(C): 36.8 (07-26-22 @ 14:22)  T(F): 98.2 (07-26-22 @ 14:22), Max: 98.4 (07-26-22 @ 06:40)  HR: 69 (07-26-22 @ 14:22) (61 - 70)  BP: 130/62 (07-26-22 @ 14:22) (118/67 - 138/70)  RR:  (17 - 18)  SpO2:  (96% - 100%)  Wt(kg): --  GENERAL: NAD, well-groomed, well-developed  RESPIRATORY: No labored breathing   GI: Soft, nontender, non distended  PSYCH: Alert and oriented x 3, normal affect, normal mood      CAPILLARY BLOOD GLUCOSE  POCT Blood Glucose.: 114 mg/dL (26 Jul 2022 16:53)  POCT Blood Glucose.: 130 mg/dL (26 Jul 2022 11:52)  POCT Blood Glucose.: 156 mg/dL (26 Jul 2022 07:42)  POCT Blood Glucose.: 197 mg/dL (25 Jul 2022 20:36)    A1C with Estimated Average Glucose (06.05.22 @ 06:00)    A1C with Estimated Average Glucose Result: 7.3      07-26    136  |  100  |  26<H>  ----------------------------<  159<H>  3.6   |  29  |  1.29    eGFR: 45<L>    Ca    8.3<L>      07-26  Mg     1.50     07-26  Phos  3.4     07-26    TPro  6.4  /  Alb  2.7<L>  /  TBili  0.3  /  DBili  x   /  AST  23  /  ALT  16  /  AlkPhos  89  07-25      Thyroid Function Tests:  07-13 @ 04:10 TSH 3.37 FreeT4 -- T3 -- Anti TPO -- Anti Thyroglobulin Ab -- TSI --      Diet, Pureed:   Consistent Carbohydrate No Snacks (CSTCHO)  Supplement Feeding Modality:  Oral  Glucerna Shake Cans or Servings Per Day:  1       Frequency:  Three Times a day (07-21-22 @ 18:24) [Active]

## 2022-07-26 NOTE — PROGRESS NOTE ADULT - PROBLEM SELECTOR PLAN 8
- c/w hydralazine 100mg TID, amlodipine 10 mg daily & carvedilol 25 mg BID  - Lasix to IV 40mg daily-->lasix 40 mg PO

## 2022-07-26 NOTE — PROGRESS NOTE ADULT - PROBLEM SELECTOR PLAN 3
BNP on arrival: 4021 with evidence of fluid overload with pleural effusions, b/l pitting edema seems given diuresis and fluids (IVC not c/w overload in ICU however intubated at that time)  - TTE (7/15/22): moderate LV systolic dysfunction, moderate diastolic dysfunction, RV enlargement with decreased RV systolic dysfunction  - TTE  (7/21/22): EF 65% with normal biventricular systolic function  - c/w Lasix 40 IV daily   - daily weights and strict I&Os BNP on arrival: 4021 with evidence of fluid overload with pleural effusions, b/l pitting edema seems given diuresis and fluids (IVC not c/w overload in ICU however intubated at that time)  - TTE (7/15/22): moderate LV systolic dysfunction, moderate diastolic dysfunction, RV enlargement with decreased RV systolic dysfunction  - TTE  (7/21/22): EF 65% with normal biventricular systolic function  - c/w Lasix 40 IV daily-->40 mg PO  - daily weights and strict I&Os

## 2022-07-26 NOTE — PROGRESS NOTE ADULT - ASSESSMENT
This is a 66 yo F /w a PMH OF DM2 (A1c 7.3% on this admission), CHF, sarcoidosis who presents with AMS c/b hypercapnic respiratory failure and CHF exacerbation requiring intubation. Endocrine consulted for: Persistent hypoglycemia  Home Regimen: Lantus 20units sq qhs     1. Hypoglycemia  Noted on day of admission, possibly related to home doses of insulin that patient was taking, poor PO intake, and low GFR  Concern for AI as below -->ruled out  Hypoglycemia now resolved     2. R/o Adrenal Insufficiency  AM cortisol returned 7.1. ACTH stimulation performed showed robust response (22 and 24 at 30 minute and 60 minute frances respectively). A value > 18 rules out primary AI (not necessarily secondary AI).   Repeat AM cortisol was normal at 16.1.   TFTs are WDL  AI ruled out, hypoglycemia not related to adrenal cause, no need for steroids at this time     3. Metabolic Acidosis   Initial acidosis likely due to starvation and renal related; BHB 3.1 elevated; was given D5 IVF overnight, PO diet started and Lantus 5 units administered   BG stable, AG/BHB significantly improved today  Continue to deanne  Resolved     4. T2DM, controlled  A1c 7.3%. Goal < 7%  Home regimen: Lantus 20 units at night  Cpeptide 0.9, repeat 5.0    While inpatient:   BG target 100-180 mg/dl; Glucose stable today  Continue Lantus to 2 units SQ qHS (please give 80% of dose if NPO)  No Admelog pre-meal insulin for now  Continue Admelog low dose correctional scale before meals, low dose at bedtime  Check BG before meals and bedtime  Consistent carbohydrate diet   Hypoglycemia protocol   Please assist with meals  Provider to RN: Insulin pen administration with patient and daughter with teach back ; Daughter agrees to help mom with insulin pen administration    Discharge Plan:   TBD based on insulin requirements, may be able to resume basal insulin alone (at adjusted dose)  Note oral agents limited in ESRD  Ensure patient has working glucometer, test strips, lancets, alcohol pads  Please also prescribe glucose tabs, Baqsimi nasal or Glucagon emergency kit for hypoglycemia risk   Follow up with PCP, podiatry and opthalmology as an outpatient      5. HTN  BP Goal < 130/80  Above goal on Coreg, Hydralazine, Amlodipine   continue management per primary team    6. HLD  LDL goal < 70  Continue Atorvastatin 20 mg daily if no contraindications   outpt lipid panel    D/w Dr. Bela Penaloza  Nurse Practitioner  Division of Endocrinology & Diabetes  In house pager #[phone]    If before 9AM or after 6PM, or on weekends/holidays, please call endocrine answering service for assistance ([phone]).For nonurgent matters email [email or web address] for assistance.

## 2022-07-26 NOTE — PROGRESS NOTE ADULT - PROBLEM SELECTOR PLAN 5
Normocytic, no s/s of bleeding, iron studies c/w AOCD, no evidence of hemolysis, normal B12/folate, low reticulocyte  - should f/u as OP w/ heme  - ?related to sarcoid

## 2022-07-26 NOTE — PROGRESS NOTE ADULT - PROBLEM SELECTOR PLAN 6
Saw pulm in Feb s/p bronch, daughter states confirmed diagnosis and was due for continued OP f/u   - f/u pulm

## 2022-07-27 LAB
ANION GAP SERPL CALC-SCNC: 9 MMOL/L — SIGNIFICANT CHANGE UP (ref 7–14)
BASE EXCESS BLDV CALC-SCNC: 5.3 MMOL/L — HIGH (ref -2–3)
BUN SERPL-MCNC: 21 MG/DL — SIGNIFICANT CHANGE UP (ref 7–23)
CALCIUM SERPL-MCNC: 8.4 MG/DL — SIGNIFICANT CHANGE UP (ref 8.4–10.5)
CHLORIDE SERPL-SCNC: 100 MMOL/L — SIGNIFICANT CHANGE UP (ref 98–107)
CO2 BLDV-SCNC: 33.6 MMOL/L — HIGH (ref 22–26)
CO2 SERPL-SCNC: 30 MMOL/L — SIGNIFICANT CHANGE UP (ref 22–31)
CREAT ?TM UR-MCNC: 19 MG/DL — SIGNIFICANT CHANGE UP
CREAT SERPL-MCNC: 1.11 MG/DL — SIGNIFICANT CHANGE UP (ref 0.5–1.3)
EGFR: 54 ML/MIN/1.73M2 — LOW
GLUCOSE BLDC GLUCOMTR-MCNC: 122 MG/DL — HIGH (ref 70–99)
GLUCOSE BLDC GLUCOMTR-MCNC: 131 MG/DL — HIGH (ref 70–99)
GLUCOSE BLDC GLUCOMTR-MCNC: 144 MG/DL — HIGH (ref 70–99)
GLUCOSE BLDC GLUCOMTR-MCNC: 208 MG/DL — HIGH (ref 70–99)
GLUCOSE SERPL-MCNC: 129 MG/DL — HIGH (ref 70–99)
HCO3 BLDV-SCNC: 32 MMOL/L — HIGH (ref 22–29)
HCT VFR BLD CALC: 28.2 % — LOW (ref 34.5–45)
HGB BLD-MCNC: 8.6 G/DL — LOW (ref 11.5–15.5)
HIV 1+2 AB+HIV1 P24 AG SERPL QL IA: SIGNIFICANT CHANGE UP
MAGNESIUM SERPL-MCNC: 1.7 MG/DL — SIGNIFICANT CHANGE UP (ref 1.6–2.6)
MCHC RBC-ENTMCNC: 25.8 PG — LOW (ref 27–34)
MCHC RBC-ENTMCNC: 30.5 GM/DL — LOW (ref 32–36)
MCV RBC AUTO: 84.7 FL — SIGNIFICANT CHANGE UP (ref 80–100)
NRBC # BLD: 0 /100 WBCS — SIGNIFICANT CHANGE UP
NRBC # FLD: 0 K/UL — SIGNIFICANT CHANGE UP
PCO2 BLDV: 54 MMHG — HIGH (ref 39–42)
PH BLDV: 7.38 — SIGNIFICANT CHANGE UP (ref 7.32–7.43)
PLATELET # BLD AUTO: 240 K/UL — SIGNIFICANT CHANGE UP (ref 150–400)
PO2 BLDV: 64 MMHG — SIGNIFICANT CHANGE UP
POTASSIUM SERPL-MCNC: 3.4 MMOL/L — LOW (ref 3.5–5.3)
POTASSIUM SERPL-SCNC: 3.4 MMOL/L — LOW (ref 3.5–5.3)
PROT ?TM UR-MCNC: 201 MG/DL — SIGNIFICANT CHANGE UP
PROT SERPL-MCNC: 6.3 G/DL — SIGNIFICANT CHANGE UP (ref 6–8.3)
PROT/CREAT UR-RTO: 10.6 RATIO — HIGH (ref 0–0.2)
RBC # BLD: 3.33 M/UL — LOW (ref 3.8–5.2)
RBC # FLD: 15.1 % — HIGH (ref 10.3–14.5)
SAO2 % BLDV: 92.7 % — SIGNIFICANT CHANGE UP
SODIUM SERPL-SCNC: 139 MMOL/L — SIGNIFICANT CHANGE UP (ref 135–145)
WBC # BLD: 3.29 K/UL — LOW (ref 3.8–10.5)
WBC # FLD AUTO: 3.29 K/UL — LOW (ref 3.8–10.5)

## 2022-07-27 PROCEDURE — 99233 SBSQ HOSP IP/OBS HIGH 50: CPT

## 2022-07-27 PROCEDURE — 84165 PROTEIN E-PHORESIS SERUM: CPT | Mod: 26

## 2022-07-27 PROCEDURE — 86334 IMMUNOFIX E-PHORESIS SERUM: CPT | Mod: 26

## 2022-07-27 PROCEDURE — 71045 X-RAY EXAM CHEST 1 VIEW: CPT | Mod: 26

## 2022-07-27 PROCEDURE — 99232 SBSQ HOSP IP/OBS MODERATE 35: CPT

## 2022-07-27 RX ORDER — POTASSIUM CHLORIDE 20 MEQ
20 PACKET (EA) ORAL ONCE
Refills: 0 | Status: COMPLETED | OUTPATIENT
Start: 2022-07-27 | End: 2022-07-27

## 2022-07-27 RX ORDER — LOSARTAN POTASSIUM 100 MG/1
25 TABLET, FILM COATED ORAL DAILY
Refills: 0 | Status: DISCONTINUED | OUTPATIENT
Start: 2022-07-27 | End: 2022-07-28

## 2022-07-27 RX ORDER — POTASSIUM CHLORIDE 20 MEQ
20 PACKET (EA) ORAL
Refills: 0 | Status: COMPLETED | OUTPATIENT
Start: 2022-07-27 | End: 2022-07-27

## 2022-07-27 RX ADMIN — Medication 650 MILLIGRAM(S): at 14:33

## 2022-07-27 RX ADMIN — AMLODIPINE BESYLATE 10 MILLIGRAM(S): 2.5 TABLET ORAL at 06:31

## 2022-07-27 RX ADMIN — Medication 100 MILLIGRAM(S): at 22:08

## 2022-07-27 RX ADMIN — Medication 20 MILLIEQUIVALENT(S): at 12:58

## 2022-07-27 RX ADMIN — Medication 125 MILLIGRAM(S): at 23:44

## 2022-07-27 RX ADMIN — Medication 6 MILLIGRAM(S): at 22:09

## 2022-07-27 RX ADMIN — Medication 100 MILLIGRAM(S): at 12:59

## 2022-07-27 RX ADMIN — Medication 40 MILLIGRAM(S): at 06:31

## 2022-07-27 RX ADMIN — LOSARTAN POTASSIUM 25 MILLIGRAM(S): 100 TABLET, FILM COATED ORAL at 08:54

## 2022-07-27 RX ADMIN — CARVEDILOL PHOSPHATE 25 MILLIGRAM(S): 80 CAPSULE, EXTENDED RELEASE ORAL at 08:57

## 2022-07-27 RX ADMIN — Medication 100 MILLIGRAM(S): at 06:31

## 2022-07-27 RX ADMIN — Medication 81 MILLIGRAM(S): at 12:58

## 2022-07-27 RX ADMIN — Medication 125 MILLIGRAM(S): at 12:58

## 2022-07-27 RX ADMIN — Medication 125 MILLIGRAM(S): at 17:04

## 2022-07-27 RX ADMIN — Medication 125 MILLIGRAM(S): at 06:32

## 2022-07-27 RX ADMIN — Medication 650 MILLIGRAM(S): at 17:38

## 2022-07-27 RX ADMIN — Medication 20 MILLIEQUIVALENT(S): at 08:56

## 2022-07-27 RX ADMIN — CARVEDILOL PHOSPHATE 25 MILLIGRAM(S): 80 CAPSULE, EXTENDED RELEASE ORAL at 17:03

## 2022-07-27 RX ADMIN — INSULIN GLARGINE 2 UNIT(S): 100 INJECTION, SOLUTION SUBCUTANEOUS at 22:08

## 2022-07-27 RX ADMIN — ENOXAPARIN SODIUM 40 MILLIGRAM(S): 100 INJECTION SUBCUTANEOUS at 12:59

## 2022-07-27 RX ADMIN — ATORVASTATIN CALCIUM 20 MILLIGRAM(S): 80 TABLET, FILM COATED ORAL at 22:09

## 2022-07-27 RX ADMIN — Medication 20 MILLIEQUIVALENT(S): at 11:08

## 2022-07-27 NOTE — SWALLOW BEDSIDE ASSESSMENT ADULT - ASR SWALLOW ASPIRATION MONITOR
change of breathing pattern/gurgly voice/fever/pneumonia/throat clearing/upper respiratory infection

## 2022-07-27 NOTE — PROGRESS NOTE ADULT - PROBLEM SELECTOR PLAN 8
- c/w hydralazine 100mg TID, amlodipine 10 mg daily & carvedilol 25 mg BID  - c/w lasix 40 mg  - add losartan 25 mg for BP above goal now that MANDY resolved and proteinuria

## 2022-07-27 NOTE — PROGRESS NOTE ADULT - ASSESSMENT
67F [place], HTN, HLD, sarcoidosis, HFpEF p/w lethargy/SOB/swelling/AMS admitted with metabolic encephalopathy due to acute hypercarbic respiratory failure in context of CHF exacerbation. s/p intubation and MICU admission 7/13 for hypercapnia/airway protection was extubated 7/16  (s/p empiric meropenem 7/13 - 7/20 for concern for sepsis given hypothermia/AMS however neg infectious w/u) course c/b PEA arrest on 7/17 pt re-intubated s/p second PEA arrest when arrived to CTICU now transferred to medicine on 7/23 s/p being extubated.  Course now c/b c diff.

## 2022-07-27 NOTE — PROGRESS NOTE ADULT - PROBLEM SELECTOR PLAN 1
Had rectal tube and diarrhea c diff sent 7/24 +, s/p recent abx exposure while admitted   - PO vanco x 10 days (7/25-), D3/10  - no diarrhea, no BM x 2 days  - tolerating diet, no abdominal pain, nausea/vomiting

## 2022-07-27 NOTE — SWALLOW BEDSIDE ASSESSMENT ADULT - ASR SWALLOW RECOMMEND DIAG
MD to consider cinesophagram if concern for aspiration pneumonia given chest imaging/VFSS/MBS
Objective testing NOT warranted given no overt signs of penetration/aspiration and CXR does NOT indicate pneumonia.

## 2022-07-27 NOTE — SWALLOW BEDSIDE ASSESSMENT ADULT - SWALLOW EVAL: DIAGNOSIS
1. Functional oral stage for puree and thin liquids marked by adequate oral acceptance, containment, collection and transfer. 2. Functional pharyngeal phase for puree and thin liquids marked by a present pharyngeal swallow trigger with hyolaryngeal elevation noted upon digital palpation without evidence of airway penetration/aspiration. Of note, patient refused/declined PO trials of solids despite encouragement.
1) Functional oral stage of swallow for regular solids, puree, mildly thick, and thin fluids marked by adequate oral containment, bolus manipulation, mastication, and coordination.  Anterior to posterior oral transit/transfer noted. Adequate oral clearance observed. 2) Functional pharyngeal stage of swallow for regular solids, puree, mildly thick, and thin fluids marked by initiation of pharyngeal swallow trigger and hyolaryngeal excursion noted upon digital palpation. No overt signs/symptoms of penetration/aspiration noted across consistencies.

## 2022-07-27 NOTE — SWALLOW BEDSIDE ASSESSMENT ADULT - ADDITIONAL RECOMMENDATIONS
1. Reconsult this service as patient continues to medically optimize 2. this service to follow up as schedule permits.
1) Monitor for PO tolerance and intake. 2) Reconsult this service as needed.

## 2022-07-27 NOTE — PROGRESS NOTE ADULT - ASSESSMENT
This is a 66 yo F /w a PMH OF DM2 (A1c 7.3% on this admission), CHF, sarcoidosis who presents with AMS c/b hypercapnic respiratory failure and CHF exacerbation requiring intubation. Endocrine consulted for: Persistent hypoglycemia  Home Regimen: Lantus 20units sq qhs     1. Hypoglycemia  Noted on day of admission, possibly related to home doses of insulin that patient was taking, poor PO intake, and low GFR  Concern for AI as below -->ruled out  Hypoglycemia now resolved     2. R/o Adrenal Insufficiency  AM cortisol returned 7.1. ACTH stimulation performed showed robust response (22 and 24 at 30 minute and 60 minute frances respectively). A value > 18 rules out primary AI (not necessarily secondary AI).   Repeat AM cortisol was normal at 16.1.   TFTs are WDL  AI ruled out, hypoglycemia not related to adrenal cause, no need for steroids at this time     3. Metabolic Acidosis   Initial acidosis likely due to starvation and renal related; BHB 3.1 elevated; was given D5 IVF overnight, PO diet started and Lantus 5 units administered   BG stable, AG/BHB significantly improved today  Continue to deanne  Resolved     4. T2DM, controlled  A1c 7.3%. Goal < 7%  Home regimen: Lantus 20 units at night  Cpeptide 0.9, repeat 5.0    While inpatient:   BG target 100-180 mg/dl; Glucose stable today  Continue Lantus to 2 units SQ qHS (please give 80% of dose if NPO)  No Admelog pre-meal insulin for now  Continue Admelog low dose correctional scale before meals, low dose at bedtime  Check BG before meals and bedtime  Consistent carbohydrate diet   Hypoglycemia protocol   Please assist with meals  Provider to RN: Insulin pen administration with patient and daughter with teach back ; Daughter agrees to help mom with insulin pen administration    Discharge Plan:   TBD based on insulin requirements, may be able to resume basal insulin alone (at adjusted dose)  Note oral agents limited in ESRD  Ensure patient has working glucometer, test strips, lancets, alcohol pads  Please also prescribe glucose tabs, Baqsimi nasal or Glucagon emergency kit for hypoglycemia risk   Follow up with PCP, podiatry and opthalmology as an outpatient      5. HTN  BP Goal < 130/80  Above goal on Coreg, Hydralazine, Amlodipine   continue management per primary team    6. HLD  LDL goal < 70  Continue Atorvastatin 20 mg daily if no contraindications   outpt lipid panel      Sofia Penaloza  Nurse Practitioner  Division of Endocrinology & Diabetes  In house pager #[phone]    If before 9AM or after 6PM, or on weekends/holidays, please call endocrine answering service for assistance ([phone]).For nonurgent matters email [email or web address] for assistance.    This is a 66 yo F /w a PMH OF DM2 (A1c 7.3% on this admission), CHF, sarcoidosis who presents with AMS c/b hypercapnic respiratory failure and CHF exacerbation requiring intubation. Endocrine consulted for: Persistent hypoglycemia  Home Regimen: Lantus 20units sq qhs     1. Hypoglycemia  Noted on day of admission, possibly related to home doses of insulin that patient was taking, poor PO intake, and low GFR  Concern for AI as below -->ruled out  Hypoglycemia now resolved     2. R/o Adrenal Insufficiency  AM cortisol returned 7.1. ACTH stimulation performed showed robust response (22 and 24 at 30 minute and 60 minute frances respectively). A value > 18 rules out primary AI (not necessarily secondary AI).   Repeat AM cortisol was normal at 16.1.   TFTs are WDL  AI ruled out, hypoglycemia not related to adrenal cause, no need for steroids at this time     3. Metabolic Acidosis   Initial acidosis likely due to starvation and renal related; BHB 3.1 elevated; was given D5 IVF overnight, PO diet started and Lantus 5 units administered   BG stable, AG/BHB significantly improved today  Continue to deanne  Resolved     4. T2DM, controlled  A1c 7.3%. Goal < 7%  Home regimen: Lantus 20 units at night  Cpeptide 0.9, repeat 5.0    While inpatient:   BG target 100-180 mg/dl; Glucose stable today  Continue Lantus to 2 units SQ qHS (please give 80% of dose if NPO)  No Admelog pre-meal insulin for now  Continue Admelog low dose correctional scale before meals, low dose at bedtime  Check BG before meals and bedtime  Consistent carbohydrate diet   Hypoglycemia protocol   Please assist with meals  Provider to RN: Insulin pen administration with patient and daughter with teach back ; Daughter agrees to help mom with insulin pen administration    Discharge Plan:   TBD based on insulin requirements, may be able to resume basal insulin alone (at adjusted dose)  Ensure patient has working glucometer, test strips, lancets, alcohol pads  Please also prescribe glucose tabs, Baqsimi nasal or Glucagon emergency kit for hypoglycemia risk   Follow up with PCP, podiatry and opthalmology as an outpatient      5. HTN  BP Goal < 130/80  Above goal on Coreg, Hydralazine, Amlodipine   continue management per primary team    6. HLD  LDL goal < 70  Continue Atorvastatin 20 mg daily if no contraindications   outpt lipid panel      Sofia Penaloza  Nurse Practitioner  Division of Endocrinology & Diabetes  In house pager #[phone]    If before 9AM or after 6PM, or on weekends/holidays, please call endocrine answering service for assistance ([phone]).For nonurgent matters email [email or web address] for assistance.    This is a 68 yo F /w a PMH OF DM2 (A1c 7.3% on this admission), CHF, sarcoidosis who presents with AMS c/b hypercapnic respiratory failure and CHF exacerbation requiring intubation. Endocrine consulted for: Persistent hypoglycemia  Home Regimen: Lantus 20units sq qhs     1. Hypoglycemia  Noted on day of admission, possibly related to home doses of insulin that patient was taking, poor PO intake, and low GFR  Concern for AI as below -->ruled out  Hypoglycemia now resolved     2. R/o Adrenal Insufficiency  AM cortisol returned 7.1. ACTH stimulation performed showed robust response (22 and 24 at 30 minute and 60 minute frances respectively). A value > 18 rules out primary AI (not necessarily secondary AI).   Repeat AM cortisol was normal at 16.1.   TFTs are WDL  AI ruled out, hypoglycemia not related to adrenal cause, no need for steroids at this time     3. Metabolic Acidosis   Initial acidosis likely due to starvation and renal related; BHB 3.1 elevated; was given D5 IVF overnight, PO diet started and Lantus 5 units administered   BG stable, AG/BHB significantly improved today  Continue to deanne  Resolved     4. T2DM, controlled  A1c 7.3%. Goal < 7%  Home regimen: Lantus 20 units at night  Cpeptide 0.9, repeat 5.0    While inpatient:   BG target 100-180 mg/dl; Glucose stable today  Continue Lantus to 2 units SQ qHS (please give 80% of dose if NPO)  No Admelog pre-meal insulin for now  Continue Admelog low dose correctional scale before meals, low dose at bedtime  Check BG before meals and bedtime  Consistent carbohydrate diet   Hypoglycemia protocol   Please assist with meals  Provider to RN: Insulin pen administration with patient and daughter with teach back ; Daughter agrees to help mom with insulin pen administration    Discharge Plan:   Basal only (doses TBD);  may be able to resume basal insulin alone (at adjusted dose) vs orals (may consider metformin pending stability of creatinine (trend GFR) or Januvia 100 mg P.O. daily (needs dose adjustment if GFR 45 or less); Doses TBD   Ensure patient has working glucometer, test strips, lancets, alcohol pads  Please also prescribe glucose tabs, Baqsimi nasal or Glucagon emergency kit for hypoglycemia risk   Follow up with PCP, podiatry and opthalmology as an outpatient      5. HTN  BP Goal < 130/80  Above goal on Coreg, Hydralazine, Amlodipine   continue management per primary team    6. HLD  LDL goal < 70  Continue Atorvastatin 20 mg daily if no contraindications   outpt lipid panel      Sofia Penaloza  Nurse Practitioner  Division of Endocrinology & Diabetes  In house pager #[phone]    If before 9AM or after 6PM, or on weekends/holidays, please call endocrine answering service for assistance ([phone]).For nonurgent matters email [email or web address] for assistance.

## 2022-07-27 NOTE — PROGRESS NOTE ADULT - PROBLEM SELECTOR PLAN 9
A1c 7.3%. Goal < 7%;  home regimen: lantus 20 units at night; had episodes of hypoglyemia while in ICU  - AI ruled out per endo s/p stim in context of hypoglycemia   - on Lantus 2  - endo following

## 2022-07-27 NOTE — SWALLOW BEDSIDE ASSESSMENT ADULT - COMMENTS
Hospitalist note 7/26 "67F [place], HTN, HLD, sarcoidosis, HFpEF p/w lethargy/SOB/swelling/AMS admitted with metabolic encephalopathy due to acute hypercarbic respiratory failure in context of CHF exacerbation. s/p intubation and MICU admission 7/13 for hypercapnia/airway protection was extubated 7/16  (s/p empiric meropenem 7/13 - 7/20 for concern for sepsis given hypothermia/AMS however neg infectious w/u) course c/b PEA arrest on 7/17 pt re-intubated s/p second PEA arrest when arrived to CTICU now transferred to medicine on 7/23 s/p being extubated. Course now c/b c diff."    WBC is low. CXR 7/24 "Frontal expiratory view of the chest shows progression of left effusion or infiltrate or combination of both. Small right effusion is similar."    Patient known to this service and seen on 7/13 and 7/21 for swallow evaluation with most recent recommendations for puree and thin liquids (see report for details).     Patient seen at bedside, awake/alert and oriented, during clinical swallow evaluation this AM. Patient able to follow directions and answer questions. Patient denied pain and/or difficulty swallowing. Per RN, patient tolerated PO intake well with no observed s/sx of penetration/aspiration.

## 2022-07-27 NOTE — PROGRESS NOTE ADULT - SUBJECTIVE AND OBJECTIVE BOX
CHIEF COMPLAINT:Patient is a 67y old  Female who presents with a chief complaint of AMs, shortness of breath (26 Jul 2022 16:55)      Interval Events:  - requiring NRB 15L overnight, back on 6L NC this morning  - reports feeling improved today, denies sob, chest pain, cough, fever, chills (although T 100.3 overnight)  - RVP and sputum cx still pending collection  - received 10 mg IV lasix yesterday, 1.1 L UOP  - ordered of additional 20 mg IV lasix today  - vanc d/c, on zosyn      REVIEW OF SYSTEMS:  [x] All other systems negative except per HPI   [ ] Unable to assess ROS because ________    OBJECTIVE:  ICU Vital Signs Last 24 Hrs  T(C): 36.5 (27 Jul 2022 06:26), Max: 36.8 (26 Jul 2022 14:22)  T(F): 97.7 (27 Jul 2022 06:26), Max: 98.2 (26 Jul 2022 14:22)  HR: 73 (27 Jul 2022 06:26) (61 - 73)  BP: 154/81 (27 Jul 2022 06:26) (130/62 - 154/81)  BP(mean): --  ABP: --  ABP(mean): --  RR: 18 (27 Jul 2022 06:26) (17 - 18)  SpO2: 99% (27 Jul 2022 06:26) (97% - 100%)    O2 Parameters below as of 27 Jul 2022 06:26  Patient On (Oxygen Delivery Method): room air              07-26 @ 07:01  -  07-27 @ 07:00  --------------------------------------------------------  IN: 1090 mL / OUT: 2050 mL / NET: -960 mL        PHYSICAL EXAM:  GENERAL: NAD  HEAD:  Atraumatic, Normocephalic  EYES: EOMI, PERRLA, conjunctiva and sclera clear  NECK: Supple, No JVD  CHEST/LUNG: Clear to auscultation bilaterally; No rales, rhonchi, wheezing, or rubs  HEART: Regular rate and rhythm; No murmurs, rubs, or gallops  ABDOMEN: Soft, Nontender, Nondistended; Bowel sounds present  VASCULAR: No clubbing, cyanosis, or edema  SKIN: No rashes or lesions  NERVOUS SYSTEM:  Alert & Oriented X3, no focal deficits    HOSPITAL MEDICATIONS:  MEDICATIONS  (STANDING):  amLODIPine   Tablet 10 milliGRAM(s) Oral daily  aspirin  chewable 81 milliGRAM(s) Oral daily  atorvastatin 20 milliGRAM(s) Oral at bedtime  carvedilol 25 milliGRAM(s) Oral every 12 hours  dextrose 50% Injectable 25 Gram(s) IV Push once  dextrose 50% Injectable 12.5 Gram(s) IV Push once  dextrose 50% Injectable 25 Gram(s) IV Push once  enoxaparin Injectable 40 milliGRAM(s) SubCutaneous every 24 hours  furosemide    Tablet 40 milliGRAM(s) Oral daily  glucagon  Injectable 1 milliGRAM(s) IntraMuscular once  hydrALAZINE 100 milliGRAM(s) Oral three times a day  insulin glargine Injectable (LANTUS) 2 Unit(s) SubCutaneous at bedtime  insulin lispro (ADMELOG) corrective regimen sliding scale   SubCutaneous three times a day before meals  insulin lispro (ADMELOG) corrective regimen sliding scale   SubCutaneous at bedtime  losartan 25 milliGRAM(s) Oral daily  melatonin 6 milliGRAM(s) Oral at bedtime  potassium chloride    Tablet ER 20 milliEquivalent(s) Oral every 2 hours  vancomycin    Solution 125 milliGRAM(s) Oral every 6 hours    MEDICATIONS  (PRN):  acetaminophen     Tablet .. 650 milliGRAM(s) Oral every 6 hours PRN Temp greater or equal to 38C (100.4F), Mild Pain (1 - 3)  dextrose Oral Gel 15 Gram(s) Oral once PRN Blood Glucose LESS THAN 70 milliGRAM(s)/deciliter  melatonin 3 milliGRAM(s) Oral at bedtime PRN Insomnia      LABS:    The Labs were reviewed by me   The Radiology was reviewed by me    EKG tracing reviewed by me    07-27    139  |  100  |  21  ----------------------------<  129<H>  3.4<L>   |  30  |  1.11  07-26    136  |  100  |  26<H>  ----------------------------<  159<H>  3.6   |  29  |  1.29  07-25    137  |  99  |  25<H>  ----------------------------<  168<H>  3.7   |  28  |  1.28    Ca    8.4      27 Jul 2022 05:54  Ca    8.3<L>      26 Jul 2022 07:01  Ca    8.3<L>      25 Jul 2022 06:31  Phos  3.4     07-26  Mg     1.70     07-27    TPro  6.4  /  Alb  2.7<L>  /  TBili  0.3  /  DBili  x   /  AST  23  /  ALT  16  /  AlkPhos  89  07-25  TPro  6.0  /  Alb  2.5<L>  /  TBili  0.3  /  DBili  x   /  AST  19  /  ALT  12  /  AlkPhos  82  07-24    Magnesium, Serum: 1.70 mg/dL (07-27-22 @ 05:54)  Magnesium, Serum: 1.50 mg/dL (07-26-22 @ 07:01)  Magnesium, Serum: 1.50 mg/dL (07-25-22 @ 06:31)  Magnesium, Serum: 1.60 mg/dL (07-24-22 @ 11:05)  Magnesium, Serum: 1.60 mg/dL (07-24-22 @ 11:05)    Phosphorus Level, Serum: 3.4 mg/dL (07-26-22 @ 07:01)  Phosphorus Level, Serum: 3.0 mg/dL (07-25-22 @ 06:31)  Phosphorus Level, Serum: 2.7 mg/dL (07-24-22 @ 11:05)  Phosphorus Level, Serum: 2.6 mg/dL (07-24-22 @ 11:05)                                              8.6    3.29  )-----------( 240      ( 27 Jul 2022 05:54 )             28.2                         8.9    3.86  )-----------( 219      ( 26 Jul 2022 07:01 )             29.7                         8.7    5.50  )-----------( 243      ( 25 Jul 2022 06:31 )             29.3     CAPILLARY BLOOD GLUCOSE      POCT Blood Glucose.: 122 mg/dL (27 Jul 2022 07:41)  POCT Blood Glucose.: 134 mg/dL (26 Jul 2022 21:45)  POCT Blood Glucose.: 114 mg/dL (26 Jul 2022 16:53)  POCT Blood Glucose.: 130 mg/dL (26 Jul 2022 11:52)

## 2022-07-27 NOTE — PROGRESS NOTE ADULT - PROBLEM SELECTOR PLAN 4
- EEG with slowing but no seizures   - CTH neg  - per neuro AMS 2/2 hypercapnic respiratory failure, prior cerebellar stroke likely small vessel  - MS was better, 7/26 confused/delirious, VBG w/ mild compensated hypercapnia discussed w/ pulm  no indication for bipap  - this am MS is better, states she was not confused

## 2022-07-27 NOTE — PROGRESS NOTE ADULT - PROBLEM SELECTOR PLAN 3
BNP on arrival: 4021 with evidence of fluid overload with pleural effusions, b/l pitting edema seems given diuresis and fluids (IVC not c/w overload in ICU however intubated at that time)  - TTE (7/15/22): moderate LV systolic dysfunction, moderate diastolic dysfunction, RV enlargement with decreased RV systolic dysfunction  - TTE  (7/21/22): EF 65% with normal biventricular systolic function  - c/w Lasix 40 mg PO  - c/w BP control   - daily weights and strict I&Os

## 2022-07-27 NOTE — CHART NOTE - NSCHARTNOTEFT_GEN_A_CORE
This is a 67 year old female with PMH of sarcoidosis, and CHF treated for aspiration pneumonia and CHF, now requires supplemental oxygen due to patient's oxygen saturation decreasing to 82% on room air at rest and recovering to 98% with 2L supplemental oxygen via nasal cannula.

## 2022-07-27 NOTE — PROGRESS NOTE ADULT - SUBJECTIVE AND OBJECTIVE BOX
Patient is a 67y old  Female who presents with a chief complaint of AMs, shortness of breath    SUBJECTIVE / OVERNIGHT EVENTS:    Feels fine, denies pain, no CP, SOB, f/c/n/v  Reports she is not having diarrhea  Asking why she needs rehab  States she had cxr today and few days ago but did not know results, advised of results, advised she was confused yesterday am, denies     MEDICATIONS  (STANDING):  amLODIPine   Tablet 10 milliGRAM(s) Oral daily  aspirin  chewable 81 milliGRAM(s) Oral daily  atorvastatin 20 milliGRAM(s) Oral at bedtime  carvedilol 25 milliGRAM(s) Oral every 12 hours  enoxaparin Injectable 40 milliGRAM(s) SubCutaneous every 24 hours  furosemide    Tablet 40 milliGRAM(s) Oral daily  glucagon  Injectable 1 milliGRAM(s) IntraMuscular once  hydrALAZINE 100 milliGRAM(s) Oral three times a day  insulin glargine Injectable (LANTUS) 2 Unit(s) SubCutaneous at bedtime  insulin lispro (ADMELOG) corrective regimen sliding scale   SubCutaneous three times a day before meals  insulin lispro (ADMELOG) corrective regimen sliding scale   SubCutaneous at bedtime  losartan 25 milliGRAM(s) Oral daily  melatonin 6 milliGRAM(s) Oral at bedtime  potassium chloride    Tablet ER 20 milliEquivalent(s) Oral every 2 hours  vancomycin    Solution 125 milliGRAM(s) Oral every 6 hours    MEDICATIONS  (PRN):  acetaminophen     Tablet .. 650 milliGRAM(s) Oral every 6 hours PRN Temp greater or equal to 38C (100.4F), Mild Pain (1 - 3)  dextrose Oral Gel 15 Gram(s) Oral once PRN Blood Glucose LESS THAN 70 milliGRAM(s)/deciliter  melatonin 3 milliGRAM(s) Oral at bedtime PRN Insomnia    T(C): 36.5 (07-27-22 @ 06:26), Max: 36.8 (07-26-22 @ 14:22)  HR: 75 (07-27-22 @ 12:00) (61 - 75)  BP: 155/87 (07-27-22 @ 12:00) (130/62 - 155/87)  RR: 18 (07-27-22 @ 12:00) (17 - 18)  SpO2: 100% (07-27-22 @ 12:00) (97% - 100%)    CAPILLARY BLOOD GLUCOSE  POCT Blood Glucose.: 131 mg/dL (27 Jul 2022 11:57)  POCT Blood Glucose.: 122 mg/dL (27 Jul 2022 07:41)  POCT Blood Glucose.: 134 mg/dL (26 Jul 2022 21:45)  POCT Blood Glucose.: 114 mg/dL (26 Jul 2022 16:53)    I&O's Summary    26 Jul 2022 07:01  -  27 Jul 2022 07:00  --------------------------------------------------------  IN: 1090 mL / OUT: 2050 mL / NET: -960 mL    PHYSICAL EXAM:  GENERAL: obese, on RA  CHEST/LUNG: decreased BS, no wheeze   HEART: Regular rate and rhythm; No murmurs, rubs, or gallops  ABDOMEN: Soft, Nontender, Nondistended; Bowel sounds present  EXTREMITIES:   warm and well perfused, No clubbing, cyanosis, or edema  PSYCH: AAOx3, very [place]  NEUROLOGY: non-focal  SKIN: blister on skin on upper/thigh RLE    LABS:                        8.6    3.29  )-----------( 240      ( 27 Jul 2022 05:54 )             28.2     07-27    139  |  100  |  21  ----------------------------<  129<H>  3.4<L>   |  30  |  1.11    Ca    8.4      27 Jul 2022 05:54  Phos  3.4     07-26  Mg     1.70     07-27      Microbiology: VB 07-27 @ 06:00  pH: 7.38/pCO2: 54/pO2: 64/HCO3: 32/lactate: --  VBG 07-26 @ 05:50  pH: 7.36/pCO2: 58/pO2: 59/HCO3: 33/lactate: 0.6      Consultant(s) Notes Reviewed:    Care Discussed with Consultants/Other Providers: pulm

## 2022-07-27 NOTE — PROGRESS NOTE ADULT - SUBJECTIVE AND OBJECTIVE BOX
Chief Complaint: Type 2 DM     History: Pt seen at bedside. Pt tolerating oral intake. Pt denies nausea and vomiting/any signs of hypoglycemia. Pt reports an adequate appetite but doesn't like the hospital food    MEDICATIONS  (STANDING):  amLODIPine   Tablet 10 milliGRAM(s) Oral daily  aspirin  chewable 81 milliGRAM(s) Oral daily  atorvastatin 20 milliGRAM(s) Oral at bedtime  carvedilol 25 milliGRAM(s) Oral every 12 hours  dextrose 50% Injectable 25 Gram(s) IV Push once  dextrose 50% Injectable 12.5 Gram(s) IV Push once  dextrose 50% Injectable 25 Gram(s) IV Push once  enoxaparin Injectable 40 milliGRAM(s) SubCutaneous every 24 hours  furosemide    Tablet 40 milliGRAM(s) Oral daily  glucagon  Injectable 1 milliGRAM(s) IntraMuscular once  hydrALAZINE 100 milliGRAM(s) Oral three times a day  insulin glargine Injectable (LANTUS) 2 Unit(s) SubCutaneous at bedtime  insulin lispro (ADMELOG) corrective regimen sliding scale   SubCutaneous three times a day before meals  insulin lispro (ADMELOG) corrective regimen sliding scale   SubCutaneous at bedtime  losartan 25 milliGRAM(s) Oral daily  melatonin 6 milliGRAM(s) Oral at bedtime  vancomycin    Solution 125 milliGRAM(s) Oral every 6 hours    MEDICATIONS  (PRN):  acetaminophen     Tablet .. 650 milliGRAM(s) Oral every 6 hours PRN Temp greater or equal to 38C (100.4F), Mild Pain (1 - 3)  dextrose Oral Gel 15 Gram(s) Oral once PRN Blood Glucose LESS THAN 70 milliGRAM(s)/deciliter  melatonin 3 milliGRAM(s) Oral at bedtime PRN Insomnia      Allergies: penicillin ([place] [employer])      Review of Systems:  HEENT: No pain  Cardiovascular: No chest pain, palpitations  Respiratory: No SOB, no cough  GI: No nausea, vomiting, abdominal pain  Endocrine: no polyuria, polydipsia      PHYSICAL EXAM:  VITALS: T(C): 36.9 (07-27-22 @ 12:00)  T(F): 98.5 (07-27-22 @ 12:00), Max: 98.5 (07-27-22 @ 12:00)  HR: 75 (07-27-22 @ 12:00) (73 - 75)  BP: 155/87 (07-27-22 @ 12:00) (152/75 - 155/87)  RR:  (17 - 18)  SpO2:  (97% - 100%)  Wt(kg): --  GENERAL: NAD, well-groomed, well-developed  RESPIRATORY: No labored breathing   GI: Soft, nontender, non distended  PSYCH: Alert and oriented x 3, normal affect, normal mood      CAPILLARY BLOOD GLUCOSE  POCT Blood Glucose.: 144 mg/dL (27 Jul 2022 16:46)  POCT Blood Glucose.: 131 mg/dL (27 Jul 2022 11:57)  POCT Blood Glucose.: 122 mg/dL (27 Jul 2022 07:41)  POCT Blood Glucose.: 134 mg/dL (26 Jul 2022 21:45)    A1C with Estimated Average Glucose (06.05.22 @ 06:00)    A1C with Estimated Average Glucose Result: 7.3      07-27    139  |  100  |  21  ----------------------------<  129<H>  3.4<L>   |  30  |  1.11    eGFR: 54<L>    Ca    8.4      07-27  Mg     1.70     07-27  Phos  3.4     07-26    TPro  6.3  /  Alb  x   /  TBili  x   /  DBili  x   /  AST  x   /  ALT  x   /  AlkPhos  x   07-27      Thyroid Function Tests:  07-13 @ 04:10 TSH 3.37 FreeT4 -- T3 -- Anti TPO -- Anti Thyroglobulin Ab -- TSI --      Diet, Regular:   Consistent Carbohydrate Evening Snack (CSTCHOSN)  Supplement Feeding Modality:  Oral  Glucerna Shake Cans or Servings Per Day:  1       Frequency:  Three Times a day (07-27-22 @ 15:35) [Active]

## 2022-07-27 NOTE — PROGRESS NOTE ADULT - PROBLEM SELECTOR PLAN 2
Presented with R>L effusion, x-ray on 7/24 w/ L effusion vs infiltrate, pulm did POCUS on 7/25 no large left effusion  - currently on RA  - s/p IV diuresis now on lasix 40 mg PO daily   - s/p meropenum 7/13-7/20  - pulm consult appreciated  - c/w incentive spirometry  - CXR today with similar findings to 7/24

## 2022-07-27 NOTE — CHART NOTE - NSCHARTNOTEFT_GEN_A_CORE
Source: Patient [ ]    Family [ ]     other [ ]    Diet :       Patient reports [ ] nausea  [ ] vomiting [ ] diarrhea [ ] constipation  [ ]chewing problems [ ] swallowing issues  [ ] other:      PO intake:  < 50% [ ] 50-75% [ ]   % [ ]  other :     Source for PO intake [ ] Patient [ ] family [ ] chart [ ] staff [ ] other     Enteral /Parenteral Nutrition:       Current Weight:   % Weight Change    Pertinent Medications: MEDICATIONS  (STANDING):  amLODIPine   Tablet 10 milliGRAM(s) Oral daily  aspirin  chewable 81 milliGRAM(s) Oral daily  atorvastatin 20 milliGRAM(s) Oral at bedtime  carvedilol 25 milliGRAM(s) Oral every 12 hours  dextrose 50% Injectable 25 Gram(s) IV Push once  dextrose 50% Injectable 12.5 Gram(s) IV Push once  dextrose 50% Injectable 25 Gram(s) IV Push once  enoxaparin Injectable 40 milliGRAM(s) SubCutaneous every 24 hours  furosemide    Tablet 40 milliGRAM(s) Oral daily  glucagon  Injectable 1 milliGRAM(s) IntraMuscular once  hydrALAZINE 100 milliGRAM(s) Oral three times a day  insulin glargine Injectable (LANTUS) 2 Unit(s) SubCutaneous at bedtime  insulin lispro (ADMELOG) corrective regimen sliding scale   SubCutaneous three times a day before meals  insulin lispro (ADMELOG) corrective regimen sliding scale   SubCutaneous at bedtime  losartan 25 milliGRAM(s) Oral daily  melatonin 6 milliGRAM(s) Oral at bedtime  vancomycin    Solution 125 milliGRAM(s) Oral every 6 hours    MEDICATIONS  (PRN):  acetaminophen     Tablet .. 650 milliGRAM(s) Oral every 6 hours PRN Temp greater or equal to 38C (100.4F), Mild Pain (1 - 3)  dextrose Oral Gel 15 Gram(s) Oral once PRN Blood Glucose LESS THAN 70 milliGRAM(s)/deciliter  melatonin 3 milliGRAM(s) Oral at bedtime PRN Insomnia    Pertinent Labs:  07-27 Na139 mmol/L Glu 129 mg/dL<H> K+ 3.4 mmol/L<L> Cr  1.11 mg/dL BUN 21 mg/dL 07-26 Phos 3.4 mg/dL 07-25 Alb 2.7 g/dL<L>      Skin:     Estimated Needs:   [ ] no change since previous assessment  [ ] recalculated:       Previous Nutrition Diagnosis:     [ ] Inadequate Energy Intake [ ]Inadequate Oral Intake [ ] Excessive Energy Intake     [ ] Underweight [ ] Increased Nutrient Needs [ ] Overweight/Obesity     [ ] Altered GI Function [ ] Unintended Weight Loss [ ] Food & Nutrition Related Knowledge Deficit [ ] Malnutrition      Nutrition Diagnosis is [ ] ongoing  [ ] resolved [ ] not applicable          New Nutrition Diagnosis: [ ] not applicable    [ ] Inadequate Protein Energy Intake   [ ]Inadequate Oral Intake   [ ] Excessive Energy Intake   [ ] Underweight   [ ] Increased Nutrient Needs   [ ] Overweight/Obesity   [ ] Altered GI Function   [ ] Unintended Weight Loss   [ ] Food & Nutrition Related Knowledge Deficit  [ ] Limited Adherence to nutrition related recommendations   [ ] Malnutrition    [ ] other:        Related to:     As evidenced by:     Interventions:     Recommend    [ ] Change Diet To:    [ ] Nutrition Supplement    [ ] Nutrition Support    [ ] Other:        Monitoring and Evaluation:     [ ] PO intake [ ] Tolerance to diet prescription [ ] weights [ ] follow up per protocol    [ ] other: Source:     other [x] nurse, PCA, Medical chart   Diet rx: Diet, Pureed; Consistent Carbohydrate (No snacks);   Supplement Feeding Modality:  Oral    Glucerna Shake Cans or Servings Per Day:  1       Frequency:  Three Times a day (07-27-22 @ 15:35) [Active]    Pt 66 yo female with PMHx of HTN, HLD, sarcoidosis, HFpEF presented with lethargy/SOB/swelling/AMS admitted with metabolic encephalopathy due to acute hypercarbic respiratory failure in context of CHF exacerbation. Of note, Pt was intubated 7/13 then extubated 7/16, but on 7/17 Pt got re-intubated s/p second PEA arrest, later Pt was extubated on 7/23. Now Pt with c diff.    At time of visit, Pt awake, alert; Pt very [place]. PCA at bed side. Per PCA, Pt with poor appetite/PO intake; Pt does not like Pureed consistency food/meal options. Pt does not like to drink PO supplement: Glucerna shake either. Pt only had apple juice for breakfast this morning reported. Of note, SLP eval pending. No report of nausea, vomiting @ this time. Case discussed with nurse, PCA. RDN remains available.     Pt's height: 64" (7/12)       IBW: 120#+/-10%        Pt's weights: 80.6 kg (7/23), 88 kg (7/13)   Pertinent Medications: Lovenox, Aspirin, Insulin (Glargine), Insulin (Lispro), Lasix, Lipitor,   Pertinent Labs:  07-27 Na139 mmol/L Glu 129 mg/dL<H> K+ 3.4 mmol/L<L> Cr  1.11 mg/dL BUN 21 mg/dL 07-26 Phos 3.4 mg/dL 07-25 Alb 2.7 g/dL<L>  Skin: per flow sheet, Pt with pressure injury to sacrum - stage II     Estimated Needs: [x] no change since previous assessment  Previous Nutrition Diagnosis: [x] Malnutrition, severe    New Nutrition Diagnosis: [x] not applicable    Nutrition Interventions/Recommendations:  1. Swallow Evaluation as ordered; PO diet/Fluid consistency per SLP recommendation;  2. Encourage & assist Pt with meals; Monitor PO diet tolerance;   3. Honor food preferences;  4. Add Multivitamins 1 tab daily for micronutrient coverage;   5. Monitor labs, daily weights, hydration status;

## 2022-07-28 ENCOUNTER — TRANSCRIPTION ENCOUNTER (OUTPATIENT)
Age: 67
End: 2022-07-28

## 2022-07-28 DIAGNOSIS — R80.9 PROTEINURIA, UNSPECIFIED: ICD-10-CM

## 2022-07-28 LAB
ANION GAP SERPL CALC-SCNC: 10 MMOL/L — SIGNIFICANT CHANGE UP (ref 7–14)
ANION GAP SERPL CALC-SCNC: 8 MMOL/L — SIGNIFICANT CHANGE UP (ref 7–14)
APTT BLD: 40.4 SEC — HIGH (ref 27–36.3)
BASE EXCESS BLDA CALC-SCNC: 8 MMOL/L — HIGH (ref -2–3)
BLOOD GAS ARTERIAL COMPREHENSIVE RESULT: SIGNIFICANT CHANGE UP
BUN SERPL-MCNC: 14 MG/DL — SIGNIFICANT CHANGE UP (ref 7–23)
BUN SERPL-MCNC: 15 MG/DL — SIGNIFICANT CHANGE UP (ref 7–23)
CALCIUM SERPL-MCNC: 8.2 MG/DL — LOW (ref 8.4–10.5)
CALCIUM SERPL-MCNC: 8.6 MG/DL — SIGNIFICANT CHANGE UP (ref 8.4–10.5)
CHLORIDE SERPL-SCNC: 100 MMOL/L — SIGNIFICANT CHANGE UP (ref 98–107)
CHLORIDE SERPL-SCNC: 101 MMOL/L — SIGNIFICANT CHANGE UP (ref 98–107)
CO2 BLDA-SCNC: 36 MMOL/L — HIGH (ref 19–24)
CO2 SERPL-SCNC: 29 MMOL/L — SIGNIFICANT CHANGE UP (ref 22–31)
CO2 SERPL-SCNC: 31 MMOL/L — SIGNIFICANT CHANGE UP (ref 22–31)
CREAT ?TM UR-MCNC: 44 MG/DL — SIGNIFICANT CHANGE UP
CREAT SERPL-MCNC: 0.96 MG/DL — SIGNIFICANT CHANGE UP (ref 0.5–1.3)
CREAT SERPL-MCNC: 1.09 MG/DL — SIGNIFICANT CHANGE UP (ref 0.5–1.3)
EGFR: 55 ML/MIN/1.73M2 — LOW
EGFR: 64 ML/MIN/1.73M2 — SIGNIFICANT CHANGE UP
GLUCOSE BLDC GLUCOMTR-MCNC: 101 MG/DL — HIGH (ref 70–99)
GLUCOSE BLDC GLUCOMTR-MCNC: 146 MG/DL — HIGH (ref 70–99)
GLUCOSE BLDC GLUCOMTR-MCNC: 155 MG/DL — HIGH (ref 70–99)
GLUCOSE BLDC GLUCOMTR-MCNC: 180 MG/DL — HIGH (ref 70–99)
GLUCOSE BLDC GLUCOMTR-MCNC: 198 MG/DL — HIGH (ref 70–99)
GLUCOSE SERPL-MCNC: 115 MG/DL — HIGH (ref 70–99)
GLUCOSE SERPL-MCNC: 193 MG/DL — HIGH (ref 70–99)
HCO3 BLDA-SCNC: 34 MMOL/L — HIGH (ref 21–28)
HCT VFR BLD CALC: 29.3 % — LOW (ref 34.5–45)
HCT VFR BLD CALC: 29.3 % — LOW (ref 34.5–45)
HGB BLD-MCNC: 8.5 G/DL — LOW (ref 11.5–15.5)
HGB BLD-MCNC: 8.7 G/DL — LOW (ref 11.5–15.5)
INR BLD: 1.03 RATIO — SIGNIFICANT CHANGE UP (ref 0.88–1.16)
INTERPRETATION SERPL IFE-IMP: SIGNIFICANT CHANGE UP
KAPPA LC SER QL IFE: 11.62 MG/DL — HIGH (ref 0.33–1.94)
KAPPA/LAMBDA FREE LIGHT CHAIN RATIO, SERUM: 2.49 RATIO — HIGH (ref 0.26–1.65)
LAMBDA LC SER QL IFE: 4.67 MG/DL — HIGH (ref 0.57–2.63)
MAGNESIUM SERPL-MCNC: 1.6 MG/DL — SIGNIFICANT CHANGE UP (ref 1.6–2.6)
MAGNESIUM SERPL-MCNC: 1.6 MG/DL — SIGNIFICANT CHANGE UP (ref 1.6–2.6)
MCHC RBC-ENTMCNC: 25.3 PG — LOW (ref 27–34)
MCHC RBC-ENTMCNC: 25.4 PG — LOW (ref 27–34)
MCHC RBC-ENTMCNC: 29 GM/DL — LOW (ref 32–36)
MCHC RBC-ENTMCNC: 29.7 GM/DL — LOW (ref 32–36)
MCV RBC AUTO: 85.2 FL — SIGNIFICANT CHANGE UP (ref 80–100)
MCV RBC AUTO: 87.5 FL — SIGNIFICANT CHANGE UP (ref 80–100)
NRBC # BLD: 0 /100 WBCS — SIGNIFICANT CHANGE UP
NRBC # BLD: 0 /100 WBCS — SIGNIFICANT CHANGE UP
NRBC # FLD: 0 K/UL — SIGNIFICANT CHANGE UP
NRBC # FLD: 0 K/UL — SIGNIFICANT CHANGE UP
PCO2 BLDA: 51 MMHG — HIGH (ref 32–35)
PH BLDA: 7.43 — SIGNIFICANT CHANGE UP (ref 7.35–7.45)
PHOSPHATE SERPL-MCNC: 2.9 MG/DL — SIGNIFICANT CHANGE UP (ref 2.5–4.5)
PHOSPHATE SERPL-MCNC: 3.4 MG/DL — SIGNIFICANT CHANGE UP (ref 2.5–4.5)
PLATELET # BLD AUTO: 299 K/UL — SIGNIFICANT CHANGE UP (ref 150–400)
PLATELET # BLD AUTO: 319 K/UL — SIGNIFICANT CHANGE UP (ref 150–400)
PO2 BLDA: 88 MMHG — SIGNIFICANT CHANGE UP (ref 83–108)
POTASSIUM SERPL-MCNC: 3.7 MMOL/L — SIGNIFICANT CHANGE UP (ref 3.5–5.3)
POTASSIUM SERPL-MCNC: 3.7 MMOL/L — SIGNIFICANT CHANGE UP (ref 3.5–5.3)
POTASSIUM SERPL-SCNC: 3.7 MMOL/L — SIGNIFICANT CHANGE UP (ref 3.5–5.3)
POTASSIUM SERPL-SCNC: 3.7 MMOL/L — SIGNIFICANT CHANGE UP (ref 3.5–5.3)
PROT ?TM UR-MCNC: 499 MG/DL — SIGNIFICANT CHANGE UP
PROT/CREAT UR-RTO: 11.3 RATIO — HIGH (ref 0–0.2)
PROTHROM AB SERPL-ACNC: 12 SEC — SIGNIFICANT CHANGE UP (ref 10.5–13.4)
RBC # BLD: 3.35 M/UL — LOW (ref 3.8–5.2)
RBC # BLD: 3.44 M/UL — LOW (ref 3.8–5.2)
RBC # FLD: 15.5 % — HIGH (ref 10.3–14.5)
RBC # FLD: 15.7 % — HIGH (ref 10.3–14.5)
SAO2 % BLDA: 96.8 % — SIGNIFICANT CHANGE UP (ref 94–98)
SARS-COV-2 RNA SPEC QL NAA+PROBE: SIGNIFICANT CHANGE UP
SODIUM SERPL-SCNC: 139 MMOL/L — SIGNIFICANT CHANGE UP (ref 135–145)
SODIUM SERPL-SCNC: 140 MMOL/L — SIGNIFICANT CHANGE UP (ref 135–145)
WBC # BLD: 3.44 K/UL — LOW (ref 3.8–10.5)
WBC # BLD: 5.08 K/UL — SIGNIFICANT CHANGE UP (ref 3.8–10.5)
WBC # FLD AUTO: 3.44 K/UL — LOW (ref 3.8–10.5)
WBC # FLD AUTO: 5.08 K/UL — SIGNIFICANT CHANGE UP (ref 3.8–10.5)

## 2022-07-28 PROCEDURE — 99233 SBSQ HOSP IP/OBS HIGH 50: CPT

## 2022-07-28 PROCEDURE — 99223 1ST HOSP IP/OBS HIGH 75: CPT | Mod: GC

## 2022-07-28 PROCEDURE — 71045 X-RAY EXAM CHEST 1 VIEW: CPT | Mod: 26

## 2022-07-28 PROCEDURE — 99222 1ST HOSP IP/OBS MODERATE 55: CPT

## 2022-07-28 PROCEDURE — 99232 SBSQ HOSP IP/OBS MODERATE 35: CPT

## 2022-07-28 PROCEDURE — 99233 SBSQ HOSP IP/OBS HIGH 50: CPT | Mod: GC

## 2022-07-28 RX ORDER — MEROPENEM 1 G/30ML
1000 INJECTION INTRAVENOUS ONCE
Refills: 0 | Status: COMPLETED | OUTPATIENT
Start: 2022-07-28 | End: 2022-07-28

## 2022-07-28 RX ORDER — MEROPENEM 1 G/30ML
1000 INJECTION INTRAVENOUS EVERY 12 HOURS
Refills: 0 | Status: DISCONTINUED | OUTPATIENT
Start: 2022-07-29 | End: 2022-07-30

## 2022-07-28 RX ORDER — LOSARTAN POTASSIUM 100 MG/1
50 TABLET, FILM COATED ORAL DAILY
Refills: 0 | Status: DISCONTINUED | OUTPATIENT
Start: 2022-07-29 | End: 2022-07-30

## 2022-07-28 RX ORDER — MEROPENEM 1 G/30ML
INJECTION INTRAVENOUS
Refills: 0 | Status: DISCONTINUED | OUTPATIENT
Start: 2022-07-28 | End: 2022-07-30

## 2022-07-28 RX ORDER — LIDOCAINE 4 G/100G
1 CREAM TOPICAL ONCE
Refills: 0 | Status: COMPLETED | OUTPATIENT
Start: 2022-07-28 | End: 2022-07-28

## 2022-07-28 RX ORDER — ACETAMINOPHEN 500 MG
650 TABLET ORAL EVERY 8 HOURS
Refills: 0 | Status: COMPLETED | OUTPATIENT
Start: 2022-07-28 | End: 2022-08-04

## 2022-07-28 RX ORDER — LOSARTAN POTASSIUM 100 MG/1
25 TABLET, FILM COATED ORAL ONCE
Refills: 0 | Status: COMPLETED | OUTPATIENT
Start: 2022-07-28 | End: 2022-07-28

## 2022-07-28 RX ADMIN — LIDOCAINE 1 PATCH: 4 CREAM TOPICAL at 17:32

## 2022-07-28 RX ADMIN — CARVEDILOL PHOSPHATE 25 MILLIGRAM(S): 80 CAPSULE, EXTENDED RELEASE ORAL at 06:21

## 2022-07-28 RX ADMIN — LIDOCAINE 1 PATCH: 4 CREAM TOPICAL at 20:06

## 2022-07-28 RX ADMIN — Medication 125 MILLIGRAM(S): at 06:22

## 2022-07-28 RX ADMIN — AMLODIPINE BESYLATE 10 MILLIGRAM(S): 2.5 TABLET ORAL at 06:22

## 2022-07-28 RX ADMIN — Medication 650 MILLIGRAM(S): at 11:18

## 2022-07-28 RX ADMIN — Medication 40 MILLIGRAM(S): at 06:22

## 2022-07-28 RX ADMIN — LOSARTAN POTASSIUM 25 MILLIGRAM(S): 100 TABLET, FILM COATED ORAL at 06:22

## 2022-07-28 RX ADMIN — MEROPENEM 100 MILLIGRAM(S): 1 INJECTION INTRAVENOUS at 17:33

## 2022-07-28 RX ADMIN — ENOXAPARIN SODIUM 40 MILLIGRAM(S): 100 INJECTION SUBCUTANEOUS at 14:09

## 2022-07-28 RX ADMIN — Medication 125 MILLIGRAM(S): at 23:55

## 2022-07-28 RX ADMIN — Medication 100 MILLIGRAM(S): at 06:21

## 2022-07-28 RX ADMIN — Medication 125 MILLIGRAM(S): at 14:10

## 2022-07-28 RX ADMIN — Medication 650 MILLIGRAM(S): at 23:55

## 2022-07-28 RX ADMIN — Medication 650 MILLIGRAM(S): at 12:18

## 2022-07-28 RX ADMIN — Medication 1: at 17:36

## 2022-07-28 RX ADMIN — INSULIN GLARGINE 2 UNIT(S): 100 INJECTION, SOLUTION SUBCUTANEOUS at 22:21

## 2022-07-28 RX ADMIN — Medication 100 MILLIGRAM(S): at 14:10

## 2022-07-28 RX ADMIN — LOSARTAN POTASSIUM 25 MILLIGRAM(S): 100 TABLET, FILM COATED ORAL at 14:09

## 2022-07-28 RX ADMIN — ATORVASTATIN CALCIUM 20 MILLIGRAM(S): 80 TABLET, FILM COATED ORAL at 23:54

## 2022-07-28 RX ADMIN — Medication 100 MILLIGRAM(S): at 23:54

## 2022-07-28 RX ADMIN — Medication 81 MILLIGRAM(S): at 14:10

## 2022-07-28 NOTE — CONSULT NOTE ADULT - SUBJECTIVE AND OBJECTIVE BOX
[facility] DIVISION OF KIDNEY DISEASES AND HYPERTENSION -- [phone]  -- INITIAL CONSULT NOTE  --------------------------------------------------------------------------------  HPI: 66 yo F with h/o HFpEF, DM, HTN, HLD, sarcoidosis initially admitted for AMS with acute respiratory failure requiring intubation (). Hospital course complicated by PEA arrest x2. Pt was extubated and transferred to medicine. Currently being treated for C. diff. Nephrology consulted for nephrotic range proteinuria.     Pt seen and evaluated bedside. Pt reports feeling fatigued, otherwise no complaints. Denies any headaches, nausea, vomiting, fevers/chills, chest pain, palpitations, SOB, abdominal pain, and leg swelling.       PAST HISTORY  --------------------------------------------------------------------------------  PAST MEDICAL & SURGICAL HISTORY:  Type 2 diabetes mellitus  Bilateral cataracts  Fluid in pleural cavity associated with pancreatitis  Pancreatic pseudocyst/cyst  s/p drain placement and removal  HTN (hypertension)  HLD (hyperlipidemia)  History of amputation of right great toe    H/O:  section    History of laparoscopic cholecystectomy    FAMILY HISTORY:  No pertinent family history in first degree relatives      PAST SOCIAL HISTORY:    ALLERGIES & MEDICATIONS  --------------------------------------------------------------------------------  Allergies    penicillin ([place] Synd)    Intolerances      Standing Inpatient Medications  acetaminophen     Tablet .. 650 milliGRAM(s) Oral every 8 hours  amLODIPine   Tablet 10 milliGRAM(s) Oral daily  aspirin  chewable 81 milliGRAM(s) Oral daily  atorvastatin 20 milliGRAM(s) Oral at bedtime  carvedilol 25 milliGRAM(s) Oral every 12 hours  dextrose 50% Injectable 25 Gram(s) IV Push once  dextrose 50% Injectable 12.5 Gram(s) IV Push once  dextrose 50% Injectable 25 Gram(s) IV Push once  enoxaparin Injectable 40 milliGRAM(s) SubCutaneous every 24 hours  furosemide    Tablet 40 milliGRAM(s) Oral daily  glucagon  Injectable 1 milliGRAM(s) IntraMuscular once  hydrALAZINE 100 milliGRAM(s) Oral three times a day  insulin glargine Injectable (LANTUS) 2 Unit(s) SubCutaneous at bedtime  insulin lispro (ADMELOG) corrective regimen sliding scale   SubCutaneous three times a day before meals  insulin lispro (ADMELOG) corrective regimen sliding scale   SubCutaneous at bedtime  lidocaine   4% Patch 1 Patch Transdermal once  melatonin 6 milliGRAM(s) Oral at bedtime  meropenem  IVPB      vancomycin    Solution 125 milliGRAM(s) Oral every 6 hours    PRN Inpatient Medications  acetaminophen     Tablet .. 650 milliGRAM(s) Oral every 6 hours PRN  dextrose Oral Gel 15 Gram(s) Oral once PRN  melatonin 3 milliGRAM(s) Oral at bedtime PRN      REVIEW OF SYSTEMS  --------------------------------------------------------------------------------  General: Positive for fatigue  HEENT: No decreased hearing  Resp: No dyspnea  Cardio: No chest pain  GI: No abdominal pain or diarrhea  MSK: No edema  Neuro: No headache    VITALS/PHYSICAL EXAM  --------------------------------------------------------------------------------  T(C): 36.7 (22 @ 06:00), Max: 36.7 (22 @ 21:47)  HR: 79 (22 @ 06:00) (72 - 79)  BP: 159/82 (22 @ 06:00) (148/69 - 159/82)  RR: 17 (22 @ 06:00) (17 - 17)  SpO2: 100% (22 @ 06:00) (98% - 100%)  Wt(kg): --      22 @ 07:01  -  22 @ 07:00  --------------------------------------------------------  IN: 500 mL / OUT: 0 mL / NET: 500 mL    22 @ 07:01  -  22 @ 15:33  --------------------------------------------------------  IN: 350 mL / OUT: 0 mL / NET: 350 mL      Physical Exam:  Gen: NAD  HEENT: MMYEVGENIY  Pulm: Course breath sounds BL  CV: S1S2  Abd: Soft, +BS   Ext: BL LE edema  Neuro: Awake  Skin: Warm and dry  Vascular access:    LABS/STUDIES  --------------------------------------------------------------------------------              8.5    5.08  >-----------<  319      [22 @ 14:41]              29.3     139  |  100  |  15  ----------------------------<  193      [22 @ 14:41]  3.7   |  31  |  1.09        Ca     8.2     [07-28-22 @ 14:41]      Mg     1.60     [22 @ 14:41]      Phos  3.4     [22 14:41]    TPro  6.3  /  Alb  x   /  TBili  x   /  DBili  x   /  AST  x   /  ALT  x   /  AlkPhos  x   [22 @ 14:00]    PT/INR: PT 12.0 , INR 1.03       [22 14:41]  PTT: 40.4       [22 14:41]      Creatinine Trend:  SCr 1.09 [ 14:41]  SCr 0.96 [ 05:15]  SCr 1.11 [ 05:54]  SCr 1.29 [ 07:01]  SCr 1.28 [ 06:31]    Urinalysis - [22 @ 18:26]      Color Orange / Appearance Turbid / SG 1.027 / pH 6.0      Gluc Negative / Ketone Trace  / Bili Negative / Urobili 3 mg/dL       Blood Small / Protein 300 mg/dL / Leuk Est Large / Nitrite Negative      RBC 4-6 / WBC 25-30 / Hyaline 2 / Gran 2 / Sq Epi  / Non Sq Epi 5 / Bacteria Moderate    Urine Creatinine 19      [22 @ 11:13]  Urine Protein 201      [22 @ 11:13]    Iron 31, TIBC 179, %sat 17      [22 @ 06:31]  Ferritin 212      [22 @ 06:31]  TSH 3.37      [22 @ 04:10]  Lipid: chol 236, , HDL 37, LDL --      [21 @ 06:47]    HBsAg Nonreact      [22 @ 07:01]  HCV 0.24, Nonreact      [21 @ 09:19]  HIV Nonreact      [22 @ 14:00]    MINOO: titer Negative, pattern --      [21 @ 09:17]  C3 Complement 128      [22 @ 07:01]  C4 Complement 29      [22 @ 07:01]  ANCA: cANCA Negative, pANCA Negative, atypical ANCA Negative      [21 @ 09:17]  Syphilis Screen (Treponema Pallidum Ab) Negative      [21 @ 09:17]  Free Light Chains: kappa 11.62, lambda 4.67, ratio = 2.49      [ @ 14:00]  SPEP Interpretation: NAVEED Rosenberg M.D.      [21 @ 06:47]

## 2022-07-28 NOTE — RAPID RESPONSE TEAM SUMMARY - NSSITUATIONBACKGROUNDRRT_GEN_ALL_CORE
68 yo F with sarcoidosis, CHF who orinially presented for AMS and CHF exacerbation. With course c/b PEA arrest x2 and intubation x2. Last extubated on 7/20. Downgraded to floors on 7/21. There found to have complete whiteout of L. lung, but saturating well on RA. Pulmonary consulted for management, with recommendations for aggressive chest PT. RRT called this afternoon for bradycardia to 30s during turning and then when brought back supine for hypoxia to high 80s on RA. On arrival patient was on NRB saturating 100%, HR 70s, BP appropriate. Rectal temp found to be 94.5. ABG drawn during RRT with respiratory acidosis 7.28/68/221. Initiated on bilevel at 14/6, 30%. Patient was nasotracheally suctioned before bipap initiation with little secretions returned. Empiric antimicrobials for possible post-obstructive PNA reinitiated.
67F with sarcoidosis, CHF who presented for AMS and CHF exacerbation. intubated for AMS, briefly on pressors for vasoplegia (off since 7/13). had some hypoglycemia requiring D5LR now resolved. on antibiotics for sepsis without source. extubated 7/16 AM and transfer to floors. Earlier in evening patient was found comfortable in bed on 9N without any respiratory distress. At 7:45 patient was found with agonal respiration so RRT was called.

## 2022-07-28 NOTE — DISCHARGE NOTE NURSING/CASE MANAGEMENT/SOCIAL WORK - NSDCCRNAME_GEN_ALL_CORE_FT
[facility] address: [address] [facility] [address] [facility] [id number], 3pm  via [employer] [employer]

## 2022-07-28 NOTE — PROGRESS NOTE ADULT - SUBJECTIVE AND OBJECTIVE BOX
Chief Complaint:     History:    MEDICATIONS  (STANDING):  acetaminophen     Tablet .. 650 milliGRAM(s) Oral every 8 hours  amLODIPine   Tablet 10 milliGRAM(s) Oral daily  aspirin  chewable 81 milliGRAM(s) Oral daily  atorvastatin 20 milliGRAM(s) Oral at bedtime  carvedilol 25 milliGRAM(s) Oral every 12 hours  dextrose 50% Injectable 25 Gram(s) IV Push once  dextrose 50% Injectable 12.5 Gram(s) IV Push once  dextrose 50% Injectable 25 Gram(s) IV Push once  enoxaparin Injectable 40 milliGRAM(s) SubCutaneous every 24 hours  furosemide    Tablet 40 milliGRAM(s) Oral daily  glucagon  Injectable 1 milliGRAM(s) IntraMuscular once  hydrALAZINE 100 milliGRAM(s) Oral three times a day  insulin glargine Injectable (LANTUS) 2 Unit(s) SubCutaneous at bedtime  insulin lispro (ADMELOG) corrective regimen sliding scale   SubCutaneous three times a day before meals  insulin lispro (ADMELOG) corrective regimen sliding scale   SubCutaneous at bedtime  lidocaine   4% Patch 1 Patch Transdermal once  melatonin 6 milliGRAM(s) Oral at bedtime  meropenem  IVPB      meropenem  IVPB 1000 milliGRAM(s) IV Intermittent once  vancomycin    Solution 125 milliGRAM(s) Oral every 6 hours    MEDICATIONS  (PRN):  acetaminophen     Tablet .. 650 milliGRAM(s) Oral every 6 hours PRN Temp greater or equal to 38C (100.4F), Mild Pain (1 - 3)  dextrose Oral Gel 15 Gram(s) Oral once PRN Blood Glucose LESS THAN 70 milliGRAM(s)/deciliter  melatonin 3 milliGRAM(s) Oral at bedtime PRN Insomnia      Allergies    penicillin ([place] Synd)    Intolerances      Review of Systems:  Constitutional: No fever  Eyes: No blurry vision  Neuro: No tremors  HEENT: No pain  Cardiovascular: No chest pain, palpitations  Respiratory: No SOB, no cough  GI: No nausea, vomiting, abdominal pain  : No dysuria  Skin: no rash  Psych: no depression  Endocrine: no polyuria, polydipsia  Hem/lymph: no swelling  Osteoporosis: no fractures    ALL OTHER SYSTEMS REVIEWED AND NEGATIVE    UNABLE TO OBTAIN    PHYSICAL EXAM:  VITALS: T(C): 36.7 (07-28-22 @ 06:00)  T(F): 98 (07-28-22 @ 06:00), Max: 98 (07-27-22 @ 21:47)  HR: 78 (07-28-22 @ 15:06) (72 - 79)  BP: 148/81 (07-28-22 @ 14:00) (148/69 - 159/82)  RR:  (17 - 18)  SpO2:  (98% - 100%)  Wt(kg): --  GENERAL: NAD, well-groomed, well-developed  EYES: No proptosis, no lid lag, anicteric  HEENT:  Atraumatic, Normocephalic, moist mucous membranes  THYROID: Normal size, no palpable nodules  RESPIRATORY: Clear to auscultation bilaterally; No rales, rhonchi, wheezing  CARDIOVASCULAR: Regular rate and rhythm; No murmurs; no peripheral edema  GI: Soft, nontender, non distended  SKIN: Dry, intact, No rashes or lesions  MUSCULOSKELETAL: Full range of motion, normal strength  NEURO: extraocular movements intact, no tremor  PSYCH: Alert and oriented x 3, normal affect, normal mood      CAPILLARY BLOOD GLUCOSE      POCT Blood Glucose.: 180 mg/dL (28 Jul 2022 14:31)  POCT Blood Glucose.: 146 mg/dL (28 Jul 2022 11:35)  POCT Blood Glucose.: 101 mg/dL (28 Jul 2022 09:05)  POCT Blood Glucose.: 208 mg/dL (27 Jul 2022 21:45)      07-28    139  |  100  |  15  ----------------------------<  193<H>  3.7   |  31  |  1.09    eGFR: 55<L>    Ca    8.2<L>      07-28  Mg     1.60     07-28  Phos  3.4     07-28    TPro  6.3  /  Alb  x   /  TBili  x   /  DBili  x   /  AST  x   /  ALT  x   /  AlkPhos  x   07-27          Thyroid Function Tests:  07-13 @ 04:10 TSH 3.37 FreeT4 -- T3 -- Anti TPO -- Anti Thyroglobulin Ab -- TSI --                       Chief Complaint: Type 2 Dm     History: Pt seen at bedside. Pt tolerating oral intake. Pt denies nausea and vomiting/any signs of hypoglycemia. Pt reports an adequate appetite but dislikes the hospital food.     MEDICATIONS  (STANDING):  acetaminophen     Tablet .. 650 milliGRAM(s) Oral every 8 hours  amLODIPine   Tablet 10 milliGRAM(s) Oral daily  aspirin  chewable 81 milliGRAM(s) Oral daily  atorvastatin 20 milliGRAM(s) Oral at bedtime  carvedilol 25 milliGRAM(s) Oral every 12 hours  dextrose 50% Injectable 25 Gram(s) IV Push once  dextrose 50% Injectable 12.5 Gram(s) IV Push once  dextrose 50% Injectable 25 Gram(s) IV Push once  enoxaparin Injectable 40 milliGRAM(s) SubCutaneous every 24 hours  furosemide    Tablet 40 milliGRAM(s) Oral daily  glucagon  Injectable 1 milliGRAM(s) IntraMuscular once  hydrALAZINE 100 milliGRAM(s) Oral three times a day  insulin glargine Injectable (LANTUS) 2 Unit(s) SubCutaneous at bedtime  insulin lispro (ADMELOG) corrective regimen sliding scale   SubCutaneous three times a day before meals  insulin lispro (ADMELOG) corrective regimen sliding scale   SubCutaneous at bedtime  lidocaine   4% Patch 1 Patch Transdermal once  melatonin 6 milliGRAM(s) Oral at bedtime  meropenem  IVPB      meropenem  IVPB 1000 milliGRAM(s) IV Intermittent once  vancomycin    Solution 125 milliGRAM(s) Oral every 6 hours    MEDICATIONS  (PRN):  acetaminophen     Tablet .. 650 milliGRAM(s) Oral every 6 hours PRN Temp greater or equal to 38C (100.4F), Mild Pain (1 - 3)  dextrose Oral Gel 15 Gram(s) Oral once PRN Blood Glucose LESS THAN 70 milliGRAM(s)/deciliter  melatonin 3 milliGRAM(s) Oral at bedtime PRN Insomnia      Allergies: penicillin ([place] Synd)      Review of Systems:  HEENT: No pain  Cardiovascular: No chest pain, palpitations  Respiratory: No SOB, no cough  GI: No nausea, vomiting, abdominal pain  Endocrine: no polyuria, polydipsia    PHYSICAL EXAM:  VITALS: T(C): 36.7 (07-28-22 @ 06:00)  T(F): 98 (07-28-22 @ 06:00), Max: 98 (07-27-22 @ 21:47)  HR: 78 (07-28-22 @ 15:06) (72 - 79)  BP: 148/81 (07-28-22 @ 14:00) (148/69 - 159/82)  RR:  (17 - 18)  SpO2:  (98% - 100%)  Wt(kg): --  GENERAL: NAD, well-groomed, well-developed  EYES: No proptosis, no lid lag, anicteric  HEENT:  Atraumatic, Normocephalic, moist mucous membranes  THYROID: Normal size, no palpable nodules  RESPIRATORY: Clear to auscultation bilaterally; No rales, rhonchi, wheezing  CARDIOVASCULAR: Regular rate and rhythm; No murmurs; no peripheral edema  GI: Soft, nontender, non distended  SKIN: Dry, intact, No rashes or lesions  MUSCULOSKELETAL: Full range of motion, normal strength  NEURO: extraocular movements intact, no tremor  PSYCH: Alert and oriented x 3, normal affect, normal mood      CAPILLARY BLOOD GLUCOSE  POCT Blood Glucose.: 180 mg/dL (28 Jul 2022 14:31)  POCT Blood Glucose.: 146 mg/dL (28 Jul 2022 11:35)  POCT Blood Glucose.: 101 mg/dL (28 Jul 2022 09:05)  POCT Blood Glucose.: 208 mg/dL (27 Jul 2022 21:45)      07-28    139  |  100  |  15  ----------------------------<  193<H>  3.7   |  31  |  1.09    eGFR: 55<L>    Ca    8.2<L>      07-28  Mg     1.60     07-28  Phos  3.4     07-28    TPro  6.3  /  Alb  x   /  TBili  x   /  DBili  x   /  AST  x   /  ALT  x   /  AlkPhos  x   07-27          Thyroid Function Tests:  07-13 @ 04:10 TSH 3.37 FreeT4 -- T3 -- Anti TPO -- Anti Thyroglobulin Ab -- TSI --        Diet, Regular:   Consistent Carbohydrate Evening Snack (CSTCHOSN)  Supplement Feeding Modality:  Oral  Glucerna Shake Cans or Servings Per Day:  1       Frequency:  Three Times a day (07-27-22 @ 15:35) [Active]

## 2022-07-28 NOTE — PROGRESS NOTE ADULT - PROBLEM SELECTOR PLAN 3
Presented with R>L effusion, x-ray on 7/24 w/ L effusion vs infiltrate, pulm did POCUS on 7/25 no large left effusion  - currently on RA  - s/p IV diuresis now on lasix 40 mg PO daily   - s/p meropenum 7/13-7/20  - pulm consult appreciated  - c/w incentive spirometry--educated on use   - CXR 7/27 with similar findings to 7/24

## 2022-07-28 NOTE — PROGRESS NOTE ADULT - PROBLEM SELECTOR PLAN 7
Saw pulm in Feb s/p bronch, daughter states confirmed diagnosis and was due for continued OP f/u   - f/u pulm  - OP sleep study and PFTs

## 2022-07-28 NOTE — CONSULT NOTE ADULT - SUBJECTIVE AND OBJECTIVE BOX
CHIEF COMPLAINT: Respiratory distress     HPI:  68 y/o F with pmhx of HTN, HLD, sarcoidosis, CHF, presented to the ED for new onset AMS. The patient is hard of hearing, difficult to obtain history and is also very lethargic. Hx obtained from daughter over the phone. As per daughter, the patient yesterday was at her baseline and can communicate as usual. However at midnight, the patient suddenly became altered, confused and daughter noted she was slurring his speech. She was incoherent, and hallucinating, saying there is a cat present and is asking for her aunt who lives far away. She would also be very sleepy and does not do her usual activities. The daughter was concerned for a stroke. Denies facial droop. The patient also has symptoms of leg swelling and shortness of breath since yesterday. The patient known to have frequent admission for CHF exacerbations but is unable to follow up with outpatient cardiologist and as per daughter, not on diuretics at home (however the order rec shows that it was prescribed recently). Denies chest pain. No fevers at home.    Per primary team, pt was noted to be more lethargic. Code Stroke was called; per neuro, metabolic encephalopathy was more likely. Also noted to be hypothermic despite being on the tracy hugger. VBG showed hypercapneic respiratory failure. Pt was placed on BiPAP without improvement in mental status, subsequently intubated and admitted to MICU for hypercapneic respiratory failure.    Patient was extubated and transferred to floors     Interval history:       PAST MEDICAL & SURGICAL HISTORY:  Type 2 diabetes mellitus      Bilateral cataracts      Fluid in pleural cavity associated with pancreatitis      Pancreatic pseudocyst/cyst  s/p drain placement and removal      HTN (hypertension)      HLD (hyperlipidemia)      History of amputation of right great toe        H/O:  section        History of laparoscopic cholecystectomy          FAMILY HISTORY:  No pertinent family history in first degree relatives        SOCIAL HISTORY:  Smoking: __ packs x ___ years  EtOH Use:  Marital Status:  Occupation:  Recent Travel:  Country of Birth:  Advance Directives:    Allergies    penicillin ([place] [employer])    Intolerances        HOME MEDICATIONS:    REVIEW OF SYSTEMS:  Constitutional:   Eyes:  ENT:  CV:  Resp:  GI:  :  MSK:  Integumentary:  Neurological:  Psychiatric:  Endocrine:  Hematologic/Lymphatic:  Allergic/Immunologic:  [ ] All other systems negative  [ ] Unable to assess ROS because ________    OBJECTIVE:  ICU Vital Signs Last 24 Hrs  T(C): 36.7 (2022 06:00), Max: 36.7 (2022 21:47)  T(F): 98 (2022 06:00), Max: 98 (2022 21:47)  HR: 78 (2022 15:06) (72 - 79)  BP: 159/82 (2022 06:00) (148/69 - 159/82)  BP(mean): --  ABP: --  ABP(mean): --  RR: 17 (2022 06:00) (17 - 17)  SpO2: 99% (2022 15:06) (98% - 100%)    O2 Parameters below as of 2022 06:00  Patient On (Oxygen Delivery Method): nasal cannula  O2 Flow (L/min): 2             @ : @ 07:00  --------------------------------------------------------  IN: 500 mL / OUT: 0 mL / NET: 500 mL     @ : @ 16:15  --------------------------------------------------------  IN: 350 mL / OUT: 0 mL / NET: 350 mL      CAPILLARY BLOOD GLUCOSE      POCT Blood Glucose.: 180 mg/dL (2022 14:31)      PHYSICAL EXAM:  General:   HEENT:   Lymph Nodes:  Neck:   Respiratory:   Cardiovascular:   Abdomen:   Extremities:   Skin:   Neurological:  Psychiatry:    HOSPITAL MEDICATIONS:  MEDICATIONS  (STANDING):  acetaminophen     Tablet .. 650 milliGRAM(s) Oral every 8 hours  amLODIPine   Tablet 10 milliGRAM(s) Oral daily  aspirin  chewable 81 milliGRAM(s) Oral daily  atorvastatin 20 milliGRAM(s) Oral at bedtime  carvedilol 25 milliGRAM(s) Oral every 12 hours  dextrose 50% Injectable 25 Gram(s) IV Push once  dextrose 50% Injectable 12.5 Gram(s) IV Push once  dextrose 50% Injectable 25 Gram(s) IV Push once  enoxaparin Injectable 40 milliGRAM(s) SubCutaneous every 24 hours  furosemide    Tablet 40 milliGRAM(s) Oral daily  glucagon  Injectable 1 milliGRAM(s) IntraMuscular once  hydrALAZINE 100 milliGRAM(s) Oral three times a day  insulin glargine Injectable (LANTUS) 2 Unit(s) SubCutaneous at bedtime  insulin lispro (ADMELOG) corrective regimen sliding scale   SubCutaneous three times a day before meals  insulin lispro (ADMELOG) corrective regimen sliding scale   SubCutaneous at bedtime  lidocaine   4% Patch 1 Patch Transdermal once  melatonin 6 milliGRAM(s) Oral at bedtime  meropenem  IVPB      vancomycin    Solution 125 milliGRAM(s) Oral every 6 hours    MEDICATIONS  (PRN):  acetaminophen     Tablet .. 650 milliGRAM(s) Oral every 6 hours PRN Temp greater or equal to 38C (100.4F), Mild Pain (1 - 3)  dextrose Oral Gel 15 Gram(s) Oral once PRN Blood Glucose LESS THAN 70 milliGRAM(s)/deciliter  melatonin 3 milliGRAM(s) Oral at bedtime PRN Insomnia      LABS:                        8.5    5.08  )-----------( 319      ( 2022 14:41 )             29.3         139  |  100  |  15  ----------------------------<  193<H>  3.7   |  31  |  1.09    Ca    8.2<L>      2022 14:41  Phos  3.4       Mg     1.60         TPro  6.3  /  Alb  x   /  TBili  x   /  DBili  x   /  AST  x   /  ALT  x   /  AlkPhos  x       PT/INR - ( 2022 14:41 )   PT: 12.0 sec;   INR: 1.03 ratio         PTT - ( 2022 14:41 )  PTT:40.4 sec    Arterial Blood Gas:   @ 14:41  7.28/68/221/32/98.8/3.2  ABG lactate: --    Venous Blood Gas:   @ 06:00  7.38/54/64/32/92.7  VBG Lactate: --      MICROBIOLOGY:     RADIOLOGY:  [ ] Reviewed and interpreted by me    EKG: CHIEF COMPLAINT: Respiratory distress     HPI:  68 y/o F with pmhx of HTN, HLD, sarcoidosis, CHF, presented to the ED for new onset AMS. The patient is hard of hearing, difficult to obtain history and is also very lethargic. Hx obtained from daughter over the phone. As per daughter, the patient yesterday was at her baseline and can communicate as usual. However at midnight, the patient suddenly became altered, confused and daughter noted she was slurring his speech. She was incoherent, and hallucinating, saying there is a cat present and is asking for her aunt who lives far away. She would also be very sleepy and does not do her usual activities. The daughter was concerned for a stroke. Denies facial droop. The patient also has symptoms of leg swelling and shortness of breath since yesterday. The patient known to have frequent admission for CHF exacerbations but is unable to follow up with outpatient cardiologist and as per daughter, not on diuretics at home (however the order rec shows that it was prescribed recently). Denies chest pain. No fevers at home.    Per primary team, pt was noted to be more lethargic. Code Stroke was called; per neuro, metabolic encephalopathy was more likely. Also noted to be hypothermic despite being on the tracy hugger. VBG showed hypercapneic respiratory failure. Pt was placed on BiPAP without improvement in mental status, subsequently intubated and admitted to MICU for hypercapneic respiratory failure.    Patient was extubated and transferred to floors on .     On , RRT called for agonal breathing, patient then noted to have lost pulse. CPR initiated. Patient intubated. Rhythm on monitor showed PEA. ROSC achieved after Epi x1. Patient then re-admitted to MICU. Upon arrival to ICU, patient lost pulse again. CPR started for PEA arrest. Epi x 1 given and ROSC achieved.     While in MICU, patient treated for presumed aspiration pna which . Hypoxemia in s/o recurrent aspiration thought to be underlying reason for PEA arrest. Patient was treated with course of meropenem for aspiration PNA and extubated , then transferred to floors .     While on floors. Pt found to be C.diff + started on oral vanco. CXR  showed total left lung white out but continued to maintain good SpO2. Pulmonology consulted and recommended airway clearance. Pt also undergoing work up for nephrotic syndrome given significant proteinuria.      , RRT called for bradycardia during turning and when brought back to supine, patient hypoxemic to 80s on room air. CXR repeat showed improvement in total left lung atelectasis. ABG showed hypercarbia. Pt started on BiPAP and MICU consulted for recommendations for respiratory management.     Interval history:       PAST MEDICAL & SURGICAL HISTORY:  Type 2 diabetes mellitus  Bilateral cataracts  Fluid in pleural cavity associated with pancreatitis  Pancreatic pseudocyst/cyst s/p drain placement and removal  HTN (hypertension)  HLD (hyperlipidemia)  History of amputation of right great toe   H/O:  [id number]  History of laparoscopic cholecystectomy    FAMILY HISTORY:  No pertinent family history in first degree relatives    SOCIAL HISTORY:    Allergies  penicillin ([place] Synd)  Intolerances    HOME MEDICATIONS:    REVIEW OF SYSTEMS:  [x] Unable to assess ROS because BiPAP in place and pt lethargic     OBJECTIVE:  ICU Vital Signs Last 24 Hrs  T(C): 36.7 (2022 06:00), Max: 36.7 (2022 21:47)  T(F): 98 (2022 06:00), Max: 98 (2022 21:47)  HR: 78 (2022 15:06) (72 - 79)  BP: 159/82 (2022 06:00) (148/69 - 159/82)  BP(mean): --  ABP: --  ABP(mean): --  RR: 17 (2022 06:00) (17 - 17)  SpO2: 99% (2022 15:06) (98% - 100%)    O2 Parameters below as of 2022 06:00  Patient On (Oxygen Delivery Method): nasal cannula  O2 Flow (L/min): 2       @ :  -   @ 07:00  --------------------------------------------------------  IN: 500 mL / OUT: 0 mL / NET: 500 mL     @ 07:  -   @ 16:15  --------------------------------------------------------  IN: 350 mL / OUT: 0 mL / NET: 350 mL    CAPILLARY BLOOD GLUCOSE    POCT Blood Glucose.: 180 mg/dL (2022 14:31)      PHYSICAL EXAM:  General: NAD lethargic   HEENT: NCAT, hard of hearing   Respiratory: No breath sounds on left lung field. No wheezes rales or rhonchi   Cardiovascular: S1S2   Abdomen: Soft non distended non tender   Extremities: No edema     HOSPITAL MEDICATIONS:  MEDICATIONS  (STANDING):  acetaminophen     Tablet .. 650 milliGRAM(s) Oral every 8 hours  amLODIPine   Tablet 10 milliGRAM(s) Oral daily  aspirin  chewable 81 milliGRAM(s) Oral daily  atorvastatin 20 milliGRAM(s) Oral at bedtime  carvedilol 25 milliGRAM(s) Oral every 12 hours  dextrose 50% Injectable 25 Gram(s) IV Push once  dextrose 50% Injectable 12.5 Gram(s) IV Push once  dextrose 50% Injectable 25 Gram(s) IV Push once  enoxaparin Injectable 40 milliGRAM(s) SubCutaneous every 24 hours  furosemide    Tablet 40 milliGRAM(s) Oral daily  glucagon  Injectable 1 milliGRAM(s) IntraMuscular once  hydrALAZINE 100 milliGRAM(s) Oral three times a day  insulin glargine Injectable (LANTUS) 2 Unit(s) SubCutaneous at bedtime  insulin lispro (ADMELOG) corrective regimen sliding scale   SubCutaneous three times a day before meals  insulin lispro (ADMELOG) corrective regimen sliding scale   SubCutaneous at bedtime  lidocaine   4% Patch 1 Patch Transdermal once  melatonin 6 milliGRAM(s) Oral at bedtime  meropenem  IVPB      vancomycin    Solution 125 milliGRAM(s) Oral every 6 hours    MEDICATIONS  (PRN):  acetaminophen     Tablet .. 650 milliGRAM(s) Oral every 6 hours PRN Temp greater or equal to 38C (100.4F), Mild Pain (1 - 3)  dextrose Oral Gel 15 Gram(s) Oral once PRN Blood Glucose LESS THAN 70 milliGRAM(s)/deciliter  melatonin 3 milliGRAM(s) Oral at bedtime PRN Insomnia      LABS:                        8.5    5.08  )-----------( 319      ( 2022 14:41 )             29.3         139  |  100  |  15  ----------------------------<  193<H>  3.7   |  31  |  1.09    Ca    8.2<L>      2022 14:41  Phos  3.4       Mg     1.60         TPro  6.3  /  Alb  x   /  TBili  x   /  DBili  x   /  AST  x   /  ALT  x   /  AlkPhos  x       PT/INR - ( 2022 14:41 )   PT: 12.0 sec;   INR: 1.03 ratio         PTT - ( 2022 14:41 )  PTT:40.4 sec    Arterial Blood Gas:   @ 14:41  7.28/68/221/32/98.8/3.2  ABG lactate: --    Venous Blood Gas:   @ 06:00  7.38/54/64/32/92.7  VBG Lactate: --      MICROBIOLOGY:     RADIOLOGY:  [x] Reviewed and interpreted by me

## 2022-07-28 NOTE — PROGRESS NOTE ADULT - PROBLEM SELECTOR PLAN 9
- c/w hydralazine 100mg TID, amlodipine 10 mg daily & carvedilol 25 mg BID  - c/w lasix 40 mg  - losartan 25 mg-->50 mg for BP above goal now that MANDY resolved and proteinuria

## 2022-07-28 NOTE — DISCHARGE NOTE NURSING/CASE MANAGEMENT/SOCIAL WORK - NSSCCARECORD_GEN_ALL_CORE
Home Care Agency/Durable Medical Equipment Agency Durable Medical Equipment Agency/Community Resource Community Resource

## 2022-07-28 NOTE — PROGRESS NOTE ADULT - ASSESSMENT
67F [place], HTN, HLD, sarcoidosis, HFpEF p/w lethargy/SOB/swelling/AMS admitted with metabolic encephalopathy due to acute hypercarbic respiratory failure in context of CHF exacerbation. s/p intubation and MICU admission 7/13 for hypercapnia/airway protection was extubated 7/16  (s/p empiric meropenem 7/13 - 7/20 for concern for sepsis given hypothermia/AMS however neg infectious w/u) course c/b PEA arrest on 7/17 pt re-intubated s/p second PEA arrest when arrived to CTICU now transferred to medicine on 7/23 s/p being extubated.  Course now c/b c diff without diarrhea at this time.  Also with nephrotic range proteinuria.

## 2022-07-28 NOTE — PROGRESS NOTE ADULT - SUBJECTIVE AND OBJECTIVE BOX
CHIEF COMPLAINT:    Interval Events:  - CXR from yesterday still with white out of left lung  - has not been getting chest PT or airway clearance  - awake, alert, on room air this morning  - in afternoon, RRT called for bradycardia. Per RN, pt was turned on side for cleaning and became bradycardic to 30s. She was returned to supine position and HR improved to 70s. O2sat 89% on room air, placed on NRB. Decreased breath sounds L side. Increased work of breathing      REVIEW OF SYSTEMS:  Negative except as documented above.      OBJECTIVE:  ICU Vital Signs Last 24 Hrs  T(C): 36.7 (28 Jul 2022 06:00), Max: 36.7 (27 Jul 2022 21:47)  T(F): 98 (28 Jul 2022 06:00), Max: 98 (27 Jul 2022 21:47)  HR: 78 (28 Jul 2022 15:06) (72 - 79)  BP: 159/82 (28 Jul 2022 06:00) (148/69 - 159/82)  BP(mean): --  ABP: --  ABP(mean): --  RR: 17 (28 Jul 2022 06:00) (17 - 17)  SpO2: 99% (28 Jul 2022 15:06) (98% - 100%)    O2 Parameters below as of 28 Jul 2022 06:00  Patient On (Oxygen Delivery Method): nasal cannula  O2 Flow (L/min): 2            07-27 @ 07:01 - 07-28 @ 07:00  --------------------------------------------------------  IN: 500 mL / OUT: 0 mL / NET: 500 mL    07-28 @ 07:01 - 07-28 @ 15:37  --------------------------------------------------------  IN: 350 mL / OUT: 0 mL / NET: 350 mL      CAPILLARY BLOOD GLUCOSE      POCT Blood Glucose.: 180 mg/dL (28 Jul 2022 14:31)      PHYSICAL EXAM:  General: uncomfortable appearing  HEENT: PERRL, EOMI, sclera anicteric  Neck: supple, no JVD  Cardiovascular: RRR, no murmurs, rubs, or gallops  Respiratory: decreased breath sounds L side, R side with rhonci  Abdomen: soft, nontender, nondistended, normoactive bowel sounds  Extremities: warm and well perfused, no edema, no clubbing  Skin: no rashes  Neurological: unable to assess    HOSPITAL MEDICATIONS:  MEDICATIONS  (STANDING):  acetaminophen     Tablet .. 650 milliGRAM(s) Oral every 8 hours  amLODIPine   Tablet 10 milliGRAM(s) Oral daily  aspirin  chewable 81 milliGRAM(s) Oral daily  atorvastatin 20 milliGRAM(s) Oral at bedtime  carvedilol 25 milliGRAM(s) Oral every 12 hours  dextrose 50% Injectable 25 Gram(s) IV Push once  dextrose 50% Injectable 12.5 Gram(s) IV Push once  dextrose 50% Injectable 25 Gram(s) IV Push once  enoxaparin Injectable 40 milliGRAM(s) SubCutaneous every 24 hours  furosemide    Tablet 40 milliGRAM(s) Oral daily  glucagon  Injectable 1 milliGRAM(s) IntraMuscular once  hydrALAZINE 100 milliGRAM(s) Oral three times a day  insulin glargine Injectable (LANTUS) 2 Unit(s) SubCutaneous at bedtime  insulin lispro (ADMELOG) corrective regimen sliding scale   SubCutaneous three times a day before meals  insulin lispro (ADMELOG) corrective regimen sliding scale   SubCutaneous at bedtime  lidocaine   4% Patch 1 Patch Transdermal once  melatonin 6 milliGRAM(s) Oral at bedtime  meropenem  IVPB      vancomycin    Solution 125 milliGRAM(s) Oral every 6 hours    MEDICATIONS  (PRN):  acetaminophen     Tablet .. 650 milliGRAM(s) Oral every 6 hours PRN Temp greater or equal to 38C (100.4F), Mild Pain (1 - 3)  dextrose Oral Gel 15 Gram(s) Oral once PRN Blood Glucose LESS THAN 70 milliGRAM(s)/deciliter  melatonin 3 milliGRAM(s) Oral at bedtime PRN Insomnia      LABS:                        8.5    5.08  )-----------( 319      ( 28 Jul 2022 14:41 )             29.3     Hgb Trend: 8.5<--, 8.7<--, 8.6<--, 8.9<--, 8.7<--  07-28    139  |  100  |  15  ----------------------------<  193<H>  3.7   |  31  |  1.09    Ca    8.2<L>      28 Jul 2022 14:41  Phos  3.4     07-28  Mg     1.60     07-28    TPro  6.3  /  Alb  x   /  TBili  x   /  DBili  x   /  AST  x   /  ALT  x   /  AlkPhos  x   07-27    Creatinine Trend: 1.09<--, 0.96<--, 1.11<--, 1.29<--, 1.28<--, 1.29<--  PT/INR - ( 28 Jul 2022 14:41 )   PT: 12.0 sec;   INR: 1.03 ratio         PTT - ( 28 Jul 2022 14:41 )  PTT:40.4 sec    Arterial Blood Gas:  07-28 @ 14:41  7.28/68/221/32/98.8/3.2  ABG lactate: --    Venous Blood Gas:  07-27 @ 06:00  7.38/54/64/32/92.7  VBG Lactate: --      MICROBIOLOGY:       RADIOLOGY:  [x] Reviewed and interpreted by me

## 2022-07-28 NOTE — PROGRESS NOTE ADULT - PROBLEM SELECTOR PLAN 2
Had rectal tube and diarrhea c diff sent 7/24 +, s/p recent abx exposure while admitted   - PO vanco x 10 days (7/25-), D4/10  - no diarrhea, BM on 7/27 however no reports of diarrhea (had gone 2 days prior without BM)  - tolerating diet, no abdominal pain, nausea/vomiting

## 2022-07-28 NOTE — PROGRESS NOTE ADULT - PROBLEM SELECTOR PLAN 1
Patient came in with volume overload, in my review cardiac cause (TTE findings) not correlating as cause.  Does have proteinurea and MANDY on arrival.  Unclear if related to underlying hypertensive and diabetic kidney disease.    - spot protein 10g  - on lasix 40 mg PO daily, appears euvolemic  - complements wnl, Hep C and B neg, free lyte chains elevated, other serolgies pending  - renal eval pending re: need for further IP vs OP w/u

## 2022-07-28 NOTE — PROCEDURE NOTE - NSPROCDETAILS_GEN_ALL_CORE
patient pre-oxygenated, tube inserted, placement confirmed
positive blood return obtained via catheter

## 2022-07-28 NOTE — DISCHARGE NOTE NURSING/CASE MANAGEMENT/SOCIAL WORK - PATIENT PORTAL LINK FT
You can access the FollowMyHealth Patient Portal offered by [facility] by registering at the following website: [email or web address]. By joining [email or web address] [email or web address] portal, you will also be able to view your health information using other applications (apps) compatible with our system.

## 2022-07-28 NOTE — DISCHARGE NOTE NURSING/CASE MANAGEMENT/SOCIAL WORK - NSDCPEFALRISK_GEN_ALL_CORE
For information on Fall & Injury Prevention, visit: [email or web address] OR  [email or web address] OR  https://www.cdc.gov/steadi/patient.html

## 2022-07-28 NOTE — CONSULT NOTE ADULT - REASON FOR ADMISSION
AMs, shortness of breath

## 2022-07-28 NOTE — PROGRESS NOTE ADULT - PROBLEM SELECTOR PLAN 8
Cr max 3.72  - Cr 0.96  - monitor Cr & renally dose meds  - hypokalemia, resolved s/p repletion  - renal eval pending re: proteinuria

## 2022-07-28 NOTE — PROGRESS NOTE ADULT - SUBJECTIVE AND OBJECTIVE BOX
Patient is a 67y old  Female who presents with a chief complaint of AMs, shortness of breath    SUBJECTIVE / OVERNIGHT EVENTS:    Reports doing well, no SOB, f/c/n/v  Reports chest pain on L side worse with coughing, hurts when palpitated  No other complaints     MEDICATIONS  (STANDING):  acetaminophen  Tablet 650 milliGRAM(s) Oral every 8 hours  amLODIPine   Tablet 10 milliGRAM(s) Oral daily  aspirin  chewable 81 milliGRAM(s) Oral daily  atorvastatin 20 milliGRAM(s) Oral at bedtime  carvedilol 25 milliGRAM(s) Oral every 12 hours  enoxaparin Injectable 40 milliGRAM(s) SubCutaneous every 24 hours  furosemide    Tablet 40 milliGRAM(s) Oral daily  glucagon  Injectable 1 milliGRAM(s) IntraMuscular once  hydrALAZINE 100 milliGRAM(s) Oral three times a day  insulin glargine Injectable (LANTUS) 2 Unit(s) SubCutaneous at bedtime  insulin lispro (ADMELOG) corrective regimen sliding scale   SubCutaneous three times a day before meals  insulin lispro (ADMELOG) corrective regimen sliding scale   SubCutaneous at bedtime  losartan 25 milliGRAM(s) Oral once  melatonin 6 milliGRAM(s) Oral at bedtime  vancomycin    Solution 125 milliGRAM(s) Oral every 6 hours    MEDICATIONS  (PRN):  acetaminophen     Tablet .. 650 milliGRAM(s) Oral every 6 hours PRN Temp greater or equal to 38C (100.4F), Mild Pain (1 - 3)  dextrose Oral Gel 15 Gram(s) Oral once PRN Blood Glucose LESS THAN 70 milliGRAM(s)/deciliter  melatonin 3 milliGRAM(s) Oral at bedtime PRN Insomnia    T(C): 36.7 (07-28-22 @ 06:00), Max: 36.7 (07-27-22 @ 21:47)  HR: 79 (07-28-22 @ 06:00) (72 - 79)  BP: 159/82 (07-28-22 @ 06:00) (148/69 - 159/82)  RR: 17 (07-28-22 @ 06:00) (17 - 17)  SpO2: 100% (07-28-22 @ 06:00) (98% - 100%)    CAPILLARY BLOOD GLUCOSE  POCT Blood Glucose.: 146 mg/dL (28 Jul 2022 11:35)  POCT Blood Glucose.: 101 mg/dL (28 Jul 2022 09:05)  POCT Blood Glucose.: 208 mg/dL (27 Jul 2022 21:45)  POCT Blood Glucose.: 144 mg/dL (27 Jul 2022 16:46)    I&O's Summary    27 Jul 2022 07:01  -  28 Jul 2022 07:00  --------------------------------------------------------  IN: 500 mL / OUT: 0 mL / NET: 500 mL    28 Jul 2022 07:01  -  28 Jul 2022 14:01  --------------------------------------------------------  IN: 350 mL / OUT: 0 mL / NET: 350 mL    PHYSICAL EXAM:  GENERAL: obese, on RA  CHEST/LUNG: decreased BS, no wheeze   HEART: Regular rate and rhythm; No murmurs, rubs, or gallops  ABDOMEN: Soft, Nontender, Nondistended; Bowel sounds present  EXTREMITIES:   warm and well perfused, No clubbing, cyanosis, or edema  PSYCH: AAOx3, very [place]  NEUROLOGY: non-focal      LABS:                        8.7    3.44  )-----------( 299      ( 28 Jul 2022 05:15 )             29.3     07-28    140  |  101  |  14  ----------------------------<  115<H>  3.7   |  29  |  0.96    Ca    8.6      28 Jul 2022 05:15  Phos  2.9     07-28  Mg     1.60     07-28    TPro  6.3  /  Alb  x   /  TBili  x   /  DBili  x   /  AST  x   /  ALT  x   /  AlkPhos  x   07-27    Microbiology:   VBG 07-27 @ 06:00  pH: 7.38/pCO2: 54/pO2: 64/HCO3: 32/lactate: --    Consultant(s) Notes Reviewed:    Care Discussed with Consultants/Other Providers: renal and pulm fellow    14-Jan-2020 12:01

## 2022-07-28 NOTE — PROGRESS NOTE ADULT - PROBLEM SELECTOR PLAN 6
Normocytic, no s/s of bleeding, iron studies c/w AOCD, no evidence of hemolysis, normal B12/folate, low reticulocyte  - should f/u as OP w/ heme  - f/u SPEP, immunofixation, free light chains elevated   - ?related to sarcoid

## 2022-07-28 NOTE — CONSULT NOTE ADULT - PROBLEM SELECTOR RECOMMENDATION 9
Pt with nephrotic range proteinuria. SCr was initially elevated up to 3.72 on 7/19. SCr now within normal range, 1.09 today. UA showing proteinuria. Spot urine protein to creatinine ratio was 10.6 on 7/27. Recommend to repeat UA, urine protein, and urine creatinine. Please send MINOO, P-ANCA, C-ANCA, Anti-GBM ab, HBsAg, Hep C antibody, HIV, Parvovirus, C3, C4, SPEP, serum immunofixation, serum free light chains, anti PLA2R. Pt currently on losartan 50 mg qd, dose may need to be increased pending results of above studies.    If you have any questions, please feel free to contact me  Thomas Pena  Nephrology Fellow  [phone] / [employer] Teams(Preferred)  (After 5pm or on weekends please page the on-call fellow)

## 2022-07-28 NOTE — CONSULT NOTE ADULT - CONSULT REASON
Respiratory distress
AMS/Stroke   office patient
Nephrotic range proteinuria
consolidation vs effusion
Hypoglycemia

## 2022-07-28 NOTE — PROGRESS NOTE ADULT - PROBLEM SELECTOR PLAN 5
- EEG with slowing but no seizures   - CTH neg  - per neuro AMS 2/2 hypercapnic respiratory failure, prior cerebellar stroke likely small vessel  - MS was better, 7/26 confused/delirious, VBG w/ mild compensated hypercapnia discussed w/ pulm  no indication for bipap  - MS better, communicating via typing on cell given [facility]

## 2022-07-28 NOTE — RAPID RESPONSE TEAM SUMMARY - NSOTHERINTERVENTIONSRRT_GEN_ALL_CORE
Aggressive Chest PT  Bipap  ABG/Labs  Abx  NPO  MICU consult  Continued GOC given hx of PEA arrest x2 and prior intubations

## 2022-07-28 NOTE — CONSULT NOTE ADULT - ASSESSMENT
66 yo F with sarcoidosis, CHF who originally presented for AMS and CHF exacerbation. With course c/b PEA arrest x2 and intubation x2. Last extubated on 7/20. Downgraded to floors on 7/21. There found to have complete whiteout of L. lung, but saturating well on RA. Pulmonary consulted for management, with recommendations for aggressive chest PT. RRT called this afternoon for bradycardia to 30s during turning and then when brought back supine for hypoxic to high 80s on RA. On arrival patient was on NRB saturating 100%, HR 70s, BP appropriate. Rectal temp found to be 94.5. ABG drawn during RRT with respiratory acidosis 7.28/68/221. Initiated on bilevel at 14/6, 30%. Patient was nasotracheally suctioned before bipap initiation with little secretions returned. Empiric antimicrobials for possible post-obstructive PNA reinitiated. 66 yo F with sarcoidosis, CHF who originally presented for AMS and CHF exacerbation. With course c/b PEA arrest x2 and intubation x2. Last extubated on 7/20 for presumed aspiration event leading to hypoxemia and PEA arrest. Patient has had total left lung atelectasis and collapse since 7/24 on CXR however continued to maintain adequate SpO2. While repositioned, patient noted to be bradycardic and hypoxemic to 80s on room air, prompting RRT. Patient started on BiPAP for hypercarbic respiratory failure. MICU consulted for c/f recurrent hypoxemic events possibly contributing to bradycardia.     # Acute hypercarbic respiratory failure   - In s/o left lung total white out and aspiration   - Would continue aggressive airway clearance   - Position patient in right lateral decub to increase perfusion to "good" lung, in order to minimize v/q mismatch   - F/u ABG s/p BiPAP   - Consider MBS to eval for silent aspiration as per recommended by speech pathology   - GOC conversation involving family, given patient's been intubated 2x within a 2 week period     Patient awake, able to respond to questions by nodding head yes or shaking head no, respirating comfortably. Patient does not require MICU level of care and management at this time. Please re-consult should clinical condition change.     Care discussed with fellow and attending physician.     Suma Salcido MD  PGY-3  Internal Medicine  Available on TEAMS

## 2022-07-28 NOTE — PROGRESS NOTE ADULT - ATTENDING COMMENTS
This is a 67-year-old female with significant past medical history of possible sarcoidosis, CHF, who comes in for fluid overload complaining of CHF exacerbation and had a stay in MICU with intubation.  Course complicated by PEA arrest and readmission with additional PEA arrest from aspiration.  Patient was sent to the floor is being treated medically was found to have worsening left-sided chest x-ray with possible consolidation or effusion of the lung.    Patient without significant improvement of left lung atelectasis from likely aspiration. Have asked to have aggressive chest PT, suctioning, and out of bed to chair. Upon rounds today, the patient was found to have hypoxemic and bradycardic likely secondary to turning with the left side down. At this time, the patient was hypoxemic and RRT was called. Patient back on bilevel.    Ultrasound today demonstrates trace pleural effusion with consolidation likely secondary to aspiration.  Again patient will need aggressive chest PT, deep suctioning, and out of bed to chair.    No role for bronchoscopy at this time as it continues to be a reoccurring issue. Would continue GOC conversation with family as it is likely the patient may need trach if she continues to have aspiration.

## 2022-07-28 NOTE — DISCHARGE NOTE NURSING/CASE MANAGEMENT/SOCIAL WORK - NSDCDMETYPESERV_GEN_ALL_CORE_FT
oxygen potable tanks at bedside, concentrator for home delivery, Rolling Walker to be delivered at bedside

## 2022-07-28 NOTE — PROGRESS NOTE ADULT - ASSESSMENT
66 y/o F with pmhx of HTN, HLD, sarcoidosis, CHF, presented to the ED for new onset AMS and fluid overload c/f CHF exacerbation s/p ICU admission - with intubation. Course complicated by PEA arrest  and readmission to ICU where she had second PEA arrest thought to be due to hypoxemia from aspiration. Completed 7d course meropenem and extubated, ICU stay c/b hypoglycemia from poor PO intake requiring D5 LR. Now on floor with CXR showing white out of left lung c/w atelectasis vs consolidation. RRT  with increased work of breathing, bradycardia, hypothermia.    Recommendations  - sarcoid f/u as outpatient  - airway clearance strategies: nasopharyngeal suction, chest vest/chest PT, hypertonic saline nebulizer, incentive spirometry if able  - recommend airway clearance; if does not improve, will likely need intubation and deep suctioning   - infectious workup including blood cultures, sputum culture, urine culture given hypothermia          Prior to discharge:  Please email [email or web address] to set up an appointment. Include patient's name, , MRN, and contact information in the email.    Pulmonary/Sleep Clinic  [address] [place] [address]  [phone]

## 2022-07-28 NOTE — PROGRESS NOTE ADULT - ASSESSMENT
This is a 66 yo F /w a PMH OF DM2 (A1c 7.3% on this admission), CHF, sarcoidosis who presents with AMS c/b hypercapnic respiratory failure and CHF exacerbation requiring intubation. Endocrine consulted for: Persistent hypoglycemia  Home Regimen: Lantus 20units sq qhs     1. Hypoglycemia  Noted on day of admission, possibly related to home doses of insulin that patient was taking, poor PO intake, and low GFR  Concern for AI as below -->ruled out  Hypoglycemia now resolved     2. R/o Adrenal Insufficiency  AM cortisol returned 7.1. ACTH stimulation performed showed robust response (22 and 24 at 30 minute and 60 minute frances respectively). A value > 18 rules out primary AI (not necessarily secondary AI).   Repeat AM cortisol was normal at 16.1.   TFTs are WDL  AI ruled out, hypoglycemia not related to adrenal cause, no need for steroids at this time     3. Metabolic Acidosis   Initial acidosis likely due to starvation and renal related; BHB 3.1 elevated; was given D5 IVF overnight, PO diet started and Lantus 5 units administered   BG stable, AG/BHB significantly improved today  Continue to deanne  Resolved     4. T2DM, controlled  A1c 7.3%. Goal < 7%  Home regimen: Lantus 20 units at night  Cpeptide 0.9, repeat 5.0    While inpatient:   BG target 100-180 mg/dl; Glucose stable today  Continue Lantus to 2 units SQ qHS (please give 80% of dose if NPO)  No Admelog pre-meal insulin for now  Continue Admelog low dose correctional scale before meals, low dose at bedtime  Check BG before meals and bedtime  Consistent carbohydrate diet   Hypoglycemia protocol   Please assist with meals  Provider to RN: Insulin pen administration with patient and daughter with teach back ; Daughter agrees to help mom with insulin pen administration    Discharge Plan: Basal only (doses TBD) vs orals  Ensure patient has working glucometer, test strips, lancets, alcohol pads  Please also prescribe glucose tabs, Baqsimi nasal or Glucagon emergency kit for hypoglycemia risk   Follow up with PCP, podiatry and opthalmology as an outpatient  Daughter prefers PCP for DM management       5. HTN  BP Goal < 130/80  Above goal on Coreg, Hydralazine, Amlodipine   continue management per primary team    6. HLD  LDL goal < 70  Continue Atorvastatin 20 mg daily if no contraindications   outpt lipid panel      Sofia Penaloza  Nurse Practitioner  Division of Endocrinology & Diabetes  In house pager #[phone]    If before 9AM or after 6PM, or on weekends/holidays, please call endocrine answering service for assistance ([phone]).For nonurgent matters email [email or web address] for assistance.    This is a 68 yo F /w a PMH OF DM2 (A1c 7.3% on this admission), CHF, sarcoidosis who presents with AMS c/b hypercapnic respiratory failure and CHF exacerbation requiring intubation. Endocrine consulted for: Persistent hypoglycemia  Home Regimen: Lantus 20units sq qhs     1. Hypoglycemia  Noted on day of admission, possibly related to home doses of insulin that patient was taking, poor PO intake, and low GFR  Concern for AI as below -->ruled out  Hypoglycemia now resolved     2. R/o Adrenal Insufficiency  AM cortisol returned 7.1. ACTH stimulation performed showed robust response (22 and 24 at 30 minute and 60 minute frances respectively). A value > 18 rules out primary AI (not necessarily secondary AI).   Repeat AM cortisol was normal at 16.1.   TFTs are WDL  AI ruled out, hypoglycemia not related to adrenal cause, no need for steroids at this time     3. Metabolic Acidosis   Initial acidosis likely due to starvation and renal related; BHB 3.1 elevated; was given D5 IVF overnight, PO diet started and Lantus 5 units administered   BG stable, AG/BHB significantly improved today  Continue to monitor  Resolved     4. T2DM, controlled  A1c 7.3%. Goal < 7%  Home regimen: Lantus 20 units at night  Cpeptide 0.9, repeat 5.0    While inpatient:   BG target 100-180 mg/dl; Glucose stable today  Continue Lantus to 2 units SQ qHS (please give 80% of dose if NPO)  No Admelog pre-meal insulin for now  Continue Admelog low dose correctional scale before meals, low dose at bedtime  Check BG before meals and bedtime  Consistent carbohydrate diet   Hypoglycemia protocol   Please assist with meals  Provider to RN: Insulin pen administration with patient and daughter with teach back ; Daughter agrees to help mom with insulin pen administration    Discharge Plan: Basal only (doses TBD); this will be based on insulin requirements while inpt vs orals (may consider metformin pending stability of creatinine (trend GFR) or Januvia 100 mg P.O. daily (needs dose adjustment if GFR 45 or less); Doses TBD   Ensure patient has working glucometer, test strips, lancets, alcohol pads  Please also prescribe glucose tabs, Baqsimi nasal or Glucagon emergency kit for hypoglycemia risk   Follow up with PCP, podiatry and opthalmology as an outpatient  Daughter prefers PCP for DM management       5. HTN  BP Goal < 130/80  Above goal on Coreg, Hydralazine, Amlodipine   continue management per primary team    6. HLD  LDL goal < 70  Continue Atorvastatin 20 mg daily if no contraindications   outpt lipid panel      Sofia Penaloza  Nurse Practitioner  Division of Endocrinology & Diabetes  In house pager #[phone]    If before 9AM or after 6PM, or on weekends/holidays, please call endocrine answering service for assistance ([phone]).For nonurgent matters email [email or web address] for assistance.    This is a 68 yo F /w a PMH OF DM2 (A1c 7.3% on this admission), CHF, sarcoidosis who presents with AMS c/b hypercapnic respiratory failure and CHF exacerbation requiring intubation. Endocrine consulted for: Persistent hypoglycemia  Home Regimen: Lantus 20units sq qhs     1. Hypoglycemia  Noted on day of admission, possibly related to home doses of insulin that patient was taking, poor PO intake, and low GFR  Concern for AI as below -->ruled out  Hypoglycemia now resolved     2. R/o Adrenal Insufficiency  AM cortisol returned 7.1. ACTH stimulation performed showed robust response (22 and 24 at 30 minute and 60 minute frances respectively). A value > 18 rules out primary AI (not necessarily secondary AI).   Repeat AM cortisol was normal at 16.1.   TFTs are WDL  AI ruled out, hypoglycemia not related to adrenal cause, no need for steroids at this time     3. Metabolic Acidosis   Initial acidosis likely due to starvation and renal related; BHB 3.1 elevated; was given D5 IVF overnight, PO diet started and Lantus 5 units administered   BG stable, AG/BHB significantly improved today  Continue to monitor  Resolved     4. T2DM, controlled  A1c 7.3%. Goal < 7%  Home regimen: Lantus 20 units at night  Cpeptide 0.9, repeat 5.0    While inpatient:   BG target 100-180 mg/dl; Glucose stable today  Continue Lantus to 2 units SQ qHS (please give 80% of dose if NPO)  No Admelog pre-meal insulin for now  Continue Admelog low dose correctional scale before meals, low dose at bedtime  Check BG before meals and bedtime  Consistent carbohydrate diet   Hypoglycemia protocol   Please assist with meals  Provider to RN: Insulin pen administration with patient and daughter with teach back ; Daughter agrees to help mom with insulin pen administration    Discharge Plan: Basal only (doses TBD);  may be able to resume basal insulin alone (at adjusted dose) vs orals (may consider metformin pending stability of creatinine (trend GFR) or Januvia 100 mg P.O. daily (needs dose adjustment if GFR 45 or less); Doses TBD   Ensure patient has working glucometer, test strips, lancets, alcohol pads  Please also prescribe glucose tabs, Baqsimi nasal or Glucagon emergency kit for hypoglycemia risk   Follow up with PCP, podiatry and opthalmology as an outpatient  Daughter prefers PCP for DM management       5. HTN  BP Goal < 130/80  Above goal on Coreg, Hydralazine, Amlodipine   continue management per primary team    6. HLD  LDL goal < 70  Continue Atorvastatin 20 mg daily if no contraindications   outpt lipid panel      Sofia Penaloza  Nurse Practitioner  Division of Endocrinology & Diabetes  In house pager #[phone]    If before 9AM or after 6PM, or on weekends/holidays, please call endocrine answering service for assistance ([phone]).For nonurgent matters email [email or web address] for assistance.    This is a 66 yo F /w a PMH OF DM2 (A1c 7.3% on this admission), CHF, sarcoidosis who presents with AMS c/b hypercapnic respiratory failure and CHF exacerbation requiring intubation. Endocrine consulted for: Persistent hypoglycemia  Home Regimen: Lantus 20units sq qhs     1. Hypoglycemia  Noted on day of admission, possibly related to home doses of insulin that patient was taking, poor PO intake, and low GFR  Concern for AI as below -->ruled out  Hypoglycemia now resolved     2. R/o Adrenal Insufficiency  AM cortisol returned 7.1. ACTH stimulation performed showed robust response (22 and 24 at 30 minute and 60 minute frances respectively). A value > 18 rules out primary AI (not necessarily secondary AI).   Repeat AM cortisol was normal at 16.1.   TFTs are WDL  AI ruled out, hypoglycemia not related to adrenal cause, no need for steroids at this time     3. Metabolic Acidosis   Initial acidosis likely due to starvation and renal related; BHB 3.1 elevated; was given D5 IVF overnight, PO diet started and Lantus 5 units administered   BG stable, AG/BHB significantly improved today  Continue to monitor  Resolved     4. T2DM, controlled  A1c 7.3%. Goal < 7%  Home regimen: Lantus 20 units at night  Cpeptide 0.9, repeat 5.0    While inpatient:   BG target 100-180 mg/dl; Glucose stable today  Continue Lantus to 2 units SQ qHS (please give 80% of dose if NPO)  No Admelog pre-meal insulin for now  Continue Admelog low dose correctional scale before meals, low dose at bedtime  Check BG before meals and bedtime  Consistent carbohydrate diet   Hypoglycemia protocol   Please assist with meals  Provider to RN: Insulin pen administration with patient and daughter with teach back ; Daughter agrees to help mom with insulin pen administration    Discharge Plan: Basal only (doses TBD);  may be able to resume basal insulin alone (at adjusted dose) vs orals (may consider metformin pending stability of creatinine (trend GFR) or Januvia 100 mg P.O. daily (needs dose adjustment if GFR 45 or less); Doses TBD   Ensure patient has working glucometer, test strips, lancets, alcohol pads  Please also prescribe glucose tabs, Baqsimi nasal or Glucagon emergency kit for hypoglycemia risk   Follow up with PCP, podiatry and opthalmology as an outpatient  If pt and family wishes can follow up with [facility] [place] ; [address], [address], [place] [address], [phone]    5. HTN  BP Goal < 130/80  Above goal on Coreg, Hydralazine, Amlodipine   continue management per primary team    6. HLD  LDL goal < 70  Continue Atorvastatin 20 mg daily if no contraindications   outpt lipid panel      Sofia Penaloza  Nurse Practitioner  Division of Endocrinology & Diabetes  In house pager #[phone]    If before 9AM or after 6PM, or on weekends/holidays, please call endocrine answering service for assistance ([phone]).For nonurgent matters email [email or web address] for assistance.    This is a 66 yo F /w a PMH OF DM2 (A1c 7.3% on this admission), CHF, sarcoidosis who presents with AMS c/b hypercapnic respiratory failure and CHF exacerbation requiring intubation. Endocrine consulted for: Persistent hypoglycemia  Home Regimen: Lantus 20units sq qhs     1. Hypoglycemia  Noted on day of admission, possibly related to home doses of insulin that patient was taking, poor PO intake, and low GFR  Concern for AI as below -->ruled out  Hypoglycemia now resolved     2. R/o Adrenal Insufficiency  AM cortisol returned 7.1. ACTH stimulation performed showed robust response (22 and 24 at 30 minute and 60 minute frances respectively). A value > 18 rules out primary AI (not necessarily secondary AI).   Repeat AM cortisol was normal at 16.1.   TFTs are WDL  AI ruled out, hypoglycemia not related to adrenal cause, no need for steroids at this time     3. Metabolic Acidosis   Initial acidosis likely due to starvation and renal related; BHB 3.1 elevated; was given D5 IVF overnight, PO diet started and Lantus 5 units administered   BG stable, AG/BHB significantly improved today  Continue to monitor  Resolved     4. T2DM, controlled  A1c 7.3%. Goal < 7%  Home regimen: Lantus 20 units at night  Cpeptide 0.9, repeat 5.0    While inpatient:   BG target 100-180 mg/dl; Glucose stable today  Continue Lantus to 2 units SQ qHS (please give 80% of dose if NPO)  No Admelog pre-meal insulin for now  Continue Admelog low dose correctional scale before meals, low dose at bedtime  Check BG before meals and bedtime  Consistent carbohydrate diet   Hypoglycemia protocol   Please assist with meals  Provider to RN: Insulin pen administration with patient and daughter with teach back ; Daughter agrees to help mom with insulin pen administration    Discharge Plan: May be able to resume basal insulin only (dose TBD) vs orals (may consider metformin pending stability of creatinine (trend GFR) or Januvia 100 mg P.O. daily (needs dose adjustment if GFR 45 or less); Doses TBD   Ensure patient has working glucometer, test strips, lancets, alcohol pads  Please also prescribe glucose tabs, Baqsimi nasal or Glucagon emergency kit for hypoglycemia risk   Follow up with PCP, podiatry and opthalmology as an outpatient  If pt and family wishes can follow up with [facility] at [address], [address], [place] [address], [phone]    5. HTN  BP Goal < 130/80  Above goal on Coreg, Hydralazine, Amlodipine   continue management per primary team    6. HLD  LDL goal < 70  Continue Atorvastatin 20 mg daily if no contraindications   outpt lipid panel      Sofia Penaloza  Nurse Practitioner  Division of Endocrinology & Diabetes  In house pager #[phone]    If before 9AM or after 6PM, or on weekends/holidays, please call endocrine answering service for assistance ([phone]).For nonurgent matters email [email or web address] for assistance.    This is a 68 yo F /w a PMH OF DM2 (A1c 7.3% on this admission), CHF, sarcoidosis who presents with AMS c/b hypercapnic respiratory failure and CHF exacerbation requiring intubation. Endocrine consulted for: Persistent hypoglycemia  Home Regimen: Lantus 20units sq qhs     1. Hypoglycemia  Noted on day of admission, possibly related to home doses of insulin that patient was taking, poor PO intake, and low GFR  Concern for AI as below -->ruled out  Hypoglycemia now resolved     2. R/o Adrenal Insufficiency  AM cortisol returned 7.1. ACTH stimulation performed showed robust response (22 and 24 at 30 minute and 60 minute frances respectively). A value > 18 rules out primary AI (not necessarily secondary AI).   Repeat AM cortisol was normal at 16.1.   TFTs are WDL  AI ruled out, hypoglycemia not related to adrenal cause, no need for steroids at this time     3. Metabolic Acidosis   Initial acidosis likely due to starvation and renal related; BHB 3.1 elevated; was given D5 IVF overnight, PO diet started and Lantus 5 units administered   BG stable, AG/BHB significantly improved today  Continue to monitor  Resolved     4. T2DM, controlled  A1c 7.3%. Goal < 7%  Home regimen: Lantus 20 units at night  Cpeptide 0.9, repeat 5.0    While inpatient:   BG target 100-180 mg/dl; Glucose stable today  Continue Lantus to 2 units SQ qHS (please give 80% of dose if NPO)  No Admelog pre-meal insulin for now  Continue Admelog low dose correctional scale before meals, low dose at bedtime  Check BG before meals and bedtime  Consistent carbohydrate diet   Hypoglycemia protocol   Please assist with meals  Provider to RN: Insulin pen administration with patient and daughter with teach back ; Daughter agrees to help mom with insulin pen administration    Discharge Plan: May be able to resume basal insulin only (dose TBD) vs orals (may consider metformin pending stability of creatinine (EF 65%) or Januvia 100 mg P.O. daily (needs dose adjustment if GFR 45 or less); Doses TBD   Ensure patient has working glucometer, test strips, lancets, alcohol pads  Please also prescribe glucose tabs, Baqsimi nasal or Glucagon emergency kit for hypoglycemia risk   Follow up with PCP, podiatry and opthalmology as an outpatient  If pt and family wishes can follow up with [facility] at [address], [address], [place] [address], [phone]    5. HTN  BP Goal < 130/80  Above goal on Coreg, Hydralazine, Amlodipine   continue management per primary team    6. HLD  LDL goal < 70  Continue Atorvastatin 20 mg daily if no contraindications   outpt lipid panel      Sofia Penaloza  Nurse Practitioner  Division of Endocrinology & Diabetes  In house pager #[phone]    If before 9AM or after 6PM, or on weekends/holidays, please call endocrine answering service for assistance ([phone]).For nonurgent matters email [email or web address] for assistance.

## 2022-07-28 NOTE — PROCEDURE NOTE - NSINDICATIONS_GEN_A_CORE
arterial puncture to obtain ABG's
airway protection/critical patient/mental status change/respiratory distress/respiratory failure

## 2022-07-28 NOTE — CONSULT NOTE ADULT - ATTENDING COMMENTS
Attending Attestation:    Patient seen and examined with resident/fellow.  Agree with above except as noted.  66 yo female with sarcoidosis, chf now with acute hypercapnic  and hypoxemic respiratory failure.   Pt with L lung atelectasis slightly improved this am compared to yesterday.   On exam, Pt is arousble and answers questions appropriately.    Recommend;  1. Aggressive chest PT and airway clearance. Consider Acapella valve.  2. Currently on BiPAP. May consider taking off BiPAP because BiPAP will dry out secretions . Want pt to be able to cough up secretions and expand lung.  3.Position patient with on right side down and Left side up for postural drainage of secretions.  4. Continue antibiotics for possible aspiration pneumonia.  5. Consider swallow evaluation .      Pt is not a MICU candidate at this time.
This is a 67-year-old female with significant past medical history of possible sarcoidosis, CHF, who comes in for fluid overload complaining of CHF exacerbation and had a stay in MICU with intubation.  Course complicated by PEA arrest and readmission with additional PEA arrest from aspiration.  Patient was sent to the floor is being treated medically was found to have worsening left-sided chest x-ray with possible consolidation or effusion of the lung.    Ultrasound today demonstrates trace pleural effusion with consolidation likely secondary to aspiration.  This is likely atelectatic lung versus pneumonia.  Would suggest out of bed to chair at this time, incentive spirometry, chest PT.    Would repeat imaging in the next 48 to 72 hours to ensure improvement of lung.    Patient can follow-up as an outpatient once able to be discharged.    Thank you for your consult.  We will follow the patient up on Wednesday or Thursday.
Nephrotic range proteinuria    Workup for GN as above.   Patient at risk given history of DM and multiple cardiac arrests.
66 yo F with DM2, sarcoid, CKD now extubated - hypoglycemia has resolved (likely in setting of too much basal insulin and low egfr, not eating) and now hyperglycemic on dextrose fluids, tube feeds. Hold dextrose fluids, hold lantus for now, monitor with low dose correction scale q6h. Adrenal insufficiency has been ruled out. Low suspicion for other causes of hypoglycemia so would defer other workup at this time. Repeat c peptide when euglycemic. Patient is high risk and high level decision making, requiring ICU level of care.

## 2022-07-29 DIAGNOSIS — J18.1 LOBAR PNEUMONIA, UNSPECIFIED ORGANISM: ICD-10-CM

## 2022-07-29 DIAGNOSIS — J96.01 ACUTE RESPIRATORY FAILURE WITH HYPOXIA: ICD-10-CM

## 2022-07-29 LAB
% ALBUMIN: 37.1 % — SIGNIFICANT CHANGE UP
% ALPHA 1: 5.8 % — SIGNIFICANT CHANGE UP
% ALPHA 2: 15.7 % — SIGNIFICANT CHANGE UP
% BETA: 13.8 % — SIGNIFICANT CHANGE UP
% GAMMA: 27.7 % — SIGNIFICANT CHANGE UP
ALBUMIN SERPL ELPH-MCNC: 2.34 G/DL — LOW (ref 3.3–4.4)
ALBUMIN SERPL ELPH-MCNC: 2.6 G/DL — LOW (ref 3.3–5)
ALBUMIN/GLOB SERPL ELPH: 0.6 RATIO — SIGNIFICANT CHANGE UP
ALP SERPL-CCNC: 95 U/L — SIGNIFICANT CHANGE UP (ref 40–120)
ALPHA1 GLOB SERPL ELPH-MCNC: 0.37 G/DL — HIGH (ref 0.1–0.3)
ALPHA2 GLOB SERPL ELPH-MCNC: 1 G/DL — SIGNIFICANT CHANGE UP (ref 0.6–1)
ALT FLD-CCNC: 24 U/L — SIGNIFICANT CHANGE UP (ref 4–33)
ANION GAP SERPL CALC-SCNC: 9 MMOL/L — SIGNIFICANT CHANGE UP (ref 7–14)
AST SERPL-CCNC: 35 U/L — HIGH (ref 4–32)
AUTO DIFF PNL BLD: NEGATIVE — SIGNIFICANT CHANGE UP
B-GLOBULIN SERPL ELPH-MCNC: 0.87 G/DL — SIGNIFICANT CHANGE UP (ref 0.6–1.1)
B19V IGG SER-ACNC: 0.43 INDEX — SIGNIFICANT CHANGE UP (ref 0–0.9)
B19V IGG+IGM SER-IMP: NEGATIVE — SIGNIFICANT CHANGE UP
B19V IGG+IGM SER-IMP: SIGNIFICANT CHANGE UP
B19V IGM FLD-ACNC: 0.12 INDEX — SIGNIFICANT CHANGE UP (ref 0–0.9)
B19V IGM SER-ACNC: NEGATIVE — SIGNIFICANT CHANGE UP
BILIRUB DIRECT SERPL-MCNC: <0.2 MG/DL — SIGNIFICANT CHANGE UP (ref 0–0.3)
BILIRUB INDIRECT FLD-MCNC: >0 MG/DL — SIGNIFICANT CHANGE UP (ref 0–1)
BILIRUB SERPL-MCNC: 0.2 MG/DL — SIGNIFICANT CHANGE UP (ref 0.2–1.2)
BLD GP AB SCN SERPL QL: NEGATIVE — SIGNIFICANT CHANGE UP
BLOOD GAS ARTERIAL - LYTES,HGB,ICA,LACT RESULT: SIGNIFICANT CHANGE UP
BUN SERPL-MCNC: 17 MG/DL — SIGNIFICANT CHANGE UP (ref 7–23)
C-ANCA SER-ACNC: NEGATIVE — SIGNIFICANT CHANGE UP
CALCIUM SERPL-MCNC: 8.1 MG/DL — LOW (ref 8.4–10.5)
CHLORIDE SERPL-SCNC: 100 MMOL/L — SIGNIFICANT CHANGE UP (ref 98–107)
CO2 SERPL-SCNC: 27 MMOL/L — SIGNIFICANT CHANGE UP (ref 22–31)
CREAT SERPL-MCNC: 1.24 MG/DL — SIGNIFICANT CHANGE UP (ref 0.5–1.3)
EGFR: 47 ML/MIN/1.73M2 — LOW
GAMMA GLOBULIN: 1.75 G/DL — HIGH (ref 0.7–1.7)
GLUCOSE BLDC GLUCOMTR-MCNC: 106 MG/DL — HIGH (ref 70–99)
GLUCOSE BLDC GLUCOMTR-MCNC: 113 MG/DL — HIGH (ref 70–99)
GLUCOSE BLDC GLUCOMTR-MCNC: 130 MG/DL — HIGH (ref 70–99)
GLUCOSE SERPL-MCNC: 127 MG/DL — HIGH (ref 70–99)
HCT VFR BLD CALC: 24.1 % — LOW (ref 34.5–45)
HCT VFR BLD CALC: 25.5 % — LOW (ref 34.5–45)
HGB BLD-MCNC: 7.2 G/DL — LOW (ref 11.5–15.5)
HGB BLD-MCNC: 7.4 G/DL — LOW (ref 11.5–15.5)
HIV 1+2 AB+HIV1 P24 AG SERPL QL IA: SIGNIFICANT CHANGE UP
LACTATE SERPL-SCNC: 0.5 MMOL/L — SIGNIFICANT CHANGE UP (ref 0.5–2)
MAGNESIUM SERPL-MCNC: 1.6 MG/DL — SIGNIFICANT CHANGE UP (ref 1.6–2.6)
MCHC RBC-ENTMCNC: 25.3 PG — LOW (ref 27–34)
MCHC RBC-ENTMCNC: 25.7 PG — LOW (ref 27–34)
MCHC RBC-ENTMCNC: 29 GM/DL — LOW (ref 32–36)
MCHC RBC-ENTMCNC: 29.9 GM/DL — LOW (ref 32–36)
MCV RBC AUTO: 86.1 FL — SIGNIFICANT CHANGE UP (ref 80–100)
MCV RBC AUTO: 87 FL — SIGNIFICANT CHANGE UP (ref 80–100)
NRBC # BLD: 0 /100 WBCS — SIGNIFICANT CHANGE UP
NRBC # BLD: 0 /100 WBCS — SIGNIFICANT CHANGE UP
NRBC # FLD: 0 K/UL — SIGNIFICANT CHANGE UP
NRBC # FLD: 0 K/UL — SIGNIFICANT CHANGE UP
P-ANCA SER-ACNC: NEGATIVE — SIGNIFICANT CHANGE UP
PHOSPHATE SERPL-MCNC: 3.7 MG/DL — SIGNIFICANT CHANGE UP (ref 2.5–4.5)
PHOSPHOLIPASE A2 RECEPTOR ELISA: <1.8 RU/ML — SIGNIFICANT CHANGE UP (ref 0–19.9)
PLATELET # BLD AUTO: 264 K/UL — SIGNIFICANT CHANGE UP (ref 150–400)
PLATELET # BLD AUTO: 271 K/UL — SIGNIFICANT CHANGE UP (ref 150–400)
POTASSIUM SERPL-MCNC: 3.7 MMOL/L — SIGNIFICANT CHANGE UP (ref 3.5–5.3)
POTASSIUM SERPL-SCNC: 3.7 MMOL/L — SIGNIFICANT CHANGE UP (ref 3.5–5.3)
PROCALCITONIN SERPL-MCNC: 0.15 NG/ML — HIGH (ref 0.02–0.1)
PROCALCITONIN SERPL-MCNC: 0.16 NG/ML — HIGH (ref 0.02–0.1)
PROT PATTERN SERPL ELPH-IMP: SIGNIFICANT CHANGE UP
PROT SERPL-MCNC: 6 G/DL — SIGNIFICANT CHANGE UP (ref 6–8.3)
PROT SERPL-MCNC: 6.3 G/DL — SIGNIFICANT CHANGE UP
RBC # BLD: 2.8 M/UL — LOW (ref 3.8–5.2)
RBC # BLD: 2.93 M/UL — LOW (ref 3.8–5.2)
RBC # FLD: 15.8 % — HIGH (ref 10.3–14.5)
RBC # FLD: 16.1 % — HIGH (ref 10.3–14.5)
RH IG SCN BLD-IMP: POSITIVE — SIGNIFICANT CHANGE UP
SODIUM SERPL-SCNC: 136 MMOL/L — SIGNIFICANT CHANGE UP (ref 135–145)
TSH SERPL-MCNC: 4.72 UIU/ML — HIGH (ref 0.27–4.2)
WBC # BLD: 3.94 K/UL — SIGNIFICANT CHANGE UP (ref 3.8–10.5)
WBC # BLD: 4.06 K/UL — SIGNIFICANT CHANGE UP (ref 3.8–10.5)
WBC # FLD AUTO: 3.94 K/UL — SIGNIFICANT CHANGE UP (ref 3.8–10.5)
WBC # FLD AUTO: 4.06 K/UL — SIGNIFICANT CHANGE UP (ref 3.8–10.5)

## 2022-07-29 PROCEDURE — 99233 SBSQ HOSP IP/OBS HIGH 50: CPT | Mod: GC

## 2022-07-29 PROCEDURE — 74230 X-RAY XM SWLNG FUNCJ C+: CPT | Mod: 26

## 2022-07-29 PROCEDURE — 99233 SBSQ HOSP IP/OBS HIGH 50: CPT

## 2022-07-29 RX ORDER — SODIUM CHLORIDE 9 MG/ML
1000 INJECTION, SOLUTION INTRAVENOUS
Refills: 0 | Status: DISCONTINUED | OUTPATIENT
Start: 2022-07-29 | End: 2022-07-29

## 2022-07-29 RX ORDER — IPRATROPIUM/ALBUTEROL SULFATE 18-103MCG
3 AEROSOL WITH ADAPTER (GRAM) INHALATION EVERY 6 HOURS
Refills: 0 | Status: DISCONTINUED | OUTPATIENT
Start: 2022-07-29 | End: 2022-08-05

## 2022-07-29 RX ORDER — SODIUM CHLORIDE 9 MG/ML
4 INJECTION INTRAMUSCULAR; INTRAVENOUS; SUBCUTANEOUS EVERY 6 HOURS
Refills: 0 | Status: DISCONTINUED | OUTPATIENT
Start: 2022-07-29 | End: 2022-08-05

## 2022-07-29 RX ADMIN — Medication 3 MILLILITER(S): at 15:38

## 2022-07-29 RX ADMIN — Medication 125 MILLIGRAM(S): at 05:08

## 2022-07-29 RX ADMIN — CARVEDILOL PHOSPHATE 25 MILLIGRAM(S): 80 CAPSULE, EXTENDED RELEASE ORAL at 17:51

## 2022-07-29 RX ADMIN — MEROPENEM 100 MILLIGRAM(S): 1 INJECTION INTRAVENOUS at 17:48

## 2022-07-29 RX ADMIN — Medication 100 MILLIGRAM(S): at 21:20

## 2022-07-29 RX ADMIN — CARVEDILOL PHOSPHATE 25 MILLIGRAM(S): 80 CAPSULE, EXTENDED RELEASE ORAL at 05:09

## 2022-07-29 RX ADMIN — ENOXAPARIN SODIUM 40 MILLIGRAM(S): 100 INJECTION SUBCUTANEOUS at 14:20

## 2022-07-29 RX ADMIN — LIDOCAINE 1 PATCH: 4 CREAM TOPICAL at 06:57

## 2022-07-29 RX ADMIN — Medication 650 MILLIGRAM(S): at 05:09

## 2022-07-29 RX ADMIN — Medication 650 MILLIGRAM(S): at 21:20

## 2022-07-29 RX ADMIN — Medication 81 MILLIGRAM(S): at 14:21

## 2022-07-29 RX ADMIN — Medication 125 MILLIGRAM(S): at 14:23

## 2022-07-29 RX ADMIN — Medication 6 MILLIGRAM(S): at 21:17

## 2022-07-29 RX ADMIN — Medication 125 MILLIGRAM(S): at 22:46

## 2022-07-29 RX ADMIN — Medication 100 MILLIGRAM(S): at 14:21

## 2022-07-29 RX ADMIN — ATORVASTATIN CALCIUM 20 MILLIGRAM(S): 80 TABLET, FILM COATED ORAL at 21:16

## 2022-07-29 RX ADMIN — MEROPENEM 100 MILLIGRAM(S): 1 INJECTION INTRAVENOUS at 05:10

## 2022-07-29 RX ADMIN — AMLODIPINE BESYLATE 10 MILLIGRAM(S): 2.5 TABLET ORAL at 05:09

## 2022-07-29 RX ADMIN — Medication 100 MILLIGRAM(S): at 08:55

## 2022-07-29 RX ADMIN — INSULIN GLARGINE 2 UNIT(S): 100 INJECTION, SOLUTION SUBCUTANEOUS at 21:13

## 2022-07-29 RX ADMIN — LOSARTAN POTASSIUM 50 MILLIGRAM(S): 100 TABLET, FILM COATED ORAL at 05:12

## 2022-07-29 RX ADMIN — Medication 3 MILLILITER(S): at 21:38

## 2022-07-29 RX ADMIN — Medication 650 MILLIGRAM(S): at 14:23

## 2022-07-29 RX ADMIN — Medication 40 MILLIGRAM(S): at 05:10

## 2022-07-29 NOTE — SWALLOW VFSS/MBS ASSESSMENT ADULT - DIAGNOSTIC IMPRESSIONS
Patient presents with Mild Oral Dysphagia and Mild Pharyngeal Dysphagia given Thin Liquids, Mildly Thick Liquids, Puree and Regular Solids marked by adequate bolus containment, slowed bolus manipulation, slowed mastication with adequate ability to break down solid textures and slowed anterior-posterior transport. There was mild premature posterior loss to the valleculae for Thin Liquids secondary to reduced tongue to palate seal. Adequate oral clearance noted post primary swallow across all PO trials. Pharyngeal stage marked by adequate tongue base retraction, delayed initiation of the pharyngeal swallow trigger (bolus head at the valleculae-thin liquids), adequate laryngeal elevation, adequate pharyngeal constriction and adequate laryngeal vestibule closure. There was No Aspiration before, during or after the swallow for Thin Liquids, Mildly Thick Liquids, Puree and Regular Solids. Patient presents with Mild Oral Dysphagia and Mild Pharyngeal Dysphagia given Thin Liquids, Mildly Thick Liquids, Puree and Regular Solids marked by adequate bolus containment, slowed bolus manipulation, slowed mastication with adequate ability to break down solid textures and slowed anterior-posterior transport. There was mild premature posterior loss to the valleculae for Thin Liquids secondary to reduced tongue to palate seal. Adequate oral clearance noted post primary swallow across all PO trials. Pharyngeal stage marked by adequate tongue base retraction, delayed initiation of the pharyngeal swallow trigger (bolus head at the valleculae-thin liquids), adequate laryngeal elevation, adequate pharyngeal constriction and adequate laryngeal vestibule closure. There was No Aspiration before, during or after the swallow for Thin Liquids, Mildly Thick Liquids, Puree and Regular Solids.    Of note: There was Trace/mild pooling of contrast in the hypopharynx in Lateral View at level C4-C5 (Per Radiologist)

## 2022-07-29 NOTE — PROGRESS NOTE ADULT - ASSESSMENT
66 y/o F with pmhx of HTN, HLD, sarcoidosis, CHF, presented to the ED for new onset AMS and fluid overload c/f CHF exacerbation s/p ICU admission - with intubation. Course complicated by PEA arrest  and readmission to ICU where she had second PEA arrest thought to be due to hypoxemia from aspiration. Completed 7d course meropenem and extubated, ICU stay c/b hypoglycemia from poor PO intake requiring D5 LR. Now on floor with CXR showing white out of left lung c/w atelectasis vs consolidation. RRT  with increased work of breathing, bradycardia, hypothermia.    Recommendations  - sarcoid f/u as outpatient  - airway clearance strategies: nasopharyngeal suction, chest vest/chest PT, hypertonic saline nebulizer, incentive spirometry if able  - infectious workup including blood cultures, sputum culture, urine culture given hypothermia  - ultrasound with B lines, consolidated lungs, no effusion        Prior to discharge:  Please email [email or web address] to set up an appointment. Include patient's name, , MRN, and contact information in the email.    P[facility]  [address] [place] [address]  [phone]

## 2022-07-29 NOTE — PROGRESS NOTE ADULT - PROBLEM SELECTOR PLAN 8
Saw pulm in Feb s/p bronch, daughter states confirmed diagnosis (however path from Feb of LN states nondiagnostic) and was due for continued OP f/u   - f/u pulm  - OP sleep study and PFTs

## 2022-07-29 NOTE — PROGRESS NOTE ADULT - PROBLEM SELECTOR PLAN 6
- EEG with slowing but no seizures   - CTH neg  - per neuro AMS 2/2 hypercapnic respiratory failure, prior cerebellar stroke likely small vessel  - MS better, communicating via typing on cell given [place]  - MRI brain r/o central causes for intermittent encephalopathy

## 2022-07-29 NOTE — PROGRESS NOTE ADULT - PROBLEM SELECTOR PLAN 12
[facility]  S&S 7/27: regular solids and thin liquids, pending cinesophagram  PT rec MELISSA, pt and family now amenable to MELISSA  care d/w daughter via phone on 7/29 updated re: events of 7/28 [facility]  S&S 7/27: regular solids and thin liquids  PT rec MELISSA, pt and family now amenable to MELISSA  care d/w daughter via phone on 7/29 updated re: events of 7/28

## 2022-07-29 NOTE — PROGRESS NOTE ADULT - PROBLEM SELECTOR PLAN 4
Had rectal tube and diarrhea c diff sent 7/24 +, s/p recent abx exposure while admitted   - PO vanco x 10 days (7/25-), D5/10  - no diarrhea   - tolerating diet, no abdominal pain, nausea/vomiting

## 2022-07-29 NOTE — PROGRESS NOTE ADULT - ATTENDING COMMENTS
This is a 67-year-old female with significant past medical history of possible sarcoidosis, CHF, who comes in for fluid overload complaining of CHF exacerbation and had a stay in MICU with intubation.  Course complicated by PEA arrest and readmission with additional PEA arrest from aspiration.  Patient was sent to the floor is being treated medically was found to have worsening left-sided chest x-ray with possible consolidation or effusion of the lung.    Patient without significant improvement of left lung atelectasis from likely aspiration. Have asked to have aggressive chest PT, suctioning, and out of bed to chair. On 7/28, the patient was found to have hypoxemic and bradycardic likely secondary to turning with the left side down. At that time, the patient was hypoxemic and RRT was called and she was subsequently placed back on bilevel.    Appreciate S&SW eval regarding possible aspiration. Review of left lung continues to wax and wane fro mucus plug. I still continue to believe this is due ot a mucus plug  and she continues to require chest PT, aggressive suctioning, duonebs, and out of bed to chair     No role for bronchoscopy at this time as it continues to be a reoccurring issue. Would continue GOC conversation with family as it is likely the patient may need trach if she continues to have aspiration.      Please note that the patient will not be seen over the weekend. Please call the oncall pulmonary fellow if any issues or questions arise. The patient will likely be seen again on Monday.

## 2022-07-29 NOTE — PROGRESS NOTE ADULT - PROBLEM SELECTOR PLAN 1
Presented with R>L effusion, x-ray on 7/24 w/ L effusion vs infiltrate, pulm did POCUS on 7/25 no large left effusion  - s/p bipap again on 7/28 taken off this am sat on RA 94, pending ABG s/p 2 hours off bipap  - on lasix 40 mg PO daily (per pulm POCUS not c/w effusion)  - s/p meropenum 7/13-7/20, restarted on 7/28  - pulm consult appreciated  - c/w incentive spirometry--educated on use   - c/w chest PT, chest vest  - CTPA ordered to r/o PE and clarify lung pathology on L sided Presented with R>L effusion.  Now with x-ray on 7/24 w/ L effusion vs infiltrate, pulm did POCUS on 7/25 no large left effusion suspected atelectatic lung vs consolidation  - s/p bipap again on 7/28 for acute episode hypoxia/hypercapnea  - taken off this am sat on RA 94, ABG 2 hours off bipap w/o acidosis w acceptable sat on 2L  - on lasix 40 mg PO daily   - s/p meropenum 7/13-7/20, restarted on 7/28  - pulm consult appreciated  - c/w incentive spirometry,  chest PT, chest vest, nebs  - CTPA ordered to r/o PE and clarify lung pathology on L sided  - cineesophagogram by S&S 7/29 confirms no aspiration

## 2022-07-29 NOTE — SWALLOW VFSS/MBS ASSESSMENT ADULT - COMMENTS
Per Hospitalist Note 7/29/22: "67F [place], HTN, HLD, sarcoidosis, HFpEF p/w lethargy/SOB/swelling/AMS admitted with metabolic encephalopathy due to acute hypercarbic respiratory failure in context presumed HFpEF exacerbation s/p intubation and MICU admission 7/13 for hypercapnia/airway protection was extubated 7/16  (s/p empiric meropenem 7/13 - 7/20 for concern for sepsis given hypothermia/AMS however neg infectious w/u) course c/b PEA arrest on 7/17 pt re-intubated s/p second PEA arrest when arrived to CTICU now transferred to medicine on 7/23 s/p being extubated.  Course now c/b c diff. Also with nephrotic range proteinuria of unclear etiology.  L sided white-out on CXR since 7/24 c/b on 7/28 episode of hypoxia w/ bradycardia in context of turning to change pt w/ R side down, ABG w/ acute on chronic hypercapnea s/p bipap, this am taken off bipap pending repeat ABG."    Patient is familiar to this department as the patient was seen for an multiple swallow evaluations (7/13, 7/21, 7/27) with most recent recommending regular solids with thin liquids and a cinesophgram. Refer to consult for further details.     Patient received in Radiology Suite awake and alert with RN present and transferred to specialized seating unit for lateral view projection. Patient responding to SLP via head nods only. Per RN, patient with fluctuation in mentation.

## 2022-07-29 NOTE — PROGRESS NOTE ADULT - PROBLEM SELECTOR PLAN 3
Patient came in with volume overload, in my review cardiac cause (TTE findings) not correlating as cause of such volume overload.  Does have proteinurea and MANDY on arrival.  Unclear if related to underlying hypertensive and diabetic kidney disease.    - spot protein 10g & 11g  - on lasix 40 mg PO daily, appears euvolemic  - complements wnl, Hep C and B neg, normal immunofixation, free lyte chains elevated, other serologies pending  - renal following Patient came in with volume overload, in my review cardiac cause (TTE findings) not correlating as cause of such volume overload.  Does have proteinurea and MANDY on arrival.  Unclear if related to underlying hypertensive and diabetic kidney disease.  Per renal may also worsen in setting of PEA arrest.   - spot protein 10g & 11g  - on lasix 40 mg PO daily, appears euvolemic  - C3/C4 wnl, HIV & Hep C/B neg, normal immunofixation, SPEP w/ chronic inflammation,  free lyte chains elevated, PLAR AB neg, parvo neg, other serologies pending (ANCA, MINOO)  - renal following

## 2022-07-29 NOTE — PROGRESS NOTE ADULT - PROBLEM SELECTOR PLAN 9
Cr max 3.72  - monitor Cr & renally dose meds  - renal eval pending re: proteinuria Cr max 3.72  - monitor Cr & renally dose meds  - renal eval re: proteinuria

## 2022-07-29 NOTE — PROGRESS NOTE ADULT - PROBLEM SELECTOR PLAN 2
Initially intubated in ED for hypercapnic resp failure.  Then for airway protection.  Now on 7/28 w/ episode of hypoxia on turning also noted to be hypercapnic  - per prior d/w pulm did not need bipap qhs etc  - dc bipap, f/u repeat ABG off  - CTPA pending  - pulm following  - chest PT, incentive spirometry Initially intubated in ED for hypercapnic resp failure.  Then for airway protection.  Now on 7/28 w/ episode of hypoxia on turning also noted to be hypercapnic  - per prior d/w pulm did not need bipap qhs etc  - CTPA pending  - pulm following  - chest PT, incentive spirometry, nebs

## 2022-07-29 NOTE — SWALLOW VFSS/MBS ASSESSMENT ADULT - DEMONSTRATES NEED FOR REFERRAL TO ANOTHER SERVICE
Consider ENT consultation to assess and rule out anatomical abnormality via direct visualization at MD's discretion./ENT

## 2022-07-29 NOTE — PROGRESS NOTE ADULT - SUBJECTIVE AND OBJECTIVE BOX
Interval Events:  - ABG after placed on bipap: 7.43/51/86  - remained on bipap overnight  - somnolent but arousable this morning      REVIEW OF SYSTEMS:  Negative except as documented above.      OBJECTIVE:  ICU Vital Signs Last 24 Hrs  T(C): 35.1 (29 Jul 2022 05:06), Max: 35.1 (29 Jul 2022 05:06)  T(F): 95.2 (29 Jul 2022 05:06), Max: 95.2 (29 Jul 2022 05:06)  HR: 74 (29 Jul 2022 07:43) (52 - 78)  BP: 143/66 (29 Jul 2022 05:06) (106/65 - 157/85)  BP(mean): --  ABP: --  ABP(mean): --  RR: 20 (29 Jul 2022 05:06) (18 - 29)  SpO2: 100% (29 Jul 2022 07:43) (96% - 100%)    O2 Parameters below as of 29 Jul 2022 05:06  Patient On (Oxygen Delivery Method): nasal cannula  O2 Flow (L/min): 2            07-28 @ 07:01  -  07-29 @ 07:00  --------------------------------------------------------  IN: 950 mL / OUT: 300 mL / NET: 650 mL      CAPILLARY BLOOD GLUCOSE      POCT Blood Glucose.: 106 mg/dL (29 Jul 2022 07:38)      PHYSICAL EXAM:  General: NAD  HEENT: PERRL, EOMI, sclera anicteric  Neck: supple, no JVD  Cardiovascular: RRR, no murmurs, rubs, or gallops  Respiratory: decreased breath sounds L side, no wheezes or crackles  Abdomen: soft, nontender, nondistended, normoactive bowel sounds  Extremities: warm and well perfused, no edema, no clubbing  Skin: no rashes  Neurological: somnolent, no focal deficits    HOSPITAL MEDICATIONS:  MEDICATIONS  (STANDING):  acetaminophen     Tablet .. 650 milliGRAM(s) Oral every 8 hours  amLODIPine   Tablet 10 milliGRAM(s) Oral daily  aspirin  chewable 81 milliGRAM(s) Oral daily  atorvastatin 20 milliGRAM(s) Oral at bedtime  carvedilol 25 milliGRAM(s) Oral every 12 hours  dextrose 50% Injectable 25 Gram(s) IV Push once  dextrose 50% Injectable 12.5 Gram(s) IV Push once  dextrose 50% Injectable 25 Gram(s) IV Push once  enoxaparin Injectable 40 milliGRAM(s) SubCutaneous every 24 hours  furosemide    Tablet 40 milliGRAM(s) Oral daily  glucagon  Injectable 1 milliGRAM(s) IntraMuscular once  hydrALAZINE 100 milliGRAM(s) Oral three times a day  insulin glargine Injectable (LANTUS) 2 Unit(s) SubCutaneous at bedtime  insulin lispro (ADMELOG) corrective regimen sliding scale   SubCutaneous three times a day before meals  insulin lispro (ADMELOG) corrective regimen sliding scale   SubCutaneous at bedtime  losartan 50 milliGRAM(s) Oral daily  melatonin 6 milliGRAM(s) Oral at bedtime  meropenem  IVPB      meropenem  IVPB 1000 milliGRAM(s) IV Intermittent every 12 hours  vancomycin    Solution 125 milliGRAM(s) Oral every 6 hours    MEDICATIONS  (PRN):  acetaminophen     Tablet .. 650 milliGRAM(s) Oral every 6 hours PRN Temp greater or equal to 38C (100.4F), Mild Pain (1 - 3)  dextrose Oral Gel 15 Gram(s) Oral once PRN Blood Glucose LESS THAN 70 milliGRAM(s)/deciliter  melatonin 3 milliGRAM(s) Oral at bedtime PRN Insomnia      LABS:                        7.4    4.06  )-----------( 264      ( 29 Jul 2022 03:50 )             25.5     Hgb Trend: 7.4<--, 8.5<--, 8.7<--, 8.6<--, 8.9<--  07-29    136  |  100  |  17  ----------------------------<  127<H>  3.7   |  27  |  1.24    Ca    8.1<L>      29 Jul 2022 03:50  Phos  3.7     07-29  Mg     1.60     07-29    TPro  6.0  /  Alb  2.6<L>  /  TBili  0.2  /  DBili  <0.2  /  AST  35<H>  /  ALT  24  /  AlkPhos  95  07-29    Creatinine Trend: 1.24<--, 1.09<--, 0.96<--, 1.11<--, 1.29<--, 1.28<--  PT/INR - ( 28 Jul 2022 14:41 )   PT: 12.0 sec;   INR: 1.03 ratio         PTT - ( 28 Jul 2022 14:41 )  PTT:40.4 sec    Arterial Blood Gas:  07-28 @ 17:08  7.43/51/88/34/96.8/8.0  ABG lactate: --  Arterial Blood Gas:  07-28 @ 14:41  7.28/68/221/32/98.8/3.2  ABG lactate: --        MICROBIOLOGY:       RADIOLOGY:  [x] Reviewed and interpreted by me     Interval Events:  - ABG after placed on bipap: 7.43/51/86  - remained on bipap overnight  - somnolent but arousable this morning  - taken off of bipap this AM      REVIEW OF SYSTEMS:  Negative except as documented above.      OBJECTIVE:  ICU Vital Signs Last 24 Hrs  T(C): 35.1 (29 Jul 2022 05:06), Max: 35.1 (29 Jul 2022 05:06)  T(F): 95.2 (29 Jul 2022 05:06), Max: 95.2 (29 Jul 2022 05:06)  HR: 74 (29 Jul 2022 07:43) (52 - 78)  BP: 143/66 (29 Jul 2022 05:06) (106/65 - 157/85)  BP(mean): --  ABP: --  ABP(mean): --  RR: 20 (29 Jul 2022 05:06) (18 - 29)  SpO2: 100% (29 Jul 2022 07:43) (96% - 100%)    O2 Parameters below as of 29 Jul 2022 05:06  Patient On (Oxygen Delivery Method): nasal cannula  O2 Flow (L/min): 2            07-28 @ 07:01  -  07-29 @ 07:00  --------------------------------------------------------  IN: 950 mL / OUT: 300 mL / NET: 650 mL      CAPILLARY BLOOD GLUCOSE      POCT Blood Glucose.: 106 mg/dL (29 Jul 2022 07:38)      PHYSICAL EXAM:  General: NAD  HEENT: PERRL, EOMI, sclera anicteric  Neck: supple, no JVD  Cardiovascular: RRR, no murmurs, rubs, or gallops  Respiratory: decreased breath sounds L side, no wheezes or crackles  Abdomen: soft, nontender, nondistended, normoactive bowel sounds  Extremities: warm and well perfused, no edema, no clubbing  Skin: no rashes  Neurological: somnolent, no focal deficits    HOSPITAL MEDICATIONS:  MEDICATIONS  (STANDING):  acetaminophen     Tablet .. 650 milliGRAM(s) Oral every 8 hours  amLODIPine   Tablet 10 milliGRAM(s) Oral daily  aspirin  chewable 81 milliGRAM(s) Oral daily  atorvastatin 20 milliGRAM(s) Oral at bedtime  carvedilol 25 milliGRAM(s) Oral every 12 hours  dextrose 50% Injectable 25 Gram(s) IV Push once  dextrose 50% Injectable 12.5 Gram(s) IV Push once  dextrose 50% Injectable 25 Gram(s) IV Push once  enoxaparin Injectable 40 milliGRAM(s) SubCutaneous every 24 hours  furosemide    Tablet 40 milliGRAM(s) Oral daily  glucagon  Injectable 1 milliGRAM(s) IntraMuscular once  hydrALAZINE 100 milliGRAM(s) Oral three times a day  insulin glargine Injectable (LANTUS) 2 Unit(s) SubCutaneous at bedtime  insulin lispro (ADMELOG) corrective regimen sliding scale   SubCutaneous three times a day before meals  insulin lispro (ADMELOG) corrective regimen sliding scale   SubCutaneous at bedtime  losartan 50 milliGRAM(s) Oral daily  melatonin 6 milliGRAM(s) Oral at bedtime  meropenem  IVPB      meropenem  IVPB 1000 milliGRAM(s) IV Intermittent every 12 hours  vancomycin    Solution 125 milliGRAM(s) Oral every 6 hours    MEDICATIONS  (PRN):  acetaminophen     Tablet .. 650 milliGRAM(s) Oral every 6 hours PRN Temp greater or equal to 38C (100.4F), Mild Pain (1 - 3)  dextrose Oral Gel 15 Gram(s) Oral once PRN Blood Glucose LESS THAN 70 milliGRAM(s)/deciliter  melatonin 3 milliGRAM(s) Oral at bedtime PRN Insomnia      LABS:                        7.4    4.06  )-----------( 264      ( 29 Jul 2022 03:50 )             25.5     Hgb Trend: 7.4<--, 8.5<--, 8.7<--, 8.6<--, 8.9<--  07-29    136  |  100  |  17  ----------------------------<  127<H>  3.7   |  27  |  1.24    Ca    8.1<L>      29 Jul 2022 03:50  Phos  3.7     07-29  Mg     1.60     07-29    TPro  6.0  /  Alb  2.6<L>  /  TBili  0.2  /  DBili  <0.2  /  AST  35<H>  /  ALT  24  /  AlkPhos  95  07-29    Creatinine Trend: 1.24<--, 1.09<--, 0.96<--, 1.11<--, 1.29<--, 1.28<--  PT/INR - ( 28 Jul 2022 14:41 )   PT: 12.0 sec;   INR: 1.03 ratio         PTT - ( 28 Jul 2022 14:41 )  PTT:40.4 sec    Arterial Blood Gas:  07-28 @ 17:08  7.43/51/88/34/96.8/8.0  ABG lactate: --  Arterial Blood Gas:  07-28 @ 14:41  7.28/68/221/32/98.8/3.2  ABG lactate: --        MICROBIOLOGY:       RADIOLOGY:  [x] Reviewed and interpreted by me

## 2022-07-29 NOTE — CHART NOTE - NSCHARTNOTEFT_GEN_A_CORE
Notified by RN pt hypothermic. Rectal Temp of 93.8F. Chart reviewed and pt seen. Pt A&Ox4 and endorses feeling cold but no other complaints. Starting hyperthermia blanket, getting AM labs early, CBC, BMP, Procalcitonin, lactate, TSH, blood cultures and UA. Pt currently on meropenem and vancomycin. CXR (7/2): Follow-up studies disclosing total whiteout probably combined effusion and atelectasis and partial reexpansion of the left upper lobe on the following exam.    Will continue to monitor.

## 2022-07-29 NOTE — SWALLOW VFSS/MBS ASSESSMENT ADULT - RECOMMENDED CONSISTENCY
1.   2. Aspiration/Reflux Precautions  3. Daily Oral Hygiene 1. Continue current diet of Regular Solids with Thin Liquids  2. Aspiration/Reflux Precautions  3. Daily Oral Hygiene

## 2022-07-29 NOTE — CHART NOTE - NSCHARTNOTEFT_GEN_A_CORE
: Jennifer Chambers    INDICATION: L lung white out    PROCEDURE:  [ ] LIMITED ECHO  [x] LIMITED CHEST  [ ] LIMITED RETROPERITONEAL  [ ] LIMITED ABDOMINAL  [ ] LIMITED DVT  [ ] NEEDLE GUIDANCE VASCULAR  [ ] NEEDLE GUIDANCE THORACENTESIS  [ ] NEEDLE GUIDANCE PARACENTESIS  [ ] NEEDLE GUIDANCE PERICARDIOCENTESIS  [ ] OTHER    FINDINGS: bilateral B lines, no evidence of pleural effusion      INTERPRETATION:  Bilateral B lines, consolidated lung      Images uploaded on [employer]

## 2022-07-29 NOTE — PROGRESS NOTE ADULT - SUBJECTIVE AND OBJECTIVE BOX
Patient is a 67y old  Female who presents with a chief complaint of AMs, shortness of breath    SUBJECTIVE / OVERNIGHT EVENTS:    No complaints this am aside from same L sided CP reproducible w/ palpation of chest wall, worse with cough  reports some cough and mucous, cannot state color  wants her cell phone plugged in and wants me to call her daughter     MEDICATIONS  (STANDING):  acetaminophen  Tablet 650 milliGRAM(s) Oral every 8 hours  amLODIPine   Tablet 10 milliGRAM(s) Oral daily  aspirin  chewable 81 milliGRAM(s) Oral daily  atorvastatin 20 milliGRAM(s) Oral at bedtime  carvedilol 25 milliGRAM(s) Oral every 12 hours  enoxaparin Injectable 40 milliGRAM(s) SubCutaneous every 24 hours  furosemide    Tablet 40 milliGRAM(s) Oral daily  hydrALAZINE 100 milliGRAM(s) Oral three times a day  insulin glargine Injectable (LANTUS) 2 Unit(s) SubCutaneous at bedtime  insulin lispro (ADMELOG) corrective regimen sliding scale   SubCutaneous three times a day before meals  insulin lispro (ADMELOG) corrective regimen sliding scale   SubCutaneous at bedtime  losartan 50 milliGRAM(s) Oral daily  melatonin 6 milliGRAM(s) Oral at bedtime  meropenem  IVPB 1000 milliGRAM(s) IV Intermittent every 12 hours  vancomycin    Solution 125 milliGRAM(s) Oral every 6 hours    MEDICATIONS  (PRN):  acetaminophen     Tablet .. 650 milliGRAM(s) Oral every 6 hours PRN Temp greater or equal to 38C (100.4F), Mild Pain (1 - 3)  dextrose Oral Gel 15 Gram(s) Oral once PRN Blood Glucose LESS THAN 70 milliGRAM(s)/deciliter  melatonin 3 milliGRAM(s) Oral at bedtime PRN Insomnia    T(C): 37.2 (07-29-22 @ 08:55), Max: 37.2 (07-29-22 @ 08:55)  HR: 74 (07-29-22 @ 08:55) (52 - 78)  BP: 128/66 (07-29-22 @ 08:55) (106/65 - 157/85)  RR: 20 (07-29-22 @ 08:55) (18 - 29)  SpO2: 98% (07-29-22 @ 08:55) (96% - 100%)    CAPILLARY BLOOD GLUCOSE  POCT Blood Glucose.: 106 mg/dL (29 Jul 2022 07:38)  POCT Blood Glucose.: 155 mg/dL (28 Jul 2022 21:30)  POCT Blood Glucose.: 198 mg/dL (28 Jul 2022 17:10)  POCT Blood Glucose.: 180 mg/dL (28 Jul 2022 14:31)    I&O's Summary    28 Jul 2022 07:01  -  29 Jul 2022 07:00  --------------------------------------------------------  IN: 950 mL / OUT: 300 mL / NET: 650 mL    PHYSICAL EXAM:  GENERAL: obese, on RA  CHEST/LUNG: decreased BS on L, no wheeze   HEART: Regular rate and rhythm; No murmurs, rubs, or gallops  ABDOMEN: Soft, Nontender, Nondistended; Bowel sounds present  EXTREMITIES:   warm and well perfused, No clubbing, cyanosis, or edema  PSYCH: AAOx3, very [place]  NEUROLOGY: non-focal    LABS:                        7.4    4.06  )-----------( 264      ( 29 Jul 2022 03:50 )             25.5     07-29    136  |  100  |  17  ----------------------------<  127<H>  3.7   |  27  |  1.24    Ca    8.1<L>      29 Jul 2022 03:50  Phos  3.7     07-29  Mg     1.60     07-29    TPro  6.0  /  Alb  2.6<L>  /  TBili  0.2  /  DBili  <0.2  /  AST  35<H>  /  ALT  24  /  AlkPhos  95  07-29    PT/INR - ( 28 Jul 2022 14:41 )   PT: 12.0 sec;   INR: 1.03 ratio    PTT - ( 28 Jul 2022 14:41 )  PTT:40.4 sec    ABG - ( 28 Jul 2022 17:08 )  pH, Arterial: 7.43  pH, Blood: x     /  pCO2: 51    /  pO2: 88    / HCO3: 34    / Base Excess: 8.0   /  SaO2: 96.8      Consultant(s) Notes Reviewed:    Care Discussed with Consultants/Other Providers: pulm fellow

## 2022-07-29 NOTE — PROGRESS NOTE ADULT - PROBLEM SELECTOR PLAN 7
Normocytic, no s/s of bleeding, iron studies c/w AOCD, no evidence of hemolysis, normal B12/folate, low reticulocyte  - should f/u as OP w/ heme  - f/u SPEP, immunofixation wnl, free light chains elevated   - ?related to sarcoid Normocytic, no s/s of bleeding, iron studies c/w AOCD, no evidence of hemolysis, normal B12/folate, low reticulocyte, SPEP chronic inflammation, immunofixation wnl  - OP heme f/u   - ?related to sarcoid

## 2022-07-29 NOTE — PROGRESS NOTE ADULT - ASSESSMENT
67F [place], HTN, HLD, sarcoidosis, HFpEF p/w lethargy/SOB/swelling/AMS admitted with metabolic encephalopathy due to acute hypercarbic respiratory failure in context presumed HFpEF exacerbation s/p intubation and MICU admission 7/13 for hypercapnia/airway protection was extubated 7/16  (s/p empiric meropenem 7/13 - 7/20 for concern for sepsis given hypothermia/AMS however neg infectious w/u) course c/b PEA arrest on 7/17 pt re-intubated s/p second PEA arrest when arrived to CTICU now transferred to medicine on 7/23 s/p being extubated.  Course now c/b c diff. Also with nephrotic range proteinuria of unclear etiology.  L sided white-out on CXR since 7/24 c/b on 7/28 episode of hypoxia w/ bradycardia in context of turning to change pt w/ R side down, ABG w/ acute on chronic hypercapnea s/p bipap, this am taken off bipap pending repeat ABG.

## 2022-07-30 LAB
ANION GAP SERPL CALC-SCNC: 9 MMOL/L — SIGNIFICANT CHANGE UP (ref 7–14)
BUN SERPL-MCNC: 19 MG/DL — SIGNIFICANT CHANGE UP (ref 7–23)
CALCIUM SERPL-MCNC: 8.2 MG/DL — LOW (ref 8.4–10.5)
CHLORIDE SERPL-SCNC: 101 MMOL/L — SIGNIFICANT CHANGE UP (ref 98–107)
CO2 SERPL-SCNC: 28 MMOL/L — SIGNIFICANT CHANGE UP (ref 22–31)
CREAT SERPL-MCNC: 1.7 MG/DL — HIGH (ref 0.5–1.3)
EGFR: 32 ML/MIN/1.73M2 — LOW
GLUCOSE BLDC GLUCOMTR-MCNC: 103 MG/DL — HIGH (ref 70–99)
GLUCOSE BLDC GLUCOMTR-MCNC: 142 MG/DL — HIGH (ref 70–99)
GLUCOSE BLDC GLUCOMTR-MCNC: 147 MG/DL — HIGH (ref 70–99)
GLUCOSE BLDC GLUCOMTR-MCNC: 148 MG/DL — HIGH (ref 70–99)
GLUCOSE SERPL-MCNC: 98 MG/DL — SIGNIFICANT CHANGE UP (ref 70–99)
HCT VFR BLD CALC: 23.4 % — LOW (ref 34.5–45)
HGB BLD-MCNC: 7 G/DL — CRITICAL LOW (ref 11.5–15.5)
MAGNESIUM SERPL-MCNC: 1.6 MG/DL — SIGNIFICANT CHANGE UP (ref 1.6–2.6)
MCHC RBC-ENTMCNC: 25.9 PG — LOW (ref 27–34)
MCHC RBC-ENTMCNC: 29.9 GM/DL — LOW (ref 32–36)
MCV RBC AUTO: 86.7 FL — SIGNIFICANT CHANGE UP (ref 80–100)
NRBC # BLD: 0 /100 WBCS — SIGNIFICANT CHANGE UP
NRBC # FLD: 0 K/UL — SIGNIFICANT CHANGE UP
PHOSPHATE SERPL-MCNC: 4.1 MG/DL — SIGNIFICANT CHANGE UP (ref 2.5–4.5)
PLATELET # BLD AUTO: 276 K/UL — SIGNIFICANT CHANGE UP (ref 150–400)
POTASSIUM SERPL-MCNC: 3.9 MMOL/L — SIGNIFICANT CHANGE UP (ref 3.5–5.3)
POTASSIUM SERPL-SCNC: 3.9 MMOL/L — SIGNIFICANT CHANGE UP (ref 3.5–5.3)
RBC # BLD: 2.7 M/UL — LOW (ref 3.8–5.2)
RBC # FLD: 16.7 % — HIGH (ref 10.3–14.5)
SODIUM SERPL-SCNC: 138 MMOL/L — SIGNIFICANT CHANGE UP (ref 135–145)
WBC # BLD: 3.41 K/UL — LOW (ref 3.8–10.5)
WBC # FLD AUTO: 3.41 K/UL — LOW (ref 3.8–10.5)

## 2022-07-30 PROCEDURE — 99233 SBSQ HOSP IP/OBS HIGH 50: CPT

## 2022-07-30 PROCEDURE — 71045 X-RAY EXAM CHEST 1 VIEW: CPT | Mod: 26

## 2022-07-30 PROCEDURE — 99232 SBSQ HOSP IP/OBS MODERATE 35: CPT | Mod: GC

## 2022-07-30 RX ADMIN — LOSARTAN POTASSIUM 50 MILLIGRAM(S): 100 TABLET, FILM COATED ORAL at 06:22

## 2022-07-30 RX ADMIN — Medication 100 MILLIGRAM(S): at 06:22

## 2022-07-30 RX ADMIN — Medication 6 MILLIGRAM(S): at 22:00

## 2022-07-30 RX ADMIN — Medication 100 MILLIGRAM(S): at 22:02

## 2022-07-30 RX ADMIN — Medication 125 MILLIGRAM(S): at 05:19

## 2022-07-30 RX ADMIN — Medication 650 MILLIGRAM(S): at 06:24

## 2022-07-30 RX ADMIN — AMLODIPINE BESYLATE 10 MILLIGRAM(S): 2.5 TABLET ORAL at 06:22

## 2022-07-30 RX ADMIN — Medication 650 MILLIGRAM(S): at 13:39

## 2022-07-30 RX ADMIN — Medication 125 MILLIGRAM(S): at 22:42

## 2022-07-30 RX ADMIN — ENOXAPARIN SODIUM 40 MILLIGRAM(S): 100 INJECTION SUBCUTANEOUS at 15:05

## 2022-07-30 RX ADMIN — CARVEDILOL PHOSPHATE 25 MILLIGRAM(S): 80 CAPSULE, EXTENDED RELEASE ORAL at 17:42

## 2022-07-30 RX ADMIN — Medication 40 MILLIGRAM(S): at 06:23

## 2022-07-30 RX ADMIN — Medication 3 MILLILITER(S): at 03:51

## 2022-07-30 RX ADMIN — Medication 650 MILLIGRAM(S): at 21:59

## 2022-07-30 RX ADMIN — Medication 81 MILLIGRAM(S): at 13:39

## 2022-07-30 RX ADMIN — MEROPENEM 100 MILLIGRAM(S): 1 INJECTION INTRAVENOUS at 06:22

## 2022-07-30 RX ADMIN — INSULIN GLARGINE 2 UNIT(S): 100 INJECTION, SOLUTION SUBCUTANEOUS at 22:05

## 2022-07-30 RX ADMIN — Medication 3 MILLILITER(S): at 21:31

## 2022-07-30 RX ADMIN — Medication 3 MILLILITER(S): at 10:26

## 2022-07-30 RX ADMIN — CARVEDILOL PHOSPHATE 25 MILLIGRAM(S): 80 CAPSULE, EXTENDED RELEASE ORAL at 06:23

## 2022-07-30 RX ADMIN — Medication 3 MILLILITER(S): at 15:59

## 2022-07-30 RX ADMIN — Medication 125 MILLIGRAM(S): at 11:26

## 2022-07-30 RX ADMIN — SODIUM CHLORIDE 4 MILLILITER(S): 9 INJECTION INTRAMUSCULAR; INTRAVENOUS; SUBCUTANEOUS at 15:59

## 2022-07-30 RX ADMIN — Medication 125 MILLIGRAM(S): at 17:42

## 2022-07-30 RX ADMIN — ATORVASTATIN CALCIUM 20 MILLIGRAM(S): 80 TABLET, FILM COATED ORAL at 22:00

## 2022-07-30 NOTE — PROGRESS NOTE ADULT - PROBLEM SELECTOR PLAN 8
Normocytic, no s/s of bleeding, iron studies c/w AOCD, no evidence of hemolysis, normal B12/folate, low reticulocyte, SPEP chronic inflammation, immunofixation wnl  - OP heme f/u   - ?related to sarcoid  - downtrending, no overt bleeding, for pRBC x 1 on 7/30

## 2022-07-30 NOTE — PROGRESS NOTE ADULT - PROBLEM SELECTOR PLAN 7
- EEG with slowing but no seizures   - CTH neg  - per neuro AMS 2/2 hypercapnic respiratory failure, prior cerebellar stroke likely small vessel  - MS better, communicating via typing on cell given [place]  - MRI brain r/o central causes for intermittent encephalopathy pending

## 2022-07-30 NOTE — PROGRESS NOTE ADULT - PROBLEM SELECTOR PLAN 12
[facility]  S&S 7/27: regular solids and thin liquids  PT rec MELISSA, pt and family now amenable to MELISSA  care d/w daughter via phone on 7/29 [facility]  S&S 7/27: regular solids and thin liquids  PT rec MELISSA, pt and family now amenable to MELISSA  care d/w daughter via phone on 7/30

## 2022-07-30 NOTE — PROGRESS NOTE ADULT - PROBLEM SELECTOR PLAN 1
Pt. with multiple MANDY episodes during this admission. Scr was elevated at 1.65 on admission (7/12/22). Pt. was intubated on 7/13/22. Scr increased to 2.39 on 7/14/22. Scr subsequently increased to 3.72 on 7/19/22. Scr improved to 127 on 7/24/22. Scr further improved to 0.96 on 7/28/22. Scr is elevated at 1.70 today (7/30/22). UA showed proteinuria with large LECs and WBCs. Spot urine TP/CR was significantly elevated at 11.3. CT scan abdomen ruled out obstruction / hydronephrosis on 7/12/22. Pt currently receiving lasix 40 mg po OD. Serological work up nephrotic range proteinuria negative so far (as outline in lab section). Labs reviewed. Pt. clinically not in fluid overload. Recommend discontinuing diuretics for now. Recommend bladder scan to rule out urine retention. Monitor labs and urine output. Avoid any potential nephrotoxins. Dose medications as per eGFR.    If you have any questions, please feel free to contact me  Desean De Leon  Nephrology Fellow  [phone]/ [employer] Teams(Preferred)  (After 5pm or on weekends please page the on-call fellow).

## 2022-07-30 NOTE — PROVIDER CONTACT NOTE (CRITICAL VALUE NOTIFICATION) - SITUATION
Hgb 7
ABG 7.17
Hct on ABG 21
pt with a positive cdiff critical value. ACP Tonja made aware. contact precaution maintained

## 2022-07-30 NOTE — PROGRESS NOTE ADULT - PROBLEM SELECTOR PLAN 3
Initially intubated in ED for hypercapnic resp failure.  Then for airway protection.  Now on 7/28 w/ episode of hypoxia on turning also noted to be hypercapnic  - per prior d/w pulm did not need bipap qhs etc, now on bipap qhs, will need to clarigy w/ pulm   - CTPA pending  - pulm following  - chest PT, incentive spirometry, nebs

## 2022-07-30 NOTE — PROGRESS NOTE ADULT - PROBLEM SELECTOR PLAN 5
Had rectal tube and diarrhea c diff sent 7/24 +, s/p recent abx exposure while admitted   - PO vanco x 10 days (7/25-), D 6/10  - no diarrhea   - tolerating diet, no abdominal pain, nausea/vomiting

## 2022-07-30 NOTE — PROGRESS NOTE ADULT - ASSESSMENT
67F [place], HTN, HLD, sarcoidosis, HFpEF p/w lethargy/SOB/swelling/AMS admitted with metabolic encephalopathy due to acute hypercarbic respiratory failure in context presumed HFpEF exacerbation s/p intubation and MICU admission 7/13 for hypercapnia/airway protection was extubated 7/16  (s/p empiric meropenem 7/13 - 7/20 for concern for sepsis given hypothermia/AMS however neg infectious w/u) course c/b PEA arrest on 7/17 pt re-intubated s/p second PEA arrest when arrived to CTICU now transferred to medicine on 7/23 s/p being extubated.  Course now c/b c diff. Also with nephrotic range proteinuria of unclear etiology.  L sided white-out on CXR since 7/24 c/b on 7/28 episode of hypoxia w/ bradycardia in context of turning to change pt w/ R side down, ABG w/ acute on chronic hypercapnia now tolerating NC.

## 2022-07-30 NOTE — PROGRESS NOTE ADULT - ATTENDING COMMENTS
1. MANDY - creatinine increased to 1.7 today.  See above recommendations regarding bladder scan, diuretics.  May need to consider transfusion which might improve renal perfusion.  2. Anemia - monitor for need to transfuse  3. Hypertension - continue with current management. 1. MANDY - creatinine increased to 1.7 today.  See above recommendations regarding bladder scan, diuretics.  May need to consider transfusion which might improve renal perfusion. Given level of proteinuria, may need to consider imaging of renal veins ( R/O thrombosis) depending on course.   2. Anemia - monitor for need to transfuse  3. Hypertension - continue with current management.

## 2022-07-30 NOTE — PROGRESS NOTE ADULT - SUBJECTIVE AND OBJECTIVE BOX
Patient is a 67y old  Female who presents with a chief complaint of AMs, shortness of breath    SUBJECTIVE / OVERNIGHT EVENTS:    No CP, SOB, f/c/n/v  states no cough or sputum presently   Reports doing OK, A&Ox3  advised of need for CT and MRI and blood and in agreement     MEDICATIONS  (STANDING):  acetaminophen  Tablet 650 milliGRAM(s) Oral every 8 hours  albuterol/ipratropium for Nebulization 3 milliLiter(s) Nebulizer every 6 hours  amLODIPine   Tablet 10 milliGRAM(s) Oral daily  aspirin  chewable 81 milliGRAM(s) Oral daily  atorvastatin 20 milliGRAM(s) Oral at bedtime  carvedilol 25 milliGRAM(s) Oral every 12 hours  enoxaparin Injectable 40 milliGRAM(s) SubCutaneous every 24 hours  furosemide    Tablet 40 milliGRAM(s) Oral daily  hydrALAZINE 100 milliGRAM(s) Oral three times a day  insulin glargine Injectable (LANTUS) 2 Unit(s) SubCutaneous at bedtime  insulin lispro (ADMELOG) corrective regimen sliding scale   SubCutaneous three times a day before meals  insulin lispro (ADMELOG) corrective regimen sliding scale   SubCutaneous at bedtime  melatonin 6 milliGRAM(s) Oral at bedtime   meropenem  IVPB 1000 milliGRAM(s) IV Intermittent every 12 hours  sodium chloride 3%  Inhalation 4 milliLiter(s) Inhalation every 6 hours  vancomycin    Solution 125 milliGRAM(s) Oral every 6 hours    MEDICATIONS  (PRN):  acetaminophen     Tablet .. 650 milliGRAM(s) Oral every 6 hours PRN Temp greater or equal to 38C (100.4F), Mild Pain (1 - 3)  dextrose Oral Gel 15 Gram(s) Oral once PRN Blood Glucose LESS THAN 70 milliGRAM(s)/deciliter    T(C): 36.1 (07-30-22 @ 06:19), Max: 36.6 (07-29-22 @ 17:51)  HR: 74 (07-30-22 @ 11:01) (65 - 78)  BP: 133/78 (07-30-22 @ 06:19) (110/60 - 133/78)  RR: 18 (07-30-22 @ 06:19) (18 - 19)  SpO2: 96% (07-30-22 @ 11:01) (96% - 100%)    CAPILLARY BLOOD GLUCOSE  POCT Blood Glucose.: 147 mg/dL (30 Jul 2022 11:52)  POCT Blood Glucose.: 103 mg/dL (30 Jul 2022 07:58)  POCT Blood Glucose.: 130 mg/dL (29 Jul 2022 20:09)  POCT Blood Glucose.: 113 mg/dL (29 Jul 2022 17:08)    I&O's Summary    29 Jul 2022 07:01  -  30 Jul 2022 07:00  --------------------------------------------------------  IN: 450 mL / OUT: 401 mL / NET: 49 mL    PHYSICAL EXAM:  GENERAL: obese, on RA  CHEST/LUNG: decreased BS on L, no wheeze   HEART: Regular rate and rhythm; No murmurs, rubs, or gallops  ABDOMEN: Soft, Nontender, Nondistended; Bowel sounds present  EXTREMITIES:   warm and well perfused, No clubbing, cyanosis, or edema  PSYCH: AAOx3, very [place]  NEUROLOGY: non-focal    LABS:                        7.0    3.41  )-----------( 276      ( 30 Jul 2022 07:19 )             23.4     07-30    138  |  101  |  19  ----------------------------<  98  3.9   |  28  |  1.70<H>    Ca    8.2<L>      30 Jul 2022 07:19  Phos  4.1     07-30  Mg     1.60     07-30    TPro  6.0  /  Alb  2.6<L>  /  TBili  0.2  /  DBili  <0.2  /  AST  35<H>  /  ALT  24  /  AlkPhos  95  07-29    PT/INR - ( 28 Jul 2022 14:41 )   PT: 12.0 sec;   INR: 1.03 ratio    PTT - ( 28 Jul 2022 14:41 )  PTT:40.4 sec    ABG - ( 29 Jul 2022 11:20 )  pH, Arterial: 7.50  pH, Blood: x     /  pCO2: 41    /  pO2: 163   / HCO3: 32    / Base Excess: 8.1   /  SaO2: 98.6      Microbiology: Culture Results:   No growth to date. (07-29 @ 04:30)  Culture Results:   No growth to date. (07-29 @ 03:40)  Culture Results:   No growth to date. (07-28 @ 15:00)  Culture Results:   No growth to date. (07-28 @ 15:00)    Consultant(s) Notes Reviewed:    Care Discussed with Consultants/Other Providers:

## 2022-07-30 NOTE — PROGRESS NOTE ADULT - PROBLEM SELECTOR PLAN 1
Presented with R>L effusion.  Now with x-ray on 7/24 w/ L effusion vs infiltrate, pulm did POCUS  and not c/w  left effusion suspected atelectatic lung vs consolidation  - s/p bipap again on 7/28 for acute episode hypoxia/hypercapnea  - on lasix 40 mg PO daily   - s/p meropenum 7/13-7/20, restarted on 7/28-7/30, blood cx NG and no fevers/cough etc, holding and monitor   - pulm consult appreciated  - c/w incentive spirometry,  chest PT, chest vest, nebs  - CTPA ordered to r/o PE and clarify lung pathology on L sided  - cineesophagogram by S&S 7/29 confirms no aspiration

## 2022-07-30 NOTE — PROGRESS NOTE ADULT - PROBLEM SELECTOR PLAN 2
Cr max 3.72, went as low as 0.96  - Cr 1.2-->1.7  - monitor Cr & renally dose meds  - renal saw re: proteinuria

## 2022-07-30 NOTE — PROGRESS NOTE ADULT - SUBJECTIVE AND OBJECTIVE BOX
[facility] [place] DIVISION OF KIDNEY DISEASES AND HYPERTENSION -- FOLLOW UP NOTE  --------------------------------------------------------------------------------  HPI: 67-year-old F with h/o HFpEF, DM, HTN, HLD, sarcoidosis admitted at [facility] on 7/13/22 for AMS with acute respiratory failure requiring intubation (7/13). Hospital course complicated by PEA arrest x2. Pt was extubated and transferred to medicine. Currently being treated for C. diff. Pt. being seen for for nephrotic range proteinuria.     Pt. seen and examined at bedside. Pt. reports of feeling lethargic. Denies any SOB, CP, HA, N/V, abdominal pain , urinary symptoms or dizziness.     PAST HISTORY  --------------------------------------------------------------------------------  No significant changes to PMH, PSH, FHx, SHx, unless otherwise noted    ALLERGIES & MEDICATIONS  --------------------------------------------------------------------------------  Allergies    penicillin ([place])    Intolerances    Standing Inpatient Medications  acetaminophen     Tablet .. 650 milliGRAM(s) Oral every 8 hours  albuterol/ipratropium for Nebulization 3 milliLiter(s) Nebulizer every 6 hours  amLODIPine   Tablet 10 milliGRAM(s) Oral daily  aspirin  chewable 81 milliGRAM(s) Oral daily  atorvastatin 20 milliGRAM(s) Oral at bedtime  carvedilol 25 milliGRAM(s) Oral every 12 hours  dextrose 50% Injectable 25 Gram(s) IV Push once  dextrose 50% Injectable 12.5 Gram(s) IV Push once  dextrose 50% Injectable 25 Gram(s) IV Push once  enoxaparin Injectable 40 milliGRAM(s) SubCutaneous every 24 hours  furosemide    Tablet 40 milliGRAM(s) Oral daily  hydrALAZINE 100 milliGRAM(s) Oral three times a day  insulin glargine Injectable (LANTUS) 2 Unit(s) SubCutaneous at bedtime  insulin lispro (ADMELOG) corrective regimen sliding scale   SubCutaneous three times a day before meals  insulin lispro (ADMELOG) corrective regimen sliding scale   SubCutaneous at bedtime  melatonin 6 milliGRAM(s) Oral at bedtime  sodium chloride 3%  Inhalation 4 milliLiter(s) Inhalation every 6 hours  vancomycin    Solution 125 milliGRAM(s) Oral every 6 hours    PRN Inpatient Medications  acetaminophen     Tablet .. 650 milliGRAM(s) Oral every 6 hours PRN  dextrose Oral Gel 15 Gram(s) Oral once PRN    REVIEW OF SYSTEMS  --------------------------------------------------------------------------------  Gen: No fevers   Respiratory: No dyspnea  CV: No chest pain  GI: No abdominal pain  : No dysuria  MSK: No  edema  Neuro: no dizziness     All other systems were reviewed and are negative, except as noted.    VITALS/PHYSICAL EXAM  --------------------------------------------------------------------------------  T(C): 35.9 (07-30-22 @ 15:18), Max: 36.6 (07-29-22 @ 17:51)  HR: 79 (07-30-22 @ 15:18) (65 - 79)  BP: 133/78 (07-30-22 @ 15:18) (110/60 - 133/78)  RR: 17 (07-30-22 @ 15:18) (17 - 19)  SpO2: 100% (07-30-22 @ 15:18) (96% - 100%)  Wt(kg): --    07-29-22 @ 07:01  -  07-30-22 @ 07:00  --------------------------------------------------------  IN: 450 mL / OUT: 401 mL / NET: 49 mL    Physical Exam:  	Gen: NAD  	HEENT: MMM  	Pulm: CTA B/L  	CV: S1S2              : No suprapubic tenderness  	Abd: Soft, +BS   	Ext: No LE edema B/L  	Neuro: Awake  	Skin: Warm and dry  	Vascular access: IV peripheral canula    LABS/STUDIES  --------------------------------------------------------------------------------              7.0    3.41  >-----------<  276      [07-30-22 @ 07:19]              23.4     138  |  101  |  19  ----------------------------<  98      [07-30-22 @ 07:19]  3.9   |  28  |  1.70        Ca     8.2     [07-30-22 @ 07:19]      Mg     1.60     [07-30-22 @ 07:19]      Phos  4.1     [07-30-22 @ 07:19]    TPro  6.0  /  Alb  2.6  /  TBili  0.2  /  DBili  <0.2  /  AST  35  /  ALT  24  /  AlkPhos  95  [07-29-22 @ 04:30]    Creatinine Trend:  SCr 1.70 [07-30 @ 07:19]  SCr 1.24 [07-29 @ 03:50]  SCr 1.09 [07-28 @ 14:41]  SCr 0.96 [07-28 @ 05:15]  SCr 1.11 [07-27 @ 05:54]    Urinalysis - [07-17-22 @ 18:26]      Color Orange / Appearance Turbid / SG 1.027 / pH 6.0      Gluc Negative / Ketone Trace  / Bili Negative / Urobili 3 mg/dL       Blood Small / Protein 300 mg/dL / Leuk Est Large / Nitrite Negative      RBC 4-6 / WBC 25-30 / Hyaline 2 / Gran 2 / Sq Epi  / Non Sq Epi 5 / Bacteria Moderate    Urine Creatinine 44      [07-28-22 @ 16:43]  Urine Protein 499      [07-28-22 @ 16:43]    Iron 31, TIBC 179, %sat 17      [07-25-22 @ 06:31]  Ferritin 212      [07-25-22 @ 06:31]  TSH 4.72      [07-29-22 @ 04:30]  Lipid: chol 236, , HDL 37, LDL --      [11-29-21 @ 06:47]    HBsAg Nonreact      [07-26-22 @ 07:01]  HIV Nonreact      [07-29-22 @ 04:30]    C3 Complement 128      [07-26-22 @ 07:01]  C4 Complement 29      [07-26-22 @ 07:01]  ANCA: cANCA Negative, pANCA Negative, atypical ANCA Negative      [07-27-22 @ 14:00]  PLA2R: DESHAWN <1.8, IFA --      [07-27-22 @ 14:00]  Free Light Chains: kappa 11.62, lambda 4.67, ratio = 2.49      [07-27 @ 14:00]  Immunofixation Serum:   No Monoclonal Band Identified. NAVEED Rosenberg M.D.  Reference Range: None Detected      [07-27-22 @ 14:00]  SPEP Interpretation: Decreased serum Albumin and increased Polyclonal Gamma fraction  consistent with chronic inflammatory condition.  NAVEED Rosenberg M.D.      [07-27-22 @ 14:00]

## 2022-07-30 NOTE — PROVIDER CONTACT NOTE (CRITICAL VALUE NOTIFICATION) - ASSESSMENT
pt back to baseline AOx4, [place] complete care VS as documented
ABG 7.17
pt back to baseline AOx4, [place] complete care VS as documented
pt with a positive cdiff critical value. ACP Tonja made aware. contact precaution maintained

## 2022-07-30 NOTE — PROVIDER CONTACT NOTE (CRITICAL VALUE NOTIFICATION) - BACKGROUND
pt admitted for heart failure extubated twice
SOB, Lethargy
67 yrs old female prsenting with metabolic encephalopathy and CHF. PMG HTN, HDL, DM, pancreatic cyst
pt admitted for heart failure extubated twice - RRT yesterday

## 2022-07-30 NOTE — PROVIDER CONTACT NOTE (CRITICAL VALUE NOTIFICATION) - ACTION/TREATMENT ORDERED:
continue to monitor CBC
Provider made aware
continue to monitor CBC
ACP Tonja made aware. contact precaution maintained

## 2022-07-30 NOTE — PROGRESS NOTE ADULT - PROBLEM SELECTOR PLAN 4
Patient came in with volume overload, in my review cardiac cause (TTE findings) not correlating as cause of such volume overload.  Does have proteinurea and MANDY on arrival.  Unclear if related to underlying hypertensive and diabetic kidney disease.  Per renal may also worsen in setting of PEA arrest.   - spot protein 10g & 11g  - on lasix 40 mg PO daily, appears euvolemic  - C3/C4 wnl, HIV & Hep C/B neg, normal immunofixation, SPEP w/ chronic inflammation,  free lyte chains elevated, PLAR AB neg, parvo neg, ANCA neg  - renal following

## 2022-07-31 LAB
ANION GAP SERPL CALC-SCNC: 8 MMOL/L — SIGNIFICANT CHANGE UP (ref 7–14)
BASE EXCESS BLDV CALC-SCNC: 5.8 MMOL/L — HIGH (ref -2–3)
BUN SERPL-MCNC: 24 MG/DL — HIGH (ref 7–23)
CALCIUM SERPL-MCNC: 8.2 MG/DL — LOW (ref 8.4–10.5)
CHLORIDE SERPL-SCNC: 100 MMOL/L — SIGNIFICANT CHANGE UP (ref 98–107)
CO2 BLDV-SCNC: 32.6 MMOL/L — HIGH (ref 22–26)
CO2 SERPL-SCNC: 28 MMOL/L — SIGNIFICANT CHANGE UP (ref 22–31)
CREAT SERPL-MCNC: 1.77 MG/DL — HIGH (ref 0.5–1.3)
CRP SERPL-MCNC: 9.5 MG/L — HIGH
EGFR: 31 ML/MIN/1.73M2 — LOW
ERYTHROCYTE [SEDIMENTATION RATE] IN BLOOD: 44 MM/HR — HIGH (ref 4–25)
GLUCOSE BLDC GLUCOMTR-MCNC: 110 MG/DL — HIGH (ref 70–99)
GLUCOSE BLDC GLUCOMTR-MCNC: 118 MG/DL — HIGH (ref 70–99)
GLUCOSE BLDC GLUCOMTR-MCNC: 123 MG/DL — HIGH (ref 70–99)
GLUCOSE BLDC GLUCOMTR-MCNC: 131 MG/DL — HIGH (ref 70–99)
GLUCOSE SERPL-MCNC: 112 MG/DL — HIGH (ref 70–99)
HCO3 BLDV-SCNC: 31 MMOL/L — HIGH (ref 22–29)
HCT VFR BLD CALC: 25.3 % — LOW (ref 34.5–45)
HGB BLD-MCNC: 7.9 G/DL — LOW (ref 11.5–15.5)
MAGNESIUM SERPL-MCNC: 1.5 MG/DL — LOW (ref 1.6–2.6)
MCHC RBC-ENTMCNC: 26.2 PG — LOW (ref 27–34)
MCHC RBC-ENTMCNC: 31.2 GM/DL — LOW (ref 32–36)
MCV RBC AUTO: 84.1 FL — SIGNIFICANT CHANGE UP (ref 80–100)
NRBC # BLD: 0 /100 WBCS — SIGNIFICANT CHANGE UP
NRBC # FLD: 0 K/UL — SIGNIFICANT CHANGE UP
PCO2 BLDV: 47 MMHG — HIGH (ref 39–42)
PH BLDV: 7.43 — SIGNIFICANT CHANGE UP (ref 7.32–7.43)
PHOSPHATE SERPL-MCNC: 4.4 MG/DL — SIGNIFICANT CHANGE UP (ref 2.5–4.5)
PLATELET # BLD AUTO: 294 K/UL — SIGNIFICANT CHANGE UP (ref 150–400)
PO2 BLDV: 71 MMHG — SIGNIFICANT CHANGE UP
POTASSIUM SERPL-MCNC: 4.3 MMOL/L — SIGNIFICANT CHANGE UP (ref 3.5–5.3)
POTASSIUM SERPL-SCNC: 4.3 MMOL/L — SIGNIFICANT CHANGE UP (ref 3.5–5.3)
RBC # BLD: 3.01 M/UL — LOW (ref 3.8–5.2)
RBC # BLD: 3.01 M/UL — LOW (ref 3.8–5.2)
RBC # FLD: 16.5 % — HIGH (ref 10.3–14.5)
RETICS #: 62.6 K/UL — SIGNIFICANT CHANGE UP (ref 25–125)
RETICS/RBC NFR: 2.1 % — SIGNIFICANT CHANGE UP (ref 0.5–2.5)
SAO2 % BLDV: 96.3 % — SIGNIFICANT CHANGE UP
SODIUM SERPL-SCNC: 136 MMOL/L — SIGNIFICANT CHANGE UP (ref 135–145)
WBC # BLD: 4.37 K/UL — SIGNIFICANT CHANGE UP (ref 3.8–10.5)
WBC # FLD AUTO: 4.37 K/UL — SIGNIFICANT CHANGE UP (ref 3.8–10.5)

## 2022-07-31 PROCEDURE — 99233 SBSQ HOSP IP/OBS HIGH 50: CPT

## 2022-07-31 PROCEDURE — 99232 SBSQ HOSP IP/OBS MODERATE 35: CPT | Mod: GC

## 2022-07-31 RX ORDER — MAGNESIUM OXIDE 400 MG ORAL TABLET 241.3 MG
400 TABLET ORAL
Refills: 0 | Status: DISCONTINUED | OUTPATIENT
Start: 2022-07-31 | End: 2022-07-31

## 2022-07-31 RX ORDER — MAGNESIUM SULFATE 500 MG/ML
2 VIAL (ML) INJECTION ONCE
Refills: 0 | Status: COMPLETED | OUTPATIENT
Start: 2022-07-31 | End: 2022-07-31

## 2022-07-31 RX ADMIN — AMLODIPINE BESYLATE 10 MILLIGRAM(S): 2.5 TABLET ORAL at 05:23

## 2022-07-31 RX ADMIN — ENOXAPARIN SODIUM 40 MILLIGRAM(S): 100 INJECTION SUBCUTANEOUS at 13:13

## 2022-07-31 RX ADMIN — Medication 125 MILLIGRAM(S): at 18:12

## 2022-07-31 RX ADMIN — Medication 3 MILLILITER(S): at 09:34

## 2022-07-31 RX ADMIN — Medication 25 GRAM(S): at 12:49

## 2022-07-31 RX ADMIN — CARVEDILOL PHOSPHATE 25 MILLIGRAM(S): 80 CAPSULE, EXTENDED RELEASE ORAL at 18:12

## 2022-07-31 RX ADMIN — Medication 650 MILLIGRAM(S): at 05:24

## 2022-07-31 RX ADMIN — SODIUM CHLORIDE 4 MILLILITER(S): 9 INJECTION INTRAMUSCULAR; INTRAVENOUS; SUBCUTANEOUS at 15:59

## 2022-07-31 RX ADMIN — Medication 100 MILLIGRAM(S): at 13:12

## 2022-07-31 RX ADMIN — SODIUM CHLORIDE 4 MILLILITER(S): 9 INJECTION INTRAMUSCULAR; INTRAVENOUS; SUBCUTANEOUS at 09:34

## 2022-07-31 RX ADMIN — Medication 3 MILLILITER(S): at 15:59

## 2022-07-31 RX ADMIN — ATORVASTATIN CALCIUM 20 MILLIGRAM(S): 80 TABLET, FILM COATED ORAL at 21:22

## 2022-07-31 RX ADMIN — Medication 100 MILLIGRAM(S): at 05:24

## 2022-07-31 RX ADMIN — Medication 125 MILLIGRAM(S): at 23:31

## 2022-07-31 RX ADMIN — Medication 650 MILLIGRAM(S): at 13:13

## 2022-07-31 RX ADMIN — INSULIN GLARGINE 2 UNIT(S): 100 INJECTION, SOLUTION SUBCUTANEOUS at 21:23

## 2022-07-31 RX ADMIN — CARVEDILOL PHOSPHATE 25 MILLIGRAM(S): 80 CAPSULE, EXTENDED RELEASE ORAL at 05:24

## 2022-07-31 RX ADMIN — Medication 100 MILLIGRAM(S): at 21:21

## 2022-07-31 RX ADMIN — Medication 650 MILLIGRAM(S): at 21:24

## 2022-07-31 RX ADMIN — Medication 125 MILLIGRAM(S): at 05:22

## 2022-07-31 RX ADMIN — Medication 81 MILLIGRAM(S): at 12:40

## 2022-07-31 RX ADMIN — Medication 6 MILLIGRAM(S): at 21:24

## 2022-07-31 RX ADMIN — Medication 3 MILLILITER(S): at 04:02

## 2022-07-31 RX ADMIN — Medication 125 MILLIGRAM(S): at 12:39

## 2022-07-31 NOTE — PROVIDER CONTACT NOTE (OTHER) - REASON
Continued lethargy
Patient refusing to wear BIPAP at night
Pt hypothermic with oral temp 92.8, rectal temp 93.8
Pt altered mental status worsen, rectal temp 90.4, lantus order
Rectal temp 90.4

## 2022-07-31 NOTE — PROGRESS NOTE ADULT - PROBLEM SELECTOR PLAN 4
Patient came in with volume overload, in my review cardiac cause (TTE findings) not correlating as cause of such volume overload.  Does have proteinurea and MANDY on arrival.  Unclear if related to underlying hypertensive and diabetic kidney disease.  Per renal may also worsen in setting of PEA arrest.   - spot protein 10g & 11g  - s/p lasix 40 mg PO daily, holding for MANDY  - C3/C4 wnl, HIV & Hep C/B neg, normal immunofixation, SPEP w/ chronic inflammation,  free lyte chains elevated, PLAR AB neg, parvo neg, ANCA neg  - renal following

## 2022-07-31 NOTE — PROGRESS NOTE ADULT - ASSESSMENT
67F [place], HTN, HLD, sarcoidosis, HFpEF p/w lethargy/SOB/swelling/AMS admitted with metabolic encephalopathy due to acute hypercarbic respiratory failure in context presumed HFpEF exacerbation s/p intubation and MICU admission 7/13 for hypercapnia/airway protection was extubated 7/16  (s/p empiric meropenem 7/13 - 7/20 for concern for sepsis given hypothermia/AMS however neg infectious w/u) course c/b PEA arrest on 7/17 pt re-intubated s/p second PEA arrest when arrived to CTICU now transferred to medicine on 7/23 s/p being extubated.  Course now c/b c diff. Also with nephrotic range proteinuria of unclear etiology.  L sided white-out on CXR since 7/24 c/b on 7/28 episode of hypoxia w/ bradycardia in context of turning to change pt w/ R side down, ABG w/ acute on chronic hypercapnia now tolerating NC.

## 2022-07-31 NOTE — PROGRESS NOTE ADULT - PROBLEM SELECTOR PLAN 12
[facility]  S&S 7/27: regular solids and thin liquids  PT rec MELISSA, pt and family now amenable to MELISSA  care d/w daughter via phone on 7/30

## 2022-07-31 NOTE — PROGRESS NOTE ADULT - ATTENDING COMMENTS
1. MANDY - creatinine as noted.  Please repeat urinalysis to assess for possible ATN.  Given level of proteinuria, renal vein dopplers need to be ordered to rule out renal vein thrombosis.   2. Anemia - transfusion if needed, per primary medical team.  3. Hypertension - continue with current regimen.  4. Nephrotic proteinuria - serologies negative.  Further recommendations per course.

## 2022-07-31 NOTE — PROGRESS NOTE ADULT - SUBJECTIVE AND OBJECTIVE BOX
Patient is a 67y old  Female who presents with a chief complaint of AMs, shortness of breath    SUBJECTIVE / OVERNIGHT EVENTS:    No new complaints, no CP, SOB, f/c/n/v  chest wall pain better     MEDICATIONS  (STANDING):  acetaminophen Tablet 650 milliGRAM(s) Oral every 8 hours  albuterol/ipratropium for Nebulization 3 milliLiter(s) Nebulizer every 6 hours  amLODIPine   Tablet 10 milliGRAM(s) Oral daily  aspirin  chewable 81 milliGRAM(s) Oral daily  atorvastatin 20 milliGRAM(s) Oral at bedtime  carvedilol 25 milliGRAM(s) Oral every 12 hours  enoxaparin Injectable 40 milliGRAM(s) SubCutaneous every 24 hours  hydrALAZINE 100 milliGRAM(s) Oral three times a day  insulin glargine Injectable (LANTUS) 2 Unit(s) SubCutaneous at bedtime  insulin lispro (ADMELOG) corrective regimen sliding scale   SubCutaneous three times a day before meals  insulin lispro (ADMELOG) corrective regimen sliding scale   SubCutaneous at bedtime  magnesium sulfate  IVPB 2 Gram(s) IV Intermittent once  melatonin 6 milliGRAM(s) Oral at bedtime  sodium chloride 3%  Inhalation 4 milliLiter(s) Inhalation every 6 hours  vancomycin    Solution 125 milliGRAM(s) Oral every 6 hours    MEDICATIONS  (PRN):  acetaminophen     Tablet .. 650 milliGRAM(s) Oral every 6 hours PRN Temp greater or equal to 38C (100.4F), Mild Pain (1 - 3)  dextrose Oral Gel 15 Gram(s) Oral once PRN Blood Glucose LESS THAN 70 milliGRAM(s)/deciliter    T(C): 37.2 (07-31-22 @ 05:20), Max: 37.2 (07-30-22 @ 22:00)  HR: 79 (07-31-22 @ 05:20) (75 - 82)  BP: 124/69 (07-31-22 @ 05:20) (124/69 - 134/74)  RR: 18 (07-31-22 @ 05:20) (17 - 18)  SpO2: 98% (07-31-22 @ 05:20) (95% - 100%)    CAPILLARY BLOOD GLUCOSE  POCT Blood Glucose.: 110 mg/dL (31 Jul 2022 07:40)  POCT Blood Glucose.: 142 mg/dL (30 Jul 2022 22:01)  POCT Blood Glucose.: 148 mg/dL (30 Jul 2022 17:07)  POCT Blood Glucose.: 147 mg/dL (30 Jul 2022 11:52)    I&O's Summary    30 Jul 2022 07:01  -  31 Jul 2022 07:00  --------------------------------------------------------  IN: 1190 mL / OUT: 250 mL / NET: 940 mL    31 Jul 2022 07:01  -  31 Jul 2022 11:33  --------------------------------------------------------  IN: 240 mL / OUT: 0 mL / NET: 240 mL    PHYSICAL EXAM:  GENERAL: obese, on RA  CHEST/LUNG: decreased BS on L, no wheeze   HEART: Regular rate and rhythm; No murmurs, rubs, or gallops  ABDOMEN: Soft, Nontender, Nondistended; Bowel sounds present  EXTREMITIES:   warm and well perfused, No clubbing, cyanosis, or edema  PSYCH: AAOx3, very [place]  NEUROLOGY: non-focal    LABS:                        7.9    4.37  )-----------( 294      ( 31 Jul 2022 04:52 )             25.3     07-31    136  |  100  |  24<H>  ----------------------------<  112<H>  4.3   |  28  |  1.77<H>    Ca    8.2<L>      31 Jul 2022 04:52  Phos  4.4     07-31  Mg     1.50     07-31    Microbiology: Culture Results:   No growth to date. (07-29 @ 04:30)  Culture Results:   No growth to date. (07-29 @ 03:40)  Culture Results:   No growth to date. (07-28 @ 15:00)  Culture Results:   No growth to date. (07-28 @ 15:00)    [facility] 07-31 @ 04:52  pH: 7.43/pCO2: 47/pO2: 71/HCO3: 31/lactate: --      Consultant(s) Notes Reviewed:    Care Discussed with Consultants/Other Providers:

## 2022-07-31 NOTE — PROVIDER CONTACT NOTE (OTHER) - BACKGROUND
SOB, lethargy
admitted for CHF exacerbation
Pmhx of HTN, HLD, CHF, presenting to the ED for new onset AMS. Admitted for metabolic encephalopathy and CHF exacerbation.
67F presented to the ED for new onset AMS. She is also found to have pleural effusion, elevated BNP and LE edema concerning for CHF exacerbation. pt code blue x2 s/p MICU & intubation twice
pt code blue x2 s/p MICU & intubation twice

## 2022-07-31 NOTE — PROGRESS NOTE ADULT - SUBJECTIVE AND OBJECTIVE BOX
[facility] DIVISION OF KIDNEY DISEASES AND HYPERTENSION -- FOLLOW UP NOTE  --------------------------------------------------------------------------------  HPI: 67-year-old F with h/o HFpEF, DM, HTN, HLD, sarcoidosis admitted at [facility] on 7/13/22 for AMS with acute respiratory failure requiring intubation (7/13). Hospital course complicated by PEA arrest x2. Pt was extubated and transferred to medicine. Currently being treated for C. diff. Pt. being seen for for nephrotic range proteinuria.     Pt. seen and examined at bedside. Pt. reports of feeling lethargic. Denies any SOB, CP, HA, N/V, abdominal pain , urinary symptoms or dizziness.     PAST HISTORY  --------------------------------------------------------------------------------  No significant changes to PMH, PSH, FHx, SHx, unless otherwise noted    ALLERGIES & MEDICATIONS  --------------------------------------------------------------------------------  Allergies    penicillin ([place])    Intolerances    Standing Inpatient Medications  acetaminophen     Tablet .. 650 milliGRAM(s) Oral every 8 hours  albuterol/ipratropium for Nebulization 3 milliLiter(s) Nebulizer every 6 hours  amLODIPine   Tablet 10 milliGRAM(s) Oral daily  aspirin  chewable 81 milliGRAM(s) Oral daily  atorvastatin 20 milliGRAM(s) Oral at bedtime  carvedilol 25 milliGRAM(s) Oral every 12 hours  dextrose 50% Injectable 25 Gram(s) IV Push once  dextrose 50% Injectable 12.5 Gram(s) IV Push once  dextrose 50% Injectable 25 Gram(s) IV Push once  enoxaparin Injectable 40 milliGRAM(s) SubCutaneous every 24 hours  hydrALAZINE 100 milliGRAM(s) Oral three times a day  insulin glargine Injectable (LANTUS) 2 Unit(s) SubCutaneous at bedtime  insulin lispro (ADMELOG) corrective regimen sliding scale   SubCutaneous three times a day before meals  insulin lispro (ADMELOG) corrective regimen sliding scale   SubCutaneous at bedtime  melatonin 6 milliGRAM(s) Oral at bedtime  sodium chloride 3%  Inhalation 4 milliLiter(s) Inhalation every 6 hours  vancomycin    Solution 125 milliGRAM(s) Oral every 6 hours    PRN Inpatient Medications  acetaminophen     Tablet .. 650 milliGRAM(s) Oral every 6 hours PRN  dextrose Oral Gel 15 Gram(s) Oral once PRN    REVIEW OF SYSTEMS  --------------------------------------------------------------------------------  Gen: No fevers   Respiratory: No dyspnea  CV: No chest pain  GI: No abdominal pain  : No dysuria  MSK: No  edema  Neuro: no dizziness   All other systems were reviewed and are negative, except as noted.    VITALS/PHYSICAL EXAM  --------------------------------------------------------------------------------  T(C): 36.9 (07-31-22 @ 13:00), Max: 37.2 (07-30-22 @ 22:00)  HR: 73 (07-31-22 @ 13:00) (73 - 82)  BP: 116/62 (07-31-22 @ 13:00) (116/62 - 134/74)  RR: 18 (07-31-22 @ 13:00) (17 - 18)  SpO2: 96% (07-31-22 @ 13:00) (95% - 100%)  Wt(kg): --    07-30-22 @ 07:01  -  07-31-22 @ 07:00  --------------------------------------------------------  IN: 1190 mL / OUT: 250 mL / NET: 940 mL    07-31-22 @ 07:01  -  07-31-22 @ 14:05  --------------------------------------------------------  IN: 480 mL / OUT: 0 mL / NET: 480 mL    Physical Exam:  	Gen: NAD  	HEENT: MMM  	Pulm: CTA B/L  	CV: S1S2              : No suprapubic tenderness  	Abd: Soft, +BS   	Ext: No LE edema B/L  	Neuro: Awake  	Skin: Warm and dry  	Vascular access: IV peripheral canula    LABS/STUDIES  --------------------------------------------------------------------------------              7.9    4.37  >-----------<  294      [07-31-22 @ 04:52]              25.3     136  |  100  |  24  ----------------------------<  112      [07-31-22 @ 04:52]  4.3   |  28  |  1.77        Ca     8.2     [07-31-22 @ 04:52]      Mg     1.50     [07-31-22 @ 04:52]      Phos  4.4     [07-31-22 @ 04:52]    Creatinine Trend:  SCr 1.77 [07-31 @ 04:52]  SCr 1.70 [07-30 @ 07:19]  SCr 1.24 [07-29 @ 03:50]  SCr 1.09 [07-28 @ 14:41]  SCr 0.96 [07-28 @ 05:15]    Urinalysis - [07-17-22 @ 18:26]      Color Orange / Appearance Turbid / SG 1.027 / pH 6.0      Gluc Negative / Ketone Trace  / Bili Negative / Urobili 3 mg/dL       Blood Small / Protein 300 mg/dL / Leuk Est Large / Nitrite Negative      RBC 4-6 / WBC 25-30 / Hyaline 2 / Gran 2 / Sq Epi  / Non Sq Epi 5 / Bacteria Moderate    Urine Creatinine 44      [07-28-22 @ 16:43]  Urine Protein 499      [07-28-22 @ 16:43]    Iron 31, TIBC 179, %sat 17      [07-25-22 @ 06:31]  Ferritin 212      [07-25-22 @ 06:31]  TSH 4.72      [07-29-22 @ 04:30]  Lipid: chol 236, , HDL 37, LDL --      [11-29-21 @ 06:47]    HBsAg Nonreact      [07-26-22 @ 07:01]  HIV Nonreact      [07-29-22 @ 04:30]    C3 Complement 128      [07-26-22 @ 07:01]  C4 Complement 29      [07-26-22 @ 07:01]  ANCA: cANCA Negative, pANCA Negative, atypical ANCA Negative      [07-27-22 @ 14:00]  PLA2R: DESHAWN <1.8, IFA --      [07-27-22 @ 14:00]  Free Light Chains: kappa 11.62, lambda 4.67, ratio = 2.49      [07-27 @ 14:00]

## 2022-07-31 NOTE — PROGRESS NOTE ADULT - ASSESSMENT
66 y/o  AAF with HTN, HLD, sarcoidosis, CHF, DM presented to the ED for new onset AMS. Patient suddenly became altered, confused and daughter noted she was slurring his speech. She was incoherent, and hallucinating, saying there is a cat present and is asking for her aunt who lives far away.  The patient known to have frequent admission for CHF exacerbations eventually intubated for hypoxic respiratory failure and now in MICU   MRI brain/IAC from 2021 with old R cerebellar infarct ; CTA h/N that time unremarkable. , A1c 9.2%  CT C/A with new large pleural effusions   CTH unremarakble   A1c 7.3  TTE was done   7/17 AM RRT, intubated, c/f aspiration PNA   7/19 eeg with slowing but no seizures   7/21 now extubated TONEY at least 4/5    7/22 follows commands, able to name objection (pen)   on floor, neuro exam and mental status near [facility] .   + C diff     Impression: 1) AMS 2/2 hypercapneic resp failure 2) prior cerebellar stroke likely small vessel   - PO vanco for C diff   - asa 81 and statin therapy for secondary stroke prevention LDL goal < 70    - was on meropenum, c/f aspiration PNA; now off   - h/o sarcoid, f/u pulmo   - telemetry  - PT/OT/SS/SLP, OOBC  - check FS, glucose control <180  - GI/DVT ppx  - Thank you for allowing me to participate in the care of this patient. Call with questions.     - family refusing rehab   Russell Jimenes MD  Vascular Neurology  Office: [phone]

## 2022-07-31 NOTE — PROGRESS NOTE ADULT - PROBLEM SELECTOR PLAN 3
Initially intubated in ED for hypercapnic resp failure.  Then for airway protection.  Now on 7/28 w/ episode of hypoxia on turning also noted to be hypercapnic  - off bipap  - CT chest   - pulm following  - chest PT, incentive spirometry, nebs

## 2022-07-31 NOTE — PROGRESS NOTE ADULT - PROBLEM SELECTOR PLAN 1
Presented with R>L effusion.  Now with x-ray on 7/24 w/ L effusion vs infiltrate, pulm did POCUS  and not c/w  left effusion suspected atelectatic lung vs consolidation  - s/p bipap again on 7/28 for acute episode hypoxia/hypercapnea  - lasix 40 mg PO daily, held 7/30 for MANDY  - s/p meropenum 7/13-7/20, restarted on 7/28-7/30, blood cx NG and no fevers/cough etc, holding and monitor   - pulm consult appreciated  - c/w incentive spirometry,  chest PT, chest vest, nebs  - CTPA changed to CT chest given PE less likely and rising Cr  - cineesophagogram by S&S 7/29 confirms no aspiration

## 2022-07-31 NOTE — PROVIDER CONTACT NOTE (OTHER) - ASSESSMENT
Pt is a/ox1 only to name, FS of 122, rectal 90.4 temp, pt failed dysphagia screen due to not alert, hard of hearing,
Rectal temp 90.4
Pt A&O x4, denies chest pain or SOB, pt hypothermic, see flowsheet for V/S, ACP YEVGENIY Boles aware
Patient O2 sat 100% on NC, patient denying BIPAP mask at this time despite education because pt states mask is uncomfortable to sleep with.
ABG is improving but pt remains lethargic arousal to vigorous stimuli- VS stable at this time

## 2022-07-31 NOTE — PROVIDER CONTACT NOTE (OTHER) - ACTION/TREATMENT ORDERED:
ACP Saw pt at bedside   Hyperthermia blanket applied  STAT Blood cultures x2, Lactate, HIV 1/2 Antigen, TSH, Procalcitonin, CBC, BMP, UA, UC, Type and Screen
Continue to encourage the use of BIPAP mask and continue to monitor O2 saturation
MICU and RCU consulted - no beds available
Provider aware, at bedside. awaiting orders
per attending, do sepsis lab workup, hypothermia blanket, reassess pt v/s for any acute changes and do not give lantus at this time

## 2022-07-31 NOTE — PROGRESS NOTE ADULT - PROBLEM SELECTOR PLAN 5
Had rectal tube and diarrhea c diff sent 7/24 +, s/p recent abx exposure while admitted   - PO vanco x 10 days (7/25-), D 7/10  - no diarrhea   - tolerating diet, no abdominal pain, nausea/vomiting

## 2022-07-31 NOTE — PROGRESS NOTE ADULT - PROBLEM SELECTOR PLAN 6
BNP on arrival: 4021 with evidence of fluid overload with pleural effusions, b/l pitting edema seems given diuresis and fluids (IVC not c/w overload in ICU however intubated at that time)  - TTE (7/15/22): moderate LV systolic dysfunction, moderate diastolic dysfunction, RV enlargement with decreased RV systolic dysfunction  - TTE  (7/21/22): EF 65% with normal biventricular systolic function  - Lasix 40 mg PO held  - c/w BP control   - daily weights and strict I&Os

## 2022-07-31 NOTE — PROVIDER CONTACT NOTE (OTHER) - SITUATION
Patient refusing to wear BIPAP for bedtime.
Pt RRT in afternoon for change in VS and level of consciousness, pt put on BiPAP- ABG is improving but pt remains lethargic arousal to vigorous stimuli
Pt hypothermic with oral temp 92.8, rectal temp 93.8  pt s/p RRT in afternoon for change in VS and level of consciousness, pt put on BiPAP-
Pt is a/ox1 only to name, FS of 122, rectal 90.4 temp, pt failed dysphagia screen due to not alert, hard of hearing,
Rectal temp 90.4

## 2022-07-31 NOTE — PROGRESS NOTE ADULT - PROBLEM SELECTOR PLAN 7
- EEG with slowing but no seizures   - CTH neg  - per neuro AMS 2/2 hypercapnic respiratory failure, prior cerebellar stroke likely small vessel  - MS better, communicating via typing on cell given [place]  - MRI brain r/o central causes for intermittent encephalopathy pending

## 2022-07-31 NOTE — PROGRESS NOTE ADULT - PROBLEM SELECTOR PLAN 2
Cr max 3.72, went as low as 0.96  - Cr 1.2-->1.7  - monitor Cr & renally dose meds  - holding losartan 50 mg and lasix 40 mg  - renal saw re: proteinuria

## 2022-07-31 NOTE — PROGRESS NOTE ADULT - PROBLEM SELECTOR PLAN 1
Pt. with multiple MANDY episodes during this admission. Scr was elevated at 1.65 on admission (7/12/22). Pt. was intubated on 7/13/22. Scr increased to 2.39 on 7/14/22. Scr subsequently increased to 3.72 on 7/19/22. Scr improved to 127 on 7/24/22. Scr further improved to 0.96 on 7/28/22. Scr was elevated at 1.70 yesterday (7/30/22). Scr remained elevated at 1.77 today (7/31/22). UA showed proteinuria with large LECs and WBCs. Spot urine TP/CR was significantly elevated at 11.3. CT scan abdomen ruled out obstruction / hydronephrosis on 7/12/22. Serological work up nephrotic range proteinuria negative so far (as outline in lab section). Labs reviewed. Pt. clinically not in fluid overload. Recommend continue holding off diuretics for now. Check renal vein doppler. Monitor labs and urine output. Avoid any potential nephrotoxins. Dose medications as per eGFR.    If you have any questions, please feel free to contact me  Desean De Leon  Nephrology Fellow  [phone]/ [employer] Teams(Preferred)  (After 5pm or on weekends please page the on-call fellow).

## 2022-07-31 NOTE — PROGRESS NOTE ADULT - PROBLEM SELECTOR PLAN 8
Normocytic, no s/s of bleeding, iron studies c/w AOCD, no evidence of hemolysis, normal B12/folate, low reticulocyte, SPEP chronic inflammation, immunofixation wnl  - OP heme f/u   - ?related to sarcoid  - downtrending, no overt bleeding, pRBC x 1 on 7/30

## 2022-07-31 NOTE — PROVIDER CONTACT NOTE (OTHER) - RECOMMENDATIONS
transfer pt to higher level of care and continue to monitor on BiPAP
Hyperthermia blanket   STAT Blood cultures x2, Lactate, HIV 1/2 Antigen, TSH, Procalcitonin, CBC, BMP, UA, UC, Type and Screen  ACP to see pt at bedside
Continue to encourage the use of BIPAP mask and continue to monitor O2 saturation

## 2022-07-31 NOTE — PROGRESS NOTE ADULT - SUBJECTIVE AND OBJECTIVE BOX
Neurology Progress Note    S: Patient seen and examined on floor.   EEG neg for seizures. mental status improved.   on contact for c diff     Medication:  MEDICATIONS  (STANDING):  acetaminophen     Tablet .. 650 milliGRAM(s) Oral every 8 hours  albuterol/ipratropium for Nebulization 3 milliLiter(s) Nebulizer every 6 hours  amLODIPine   Tablet 10 milliGRAM(s) Oral daily  aspirin  chewable 81 milliGRAM(s) Oral daily  atorvastatin 20 milliGRAM(s) Oral at bedtime  carvedilol 25 milliGRAM(s) Oral every 12 hours  dextrose 50% Injectable 25 Gram(s) IV Push once  dextrose 50% Injectable 12.5 Gram(s) IV Push once  dextrose 50% Injectable 25 Gram(s) IV Push once  enoxaparin Injectable 40 milliGRAM(s) SubCutaneous every 24 hours  hydrALAZINE 100 milliGRAM(s) Oral three times a day  insulin glargine Injectable (LANTUS) 2 Unit(s) SubCutaneous at bedtime  insulin lispro (ADMELOG) corrective regimen sliding scale   SubCutaneous three times a day before meals  insulin lispro (ADMELOG) corrective regimen sliding scale   SubCutaneous at bedtime  magnesium sulfate  IVPB 2 Gram(s) IV Intermittent once  melatonin 6 milliGRAM(s) Oral at bedtime  sodium chloride 3%  Inhalation 4 milliLiter(s) Inhalation every 6 hours  vancomycin    Solution 125 milliGRAM(s) Oral every 6 hours    MEDICATIONS  (PRN):  acetaminophen     Tablet .. 650 milliGRAM(s) Oral every 6 hours PRN Temp greater or equal to 38C (100.4F), Mild Pain (1 - 3)  dextrose Oral Gel 15 Gram(s) Oral once PRN Blood Glucose LESS THAN 70 milliGRAM(s)/deciliter      Vital:     Vital Signs Last 24 Hrs  T(C): 37.2 (22 @ 05:20), Max: 37.2 (22 @ 22:00)  T(F): 98.9 (22 @ 05:20), Max: 99 (22 @ 22:00)  HR: 79 (22 @ 05:20) (73 - 82)  BP: 124/69 (22 @ 05:20) (112/55 - 134/74)  BP(mean): --  RR: 18 (22 @ 05:20) (17 - 19)  SpO2: 98% (22 @ 05:20) (95% - 100%)          General Exam:   General Appearance: Appropriately dressed and in no acute distress       Head: Normocephalic, atraumatic and no dysmorphic features  Ear, Nose, and Throat: Moist mucous membranes    CVS: S1S2+  Resp: No SOB, no wheeze or rhonchi  GI: soft NT/ND  Extremities:  toe amputation noted   Skin: No bruises or rashes     Neurological Exam:    Mental Status:  AAOx1, able to follow simple verbal commands. answers questions appropriately , able to name pen when prompted   Cranial Nerves: PERRL, EOMI, VFFC, sensation V1-V3 intact,  no obvious facial asymmetry, equal elevation of palate, scm/trap 5/5, tongue is midline on protrusion. no obvious papilledema on fundoscopic exam. +[place] worse on R compared to L   Motor: TONEY  at least 4/5 now.    Sensation: withdraws x 4  Reflexes: 1+ throughout at biceps, brachioradialis, triceps, patellars and ankles bilaterally and equal. No clonus. R toe and L toe were both downgoing.  Coordination: unable   Gait:  unable     I personally reviewed the below data/images/labs:    CBC Full  -  ( 2022 04:52 )  WBC Count : 4.37 K/uL  RBC Count : 3.01 M/uL  Hemoglobin : 7.9 g/dL  Hematocrit : 25.3 %  Platelet Count - Automated : 294 K/uL  Mean Cell Volume : 84.1 fL  Mean Cell Hemoglobin : 26.2 pg  Mean Cell Hemoglobin Concentration : 31.2 gm/dL  Auto Neutrophil # : x  Auto Lymphocyte # : x  Auto Monocyte # : x  Auto Eosinophil # : x  Auto Basophil # : x  Auto Neutrophil % : x  Auto Lymphocyte % : x  Auto Monocyte % : x  Auto Eosinophil % : x  Auto Basophil % : x        136  |  100  |  24<H>  ----------------------------<  112<H>  4.3   |  28  |  1.77<H>    Ca    8.2<L>      2022 04:52  Phos  4.4       Mg     1.50             Urinalysis Basic - ( 2022 09:00 )    Color: Yellow / Appearance: Slightly Turbid / S.027 / pH: x  Gluc: x / Ketone: Trace  / Bili: Negative / Urobili: 3 mg/dL   Blood: x / Protein: 300 mg/dL / Nitrite: Negative   Leuk Esterase: Large / RBC: 5 /HPF / WBC >50 /HPF   Sq Epi: x / Non Sq Epi: x / Bacteria: x        EXAM:  MR BRAIN WAW IC      EXAM:  MR IAC ONLY WAW IC        PROCEDURE DATE:  Dec  1 2021         INTERPRETATION:  .    CLINICAL INFORMATION: Acute onset of bilateral hearing loss.    TECHNIQUE: Multiplanar multisequential MRI of the brain and internal auditory canals was acquired with and without the administration of IV gadolinium. 7.5 cc's of IV Gadavist was administered for the purposes of this examination. 0 cc were discarded.    COMPARISON: Prior CT examination of the internal auditory canals dated 2021. Prior CT angiograms/venogram examination of the head and neck dated 2021. Examination.    FINDINGS:    MRI BRAIN: A chronic lacunar infarct is seen within the right medial cerebellar hemisphere.    Multiple patchy confluent nonspecific T2/FLAIR hyperintense signal changes are noted throughout the bihemispheric white matter without associated mass effect or restricted diffusion.    There is no abnormal brain parenchymal or leptomeningeal enhancement.    Ventricular sizeand configuration is unremarkable. No abnormal extra-axial fluid collections are seen. Flow-voids are noted throughout the major intracranial vessels, on the T2 weighted images, consistent with their patency. The sellar area appears unremarkable.    Minimal scattered mucosal thickening is seen throughout the paranasal sinuses. The mastoid air cells are clear. The orbits appear unremarkable.    MRI IAC: There is no CP angle mass. The bilateral 7th and 8th cranial nerves appear unremarkable in course and morphology. No abnormal enhancement is seen. The inner ear structures are normally formed. Normal fluid signal is seen within the inner ear structures. The cochlea, vestibule, and semicircular canals appear unremarkable.    IMPRESSION:    MRI BRAIN: No acute intracranial hemorrhage, acute ischemia, or abnormal intracranial enhancement.    Chronic lacunar infarct within the right medial cerebellar hemisphere and mild chronic white matter microvascular type changes.    MRI IAC: Unremarkable study.    --- End of Report ---              LUCI RUEDA MD; Attending Radiologist  This document has been electronically signed. Dec  3 2021  8:46AM    < end of copied text >      < from: CT Chest w/ IV Cont (22 @ 22:58) >    ACC: [id number] EXAM:  CT ABDOMEN AND PELVIS IC                        ACC: [id number] EXAM:  CT CHEST IC                          PROCEDURE DATE:  2022          INTERPRETATION:  CLINICAL INFORMATION: Pleural effusion. Rule out   empyema. Abdominal tenderness. History of pancreatitis. Rule out   infection or obstruction.    COMPARISON: CT chest 2022.    CONTRAST/COMPLICATIONS:  IV Contrast: IV contrast documented in associated exam (accession   [id number]), Omnipaque 350 (accession [id number])  64 cc administered   6 cc   discarded  Oral Contrast: NONE  Complications: None reported at time of study completion    PROCEDURE:  CT of the Chest, Abdomen and Pelvis was performed.  Sagittal and coronal reformats were performed.    FINDINGS:  CHEST:  LUNGS AND LARGE AIRWAYS: Compressive atelectasis of the right upper,   middle and bilateral lower lobes including atelectasis of the majority of   the right lower lobe. Subsegmental atelectasis in the left upper lobe.  PLEURA: New large right andsmall left pleural effusions. No evidence of   an empyema. Fluid in the minor fissure.  VESSELS: Atherosclerotic changes of the aorta and coronary arteries.  HEART: Heart size is enlarged. No pericardial effusion.  MEDIASTINUM AND ANTONIO: No lymphadenopathy. Redemonstration of calcified   mediastinal lymph nodes.  CHEST WALL AND LOWER NECK: 7 mm right thyroid lobe nodule. Generalized   anasarca.    ABDOMEN AND PELVIS:  LIVER: Within normal limits.  BILE DUCTS: Normal caliber.  GALLBLADDER: Cholecystectomy.  SPLEEN: Within normal limits.  PANCREAS: Atrophic.  ADRENALS: Within normal limits.  KIDNEYS/URETERS: No renal stones or hydronephrosis.    BLADDER: Within normal limits.  REPRODUCTIVE ORGANS: Atrophic and retroflexed uterus. No adnexal masses.    BOWEL: Redemonstration of an outpouching with wall calcification and   intraluminal fluid and gas measuring 4.9 x 4.6 cm emanating from the   posterior gastric body likely representing a large diverticulum. No bowel   obstruction or inflammation. Colonic diverticulosis without   diverticulitis. Appendix within normal limits.  PERITONEUM: Small volume free pelvic fluid.  VESSELS: Atherosclerotic changes.  RETROPERITONEUM/LYMPH NODES: No lymphadenopathy.  ABDOMINAL WALL: Generalized anasarca.  BONES: Degenerative changes of the spine. Mild S-shaped thoracolumbar   scoliosis.    IMPRESSION:  1. New large right and small left pleural effusions with adjacent   compressive atelectasis including atelectasis of the majority of the   right lowerlobe.  2. No bowel obstruction or inflammation.      --- End of Report ---          MARTA HART MD; Resident Radiologist  This document has been electronically signed.  CHINTAN WHITFIELD MD; Attending Radiologist  This document has been electronically signed. 2022 12:32AM    < end of copied text >         < from: CT Head No Cont (22 @ 00:03) >    ACC: [id number] EXAM:  CT BRAIN                          PROCEDURE DATE:  2022          INTERPRETATION:  INDICATIONS:  Alteration of  Consciousness    TECHNIQUE:  Serial axial images were obtained from the skull base to the   vertex without intravenous contrast. Sagittal and Coronal reformats were   performed    COMPARISON EXAMINATION: None.    FINDINGS:  VENTRICLES AND SULCI:  Normal.  INTRA-AXIAL:  No mass, blood or abnormal attenuation is seen.  EXTRA-AXIAL:  No mass or collection is seen.  VISUALIZED SINUSES:  Clear.  VISUALIZED MASTOIDS:  Clear.  CALVARIUM:  Normal.  MISCELLANEOUS:  None.    IMPRESSION:  No evidence of intracranial hemorrhage, no evidence of major   vessel distribution infarct    --- End of Report ---            MOLLY TOTH MD; Attending Radiologist  This document has been electronically signed. 2022 10:19AM    < end of copied text >  EEG Summary / Classification:  Abnormal   - Moderate generalized slowing.    EEG Impression / Clinical Correlate:  Abnormal EEG study.  Moderate nonspecific diffuse or multifocal cerebral dysfunction.   No epileptiform pattern or clear seizure seen.    **In absence of additional clinical concerns, recommend consideration for discontinuation of current EEG study with reconnection in future if warranted.

## 2022-08-01 LAB
ANION GAP SERPL CALC-SCNC: 7 MMOL/L — SIGNIFICANT CHANGE UP (ref 7–14)
BASE EXCESS BLDV CALC-SCNC: 3.3 MMOL/L — HIGH (ref -2–3)
BUN SERPL-MCNC: 24 MG/DL — HIGH (ref 7–23)
CALCIUM SERPL-MCNC: 8.6 MG/DL — SIGNIFICANT CHANGE UP (ref 8.4–10.5)
CHLORIDE SERPL-SCNC: 98 MMOL/L — SIGNIFICANT CHANGE UP (ref 98–107)
CO2 BLDV-SCNC: 30.6 MMOL/L — HIGH (ref 22–26)
CO2 SERPL-SCNC: 27 MMOL/L — SIGNIFICANT CHANGE UP (ref 22–31)
CREAT SERPL-MCNC: 1.59 MG/DL — HIGH (ref 0.5–1.3)
EGFR: 35 ML/MIN/1.73M2 — LOW
GAS PNL BLDV: SIGNIFICANT CHANGE UP
GLUCOSE BLDC GLUCOMTR-MCNC: 105 MG/DL — HIGH (ref 70–99)
GLUCOSE BLDC GLUCOMTR-MCNC: 142 MG/DL — HIGH (ref 70–99)
GLUCOSE BLDC GLUCOMTR-MCNC: 147 MG/DL — HIGH (ref 70–99)
GLUCOSE BLDC GLUCOMTR-MCNC: 152 MG/DL — HIGH (ref 70–99)
GLUCOSE SERPL-MCNC: 106 MG/DL — HIGH (ref 70–99)
HCO3 BLDV-SCNC: 29 MMOL/L — SIGNIFICANT CHANGE UP (ref 22–29)
HCT VFR BLD CALC: 28.4 % — LOW (ref 34.5–45)
HGB BLD-MCNC: 8.8 G/DL — LOW (ref 11.5–15.5)
MAGNESIUM SERPL-MCNC: 1.9 MG/DL — SIGNIFICANT CHANGE UP (ref 1.6–2.6)
MCHC RBC-ENTMCNC: 26.4 PG — LOW (ref 27–34)
MCHC RBC-ENTMCNC: 31 GM/DL — LOW (ref 32–36)
MCV RBC AUTO: 85.3 FL — SIGNIFICANT CHANGE UP (ref 80–100)
NRBC # BLD: 0 /100 WBCS — SIGNIFICANT CHANGE UP
NRBC # FLD: 0 K/UL — SIGNIFICANT CHANGE UP
PCO2 BLDV: 48 MMHG — HIGH (ref 39–42)
PH BLDV: 7.39 — SIGNIFICANT CHANGE UP (ref 7.32–7.43)
PHOSPHATE SERPL-MCNC: 4.3 MG/DL — SIGNIFICANT CHANGE UP (ref 2.5–4.5)
PLATELET # BLD AUTO: 295 K/UL — SIGNIFICANT CHANGE UP (ref 150–400)
PO2 BLDV: 57 MMHG — SIGNIFICANT CHANGE UP
POTASSIUM SERPL-MCNC: 4.2 MMOL/L — SIGNIFICANT CHANGE UP (ref 3.5–5.3)
POTASSIUM SERPL-SCNC: 4.2 MMOL/L — SIGNIFICANT CHANGE UP (ref 3.5–5.3)
RBC # BLD: 3.33 M/UL — LOW (ref 3.8–5.2)
RBC # FLD: 16.4 % — HIGH (ref 10.3–14.5)
SAO2 % BLDV: 88.2 % — SIGNIFICANT CHANGE UP
SODIUM SERPL-SCNC: 132 MMOL/L — LOW (ref 135–145)
WBC # BLD: 3.85 K/UL — SIGNIFICANT CHANGE UP (ref 3.8–10.5)
WBC # FLD AUTO: 3.85 K/UL — SIGNIFICANT CHANGE UP (ref 3.8–10.5)

## 2022-08-01 PROCEDURE — 99233 SBSQ HOSP IP/OBS HIGH 50: CPT | Mod: GC,25

## 2022-08-01 PROCEDURE — 99232 SBSQ HOSP IP/OBS MODERATE 35: CPT

## 2022-08-01 PROCEDURE — 71250 CT THORAX DX C-: CPT | Mod: 26

## 2022-08-01 PROCEDURE — 99233 SBSQ HOSP IP/OBS HIGH 50: CPT

## 2022-08-01 PROCEDURE — 76604 US EXAM CHEST: CPT | Mod: 26,GC

## 2022-08-01 RX ADMIN — Medication 650 MILLIGRAM(S): at 22:59

## 2022-08-01 RX ADMIN — Medication 81 MILLIGRAM(S): at 13:37

## 2022-08-01 RX ADMIN — Medication 125 MILLIGRAM(S): at 05:10

## 2022-08-01 RX ADMIN — Medication 650 MILLIGRAM(S): at 05:07

## 2022-08-01 RX ADMIN — AMLODIPINE BESYLATE 10 MILLIGRAM(S): 2.5 TABLET ORAL at 05:08

## 2022-08-01 RX ADMIN — CARVEDILOL PHOSPHATE 25 MILLIGRAM(S): 80 CAPSULE, EXTENDED RELEASE ORAL at 18:31

## 2022-08-01 RX ADMIN — Medication 3 MILLILITER(S): at 03:33

## 2022-08-01 RX ADMIN — ENOXAPARIN SODIUM 40 MILLIGRAM(S): 100 INJECTION SUBCUTANEOUS at 13:37

## 2022-08-01 RX ADMIN — SODIUM CHLORIDE 4 MILLILITER(S): 9 INJECTION INTRAMUSCULAR; INTRAVENOUS; SUBCUTANEOUS at 11:10

## 2022-08-01 RX ADMIN — Medication 100 MILLIGRAM(S): at 13:38

## 2022-08-01 RX ADMIN — Medication 3 MILLILITER(S): at 21:41

## 2022-08-01 RX ADMIN — ATORVASTATIN CALCIUM 20 MILLIGRAM(S): 80 TABLET, FILM COATED ORAL at 22:59

## 2022-08-01 RX ADMIN — SODIUM CHLORIDE 4 MILLILITER(S): 9 INJECTION INTRAMUSCULAR; INTRAVENOUS; SUBCUTANEOUS at 16:08

## 2022-08-01 RX ADMIN — Medication 100 MILLIGRAM(S): at 22:59

## 2022-08-01 RX ADMIN — Medication 3 MILLILITER(S): at 16:08

## 2022-08-01 RX ADMIN — Medication 125 MILLIGRAM(S): at 13:37

## 2022-08-01 RX ADMIN — INSULIN GLARGINE 2 UNIT(S): 100 INJECTION, SOLUTION SUBCUTANEOUS at 22:59

## 2022-08-01 RX ADMIN — Medication 6 MILLIGRAM(S): at 22:58

## 2022-08-01 RX ADMIN — Medication 3 MILLILITER(S): at 11:09

## 2022-08-01 RX ADMIN — Medication 125 MILLIGRAM(S): at 20:13

## 2022-08-01 RX ADMIN — CARVEDILOL PHOSPHATE 25 MILLIGRAM(S): 80 CAPSULE, EXTENDED RELEASE ORAL at 05:08

## 2022-08-01 RX ADMIN — Medication 100 MILLIGRAM(S): at 05:09

## 2022-08-01 RX ADMIN — SODIUM CHLORIDE 4 MILLILITER(S): 9 INJECTION INTRAMUSCULAR; INTRAVENOUS; SUBCUTANEOUS at 21:41

## 2022-08-01 NOTE — PROGRESS NOTE ADULT - PROBLEM SELECTOR PLAN 5
Had rectal tube and diarrhea c diff sent 7/24 +, s/p recent abx exposure while admitted   - PO vanco x 10 days (7/25-), D 8/10  - no diarrhea   - tolerating diet, no abdominal pain, nausea/vomiting

## 2022-08-01 NOTE — PROGRESS NOTE ADULT - ATTENDING COMMENTS
67F with sarcoidosis with complicated hospital course including respiratory failure in setting of CHF exacerbation s/p ICU admission 7/13-7/16 with intubation, PEA arrest 7/16 and readmission to ICU where she had second PEA arrest thought to be due to hypoxemia from aspiration, RRT 7/28 with increased work of breathing, bradycardia, hypothermia.  - CT chest reviewed which shows a loculated effusion but not amenable for thoracentesis.  - Also noted is atelectasis not due to the effusion, and will need aggressive airway clearance.  - Please add on Aerobika in addition to the above airway clearance strategies.   - Encourage OOB to chair

## 2022-08-01 NOTE — PROGRESS NOTE ADULT - SUBJECTIVE AND OBJECTIVE BOX
[facility] Division of Kidney Diseases & Hypertension  FOLLOW UP NOTE  --------------------------------------------------------------------------------  HPI: 67-year-old F with h/o HFpEF, DM, HTN, HLD, sarcoidosis admitted at [facility] on 7/13/22 for AMS with acute respiratory failure requiring intubation (7/13). Hospital course complicated by PEA arrest x2. Pt was extubated and transferred to medicine. Currently being treated for C. diff. Pt. being seen for for nephrotic range proteinuria.     Pt. seen and examined at bedside. Pt. reports of feeling ok but review of systems is overall limited due to her hearing difficulties.    PAST HISTORY  --------------------------------------------------------------------------------  No significant changes to PMH, PSH, FHx, SHx, unless otherwise noted    ALLERGIES & MEDICATIONS  --------------------------------------------------------------------------------  Allergies    penicillin ([place])    Intolerances      Standing Inpatient Medications  acetaminophen     Tablet .. 650 milliGRAM(s) Oral every 8 hours  albuterol/ipratropium for Nebulization 3 milliLiter(s) Nebulizer every 6 hours  amLODIPine   Tablet 10 milliGRAM(s) Oral daily  aspirin  chewable 81 milliGRAM(s) Oral daily  atorvastatin 20 milliGRAM(s) Oral at bedtime  carvedilol 25 milliGRAM(s) Oral every 12 hours  dextrose 50% Injectable 25 Gram(s) IV Push once  dextrose 50% Injectable 12.5 Gram(s) IV Push once  dextrose 50% Injectable 25 Gram(s) IV Push once  enoxaparin Injectable 40 milliGRAM(s) SubCutaneous every 24 hours  hydrALAZINE 100 milliGRAM(s) Oral three times a day  insulin glargine Injectable (LANTUS) 2 Unit(s) SubCutaneous at bedtime  insulin lispro (ADMELOG) corrective regimen sliding scale   SubCutaneous three times a day before meals  insulin lispro (ADMELOG) corrective regimen sliding scale   SubCutaneous at bedtime  melatonin 6 milliGRAM(s) Oral at bedtime  sodium chloride 3%  Inhalation 4 milliLiter(s) Inhalation every 6 hours  vancomycin    Solution 125 milliGRAM(s) Oral every 6 hours    PRN Inpatient Medications  acetaminophen     Tablet .. 650 milliGRAM(s) Oral every 6 hours PRN  dextrose Oral Gel 15 Gram(s) Oral once PRN      REVIEW OF SYSTEMS  --------------------------------------------------------------------------------  ROS is limited    VITALS/PHYSICAL EXAM  --------------------------------------------------------------------------------  T(C): 36.6 (08-01-22 @ 05:00), Max: 36.9 (07-31-22 @ 13:00)  HR: 68 (08-01-22 @ 05:00) (68 - 79)  BP: 133/68 (08-01-22 @ 05:00) (116/62 - 134/68)  ABP: --  ABP(mean): --  RR: 18 (08-01-22 @ 05:00) (18 - 18)  SpO2: 98% (08-01-22 @ 05:00) (96% - 100%)  CVP(mm Hg): --        07-31-22 @ 07:01  -  08-01-22 @ 07:00  --------------------------------------------------------  IN: 1160 mL / OUT: 400 mL / NET: 760 mL    Physical Exam:  	Gen: NAD  	HEENT: MMM  	Pulm: CTA B/L  	CV: S1S2              : No suprapubic tenderness  	Abd: Soft, +BS   	Ext: edema to mid shin  	Neuro: Awake  	Skin: Warm and dry  	Vascular access: IV peripheral canula    LABS/STUDIES  --------------------------------------------------------------------------------              8.8    3.85  >-----------<  295      [08-01-22 @ 06:40]              28.4     132  |  98  |  24  ----------------------------<  106      [08-01-22 @ 06:40]  4.2   |  27  |  1.59        Ca     8.6     [08-01-22 @ 06:40]      Mg     1.90     [08-01-22 @ 06:40]      Phos  4.3     [08-01-22 @ 06:40]    Creatinine Trend:  SCr 1.59 [08-01 @ 06:40]  SCr 1.77 [07-31 @ 04:52]  SCr 1.70 [07-30 @ 07:19]  SCr 1.24 [07-29 @ 03:50]  SCr 1.09 [07-28 @ 14:41]    Urine Creatinine 44      [07-28-22 @ 16:43]  Urine Protein 499      [07-28-22 @ 16:43]    TSH 4.72      [07-29-22 @ 04:30]    HBsAg Nonreact      [07-26-22 @ 07:01]  HIV Nonreact      [07-29-22 @ 04:30]    C3 Complement 128      [07-26-22 @ 07:01]  C4 Complement 29      [07-26-22 @ 07:01]  ANCA: cANCA Negative, pANCA Negative, atypical ANCA Negative      [07-27-22 @ 14:00]  PLA2R: DESHAWN <1.8, IFA --      [07-27-22 @ 14:00]  Free Light Chains: kappa 11.62, lambda 4.67, ratio = 2.49      [07-27 @ 14:00]  Immunofixation Serum:   No Monoclonal Band Identified. NAVEED Rosenberg M.D.  Reference Range: None Detected      [07-27-22 @ 14:00]  SPEP Interpretation: Decreased serum Albumin and increased Polyclonal Gamma fraction  consistent with chronic inflammatory condition.  NAVEED Rosenberg M.D.      [07-27-22 @ 14:00]

## 2022-08-01 NOTE — PROGRESS NOTE ADULT - PROBLEM SELECTOR PLAN 4
Patient came in with volume overload, in my review cardiac cause (TTE findings) not correlating as cause of such volume overload.  Does have proteinurea and MANDY on arrival.  Unclear if related to underlying hypertensive and diabetic kidney disease.  Per renal may also worsen in setting of PEA arrest.   - spot protein 10g & 11  - C3/C4 wnl, HIV & Hep C/B neg, normal immunofixation, SPEP w/ chronic inflammation,  free lyte chains elevated, PLAR AB neg, parvo neg, ANCA neg  - MINOO pending.  - Holding lasix.   - renal following Physical Therapy     Referred by: Trevor Witt MD; Medical Diagnosis (from order):    Diagnosis Information      Diagnosis    719.41 (ICD-9-CM) - M25.512 (ICD-10-CM) - Acute pain of left shoulder    726.0 (ICD-9-CM) - M75.02 (ICD-10-CM) - Adhesive capsulitis of left shoulder                Daily Treatment Note    Visit:  6     SUBJECTIVE                                                                                                               Patient reports she drove to [place] yesterday and had some pain in the late after noon, rating it 6/10. Does state her shoulder felt better compared to last time she drove to [place]. [place] sore in the evening after last session.   Functional Change: Patient reports she has more strength and is sleeping better, waking up less throughout the night.   Pain / Symptoms:  Pain rating (out of 10): Current: 2     OBJECTIVE                                                                                                                        TREATMENT                                                                                                                  Therapeutic Exercise:  UBE forward level 1 x 6 min  Pulley's into flexion x 20   Finger ladder to level 13 x 5   Wall washing into flexion \"V's x 15  Wall washing into flexion x 15  Seated AAROM with dowel into flexion x 10 on left   Seated AAROM with dowel into external rotation/internal rotation x 10 on left  Seated AAROM with dowel into scaption x 10 on left        Manual Therapy:  PROM into flexion/abduction/ER/IR x 10 each direction   Shoulder distraction grade I with oscillations   Heat pack on during manual     Skilled input: verbal instruction/cues    Writer verbally educated and received verbal consent for hand placement, positioning of patient, and techniques to be performed today from patient for therapist position for techniques as described above and how they are pertinent to the patient's plan of  care.    Home Exercise Program/Education Materials: Home Exercise Program/Education Materials: Access Code: [email or web address]  URL: [email or web address]  Date: 11/01/2021  Prepared by: Bijal Cavazos     Exercises  Circular Shoulder Pendulum with Table Support - 1 x daily - 7 x weekly - 3 sets - 10 reps  Flexion-Extension Shoulder Pendulum with Table Support - 1 x daily - 7 x weekly - 3 sets - 10 reps  Seated Shoulder Flexion Towel Slide at Table Top - 1 x daily - 7 x weekly - 3 sets - 10 reps  Seated Shoulder Flexion Towel Slide at Table Top - 1 x daily - 7 x weekly - 3 sets - 10 reps       ASSESSMENT                                                                                                             Today patient tolerated AAROM well, continues to be fairly limited by pain. Noted improvements in flexion/ER today. Patient reporting she did not follow up with Dr. Witt for cortisone shot yet. Continues to benefit from skilled physical therapy to address shoulder ROM/strength.   Pain/symptoms after session (out of 10): 1        PLAN                                                                                                                           Suggestions for next session as indicated: Progress per plan of care, AAROM of left shoulder as tolerated, light strengthening          Therapy procedure time and total treatment time can be found documented on the Time Entry flowsheet

## 2022-08-01 NOTE — PROGRESS NOTE ADULT - ASSESSMENT
67F [place], HTN, HLD, sarcoidosis, HFpEF p/w lethargy/SOB/swelling/AMS admitted with metabolic encephalopathy due to acute hypercarbic respiratory failure in context presumed HFpEF exacerbation s/p intubation and MICU admission 7/13 for hypercapnia/airway protection was extubated 7/16  (s/p empiric meropenem 7/13 - 7/20 for concern for sepsis given hypothermia/AMS however neg infectious w/u) course c/b PEA arrest on 7/17 pt re-intubated s/p second PEA arrest when arrived to CTICU now transferred to medicine on 7/23 s/p being extubated.  Course now c/b c diff. Also with nephrotic range proteinuria of unclear etiology.  L sided white-out on CXR since 7/24 c/b on 7/28 episode of hypoxia w/ bradycardia in context of turning to change pt w/ R side down, ABG w/ acute on chronic hypercapnia now tolerating NC.

## 2022-08-01 NOTE — PROGRESS NOTE ADULT - ASSESSMENT
This is a 66 yo F /w a PMH OF DM2 (A1c 7.3% on this admission), CHF, sarcoidosis who presents with AMS c/b hypercapnic respiratory failure and CHF exacerbation requiring intubation. Endocrine consulted for: Persistent hypoglycemia    1. Hypoglycemia  Noted on day of admission, possibly related to home doses of insulin that patient was taking, poor PO intake, and low GFR  Concern for AI as below -->ruled out  Hypoglycemia now resolved     2. R/o Adrenal Insufficiency  AM cortisol returned 7.1. ACTH stimulation performed showed robust response (22 and 24 at 30 minute and 60 minute frances respectively). A value > 18 rules out primary AI (not necessarily secondary AI).   Repeat AM cortisol was normal at 16.1.   TFTs are WDL  AI ruled out, hypoglycemia not related to adrenal cause, no need for steroids at this time     3. Metabolic Acidosis   Initial acidosis likely due to starvation and renal related; BHB 3.1 elevated; was given D5 IVF, PO diet started and Lantus 5 units administered   BG stable, AG/BHB significantly improved   Continue to monitor  Resolved     4. T2DM, controlled  A1c 7.3%. Goal < 7%  Home regimen: Lantus 20 units at night  Cpeptide 0.9, repeat 5.0    While inpatient:   BG target 100-180 mg/dl; Glucose stable   Continue Lantus to 2 units SQ qHS  No Admelog pre-meal insulin for now  Continue Admelog low dose correctional scale before meals, low dose at bedtime  Check BG before meals and bedtime  Consistent carbohydrate diet   Hypoglycemia protocol   Please assist with meals  Provider to RN: Insulin pen administration with patient and daughter with teach back; Daughter agrees to help with insulin pen administration    Discharge Plan:   May be able to resume basal insulin only (dose TBD) vs orals (may consider Metformin pending stability of kidney function, EF is 65% or Januvia 100 mg P.O. daily (needs dose adjustment if GFR 45 or less); Doses TBD   Ensure patient has working glucometer, test strips, lancets, alcohol pads  Please also prescribe glucose tabs, Baqsimi nasal or Glucagon emergency kit for hypoglycemia risk   Follow up with PCP, podiatry and opthalmology as an outpatient  If pt and family wishes can follow up with [facility] [place] ; [address], [address], [place] [address], [phone]    5. HTN  BP Goal < 130/80  Above goal on Coreg, Hydralazine, Amlodipine   Continue management per primary team    6. HLD  LDL goal < 70  Continue Atorvastatin 20 mg daily if no contraindications     Tracey Alejo  Nurse Practitioner  Division of Endocrinology & Diabetes  In house pager #[phone]/long range pager #[phone]    If before 9AM or after 6PM, or on weekends/holidays, please call endocrine answering service for assistance ([phone]).  For nonurgent matters email [email or web address] for assistance.

## 2022-08-01 NOTE — PROGRESS NOTE ADULT - PROBLEM SELECTOR PLAN 3
CT chest findings as discussed above.   - pulm following  - chest PT, incentive spirometry, nebs  - Supportive measures. Currently on 2L by NC.

## 2022-08-01 NOTE — CHART NOTE - NSCHARTNOTEFT_GEN_A_CORE
: Jennifer Chambers    INDICATION: left lung white out    PROCEDURE:  [ ] LIMITED ECHO  [x] LIMITED CHEST  [ ] LIMITED RETROPERITONEAL  [ ] LIMITED ABDOMINAL  [ ] LIMITED DVT  [ ] NEEDLE GUIDANCE VASCULAR  [ ] NEEDLE GUIDANCE THORACENTESIS  [ ] NEEDLE GUIDANCE PARACENTESIS  [ ] NEEDLE GUIDANCE PERICARDIOCENTESIS  [ ] OTHER    FINDINGS: left consolidated lung with surrounding areas of effusion      INTERPRETATION: left consolidated lung with surround effusion not amenable to drainage, c/w atelectasis       Images uploaded on [email or web address] : Jennifer Chambers    INDICATION: left lung white out    PROCEDURE:  [ ] LIMITED ECHO  [x] LIMITED CHEST  [ ] LIMITED RETROPERITONEAL  [ ] LIMITED ABDOMINAL  [ ] LIMITED DVT  [ ] NEEDLE GUIDANCE VASCULAR  [ ] NEEDLE GUIDANCE THORACENTESIS  [ ] NEEDLE GUIDANCE PARACENTESIS  [ ] NEEDLE GUIDANCE PERICARDIOCENTESIS  [ ] OTHER    FINDINGS: left consolidated lung with surrounding areas of effusion      INTERPRETATION: left consolidated lung with surround effusion not amenable to drainage, c/w atelectasis       Images uploaded on [employer]    Attending Addendum:  I was present for the duration of the procedure.    Monica Quick MD

## 2022-08-01 NOTE — PROGRESS NOTE ADULT - SUBJECTIVE AND OBJECTIVE BOX
[facility] Division of Hospital Medicine  Aime Vickers) MD Arjun  Pager [phone]    SUBJECTIVE:  Chief complaint: C diff.    Pt seen and evaluated at bedside. She is hard of hearing. Communication done by typing message on phone for patient to read. She states she has no pain or discomfort. She denied having any diarrhea. No f/c.       ROS: All systems negative except as noted.      Vital Signs Last 24 Hrs  T(C): 36.6 (01 Aug 2022 05:00), Max: 36.8 (31 Jul 2022 21:20)  T(F): 97.8 (01 Aug 2022 05:00), Max: 98.2 (31 Jul 2022 21:20)  HR: 69 (01 Aug 2022 16:09) (68 - 82)  BP: 133/68 (01 Aug 2022 05:00) (117/67 - 134/68)  BP(mean): --  RR: 18 (01 Aug 2022 05:00) (18 - 18)  SpO2: 96% (01 Aug 2022 16:09) (96% - 100%)    Parameters below as of 01 Aug 2022 16:09  Patient On (Oxygen Delivery Method): nasal cannula        PHYSICAL EXAM:  Gen- In bed, frail  Eyes- EOMI, PERRLA, nonicteric.  EMNT- Fair dentition. MMM. No tonsilar exudates. No posterior pharynx erythema.  Neck- Supple. No masses. No tracheal deviation.  Resp- Diminished air entry at the bases. Good effort.  CVS- RRR, S1S2, no g/r/m. No LE edema.  GI- Soft abd, NT, ND, +BSx4. No HSM.  MSK- No C/C. ROM intact. No crepitus.  Neuro- CN II-XII intact. Speech fluent/face symmetric. Sensation intact.  Skin- No rashes/ulcers. Warm/moist.  Psych- Appropriate mood/affect.      MEDICATION:  MEDICATIONS  (STANDING):  acetaminophen     Tablet .. 650 milliGRAM(s) Oral every 8 hours  albuterol/ipratropium for Nebulization 3 milliLiter(s) Nebulizer every 6 hours  amLODIPine   Tablet 10 milliGRAM(s) Oral daily  aspirin  chewable 81 milliGRAM(s) Oral daily  atorvastatin 20 milliGRAM(s) Oral at bedtime  carvedilol 25 milliGRAM(s) Oral every 12 hours  dextrose 50% Injectable 25 Gram(s) IV Push once  dextrose 50% Injectable 12.5 Gram(s) IV Push once  dextrose 50% Injectable 25 Gram(s) IV Push once  enoxaparin Injectable 40 milliGRAM(s) SubCutaneous every 24 hours  hydrALAZINE 100 milliGRAM(s) Oral three times a day  insulin glargine Injectable (LANTUS) 2 Unit(s) SubCutaneous at bedtime  insulin lispro (ADMELOG) corrective regimen sliding scale   SubCutaneous three times a day before meals  insulin lispro (ADMELOG) corrective regimen sliding scale   SubCutaneous at bedtime  melatonin 6 milliGRAM(s) Oral at bedtime  sodium chloride 3%  Inhalation 4 milliLiter(s) Inhalation every 6 hours  vancomycin    Solution 125 milliGRAM(s) Oral every 6 hours    MEDICATIONS  (PRN):  acetaminophen     Tablet .. 650 milliGRAM(s) Oral every 6 hours PRN Temp greater or equal to 38C (100.4F), Mild Pain (1 - 3)  dextrose Oral Gel 15 Gram(s) Oral once PRN Blood Glucose LESS THAN 70 milliGRAM(s)/deciliter            LABORATORY:                          8.8    3.85  )-----------( 295      ( 01 Aug 2022 06:40 )             28.4     08-01    132<L>  |  98  |  24<H>  ----------------------------<  106<H>  4.2   |  27  |  1.59<H>    Ca    8.6      01 Aug 2022 06:40  Phos  4.3     08-01  Mg     1.90     08-01                COVID-19 PCR: NotDetec (28 Jul 2022 17:18)  COVID-19 PCR: NotDetec (25 Jul 2022 07:08)  COVID-19 PCR: NotDetec (19 Jul 2022 07:36)  SARS-CoV-2: NotDetec (04 Jun 2022 00:56)  COVID-19 PCR: NotDetec (09 Feb 2022 14:13)

## 2022-08-01 NOTE — PROGRESS NOTE ADULT - PROBLEM SELECTOR PLAN 2
With nephrotic range proteinuria. Serologic workup neg so far.   -Pending MINOO  -Pending renal vein doppler.  -Hold lasix.   -Cont to follow nephro.

## 2022-08-01 NOTE — PROGRESS NOTE ADULT - ASSESSMENT
68 y/o F with pmhx of HTN, HLD, sarcoidosis, CHF, presented to the ED for new onset AMS and fluid overload c/f CHF exacerbation s/p ICU admission - with intubation. Course complicated by PEA arrest  and readmission to ICU where she had second PEA arrest thought to be due to hypoxemia from aspiration. Completed 7d course meropenem and extubated, ICU stay c/b hypoglycemia from poor PO intake requiring D5 LR. Now on floor with CXR showing white out of left lung c/w atelectasis vs consolidation. RRT  with increased work of breathing, bradycardia, hypothermia.    Recommendations  - US left chest with consolidated lung with surround effusion, not amenable to drainage  - sarcoid f/u as outpatient  - continue airway clearance strategies: nasopharyngeal suction, chest vest/chest PT, hypertonic saline nebulizer, incentive spirometry   - please order Aerobika for airway clearance  - OOB to chair, ambulate as able      Prior to discharge:  Please email [email or web address] to set up an appointment. Include patient's name, , MRN, and contact information in the email.    Pulmonary/Sleep Clinic  [address] [place] [address]  [phone]   68 y/o F with pmhx of HTN, HLD, sarcoidosis, CHF, presented to the ED for new onset AMS and fluid overload c/f CHF exacerbation s/p ICU admission - with intubation. Course complicated by PEA arrest  and readmission to ICU where she had second PEA arrest thought to be due to hypoxemia from aspiration. Completed 7d course meropenem and extubated, ICU stay c/b hypoglycemia from poor PO intake requiring D5 LR. Now on floor with CXR showing white out of left lung c/w atelectasis vs consolidation. RRT  with increased work of breathing, bradycardia, hypothermia.    Recommendations  - CT chest today with large left side loculated effusion; US left chest with consolidated lung with surround effusion, not amenable to drainage  - sarcoid f/u as outpatient  - continue airway clearance strategies: nasopharyngeal suction, chest vest/chest PT, hypertonic saline nebulizer, incentive spirometry   - please order Aerobika for airway clearance  - OOB to chair, ambulate as able      Prior to discharge:  Please email [email or web address] to set up an appointment. Include patient's name, , MRN, and contact information in the email.    Pulmonary/Sleep Clinic  [address] [place] [address]  [phone]

## 2022-08-01 NOTE — PROGRESS NOTE ADULT - PROBLEM SELECTOR PLAN 2
Hypoalbuminemia, edema, with nephrotic range proteinuria concerning for nephrotic syndrome.  Ok to hold off on diuretics for now.  Recommend check MINOO and lupus serologies.

## 2022-08-01 NOTE — PROGRESS NOTE ADULT - PROBLEM SELECTOR PLAN 7
- EEG with slowing but no seizures   - CTH neg  - per neuro AMS 2/2 hypercapnic respiratory failure, prior cerebellar stroke likely small vessel  - MS better, communicating via typing on cell given [place]  - MRI brain r/o central causes for intermittent encephalopathy pending

## 2022-08-01 NOTE — PROGRESS NOTE ADULT - SUBJECTIVE AND OBJECTIVE BOX
Chief Complaint: DM 2    History: Patient seen at bedside. Tolerating PO, no nausea/vomiting, no s/s of hypoglycemia     MEDICATIONS  (STANDING):  acetaminophen     Tablet .. 650 milliGRAM(s) Oral every 8 hours  albuterol/ipratropium for Nebulization 3 milliLiter(s) Nebulizer every 6 hours  amLODIPine   Tablet 10 milliGRAM(s) Oral daily  aspirin  chewable 81 milliGRAM(s) Oral daily  atorvastatin 20 milliGRAM(s) Oral at bedtime  carvedilol 25 milliGRAM(s) Oral every 12 hours  dextrose 50% Injectable 25 Gram(s) IV Push once  dextrose 50% Injectable 12.5 Gram(s) IV Push once  dextrose 50% Injectable 25 Gram(s) IV Push once  enoxaparin Injectable 40 milliGRAM(s) SubCutaneous every 24 hours  hydrALAZINE 100 milliGRAM(s) Oral three times a day  insulin glargine Injectable (LANTUS) 2 Unit(s) SubCutaneous at bedtime  insulin lispro (ADMELOG) corrective regimen sliding scale   SubCutaneous three times a day before meals  insulin lispro (ADMELOG) corrective regimen sliding scale   SubCutaneous at bedtime  melatonin 6 milliGRAM(s) Oral at bedtime  sodium chloride 3%  Inhalation 4 milliLiter(s) Inhalation every 6 hours  vancomycin    Solution 125 milliGRAM(s) Oral every 6 hours    MEDICATIONS  (PRN):  acetaminophen     Tablet .. 650 milliGRAM(s) Oral every 6 hours PRN Temp greater or equal to 38C (100.4F), Mild Pain (1 - 3)  dextrose Oral Gel 15 Gram(s) Oral once PRN Blood Glucose LESS THAN 70 milliGRAM(s)/deciliter    Allergies  penicillin ([place])    Review of Systems:  Cardiovascular: No chest pain  Respiratory: No SOB  GI: No nausea, vomiting  Endocrine: no hypoglycemia     PHYSICAL EXAM:  VITALS: T(C): 36.6 (08-01-22 @ 05:00)  T(F): 97.8 (08-01-22 @ 05:00), Max: 98.5 (07-31-22 @ 13:00)  HR: 78 (08-01-22 @ 11:21) (68 - 82)  BP: 133/68 (08-01-22 @ 05:00) (116/62 - 134/68)  RR:  (18 - 18)  SpO2:  (96% - 100%)  Wt(kg): --  GENERAL: NAD  EYES: No proptosis, no lid lag, anicteric  HEENT:  Atraumatic, Normocephalic, moist mucous membranes  RESPIRATORY: unlabored respirations     CAPILLARY BLOOD GLUCOSE    POCT Blood Glucose.: 147 mg/dL (01 Aug 2022 11:31)  POCT Blood Glucose.: 105 mg/dL (01 Aug 2022 08:21)  POCT Blood Glucose.: 123 mg/dL (31 Jul 2022 21:05)  POCT Blood Glucose.: 131 mg/dL (31 Jul 2022 16:52)      08-01    132<L>  |  98  |  24<H>  ----------------------------<  106<H>  4.2   |  27  |  1.59<H>    eGFR: 35<L>    Ca    8.6      08-01  Mg     1.90     08-01  Phos  4.3     08-01        Thyroid Function Tests:  07-29 @ 04:30 TSH 4.72 FreeT4 -- T3 -- Anti TPO -- Anti Thyroglobulin Ab -- TSI --  07-13 @ 04:10 TSH 3.37 FreeT4 -- T3 -- Anti TPO -- Anti Thyroglobulin Ab -- TSI --      A1C with Estimated Average Glucose Result: 7.3 % (06-05-22 @ 06:00)  A1C with Estimated Average Glucose Result: 7.7 % (02-04-22 @ 05:37)  A1C with Estimated Average Glucose Result: 9.2 % (11-29-21 @ 06:47)    Diet, Regular:   Consistent Carbohydrate Evening Snack (CSTCHOSN) (07-29-22 @ 14:09)

## 2022-08-01 NOTE — PROGRESS NOTE ADULT - SUBJECTIVE AND OBJECTIVE BOX
Interval Events:  - s/p svitlana, now monitoring off abx  - awake and alert this AM  - on 2L NC  - denies sob, chest pain, chest tightness, fever, chills    REVIEW OF SYSTEMS:  Negative except as documented above.      OBJECTIVE:  ICU Vital Signs Last 24 Hrs  T(C): 36.6 (01 Aug 2022 05:00), Max: 36.9 (31 Jul 2022 13:00)  T(F): 97.8 (01 Aug 2022 05:00), Max: 98.5 (31 Jul 2022 13:00)  HR: 68 (01 Aug 2022 05:00) (68 - 79)  BP: 133/68 (01 Aug 2022 05:00) (116/62 - 134/68)  BP(mean): --  ABP: --  ABP(mean): --  RR: 18 (01 Aug 2022 05:00) (18 - 18)  SpO2: 98% (01 Aug 2022 05:00) (96% - 100%)    O2 Parameters below as of 01 Aug 2022 05:00  Patient On (Oxygen Delivery Method): nasal cannula  O2 Flow (L/min): 2            07-31 @ 07:01  -  08-01 @ 07:00  --------------------------------------------------------  IN: 1160 mL / OUT: 400 mL / NET: 760 mL      CAPILLARY BLOOD GLUCOSE      POCT Blood Glucose.: 105 mg/dL (01 Aug 2022 08:21)      PHYSICAL EXAM:  General: NAD  HEENT: PERRL, EOMI, sclera anicteric, moist mucus membranes  Neck: supple, no JVD  Cardiovascular: RRR, no murmurs, rubs, or gallops  Respiratory: decreased breath sounds on L side, R side clear with no wheezes, crackles, or rhonci  Abdomen: soft, nontender, nondistended, normoactive bowel sounds  Extremities: warm and well perfused, no edema, no clubbing  Skin: no rashes  Neurological: Aox2, no focal deficits    HOSPITAL MEDICATIONS:  MEDICATIONS  (STANDING):  acetaminophen     Tablet .. 650 milliGRAM(s) Oral every 8 hours  albuterol/ipratropium for Nebulization 3 milliLiter(s) Nebulizer every 6 hours  amLODIPine   Tablet 10 milliGRAM(s) Oral daily  aspirin  chewable 81 milliGRAM(s) Oral daily  atorvastatin 20 milliGRAM(s) Oral at bedtime  carvedilol 25 milliGRAM(s) Oral every 12 hours  dextrose 50% Injectable 25 Gram(s) IV Push once  dextrose 50% Injectable 12.5 Gram(s) IV Push once  dextrose 50% Injectable 25 Gram(s) IV Push once  enoxaparin Injectable 40 milliGRAM(s) SubCutaneous every 24 hours  hydrALAZINE 100 milliGRAM(s) Oral three times a day  insulin glargine Injectable (LANTUS) 2 Unit(s) SubCutaneous at bedtime  insulin lispro (ADMELOG) corrective regimen sliding scale   SubCutaneous three times a day before meals  insulin lispro (ADMELOG) corrective regimen sliding scale   SubCutaneous at bedtime  melatonin 6 milliGRAM(s) Oral at bedtime  sodium chloride 3%  Inhalation 4 milliLiter(s) Inhalation every 6 hours  vancomycin    Solution 125 milliGRAM(s) Oral every 6 hours    MEDICATIONS  (PRN):  acetaminophen     Tablet .. 650 milliGRAM(s) Oral every 6 hours PRN Temp greater or equal to 38C (100.4F), Mild Pain (1 - 3)  dextrose Oral Gel 15 Gram(s) Oral once PRN Blood Glucose LESS THAN 70 milliGRAM(s)/deciliter      LABS:                        8.8    3.85  )-----------( 295      ( 01 Aug 2022 06:40 )             28.4     Hgb Trend: 8.8<--, 7.9<--, 7.0<--, 7.2<--, 7.4<--  08-01    132<L>  |  98  |  24<H>  ----------------------------<  106<H>  4.2   |  27  |  1.59<H>    Ca    8.6      01 Aug 2022 06:40  Phos  4.3     08-01  Mg     1.90     08-01      Creatinine Trend: 1.59<--, 1.77<--, 1.70<--, 1.24<--, 1.09<--, 0.96<--        Venous Blood Gas:  08-01 @ 06:42  7.39/48/57/29/88.2  VBG Lactate: --  Venous Blood Gas:  07-31 @ 04:52  7.43/47/71/31/96.3  VBG Lactate: --      MICROBIOLOGY:       RADIOLOGY:  [x] Reviewed and interpreted by me

## 2022-08-01 NOTE — PROGRESS NOTE ADULT - PROBLEM SELECTOR PLAN 1
Presented with R>L effusion.  Now with x-ray on 7/24 w/ L effusion vs infiltrate, pulm did POCUS  and not c/w  left effusion suspected atelectatic lung vs consolidation  - s/p bipap again on 7/28 for acute episode hypoxia/hypercapnea  - lasix 40 mg PO daily, held 7/30 for MANDY  - s/p meropenum 7/13-7/20, restarted on 7/28-7/30, blood cx NG and no fevers/cough etc, holding and monitor   - pulm consult appreciated  - c/w incentive spirometry,  chest PT, chest vest, nebs  - cineesophagogram by S&S 7/29 confirms no aspiration  - CT chest showing loculated L PLEF w/ resulting partial collapse of left lung. Small right PLEF w/ partial collapse of RLL. POCUS from pulm noted - no additional interventions recommended for findings at this time.

## 2022-08-01 NOTE — PROGRESS NOTE ADULT - PROBLEM SELECTOR PLAN 12
[facility]  S&S 7/27: regular solids and thin liquids    Dispo: PT rec MELISSA, pt and family now amenable to MELISSA    Communication: Plan of care d/w daughter Shareese via phone on 8/1

## 2022-08-01 NOTE — PROGRESS NOTE ADULT - PROBLEM SELECTOR PLAN 1
Pt. with multiple MANDY episodes during this admission. Scr was elevated at 1.65 on admission (7/12/22). Pt. was intubated on 7/13/22. Scr increased to 2.39 on 7/14/22. Scr subsequently increased to 3.72 on 7/19/22. Scr improved to 127 on 7/24/22. Scr further improved to 0.96 on 7/28/22. Scr was elevated at 1.70 (7/30/22). Scr remained elevated at 1.77 yesterday (7/31/22) but improved to 1.59 today. UA showed proteinuria with large LECs and WBCs. Spot urine TP/CR was significantly elevated at 11.3. CT scan abdomen ruled out obstruction / hydronephrosis on 7/12/22. Serological work up nephrotic range proteinuria negative so far (as outline in lab section). Labs reviewed. Pt. clinically not in fluid overload. Recommend continue holding off diuretics for now. Check renal vein doppler. Monitor labs and urine output. Avoid any potential nephrotoxins. Dose medications as per eGFR.    If you have any questions, please feel free to contact me  Desean De Leon  Nephrology Fellow  [phone]/ [employer] Teams(Preferred)  (After 5pm or on weekends please page the on-call fellow).

## 2022-08-02 LAB
ANA TITR SER: NEGATIVE — SIGNIFICANT CHANGE UP
ANION GAP SERPL CALC-SCNC: 7 MMOL/L — SIGNIFICANT CHANGE UP (ref 7–14)
BASE EXCESS BLDV CALC-SCNC: 4.7 MMOL/L — HIGH (ref -2–3)
BUN SERPL-MCNC: 24 MG/DL — HIGH (ref 7–23)
CALCIUM SERPL-MCNC: 8.9 MG/DL — SIGNIFICANT CHANGE UP (ref 8.4–10.5)
CHLORIDE SERPL-SCNC: 101 MMOL/L — SIGNIFICANT CHANGE UP (ref 98–107)
CO2 BLDV-SCNC: 32.5 MMOL/L — HIGH (ref 22–26)
CO2 SERPL-SCNC: 29 MMOL/L — SIGNIFICANT CHANGE UP (ref 22–31)
CREAT SERPL-MCNC: 1.37 MG/DL — HIGH (ref 0.5–1.3)
CULTURE RESULTS: SIGNIFICANT CHANGE UP
CULTURE RESULTS: SIGNIFICANT CHANGE UP
EGFR: 42 ML/MIN/1.73M2 — LOW
GLUCOSE BLDC GLUCOMTR-MCNC: 107 MG/DL — HIGH (ref 70–99)
GLUCOSE BLDC GLUCOMTR-MCNC: 107 MG/DL — HIGH (ref 70–99)
GLUCOSE BLDC GLUCOMTR-MCNC: 111 MG/DL — HIGH (ref 70–99)
GLUCOSE BLDC GLUCOMTR-MCNC: 113 MG/DL — HIGH (ref 70–99)
GLUCOSE SERPL-MCNC: 120 MG/DL — HIGH (ref 70–99)
HCO3 BLDV-SCNC: 31 MMOL/L — HIGH (ref 22–29)
HCT VFR BLD CALC: 29.4 % — LOW (ref 34.5–45)
HGB BLD-MCNC: 9 G/DL — LOW (ref 11.5–15.5)
MAGNESIUM SERPL-MCNC: 2 MG/DL — SIGNIFICANT CHANGE UP (ref 1.6–2.6)
MCHC RBC-ENTMCNC: 26.3 PG — LOW (ref 27–34)
MCHC RBC-ENTMCNC: 30.6 GM/DL — LOW (ref 32–36)
MCV RBC AUTO: 86 FL — SIGNIFICANT CHANGE UP (ref 80–100)
NRBC # BLD: 0 /100 WBCS — SIGNIFICANT CHANGE UP
NRBC # FLD: 0 K/UL — SIGNIFICANT CHANGE UP
PCO2 BLDV: 51 MMHG — HIGH (ref 39–42)
PH BLDV: 7.39 — SIGNIFICANT CHANGE UP (ref 7.32–7.43)
PHOSPHATE SERPL-MCNC: 4.6 MG/DL — HIGH (ref 2.5–4.5)
PLATELET # BLD AUTO: 298 K/UL — SIGNIFICANT CHANGE UP (ref 150–400)
PO2 BLDV: 64 MMHG — SIGNIFICANT CHANGE UP
POTASSIUM SERPL-MCNC: 4.8 MMOL/L — SIGNIFICANT CHANGE UP (ref 3.5–5.3)
POTASSIUM SERPL-SCNC: 4.8 MMOL/L — SIGNIFICANT CHANGE UP (ref 3.5–5.3)
RBC # BLD: 3.42 M/UL — LOW (ref 3.8–5.2)
RBC # FLD: 16 % — HIGH (ref 10.3–14.5)
SAO2 % BLDV: 92.7 % — SIGNIFICANT CHANGE UP
SODIUM SERPL-SCNC: 137 MMOL/L — SIGNIFICANT CHANGE UP (ref 135–145)
SPECIMEN SOURCE: SIGNIFICANT CHANGE UP
SPECIMEN SOURCE: SIGNIFICANT CHANGE UP
WBC # BLD: 3.68 K/UL — LOW (ref 3.8–10.5)
WBC # FLD AUTO: 3.68 K/UL — LOW (ref 3.8–10.5)

## 2022-08-02 PROCEDURE — 99232 SBSQ HOSP IP/OBS MODERATE 35: CPT

## 2022-08-02 PROCEDURE — 99233 SBSQ HOSP IP/OBS HIGH 50: CPT | Mod: GC

## 2022-08-02 RX ADMIN — Medication 100 MILLIGRAM(S): at 12:38

## 2022-08-02 RX ADMIN — ATORVASTATIN CALCIUM 20 MILLIGRAM(S): 80 TABLET, FILM COATED ORAL at 22:32

## 2022-08-02 RX ADMIN — Medication 650 MILLIGRAM(S): at 06:27

## 2022-08-02 RX ADMIN — SODIUM CHLORIDE 4 MILLILITER(S): 9 INJECTION INTRAMUSCULAR; INTRAVENOUS; SUBCUTANEOUS at 21:05

## 2022-08-02 RX ADMIN — Medication 125 MILLIGRAM(S): at 12:39

## 2022-08-02 RX ADMIN — Medication 100 MILLIGRAM(S): at 22:32

## 2022-08-02 RX ADMIN — Medication 650 MILLIGRAM(S): at 22:32

## 2022-08-02 RX ADMIN — Medication 125 MILLIGRAM(S): at 10:13

## 2022-08-02 RX ADMIN — Medication 3 MILLILITER(S): at 09:27

## 2022-08-02 RX ADMIN — CARVEDILOL PHOSPHATE 25 MILLIGRAM(S): 80 CAPSULE, EXTENDED RELEASE ORAL at 18:51

## 2022-08-02 RX ADMIN — ENOXAPARIN SODIUM 40 MILLIGRAM(S): 100 INJECTION SUBCUTANEOUS at 12:38

## 2022-08-02 RX ADMIN — Medication 125 MILLIGRAM(S): at 18:54

## 2022-08-02 RX ADMIN — Medication 125 MILLIGRAM(S): at 02:09

## 2022-08-02 RX ADMIN — SODIUM CHLORIDE 4 MILLILITER(S): 9 INJECTION INTRAMUSCULAR; INTRAVENOUS; SUBCUTANEOUS at 09:27

## 2022-08-02 RX ADMIN — SODIUM CHLORIDE 4 MILLILITER(S): 9 INJECTION INTRAMUSCULAR; INTRAVENOUS; SUBCUTANEOUS at 15:52

## 2022-08-02 RX ADMIN — INSULIN GLARGINE 2 UNIT(S): 100 INJECTION, SOLUTION SUBCUTANEOUS at 22:29

## 2022-08-02 RX ADMIN — Medication 6 MILLIGRAM(S): at 22:33

## 2022-08-02 RX ADMIN — Medication 650 MILLIGRAM(S): at 12:36

## 2022-08-02 RX ADMIN — Medication 100 MILLIGRAM(S): at 06:27

## 2022-08-02 RX ADMIN — CARVEDILOL PHOSPHATE 25 MILLIGRAM(S): 80 CAPSULE, EXTENDED RELEASE ORAL at 06:28

## 2022-08-02 RX ADMIN — Medication 3 MILLILITER(S): at 15:52

## 2022-08-02 RX ADMIN — Medication 81 MILLIGRAM(S): at 12:35

## 2022-08-02 RX ADMIN — Medication 3 MILLILITER(S): at 21:05

## 2022-08-02 RX ADMIN — AMLODIPINE BESYLATE 10 MILLIGRAM(S): 2.5 TABLET ORAL at 06:28

## 2022-08-02 NOTE — PROGRESS NOTE ADULT - PROBLEM SELECTOR PLAN 4
Patient came in with volume overload, in my review cardiac cause (TTE findings) not correlating as cause of such volume overload.  Does have proteinurea and MANDY on arrival.  Unclear if related to underlying hypertensive and diabetic kidney disease.  Per renal may also worsen in setting of PEA arrest.   - spot protein 10g & 11  - C3/C4 wnl, HIV & Hep C/B neg, normal immunofixation, SPEP w/ chronic inflammation,  free lyte chains elevated, PLAR AB neg, parvo neg, ANCA neg  - MINOO pending.  - Holding lasix.   - renal following

## 2022-08-02 NOTE — PROGRESS NOTE ADULT - SUBJECTIVE AND OBJECTIVE BOX
CHIEF COMPLAINT: AMS    Interval Events:  - on 2L NC  - denies sob, chest pain, chest tightness, cough    REVIEW OF SYSTEMS:  Negative except as documented above.      OBJECTIVE:  ICU Vital Signs Last 24 Hrs  T(C): 35.3 (02 Aug 2022 06:16), Max: 36.9 (01 Aug 2022 22:50)  T(F): 95.5 (02 Aug 2022 06:16), Max: 98.4 (01 Aug 2022 22:50)  HR: 63 (02 Aug 2022 06:16) (63 - 82)  BP: 127/70 (02 Aug 2022 06:16) (127/70 - 147/73)  BP(mean): --  ABP: --  ABP(mean): --  RR: 18 (02 Aug 2022 06:16) (18 - 18)  SpO2: 97% (02 Aug 2022 06:16) (96% - 98%)    O2 Parameters below as of 02 Aug 2022 06:16  Patient On (Oxygen Delivery Method): nasal cannula  O2 Flow (L/min): 2            08-01 @ 07:01  -  08-02 @ 07:00  --------------------------------------------------------  IN: 200 mL / OUT: 200 mL / NET: 0 mL      CAPILLARY BLOOD GLUCOSE      POCT Blood Glucose.: 111 mg/dL (02 Aug 2022 07:45)      PHYSICAL EXAM:  General: NAD  HEENT: PERRL, EOMI, sclera anicteric, moist mucus membranes  Neck: supple, no JVD  Cardiovascular: RRR, no murmurs, rubs, or gallops  Respiratory: decreased L side breath sounds, no wheezes, crackles, or rhonci  Abdomen: soft, nontender, nondistended, normoactive bowel sounds  Extremities: warm and well perfused, no edema, no clubbing  Skin: no rashes  Neurological: Aox3, no focal deficits    HOSPITAL MEDICATIONS:  MEDICATIONS  (STANDING):  acetaminophen     Tablet .. 650 milliGRAM(s) Oral every 8 hours  albuterol/ipratropium for Nebulization 3 milliLiter(s) Nebulizer every 6 hours  amLODIPine   Tablet 10 milliGRAM(s) Oral daily  aspirin  chewable 81 milliGRAM(s) Oral daily  atorvastatin 20 milliGRAM(s) Oral at bedtime  carvedilol 25 milliGRAM(s) Oral every 12 hours  dextrose 50% Injectable 25 Gram(s) IV Push once  dextrose 50% Injectable 12.5 Gram(s) IV Push once  dextrose 50% Injectable 25 Gram(s) IV Push once  enoxaparin Injectable 40 milliGRAM(s) SubCutaneous every 24 hours  hydrALAZINE 100 milliGRAM(s) Oral three times a day  insulin glargine Injectable (LANTUS) 2 Unit(s) SubCutaneous at bedtime  insulin lispro (ADMELOG) corrective regimen sliding scale   SubCutaneous three times a day before meals  insulin lispro (ADMELOG) corrective regimen sliding scale   SubCutaneous at bedtime  melatonin 6 milliGRAM(s) Oral at bedtime  sodium chloride 3%  Inhalation 4 milliLiter(s) Inhalation every 6 hours  vancomycin    Solution 125 milliGRAM(s) Oral every 6 hours    MEDICATIONS  (PRN):  acetaminophen     Tablet .. 650 milliGRAM(s) Oral every 6 hours PRN Temp greater or equal to 38C (100.4F), Mild Pain (1 - 3)  dextrose Oral Gel 15 Gram(s) Oral once PRN Blood Glucose LESS THAN 70 milliGRAM(s)/deciliter      LABS:                        9.0    3.68  )-----------( 298      ( 02 Aug 2022 06:10 )             29.4     Hgb Trend: 9.0<--, 8.8<--, 7.9<--, 7.0<--, 7.2<--  08-02    137  |  101  |  24<H>  ----------------------------<  120<H>  4.8   |  29  |  1.37<H>    Ca    8.9      02 Aug 2022 06:10  Phos  4.6     08-02  Mg     2.00     08-02      Creatinine Trend: 1.37<--, 1.59<--, 1.77<--, 1.70<--, 1.24<--, 1.09<--        Venous Blood Gas:  08-02 @ 06:10  7.39/51/64/31/92.7  VBG Lactate: --  Venous Blood Gas:  08-01 @ 06:42  7.39/48/57/29/88.2  VBG Lactate: --      MICROBIOLOGY:       RADIOLOGY:  [x] Reviewed and interpreted by me

## 2022-08-02 NOTE — PROGRESS NOTE ADULT - PROBLEM SELECTOR PLAN 3
CT chest findings as discussed above.   - pulm following  - chest PT, incentive spirometry, nebs  - Supportive measures. Currently on 2L by NC.  -Will discuss w/ pulm re: need for further bipap use. Pt has been nonadherent.

## 2022-08-02 NOTE — PROGRESS NOTE ADULT - ASSESSMENT
67F [place], HTN, HLD, sarcoidosis, HFpEF p/w lethargy/SOB/swelling/AMS admitted with metabolic encephalopathy due to acute hypercarbic respiratory failure in context presumed HFpEF exacerbation s/p intubation and MICU admission 7/13 for hypercapnia/airway protection was extubated 7/16  (s/p empiric meropenem 7/13 - 7/20 for concern for sepsis given hypothermia/AMS however neg infectious w/u) course c/b PEA arrest on 7/17 pt re-intubated s/p second PEA arrest when arrived to CTICU now transferred to medicine on 7/23 s/p being extubated.  Course now c/b c diff. Also with nephrotic range proteinuria of unclear etiology.  L sided white-out on CXR since 7/24 c/b on 7/28 episode of hypoxia w/ bradycardia in context of turning to change pt w/ R side down, ABG w/ acute on chronic hypercapnia now tolerating NC.

## 2022-08-02 NOTE — ED ADULT NURSE NOTE - OBJECTIVE STATEMENT
Float:: 65 yo F AAOx4 received to Tr- B with SOB since last night, pt arrives tachypneic, with resps even yet labored + use of accessory muscles, maintaining saturation on RA- placed on 2L NC for support, abd firm and distended, mild pitting edema noted to b/l LE, dyspnea noted to worsen on exertion and during nursing interventions, #20 placed to RAC, MD Antonio and Katelyn at bedside, report given to primary RN Mayi, pending orders 68

## 2022-08-02 NOTE — PROGRESS NOTE ADULT - ASSESSMENT
68 y/o F with pmhx of HTN, HLD, sarcoidosis, CHF, presented to the ED for new onset AMS and fluid overload c/f CHF exacerbation s/p ICU admission - with intubation. Course complicated by PEA arrest  and readmission to ICU where she had second PEA arrest thought to be due to hypoxemia from aspiration. Completed 7d course meropenem and extubated, ICU stay c/b hypoglycemia from poor PO intake requiring D5 LR. Now on floor with CXR showing white out of left lung c/w atelectasis vs consolidation. RRT  with increased work of breathing, bradycardia, hypothermia.    Recommendations  - pleural effusion not amenable to drainage  - sarcoid f/u as outpatient  - continue airway clearance strategies: nasopharyngeal suction, chest vest/chest PT, hypertonic saline nebulizer, incentive spirometry  - aerobika  - OOB to chair, ambulate as able  - wean nasal cannula    Prior to discharge:  Please email [email or web address] to set up an appointment. Include patient's name, , MRN, and contact information in the email.    Pulmonary/Sleep Clinic  [address] [place] [address]  [phone]

## 2022-08-02 NOTE — PROGRESS NOTE ADULT - PROBLEM SELECTOR PLAN 2
With nephrotic range proteinuria. Serologic workup neg so far.  MINOO neg.  -Pending renal vein doppler.  -Hold lasix.   -Cont to follow nephro.

## 2022-08-02 NOTE — PROGRESS NOTE ADULT - PROBLEM SELECTOR PLAN 12
[facility]  S&S 7/27: regular solids and thin liquids    Dispo: PT rec MELISSA, pt and family now amenable to MELISSA    Communication: Plan of care d/w daughter Shareese via phone on 8/1.

## 2022-08-02 NOTE — PROGRESS NOTE ADULT - ATTENDING COMMENTS
- Continue aggressive airway clearance  - Aerobika to facilitate mobilization of secretions  - Loculated effusion but not amenable for thoracentesis at this time.  - Encourage OOB to chair . 67F with sarcoidosis with complicated hospital course including respiratory failure in setting of CHF exacerbation s/p ICU admission 7/13-7/16 with intubation, PEA arrest 7/16 and readmission to ICU where she had second PEA arrest thought to be due to hypoxemia from aspiration, RRT 7/28 with increased work of breathing, bradycardia, hypothermia and intermittent airway plugging  - Continue aggressive airway clearance  - Aerobika to facilitate mobilization of secretions  - Loculated effusion but not amenable for thoracentesis at this time.  - Encourage OOB to chair .

## 2022-08-02 NOTE — PROGRESS NOTE ADULT - PROBLEM SELECTOR PLAN 7
- EEG with slowing but no seizures   - CTH neg  - per neuro AMS 2/2 hypercapnic respiratory failure, prior cerebellar stroke likely small vessel  - MS better, communicating via typing on cell given [place]  - MRI brain r/o central causes for intermittent encephalopathy pending

## 2022-08-02 NOTE — PROGRESS NOTE ADULT - SUBJECTIVE AND OBJECTIVE BOX
[facility] Division of Hospital Medicine  Aime PalmRandolph) MD Arjun  Pager [phone]    SUBJECTIVE:  Chief complaint: C diff.    Pt seen and evaluated at bedside. No o/n events. DEnies any SOB/CP/NV. Feels well. Returned to bed this afternoon. Relative from [place] was visiting. States that she had chest PT this AM.      ROS: All systems negative except as noted.      Vital Signs Last 24 Hrs  T(C): 37.1 (02 Aug 2022 18:58), Max: 37.1 (02 Aug 2022 16:08)  T(F): 98.7 (02 Aug 2022 18:58), Max: 98.8 (02 Aug 2022 16:08)  HR: 80 (02 Aug 2022 18:58) (63 - 80)  BP: 131/70 (02 Aug 2022 18:58) (116/76 - 147/73)  BP(mean): --  RR: 18 (02 Aug 2022 18:58) (18 - 18)  SpO2: 96% (02 Aug 2022 18:58) (96% - 98%)    Parameters below as of 02 Aug 2022 18:58  Patient On (Oxygen Delivery Method): nasal cannula  O2 Flow (L/min): 2        PHYSICAL EXAM:  Gen- In bed, frail  Eyes- EOMI, PERRLA, nonicteric.  EMNT- Fair dentition. MMM. No tonsilar exudates. No posterior pharynx erythema.  Neck- Supple. No masses. No tracheal deviation.  Resp- Diminished air entry at the bases. Good effort.  CVS- RRR, S1S2, no g/r/m. No LE edema.  GI- Soft abd, NT, ND, +BSx4. No HSM.  MSK- No C/C. ROM intact. No crepitus.  Neuro- CN II-XII intact. Speech fluent/face symmetric. Sensation intact.  Skin- No rashes/ulcers. Warm/moist.  Psych- Appropriate mood/affect.      MEDICATION:  MEDICATIONS  (STANDING):  acetaminophen     Tablet .. 650 milliGRAM(s) Oral every 8 hours  albuterol/ipratropium for Nebulization 3 milliLiter(s) Nebulizer every 6 hours  amLODIPine   Tablet 10 milliGRAM(s) Oral daily  aspirin  chewable 81 milliGRAM(s) Oral daily  atorvastatin 20 milliGRAM(s) Oral at bedtime  carvedilol 25 milliGRAM(s) Oral every 12 hours  dextrose 50% Injectable 25 Gram(s) IV Push once  dextrose 50% Injectable 12.5 Gram(s) IV Push once  dextrose 50% Injectable 25 Gram(s) IV Push once  enoxaparin Injectable 40 milliGRAM(s) SubCutaneous every 24 hours  hydrALAZINE 100 milliGRAM(s) Oral three times a day  insulin glargine Injectable (LANTUS) 2 Unit(s) SubCutaneous at bedtime  insulin lispro (ADMELOG) corrective regimen sliding scale   SubCutaneous three times a day before meals  insulin lispro (ADMELOG) corrective regimen sliding scale   SubCutaneous at bedtime  melatonin 6 milliGRAM(s) Oral at bedtime  sodium chloride 3%  Inhalation 4 milliLiter(s) Inhalation every 6 hours  vancomycin    Solution 125 milliGRAM(s) Oral every 6 hours    MEDICATIONS  (PRN):  acetaminophen     Tablet .. 650 milliGRAM(s) Oral every 6 hours PRN Temp greater or equal to 38C (100.4F), Mild Pain (1 - 3)  benzonatate 100 milliGRAM(s) Oral three times a day PRN Cough  dextrose Oral Gel 15 Gram(s) Oral once PRN Blood Glucose LESS THAN 70 milliGRAM(s)/deciliter  guaiFENesin Oral Liquid (Sugar-Free) 100 milliGRAM(s) Oral every 6 hours PRN Cough        LABORATORY:                          9.0    3.68  )-----------( 298      ( 02 Aug 2022 06:10 )             29.4     08-02    137  |  101  |  24<H>  ----------------------------<  120<H>  4.8   |  29  |  1.37<H>    Ca    8.9      02 Aug 2022 06:10  Phos  4.6     08-02  Mg     2.00     08-02                COVID-19 PCR: NotDetec (28 Jul 2022 17:18)  COVID-19 PCR: NotDetec (25 Jul 2022 07:08)  COVID-19 PCR: NotDetec (19 Jul 2022 07:36)  SARS-CoV-2: NotDetec (04 Jun 2022 00:56)  COVID-19 PCR: NotDetec (09 Feb 2022 14:13)

## 2022-08-03 LAB
ANION GAP SERPL CALC-SCNC: 7 MMOL/L — SIGNIFICANT CHANGE UP (ref 7–14)
BUN SERPL-MCNC: 23 MG/DL — SIGNIFICANT CHANGE UP (ref 7–23)
CALCIUM SERPL-MCNC: 8.6 MG/DL — SIGNIFICANT CHANGE UP (ref 8.4–10.5)
CHLORIDE SERPL-SCNC: 100 MMOL/L — SIGNIFICANT CHANGE UP (ref 98–107)
CO2 SERPL-SCNC: 28 MMOL/L — SIGNIFICANT CHANGE UP (ref 22–31)
CREAT SERPL-MCNC: 1.53 MG/DL — HIGH (ref 0.5–1.3)
CULTURE RESULTS: SIGNIFICANT CHANGE UP
CULTURE RESULTS: SIGNIFICANT CHANGE UP
EGFR: 37 ML/MIN/1.73M2 — LOW
GLUCOSE BLDC GLUCOMTR-MCNC: 111 MG/DL — HIGH (ref 70–99)
GLUCOSE BLDC GLUCOMTR-MCNC: 136 MG/DL — HIGH (ref 70–99)
GLUCOSE BLDC GLUCOMTR-MCNC: 166 MG/DL — HIGH (ref 70–99)
GLUCOSE BLDC GLUCOMTR-MCNC: 93 MG/DL — SIGNIFICANT CHANGE UP (ref 70–99)
GLUCOSE SERPL-MCNC: 101 MG/DL — HIGH (ref 70–99)
HCT VFR BLD CALC: 29 % — LOW (ref 34.5–45)
HGB BLD-MCNC: 8.5 G/DL — LOW (ref 11.5–15.5)
MAGNESIUM SERPL-MCNC: 1.8 MG/DL — SIGNIFICANT CHANGE UP (ref 1.6–2.6)
MCHC RBC-ENTMCNC: 25.8 PG — LOW (ref 27–34)
MCHC RBC-ENTMCNC: 29.3 GM/DL — LOW (ref 32–36)
MCV RBC AUTO: 88.1 FL — SIGNIFICANT CHANGE UP (ref 80–100)
NRBC # BLD: 0 /100 WBCS — SIGNIFICANT CHANGE UP
NRBC # FLD: 0 K/UL — SIGNIFICANT CHANGE UP
PHOSPHATE SERPL-MCNC: 4.7 MG/DL — HIGH (ref 2.5–4.5)
PLATELET # BLD AUTO: 276 K/UL — SIGNIFICANT CHANGE UP (ref 150–400)
POTASSIUM SERPL-MCNC: 4.8 MMOL/L — SIGNIFICANT CHANGE UP (ref 3.5–5.3)
POTASSIUM SERPL-SCNC: 4.8 MMOL/L — SIGNIFICANT CHANGE UP (ref 3.5–5.3)
RBC # BLD: 3.29 M/UL — LOW (ref 3.8–5.2)
RBC # FLD: 15.9 % — HIGH (ref 10.3–14.5)
SARS-COV-2 RNA SPEC QL NAA+PROBE: SIGNIFICANT CHANGE UP
SODIUM SERPL-SCNC: 135 MMOL/L — SIGNIFICANT CHANGE UP (ref 135–145)
SPECIMEN SOURCE: SIGNIFICANT CHANGE UP
SPECIMEN SOURCE: SIGNIFICANT CHANGE UP
WBC # BLD: 3.85 K/UL — SIGNIFICANT CHANGE UP (ref 3.8–10.5)
WBC # FLD AUTO: 3.85 K/UL — SIGNIFICANT CHANGE UP (ref 3.8–10.5)

## 2022-08-03 PROCEDURE — 99233 SBSQ HOSP IP/OBS HIGH 50: CPT | Mod: GC

## 2022-08-03 PROCEDURE — 99232 SBSQ HOSP IP/OBS MODERATE 35: CPT

## 2022-08-03 RX ADMIN — Medication 100 MILLIGRAM(S): at 18:11

## 2022-08-03 RX ADMIN — AMLODIPINE BESYLATE 10 MILLIGRAM(S): 2.5 TABLET ORAL at 05:40

## 2022-08-03 RX ADMIN — ENOXAPARIN SODIUM 40 MILLIGRAM(S): 100 INJECTION SUBCUTANEOUS at 13:16

## 2022-08-03 RX ADMIN — Medication 125 MILLIGRAM(S): at 21:00

## 2022-08-03 RX ADMIN — Medication 3 MILLILITER(S): at 09:27

## 2022-08-03 RX ADMIN — Medication 650 MILLIGRAM(S): at 05:40

## 2022-08-03 RX ADMIN — ATORVASTATIN CALCIUM 20 MILLIGRAM(S): 80 TABLET, FILM COATED ORAL at 21:04

## 2022-08-03 RX ADMIN — SODIUM CHLORIDE 4 MILLILITER(S): 9 INJECTION INTRAMUSCULAR; INTRAVENOUS; SUBCUTANEOUS at 21:35

## 2022-08-03 RX ADMIN — CARVEDILOL PHOSPHATE 25 MILLIGRAM(S): 80 CAPSULE, EXTENDED RELEASE ORAL at 18:11

## 2022-08-03 RX ADMIN — SODIUM CHLORIDE 4 MILLILITER(S): 9 INJECTION INTRAMUSCULAR; INTRAVENOUS; SUBCUTANEOUS at 09:27

## 2022-08-03 RX ADMIN — Medication 650 MILLIGRAM(S): at 13:16

## 2022-08-03 RX ADMIN — Medication 125 MILLIGRAM(S): at 00:22

## 2022-08-03 RX ADMIN — INSULIN GLARGINE 2 UNIT(S): 100 INJECTION, SOLUTION SUBCUTANEOUS at 22:47

## 2022-08-03 RX ADMIN — Medication 650 MILLIGRAM(S): at 21:05

## 2022-08-03 RX ADMIN — Medication 100 MILLIGRAM(S): at 21:04

## 2022-08-03 RX ADMIN — SODIUM CHLORIDE 4 MILLILITER(S): 9 INJECTION INTRAMUSCULAR; INTRAVENOUS; SUBCUTANEOUS at 15:48

## 2022-08-03 RX ADMIN — Medication 100 MILLIGRAM(S): at 05:40

## 2022-08-03 RX ADMIN — Medication 100 MILLIGRAM(S): at 13:16

## 2022-08-03 RX ADMIN — Medication 125 MILLIGRAM(S): at 13:15

## 2022-08-03 RX ADMIN — Medication 3 MILLILITER(S): at 02:47

## 2022-08-03 RX ADMIN — Medication 3 MILLILITER(S): at 21:35

## 2022-08-03 RX ADMIN — Medication 100 MILLIGRAM(S): at 18:09

## 2022-08-03 RX ADMIN — Medication 81 MILLIGRAM(S): at 13:26

## 2022-08-03 RX ADMIN — SODIUM CHLORIDE 4 MILLILITER(S): 9 INJECTION INTRAMUSCULAR; INTRAVENOUS; SUBCUTANEOUS at 02:48

## 2022-08-03 RX ADMIN — CARVEDILOL PHOSPHATE 25 MILLIGRAM(S): 80 CAPSULE, EXTENDED RELEASE ORAL at 05:40

## 2022-08-03 RX ADMIN — Medication 6 MILLIGRAM(S): at 21:04

## 2022-08-03 RX ADMIN — Medication 125 MILLIGRAM(S): at 05:39

## 2022-08-03 RX ADMIN — Medication 3 MILLILITER(S): at 15:48

## 2022-08-03 NOTE — PROGRESS NOTE ADULT - ASSESSMENT
67F [place], HTN, HLD, sarcoidosis, HFpEF p/w lethargy/SOB/swelling/AMS admitted with metabolic encephalopathy due to acute hypercarbic respiratory failure in context presumed HFpEF exacerbation s/p intubation and MICU admission 7/13 for hypercapnia/airway protection was extubated 7/16  (s/p empiric meropenem 7/13 - 7/20 for concern for sepsis given hypothermia/AMS however neg infectious w/u) course c/b PEA arrest on 7/17 pt re-intubated s/p second PEA arrest when arrived to CTICU now transferred to medicine on 7/23 s/p being extubated.  Course now c/b c diff. Also with nephrotic range proteinuria of unclear etiology.  L sided white-out on CXR since 7/24 c/b on 7/28 episode of hypoxia w/ bradycardia in context of turning to change pt w/ R side down, ABG w/ acute on chronic hypercapnia now tolerating NC.

## 2022-08-03 NOTE — PROGRESS NOTE ADULT - PROBLEM SELECTOR PLAN 7
- EEG with slowing but no seizures   - CTH neg  - per neuro AMS 2/2 hypercapnic respiratory failure, prior cerebellar stroke likely small vessel  - MS better, communicating via typing on cell given [place]  - MRI brain r/o central causes for intermittent encephalopathy pending

## 2022-08-03 NOTE — PROGRESS NOTE ADULT - PROBLEM SELECTOR PLAN 4
Patient came in with volume overload, in my review cardiac cause (TTE findings) not correlating as cause of such volume overload.  Does have proteinurea and MANDY on arrival.  Unclear if related to underlying hypertensive and diabetic kidney disease.  Per renal may also worsen in setting of PEA arrest.   - spot protein 10g & 11  - C3/C4 wnl, HIV & Hep C/B neg, normal immunofixation, SPEP w/ chronic inflammation,  free lyte chains elevated, PLAR AB neg, parvo neg, ANCA neg  - MINOO neg  - Holding lasix.   - renal following

## 2022-08-03 NOTE — CHART NOTE - NSCHARTNOTEFT_GEN_A_CORE
Source:    other [x] nurse, PCA, medical chart   Diet rx: Regular; Consistent Carbohydrate (Evening snack)     Pt 66 yo female with PMHx of HTN, HLD, sarcoidosis, HFpEF presented with lethargy/SOB/swelling/AMS admitted with metabolic encephalopathy due to acute hypercarbic respiratory failure in context of CHF exacerbation. Of note, Pt was intubated 7/13 then extubated 7/16, but on 7/17 Pt got re-intubated s/p second PEA arrest, later Pt was extubated on 7/23 - per chart review.     At time of visit, Pt asleep; Pt very [place]. RDN called Pt by her name multiple times, but unable to arouse Pt. Per nurse, Pt eats well; Pt needs assistance with meals. S/P [employer], SLP rec: Continue Regular diet with thin liquids (7/29). Rec to add PO supplement: Glucerna shake - 2x daily to diet rx. No report of nausea, vomiting @ this time. +BM (8/1) per flow sheet. Case discussed with nurse, PCA. RDN remains available, nurse made aware.     Pt's height: 64" (7/12)     IBW: 120#+/-10%     Pt's weights: 80.6 kg (7/23), 88 kg (7/13)      Pertinent Medications: Aspirin, Lovenox, Insulin (Glargine), Insulin (Lispro), Lipitor,   Pertinent Labs: 08-03 Na135 mmol/L Glu 101 mg/dL<H> K+ 4.8 mmol/L Cr  1.53 mg/dL<H> BUN 23 mg/dL 08-03 Phos 4.7 mg/dL<H> 07-29 Alb 2.6 g/dL<L>  Skin: per flow sheet, Pt with pressure injury to sacrum - stage II; 1+ edema to R/L ankle, R/L foot     Estimated Needs: [x] no change since previous assessment  Previous Nutrition Diagnosis: [x] Malnutrition, severe   Nutrition Diagnosis is [x] ongoing     Nutrition Interventions/Recommendations:   1. Encourage & assist Pt with meals; Monitor PO diet tolerance;   2. PO supplement: Glucerna Therapeutic Nutrition 8oz. 2x daily (~440 Kcals, ~20 gm Protein);    3. Add Multivitamins 1 tab daily for micronutrient coverage;   4. Monitor labs, weekly weights, hydration status;

## 2022-08-03 NOTE — PROGRESS NOTE ADULT - PROBLEM SELECTOR PLAN 12
[facility]  S&S 7/27: regular solids and thin liquids    Dispo: PT rec MELISSA, pt and family now amenable to MELISSA    Communication: Plan of care d/w daughter Shareese via phone on 8/3.

## 2022-08-03 NOTE — CHART NOTE - NSCHARTNOTESELECT_GEN_ALL_CORE
Event Note
Event Note
Follow-up/Nutrition Services
MAR accept
Endocrine
Endocrine
Endocrinology/Event Note
Event Note
Follow-up/Nutrition Services
MAR Accept Note/Event Note
MICU Acceptance Note/Event Note
MICU Acceptance/Event Note
MICU Transfer/Transfer Note
MICU accept/Transfer Note
MICU/Transfer Note
Nutrition Follow Up/Nutrition Services
POCUS Note
Ultrasound/Event Note
Ultrasound/Event Note

## 2022-08-03 NOTE — PROGRESS NOTE ADULT - ASSESSMENT
66 y/o F with pmhx of HTN, HLD, sarcoidosis, CHF, presented to the ED for new onset AMS and fluid overload c/f CHF exacerbation s/p ICU admission - with intubation. Course complicated by PEA arrest  and readmission to ICU where she had second PEA arrest thought to be due to hypoxemia from aspiration. Completed 7d course meropenem and extubated, ICU stay c/b hypoglycemia from poor PO intake requiring D5 LR. Now on floor with CXR showing white out of left lung c/w atelectasis vs consolidation. RRT  with increased work of breathing, bradycardia, hypothermia.    Recommendations  - pleural effusion not amenable to drainage  - sarcoid f/u as outpatient  - continue airway clearance strategies: nasopharyngeal suction, chest vest/chest PT, hypertonic saline nebulizer, incentive spirometry  - continue aerobika  - OOB to chair, ambulate as able  - does not need bipap    Prior to discharge:  Please email [email or web address] to set up an appointment. Include patient's name, , MRN, and contact information in the email.    Pulmonary[facility] Clinic  [address] [place] [address]  [phone]

## 2022-08-03 NOTE — PROGRESS NOTE ADULT - SUBJECTIVE AND OBJECTIVE BOX
[facility] Division of Hospital Medicine  Aime PalmRandolph) MD Arjun  Pager [phone]    SUBJECTIVE:  Chief complaint: C diff.    Pt seen and evaluated at bedside. No o/n events. DEnies any SOB/CP/NV. Feels well. No new complaints.      ROS: All systems negative except as noted.      Vital Signs Last 24 Hrs  T(C): 36.4 (03 Aug 2022 11:33), Max: 37.1 (02 Aug 2022 18:58)  T(F): 97.5 (03 Aug 2022 11:33), Max: 98.7 (02 Aug 2022 18:58)  HR: 70 (03 Aug 2022 15:48) (66 - 80)  BP: 117/73 (03 Aug 2022 11:33) (117/73 - 143/73)  BP(mean): --  RR: 18 (03 Aug 2022 11:33) (18 - 18)  SpO2: 99% (03 Aug 2022 11:33) (96% - 100%)    Parameters below as of 03 Aug 2022 11:33  Patient On (Oxygen Delivery Method): room air        PHYSICAL EXAM:  Gen- In bed, frail  Eyes- EOMI, PERRLA, nonicteric.  EMNT- Fair dentition. MMM. No tonsilar exudates. No posterior pharynx erythema.  Neck- Supple. No masses. No tracheal deviation.  Resp- Diminished air entry at the bases. Good effort.  CVS- RRR, S1S2, no g/r/m. No LE edema.  GI- Soft abd, NT, ND, +BSx4. No HSM.  MSK- No C/C. ROM intact. No crepitus.  Neuro- CN II-XII intact. Speech fluent/face symmetric. Sensation intact.  Skin- No rashes/ulcers. Warm/moist.  Psych- Appropriate mood/affect.      MEDICATION:  MEDICATIONS  (STANDING):  acetaminophen     Tablet .. 650 milliGRAM(s) Oral every 8 hours  albuterol/ipratropium for Nebulization 3 milliLiter(s) Nebulizer every 6 hours  amLODIPine   Tablet 10 milliGRAM(s) Oral daily  aspirin  chewable 81 milliGRAM(s) Oral daily  atorvastatin 20 milliGRAM(s) Oral at bedtime  carvedilol 25 milliGRAM(s) Oral every 12 hours  dextrose 50% Injectable 25 Gram(s) IV Push once  dextrose 50% Injectable 12.5 Gram(s) IV Push once  dextrose 50% Injectable 25 Gram(s) IV Push once  enoxaparin Injectable 40 milliGRAM(s) SubCutaneous every 24 hours  hydrALAZINE 100 milliGRAM(s) Oral three times a day  insulin glargine Injectable (LANTUS) 2 Unit(s) SubCutaneous at bedtime  insulin lispro (ADMELOG) corrective regimen sliding scale   SubCutaneous three times a day before meals  insulin lispro (ADMELOG) corrective regimen sliding scale   SubCutaneous at bedtime  melatonin 6 milliGRAM(s) Oral at bedtime  sodium chloride 3%  Inhalation 4 milliLiter(s) Inhalation every 6 hours  vancomycin    Solution 125 milliGRAM(s) Oral every 6 hours    MEDICATIONS  (PRN):  acetaminophen     Tablet .. 650 milliGRAM(s) Oral every 6 hours PRN Temp greater or equal to 38C (100.4F), Mild Pain (1 - 3)  benzonatate 100 milliGRAM(s) Oral three times a day PRN Cough  dextrose Oral Gel 15 Gram(s) Oral once PRN Blood Glucose LESS THAN 70 milliGRAM(s)/deciliter  guaiFENesin Oral Liquid (Sugar-Free) 100 milliGRAM(s) Oral every 6 hours PRN Cough        LABORATORY:                          8.5    3.85  )-----------( 276      ( 03 Aug 2022 06:55 )             29.0     08-03    135  |  100  |  23  ----------------------------<  101<H>  4.8   |  28  |  1.53<H>    Ca    8.6      03 Aug 2022 06:55  Phos  4.7     08-03  Mg     1.80     08-03        COVID-19 PCR: NotDetec (28 Jul 2022 17:18)  COVID-19 PCR: NotDetec (25 Jul 2022 07:08)  COVID-19 PCR: NotDetec (19 Jul 2022 07:36)  SARS-CoV-2: NotDetec (04 Jun 2022 00:56)  COVID-19 PCR: NotDetec (09 Feb 2022 14:13)

## 2022-08-03 NOTE — PROGRESS NOTE ADULT - PROBLEM SELECTOR PLAN 5
Had rectal tube and diarrhea c diff sent 7/24 +, s/p recent abx exposure while admitted   - PO vanco x 10 days  - no diarrhea   - tolerating diet, no abdominal pain, nausea/vomiting

## 2022-08-03 NOTE — PROGRESS NOTE ADULT - ATTENDING COMMENTS
67F with sarcoidosis with complicated hospital course including respiratory failure in setting of CHF exacerbation s/p ICU admission 7/13-7/16 with intubation, PEA arrest 7/16 and readmission to ICU where she had second PEA arrest thought to be due to hypoxemia from aspiration, RRT 7/28 with increased work of breathing, bradycardia, hypothermia and intermittent airway plugging  - Continue aggressive airway clearance  - Aerobika and chest PT to facilitate mobilization of secretions  - Loculated effusion but not amenable for thoracentesis at this time.  - Encourage OOB to chair as mobilization would help with airway clearance.

## 2022-08-03 NOTE — PROGRESS NOTE ADULT - SUBJECTIVE AND OBJECTIVE BOX
Interval Events:  - on 2L NC this AM, weaned to room air with Spo2 94-95%      REVIEW OF SYSTEMS:  Negative except as documented above.      OBJECTIVE:  ICU Vital Signs Last 24 Hrs  T(C): 36.2 (03 Aug 2022 05:38), Max: 37.1 (02 Aug 2022 16:08)  T(F): 97.2 (03 Aug 2022 05:38), Max: 98.8 (02 Aug 2022 16:08)  HR: 76 (03 Aug 2022 05:38) (66 - 80)  BP: 143/73 (03 Aug 2022 05:38) (116/76 - 143/73)  BP(mean): --  ABP: --  ABP(mean): --  RR: 18 (03 Aug 2022 05:38) (18 - 18)  SpO2: 100% (03 Aug 2022 05:38) (96% - 100%)    O2 Parameters below as of 03 Aug 2022 05:38  Patient On (Oxygen Delivery Method): nasal cannula  O2 Flow (L/min): 2            08-02 @ 07:01  -  08-03 @ 07:00  --------------------------------------------------------  IN: 300 mL / OUT: 400 mL / NET: -100 mL      CAPILLARY BLOOD GLUCOSE      POCT Blood Glucose.: 93 mg/dL (03 Aug 2022 07:43)      PHYSICAL EXAM:  General: NAD  HEENT: PERRL, EOMI, sclera anicteric, moist mucus membranes  Neck: supple, no JVD  Cardiovascular: RRR, no murmurs, rubs, or gallops  Respiratory: decreased breath sounds L side, no wheezes, crackles, or rhonci  Abdomen: soft, nontender, nondistended, normoactive bowel sounds  Extremities: warm and well perfused, no edema, no clubbing  Skin: no rashes  Neurological: Aox3, no focal deficits    HOSPITAL MEDICATIONS:  MEDICATIONS  (STANDING):  acetaminophen     Tablet .. 650 milliGRAM(s) Oral every 8 hours  albuterol/ipratropium for Nebulization 3 milliLiter(s) Nebulizer every 6 hours  amLODIPine   Tablet 10 milliGRAM(s) Oral daily  aspirin  chewable 81 milliGRAM(s) Oral daily  atorvastatin 20 milliGRAM(s) Oral at bedtime  carvedilol 25 milliGRAM(s) Oral every 12 hours  dextrose 50% Injectable 25 Gram(s) IV Push once  dextrose 50% Injectable 12.5 Gram(s) IV Push once  dextrose 50% Injectable 25 Gram(s) IV Push once  enoxaparin Injectable 40 milliGRAM(s) SubCutaneous every 24 hours  hydrALAZINE 100 milliGRAM(s) Oral three times a day  insulin glargine Injectable (LANTUS) 2 Unit(s) SubCutaneous at bedtime  insulin lispro (ADMELOG) corrective regimen sliding scale   SubCutaneous three times a day before meals  insulin lispro (ADMELOG) corrective regimen sliding scale   SubCutaneous at bedtime  melatonin 6 milliGRAM(s) Oral at bedtime  sodium chloride 3%  Inhalation 4 milliLiter(s) Inhalation every 6 hours  vancomycin    Solution 125 milliGRAM(s) Oral every 6 hours    MEDICATIONS  (PRN):  acetaminophen     Tablet .. 650 milliGRAM(s) Oral every 6 hours PRN Temp greater or equal to 38C (100.4F), Mild Pain (1 - 3)  benzonatate 100 milliGRAM(s) Oral three times a day PRN Cough  dextrose Oral Gel 15 Gram(s) Oral once PRN Blood Glucose LESS THAN 70 milliGRAM(s)/deciliter  guaiFENesin Oral Liquid (Sugar-Free) 100 milliGRAM(s) Oral every 6 hours PRN Cough      LABS:                        8.5    3.85  )-----------( 276      ( 03 Aug 2022 06:55 )             29.0     Hgb Trend: 8.5<--, 9.0<--, 8.8<--, 7.9<--, 7.0<--  08-03    135  |  100  |  23  ----------------------------<  101<H>  4.8   |  28  |  1.53<H>    Ca    8.6      03 Aug 2022 06:55  Phos  4.7     08-03  Mg     1.80     08-03      Creatinine Trend: 1.53<--, 1.37<--, 1.59<--, 1.77<--, 1.70<--, 1.24<--        Venous Blood Gas:  08-02 @ 06:10  7.39/51/64/31/92.7  VBG Lactate: --      MICROBIOLOGY:       RADIOLOGY:  [x] Reviewed and interpreted by me

## 2022-08-04 ENCOUNTER — APPOINTMENT (OUTPATIENT)
Dept: OTOLARYNGOLOGY | Facility: CLINIC | Age: 67
End: 2022-08-04

## 2022-08-04 LAB
ANION GAP SERPL CALC-SCNC: 7 MMOL/L — SIGNIFICANT CHANGE UP (ref 7–14)
BUN SERPL-MCNC: 21 MG/DL — SIGNIFICANT CHANGE UP (ref 7–23)
CALCIUM SERPL-MCNC: 8.9 MG/DL — SIGNIFICANT CHANGE UP (ref 8.4–10.5)
CHLORIDE SERPL-SCNC: 102 MMOL/L — SIGNIFICANT CHANGE UP (ref 98–107)
CO2 SERPL-SCNC: 27 MMOL/L — SIGNIFICANT CHANGE UP (ref 22–31)
CREAT SERPL-MCNC: 1.34 MG/DL — HIGH (ref 0.5–1.3)
EGFR: 43 ML/MIN/1.73M2 — LOW
GLUCOSE BLDC GLUCOMTR-MCNC: 109 MG/DL — HIGH (ref 70–99)
GLUCOSE BLDC GLUCOMTR-MCNC: 114 MG/DL — HIGH (ref 70–99)
GLUCOSE BLDC GLUCOMTR-MCNC: 116 MG/DL — HIGH (ref 70–99)
GLUCOSE BLDC GLUCOMTR-MCNC: 137 MG/DL — HIGH (ref 70–99)
GLUCOSE SERPL-MCNC: 118 MG/DL — HIGH (ref 70–99)
HCT VFR BLD CALC: 30.1 % — LOW (ref 34.5–45)
HGB BLD-MCNC: 9 G/DL — LOW (ref 11.5–15.5)
MAGNESIUM SERPL-MCNC: 2 MG/DL — SIGNIFICANT CHANGE UP (ref 1.6–2.6)
MCHC RBC-ENTMCNC: 26.1 PG — LOW (ref 27–34)
MCHC RBC-ENTMCNC: 29.9 GM/DL — LOW (ref 32–36)
MCV RBC AUTO: 87.2 FL — SIGNIFICANT CHANGE UP (ref 80–100)
NRBC # BLD: 0 /100 WBCS — SIGNIFICANT CHANGE UP
NRBC # FLD: 0 K/UL — SIGNIFICANT CHANGE UP
PHOSPHATE SERPL-MCNC: 4.9 MG/DL — HIGH (ref 2.5–4.5)
PLATELET # BLD AUTO: 253 K/UL — SIGNIFICANT CHANGE UP (ref 150–400)
POTASSIUM SERPL-MCNC: 4.8 MMOL/L — SIGNIFICANT CHANGE UP (ref 3.5–5.3)
POTASSIUM SERPL-SCNC: 4.8 MMOL/L — SIGNIFICANT CHANGE UP (ref 3.5–5.3)
RBC # BLD: 3.45 M/UL — LOW (ref 3.8–5.2)
RBC # FLD: 15.9 % — HIGH (ref 10.3–14.5)
SODIUM SERPL-SCNC: 136 MMOL/L — SIGNIFICANT CHANGE UP (ref 135–145)
WBC # BLD: 3.06 K/UL — LOW (ref 3.8–10.5)
WBC # FLD AUTO: 3.06 K/UL — LOW (ref 3.8–10.5)

## 2022-08-04 PROCEDURE — 93975 VASCULAR STUDY: CPT | Mod: 26

## 2022-08-04 PROCEDURE — 99233 SBSQ HOSP IP/OBS HIGH 50: CPT

## 2022-08-04 PROCEDURE — 99232 SBSQ HOSP IP/OBS MODERATE 35: CPT

## 2022-08-04 PROCEDURE — 70551 MRI BRAIN STEM W/O DYE: CPT | Mod: 26

## 2022-08-04 RX ADMIN — Medication 3 MILLILITER(S): at 21:11

## 2022-08-04 RX ADMIN — SODIUM CHLORIDE 4 MILLILITER(S): 9 INJECTION INTRAMUSCULAR; INTRAVENOUS; SUBCUTANEOUS at 10:44

## 2022-08-04 RX ADMIN — Medication 125 MILLIGRAM(S): at 12:53

## 2022-08-04 RX ADMIN — CARVEDILOL PHOSPHATE 25 MILLIGRAM(S): 80 CAPSULE, EXTENDED RELEASE ORAL at 18:03

## 2022-08-04 RX ADMIN — INSULIN GLARGINE 2 UNIT(S): 100 INJECTION, SOLUTION SUBCUTANEOUS at 22:02

## 2022-08-04 RX ADMIN — SODIUM CHLORIDE 4 MILLILITER(S): 9 INJECTION INTRAMUSCULAR; INTRAVENOUS; SUBCUTANEOUS at 15:15

## 2022-08-04 RX ADMIN — Medication 650 MILLIGRAM(S): at 05:11

## 2022-08-04 RX ADMIN — ENOXAPARIN SODIUM 40 MILLIGRAM(S): 100 INJECTION SUBCUTANEOUS at 12:54

## 2022-08-04 RX ADMIN — Medication 81 MILLIGRAM(S): at 12:53

## 2022-08-04 RX ADMIN — Medication 3 MILLILITER(S): at 10:44

## 2022-08-04 RX ADMIN — Medication 6 MILLIGRAM(S): at 21:07

## 2022-08-04 RX ADMIN — AMLODIPINE BESYLATE 10 MILLIGRAM(S): 2.5 TABLET ORAL at 05:11

## 2022-08-04 RX ADMIN — Medication 3 MILLILITER(S): at 04:45

## 2022-08-04 RX ADMIN — Medication 3 MILLILITER(S): at 15:14

## 2022-08-04 RX ADMIN — CARVEDILOL PHOSPHATE 25 MILLIGRAM(S): 80 CAPSULE, EXTENDED RELEASE ORAL at 05:11

## 2022-08-04 RX ADMIN — Medication 100 MILLIGRAM(S): at 21:06

## 2022-08-04 RX ADMIN — Medication 100 MILLIGRAM(S): at 05:11

## 2022-08-04 RX ADMIN — Medication 100 MILLIGRAM(S): at 12:54

## 2022-08-04 RX ADMIN — Medication 125 MILLIGRAM(S): at 03:32

## 2022-08-04 RX ADMIN — ATORVASTATIN CALCIUM 20 MILLIGRAM(S): 80 TABLET, FILM COATED ORAL at 21:07

## 2022-08-04 NOTE — PROGRESS NOTE ADULT - PROBLEM SELECTOR PLAN 3
CT chest findings as discussed above.   - pulm following  - chest PT, incentive spirometry, nebs  - Supportive measures. Currently on 2L by NC.  - BiPAP DC-ed. Pt continues to do well.

## 2022-08-04 NOTE — PROGRESS NOTE ADULT - PROBLEM SELECTOR PLAN 11
A1c 7.3%. Goal < 7%;  home regimen: lantus 20 units at night; had episodes of hypoglycemia while in ICU  - AI ruled out per endo s/p stim in context of hypoglycemia   - on Lantus 2  - endo following
[facility]  S&S 7/27: regular solids and thin liquids  PT rec MELISSA, pt and family now amenable to MELISSA  medically optimized pending renal recs  care d/w daughter via phone on 7/28
A1c 7.3%. Goal < 7%;  home regimen: lantus 20 units at night; had episodes of hypoglycemia while in ICU  - AI ruled out per endo s/p stim in context of hypoglycemia   - on Lantus 2  - endo following

## 2022-08-04 NOTE — PROGRESS NOTE ADULT - PROBLEM SELECTOR PROBLEM 10
room air
Prophylactic measure
Prophylactic measure
Hypertension
Type 2 diabetes mellitus
Hypertension

## 2022-08-04 NOTE — PROGRESS NOTE ADULT - PROBLEM SELECTOR PLAN 2
Hypoalbuminemia, edema, with nephrotic range proteinuria concerning for nephrotic syndrome.  recommendations as above.

## 2022-08-04 NOTE — PROGRESS NOTE ADULT - PROBLEM SELECTOR PROBLEM 11
Type 2 diabetes mellitus
Prophylactic measure
Type 2 diabetes mellitus

## 2022-08-04 NOTE — PROGRESS NOTE ADULT - PROBLEM SELECTOR PROBLEM 9
Sarcoidosis
Type 2 diabetes mellitus
Sarcoidosis
Sarcoidosis
Hypertension
MANDY (acute kidney injury)
Sarcoidosis
Sarcoidosis
Type 2 diabetes mellitus
Sarcoidosis
Prophylactic measure

## 2022-08-04 NOTE — PROGRESS NOTE ADULT - SUBJECTIVE AND OBJECTIVE BOX
[facility] Division of Hospital Medicine  Aime PalmRandolph) MD Arjun  Pager [phone]    SUBJECTIVE:  Chief complaint: respiratory failure    Pt seen and evaluated at bedside. No o/n events. Feels well. No sig SOB, CP, NV. Overall feeling better.       ROS: All systems negative except as noted.      Vital Signs Last 24 Hrs  T(C): 36.8 (04 Aug 2022 18:00), Max: 36.9 (04 Aug 2022 05:10)  T(F): 98.2 (04 Aug 2022 18:00), Max: 98.4 (04 Aug 2022 05:10)  HR: 62 (04 Aug 2022 18:00) (60 - 62)  BP: 133/78 (04 Aug 2022 18:00) (123/69 - 137/70)  BP(mean): --  RR: 17 (04 Aug 2022 18:00) (17 - 17)  SpO2: 100% (04 Aug 2022 18:00) (97% - 100%)    Parameters below as of 04 Aug 2022 18:00  Patient On (Oxygen Delivery Method): nasal cannula  O2 Flow (L/min): 2          PHYSICAL EXAM:  Gen- In bed, frail  Eyes- EOMI, PERRLA, nonicteric.  EMNT- Fair dentition. MMM. No tonsilar exudates. No posterior pharynx erythema.  Neck- Supple. No masses. No tracheal deviation.  Resp- Diminished air entry at the bases. Good effort. Improved.   CVS- RRR, S1S2, no g/r/m. No LE edema.  GI- Soft abd, NT, ND, +BSx4. No HSM.  MSK- No C/C. ROM intact. No crepitus.  Neuro- CN II-XII intact. Speech fluent/face symmetric. Sensation intact.  Skin- No rashes/ulcers. Warm/moist.  Psych- Appropriate mood/affect.      MEDICATIONS  (STANDING):  albuterol/ipratropium for Nebulization 3 milliLiter(s) Nebulizer every 6 hours  amLODIPine   Tablet 10 milliGRAM(s) Oral daily  aspirin  chewable 81 milliGRAM(s) Oral daily  atorvastatin 20 milliGRAM(s) Oral at bedtime  carvedilol 25 milliGRAM(s) Oral every 12 hours  dextrose 50% Injectable 25 Gram(s) IV Push once  dextrose 50% Injectable 12.5 Gram(s) IV Push once  dextrose 50% Injectable 25 Gram(s) IV Push once  enoxaparin Injectable 40 milliGRAM(s) SubCutaneous every 24 hours  hydrALAZINE 100 milliGRAM(s) Oral three times a day  insulin glargine Injectable (LANTUS) 2 Unit(s) SubCutaneous at bedtime  insulin lispro (ADMELOG) corrective regimen sliding scale   SubCutaneous three times a day before meals  insulin lispro (ADMELOG) corrective regimen sliding scale   SubCutaneous at bedtime  melatonin 6 milliGRAM(s) Oral at bedtime  sodium chloride 3%  Inhalation 4 milliLiter(s) Inhalation every 6 hours    MEDICATIONS  (PRN):  acetaminophen     Tablet .. 650 milliGRAM(s) Oral every 6 hours PRN Temp greater or equal to 38C (100.4F), Mild Pain (1 - 3)  benzonatate 100 milliGRAM(s) Oral three times a day PRN Cough  dextrose Oral Gel 15 Gram(s) Oral once PRN Blood Glucose LESS THAN 70 milliGRAM(s)/deciliter  guaiFENesin Oral Liquid (Sugar-Free) 100 milliGRAM(s) Oral every 6 hours PRN Cough          LABORATORY:                          9.0    3.06  )-----------( 253      ( 04 Aug 2022 07:15 )             30.1     08-04    136  |  102  |  21  ----------------------------<  118<H>  4.8   |  27  |  1.34<H>    Ca    8.9      04 Aug 2022 07:15  Phos  4.9     08-04  Mg     2.00     08-04                COVID-19 PCR: NotDetec (03 Aug 2022 19:16)  COVID-19 PCR: NotDetec (28 Jul 2022 17:18)  COVID-19 PCR: NotDetec (25 Jul 2022 07:08)  COVID-19 PCR: NotDetec (19 Jul 2022 07:36)  SARS-CoV-2: NotDetec (04 Jun 2022 00:56)  COVID-19 PCR: NotDetec (09 Feb 2022 14:13)

## 2022-08-04 NOTE — PROGRESS NOTE ADULT - PROBLEM SELECTOR PLAN 10
[facility]  PT rec MELISSA  medically optimized pending pulm recs
A1c 7.3%. Goal < 7%;  home regimen: lantus 20 units at night; had episodes of hypoglycemia while in ICU  - AI ruled out per endo s/p stim in context of hypoglycemia   - on Lantus 2  - endo following
- c/w hydralazine 100mg TID, amlodipine 10 mg daily & carvedilol 25 mg BID  - holding lasix 40 mg  - holding losartan 50 mg for proteinuria
- c/w hydralazine 100mg TID, amlodipine 10 mg daily & carvedilol 25 mg BID  - c/w lasix 40 mg  - losartan 50 mg for BP above goal now that MANDY resolved and proteinuria
- c/w hydralazine 100mg TID, amlodipine 10 mg daily & carvedilol 25 mg BID  - c/w lasix 40 mg  - losartan 50 mg for proteinuria, holding for rising Cr
- c/w hydralazine 100mg TID, amlodipine 10 mg daily & carvedilol 25 mg BID  - holding lasix 40 mg  - holding losartan 50 mg for proteinuria
[facility]  PT rec MELISSA  dispo pending

## 2022-08-04 NOTE — PROGRESS NOTE ADULT - PROBLEM SELECTOR PLAN 12
[facility]  S&S 7/27: regular solids and thin liquids    Dispo: PT rec MELISSA, pt and family now amenable to MELISSA - awaiting completion of diagnostics, likely DC in 24h.    Communication: Plan of care d/w daughter Shareese via phone on 8/3.

## 2022-08-04 NOTE — PROGRESS NOTE ADULT - SUBJECTIVE AND OBJECTIVE BOX
Chief Complaint: DM 2    History: Patient seen at bedside. Due to [place], provider questions communicated to patient via writing. Patient reports she is hungry today - currently NPO since MN  No nausea/vomiting, no hypoglycemia    MEDICATIONS  (STANDING):  albuterol/ipratropium for Nebulization 3 milliLiter(s) Nebulizer every 6 hours  amLODIPine   Tablet 10 milliGRAM(s) Oral daily  aspirin  chewable 81 milliGRAM(s) Oral daily  atorvastatin 20 milliGRAM(s) Oral at bedtime  carvedilol 25 milliGRAM(s) Oral every 12 hours  dextrose 50% Injectable 25 Gram(s) IV Push once  dextrose 50% Injectable 12.5 Gram(s) IV Push once  dextrose 50% Injectable 25 Gram(s) IV Push once  enoxaparin Injectable 40 milliGRAM(s) SubCutaneous every 24 hours  hydrALAZINE 100 milliGRAM(s) Oral three times a day  insulin glargine Injectable (LANTUS) 2 Unit(s) SubCutaneous at bedtime  insulin lispro (ADMELOG) corrective regimen sliding scale   SubCutaneous three times a day before meals  insulin lispro (ADMELOG) corrective regimen sliding scale   SubCutaneous at bedtime  melatonin 6 milliGRAM(s) Oral at bedtime  sodium chloride 3%  Inhalation 4 milliLiter(s) Inhalation every 6 hours    MEDICATIONS  (PRN):  acetaminophen     Tablet .. 650 milliGRAM(s) Oral every 6 hours PRN Temp greater or equal to 38C (100.4F), Mild Pain (1 - 3)  benzonatate 100 milliGRAM(s) Oral three times a day PRN Cough  dextrose Oral Gel 15 Gram(s) Oral once PRN Blood Glucose LESS THAN 70 milliGRAM(s)/deciliter  guaiFENesin Oral Liquid (Sugar-Free) 100 milliGRAM(s) Oral every 6 hours PRN Cough    Allergies  penicillin ([place] Man Synd)    Review of Systems:  Cardiovascular: No chest pain  Respiratory: No SOB  GI: No nausea, vomiting  Endocrine: no hypoglycemia     PHYSICAL EXAM:  VITALS: T(C): 36.1 (08-04-22 @ 12:30)  T(F): 97 (08-04-22 @ 12:30), Max: 98.4 (08-04-22 @ 05:10)  HR: 60 (08-04-22 @ 12:30) (60 - 71)  BP: 123/69 (08-04-22 @ 12:30) (117/97 - 137/70)  RR:  (17 - 17)  SpO2:  (97% - 100%)  Wt(kg): --  GENERAL: NAD  EYES: No proptosis, no lid lag, anicteric  HEENT:  Atraumatic, Normocephalic, moist mucous membranes  RESPIRATORY: unlabored respirations     CAPILLARY BLOOD GLUCOSE    POCT Blood Glucose.: 116 mg/dL (04 Aug 2022 11:10)  POCT Blood Glucose.: 114 mg/dL (04 Aug 2022 07:22)  POCT Blood Glucose.: 166 mg/dL (03 Aug 2022 22:45)  POCT Blood Glucose.: 111 mg/dL (03 Aug 2022 17:04)      08-04    136  |  102  |  21  ----------------------------<  118<H>  4.8   |  27  |  1.34<H>    eGFR: 43<L>    Ca    8.9      08-04  Mg     2.00     08-04  Phos  4.9     08-04        Thyroid Function Tests:  07-29 @ 04:30 TSH 4.72 FreeT4 -- T3 -- Anti TPO -- Anti Thyroglobulin Ab -- TSI --  07-13 @ 04:10 TSH 3.37 FreeT4 -- T3 -- Anti TPO -- Anti Thyroglobulin Ab -- TSI --      A1C with Estimated Average Glucose Result: 7.3 % (06-05-22 @ 06:00)  A1C with Estimated Average Glucose Result: 7.7 % (02-04-22 @ 05:37)  A1C with Estimated Average Glucose Result: 9.2 % (11-29-21 @ 06:47)    Diet, NPO after Midnight:      NPO Start Date: 03-Aug-2022,   NPO Start Time: 23:59  Except Medications (08-04-22 @ 03:30)  Diet, Regular:   Consistent Carbohydrate Evening Snack (CSTCHOSN) (07-29-22 @ 14:09)

## 2022-08-04 NOTE — PROGRESS NOTE ADULT - ASSESSMENT
67F [place], HTN, HLD, sarcoidosis, HFpEF p/w lethargy/SOB/swelling/AMS admitted with metabolic encephalopathy due to acute hypercarbic respiratory failure in context presumed HFpEF exacerbation s/p intubation and MICU admission 7/13 for hypercapnia/airway protection was extubated 7/16  (s/p empiric meropenem 7/13 - 7/20 for concern for sepsis given hypothermia/AMS however neg infectious w/u) course c/b PEA arrest on 7/17 pt re-intubated s/p second PEA arrest when arrived to CTICU now transferred to medicine on 7/23 s/p being extubated.  Course now c/b c diff. Also with nephrotic range proteinuria of unclear etiology.  L sided white-out on CXR since 7/24 c/b on 7/28 episode of hypoxia w/ bradycardia in context of turning to change pt w/ R side down, ABG w/ acute on chronic hypercapnia now tolerating NC.

## 2022-08-04 NOTE — PROGRESS NOTE ADULT - ASSESSMENT
This is a 66 yo F /w a PMH OF DM2 (A1c 7.3% on this admission), CHF, sarcoidosis who presents with AMS c/b hypercapnic respiratory failure and CHF exacerbation requiring intubation. Endocrine consulted for: Persistent hypoglycemia    1. Hypoglycemia  Noted on day of admission, possibly related to home doses of insulin that patient was taking, poor PO intake, and low GFR  Concern for AI as below -->ruled out  Hypoglycemia now resolved     2. R/o Adrenal Insufficiency  AM cortisol returned 7.1. ACTH stimulation performed showed robust response (22 and 24 at 30 minute and 60 minute frances respectively). A value > 18 rules out primary AI (not necessarily secondary AI).   Repeat AM cortisol was normal at 16.1.   TFTs are WDL  AI ruled out, hypoglycemia not related to adrenal cause, no need for steroids at this time     3. Metabolic Acidosis   Initial acidosis likely due to starvation and renal related; BHB 3.1 elevated; was given D5 IVF, PO diet started and Lantus 5 units administered   BG stable, AG/BHB significantly improved   Continue to monitor  Resolved     4. T2DM, controlled  A1c 7.3%. Goal < 7%  Home regimen: Lantus 20 units at night  Cpeptide 0.9, repeat 5.0    While inpatient:   BG target 100-180 mg/dl  Within target range  Continue Lantus to 2 units SQ qHS  No Admelog pre-meal insulin for now  Continue Admelog low dose correctional scale before meals, low dose at bedtime  Check BG before meals and bedtime  Consistent carbohydrate diet   Hypoglycemia protocol   Please assist with meals  Provider to RN: Insulin pen administration with patient and daughter with teach back; Daughter agrees to help with insulin pen administration    Discharge Plan:   May be able to resume basal insulin only (dose TBD) vs orals (eg renally adjusted DPP4)   Ensure patient has working glucometer, test strips, lancets, alcohol pads  Please also prescribe glucose tabs, Baqsimi nasal or Glucagon emergency kit for hypoglycemia risk   Follow up with PCP, podiatry and opthalmology as an outpatient  If pt and family wishes can follow up with [facility] [place] ; [address] [place] [address], [address], [facility] [address], [phone]    5. HTN  BP Goal < 130/80  Within goal on Coreg, Hydralazine, Amlodipine   Continue management per primary team    6. HLD  LDL goal < 70  Continue Atorvastatin 20 mg daily if no contraindications     Tracey Alejo  Nurse Practitioner  Division of Endocrinology & Diabetes  In house pager #[phone]/long range pager #[phone]    If before 9AM or after 6PM, or on weekends/holidays, please call endocrine answering service for assistance ([phone]).  For nonurgent matters email [email or web address] for assistance.

## 2022-08-04 NOTE — PROGRESS NOTE ADULT - PROBLEM SELECTOR PLAN 8
Normocytic, no s/s of bleeding, iron studies c/w AOCD, no evidence of hemolysis, normal B12/folate, low reticulocyte, SPEP chronic inflammation, immunofixation wnl  - OP heme f/u   - ?related to sarcoid  - Stable. no overt bleeding, pRBC x 1 on 7/30

## 2022-08-04 NOTE — PROGRESS NOTE ADULT - PROBLEM SELECTOR PROBLEM 8
Anemia
Prophylactic measure
Hypertension
Anemia
Anemia
Hypertension
MANDY (acute kidney injury)
Anemia
Anemia
Sarcoidosis
Anemia
Type 2 diabetes mellitus

## 2022-08-04 NOTE — PROGRESS NOTE ADULT - PROBLEM SELECTOR PLAN 1
Pt. with multiple MANDY episodes during this admission. Scr was elevated at 1.65 on admission (7/12/22). Pt. was intubated on 7/13/22. Scr increased to 2.39 on 7/14/22. Scr subsequently increased to 3.72 on 7/19/22. Scr improved to 127 on 7/24/22. Scr further improved to 0.96 on 7/28/22. Scr was elevated at 1.70 (7/30/22). Scr remained elevated at 1.77 yesterday (7/31/22) but improved to 1.59 today. UA showed proteinuria with large LECs and WBCs. Spot urine TP/CR was significantly elevated at 11.3. CT scan abdomen ruled out obstruction / hydronephrosis on 7/12/22. Serological work up nephrotic range proteinuria negative so far (as outline in lab section). Labs reviewed. Pt. clinically not in fluid overload. Recommend continue holding off diuretics for now. Check renal vein doppler.    I had extensive discussion for 20 minutes today with the patient's daughter.  We discussed the fact that the patient has had poorly controlled diabetes for many years and only recently started getting things under control this year.  Her main complaint has been recurrent swelling.  Suspect that this is diabetic kidney disease.  I explained to the daughter that a kidney biopsy would be needed but given recent cardiac arrest and now on aspirin a biopsy would be high risk.  We are going to revisit the possibility of biopsy on the outside.  I recommend that we introduce losartan 25 mg po daily and hold off on amlodipine (given propensity for edema).  Monitor BMP and repeat prot/cr ratio.

## 2022-08-04 NOTE — PROGRESS NOTE ADULT - PROBLEM SELECTOR PLAN 7
- EEG with slowing but no seizures   - CTH neg  - per neuro AMS 2/2 hypercapnic respiratory failure, prior cerebellar stroke likely small vessel  - Mentation seems to be back to baseline.   - MRI brain r/o central causes for intermittent encephalopathy in progress.

## 2022-08-04 NOTE — PROGRESS NOTE ADULT - SUBJECTIVE AND OBJECTIVE BOX
[facility] Division of Kidney Diseases & Hypertension  FOLLOW UP NOTE  --------------------------------------------------------------------------------    HPI: 67-year-old F with h/o HFpEF, DM, HTN, HLD, sarcoidosis admitted at [facility] on 7/13/22 for AMS with acute respiratory failure requiring intubation (7/13). Hospital course complicated by PEA arrest x2. Pt was extubated and transferred to medicine. Currently being treated for C. diff. Pt. being seen for for nephrotic range proteinuria.     Pt. seen and examined at bedside. Pt. reports of feeling ok but review of systems is overall limited due to her hearing difficulties.  Communicated with patient by writing down.     PAST HISTORY  --------------------------------------------------------------------------------  No significant changes to PMH, PSH, FHx, SHx, unless otherwise noted    ALLERGIES & MEDICATIONS  --------------------------------------------------------------------------------  Allergies    penicillin (Red Man Synd)    Intolerances      Standing Inpatient Medications  albuterol/ipratropium for Nebulization 3 milliLiter(s) Nebulizer every 6 hours  amLODIPine   Tablet 10 milliGRAM(s) Oral daily  aspirin  chewable 81 milliGRAM(s) Oral daily  atorvastatin 20 milliGRAM(s) Oral at bedtime  carvedilol 25 milliGRAM(s) Oral every 12 hours  dextrose 50% Injectable 25 Gram(s) IV Push once  dextrose 50% Injectable 12.5 Gram(s) IV Push once  dextrose 50% Injectable 25 Gram(s) IV Push once  enoxaparin Injectable 40 milliGRAM(s) SubCutaneous every 24 hours  hydrALAZINE 100 milliGRAM(s) Oral three times a day  insulin glargine Injectable (LANTUS) 2 Unit(s) SubCutaneous at bedtime  insulin lispro (ADMELOG) corrective regimen sliding scale   SubCutaneous three times a day before meals  insulin lispro (ADMELOG) corrective regimen sliding scale   SubCutaneous at bedtime  melatonin 6 milliGRAM(s) Oral at bedtime  sodium chloride 3%  Inhalation 4 milliLiter(s) Inhalation every 6 hours  vancomycin    Solution 125 milliGRAM(s) Oral every 6 hours    PRN Inpatient Medications  acetaminophen     Tablet .. 650 milliGRAM(s) Oral every 6 hours PRN  benzonatate 100 milliGRAM(s) Oral three times a day PRN  dextrose Oral Gel 15 Gram(s) Oral once PRN  guaiFENesin Oral Liquid (Sugar-Free) 100 milliGRAM(s) Oral every 6 hours PRN      REVIEW OF SYSTEMS  --------------------------------------------------------------------------------  Gen: no fever  Respiratory: no sob  CV: mild chest soreness  GI: no ab pain  : no urinary complaints  MSK: no pain    VITALS/PHYSICAL EXAM  --------------------------------------------------------------------------------  T(C): 36.9 (08-04-22 @ 05:10), Max: 36.9 (08-04-22 @ 05:10)  HR: 61 (08-04-22 @ 05:10) (60 - 71)  BP: 137/70 (08-04-22 @ 05:10) (117/73 - 137/70)  ABP: --  ABP(mean): --  RR: 17 (08-04-22 @ 05:10) (17 - 18)  SpO2: 98% (08-04-22 @ 05:10) (97% - 100%)  CVP(mm Hg): --        08-03-22 @ 07:01  -  08-04-22 @ 07:00  --------------------------------------------------------  IN: 300 mL / OUT: 900 mL / NET: -600 mL      Physical Exam:  	Gen: NAD  	HEENT: MMM  	Pulm: CTA B/L  	CV: S1S2              : No suprapubic tenderness  	Abd: Soft  	Ext: improved edema  	Neuro: Awake  	Skin: Warm and dry  	Vascular access: IV peripheral canula    LABS/STUDIES  --------------------------------------------------------------------------------              9.0    3.06  >-----------<  253      [08-04-22 @ 07:15]              30.1     136  |  102  |  21  ----------------------------<  118      [08-04-22 @ 07:15]  4.8   |  27  |  1.34        Ca     8.9     [08-04-22 @ 07:15]      Mg     2.00     [08-04-22 @ 07:15]      Phos  4.9     [08-04-22 @ 07:15]            Creatinine Trend:  SCr 1.34 [08-04 @ 07:15]  SCr 1.53 [08-03 @ 06:55]  SCr 1.37 [08-02 @ 06:10]  SCr 1.59 [08-01 @ 06:40]  SCr 1.77 [07-31 @ 04:52]      Urine Creatinine 44      [07-28-22 @ 16:43]  Urine Protein 499      [07-28-22 @ 16:43]    TSH 4.72      [07-29-22 @ 04:30]    HIV Nonreact      [07-29-22 @ 04:30]

## 2022-08-05 ENCOUNTER — TRANSCRIPTION ENCOUNTER (OUTPATIENT)
Age: 67
End: 2022-08-05

## 2022-08-05 VITALS — OXYGEN SATURATION: 98 %

## 2022-08-05 LAB
GLUCOSE BLDC GLUCOMTR-MCNC: 100 MG/DL — HIGH (ref 70–99)
GLUCOSE BLDC GLUCOMTR-MCNC: 149 MG/DL — HIGH (ref 70–99)

## 2022-08-05 PROCEDURE — 99233 SBSQ HOSP IP/OBS HIGH 50: CPT | Mod: GC

## 2022-08-05 PROCEDURE — 99239 HOSP IP/OBS DSCHRG MGMT >30: CPT

## 2022-08-05 RX ORDER — AMLODIPINE BESYLATE 2.5 MG/1
1 TABLET ORAL
Qty: 0 | Refills: 0 | DISCHARGE
Start: 2022-08-05

## 2022-08-05 RX ORDER — CARVEDILOL PHOSPHATE 80 MG/1
1 CAPSULE, EXTENDED RELEASE ORAL
Qty: 0 | Refills: 0 | DISCHARGE
Start: 2022-08-05

## 2022-08-05 RX ORDER — IPRATROPIUM/ALBUTEROL SULFATE 18-103MCG
3 AEROSOL WITH ADAPTER (GRAM) INHALATION
Qty: 0 | Refills: 0 | DISCHARGE
Start: 2022-08-05

## 2022-08-05 RX ORDER — DEXAMETHASONE 0.4 MG/1
5 INSERT INTRACANALICULAR; OPHTHALMIC
Qty: 0 | Refills: 0 | DISCHARGE

## 2022-08-05 RX ORDER — INSULIN DETEMIR 100/ML (3)
20 INSULIN PEN (ML) SUBCUTANEOUS
Qty: 0 | Refills: 0 | DISCHARGE

## 2022-08-05 RX ORDER — INSULIN LISPRO 100/ML
0 VIAL (ML) SUBCUTANEOUS
Qty: 0 | Refills: 0 | DISCHARGE
Start: 2022-08-05

## 2022-08-05 RX ORDER — ENOXAPARIN SODIUM 100 MG/ML
40 INJECTION SUBCUTANEOUS
Qty: 0 | Refills: 0 | DISCHARGE
Start: 2022-08-05

## 2022-08-05 RX ORDER — ASPIRIN/CALCIUM CARB/MAGNESIUM 324 MG
1 TABLET ORAL
Qty: 0 | Refills: 0 | DISCHARGE
Start: 2022-08-05

## 2022-08-05 RX ORDER — SODIUM CHLORIDE 9 MG/ML
4 INJECTION INTRAMUSCULAR; INTRAVENOUS; SUBCUTANEOUS
Qty: 0 | Refills: 0 | DISCHARGE
Start: 2022-08-05

## 2022-08-05 RX ORDER — ATORVASTATIN CALCIUM 80 MG/1
1 TABLET, FILM COATED ORAL
Qty: 0 | Refills: 0 | DISCHARGE
Start: 2022-08-05

## 2022-08-05 RX ORDER — HYDRALAZINE HCL 50 MG
1 TABLET ORAL
Qty: 0 | Refills: 0 | DISCHARGE
Start: 2022-08-05

## 2022-08-05 RX ORDER — ACETAMINOPHEN 500 MG
2 TABLET ORAL
Qty: 0 | Refills: 0 | DISCHARGE
Start: 2022-08-05

## 2022-08-05 RX ORDER — INSULIN GLARGINE 100 [IU]/ML
2 INJECTION, SOLUTION SUBCUTANEOUS
Qty: 0 | Refills: 0 | DISCHARGE
Start: 2022-08-05

## 2022-08-05 RX ORDER — ATORVASTATIN CALCIUM 80 MG/1
1 TABLET, FILM COATED ORAL
Qty: 0 | Refills: 0 | DISCHARGE

## 2022-08-05 RX ORDER — LANOLIN ALCOHOL/MO/W.PET/CERES
2 CREAM (GRAM) TOPICAL
Qty: 0 | Refills: 0 | DISCHARGE
Start: 2022-08-05

## 2022-08-05 RX ADMIN — ENOXAPARIN SODIUM 40 MILLIGRAM(S): 100 INJECTION SUBCUTANEOUS at 13:38

## 2022-08-05 RX ADMIN — Medication 100 MILLIGRAM(S): at 05:48

## 2022-08-05 RX ADMIN — CARVEDILOL PHOSPHATE 25 MILLIGRAM(S): 80 CAPSULE, EXTENDED RELEASE ORAL at 05:49

## 2022-08-05 RX ADMIN — Medication 3 MILLILITER(S): at 15:50

## 2022-08-05 RX ADMIN — Medication 3 MILLILITER(S): at 10:52

## 2022-08-05 RX ADMIN — AMLODIPINE BESYLATE 10 MILLIGRAM(S): 2.5 TABLET ORAL at 05:49

## 2022-08-05 RX ADMIN — SODIUM CHLORIDE 4 MILLILITER(S): 9 INJECTION INTRAMUSCULAR; INTRAVENOUS; SUBCUTANEOUS at 13:40

## 2022-08-05 RX ADMIN — SODIUM CHLORIDE 4 MILLILITER(S): 9 INJECTION INTRAMUSCULAR; INTRAVENOUS; SUBCUTANEOUS at 15:51

## 2022-08-05 RX ADMIN — SODIUM CHLORIDE 4 MILLILITER(S): 9 INJECTION INTRAMUSCULAR; INTRAVENOUS; SUBCUTANEOUS at 10:53

## 2022-08-05 RX ADMIN — Medication 3 MILLILITER(S): at 04:17

## 2022-08-05 RX ADMIN — Medication 81 MILLIGRAM(S): at 13:40

## 2022-08-05 RX ADMIN — Medication 100 MILLIGRAM(S): at 13:39

## 2022-08-05 NOTE — DISCHARGE NOTE PROVIDER - NSDCCPCAREPLAN_GEN_ALL_CORE_FT
PRINCIPAL DISCHARGE DIAGNOSIS  Diagnosis: Metabolic encephalopathy  Assessment and Plan of Treatment: You were initially admitted for metabolic encephalopathy (changes in mental status). Neurology was following you during your admission. This could've been likely to your respiratory failure upon admission. You had a CT and MRI of your head that were negative for any findings. You had an EEG performed that did not show any seizure activity.      SECONDARY DISCHARGE DIAGNOSES  Diagnosis: Acute hypoxemic respiratory failure  Assessment and Plan of Treatment:     Diagnosis: Acute diastolic heart failure  Assessment and Plan of Treatment:     Diagnosis: PEA (Pulseless electrical activity)  Assessment and Plan of Treatment:     Diagnosis: Anemia  Assessment and Plan of Treatment:     Diagnosis: Lung consolidation  Assessment and Plan of Treatment:     Diagnosis: MANDY (acute kidney injury)  Assessment and Plan of Treatment:     Diagnosis: Clostridium difficile diarrhea  Assessment and Plan of Treatment:     Diagnosis: DM (diabetes mellitus), type 2  Assessment and Plan of Treatment:     Diagnosis: Sarcoidosis  Assessment and Plan of Treatment:     Diagnosis: Hypertension  Assessment and Plan of Treatment:      PRINCIPAL DISCHARGE DIAGNOSIS  Diagnosis: Metabolic encephalopathy  Assessment and Plan of Treatment: You were initially admitted for metabolic encephalopathy (changes in mental status). Neurology was following you during your admission. This could've been likely to your respiratory failure upon admission. You had a CT and MRI of your head that were negative for any findings. You had an EEG performed that did not show any seizure activity. Your mental status improved. Please follow up with your PCP upon discharge.      SECONDARY DISCHARGE DIAGNOSES  Diagnosis: Acute hypoxemic respiratory failure  Assessment and Plan of Treatment: You were initially admitted to the MICU and intubated for respiratory failure. You were extubated however re-intubated during your admission after your episode of pulseless electrical activity (your heart stopped). You were extubated the second time and were put on BIPAP oxygen (ventilator). You were weaned off BIPAP and are now stable on nasal cannula.    Diagnosis: Acute diastolic heart failure  Assessment and Plan of Treatment: You were also admitted for heart failure exacerbation. You were placed on diuretics to relieve fluid, however this was discontinued due to an acute kidney injury. Your fluid status has improved.    Continue recommended medication regimen, fluid restriction. Monitor for signs/symptoms of fluid overload and electrolyte abnormalities, such as, shortness of breath, cough, swelling, chest discomfort, changes in heart rate, dizziness, fainting, or changes in mental status. Keep track of your weight and call your outpatient physician if there are abrupt changes in weight.   Follow-up with your outpatient provider after you've been discharged from the hospital for further care/recommendations.    Diagnosis: PEA (Pulseless electrical activity)  Assessment and Plan of Treatment: During your admission you had 2 episodes of pulseless electrical activity (your heart stopped) and you gained return of spontaneous circulation (your heart started again).    Diagnosis: Anemia  Assessment and Plan of Treatment: Your hemoglobin levels were low during your admission however there was no signs of bleeding. This is likely an anemia of chronic disease. You received a transfusion on 7/30. Please follow up outpatient with your primary care doctor/ hematologist for further management.    Diagnosis: Lung consolidation  Assessment and Plan of Treatment: Pulmonology was following you during your stay. You were treated with antibiotics during this admission for any possible infection. You had CT chest that showed small pleural effusions with partial collapse of your lung on both right  and left sides. Per pulmonology, no further interventions were needed. You do not require BIPAP (vent) as supplemental oxygen upon discharge. Continue supplemental oxygen with nasal cannula.    Diagnosis: MANDY (acute kidney injury)  Assessment and Plan of Treatment: You had acute kidney injury with also protein in your urine. You had a workup for causes of this protein in your urine and all tests so far came back negative. You were followed by nephrology during your stay. You had a doppler done of your kidneys that did not have any acute findings. Please follow up outpatient with your PCP.    Diagnosis: Clostridium difficile diarrhea  Assessment and Plan of Treatment: During your admission you had c-diff infection that caused diarrhea. This was treated by a course of antibiotics. Your diarrhea has since resolved.    Diagnosis: DM (diabetes mellitus), type 2  Assessment and Plan of Treatment: Your HgA1C during hospitalization was noted to be 7.3 (Provide such information to your primary care).  You home lantus dose was decreased to 2 units. Endocrine was follow you during your stay. Upon discharge, maintain a consistent carbohydrate diet (Meaning eating the same amount of carbohydrates at the same time each day). Monitor blood glucose levels throughout the day before meals and at bedtime. Record blood sugars and bring to outpatient providers appointment in order to be reviewed by your doctor for management modifications. If your sugars are more than 400 or less than 70 you should contact your PCP immediately.   Monitor for signs/symptoms of low blood glucose, such as, dizziness, altered mental status, or cool/clammy skin. In addition, monitor for signs/symptoms of high blood glucose, such as, feeling hot, dry, fatigued, or with increased thirst/urination.   Make regular podiatry appointments in order to have feet checked for wounds and uncontrolled toe nail growth to prevent infections, as well as, appointments with an ophthalmologist to monitor your vision.    Diagnosis: Sarcoidosis  Assessment and Plan of Treatment: Please follow up outpatient with pulmonology Dr. Monica Quick on discharge regarding your recent diagnosis of sarcoidosis. Please also have an outpatient sleep study and pulmonary function tests.    Diagnosis: Hypertension  Assessment and Plan of Treatment: Continue blood pressure medication regimen as directed. Monitor for any visual changes, headaches or dizziness.  Monitor blood pressure regularly.  Follow up with your primary care provider for further management for high blood pressure.

## 2022-08-05 NOTE — PROGRESS NOTE ADULT - ATTENDING COMMENTS
67F with sarcoidosis with complicated hospital course including respiratory failure in setting of CHF exacerbation s/p ICU admission 7/13-7/16 with intubation, PEA arrest 7/16 and readmission to ICU where she had second PEA arrest thought to be due to hypoxemia from aspiration, RRT 7/28 with increased work of breathing, bradycardia, hypothermia and intermittent airway plugging  - Remains with diminished breath sounds on left  - Continue aggressive airway clearance  - Aerobika and chest PT to facilitate mobilization of secretions  - Loculated effusion but not amenable for thoracentesis at this time.  - Encourage OOB to chair as mobilization would help with airway clearance.    - Discharge planning per primary team  - Follow up as outpatient

## 2022-08-05 NOTE — DISCHARGE NOTE PROVIDER - CARE PROVIDER_API CALL
Monica Quick)  Critical Care Medicine; Internal Medicine; Pulmonary Disease; Sleep Medicine  [address] [place] [address]  Phone: [phone]  Fax: [phone]  Follow Up Time:     Your PCP,   Phone: (   )    -  Fax: (   )    -  Follow Up Time: 1 week

## 2022-08-05 NOTE — PROGRESS NOTE ADULT - TIME BILLING
patient care
patient care
Medical management as above, review of results/records, discussion with patient and primary team.
patient care
Medical management as above, review of results/records, discussion with patient and primary team.
patient care

## 2022-08-05 NOTE — DISCHARGE NOTE PROVIDER - PROVIDER TOKENS
PROVIDER:[TOKEN:[[id number]]],FREE:[LAST:[Your PCP],PHONE:[(   )    -],FAX:[(   )    -],FOLLOWUP:[1 week]]

## 2022-08-05 NOTE — DISCHARGE NOTE PROVIDER - CARE PROVIDERS DIRECT ADDRESSES
[email or web address]

## 2022-08-05 NOTE — DISCHARGE NOTE PROVIDER - DETAILS OF MALNUTRITION DIAGNOSIS/DIAGNOSES
This patient has been assessed with a concern for Malnutrition and was treated during this hospitalization for the following Nutrition diagnosis/diagnoses:     -  07/21/2022: Severe protein-calorie malnutrition

## 2022-08-05 NOTE — DISCHARGE NOTE PROVIDER - HOSPITAL COURSE
67F w/ PMHx of HTN, HLD, sarcoidosis, CHF, presented to the ED for new onset AMS. She is also found to have pleural effusion, elevated BNP and LE edema concerning for CHF exacerbation. Admitted for metabolic encephalopathy and CHF exacerbation. Intubated 7/13 for AMS, extubated 7/16 and transferred to floor. Code called on 7/17 am, PEA, ROSC achieved. Re-intubated and re-admitted to MICU (boarded in CTICU). Code called upon transfer to CTICU, PEA, ROSC achieved. Extubated to BIPAP and weaned to NC, transferred to medicine floor.     Lung consolidation.   - Presented with R>L effusion.  CXR on 7/24 w/ L effusion vs infiltrate, pulm did POCUS and not c/w left effusion, suspected atelectatic lung vs consolidation  - s/p bipap again on 7/28 for acute episode hypoxia/hypercapnea  - lasix 40 mg PO daily, held 7/30 for MANDY  - s/p meropenum 7/13-7/20, restarted on 7/28-7/30, BCx NGTD and no fevers/cough etc   - followed by pulmonology   - c/w incentive spirometry, chest PT, chest vest, nebs  - cineesophagogram by S&S 7/29 confirmed no aspiration  - CT chest showed loculated L PLEF w/ resulting partial collapse of left lung. Small right PLEF w/ partial collapse of RLL. POCUS from pulm noted - no additional interventions recommended for findings at this time.    MANDY (acute kidney injury).   - With nephrotic range proteinuria. Serologic workup neg so far.  MINOO neg. Unclear if related to underlying hypertensive and diabetic kidney disease. Per renal, may also worsen in setting of PEA arrest.   - renal vein doppler neg for renal vein thrombosis   - Last held   - followed by nephrology     Acute respiratory failure with hypoxia and hypercapnia.   - CT chest findings as discussed above.   - followed by pulm   - c/w chest PT, incentive spirometry, nebs  - BIPAP discontinued as patient did not require. O2 sated well on 2L NC.     Clostridium difficile diarrhea.   - Had rectal tube and diarrhea c diff sent 7/24 +, s/p recent abx exposure while admitted   - PO vanco x 10 days  - upon discharge pt tolerating diet, no abdominal pain, nausea/vomiting/diarrhea     Acute diastolic heart failure.   - BNP on arrival: 4021 with evidence of fluid overload with pleural effusions, b/l pitting edema (IVC not c/w overload in ICU however intubated at that time)  - TTE (7/15/22): moderate LV systolic dysfunction, moderate diastolic dysfunction, RV enlargement with decreased RV systolic dysfunction  - TTE  (7/21/22): EF 65% with normal biventricular systolic function  - s/p lasix, held for MANDY   - c/w BP control, daily weights and strict I&Os.    Metabolic encephalopathy.   - EEG no seizures   - CTH neg   - per neuro AMS 2/2 hypercapnic respiratory failure, prior cerebellar stroke likely small vessel  - MS improved   - MRI brain negative for any acute findings     Anemia.   - Normocytic, no s/s of bleeding, iron studies c/w AOCD, no evidence of hemolysis, normal B12/folate, low reticulocyte, SPEP chronic inflammation, immunofixation wnl  - OP heme f/u   - ?related to sarcoid  - downtrending, no overt bleeding, pRBC x 1 on 7/30.    Sarcoidosis.   - Saw pulm in Feb s/p bronch, daughter stated confirmed diagnosis (however path from Feb of LN states nondiagnostic) and was due for continued OP f/u   - OP sleep study and PFTs.  - Pt to f/u w/ Dr. Monica Quick on d/c for sarcoid treatment     Hypertension.   - c/w hydralazine 100mg TID, amlodipine 10 mg daily & carvedilol 25 mg BID  - held lasix and losartan for MANDY     Type 2 diabetes mellitus. (A1C 7.3%)  - home regimen: lantus 20 units at night; had episodes of hypoglycemia while in ICU  - adrenal insufficiency ruled out per endo s/p stim in context of hypoglycemia   - on Lantus 2, followed by endocrine     Dispo - MELISSA with NC O2.   Patient is medically optimized for discharge. Case discussed with Dr. Díaz on 8/5/22.

## 2022-08-05 NOTE — PROGRESS NOTE ADULT - REASON FOR ADMISSION
AMS, shortness of breath
shortness of breath
AMs, shortness of breath

## 2022-08-05 NOTE — DISCHARGE NOTE PROVIDER - NSDCMRMEDTOKEN_GEN_ALL_CORE_FT
amLODIPine 10 mg oral tablet: 1 tab(s) orally once a day  aspirin 81 mg oral delayed release tablet: 1 tab(s) orally once a day  atorvastatin 20 mg oral tablet: 1 tab(s) orally once a day  carvedilol 25 mg oral tablet: 1 tab(s) orally 2 times a day   dexamethasone 0.1% ophthalmic suspension: 5 drop(s) to each affected ear once a day  furosemide 40 mg oral tablet: 1 tab(s) orally once a day  hydrALAZINE 50 mg oral tablet: 1 tab(s) orally 3 times a day  insulin detemir 100 units/mL subcutaneous solution: 20 unit(s) subcutaneous once a day (at bedtime)   acetaminophen 325 mg oral tablet: 2 tab(s) orally every 6 hours, As needed, Temp greater or equal to 38C (100.4F), Mild Pain (1 - 3)  amLODIPine 10 mg oral tablet: 1 tab(s) orally once a day  aspirin 81 mg oral tablet, chewable: 1 tab(s) orally once a day  atorvastatin 20 mg oral tablet: 1 tab(s) orally once a day (at bedtime)  benzonatate 100 mg oral capsule: 1 cap(s) orally 3 times a day, As needed, Cough  carvedilol 25 mg oral tablet: 1 tab(s) orally every 12 hours  enoxaparin: 40 milligram(s) subcutaneous every 24 hours  guaiFENesin 100 mg/5 mL oral liquid: 5 milliliter(s) orally every 6 hours, As needed, Cough  hydrALAZINE 100 mg oral tablet: 1 tab(s) orally 3 times a day  insulin glargine 100 units/mL subcutaneous solution: 2 unit(s) subcutaneous once a day (at bedtime)  insulin lispro 100 units/mL injectable solution:     Sliding Scale, three times a day before meals     1 Unit(s) if Glucose 151 - 200  2 Unit(s) if Glucose 201 - 250  3 Unit(s) if Glucose 251 - 300  4 Unit(s) if Glucose 301 - 350  5 Unit(s) if Glucose 351 - 400  6 Unit(s) if Glucose Greater Than 400  insulin lispro 100 units/mL injectable solution:     Sliding Scale before bedtime    0 Unit(s) if Glucose 0 - 250  1 Unit(s) if Glucose 251 - 300  2 Unit(s) if Glucose 301 - 350  3 Unit(s) if Glucose 351 - 400  4 Unit(s) if Glucose Greater Than 400  ipratropium-albuterol 0.5 mg-2.5 mg/3 mL inhalation solution: 3 milliliter(s) inhaled every 6 hours  melatonin 3 mg oral tablet: 2 tab(s) orally once a day (at bedtime)  sodium chloride 3% inhalation solution: 4 milliliter(s) inhaled every 6 hours

## 2022-08-05 NOTE — PROGRESS NOTE ADULT - ASSESSMENT
68 y/o F with pmhx of HTN, HLD, sarcoidosis, CHF, presented to the ED for new onset AMS and fluid overload c/f CHF exacerbation s/p ICU admission - with intubation. Course complicated by PEA arrest  and readmission to ICU where she had second PEA arrest thought to be due to hypoxemia from aspiration. Completed 7d course meropenem and extubated, ICU stay c/b hypoglycemia from poor PO intake requiring D5 LR. Now on floor with CXR showing white out of left lung c/w atelectasis vs consolidation. RRT  with increased work of breathing, bradycardia, hypothermia.    Recommendations  - pleural effusion not amenable to drainage  - sarcoid f/u as outpatient  - continue airway clearance strategies: nasopharyngeal suction, chest vest/chest PT, hypertonic saline nebulizer, incentive spirometry  - continue aerobika  - OOB to chair, ambulate as able  - does not need bipap    Prior to discharge:  Please email [email or web address] to set up an appointment. Include patient's name, , MRN, and contact information in the email.    Pulmonary/Sleep Clinic  [address] [place] [address]  [phone]

## 2022-08-05 NOTE — DISCHARGE NOTE PROVIDER - NSFOLLOWUPCLINICS_GEN_ALL_ED_FT
[facility] Pulmonolgy and Sleep [facility]  Pulmonology  [address] [place] [address], [place] [address]  Phone: [phone]  Fax:

## 2022-08-05 NOTE — PROGRESS NOTE ADULT - SUBJECTIVE AND OBJECTIVE BOX
Interval Events:  - denies cough, sob, fever, chills, chest pain, chest tightness  - vest at bedside  - 97% on room air when taken off 2L NC    REVIEW OF SYSTEMS:  Negative except as documented above.      OBJECTIVE:  ICU Vital Signs Last 24 Hrs  T(C): 36.8 (05 Aug 2022 05:45), Max: 36.8 (04 Aug 2022 18:00)  T(F): 98.2 (05 Aug 2022 05:45), Max: 98.2 (04 Aug 2022 18:00)  HR: 69 (05 Aug 2022 05:45) (60 - 80)  BP: 128/62 (05 Aug 2022 05:45) (123/69 - 133/78)  BP(mean): --  ABP: --  ABP(mean): --  RR: 18 (05 Aug 2022 05:45) (17 - 18)  SpO2: 98% (05 Aug 2022 05:45) (98% - 100%)    O2 Parameters below as of 05 Aug 2022 05:45  Patient On (Oxygen Delivery Method): nasal cannula  O2 Flow (L/min): 2            08-04 @ 07:01  -  08-05 @ 07:00  --------------------------------------------------------  IN: 450 mL / OUT: 1500 mL / NET: -1050 mL      CAPILLARY BLOOD GLUCOSE      POCT Blood Glucose.: 100 mg/dL (05 Aug 2022 07:37)      PHYSICAL EXAM:  General: NAD  HEENT: PERRL, EOMI, sclera anicteric, moist mucus membranes  Neck: supple, no JVD  Cardiovascular: RRR, no murmurs, rubs, or gallops  Respiratory: decreased breath sounds L side, no wheezes, crackles, or rhonci  Abdomen: soft, nontender, nondistended, normoactive bowel sounds  Extremities: warm and well perfused, no edema, no clubbing  Skin: no rashes  Neurological: Aox3, no focal deficits    HOSPITAL MEDICATIONS:  MEDICATIONS  (STANDING):  albuterol/ipratropium for Nebulization 3 milliLiter(s) Nebulizer every 6 hours  amLODIPine   Tablet 10 milliGRAM(s) Oral daily  aspirin  chewable 81 milliGRAM(s) Oral daily  atorvastatin 20 milliGRAM(s) Oral at bedtime  carvedilol 25 milliGRAM(s) Oral every 12 hours  dextrose 50% Injectable 25 Gram(s) IV Push once  dextrose 50% Injectable 12.5 Gram(s) IV Push once  dextrose 50% Injectable 25 Gram(s) IV Push once  enoxaparin Injectable 40 milliGRAM(s) SubCutaneous every 24 hours  hydrALAZINE 100 milliGRAM(s) Oral three times a day  insulin glargine Injectable (LANTUS) 2 Unit(s) SubCutaneous at bedtime  insulin lispro (ADMELOG) corrective regimen sliding scale   SubCutaneous three times a day before meals  insulin lispro (ADMELOG) corrective regimen sliding scale   SubCutaneous at bedtime  melatonin 6 milliGRAM(s) Oral at bedtime  sodium chloride 3%  Inhalation 4 milliLiter(s) Inhalation every 6 hours    MEDICATIONS  (PRN):  acetaminophen     Tablet .. 650 milliGRAM(s) Oral every 6 hours PRN Temp greater or equal to 38C (100.4F), Mild Pain (1 - 3)  benzonatate 100 milliGRAM(s) Oral three times a day PRN Cough  dextrose Oral Gel 15 Gram(s) Oral once PRN Blood Glucose LESS THAN 70 milliGRAM(s)/deciliter  guaiFENesin Oral Liquid (Sugar-Free) 100 milliGRAM(s) Oral every 6 hours PRN Cough      LABS:                        9.0    3.06  )-----------( 253      ( 04 Aug 2022 07:15 )             30.1     Hgb Trend: 9.0<--, 8.5<--, 9.0<--, 8.8<--, 7.9<--  08-04    136  |  102  |  21  ----------------------------<  118<H>  4.8   |  27  |  1.34<H>    Ca    8.9      04 Aug 2022 07:15  Phos  4.9     08-04  Mg     2.00     08-04      Creatinine Trend: 1.34<--, 1.53<--, 1.37<--, 1.59<--, 1.77<--, 1.70<--            MICROBIOLOGY:       RADIOLOGY:  [x] Reviewed and interpreted by me   Interval Events:  - denies cough, sob, fever, chills, chest pain, chest tightness  - vest at bedside  - 97% on room air when taken off 2L NC, put back on 2L in afternoon    REVIEW OF SYSTEMS:  Negative except as documented above.      OBJECTIVE:  ICU Vital Signs Last 24 Hrs  T(C): 36.8 (05 Aug 2022 05:45), Max: 36.8 (04 Aug 2022 18:00)  T(F): 98.2 (05 Aug 2022 05:45), Max: 98.2 (04 Aug 2022 18:00)  HR: 69 (05 Aug 2022 05:45) (60 - 80)  BP: 128/62 (05 Aug 2022 05:45) (123/69 - 133/78)  BP(mean): --  ABP: --  ABP(mean): --  RR: 18 (05 Aug 2022 05:45) (17 - 18)  SpO2: 98% (05 Aug 2022 05:45) (98% - 100%)    O2 Parameters below as of 05 Aug 2022 05:45  Patient On (Oxygen Delivery Method): nasal cannula  O2 Flow (L/min): 2            08-04 @ 07:01  -  08-05 @ 07:00  --------------------------------------------------------  IN: 450 mL / OUT: 1500 mL / NET: -1050 mL      CAPILLARY BLOOD GLUCOSE      POCT Blood Glucose.: 100 mg/dL (05 Aug 2022 07:37)      PHYSICAL EXAM:  General: NAD  HEENT: PERRL, EOMI, sclera anicteric, moist mucus membranes  Neck: supple, no JVD  Cardiovascular: RRR, no murmurs, rubs, or gallops  Respiratory: decreased breath sounds L side, no wheezes, crackles, or rhonci  Abdomen: soft, nontender, nondistended, normoactive bowel sounds  Extremities: warm and well perfused, no edema, no clubbing  Skin: no rashes  Neurological: Aox3, no focal deficits    HOSPITAL MEDICATIONS:  MEDICATIONS  (STANDING):  albuterol/ipratropium for Nebulization 3 milliLiter(s) Nebulizer every 6 hours  amLODIPine   Tablet 10 milliGRAM(s) Oral daily  aspirin  chewable 81 milliGRAM(s) Oral daily  atorvastatin 20 milliGRAM(s) Oral at bedtime  carvedilol 25 milliGRAM(s) Oral every 12 hours  dextrose 50% Injectable 25 Gram(s) IV Push once  dextrose 50% Injectable 12.5 Gram(s) IV Push once  dextrose 50% Injectable 25 Gram(s) IV Push once  enoxaparin Injectable 40 milliGRAM(s) SubCutaneous every 24 hours  hydrALAZINE 100 milliGRAM(s) Oral three times a day  insulin glargine Injectable (LANTUS) 2 Unit(s) SubCutaneous at bedtime  insulin lispro (ADMELOG) corrective regimen sliding scale   SubCutaneous three times a day before meals  insulin lispro (ADMELOG) corrective regimen sliding scale   SubCutaneous at bedtime  melatonin 6 milliGRAM(s) Oral at bedtime  sodium chloride 3%  Inhalation 4 milliLiter(s) Inhalation every 6 hours    MEDICATIONS  (PRN):  acetaminophen     Tablet .. 650 milliGRAM(s) Oral every 6 hours PRN Temp greater or equal to 38C (100.4F), Mild Pain (1 - 3)  benzonatate 100 milliGRAM(s) Oral three times a day PRN Cough  dextrose Oral Gel 15 Gram(s) Oral once PRN Blood Glucose LESS THAN 70 milliGRAM(s)/deciliter  guaiFENesin Oral Liquid (Sugar-Free) 100 milliGRAM(s) Oral every 6 hours PRN Cough      LABS:                        9.0    3.06  )-----------( 253      ( 04 Aug 2022 07:15 )             30.1     Hgb Trend: 9.0<--, 8.5<--, 9.0<--, 8.8<--, 7.9<--  08-04    136  |  102  |  21  ----------------------------<  118<H>  4.8   |  27  |  1.34<H>    Ca    8.9      04 Aug 2022 07:15  Phos  4.9     08-04  Mg     2.00     08-04      Creatinine Trend: 1.34<--, 1.53<--, 1.37<--, 1.59<--, 1.77<--, 1.70<--        RADIOLOGY:  [x] Reviewed and interpreted by me

## 2022-08-05 NOTE — PROGRESS NOTE ADULT - PROVIDER SPECIALTY LIST ADULT
Anesthesia
Neurology
Pulmonology
Pulmonology
MICU
Nephrology
Neurology
Neurology
Pulmonology
Pulmonology
Hospitalist
Hospitalist
MICU
Pulmonology
MICU
Neurology
Neurology
Endocrinology
Nephrology
Neurology
Endocrinology
Hospitalist
Neurology
Endocrinology
Nephrology
Nephrology
Endocrinology
Hospitalist
Hospitalist
Endocrinology
Endocrinology
Hospitalist
Endocrinology
Hospitalist
Endocrinology
Hospitalist
Hospitalist
Internal Medicine
Hospitalist

## 2022-08-05 NOTE — PROGRESS NOTE ADULT - NUTRITIONAL ASSESSMENT
This patient has been assessed with a concern for Malnutrition and has been determined to have a diagnosis/diagnoses of Severe protein-calorie malnutrition.    This patient is being managed with:   Diet Pureed-  Consistent Carbohydrate {No Snacks} (CSTCHO)  Supplement Feeding Modality:  Oral  Glucerna Shake Cans or Servings Per Day:  1       Frequency:  Three Times a day  Entered: Jul 21 2022  6:24PM    
This patient has been assessed with a concern for Malnutrition and has been determined to have a diagnosis/diagnoses of Severe protein-calorie malnutrition.    This patient is being managed with:   Diet Regular-  Consistent Carbohydrate {Evening Snack} (CSTCHOSN)  Entered: Jul 29 2022 10:37AM    
This patient has been assessed with a concern for Malnutrition and has been determined to have a diagnosis/diagnoses of Severe protein-calorie malnutrition.    This patient is being managed with:   Diet Pureed-  Consistent Carbohydrate {No Snacks} (CSTCHO)  Supplement Feeding Modality:  Oral  Glucerna Shake Cans or Servings Per Day:  1       Frequency:  Three Times a day  Entered: Jul 21 2022  6:24PM    
This patient has been assessed with a concern for Malnutrition and has been determined to have a diagnosis/diagnoses of Severe protein-calorie malnutrition.    This patient is being managed with:   Diet Pureed-  Supplement Feeding Modality:  Oral  Glucerna Shake Cans or Servings Per Day:  1       Frequency:  Three Times a day  Entered: Jul 21 2022  5:34PM    
This patient has been assessed with a concern for Malnutrition and has been determined to have a diagnosis/diagnoses of Severe protein-calorie malnutrition.    This patient is being managed with:   Diet Pureed-  Consistent Carbohydrate {No Snacks} (CSTCHO)  Supplement Feeding Modality:  Oral  Glucerna Shake Cans or Servings Per Day:  1       Frequency:  Three Times a day  Entered: Jul 21 2022  6:24PM    
This patient has been assessed with a concern for Malnutrition and has been determined to have a diagnosis/diagnoses of Severe protein-calorie malnutrition.    This patient is being managed with:   Diet Regular-  Consistent Carbohydrate {Evening Snack} (CSTCHOSN)  Entered: Jul 29 2022 10:37AM    
This patient has been assessed with a concern for Malnutrition and has been determined to have a diagnosis/diagnoses of Severe protein-calorie malnutrition.    This patient is being managed with:   Diet Regular-  Consistent Carbohydrate {Evening Snack} (CSTCHOSN)  Supplement Feeding Modality:  Oral  Glucerna Shake Cans or Servings Per Day:  1       Frequency:  Three Times a day  Entered: Jul 27 2022  3:34PM    
This patient has been assessed with a concern for Malnutrition and has been determined to have a diagnosis/diagnoses of Severe protein-calorie malnutrition.    This patient is being managed with:   Diet Pureed-  Consistent Carbohydrate {No Snacks} (CSTCHO)  Supplement Feeding Modality:  Oral  Glucerna Shake Cans or Servings Per Day:  1       Frequency:  Three Times a day  Entered: Jul 21 2022  6:24PM    
This patient has been assessed with a concern for Malnutrition and has been determined to have a diagnosis/diagnoses of Severe protein-calorie malnutrition.    This patient is being managed with:   Diet Regular-  Consistent Carbohydrate {Evening Snack} (CSTCHOSN)  Entered: Jul 29 2022 10:37AM    
This patient has been assessed with a concern for Malnutrition and has been determined to have a diagnosis/diagnoses of Severe protein-calorie malnutrition.    This patient is being managed with:   Diet Regular-  Consistent Carbohydrate {Evening Snack} (CSTCHOSN)  Supplement Feeding Modality:  Oral  Glucerna Shake Cans or Servings Per Day:  1       Frequency:  Three Times a day  Entered: Jul 27 2022  3:34PM    
This patient has been assessed with a concern for Malnutrition and has been determined to have a diagnosis/diagnoses of Severe protein-calorie malnutrition.    This patient is being managed with:   Diet NPO after Midnight-     NPO Start Date: 03-Aug-2022   NPO Start Time: 23:59  Entered: Aug  3 2022  3:15PM    Diet Regular-  Consistent Carbohydrate {Evening Snack} (CSTCHOSN)  Entered: Jul 29 2022 10:37AM    
This patient has been assessed with a concern for Malnutrition and has been determined to have a diagnosis/diagnoses of Severe protein-calorie malnutrition.    This patient is being managed with:   Diet Regular-  Consistent Carbohydrate {Evening Snack} (CSTCHOSN)  Entered: Jul 29 2022 10:37AM

## 2022-08-05 NOTE — PROGRESS NOTE ADULT - ASSESSMENT
66 y/o  AAF with HTN, HLD, sarcoidosis, CHF, DM presented to the ED for new onset AMS. Patient suddenly became altered, confused and daughter noted she was slurring his speech. She was incoherent, and hallucinating, saying there is a cat present and is asking for her aunt who lives far away.  The patient known to have frequent admission for CHF exacerbations eventually intubated for hypoxic respiratory failure and now in MICU   MRI brain/IAC from 2021 with old R cerebellar infarct ; CTA h/N that time unremarkable. , A1c 9.2%  CT C/A with new large pleural effusions   CTH unremarakble   A1c 7.3  TTE was done   7/17 AM RRT, intubated, c/f aspiration PNA   7/19 eeg with slowing but no seizures   7/21 now extubated TONEY at least 4/5 7/22 follows commands, able to name objection (pen)   on floor, neuro exam and mental status near [facility] .   + C diff   MRI brain done 8/4 showed no new findings; Old chronic appearing R cerebellar infarct noted     Impression: 1) AMS 2/2 hypercapneic resp failure 2) prior cerebellar stroke likely small vessel f   - asa 81 and statin therapy for secondary stroke prevention LDL goal < 70    - h/o sarcoid, f/u pulmo   - telemetry  - PT/OT/SS/SLP, OOBC  - check FS, glucose control <180  - GI/DVT ppx  - Thank you for allowing me to participate in the care of this patient. Call with questions.    -/dc planning when able   Russell Jimenes MD  Vascular Neurology  Office: [phone]

## 2022-08-05 NOTE — PROGRESS NOTE ADULT - ATTENDING SUPERVISION STATEMENT
Fellow
Resident
Fellow
Resident
Fellow
Fellow
Resident

## 2022-08-05 NOTE — PROGRESS NOTE ADULT - SUBJECTIVE AND OBJECTIVE BOX
Neurology Progress Note    S: Patient seen and examined on floor.   EEG neg for seizures. mental status improved.   MRI neg for acute infarcts     Medication:  MEDICATIONS  (STANDING):  albuterol/ipratropium for Nebulization 3 milliLiter(s) Nebulizer every 6 hours  amLODIPine   Tablet 10 milliGRAM(s) Oral daily  aspirin  chewable 81 milliGRAM(s) Oral daily  atorvastatin 20 milliGRAM(s) Oral at bedtime  carvedilol 25 milliGRAM(s) Oral every 12 hours  dextrose 50% Injectable 25 Gram(s) IV Push once  dextrose 50% Injectable 12.5 Gram(s) IV Push once  dextrose 50% Injectable 25 Gram(s) IV Push once  enoxaparin Injectable 40 milliGRAM(s) SubCutaneous every 24 hours  hydrALAZINE 100 milliGRAM(s) Oral three times a day  insulin glargine Injectable (LANTUS) 2 Unit(s) SubCutaneous at bedtime  insulin lispro (ADMELOG) corrective regimen sliding scale   SubCutaneous three times a day before meals  insulin lispro (ADMELOG) corrective regimen sliding scale   SubCutaneous at bedtime  melatonin 6 milliGRAM(s) Oral at bedtime  sodium chloride 3%  Inhalation 4 milliLiter(s) Inhalation every 6 hours    MEDICATIONS  (PRN):  acetaminophen     Tablet .. 650 milliGRAM(s) Oral every 6 hours PRN Temp greater or equal to 38C (100.4F), Mild Pain (1 - 3)  benzonatate 100 milliGRAM(s) Oral three times a day PRN Cough  dextrose Oral Gel 15 Gram(s) Oral once PRN Blood Glucose LESS THAN 70 milliGRAM(s)/deciliter  guaiFENesin Oral Liquid (Sugar-Free) 100 milliGRAM(s) Oral every 6 hours PRN Cough    Vital:     Vital Signs Last 24 Hrs  T(C): 36.2 (22 @ 11:45), Max: 36.8 (22 @ 18:00)  T(F): 97.2 (22 @ 11:45), Max: 98.2 (22 @ 18:00)  HR: 69 (22 @ 11:45) (60 - 80)  BP: 133/66 (22 @ 11:45) (128/62 - 133/78)  BP(mean): --  RR: 18 (22 @ 11:45) (17 - 18)  SpO2: 100% (22 @ 11:45) (98% - 100%)          General Exam:   General Appearance: Appropriately dressed and in no acute distress       Head: Normocephalic, atraumatic and no dysmorphic features  Ear, Nose, and Throat: Moist mucous membranes    CVS: S1S2+  Resp: No SOB, no wheeze or rhonchi  GI: soft NT/ND  Extremities:  toe amputation noted   Skin: No bruises or rashes     Neurological Exam:    Mental Status:  AAOx1-2 , able to follow simple verbal commands. answers questions appropriately , able to name pen when prompted   Cranial Nerves: PERRL, EOMI, VFFC, sensation V1-V3 intact,  no obvious facial asymmetry, equal elevation of palate, scm/trap 5/5, tongue is midline on protrusion. no obvious papilledema on fundoscopic exam. +[place] worse on R compared to L   Motor: TONEY  at least 4/5 now.    Sensation: withdraws x 4  Reflexes: 1+ throughout at biceps, brachioradialis, triceps, patellars and ankles bilaterally and equal. No clonus. R toe and L toe were both downgoing.  Coordination: no dysmetria FNF   Gait:  unable     I personally reviewed the below data/images/labs:    CBC Full  -  ( 04 Aug 2022 07:15 )  WBC Count : 3.06 K/uL  RBC Count : 3.45 M/uL  Hemoglobin : 9.0 g/dL  Hematocrit : 30.1 %  Platelet Count - Automated : 253 K/uL  Mean Cell Volume : 87.2 fL  Mean Cell Hemoglobin : 26.1 pg  Mean Cell Hemoglobin Concentration : 29.9 gm/dL  Auto Neutrophil # : x  Auto Lymphocyte # : x  Auto Monocyte # : x  Auto Eosinophil # : x  Auto Basophil # : x  Auto Neutrophil % : x  Auto Lymphocyte % : x  Auto Monocyte % : x  Auto Eosinophil % : x  Auto Basophil % : x        136  |  102  |  21  ----------------------------<  118<H>  4.8   |  27  |  1.34<H>    Ca    8.9      04 Aug 2022 07:15  Phos  4.9     08-04  Mg     2.00     08-04          Urinalysis Basic - ( 2022 09:00 )    Color: Yellow / Appearance: Slightly Turbid / S.027 / pH: x  Gluc: x / Ketone: Trace  / Bili: Negative / Urobili: 3 mg/dL   Blood: x / Protein: 300 mg/dL / Nitrite: Negative   Leuk Esterase: Large / RBC: 5 /HPF / WBC >50 /HPF   Sq Epi: x / Non Sq Epi: x / Bacteria: x        EXAM:  MR BRAIN WAW IC      EXAM:  MR IAC ONLY WAW IC        PROCEDURE DATE:  Dec  1 2021         INTERPRETATION:  .    CLINICAL INFORMATION: Acute onset of bilateral hearing loss.    TECHNIQUE: Multiplanar multisequential MRI of the brain and internal auditory canals was acquired with and without the administration of IV gadolinium. 7.5 cc's of IV Gadavist was administered for the purposes of this examination. 0 cc were discarded.    COMPARISON: Prior CT examination of the internal auditory canals dated 2021. Prior CT angiograms/venogram examination of the head and neck dated 2021. Examination.    FINDINGS:    MRI BRAIN: A chronic lacunar infarct is seen within the right medial cerebellar hemisphere.    Multiple patchy confluent nonspecific T2/FLAIR hyperintense signal changes are noted throughout the bihemispheric white matter without associated mass effect or restricted diffusion.    There is no abnormal brain parenchymal or leptomeningeal enhancement.    Ventricular sizeand configuration is unremarkable. No abnormal extra-axial fluid collections are seen. Flow-voids are noted throughout the major intracranial vessels, on the T2 weighted images, consistent with their patency. The sellar area appears unremarkable.    Minimal scattered mucosal thickening is seen throughout the paranasal sinuses. The mastoid air cells are clear. The orbits appear unremarkable.    MRI IAC: There is no CP angle mass. The bilateral 7th and 8th cranial nerves appear unremarkable in course and morphology. No abnormal enhancement is seen. The inner ear structures are normally formed. Normal fluid signal is seen within the inner ear structures. The cochlea, vestibule, and semicircular canals appear unremarkable.    IMPRESSION:    MRI BRAIN: No acute intracranial hemorrhage, acute ischemia, or abnormal intracranial enhancement.    Chronic lacunar infarct within the right medial cerebellar hemisphere and mild chronic white matter microvascular type changes.    MRI IAC: Unremarkable study.    --- End of Report ---              LUCI RUEDA MD; Attending Radiologist  This document has been electronically signed. Dec  3 2021  8:46AM    < end of copied text >      < from: CT Chest w/ IV Cont (22 @ 22:58) >    ACC: [id number] EXAM:  CT ABDOMEN AND PELVIS IC                        ACC: [id number] EXAM:  CT CHEST IC                          PROCEDURE DATE:  2022          INTERPRETATION:  CLINICAL INFORMATION: Pleural effusion. Rule out   empyema. Abdominal tenderness. History of pancreatitis. Rule out   infection or obstruction.    COMPARISON: CT chest 2022.    CONTRAST/COMPLICATIONS:  IV Contrast: IV contrast documented in associated exam (accession   [id number]), Omnipaque 350 (accession [id number])  64 cc administered   6 cc   discarded  Oral Contrast: NONE  Complications: None reported at time of study completion    PROCEDURE:  CT of the Chest, Abdomen and Pelvis was performed.  Sagittal and coronal reformats were performed.    FINDINGS:  CHEST:  LUNGS AND LARGE AIRWAYS: Compressive atelectasis of the right upper,   middle and bilateral lower lobes including atelectasis of the majority of   the right lower lobe. Subsegmental atelectasis in the left upper lobe.  PLEURA: New large right andsmall left pleural effusions. No evidence of   an empyema. Fluid in the minor fissure.  VESSELS: Atherosclerotic changes of the aorta and coronary arteries.  HEART: Heart size is enlarged. No pericardial effusion.  MEDIASTINUM AND ANTONIO: No lymphadenopathy. Redemonstration of calcified   mediastinal lymph nodes.  CHEST WALL AND LOWER NECK: 7 mm right thyroid lobe nodule. Generalized   anasarca.    ABDOMEN AND PELVIS:  LIVER: Within normal limits.  BILE DUCTS: Normal caliber.  GALLBLADDER: Cholecystectomy.  SPLEEN: Within normal limits.  PANCREAS: Atrophic.  ADRENALS: Within normal limits.  KIDNEYS/URETERS: No renal stones or hydronephrosis.    BLADDER: Within normal limits.  REPRODUCTIVE ORGANS: Atrophic and retroflexed uterus. No adnexal masses.    BOWEL: Redemonstration of an outpouching with wall calcification and   intraluminal fluid and gas measuring 4.9 x 4.6 cm emanating from the   posterior gastric body likely representing a large diverticulum. No bowel   obstruction or inflammation. Colonic diverticulosis without   diverticulitis. Appendix within normal limits.  PERITONEUM: Small volume free pelvic fluid.  VESSELS: Atherosclerotic changes.  RETROPERITONEUM/LYMPH NODES: No lymphadenopathy.  ABDOMINAL WALL: Generalized anasarca.  BONES: Degenerative changes of the spine. Mild S-shaped thoracolumbar   scoliosis.    IMPRESSION:  1. New large right and small left pleural effusions with adjacent   compressive atelectasis including atelectasis of the majority of the   right lowerlobe.  2. No bowel obstruction or inflammation.      --- End of Report ---          MARTA HART MD; Resident Radiologist  This document has been electronically signed.  CHINTAN WHITFIELD MD; Attending Radiologist  This document has been electronically signed. 2022 12:32AM    < end of copied text >         < from: CT Head No Cont (22 @ 00:03) >    ACC: [id number] EXAM:  CT BRAIN                          PROCEDURE DATE:  2022          INTERPRETATION:  INDICATIONS:  Alteration of  Consciousness    TECHNIQUE:  Serial axial images were obtained from the skull base to the   vertex without intravenous contrast. Sagittal and Coronal reformats were   performed    COMPARISON EXAMINATION: None.    FINDINGS:  VENTRICLES AND SULCI:  Normal.  INTRA-AXIAL:  No mass, blood or abnormal attenuation is seen.  EXTRA-AXIAL:  No mass or collection is seen.  VISUALIZED SINUSES:  Clear.  VISUALIZED MASTOIDS:  Clear.  CALVARIUM:  Normal.  MISCELLANEOUS:  None.    IMPRESSION:  No evidence of intracranial hemorrhage, no evidence of major   vessel distribution infarct    --- End of Report ---            MOLLY TOTH MD; Attending Radiologist  This document has been electronically signed. 2022 10:19AM    < end of copied text >  EEG Summary / Classification:  Abnormal   - Moderate generalized slowing.    EEG Impression / Clinical Correlate:  Abnormal EEG study.  Moderate nonspecific diffuse or multifocal cerebral dysfunction.   No epileptiform pattern or clear seizure seen.    **In absence of additional clinical concerns, recommend consideration for discontinuation of current EEG study with reconnection in future if warranted.      < from: MR Head No Cont (22 @ 19:00) >    ACC: [id number] EXAM:  MR BRAIN                          PROCEDURE DATE:  2022          INTERPRETATION:  Clinical indication: Altered mental status    MRI of brain was performed using sagittal T1 axial T1 T2 T2 FLAIR   diffusion and susceptibility weighted sequence.    This exams compared prior head CT performed on 2022 and MRI   performed on 2021.    Parenchymal volume loss and chronic microvascular ischemic changes are   again seen.    There is no acute hemorrhage mass or mass effect seen.    Evaluation of the diffusion weighted sequence demonstrates no abnormal   areas of restricted diffusion to suggest acute infarct.    Old lacunar infarcts involving the medial right cerebellar region are   again seen and unchanged.    The large vessels demonstrate normal flow voids.    The visualized paranasal sinuses mastoid and middle ear regions appear   clear.    Mild inflammatory changes involving both mastoid and middle ear regions   are seen right greater than left.    IMPRESSION: No evidence of acute hemorrhage mass mass effect or acute   territorial infarcts seen.      --- End of Report ---            ALEKS LORENZ MD; Attending Radiologist  This document has been electronically signed. Aug  5 2022  9:39AM    < end of copied text >

## 2022-08-30 ENCOUNTER — INPATIENT (INPATIENT)
Facility: HOSPITAL | Age: 67
LOS: 9 days | Discharge: NURSING, REHAB OR LONG-TERM CARE | End: 2022-09-09
Attending: STUDENT IN AN ORGANIZED HEALTH CARE EDUCATION/TRAINING PROGRAM | Admitting: STUDENT IN AN ORGANIZED HEALTH CARE EDUCATION/TRAINING PROGRAM

## 2022-08-30 VITALS
SYSTOLIC BLOOD PRESSURE: 127 MMHG | RESPIRATION RATE: 16 BRPM | DIASTOLIC BLOOD PRESSURE: 59 MMHG | TEMPERATURE: 97 F | HEIGHT: 64 IN | HEART RATE: 60 BPM | OXYGEN SATURATION: 96 %

## 2022-08-30 DIAGNOSIS — Z90.49 ACQUIRED ABSENCE OF OTHER SPECIFIED PARTS OF DIGESTIVE TRACT: Chronic | ICD-10-CM

## 2022-08-30 DIAGNOSIS — Z98.891 HISTORY OF UTERINE SCAR FROM PREVIOUS SURGERY: Chronic | ICD-10-CM

## 2022-08-30 DIAGNOSIS — R41.82 ALTERED MENTAL STATUS, UNSPECIFIED: ICD-10-CM

## 2022-08-30 DIAGNOSIS — I50.9 HEART FAILURE, UNSPECIFIED: ICD-10-CM

## 2022-08-30 DIAGNOSIS — Z89.411 ACQUIRED ABSENCE OF RIGHT GREAT TOE: Chronic | ICD-10-CM

## 2022-08-30 LAB
ALBUMIN SERPL ELPH-MCNC: 3.3 G/DL — SIGNIFICANT CHANGE UP (ref 3.3–5)
ALBUMIN SERPL ELPH-MCNC: 3.4 G/DL — SIGNIFICANT CHANGE UP (ref 3.3–5)
ALP SERPL-CCNC: 107 U/L — SIGNIFICANT CHANGE UP (ref 40–120)
ALP SERPL-CCNC: 109 U/L — SIGNIFICANT CHANGE UP (ref 40–120)
ALT FLD-CCNC: 15 U/L — SIGNIFICANT CHANGE UP (ref 4–33)
ALT FLD-CCNC: 21 U/L — SIGNIFICANT CHANGE UP (ref 4–33)
ANION GAP SERPL CALC-SCNC: 11 MMOL/L — SIGNIFICANT CHANGE UP (ref 7–14)
ANION GAP SERPL CALC-SCNC: 9 MMOL/L — SIGNIFICANT CHANGE UP (ref 7–14)
ANION GAP SERPL CALC-SCNC: 9 MMOL/L — SIGNIFICANT CHANGE UP (ref 7–14)
APPEARANCE UR: ABNORMAL
APTT BLD: 36.9 SEC — HIGH (ref 27–36.3)
AST SERPL-CCNC: 19 U/L — SIGNIFICANT CHANGE UP (ref 4–32)
AST SERPL-CCNC: 32 U/L — SIGNIFICANT CHANGE UP (ref 4–32)
BACTERIA # UR AUTO: NEGATIVE — SIGNIFICANT CHANGE UP
BASE EXCESS BLDV CALC-SCNC: -2.2 MMOL/L — LOW (ref -2–3)
BASE EXCESS BLDV CALC-SCNC: -3.1 MMOL/L — LOW (ref -2–3)
BASOPHILS # BLD AUTO: 0 K/UL — SIGNIFICANT CHANGE UP (ref 0–0.2)
BASOPHILS # BLD AUTO: 0.01 K/UL — SIGNIFICANT CHANGE UP (ref 0–0.2)
BASOPHILS NFR BLD AUTO: 0 % — SIGNIFICANT CHANGE UP (ref 0–2)
BASOPHILS NFR BLD AUTO: 0.3 % — SIGNIFICANT CHANGE UP (ref 0–2)
BILIRUB SERPL-MCNC: 0.3 MG/DL — SIGNIFICANT CHANGE UP (ref 0.2–1.2)
BILIRUB SERPL-MCNC: 0.3 MG/DL — SIGNIFICANT CHANGE UP (ref 0.2–1.2)
BILIRUB UR-MCNC: NEGATIVE — SIGNIFICANT CHANGE UP
BLD GP AB SCN SERPL QL: NEGATIVE — SIGNIFICANT CHANGE UP
BLOOD GAS VENOUS COMPREHENSIVE RESULT: SIGNIFICANT CHANGE UP
BUN SERPL-MCNC: 42 MG/DL — HIGH (ref 7–23)
CA-I SERPL-SCNC: 1.25 MMOL/L — SIGNIFICANT CHANGE UP (ref 1.15–1.33)
CALCIUM SERPL-MCNC: 9 MG/DL — SIGNIFICANT CHANGE UP (ref 8.4–10.5)
CALCIUM SERPL-MCNC: 9.2 MG/DL — SIGNIFICANT CHANGE UP (ref 8.4–10.5)
CALCIUM SERPL-MCNC: 9.4 MG/DL — SIGNIFICANT CHANGE UP (ref 8.4–10.5)
CHLORIDE BLDV-SCNC: 109 MMOL/L — HIGH (ref 96–108)
CHLORIDE BLDV-SCNC: 110 MMOL/L — HIGH (ref 96–108)
CHLORIDE SERPL-SCNC: 106 MMOL/L — SIGNIFICANT CHANGE UP (ref 98–107)
CHLORIDE SERPL-SCNC: 106 MMOL/L — SIGNIFICANT CHANGE UP (ref 98–107)
CHLORIDE SERPL-SCNC: 108 MMOL/L — HIGH (ref 98–107)
CK MB CFR SERPL CALC: 3.8 NG/ML — SIGNIFICANT CHANGE UP
CO2 BLDV-SCNC: 26.1 MMOL/L — HIGH (ref 22–26)
CO2 BLDV-SCNC: 26.5 MMOL/L — HIGH (ref 22–26)
CO2 SERPL-SCNC: 22 MMOL/L — SIGNIFICANT CHANGE UP (ref 22–31)
CO2 SERPL-SCNC: 23 MMOL/L — SIGNIFICANT CHANGE UP (ref 22–31)
CO2 SERPL-SCNC: 24 MMOL/L — SIGNIFICANT CHANGE UP (ref 22–31)
COLOR SPEC: ABNORMAL
CORTIS AM PEAK SERPL-MCNC: 7 UG/DL — SIGNIFICANT CHANGE UP (ref 6–18.4)
CREAT SERPL-MCNC: 1.46 MG/DL — HIGH (ref 0.5–1.3)
CREAT SERPL-MCNC: 1.5 MG/DL — HIGH (ref 0.5–1.3)
CREAT SERPL-MCNC: 1.54 MG/DL — HIGH (ref 0.5–1.3)
DIFF PNL FLD: ABNORMAL
EGFR: 37 ML/MIN/1.73M2 — LOW
EGFR: 38 ML/MIN/1.73M2 — LOW
EGFR: 39 ML/MIN/1.73M2 — LOW
EOSINOPHIL # BLD AUTO: 0.08 K/UL — SIGNIFICANT CHANGE UP (ref 0–0.5)
EOSINOPHIL # BLD AUTO: 0.1 K/UL — SIGNIFICANT CHANGE UP (ref 0–0.5)
EOSINOPHIL NFR BLD AUTO: 2.6 % — SIGNIFICANT CHANGE UP (ref 0–6)
EOSINOPHIL NFR BLD AUTO: 3.2 % — SIGNIFICANT CHANGE UP (ref 0–6)
EPI CELLS # UR: 1 /HPF — SIGNIFICANT CHANGE UP (ref 0–5)
FLUAV AG NPH QL: SIGNIFICANT CHANGE UP
FLUBV AG NPH QL: SIGNIFICANT CHANGE UP
GAS PNL BLDA: SIGNIFICANT CHANGE UP
GAS PNL BLDA: SIGNIFICANT CHANGE UP
GAS PNL BLDV: 137 MMOL/L — SIGNIFICANT CHANGE UP (ref 136–145)
GAS PNL BLDV: 139 MMOL/L — SIGNIFICANT CHANGE UP (ref 136–145)
GAS PNL BLDV: SIGNIFICANT CHANGE UP
GLUCOSE BLDC GLUCOMTR-MCNC: 122 MG/DL — HIGH (ref 70–99)
GLUCOSE BLDV-MCNC: 124 MG/DL — HIGH (ref 70–99)
GLUCOSE BLDV-MCNC: 146 MG/DL — HIGH (ref 70–99)
GLUCOSE SERPL-MCNC: 112 MG/DL — HIGH (ref 70–99)
GLUCOSE SERPL-MCNC: 140 MG/DL — HIGH (ref 70–99)
GLUCOSE SERPL-MCNC: 153 MG/DL — HIGH (ref 70–99)
GLUCOSE UR QL: ABNORMAL
HCO3 BLDV-SCNC: 24 MMOL/L — SIGNIFICANT CHANGE UP (ref 22–29)
HCO3 BLDV-SCNC: 25 MMOL/L — SIGNIFICANT CHANGE UP (ref 22–29)
HCT VFR BLD CALC: 25.5 % — LOW (ref 34.5–45)
HCT VFR BLD CALC: 26.6 % — LOW (ref 34.5–45)
HCT VFR BLDA CALC: 25 % — LOW (ref 34.5–46.5)
HCT VFR BLDA CALC: 25 % — LOW (ref 34.5–46.5)
HGB BLD CALC-MCNC: 8.2 G/DL — LOW (ref 11.5–15.5)
HGB BLD CALC-MCNC: 8.2 G/DL — LOW (ref 11.5–15.5)
HGB BLD-MCNC: 7.5 G/DL — LOW (ref 11.5–15.5)
HGB BLD-MCNC: 8.1 G/DL — LOW (ref 11.5–15.5)
HYALINE CASTS # UR AUTO: 2 /LPF — SIGNIFICANT CHANGE UP (ref 0–7)
IANC: 1.53 K/UL — LOW (ref 1.8–7.4)
IANC: 2.62 K/UL — SIGNIFICANT CHANGE UP (ref 1.8–7.4)
IMM GRANULOCYTES NFR BLD AUTO: 0.8 % — SIGNIFICANT CHANGE UP (ref 0–1.5)
IMM GRANULOCYTES NFR BLD AUTO: 1.2 % — SIGNIFICANT CHANGE UP (ref 0–1.5)
INR BLD: 1.1 RATIO — SIGNIFICANT CHANGE UP (ref 0.88–1.16)
KETONES UR-MCNC: NEGATIVE — SIGNIFICANT CHANGE UP
LACTATE BLDV-MCNC: 0.4 MMOL/L — LOW (ref 0.5–2)
LACTATE BLDV-MCNC: 0.5 MMOL/L — SIGNIFICANT CHANGE UP (ref 0.5–2)
LEUKOCYTE ESTERASE UR-ACNC: ABNORMAL
LYMPHOCYTES # BLD AUTO: 0.6 K/UL — LOW (ref 1–3.3)
LYMPHOCYTES # BLD AUTO: 0.65 K/UL — LOW (ref 1–3.3)
LYMPHOCYTES # BLD AUTO: 16.8 % — SIGNIFICANT CHANGE UP (ref 13–44)
LYMPHOCYTES # BLD AUTO: 24.2 % — SIGNIFICANT CHANGE UP (ref 13–44)
MAGNESIUM SERPL-MCNC: 1.9 MG/DL — SIGNIFICANT CHANGE UP (ref 1.6–2.6)
MCHC RBC-ENTMCNC: 25.8 PG — LOW (ref 27–34)
MCHC RBC-ENTMCNC: 26.6 PG — LOW (ref 27–34)
MCHC RBC-ENTMCNC: 29.4 GM/DL — LOW (ref 32–36)
MCHC RBC-ENTMCNC: 30.5 GM/DL — LOW (ref 32–36)
MCV RBC AUTO: 87.2 FL — SIGNIFICANT CHANGE UP (ref 80–100)
MCV RBC AUTO: 87.6 FL — SIGNIFICANT CHANGE UP (ref 80–100)
MONOCYTES # BLD AUTO: 0.24 K/UL — SIGNIFICANT CHANGE UP (ref 0–0.9)
MONOCYTES # BLD AUTO: 0.47 K/UL — SIGNIFICANT CHANGE UP (ref 0–0.9)
MONOCYTES NFR BLD AUTO: 12.1 % — SIGNIFICANT CHANGE UP (ref 2–14)
MONOCYTES NFR BLD AUTO: 9.7 % — SIGNIFICANT CHANGE UP (ref 2–14)
NEUTROPHILS # BLD AUTO: 1.53 K/UL — LOW (ref 1.8–7.4)
NEUTROPHILS # BLD AUTO: 2.62 K/UL — SIGNIFICANT CHANGE UP (ref 1.8–7.4)
NEUTROPHILS NFR BLD AUTO: 61.7 % — SIGNIFICANT CHANGE UP (ref 43–77)
NEUTROPHILS NFR BLD AUTO: 67.4 % — SIGNIFICANT CHANGE UP (ref 43–77)
NITRITE UR-MCNC: NEGATIVE — SIGNIFICANT CHANGE UP
NRBC # BLD: 0 /100 WBCS — SIGNIFICANT CHANGE UP (ref 0–0)
NRBC # BLD: 0 /100 WBCS — SIGNIFICANT CHANGE UP (ref 0–0)
NRBC # FLD: 0 K/UL — SIGNIFICANT CHANGE UP (ref 0–0)
NRBC # FLD: 0 K/UL — SIGNIFICANT CHANGE UP (ref 0–0)
NT-PROBNP SERPL-SCNC: 2414 PG/ML — HIGH
PCO2 BLDV: 54 MMHG — HIGH (ref 39–42)
PCO2 BLDV: 57 MMHG — HIGH (ref 39–42)
PH BLDV: 7.24 — LOW (ref 7.32–7.43)
PH BLDV: 7.27 — LOW (ref 7.32–7.43)
PH UR: 6.5 — SIGNIFICANT CHANGE UP (ref 5–8)
PHOSPHATE SERPL-MCNC: 4.3 MG/DL — SIGNIFICANT CHANGE UP (ref 2.5–4.5)
PLATELET # BLD AUTO: 180 K/UL — SIGNIFICANT CHANGE UP (ref 150–400)
PLATELET # BLD AUTO: 191 K/UL — SIGNIFICANT CHANGE UP (ref 150–400)
PO2 BLDV: 115 MMHG — SIGNIFICANT CHANGE UP
PO2 BLDV: 76 MMHG — SIGNIFICANT CHANGE UP
POTASSIUM BLDV-SCNC: 4.8 MMOL/L — SIGNIFICANT CHANGE UP (ref 3.5–5.1)
POTASSIUM BLDV-SCNC: 6.7 MMOL/L — CRITICAL HIGH (ref 3.5–5.1)
POTASSIUM SERPL-MCNC: 4.5 MMOL/L — SIGNIFICANT CHANGE UP (ref 3.5–5.3)
POTASSIUM SERPL-MCNC: 4.8 MMOL/L — SIGNIFICANT CHANGE UP (ref 3.5–5.3)
POTASSIUM SERPL-MCNC: 5.7 MMOL/L — HIGH (ref 3.5–5.3)
POTASSIUM SERPL-SCNC: 4.5 MMOL/L — SIGNIFICANT CHANGE UP (ref 3.5–5.3)
POTASSIUM SERPL-SCNC: 4.8 MMOL/L — SIGNIFICANT CHANGE UP (ref 3.5–5.3)
POTASSIUM SERPL-SCNC: 5.7 MMOL/L — HIGH (ref 3.5–5.3)
PROT SERPL-MCNC: 7.2 G/DL — SIGNIFICANT CHANGE UP (ref 6–8.3)
PROT SERPL-MCNC: 7.3 G/DL — SIGNIFICANT CHANGE UP (ref 6–8.3)
PROT UR-MCNC: ABNORMAL
PROTHROM AB SERPL-ACNC: 12.8 SEC — SIGNIFICANT CHANGE UP (ref 10.5–13.4)
RBC # BLD: 2.91 M/UL — LOW (ref 3.8–5.2)
RBC # BLD: 3.05 M/UL — LOW (ref 3.8–5.2)
RBC # FLD: 16.8 % — HIGH (ref 10.3–14.5)
RBC # FLD: 16.8 % — HIGH (ref 10.3–14.5)
RBC CASTS # UR COMP ASSIST: >720 /HPF — HIGH (ref 0–4)
RH IG SCN BLD-IMP: POSITIVE — SIGNIFICANT CHANGE UP
RSV RNA NPH QL NAA+NON-PROBE: SIGNIFICANT CHANGE UP
SAO2 % BLDV: 95.3 % — SIGNIFICANT CHANGE UP
SAO2 % BLDV: 97.7 % — SIGNIFICANT CHANGE UP
SARS-COV-2 RNA SPEC QL NAA+PROBE: SIGNIFICANT CHANGE UP
SODIUM SERPL-SCNC: 137 MMOL/L — SIGNIFICANT CHANGE UP (ref 135–145)
SODIUM SERPL-SCNC: 139 MMOL/L — SIGNIFICANT CHANGE UP (ref 135–145)
SODIUM SERPL-SCNC: 142 MMOL/L — SIGNIFICANT CHANGE UP (ref 135–145)
SP GR SPEC: 1.01 — SIGNIFICANT CHANGE UP (ref 1.01–1.05)
TROPONIN T, HIGH SENSITIVITY RESULT: 66 NG/L — CRITICAL HIGH
TSH SERPL-MCNC: 4.82 UIU/ML — HIGH (ref 0.27–4.2)
UROBILINOGEN FLD QL: SIGNIFICANT CHANGE UP
WBC # BLD: 2.48 K/UL — LOW (ref 3.8–10.5)
WBC # BLD: 3.88 K/UL — SIGNIFICANT CHANGE UP (ref 3.8–10.5)
WBC # FLD AUTO: 2.48 K/UL — LOW (ref 3.8–10.5)
WBC # FLD AUTO: 3.88 K/UL — SIGNIFICANT CHANGE UP (ref 3.8–10.5)
WBC UR QL: 19 /HPF — HIGH (ref 0–5)

## 2022-08-30 PROCEDURE — 99291 CRITICAL CARE FIRST HOUR: CPT | Mod: GC

## 2022-08-30 PROCEDURE — 36569 INSJ PICC 5 YR+ W/O IMAGING: CPT

## 2022-08-30 PROCEDURE — 71045 X-RAY EXAM CHEST 1 VIEW: CPT | Mod: 26

## 2022-08-30 PROCEDURE — 71045 X-RAY EXAM CHEST 1 VIEW: CPT | Mod: 26,77

## 2022-08-30 PROCEDURE — 70450 CT HEAD/BRAIN W/O DYE: CPT | Mod: 26

## 2022-08-30 PROCEDURE — 99284 EMERGENCY DEPT VISIT MOD MDM: CPT | Mod: 25

## 2022-08-30 PROCEDURE — 31500 INSERT EMERGENCY AIRWAY: CPT

## 2022-08-30 PROCEDURE — 76937 US GUIDE VASCULAR ACCESS: CPT | Mod: 26,59

## 2022-08-30 RX ORDER — CHLORHEXIDINE GLUCONATE 213 G/1000ML
1 SOLUTION TOPICAL
Refills: 0 | Status: DISCONTINUED | OUTPATIENT
Start: 2022-08-30 | End: 2022-09-02

## 2022-08-30 RX ORDER — AZTREONAM 2 G
VIAL (EA) INJECTION
Refills: 0 | Status: DISCONTINUED | OUTPATIENT
Start: 2022-08-30 | End: 2022-09-01

## 2022-08-30 RX ORDER — DEXTROSE 50 % IN WATER 50 %
12.5 SYRINGE (ML) INTRAVENOUS ONCE
Refills: 0 | Status: DISCONTINUED | OUTPATIENT
Start: 2022-08-30 | End: 2022-09-09

## 2022-08-30 RX ORDER — VANCOMYCIN HCL 1 G
1250 VIAL (EA) INTRAVENOUS ONCE
Refills: 0 | Status: COMPLETED | OUTPATIENT
Start: 2022-08-30 | End: 2022-08-30

## 2022-08-30 RX ORDER — CHLORHEXIDINE GLUCONATE 213 G/1000ML
15 SOLUTION TOPICAL EVERY 12 HOURS
Refills: 0 | Status: DISCONTINUED | OUTPATIENT
Start: 2022-08-30 | End: 2022-09-03

## 2022-08-30 RX ORDER — FUROSEMIDE 40 MG
40 TABLET ORAL ONCE
Refills: 0 | Status: COMPLETED | OUTPATIENT
Start: 2022-08-30 | End: 2022-08-30

## 2022-08-30 RX ORDER — SODIUM CHLORIDE 9 MG/ML
1000 INJECTION, SOLUTION INTRAVENOUS
Refills: 0 | Status: DISCONTINUED | OUTPATIENT
Start: 2022-08-30 | End: 2022-09-09

## 2022-08-30 RX ORDER — DEXTROSE 50 % IN WATER 50 %
25 SYRINGE (ML) INTRAVENOUS ONCE
Refills: 0 | Status: DISCONTINUED | OUTPATIENT
Start: 2022-08-30 | End: 2022-09-09

## 2022-08-30 RX ORDER — VANCOMYCIN HCL 1 G
1250 VIAL (EA) INTRAVENOUS EVERY 24 HOURS
Refills: 0 | Status: DISCONTINUED | OUTPATIENT
Start: 2022-08-31 | End: 2022-08-31

## 2022-08-30 RX ORDER — AZTREONAM 2 G
1000 VIAL (EA) INJECTION ONCE
Refills: 0 | Status: COMPLETED | OUTPATIENT
Start: 2022-08-30 | End: 2022-08-30

## 2022-08-30 RX ORDER — ACETAMINOPHEN 500 MG
1000 TABLET ORAL ONCE
Refills: 0 | Status: COMPLETED | OUTPATIENT
Start: 2022-08-30 | End: 2022-08-30

## 2022-08-30 RX ORDER — IPRATROPIUM/ALBUTEROL SULFATE 18-103MCG
3 AEROSOL WITH ADAPTER (GRAM) INHALATION EVERY 6 HOURS
Refills: 0 | Status: DISCONTINUED | OUTPATIENT
Start: 2022-08-30 | End: 2022-08-31

## 2022-08-30 RX ORDER — INSULIN LISPRO 100/ML
VIAL (ML) SUBCUTANEOUS EVERY 6 HOURS
Refills: 0 | Status: DISCONTINUED | OUTPATIENT
Start: 2022-08-30 | End: 2022-08-31

## 2022-08-30 RX ORDER — VANCOMYCIN HCL 1 G
VIAL (EA) INTRAVENOUS
Refills: 0 | Status: DISCONTINUED | OUTPATIENT
Start: 2022-08-30 | End: 2022-08-31

## 2022-08-30 RX ORDER — DEXTROSE 50 % IN WATER 50 %
15 SYRINGE (ML) INTRAVENOUS ONCE
Refills: 0 | Status: DISCONTINUED | OUTPATIENT
Start: 2022-08-30 | End: 2022-09-09

## 2022-08-30 RX ORDER — AZTREONAM 2 G
1000 VIAL (EA) INJECTION EVERY 8 HOURS
Refills: 0 | Status: DISCONTINUED | OUTPATIENT
Start: 2022-08-31 | End: 2022-09-01

## 2022-08-30 RX ORDER — FENTANYL CITRATE 50 UG/ML
1 INJECTION INTRAVENOUS
Qty: 2500 | Refills: 0 | Status: DISCONTINUED | OUTPATIENT
Start: 2022-08-30 | End: 2022-09-01

## 2022-08-30 RX ORDER — PROPOFOL 10 MG/ML
20 INJECTION, EMULSION INTRAVENOUS
Qty: 1000 | Refills: 0 | Status: DISCONTINUED | OUTPATIENT
Start: 2022-08-30 | End: 2022-09-01

## 2022-08-30 RX ORDER — GLUCAGON INJECTION, SOLUTION 0.5 MG/.1ML
1 INJECTION, SOLUTION SUBCUTANEOUS ONCE
Refills: 0 | Status: DISCONTINUED | OUTPATIENT
Start: 2022-08-30 | End: 2022-09-09

## 2022-08-30 RX ORDER — HEPARIN SODIUM 5000 [USP'U]/ML
5000 INJECTION INTRAVENOUS; SUBCUTANEOUS EVERY 8 HOURS
Refills: 0 | Status: DISCONTINUED | OUTPATIENT
Start: 2022-08-30 | End: 2022-09-09

## 2022-08-30 RX ADMIN — Medication 166.67 MILLIGRAM(S): at 21:51

## 2022-08-30 RX ADMIN — Medication 1000 MILLIGRAM(S): at 12:34

## 2022-08-30 RX ADMIN — HEPARIN SODIUM 5000 UNIT(S): 5000 INJECTION INTRAVENOUS; SUBCUTANEOUS at 21:52

## 2022-08-30 RX ADMIN — Medication 50 MILLIGRAM(S): at 21:26

## 2022-08-30 RX ADMIN — Medication 40 MILLIGRAM(S): at 13:56

## 2022-08-30 RX ADMIN — FENTANYL CITRATE 8.5 MICROGRAM(S)/KG/HR: 50 INJECTION INTRAVENOUS at 17:30

## 2022-08-30 RX ADMIN — PROPOFOL 10.2 MICROGRAM(S)/KG/MIN: 10 INJECTION, EMULSION INTRAVENOUS at 17:30

## 2022-08-30 RX ADMIN — Medication 400 MILLIGRAM(S): at 12:19

## 2022-08-30 NOTE — ED ADULT NURSE NOTE - OBJECTIVE STATEMENT
Received patient in room 22 x/o bilateral lower extremities swelling, insomnia x 1 week. Patient c/o SOB, denies chest pain, headache. Patient is on cardiac monitor sinus bradycardia w/O2 sat 96% on a room air. Patient is A&OX4, airway patent, breathing labored, radial pulses palpable, abdomen soft, nontender. MD at bedside for evaluation. Side rails up and safety maintained. Fall precaution in place. Family at bedside. Received patient in room 22 x/o bilateral lower extremities swelling, insomnia x 1 week. Patient c/o SOB, denies chest pain, headache. Patient is on cardiac monitor sinus bradycardia w/O2 sat 100% 2L/NC. Patient is A&OX2, airway patent, breathing labored, radial pulses palpable, abdomen soft, nontender. MD at bedside for evaluation. Side rails up and safety maintained. Fall precaution in place. Family at bedside.

## 2022-08-30 NOTE — ED ADULT NURSE NOTE - NSFALLRSKHARMRISK_ED_ALL_ED
Ref Range    Protein, UA Negative Negative   POCT urine qual dipstick glucose   Result Value Ref Range    Glucose, UA POC neg      An ultrasound evaluation was done in our office today. Please refer to the enclosed copy of the ultrasound report for further information. Lab Work Review:  reviewed    IMPRESSION:  1) Single intrauterine gestation at 24 5/7 weeks with fetal biometry consistent with dates. 2) The fetal anatomy appears normal and the fetal heart rate is 155 bpm.There is mild bilateral prominence of the renal pelves measuring wnl (around 3 mm.). 3) The amniotic fluid volume is WNL and the uterus appears to be didelphis with 2 uterine and a thick septum between the two. The fetus is located in the right uterus. 4) The left uterus appears normal with thickened walls. 5) Hypothyroid- on Traph[place] thyroid replacement. 6) Fetal demise at 23 weeks. 7 Asthma. The patient declined genetic testing. 8) History of pre-eclampsia. 9) The cervix is long and closed by transvaginal ultrasound. RECOMMENDATIONS:  Each of the recommendations were discussed with the patient:  1) Single intrauterine gestation at 24 5/7 weeks with fetal biometry consistent with dates   2) The fetal anatomy appears normal and the fetal heart rate is 155 bpm. There is mild bilateral prominence of the renal pelves measuring wnl (around 3 mm.). 3) The amniotic fluid volume is WNL and the uterus appears to be didelphis with 2 uterine and a thick septum between the two. The fetus is located in the right uterus. 4) The left uterus appears normal with thickened walls. 5) Hypothyroid- on Traphill thyroid replacement. Suggest serial tSH levels in each trimester. 6) Fetal demise at 23 weeks. 7 Asthma. The patient declined genetic testing. 8) History of pre-eclampsia. 9) The cervix is long and closed by transvaginal ultrasound.   10) I suggested the patient return for a detailed fetal anatomy exam in 4 weeks unless there is a clinical no

## 2022-08-30 NOTE — H&P ADULT - NSICDXPASTSURGICALHX_GEN_ALL_CORE_FT
PAST SURGICAL HISTORY:  H/O:  section     History of amputation of right great toe 2018    History of laparoscopic cholecystectomy     
PAST SURGICAL HISTORY:  H/O:  section     History of amputation of right great toe 2018    History of laparoscopic cholecystectomy

## 2022-08-30 NOTE — PATIENT PROFILE ADULT - FALL HARM RISK - DEVICES
unable to assess, pt is a/ox1, only to name but Hard of hearing unable to assess, pt is a/ox1, only to name but Hard of hearing/None

## 2022-08-30 NOTE — PATIENT PROFILE ADULT - FALL HARM RISK - HARM RISK INTERVENTIONS

## 2022-08-30 NOTE — H&P ADULT - ATTENDING COMMENTS
Patient examined and case reviewed in detail on bedside rounds  Critically ill and unstable on vent with alteration of MS and probable CHF  Frequent bedside visits with therapy change today.   I have personally provided 35+ minutes of critical care time concurrently with the resident/fellow; this excludes time spent on separate procedures.

## 2022-08-30 NOTE — H&P ADULT - HISTORY OF PRESENT ILLNESS
Ms. Fragoso is a 66 YO woman with PMH of possible sarcoidosis, CHF, HLD, HTN, pancreatic pseudocyst, recent admission for AMS complicated by PEA x2, who presents with shortness of breath and b/l leg edema. 
67F w/ PMHx of HTN, HLD, sarcoidosis, CHF, presented to the ED for BL leg swelling and SOB. The patient was admitted on July 13th and was found to have pleural effusion, elevated BNP and LE edema concerning for CHF exacerbation. Admitted for metabolic encephalopathy and CHF exacerbation. Intubated 7/13 for AMS, extubated 7/16 and transferred to floor. Code called on 7/17 am, PEA, ROSC achieved. The patient was discharged to rehab facility on August 5th. Today the patient presented to the ED with 1 week of BL leg swelling and SOB that developed last night. The patient was released from rehab on Thursday. Per the patients daughter her leg swelling was initially noticed last week before she left the rehab facility. She had been getting lasix 20mg daily but that did not help with her symptoms Her legs progressively became more swollen. The daughter states that she noticed her mom become more short of breath last night and was unable to sleep . The patient is on home oxygen and was not able to obtain relief with oxygen. This morning the patient became delerius and was stating things that did not make sense per daughter which prompted her to bring the patient to the ED.

## 2022-08-30 NOTE — ED ADULT TRIAGE NOTE - NS ED NURSE BANDS TYPE
-Continue home amlodipine 5mg qday and irbesartan 300mg qday (losartan equivalent dose while inpt) Name band;

## 2022-08-30 NOTE — H&P ADULT - NSHPSOCIALHISTORY_GEN_ALL_CORE
The patient lives with daughter. Was recently discharged from Rehab facility. Patient baseline mental status is AAO X3. Able to have a conversation.
Denies ETOH use, former tobacco use, Denies illicit drug use

## 2022-08-30 NOTE — H&P ADULT - NSICDXPASTMEDICALHX_GEN_ALL_CORE_FT
PAST MEDICAL HISTORY:  Bilateral cataracts     Fluid in pleural cavity associated with pancreatitis     HLD (hyperlipidemia)     HTN (hypertension)     Pancreatic pseudocyst/cyst s/p drain placement and removal    Type 2 diabetes mellitus     
PAST MEDICAL HISTORY:  Bilateral cataracts     Fluid in pleural cavity associated with pancreatitis     HLD (hyperlipidemia)     HTN (hypertension)     Pancreatic pseudocyst/cyst s/p drain placement and removal    Type 2 diabetes mellitus

## 2022-08-30 NOTE — ED ADULT NURSE REASSESSMENT NOTE - NS ED NURSE REASSESS COMMENT FT1
pt. moved into room 7 at this time for further evaluation. pt. hard to arouse, responsive to sternal chest rub. rpt. VBG and EKG to be done. MD nAtunez aware.

## 2022-08-30 NOTE — H&P ADULT - NSHPREVIEWOFSYSTEMS_GEN_ALL_CORE
unable to obtain because the patient is intubated.
CONSTITUTIONAL: No fevers, chills, fatigue, dizziness, weakness, unexpected weight change  EYES: No double vision, blurry vision  ENT: No ear pain, nasal congestion, runny nose, sore throat  CV: No chest pain, palpitations  PULM: No cough, shortness of breath  GI: No abdominal pain, nausea, vomiting, diarrhea, constipation  : No dysuria, polyuria, hematuria  SKIN: No rashes, swelling  MSK: No muscle aches  NEURO: No headache, paresthesias  PSYCHIATRIC: Denies suicidal, homicidal ideations. No auditory, visual, tactile hallucinations

## 2022-08-30 NOTE — ED ADULT NURSE REASSESSMENT NOTE - NS ED NURSE REASSESS COMMENT FT1
Patient remains intubated, waiting to go to ICU. Patient on cardiac monitor sinus bradycardia w/O2 sat 100%. Side rails up and safety maintained.

## 2022-08-30 NOTE — ED ADULT NURSE REASSESSMENT NOTE - NS ED NURSE REASSESS COMMENT FT1
Float RN in room during intubation. Pt obtunded, using abdominal muscles for breathing.. not following commands. 20mg of etomidate and 80mg of rocuronium given. 2nd PIV inserted to Right AC. pt on tele with capno monitoring. Float RN in room during intubation. Pt obtunded, using abdominal muscles for breathing.. not following commands. 20mg of etomidate and 80mg of rocuronium given. 2nd PIV inserted to Right AC. pt on tele with C02 monitoring. Float RN in room during intubation. Pt obtunded.. not following commands. 20mg of etomidate and 80mg of rocuronium given by ED Resident Brock Rich. 2nd PIV inserted to Right AC. pt on tele with C02 monitoring.

## 2022-08-30 NOTE — H&P ADULT - ASSESSMENT
Ms. Fragoso is a 66 YO woman with PMH of possible sarcoidosis, CHF, HLD, HTN, pancreatic pseudocyst, recent admission for AMS complicated by PEA x2, who presents with shortness of breath and b/l leg edema, with CXR consistent with pulmonary edema and BMP of 2414, likely represents acute exacerbation of CHF.

## 2022-08-30 NOTE — H&P ADULT - NSHPLABSRESULTS_GEN_ALL_CORE
8.1    3.88  )-----------( 191      ( 30 Aug 2022 12:12 )             26.6           139  |  106  |  42<H>  ----------------------------<  140<H>  4.8   |  24  |  1.50<H>    Ca    9.2      30 Aug 2022 14:23    TPro  7.3  /  Alb  3.3  /  TBili  0.3  /  DBili  x   /  AST  32  /  ALT  21  /  AlkPhos  107                Urinalysis Basic - ( 30 Aug 2022 16:14 )    Color: Light Brown / Appearance: Slightly Turbid / S.011 / pH: x  Gluc: x / Ketone: Negative  / Bili: Negative / Urobili: <2 mg/dL   Blood: x / Protein: 30 mg/dL / Nitrite: Negative   Leuk Esterase: Small / RBC: >720 /HPF / WBC 19 /HPF   Sq Epi: x / Non Sq Epi: 1 /HPF / Bacteria: Negative            Lactate Trend            CAPILLARY BLOOD GLUCOSE
LABS: When present labs, imaging, and ECG were personally reviewed                          8.1    3.88  )-----------( 191      ( 30 Aug 2022 12:12 )             26.6       08-30    139  |  106  |  42<H>  ----------------------------<  140<H>  4.8   |  24  |  1.50<H>    Ca    9.2      30 Aug 2022 14:23    TPro  7.3  /  Alb  3.3  /  TBili  0.3  /  DBili  x   /  AST  32  /  ALT  21  /  AlkPhos  107  08-30       LIVER FUNCTIONS - ( 30 Aug 2022 12:12 )  Alb: 3.3 g/dL / Pro: 7.3 g/dL / ALK PHOS: 107 U/L / ALT: 21 U/L / AST: 32 U/L / GGT: x                            Lactate Trend            CAPILLARY BLOOD GLUCOSE                RADIOLOGY & ADDITIONAL TESTS:

## 2022-08-30 NOTE — ED ADULT NURSE REASSESSMENT NOTE - NS ED NURSE REASSESS COMMENT FT1
BREAK RN: pt. resting comfortably at this time. no acute distress noted. respirations even and unlabored on supplemental O2. Sinus eugenie on cardiac monitor. VS as noted.

## 2022-08-30 NOTE — ED ADULT TRIAGE NOTE - CHIEF COMPLAINT QUOTE
Pt presents to ED via wheelchair from home with c/o bilateral lower extremity swelling, and insomnia since being discharged from rehab x 1 week.

## 2022-08-30 NOTE — PROCEDURE NOTE - ADDITIONAL PROCEDURE DETAILS
Target vessel identified via US. Vessel easily compressible proximally and distally. Needle tip observed within vessel lumen. Catheter easily advanced into place. Positive blood return, flushes easily.

## 2022-08-30 NOTE — PATIENT PROFILE ADULT - OVER THE PAST TWO WEEKS HAVE YOU FELT DOWN, DEPRESSED OR HOPELESS?
unable to assess, pt is a/ox1, only to name but Hard of hearin unable to assess, pt is a/ox1, only to name but Hard of hearin/no

## 2022-08-30 NOTE — ED ADULT NURSE REASSESSMENT NOTE - NS ED NURSE REASSESS COMMENT FT1
Labs obtained, 20G IV placed on left wrist, medication given as ordered. Side rails up and safety maintained.

## 2022-08-30 NOTE — PROCEDURE NOTE - NSINDICATIONS_GEN_A_CORE
antibiotic therapy Opioid Pregnancy And Lactation Text: These medications can lead to premature delivery and should be avoided during pregnancy. These medications are also present in breast milk in small amounts.

## 2022-08-30 NOTE — ED PROVIDER NOTE - OBJECTIVE STATEMENT
67F w/ h/o HTN, HLD, DM2, [place], presents with b/l leg edema and SOB, patient recently admitted for ---- and discharged from rehab 1 week ago, since then she has had worsening b/l LE edema, and SOB. Pt has been taking 20 lasix daily without sig improvement. Denies CP, fever/chills, back pain, abd pain, N/V. 67F w/ h/o HTN, HLD, DM2, [place], presents with b/l leg edema and SOB, patient recently admitted for ---- and discharged from rehab 1 week ago, since then she has had worsening b/l LE edema, and SOB. Pt has been taking 20 lasix daily without sig improvement. Denies CP, fever/chills, back pain, abd pain, N/V.    Attendinyo presents with shortness of breath and lower extremity edema.  no fever or chills.  no vomiting.

## 2022-08-30 NOTE — PATIENT PROFILE ADULT - STATED REASON FOR ADMISSION
CHF/intubated  Received Pt from ED, sedated and intubated with propofol and fentanyl. Unable to complete an accurate patient profile

## 2022-08-30 NOTE — H&P ADULT - ASSESSMENT
#Neuro:  - Intubated and sedated  - Will get CT head and look for intercranial pathology as source of AMS   - Patient had AMS     #Cardiovascular:  - POCUS patient to asses cardiovascular function  - Currently not on pressors   -     #Pulmonary:  -    #FEN/GI:  -    #Renal:  -    #:  -    #ID:  -    #Heme:  -    #Endo:  -    #Ethics:  -     #Neuro:  - Intubated and sedated  - Will get CT head and look for intercranial pathology as source of AMS   - Patient had AMS     #Cardiovascular:  - POCUS patient to asses cardiovascular function  - Currently not on pressors   - The patient is currently sedated with Propofol    #Pulmonary:  - On examination lungs CTA   - The patient is intubated and sedated, on Propofol   - Chest Xray shows resolving pleural effusion on the R when compare to 7/30.   -Monitor Patient, obtain chest Xray in the morning to evaluate pleural effusion status.     #FEN/GI:  - No current recommendations    #Renal:  - Patient BUN/Cr 40/1.5, consistent with labs from previous admission  -Obtain BMP q8 and monitor for changes in BUN/Cr egfr decreased consistent with previous admission.     #:  -    #ID:  -    #Heme:  *Anemia   -hgb 8.1, will transfuse the patient if hgb is less than 7. Patient has had anemia in the past with hgb in the   -obtain type and screen for patient  -Repeat CBC q6    #Endo:  -    #Ethics:  -     #Neuro:  - Intubated and sedated  - Patient had AMS   - Will get CT head and look for intercranial pathology as source of AMS       #Cardiovascular:  - POCUS patient to asses cardiovascular function  - Currently not on pressors   - The patient is currently sedated with Propofol    #Pulmonary:  - On examination lungs wheezes were present in R upper lobe   - The patient is intubated and sedated, on Propofol   - Chest Xray shows resolving pleural effusion on the R when compared to 7/30, but BL pleural effusions are still present.    - Monitor Patient, obtain chest Xray in the morning to evaluate pleural effusion status.     #FEN/GI:  - No current recommendations    #Renal:  - Patient BUN/Cr 40/1.5, consistent with labs from previous admission  -Obtain BMP q8 and monitor for changes in BUN/Cr egfr decreased consistent with previous admission.     #:  - UA shows blood in urine and proteinuria   - No evidence to suggest UTI   - Will consider UC    #ID:  -Leukopenic at 3.02, hypothermic at 91 F, will obtain BC X2 and patient will be started on abx   -UA does not show suspicion for UTI  -Chest xray shows BL plueral effusions, possible source   -Will consider CT chest for patient    #Heme:  *Anemia   -Hgb 8.1, will transfuse the patient if hgb is less than 7. Patient has had anemia in the past with hgb in the 8s   -Obtain type and screen for patient  -Repeat CBC STAT, if stable  q daily       #Endo:  - Hypothermic at 91 F and bradycardic, obtain TSH and cortisol level  - Patient will be started on empiric ABx after blood culture is drawn      #Ethics:  - Full Code Status      #Neuro:  - Intubated and sedated  - Patient had AMS   - Patient was intubated for altered mental status  - Will get CT head and look for intercranial pathology as source of AMS   - The patient is currently sedated with Propofol  - Possible infection or hypercapnia causing AMS       #Cardiovascular:  - POCUS patient to asses cardiovascular function  - Currently not on pressors   - Patient is bradycardic   - Obtain EKG  - Cardio consultation     #Pulmonary:  - On examination lungs wheezes were present in R upper lobe   - The patient is intubated and sedated, on Propofol   - Chest Xray shows resolving pleural effusion on the R when compared to 7/30, but BL pleural effusions are still present.    - Monitor Patient, obtain chest Xray in the morning to evaluate pleural effusion status.     #FEN/GI:  - Possibly start tube feeding     #Renal:  - Patient BUN/Cr 40/1.5, consistent with labs from previous admission  -Obtain BMP qdaily and monitor for changes in BUN/Cr egfr decreased consistent with previous admission.     #:  - UA shows blood in urine and proteinuria   - No evidence to suggest UTI   - Will consider UC    #ID:  -Leukopenic at 3.02, hypothermic at 91 F, will obtain BC X2 and patient will be started on abx   -UA does not show suspicion for UTI  -Chest xray shows BL plueral effusions, possible source   -Will consider CT chest for patient    #Heme:  *Anemia   -Hgb 8.1, will transfuse the patient if hgb is less than 7. Patient has had anemia in the past with hgb in the 8s   -Obtain type and screen for patient  -Repeat CBC STAT, if stable  q daily       #Endo:  - Hypothermic at 91 F and bradycardic, obtain TSH and cortisol level      #Ethics:  - Full Code Status  67y F PMHx of HTN, HLD, sarcoidosis, CHF, patient presented to the ED with 1 week of BL leg swelling and SOB that developed last night. Patient became altered in the ED and had respiratory distress was sedated and intubated and admitted to MICU.     #Neuro:  - Intubated and sedated  - Patient had AMS   - Patient was intubated for altered mental status  - Will get CT head and look for intercranial pathology as source of AMS   - The patient is currently sedated with Propofol  - Possible infection or hypercapnia causing AMS       #Cardiovascular:  - POCUS patient to asses cardiovascular function  - Currently not on pressors   - Patient is bradycardic   - Obtain EKG  - Trend enzymes   - Cardio consultation     #Pulmonary:  - On examination lungs wheezes were present in R upper lobe   - The patient is intubated and sedated, on Propofol   - Chest Xray shows resolving pleural effusion on the R when compared to 7/30, but BL pleural effusions are still present.    - POCUS of lungs to evaluate for effusions    #FEN/GI:  - Possibly start tube feeding     #Renal:  - Patient BUN/Cr 40/1.5, consistent with labs from previous admission  - Obtain BMP qdaily and monitor for changes in BUN/Cr egfr decreased consistent with previous admission.   - Lasix as needed     #:  - UA shows blood in urine and proteinuria   - No evidence to suggest UTI   - Will consider UC    #ID:  -Leukopenic at 3.02, hypothermic at 91 F, will obtain BC X2 and patient will be started on Zosyn and Aztreonam   -UA does not show suspicion for UTI  -Chest xray shows BL plueral effusions, possible source   - MRSA swab  - Legionella     #Heme:  *Anemia   -Hgb 8.1, will transfuse the patient if hgb is less than 7. Patient has had anemia in the past with hgb in the 8s   -Obtain type and screen for patient  -Repeat CBC STAT,   - Hep SubQ, for DVT prophylaxis     #Endo:  - Hypothermic at 91 F and bradycardic, obtain TSH and cortisol level      #Ethics:  - Full Code Status  67y F PMHx of HTN, HLD, sarcoidosis, CHF, patient presented to the ED with 1 week of BL leg swelling and SOB that developed last night. Patient became altered in the ED and had respiratory distress was sedated and intubated and admitted to MICU.     #Neuro:  - Intubated and sedated  - Patient had AMS   - Patient was intubated for altered mental status  - Will get CT head and look for intercranial pathology as source of AMS   - The patient is currently sedated with Propofol  - Possible infection or hypercapnia causing AMS       #Cardiovascular:  - POCUS patient to asses cardiovascular function  - Currently not on pressors   - Patient is bradycardic   - Obtain EKG  - Trend enzymes   - Cardio consultation     #Pulmonary:  - On examination lungs wheezes were present in R upper lobe   - The patient is intubated and sedated, on Propofol   - Chest Xray shows resolving pleural effusion on the R when compared to 7/30, but BL pleural effusions are still present.    - POCUS of lungs to evaluate for effusions    #FEN/GI:  - Possibly start tube feeding     #Renal:  - Patient BUN/Cr 40/1.5, consistent with labs from previous admission  - Obtain BMP qdaily and monitor for changes in BUN/Cr egfr decreased consistent with previous admission.   - Lasix as needed     #:  - UA shows blood in urine and proteinuria   - No evidence to suggest UTI   - Will consider UC    #ID:  -Leukopenic at 3.02, hypothermic at 91 F, will obtain BC X2 and patient will be started on Zosyn and Aztreonam   -UA does not show suspicion for UTI  -Chest xray shows BL plueral effusions, possible source   - MRSA swab  - Legionella     #Heme:  *Anemia   -Hgb 8.1, will transfuse the patient if hgb is less than 7. Patient has had anemia in the past with hgb in the 8s   -Obtain type and screen for patient  -Repeat CBC STAT,   - Hep SubQ, for DVT prophylaxis     #Endo:  - Hypothermic at 91 F and bradycardic, obtain TSH and cortisol level    T2DM  -takes insulin 'prn' at home  -ISS q6h      #Ethics:  - Full Code Status

## 2022-08-30 NOTE — H&P ADULT - NSHPPHYSICALEXAM_GEN_ALL_CORE
GENERAL: Vital signs are within normal limits  EYES: Conjunctiva noninjected or pale, sclera anicteric  HENT: NC/AT, moist mucous membranes  NECK: Supple, trachea midline  LUNG: Nonlabored respirations, no wheezes, rales  CV: RRR, Pulses- Radial: 2+ b/l  ABDOMEN: Nondistended, nontender  MSK: No visible deformities, nontender extremities  SKIN: No rashes, bruises  NEURO: AAOx4 (to person, place, time, event), no tremor, steady gait  PSYCH: Normal mood and affect
VITALS:   T(C): 36.2 (08-30-22 @ 10:56), Max: 36.2 (08-30-22 @ 10:56)  HR: 48 (08-30-22 @ 19:00) (46 - 60)  BP: 148/70 (08-30-22 @ 19:00) (115/62 - 157/76)  RR: 18 (08-30-22 @ 19:00) (16 - 18)  SpO2: 100% (08-30-22 @ 19:00) (96% - 100%)    GENERAL: NAD, lying in bed comfortably  HEAD:  Atraumatic, Normocephalic, pupils small but reactive  EYES: conjunctiva and sclera clear  CHEST/LUNG: Clear to auscultation bilaterally; +faint wheeze, Unlabored respirations +ETT  HEART: Regular rate and rhythm; No murmurs, rubs, or gallops  ABDOMEN: BSx4; Soft, nontender, nondistended  EXTREMITIES:   2+edema b/l to knees  NERVOUS SYSTEM:  intubated, sedated  SKIN: No rashes or lesions  Psych: intubated, sedated

## 2022-08-30 NOTE — PATIENT PROFILE ADULT - FUNCTIONAL ASSESSMENT - BASIC MOBILITY 6.
1-calculated by average/Not able to assess (calculate score using [facility] averaging method)

## 2022-08-31 DIAGNOSIS — E78.5 HYPERLIPIDEMIA, UNSPECIFIED: ICD-10-CM

## 2022-08-31 DIAGNOSIS — E27.40 UNSPECIFIED ADRENOCORTICAL INSUFFICIENCY: ICD-10-CM

## 2022-08-31 DIAGNOSIS — Z91.89 OTHER SPECIFIED PERSONAL RISK FACTORS, NOT ELSEWHERE CLASSIFIED: ICD-10-CM

## 2022-08-31 LAB
A1C WITH ESTIMATED AVERAGE GLUCOSE RESULT: 6 % — HIGH (ref 4–5.6)
ALBUMIN SERPL ELPH-MCNC: 2.8 G/DL — LOW (ref 3.3–5)
ALBUMIN SERPL ELPH-MCNC: 2.9 G/DL — LOW (ref 3.3–5)
ALP SERPL-CCNC: 118 U/L — SIGNIFICANT CHANGE UP (ref 40–120)
ALP SERPL-CCNC: 97 U/L — SIGNIFICANT CHANGE UP (ref 40–120)
ALT FLD-CCNC: 17 U/L — SIGNIFICANT CHANGE UP (ref 4–33)
ALT FLD-CCNC: SIGNIFICANT CHANGE UP U/L (ref 4–33)
ANION GAP SERPL CALC-SCNC: 10 MMOL/L — SIGNIFICANT CHANGE UP (ref 7–14)
ANION GAP SERPL CALC-SCNC: 9 MMOL/L — SIGNIFICANT CHANGE UP (ref 7–14)
AST SERPL-CCNC: 20 U/L — SIGNIFICANT CHANGE UP (ref 4–32)
AST SERPL-CCNC: 23 U/L — SIGNIFICANT CHANGE UP (ref 4–32)
BASOPHILS # BLD AUTO: 0.01 K/UL — SIGNIFICANT CHANGE UP (ref 0–0.2)
BASOPHILS # BLD AUTO: 0.04 K/UL — SIGNIFICANT CHANGE UP (ref 0–0.2)
BASOPHILS NFR BLD AUTO: 0.3 % — SIGNIFICANT CHANGE UP (ref 0–2)
BASOPHILS NFR BLD AUTO: 0.7 % — SIGNIFICANT CHANGE UP (ref 0–2)
BILIRUB SERPL-MCNC: 0.2 MG/DL — SIGNIFICANT CHANGE UP (ref 0.2–1.2)
BILIRUB SERPL-MCNC: <0.2 MG/DL — SIGNIFICANT CHANGE UP (ref 0.2–1.2)
BLD GP AB SCN SERPL QL: NEGATIVE — SIGNIFICANT CHANGE UP
BUN SERPL-MCNC: 40 MG/DL — HIGH (ref 7–23)
BUN SERPL-MCNC: 42 MG/DL — HIGH (ref 7–23)
CALCIUM SERPL-MCNC: 7.8 MG/DL — LOW (ref 8.4–10.5)
CALCIUM SERPL-MCNC: 8.7 MG/DL — SIGNIFICANT CHANGE UP (ref 8.4–10.5)
CHLORIDE SERPL-SCNC: 106 MMOL/L — SIGNIFICANT CHANGE UP (ref 98–107)
CHLORIDE SERPL-SCNC: 109 MMOL/L — HIGH (ref 98–107)
CO2 SERPL-SCNC: 22 MMOL/L — SIGNIFICANT CHANGE UP (ref 22–31)
CO2 SERPL-SCNC: 22 MMOL/L — SIGNIFICANT CHANGE UP (ref 22–31)
CREAT SERPL-MCNC: 1.6 MG/DL — HIGH (ref 0.5–1.3)
CREAT SERPL-MCNC: 1.75 MG/DL — HIGH (ref 0.5–1.3)
CULTURE RESULTS: SIGNIFICANT CHANGE UP
EGFR: 31 ML/MIN/1.73M2 — LOW
EGFR: 35 ML/MIN/1.73M2 — LOW
EOSINOPHIL # BLD AUTO: 0.1 K/UL — SIGNIFICANT CHANGE UP (ref 0–0.5)
EOSINOPHIL # BLD AUTO: 0.18 K/UL — SIGNIFICANT CHANGE UP (ref 0–0.5)
EOSINOPHIL NFR BLD AUTO: 3 % — SIGNIFICANT CHANGE UP (ref 0–6)
EOSINOPHIL NFR BLD AUTO: 3 % — SIGNIFICANT CHANGE UP (ref 0–6)
ESTIMATED AVERAGE GLUCOSE: 126 — SIGNIFICANT CHANGE UP
GLUCOSE BLDC GLUCOMTR-MCNC: 100 MG/DL — HIGH (ref 70–99)
GLUCOSE BLDC GLUCOMTR-MCNC: 78 MG/DL — SIGNIFICANT CHANGE UP (ref 70–99)
GLUCOSE BLDC GLUCOMTR-MCNC: 81 MG/DL — SIGNIFICANT CHANGE UP (ref 70–99)
GLUCOSE BLDC GLUCOMTR-MCNC: 81 MG/DL — SIGNIFICANT CHANGE UP (ref 70–99)
GLUCOSE BLDC GLUCOMTR-MCNC: 84 MG/DL — SIGNIFICANT CHANGE UP (ref 70–99)
GLUCOSE BLDC GLUCOMTR-MCNC: 98 MG/DL — SIGNIFICANT CHANGE UP (ref 70–99)
GLUCOSE SERPL-MCNC: 86 MG/DL — SIGNIFICANT CHANGE UP (ref 70–99)
GLUCOSE SERPL-MCNC: 96 MG/DL — SIGNIFICANT CHANGE UP (ref 70–99)
GRAM STN FLD: SIGNIFICANT CHANGE UP
HCT VFR BLD CALC: 23.5 % — LOW (ref 34.5–45)
HCT VFR BLD CALC: 26.3 % — LOW (ref 34.5–45)
HGB BLD-MCNC: 7.1 G/DL — LOW (ref 11.5–15.5)
HGB BLD-MCNC: 7.6 G/DL — LOW (ref 11.5–15.5)
IANC: 2.07 K/UL — SIGNIFICANT CHANGE UP (ref 1.8–7.4)
IANC: 4.13 K/UL — SIGNIFICANT CHANGE UP (ref 1.8–7.4)
IMM GRANULOCYTES NFR BLD AUTO: 0.6 % — SIGNIFICANT CHANGE UP (ref 0–1.5)
IMM GRANULOCYTES NFR BLD AUTO: 1 % — SIGNIFICANT CHANGE UP (ref 0–1.5)
LEGIONELLA AG UR QL: NEGATIVE — SIGNIFICANT CHANGE UP
LYMPHOCYTES # BLD AUTO: 0.74 K/UL — LOW (ref 1–3.3)
LYMPHOCYTES # BLD AUTO: 0.76 K/UL — LOW (ref 1–3.3)
LYMPHOCYTES # BLD AUTO: 12.1 % — LOW (ref 13–44)
LYMPHOCYTES # BLD AUTO: 22.6 % — SIGNIFICANT CHANGE UP (ref 13–44)
MAGNESIUM SERPL-MCNC: 1.8 MG/DL — SIGNIFICANT CHANGE UP (ref 1.6–2.6)
MCHC RBC-ENTMCNC: 25.7 PG — LOW (ref 27–34)
MCHC RBC-ENTMCNC: 26.1 PG — LOW (ref 27–34)
MCHC RBC-ENTMCNC: 28.9 GM/DL — LOW (ref 32–36)
MCHC RBC-ENTMCNC: 30.2 GM/DL — LOW (ref 32–36)
MCV RBC AUTO: 86.4 FL — SIGNIFICANT CHANGE UP (ref 80–100)
MCV RBC AUTO: 88.9 FL — SIGNIFICANT CHANGE UP (ref 80–100)
MONOCYTES # BLD AUTO: 0.41 K/UL — SIGNIFICANT CHANGE UP (ref 0–0.9)
MONOCYTES # BLD AUTO: 0.95 K/UL — HIGH (ref 0–0.9)
MONOCYTES NFR BLD AUTO: 12.2 % — SIGNIFICANT CHANGE UP (ref 2–14)
MONOCYTES NFR BLD AUTO: 15.6 % — HIGH (ref 2–14)
MRSA PCR RESULT.: SIGNIFICANT CHANGE UP
NEUTROPHILS # BLD AUTO: 2.07 K/UL — SIGNIFICANT CHANGE UP (ref 1.8–7.4)
NEUTROPHILS # BLD AUTO: 4.13 K/UL — SIGNIFICANT CHANGE UP (ref 1.8–7.4)
NEUTROPHILS NFR BLD AUTO: 61.3 % — SIGNIFICANT CHANGE UP (ref 43–77)
NEUTROPHILS NFR BLD AUTO: 67.6 % — SIGNIFICANT CHANGE UP (ref 43–77)
NRBC # BLD: 0 /100 WBCS — SIGNIFICANT CHANGE UP (ref 0–0)
NRBC # BLD: 0 /100 WBCS — SIGNIFICANT CHANGE UP (ref 0–0)
NRBC # FLD: 0 K/UL — SIGNIFICANT CHANGE UP (ref 0–0)
NRBC # FLD: 0 K/UL — SIGNIFICANT CHANGE UP (ref 0–0)
PHOSPHATE SERPL-MCNC: 4.2 MG/DL — SIGNIFICANT CHANGE UP (ref 2.5–4.5)
PLATELET # BLD AUTO: 181 K/UL — SIGNIFICANT CHANGE UP (ref 150–400)
PLATELET # BLD AUTO: 185 K/UL — SIGNIFICANT CHANGE UP (ref 150–400)
POTASSIUM SERPL-MCNC: 4.2 MMOL/L — SIGNIFICANT CHANGE UP (ref 3.5–5.3)
POTASSIUM SERPL-MCNC: 4.6 MMOL/L — SIGNIFICANT CHANGE UP (ref 3.5–5.3)
POTASSIUM SERPL-SCNC: 4.2 MMOL/L — SIGNIFICANT CHANGE UP (ref 3.5–5.3)
POTASSIUM SERPL-SCNC: 4.6 MMOL/L — SIGNIFICANT CHANGE UP (ref 3.5–5.3)
PROCALCITONIN SERPL-MCNC: 0.13 NG/ML — HIGH (ref 0.02–0.1)
PROT SERPL-MCNC: 5.9 G/DL — LOW (ref 6–8.3)
PROT SERPL-MCNC: 5.9 G/DL — LOW (ref 6–8.3)
RBC # BLD: 2.72 M/UL — LOW (ref 3.8–5.2)
RBC # BLD: 2.96 M/UL — LOW (ref 3.8–5.2)
RBC # FLD: 16.9 % — HIGH (ref 10.3–14.5)
RBC # FLD: 16.9 % — HIGH (ref 10.3–14.5)
RH IG SCN BLD-IMP: POSITIVE — SIGNIFICANT CHANGE UP
S AUREUS DNA NOSE QL NAA+PROBE: SIGNIFICANT CHANGE UP
SODIUM SERPL-SCNC: 137 MMOL/L — SIGNIFICANT CHANGE UP (ref 135–145)
SODIUM SERPL-SCNC: 141 MMOL/L — SIGNIFICANT CHANGE UP (ref 135–145)
SPECIMEN SOURCE: SIGNIFICANT CHANGE UP
SPECIMEN SOURCE: SIGNIFICANT CHANGE UP
TROPONIN T, HIGH SENSITIVITY RESULT: 74 NG/L — CRITICAL HIGH
VANCOMYCIN TROUGH SERPL-MCNC: 12.5 UG/ML — SIGNIFICANT CHANGE UP (ref 10–20)
WBC # BLD: 3.37 K/UL — LOW (ref 3.8–10.5)
WBC # BLD: 6.1 K/UL — SIGNIFICANT CHANGE UP (ref 3.8–10.5)
WBC # FLD AUTO: 3.37 K/UL — LOW (ref 3.8–10.5)
WBC # FLD AUTO: 6.1 K/UL — SIGNIFICANT CHANGE UP (ref 3.8–10.5)

## 2022-08-31 PROCEDURE — 99291 CRITICAL CARE FIRST HOUR: CPT | Mod: GC,25

## 2022-08-31 PROCEDURE — 76604 US EXAM CHEST: CPT | Mod: 26

## 2022-08-31 PROCEDURE — 93306 TTE W/DOPPLER COMPLETE: CPT | Mod: 26

## 2022-08-31 PROCEDURE — 99223 1ST HOSP IP/OBS HIGH 75: CPT

## 2022-08-31 PROCEDURE — 93308 TTE F-UP OR LMTD: CPT | Mod: 26

## 2022-08-31 PROCEDURE — 31624 DX BRONCHOSCOPE/LAVAGE: CPT | Mod: GC

## 2022-08-31 RX ORDER — ALBUTEROL 90 UG/1
1.25 AEROSOL, METERED ORAL EVERY 6 HOURS
Refills: 0 | Status: DISCONTINUED | OUTPATIENT
Start: 2022-08-31 | End: 2022-08-31

## 2022-08-31 RX ORDER — BUMETANIDE 0.25 MG/ML
1 INJECTION INTRAMUSCULAR; INTRAVENOUS ONCE
Refills: 0 | Status: COMPLETED | OUTPATIENT
Start: 2022-08-31 | End: 2022-08-31

## 2022-08-31 RX ORDER — HYDROCORTISONE 20 MG
50 TABLET ORAL EVERY 8 HOURS
Refills: 0 | Status: DISCONTINUED | OUTPATIENT
Start: 2022-08-31 | End: 2022-09-01

## 2022-08-31 RX ORDER — FUROSEMIDE 40 MG
40 TABLET ORAL ONCE
Refills: 0 | Status: COMPLETED | OUTPATIENT
Start: 2022-08-31 | End: 2022-08-31

## 2022-08-31 RX ORDER — DEXTROSE 50 % IN WATER 50 %
50 SYRINGE (ML) INTRAVENOUS ONCE
Refills: 0 | Status: COMPLETED | OUTPATIENT
Start: 2022-08-31 | End: 2022-08-31

## 2022-08-31 RX ORDER — SODIUM CHLORIDE 9 MG/ML
4 INJECTION INTRAMUSCULAR; INTRAVENOUS; SUBCUTANEOUS EVERY 6 HOURS
Refills: 0 | Status: COMPLETED | OUTPATIENT
Start: 2022-08-31 | End: 2022-09-03

## 2022-08-31 RX ORDER — METRONIDAZOLE 500 MG
500 TABLET ORAL ONCE
Refills: 0 | Status: COMPLETED | OUTPATIENT
Start: 2022-08-31 | End: 2022-08-31

## 2022-08-31 RX ORDER — METRONIDAZOLE 500 MG
TABLET ORAL
Refills: 0 | Status: DISCONTINUED | OUTPATIENT
Start: 2022-08-31 | End: 2022-09-03

## 2022-08-31 RX ORDER — MAGNESIUM SULFATE 500 MG/ML
1 VIAL (ML) INJECTION ONCE
Refills: 0 | Status: COMPLETED | OUTPATIENT
Start: 2022-08-31 | End: 2022-08-31

## 2022-08-31 RX ORDER — METRONIDAZOLE 500 MG
500 TABLET ORAL EVERY 8 HOURS
Refills: 0 | Status: DISCONTINUED | OUTPATIENT
Start: 2022-08-31 | End: 2022-09-03

## 2022-08-31 RX ORDER — ALBUTEROL 90 UG/1
2 AEROSOL, METERED ORAL EVERY 6 HOURS
Refills: 0 | Status: DISCONTINUED | OUTPATIENT
Start: 2022-08-31 | End: 2022-09-05

## 2022-08-31 RX ADMIN — Medication 100 MILLIGRAM(S): at 17:15

## 2022-08-31 RX ADMIN — Medication 50 MILLIGRAM(S): at 17:16

## 2022-08-31 RX ADMIN — BUMETANIDE 1 MILLIGRAM(S): 0.25 INJECTION INTRAMUSCULAR; INTRAVENOUS at 15:04

## 2022-08-31 RX ADMIN — PROPOFOL 10.2 MICROGRAM(S)/KG/MIN: 10 INJECTION, EMULSION INTRAVENOUS at 10:22

## 2022-08-31 RX ADMIN — ALBUTEROL 2 PUFF(S): 90 AEROSOL, METERED ORAL at 22:29

## 2022-08-31 RX ADMIN — SODIUM CHLORIDE 4 MILLILITER(S): 9 INJECTION INTRAMUSCULAR; INTRAVENOUS; SUBCUTANEOUS at 15:08

## 2022-08-31 RX ADMIN — HEPARIN SODIUM 5000 UNIT(S): 5000 INJECTION INTRAVENOUS; SUBCUTANEOUS at 05:35

## 2022-08-31 RX ADMIN — FENTANYL CITRATE 8.5 MICROGRAM(S)/KG/HR: 50 INJECTION INTRAVENOUS at 17:07

## 2022-08-31 RX ADMIN — Medication 40 MILLIGRAM(S): at 10:52

## 2022-08-31 RX ADMIN — HEPARIN SODIUM 5000 UNIT(S): 5000 INJECTION INTRAVENOUS; SUBCUTANEOUS at 21:17

## 2022-08-31 RX ADMIN — Medication 40 MILLIGRAM(S): at 20:02

## 2022-08-31 RX ADMIN — ALBUTEROL 2 PUFF(S): 90 AEROSOL, METERED ORAL at 15:17

## 2022-08-31 RX ADMIN — Medication 50 MILLIGRAM(S): at 05:32

## 2022-08-31 RX ADMIN — Medication 50 MILLIGRAM(S): at 21:18

## 2022-08-31 RX ADMIN — Medication 50 MILLILITER(S): at 12:56

## 2022-08-31 RX ADMIN — HEPARIN SODIUM 5000 UNIT(S): 5000 INJECTION INTRAVENOUS; SUBCUTANEOUS at 13:58

## 2022-08-31 RX ADMIN — PROPOFOL 10.2 MICROGRAM(S)/KG/MIN: 10 INJECTION, EMULSION INTRAVENOUS at 20:02

## 2022-08-31 RX ADMIN — CHLORHEXIDINE GLUCONATE 15 MILLILITER(S): 213 SOLUTION TOPICAL at 17:15

## 2022-08-31 RX ADMIN — Medication 100 GRAM(S): at 12:56

## 2022-08-31 RX ADMIN — Medication 1 APPLICATION(S): at 17:15

## 2022-08-31 RX ADMIN — FENTANYL CITRATE 8.5 MICROGRAM(S)/KG/HR: 50 INJECTION INTRAVENOUS at 20:02

## 2022-08-31 RX ADMIN — CHLORHEXIDINE GLUCONATE 15 MILLILITER(S): 213 SOLUTION TOPICAL at 05:35

## 2022-08-31 RX ADMIN — Medication 50 MILLIGRAM(S): at 13:58

## 2022-08-31 RX ADMIN — SODIUM CHLORIDE 4 MILLILITER(S): 9 INJECTION INTRAMUSCULAR; INTRAVENOUS; SUBCUTANEOUS at 22:29

## 2022-08-31 NOTE — PROGRESS NOTE ADULT - SUBJECTIVE AND OBJECTIVE BOX
MICU PROGRESS NOTE    INTERVAL HPI/OVERNIGHT EVENTS:    SUBJECTIVE:     OBJECTIVE:    VITAL SIGNS:  ICU Vital Signs Last 24 Hrs  T(C): 37.2 (31 Aug 2022 08:00), Max: 38.1 (31 Aug 2022 05:00)  T(F): 98.9 (31 Aug 2022 08:00), Max: 100.6 (31 Aug 2022 05:00)  HR: 55 (31 Aug 2022 08:00) (46 - 61)  BP: 117/61 (31 Aug 2022 08:00) (103/55 - 157/76)  BP(mean): 74 (31 Aug 2022 08:) (66 - 92)  ABP: --  ABP(mean): --  RR: 14 (31 Aug 2022 08:) (14 - 18)  SpO2: 97% (31 Aug 2022 08:) (95% - 100%)    O2 Parameters below as of 31 Aug 2022 08:00  Patient On (Oxygen Delivery Method): ventilator    O2 Concentration (%): 30        VENTS:  Mode: AC/ CMV (Assist Control/ Continuous Mandatory Ventilation), RR (machine): 14, TV (machine): 350, FiO2: 30, PEEP: 5, ITime: 1, MAP: 10, PIP: 31    I&O:     @ :  -   @ 07:00  --------------------------------------------------------  IN: 675.2 mL / OUT: 705 mL / NET: -29.8 mL     @ :  -   @ 09:00  --------------------------------------------------------  IN: 54.4 mL / OUT: 10 mL / NET: 44.4 mL        PHYSICAL EXAM:  GENERAL: NAD, conversant  CHEST/LUNG: Clear to auscultation bilaterally; No crackles, rhonchi, wheezing, or rubs  HEART: Regular rate and rhythm; No murmurs, rubs, or gallops  ABDOMEN: Soft, Nontender, Nondistended; Bowel sounds present  EXTREMITIES:  2+ Peripheral Pulses, No clubbing, cyanosis, or edema  SKIN: No rashes or lesions  NERVOUS SYSTEM:  Alert & Oriented, Good concentration      LINES:                                       MEDICATIONS:  MEDICATIONS  (STANDING):  aztreonam  IVPB 1000 milliGRAM(s) IV Intermittent every 8 hours  aztreonam  IVPB      chlorhexidine 0.12% Liquid 15 milliLiter(s) Oral Mucosa every 12 hours  chlorhexidine 4% Liquid 1 Application(s) Topical <User Schedule>  dextrose 5%. 1000 milliLiter(s) (100 mL/Hr) IV Continuous <Continuous>  dextrose 5%. 1000 milliLiter(s) (50 mL/Hr) IV Continuous <Continuous>  dextrose 50% Injectable 25 Gram(s) IV Push once  dextrose 50% Injectable 12.5 Gram(s) IV Push once  dextrose 50% Injectable 25 Gram(s) IV Push once  fentaNYL   Infusion. 1 MICROgram(s)/kG/Hr (8.5 mL/Hr) IV Continuous <Continuous>  glucagon  Injectable 1 milliGRAM(s) IntraMuscular once  heparin   Injectable 5000 Unit(s) SubCutaneous every 8 hours  insulin lispro (ADMELOG) corrective regimen sliding scale   SubCutaneous every 6 hours  propofol Infusion 20 MICROgram(s)/kG/Min (10.2 mL/Hr) IV Continuous <Continuous>  vancomycin  IVPB 1250 milliGRAM(s) IV Intermittent every 24 hours  vancomycin  IVPB        MEDICATIONS  (PRN):  dextrose Oral Gel 15 Gram(s) Oral once PRN Blood Glucose LESS THAN 70 milliGRAM(s)/deciliter      ALLERGIES:  Allergies    penicillin ([facility] Synd)    Intolerances        LABS:                        7.1    3.37  )-----------( 181      ( 31 Aug 2022 02:00 )             23.5     08-    137  |  106  |  42<H>  ----------------------------<  86  4.2   |  22  |  1.60<H>    Ca    8.7      31 Aug 2022 02:00  Phos  4.2     08-  Mg     1.80     08-    TPro  5.9<L>  /  Alb  2.9<L>  /  TBili  0.2  /  DBili  x   /  AST  20  /  ALT  17  /  AlkPhos  97  08-    PT/INR - ( 30 Aug 2022 19:40 )   PT: 12.8 sec;   INR: 1.10 ratio         PTT - ( 30 Aug 2022 19:40 )  PTT:36.9 sec  Urinalysis Basic - ( 30 Aug 2022 16:14 )    Color: Light Brown / Appearance: Slightly Turbid / S.011 / pH: x  Gluc: x / Ketone: Negative  / Bili: Negative / Urobili: <2 mg/dL   Blood: x / Protein: 30 mg/dL / Nitrite: Negative   Leuk Esterase: Small / RBC: >720 /HPF / WBC 19 /HPF   Sq Epi: x / Non Sq Epi: 1 /HPF / Bacteria: Negative        CAPILLARY BLOOD GLUCOSE      POCT Blood Glucose.: 81 mg/dL (31 Aug 2022 05:24)      RADIOLOGY & ADDITIONAL TESTS: Reviewed. MICU PROGRESS NOTE    INTERVAL HPI/OVERNIGHT EVENTS: NAEON    SUBJECTIVE: not obtainable, pt sedated    OBJECTIVE:    VITAL SIGNS:  ICU Vital Signs Last 24 Hrs  T(C): 37.2 (31 Aug 2022 08:00), Max: 38.1 (31 Aug 2022 05:00)  T(F): 98.9 (31 Aug 2022 08:00), Max: 100.6 (31 Aug 2022 05:00)  HR: 55 (31 Aug 2022 08:00) (46 - 61)  BP: 117/61 (31 Aug 2022 08:00) (103/55 - 157/76)  BP(mean): 74 (31 Aug 2022 08:) (66 - 92)  ABP: --  ABP(mean): --  RR: 14 (31 Aug 2022 08:) (14 - 18)  SpO2: 97% (31 Aug 2022 08:) (95% - 100%)    O2 Parameters below as of 31 Aug 2022 08:  Patient On (Oxygen Delivery Method): ventilator    O2 Concentration (%): 30        VENTS:  Mode: AC/ CMV (Assist Control/ Continuous Mandatory Ventilation), RR (machine): 14, TV (machine): 350, FiO2: 30, PEEP: 5, ITime: 1, MAP: 10, PIP: 31    I&O:     @ :  -   @ 07:00  --------------------------------------------------------  IN: 675.2 mL / OUT: 705 mL / NET: -29.8 mL     @ :  -   @ 09:00  --------------------------------------------------------  IN: 54.4 mL / OUT: 10 mL / NET: 44.4 mL        PHYSICAL EXAM:  GENERAL:  intubated sedated  CHEST/LUNG: Clear to auscultation bilaterally; No crackles, rhonchi, wheezing, or rubs +ETT  HEART: Regular rate and rhythm; No murmurs, rubs, or gallops  ABDOMEN: Soft, Nontender, Nondistended; Bowel sounds present +diastasis recti  EXTREMITIES: 2+ edema to above hips  SKIN: No rashes or lesions  NERVOUS SYSTEM: sedated  LINES:                                       MEDICATIONS:  MEDICATIONS  (STANDING):  aztreonam  IVPB 1000 milliGRAM(s) IV Intermittent every 8 hours  aztreonam  IVPB      chlorhexidine 0.12% Liquid 15 milliLiter(s) Oral Mucosa every 12 hours  chlorhexidine 4% Liquid 1 Application(s) Topical <User Schedule>  dextrose 5%. 1000 milliLiter(s) (100 mL/Hr) IV Continuous <Continuous>  dextrose 5%. 1000 milliLiter(s) (50 mL/Hr) IV Continuous <Continuous>  dextrose 50% Injectable 25 Gram(s) IV Push once  dextrose 50% Injectable 12.5 Gram(s) IV Push once  dextrose 50% Injectable 25 Gram(s) IV Push once  fentaNYL   Infusion. 1 MICROgram(s)/kG/Hr (8.5 mL/Hr) IV Continuous <Continuous>  glucagon  Injectable 1 milliGRAM(s) IntraMuscular once  heparin   Injectable 5000 Unit(s) SubCutaneous every 8 hours  insulin lispro (ADMELOG) corrective regimen sliding scale   SubCutaneous every 6 hours  propofol Infusion 20 MICROgram(s)/kG/Min (10.2 mL/Hr) IV Continuous <Continuous>  vancomycin  IVPB 1250 milliGRAM(s) IV Intermittent every 24 hours  vancomycin  IVPB        MEDICATIONS  (PRN):  dextrose Oral Gel 15 Gram(s) Oral once PRN Blood Glucose LESS THAN 70 milliGRAM(s)/deciliter      ALLERGIES:  Allergies    penicillin ([facility] Synd)    Intolerances        LABS:                        7.1    3.37  )-----------( 181      ( 31 Aug 2022 02:00 )             23.5     08-31    137  |  106  |  42<H>  ----------------------------<  86  4.2   |  22  |  1.60<H>    Ca    8.7      31 Aug 2022 02:00  Phos  4.2     08-31  Mg     1.80     08-31    TPro  5.9<L>  /  Alb  2.9<L>  /  TBili  0.2  /  DBili  x   /  AST  20  /  ALT  17  /  AlkPhos  97  08-31    PT/INR - ( 30 Aug 2022 19:40 )   PT: 12.8 sec;   INR: 1.10 ratio         PTT - ( 30 Aug 2022 19:40 )  PTT:36.9 sec  Urinalysis Basic - ( 30 Aug 2022 16:14 )    Color: Light Brown / Appearance: Slightly Turbid / S.011 / pH: x  Gluc: x / Ketone: Negative  / Bili: Negative / Urobili: <2 mg/dL   Blood: x / Protein: 30 mg/dL / Nitrite: Negative   Leuk Esterase: Small / RBC: >720 /HPF / WBC 19 /HPF   Sq Epi: x / Non Sq Epi: 1 /HPF / Bacteria: Negative        CAPILLARY BLOOD GLUCOSE      POCT Blood Glucose.: 81 mg/dL (31 Aug 2022 05:24)      RADIOLOGY & ADDITIONAL TESTS: Reviewed. MICU PROGRESS NOTE    INTERVAL HPI/OVERNIGHT EVENTS: NAEON    SUBJECTIVE: not obtainable, pt sedated    OBJECTIVE:    VITAL SIGNS:  ICU Vital Signs Last 24 Hrs  T(C): 37.2 (31 Aug 2022 08:00), Max: 38.1 (31 Aug 2022 05:00)  T(F): 98.9 (31 Aug 2022 08:00), Max: 100.6 (31 Aug 2022 05:00)  HR: 55 (31 Aug 2022 08:00) (46 - 61)  BP: 117/61 (31 Aug 2022 08:00) (103/55 - 157/76)  BP(mean): 74 (31 Aug 2022 08:) (66 - 92)  ABP: --  ABP(mean): --  RR: 14 (31 Aug 2022 08:) (14 - 18)  SpO2: 97% (31 Aug 2022 08:) (95% - 100%)    O2 Parameters below as of 31 Aug 2022 08:  Patient On (Oxygen Delivery Method): ventilator    O2 Concentration (%): 30        VENTS:  Mode: AC/ CMV (Assist Control/ Continuous Mandatory Ventilation), RR (machine): 14, TV (machine): 350, FiO2: 30, PEEP: 5, ITime: 1, MAP: 10, PIP: 31    I&O:     @ :  -   @ 07:00  --------------------------------------------------------  IN: 675.2 mL / OUT: 705 mL / NET: -29.8 mL     @ :  -   @ 09:00  --------------------------------------------------------  IN: 54.4 mL / OUT: 10 mL / NET: 44.4 mL        PHYSICAL EXAM:  GENERAL:  intubated sedated  CHEST/LUNG: b/l crackles, no rhonchi, wheezing, or rubs +ETT  HEART: Regular rate 60s and rhythm; No murmurs, rubs, or gallops  ABDOMEN: Soft, Nontender, Nondistended; Bowel sounds present +diastasis recti  EXTREMITIES: 2+ edema to above hips  SKIN: No rashes or lesions; peripheral lines placed  NERVOUS SYSTEM: sedated  LINES:                                       MEDICATIONS:  MEDICATIONS  (STANDING):  aztreonam  IVPB 1000 milliGRAM(s) IV Intermittent every 8 hours  aztreonam  IVPB      chlorhexidine 0.12% Liquid 15 milliLiter(s) Oral Mucosa every 12 hours  chlorhexidine 4% Liquid 1 Application(s) Topical <User Schedule>  dextrose 5%. 1000 milliLiter(s) (100 mL/Hr) IV Continuous <Continuous>  dextrose 5%. 1000 milliLiter(s) (50 mL/Hr) IV Continuous <Continuous>  dextrose 50% Injectable 25 Gram(s) IV Push once  dextrose 50% Injectable 12.5 Gram(s) IV Push once  dextrose 50% Injectable 25 Gram(s) IV Push once  fentaNYL   Infusion. 1 MICROgram(s)/kG/Hr (8.5 mL/Hr) IV Continuous <Continuous>  glucagon  Injectable 1 milliGRAM(s) IntraMuscular once  heparin   Injectable 5000 Unit(s) SubCutaneous every 8 hours  insulin lispro (ADMELOG) corrective regimen sliding scale   SubCutaneous every 6 hours  propofol Infusion 20 MICROgram(s)/kG/Min (10.2 mL/Hr) IV Continuous <Continuous>  vancomycin  IVPB 1250 milliGRAM(s) IV Intermittent every 24 hours  vancomycin  IVPB        MEDICATIONS  (PRN):  dextrose Oral Gel 15 Gram(s) Oral once PRN Blood Glucose LESS THAN 70 milliGRAM(s)/deciliter      ALLERGIES:  Allergies    penicillin ([facility] Synd)    Intolerances        LABS:                        7.1    3.37  )-----------( 181      ( 31 Aug 2022 02:00 )             23.5     08-31    137  |  106  |  42<H>  ----------------------------<  86  4.2   |  22  |  1.60<H>    Ca    8.7      31 Aug 2022 02:00  Phos  4.2     08-31  Mg     1.80     08-31    TPro  5.9<L>  /  Alb  2.9<L>  /  TBili  0.2  /  DBili  x   /  AST  20  /  ALT  17  /  AlkPhos  97  08-31    PT/INR - ( 30 Aug 2022 19:40 )   PT: 12.8 sec;   INR: 1.10 ratio         PTT - ( 30 Aug 2022 19:40 )  PTT:36.9 sec  Urinalysis Basic - ( 30 Aug 2022 16:14 )    Color: Light Brown / Appearance: Slightly Turbid / S.011 / pH: x  Gluc: x / Ketone: Negative  / Bili: Negative / Urobili: <2 mg/dL   Blood: x / Protein: 30 mg/dL / Nitrite: Negative   Leuk Esterase: Small / RBC: >720 /HPF / WBC 19 /HPF   Sq Epi: x / Non Sq Epi: 1 /HPF / Bacteria: Negative        CAPILLARY BLOOD GLUCOSE      POCT Blood Glucose.: 81 mg/dL (31 Aug 2022 05:24)      RADIOLOGY & ADDITIONAL TESTS: Reviewed.

## 2022-08-31 NOTE — PROGRESS NOTE ADULT - ASSESSMENT
67y F PMHx of HTN, HLD, sarcoidosis, CHF, recent adm with PEA arrest x2, patient presented to the ED with 1 week of BL leg swelling and SOB that developed last night. Patient became altered in the ED and had respiratory distress was intubated and admitted to MICU.     #Neuro:  ##AMS  unclear etiology  - Intubated for AMS  -CTH 8/30 w/o acute pathology  - Possible infection or hypercapnia causing AMS       #Cardiovascular:  ##Hx CHF  -TTE reduced LV systolic function EF 55%, diastolic dysfunction. MICU team POCUS with normal RV function    ##Sinus bradycardia  ekg w/ sinus eugenie  trend tropes    #Pulmonary:  Hypercapneic resp failure?  -s/p Int 8/30 on adm  - Chest Xray shows resolving pleural effusion on the R when compared to 7/30, but BL pleural effusions are still present.    - POCUS of lungs to evaluate for effusions - moderate effusion on R    #FEN/GI:  - Possibly start tube feeding     #Renal:  - Patient BUN/Cr 40/1.5, consistent with labs from previous admission  - Obtain BMP qdaily and monitor for changes in BUN/Cr egfr decreased consistent with previous admission.   - Lasix as needed     #:  - UA shows blood in urine and proteinuria   - No evidence to suggest UTI   - Will consider UC    #ID:  -Leukopenic at 3.02, hypothermic at 91 F, will obtain BC X2 and patient will be started on Zosyn and Aztreonam   -UA does not show suspicion for UTI  -Chest xray shows BL plueral effusions, possible source   - MRSA swab  - Legionella     #Heme:  *Anemia   -Hgb 8.1, will transfuse the patient if hgb is less than 7. Patient has had anemia in the past with hgb in the 8s   -Obtain type and screen for patient  -Repeat CBC STAT,   - Hep SubQ, for DVT prophylaxis     #Endo:  - Hypothermic at 91 F and bradycardic, obtain TSH and cortisol level    T2DM  -takes insulin 'prn' at home  -ISS q6h      #Ethics:  - Full Code Status  67y F PMHx of HTN, HLD, sarcoidosis, CHF, recent adm with PEA arrest x2, patient presented to the ED with 1 week of BL leg swelling and SOB that developed last night. Patient became altered in the ED and had respiratory distress was intubated and admitted to MICU.     #Neuro:  ##AMS  unclear etiology  - Intubated for AMS  -CTH 8/30 w/o acute pathology  - Possible infection or hypercapnia causing AMS       #Cardiovascular:  ##Hx CHF  -TTE reduced LV systolic function EF 55%, diastolic dysfunction. MICU team POCUS with normal RV function  -beside POCUS showed no right heart strain    ##Sinus bradycardia  ekg w/ sinus eugenie  trend tropes    #Pulmonary:  Hypercapneic resp failure  -s/p Int 8/30 on adm  - Chest Xray shows resolving pleural effusion on the R when compared to 7/30, but BL pleural effusions are still present.    - POCUS of lungs to evaluate for effusions - moderate effusion on R  -diuresis lasix 40 b/l crackles    #FEN/GI:  - Possibly start tube feeding     #Renal:  - Patient BUN/Cr 40/1.5, consistent with labs from previous admission  - Obtain BMP qdaily and monitor for changes in BUN/Cr egfr decreased consistent with previous admission.   - Lasix as needed     #:  - UA shows blood in urine and proteinuria   - No evidence to suggest UTI   - Will consider UC    #ID:  -Leukopenic at 3.02, hypothermic at 91 F, will obtain BC X2 and patient will be started on Zosyn and Aztreonam   -UA does not show suspicion for UTI  -pending UC  -Chest xray shows BL plueral effusions, possible source   - MRSA swab  - Legionella   - vanc trough sent      #Heme:  *Anemia   -Hgb 8.1, will transfuse the patient if hgb is less than 7. Patient has had anemia in the past with hgb in the 8s   -Obtain type and screen for patient  -Repeat CBC STAT,   - Hep SubQ, for DVT prophylaxis     #Endo:  - Hypothermic at 91 F and bradycardic, obtain TSH and cortisol level    T2DM  -takes insulin 'prn' at home  -ISS q6h      #Ethics:  - Full Code Status

## 2022-08-31 NOTE — CONSULT NOTE ADULT - ATTENDING COMMENTS
67F w/ PMHx of HTN, HLD, T2DM, sarcoidosis, CHF, presented to the ED for BL leg swelling and SOB. Endocrinology consulted for concern for Addisons Disease.    pt passed stim  test on previous admission  with intubation and hypothermia she had a cortisol of 7   would have expected higher cortisol for essentially what is a stress test for the pt  given a corstiol <10 when critically ill, will start stress dose steroids for now with hope to taper clinically as she improves and will reassess the axix at that time

## 2022-08-31 NOTE — CONSULT NOTE ADULT - ASSESSMENT
67F w/ PMHx of HTN, HLD, sarcoidosis, CHF, presented to the ED for BL leg swelling and SOB. Endocrinology consulted for concern for Addisons Disease.    #Concern for Addisons Disease  #Hypothermia  - patient currently hemodynamically stable, not on pressors  - presented with shortness of breath, swelling of her legs and AMS with acute episode of bradycardia and respiratory distress in ED prompting intubation  - patient reportedly hypothermic to 91F in ED, now off warmer and normothermic  - TSH 4.82  - 8/31 Na 137. K 4.2  - previously negative stim test 7/16 on prior admission: 8am cortisol 16.1, 8:30am cortisol 22.4, 9:15am cortisol 24.7.  - cortisol 8/30 at 7:40pm 7, although not obtained at ideal time of 8am, would expect to have high cortisol with acute decompensation 67F w/ PMHx of HTN, HLD, T2DM, sarcoidosis, CHF, presented to the ED for BL leg swelling and SOB. Endocrinology consulted for concern for Addisons Disease.    #Concern for Adrenal Insuffiency  #Hypothermia  - patient currently intubated, not on pressors  - presented with shortness of breath, swelling of her legs and AMS with acute episode of bradycardia, hypothermia and respiratory distress in ED prompting intubation  - patient reportedly hypothermic to 91F in ED, now off warmer and normothermic  - no darkening of skin on physical exam  - TSH 4.82  - 8/31 Na 137. K 4.2  - previous negative co-syntropin stim test 7/16 on prior admission: 8am cortisol 16.1, 8:30am cortisol 22.4, 9:15am cortisol 24.7.  - cortisol 8/30 at 7:40pm 7, although not obtained at ideal time of 8am, would expect to have high cortisol with acute decompensation  - possible patient could have relative adrenal insufficiency, unclear other etiologies of secondary AI and less likely addisons disease without history of autoimmune conditions  PLAN  - would initiate hydrocortisone 50mg IV q8h and for possible relative adrenal insufficiency as patient intubated and critically ill  - taper as patient clinically improves and likely re-perform co-syntropin stim test at that time    #T2DM  - HbA1c 6  - patient was previously on high doses of lantus and admelog as well as metformin and victoza in the past, however had presented to last hospitalization with frequent episodes of hypoglycemia  - C-peptide 5 on 7/17  - last admission, patient was on minimal amounts of insulin and discharged on lantus 2 units daily  PLAN  - low admelog correction scale q6h with fingersticks  - dextrose prn for hypoglycemia  - no lantus at this time  - if BG >300 persistently, would check BMP, VBG, BHB to rule out DKA  - Target -180 while in ICU  - Discharge recommendations: Possibly low dose basal insulin, pending clinical course    #HLD  - on atorvastatin 40mg daily outpatient  PLAN  - hold atorvastatin per primary team    Discussed with primary team.     Thank you for the interesting consult.    Sincere England MD, Endocrinology Fellow  Pager [phone] from 9am to 5pm. After hours and on weekends, please call [phone]. 67F w/ PMHx of HTN, HLD, T2DM, sarcoidosis, CHF, presented to the ED for BL leg swelling and SOB. Endocrinology consulted for concern for Addisons Disease.    #Concern for Adrenal Insuffiency  #Hypothermia  - patient currently intubated, not on pressors  - presented with shortness of breath, swelling of her legs and AMS with acute episode of bradycardia, hypothermia and respiratory distress in ED prompting intubation  - patient reportedly hypothermic to 91F in ED, now off warmer and normothermic  - no darkening of skin on physical exam  - TSH 4.82  - 8/31 Na 137. K 4.2  - previous negative co-syntropin stim test 7/16 on prior admission: 8am cortisol 16.1, 8:30am cortisol 22.4, 9:15am cortisol 24.7.  - cortisol 8/30 at 7:40pm 7, although not obtained at ideal time of 8am, would expect to have high cortisol with acute decompensation  - possible patient could have relative adrenal insufficiency, unclear other etiologies of secondary AI and less likely addisons disease without history of autoimmune conditions  PLAN  - would initiate hydrocortisone 50mg IV q8h and for possible relative adrenal insufficiency as patient intubated and critically ill and hypothermia   - taper as patient clinically improves and likely re-perform co-syntropin stim test at that time    #T2DM  - HbA1c 6  - patient was previously on high doses of lantus and admelog as well as metformin and victoza in the past, however had presented to last hospitalization with frequent episodes of hypoglycemia  - C-peptide 5 on 7/17  - last admission, patient was on minimal amounts of insulin and discharged on lantus 2 units daily  PLAN  - low admelog correction scale q6h with fingersticks  - dextrose prn for hypoglycemia  - no lantus at this time  - if BG >300 persistently, would check BMP, VBG, BHB to rule out DKA  - Target -180 while in ICU  - Discharge recommendations: Possibly low dose basal insulin, pending clinical course    #HLD  - on atorvastatin 40mg daily outpatient  PLAN  - hold atorvastatin per primary team    Discussed with primary team.     Thank you for the interesting consult.    Sincere England MD, Endocrinology Fellow  Pager [phone] from 9am to 5pm. After hours and on weekends, please call [phone].

## 2022-08-31 NOTE — CONSULT NOTE ADULT - SUBJECTIVE AND OBJECTIVE BOX
ENDOCRINE INITIAL CONSULT -     HPI:  67F w/ PMHx of HTN, HLD, sarcoidosis, CHF, presented to the ED for BL leg swelling and SOB. The patient was admitted on  and was found to have pleural effusion, elevated BNP and LE edema concerning for CHF exacerbation. Admitted for metabolic encephalopathy and CHF exacerbation. Intubated  for AMS, extubated  and transferred to floor. Code called on  am, PEA, ROSC achieved. The patient was discharged to rehab facility on . Today the patient presented to the ED with 1 week of BL leg swelling and SOB that developed last night. The patient was released from rehab on Thursday. Per the patients daughter her leg swelling was initially noticed last week before she left the rehab facility. She had been getting lasix 20mg daily but that did not help with her symptoms Her legs progressively became more swollen. The daughter states that she noticed her mom become more short of breath last night and was unable to sleep . The patient is on home oxygen and was not able to obtain relief with oxygen. This morning the patient became delerius and was stating things that did not make sense per daughter which prompted her to bring the patient to the ED. (30 Aug 2022 16:43)      ENDOCRINE HISTORY     Endocrinologist:  Social History:  Family History:  Surgical History:  Insurance:  Resides In:    PAST MEDICAL & SURGICAL HISTORY:  Type 2 diabetes mellitus      Bilateral cataracts      Fluid in pleural cavity associated with pancreatitis      Pancreatic pseudocyst/cyst  s/p drain placement and removal      HTN (hypertension)      HLD (hyperlipidemia)      History of amputation of right great toe        H/O:  section        History of laparoscopic cholecystectomy          FAMILY HISTORY:  No pertinent family history in first degree relatives        Social History:  The patient lives with daughter. Was recently discharged from Rehab facility. Patient baseline mental status is AAO X3. Able to have a conversation. (30 Aug 2022 16:43)      Home Medications:  amLODIPine 10 mg oral tablet: 1 tab(s) orally once a day (30 Aug 2022 17:40)  aspirin 81 mg oral tablet, chewable: 1 tab(s) orally once a day (30 Aug 2022 17:40)  atorvastatin 40 mg oral tablet: 1 tab(s) orally once a day (30 Aug 2022 17:40)  carvedilol 25 mg oral tablet: 1 tab(s) orally every 12 hours (30 Aug 2022 17:40)  enoxaparin: 40 milligram(s) subcutaneous every 24 hours (30 Aug 2022 17:40)  hydrALAZINE 100 mg oral tablet: 1 tab(s) orally 3 times a day (30 Aug 2022 17:40)  insulin glargine 100 units/mL subcutaneous solution: 2 unit(s) subcutaneous once a day (at bedtime)  patient takes PRN  (30 Aug 2022 17:40)  ipratropium-albuterol 0.5 mg-2.5 mg/3 mL inhalation solution: 3 milliliter(s) inhaled every 6 hours (30 Aug 2022 17:40)  melatonin 3 mg oral tablet: 2 tab(s) orally once a day (at bedtime) (30 Aug 2022 17:40)  sodium chloride 3% inhalation solution: 4 milliliter(s) inhaled every 6 hours (30 Aug 2022 17:40)      MEDICATIONS  (STANDING):  aztreonam  IVPB 1000 milliGRAM(s) IV Intermittent every 8 hours  aztreonam  IVPB      chlorhexidine 0.12% Liquid 15 milliLiter(s) Oral Mucosa every 12 hours  chlorhexidine 4% Liquid 1 Application(s) Topical <User Schedule>  dextrose 5%. 1000 milliLiter(s) (100 mL/Hr) IV Continuous <Continuous>  dextrose 5%. 1000 milliLiter(s) (50 mL/Hr) IV Continuous <Continuous>  dextrose 50% Injectable 25 Gram(s) IV Push once  dextrose 50% Injectable 12.5 Gram(s) IV Push once  dextrose 50% Injectable 25 Gram(s) IV Push once  fentaNYL   Infusion. 1 MICROgram(s)/kG/Hr (8.5 mL/Hr) IV Continuous <Continuous>  glucagon  Injectable 1 milliGRAM(s) IntraMuscular once  heparin   Injectable 5000 Unit(s) SubCutaneous every 8 hours  propofol Infusion 20 MICROgram(s)/kG/Min (10.2 mL/Hr) IV Continuous <Continuous>    MEDICATIONS  (PRN):  dextrose Oral Gel 15 Gram(s) Oral once PRN Blood Glucose LESS THAN 70 milliGRAM(s)/deciliter      Allergies    penicillin ([facility])    Intolerances        REVIEW OF SYSTEMS  Constitutional: No fever  Eyes: No blurry vision  Neuro: No tremors  HEENT: No pain  Cardiovascular: No chest pain, palpitations  Respiratory: No SOB, no cough  GI: No nausea, vomiting, abdominal pain  : No dysuria  Skin: no rash  Psych: no depression  Endocrine: no polyuria, polydipsia  Hem/lymph: no swelling  Osteoporosis: no fractures  ALL OTHER SYSTEMS REVIEWED AND NEGATIVE     UNABLE TO OBTAIN     PHYSICAL EXAM   Vital Signs Last 24 Hrs  T(C): 36.3 (31 Aug 2022 12:00), Max: 38.1 (31 Aug 2022 05:00)  T(F): 97.3 (31 Aug 2022 12:00), Max: 100.6 (31 Aug 2022 05:00)  HR: 62 (31 Aug 2022 13:00) (46 - 62)  BP: 108/60 (31 Aug 2022 13:00) (102/53 - 157/76)  BP(mean): 72 (31 Aug 2022 13:00) (64 - 92)  RR: 14 (31 Aug 2022 13:00) (14 - 18)  SpO2: 94% (31 Aug 2022 13:00) (94% - 100%)    Parameters below as of 31 Aug 2022 14:00  Patient On (Oxygen Delivery Method): ventilator    O2 Concentration (%): 30  GENERAL: NAD, well-groomed, well-developed  EYES: No proptosis, no lid lag, anicteric  HEENT:  Atraumatic, Normocephalic, moist mucous membranes  THYROID: Normal size, no palpable nodules  RESPIRATORY: Clear to auscultation bilaterally; No rales, rhonchi, wheezing  CARDIOVASCULAR: Regular rate and rhythm; No murmurs; no peripheral edema  GI: Soft, nontender, non distended, normal bowel sounds  SKIN: Dry, intact, No rashes or lesions  MUSCULOSKELETAL: Full range of motion, normal strength  NEURO: sensation intact, extraocular movements intact, no tremor  PSYCH: Alert and oriented x 3, normal affect, normal mood  CUSHING'S SIGNS: no striae    CAPILLARY BLOOD GLUCOSE      POCT Blood Glucose.: 78 mg/dL (31 Aug 2022 12:26)  POCT Blood Glucose.: 81 mg/dL (31 Aug 2022 05:24)  POCT Blood Glucose.: 81 mg/dL (31 Aug 2022 05:20)  POCT Blood Glucose.: 122 mg/dL (30 Aug 2022 23:05)      A1C with Estimated Average Glucose Result: 6.0 % (22 @ 02:00)  A1C with Estimated Average Glucose Result: 7.3 % (22 @ 06:00)                            7.1    3.37  )-----------( 181      ( 31 Aug 2022 02:00 )             23.5       08    137  |  106  |  42<H>  ----------------------------<  86  4.2   |  22  |  1.60<H>    Ca    8.7      31 Aug 2022 02:00  Phos  4.2       Mg     1.80         TPro  5.9<L>  /  Alb  2.9<L>  /  TBili  0.2  /  DBili  x   /  AST  20  /  ALT  17  /  AlkPhos  97        Thyroid Stimulating Hormone, Serum: 4.82 uIU/mL (22 @ 19:40)      LIPIDS    RADIOLOGY ENDOCRINE INITIAL CONSULT - Addisons Disease    HPI:  67F w/ PMHx of HTN, HLD, sarcoidosis, CHF, presented to the ED for BL leg swelling and SOB. The patient was admitted on  and was found to have pleural effusion, elevated BNP and LE edema concerning for CHF exacerbation. Admitted for metabolic encephalopathy and CHF exacerbation. Intubated  for AMS, extubated  and transferred to floor. Code called on  am, PEA, ROSC achieved. The patient was discharged to rehab facility on . Today the patient presented to the ED with 1 week of BL leg swelling and SOB that developed last night. The patient was released from rehab on Thursday. Per the patients daughter her leg swelling was initially noticed last week before she left the rehab facility. She had been getting lasix 20mg daily but that did not help with her symptoms Her legs progressively became more swollen. The daughter states that she noticed her mom become more short of breath last night and was unable to sleep . The patient is on home oxygen and was not able to obtain relief with oxygen. This morning the patient became delerius and was stating things that did not make sense per daughter which prompted her to bring the patient to the ED. (30 Aug 2022 16:43)      ENDOCRINE HISTORY   Patient intubated and sedated, unable to provide history. Per primary team, patient had worsening sob for the past week with leg swelling. The patient developed sudden onset bradycardia, hypothermia in the ED and was intubated. Per chart review, patient had a stim test performed on  with 8am cortisol 16.1, 8:30am cortisol 22.4, 9:15am cortisol 24.7. Cortisol obtained 7:40pm  7.    PAST MEDICAL & SURGICAL HISTORY:  Type 2 diabetes mellitus      Bilateral cataracts      Fluid in pleural cavity associated with pancreatitis      Pancreatic pseudocyst/cyst  s/p drain placement and removal      HTN (hypertension)      HLD (hyperlipidemia)      History of amputation of right great toe        H/O:  section        History of laparoscopic cholecystectomy          FAMILY HISTORY:  No pertinent family history in first degree relatives        Social History:  The patient lives with daughter. Was recently discharged from Rehab facility. Patient baseline mental status is AAO X3. Able to have a conversation. (30 Aug 2022 16:43)      Home Medications:  amLODIPine 10 mg oral tablet: 1 tab(s) orally once a day (30 Aug 2022 17:40)  aspirin 81 mg oral tablet, chewable: 1 tab(s) orally once a day (30 Aug 2022 17:40)  atorvastatin 40 mg oral tablet: 1 tab(s) orally once a day (30 Aug 2022 17:40)  carvedilol 25 mg oral tablet: 1 tab(s) orally every 12 hours (30 Aug 2022 17:40)  enoxaparin: 40 milligram(s) subcutaneous every 24 hours (30 Aug 2022 17:40)  hydrALAZINE 100 mg oral tablet: 1 tab(s) orally 3 times a day (30 Aug 2022 17:40)  insulin glargine 100 units/mL subcutaneous solution: 2 unit(s) subcutaneous once a day (at bedtime)  patient takes PRN  (30 Aug 2022 17:40)  ipratropium-albuterol 0.5 mg-2.5 mg/3 mL inhalation solution: 3 milliliter(s) inhaled every 6 hours (30 Aug 2022 17:40)  melatonin 3 mg oral tablet: 2 tab(s) orally once a day (at bedtime) (30 Aug 2022 17:40)  sodium chloride 3% inhalation solution: 4 milliliter(s) inhaled every 6 hours (30 Aug 2022 17:40)      MEDICATIONS  (STANDING):  aztreonam  IVPB 1000 milliGRAM(s) IV Intermittent every 8 hours  aztreonam  IVPB      chlorhexidine 0.12% Liquid 15 milliLiter(s) Oral Mucosa every 12 hours  chlorhexidine 4% Liquid 1 Application(s) Topical <User Schedule>  dextrose 5%. 1000 milliLiter(s) (100 mL/Hr) IV Continuous <Continuous>  dextrose 5%. 1000 milliLiter(s) (50 mL/Hr) IV Continuous <Continuous>  dextrose 50% Injectable 25 Gram(s) IV Push once  dextrose 50% Injectable 12.5 Gram(s) IV Push once  dextrose 50% Injectable 25 Gram(s) IV Push once  fentaNYL   Infusion. 1 MICROgram(s)/kG/Hr (8.5 mL/Hr) IV Continuous <Continuous>  glucagon  Injectable 1 milliGRAM(s) IntraMuscular once  heparin   Injectable 5000 Unit(s) SubCutaneous every 8 hours  propofol Infusion 20 MICROgram(s)/kG/Min (10.2 mL/Hr) IV Continuous <Continuous>    MEDICATIONS  (PRN):  dextrose Oral Gel 15 Gram(s) Oral once PRN Blood Glucose LESS THAN 70 milliGRAM(s)/deciliter      Allergies    penicillin ([facility])    Intolerances        REVIEW OF SYSTEMS  UNABLE TO OBTAIN     PHYSICAL EXAM   Vital Signs Last 24 Hrs  T(C): 36.3 (31 Aug 2022 12:00), Max: 38.1 (31 Aug 2022 05:00)  T(F): 97.3 (31 Aug 2022 12:00), Max: 100.6 (31 Aug 2022 05:00)  HR: 62 (31 Aug 2022 13:00) (46 - 62)  BP: 108/60 (31 Aug 2022 13:00) (102/53 - 157/76)  BP(mean): 72 (31 Aug 2022 13:00) (64 - 92)  RR: 14 (31 Aug 2022 13:00) (14 - 18)  SpO2: 94% (31 Aug 2022 13:00) (94% - 100%)    Parameters below as of 31 Aug 2022 14:00  Patient On (Oxygen Delivery Method): ventilator    O2 Concentration (%): 30  GENERAL: Ill-appearing, lying in bed  EYES: No proptosis, no lid lag, anicteric  HEENT:  Atraumatic, Normocephalic, dry mucous membranes  THYROID: Normal size, no palpable nodules  RESPIRATORY: Mild coarse breath sounds anteriorly; on ventilator  CARDIOVASCULAR: Regular rate and rhythm; No murmurs; no peripheral edema  GI: Soft, nontender, non distended, normal bowel sounds  SKIN: Dry, intact, dry skin on bilateral anterior shins, no areas of darkened skin visible  MUSCULOSKELETAL: Enlarged LE bilaterally, Right big toe amputated  NEURO/PSYCH: Intubated/sedated, no tremor  CUSHING'S SIGNS: no striae    CAPILLARY BLOOD GLUCOSE      POCT Blood Glucose.: 78 mg/dL (31 Aug 2022 12:26)  POCT Blood Glucose.: 81 mg/dL (31 Aug 2022 05:24)  POCT Blood Glucose.: 81 mg/dL (31 Aug 2022 05:20)  POCT Blood Glucose.: 122 mg/dL (30 Aug 2022 23:05)      A1C with Estimated Average Glucose Result: 6.0 % (22 @ 02:00)  A1C with Estimated Average Glucose Result: 7.3 % (22 @ 06:00)                            7.1    3.37  )-----------( 181      ( 31 Aug 2022 02:00 )             23.5           137  |  106  |  42<H>  ----------------------------<  86  4.2   |  22  |  1.60<H>    Ca    8.7      31 Aug 2022 02:00  Phos  4.2       Mg     1.80         TPro  5.9<L>  /  Alb  2.9<L>  /  TBili  0.2  /  DBili  x   /  AST  20  /  ALT  17  /  AlkPhos  97        Thyroid Stimulating Hormone, Serum: 4.82 uIU/mL (22 @ 19:40)      LIPIDS    RADIOLOGY ENDOCRINE INITIAL CONSULT - Addisons Disease    HPI:  67F w/ PMHx of HTN, HLD, T2DM, sarcoidosis, CHF, presented to the ED for BL leg swelling and SOB. The patient was admitted on  and was found to have pleural effusion, elevated BNP and LE edema concerning for CHF exacerbation. Admitted for metabolic encephalopathy and CHF exacerbation. Intubated  for AMS, extubated  and transferred to floor. Code called on  am, PEA, ROSC achieved. The patient was discharged to rehab facility on . Today the patient presented to the ED with 1 week of BL leg swelling and SOB that developed last night. The patient was released from rehab on Thursday. Per the patients daughter her leg swelling was initially noticed last week before she left the rehab facility. She had been getting lasix 20mg daily but that did not help with her symptoms Her legs progressively became more swollen. The daughter states that she noticed her mom become more short of breath last night and was unable to sleep . The patient is on home oxygen and was not able to obtain relief with oxygen. This morning the patient became delerius and was stating things that did not make sense per daughter which prompted her to bring the patient to the ED. (30 Aug 2022 16:43)      ENDOCRINE HISTORY   Patient intubated and sedated, unable to provide history. Per primary team, patient had worsening sob for the past week with leg swelling and AMS. The patient developed sudden onset bradycardia, hypothermia and respiratory distress in the ED and was intubated. Per chart review, patient had a stim test performed on  with 8am cortisol 16.1, 8:30am cortisol 22.4, 9:15am cortisol 24.7. Cortisol obtained 7:40pm  7.    PAST MEDICAL & SURGICAL HISTORY:  Type 2 diabetes mellitus      Bilateral cataracts      Fluid in pleural cavity associated with pancreatitis      Pancreatic pseudocyst/cyst  s/p drain placement and removal      HTN (hypertension)      HLD (hyperlipidemia)      History of amputation of right great toe        H/O:  section        History of laparoscopic cholecystectomy          FAMILY HISTORY:  No pertinent family history in first degree relatives        Social History:  The patient lives with daughter. Was recently discharged from Rehab facility. Patient baseline mental status is AAO X3. Able to have a conversation. (30 Aug 2022 16:43)      Home Medications:  amLODIPine 10 mg oral tablet: 1 tab(s) orally once a day (30 Aug 2022 17:40)  aspirin 81 mg oral tablet, chewable: 1 tab(s) orally once a day (30 Aug 2022 17:40)  atorvastatin 40 mg oral tablet: 1 tab(s) orally once a day (30 Aug 2022 17:40)  carvedilol 25 mg oral tablet: 1 tab(s) orally every 12 hours (30 Aug 2022 17:40)  enoxaparin: 40 milligram(s) subcutaneous every 24 hours (30 Aug 2022 17:40)  hydrALAZINE 100 mg oral tablet: 1 tab(s) orally 3 times a day (30 Aug 2022 17:40)  insulin glargine 100 units/mL subcutaneous solution: 2 unit(s) subcutaneous once a day (at bedtime)  patient takes PRN  (30 Aug 2022 17:40)  ipratropium-albuterol 0.5 mg-2.5 mg/3 mL inhalation solution: 3 milliliter(s) inhaled every 6 hours (30 Aug 2022 17:40)  melatonin 3 mg oral tablet: 2 tab(s) orally once a day (at bedtime) (30 Aug 2022 17:40)  sodium chloride 3% inhalation solution: 4 milliliter(s) inhaled every 6 hours (30 Aug 2022 17:40)      MEDICATIONS  (STANDING):  aztreonam  IVPB 1000 milliGRAM(s) IV Intermittent every 8 hours  aztreonam  IVPB      chlorhexidine 0.12% Liquid 15 milliLiter(s) Oral Mucosa every 12 hours  chlorhexidine 4% Liquid 1 Application(s) Topical <User Schedule>  dextrose 5%. 1000 milliLiter(s) (100 mL/Hr) IV Continuous <Continuous>  dextrose 5%. 1000 milliLiter(s) (50 mL/Hr) IV Continuous <Continuous>  dextrose 50% Injectable 25 Gram(s) IV Push once  dextrose 50% Injectable 12.5 Gram(s) IV Push once  dextrose 50% Injectable 25 Gram(s) IV Push once  fentaNYL   Infusion. 1 MICROgram(s)/kG/Hr (8.5 mL/Hr) IV Continuous <Continuous>  glucagon  Injectable 1 milliGRAM(s) IntraMuscular once  heparin   Injectable 5000 Unit(s) SubCutaneous every 8 hours  propofol Infusion 20 MICROgram(s)/kG/Min (10.2 mL/Hr) IV Continuous <Continuous>    MEDICATIONS  (PRN):  dextrose Oral Gel 15 Gram(s) Oral once PRN Blood Glucose LESS THAN 70 milliGRAM(s)/deciliter      Allergies    penicillin ([facility])    Intolerances        REVIEW OF SYSTEMS  UNABLE TO OBTAIN     PHYSICAL EXAM   Vital Signs Last 24 Hrs  T(C): 36.3 (31 Aug 2022 12:00), Max: 38.1 (31 Aug 2022 05:00)  T(F): 97.3 (31 Aug 2022 12:00), Max: 100.6 (31 Aug 2022 05:00)  HR: 62 (31 Aug 2022 13:00) (46 - 62)  BP: 108/60 (31 Aug 2022 13:00) (102/53 - 157/76)  BP(mean): 72 (31 Aug 2022 13:00) (64 - 92)  RR: 14 (31 Aug 2022 13:00) (14 - 18)  SpO2: 94% (31 Aug 2022 13:00) (94% - 100%)    Parameters below as of 31 Aug 2022 14:00  Patient On (Oxygen Delivery Method): ventilator    O2 Concentration (%): 30  GENERAL: Ill-appearing, lying in bed  EYES: No proptosis, no lid lag, anicteric  HEENT:  Atraumatic, Normocephalic, dry mucous membranes  THYROID: Normal size, no palpable nodules  RESPIRATORY: Mild coarse breath sounds anteriorly; on ventilator  CARDIOVASCULAR: Regular rate and rhythm; No murmurs; no peripheral edema  GI: Soft, nontender, non distended, normal bowel sounds  SKIN: Dry, intact, dry skin on bilateral anterior shins, no areas of darkened skin visible  MUSCULOSKELETAL: Enlarged LE bilaterally, Right big toe amputated  NEURO/PSYCH: Intubated/sedated, no tremor  CUSHING'S SIGNS: no striae    CAPILLARY BLOOD GLUCOSE      POCT Blood Glucose.: 78 mg/dL (31 Aug 2022 12:26)  POCT Blood Glucose.: 81 mg/dL (31 Aug 2022 05:24)  POCT Blood Glucose.: 81 mg/dL (31 Aug 2022 05:20)  POCT Blood Glucose.: 122 mg/dL (30 Aug 2022 23:05)      A1C with Estimated Average Glucose Result: 6.0 % (22 @ 02:00)  A1C with Estimated Average Glucose Result: 7.3 % (06-05-22 @ 06:00)                            7.1    3.37  )-----------( 181      ( 31 Aug 2022 02:00 )             23.5       08    137  |  106  |  42<H>  ----------------------------<  86  4.2   |  22  |  1.60<H>    Ca    8.7      31 Aug 2022 02:00  Phos  4.2       Mg     1.80         TPro  5.9<L>  /  Alb  2.9<L>  /  TBili  0.2  /  DBili  x   /  AST  20  /  ALT  17  /  AlkPhos  97        Thyroid Stimulating Hormone, Serum: 4.82 uIU/mL (22 @ 19:40)      LIPIDS    RADIOLOGY

## 2022-08-31 NOTE — PROCEDURE NOTE - NSBRONCHPROCDETAILS_GEN_A_CORE_FT
Brochoscope advanced into the airway through Endotracheal Tube.     All major airways of the right lung inspected with findings of diffuse bronchial edema.     Mucus plug of the left mainstem bronchus evacuated.     All major airways of the left lung inspected with diffuse bronchial edema and areas of mucus that were suctioned.     BAL conducted in left upper lobe.     Pt tolerated procedure well and sample sent for culture.     Noted to have putrid smell of pulmonary secretions with concern for anaerobic infection.

## 2022-08-31 NOTE — CHART NOTE - NSCHARTNOTEFT_GEN_A_CORE
:  Sarah Cadet, resident  Tammy Luna, Fellow  Billy Mccann, Attending    INDICATION:  [ X] ACUTE HYPOXIC RESPIRATORY FAILURE    PROCEDURE:  [X ] LIMITED ECHO  [X ] LIMITED CHEST  [ ] LIMITED RETROPERITONEAL  [ ] LIMITED ABDOMINAL  [ ] LIMITED DVT  [ ] NEEDLE GUIDANCE VASCULAR  [ ] NEEDLE GUIDANCE THORACENTESIS  [ ] NEEDLE GUIDANCE PARACENTESIS  [ ] NEEDLE GUIDANCE PERICARDIOCENTESIS  [ ] OTHER    FINDINGS:    Cardiac:  Normal LV systolic function  No RV enlargement noted    Pulmonary:  Diffuse B lines throughout  Large effusion in right lung base  Small consolidation and effusion in left lung base    INTERPRETATION:  Normal LV systolic function  Bilateral pulmonary effusions    IMAGES STORED IN [email or web address] :  Sarah Cadet, resident  Tammy Luna, Fellow  Billy Mccann, Attending    INDICATION:  [ X] ACUTE HYPOXIC RESPIRATORY FAILURE    PROCEDURE:  [X ] LIMITED ECHO  [X ] LIMITED CHEST  [ ] LIMITED RETROPERITONEAL  [ ] LIMITED ABDOMINAL  [ ] LIMITED DVT  [ ] NEEDLE GUIDANCE VASCULAR  [ ] NEEDLE GUIDANCE THORACENTESIS  [ ] NEEDLE GUIDANCE PARACENTESIS  [ ] NEEDLE GUIDANCE PERICARDIOCENTESIS  [ ] OTHER    FINDINGS:    Cardiac:  Normal LV systolic function  No RV enlargement noted    Pulmonary:  Diffuse B lines throughout  Large effusion in right lung base  Small consolidation and effusion in left lung base    INTERPRETATION:  Normal LV systolic function  Bilateral basilar pulmonary effusions    IMAGES STORED IN [employer] :  Sarah Cadet, resident  Tammy Luna, Fellow  Billy Mccann, Attending    INDICATION:  [ X] ACUTE HYPOXIC RESPIRATORY FAILURE    PROCEDURE:  [X ] LIMITED ECHO  [X ] LIMITED CHEST  [ ] LIMITED RETROPERITONEAL  [ ] LIMITED ABDOMINAL  [ ] LIMITED DVT  [ ] NEEDLE GUIDANCE VASCULAR  [ ] NEEDLE GUIDANCE THORACENTESIS  [ ] NEEDLE GUIDANCE PARACENTESIS  [ ] NEEDLE GUIDANCE PERICARDIOCENTESIS  [ ] OTHER    FINDINGS:    Cardiac:  Normal LV systolic function  No RV enlargement noted    Pulmonary:  Diffuse B lines throughout  Large effusion in right lung base  Small consolidation and effusion in left lung base    INTERPRETATION:  Normal LV systolic function  Bilateral basilar pulmonary effusions    IMAGES STORED IN [employer]      I was present during the key portions of the procedure and immediately available during the entire procedure.  Ramy ALVAREZ  Attending

## 2022-09-01 LAB
-  AMIKACIN: SIGNIFICANT CHANGE UP
-  AMIKACIN: SIGNIFICANT CHANGE UP
-  AMOXICILLIN/CLAVULANIC ACID: SIGNIFICANT CHANGE UP
-  AMOXICILLIN/CLAVULANIC ACID: SIGNIFICANT CHANGE UP
-  AMPICILLIN/SULBACTAM: SIGNIFICANT CHANGE UP
-  AMPICILLIN/SULBACTAM: SIGNIFICANT CHANGE UP
-  AMPICILLIN: SIGNIFICANT CHANGE UP
-  AMPICILLIN: SIGNIFICANT CHANGE UP
-  AZTREONAM: SIGNIFICANT CHANGE UP
-  AZTREONAM: SIGNIFICANT CHANGE UP
-  CEFAZOLIN: SIGNIFICANT CHANGE UP
-  CEFAZOLIN: SIGNIFICANT CHANGE UP
-  CEFEPIME: SIGNIFICANT CHANGE UP
-  CEFEPIME: SIGNIFICANT CHANGE UP
-  CEFOXITIN: SIGNIFICANT CHANGE UP
-  CEFOXITIN: SIGNIFICANT CHANGE UP
-  CEFTRIAXONE: SIGNIFICANT CHANGE UP
-  CEFTRIAXONE: SIGNIFICANT CHANGE UP
-  CIPROFLOXACIN: SIGNIFICANT CHANGE UP
-  CIPROFLOXACIN: SIGNIFICANT CHANGE UP
-  ERTAPENEM: SIGNIFICANT CHANGE UP
-  ERTAPENEM: SIGNIFICANT CHANGE UP
-  GENTAMICIN: SIGNIFICANT CHANGE UP
-  GENTAMICIN: SIGNIFICANT CHANGE UP
-  IMIPENEM: SIGNIFICANT CHANGE UP
-  IMIPENEM: SIGNIFICANT CHANGE UP
-  LEVOFLOXACIN: SIGNIFICANT CHANGE UP
-  LEVOFLOXACIN: SIGNIFICANT CHANGE UP
-  MEROPENEM: SIGNIFICANT CHANGE UP
-  MEROPENEM: SIGNIFICANT CHANGE UP
-  NITROFURANTOIN: SIGNIFICANT CHANGE UP
-  NITROFURANTOIN: SIGNIFICANT CHANGE UP
-  PIPERACILLIN/TAZOBACTAM: SIGNIFICANT CHANGE UP
-  PIPERACILLIN/TAZOBACTAM: SIGNIFICANT CHANGE UP
-  TIGECYCLINE: SIGNIFICANT CHANGE UP
-  TIGECYCLINE: SIGNIFICANT CHANGE UP
-  TOBRAMYCIN: SIGNIFICANT CHANGE UP
-  TOBRAMYCIN: SIGNIFICANT CHANGE UP
-  TRIMETHOPRIM/SULFAMETHOXAZOLE: SIGNIFICANT CHANGE UP
-  TRIMETHOPRIM/SULFAMETHOXAZOLE: SIGNIFICANT CHANGE UP
ALBUMIN SERPL ELPH-MCNC: 2.8 G/DL — LOW (ref 3.3–5)
ALP SERPL-CCNC: 134 U/L — HIGH (ref 40–120)
ALT FLD-CCNC: 24 U/L — SIGNIFICANT CHANGE UP (ref 4–33)
ANION GAP SERPL CALC-SCNC: 12 MMOL/L — SIGNIFICANT CHANGE UP (ref 7–14)
APTT BLD: 39.2 SEC — HIGH (ref 27–36.3)
AST SERPL-CCNC: 24 U/L — SIGNIFICANT CHANGE UP (ref 4–32)
BASOPHILS # BLD AUTO: 0.02 K/UL — SIGNIFICANT CHANGE UP (ref 0–0.2)
BASOPHILS NFR BLD AUTO: 0.4 % — SIGNIFICANT CHANGE UP (ref 0–2)
BILIRUB SERPL-MCNC: 0.2 MG/DL — SIGNIFICANT CHANGE UP (ref 0.2–1.2)
BUN SERPL-MCNC: 47 MG/DL — HIGH (ref 7–23)
CALCIUM SERPL-MCNC: 8.4 MG/DL — SIGNIFICANT CHANGE UP (ref 8.4–10.5)
CHLORIDE SERPL-SCNC: 104 MMOL/L — SIGNIFICANT CHANGE UP (ref 98–107)
CO2 SERPL-SCNC: 20 MMOL/L — LOW (ref 22–31)
CREAT SERPL-MCNC: 2.03 MG/DL — HIGH (ref 0.5–1.3)
CULTURE RESULTS: SIGNIFICANT CHANGE UP
EGFR: 26 ML/MIN/1.73M2 — LOW
EOSINOPHIL # BLD AUTO: 0.01 K/UL — SIGNIFICANT CHANGE UP (ref 0–0.5)
EOSINOPHIL NFR BLD AUTO: 0.2 % — SIGNIFICANT CHANGE UP (ref 0–6)
GLUCOSE BLDC GLUCOMTR-MCNC: 116 MG/DL — HIGH (ref 70–99)
GLUCOSE BLDC GLUCOMTR-MCNC: 125 MG/DL — HIGH (ref 70–99)
GLUCOSE SERPL-MCNC: 106 MG/DL — HIGH (ref 70–99)
HCT VFR BLD CALC: 26.5 % — LOW (ref 34.5–45)
HGB BLD-MCNC: 8 G/DL — LOW (ref 11.5–15.5)
IANC: 4.65 K/UL — SIGNIFICANT CHANGE UP (ref 1.8–7.4)
IMM GRANULOCYTES NFR BLD AUTO: 0.7 % — SIGNIFICANT CHANGE UP (ref 0–1.5)
LYMPHOCYTES # BLD AUTO: 0.56 K/UL — LOW (ref 1–3.3)
LYMPHOCYTES # BLD AUTO: 10.3 % — LOW (ref 13–44)
MAGNESIUM SERPL-MCNC: 2 MG/DL — SIGNIFICANT CHANGE UP (ref 1.6–2.6)
MCHC RBC-ENTMCNC: 26.5 PG — LOW (ref 27–34)
MCHC RBC-ENTMCNC: 30.2 GM/DL — LOW (ref 32–36)
MCV RBC AUTO: 87.7 FL — SIGNIFICANT CHANGE UP (ref 80–100)
METHOD TYPE: SIGNIFICANT CHANGE UP
METHOD TYPE: SIGNIFICANT CHANGE UP
MONOCYTES # BLD AUTO: 0.18 K/UL — SIGNIFICANT CHANGE UP (ref 0–0.9)
MONOCYTES NFR BLD AUTO: 3.3 % — SIGNIFICANT CHANGE UP (ref 2–14)
NEUTROPHILS # BLD AUTO: 4.65 K/UL — SIGNIFICANT CHANGE UP (ref 1.8–7.4)
NEUTROPHILS NFR BLD AUTO: 85.1 % — HIGH (ref 43–77)
NRBC # BLD: 0 /100 WBCS — SIGNIFICANT CHANGE UP (ref 0–0)
NRBC # FLD: 0 K/UL — SIGNIFICANT CHANGE UP (ref 0–0)
ORGANISM # SPEC MICROSCOPIC CNT: SIGNIFICANT CHANGE UP
PHOSPHATE SERPL-MCNC: 5.7 MG/DL — HIGH (ref 2.5–4.5)
PLATELET # BLD AUTO: 182 K/UL — SIGNIFICANT CHANGE UP (ref 150–400)
POTASSIUM SERPL-MCNC: 5.2 MMOL/L — SIGNIFICANT CHANGE UP (ref 3.5–5.3)
POTASSIUM SERPL-SCNC: 5.2 MMOL/L — SIGNIFICANT CHANGE UP (ref 3.5–5.3)
PROT SERPL-MCNC: 6.5 G/DL — SIGNIFICANT CHANGE UP (ref 6–8.3)
RBC # BLD: 3.02 M/UL — LOW (ref 3.8–5.2)
RBC # FLD: 17 % — HIGH (ref 10.3–14.5)
SODIUM SERPL-SCNC: 136 MMOL/L — SIGNIFICANT CHANGE UP (ref 135–145)
SPECIMEN SOURCE: SIGNIFICANT CHANGE UP
WBC # BLD: 5.46 K/UL — SIGNIFICANT CHANGE UP (ref 3.8–10.5)
WBC # FLD AUTO: 5.46 K/UL — SIGNIFICANT CHANGE UP (ref 3.8–10.5)

## 2022-09-01 PROCEDURE — 93308 TTE F-UP OR LMTD: CPT | Mod: 26

## 2022-09-01 PROCEDURE — 99291 CRITICAL CARE FIRST HOUR: CPT | Mod: GC,25

## 2022-09-01 PROCEDURE — 99232 SBSQ HOSP IP/OBS MODERATE 35: CPT

## 2022-09-01 PROCEDURE — 76604 US EXAM CHEST: CPT | Mod: 26

## 2022-09-01 RX ORDER — BUMETANIDE 0.25 MG/ML
1 INJECTION INTRAMUSCULAR; INTRAVENOUS ONCE
Refills: 0 | Status: COMPLETED | OUTPATIENT
Start: 2022-09-01 | End: 2022-09-01

## 2022-09-01 RX ORDER — HYDROCORTISONE 20 MG
50 TABLET ORAL EVERY 6 HOURS
Refills: 0 | Status: DISCONTINUED | OUTPATIENT
Start: 2022-09-01 | End: 2022-09-03

## 2022-09-01 RX ORDER — VANCOMYCIN HCL 1 G
1250 VIAL (EA) INTRAVENOUS ONCE
Refills: 0 | Status: DISCONTINUED | OUTPATIENT
Start: 2022-09-01 | End: 2022-09-01

## 2022-09-01 RX ORDER — BUMETANIDE 0.25 MG/ML
1 INJECTION INTRAMUSCULAR; INTRAVENOUS ONCE
Refills: 0 | Status: DISCONTINUED | OUTPATIENT
Start: 2022-09-01 | End: 2022-09-01

## 2022-09-01 RX ORDER — DEXMEDETOMIDINE HYDROCHLORIDE IN 0.9% SODIUM CHLORIDE 4 UG/ML
0.2 INJECTION INTRAVENOUS
Qty: 400 | Refills: 0 | Status: DISCONTINUED | OUTPATIENT
Start: 2022-09-01 | End: 2022-09-01

## 2022-09-01 RX ORDER — CEFEPIME 1 G/1
1000 INJECTION, POWDER, FOR SOLUTION INTRAMUSCULAR; INTRAVENOUS EVERY 12 HOURS
Refills: 0 | Status: DISCONTINUED | OUTPATIENT
Start: 2022-09-01 | End: 2022-09-02

## 2022-09-01 RX ADMIN — Medication 100 MILLIGRAM(S): at 09:41

## 2022-09-01 RX ADMIN — SODIUM CHLORIDE 4 MILLILITER(S): 9 INJECTION INTRAMUSCULAR; INTRAVENOUS; SUBCUTANEOUS at 15:09

## 2022-09-01 RX ADMIN — ALBUTEROL 2 PUFF(S): 90 AEROSOL, METERED ORAL at 10:03

## 2022-09-01 RX ADMIN — CEFEPIME 100 MILLIGRAM(S): 1 INJECTION, POWDER, FOR SOLUTION INTRAMUSCULAR; INTRAVENOUS at 23:35

## 2022-09-01 RX ADMIN — SODIUM CHLORIDE 4 MILLILITER(S): 9 INJECTION INTRAMUSCULAR; INTRAVENOUS; SUBCUTANEOUS at 22:50

## 2022-09-01 RX ADMIN — SODIUM CHLORIDE 4 MILLILITER(S): 9 INJECTION INTRAMUSCULAR; INTRAVENOUS; SUBCUTANEOUS at 10:04

## 2022-09-01 RX ADMIN — HEPARIN SODIUM 5000 UNIT(S): 5000 INJECTION INTRAVENOUS; SUBCUTANEOUS at 22:00

## 2022-09-01 RX ADMIN — PROPOFOL 10.2 MICROGRAM(S)/KG/MIN: 10 INJECTION, EMULSION INTRAVENOUS at 07:52

## 2022-09-01 RX ADMIN — CHLORHEXIDINE GLUCONATE 1 APPLICATION(S): 213 SOLUTION TOPICAL at 06:09

## 2022-09-01 RX ADMIN — FENTANYL CITRATE 8.5 MICROGRAM(S)/KG/HR: 50 INJECTION INTRAVENOUS at 03:47

## 2022-09-01 RX ADMIN — Medication 50 MILLIGRAM(S): at 15:13

## 2022-09-01 RX ADMIN — CEFEPIME 100 MILLIGRAM(S): 1 INJECTION, POWDER, FOR SOLUTION INTRAMUSCULAR; INTRAVENOUS at 13:13

## 2022-09-01 RX ADMIN — SODIUM CHLORIDE 4 MILLILITER(S): 9 INJECTION INTRAMUSCULAR; INTRAVENOUS; SUBCUTANEOUS at 04:51

## 2022-09-01 RX ADMIN — CHLORHEXIDINE GLUCONATE 15 MILLILITER(S): 213 SOLUTION TOPICAL at 05:48

## 2022-09-01 RX ADMIN — ALBUTEROL 2 PUFF(S): 90 AEROSOL, METERED ORAL at 04:50

## 2022-09-01 RX ADMIN — Medication 100 MILLIGRAM(S): at 17:58

## 2022-09-01 RX ADMIN — DEXMEDETOMIDINE HYDROCHLORIDE IN 0.9% SODIUM CHLORIDE 4.24 MICROGRAM(S)/KG/HR: 4 INJECTION INTRAVENOUS at 12:24

## 2022-09-01 RX ADMIN — CHLORHEXIDINE GLUCONATE 15 MILLILITER(S): 213 SOLUTION TOPICAL at 17:58

## 2022-09-01 RX ADMIN — HEPARIN SODIUM 5000 UNIT(S): 5000 INJECTION INTRAVENOUS; SUBCUTANEOUS at 05:48

## 2022-09-01 RX ADMIN — Medication 50 MILLIGRAM(S): at 21:09

## 2022-09-01 RX ADMIN — Medication 1 APPLICATION(S): at 05:49

## 2022-09-01 RX ADMIN — HEPARIN SODIUM 5000 UNIT(S): 5000 INJECTION INTRAVENOUS; SUBCUTANEOUS at 14:00

## 2022-09-01 RX ADMIN — Medication 50 MILLIGRAM(S): at 09:41

## 2022-09-01 RX ADMIN — ALBUTEROL 2 PUFF(S): 90 AEROSOL, METERED ORAL at 15:08

## 2022-09-01 RX ADMIN — Medication 100 MILLIGRAM(S): at 00:08

## 2022-09-01 RX ADMIN — Medication 50 MILLIGRAM(S): at 05:47

## 2022-09-01 RX ADMIN — Medication 50 MILLIGRAM(S): at 00:08

## 2022-09-01 RX ADMIN — BUMETANIDE 1 MILLIGRAM(S): 0.25 INJECTION INTRAMUSCULAR; INTRAVENOUS at 10:08

## 2022-09-01 RX ADMIN — ALBUTEROL 2 PUFF(S): 90 AEROSOL, METERED ORAL at 22:50

## 2022-09-01 RX ADMIN — Medication 1 APPLICATION(S): at 17:58

## 2022-09-01 RX ADMIN — FENTANYL CITRATE 8.5 MICROGRAM(S)/KG/HR: 50 INJECTION INTRAVENOUS at 07:53

## 2022-09-01 NOTE — PROGRESS NOTE ADULT - ASSESSMENT
67y F PMHx of HTN, HLD, sarcoidosis, CHF, recent adm with PEA arrest x2, patient presented to the ED with 1 week of BL leg swelling and SOB that developed last night. Patient became altered in the ED and had respiratory distress was intubated and admitted to MICU.    #Neuro:  ##AMS  unclear etiology  - Intubated for AMS  -CTH 8/30 w/o acute pathology  - Possible infection or hypercapnia causing AMS     #Cardiovascular:  ##Hx CHF  -TTE reduced LV systolic function EF 55%, diastolic dysfunction. MICU team POCUS with normal RV function  -beside POCUS showed no right heart strain    ##Sinus bradycardia  ekg w/ sinus eugenie  trend tropes    #Pulmonary:  Hypercapneic resp failure  -s/p Int 8/30 on adm  - Chest Xray shows resolving pleural effusion on the R when compared to 7/30, but BL pleural effusions are still present.    - POCUS of lungs to evaluate for effusions - moderate effusion on R  -diuresis lasix 40 b/l crackles    #FEN/GI:  - Possibly start tube feeding     #Renal:  - Patient BUN/Cr 40/1.5, consistent with labs from previous admission  - Obtain BMP qdaily and monitor for changes in BUN/Cr egfr decreased consistent with previous admission.   - Lasix as needed     #:  - UA shows blood in urine and proteinuria   - No evidence to suggest UTI   - Will consider UC    #ID:  -Leukopenic at 3.02, hypothermic at 91 F, will obtain BC X2 and patient will be started on Zosyn and Aztreonam   -UA does not show suspicion for UTI  -pending UC  -Chest xray shows BL plueral effusions, possible source   - MRSA swab  - Legionella   - vanc trough sent    #Heme:  *Anemia   -Hgb 8.1, will transfuse the patient if hgb is less than 7. Patient has had anemia in the past with hgb in the 8s   -Obtain type and screen for patient  -Repeat CBC STAT  - Hep SubQ, for DVT prophylaxis     #Endo:  - Hypothermic at 91 F and bradycardic, obtain TSH and cortisol level    T2DM  -takes insulin 'prn' at home  -ISS q6h    #Ethics:  - Full Code Status 67y F PMHx of HTN, HLD, sarcoidosis, CHF, recent adm with PEA arrest x2, patient presented to the ED with 1 week of BL leg swelling and SOB that developed last night. Patient became altered in the ED and had respiratory distress was intubated and admitted to MICU.    #Neuro:  ##AMS  unclear etiology  - Intubated for AMS  - CTH 8/30 w/o acute pathology  - Possible infection or hypercapnia causing AMS  - Currently pausing propofol/fentanyl, pt to be assessed    #Cardiovascular:  ##Hx CHF  - TTE reduced LV systolic function EF 55%, diastolic dysfunction. MICU team POCUS with normal RV function  - beside POCUS showed no right heart strain    ##Sinus bradycardia  - pt initially presented with EKG w/ sinus eugenie  - bradycardia since resolved    #Pulmonary:  Hypercapneic resp failure  -s/p Int 8/30 on adm  - Chest XR shows resolving pleural effusion on the R when compared to 7/30, but b/l pleural effusions are still present.    - POCUS of lungs to evaluate for effusions - moderate effusion on R  - pt diuresed with Lasix 40mg overnight, will consider re-diuresis as clinically indicated    #FEN/GI:  - tube feed d/c while attempting sedation reversal    #Renal:  - Patient BUN/Cr 47/2.03, mildly elevated compared to labs from previous admission  - Renal consult not necessary at this time  - Obtain BMP qdaily and monitor for changes in BUN/Cr egfr decreased consistent with previous admission.   - Diuresis as needed    #:  - UA shows blood in urine and proteinuria  - UCx shows E Coli, sensitivities pending    #ID:  - Pt initially leukopenic at 3.02, hypothermic at 91 F  - Patient started on Zosyn and Aztreonam, Aztreonam to be d/c and changed to Cefepime  - UCx shows E Coli, sensitivities pending  - Chest xray shows b/l pleural effusions, possible source  - MRSA Swab NEG  - Legionella NEG  - Vancomycin d/c    #Heme:  Anemia  - Hgb 8.0, will transfuse the patient if hgb is less than 7. Patient has had anemia in the past with hgb in the 8s  - Obtain type and screen for patient  - Hg is lateral, no concern for acute bleeding at this time  - Hep SubQ, for DVT prophylaxis    #Endo:  - Pt initially hypothermic at 91 F and bradycardic  - Low cortisol on lab, per endocrine and pharmacy give hydrocortisone 50mg q6h  - TSH 4.82    T2DM  - takes insulin 'prn' at home  - ISS q6h    #Ethics:  - Full Code Status

## 2022-09-01 NOTE — CHART NOTE - NSCHARTNOTEFT_GEN_A_CORE
:  Sarah Cadet, resident  Tammy Luna, Fellow  Billy Mccann, Attending    INDICATION:  [ X] ACUTE HYPOXIC RESPIRATORY FAILURE    PROCEDURE:  [X ] LIMITED ECHO  [X ] LIMITED CHEST  [ ] LIMITED RETROPERITONEAL  [ ] LIMITED ABDOMINAL  [ ] LIMITED DVT  [ ] NEEDLE GUIDANCE VASCULAR  [ ] NEEDLE GUIDANCE THORACENTESIS  [ ] NEEDLE GUIDANCE PARACENTESIS  [ ] NEEDLE GUIDANCE PERICARDIOCENTESIS  [ ] OTHER    FINDINGS:    Cardiac:  Normal LV systolic function  No RV enlargement noted  Plump IVC    Pulmonary:  Diffuse B lines throughout  Bilateral basilar pleural effusion  Consolidation in left lung base    INTERPRETATION:  Normal LV systolic function  Bilateral basilar pulmonary effusions    IMAGES STORED IN [facility] :  Sarah Cadet, resident  Tammy Luna, Fellow  Billy Mccann, Attending    INDICATION:  [ X] ACUTE HYPOXIC RESPIRATORY FAILURE    PROCEDURE:  [X ] LIMITED ECHO  [X ] LIMITED CHEST  [ ] LIMITED RETROPERITONEAL  [ ] LIMITED ABDOMINAL  [ ] LIMITED DVT  [ ] NEEDLE GUIDANCE VASCULAR  [ ] NEEDLE GUIDANCE THORACENTESIS  [ ] NEEDLE GUIDANCE PARACENTESIS  [ ] NEEDLE GUIDANCE PERICARDIOCENTESIS  [ ] OTHER    FINDINGS:    Cardiac:  Normal LV systolic function  No RV enlargement noted  Plump IVC    Pulmonary:  Diffuse B lines throughout  Bilateral basilar pleural effusion  Consolidation in left lung base    INTERPRETATION:  Normal LV systolic function  Bilateral basilar pulmonary effusions    IMAGES STORED IN [facility]    I was present during the key portions of the procedure and immediately available during the entire procedure.  Ramy ALVAREZ  Attending

## 2022-09-01 NOTE — DIETITIAN INITIAL EVALUATION ADULT - SIGNS/SYMPTOMS
Current enteral regimen suboptimally meets estimated kcal/protein needs.  Physical appearance with BMI = 32.1 (class 1, mild obesity)

## 2022-09-01 NOTE — PROGRESS NOTE ADULT - ASSESSMENT
67F w/ PMHx of HTN, HLD, T2DM, sarcoidosis, CHF, presented to the ED for BL leg swelling and SOB. Endocrinology consulted for concern for Addisons Disease.    #Concern for Adrenal Insuffiency  #Hypothermia  - patient currently intubated, not on pressors  - presented with shortness of breath, swelling of her legs and AMS with acute episode of bradycardia, hypothermia and respiratory distress in ED prompting intubation  - patient reportedly hypothermic to 91F in ED, now off warmer and normothermic  - no darkening of skin on physical exam  - TSH 4.82  - 8/31 Na 137. K 4.2  - previous negative co-syntropin stim test 7/16 on prior admission: 8am cortisol 16.1, 8:30am cortisol 22.4, 9:15am cortisol 24.7.  - cortisol 8/30 at 7:40pm 7, although not obtained at ideal time of 8am, would expect to have high cortisol with acute decompensation  - possible patient could have relative adrenal insufficiency, unclear other etiologies of secondary AI and less likely addisons disease without history of autoimmune conditions  PLAN  - Continue hydrocortisone 50mg IV q8h and for possible relative adrenal insufficiency as patient intubated and critically ill and hypothermia   - taper as patient clinically improves and likely re-perform co-syntropin stim test at that time.   Once extubated will plan to reduce to hydrocortisone 50mg IV q12h    #T2DM  - HbA1c 6  - patient was previously on high doses of lantus and admelog as well as metformin and victoza in the past, however had presented to last hospitalization with frequent episodes of hypoglycemia  - C-peptide 5 on 7/17  - last admission, patient was on minimal amounts of insulin and discharged on lantus 2 units daily  PLAN  - low admelog correction scale q6h with fingersticks  - dextrose prn for hypoglycemia  - no lantus at this time  - if BG >300 persistently, would check BMP, VBG, BHB to rule out DKA  - Target -180 while in ICU  - Discharge recommendations: Possibly low dose basal insulin, pending clinical course    #HLD  - on atorvastatin 40mg daily outpatient  PLAN  - hold atorvastatin per primary team    Louise Samson MD  Division of Endocrinology  Pager: [phone]    If after 6PM or before 9AM, or on weekends/holidays, please call endocrine answering service for assistance ([phone]).  For nonurgent matters email [email or web address] for assistance.

## 2022-09-01 NOTE — PROGRESS NOTE ADULT - SUBJECTIVE AND OBJECTIVE BOX
MICU PROGRESS NOTE    INTERVAL HPI/OVERNIGHT EVENTS:    SUBJECTIVE:  Unable to attain as patient is sedated and intubated.    OBJECTIVE:    VITAL SIGNS:  ICU Vital Signs Last 24 Hrs  T(C): 37.3 (01 Sep 2022 05:00), Max: 37.3 (31 Aug 2022 20:00)  T(F): 99.2 (01 Sep 2022 05:00), Max: 99.2 (01 Sep 2022 05:00)  HR: 61 (01 Sep 2022 07:15) (55 - 72)  BP: 122/56 (01 Sep 2022 07:00) (102/53 - 127/60)  BP(mean): 72 (01 Sep 2022 07:00) (64 - 105)  ABP: --  ABP(mean): --  RR: 14 (01 Sep 2022 07:00) (13 - 15)  SpO2: 99% (01 Sep 2022 07:15) (91% - 100%)    O2 Parameters below as of 01 Sep 2022 06:00  Patient On (Oxygen Delivery Method): ventilator  O2 Flow (L/min): 30          VENTS:  Mode: AC/ CMV (Assist Control/ Continuous Mandatory Ventilation), RR (machine): 14, TV (machine): 350, FiO2: 30, PEEP: 5, ITime: 0.62, MAP: 8, PIP: 26    I&O:     @ 07:01  -  - @ 07:00  --------------------------------------------------------  IN: 1293.5 mL / OUT: 1000 mL / NET: 293.5 mL        PHYSICAL EXAM:  GENERAL: patient is sedated and intubated  CHEST/LUNG: mild b/l crackles, NO rhonchi, wheezing, or rubs  HEART: Regular rate and rhythm; No murmurs, rubs, or gallops  ABDOMEN: Soft, Nontender, Nondistended; Bowel sounds present  EXTREMITIES:  2+ Peripheral Pulses, No clubbing, cyanosis, or edema  SKIN: No rashes or lesions  NERVOUS SYSTEM:  patient is sedated      LINES: Active lines in R antecubital fossa and L wrist                                      MEDICATIONS:  MEDICATIONS  (STANDING):  ALBUTerol    90 MICROgram(s) HFA Inhaler 2 Puff(s) Inhalation every 6 hours  aztreonam  IVPB 1000 milliGRAM(s) IV Intermittent every 8 hours  aztreonam  IVPB      chlorhexidine 0.12% Liquid 15 milliLiter(s) Oral Mucosa every 12 hours  chlorhexidine 4% Liquid 1 Application(s) Topical <User Schedule>  dextrose 5%. 1000 milliLiter(s) (100 mL/Hr) IV Continuous <Continuous>  dextrose 5%. 1000 milliLiter(s) (50 mL/Hr) IV Continuous <Continuous>  dextrose 50% Injectable 25 Gram(s) IV Push once  dextrose 50% Injectable 12.5 Gram(s) IV Push once  dextrose 50% Injectable 25 Gram(s) IV Push once  fentaNYL   Infusion. 1 MICROgram(s)/kG/Hr (8.5 mL/Hr) IV Continuous <Continuous>  glucagon  Injectable 1 milliGRAM(s) IntraMuscular once  heparin   Injectable 5000 Unit(s) SubCutaneous every 8 hours  hydrocortisone sodium succinate Injectable 50 milliGRAM(s) IV Push every 8 hours  metroNIDAZOLE  IVPB 500 milliGRAM(s) IV Intermittent every 8 hours  metroNIDAZOLE  IVPB      petrolatum Ophthalmic Ointment 1 Application(s) Both EYES two times a day  propofol Infusion 20 MICROgram(s)/kG/Min (10.2 mL/Hr) IV Continuous <Continuous>  sodium chloride 3%  Inhalation 4 milliLiter(s) Inhalation every 6 hours    MEDICATIONS  (PRN):  dextrose Oral Gel 15 Gram(s) Oral once PRN Blood Glucose LESS THAN 70 milliGRAM(s)/deciliter      ALLERGIES:  Allergies    penicillin (Red Man Synd)    Intolerances        LABS:                        8.0    5.46  )-----------( 182      ( 01 Sep 2022 01:35 )             26.5         136  |  104  |  47<H>  ----------------------------<  106<H>  5.2   |  20<L>  |  2.03<H>    Ca    8.4      01 Sep 2022 01:35  Phos  5.7       Mg     2.00         TPro  6.5  /  Alb  2.8<L>  /  TBili  0.2  /  DBili  x   /  AST  24  /  ALT  24  /  AlkPhos  134<H>      PT/INR - ( 30 Aug 2022 19:40 )   PT: 12.8 sec;   INR: 1.10 ratio         PTT - ( 01 Sep 2022 01:35 )  PTT:39.2 sec  Urinalysis Basic - ( 30 Aug 2022 16:14 )    Color: Light Brown / Appearance: Slightly Turbid / S.011 / pH: x  Gluc: x / Ketone: Negative  / Bili: Negative / Urobili: <2 mg/dL   Blood: x / Protein: 30 mg/dL / Nitrite: Negative   Leuk Esterase: Small / RBC: >720 /HPF / WBC 19 /HPF   Sq Epi: x / Non Sq Epi: 1 /HPF / Bacteria: Negative        Culture - Bronchial (collected 22 @ 19:19)  Source: .Bronchial Bronchial Lavage  Gram Stain (22 @ 23:03):    Numerous polymorphonuclear leukocytes per low power field    No squamous epithelial cells per low power field    Rare Gram positive cocci in pairs per oil power field      CAPILLARY BLOOD GLUCOSE      POCT Blood Glucose.: 125 mg/dL (01 Sep 2022 05:46)      RADIOLOGY & ADDITIONAL TESTS: Reviewed. MICU PROGRESS NOTE    INTERVAL HPI/OVERNIGHT EVENTS:  Vancomycin discontinued. Lasix given with POS net output    SUBJECTIVE:  Unable to attain as patient is sedated and intubated.    OBJECTIVE:    VITAL SIGNS:  ICU Vital Signs Last 24 Hrs  T(C): 37.3 (01 Sep 2022 05:00), Max: 37.3 (31 Aug 2022 20:00)  T(F): 99.2 (01 Sep 2022 05:00), Max: 99.2 (01 Sep 2022 05:00)  HR: 61 (01 Sep 2022 07:15) (55 - 72)  BP: 122/56 (01 Sep 2022 07:00) (102/53 - 127/60)  BP(mean): 72 (01 Sep 2022 07:00) (64 - 105)  ABP: --  ABP(mean): --  RR: 14 (01 Sep 2022 07:00) (13 - 15)  SpO2: 99% (01 Sep 2022 07:15) (91% - 100%)    O2 Parameters below as of 01 Sep 2022 06:00  Patient On (Oxygen Delivery Method): ventilator  O2 Flow (L/min): 30          VENTS:  Mode: AC/ CMV (Assist Control/ Continuous Mandatory Ventilation), RR (machine): 14, TV (machine): 350, FiO2: 30, PEEP: 5, ITime: 0.62, MAP: 8, PIP: 26    I&O:     @ 07:01  -  09- @ 07:00  --------------------------------------------------------  IN: 1293.5 mL / OUT: 1000 mL / NET: 293.5 mL        PHYSICAL EXAM:  GENERAL: patient is sedated and intubated  CHEST/LUNG: mild b/l crackles, NO rhonchi, wheezing, or rubs  HEART: Regular rate and rhythm; No murmurs, rubs, or gallops  ABDOMEN: Soft, Nontender, Nondistended; Bowel sounds present  EXTREMITIES:  2+ Peripheral Pulses, No clubbing, cyanosis, or edema  SKIN: No rashes or lesions  NERVOUS SYSTEM:  patient is sedated      LINES: Active peripheral venous lines in R antecubital fossa and L wrist, Murphy catheter intact, NG tube in place                                      MEDICATIONS:  MEDICATIONS  (STANDING):  ALBUTerol    90 MICROgram(s) HFA Inhaler 2 Puff(s) Inhalation every 6 hours  aztreonam  IVPB 1000 milliGRAM(s) IV Intermittent every 8 hours  aztreonam  IVPB      chlorhexidine 0.12% Liquid 15 milliLiter(s) Oral Mucosa every 12 hours  chlorhexidine 4% Liquid 1 Application(s) Topical <User Schedule>  dextrose 5%. 1000 milliLiter(s) (100 mL/Hr) IV Continuous <Continuous>  dextrose 5%. 1000 milliLiter(s) (50 mL/Hr) IV Continuous <Continuous>  dextrose 50% Injectable 25 Gram(s) IV Push once  dextrose 50% Injectable 12.5 Gram(s) IV Push once  dextrose 50% Injectable 25 Gram(s) IV Push once  fentaNYL   Infusion. 1 MICROgram(s)/kG/Hr (8.5 mL/Hr) IV Continuous <Continuous>  glucagon  Injectable 1 milliGRAM(s) IntraMuscular once  heparin   Injectable 5000 Unit(s) SubCutaneous every 8 hours  hydrocortisone sodium succinate Injectable 50 milliGRAM(s) IV Push every 8 hours  metroNIDAZOLE  IVPB 500 milliGRAM(s) IV Intermittent every 8 hours  metroNIDAZOLE  IVPB      petrolatum Ophthalmic Ointment 1 Application(s) Both EYES two times a day  propofol Infusion 20 MICROgram(s)/kG/Min (10.2 mL/Hr) IV Continuous <Continuous>  sodium chloride 3%  Inhalation 4 milliLiter(s) Inhalation every 6 hours    MEDICATIONS  (PRN):  dextrose Oral Gel 15 Gram(s) Oral once PRN Blood Glucose LESS THAN 70 milliGRAM(s)/deciliter      ALLERGIES:  Allergies    penicillin (Red Man Synd)    Intolerances        LABS:                        8.0    5.46  )-----------( 182      ( 01 Sep 2022 01:35 )             26.5     09-    136  |  104  |  47<H>  ----------------------------<  106<H>  5.2   |  20<L>  |  2.03<H>    Ca    8.4      01 Sep 2022 01:35  Phos  5.7     09-  Mg     2.00     09-    TPro  6.5  /  Alb  2.8<L>  /  TBili  0.2  /  DBili  x   /  AST  24  /  ALT  24  /  AlkPhos  134<H>      PT/INR - ( 30 Aug 2022 19:40 )   PT: 12.8 sec;   INR: 1.10 ratio         PTT - ( 01 Sep 2022 01:35 )  PTT:39.2 sec  Urinalysis Basic - ( 30 Aug 2022 16:14 )    Color: Light Brown / Appearance: Slightly Turbid / S.011 / pH: x  Gluc: x / Ketone: Negative  / Bili: Negative / Urobili: <2 mg/dL   Blood: x / Protein: 30 mg/dL / Nitrite: Negative   Leuk Esterase: Small / RBC: >720 /HPF / WBC 19 /HPF   Sq Epi: x / Non Sq Epi: 1 /HPF / Bacteria: Negative        Culture - Bronchial (collected 22 @ 19:19)  Source: .Bronchial Bronchial Lavage  Gram Stain (22 @ 23:03):    Numerous polymorphonuclear leukocytes per low power field    No squamous epithelial cells per low power field    Rare Gram positive cocci in pairs per oil power field      CAPILLARY BLOOD GLUCOSE      POCT Blood Glucose.: 125 mg/dL (01 Sep 2022 05:46)      RADIOLOGY & ADDITIONAL TESTS: Reviewed.

## 2022-09-01 NOTE — DIETITIAN INITIAL EVALUATION ADULT - OTHER INFO
Pt. sedated and ~293 non-nutritive lipid calories provided by Propofol over past 24hrs.  Receiving GLucerna 1.5 enterally at 10mL/hr.  Spoke with RN.  No intolerance to TF @ this time (no nausea/vomiting/diarrhea/constipation or abdominal distention).   Pt. received diuresis.  Also noted with possible adrenal insufficieny and initiated on corticosteroid.   Weight 80.6kg (July 23 2022) on previous admission.

## 2022-09-01 NOTE — DIETITIAN INITIAL EVALUATION ADULT - PERTINENT LABORATORY DATA
09-01    136  |  104  |  47<H>  ----------------------------<  106<H>  5.2   |  20<L>  |  2.03<H>    Ca    8.4      01 Sep 2022 01:35  Phos  5.7     09-01  Mg     2.00     09-01    TPro  6.5  /  Alb  2.8<L>  /  TBili  0.2  /  DBili  x   /  AST  24  /  ALT  24  /  AlkPhos  134<H>  09-01    CAPILLARY BLOOD GLUCOSE      POCT Blood Glucose.: 125 mg/dL (01 Sep 2022 05:46)  POCT Blood Glucose.: 100 mg/dL (31 Aug 2022 23:46)  POCT Blood Glucose.: 98 mg/dL (31 Aug 2022 20:01)  POCT Blood Glucose.: 84 mg/dL (31 Aug 2022 15:52)  POCT Blood Glucose.: 78 mg/dL (31 Aug 2022 12:26)    A1C with Estimated Average Glucose Result: 6.0 % (08-31-22 @ 02:00)  A1C with Estimated Average Glucose Result: 7.3 % (06-05-22 @ 06:00)  A1C with Estimated Average Glucose Result: 7.7 % (02-04-22 @ 05:37)

## 2022-09-01 NOTE — DIETITIAN INITIAL EVALUATION ADULT - NSFNSGIIOFT_GEN_A_CORE
08-31-22 @ 07:01  -  09-01-22 @ 07:00  --------------------------------------------------------  OUT:  Total OUT: 0 mL    Total NET: 230 mL      09-01-22 @ 07:01  -  09-01-22 @ 11:45  --------------------------------------------------------  OUT:  Total OUT: 0 mL    Total NET: 40 mL

## 2022-09-01 NOTE — DIETITIAN INITIAL EVALUATION ADULT - NS FNS DIET ORDER
Diet, NPO with Tube Feed:   Tube Feeding Modality: Nasogastric  Glucerna 1.5 Hugh (GLUCERNA1.5RTH)  Total Volume for 24 Hours (mL): 240  Continuous  Starting Tube Feed Rate {mL per Hour}: 10  Increase Tube Feed Rate by (mL): 10     Every 4 hours  Until Goal Tube Feed Rate (mL per Hour): 10  Tube Feed Duration (in Hours): 24  Tube Feed Start Time: 22:00 (08-31-22 @ 11:53)

## 2022-09-01 NOTE — PROGRESS NOTE ADULT - SUBJECTIVE AND OBJECTIVE BOX
Chief Complaint: Rule out AI    History: awake, remains intubated  NGT in place but with feeds held    MEDICATIONS  (STANDING):  ALBUTerol    90 MICROgram(s) HFA Inhaler 2 Puff(s) Inhalation every 6 hours  cefepime   IVPB 1000 milliGRAM(s) IV Intermittent every 12 hours  chlorhexidine 0.12% Liquid 15 milliLiter(s) Oral Mucosa every 12 hours  chlorhexidine 4% Liquid 1 Application(s) Topical <User Schedule>  dexMEDEtomidine Infusion 0.2 MICROgram(s)/kG/Hr (4.24 mL/Hr) IV Continuous <Continuous>  dextrose 5%. 1000 milliLiter(s) (100 mL/Hr) IV Continuous <Continuous>  dextrose 5%. 1000 milliLiter(s) (50 mL/Hr) IV Continuous <Continuous>  dextrose 50% Injectable 25 Gram(s) IV Push once  dextrose 50% Injectable 12.5 Gram(s) IV Push once  dextrose 50% Injectable 25 Gram(s) IV Push once  fentaNYL   Infusion. 1 MICROgram(s)/kG/Hr (8.5 mL/Hr) IV Continuous <Continuous>  glucagon  Injectable 1 milliGRAM(s) IntraMuscular once  heparin   Injectable 5000 Unit(s) SubCutaneous every 8 hours  hydrocortisone sodium succinate Injectable 50 milliGRAM(s) IV Push every 6 hours  metroNIDAZOLE  IVPB 500 milliGRAM(s) IV Intermittent every 8 hours  metroNIDAZOLE  IVPB      petrolatum Ophthalmic Ointment 1 Application(s) Both EYES two times a day  propofol Infusion 20 MICROgram(s)/kG/Min (10.2 mL/Hr) IV Continuous <Continuous>  sodium chloride 3%  Inhalation 4 milliLiter(s) Inhalation every 6 hours    MEDICATIONS  (PRN):  dextrose Oral Gel 15 Gram(s) Oral once PRN Blood Glucose LESS THAN 70 milliGRAM(s)/deciliter      Allergies    penicillin (Red Man Synd)    Intolerances      Review of Systems:    UNABLE TO OBTAIN    PHYSICAL EXAM:  VITALS: T(C): 36.9 (09-01-22 @ 08:00)  T(F): 98.5 (09-01-22 @ 08:00), Max: 99.2 (09-01-22 @ 05:00)  HR: 77 (09-01-22 @ 16:00) (54 - 80)  BP: 138/65 (09-01-22 @ 16:00) (101/46 - 148/68)  RR:  (10 - 15)  SpO2:  (91% - 100%)  Wt(kg): --  GENERAL: NAD, well-groomed, well-developed  EYES: No proptosis, no lid lag, anicteric  HEENT:  Atraumatic, Normocephalic  RESPIRATORY: ETT on vent  PSYCH: awake but unable to communicate    CAPILLARY BLOOD GLUCOSE      POCT Blood Glucose.: 116 mg/dL (01 Sep 2022 13:23)  POCT Blood Glucose.: 125 mg/dL (01 Sep 2022 05:46)  POCT Blood Glucose.: 100 mg/dL (31 Aug 2022 23:46)  POCT Blood Glucose.: 98 mg/dL (31 Aug 2022 20:01)      09-01    136  |  104  |  47<H>  ----------------------------<  106<H>  5.2   |  20<L>  |  2.03<H>    eGFR: 26<L>    Ca    8.4      09-01  Mg     2.00     09-01  Phos  5.7     09-01    TPro  6.5  /  Alb  2.8<L>  /  TBili  0.2  /  DBili  x   /  AST  24  /  ALT  24  /  AlkPhos  134<H>  09-01      A1C with Estimated Average Glucose Result: 6.0 % (08-31-22 @ 02:00)  A1C with Estimated Average Glucose Result: 7.3 % (06-05-22 @ 06:00)  A1C with Estimated Average Glucose Result: 7.7 % (02-04-22 @ 05:37)  A1C with Estimated Average Glucose Result: 9.2 % (11-29-21 @ 06:47)      Thyroid Function Tests:  08-30 @ 19:40 TSH 4.82 FreeT4 -- T3 -- Anti TPO -- Anti Thyroglobulin Ab -- TSI --

## 2022-09-01 NOTE — DIETITIAN INITIAL EVALUATION ADULT - PERTINENT MEDS FT
MEDICATIONS  (STANDING):  ALBUTerol    90 MICROgram(s) HFA Inhaler 2 Puff(s) Inhalation every 6 hours  aztreonam  IVPB 1000 milliGRAM(s) IV Intermittent every 8 hours  aztreonam  IVPB      chlorhexidine 0.12% Liquid 15 milliLiter(s) Oral Mucosa every 12 hours  chlorhexidine 4% Liquid 1 Application(s) Topical <User Schedule>  dextrose 5%. 1000 milliLiter(s) (100 mL/Hr) IV Continuous <Continuous>  dextrose 5%. 1000 milliLiter(s) (50 mL/Hr) IV Continuous <Continuous>  dextrose 50% Injectable 25 Gram(s) IV Push once  dextrose 50% Injectable 12.5 Gram(s) IV Push once  dextrose 50% Injectable 25 Gram(s) IV Push once  fentaNYL   Infusion. 1 MICROgram(s)/kG/Hr (8.5 mL/Hr) IV Continuous <Continuous>  glucagon  Injectable 1 milliGRAM(s) IntraMuscular once  heparin   Injectable 5000 Unit(s) SubCutaneous every 8 hours  hydrocortisone sodium succinate Injectable 50 milliGRAM(s) IV Push every 8 hours  metroNIDAZOLE  IVPB 500 milliGRAM(s) IV Intermittent every 8 hours  metroNIDAZOLE  IVPB      petrolatum Ophthalmic Ointment 1 Application(s) Both EYES two times a day  propofol Infusion 20 MICROgram(s)/kG/Min (10.2 mL/Hr) IV Continuous <Continuous>  sodium chloride 3%  Inhalation 4 milliLiter(s) Inhalation every 6 hours    MEDICATIONS  (PRN):  dextrose Oral Gel 15 Gram(s) Oral once PRN Blood Glucose LESS THAN 70 milliGRAM(s)/deciliter

## 2022-09-02 LAB
ALBUMIN SERPL ELPH-MCNC: 2.9 G/DL — LOW (ref 3.3–5)
ALBUMIN SERPL ELPH-MCNC: 3.1 G/DL — LOW (ref 3.3–5)
ALP SERPL-CCNC: 121 U/L — HIGH (ref 40–120)
ALP SERPL-CCNC: 127 U/L — HIGH (ref 40–120)
ALT FLD-CCNC: 19 U/L — SIGNIFICANT CHANGE UP (ref 4–33)
ALT FLD-CCNC: 24 U/L — SIGNIFICANT CHANGE UP (ref 4–33)
ANION GAP SERPL CALC-SCNC: 13 MMOL/L — SIGNIFICANT CHANGE UP (ref 7–14)
ANION GAP SERPL CALC-SCNC: 16 MMOL/L — HIGH (ref 7–14)
AST SERPL-CCNC: 15 U/L — SIGNIFICANT CHANGE UP (ref 4–32)
AST SERPL-CCNC: 28 U/L — SIGNIFICANT CHANGE UP (ref 4–32)
BASE EXCESS BLDA CALC-SCNC: -6 MMOL/L — LOW (ref -2–3)
BASOPHILS # BLD AUTO: 0.01 K/UL — SIGNIFICANT CHANGE UP (ref 0–0.2)
BASOPHILS NFR BLD AUTO: 0.2 % — SIGNIFICANT CHANGE UP (ref 0–2)
BILIRUB SERPL-MCNC: <0.2 MG/DL — SIGNIFICANT CHANGE UP (ref 0.2–1.2)
BILIRUB SERPL-MCNC: <0.2 MG/DL — SIGNIFICANT CHANGE UP (ref 0.2–1.2)
BUN SERPL-MCNC: 53 MG/DL — HIGH (ref 7–23)
BUN SERPL-MCNC: 59 MG/DL — HIGH (ref 7–23)
CALCIUM SERPL-MCNC: 8.4 MG/DL — SIGNIFICANT CHANGE UP (ref 8.4–10.5)
CALCIUM SERPL-MCNC: 8.7 MG/DL — SIGNIFICANT CHANGE UP (ref 8.4–10.5)
CHLORIDE SERPL-SCNC: 103 MMOL/L — SIGNIFICANT CHANGE UP (ref 98–107)
CHLORIDE SERPL-SCNC: 106 MMOL/L — SIGNIFICANT CHANGE UP (ref 98–107)
CO2 BLDA-SCNC: 23 MMOL/L — SIGNIFICANT CHANGE UP (ref 19–24)
CO2 SERPL-SCNC: 18 MMOL/L — LOW (ref 22–31)
CO2 SERPL-SCNC: 22 MMOL/L — SIGNIFICANT CHANGE UP (ref 22–31)
CREAT SERPL-MCNC: 1.94 MG/DL — HIGH (ref 0.5–1.3)
CREAT SERPL-MCNC: 1.97 MG/DL — HIGH (ref 0.5–1.3)
CULTURE RESULTS: SIGNIFICANT CHANGE UP
EGFR: 27 ML/MIN/1.73M2 — LOW
EGFR: 28 ML/MIN/1.73M2 — LOW
EOSINOPHIL # BLD AUTO: 0 K/UL — SIGNIFICANT CHANGE UP (ref 0–0.5)
EOSINOPHIL NFR BLD AUTO: 0 % — SIGNIFICANT CHANGE UP (ref 0–6)
GAS PNL BLDA: SIGNIFICANT CHANGE UP
GLUCOSE BLDC GLUCOMTR-MCNC: 156 MG/DL — HIGH (ref 70–99)
GLUCOSE BLDC GLUCOMTR-MCNC: 158 MG/DL — HIGH (ref 70–99)
GLUCOSE BLDC GLUCOMTR-MCNC: 173 MG/DL — HIGH (ref 70–99)
GLUCOSE SERPL-MCNC: 156 MG/DL — HIGH (ref 70–99)
GLUCOSE SERPL-MCNC: 184 MG/DL — HIGH (ref 70–99)
HCO3 BLDA-SCNC: 22 MMOL/L — SIGNIFICANT CHANGE UP (ref 21–28)
HCT VFR BLD CALC: 26.8 % — LOW (ref 34.5–45)
HGB BLD-MCNC: 8.1 G/DL — LOW (ref 11.5–15.5)
IANC: 3.95 K/UL — SIGNIFICANT CHANGE UP (ref 1.8–7.4)
IMM GRANULOCYTES NFR BLD AUTO: 0.9 % — SIGNIFICANT CHANGE UP (ref 0–1.5)
LYMPHOCYTES # BLD AUTO: 0.53 K/UL — LOW (ref 1–3.3)
LYMPHOCYTES # BLD AUTO: 11.4 % — LOW (ref 13–44)
MAGNESIUM SERPL-MCNC: 2.1 MG/DL — SIGNIFICANT CHANGE UP (ref 1.6–2.6)
MCHC RBC-ENTMCNC: 25.8 PG — LOW (ref 27–34)
MCHC RBC-ENTMCNC: 30.2 GM/DL — LOW (ref 32–36)
MCV RBC AUTO: 85.4 FL — SIGNIFICANT CHANGE UP (ref 80–100)
MONOCYTES # BLD AUTO: 0.12 K/UL — SIGNIFICANT CHANGE UP (ref 0–0.9)
MONOCYTES NFR BLD AUTO: 2.6 % — SIGNIFICANT CHANGE UP (ref 2–14)
NEUTROPHILS # BLD AUTO: 3.95 K/UL — SIGNIFICANT CHANGE UP (ref 1.8–7.4)
NEUTROPHILS NFR BLD AUTO: 84.9 % — HIGH (ref 43–77)
NRBC # BLD: 0 /100 WBCS — SIGNIFICANT CHANGE UP (ref 0–0)
NRBC # FLD: 0 K/UL — SIGNIFICANT CHANGE UP (ref 0–0)
PCO2 BLDA: 49 MMHG — HIGH (ref 32–35)
PH BLDA: 7.25 — LOW (ref 7.35–7.45)
PHOSPHATE SERPL-MCNC: 6.5 MG/DL — HIGH (ref 2.5–4.5)
PLATELET # BLD AUTO: 270 K/UL — SIGNIFICANT CHANGE UP (ref 150–400)
PO2 BLDA: 84 MMHG — SIGNIFICANT CHANGE UP (ref 83–108)
POTASSIUM SERPL-MCNC: 4.9 MMOL/L — SIGNIFICANT CHANGE UP (ref 3.5–5.3)
POTASSIUM SERPL-MCNC: 5.5 MMOL/L — HIGH (ref 3.5–5.3)
POTASSIUM SERPL-SCNC: 4.9 MMOL/L — SIGNIFICANT CHANGE UP (ref 3.5–5.3)
POTASSIUM SERPL-SCNC: 5.5 MMOL/L — HIGH (ref 3.5–5.3)
PROT SERPL-MCNC: 7 G/DL — SIGNIFICANT CHANGE UP (ref 6–8.3)
PROT SERPL-MCNC: 7 G/DL — SIGNIFICANT CHANGE UP (ref 6–8.3)
RBC # BLD: 3.14 M/UL — LOW (ref 3.8–5.2)
RBC # FLD: 16.8 % — HIGH (ref 10.3–14.5)
SAO2 % BLDA: 96.5 % — SIGNIFICANT CHANGE UP (ref 94–98)
SODIUM SERPL-SCNC: 137 MMOL/L — SIGNIFICANT CHANGE UP (ref 135–145)
SODIUM SERPL-SCNC: 141 MMOL/L — SIGNIFICANT CHANGE UP (ref 135–145)
SPECIMEN SOURCE: SIGNIFICANT CHANGE UP
WBC # BLD: 4.65 K/UL — SIGNIFICANT CHANGE UP (ref 3.8–10.5)
WBC # FLD AUTO: 4.65 K/UL — SIGNIFICANT CHANGE UP (ref 3.8–10.5)

## 2022-09-02 PROCEDURE — 99291 CRITICAL CARE FIRST HOUR: CPT | Mod: GC,25

## 2022-09-02 PROCEDURE — 76604 US EXAM CHEST: CPT | Mod: 26

## 2022-09-02 PROCEDURE — 99232 SBSQ HOSP IP/OBS MODERATE 35: CPT

## 2022-09-02 PROCEDURE — 93308 TTE F-UP OR LMTD: CPT | Mod: 26

## 2022-09-02 RX ORDER — BUMETANIDE 0.25 MG/ML
1 INJECTION INTRAMUSCULAR; INTRAVENOUS ONCE
Refills: 0 | Status: COMPLETED | OUTPATIENT
Start: 2022-09-02 | End: 2022-09-03

## 2022-09-02 RX ORDER — BUMETANIDE 0.25 MG/ML
1 INJECTION INTRAMUSCULAR; INTRAVENOUS ONCE
Refills: 0 | Status: COMPLETED | OUTPATIENT
Start: 2022-09-02 | End: 2022-09-02

## 2022-09-02 RX ORDER — ONDANSETRON 8 MG/1
4 TABLET, FILM COATED ORAL EVERY 8 HOURS
Refills: 0 | Status: DISCONTINUED | OUTPATIENT
Start: 2022-09-02 | End: 2022-09-04

## 2022-09-02 RX ORDER — CEFTRIAXONE 500 MG/1
1000 INJECTION, POWDER, FOR SOLUTION INTRAMUSCULAR; INTRAVENOUS EVERY 24 HOURS
Refills: 0 | Status: DISCONTINUED | OUTPATIENT
Start: 2022-09-02 | End: 2022-09-02

## 2022-09-02 RX ORDER — ONDANSETRON 8 MG/1
4 TABLET, FILM COATED ORAL ONCE
Refills: 0 | Status: COMPLETED | OUTPATIENT
Start: 2022-09-02 | End: 2022-09-02

## 2022-09-02 RX ORDER — CEFEPIME 1 G/1
1000 INJECTION, POWDER, FOR SOLUTION INTRAMUSCULAR; INTRAVENOUS EVERY 12 HOURS
Refills: 0 | Status: DISCONTINUED | OUTPATIENT
Start: 2022-09-02 | End: 2022-09-05

## 2022-09-02 RX ORDER — CHLORHEXIDINE GLUCONATE 213 G/1000ML
1 SOLUTION TOPICAL DAILY
Refills: 0 | Status: DISCONTINUED | OUTPATIENT
Start: 2022-09-02 | End: 2022-09-06

## 2022-09-02 RX ADMIN — Medication 100 MILLIGRAM(S): at 22:54

## 2022-09-02 RX ADMIN — BUMETANIDE 1 MILLIGRAM(S): 0.25 INJECTION INTRAMUSCULAR; INTRAVENOUS at 03:40

## 2022-09-02 RX ADMIN — Medication 50 MILLIGRAM(S): at 03:40

## 2022-09-02 RX ADMIN — CEFEPIME 100 MILLIGRAM(S): 1 INJECTION, POWDER, FOR SOLUTION INTRAMUSCULAR; INTRAVENOUS at 11:31

## 2022-09-02 RX ADMIN — Medication 50 MILLIGRAM(S): at 22:52

## 2022-09-02 RX ADMIN — ALBUTEROL 2 PUFF(S): 90 AEROSOL, METERED ORAL at 11:14

## 2022-09-02 RX ADMIN — CHLORHEXIDINE GLUCONATE 1 APPLICATION(S): 213 SOLUTION TOPICAL at 11:14

## 2022-09-02 RX ADMIN — CHLORHEXIDINE GLUCONATE 15 MILLILITER(S): 213 SOLUTION TOPICAL at 17:31

## 2022-09-02 RX ADMIN — HEPARIN SODIUM 5000 UNIT(S): 5000 INJECTION INTRAVENOUS; SUBCUTANEOUS at 05:56

## 2022-09-02 RX ADMIN — BUMETANIDE 1 MILLIGRAM(S): 0.25 INJECTION INTRAMUSCULAR; INTRAVENOUS at 11:13

## 2022-09-02 RX ADMIN — ONDANSETRON 4 MILLIGRAM(S): 8 TABLET, FILM COATED ORAL at 04:42

## 2022-09-02 RX ADMIN — Medication 50 MILLIGRAM(S): at 15:57

## 2022-09-02 RX ADMIN — Medication 50 MILLIGRAM(S): at 09:55

## 2022-09-02 RX ADMIN — CHLORHEXIDINE GLUCONATE 15 MILLILITER(S): 213 SOLUTION TOPICAL at 05:55

## 2022-09-02 RX ADMIN — Medication 100 MILLIGRAM(S): at 00:17

## 2022-09-02 RX ADMIN — ALBUTEROL 2 PUFF(S): 90 AEROSOL, METERED ORAL at 16:17

## 2022-09-02 RX ADMIN — ALBUTEROL 2 PUFF(S): 90 AEROSOL, METERED ORAL at 04:03

## 2022-09-02 RX ADMIN — Medication 1 APPLICATION(S): at 05:56

## 2022-09-02 RX ADMIN — Medication 1 APPLICATION(S): at 17:31

## 2022-09-02 RX ADMIN — HEPARIN SODIUM 5000 UNIT(S): 5000 INJECTION INTRAVENOUS; SUBCUTANEOUS at 13:26

## 2022-09-02 RX ADMIN — HEPARIN SODIUM 5000 UNIT(S): 5000 INJECTION INTRAVENOUS; SUBCUTANEOUS at 22:53

## 2022-09-02 RX ADMIN — Medication 100 MILLIGRAM(S): at 13:26

## 2022-09-02 RX ADMIN — ONDANSETRON 4 MILLIGRAM(S): 8 TABLET, FILM COATED ORAL at 22:53

## 2022-09-02 RX ADMIN — SODIUM CHLORIDE 4 MILLILITER(S): 9 INJECTION INTRAMUSCULAR; INTRAVENOUS; SUBCUTANEOUS at 11:14

## 2022-09-02 RX ADMIN — SODIUM CHLORIDE 4 MILLILITER(S): 9 INJECTION INTRAMUSCULAR; INTRAVENOUS; SUBCUTANEOUS at 16:16

## 2022-09-02 RX ADMIN — Medication 100 MILLIGRAM(S): at 05:57

## 2022-09-02 RX ADMIN — SODIUM CHLORIDE 4 MILLILITER(S): 9 INJECTION INTRAMUSCULAR; INTRAVENOUS; SUBCUTANEOUS at 04:03

## 2022-09-02 NOTE — PROGRESS NOTE ADULT - ASSESSMENT
67y F PMHx of HTN, HLD, sarcoidosis, CHF, recent adm with PEA arrest x2, patient presented to the ED with 1 week of BL leg swelling and SOB that developed last night. Patient became altered in the ED and had respiratory distress was intubated and admitted to MICU.    #Neuro:  #AMS  unclear etiology  - Intubated for AMS  - CTH 8/30 w/o acute pathology  - Possible infection or hypercapnia causing AMS  - Currently paused propofol/fentanyl, pt mentating well on vent. Following commands     #Cardiovascular:  ##Hx CHF  - TTE reduced LV systolic function EF 55%, diastolic dysfunction. MICU team POCUS with normal RV function  - beside POCUS showed no right heart strain    ##Sinus bradycardia  - pt initially presented with EKG w/ sinus eugenie  - bradycardia since resolved    #Pulmonary:  Hypercapneic resp failure  -s/p Int 8/30 on adm  - Chest XR shows resolving pleural effusion on the R when compared to 7/30, but b/l pleural effusions are still present.    - POCUS of lungs to evaluate for effusions - moderate effusion on R  - pt diuresed with Lasix 40mg overnight, will consider re-diuresis as clinically indicated    #FEN/GI:  - tube feed d/c while attempting sedation reversal    #Renal:  - Patient BUN/Cr 47/2.03, mildly elevated compared to labs from previous admission  - Renal consult not necessary at this time  - Obtain BMP qdaily and monitor for changes in BUN/Cr egfr decreased consistent with previous admission.   - Diuresis as needed    #:  - UA shows blood in urine and proteinuria  - UCx shows E Coli, sensitivities pending    #ID:  - Pt initially leukopenic at 3.02, hypothermic at 91 F  - Patient started on Zosyn and Aztreonam, Aztreonam to be d/c and changed to Cefepime  - UCx shows E Coli, sensitivities pending  - Chest xray shows b/l pleural effusions, possible source  - MRSA Swab NEG  - Legionella NEG  - Vancomycin d/c    #Heme:  Anemia  - Hgb 8.0, will transfuse the patient if hgb is less than 7. Patient has had anemia in the past with hgb in the 8s  - Obtain type and screen for patient  - Hg is lateral, no concern for acute bleeding at this time  - Hep SubQ, for DVT prophylaxis    #Endo:  - Pt initially hypothermic at 91 F and bradycardic  - Low cortisol on lab, per endocrine and pharmacy give hydrocortisone 50mg q6h  - TSH 4.82    T2DM  - takes insulin 'prn' at home  - ISS q6h    #Ethics:  - Full Code Status 67y F PMHx of HTN, HLD, sarcoidosis, CHF, recent adm with PEA arrest x2, patient presented to the ED with 1 week of BL leg swelling and SOB that developed last night. Patient became altered in the ED and had respiratory distress was intubated and admitted to MICU.    #Neuro:  #AMS  unclear etiology  - Intubated for AMS  - CTH 8/30 w/o acute pathology  - Possible infection or hypercapnia causing AMS  - Currently paused propofol/fentanyl, pt mentating well on vent. Following commands.  - Will attempt to extubate today after ABG results and improved MS (status is getting better)      #Cardiovascular:  ##Hx CHF  - TTE reduced LV systolic function EF 55%, diastolic dysfunction. MICU team POCUS with normal RV function  - beside POCUS showed no right heart strain    ##Sinus bradycardia  - pt initially presented with EKG w/ sinus eugenie  - bradycardia since resolved    #Pulmonary:  Hypercapneic resp failure  -s/p Int 8/30 on adm  - Chest XR shows resolving pleural effusion on the R when compared to 7/30, but b/l pleural effusions are still present.    - POCUS of lungs to evaluate for effusions - moderate effusion on R  - pt diuresed with Lasix 40mg overnight  - Will give 1 bumex and reassess later for goal of negative 1 or 2 L today. Can redose if goals not met.     #FEN/GI:  - tube feed d/c while attempting sedation reversal    #Renal:  - Patient BUN/Cr 47/2.03, mildly elevated compared to labs from previous admission  - Renal consult not necessary at this time  - Obtain BMP qdaily and monitor for changes in BUN/Cr egfr decreased consistent with previous admission.   - Will give 1 bumex and reassess later for goal of negative 1 or 2 L today. Can redose if goals not met.     #:  - UA shows blood in urine and proteinuria  - UCx shows E Coli, sensitivities pending    #ID:  - Pt initially leukopenic at 3.02, hypothermic at 91 F  - Patient started on Zosyn and Aztreonam, Aztreonam to be d/c and changed to Cefepime  - UCx shows E Coli, sensitivities pending  - Chest xray shows b/l pleural effusions, possible source  - MRSA Swab NEG  - Legionella NEG  - Vancomycin d/c  - On metro and cefepime for anaerobic coverage and recent admission w/ intubation and pseudomonas cultures.     #Heme:  Anemia  - Hgb 8.0, will transfuse the patient if hgb is less than 7. Patient has had anemia in the past with hgb in the 8s  - Obtain type and screen for patient  - Hg is lateral, no concern for acute bleeding at this time  - Hep SubQ, for DVT prophylaxis    #Endo:  - Pt initially hypothermic at 91 F and bradycardic  - Low cortisol on lab, per endocrine and pharmacy give hydrocortisone 50mg q6h  - TSH 4.82    T2DM  - takes insulin 'prn' at home  - ISS q6h    #Ethics:  - Full Code Status 67y F PMHx of HTN, HLD, sarcoidosis, CHF, recent adm with PEA arrest x2, patient presented to the ED with 1 week of BL leg swelling and SOB that developed last night. Patient became altered in the ED and had respiratory distress was intubated and admitted to MICU.    #Neuro:  #AMS  unclear etiology  - Intubated for AMS  - [facility] 8/30 w/o acute pathology  - Possible infection or hypercapnia causing AMS  - Currently paused propofol/fentanyl, pt mentating well on vent. Following commands.  - Will attempt to extubate today after ABG results and improved MS (status is getting better)      #Cardiovascular:  ##Hx CHF  - TTE reduced LV systolic function EF 55%, diastolic dysfunction. MICU team POCUS with normal RV function  - beside POCUS showed no right heart strain    ##Sinus bradycardia  - pt initially presented with EKG w/ sinus eugenie  - bradycardia since resolved    #Pulmonary:  Hypercapneic resp failure  -s/p Int 8/30 on adm  - Chest XR shows resolving pleural effusion on the R when compared to 7/30, but b/l pleural effusions are still present.    - POCUS of lungs to evaluate for effusions - moderate effusion on R  - pt diuresed with Lasix 40mg overnight  - Will give 1 bumex and reassess later for goal of negative 1 or 2 L today. Can redose if goals not met.     #FEN/GI:  - tube feed d/c while attempting sedation reversal  - Afternoon 9/2 BGs 180s  - Formal swallow study needed after extubation     #Renal:  - Patient BUN/Cr 47/2.03, mildly elevated compared to labs from previous admission  - Renal consult not necessary at this time  - Obtain BMP qdaily and monitor for changes in BUN/Cr egfr decreased consistent with previous admission.   - Will give 1 bumex and reassess later for goal of negative 1 or 2 L today. Can redose if goals not met.   - K and Mg did not need repletion on 9/2 (4.9 and 2.10). CTM     #:  - UA shows blood in urine and proteinuria  - UCx shows E Coli, sensitivities pending  - On metro and cefepime for anaerobic coverage and recent admission w/ intubation and pseudomonas cultures.     #ID:  - Pt initially leukopenic at 3.02, hypothermic at 91 F  - Patient started on Zosyn and Aztreonam, Aztreonam to be d/c and changed to Cefepime  - UCx shows E Coli, sensitivities pending  - Chest xray shows b/l pleural effusions, possible source  - MRSA Swab NEG  - Legionella NEG  - Vancomycin d/c  - On metro and cefepime for anaerobic coverage and recent admission w/ intubation and pseudomonas cultures.     #Heme:  Anemia  - Hgb 8.0, will transfuse the patient if hgb is less than 7. Patient has had anemia in the past with hgb in the 8s  - Obtain type and screen for patient  - Hg is lateral, no concern for acute bleeding at this time  - Hep SubQ, for DVT prophylaxis    #Endo:  - Pt initially hypothermic at 91 F and bradycardic  - Low cortisol on lab, per endocrine and pharmacy give hydrocortisone 50mg q6h  - TSH 4.82    T2DM  - takes insulin 'prn' at home  - ISS q6h    #Ethics:  - Full Code Status

## 2022-09-02 NOTE — PROGRESS NOTE ADULT - ATTENDING COMMENTS
Patient examined and case reviewed in detail on bedside rounds  Critically ill and unstable on vent with unexplained resp arrest   Frequent bedside visits with therapy change today.   I have personally provided 35+ minutes of critical care time concurrently with the resident/fellow; this excludes time spent on separate procedures.      This patient had a goal directed echo within 24 hours of admission to the ICU  The physician staff has had a goals of care discussion with this patient and/or their family  This patient is on DVT prophylaxis
Patient examined and case reviewed in detail on bedside rounds  Critically ill and unstable on vent with PNA For attempt at SBT and possible extubation  Frequent bedside visits with therapy change today.   I have personally provided 35+ minutes of critical care time concurrently with the resident/fellow; this excludes time spent on separate procedures.
Patient examined and case reviewed in detail on bedside rounds  Critically ill and unstable on vent Fails SBT today X 3 due to con'd lethargy  Frequent bedside visits with therapy change today.   I have personally provided 35+ minutes of critical care time concurrently with the resident/fellow; this excludes time spent on separate procedures.

## 2022-09-02 NOTE — PROGRESS NOTE ADULT - SUBJECTIVE AND OBJECTIVE BOX
Chief Complaint/Follow-up on: AI    Subjective:    pt remains intubated   unable to take part in hx   plan d/w ICU team     MEDICATIONS  (STANDING):  ALBUTerol    90 MICROgram(s) HFA Inhaler 2 Puff(s) Inhalation every 6 hours  cefepime   IVPB 1000 milliGRAM(s) IV Intermittent every 12 hours  chlorhexidine 0.12% Liquid 15 milliLiter(s) Oral Mucosa every 12 hours  chlorhexidine 2% Cloths 1 Application(s) Topical daily  dextrose 5%. 1000 milliLiter(s) (100 mL/Hr) IV Continuous <Continuous>  dextrose 5%. 1000 milliLiter(s) (50 mL/Hr) IV Continuous <Continuous>  dextrose 50% Injectable 25 Gram(s) IV Push once  dextrose 50% Injectable 25 Gram(s) IV Push once  dextrose 50% Injectable 12.5 Gram(s) IV Push once  glucagon  Injectable 1 milliGRAM(s) IntraMuscular once  heparin   Injectable 5000 Unit(s) SubCutaneous every 8 hours  hydrocortisone sodium succinate Injectable 50 milliGRAM(s) IV Push every 6 hours  metroNIDAZOLE  IVPB      metroNIDAZOLE  IVPB 500 milliGRAM(s) IV Intermittent every 8 hours  petrolatum Ophthalmic Ointment 1 Application(s) Both EYES two times a day  sodium chloride 3%  Inhalation 4 milliLiter(s) Inhalation every 6 hours    MEDICATIONS  (PRN):  dextrose Oral Gel 15 Gram(s) Oral once PRN Blood Glucose LESS THAN 70 milliGRAM(s)/deciliter      PHYSICAL EXAM:  VITALS: T(C): 37.1 (09-02-22 @ 12:00)  T(F): 98.7 (09-02-22 @ 12:00), Max: 99 (09-02-22 @ 04:00)  HR: 94 (09-02-22 @ 14:00) (77 - 100)  BP: 137/64 (09-02-22 @ 14:00) (127/62 - 153/63)  RR:  (8 - 26)  SpO2:  (94% - 100%)  Wt(kg): --  GENERAL: NAD, well-groomed, well-developed  RESPIRATORY: Clear to auscultation bilaterally; No rales, rhonchi, wheezing, or rubs  CARDIOVASCULAR: Regular rate and rhythm; No murmurs; no peripheral edema  GI: Soft, nontender, non distended, normal bowel sounds    POCT Blood Glucose.: 173 mg/dL (09-02-22 @ 11:30)  POCT Blood Glucose.: 116 mg/dL (09-01-22 @ 13:23)  POCT Blood Glucose.: 125 mg/dL (09-01-22 @ 05:46)  POCT Blood Glucose.: 100 mg/dL (08-31-22 @ 23:46)  POCT Blood Glucose.: 98 mg/dL (08-31-22 @ 20:01)  POCT Blood Glucose.: 84 mg/dL (08-31-22 @ 15:52)  POCT Blood Glucose.: 78 mg/dL (08-31-22 @ 12:26)  POCT Blood Glucose.: 81 mg/dL (08-31-22 @ 05:24)  POCT Blood Glucose.: 81 mg/dL (08-31-22 @ 05:20)  POCT Blood Glucose.: 122 mg/dL (08-30-22 @ 23:05)    09-02    141  |  106  |  59<H>  ----------------------------<  184<H>  4.9   |  22  |  1.97<H>    eGFR: 27<L>    Ca    8.7      09-02  Mg     2.10     09-02  Phos  6.5     09-02    TPro  7.0  /  Alb  3.1<L>  /  TBili  <0.2  /  DBili  x   /  AST  15  /  ALT  19  /  AlkPhos  121<H>  09-02          Thyroid Function Tests:  08-30 @ 19:40 TSH 4.82 FreeT4 -- T3 -- Anti TPO -- Anti Thyroglobulin Ab -- TSI --

## 2022-09-02 NOTE — CHART NOTE - NSCHARTNOTEFT_GEN_A_CORE
:  Sarah Cadet, resident  Tammy Luna, Fellow  Billy Mccann, Attending    INDICATION:  [ X] ACUTE HYPOXIC RESPIRATORY FAILURE    PROCEDURE:  [X ] LIMITED ECHO  [X ] LIMITED CHEST  [ ] LIMITED RETROPERITONEAL  [ ] LIMITED ABDOMINAL  [ ] LIMITED DVT  [ ] NEEDLE GUIDANCE VASCULAR  [ ] NEEDLE GUIDANCE THORACENTESIS  [ ] NEEDLE GUIDANCE PARACENTESIS  [ ] NEEDLE GUIDANCE PERICARDIOCENTESIS  [ ] OTHER    FINDINGS:    Cardiac:  Normal LV systolic function. LVOT VTI 18.4cm  No RV enlargement noted  IVC 1.7cm    Pulmonary:  Diffuse B lines throughout  Bilateral basilar pleural effusion    INTERPRETATION:  Normal LV systolic function  Bilateral basilar pulmonary effusions    IMAGES STORED IN [employer] :  Sarah Cadet, resident  Tammy Luna, Fellow  Billy Mccann, Attending    INDICATION:  [ X] ACUTE HYPOXIC RESPIRATORY FAILURE    PROCEDURE:  [X ] LIMITED ECHO  [X ] LIMITED CHEST  [ ] LIMITED RETROPERITONEAL  [ ] LIMITED ABDOMINAL  [ ] LIMITED DVT  [ ] NEEDLE GUIDANCE VASCULAR  [ ] NEEDLE GUIDANCE THORACENTESIS  [ ] NEEDLE GUIDANCE PARACENTESIS  [ ] NEEDLE GUIDANCE PERICARDIOCENTESIS  [ ] OTHER    FINDINGS:    Cardiac:  Normal LV systolic function. LVOT VTI 18.4cm  No RV enlargement noted  IVC 1.7cm    Pulmonary:  Scattered B lines  Bilateral basilar pleural effusion    INTERPRETATION:  Normal LV systolic function  Bilateral basilar pulmonary effusions  Mild interstitial pattern    IMAGES STORED IN [facility]    I was present during the key portions of the procedure and immediately available during the entire procedure.  Ramy ALVAREZ  Attending

## 2022-09-02 NOTE — PROGRESS NOTE ADULT - ASSESSMENT
67F w/ PMHx of HTN, HLD, T2DM, sarcoidosis, CHF, presented to the ED for BL leg swelling and SOB. Endocrinology consulted for concern for Addisons Disease.    #Concern for Adrenal Insuffiency  #Hypothermia  - patient currently intubated, not on pressors  - presented with shortness of breath, swelling of her legs and AMS with acute episode of bradycardia, hypothermia and respiratory distress in ED prompting intubation  - patient reportedly hypothermic to 91F in ED, now off warmer and normothermic  - no darkening of skin on physical exam  - TSH 4.82  - 8/31 Na 137. K 4.2  - previous negative co-syntropin stim test 7/16 on prior admission: 8am cortisol 16.1, 8:30am cortisol 22.4, 9:15am cortisol 24.7.  - cortisol 8/30 at 7:40pm 7, although not obtained at ideal time of 8am, would expect to have high cortisol with acute decompensation  - possible patient could have relative adrenal insufficiency, unclear other etiologies of secondary AI and less likely addisons disease without history of autoimmune conditions  PLAN  - Continue hydrocortisone 50mg IV q6-8h and for possible relative adrenal insufficiency as patient intubated and critically ill   - taper as patient clinically improves and likely re-perform co-syntropin stim test at that time.   Once extubated will plan to reduce to hydrocortisone 50mg IV q12h and taper clinically  pt is critically ill. will taper steroids when she clinically improves     #T2DM  - HbA1c 6  - patient was previously on high doses of lantus and admelog as well as metformin and victoza in the past, however had presented to last hospitalization with frequent episodes of hypoglycemia  - C-peptide 5 on 7/17  - last admission, patient was on minimal amounts of insulin and discharged on lantus 2 units daily  PLAN  - low admelog correction scale q6h with fingersticks  - dextrose prn for hypoglycemia  - no lantus at this time  - if BG >300 persistently, would check BMP, VBG, BHB to rule out DKA  - Target -180 while in ICU  - Discharge recommendations: Possibly low dose basal insulin, pending clinical course    #HLD  - on atorvastatin 40mg daily outpatient  PLAN  - hold atorvastatin per primary team        Clementine Masterson MD  Attending Physician   Department of Endocrinology, Diabetes and Metabolism     Pager  [phone] [please provide 10 digit call back number]  [facility] 9-5  [email or web address]  Nights and weekends: [phone]  Please note that this patient may be followed by a different provider tomorrow.   If no answer or after hours, please contact [phone].  For final dc reccomendations, please call [phone] or page the endocrine fellow on call.

## 2022-09-02 NOTE — PROGRESS NOTE ADULT - SUBJECTIVE AND OBJECTIVE BOX
MICU PROGRESS NOTE    INTERVAL HPI/OVERNIGHT EVENTS:  Lasix given with POS net output again    SUBJECTIVE:  Unable to attain as patient is sedated and intubated.    OBJECTIVE:    VITAL SIGNS:  ICU Vital Signs Last 24 Hrs  T(C): 37.1 (02 Sep 2022 12:00), Max: 37.2 (02 Sep 2022 04:00)  T(F): 98.7 (02 Sep 2022 12:00), Max: 99 (02 Sep 2022 04:00)  HR: 91 (02 Sep 2022 12:00) (73 - 100)  BP: 133/61 (02 Sep 2022 12:00) (127/62 - 153/63)  BP(mean): 80 (02 Sep 2022 12:00) (77 - 89)  ABP: --  ABP(mean): --  RR: 13 (02 Sep 2022 12:00) (8 - 26)  SpO2: 96% (02 Sep 2022 12:00) (94% - 100%)    O2 Parameters below as of 02 Sep 2022 12:00  Patient On (Oxygen Delivery Method): ventilator,CPAP 5/5    O2 Concentration (%): 30    Mode: CPAP with PS  FiO2: 21  PEEP: 5  PS: 5  MAP: 7  PIP: 11    I&O:    I&O's Summary    01 Sep 2022 07:01  -  02 Sep 2022 07:00  --------------------------------------------------------  IN: 690.9 mL / OUT: 1725 mL / NET: -1034.1 mL    02 Sep 2022 07:01  -  02 Sep 2022 13:19  --------------------------------------------------------  IN: 0 mL / OUT: 300 mL / NET: -300 mL    PHYSICAL EXAM:  GENERAL: patient is sedated and intubated  CHEST/LUNG: mild b/l crackles, NO rhonchi, wheezing, or rubs  HEART: Regular rate and rhythm; No murmurs, rubs, or gallops  ABDOMEN: Soft, Nontender, Nondistended; Bowel sounds present  EXTREMITIES:  2+ Peripheral Pulses, No edema  SKIN: No rashes or lesions  NERVOUS SYSTEM:  patient is awake and alert w/ vent, CPAPing nicely. Following commands     MEDICATIONS:  MEDICATIONS  (STANDING):  ALBUTerol    90 MICROgram(s) HFA Inhaler 2 Puff(s) Inhalation every 6 hours  aztreonam  IVPB 1000 milliGRAM(s) IV Intermittent every 8 hours  aztreonam  IVPB      chlorhexidine 0.12% Liquid 15 milliLiter(s) Oral Mucosa every 12 hours  chlorhexidine 4% Liquid 1 Application(s) Topical <User Schedule>  dextrose 5%. 1000 milliLiter(s) (100 mL/Hr) IV Continuous <Continuous>  dextrose 5%. 1000 milliLiter(s) (50 mL/Hr) IV Continuous <Continuous>  dextrose 50% Injectable 25 Gram(s) IV Push once  dextrose 50% Injectable 12.5 Gram(s) IV Push once  dextrose 50% Injectable 25 Gram(s) IV Push once  fentaNYL   Infusion. 1 MICROgram(s)/kG/Hr (8.5 mL/Hr) IV Continuous <Continuous>  glucagon  Injectable 1 milliGRAM(s) IntraMuscular once  heparin   Injectable 5000 Unit(s) SubCutaneous every 8 hours  hydrocortisone sodium succinate Injectable 50 milliGRAM(s) IV Push every 8 hours  metroNIDAZOLE  IVPB 500 milliGRAM(s) IV Intermittent every 8 hours  metroNIDAZOLE  IVPB      petrolatum Ophthalmic Ointment 1 Application(s) Both EYES two times a day  propofol Infusion 20 MICROgram(s)/kG/Min (10.2 mL/Hr) IV Continuous <Continuous>  sodium chloride 3%  Inhalation 4 milliLiter(s) Inhalation every 6 hours    MEDICATIONS  (PRN):  dextrose Oral Gel 15 Gram(s) Oral once PRN Blood Glucose LESS THAN 70 milliGRAM(s)/deciliter      ALLERGIES:  Allergies    penicillin ([facility] Synd)    Intolerances    LABS:                        8.1    4.65  )-----------( 270      ( 02 Sep 2022 01:00 )             26.8     09-02    141  |  106  |  59<H>  ----------------------------<  184<H>  4.9   |  22  |  1.97<H>    Ca    8.7      02 Sep 2022 11:29  Phos  6.5     09-02  Mg     2.10     09-02    TPro  7.0  /  Alb  3.1<L>  /  TBili  <0.2  /  DBili  x   /  AST  15  /  ALT  19  /  AlkPhos  121<H>  09-02    PTT - ( 01 Sep 2022 01:35 )  PTT:39.2 sec           MICU PROGRESS NOTE    INTERVAL HPI/OVERNIGHT EVENTS:  Lasix given with POS net output again    SUBJECTIVE:  Unable to attain as patient is sedated and intubated.    OBJECTIVE:    VITAL SIGNS:  ICU Vital Signs Last 24 Hrs  T(C): 37.1 (02 Sep 2022 12:00), Max: 37.2 (02 Sep 2022 04:00)  T(F): 98.7 (02 Sep 2022 12:00), Max: 99 (02 Sep 2022 04:00)  HR: 91 (02 Sep 2022 12:00) (73 - 100)  BP: 133/61 (02 Sep 2022 12:00) (127/62 - 153/63)  BP(mean): 80 (02 Sep 2022 12:00) (77 - 89)  ABP: --  ABP(mean): --  RR: 13 (02 Sep 2022 12:00) (8 - 26)  SpO2: 96% (02 Sep 2022 12:00) (94% - 100%)    O2 Parameters below as of 02 Sep 2022 12:00  Patient On (Oxygen Delivery Method): ventilator,CPAP 5/5    O2 Concentration (%): 30    Mode: CPAP with PS  FiO2: 21  PEEP: 5  PS: 5  MAP: 7  PIP: 11    I&O:    I&O's Summary    01 Sep 2022 07:01  -  02 Sep 2022 07:00  --------------------------------------------------------  IN: 690.9 mL / OUT: 1725 mL / NET: -1034.1 mL    02 Sep 2022 07:01  -  02 Sep 2022 13:19  --------------------------------------------------------  IN: 0 mL / OUT: 300 mL / NET: -300 mL    PHYSICAL EXAM:  GENERAL: patient is intubated, appears well  CHEST/LUNG: mild b/l crackles  HEART: Regular rate and rhythm; No murmurs, rubs, or gallops  ABDOMEN: Soft, Nontender, Nondistended; Bowel sounds present  EXTREMITIES:  2+ Peripheral Pulses, No edema  SKIN: No rashes or lesions  NERVOUS SYSTEM:  patient is awake and alert w/ vent, CPAPing nicely. Following commands     MEDICATIONS:  MEDICATIONS  (STANDING):  ALBUTerol    90 MICROgram(s) HFA Inhaler 2 Puff(s) Inhalation every 6 hours  aztreonam  IVPB 1000 milliGRAM(s) IV Intermittent every 8 hours  aztreonam  IVPB      chlorhexidine 0.12% Liquid 15 milliLiter(s) Oral Mucosa every 12 hours  chlorhexidine 4% Liquid 1 Application(s) Topical <User Schedule>  dextrose 5%. 1000 milliLiter(s) (100 mL/Hr) IV Continuous <Continuous>  dextrose 5%. 1000 milliLiter(s) (50 mL/Hr) IV Continuous <Continuous>  dextrose 50% Injectable 25 Gram(s) IV Push once  dextrose 50% Injectable 12.5 Gram(s) IV Push once  dextrose 50% Injectable 25 Gram(s) IV Push once  fentaNYL   Infusion. 1 MICROgram(s)/kG/Hr (8.5 mL/Hr) IV Continuous <Continuous>  glucagon  Injectable 1 milliGRAM(s) IntraMuscular once  heparin   Injectable 5000 Unit(s) SubCutaneous every 8 hours  hydrocortisone sodium succinate Injectable 50 milliGRAM(s) IV Push every 8 hours  metroNIDAZOLE  IVPB 500 milliGRAM(s) IV Intermittent every 8 hours  metroNIDAZOLE  IVPB      petrolatum Ophthalmic Ointment 1 Application(s) Both EYES two times a day  propofol Infusion 20 MICROgram(s)/kG/Min (10.2 mL/Hr) IV Continuous <Continuous>  sodium chloride 3%  Inhalation 4 milliLiter(s) Inhalation every 6 hours    MEDICATIONS  (PRN):  dextrose Oral Gel 15 Gram(s) Oral once PRN Blood Glucose LESS THAN 70 milliGRAM(s)/deciliter    ALLERGIES:  Allergies    penicillin ([facility] Synd)    Intolerances    LABS:                        8.1    4.65  )-----------( 270      ( 02 Sep 2022 01:00 )             26.8     09-02    141  |  106  |  59<H>  ----------------------------<  184<H>  4.9   |  22  |  1.97<H>    Ca    8.7      02 Sep 2022 11:29  Phos  6.5     09-02  Mg     2.10     09-02    TPro  7.0  /  Alb  3.1<L>  /  TBili  <0.2  /  DBili  x   /  AST  15  /  ALT  19  /  AlkPhos  121<H>  09-02    PTT - ( 01 Sep 2022 01:35 )  PTT:39.2 sec           MICU PROGRESS NOTE    INTERVAL HPI/OVERNIGHT EVENTS:  Lasix given with POS net output again. Intubated. Interminant      SUBJECTIVE:  Unable to attain as patient is intubated.    OBJECTIVE:    VITAL SIGNS:  ICU Vital Signs Last 24 Hrs  T(C): 37.1 (02 Sep 2022 12:00), Max: 37.2 (02 Sep 2022 04:00)  T(F): 98.7 (02 Sep 2022 12:00), Max: 99 (02 Sep 2022 04:00)  HR: 91 (02 Sep 2022 12:00) (73 - 100)  BP: 133/61 (02 Sep 2022 12:00) (127/62 - 153/63)  BP(mean): 80 (02 Sep 2022 12:00) (77 - 89)  ABP: --  ABP(mean): --  RR: 13 (02 Sep 2022 12:00) (8 - 26)  SpO2: 96% (02 Sep 2022 12:00) (94% - 100%)    O2 Parameters below as of 02 Sep 2022 12:00  Patient On (Oxygen Delivery Method): ventilator,CPAP 5/5    O2 Concentration (%): 30    Mode: CPAP with PS  FiO2: 21  PEEP: 5  PS: 5  MAP: 7  PIP: 11    I&O:    I&O's Summary    01 Sep 2022 07:01  -  02 Sep 2022 07:00  --------------------------------------------------------  IN: 690.9 mL / OUT: 1725 mL / NET: -1034.1 mL    02 Sep 2022 07:01  -  02 Sep 2022 13:19  --------------------------------------------------------  IN: 0 mL / OUT: 300 mL / NET: -300 mL    PHYSICAL EXAM:  GENERAL: patient is intubated, appears well  CHEST/LUNG: mild b/l crackles  HEART: Regular rate and rhythm; No murmurs, rubs, or gallops  ABDOMEN: Soft, Nontender, Nondistended; Bowel sounds present  EXTREMITIES:  2+ Peripheral Pulses, No edema  SKIN: No rashes or lesions  NERVOUS SYSTEM:  patient is awake and alert w/ vent, CPAPing nicely. Following commands     MEDICATIONS:  MEDICATIONS  (STANDING):  ALBUTerol    90 MICROgram(s) HFA Inhaler 2 Puff(s) Inhalation every 6 hours  aztreonam  IVPB 1000 milliGRAM(s) IV Intermittent every 8 hours  aztreonam  IVPB      chlorhexidine 0.12% Liquid 15 milliLiter(s) Oral Mucosa every 12 hours  chlorhexidine 4% Liquid 1 Application(s) Topical <User Schedule>  dextrose 5%. 1000 milliLiter(s) (100 mL/Hr) IV Continuous <Continuous>  dextrose 5%. 1000 milliLiter(s) (50 mL/Hr) IV Continuous <Continuous>  dextrose 50% Injectable 25 Gram(s) IV Push once  dextrose 50% Injectable 12.5 Gram(s) IV Push once  dextrose 50% Injectable 25 Gram(s) IV Push once  fentaNYL   Infusion. 1 MICROgram(s)/kG/Hr (8.5 mL/Hr) IV Continuous <Continuous>  glucagon  Injectable 1 milliGRAM(s) IntraMuscular once  heparin   Injectable 5000 Unit(s) SubCutaneous every 8 hours  hydrocortisone sodium succinate Injectable 50 milliGRAM(s) IV Push every 8 hours  metroNIDAZOLE  IVPB 500 milliGRAM(s) IV Intermittent every 8 hours  metroNIDAZOLE  IVPB      petrolatum Ophthalmic Ointment 1 Application(s) Both EYES two times a day  propofol Infusion 20 MICROgram(s)/kG/Min (10.2 mL/Hr) IV Continuous <Continuous>  sodium chloride 3%  Inhalation 4 milliLiter(s) Inhalation every 6 hours    MEDICATIONS  (PRN):  dextrose Oral Gel 15 Gram(s) Oral once PRN Blood Glucose LESS THAN 70 milliGRAM(s)/deciliter    ALLERGIES:  Allergies    penicillin ([facility] Synd)    Intolerances    LABS:                        8.1    4.65  )-----------( 270      ( 02 Sep 2022 01:00 )             26.8     09-02    141  |  106  |  59<H>  ----------------------------<  184<H>  4.9   |  22  |  1.97<H>    Ca    8.7      02 Sep 2022 11:29  Phos  6.5     09-02  Mg     2.10     09-02    TPro  7.0  /  Alb  3.1<L>  /  TBili  <0.2  /  DBili  x   /  AST  15  /  ALT  19  /  AlkPhos  121<H>  09-02    PTT - ( 01 Sep 2022 01:35 )  PTT:39.2 sec           MICU PROGRESS NOTE    INTERVAL HPI/OVERNIGHT EVENTS:  Lasix given with POS net output again. Intubated. Interminant      SUBJECTIVE:  Unable to attain as patient is intubated.    OBJECTIVE:    VITAL SIGNS:  ICU Vital Signs Last 24 Hrs  T(C): 37.1 (02 Sep 2022 12:00), Max: 37.2 (02 Sep 2022 04:00)  T(F): 98.7 (02 Sep 2022 12:00), Max: 99 (02 Sep 2022 04:00)  HR: 91 (02 Sep 2022 12:00) (73 - 100)  BP: 133/61 (02 Sep 2022 12:00) (127/62 - 153/63)  BP(mean): 80 (02 Sep 2022 12:00) (77 - 89)  ABP: --  ABP(mean): --  RR: 13 (02 Sep 2022 12:00) (8 - 26)  SpO2: 96% (02 Sep 2022 12:00) (94% - 100%)    O2 Parameters below as of 02 Sep 2022 12:00  Patient On (Oxygen Delivery Method): ventilator,CPAP 5/5    O2 Concentration (%): 30    Mode: CPAP with PS  FiO2: 21  PEEP: 5  PS: 5  MAP: 7  PIP: 11    I&O:    I&O's Summary    01 Sep 2022 07:01  -  02 Sep 2022 07:00  --------------------------------------------------------  IN: 690.9 mL / OUT: 1725 mL / NET: -1034.1 mL    02 Sep 2022 07:01  -  02 Sep 2022 13:19  --------------------------------------------------------  IN: 0 mL / OUT: 300 mL / NET: -300 mL    PHYSICAL EXAM:  GENERAL: patient is intubated, appears well  CHEST/LUNG: mild b/l crackles  HEART: Regular rate and rhythm; No murmurs, rubs, or gallops  ABDOMEN: Soft, Nontender, Nondistended; Bowel sounds present  EXTREMITIES:  2+ Peripheral Pulses, No edema  SKIN: No rashes or lesions  NERVOUS SYSTEM: patient is awake and alert w/ vent, CPAPing nicely. Following commands     MEDICATIONS:  MEDICATIONS  (STANDING):  ALBUTerol    90 MICROgram(s) HFA Inhaler 2 Puff(s) Inhalation every 6 hours  aztreonam  IVPB 1000 milliGRAM(s) IV Intermittent every 8 hours  aztreonam  IVPB      chlorhexidine 0.12% Liquid 15 milliLiter(s) Oral Mucosa every 12 hours  chlorhexidine 4% Liquid 1 Application(s) Topical <User Schedule>  dextrose 5%. 1000 milliLiter(s) (100 mL/Hr) IV Continuous <Continuous>  dextrose 5%. 1000 milliLiter(s) (50 mL/Hr) IV Continuous <Continuous>  dextrose 50% Injectable 25 Gram(s) IV Push once  dextrose 50% Injectable 12.5 Gram(s) IV Push once  dextrose 50% Injectable 25 Gram(s) IV Push once  fentaNYL   Infusion. 1 MICROgram(s)/kG/Hr (8.5 mL/Hr) IV Continuous <Continuous>  glucagon  Injectable 1 milliGRAM(s) IntraMuscular once  heparin   Injectable 5000 Unit(s) SubCutaneous every 8 hours  hydrocortisone sodium succinate Injectable 50 milliGRAM(s) IV Push every 8 hours  metroNIDAZOLE  IVPB 500 milliGRAM(s) IV Intermittent every 8 hours  metroNIDAZOLE  IVPB      petrolatum Ophthalmic Ointment 1 Application(s) Both EYES two times a day  propofol Infusion 20 MICROgram(s)/kG/Min (10.2 mL/Hr) IV Continuous <Continuous>  sodium chloride 3%  Inhalation 4 milliLiter(s) Inhalation every 6 hours    MEDICATIONS  (PRN):  dextrose Oral Gel 15 Gram(s) Oral once PRN Blood Glucose LESS THAN 70 milliGRAM(s)/deciliter    ALLERGIES:  Allergies    penicillin ([facility] Synd)    Intolerances    LABS:                        8.1    4.65  )-----------( 270      ( 02 Sep 2022 01:00 )             26.8     09-02    141  |  106  |  59<H>  ----------------------------<  184<H>  4.9   |  22  |  1.97<H>    Ca    8.7      02 Sep 2022 11:29  Phos  6.5     09-02  Mg     2.10     09-02    TPro  7.0  /  Alb  3.1<L>  /  TBili  <0.2  /  DBili  x   /  AST  15  /  ALT  19  /  AlkPhos  121<H>  09-02    PTT - ( 01 Sep 2022 01:35 )  PTT:39.2 sec

## 2022-09-03 LAB
ALBUMIN SERPL ELPH-MCNC: 3.4 G/DL — SIGNIFICANT CHANGE UP (ref 3.3–5)
ALP SERPL-CCNC: 122 U/L — HIGH (ref 40–120)
ALT FLD-CCNC: 17 U/L — SIGNIFICANT CHANGE UP (ref 4–33)
ANION GAP SERPL CALC-SCNC: 14 MMOL/L — SIGNIFICANT CHANGE UP (ref 7–14)
AST SERPL-CCNC: 12 U/L — SIGNIFICANT CHANGE UP (ref 4–32)
BASE EXCESS BLDV CALC-SCNC: -2.4 MMOL/L — LOW (ref -2–3)
BASOPHILS # BLD AUTO: 0.01 K/UL — SIGNIFICANT CHANGE UP (ref 0–0.2)
BASOPHILS NFR BLD AUTO: 0.1 % — SIGNIFICANT CHANGE UP (ref 0–2)
BILIRUB SERPL-MCNC: <0.2 MG/DL — SIGNIFICANT CHANGE UP (ref 0.2–1.2)
BLOOD GAS ARTERIAL - LYTES,HGB,ICA,LACT RESULT: SIGNIFICANT CHANGE UP
BUN SERPL-MCNC: 58 MG/DL — HIGH (ref 7–23)
CA-I SERPL-SCNC: 1.21 MMOL/L — SIGNIFICANT CHANGE UP (ref 1.15–1.33)
CALCIUM SERPL-MCNC: 8.6 MG/DL — SIGNIFICANT CHANGE UP (ref 8.4–10.5)
CHLORIDE BLDV-SCNC: 108 MMOL/L — SIGNIFICANT CHANGE UP (ref 96–108)
CHLORIDE SERPL-SCNC: 104 MMOL/L — SIGNIFICANT CHANGE UP (ref 98–107)
CO2 BLDV-SCNC: 25.6 MMOL/L — SIGNIFICANT CHANGE UP (ref 22–26)
CO2 SERPL-SCNC: 22 MMOL/L — SIGNIFICANT CHANGE UP (ref 22–31)
CREAT SERPL-MCNC: 1.93 MG/DL — HIGH (ref 0.5–1.3)
EGFR: 28 ML/MIN/1.73M2 — LOW
EOSINOPHIL # BLD AUTO: 0 K/UL — SIGNIFICANT CHANGE UP (ref 0–0.5)
EOSINOPHIL NFR BLD AUTO: 0 % — SIGNIFICANT CHANGE UP (ref 0–6)
GAS PNL BLDV: 140 MMOL/L — SIGNIFICANT CHANGE UP (ref 136–145)
GAS PNL BLDV: SIGNIFICANT CHANGE UP
GLUCOSE BLDC GLUCOMTR-MCNC: 162 MG/DL — HIGH (ref 70–99)
GLUCOSE BLDC GLUCOMTR-MCNC: 185 MG/DL — HIGH (ref 70–99)
GLUCOSE BLDC GLUCOMTR-MCNC: 196 MG/DL — HIGH (ref 70–99)
GLUCOSE BLDC GLUCOMTR-MCNC: 196 MG/DL — HIGH (ref 70–99)
GLUCOSE BLDV-MCNC: 155 MG/DL — HIGH (ref 70–99)
GLUCOSE SERPL-MCNC: 160 MG/DL — HIGH (ref 70–99)
HCO3 BLDV-SCNC: 24 MMOL/L — SIGNIFICANT CHANGE UP (ref 22–29)
HCT VFR BLD CALC: 29.9 % — LOW (ref 34.5–45)
HCT VFR BLDA CALC: 27 % — LOW (ref 34.5–46.5)
HGB BLD CALC-MCNC: 9.1 G/DL — LOW (ref 11.5–15.5)
HGB BLD-MCNC: 8.9 G/DL — LOW (ref 11.5–15.5)
IANC: 6.15 K/UL — SIGNIFICANT CHANGE UP (ref 1.8–7.4)
IMM GRANULOCYTES NFR BLD AUTO: 1 % — SIGNIFICANT CHANGE UP (ref 0–1.5)
LACTATE BLDV-MCNC: 0.7 MMOL/L — SIGNIFICANT CHANGE UP (ref 0.5–2)
LYMPHOCYTES # BLD AUTO: 0.7 K/UL — LOW (ref 1–3.3)
LYMPHOCYTES # BLD AUTO: 9.5 % — LOW (ref 13–44)
MAGNESIUM SERPL-MCNC: 2.2 MG/DL — SIGNIFICANT CHANGE UP (ref 1.6–2.6)
MCHC RBC-ENTMCNC: 25.5 PG — LOW (ref 27–34)
MCHC RBC-ENTMCNC: 29.8 GM/DL — LOW (ref 32–36)
MCV RBC AUTO: 85.7 FL — SIGNIFICANT CHANGE UP (ref 80–100)
MONOCYTES # BLD AUTO: 0.4 K/UL — SIGNIFICANT CHANGE UP (ref 0–0.9)
MONOCYTES NFR BLD AUTO: 5.5 % — SIGNIFICANT CHANGE UP (ref 2–14)
NEUTROPHILS # BLD AUTO: 6.15 K/UL — SIGNIFICANT CHANGE UP (ref 1.8–7.4)
NEUTROPHILS NFR BLD AUTO: 83.9 % — HIGH (ref 43–77)
NRBC # BLD: 0 /100 WBCS — SIGNIFICANT CHANGE UP (ref 0–0)
NRBC # FLD: 0 K/UL — SIGNIFICANT CHANGE UP (ref 0–0)
PCO2 BLDV: 49 MMHG — HIGH (ref 39–42)
PH BLDV: 7.3 — LOW (ref 7.32–7.43)
PHOSPHATE SERPL-MCNC: 5.3 MG/DL — HIGH (ref 2.5–4.5)
PLATELET # BLD AUTO: 251 K/UL — SIGNIFICANT CHANGE UP (ref 150–400)
PO2 BLDV: 90 MMHG — SIGNIFICANT CHANGE UP
POTASSIUM BLDV-SCNC: 4.5 MMOL/L — SIGNIFICANT CHANGE UP (ref 3.5–5.1)
POTASSIUM SERPL-MCNC: 4.4 MMOL/L — SIGNIFICANT CHANGE UP (ref 3.5–5.3)
POTASSIUM SERPL-SCNC: 4.4 MMOL/L — SIGNIFICANT CHANGE UP (ref 3.5–5.3)
PROT SERPL-MCNC: 7.3 G/DL — SIGNIFICANT CHANGE UP (ref 6–8.3)
RBC # BLD: 3.49 M/UL — LOW (ref 3.8–5.2)
RBC # FLD: 16.9 % — HIGH (ref 10.3–14.5)
SAO2 % BLDV: 98.3 % — SIGNIFICANT CHANGE UP
SODIUM SERPL-SCNC: 140 MMOL/L — SIGNIFICANT CHANGE UP (ref 135–145)
WBC # BLD: 7.33 K/UL — SIGNIFICANT CHANGE UP (ref 3.8–10.5)
WBC # FLD AUTO: 7.33 K/UL — SIGNIFICANT CHANGE UP (ref 3.8–10.5)

## 2022-09-03 PROCEDURE — 99291 CRITICAL CARE FIRST HOUR: CPT

## 2022-09-03 PROCEDURE — 74019 RADEX ABDOMEN 2 VIEWS: CPT | Mod: 26

## 2022-09-03 RX ORDER — METRONIDAZOLE 500 MG
500 TABLET ORAL ONCE
Refills: 0 | Status: COMPLETED | OUTPATIENT
Start: 2022-09-03 | End: 2022-09-03

## 2022-09-03 RX ORDER — PROPOFOL 10 MG/ML
20.05 INJECTION, EMULSION INTRAVENOUS
Qty: 1000 | Refills: 0 | Status: DISCONTINUED | OUTPATIENT
Start: 2022-09-03 | End: 2022-09-03

## 2022-09-03 RX ORDER — ACETAMINOPHEN 500 MG
1000 TABLET ORAL ONCE
Refills: 0 | Status: COMPLETED | OUTPATIENT
Start: 2022-09-03 | End: 2022-09-03

## 2022-09-03 RX ORDER — INSULIN LISPRO 100/ML
VIAL (ML) SUBCUTANEOUS
Refills: 0 | Status: DISCONTINUED | OUTPATIENT
Start: 2022-09-03 | End: 2022-09-08

## 2022-09-03 RX ORDER — INSULIN LISPRO 100/ML
VIAL (ML) SUBCUTANEOUS AT BEDTIME
Refills: 0 | Status: DISCONTINUED | OUTPATIENT
Start: 2022-09-03 | End: 2022-09-08

## 2022-09-03 RX ORDER — HYDROCORTISONE 20 MG
50 TABLET ORAL EVERY 12 HOURS
Refills: 0 | Status: DISCONTINUED | OUTPATIENT
Start: 2022-09-03 | End: 2022-09-04

## 2022-09-03 RX ORDER — METRONIDAZOLE 500 MG
500 TABLET ORAL EVERY 8 HOURS
Refills: 0 | Status: DISCONTINUED | OUTPATIENT
Start: 2022-09-03 | End: 2022-09-04

## 2022-09-03 RX ORDER — DEXTROSE 50 % IN WATER 50 %
15 SYRINGE (ML) INTRAVENOUS ONCE
Refills: 0 | Status: DISCONTINUED | OUTPATIENT
Start: 2022-09-03 | End: 2022-09-09

## 2022-09-03 RX ORDER — METRONIDAZOLE 500 MG
TABLET ORAL
Refills: 0 | Status: DISCONTINUED | OUTPATIENT
Start: 2022-09-03 | End: 2022-09-04

## 2022-09-03 RX ADMIN — ONDANSETRON 4 MILLIGRAM(S): 8 TABLET, FILM COATED ORAL at 12:31

## 2022-09-03 RX ADMIN — Medication 100 MILLIGRAM(S): at 18:44

## 2022-09-03 RX ADMIN — ALBUTEROL 2 PUFF(S): 90 AEROSOL, METERED ORAL at 10:12

## 2022-09-03 RX ADMIN — ALBUTEROL 2 PUFF(S): 90 AEROSOL, METERED ORAL at 16:32

## 2022-09-03 RX ADMIN — CEFEPIME 100 MILLIGRAM(S): 1 INJECTION, POWDER, FOR SOLUTION INTRAMUSCULAR; INTRAVENOUS at 23:30

## 2022-09-03 RX ADMIN — Medication 50 MILLIGRAM(S): at 11:58

## 2022-09-03 RX ADMIN — Medication 50 MILLIGRAM(S): at 18:45

## 2022-09-03 RX ADMIN — HEPARIN SODIUM 5000 UNIT(S): 5000 INJECTION INTRAVENOUS; SUBCUTANEOUS at 05:26

## 2022-09-03 RX ADMIN — ONDANSETRON 4 MILLIGRAM(S): 8 TABLET, FILM COATED ORAL at 22:18

## 2022-09-03 RX ADMIN — ALBUTEROL 2 PUFF(S): 90 AEROSOL, METERED ORAL at 03:21

## 2022-09-03 RX ADMIN — Medication 50 MILLIGRAM(S): at 02:27

## 2022-09-03 RX ADMIN — Medication 100 MILLIGRAM(S): at 14:06

## 2022-09-03 RX ADMIN — CEFEPIME 100 MILLIGRAM(S): 1 INJECTION, POWDER, FOR SOLUTION INTRAMUSCULAR; INTRAVENOUS at 00:05

## 2022-09-03 RX ADMIN — SODIUM CHLORIDE 4 MILLILITER(S): 9 INJECTION INTRAMUSCULAR; INTRAVENOUS; SUBCUTANEOUS at 10:09

## 2022-09-03 RX ADMIN — ALBUTEROL 2 PUFF(S): 90 AEROSOL, METERED ORAL at 23:56

## 2022-09-03 RX ADMIN — HEPARIN SODIUM 5000 UNIT(S): 5000 INJECTION INTRAVENOUS; SUBCUTANEOUS at 14:06

## 2022-09-03 RX ADMIN — BUMETANIDE 1 MILLIGRAM(S): 0.25 INJECTION INTRAMUSCULAR; INTRAVENOUS at 02:26

## 2022-09-03 RX ADMIN — CHLORHEXIDINE GLUCONATE 15 MILLILITER(S): 213 SOLUTION TOPICAL at 05:25

## 2022-09-03 RX ADMIN — Medication 1000 MILLIGRAM(S): at 12:15

## 2022-09-03 RX ADMIN — Medication 400 MILLIGRAM(S): at 12:00

## 2022-09-03 RX ADMIN — CEFEPIME 100 MILLIGRAM(S): 1 INJECTION, POWDER, FOR SOLUTION INTRAMUSCULAR; INTRAVENOUS at 12:35

## 2022-09-03 RX ADMIN — ONDANSETRON 4 MILLIGRAM(S): 8 TABLET, FILM COATED ORAL at 05:25

## 2022-09-03 RX ADMIN — HEPARIN SODIUM 5000 UNIT(S): 5000 INJECTION INTRAVENOUS; SUBCUTANEOUS at 22:18

## 2022-09-03 RX ADMIN — Medication 1: at 18:42

## 2022-09-03 RX ADMIN — SODIUM CHLORIDE 4 MILLILITER(S): 9 INJECTION INTRAMUSCULAR; INTRAVENOUS; SUBCUTANEOUS at 03:22

## 2022-09-03 RX ADMIN — Medication 1 APPLICATION(S): at 05:25

## 2022-09-03 RX ADMIN — Medication 100 MILLIGRAM(S): at 05:25

## 2022-09-03 NOTE — PROGRESS NOTE ADULT - SUBJECTIVE AND OBJECTIVE BOX
ENDOCRINE FOLLOW UP     Chief Complaint: AI    History:   The patient was extubated earlier in the morning. Endorses chest pain, sob and abdominal pain.    MEDICATIONS  (STANDING):  ALBUTerol    90 MICROgram(s) HFA Inhaler 2 Puff(s) Inhalation every 6 hours  cefepime   IVPB 1000 milliGRAM(s) IV Intermittent every 12 hours  chlorhexidine 0.12% Liquid 15 milliLiter(s) Oral Mucosa every 12 hours  chlorhexidine 2% Cloths 1 Application(s) Topical daily  dextrose 5%. 1000 milliLiter(s) (100 mL/Hr) IV Continuous <Continuous>  dextrose 5%. 1000 milliLiter(s) (50 mL/Hr) IV Continuous <Continuous>  dextrose 50% Injectable 25 Gram(s) IV Push once  dextrose 50% Injectable 12.5 Gram(s) IV Push once  dextrose 50% Injectable 25 Gram(s) IV Push once  glucagon  Injectable 1 milliGRAM(s) IntraMuscular once  heparin   Injectable 5000 Unit(s) SubCutaneous every 8 hours  hydrocortisone sodium succinate Injectable 50 milliGRAM(s) IV Push every 12 hours  ondansetron Injectable 4 milliGRAM(s) IV Push every 8 hours  petrolatum Ophthalmic Ointment 1 Application(s) Both EYES two times a day  propofol Infusion 20.047 MICROgram(s)/kG/Min (10.2 mL/Hr) IV Continuous <Continuous>    MEDICATIONS  (PRN):  dextrose Oral Gel 15 Gram(s) Oral once PRN Blood Glucose LESS THAN 70 milliGRAM(s)/deciliter      Allergies    penicillin ([facility] Synd)    Intolerances        ROS: All other systems reviewed and negative    PHYSICAL EXAM:  VITALS: T(C): 37.2 (09-03-22 @ 08:00)  T(F): 98.9 (09-03-22 @ 08:00), Max: 100.3 (09-02-22 @ 16:00)  HR: 80 (09-03-22 @ 15:00) (70 - 92)  BP: 167/76 (09-03-22 @ 15:00) (137/72 - 167/90)  RR:  (12 - 24)  SpO2:  (95% - 100%)  Wt(kg): --    GENERAL: Ill-appearing, lying in bed, oxygen face mask on  EYES: No proptosis,  anicteric  HEENT:  Atraumatic, Normocephalic, dry mucous membranes, NGT in place  RESPIRATORY: Coarse breath sounds bilaterally anteriorly, on oxygen face mask, tachypneic  CARDIOVASCULAR: Regular rate and rhythm; No murmurs  GI: Soft, mild diffuse tenderness to palpation, non distended, normal bowel sounds  NEURO: Moves all extremities spontaenuously, shake or nod head to questions   PSYCH:  reactive affect, minimally responsive    POCT Blood Glucose.: 162 mg/dL (09-03-22 @ 12:12)  POCT Blood Glucose.: 185 mg/dL (09-03-22 @ 05:29)  POCT Blood Glucose.: 156 mg/dL (09-02-22 @ 23:14)  POCT Blood Glucose.: 158 mg/dL (09-02-22 @ 17:28)  POCT Blood Glucose.: 173 mg/dL (09-02-22 @ 11:30)  POCT Blood Glucose.: 116 mg/dL (09-01-22 @ 13:23)  POCT Blood Glucose.: 125 mg/dL (09-01-22 @ 05:46)  POCT Blood Glucose.: 100 mg/dL (08-31-22 @ 23:46)  POCT Blood Glucose.: 98 mg/dL (08-31-22 @ 20:01)  POCT Blood Glucose.: 84 mg/dL (08-31-22 @ 15:52)      09-03    140  |  104  |  58<H>  ----------------------------<  160<H>  4.4   |  22  |  1.93<H>    eGFR: 28<L>    Ca    8.6      09-03  Mg     2.20     09-03  Phos  5.3     09-03    TPro  7.3  /  Alb  3.4  /  TBili  <0.2  /  DBili  x   /  AST  12  /  ALT  17  /  AlkPhos  122<H>  09-03      A1C with Estimated Average Glucose Result: 6.0 % (08-31-22 @ 02:00)  A1C with Estimated Average Glucose Result: 7.3 % (06-05-22 @ 06:00)      Thyroid Stimulating Hormone, Serum: 4.82 uIU/mL (08-30-22 @ 19:40)

## 2022-09-03 NOTE — PROGRESS NOTE ADULT - ASSESSMENT
67y F PMHx of HTN, HLD, sarcoidosis, CHF, recent adm with PEA arrest x2, patient presented to the ED with 1 week of BL leg swelling and SOB that developed last night. Patient became altered in the ED and had respiratory distress was intubated and admitted to MICU.    #Neuro:  #AMS  unclear etiology  - Intubated for AMS  - [facility] 8/30 w/o acute pathology  - Possible infection or hypercapnia causing AMS  - Currently paused propofol/fentanyl, pt mentating well on vent. Following commands.  - Will attempt to extubate today after ABG results and improved MS (status is getting better)      #Cardiovascular:  ##Hx CHF  - TTE reduced LV systolic function EF 55%, diastolic dysfunction. MICU team POCUS with normal RV function  - beside POCUS showed no right heart strain    ##Sinus bradycardia  - pt initially presented with EKG w/ sinus eugenie  - bradycardia since resolved    #Pulmonary:  Hypercapneic resp failure  -s/p Int 8/30 on adm  - Chest XR shows resolving pleural effusion on the R when compared to 7/30, but b/l pleural effusions are still present.    - POCUS of lungs to evaluate for effusions - moderate effusion on R  - pt diuresed with Lasix 40mg overnight  - Will give 1 bumex and reassess later for goal of negative 1 or 2 L today. Can redose if goals not met.     #FEN/GI:  - tube feed d/c while attempting sedation reversal  - Afternoon 9/2 BGs 180s  - Formal swallow study needed after extubation     #Renal:  - Patient BUN/Cr 47/2.03, mildly elevated compared to labs from previous admission  - Renal consult not necessary at this time  - Obtain BMP qdaily and monitor for changes in BUN/Cr egfr decreased consistent with previous admission.   - Will give 1 bumex and reassess later for goal of negative 1 or 2 L today. Can redose if goals not met.   - K and Mg did not need repletion on 9/2 (4.9 and 2.10). CTM     #:  - UA shows blood in urine and proteinuria  - UCx shows E Coli, sensitivities pending  - On metro and cefepime for anaerobic coverage and recent admission w/ intubation and pseudomonas cultures.     #ID:  - Pt initially leukopenic at 3.02, hypothermic at 91 F  - Patient started on Zosyn and Aztreonam, Aztreonam to be d/c and changed to Cefepime  - UCx shows E Coli, sensitivities pending  - Chest xray shows b/l pleural effusions, possible source  - MRSA Swab NEG  - Legionella NEG  - Vancomycin d/c  - On metro and cefepime for anaerobic coverage and recent admission w/ intubation and pseudomonas cultures.     #Heme:  Anemia  - Hgb 8.0, will transfuse the patient if hgb is less than 7. Patient has had anemia in the past with hgb in the 8s  - Obtain type and screen for patient  - Hg is lateral, no concern for acute bleeding at this time  - Hep SubQ, for DVT prophylaxis    #Endo:  - Pt initially hypothermic at 91 F and bradycardic  - Low cortisol on lab, per endocrine and pharmacy give hydrocortisone 50mg q6h  - TSH 4.82    T2DM  - takes insulin 'prn' at home  - ISS q6h    #Ethics:  - Full Code Status 67y F PMHx of HTN, HLD, sarcoidosis, CHF, recent adm with PEA arrest x2, patient presented to the ED with 1 week of BL leg swelling and SOB that developed last night. Patient became altered in the ED and had respiratory distress was intubated and admitted to MICU.    #Neuro:  #AMS  unclear etiology  - Intubated for AMS  - [facility] 8/30 w/o acute pathology  - Possible infection or hypercapnia causing AMS  - s/p sedation w/ propofol/fentanyl, now extubated 9/3.  - mentating well, following commands.       #Cardiovascular:  ##Hx CHF  - TTE reduced LV systolic function EF 55%, diastolic dysfunction. MICU team POCUS with normal RV function  - beside POCUS showed no right heart strain    ##Sinus bradycardia -resolved   - pt initially presented with EKG w/ sinus eugenie  - Hr now 70s    #Pulmonary:  Hypercapneic resp failure  -s/p Int 8/30 on admission, extubated to facetent 9/3   - Chest XR shows resolving pleural effusion on the R when compared to 7/30, but b/l pleural effusions are still present.    - POCUS of lungs to evaluate for effusions - moderate effusion on R  - pt diuresed with Lasix 40mg , and 1 bumex 9/2 for goal negative 1 or 2 L . she is net neg 1.7L today.    #FEN/GI:  - now s/p extubation w/ removal of feeding tube  - NPO pending swallow eval  - S&S eval ordered  - per RN no BM since 8/30 and decreased BS on exam , f/up abdominal Xray      #Renal:  - Patient BUN/Cr 58/1.93, mildly elevated compared to labs from previous admission, however Cr downtrending  - Renal consult not necessary at this time  - Obtain BMP qdaily and monitor for changes in BUN/Cr egfr decreased consistent with previous admission.   - s/p 1 bumex 9/2  for goal of negative 1 or 2 L today. Can redose if goals not met. was net neg 1.7 L today, will hold off diuresis.    - electrolytes WNLs    #:  - UA shows blood in urine and proteinuria  - UCx shows E Coli and klebs pneumoniae, sensitivities appreciated   - On metro and cefepime for anaerobic coverage and recent admission w/ intubation and pseudomonas cultures. Dc'd metro today.     #ID:  - Pt initially leukopenic at 3.02, hypothermic at 91 F , Tmax overnight 100.3  - Patient started on Zosyn and Aztreonam, Aztreonam was d/c and changed to Cefepime, metro was added for anaerobic coverage.  - UCx shows E Coli, sensitivities pending  - Chest xray shows b/l pleural effusions, possible source  - MRSA Swab NEG  - Legionella NEG  - Vancomycin d/c  - dc'd metro today.    #Heme:  Anemia  - Hgb 8.9, will transfuse the patient if hgb is less than 7. Patient has had anemia in the past with hgb in the 8s  - Obtain type and screen for patient  - Hg is lateral, no concern for acute bleeding at this time  - Hep SubQ, for DVT prophylaxis    #Endo:  Concern for Adrenal insufficiency  - Pt initially hypothermic at 91 F and bradycardic , now resolved   - Low cortisol on labs (7at 7:40pm on 8/30), pt was started on stress dose steroids w/ hydrocortisone 50mg q6h   - TSH 4.82  - seen by endo for eval of possible adrenal insufficiency, per endo previous negative co-syntropin stim test 7/16 on prior admission. would expect to have higher cortisol with acute decompensation. continue hydrocortisone 50mg IV q6-8h and for possible relative adrenal insufficiency . Once extubated will plan to reduce to hydrocortisone 50mg IV q12h. will likely re-perform co-syntropin stim test at that time.   - pt extubated today- hydrocortisone tapered to 50mg IV q12hrs      T2DM  - takes insulin 'prn' at home  - per endo pt previously on high doses of lantus and admelog as well as metformin and victoza in the past, however had presented to last hospitalization with frequent episodes of hypoglycemia. last admission pt on minimal amounts of insulin and Dc'd on lantus 2units QD  - ISS q6h  - not lantus at this time  - Target -180 while in ICU, FS 160s today  - Discharge recommendations: Possibly low dose basal insulin, pending clinical course    #Ethics:  - Full Code Status

## 2022-09-03 NOTE — PROGRESS NOTE ADULT - ASSESSMENT
67F w/ PMHx of HTN, HLD, T2DM, sarcoidosis, CHF, presented to the ED for BL leg swelling and SOB. Endocrinology consulted for concern for Addisons Disease.    #Concern for Adrenal Insuffiency  #Hypothermia  - patient currently intubated, not on pressors  - presented with shortness of breath, swelling of her legs and AMS with acute episode of bradycardia, hypothermia and respiratory distress in ED prompting intubation  - patient reportedly hypothermic to 91F in ED, now off warmer and normothermic  - no darkening of skin on physical exam  - TSH 4.82  - 8/31 Na 137. K 4.2  - previous negative co-syntropin stim test 7/16 on prior admission: 8am cortisol 16.1, 8:30am cortisol 22.4, 9:15am cortisol 24.7.  - cortisol 8/30 at 7:40pm 7, although not obtained at ideal time of 8am, would expect to have high cortisol with acute decompensation  - possible patient could have relative adrenal insufficiency, unclear other etiologies of secondary AI and less likely addisons disease without history of autoimmune conditions  - extubated 9/3 in am  PLAN  - agree with wean to hydrocortisone 50mg q12h starting 9/4 (one dose tonight - total 3 doses 9.3) as patient now extubated not on pressors  - likely re-perform co-syntropin stim test once can wean hydrocortisone further    #T2DM  - HbA1c 6  - patient was previously on high doses of lantus and admelog as well as metformin and victoza in the past, however had presented to last hospitalization with frequent episodes of hypoglycemia  - C-peptide 5 on 7/17  - last admission, patient was on minimal amounts of insulin and discharged on lantus 2 units daily  PLAN  - low admelog correction scale q6h with fingersticks  - dextrose prn for hypoglycemia  - no lantus at this time  - if BG >300 persistently, would check BMP, VBG, BHB to rule out DKA  - Target -180 while in ICU  - Discharge recommendations: Possibly low dose basal insulin, pending clinical course    #HLD  - on atorvastatin 40mg daily outpatient  PLAN  - hold atorvastatin per primary team    Discussed with primary team.     Thank you for the interesting consult.    Sincere England MD, Endocrinology Fellow  Pager [phone] from 9am to 5pm. After hours and on weekends, please call [phone].

## 2022-09-03 NOTE — PROGRESS NOTE ADULT - CRITICAL CARE ATTENDING COMMENT
67 year old female with history of HTN, HLD, CHF, sarcoidosis, and recent PEA arrest here with shortness of breath, lower extremity swelling, acute metabolic encephalopathy. Has sepsis secondary to UTI (growing e. coli and klebsiella on urine culture). Also concern for acute on chronic diastolic heart failure exacerbation. Patient was intubated at time of presentation for acute respiratory failure. Fluid balance has improved with IV diuresis. Mentation is improved as well. Patient tolerated PST this morning and subsequently extubated. Continue cefepime, supportive care, monitor in ICU. 67 year old female with history of HTN, HLD, CHF, sarcoidosis, and recent PEA arrest here with shortness of breath, lower extremity swelling, and acute metabolic encephalopathy. Has sepsis secondary to UTI (growing e. coli and klebsiella on urine culture). Also concern for acute on chronic diastolic heart failure exacerbation. Patient was intubated at time of presentation for acute respiratory failure. Fluid balance has improved with IV diuresis. Mentation is improved as well. Patient tolerated PST this morning and subsequently extubated. Continue cefepime, supportive care, monitor in ICU.

## 2022-09-03 NOTE — SWALLOW BEDSIDE ASSESSMENT ADULT - SWALLOW EVAL: DIAGNOSIS
1. Functional oral stage for puree, mildly thick liquids and thin liquids marked by adequate oral acceptance, collection and transfer. 2. Functional pharyngeal phase for puree, mildly thick and thin liquids marked by a present pharyngeal swallow trigger with hyolaryngeal elevation upon digital palpation without evidence of airway penetration/aspiration. 1. Functional oral stage for puree, mildly thick liquids and thin liquids marked by adequate oral acceptance, collection and transfer. 2. Functional pharyngeal phase for puree, mildly thick and thin liquids marked by a present pharyngeal swallow trigger with hyolaryngeal elevation upon digital palpation without evidence of airway penetration/aspiration. Of note, patient declined regular solids at this time

## 2022-09-04 LAB
ANION GAP SERPL CALC-SCNC: 10 MMOL/L — SIGNIFICANT CHANGE UP (ref 7–14)
BASE EXCESS BLDV CALC-SCNC: 2.3 MMOL/L — SIGNIFICANT CHANGE UP (ref -2–3)
BASOPHILS # BLD AUTO: 0 K/UL — SIGNIFICANT CHANGE UP (ref 0–0.2)
BASOPHILS NFR BLD AUTO: 0 % — SIGNIFICANT CHANGE UP (ref 0–2)
BUN SERPL-MCNC: 48 MG/DL — HIGH (ref 7–23)
CA-I SERPL-SCNC: 1.22 MMOL/L — SIGNIFICANT CHANGE UP (ref 1.15–1.33)
CALCIUM SERPL-MCNC: 8.5 MG/DL — SIGNIFICANT CHANGE UP (ref 8.4–10.5)
CHLORIDE BLDV-SCNC: 111 MMOL/L — HIGH (ref 96–108)
CHLORIDE SERPL-SCNC: 106 MMOL/L — SIGNIFICANT CHANGE UP (ref 98–107)
CO2 BLDV-SCNC: 30.2 MMOL/L — HIGH (ref 22–26)
CO2 SERPL-SCNC: 25 MMOL/L — SIGNIFICANT CHANGE UP (ref 22–31)
CREAT SERPL-MCNC: 1.44 MG/DL — HIGH (ref 0.5–1.3)
EGFR: 40 ML/MIN/1.73M2 — LOW
EOSINOPHIL # BLD AUTO: 0 K/UL — SIGNIFICANT CHANGE UP (ref 0–0.5)
EOSINOPHIL NFR BLD AUTO: 0 % — SIGNIFICANT CHANGE UP (ref 0–6)
GAS PNL BLDV: 141 MMOL/L — SIGNIFICANT CHANGE UP (ref 136–145)
GAS PNL BLDV: SIGNIFICANT CHANGE UP
GLUCOSE BLDC GLUCOMTR-MCNC: 158 MG/DL — HIGH (ref 70–99)
GLUCOSE BLDC GLUCOMTR-MCNC: 176 MG/DL — HIGH (ref 70–99)
GLUCOSE BLDC GLUCOMTR-MCNC: 209 MG/DL — HIGH (ref 70–99)
GLUCOSE BLDC GLUCOMTR-MCNC: 223 MG/DL — HIGH (ref 70–99)
GLUCOSE BLDV-MCNC: 190 MG/DL — HIGH (ref 70–99)
GLUCOSE SERPL-MCNC: 197 MG/DL — HIGH (ref 70–99)
HCO3 BLDV-SCNC: 29 MMOL/L — SIGNIFICANT CHANGE UP (ref 22–29)
HCT VFR BLD CALC: 27.6 % — LOW (ref 34.5–45)
HCT VFR BLDA CALC: 26 % — LOW (ref 34.5–46.5)
HGB BLD CALC-MCNC: 8.6 G/DL — LOW (ref 11.5–15.5)
HGB BLD-MCNC: 8.3 G/DL — LOW (ref 11.5–15.5)
IANC: 3.09 K/UL — SIGNIFICANT CHANGE UP (ref 1.8–7.4)
IMM GRANULOCYTES NFR BLD AUTO: 1 % — SIGNIFICANT CHANGE UP (ref 0–1.5)
LACTATE BLDV-MCNC: 0.5 MMOL/L — SIGNIFICANT CHANGE UP (ref 0.5–2)
LYMPHOCYTES # BLD AUTO: 0.55 K/UL — LOW (ref 1–3.3)
LYMPHOCYTES # BLD AUTO: 14.2 % — SIGNIFICANT CHANGE UP (ref 13–44)
MAGNESIUM SERPL-MCNC: 2 MG/DL — SIGNIFICANT CHANGE UP (ref 1.6–2.6)
MCHC RBC-ENTMCNC: 25.7 PG — LOW (ref 27–34)
MCHC RBC-ENTMCNC: 30.1 GM/DL — LOW (ref 32–36)
MCV RBC AUTO: 85.4 FL — SIGNIFICANT CHANGE UP (ref 80–100)
MONOCYTES # BLD AUTO: 0.2 K/UL — SIGNIFICANT CHANGE UP (ref 0–0.9)
MONOCYTES NFR BLD AUTO: 5.2 % — SIGNIFICANT CHANGE UP (ref 2–14)
NEUTROPHILS # BLD AUTO: 3.09 K/UL — SIGNIFICANT CHANGE UP (ref 1.8–7.4)
NEUTROPHILS NFR BLD AUTO: 79.6 % — HIGH (ref 43–77)
NRBC # BLD: 0 /100 WBCS — SIGNIFICANT CHANGE UP (ref 0–0)
NRBC # FLD: 0 K/UL — SIGNIFICANT CHANGE UP (ref 0–0)
PCO2 BLDV: 53 MMHG — HIGH (ref 39–42)
PH BLDV: 7.34 — SIGNIFICANT CHANGE UP (ref 7.32–7.43)
PHOSPHATE SERPL-MCNC: 3.4 MG/DL — SIGNIFICANT CHANGE UP (ref 2.5–4.5)
PLATELET # BLD AUTO: 203 K/UL — SIGNIFICANT CHANGE UP (ref 150–400)
PO2 BLDV: 72 MMHG — SIGNIFICANT CHANGE UP
POTASSIUM BLDV-SCNC: 3.7 MMOL/L — SIGNIFICANT CHANGE UP (ref 3.5–5.1)
POTASSIUM SERPL-MCNC: 3.7 MMOL/L — SIGNIFICANT CHANGE UP (ref 3.5–5.3)
POTASSIUM SERPL-SCNC: 3.7 MMOL/L — SIGNIFICANT CHANGE UP (ref 3.5–5.3)
RBC # BLD: 3.23 M/UL — LOW (ref 3.8–5.2)
RBC # FLD: 16 % — HIGH (ref 10.3–14.5)
SAO2 % BLDV: 95 % — SIGNIFICANT CHANGE UP
SODIUM SERPL-SCNC: 141 MMOL/L — SIGNIFICANT CHANGE UP (ref 135–145)
WBC # BLD: 3.88 K/UL — SIGNIFICANT CHANGE UP (ref 3.8–10.5)
WBC # FLD AUTO: 3.88 K/UL — SIGNIFICANT CHANGE UP (ref 3.8–10.5)

## 2022-09-04 PROCEDURE — 99233 SBSQ HOSP IP/OBS HIGH 50: CPT

## 2022-09-04 RX ORDER — CARVEDILOL PHOSPHATE 80 MG/1
25 CAPSULE, EXTENDED RELEASE ORAL EVERY 12 HOURS
Refills: 0 | Status: DISCONTINUED | OUTPATIENT
Start: 2022-09-04 | End: 2022-09-09

## 2022-09-04 RX ORDER — AMLODIPINE BESYLATE 2.5 MG/1
10 TABLET ORAL DAILY
Refills: 0 | Status: DISCONTINUED | OUTPATIENT
Start: 2022-09-04 | End: 2022-09-09

## 2022-09-04 RX ORDER — HYDROCORTISONE 20 MG
25 TABLET ORAL EVERY 12 HOURS
Refills: 0 | Status: DISCONTINUED | OUTPATIENT
Start: 2022-09-05 | End: 2022-09-05

## 2022-09-04 RX ADMIN — Medication 100 MILLIGRAM(S): at 02:20

## 2022-09-04 RX ADMIN — ALBUTEROL 2 PUFF(S): 90 AEROSOL, METERED ORAL at 04:12

## 2022-09-04 RX ADMIN — ALBUTEROL 2 PUFF(S): 90 AEROSOL, METERED ORAL at 09:20

## 2022-09-04 RX ADMIN — CHLORHEXIDINE GLUCONATE 1 APPLICATION(S): 213 SOLUTION TOPICAL at 11:29

## 2022-09-04 RX ADMIN — HEPARIN SODIUM 5000 UNIT(S): 5000 INJECTION INTRAVENOUS; SUBCUTANEOUS at 21:17

## 2022-09-04 RX ADMIN — Medication 50 MILLIGRAM(S): at 17:16

## 2022-09-04 RX ADMIN — ONDANSETRON 4 MILLIGRAM(S): 8 TABLET, FILM COATED ORAL at 05:25

## 2022-09-04 RX ADMIN — HEPARIN SODIUM 5000 UNIT(S): 5000 INJECTION INTRAVENOUS; SUBCUTANEOUS at 05:25

## 2022-09-04 RX ADMIN — CARVEDILOL PHOSPHATE 25 MILLIGRAM(S): 80 CAPSULE, EXTENDED RELEASE ORAL at 08:35

## 2022-09-04 RX ADMIN — Medication 1: at 08:41

## 2022-09-04 RX ADMIN — ALBUTEROL 2 PUFF(S): 90 AEROSOL, METERED ORAL at 20:53

## 2022-09-04 RX ADMIN — ALBUTEROL 2 PUFF(S): 90 AEROSOL, METERED ORAL at 15:13

## 2022-09-04 RX ADMIN — HEPARIN SODIUM 5000 UNIT(S): 5000 INJECTION INTRAVENOUS; SUBCUTANEOUS at 14:18

## 2022-09-04 RX ADMIN — Medication 100 MILLIGRAM(S): at 10:34

## 2022-09-04 RX ADMIN — CEFEPIME 100 MILLIGRAM(S): 1 INJECTION, POWDER, FOR SOLUTION INTRAMUSCULAR; INTRAVENOUS at 11:28

## 2022-09-04 RX ADMIN — Medication 2: at 11:59

## 2022-09-04 RX ADMIN — Medication 50 MILLIGRAM(S): at 05:25

## 2022-09-04 RX ADMIN — CARVEDILOL PHOSPHATE 25 MILLIGRAM(S): 80 CAPSULE, EXTENDED RELEASE ORAL at 17:16

## 2022-09-04 RX ADMIN — Medication 1: at 17:12

## 2022-09-04 RX ADMIN — AMLODIPINE BESYLATE 10 MILLIGRAM(S): 2.5 TABLET ORAL at 08:35

## 2022-09-04 NOTE — PROGRESS NOTE ADULT - ASSESSMENT
67y F PMHx of HTN, HLD, sarcoidosis, CHF, recent adm with PEA arrest x2, patient presented to the ED with 1 week of BL leg swelling and SOB that developed last night. Patient became altered in the ED and had respiratory distress was intubated and admitted to MICU.    #Neuro:  #AMS  unclear etiology  - Intubated for AMS  - CTH 8/30 w/o acute pathology  - Possible infection or hypercapnia causing AMS  - s/p sedation w/ propofol/fentanyl, now extubated 9/3.  - mentating well, following commands.       #Cardiovascular:  ##Hx CHF  - TTE reduced LV systolic function EF 55%, diastolic dysfunction. MICU team POCUS with normal RV function  - beside POCUS showed no right heart strain    ##Sinus bradycardia -resolved   - pt initially presented with EKG w/ sinus eugenie  - Hr now 70s    #Pulmonary:  Hypercapneic resp failure  -s/p Int 8/30 on admission, extubated to [facility] 9/3   - Chest XR shows resolving pleural effusion on the R when compared to 7/30, but b/l pleural effusions are still present.    - POCUS of lungs to evaluate for effusions - moderate effusion on R  - pt diuresed with Lasix 40mg , and 1 bumex 9/2 for goal negative 1 or 2 L . she is net neg 1.7L today.    #FEN/GI:  - now s/p extubation w/ removal of feeding tube  - NPO pending swallow eval  - S&S eval ordered  - per RN no BM since 8/30 and decreased BS on exam , f/up abdominal Xray      #Renal:  - Patient BUN/Cr 58/1.93, mildly elevated compared to labs from previous admission, however Cr downtrending  - Renal consult not necessary at this time  - Obtain BMP qdaily and monitor for changes in BUN/Cr egfr decreased consistent with previous admission.   - s/p 1 bumex 9/2  for goal of negative 1 or 2 L today. Can redose if goals not met. was net neg 1.7 L today, will hold off diuresis.    - electrolytes WNLs    #:  - UA shows blood in urine and proteinuria  - UCx shows E Coli and klebs pneumoniae, sensitivities appreciated   - On metro and cefepime for anaerobic coverage and recent admission w/ intubation and pseudomonas cultures. Dc'd metro today.     #ID:  - Pt initially leukopenic at 3.02, hypothermic at 91 F , Tmax overnight 100.3  - Patient started on Zosyn and Aztreonam, Aztreonam was d/c and changed to Cefepime, metro was added for anaerobic coverage.  - UCx shows E Coli, sensitivities pending  - Chest xray shows b/l pleural effusions, possible source  - MRSA Swab NEG  - Legionella NEG  - Vancomycin d/c  - dc'd metro today.    #Heme:  Anemia  - Hgb 8.9, will transfuse the patient if hgb is less than 7. Patient has had anemia in the past with hgb in the 8s  - Obtain type and screen for patient  - Hg is lateral, no concern for acute bleeding at this time  - Hep SubQ, for DVT prophylaxis    #Endo:  Concern for Adrenal insufficiency  - Pt initially hypothermic at 91 F and bradycardic , now resolved   - Low cortisol on labs (7at 7:40pm on 8/30), pt was started on stress dose steroids w/ hydrocortisone 50mg q6h   - TSH 4.82  - seen by endo for eval of possible adrenal insufficiency, per endo previous negative co-syntropin stim test 7/16 on prior admission. would expect to have higher cortisol with acute decompensation. continue hydrocortisone 50mg IV q6-8h and for possible relative adrenal insufficiency . Once extubated will plan to reduce to hydrocortisone 50mg IV q12h. will likely re-perform co-syntropin stim test at that time.   - pt extubated today- hydrocortisone tapered to 50mg IV q12hrs      T2DM  - takes insulin 'prn' at home  - per endo pt previously on high doses of lantus and admelog as well as metformin and victoza in the past, however had presented to last hospitalization with frequent episodes of hypoglycemia. last admission pt on minimal amounts of insulin and Dc'd on lantus 2units QD  - ISS q6h  - not lantus at this time  - Target -180 while in ICU, FS 160s today  - Discharge recommendations: Possibly low dose basal insulin, pending clinical course    #Ethics:  - Full Code Status 67y F PMHx of HTN, HLD, sarcoidosis, CHF, recent adm with PEA arrest x2, patient presented to the ED with 1 week of BL leg swelling and SOB that developed last night. Patient became altered in the ED and had respiratory distress was intubated and admitted to MICU.    #Neuro:  #AMS  unclear etiology  - Intubated for AMS  - CTH 8/30 w/o acute pathology  - Possible infection or hypercapnia causing AMS  - s/p sedation w/ propofol/fentanyl, now extubated 9/3.  - mentating well, following commands.     #Cardiovascular:  ##Hx CHF  - TTE reduced LV systolic function EF 55%, diastolic dysfunction.   - MICU team POCUS with normal RV, no right heart strain  - HTN  to 170s today, restarted on home coreg 25mg BID and amlodipine 10mg QD today  - continue tapering off of stress dose steroids     ##Sinus bradycardia -resolved   - pt initially presented with EKG w/ sinus eugenie  - Hr now 70s    #Pulmonary:  Hypercapneic resp failure  -s/p Int 8/30 on admission, extubated to [facility] 9/3   - Chest XR shows resolving pleural effusion on the R when compared to 7/30, but b/l pleural effusions are still present.    - POCUS of lungs to evaluate for effusions - moderate effusion on R  - pt diuresed with Lasix 40mg , and 1 bumex 9/2 for goal negative 1 or 2 L . she is neg 1.4 L today and neg 3.9 for LOS.     #FEN/GI:  - now s/p extubation w/ removal of feeding tube  - S&S eval ordered- puree diet w/ thin liquids ordered  - per RN no BM since 8/30 and decreased BS on exam , abdominal Xray neg. pt now having BMs      #Renal:  - Patient BUN/Cr 58/1.93, mildly elevated compared to labs from previous admission, however Cr downtrending  - Renal consult not necessary at this time  - Obtain BMP qdaily and monitor for changes in BUN/Cr egfr decreased consistent with previous admission.   - s/p 1 bumex 9/2  for goal of negative 1 or 2 L today. Can redose if goals not met. was net neg 1.7 L today, will hold off diuresis.    - electrolytes WNLs    #:  - UA shows blood in urine and proteinuria  - UCx shows E Coli and klebs pneumoniae, sensitivities appreciated   - On metro and cefepime for anaerobic coverage and recent admission w/ intubation and pseudomonas cultures. Dc'd metro today.     #ID:  - Pt initially leukopenic at 3.02, hypothermic at 91 F , Tmax overnight 100.3  - Patient started on Zosyn and Aztreonam, Aztreonam was d/c and changed to Cefepime, metro was added for anaerobic coverage.  - UCx shows E Coli, sensitivities pending  - Chest xray shows b/l pleural effusions, possible source  - MRSA Swab NEG  - Legionella NEG  - Vancomycin d/c  - dc'd metro , continue cefepime 9/2-9/6    #Heme:  Anemia  - Hgb 8.3, will transfuse the patient if hgb is less than 7. Patient has had anemia in the past with hgb in the 8s  - Obtain type and screen for patient  - Hg is stable, no concern for acute bleeding at this time  - Hep SubQ, for DVT prophylaxis    #Endo:  Concern for Adrenal insufficiency  - Pt initially hypothermic at 91 F and bradycardic , now resolved   - Low cortisol on labs (7at 7:40pm on 8/30), pt was started on stress dose steroids w/ hydrocortisone 50mg q6h   - TSH 4.82  - seen by endo for eval of possible adrenal insufficiency, per endo previous negative co-syntropin stim test 7/16 on prior admission. would expect to have higher cortisol with acute decompensation.   - pt extubated 9/3- hydrocortisone tapered to 50mg IV q12hrs  - per endo recs- continue hydrocortisone 50mg q12h on 9/4, plan to taper to hydrocortisone 25mg q12h on 9/5, pending clinical stability. likely re-perform co-syntropin stim test once can wean hydrocortisone further      T2DM  - takes insulin 'prn' at home  - per endo pt previously on high doses of lantus and admelog as well as metformin and victoza in the past, however had presented to last hospitalization with frequent episodes of hypoglycemia. last admission pt on minimal amounts of insulin and Dc'd on lantus 2units QD  - ISS , monitor FS  - not lantus at this time  - Target -180 while in ICU, FS 190s today  - Discharge recommendations: Possibly low dose basal insulin, pending clinical course    #Ethics:  - Full Code Status  - PT consult ordered 67y F PMHx of HTN, HLD, sarcoidosis, CHF, recent adm with PEA arrest x2, patient presented to the ED with 1 week of BL leg swelling and SOB that developed last night. Patient became altered in the ED and had respiratory distress was intubated and admitted to MICU.    #Neuro:  #AMS- resolved  - Intubated for AMS likely in setting of sepsis 2/2 UTI vs hypercapnia  - CTH 8/30 w/o acute pathology  - s/p sedation w/ propofol/fentanyl, now extubated 9/3.  - mentating well, A&Ox3 however hard of heading.    #Cardiovascular:  ##Hx CHF  - TTE reduced LV systolic function EF 55%, diastolic dysfunction.   - MICU team POCUS with normal RV, no right heart strain  - HTN  to 170s today, restarted on home coreg 25mg BID and amlodipine 10mg QD today  - continue tapering off of stress dose steroids     ##Sinus bradycardia -resolved   - pt initially presented with EKG w/ sinus eugenie  - Hr now 70s    #Pulmonary:  Hypercapneic resp failure  -s/p Int 8/30 on admission, extubated to [facility] 9/3   - Chest XR shows resolving pleural effusion on the R when compared to 7/30, but b/l pleural effusions are still present.    - POCUS of lungs to evaluate for effusions - moderate effusion on R  - pt diuresed with Lasix 40mg , and 1 bumex 9/2 for goal negative 1 or 2 L . she is neg 1.4 L today and neg 3.9 for LOS.     #FEN/GI:  - now s/p extubation w/ removal of feeding tube  - S&S eval ordered- puree diet w/ thin liquids ordered  - per RN no BM since 8/30 and decreased BS on exam , abdominal Xray neg. pt now having BMs      #Renal:  - Patient BUN/Cr 58/1.93, mildly elevated compared to labs from previous admission, however Cr downtrending  - Renal consult not necessary at this time  - Obtain BMP qdaily and monitor for changes in BUN/Cr egfr decreased consistent with previous admission.   - s/p 1 bumex 9/2  for goal of negative 1 or 2 L today. Can redose if goals not met. was net neg 1.7 L today, will hold off diuresis.    - electrolytes WNLs    #:  - UA shows blood in urine and proteinuria  - UCx shows E Coli and klebs pneumoniae, sensitivities appreciated   - On metro and cefepime for anaerobic coverage and recent admission w/ intubation and pseudomonas cultures. Dc'd metro today.     #ID:  - Pt initially leukopenic at 3.02, hypothermic at 91 F , Tmax overnight 100.3  - Patient started on Zosyn and Aztreonam, Aztreonam was d/c and changed to Cefepime, metro was added for anaerobic coverage.  - UCx shows E Coli, sensitivities pending  - Chest xray shows b/l pleural effusions, possible source  - MRSA Swab NEG  - Legionella NEG  - Vancomycin d/c  - dc'd metro , continue cefepime 9/2-9/6    #Heme:  Anemia  - Hgb 8.3, will transfuse the patient if hgb is less than 7. Patient has had anemia in the past with hgb in the 8s  - Obtain type and screen for patient  - Hg is stable, no concern for acute bleeding at this time  - Hep SubQ, for DVT prophylaxis    #Endo:  Concern for Adrenal insufficiency  - Pt initially hypothermic at 91 F and bradycardic , now resolved   - Low cortisol on labs (7at 7:40pm on 8/30), pt was started on stress dose steroids w/ hydrocortisone 50mg q6h   - TSH 4.82  - seen by endo for eval of possible adrenal insufficiency, per endo previous negative co-syntropin stim test 7/16 on prior admission. would expect to have higher cortisol with acute decompensation.   - pt extubated 9/3- hydrocortisone tapered to 50mg IV q12hrs  - per endo recs- continue hydrocortisone 50mg q12h on 9/4, plan to taper to hydrocortisone 25mg q12h on 9/5, pending clinical stability. likely re-perform co-syntropin stim test once can wean hydrocortisone further      T2DM  - takes insulin 'prn' at home  - per endo pt previously on high doses of lantus and admelog as well as metformin and victoza in the past, however had presented to last hospitalization with frequent episodes of hypoglycemia. last admission pt on minimal amounts of insulin and Dc'd on lantus 2units QD  - ISS , monitor FS  - not lantus at this time  - Target -180 while in ICU, FS 190s today  - Discharge recommendations: Possibly low dose basal insulin, pending clinical course    #Ethics:  - Full Code Status  - PT consult ordered

## 2022-09-04 NOTE — PROGRESS NOTE ADULT - SUBJECTIVE AND OBJECTIVE BOX
ENDOCRINE FOLLOW UP     Chief Complaint:     History:     MEDICATIONS  (STANDING):  ALBUTerol    90 MICROgram(s) HFA Inhaler 2 Puff(s) Inhalation every 6 hours  amLODIPine   Tablet 10 milliGRAM(s) Oral daily  carvedilol 25 milliGRAM(s) Oral every 12 hours  cefepime   IVPB 1000 milliGRAM(s) IV Intermittent every 12 hours  chlorhexidine 2% Cloths 1 Application(s) Topical daily  dextrose 5%. 1000 milliLiter(s) (100 mL/Hr) IV Continuous <Continuous>  dextrose 5%. 1000 milliLiter(s) (50 mL/Hr) IV Continuous <Continuous>  dextrose 50% Injectable 25 Gram(s) IV Push once  dextrose 50% Injectable 12.5 Gram(s) IV Push once  dextrose 50% Injectable 25 Gram(s) IV Push once  glucagon  Injectable 1 milliGRAM(s) IntraMuscular once  heparin   Injectable 5000 Unit(s) SubCutaneous every 8 hours  hydrocortisone sodium succinate Injectable 50 milliGRAM(s) IV Push every 12 hours  insulin lispro (ADMELOG) corrective regimen sliding scale   SubCutaneous three times a day before meals  insulin lispro (ADMELOG) corrective regimen sliding scale   SubCutaneous at bedtime  metroNIDAZOLE  IVPB      metroNIDAZOLE  IVPB 500 milliGRAM(s) IV Intermittent every 8 hours  ondansetron Injectable 4 milliGRAM(s) IV Push every 8 hours    MEDICATIONS  (PRN):  dextrose Oral Gel 15 Gram(s) Oral once PRN Blood Glucose LESS THAN 70 milliGRAM(s)/deciliter  dextrose Oral Gel 15 Gram(s) Oral once PRN Blood Glucose LESS THAN 70 milliGRAM(s)/deciliter      Allergies    penicillin ([facility] Synd)    Intolerances        ROS: All other systems reviewed and negative    PHYSICAL EXAM:  VITALS: T(C): 36.9 (09-04-22 @ 08:00)  T(F): 98.5 (09-04-22 @ 08:00), Max: 99 (09-03-22 @ 16:00)  HR: 74 (09-04-22 @ 09:21) (67 - 92)  BP: 163/76 (09-04-22 @ 09:00) (158/75 - 181/77)  RR:  (13 - 20)  SpO2:  (97% - 100%)  Wt(kg): --  GENERAL: NAD, resting comfortably   EYES: No proptosis,  anicteric  HEENT:  Atraumatic, Normocephalic, moist mucous membranes  RESPIRATORY: Nonlabored respirations on room air, normal rate/effort   CARDIOVASCULAR: Regular rate and rhythm; No murmurs  GI: Soft, nontender, non distended, normal bowel sounds  NEURO: AOx3, moves all extremities spontaenuously   PSYCH:  reactive affect, euthymic mood    POCT Blood Glucose.: 176 mg/dL (09-04-22 @ 08:39)  POCT Blood Glucose.: 196 mg/dL (09-03-22 @ 22:10)  POCT Blood Glucose.: 196 mg/dL (09-03-22 @ 18:03)  POCT Blood Glucose.: 162 mg/dL (09-03-22 @ 12:12)  POCT Blood Glucose.: 185 mg/dL (09-03-22 @ 05:29)  POCT Blood Glucose.: 156 mg/dL (09-02-22 @ 23:14)  POCT Blood Glucose.: 158 mg/dL (09-02-22 @ 17:28)  POCT Blood Glucose.: 173 mg/dL (09-02-22 @ 11:30)  POCT Blood Glucose.: 116 mg/dL (09-01-22 @ 13:23)      09-04    141  |  106  |  48<H>  ----------------------------<  197<H>  3.7   |  25  |  1.44<H>    eGFR: 40<L>    Ca    8.5      09-04  Mg     2.00     09-04  Phos  3.4     09-04    TPro  7.3  /  Alb  3.4  /  TBili  <0.2  /  DBili  x   /  AST  12  /  ALT  17  /  AlkPhos  122<H>  09-03      A1C with Estimated Average Glucose Result: 6.0 % (08-31-22 @ 02:00)  A1C with Estimated Average Glucose Result: 7.3 % (06-05-22 @ 06:00)      Thyroid Stimulating Hormone, Serum: 4.82 uIU/mL (08-30-22 @ 19:40)   ENDOCRINE FOLLOW UP     Chief Complaint: AI    History:     MEDICATIONS  (STANDING):  ALBUTerol    90 MICROgram(s) HFA Inhaler 2 Puff(s) Inhalation every 6 hours  amLODIPine   Tablet 10 milliGRAM(s) Oral daily  carvedilol 25 milliGRAM(s) Oral every 12 hours  cefepime   IVPB 1000 milliGRAM(s) IV Intermittent every 12 hours  chlorhexidine 2% Cloths 1 Application(s) Topical daily  dextrose 5%. 1000 milliLiter(s) (100 mL/Hr) IV Continuous <Continuous>  dextrose 5%. 1000 milliLiter(s) (50 mL/Hr) IV Continuous <Continuous>  dextrose 50% Injectable 25 Gram(s) IV Push once  dextrose 50% Injectable 12.5 Gram(s) IV Push once  dextrose 50% Injectable 25 Gram(s) IV Push once  glucagon  Injectable 1 milliGRAM(s) IntraMuscular once  heparin   Injectable 5000 Unit(s) SubCutaneous every 8 hours  hydrocortisone sodium succinate Injectable 50 milliGRAM(s) IV Push every 12 hours  insulin lispro (ADMELOG) corrective regimen sliding scale   SubCutaneous three times a day before meals  insulin lispro (ADMELOG) corrective regimen sliding scale   SubCutaneous at bedtime  metroNIDAZOLE  IVPB      metroNIDAZOLE  IVPB 500 milliGRAM(s) IV Intermittent every 8 hours  ondansetron Injectable 4 milliGRAM(s) IV Push every 8 hours    MEDICATIONS  (PRN):  dextrose Oral Gel 15 Gram(s) Oral once PRN Blood Glucose LESS THAN 70 milliGRAM(s)/deciliter  dextrose Oral Gel 15 Gram(s) Oral once PRN Blood Glucose LESS THAN 70 milliGRAM(s)/deciliter      Allergies    penicillin ([facility] Synd)    Intolerances        ROS: All other systems reviewed and negative    PHYSICAL EXAM:  VITALS: T(C): 36.9 (09-04-22 @ 08:00)  T(F): 98.5 (09-04-22 @ 08:00), Max: 99 (09-03-22 @ 16:00)  HR: 74 (09-04-22 @ 09:21) (67 - 92)  BP: 163/76 (09-04-22 @ 09:00) (158/75 - 181/77)  RR:  (13 - 20)  SpO2:  (97% - 100%)  Wt(kg): --  GENERAL: NAD, resting comfortably   EYES: No proptosis,  anicteric  HEENT:  Atraumatic, Normocephalic, moist mucous membranes  RESPIRATORY: Nonlabored respirations on room air, normal rate/effort   CARDIOVASCULAR: Regular rate and rhythm; No murmurs  GI: Soft, nontender, non distended, normal bowel sounds  NEURO: AOx3, moves all extremities spontaenuously   PSYCH:  reactive affect, euthymic mood    POCT Blood Glucose.: 176 mg/dL (09-04-22 @ 08:39)  POCT Blood Glucose.: 196 mg/dL (09-03-22 @ 22:10)  POCT Blood Glucose.: 196 mg/dL (09-03-22 @ 18:03)  POCT Blood Glucose.: 162 mg/dL (09-03-22 @ 12:12)  POCT Blood Glucose.: 185 mg/dL (09-03-22 @ 05:29)  POCT Blood Glucose.: 156 mg/dL (09-02-22 @ 23:14)  POCT Blood Glucose.: 158 mg/dL (09-02-22 @ 17:28)  POCT Blood Glucose.: 173 mg/dL (09-02-22 @ 11:30)  POCT Blood Glucose.: 116 mg/dL (09-01-22 @ 13:23)      09-04    141  |  106  |  48<H>  ----------------------------<  197<H>  3.7   |  25  |  1.44<H>    eGFR: 40<L>    Ca    8.5      09-04  Mg     2.00     09-04  Phos  3.4     09-04    TPro  7.3  /  Alb  3.4  /  TBili  <0.2  /  DBili  x   /  AST  12  /  ALT  17  /  AlkPhos  122<H>  09-03      A1C with Estimated Average Glucose Result: 6.0 % (08-31-22 @ 02:00)  A1C with Estimated Average Glucose Result: 7.3 % (06-05-22 @ 06:00)      Thyroid Stimulating Hormone, Serum: 4.82 uIU/mL (08-30-22 @ 19:40)   ENDOCRINE FOLLOW UP     Chief Complaint: AI    History:   The patient was very hard of hearing but reports feeling well without complaints.    MEDICATIONS  (STANDING):  ALBUTerol    90 MICROgram(s) HFA Inhaler 2 Puff(s) Inhalation every 6 hours  amLODIPine   Tablet 10 milliGRAM(s) Oral daily  carvedilol 25 milliGRAM(s) Oral every 12 hours  cefepime   IVPB 1000 milliGRAM(s) IV Intermittent every 12 hours  chlorhexidine 2% Cloths 1 Application(s) Topical daily  dextrose 5%. 1000 milliLiter(s) (100 mL/Hr) IV Continuous <Continuous>  dextrose 5%. 1000 milliLiter(s) (50 mL/Hr) IV Continuous <Continuous>  dextrose 50% Injectable 25 Gram(s) IV Push once  dextrose 50% Injectable 12.5 Gram(s) IV Push once  dextrose 50% Injectable 25 Gram(s) IV Push once  glucagon  Injectable 1 milliGRAM(s) IntraMuscular once  heparin   Injectable 5000 Unit(s) SubCutaneous every 8 hours  hydrocortisone sodium succinate Injectable 50 milliGRAM(s) IV Push every 12 hours  insulin lispro (ADMELOG) corrective regimen sliding scale   SubCutaneous three times a day before meals  insulin lispro (ADMELOG) corrective regimen sliding scale   SubCutaneous at bedtime  metroNIDAZOLE  IVPB      metroNIDAZOLE  IVPB 500 milliGRAM(s) IV Intermittent every 8 hours  ondansetron Injectable 4 milliGRAM(s) IV Push every 8 hours    MEDICATIONS  (PRN):  dextrose Oral Gel 15 Gram(s) Oral once PRN Blood Glucose LESS THAN 70 milliGRAM(s)/deciliter  dextrose Oral Gel 15 Gram(s) Oral once PRN Blood Glucose LESS THAN 70 milliGRAM(s)/deciliter      Allergies    penicillin ([facility] Synd)    Intolerances        ROS: All other systems reviewed and negative    PHYSICAL EXAM:  VITALS: T(C): 36.9 (09-04-22 @ 08:00)  T(F): 98.5 (09-04-22 @ 08:00), Max: 99 (09-03-22 @ 16:00)  HR: 74 (09-04-22 @ 09:21) (67 - 92)  BP: 163/76 (09-04-22 @ 09:00) (158/75 - 181/77)  RR:  (13 - 20)  SpO2:  (97% - 100%)  Wt(kg): --    GENERAL: NAD, tired appearing, lying in bed  EYES: No proptosis,  anicteric  HEENT:  Atraumatic, Normocephalic, dry mucous membranes  RESPIRATORY: Nonlabored respirations on NC, normal rate/effort   CARDIOVASCULAR: Regular rate and rhythm; No murmurs  GI: Soft, nontender, non distended, normal bowel sounds  NEURO: Responsive to voice, moves all extremities spontaneously   PSYCH:  reactive affect, euthymic mood    POCT Blood Glucose.: 176 mg/dL (09-04-22 @ 08:39)  POCT Blood Glucose.: 196 mg/dL (09-03-22 @ 22:10)  POCT Blood Glucose.: 196 mg/dL (09-03-22 @ 18:03)  POCT Blood Glucose.: 162 mg/dL (09-03-22 @ 12:12)  POCT Blood Glucose.: 185 mg/dL (09-03-22 @ 05:29)  POCT Blood Glucose.: 156 mg/dL (09-02-22 @ 23:14)  POCT Blood Glucose.: 158 mg/dL (09-02-22 @ 17:28)  POCT Blood Glucose.: 173 mg/dL (09-02-22 @ 11:30)  POCT Blood Glucose.: 116 mg/dL (09-01-22 @ 13:23)      09-04    141  |  106  |  48<H>  ----------------------------<  197<H>  3.7   |  25  |  1.44<H>    eGFR: 40<L>    Ca    8.5      09-04  Mg     2.00     09-04  Phos  3.4     09-04    TPro  7.3  /  Alb  3.4  /  TBili  <0.2  /  DBili  x   /  AST  12  /  ALT  17  /  AlkPhos  122<H>  09-03      A1C with Estimated Average Glucose Result: 6.0 % (08-31-22 @ 02:00)  A1C with Estimated Average Glucose Result: 7.3 % (06-05-22 @ 06:00)      Thyroid Stimulating Hormone, Serum: 4.82 uIU/mL (08-30-22 @ 19:40)

## 2022-09-04 NOTE — PROGRESS NOTE ADULT - SUBJECTIVE AND OBJECTIVE BOX
MICU PROGRESS NOTE    INTERVAL HPI/OVERNIGHT EVENTS: Remained stable overnight, no acute events.      SUBJECTIVE:  Feels well, denies pain or any other complaints.    OBJECTIVE:    VITAL SIGNS:  ICU Vital Signs Last 24 Hrs  T(C): 37.1 (02 Sep 2022 12:00), Max: 37.2 (02 Sep 2022 04:00)  T(F): 98.7 (02 Sep 2022 12:00), Max: 99 (02 Sep 2022 04:00)  HR: 91 (02 Sep 2022 12:00) (73 - 100)  BP: 133/61 (02 Sep 2022 12:00) (127/62 - 153/63)  BP(mean): 80 (02 Sep 2022 12:00) (77 - 89)  ABP: --  ABP(mean): --  RR: 13 (02 Sep 2022 12:00) (8 - 26)  SpO2: 96% (02 Sep 2022 12:00) (94% - 100%)    O2 Parameters below as of 02 Sep 2022 12:00  Patient On (Oxygen Delivery Method): ventilator,CPAP 5/5    O2 Concentration (%): 30    Mode: CPAP with PS  FiO2: 21  PEEP: 5  PS: 5  MAP: 7  PIP: 11    I&O:    I&O's Summary    01 Sep 2022 07:01  -  02 Sep 2022 07:00  --------------------------------------------------------  IN: 690.9 mL / OUT: 1725 mL / NET: -1034.1 mL    02 Sep 2022 07:01  -  02 Sep 2022 13:19  --------------------------------------------------------  IN: 0 mL / OUT: 300 mL / NET: -300 mL    PHYSICAL EXAM:  GENERAL: NAD  CHEST/LUNG: mild b/l crackles  HEART: Regular rate and rhythm; No murmurs, rubs, or gallops  ABDOMEN: Soft, Nontender, Nondistended; Bowel sounds present  EXTREMITIES:  2+ Peripheral Pulses, No edema  SKIN: No rashes or lesions  NERVOUS SYSTEM: patient is awake and alert, following commands. no neuro deficits noted.     MEDICATIONS:  MEDICATIONS  (STANDING):  ALBUTerol    90 MICROgram(s) HFA Inhaler 2 Puff(s) Inhalation every 6 hours  aztreonam  IVPB 1000 milliGRAM(s) IV Intermittent every 8 hours  aztreonam  IVPB      chlorhexidine 0.12% Liquid 15 milliLiter(s) Oral Mucosa every 12 hours  chlorhexidine 4% Liquid 1 Application(s) Topical <User Schedule>  dextrose 5%. 1000 milliLiter(s) (100 mL/Hr) IV Continuous <Continuous>  dextrose 5%. 1000 milliLiter(s) (50 mL/Hr) IV Continuous <Continuous>  dextrose 50% Injectable 25 Gram(s) IV Push once  dextrose 50% Injectable 12.5 Gram(s) IV Push once  dextrose 50% Injectable 25 Gram(s) IV Push once  fentaNYL   Infusion. 1 MICROgram(s)/kG/Hr (8.5 mL/Hr) IV Continuous <Continuous>  glucagon  Injectable 1 milliGRAM(s) IntraMuscular once  heparin   Injectable 5000 Unit(s) SubCutaneous every 8 hours  hydrocortisone sodium succinate Injectable 50 milliGRAM(s) IV Push every 8 hours  metroNIDAZOLE  IVPB 500 milliGRAM(s) IV Intermittent every 8 hours  metroNIDAZOLE  IVPB      petrolatum Ophthalmic Ointment 1 Application(s) Both EYES two times a day  propofol Infusion 20 MICROgram(s)/kG/Min (10.2 mL/Hr) IV Continuous <Continuous>  sodium chloride 3%  Inhalation 4 milliLiter(s) Inhalation every 6 hours    MEDICATIONS  (PRN):  dextrose Oral Gel 15 Gram(s) Oral once PRN Blood Glucose LESS THAN 70 milliGRAM(s)/deciliter    ALLERGIES:  Allergies    penicillin ([facility] Synd)    Intolerances    LABS:                        8.1    4.65  )-----------( 270      ( 02 Sep 2022 01:00 )             26.8     09-02    141  |  106  |  59<H>  ----------------------------<  184<H>  4.9   |  22  |  1.97<H>    Ca    8.7      02 Sep 2022 11:29  Phos  6.5     09-02  Mg     2.10     09-02    TPro  7.0  /  Alb  3.1<L>  /  TBili  <0.2  /  DBili  x   /  AST  15  /  ALT  19  /  AlkPhos  121<H>  09-02    PTT - ( 01 Sep 2022 01:35 )  PTT:39.2 sec

## 2022-09-04 NOTE — CHART NOTE - [id number]_GEN_A_CORE
MICU Transfer Note    Transfer from: MICU    Transfer to: (  ) Medicine    (  ) Telemetry     (   ) RCU        (    ) Palliative         (   ) Stroke Unit          (   ) __________________    Accepting Physician:  Signout given to:     HPI/MICU COURSE:    67F w/ PMHx of HTN, HLD, sarcoidosis, CHF, presented to the ED for BL leg swelling and SOB. The patient was admitted on July 13th and was found to have pleural effusion, elevated BNP and LE edema concerning for CHF exacerbation. Admitted for metabolic encephalopathy and CHF exacerbation. Intubated 7/13 for AMS, extubated 7/16 and transferred to floor. Code called on 7/17 am, PEA, ROSC achieved. The patient was discharged to rehab facility on August 5th. Today the patient presented to the ED with 1 week of BL leg swelling and SOB that developed last night. The patient was released from rehab on Thursday. Per the patients daughter her leg swelling was initially noticed last week before she left the rehab facility. She had been getting lasix 20mg daily but that did not help with her symptoms Her legs progressively became more swollen. The daughter states that she noticed her mom become more short of breath last night and was unable to sleep . The patient is on home oxygen and was not able to obtain relief with oxygen. This morning the patient became delerius and was stating things that did not make sense per daughter which prompted her to bring the patient to the ED.        ASSESSMENT & PLAN:      67y F PMHx of HTN, HLD, sarcoidosis, CHF, recent adm with PEA arrest x2, patient presented to the ED with 1 week of BL leg swelling and SOB that developed last night. Patient became altered in the ED and had respiratory distress was intubated and admitted to MICU.    #Neuro:  #AMS  unclear etiology  - Intubated for AMS  - [facility] 8/30 w/o acute pathology  - Possible infection or hypercapnia causing AMS  - s/p sedation w/ propofol/fentanyl, now extubated 9/3.  - mentating well, following commands.       #Cardiovascular:  ##Hx CHF  - TTE reduced LV systolic function EF 55%, diastolic dysfunction. MICU team POCUS with normal RV function  - beside POCUS showed no right heart strain    ##Sinus bradycardia -resolved   - pt initially presented with EKG w/ sinus eugenie  - Hr now 70s    #Pulmonary:  Hypercapneic resp failure  -s/p Int 8/30 on admission, extubated to facetent 9/3   - Chest XR shows resolving pleural effusion on the R when compared to 7/30, but b/l pleural effusions are still present.    - POCUS of lungs to evaluate for effusions - moderate effusion on R  - pt diuresed with Lasix 40mg , and 1 bumex 9/2 for goal negative 1 or 2 L . she is net neg 1.7L today.    #FEN/GI:  - now s/p extubation w/ removal of feeding tube  - NPO pending swallow eval  - S&S eval ordered  - per RN no BM since 8/30 and decreased BS on exam , f/up abdominal Xray      #Renal:  - Patient BUN/Cr 58/1.93, mildly elevated compared to labs from previous admission, however Cr downtrending  - Renal consult not necessary at this time  - Obtain BMP qdaily and monitor for changes in BUN/Cr egfr decreased consistent with previous admission.   - s/p 1 bumex 9/2  for goal of negative 1 or 2 L today. Can redose if goals not met. was net neg 1.7 L today, will hold off diuresis.    - electrolytes WNLs    #:  - UA shows blood in urine and proteinuria  - UCx shows E Coli and klebs pneumoniae, sensitivities appreciated   - On metro and cefepime for anaerobic coverage and recent admission w/ intubation and pseudomonas cultures. Dc'd metro today.     #ID:  - Pt initially leukopenic at 3.02, hypothermic at 91 F , Tmax overnight 100.3  - Patient started on Zosyn and Aztreonam, Aztreonam was d/c and changed to Cefepime, metro was added for anaerobic coverage.  - UCx shows E Coli, sensitivities pending  - Chest xray shows b/l pleural effusions, possible source  - MRSA Swab NEG  - Legionella NEG  - Vancomycin d/c  - dc'd metro today.    #Heme:  Anemia  - Hgb 8.9, will transfuse the patient if hgb is less than 7. Patient has had anemia in the past with hgb in the 8s  - Obtain type and screen for patient  - Hg is lateral, no concern for acute bleeding at this time  - Hep SubQ, for DVT prophylaxis    #Endo:  Concern for Adrenal insufficiency  - Pt initially hypothermic at 91 F and bradycardic , now resolved   - Low cortisol on labs (7at 7:40pm on 8/30), pt was started on stress dose steroids w/ hydrocortisone 50mg q6h   - TSH 4.82  - seen by endo for eval of possible adrenal insufficiency, per endo previous negative co-syntropin stim test 7/16 on prior admission. would expect to have higher cortisol with acute decompensation. continue hydrocortisone 50mg IV q6-8h and for possible relative adrenal insufficiency . Once extubated will plan to reduce to hydrocortisone 50mg IV q12h. will likely re-perform co-syntropin stim test at that time.   - pt extubated today- hydrocortisone tapered to 50mg IV q12hrs      T2DM  - takes insulin 'prn' at home  - per endo pt previously on high doses of lantus and admelog as well as metformin and victoza in the past, however had presented to last hospitalization with frequent episodes of hypoglycemia. last admission pt on minimal amounts of insulin and Dc'd on lantus 2units QD  - ISS q6h  - not lantus at this time  - Target -180 while in ICU, FS 160s today  - Discharge recommendations: Possibly low dose basal insulin, pending clinical course    #Ethics:  - Full Code Status          FOR FOLLOW UP: MICU Transfer Note    Transfer from: MICU    Transfer to: (  ) Medicine    (  ) Telemetry     (   ) RCU        (    ) Palliative         (   ) Stroke Unit          (   ) __________________    Accepting Physician:  Signout given to:     HPI/MICU COURSE:    67y F PMHx of HTN, HLD, sarcoidosis, CHF, recent adm with PEA arrest x2, presented to the ED for BL leg swelling and SOB. The patient was admitted on July 13th and was found to have pleural effusion, elevated BNP and LE edema concerning for CHF exacerbation. Admitted for metabolic encephalopathy and CHF exacerbation. Intubated 7/13 for AMS, extubated 7/16 and transferred to floor. Code called on 7/17 am, PEA, ROSC achieved. The patient was discharged to rehab facility on August 5th.     on 8/30 pt presented to the ED with 1 week of BL leg swelling and SOB that developed last night. The patient was released from rehab on Thursday. Per the patients daughter her leg swelling was initially noticed last week before she left the rehab facility. She had been getting lasix 20mg daily but that did not help with her symptoms Her legs progressively became more swollen. The daughter states that she noticed her mom become more short of breath last night and was unable to sleep . The patient is on home oxygen and was not able to obtain relief with oxygen. This morning the patient became delerius and was stating things that did not make sense per daughter which prompted her to bring the patient to the ED.     Pt was intubated in ED on 8/30 for AMS and respiratory distress and subsequently transferred to MICU for further care. She underwent a bronchoscopy 8/31 which found L sided mucus plug, BAL however grew normal respiratory ra. Urine culture grew klebsiella pneumonia and e. coli, pt was treated w/ Cefepime. She was seen by endocrinology given concern for adrenal insufficiency as she was hypothermic and bradycardic on arrival and started on stress dose steroids. She was weaned off of sedation and passed pressure support trials and was successfully extubated on 9/3 to facetent, Spo2 now stable on RA. She is hemodynamically stable for transfer to floors.     ASSESSMENT & PLAN:      67y F PMHx of HTN, HLD, sarcoidosis, CHF, recent adm with PEA arrest x2, patient presented to the ED with 1 week of BL leg swelling and SOB that developed last night. Patient became altered in the ED and had respiratory distress was intubated and admitted to MICU.    #Neuro:  #AMS  unclear etiology  - Intubated for AMS  - [facility] 8/30 w/o acute pathology  - Possible infection or hypercapnia causing AMS  - s/p sedation w/ propofol/fentanyl, now extubated 9/3.  - mentating well, following commands.     #Cardiovascular:  ##Hx CHF  - TTE reduced LV systolic function EF 55%, diastolic dysfunction.   - MICU team POCUS with normal RV, no right heart strain  - HTN  to 170s today, restarted on home coreg 25mg BID and amlodipine 10mg QD today  - continue tapering off of stress dose steroids     ##Sinus bradycardia -resolved   - pt initially presented with EKG w/ sinus eugenie  - Hr now 70s    #Pulmonary:  Hypercapneic resp failure  -s/p Int 8/30 on admission, extubated to [facility] 9/3   - Chest XR shows resolving pleural effusion on the R when compared to 7/30, but b/l pleural effusions are still present.    - POCUS of lungs to evaluate for effusions - moderate effusion on R  - pt diuresed with Lasix 40mg , and 1 bumex 9/2 for goal negative 1 or 2 L . she is neg 1.4 L today and neg 3.9 for LOS.     #FEN/GI:  - now s/p extubation w/ removal of feeding tube  - S&S eval ordered- puree diet w/ thin liquids ordered  - per RN no BM since 8/30 and decreased BS on exam , abdominal Xray neg. pt now having BMs      #Renal:  - Patient BUN/Cr 58/1.93, mildly elevated compared to labs from previous admission, however Cr downtrending  - Renal consult not necessary at this time  - Obtain BMP qdaily and monitor for changes in BUN/Cr egfr decreased consistent with previous admission.   - s/p 1 bumex 9/2  for goal of negative 1 or 2 L today. Can redose if goals not met. was net neg 1.7 L today, will hold off diuresis.    - electrolytes WNLs    #:  - UA shows blood in urine and proteinuria  - UCx shows E Coli and klebs pneumoniae, sensitivities appreciated   - On metro and cefepime for anaerobic coverage and recent admission w/ intubation and pseudomonas cultures. Dc'd metro today.     #ID:  - Pt initially leukopenic at 3.02, hypothermic at 91 F , Tmax overnight 100.3  - Patient started on Zosyn and Aztreonam, Aztreonam was d/c and changed to Cefepime, metro was added for anaerobic coverage.  - UCx shows E Coli, sensitivities pending  - Chest xray shows b/l pleural effusions, possible source  - MRSA Swab NEG  - Legionella NEG  - Vancomycin d/c  - dc'd metro , continue cefepime 9/2-9/6    #Heme:  Anemia  - Hgb 8.3, will transfuse the patient if hgb is less than 7. Patient has had anemia in the past with hgb in the 8s  - Obtain type and screen for patient  - Hg is stable, no concern for acute bleeding at this time  - Hep SubQ, for DVT prophylaxis    #Endo:  Concern for Adrenal insufficiency  - Pt initially hypothermic at 91 F and bradycardic , now resolved   - Low cortisol on labs (7at 7:40pm on 8/30), pt was started on stress dose steroids w/ hydrocortisone 50mg q6h   - TSH 4.82  - seen by endo for eval of possible adrenal insufficiency, per endo previous negative co-syntropin stim test 7/16 on prior admission. would expect to have higher cortisol with acute decompensation.   - pt extubated 9/3- hydrocortisone tapered to 50mg IV q12hrs  - per endo recs- continue hydrocortisone 50mg q12h on 9/4, plan to taper to hydrocortisone 25mg q12h on 9/5, pending clinical stability. likely re-perform co-syntropin stim test once can wean hydrocortisone further      T2DM  - takes insulin 'prn' at home  - per endo pt previously on high doses of lantus and admelog as well as metformin and victoza in the past, however had presented to last hospitalization with frequent episodes of hypoglycemia. last admission pt on minimal amounts of insulin and Dc'd on lantus 2units QD  - ISS , monitor FS  - not lantus at this time  - Target -180 while in ICU, FS 190s today  - Discharge recommendations: Possibly low dose basal insulin, pending clinical course    #Ethics:  - Full Code Status  - PT consult ordered        FOR FOLLOW UP:  [ ] PT  [ ] taper hydocortisone to 25mg q12 on 9/5 MICU Transfer Note    Transfer from: MICU    Transfer to: (  ) Medicine    (  ) Telemetry     (   ) RCU        (    ) Palliative         (   ) Stroke Unit          (   ) __________________    Accepting Physician:  Signout given to:     HPI/MICU COURSE:    67y F PMHx of HTN, HLD, sarcoidosis, CHF, recent adm with PEA arrest x2, presented to the ED for BL leg swelling and SOB. The patient was admitted on July 13th and was found to have pleural effusion, elevated BNP and LE edema concerning for CHF exacerbation. Admitted for metabolic encephalopathy and CHF exacerbation. Intubated 7/13 for AMS, extubated 7/16 and transferred to floor. Code called on 7/17 am, PEA, ROSC achieved. The patient was discharged to rehab facility on August 5th.     on 8/30 pt presented to the ED with 1 week of BL leg swelling and SOB that developed last night. The patient was released from rehab on Thursday. Per the patients daughter her leg swelling was initially noticed last week before she left the rehab facility. She had been getting lasix 20mg daily but that did not help with her symptoms Her legs progressively became more swollen. The daughter states that she noticed her mom become more short of breath last night and was unable to sleep . The patient is on home oxygen and was not able to obtain relief with oxygen. This morning the patient became delerius and was stating things that did not make sense per daughter which prompted her to bring the patient to the ED.     Pt was intubated in ED on 8/30 for AMS and respiratory distress and subsequently transferred to MICU for further care. She underwent a bronchoscopy 8/31 which found L sided mucus plug, BAL however grew normal respiratory ra. Urine culture grew klebsiella pneumonia and e. coli, pt was treated w/ Cefepime. She was seen by endocrinology given concern for adrenal insufficiency as she was hypothermic and bradycardic on arrival and started on stress dose steroids. She was weaned off of sedation and passed pressure support trials and was successfully extubated on 9/3 to facetent, Spo2 now stable on RA. She is hemodynamically stable for transfer to floors.     ASSESSMENT & PLAN:      67y F PMHx of HTN, HLD, sarcoidosis, CHF, recent adm with PEA arrest x2, patient presented to the ED with 1 week of BL leg swelling and SOB that developed last night. Patient became altered in the ED and had respiratory distress was intubated and admitted to MICU.    #Neuro:  #AMS- resolved  - Intubated for AMS likely in setting of sepsis 2/2 UTI vs hypercapnea  - [facility] 8/30 w/o acute pathology  - s/p sedation w/ propofol/fentanyl, now extubated 9/3.  - mentating well, A&Ox3 however hard of heading.    #Cardiovascular:  ##Hx CHF  - TTE reduced LV systolic function EF 55%, diastolic dysfunction.   - MICU team POCUS with normal RV, no right heart strain  - HTN  to 170s today, restarted on home coreg 25mg BID and amlodipine 10mg QD today  - continue tapering off of stress dose steroids     ##Sinus bradycardia -resolved   - pt initially presented with EKG w/ sinus eugenie  - Hr now 70s    #Pulmonary:  Hypercapneic resp failure  -s/p Int 8/30 on admission, extubated to [facility] 9/3   - Chest XR shows resolving pleural effusion on the R when compared to 7/30, but b/l pleural effusions are still present.    - POCUS of lungs to evaluate for effusions - moderate effusion on R  - pt diuresed with Lasix 40mg , and 1 bumex 9/2 for goal negative 1 or 2 L . she is neg 1.4 L today and neg 3.9 for LOS.     #FEN/GI:  - now s/p extubation w/ removal of feeding tube  - S&S eval ordered- puree diet w/ thin liquids ordered  - per RN no BM since 8/30 and decreased BS on exam , abdominal Xray neg. pt now having BMs      #Renal:  - Patient BUN/Cr 58/1.93, mildly elevated compared to labs from previous admission, however Cr downtrending  - Renal consult not necessary at this time  - Obtain BMP qdaily and monitor for changes in BUN/Cr egfr decreased consistent with previous admission.   - s/p 1 bumex 9/2  for goal of negative 1 or 2 L today. Can redose if goals not met. was net neg 1.7 L today, will hold off diuresis.    - electrolytes WNLs    #:  - UA shows blood in urine and proteinuria  - UCx shows E Coli and klebs pneumoniae, sensitivities appreciated   - On metro and cefepime for anaerobic coverage and recent admission w/ intubation and pseudomonas cultures. Dc'd metro today.     #ID:  - Pt initially leukopenic at 3.02, hypothermic at 91 F , Tmax overnight 100.3  - Patient started on Zosyn and Aztreonam, Aztreonam was d/c and changed to Cefepime, metro was added for anaerobic coverage.  - UCx shows E Coli, sensitivities pending  - Chest xray shows b/l pleural effusions, possible source  - MRSA Swab NEG  - Legionella NEG  - Vancomycin d/c  - dc'd metro , continue cefepime 9/2-9/6    #Heme:  Anemia  - Hgb 8.3, will transfuse the patient if hgb is less than 7. Patient has had anemia in the past with hgb in the 8s  - Obtain type and screen for patient  - Hg is stable, no concern for acute bleeding at this time  - Hep SubQ, for DVT prophylaxis    #Endo:  Concern for Adrenal insufficiency  - Pt initially hypothermic at 91 F and bradycardic , now resolved   - Low cortisol on labs (7at 7:40pm on 8/30), pt was started on stress dose steroids w/ hydrocortisone 50mg q6h   - TSH 4.82  - seen by endo for eval of possible adrenal insufficiency, per endo previous negative co-syntropin stim test 7/16 on prior admission. would expect to have higher cortisol with acute decompensation.   - pt extubated 9/3- hydrocortisone tapered to 50mg IV q12hrs  - per endo recs- continue hydrocortisone 50mg q12h on 9/4, plan to taper to hydrocortisone 25mg q12h on 9/5, pending clinical stability. likely re-perform co-syntropin stim test once can wean hydrocortisone further      T2DM  - takes insulin 'prn' at home  - per endo pt previously on high doses of lantus and admelog as well as metformin and victoza in the past, however had presented to last hospitalization with frequent episodes of hypoglycemia. last admission pt on minimal amounts of insulin and Dc'd on lantus 2units QD  - ISS , monitor FS  - not lantus at this time  - Target -180 while in ICU, FS 190s today  - Discharge recommendations: Possibly low dose basal insulin, pending clinical course    #Ethics:  - Full Code Status  - PT consult ordered        FOR FOLLOW UP:  [ ] PT  [ ] taper hydocortisone to 25mg q12 on 9/5 MICU Transfer Note    Transfer from: MICU    Transfer to: (  ) Medicine    (  ) Telemetry     (   ) RCU        (    ) Palliative         (   ) Stroke Unit          (   ) __________________    Accepting Physician: Dr. Rogers García  Signout given to: Hospitalist    HPI/MICU COURSE:    67y F PMHx of HTN, HLD, sarcoidosis, CHF, recent adm with PEA arrest x2, presented to the ED for BL leg swelling and SOB. The patient was admitted on July 13th and was found to have pleural effusion, elevated BNP and LE edema concerning for CHF exacerbation. Admitted for metabolic encephalopathy and CHF exacerbation. Intubated 7/13 for AMS, extubated 7/16 and transferred to floor. Code called on 7/17 am, PEA, ROSC achieved. The patient was discharged to rehab facility on August 5th.     on 8/30 pt presented to the ED with 1 week of BL leg swelling and SOB that developed last night. The patient was released from rehab on Thursday. Per the patients daughter her leg swelling was initially noticed last week before she left the rehab facility. She had been getting lasix 20mg daily but that did not help with her symptoms Her legs progressively became more swollen. The daughter states that she noticed her mom become more short of breath last night and was unable to sleep . The patient is on home oxygen and was not able to obtain relief with oxygen. This morning the patient became delerius and was stating things that did not make sense per daughter which prompted her to bring the patient to the ED.     Pt was intubated in ED on 8/30 for AMS and respiratory distress and subsequently transferred to MICU for further care. She underwent a bronchoscopy 8/31 which found L sided mucus plug, BAL however grew normal respiratory ra. Urine culture grew klebsiella pneumonia and e. coli, pt was treated w/ Cefepime. She was seen by endocrinology given concern for adrenal insufficiency as she was hypothermic and bradycardic on arrival and started on stress dose steroids. She was weaned off of sedation and passed pressure support trials and was successfully extubated on 9/3 to facetent, Spo2 now stable on RA. She is hemodynamically stable for transfer to floors.     ASSESSMENT & PLAN:      67y F PMHx of HTN, HLD, sarcoidosis, CHF, recent adm with PEA arrest x2, patient presented to the ED with 1 week of BL leg swelling and SOB that developed last night. Patient became altered in the ED and had respiratory distress was intubated and admitted to MICU.    #Neuro:  #AMS- resolved  - Intubated for AMS likely in setting of sepsis 2/2 UTI vs hypercapnea  - [facility] 8/30 w/o acute pathology  - s/p sedation w/ propofol/fentanyl, now extubated 9/3.  - mentating well, A&Ox3 however hard of heading.    #Cardiovascular:  ##Hx CHF  - TTE reduced LV systolic function EF 55%, diastolic dysfunction.   - MICU team POCUS with normal RV, no right heart strain  - HTN  to 170s today, restarted on home coreg 25mg BID and amlodipine 10mg QD today  - continue tapering off of stress dose steroids     ##Sinus bradycardia -resolved   - pt initially presented with EKG w/ sinus eugenie  - Hr now 70s    #Pulmonary:  Hypercapneic resp failure  -s/p Int 8/30 on admission, extubated to facetent 9/3   - Chest XR shows resolving pleural effusion on the R when compared to 7/30, but b/l pleural effusions are still present.    - POCUS of lungs to evaluate for effusions - moderate effusion on R  - pt diuresed with Lasix 40mg , and 1 bumex 9/2 for goal negative 1 or 2 L . she is neg 1.4 L today and neg 3.9 for LOS.     #FEN/GI:  - now s/p extubation w/ removal of feeding tube  - S&S eval ordered- puree diet w/ thin liquids ordered  - per RN no BM since 8/30 and decreased BS on exam , abdominal Xray neg. pt now having BMs      #Renal:  - Patient BUN/Cr 58/1.93, mildly elevated compared to labs from previous admission, however Cr downtrending  - Renal consult not necessary at this time  - Obtain BMP qdaily and monitor for changes in BUN/Cr egfr decreased consistent with previous admission.   - s/p 1 bumex 9/2  for goal of negative 1 or 2 L today. Can redose if goals not met. was net neg 1.7 L today, will hold off diuresis.    - electrolytes WNLs    #:  - UA shows blood in urine and proteinuria  - UCx shows E Coli and klebs pneumoniae, sensitivities appreciated   - On metro and cefepime for anaerobic coverage and recent admission w/ intubation and pseudomonas cultures. Dc'd metro today.     #ID:  - Pt initially leukopenic at 3.02, hypothermic at 91 F , Tmax overnight 100.3  - Patient started on Zosyn and Aztreonam, Aztreonam was d/c and changed to Cefepime, metro was added for anaerobic coverage.  - UCx shows E Coli, sensitivities pending  - Chest xray shows b/l pleural effusions, possible source  - MRSA Swab NEG  - Legionella NEG  - Vancomycin d/c  - dc'd metro , continue cefepime 9/2-9/6    #Heme:  Anemia  - Hgb 8.3, will transfuse the patient if hgb is less than 7. Patient has had anemia in the past with hgb in the 8s  - Obtain type and screen for patient  - Hg is stable, no concern for acute bleeding at this time  - Hep SubQ, for DVT prophylaxis    #Endo:  Concern for Adrenal insufficiency  - Pt initially hypothermic at 91 F and bradycardic , now resolved   - Low cortisol on labs (7at 7:40pm on 8/30), pt was started on stress dose steroids w/ hydrocortisone 50mg q6h   - TSH 4.82  - seen by endo for eval of possible adrenal insufficiency, per endo previous negative co-syntropin stim test 7/16 on prior admission. would expect to have higher cortisol with acute decompensation.   - pt extubated 9/3- hydrocortisone tapered to 50mg IV q12hrs  - per endo recs- continue hydrocortisone 50mg q12h on 9/4, plan to taper to hydrocortisone 25mg q12h on 9/5, pending clinical stability. likely re-perform co-syntropin stim test once can wean hydrocortisone further      T2DM  - takes insulin 'prn' at home  - per endo pt previously on high doses of lantus and admelog as well as metformin and victoza in the past, however had presented to last hospitalization with frequent episodes of hypoglycemia. last admission pt on minimal amounts of insulin and Dc'd on lantus 2units QD  - ISS , monitor FS  - not lantus at this time  - Target -180 while in ICU, FS 190s today  - Discharge recommendations: Possibly low dose basal insulin, pending clinical course    #Ethics:  - Full Code Status  - PT consult ordered        FOR FOLLOW UP:  [ ] PT  [ ] taper hydocortisone to 25mg q12 on 9/5 MICU Transfer Note    Transfer from: MICU    Transfer to: (  ) Medicine    (  ) Telemetry     (   ) RCU        (    ) Palliative         (   ) Stroke Unit          (   ) __________________    Accepting Physician: Dr. Rogers García  Signout given to: Hospitalist    HPI/MICU COURSE:    67y F PMHx of HTN, HLD, sarcoidosis, CHF, recent adm with PEA arrest x2, presented to the ED for BL leg swelling and SOB. The patient was admitted on July 13th and was found to have pleural effusion, elevated BNP and LE edema concerning for CHF exacerbation. Admitted for metabolic encephalopathy and CHF exacerbation. Intubated 7/13 for AMS, extubated 7/16 and transferred to floor. Code called on 7/17 am, PEA, ROSC achieved. The patient was discharged to rehab facility on August 5th.     on 8/30 pt presented to the ED with 1 week of BL leg swelling and SOB that developed last night. The patient was released from rehab on Thursday. Per the patients daughter her leg swelling was initially noticed last week before she left the rehab facility. She had been getting lasix 20mg daily but that did not help with her symptoms Her legs progressively became more swollen. The daughter states that she noticed her mom become more short of breath last night and was unable to sleep . The patient is on home oxygen and was not able to obtain relief with oxygen. This morning the patient became delerius and was stating things that did not make sense per daughter which prompted her to bring the patient to the ED.     Pt was intubated in ED on 8/30 for AMS and respiratory distress and subsequently transferred to MICU for further care. She underwent a bronchoscopy 8/31 which found L sided mucus plug, BAL however grew normal respiratory ra. Urine culture grew klebsiella pneumonia and e. coli, pt was treated w/ Cefepime. She was seen by endocrinology given concern for adrenal insufficiency as she was hypothermic and bradycardic on arrival and started on stress dose steroids. She was weaned off of sedation and passed pressure support trials and was successfully extubated on 9/3 to facetent, Spo2 now stable on RA. She is hemodynamically stable for transfer to floors.     ASSESSMENT & PLAN:      67y F PMHx of HTN, HLD, sarcoidosis, CHF, recent adm with PEA arrest x2, patient presented to the ED with 1 week of BL leg swelling and SOB that developed last night. Patient became altered in the ED and had respiratory distress was intubated and admitted to MICU.    #Neuro:  #AMS- resolved  - Intubated for AMS likely in setting of sepsis 2/2 UTI vs hypercapnia  - [facility] 8/30 w/o acute pathology  - s/p sedation w/ propofol/fentanyl, now extubated 9/3.  - mentating well, A&Ox3 , hard of heading.     #Cardiovascular:  ##Hx CHF  - TTE reduced LV systolic function EF 55%, diastolic dysfunction.   - MICU team POCUS with normal RV, no right heart strain  - HTN  to 170s today, restarted on home coreg 25mg BID and amlodipine 10mg QD today  - continue tapering off of stress dose steroids     ##Sinus bradycardia -resolved   - pt initially presented with EKG w/ sinus eugenie  - Hr now 70s    #Pulmonary:  Hypercapneic resp failure  -s/p Int 8/30 on admission, extubated to [facility] 9/3   - Chest XR shows resolving pleural effusion on the R when compared to 7/30, but b/l pleural effusions are still present.    - POCUS of lungs to evaluate for effusions - moderate effusion on R  - pt diuresed with Lasix 40mg , and 1 bumex 9/2 for goal negative. she is net even today, CTM    #FEN/GI:  - now s/p extubation w/ removal of feeding tube  - S&S eval ordered- puree diet w/ thin liquids ordered, pt had refused initially to try solid foods. pt now refusing pure diet, tolerated pancakes and eggs this morning without issue. S&S consult ordered for re-evaluation  - pt having BMs      #Renal:  - MANDY in setting of sepsis, now improving  - Patient BUN/Cr 58/1.93, peaked to 2, however Cr now downtrending. Cr 1.44-> 1.25 today.   - s/p 1 bumex 9/2    - UOP: -65ml today, -4 L LOS, makes approx 100ml/hr.  - no need for diuresis at present, CTM  - electrolytes WNLs      #ID:  - Pt initially leukopenic at 3.02, hypothermic at 91 F , was afebrile overnight , now leukocytosis on labs  - Patient started on Zosyn and Aztreonam, Aztreonam was d/c and changed to Cefepime, metro was added for anaerobic coverage.  - UCx shows E Coli, sensitivities pending  - Chest xray shows b/l pleural effusions, possible source  - MRSA Swab NEG  - Legionella NEG  - Vancomycin d/c  - dc'd metro , continue cefepime 9/2-9/6    #Heme:  Anemia  - Hgb 8.3, will transfuse the patient if hgb is less than 7. Patient has had anemia in the past with hgb in the 8s  - Obtain type and screen for patient  - Hg is stable, no concern for acute bleeding at this time  - Hep SubQ, for DVT prophylaxis    #Endo:  Concern for Adrenal insufficiency  - Pt initially hypothermic at 91 F and bradycardic , now resolved   - Low cortisol on labs (7at 7:40pm on 8/30), pt was started on stress dose steroids w/ hydrocortisone 50mg q6h   - TSH 4.82  - seen by endo for eval of possible adrenal insufficiency, per endo previous negative co-syntropin stim test 7/16 on prior admission. would expect to have higher cortisol with acute decompensation.   - pt extubated 9/3- hydrocortisone tapered to 50mg IV q12hrs  - per endo recs- continue hydrocortisone 50mg q12h on 9/4, plan to taper to hydrocortisone 25mg q12h on 9/5, pending clinical stability. likely re-perform co-syntropin stim test once can wean hydrocortisone further      T2DM  - takes insulin 'prn' at home  - per endo pt previously on high doses of lantus and admelog as well as metformin and victoza in the past, however had presented to last hospitalization with frequent episodes of hypoglycemia. last admission pt on minimal amounts of insulin and Dc'd on lantus 2units QD  - ISS , monitor FS  - not lantus at this time  - Target -180 while in ICU, FS 150s  - Discharge recommendations: Possibly low dose basal insulin, pending clinical course. Endo following.    #Ethics:  - Full Code Status  - PT consult ordered    FOR FOLLOW UP:  [ ] PT  [ ] Endo recs MICU Transfer Note    Transfer from: MICU    Transfer to: (  ) Medicine    (  ) Telemetry     (   ) RCU        (    ) Palliative         (   ) Stroke Unit          (   ) __________________    Accepting Physician: Dr. Rogers García  Signout given to: Hospitalist    HPI/MICU COURSE:    67y F PMHx of HTN, HLD, sarcoidosis, CHF, recent adm with PEA arrest x2, presented to the ED for BL leg swelling and SOB. The patient was admitted on July 13th and was found to have pleural effusion, elevated BNP and LE edema concerning for CHF exacerbation. Admitted for metabolic encephalopathy and CHF exacerbation. Intubated 7/13 for AMS, extubated 7/16 and transferred to floor. Code called on 7/17 am, PEA, ROSC achieved. The patient was discharged to rehab facility on August 5th.     on 8/30 pt presented to the ED with 1 week of BL leg swelling and SOB that developed last night. The patient was released from rehab on Thursday. Per the patients daughter her leg swelling was initially noticed last week before she left the rehab facility. She had been getting lasix 20mg daily but that did not help with her symptoms Her legs progressively became more swollen. The daughter states that she noticed her mom become more short of breath last night and was unable to sleep . The patient is on home oxygen and was not able to obtain relief with oxygen. This morning the patient became delerius and was stating things that did not make sense per daughter which prompted her to bring the patient to the ED.     Pt was intubated in ED on 8/30 for AMS and respiratory distress and subsequently transferred to MICU for further care. She underwent a bronchoscopy 8/31 which found L sided mucus plug, BAL however grew normal respiratory ra. Urine culture grew klebsiella pneumonia and e. coli, pt was treated w/ Cefepime. She was seen by endocrinology given concern for adrenal insufficiency as she was hypothermic and bradycardic on arrival and started on stress dose steroids. She was weaned off of sedation and passed pressure support trials and was successfully extubated on 9/3 to facetent, Spo2 now stable on RA. She is hemodynamically stable for transfer to floors.     ASSESSMENT & PLAN:      67y F PMHx of HTN, HLD, sarcoidosis, CHF, recent adm with PEA arrest x2, patient presented to the ED with 1 week of BL leg swelling and SOB that developed last night. Patient became altered in the ED and had respiratory distress was intubated and admitted to MICU.    #Neuro:  #AMS- resolved  - Intubated for AMS likely in setting of sepsis 2/2 UTI vs hypercapnia  - [facility] 8/30 w/o acute pathology  - s/p sedation w/ propofol/fentanyl, now extubated 9/3.  - mentating well, A&Ox3 , hard of heading.     #Cardiovascular:  ##Hx CHF  - TTE reduced LV systolic function EF 55%, diastolic dysfunction.   - MICU team POCUS with normal RV, no right heart strain  - HTN  to 170s today, restarted on home coreg 25mg BID and amlodipine 10mg QD today  - continue tapering off of stress dose steroids   - started on lasix 40mg po qd    ##Sinus bradycardia -resolved   - pt initially presented with EKG w/ sinus eugenie  - Hr now 70s    #Pulmonary:  Hypercapneic resp failure  -s/p Int 8/30 on admission, extubated to [facility] 9/3   - Chest XR shows resolving pleural effusion on the R when compared to 7/30, but b/l pleural effusions are still present.    - POCUS of lungs to evaluate for effusions - moderate effusion on R  - pt diuresed with Lasix 40mg , and 1 bumex 9/2 for goal negative. she is net even today, CTM    #FEN/GI:  - now s/p extubation w/ removal of feeding tube  - S&S eval ordered- puree diet w/ thin liquids ordered, pt had refused initially to try solid foods. pt now refusing pure diet, tolerated pancakes and eggs this morning without issue. S&S consult ordered for re-evaluation  - pt having BMs      #Renal:  - MANDY in setting of sepsis, now improving  - Patient BUN/Cr 58/1.93, peaked to 2, however Cr now downtrending. Cr 1.44-> 1.25 today.   - s/p 1 bumex 9/2    - UOP: -65ml today, -4 L LOS, makes approx 100ml/hr.  - started on diuresis w/ lasix 40mg po qd, goal net neg 0-1L  - electrolytes WNLs      #ID:  - Pt initially leukopenic at 3.02, hypothermic at 91 F , was afebrile overnight , now leukocytosis on labs  - Patient started on Zosyn and Aztreonam, Aztreonam was d/c and changed to Cefepime, metro was added for anaerobic coverage.  - UCx shows E Coli, sensitivities pending  - Chest xray shows b/l pleural effusions, possible source  - MRSA Swab NEG  - Legionella NEG  - Vancomycin d/c  - dc'd metro , continue cefepime 9/2-9/6    #Heme:  Anemia  - Hgb 8.3, will transfuse the patient if hgb is less than 7. Patient has had anemia in the past with hgb in the 8s  - Obtain type and screen for patient  - Hg is stable, no concern for acute bleeding at this time  - Hep SubQ, for DVT prophylaxis    #Endo:  Concern for Adrenal insufficiency  - Pt initially hypothermic at 91 F and bradycardic , now resolved   - Low cortisol on labs (7at 7:40pm on 8/30), pt was started on stress dose steroids w/ hydrocortisone 50mg q6h   - TSH 4.82  - seen by endo for eval of possible adrenal insufficiency, per endo previous negative co-syntropin stim test 7/16 on prior admission. would expect to have higher cortisol with acute decompensation.   - pt extubated 9/3- hydrocortisone tapered to 50mg IV q12hrs  - per endo recs- continue hydrocortisone 50mg q12h on 9/4, plan to taper to hydrocortisone 25mg q12h on 9/5, pending clinical stability. likely re-perform co-syntropin stim test once can wean hydrocortisone further      T2DM  - takes insulin 'prn' at home  - per endo pt previously on high doses of lantus and admelog as well as metformin and victoza in the past, however had presented to last hospitalization with frequent episodes of hypoglycemia. last admission pt on minimal amounts of insulin and Dc'd on lantus 2units QD  - ISS , monitor FS  - not lantus at this time  - Target -180 while in ICU, FS 150s  - Discharge recommendations: Possibly low dose basal insulin, pending clinical course. Endo following.  - c/o b/l burning pain to soles of feet, likely diabetic neuropathy. started on gabapentin 100 qd.    #Ethics:  - Full Code Status  - PT consult ordered    FOR FOLLOW UP:  [ ] PT  [ ] Endo recs MICU Transfer Note    Transfer from: MICU    Transfer to: (  ) Medicine    (  ) Telemetry     (   ) RCU        (    ) Palliative         (   ) Stroke Unit          (   ) __________________    Accepting Physician: Dr. Rogers García  Signout given to: Hospitalist    HPI/MICU COURSE:    67y F PMHx of HTN, HLD, sarcoidosis, CHF, recent adm with PEA arrest x2, presented to the ED for BL leg swelling and SOB. The patient was admitted on July 13th and was found to have pleural effusion, elevated BNP and LE edema concerning for CHF exacerbation. Admitted for metabolic encephalopathy and CHF exacerbation. Intubated 7/13 for AMS, extubated 7/16 and transferred to floor. Code called on 7/17 am, PEA, ROSC achieved. The patient was discharged to rehab facility on August 5th.     on 8/30 pt presented to the ED with 1 week of BL leg swelling and SOB that developed last night. The patient was released from rehab on Thursday. Per the patients daughter her leg swelling was initially noticed last week before she left the rehab facility. She had been getting lasix 20mg daily but that did not help with her symptoms Her legs progressively became more swollen. The daughter states that she noticed her mom become more short of breath last night and was unable to sleep . The patient is on home oxygen and was not able to obtain relief with oxygen. This morning the patient became delerius and was stating things that did not make sense per daughter which prompted her to bring the patient to the ED.     Pt was intubated in ED on 8/30 for AMS and respiratory distress and subsequently transferred to MICU for further care. She underwent a bronchoscopy 8/31 which found L sided mucus plug, BAL however grew normal respiratory ra. Urine culture grew klebsiella pneumonia and e. coli, pt was treated w/ Cefepime. She was seen by endocrinology given concern for adrenal insufficiency as she was hypothermic and bradycardic on arrival and started on stress dose steroids. She was weaned off of sedation and passed pressure support trials and was successfully extubated on 9/3 to facetent, Spo2 now stable on RA. She is hemodynamically stable for transfer to floors.     ASSESSMENT & PLAN:      67y F PMHx of HTN, HLD, sarcoidosis, CHF, recent adm with PEA arrest x2, patient presented to the ED with 1 week of BL leg swelling and SOB that developed last night. Patient became altered in the ED and had respiratory distress was intubated and admitted to MICU.    #Neuro:  #AMS- resolved  - Intubated for AMS likely in setting of sepsis 2/2 UTI vs hypercapnia  - [facility] 8/30 w/o acute pathology  - s/p sedation w/ propofol/fentanyl, now extubated 9/3.  - mentating well, A&Ox3 , hard of heading.     #Cardiovascular:  ##Hx CHF  - TTE reduced LV systolic function EF 55%, diastolic dysfunction.   - MICU team POCUS with normal RV, no right heart strain  - HTN  to 170s today, restarted on home coreg 25mg BID and amlodipine 10mg QD today  - continue tapering off of stress dose steroids   - started on lasix 40mg po qd    ##Sinus bradycardia -resolved   - pt initially presented with EKG w/ sinus eugenie  - Hr now 70s    #Pulmonary:  Hypercapneic resp failure  -s/p Int 8/30 on admission, extubated to [facility] 9/3   - Chest XR shows resolving pleural effusion on the R when compared to 7/30, but b/l pleural effusions are still present.    - POCUS of lungs to evaluate for effusions - moderate effusion on R  - pt diuresed with Lasix 40mg , and 1 bumex 9/2 for goal negative. she is net even today, CTM    #FEN/GI:  - now s/p extubation w/ removal of feeding tube  - S&S eval ordered- puree diet w/ thin liquids ordered, pt had refused initially to try solid foods. pt now refusing pure diet, tolerated pancakes and eggs this morning without issue. S&S consult ordered for re-evaluation  - pt having BMs      #Renal:  - MANDY in setting of sepsis, now improving  - Patient BUN/Cr 58/1.93, peaked to 2, however Cr now downtrending. Cr 1.44-> 1.25 today.   - s/p 1 bumex 9/2    - UOP: -65ml today, -4 L LOS, makes approx 100ml/hr.  - started on diuresis w/ lasix 40mg po qd, goal net neg 0-1L  - electrolytes WNLs  - Jeffrey TANG'd, passed trial of void     #ID:  - Pt initially leukopenic at 3.02, hypothermic at 91 F , was afebrile overnight , now leukocytosis on labs  - Patient started on Zosyn and Aztreonam, Aztreonam was d/c and changed to Cefepime, metro was added for anaerobic coverage.  - UCx shows E Coli, sensitivities pending  - Chest xray shows b/l pleural effusions, possible source  - MRSA Swab NEG  - Legionella NEG  - Vancomycin d/c  - dc'd metro , continue cefepime 9/2-9/6    #Heme:  Anemia  - Hgb 8.3, will transfuse the patient if hgb is less than 7. Patient has had anemia in the past with hgb in the 8s  - Obtain type and screen for patient  - Hg is stable, no concern for acute bleeding at this time  - Hep SubQ, for DVT prophylaxis    #Endo:  Concern for Adrenal insufficiency  - Pt initially hypothermic at 91 F and bradycardic , now resolved   - Low cortisol on labs (7at 7:40pm on 8/30), pt was started on stress dose steroids w/ hydrocortisone 50mg q6h   - TSH 4.82  - seen by endo for eval of possible adrenal insufficiency, per endo previous negative co-syntropin stim test 7/16 on prior admission. would expect to have higher cortisol with acute decompensation.   - pt extubated 9/3- hydrocortisone tapered to 50mg IV q12hrs  - per endo recs- continue hydrocortisone 50mg q12h on 9/4, plan to taper to hydrocortisone 25mg q12h on 9/5, pending clinical stability. likely re-perform co-syntropin stim test once can wean hydrocortisone further      T2DM  - takes insulin 'prn' at home  - per endo pt previously on high doses of lantus and admelog as well as metformin and victoza in the past, however had presented to last hospitalization with frequent episodes of hypoglycemia. last admission pt on minimal amounts of insulin and Dc'd on lantus 2units QD  - ISS , monitor FS  - not lantus at this time  - Target -180 while in ICU, FS 150s  - Discharge recommendations: Possibly low dose basal insulin, pending clinical course. Endo following.  - c/o b/l burning pain to soles of feet, likely diabetic neuropathy. started on gabapentin 100 qd.    #Ethics:  - Full Code Status  - PT consult ordered    FOR FOLLOW UP:  [ ] PT  [ ] Endo recs

## 2022-09-04 NOTE — PROGRESS NOTE ADULT - ASSESSMENT
67F w/ PMHx of HTN, HLD, T2DM, sarcoidosis, CHF, presented to the ED for BL leg swelling and SOB. Endocrinology consulted for concern for Addisons Disease.    #Concern for Adrenal Insuffiency  #Hypothermia  - patient currently intubated, not on pressors  - presented with shortness of breath, swelling of her legs and AMS with acute episode of bradycardia, hypothermia and respiratory distress in ED prompting intubation  - patient reportedly hypothermic to 91F in ED, now off warmer and normothermic  - no darkening of skin on physical exam  - TSH 4.82  - 8/31 Na 137. K 4.2  - previous negative co-syntropin stim test 7/16 on prior admission: 8am cortisol 16.1, 8:30am cortisol 22.4, 9:15am cortisol 24.7.  - cortisol 8/30 at 7:40pm 7, although not obtained at ideal time of 8am, would expect to have high cortisol with acute decompensation  - possible patient could have relative adrenal insufficiency, unclear other etiologies of secondary AI and less likely addisons disease without history of autoimmune conditions  - extubated 9/3 in am  PLAN  - patient hypertensive, continue hydrocortisone 50mg q12h on 9/4, plan to taper to hydrocortisone 25mg q12h on 9/5, pending clinical stability  - likely re-perform co-syntropin stim test once can wean hydrocortisone further    #T2DM  - HbA1c 6  - patient was previously on high doses of lantus and admelog as well as metformin and victoza in the past, however had presented to last hospitalization with frequent episodes of hypoglycemia  - C-peptide 5 on 7/17  - last admission, patient was on minimal amounts of insulin and discharged on lantus 2 units daily  PLAN  - low admelog correction scale q6h with fingersticks  - dextrose prn for hypoglycemia  - no lantus at this time  - if BG >300 persistently, would check BMP, VBG, BHB to rule out DKA  - Target -180 while in ICU  - Discharge recommendations: Possibly low dose basal insulin, pending clinical course    #HLD  - on atorvastatin 40mg daily outpatient  PLAN  - hold atorvastatin per primary team    Discussed with primary team.     Thank you for the interesting consult.    Sincere England MD, Endocrinology Fellow  Pager [phone] from 9am to 5pm. After hours and on weekends, please call [phone].

## 2022-09-04 NOTE — PROGRESS NOTE ADULT - NS ATTEST RISK PROBLEM GEN_ALL_CORE FT
Acute metabolic encephalopathy  Sepsis secondary to UTI   Acute on chronic diastolic heart failure exacerbation   Acute respiratory failure

## 2022-09-05 LAB
ALBUMIN SERPL ELPH-MCNC: 2.8 G/DL — LOW (ref 3.3–5)
ALP SERPL-CCNC: 98 U/L — SIGNIFICANT CHANGE UP (ref 40–120)
ALT FLD-CCNC: 14 U/L — SIGNIFICANT CHANGE UP (ref 4–33)
ANION GAP SERPL CALC-SCNC: 10 MMOL/L — SIGNIFICANT CHANGE UP (ref 7–14)
AST SERPL-CCNC: 16 U/L — SIGNIFICANT CHANGE UP (ref 4–32)
BASOPHILS # BLD AUTO: 0.01 K/UL — SIGNIFICANT CHANGE UP (ref 0–0.2)
BASOPHILS NFR BLD AUTO: 0.2 % — SIGNIFICANT CHANGE UP (ref 0–2)
BILIRUB DIRECT SERPL-MCNC: <0.2 MG/DL — SIGNIFICANT CHANGE UP (ref 0–0.3)
BILIRUB INDIRECT FLD-MCNC: >0.1 MG/DL — SIGNIFICANT CHANGE UP (ref 0–1)
BILIRUB SERPL-MCNC: 0.3 MG/DL — SIGNIFICANT CHANGE UP (ref 0.2–1.2)
BUN SERPL-MCNC: 44 MG/DL — HIGH (ref 7–23)
CALCIUM SERPL-MCNC: 8.3 MG/DL — LOW (ref 8.4–10.5)
CHLORIDE SERPL-SCNC: 104 MMOL/L — SIGNIFICANT CHANGE UP (ref 98–107)
CO2 SERPL-SCNC: 26 MMOL/L — SIGNIFICANT CHANGE UP (ref 22–31)
CREAT SERPL-MCNC: 1.25 MG/DL — SIGNIFICANT CHANGE UP (ref 0.5–1.3)
CULTURE RESULTS: SIGNIFICANT CHANGE UP
CULTURE RESULTS: SIGNIFICANT CHANGE UP
EGFR: 47 ML/MIN/1.73M2 — LOW
EOSINOPHIL # BLD AUTO: 0.01 K/UL — SIGNIFICANT CHANGE UP (ref 0–0.5)
EOSINOPHIL NFR BLD AUTO: 0.2 % — SIGNIFICANT CHANGE UP (ref 0–6)
GLUCOSE BLDC GLUCOMTR-MCNC: 154 MG/DL — HIGH (ref 70–99)
GLUCOSE BLDC GLUCOMTR-MCNC: 239 MG/DL — HIGH (ref 70–99)
GLUCOSE BLDC GLUCOMTR-MCNC: 239 MG/DL — HIGH (ref 70–99)
GLUCOSE BLDC GLUCOMTR-MCNC: 255 MG/DL — HIGH (ref 70–99)
GLUCOSE SERPL-MCNC: 213 MG/DL — HIGH (ref 70–99)
HCT VFR BLD CALC: 31 % — LOW (ref 34.5–45)
HGB BLD-MCNC: 9.1 G/DL — LOW (ref 11.5–15.5)
IANC: 3.71 K/UL — SIGNIFICANT CHANGE UP (ref 1.8–7.4)
IMM GRANULOCYTES NFR BLD AUTO: 1.1 % — SIGNIFICANT CHANGE UP (ref 0–1.5)
LYMPHOCYTES # BLD AUTO: 0.67 K/UL — LOW (ref 1–3.3)
LYMPHOCYTES # BLD AUTO: 14.3 % — SIGNIFICANT CHANGE UP (ref 13–44)
MAGNESIUM SERPL-MCNC: 1.8 MG/DL — SIGNIFICANT CHANGE UP (ref 1.6–2.6)
MCHC RBC-ENTMCNC: 25.1 PG — LOW (ref 27–34)
MCHC RBC-ENTMCNC: 29.4 GM/DL — LOW (ref 32–36)
MCV RBC AUTO: 85.4 FL — SIGNIFICANT CHANGE UP (ref 80–100)
MONOCYTES # BLD AUTO: 0.25 K/UL — SIGNIFICANT CHANGE UP (ref 0–0.9)
MONOCYTES NFR BLD AUTO: 5.3 % — SIGNIFICANT CHANGE UP (ref 2–14)
NEUTROPHILS # BLD AUTO: 3.71 K/UL — SIGNIFICANT CHANGE UP (ref 1.8–7.4)
NEUTROPHILS NFR BLD AUTO: 78.9 % — HIGH (ref 43–77)
NRBC # BLD: 0 /100 WBCS — SIGNIFICANT CHANGE UP (ref 0–0)
NRBC # FLD: 0 K/UL — SIGNIFICANT CHANGE UP (ref 0–0)
PHOSPHATE SERPL-MCNC: 2.2 MG/DL — LOW (ref 2.5–4.5)
PLATELET # BLD AUTO: 187 K/UL — SIGNIFICANT CHANGE UP (ref 150–400)
POTASSIUM SERPL-MCNC: 3.9 MMOL/L — SIGNIFICANT CHANGE UP (ref 3.5–5.3)
POTASSIUM SERPL-SCNC: 3.9 MMOL/L — SIGNIFICANT CHANGE UP (ref 3.5–5.3)
PROT SERPL-MCNC: 6.4 G/DL — SIGNIFICANT CHANGE UP (ref 6–8.3)
RBC # BLD: 3.63 M/UL — LOW (ref 3.8–5.2)
RBC # FLD: 15.8 % — HIGH (ref 10.3–14.5)
SODIUM SERPL-SCNC: 140 MMOL/L — SIGNIFICANT CHANGE UP (ref 135–145)
SPECIMEN SOURCE: SIGNIFICANT CHANGE UP
SPECIMEN SOURCE: SIGNIFICANT CHANGE UP
WBC # BLD: 4.7 K/UL — SIGNIFICANT CHANGE UP (ref 3.8–10.5)
WBC # FLD AUTO: 4.7 K/UL — SIGNIFICANT CHANGE UP (ref 3.8–10.5)

## 2022-09-05 PROCEDURE — 99233 SBSQ HOSP IP/OBS HIGH 50: CPT | Mod: GC

## 2022-09-05 RX ORDER — HYDROCORTISONE 20 MG
10 TABLET ORAL ONCE
Refills: 0 | Status: DISCONTINUED | OUTPATIENT
Start: 2022-09-06 | End: 2022-09-06

## 2022-09-05 RX ORDER — GABAPENTIN 400 MG/1
100 CAPSULE ORAL DAILY
Refills: 0 | Status: DISCONTINUED | OUTPATIENT
Start: 2022-09-05 | End: 2022-09-09

## 2022-09-05 RX ORDER — CEFEPIME 1 G/1
1000 INJECTION, POWDER, FOR SOLUTION INTRAMUSCULAR; INTRAVENOUS EVERY 12 HOURS
Refills: 0 | Status: COMPLETED | OUTPATIENT
Start: 2022-09-05 | End: 2022-09-06

## 2022-09-05 RX ORDER — HYDRALAZINE HCL 50 MG
50 TABLET ORAL THREE TIMES A DAY
Refills: 0 | Status: DISCONTINUED | OUTPATIENT
Start: 2022-09-05 | End: 2022-09-07

## 2022-09-05 RX ORDER — CEFEPIME 1 G/1
1000 INJECTION, POWDER, FOR SOLUTION INTRAMUSCULAR; INTRAVENOUS EVERY 12 HOURS
Refills: 0 | Status: DISCONTINUED | OUTPATIENT
Start: 2022-09-05 | End: 2022-09-05

## 2022-09-05 RX ORDER — HYDROCORTISONE 20 MG
20 TABLET ORAL ONCE
Refills: 0 | Status: COMPLETED | OUTPATIENT
Start: 2022-09-06 | End: 2022-09-06

## 2022-09-05 RX ORDER — ACETAMINOPHEN 500 MG
650 TABLET ORAL EVERY 6 HOURS
Refills: 0 | Status: DISCONTINUED | OUTPATIENT
Start: 2022-09-05 | End: 2022-09-09

## 2022-09-05 RX ORDER — POTASSIUM CHLORIDE 20 MEQ
20 PACKET (EA) ORAL ONCE
Refills: 0 | Status: COMPLETED | OUTPATIENT
Start: 2022-09-05 | End: 2022-09-05

## 2022-09-05 RX ORDER — FUROSEMIDE 40 MG
40 TABLET ORAL DAILY
Refills: 0 | Status: DISCONTINUED | OUTPATIENT
Start: 2022-09-05 | End: 2022-09-09

## 2022-09-05 RX ORDER — MAGNESIUM SULFATE 500 MG/ML
1 VIAL (ML) INJECTION ONCE
Refills: 0 | Status: COMPLETED | OUTPATIENT
Start: 2022-09-05 | End: 2022-09-05

## 2022-09-05 RX ADMIN — GABAPENTIN 100 MILLIGRAM(S): 400 CAPSULE ORAL at 11:12

## 2022-09-05 RX ADMIN — Medication 1: at 07:57

## 2022-09-05 RX ADMIN — CHLORHEXIDINE GLUCONATE 1 APPLICATION(S): 213 SOLUTION TOPICAL at 11:17

## 2022-09-05 RX ADMIN — ALBUTEROL 2 PUFF(S): 90 AEROSOL, METERED ORAL at 09:25

## 2022-09-05 RX ADMIN — HEPARIN SODIUM 5000 UNIT(S): 5000 INJECTION INTRAVENOUS; SUBCUTANEOUS at 21:17

## 2022-09-05 RX ADMIN — Medication 40 MILLIGRAM(S): at 13:49

## 2022-09-05 RX ADMIN — CARVEDILOL PHOSPHATE 25 MILLIGRAM(S): 80 CAPSULE, EXTENDED RELEASE ORAL at 17:33

## 2022-09-05 RX ADMIN — Medication 100 GRAM(S): at 03:50

## 2022-09-05 RX ADMIN — Medication 25 MILLIGRAM(S): at 06:10

## 2022-09-05 RX ADMIN — CEFEPIME 100 MILLIGRAM(S): 1 INJECTION, POWDER, FOR SOLUTION INTRAMUSCULAR; INTRAVENOUS at 11:12

## 2022-09-05 RX ADMIN — CEFEPIME 100 MILLIGRAM(S): 1 INJECTION, POWDER, FOR SOLUTION INTRAMUSCULAR; INTRAVENOUS at 00:24

## 2022-09-05 RX ADMIN — Medication 50 MILLIGRAM(S): at 13:48

## 2022-09-05 RX ADMIN — Medication 3: at 11:18

## 2022-09-05 RX ADMIN — CARVEDILOL PHOSPHATE 25 MILLIGRAM(S): 80 CAPSULE, EXTENDED RELEASE ORAL at 06:10

## 2022-09-05 RX ADMIN — ALBUTEROL 2 PUFF(S): 90 AEROSOL, METERED ORAL at 15:32

## 2022-09-05 RX ADMIN — AMLODIPINE BESYLATE 10 MILLIGRAM(S): 2.5 TABLET ORAL at 06:09

## 2022-09-05 RX ADMIN — HEPARIN SODIUM 5000 UNIT(S): 5000 INJECTION INTRAVENOUS; SUBCUTANEOUS at 06:10

## 2022-09-05 RX ADMIN — CEFEPIME 100 MILLIGRAM(S): 1 INJECTION, POWDER, FOR SOLUTION INTRAMUSCULAR; INTRAVENOUS at 17:22

## 2022-09-05 RX ADMIN — HEPARIN SODIUM 5000 UNIT(S): 5000 INJECTION INTRAVENOUS; SUBCUTANEOUS at 13:50

## 2022-09-05 RX ADMIN — Medication 650 MILLIGRAM(S): at 00:40

## 2022-09-05 RX ADMIN — Medication 25 MILLIGRAM(S): at 17:23

## 2022-09-05 RX ADMIN — Medication 50 MILLIGRAM(S): at 09:23

## 2022-09-05 RX ADMIN — Medication 2: at 17:21

## 2022-09-05 RX ADMIN — Medication 20 MILLIEQUIVALENT(S): at 06:10

## 2022-09-05 RX ADMIN — Medication 50 MILLIGRAM(S): at 21:17

## 2022-09-05 NOTE — SWALLOW BEDSIDE ASSESSMENT ADULT - ASR SWALLOW ASPIRATION MONITOR
Schedule with Dr. Jordan in 2-3 weeks.   Start taking olanzapine 5 mg at bed time.   change of breathing pattern/oral hygiene/position upright (90Y)/cough/gurgly voice/fever/pneumonia/throat clearing/upper respiratory infection

## 2022-09-05 NOTE — SWALLOW BEDSIDE ASSESSMENT ADULT - ADDITIONAL RECOMMENDATIONS
1. Reconsult this service as patient becomes medically optimized for possible diet advancement 2. This service to follow up as schedule permits.
1. Monitor tolerance and reconsult this service as indicated  2. This service will attempt to follow up as schedule permits

## 2022-09-05 NOTE — PROGRESS NOTE ADULT - SUBJECTIVE AND OBJECTIVE BOX
MICU PROGRESS NOTE    INTERVAL HPI/OVERNIGHT EVENTS: No significant overnight events.    SUBJECTIVE: Feels well, denies pain or any other complaints.    OBJECTIVE:    VITAL SIGNS:  ICU Vital Signs Last 24 Hrs  T(C): 37.1 (02 Sep 2022 12:00), Max: 37.2 (02 Sep 2022 04:00)  T(F): 98.7 (02 Sep 2022 12:00), Max: 99 (02 Sep 2022 04:00)  HR: 91 (02 Sep 2022 12:00) (73 - 100)  BP: 133/61 (02 Sep 2022 12:00) (127/62 - 153/63)  BP(mean): 80 (02 Sep 2022 12:00) (77 - 89)  ABP: --  ABP(mean): --  RR: 13 (02 Sep 2022 12:00) (8 - 26)  SpO2: 96% (02 Sep 2022 12:00) (94% - 100%)    O2 Parameters below as of 02 Sep 2022 12:00  Patient On (Oxygen Delivery Method): ventilator,CPAP 5/5    O2 Concentration (%): 30    Mode: CPAP with PS  FiO2: 21  PEEP: 5  PS: 5  MAP: 7  PIP: 11    I&O:    I&O's Summary    01 Sep 2022 07:01  -  02 Sep 2022 07:00  --------------------------------------------------------  IN: 690.9 mL / OUT: 1725 mL / NET: -1034.1 mL    02 Sep 2022 07:01  -  02 Sep 2022 13:19  --------------------------------------------------------  IN: 0 mL / OUT: 300 mL / NET: -300 mL    PHYSICAL EXAM:  GENERAL: NAD  CHEST/LUNG: mild b/l crackles  HEART: Regular rate and rhythm; No murmurs, rubs, or gallops  ABDOMEN: Soft, Nontender, Nondistended; Bowel sounds present  EXTREMITIES:  2+ Peripheral Pulses, No edema  SKIN: No rashes or lesions  NERVOUS SYSTEM: patient is awake and alert, following commands. no neuro deficits noted.     MEDICATIONS:  MEDICATIONS  (STANDING):  ALBUTerol    90 MICROgram(s) HFA Inhaler 2 Puff(s) Inhalation every 6 hours  aztreonam  IVPB 1000 milliGRAM(s) IV Intermittent every 8 hours  aztreonam  IVPB      chlorhexidine 0.12% Liquid 15 milliLiter(s) Oral Mucosa every 12 hours  chlorhexidine 4% Liquid 1 Application(s) Topical <User Schedule>  dextrose 5%. 1000 milliLiter(s) (100 mL/Hr) IV Continuous <Continuous>  dextrose 5%. 1000 milliLiter(s) (50 mL/Hr) IV Continuous <Continuous>  dextrose 50% Injectable 25 Gram(s) IV Push once  dextrose 50% Injectable 12.5 Gram(s) IV Push once  dextrose 50% Injectable 25 Gram(s) IV Push once  fentaNYL   Infusion. 1 MICROgram(s)/kG/Hr (8.5 mL/Hr) IV Continuous <Continuous>  glucagon  Injectable 1 milliGRAM(s) IntraMuscular once  heparin   Injectable 5000 Unit(s) SubCutaneous every 8 hours  hydrocortisone sodium succinate Injectable 50 milliGRAM(s) IV Push every 8 hours  metroNIDAZOLE  IVPB 500 milliGRAM(s) IV Intermittent every 8 hours  metroNIDAZOLE  IVPB      petrolatum Ophthalmic Ointment 1 Application(s) Both EYES two times a day  propofol Infusion 20 MICROgram(s)/kG/Min (10.2 mL/Hr) IV Continuous <Continuous>  sodium chloride 3%  Inhalation 4 milliLiter(s) Inhalation every 6 hours    MEDICATIONS  (PRN):  dextrose Oral Gel 15 Gram(s) Oral once PRN Blood Glucose LESS THAN 70 milliGRAM(s)/deciliter    ALLERGIES:  Allergies    penicillin ([facility])    Intolerances    LABS:                        8.1    4.65  )-----------( 270      ( 02 Sep 2022 01:00 )             26.8     09-02    141  |  106  |  59<H>  ----------------------------<  184<H>  4.9   |  22  |  1.97<H>    Ca    8.7      02 Sep 2022 11:29  Phos  6.5     09-02  Mg     2.10     09-02    TPro  7.0  /  Alb  3.1<L>  /  TBili  <0.2  /  DBili  x   /  AST  15  /  ALT  19  /  AlkPhos  121<H>  09-02    PTT - ( 01 Sep 2022 01:35 )  PTT:39.2 sec           MICU PROGRESS NOTE    INTERVAL HPI/OVERNIGHT EVENTS: No significant overnight events.    SUBJECTIVE: States she feels better today, is in good spirits. Denies pain or any other complaints.    OBJECTIVE:    VITAL SIGNS:  ICU Vital Signs Last 24 Hrs  T(C): 37.1 (02 Sep 2022 12:00), Max: 37.2 (02 Sep 2022 04:00)  T(F): 98.7 (02 Sep 2022 12:00), Max: 99 (02 Sep 2022 04:00)  HR: 91 (02 Sep 2022 12:00) (73 - 100)  BP: 133/61 (02 Sep 2022 12:00) (127/62 - 153/63)  BP(mean): 80 (02 Sep 2022 12:00) (77 - 89)  ABP: --  ABP(mean): --  RR: 13 (02 Sep 2022 12:00) (8 - 26)  SpO2: 96% (02 Sep 2022 12:00) (94% - 100%)    O2 Parameters below as of 02 Sep 2022 12:00  Patient On (Oxygen Delivery Method): ventilator,CPAP 5/5    O2 Concentration (%): 30    Mode: CPAP with PS  FiO2: 21  PEEP: 5  PS: 5  MAP: 7  PIP: 11    I&O:    I&O's Summary    01 Sep 2022 07:01  -  02 Sep 2022 07:00  --------------------------------------------------------  IN: 690.9 mL / OUT: 1725 mL / NET: -1034.1 mL    02 Sep 2022 07:01  -  02 Sep 2022 13:19  --------------------------------------------------------  IN: 0 mL / OUT: 300 mL / NET: -300 mL    PHYSICAL EXAM:  GENERAL: NAD  CHEST/LUNG: mild b/l crackles  HEART: Regular rate and rhythm; No murmurs, rubs, or gallops  ABDOMEN: Soft, Nontender, Nondistended; Bowel sounds present  EXTREMITIES:  2+ Peripheral Pulses, No edema  SKIN: No rashes or lesions  NERVOUS SYSTEM: patient is awake and alert, following commands. no neuro deficits noted.     MEDICATIONS:  MEDICATIONS  (STANDING):  ALBUTerol    90 MICROgram(s) HFA Inhaler 2 Puff(s) Inhalation every 6 hours  aztreonam  IVPB 1000 milliGRAM(s) IV Intermittent every 8 hours  aztreonam  IVPB      chlorhexidine 0.12% Liquid 15 milliLiter(s) Oral Mucosa every 12 hours  chlorhexidine 4% Liquid 1 Application(s) Topical <User Schedule>  dextrose 5%. 1000 milliLiter(s) (100 mL/Hr) IV Continuous <Continuous>  dextrose 5%. 1000 milliLiter(s) (50 mL/Hr) IV Continuous <Continuous>  dextrose 50% Injectable 25 Gram(s) IV Push once  dextrose 50% Injectable 12.5 Gram(s) IV Push once  dextrose 50% Injectable 25 Gram(s) IV Push once  fentaNYL   Infusion. 1 MICROgram(s)/kG/Hr (8.5 mL/Hr) IV Continuous <Continuous>  glucagon  Injectable 1 milliGRAM(s) IntraMuscular once  heparin   Injectable 5000 Unit(s) SubCutaneous every 8 hours  hydrocortisone sodium succinate Injectable 50 milliGRAM(s) IV Push every 8 hours  metroNIDAZOLE  IVPB 500 milliGRAM(s) IV Intermittent every 8 hours  metroNIDAZOLE  IVPB      petrolatum Ophthalmic Ointment 1 Application(s) Both EYES two times a day  propofol Infusion 20 MICROgram(s)/kG/Min (10.2 mL/Hr) IV Continuous <Continuous>  sodium chloride 3%  Inhalation 4 milliLiter(s) Inhalation every 6 hours    MEDICATIONS  (PRN):  dextrose Oral Gel 15 Gram(s) Oral once PRN Blood Glucose LESS THAN 70 milliGRAM(s)/deciliter    ALLERGIES:  Allergies    penicillin ([facility])    Intolerances    LABS:                        8.1    4.65  )-----------( 270      ( 02 Sep 2022 01:00 )             26.8     09-02    141  |  106  |  59<H>  ----------------------------<  184<H>  4.9   |  22  |  1.97<H>    Ca    8.7      02 Sep 2022 11:29  Phos  6.5     09-02  Mg     2.10     09-02    TPro  7.0  /  Alb  3.1<L>  /  TBili  <0.2  /  DBili  x   /  AST  15  /  ALT  19  /  AlkPhos  121<H>  09-02    PTT - ( 01 Sep 2022 01:35 )  PTT:39.2 sec

## 2022-09-05 NOTE — PHYSICAL THERAPY INITIAL EVALUATION ADULT - GENERAL OBSERVATIONS, REHAB EVAL
Pt received semi-supine in bed, +IV, +nasal cannula, comfortable and in NAD. Pt agreeable to participate in PT evaluation.

## 2022-09-05 NOTE — SWALLOW BEDSIDE ASSESSMENT ADULT - COMMENTS
SLP reviewed chart, noted patient seen by this service during this hospitalization, most recently on 9/3/22 with recommendation of puree and thin liquids given patient refused to trial solids. SLP received reconsult for upgrade to solids. SLP received patient in bed, awake, alert, hard of hearing but receptive when spoken to at a louder volume.
MICU 9/3 "67y F PMHx of HTN, HLD, sarcoidosis, CHF, recent adm with PEA arrest x2, patient presented to the ED with 1 week of BL leg swelling and SOB that developed last night. Patient became altered in the ED and had respiratory distress was intubated and admitted to MICU."    CXR 8/30 "Increased pulmonary vascular markings and bilateral pleural effusions, consistent with pulmonary edema. Underlying infection cannot be excluded."    Patient is known to this service as patient was seen during a previous admission, at which time patient completed a Cinesophagram on 7/29 with recommendations of regular solids with thin liquids (please see reports).     Patient seen at bedside in MICU this afternoon. Patient with NG tube and nasal cannula in place. patient able to gesture wants/needs via head nodding. Patient with minimal verbal output. Patient intermittently followed directives.

## 2022-09-05 NOTE — PROGRESS NOTE ADULT - ASSESSMENT
67y F PMHx of HTN, HLD, sarcoidosis, CHF, recent adm with PEA arrest x2, patient presented to the ED with 1 week of BL leg swelling and SOB that developed last night. Patient became altered in the ED and had respiratory distress was intubated and admitted to MICU.    #Neuro:  #AMS- resolved  - Intubated for AMS likely in setting of sepsis 2/2 UTI vs hypercapnia  - [facility]TH 8/30 w/o acute pathology  - s/p sedation w/ propofol/fentanyl, now extubated 9/3.  - mentating well, A&Ox3 however hard of heading.    #Cardiovascular:  ##Hx CHF  - TTE reduced LV systolic function EF 55%, diastolic dysfunction.   - MICU team POCUS with normal RV, no right heart strain  - HTN  to 170s today, restarted on home coreg 25mg BID and amlodipine 10mg QD today  - continue tapering off of stress dose steroids     ##Sinus bradycardia -resolved   - pt initially presented with EKG w/ sinus eugenie  - Hr now 70s    #Pulmonary:  Hypercapneic resp failure  -s/p Int 8/30 on admission, extubated to [facility] 9/3   - Chest XR shows resolving pleural effusion on the R when compared to 7/30, but b/l pleural effusions are still present.    - POCUS of lungs to evaluate for effusions - moderate effusion on R  - pt diuresed with Lasix 40mg , and 1 bumex 9/2 for goal negative 1 or 2 L . she is neg 1.4 L today and neg 3.9 for LOS.     #FEN/GI:  - now s/p extubation w/ removal of feeding tube  - S&S eval ordered- puree diet w/ thin liquids ordered  - per RN no BM since 8/30 and decreased BS on exam , abdominal Xray neg. pt now having BMs      #Renal:  - Patient BUN/Cr 58/1.93, mildly elevated compared to labs from previous admission, however Cr downtrending  - Renal consult not necessary at this time  - Obtain BMP qdaily and monitor for changes in BUN/Cr egfr decreased consistent with previous admission.   - s/p 1 bumex 9/2  for goal of negative 1 or 2 L today. Can redose if goals not met. was net neg 1.7 L today, will hold off diuresis.    - electrolytes WNLs    #:  - UA shows blood in urine and proteinuria  - UCx shows E Coli and klebs pneumoniae, sensitivities appreciated   - On metro and cefepime for anaerobic coverage and recent admission w/ intubation and pseudomonas cultures. Dc'd metro today.     #ID:  - Pt initially leukopenic at 3.02, hypothermic at 91 F , Tmax overnight 100.3  - Patient started on Zosyn and Aztreonam, Aztreonam was d/c and changed to Cefepime, metro was added for anaerobic coverage.  - UCx shows E Coli, sensitivities pending  - Chest xray shows b/l pleural effusions, possible source  - MRSA Swab NEG  - Legionella NEG  - Vancomycin d/c  - dc'd metro , continue cefepime 9/2-9/6    #Heme:  Anemia  - Hgb 8.3, will transfuse the patient if hgb is less than 7. Patient has had anemia in the past with hgb in the 8s  - Obtain type and screen for patient  - Hg is stable, no concern for acute bleeding at this time  - Hep SubQ, for DVT prophylaxis    #Endo:  Concern for Adrenal insufficiency  - Pt initially hypothermic at 91 F and bradycardic , now resolved   - Low cortisol on labs (7at 7:40pm on 8/30), pt was started on stress dose steroids w/ hydrocortisone 50mg q6h   - TSH 4.82  - seen by endo for eval of possible adrenal insufficiency, per endo previous negative co-syntropin stim test 7/16 on prior admission. would expect to have higher cortisol with acute decompensation.   - pt extubated 9/3- hydrocortisone tapered to 50mg IV q12hrs  - per endo recs- continue hydrocortisone 50mg q12h on 9/4, plan to taper to hydrocortisone 25mg q12h on 9/5, pending clinical stability. likely re-perform co-syntropin stim test once can wean hydrocortisone further      T2DM  - takes insulin 'prn' at home  - per endo pt previously on high doses of lantus and admelog as well as metformin and victoza in the past, however had presented to last hospitalization with frequent episodes of hypoglycemia. last admission pt on minimal amounts of insulin and Dc'd on lantus 2units QD  - ISS , monitor FS  - not lantus at this time  - Target -180 while in ICU, FS 190s today  - Discharge recommendations: Possibly low dose basal insulin, pending clinical course    #Ethics:  - Full Code Status  - PT consult ordered 67y F PMHx of HTN, HLD, sarcoidosis, CHF, recent adm with PEA arrest x2, patient presented to the ED with 1 week of BL leg swelling and SOB that developed last night. Patient became altered in the ED and had respiratory distress was intubated and admitted to MICU.    #Neuro:  #AMS- resolved  - Intubated for AMS likely in setting of sepsis 2/2 UTI vs hypercapnia  - [facility] 8/30 w/o acute pathology  - s/p sedation w/ propofol/fentanyl, now extubated 9/3.  - mentating well, A&Ox3 , hard of heading.     #Cardiovascular:  ##Hx CHF  - TTE reduced LV systolic function EF 55%, diastolic dysfunction.   - MICU team POCUS with normal RV, no right heart strain  - HTN  to 170s today, restarted on home coreg 25mg BID and amlodipine 10mg QD today  - continue tapering off of stress dose steroids     ##Sinus bradycardia -resolved   - pt initially presented with EKG w/ sinus eugenie  - Hr now 70s    #Pulmonary:  Hypercapneic resp failure  -s/p Int 8/30 on admission, extubated to [facility] 9/3   - Chest XR shows resolving pleural effusion on the R when compared to 7/30, but b/l pleural effusions are still present.    - POCUS of lungs to evaluate for effusions - moderate effusion on R  - pt diuresed with Lasix 40mg , and 1 bumex 9/2 for goal negative. she is net even today, CTM    #FEN/GI:  - now s/p extubation w/ removal of feeding tube  - S&S eval ordered- puree diet w/ thin liquids ordered, pt had refused initially to try solid foods. pt now refusing pure diet, tolerated pancakes and eggs this morning without issue. S&S consult ordered for re-evaluation  - pt having BMs      #Renal:  - MANDY in setting of sepsis, now improving  - Patient BUN/Cr 58/1.93, peaked to 2, however Cr now downtrending. Cr 1.44-> 1.25 today.   - s/p 1 bumex 9/2    - UOP: -65ml today, -4 L LOS, makes approx 100ml/hr.  - no need for diuresis at present, CTM  - electrolytes WNLs      #ID:  - Pt initially leukopenic at 3.02, hypothermic at 91 F , was afebrile overnight , now leukocytosis on labs  - Patient started on Zosyn and Aztreonam, Aztreonam was d/c and changed to Cefepime, metro was added for anaerobic coverage.  - UCx shows E Coli, sensitivities pending  - Chest xray shows b/l pleural effusions, possible source  - MRSA Swab NEG  - Legionella NEG  - Vancomycin d/c  - dc'd metro , continue cefepime 9/2-9/6    #Heme:  Anemia  - Hgb 8.3, will transfuse the patient if hgb is less than 7. Patient has had anemia in the past with hgb in the 8s  - Obtain type and screen for patient  - Hg is stable, no concern for acute bleeding at this time  - Hep SubQ, for DVT prophylaxis    #Endo:  Concern for Adrenal insufficiency  - Pt initially hypothermic at 91 F and bradycardic , now resolved   - Low cortisol on labs (7at 7:40pm on 8/30), pt was started on stress dose steroids w/ hydrocortisone 50mg q6h   - TSH 4.82  - seen by endo for eval of possible adrenal insufficiency, per endo previous negative co-syntropin stim test 7/16 on prior admission. would expect to have higher cortisol with acute decompensation.   - pt extubated 9/3- hydrocortisone tapered to 50mg IV q12hrs  - per endo recs- continue hydrocortisone 50mg q12h on 9/4, plan to taper to hydrocortisone 25mg q12h on 9/5, pending clinical stability. likely re-perform co-syntropin stim test once can wean hydrocortisone further      T2DM  - takes insulin 'prn' at home  - per endo pt previously on high doses of lantus and admelog as well as metformin and victoza in the past, however had presented to last hospitalization with frequent episodes of hypoglycemia. last admission pt on minimal amounts of insulin and Dc'd on lantus 2units QD  - ISS , monitor FS  - not lantus at this time  - Target -180 while in ICU, FS 150s  - Discharge recommendations: Possibly low dose basal insulin, pending clinical course. Endo following.    #Ethics:  - Full Code Status  - PT consult ordered 67y F PMHx of HTN, HLD, sarcoidosis, CHF, recent adm with PEA arrest x2, patient presented to the ED with 1 week of BL leg swelling and SOB that developed last night. Patient became altered in the ED and had respiratory distress was intubated and admitted to MICU.    #Neuro:  #AMS- resolved  - Intubated for AMS likely in setting of sepsis 2/2 UTI vs hypercapnia  - [facility] 8/30 w/o acute pathology  - s/p sedation w/ propofol/fentanyl, now extubated 9/3.  - mentating well, A&Ox3 , hard of heading.     #Cardiovascular:  ##Hx CHF  - TTE reduced LV systolic function EF 55%, diastolic dysfunction.   - MICU team POCUS with normal RV, no right heart strain  - HTN  to 170s today, restarted on home coreg 25mg BID and amlodipine 10mg QD today  - continue tapering off of stress dose steroids   - started on lasix 40mg po qd    ##Sinus bradycardia -resolved   - pt initially presented with EKG w/ sinus eugenie  - Hr now 70s    #Pulmonary:  Hypercapneic resp failure  -s/p Int 8/30 on admission, extubated to [facility] 9/3   - Chest XR shows resolving pleural effusion on the R when compared to 7/30, but b/l pleural effusions are still present.    - POCUS of lungs to evaluate for effusions - moderate effusion on R  - pt diuresed with Lasix 40mg , and 1 bumex 9/2 for goal negative. she is net even today, CTM    #FEN/GI:  - now s/p extubation w/ removal of feeding tube  - S&S eval ordered- puree diet w/ thin liquids ordered, pt had refused initially to try solid foods. pt now refusing pure diet, tolerated pancakes and eggs this morning without issue. S&S consult ordered for re-evaluation  - pt having BMs      #Renal:  - MANDY in setting of sepsis, now improving  - Patient BUN/Cr 58/1.93, peaked to 2, however Cr now downtrending. Cr 1.44-> 1.25 today.   - s/p 1 bumex 9/2    - UOP: -65ml today, -4 L LOS, makes approx 100ml/hr.  - started on diuresis w/ lasix 40mg po qd, goal net neg 0-1L  - electrolytes WNLs      #ID:  - Pt initially leukopenic at 3.02, hypothermic at 91 F , was afebrile overnight , now leukocytosis on labs  - Patient started on Zosyn and Aztreonam, Aztreonam was d/c and changed to Cefepime, metro was added for anaerobic coverage.  - UCx shows E Coli, sensitivities pending  - Chest xray shows b/l pleural effusions, possible source  - MRSA Swab NEG  - Legionella NEG  - Vancomycin d/c  - dc'd metro , continue cefepime 9/2-9/6    #Heme:  Anemia  - Hgb 8.3, will transfuse the patient if hgb is less than 7. Patient has had anemia in the past with hgb in the 8s  - Obtain type and screen for patient  - Hg is stable, no concern for acute bleeding at this time  - Hep SubQ, for DVT prophylaxis    #Endo:  Concern for Adrenal insufficiency  - Pt initially hypothermic at 91 F and bradycardic , now resolved   - Low cortisol on labs (7at 7:40pm on 8/30), pt was started on stress dose steroids w/ hydrocortisone 50mg q6h   - TSH 4.82  - seen by endo for eval of possible adrenal insufficiency, per endo previous negative co-syntropin stim test 7/16 on prior admission. would expect to have higher cortisol with acute decompensation.   - pt extubated 9/3- hydrocortisone tapered to 50mg IV q12hrs  - per endo recs- continue hydrocortisone 50mg q12h on 9/4, plan to taper to hydrocortisone 25mg q12h on 9/5, pending clinical stability. likely re-perform co-syntropin stim test once can wean hydrocortisone further      T2DM  - takes insulin 'prn' at home  - per endo pt previously on high doses of lantus and admelog as well as metformin and victoza in the past, however had presented to last hospitalization with frequent episodes of hypoglycemia. last admission pt on minimal amounts of insulin and Dc'd on lantus 2units QD  - ISS , monitor FS  - not lantus at this time  - Target -180 while in ICU, FS 150s  - Discharge recommendations: Possibly low dose basal insulin, pending clinical course. Endo following.  - c/o b/l burning pain to soles of feet, likely diabetic neuropathy. started on gabapentin 100 qd.    #Ethics:  - Full Code Status  - PT consult ordered

## 2022-09-05 NOTE — PROGRESS NOTE ADULT - TIME BILLING
reviewing chart and coordinating care with primary team/consultants/staff, as well as reviewing vitals, radiology, medication list, recent labs, and prior records.

## 2022-09-05 NOTE — PROGRESS NOTE ADULT - ASSESSMENT
67F w/ PMHx of HTN, HLD, T2DM, sarcoidosis, CHF, presented to the ED for BL leg swelling and SOB. Endocrinology consulted for concern for Addisons Disease.    #Concern for Adrenal Insuffiency  #Hypothermia  - patient currently intubated, not on pressors  - presented with shortness of breath, swelling of her legs and AMS with acute episode of bradycardia, hypothermia and respiratory distress in ED prompting intubation  - patient reportedly hypothermic to 91F in ED, now off warmer and normothermic  - no darkening of skin on physical exam  - TSH 4.82  - 8/31 Na 137. K 4.2  - previous negative co-syntropin stim test 7/16 on prior admission: 8am cortisol 16.1, 8:30am cortisol 22.4, 9:15am cortisol 24.7.  - cortisol 8/30 at 7:40pm 7, although not obtained at ideal time of 8am, would expect to have high cortisol with acute decompensation  - possible patient could have relative adrenal insufficiency, unclear other etiologies of secondary AI and less likely addisons disease without history of autoimmune conditions  - extubated 9/3 in am  PLAN  - patient hypertensive, continue hydrocortisone 50mg q12h on 9/4, plan to taper to hydrocortisone 25mg q12h on 9/5, pending clinical stability  - likely re-perform co-syntropin stim test once can wean hydrocortisone further    #T2DM  - HbA1c 6  - patient was previously on high doses of lantus and admelog as well as metformin and victoza in the past, however had presented to last hospitalization with frequent episodes of hypoglycemia  - C-peptide 5 on 7/17  - last admission, patient was on minimal amounts of insulin and discharged on lantus 2 units daily  PLAN  - low admelog correction scale q6h with fingersticks  - dextrose prn for hypoglycemia  - no lantus at this time  - if BG >300 persistently, would check BMP, VBG, BHB to rule out DKA  - Target -180 while in ICU  - Discharge recommendations: Possibly low dose basal insulin, pending clinical course    #HLD  - on atorvastatin 40mg daily outpatient  PLAN  - hold atorvastatin per primary team    Discussed with primary team.     Thank you for the interesting consult.    Sincere England MD, Endocrinology Fellow  Pager [phone] from 9am to 5pm. After hours and on weekends, please call [phone]. 67F w/ PMHx of HTN, HLD, T2DM, sarcoidosis, CHF, presented to the ED for BL leg swelling and SOB. Endocrinology consulted for concern for Addisons Disease.    #Concern for Adrenal Insufficiency  #Hypothermia  - presented with shortness of breath, swelling of her legs and AMS with acute episode of bradycardia, hypothermia and respiratory distress in ED prompting intubation  - patient reportedly hypothermic to 91F in ED, now off warmer and normothermic  - no darkening of skin on physical exam  - TSH 4.82  - 8/31 Na 137. K 4.2  - previous negative co-syntropin stim test 7/16 on prior admission: 8am cortisol 16.1, 8:30am cortisol 22.4, 9:15am cortisol 24.7.  - cortisol 8/30 at 7:40pm 7, although not obtained at ideal time of 8am, would expect to have high cortisol with acute decompensation  - possible patient could have relative adrenal insufficiency, unclear other etiologies of secondary AI and less likely addisons disease without history of autoimmune conditions  - extubated 9/3 in am  PLAN  - patient hypertensive, continue hydrocortisone 50mg q12h on 9/4, plan to taper to hydrocortisone 25mg q12h on 9/5, pending clinical stability  - likely re-perform co-syntropin stim test once can wean hydrocortisone further    #T2DM  - HbA1c 6  - patient was previously on high doses of lantus and admelog as well as metformin and victoza in the past, however had presented to last hospitalization with frequent episodes of hypoglycemia  - C-peptide 5 on 7/17  - last admission, patient was on minimal amounts of insulin and discharged on lantus 2 units daily  PLAN  - low admelog correction scale tid with meals  - low admelog correction scale daily at bedtime  - dextrose prn for hypoglycemia  - no lantus at this time  - change to carbohydrate consistent diet  - change antibiotics to non-dextrose containing formulation  - if BG >300 persistently, would check BMP, VBG, BHB to rule out DKA  - Target -180 while in ICU  - Discharge recommendations: Possibly low dose basal insulin, pending clinical course    #HLD  - on atorvastatin 40mg daily outpatient  PLAN  - hold atorvastatin per primary team    Discussed with primary team.     Thank you for the interesting consult.    Sincere England MD, Endocrinology Fellow  Pager [phone] from 9am to 5pm. After hours and on weekends, please call [phone]. 67F w/ PMHx of HTN, HLD, T2DM, sarcoidosis, CHF, presented to the ED for BL leg swelling and SOB. Endocrinology consulted for concern for Addisons Disease.    #Concern for Adrenal Insufficiency  #Hypothermia  - presented with shortness of breath, swelling of her legs and AMS with acute episode of bradycardia, hypothermia and respiratory distress in ED prompting intubation  - patient reportedly hypothermic to 91F in ED, now off warmer and normothermic  - no darkening of skin on physical exam  - TSH 4.82  - 8/31 Na 137. K 4.2  - previous negative co-syntropin stim test 7/16 on prior admission: 8am cortisol 16.1, 8:30am cortisol 22.4, 9:15am cortisol 24.7.  - cortisol 8/30 at 7:40pm 7, although not obtained at ideal time of 8am, would expect to have high cortisol with acute decompensation  - possible patient could have relative adrenal insufficiency, unclear other etiologies of secondary AI and less likely addisons disease without history of autoimmune conditions  - extubated 9/3 in am  PLAN  - patient hypertensive, continue hydrocortisone 25mg q12h today, can transition to hydrocortisone 20mg PO at 8am and 10mg at 3pm on 9/6  - likely re-perform co-syntropin stim test once can wean hydrocortisone further    #T2DM  - HbA1c 6  - patient was previously on high doses of lantus and admelog as well as metformin and victoza in the past, however had presented to last hospitalization with frequent episodes of hypoglycemia  - C-peptide 5 on 7/17  - last admission, patient was on minimal amounts of insulin and discharged on lantus 2 units daily  PLAN  - low admelog correction scale tid with meals  - low admelog correction scale daily at bedtime  - dextrose prn for hypoglycemia  - no lantus at this time  - change to carbohydrate consistent diet  - change antibiotics to non-dextrose containing formulation  - if BG >300 persistently, would check BMP, VBG, BHB to rule out DKA  - Target -180 while in ICU  - Discharge recommendations: Possibly low dose basal insulin, pending clinical course    #HLD  - on atorvastatin 40mg daily outpatient  PLAN  - hold atorvastatin per primary team    Discussed with primary team.     Thank you for the interesting consult.    Sincere England MD, Endocrinology Fellow  Pager [phone] from 9am to 5pm. After hours and on weekends, please call [phone].

## 2022-09-05 NOTE — PHYSICAL THERAPY INITIAL EVALUATION ADULT - PERTINENT HX OF CURRENT PROBLEM, REHAB EVAL
67 year old Female PMHx of HTN, HLD, sarcoidosis, HFpEF, recent adm with PEA x2 suspected aspiration event, patient presented to the ED with 1 week of bilateral leg swelling and SOB that developed last night. Patient became altered in the ED and had respiratory distress was sedated and intubated and admitted to MICU.

## 2022-09-05 NOTE — PROGRESS NOTE ADULT - SUBJECTIVE AND OBJECTIVE BOX
ENDOCRINE FOLLOW UP     Chief Complaint: AI    History:     MEDICATIONS  (STANDING):  ALBUTerol    90 MICROgram(s) HFA Inhaler 2 Puff(s) Inhalation every 6 hours  amLODIPine   Tablet 10 milliGRAM(s) Oral daily  carvedilol 25 milliGRAM(s) Oral every 12 hours  cefepime   IVPB 1000 milliGRAM(s) IV Intermittent every 12 hours  chlorhexidine 2% Cloths 1 Application(s) Topical daily  dextrose 5%. 1000 milliLiter(s) (100 mL/Hr) IV Continuous <Continuous>  dextrose 5%. 1000 milliLiter(s) (50 mL/Hr) IV Continuous <Continuous>  dextrose 50% Injectable 25 Gram(s) IV Push once  dextrose 50% Injectable 12.5 Gram(s) IV Push once  dextrose 50% Injectable 25 Gram(s) IV Push once  glucagon  Injectable 1 milliGRAM(s) IntraMuscular once  heparin   Injectable 5000 Unit(s) SubCutaneous every 8 hours  hydrALAZINE 50 milliGRAM(s) Oral three times a day  hydrocortisone sodium succinate Injectable 25 milliGRAM(s) IV Push every 12 hours  insulin lispro (ADMELOG) corrective regimen sliding scale   SubCutaneous three times a day before meals  insulin lispro (ADMELOG) corrective regimen sliding scale   SubCutaneous at bedtime    MEDICATIONS  (PRN):  acetaminophen     Tablet .. 650 milliGRAM(s) Oral every 6 hours PRN Temp greater or equal to 38C (100.4F), Mild Pain (1 - 3)  dextrose Oral Gel 15 Gram(s) Oral once PRN Blood Glucose LESS THAN 70 milliGRAM(s)/deciliter  dextrose Oral Gel 15 Gram(s) Oral once PRN Blood Glucose LESS THAN 70 milliGRAM(s)/deciliter      Allergies    penicillin ([facility])    Intolerances        ROS: All other systems reviewed and negative    PHYSICAL EXAM:  VITALS: T(C): 36.9 (09-05-22 @ 08:00)  T(F): 98.4 (09-05-22 @ 08:00), Max: 99.5 (09-04-22 @ 22:00)  HR: 68 (09-05-22 @ 09:25) (64 - 71)  BP: 155/75 (09-05-22 @ 08:00) (137/72 - 178/79)  RR:  (18 - 20)  SpO2:  (99% - 100%)  Wt(kg): --  GENERAL: NAD, resting comfortably   EYES: No proptosis,  anicteric  HEENT:  Atraumatic, Normocephalic, moist mucous membranes  RESPIRATORY: Nonlabored respirations on room air, normal rate/effort   CARDIOVASCULAR: Regular rate and rhythm; No murmurs  GI: Soft, nontender, non distended, normal bowel sounds  NEURO: AOx3, moves all extremities spontaenuously   PSYCH:  reactive affect, euthymic mood    POCT Blood Glucose.: 154 mg/dL (09-05-22 @ 07:43)  POCT Blood Glucose.: 209 mg/dL (09-04-22 @ 21:12)  POCT Blood Glucose.: 158 mg/dL (09-04-22 @ 17:00)  POCT Blood Glucose.: 223 mg/dL (09-04-22 @ 11:56)  POCT Blood Glucose.: 176 mg/dL (09-04-22 @ 08:39)  POCT Blood Glucose.: 196 mg/dL (09-03-22 @ 22:10)  POCT Blood Glucose.: 196 mg/dL (09-03-22 @ 18:03)  POCT Blood Glucose.: 162 mg/dL (09-03-22 @ 12:12)  POCT Blood Glucose.: 185 mg/dL (09-03-22 @ 05:29)  POCT Blood Glucose.: 156 mg/dL (09-02-22 @ 23:14)  POCT Blood Glucose.: 158 mg/dL (09-02-22 @ 17:28)  POCT Blood Glucose.: 173 mg/dL (09-02-22 @ 11:30)      09-05    140  |  104  |  44<H>  ----------------------------<  213<H>  3.9   |  26  |  1.25    eGFR: 47<L>    Ca    8.3<L>      09-05  Mg     1.80     09-05  Phos  2.2     09-05    TPro  6.4  /  Alb  2.8<L>  /  TBili  0.3  /  DBili  <0.2  /  AST  16  /  ALT  14  /  AlkPhos  98  09-05      A1C with Estimated Average Glucose Result: 6.0 % (08-31-22 @ 02:00)  A1C with Estimated Average Glucose Result: 7.3 % (06-05-22 @ 06:00)      Thyroid Stimulating Hormone, Serum: 4.82 uIU/mL (08-30-22 @ 19:40)   ENDOCRINE FOLLOW UP     Chief Complaint: AI    History:   The patient reports feeling a little better. She endorses eating breakfast this morning. She denies chest pain or shortness of breath but endorses pain in her feet.    MEDICATIONS  (STANDING):  ALBUTerol    90 MICROgram(s) HFA Inhaler 2 Puff(s) Inhalation every 6 hours  amLODIPine   Tablet 10 milliGRAM(s) Oral daily  carvedilol 25 milliGRAM(s) Oral every 12 hours  cefepime   IVPB 1000 milliGRAM(s) IV Intermittent every 12 hours  chlorhexidine 2% Cloths 1 Application(s) Topical daily  dextrose 5%. 1000 milliLiter(s) (100 mL/Hr) IV Continuous <Continuous>  dextrose 5%. 1000 milliLiter(s) (50 mL/Hr) IV Continuous <Continuous>  dextrose 50% Injectable 25 Gram(s) IV Push once  dextrose 50% Injectable 12.5 Gram(s) IV Push once  dextrose 50% Injectable 25 Gram(s) IV Push once  glucagon  Injectable 1 milliGRAM(s) IntraMuscular once  heparin   Injectable 5000 Unit(s) SubCutaneous every 8 hours  hydrALAZINE 50 milliGRAM(s) Oral three times a day  hydrocortisone sodium succinate Injectable 25 milliGRAM(s) IV Push every 12 hours  insulin lispro (ADMELOG) corrective regimen sliding scale   SubCutaneous three times a day before meals  insulin lispro (ADMELOG) corrective regimen sliding scale   SubCutaneous at bedtime    MEDICATIONS  (PRN):  acetaminophen     Tablet .. 650 milliGRAM(s) Oral every 6 hours PRN Temp greater or equal to 38C (100.4F), Mild Pain (1 - 3)  dextrose Oral Gel 15 Gram(s) Oral once PRN Blood Glucose LESS THAN 70 milliGRAM(s)/deciliter  dextrose Oral Gel 15 Gram(s) Oral once PRN Blood Glucose LESS THAN 70 milliGRAM(s)/deciliter      Allergies    penicillin ([facility])    Intolerances        ROS: All other systems reviewed and negative    PHYSICAL EXAM:  VITALS: T(C): 36.9 (09-05-22 @ 08:00)  T(F): 98.4 (09-05-22 @ 08:00), Max: 99.5 (09-04-22 @ 22:00)  HR: 68 (09-05-22 @ 09:25) (64 - 71)  BP: 155/75 (09-05-22 @ 08:00) (137/72 - 178/79)  RR:  (18 - 20)  SpO2:  (99% - 100%)  Wt(kg): --    GENERAL: NAD, resting comfortably   EYES: No proptosis,  anicteric  HEENT:  Atraumatic, Normocephalic, mildly dry mucous membranes  RESPIRATORY: Nonlabored respirations on room air, normal rate/effort   CARDIOVASCULAR: Regular rate and rhythm; No murmurs  GI: Soft, nontender, non distended, normal bowel sounds  NEURO: Answering questions appropriately, moves all extremities spontaneously   PSYCH:  reactive affect, euthymic mood  MSK: Right foot big toe amputation    POCT Blood Glucose.: 154 mg/dL (09-05-22 @ 07:43)  POCT Blood Glucose.: 209 mg/dL (09-04-22 @ 21:12)  POCT Blood Glucose.: 158 mg/dL (09-04-22 @ 17:00)  POCT Blood Glucose.: 223 mg/dL (09-04-22 @ 11:56)  POCT Blood Glucose.: 176 mg/dL (09-04-22 @ 08:39)  POCT Blood Glucose.: 196 mg/dL (09-03-22 @ 22:10)  POCT Blood Glucose.: 196 mg/dL (09-03-22 @ 18:03)  POCT Blood Glucose.: 162 mg/dL (09-03-22 @ 12:12)  POCT Blood Glucose.: 185 mg/dL (09-03-22 @ 05:29)  POCT Blood Glucose.: 156 mg/dL (09-02-22 @ 23:14)  POCT Blood Glucose.: 158 mg/dL (09-02-22 @ 17:28)  POCT Blood Glucose.: 173 mg/dL (09-02-22 @ 11:30)      09-05    140  |  104  |  44<H>  ----------------------------<  213<H>  3.9   |  26  |  1.25    eGFR: 47<L>    Ca    8.3<L>      09-05  Mg     1.80     09-05  Phos  2.2     09-05    TPro  6.4  /  Alb  2.8<L>  /  TBili  0.3  /  DBili  <0.2  /  AST  16  /  ALT  14  /  AlkPhos  98  09-05      A1C with Estimated Average Glucose Result: 6.0 % (08-31-22 @ 02:00)  A1C with Estimated Average Glucose Result: 7.3 % (06-05-22 @ 06:00)      Thyroid Stimulating Hormone, Serum: 4.82 uIU/mL (08-30-22 @ 19:40)

## 2022-09-05 NOTE — SWALLOW BEDSIDE ASSESSMENT ADULT - SWALLOW EVAL: DIAGNOSIS
Patient consumed trials of solids and thin liquids. Oral phase characterized by adequate acceptance, adequate anterior bolus containment, increased time for mastication and manipulation likely due to missing dentition but functional, anterior to posterior transport and adequate oral cavity clearance. Pharyngeal phase characterized by initiation of the pharyngeal swallow and hyolaryngeal elevation and excursion noted upon palpation. Cough with thin liquids via straw, not present via cup sips. Recommending regular solids and thin liquids NO STRAWS.

## 2022-09-05 NOTE — PHYSICAL THERAPY INITIAL EVALUATION ADULT - ADDITIONAL COMMENTS
Pt currently presenting from Rehab, prior to rehab pt was living with her daughter in a private house, was able to ambulate using a rolling walker     Pt left semi-supine in bed, all lines intact, call bell in reach, bed alarm set, comfortable and in NAD. RN Vaishnavi aware and present

## 2022-09-06 DIAGNOSIS — Z29.9 ENCOUNTER FOR PROPHYLACTIC MEASURES, UNSPECIFIED: ICD-10-CM

## 2022-09-06 DIAGNOSIS — E11.9 TYPE 2 DIABETES MELLITUS WITHOUT COMPLICATIONS: ICD-10-CM

## 2022-09-06 DIAGNOSIS — I10 ESSENTIAL (PRIMARY) HYPERTENSION: ICD-10-CM

## 2022-09-06 DIAGNOSIS — J96.01 ACUTE RESPIRATORY FAILURE WITH HYPOXIA: ICD-10-CM

## 2022-09-06 DIAGNOSIS — G93.41 METABOLIC ENCEPHALOPATHY: ICD-10-CM

## 2022-09-06 LAB
ALBUMIN SERPL ELPH-MCNC: 2.7 G/DL — LOW (ref 3.3–5)
ALP SERPL-CCNC: 89 U/L — SIGNIFICANT CHANGE UP (ref 40–120)
ALT FLD-CCNC: 11 U/L — SIGNIFICANT CHANGE UP (ref 4–33)
ANION GAP SERPL CALC-SCNC: 8 MMOL/L — SIGNIFICANT CHANGE UP (ref 7–14)
AST SERPL-CCNC: 11 U/L — SIGNIFICANT CHANGE UP (ref 4–32)
BASOPHILS # BLD AUTO: 0.01 K/UL — SIGNIFICANT CHANGE UP (ref 0–0.2)
BASOPHILS NFR BLD AUTO: 0.2 % — SIGNIFICANT CHANGE UP (ref 0–2)
BILIRUB DIRECT SERPL-MCNC: <0.2 MG/DL — SIGNIFICANT CHANGE UP (ref 0–0.3)
BILIRUB INDIRECT FLD-MCNC: SIGNIFICANT CHANGE UP MG/DL (ref 0–1)
BILIRUB SERPL-MCNC: <0.2 MG/DL — SIGNIFICANT CHANGE UP (ref 0.2–1.2)
BUN SERPL-MCNC: 43 MG/DL — HIGH (ref 7–23)
CALCIUM SERPL-MCNC: 8.2 MG/DL — LOW (ref 8.4–10.5)
CHLORIDE SERPL-SCNC: 103 MMOL/L — SIGNIFICANT CHANGE UP (ref 98–107)
CO2 SERPL-SCNC: 26 MMOL/L — SIGNIFICANT CHANGE UP (ref 22–31)
CREAT SERPL-MCNC: 1.34 MG/DL — HIGH (ref 0.5–1.3)
EGFR: 43 ML/MIN/1.73M2 — LOW
EOSINOPHIL # BLD AUTO: 0.06 K/UL — SIGNIFICANT CHANGE UP (ref 0–0.5)
EOSINOPHIL NFR BLD AUTO: 1.4 % — SIGNIFICANT CHANGE UP (ref 0–6)
GLUCOSE BLDC GLUCOMTR-MCNC: 199 MG/DL — HIGH (ref 70–99)
GLUCOSE BLDC GLUCOMTR-MCNC: 227 MG/DL — HIGH (ref 70–99)
GLUCOSE BLDC GLUCOMTR-MCNC: 236 MG/DL — HIGH (ref 70–99)
GLUCOSE BLDC GLUCOMTR-MCNC: 237 MG/DL — HIGH (ref 70–99)
GLUCOSE SERPL-MCNC: 235 MG/DL — HIGH (ref 70–99)
HCT VFR BLD CALC: 29 % — LOW (ref 34.5–45)
HGB BLD-MCNC: 8.7 G/DL — LOW (ref 11.5–15.5)
IANC: 3.08 K/UL — SIGNIFICANT CHANGE UP (ref 1.8–7.4)
IMM GRANULOCYTES NFR BLD AUTO: 2.1 % — HIGH (ref 0–1.5)
LYMPHOCYTES # BLD AUTO: 0.73 K/UL — LOW (ref 1–3.3)
LYMPHOCYTES # BLD AUTO: 17.1 % — SIGNIFICANT CHANGE UP (ref 13–44)
MAGNESIUM SERPL-MCNC: 1.9 MG/DL — SIGNIFICANT CHANGE UP (ref 1.6–2.6)
MCHC RBC-ENTMCNC: 25.4 PG — LOW (ref 27–34)
MCHC RBC-ENTMCNC: 30 GM/DL — LOW (ref 32–36)
MCV RBC AUTO: 84.5 FL — SIGNIFICANT CHANGE UP (ref 80–100)
MONOCYTES # BLD AUTO: 0.29 K/UL — SIGNIFICANT CHANGE UP (ref 0–0.9)
MONOCYTES NFR BLD AUTO: 6.8 % — SIGNIFICANT CHANGE UP (ref 2–14)
NEUTROPHILS # BLD AUTO: 3.08 K/UL — SIGNIFICANT CHANGE UP (ref 1.8–7.4)
NEUTROPHILS NFR BLD AUTO: 72.4 % — SIGNIFICANT CHANGE UP (ref 43–77)
NRBC # BLD: 0 /100 WBCS — SIGNIFICANT CHANGE UP (ref 0–0)
NRBC # FLD: 0 K/UL — SIGNIFICANT CHANGE UP (ref 0–0)
PHOSPHATE SERPL-MCNC: 1.9 MG/DL — LOW (ref 2.5–4.5)
PLATELET # BLD AUTO: 190 K/UL — SIGNIFICANT CHANGE UP (ref 150–400)
POTASSIUM SERPL-MCNC: 4.1 MMOL/L — SIGNIFICANT CHANGE UP (ref 3.5–5.3)
POTASSIUM SERPL-SCNC: 4.1 MMOL/L — SIGNIFICANT CHANGE UP (ref 3.5–5.3)
PROT SERPL-MCNC: 5.9 G/DL — LOW (ref 6–8.3)
RBC # BLD: 3.43 M/UL — LOW (ref 3.8–5.2)
RBC # FLD: 15.7 % — HIGH (ref 10.3–14.5)
SARS-COV-2 RNA SPEC QL NAA+PROBE: SIGNIFICANT CHANGE UP
SODIUM SERPL-SCNC: 137 MMOL/L — SIGNIFICANT CHANGE UP (ref 135–145)
WBC # BLD: 4.26 K/UL — SIGNIFICANT CHANGE UP (ref 3.8–10.5)
WBC # FLD AUTO: 4.26 K/UL — SIGNIFICANT CHANGE UP (ref 3.8–10.5)

## 2022-09-06 PROCEDURE — 99232 SBSQ HOSP IP/OBS MODERATE 35: CPT

## 2022-09-06 PROCEDURE — 99233 SBSQ HOSP IP/OBS HIGH 50: CPT

## 2022-09-06 RX ORDER — SODIUM,POTASSIUM PHOSPHATES 278-250MG
1 POWDER IN PACKET (EA) ORAL ONCE
Refills: 0 | Status: COMPLETED | OUTPATIENT
Start: 2022-09-06 | End: 2022-09-06

## 2022-09-06 RX ORDER — SODIUM,POTASSIUM PHOSPHATES 278-250MG
2 POWDER IN PACKET (EA) ORAL ONCE
Refills: 0 | Status: COMPLETED | OUTPATIENT
Start: 2022-09-06 | End: 2022-09-06

## 2022-09-06 RX ORDER — MAGNESIUM SULFATE 500 MG/ML
1 VIAL (ML) INJECTION ONCE
Refills: 0 | Status: COMPLETED | OUTPATIENT
Start: 2022-09-06 | End: 2022-09-06

## 2022-09-06 RX ORDER — COSYNTROPIN 0.25 MG/ML
0.25 INJECTION, SOLUTION INTRAVENOUS ONCE
Refills: 0 | Status: COMPLETED | OUTPATIENT
Start: 2022-09-07 | End: 2022-09-07

## 2022-09-06 RX ADMIN — Medication 2 PACKET(S): at 05:45

## 2022-09-06 RX ADMIN — CARVEDILOL PHOSPHATE 25 MILLIGRAM(S): 80 CAPSULE, EXTENDED RELEASE ORAL at 17:16

## 2022-09-06 RX ADMIN — Medication 50 MILLIGRAM(S): at 21:44

## 2022-09-06 RX ADMIN — CARVEDILOL PHOSPHATE 25 MILLIGRAM(S): 80 CAPSULE, EXTENDED RELEASE ORAL at 05:45

## 2022-09-06 RX ADMIN — Medication 40 MILLIGRAM(S): at 05:47

## 2022-09-06 RX ADMIN — Medication 0: at 21:45

## 2022-09-06 RX ADMIN — Medication 50 MILLIGRAM(S): at 05:46

## 2022-09-06 RX ADMIN — HEPARIN SODIUM 5000 UNIT(S): 5000 INJECTION INTRAVENOUS; SUBCUTANEOUS at 14:54

## 2022-09-06 RX ADMIN — Medication 20 MILLIGRAM(S): at 07:53

## 2022-09-06 RX ADMIN — Medication 50 MILLIGRAM(S): at 14:54

## 2022-09-06 RX ADMIN — HEPARIN SODIUM 5000 UNIT(S): 5000 INJECTION INTRAVENOUS; SUBCUTANEOUS at 21:44

## 2022-09-06 RX ADMIN — Medication 1: at 07:52

## 2022-09-06 RX ADMIN — CHLORHEXIDINE GLUCONATE 1 APPLICATION(S): 213 SOLUTION TOPICAL at 11:52

## 2022-09-06 RX ADMIN — Medication 2: at 11:53

## 2022-09-06 RX ADMIN — Medication 2: at 17:14

## 2022-09-06 RX ADMIN — Medication 1 PACKET(S): at 07:53

## 2022-09-06 RX ADMIN — CEFEPIME 100 MILLIGRAM(S): 1 INJECTION, POWDER, FOR SOLUTION INTRAMUSCULAR; INTRAVENOUS at 05:40

## 2022-09-06 RX ADMIN — HEPARIN SODIUM 5000 UNIT(S): 5000 INJECTION INTRAVENOUS; SUBCUTANEOUS at 05:45

## 2022-09-06 RX ADMIN — AMLODIPINE BESYLATE 10 MILLIGRAM(S): 2.5 TABLET ORAL at 05:45

## 2022-09-06 RX ADMIN — GABAPENTIN 100 MILLIGRAM(S): 400 CAPSULE ORAL at 11:53

## 2022-09-06 RX ADMIN — CEFEPIME 100 MILLIGRAM(S): 1 INJECTION, POWDER, FOR SOLUTION INTRAMUSCULAR; INTRAVENOUS at 17:15

## 2022-09-06 RX ADMIN — Medication 100 GRAM(S): at 02:32

## 2022-09-06 NOTE — PROGRESS NOTE ADULT - SUBJECTIVE AND OBJECTIVE BOX
Chief Complaint: Rule out AI    History: tolerating po  transferred out of micu to [facility]  states does not like her room, denies other complaints      MEDICATIONS  (STANDING):  amLODIPine   Tablet 10 milliGRAM(s) Oral daily  carvedilol 25 milliGRAM(s) Oral every 12 hours  cefepime   IVPB 1000 milliGRAM(s) IV Intermittent every 12 hours  chlorhexidine 2% Cloths 1 Application(s) Topical daily  dextrose 5%. 1000 milliLiter(s) (50 mL/Hr) IV Continuous <Continuous>  dextrose 5%. 1000 milliLiter(s) (100 mL/Hr) IV Continuous <Continuous>  dextrose 50% Injectable 25 Gram(s) IV Push once  dextrose 50% Injectable 12.5 Gram(s) IV Push once  dextrose 50% Injectable 25 Gram(s) IV Push once  furosemide    Tablet 40 milliGRAM(s) Oral daily  gabapentin 100 milliGRAM(s) Oral daily  glucagon  Injectable 1 milliGRAM(s) IntraMuscular once  heparin   Injectable 5000 Unit(s) SubCutaneous every 8 hours  hydrALAZINE 50 milliGRAM(s) Oral three times a day  insulin lispro (ADMELOG) corrective regimen sliding scale   SubCutaneous three times a day before meals  insulin lispro (ADMELOG) corrective regimen sliding scale   SubCutaneous at bedtime    MEDICATIONS  (PRN):  acetaminophen     Tablet .. 650 milliGRAM(s) Oral every 6 hours PRN Temp greater or equal to 38C (100.4F), Mild Pain (1 - 3)  dextrose Oral Gel 15 Gram(s) Oral once PRN Blood Glucose LESS THAN 70 milliGRAM(s)/deciliter  dextrose Oral Gel 15 Gram(s) Oral once PRN Blood Glucose LESS THAN 70 milliGRAM(s)/deciliter      Allergies    penicillin ([facility])    Intolerances      Review of Systems:    ALL OTHER SYSTEMS REVIEWED AND NEGATIVE      PHYSICAL EXAM:  VITALS: T(C): 36.4 (09-06-22 @ 05:40)  T(F): 97.6 (09-06-22 @ 05:40), Max: 98.6 (09-05-22 @ 20:00)  HR: 70 (09-06-22 @ 05:40) (67 - 72)  BP: 174/76 (09-06-22 @ 05:40) (124/68 - 174/76)  RR:  (18 - 18)  SpO2:  (100% - 100%)  Wt(kg): --  GENERAL: NAD, well-groomed, well-developed  EYES: No proptosis, no lid lag, anicteric  HEENT:  Atraumatic, Normocephalic, moist mucous membranes  RESPIRATORY: nonlabored respirations, no wheezing  PSYCH: awake, alert, flat affect    CAPILLARY BLOOD GLUCOSE      POCT Blood Glucose.: 227 mg/dL (06 Sep 2022 11:37)  POCT Blood Glucose.: 199 mg/dL (06 Sep 2022 07:39)  POCT Blood Glucose.: 239 mg/dL (05 Sep 2022 21:17)  POCT Blood Glucose.: 239 mg/dL (05 Sep 2022 16:49)      09-06    137  |  103  |  43<H>  ----------------------------<  235<H>  4.1   |  26  |  1.34<H>    eGFR: 43<L>    Ca    8.2<L>      09-06  Mg     1.90     09-06  Phos  1.9     09-06    TPro  5.9<L>  /  Alb  2.7<L>  /  TBili  <0.2  /  DBili  <0.2  /  AST  11  /  ALT  11  /  AlkPhos  89  09-06      A1C with Estimated Average Glucose Result: 6.0 % (08-31-22 @ 02:00)  A1C with Estimated Average Glucose Result: 7.3 % (06-05-22 @ 06:00)  A1C with Estimated Average Glucose Result: 7.7 % (02-04-22 @ 05:37)  A1C with Estimated Average Glucose Result: 9.2 % (11-29-21 @ 06:47)      Thyroid Function Tests:  08-30 @ 19:40 TSH 4.82 FreeT4 -- T3 -- Anti TPO -- Anti Thyroglobulin Ab -- TSI --

## 2022-09-06 NOTE — PROGRESS NOTE ADULT - ASSESSMENT
67F w/ PMHx of HTN, HLD, T2DM, sarcoidosis, CHF, presented to the ED for BL leg swelling and SOB. Endocrinology consulted for concern for Addisons Disease.    #Concern for Adrenal Insuffiency  #Hypothermia - now resolved  - presented with shortness of breath, swelling of her legs and AMS with acute episode of bradycardia, hypothermia and respiratory distress in ED prompting intubation  - patient reportedly hypothermic to 91F in ED, now off warmer and normothermic  - no darkening of skin on physical exam  - TSH 4.82  - 8/31 Na 137. K 4.2  - previous negative co-syntropin stim test 7/16 on prior admission: 8am cortisol 16.1, 8:30am cortisol 22.4, 9:15am cortisol 24.7.  - cortisol 8/30 at 7:40pm 7, although not obtained at ideal time of 8am, would expect to have high cortisol with acute decompensation  - possible patient could have relative adrenal insufficiency, unclear other etiologies of secondary AI and less likely addisons disease without history of autoimmune conditions  - extubated 9/3 in am  PLAN  - Hydrocortisone was lowered to 20/10mg today. Will discontinue 10mg 3PM dose to enable performance of cosyntropin stim test tomorrow.  DC all hydrocortisone for now.  Check 8AM "AM cortisol" tomorrow 9/7 morning along with ACTH level  Administer cosyntropin 250 mcg IV x 1 (equivalent to 0.25mg)  Check "AM cortisol" 30 and 60 minutes after cosyntropin given.  Will follow up for results and determine need for any ongoing concern for adrenal insufficiency.  If patient becomes hemodynamically unstable will consider resume hydrocortisone stress dose 50mg IV q8h.    #T2DM  - HbA1c 6  - patient was previously on high doses of lantus and admelog as well as metformin and victoza in the past, however had presented to last hospitalization with frequent episodes of hypoglycemia  - C-peptide 5 on 7/17  - last admission, patient was on minimal amounts of insulin and discharged on lantus 2 units daily  PLAN  - low admelog correction scale premeal and low bed scale  -Tapering off steroid will help lower glucose today. If remains over 180 tomorrow will adjust insulin plan.  - no lantus at this time  - Discharge recommendations: Possibly low dose basal insulin, pending clinical course    #HLD  - on atorvastatin 40mg daily outpatient  PLAN  - hold atorvastatin per primary team    Discussed with primary team.         Louise Samson MD  Division of Endocrinology  Pager: [phone]    If after 6PM or before 9AM, or on weekends/holidays, please call endocrine answering service for assistance ([phone]).  For nonurgent matters email [email or web address] for assistance.

## 2022-09-06 NOTE — PROGRESS NOTE ADULT - SUBJECTIVE AND OBJECTIVE BOX
[facility] Medicine  Alysia Vo DO  Pager (M-F, 0H-5P): [phone]      Patient is a 67y old  Female who presents with a chief complaint of AMS/respiratory failure (06 Sep 2022 15:48)      SUBJECTIVE / OVERNIGHT EVENTS: no overnight events   ADDITIONAL REVIEW OF SYSTEMS:    MEDICATIONS  (STANDING):  amLODIPine   Tablet 10 milliGRAM(s) Oral daily  carvedilol 25 milliGRAM(s) Oral every 12 hours  cefepime   IVPB 1000 milliGRAM(s) IV Intermittent every 12 hours  chlorhexidine 2% Cloths 1 Application(s) Topical daily  dextrose 5%. 1000 milliLiter(s) (50 mL/Hr) IV Continuous <Continuous>  dextrose 5%. 1000 milliLiter(s) (100 mL/Hr) IV Continuous <Continuous>  dextrose 50% Injectable 25 Gram(s) IV Push once  dextrose 50% Injectable 12.5 Gram(s) IV Push once  dextrose 50% Injectable 25 Gram(s) IV Push once  furosemide    Tablet 40 milliGRAM(s) Oral daily  gabapentin 100 milliGRAM(s) Oral daily  glucagon  Injectable 1 milliGRAM(s) IntraMuscular once  heparin   Injectable 5000 Unit(s) SubCutaneous every 8 hours  hydrALAZINE 50 milliGRAM(s) Oral three times a day  insulin lispro (ADMELOG) corrective regimen sliding scale   SubCutaneous three times a day before meals  insulin lispro (ADMELOG) corrective regimen sliding scale   SubCutaneous at bedtime    MEDICATIONS  (PRN):  acetaminophen     Tablet .. 650 milliGRAM(s) Oral every 6 hours PRN Temp greater or equal to 38C (100.4F), Mild Pain (1 - 3)  dextrose Oral Gel 15 Gram(s) Oral once PRN Blood Glucose LESS THAN 70 milliGRAM(s)/deciliter  dextrose Oral Gel 15 Gram(s) Oral once PRN Blood Glucose LESS THAN 70 milliGRAM(s)/deciliter      CAPILLARY BLOOD GLUCOSE      POCT Blood Glucose.: 227 mg/dL (06 Sep 2022 11:37)  POCT Blood Glucose.: 199 mg/dL (06 Sep 2022 07:39)  POCT Blood Glucose.: 239 mg/dL (05 Sep 2022 21:17)  POCT Blood Glucose.: 239 mg/dL (05 Sep 2022 16:49)    I&O's Summary    05 Sep 2022 07:01  -  06 Sep 2022 07:00  --------------------------------------------------------  IN: 450 mL / OUT: 595 mL / NET: -145 mL        PHYSICAL EXAM:  Vital Signs Last 24 Hrs  T(C): 36.4 (06 Sep 2022 05:40), Max: 37 (05 Sep 2022 20:00)  T(F): 97.6 (06 Sep 2022 05:40), Max: 98.6 (05 Sep 2022 20:00)  HR: 70 (06 Sep 2022 05:40) (69 - 72)  BP: 174/76 (06 Sep 2022 05:40) (146/73 - 174/76)  BP(mean): 99 (06 Sep 2022 00:00) (96 - 99)  RR: 18 (06 Sep 2022 05:40) (18 - 18)  SpO2: 100% (06 Sep 2022 05:40) (100% - 100%)    Parameters below as of 06 Sep 2022 05:40  Patient On (Oxygen Delivery Method): nasal cannula  O2 Flow (L/min): 2    CONSTITUTIONAL: NAD, well-developed, well-groomed  EYES: EOMI; conjunctiva and sclera clear  ENMT: Moist oral mucosa, no pharyngeal injection or exudates; normal dentition  NECK: Supple, no palpable masses; no thyromegaly  RESPIRATORY: Normal respiratory effort; lungs are clear to auscultation bilaterally  CARDIOVASCULAR: Regular rate and rhythm, normal S1 and S2, no murmur/rub/gallop; No lower extremity edema; Peripheral pulses are 2+ bilaterally  ABDOMEN: Nontender to palpation, normoactive bowel sounds, no rebound/guarding; No hepatosplenomegaly  MUSKULOSKELETAL:  no clubbing or cyanosis of digits; no joint swelling or tenderness to palpation  PSYCH: A+O to person, place, and time; affect appropriate  NEUROLOGY: CN 2-12 are intact and symmetric; no gross sensory deficits;   SKIN: No rashes; no palpable lesions    LABS:                        8.7    4.26  )-----------( 190      ( 06 Sep 2022 00:05 )             29.0     09-06    137  |  103  |  43<H>  ----------------------------<  235<H>  4.1   |  26  |  1.34<H>    Ca    8.2<L>      06 Sep 2022 00:05  Phos  1.9     09-06  Mg     1.90     09-06    TPro  5.9<L>  /  Alb  2.7<L>  /  TBili  <0.2  /  DBili  <0.2  /  AST  11  /  ALT  11  /  AlkPhos  89  09-06                RADIOLOGY & ADDITIONAL TESTS:  Results Reviewed:   Imaging Personally Reviewed:  Electrocardiogram Personally Reviewed:    COORDINATION OF CARE:  Care Discussed with Consultants/Other Providers [Y/N]: Y  Prior or Outpatient Records Reviewed [Y/N]: AN   [facility] Division of Hospital Medicine  Alysia Vo DO  Pager (M-F, 8A-5P): [phone]      Patient is a 67y old  Female who presents with a chief complaint of AMS/respiratory failure (06 Sep 2022 15:48)    Pt very [place] ([place] but will read what is written on notepad and answer appropriately.   SUBJECTIVE / OVERNIGHT EVENTS: Pt transferred to [facility] overnight, this morning is upset that she was moved into the hallway o/n. Reports mild tenderness in legs.   ADDITIONAL REVIEW OF SYSTEMS: no cp/sob     MEDICATIONS  (STANDING):  amLODIPine   Tablet 10 milliGRAM(s) Oral daily  carvedilol 25 milliGRAM(s) Oral every 12 hours  cefepime   IVPB 1000 milliGRAM(s) IV Intermittent every 12 hours  chlorhexidine 2% Cloths 1 Application(s) Topical daily  dextrose 5%. 1000 milliLiter(s) (50 mL/Hr) IV Continuous <Continuous>  dextrose 5%. 1000 milliLiter(s) (100 mL/Hr) IV Continuous <Continuous>  dextrose 50% Injectable 25 Gram(s) IV Push once  dextrose 50% Injectable 12.5 Gram(s) IV Push once  dextrose 50% Injectable 25 Gram(s) IV Push once  furosemide    Tablet 40 milliGRAM(s) Oral daily  gabapentin 100 milliGRAM(s) Oral daily  glucagon  Injectable 1 milliGRAM(s) IntraMuscular once  heparin   Injectable 5000 Unit(s) SubCutaneous every 8 hours  hydrALAZINE 50 milliGRAM(s) Oral three times a day  insulin lispro (ADMELOG) corrective regimen sliding scale   SubCutaneous three times a day before meals  insulin lispro (ADMELOG) corrective regimen sliding scale   SubCutaneous at bedtime    MEDICATIONS  (PRN):  acetaminophen     Tablet .. 650 milliGRAM(s) Oral every 6 hours PRN Temp greater or equal to 38C (100.4F), Mild Pain (1 - 3)  dextrose Oral Gel 15 Gram(s) Oral once PRN Blood Glucose LESS THAN 70 milliGRAM(s)/deciliter  dextrose Oral Gel 15 Gram(s) Oral once PRN Blood Glucose LESS THAN 70 milliGRAM(s)/deciliter      CAPILLARY BLOOD GLUCOSE      POCT Blood Glucose.: 227 mg/dL (06 Sep 2022 11:37)  POCT Blood Glucose.: 199 mg/dL (06 Sep 2022 07:39)  POCT Blood Glucose.: 239 mg/dL (05 Sep 2022 21:17)  POCT Blood Glucose.: 239 mg/dL (05 Sep 2022 16:49)    I&O's Summary    05 Sep 2022 07:01  -  06 Sep 2022 07:00  --------------------------------------------------------  IN: 450 mL / OUT: 595 mL / NET: -145 mL        PHYSICAL EXAM:  Vital Signs Last 24 Hrs  T(C): 36.4 (06 Sep 2022 05:40), Max: 37 (05 Sep 2022 20:00)  T(F): 97.6 (06 Sep 2022 05:40), Max: 98.6 (05 Sep 2022 20:00)  HR: 70 (06 Sep 2022 05:40) (69 - 72)  BP: 174/76 (06 Sep 2022 05:40) (146/73 - 174/76)  BP(mean): 99 (06 Sep 2022 00:00) (96 - 99)  RR: 18 (06 Sep 2022 05:40) (18 - 18)  SpO2: 100% (06 Sep 2022 05:40) (100% - 100%)    Parameters below as of 06 Sep 2022 05:40  Patient On (Oxygen Delivery Method): nasal cannula  O2 Flow (L/min): 2    GENERAL: NAD, well-appearing, sitting in bed  HEENT: EOMI, very hard of hearing, MMM  CHEST/LUNG: decreased breath sounds, normal resp effort   HEART: Regular rate and rhythm; No murmurs, rubs, or gallops  ABDOMEN: Soft, Nontender, Nondistended; Bowel sounds present  EXTREMITIES:  warm, No edema  SKIN: No rashes or lesions  NERVOUS SYSTEM: awake and alert, following commands, nonfocal     LABS:                        8.7    4.26  )-----------( 190      ( 06 Sep 2022 00:05 )             29.0     09-06    137  |  103  |  43<H>  ----------------------------<  235<H>  4.1   |  26  |  1.34<H>    Ca    8.2<L>      06 Sep 2022 00:05  Phos  1.9     09-06  Mg     1.90     09-06    TPro  5.9<L>  /  Alb  2.7<L>  /  TBili  <0.2  /  DBili  <0.2  /  AST  11  /  ALT  11  /  AlkPhos  89  09-06                RADIOLOGY & ADDITIONAL TESTS:  Results Reviewed:   Imaging Personally Reviewed:  Electrocardiogram Personally Reviewed:    COORDINATION OF CARE:  Care Discussed with Consultants/Other Providers [Y/N]: Y  Prior or Outpatient Records Reviewed [Y/N]: Y

## 2022-09-06 NOTE — CHART NOTE - NSCHARTNOTEFT_GEN_A_CORE
MAR MICU TRANSFER NOTE    Please refer to MICU transfer note for full details.    Briefly, this is a 67F PMHx of HTN, HLD, sarcoidosis, CHF, recent adm with PEA arrest x2, p/w BLE edema + SOB. Intubated 8/30 for AMS and resp distress. Bronchoscopy 8/31 showed a L sided mucus plug, BAL however grew normal respiratory ra. Urine culture grew klebsiella pneumonia and e. coli, pt was treated w/ Cefepime. She was seen by endocrinology given concern for adrenal insufficiency as she was hypothermic and bradycardic on arrival and started on stress dose steroids. She was weaned off of sedation and passed pressure support trials and was successfully extubated on 9/3 to [facility], Spo2 now stable on RA.     To Do's:   [ ] Monitor respiratory status   [ ] Wean steroids - F/u Endo recs   [ ] Monitory Strict I/O's  [ ] Cefepime to complete 9/6  [ ] PT recs: [facility]         Vital Signs Last 24 Hrs  T(C): 36.9 (06 Sep 2022 02:30), Max: 37 (05 Sep 2022 20:00)  T(F): 98.5 (06 Sep 2022 02:30), Max: 98.6 (05 Sep 2022 20:00)  HR: 70 (06 Sep 2022 02:30) (65 - 72)  BP: 154/76 (06 Sep 2022 02:30) (124/68 - 155/75)  BP(mean): 99 (06 Sep 2022 00:00) (85 - 103)  RR: 18 (06 Sep 2022 02:30) (18 - 20)  SpO2: 100% (06 Sep 2022 02:30) (100% - 100%)    Parameters below as of 06 Sep 2022 02:30  Patient On (Oxygen Delivery Method): nasal cannula  O2 Flow (L/min): 2    I&O's Summary    04 Sep 2022 07:01  -  05 Sep 2022 07:00  --------------------------------------------------------  IN: 1100 mL / OUT: 2035 mL / NET: -935 mL    05 Sep 2022 07:01  -  06 Sep 2022 06:28  --------------------------------------------------------  IN: 450 mL / OUT: 250 mL / NET: 200 mL      Allergies    penicillin ([place])    Intolerances      MEDICATIONS  (STANDING):  amLODIPine   Tablet 10 milliGRAM(s) Oral daily  carvedilol 25 milliGRAM(s) Oral every 12 hours  cefepime   IVPB 1000 milliGRAM(s) IV Intermittent every 12 hours  chlorhexidine 2% Cloths 1 Application(s) Topical daily  dextrose 5%. 1000 milliLiter(s) (100 mL/Hr) IV Continuous <Continuous>  dextrose 5%. 1000 milliLiter(s) (50 mL/Hr) IV Continuous <Continuous>  dextrose 50% Injectable 25 Gram(s) IV Push once  dextrose 50% Injectable 12.5 Gram(s) IV Push once  dextrose 50% Injectable 25 Gram(s) IV Push once  furosemide    Tablet 40 milliGRAM(s) Oral daily  gabapentin 100 milliGRAM(s) Oral daily  glucagon  Injectable 1 milliGRAM(s) IntraMuscular once  heparin   Injectable 5000 Unit(s) SubCutaneous every 8 hours  hydrALAZINE 50 milliGRAM(s) Oral three times a day  hydrocortisone sodium succinate Injectable 20 milliGRAM(s) IV Push once  hydrocortisone sodium succinate Injectable 10 milliGRAM(s) IV Push once  insulin lispro (ADMELOG) corrective regimen sliding scale   SubCutaneous three times a day before meals  insulin lispro (ADMELOG) corrective regimen sliding scale   SubCutaneous at bedtime    MEDICATIONS  (PRN):  acetaminophen     Tablet .. 650 milliGRAM(s) Oral every 6 hours PRN Temp greater or equal to 38C (100.4F), Mild Pain (1 - 3)  dextrose Oral Gel 15 Gram(s) Oral once PRN Blood Glucose LESS THAN 70 milliGRAM(s)/deciliter  dextrose Oral Gel 15 Gram(s) Oral once PRN Blood Glucose LESS THAN 70 milliGRAM(s)/deciliter                                  8.7    4.26  )-----------( 190      ( 06 Sep 2022 00:05 )             29.0     09-06    137  |  103  |  43<H>  ----------------------------<  235<H>  4.1   |  26  |  1.34<H>    Ca    8.2<L>      06 Sep 2022 00:05  Phos  1.9     09-06  Mg     1.90     09-06    TPro  5.9<L>  /  Alb  2.7<L>  /  TBili  <0.2  /  DBili  <0.2  /  AST  11  /  ALT  11  /  AlkPhos  89  09-06              Dimple Joyner MD  PGY-3 | Internal Medicine

## 2022-09-06 NOTE — PROGRESS NOTE ADULT - ASSESSMENT
67F PMHx of HTN, HLD, sarcoidosis, CHF, recent adm with PEA arrest x2, p/w BLE edema + SOB. Intubated 8/30 for AMS and resp distress. Bronchoscopy 8/31 showed a L sided mucus plug, BAL however grew normal respiratory ra. UCx w/klebsiella pneumonia and e. coli, s/p Cefepime, completed 9/6. Seen by endo for concern of adrenal insufficiency as she was hypothermic and bradycardic on arrival and started on stress dose steroids. She was weaned off of sedation and passed pressure support trials and was successfully extubated on 9/3 to [facility], Spo2 now stable on RA.

## 2022-09-07 LAB
ACTH SER-ACNC: 30.4 PG/ML — SIGNIFICANT CHANGE UP (ref 7.2–63.3)
ALBUMIN SERPL ELPH-MCNC: 2.5 G/DL — LOW (ref 3.3–5)
ALP SERPL-CCNC: 72 U/L — SIGNIFICANT CHANGE UP (ref 40–120)
ALT FLD-CCNC: 10 U/L — SIGNIFICANT CHANGE UP (ref 4–33)
ANION GAP SERPL CALC-SCNC: 7 MMOL/L — SIGNIFICANT CHANGE UP (ref 7–14)
AST SERPL-CCNC: 10 U/L — SIGNIFICANT CHANGE UP (ref 4–32)
BASOPHILS # BLD AUTO: 0.01 K/UL — SIGNIFICANT CHANGE UP (ref 0–0.2)
BASOPHILS NFR BLD AUTO: 0.2 % — SIGNIFICANT CHANGE UP (ref 0–2)
BILIRUB SERPL-MCNC: <0.2 MG/DL — SIGNIFICANT CHANGE UP (ref 0.2–1.2)
BUN SERPL-MCNC: 40 MG/DL — HIGH (ref 7–23)
CALCIUM SERPL-MCNC: 8.2 MG/DL — LOW (ref 8.4–10.5)
CHLORIDE SERPL-SCNC: 104 MMOL/L — SIGNIFICANT CHANGE UP (ref 98–107)
CO2 SERPL-SCNC: 27 MMOL/L — SIGNIFICANT CHANGE UP (ref 22–31)
CORTIS AM PEAK SERPL-MCNC: 17.8 UG/DL — SIGNIFICANT CHANGE UP (ref 6–18.4)
CORTIS AM PEAK SERPL-MCNC: 7.9 UG/DL — SIGNIFICANT CHANGE UP (ref 6–18.4)
CORTIS AM PEAK SERPL-MCNC: 8 UG/DL — SIGNIFICANT CHANGE UP (ref 6–18.4)
CREAT SERPL-MCNC: 1.23 MG/DL — SIGNIFICANT CHANGE UP (ref 0.5–1.3)
CULTURE RESULTS: SIGNIFICANT CHANGE UP
EGFR: 48 ML/MIN/1.73M2 — LOW
EOSINOPHIL # BLD AUTO: 0.17 K/UL — SIGNIFICANT CHANGE UP (ref 0–0.5)
EOSINOPHIL NFR BLD AUTO: 3.8 % — SIGNIFICANT CHANGE UP (ref 0–6)
GLUCOSE BLDC GLUCOMTR-MCNC: 166 MG/DL — HIGH (ref 70–99)
GLUCOSE BLDC GLUCOMTR-MCNC: 200 MG/DL — HIGH (ref 70–99)
GLUCOSE BLDC GLUCOMTR-MCNC: 214 MG/DL — HIGH (ref 70–99)
GLUCOSE BLDC GLUCOMTR-MCNC: 274 MG/DL — HIGH (ref 70–99)
GLUCOSE SERPL-MCNC: 168 MG/DL — HIGH (ref 70–99)
HCT VFR BLD CALC: 27 % — LOW (ref 34.5–45)
HGB BLD-MCNC: 8.6 G/DL — LOW (ref 11.5–15.5)
IANC: 2.71 K/UL — SIGNIFICANT CHANGE UP (ref 1.8–7.4)
IMM GRANULOCYTES NFR BLD AUTO: 2.7 % — HIGH (ref 0–1.5)
LYMPHOCYTES # BLD AUTO: 0.99 K/UL — LOW (ref 1–3.3)
LYMPHOCYTES # BLD AUTO: 22.2 % — SIGNIFICANT CHANGE UP (ref 13–44)
MAGNESIUM SERPL-MCNC: 1.8 MG/DL — SIGNIFICANT CHANGE UP (ref 1.6–2.6)
MCHC RBC-ENTMCNC: 26 PG — LOW (ref 27–34)
MCHC RBC-ENTMCNC: 31.9 GM/DL — LOW (ref 32–36)
MCV RBC AUTO: 81.6 FL — SIGNIFICANT CHANGE UP (ref 80–100)
MONOCYTES # BLD AUTO: 0.46 K/UL — SIGNIFICANT CHANGE UP (ref 0–0.9)
MONOCYTES NFR BLD AUTO: 10.3 % — SIGNIFICANT CHANGE UP (ref 2–14)
NEUTROPHILS # BLD AUTO: 2.71 K/UL — SIGNIFICANT CHANGE UP (ref 1.8–7.4)
NEUTROPHILS NFR BLD AUTO: 60.8 % — SIGNIFICANT CHANGE UP (ref 43–77)
NRBC # BLD: 0 /100 WBCS — SIGNIFICANT CHANGE UP (ref 0–0)
NRBC # FLD: 0 K/UL — SIGNIFICANT CHANGE UP (ref 0–0)
PHOSPHATE SERPL-MCNC: 2.3 MG/DL — LOW (ref 2.5–4.5)
PLATELET # BLD AUTO: 189 K/UL — SIGNIFICANT CHANGE UP (ref 150–400)
POTASSIUM SERPL-MCNC: 3.8 MMOL/L — SIGNIFICANT CHANGE UP (ref 3.5–5.3)
POTASSIUM SERPL-SCNC: 3.8 MMOL/L — SIGNIFICANT CHANGE UP (ref 3.5–5.3)
PROT SERPL-MCNC: 5.7 G/DL — LOW (ref 6–8.3)
RBC # BLD: 3.31 M/UL — LOW (ref 3.8–5.2)
RBC # FLD: 15.9 % — HIGH (ref 10.3–14.5)
SODIUM SERPL-SCNC: 138 MMOL/L — SIGNIFICANT CHANGE UP (ref 135–145)
SPECIMEN SOURCE: SIGNIFICANT CHANGE UP
WBC # BLD: 4.46 K/UL — SIGNIFICANT CHANGE UP (ref 3.8–10.5)
WBC # FLD AUTO: 4.46 K/UL — SIGNIFICANT CHANGE UP (ref 3.8–10.5)

## 2022-09-07 PROCEDURE — 99233 SBSQ HOSP IP/OBS HIGH 50: CPT

## 2022-09-07 PROCEDURE — 99232 SBSQ HOSP IP/OBS MODERATE 35: CPT

## 2022-09-07 RX ORDER — HYDRALAZINE HCL 50 MG
75 TABLET ORAL THREE TIMES A DAY
Refills: 0 | Status: DISCONTINUED | OUTPATIENT
Start: 2022-09-07 | End: 2022-09-08

## 2022-09-07 RX ADMIN — Medication 2: at 17:23

## 2022-09-07 RX ADMIN — Medication 1: at 21:25

## 2022-09-07 RX ADMIN — HEPARIN SODIUM 5000 UNIT(S): 5000 INJECTION INTRAVENOUS; SUBCUTANEOUS at 14:56

## 2022-09-07 RX ADMIN — Medication 75 MILLIGRAM(S): at 21:23

## 2022-09-07 RX ADMIN — CARVEDILOL PHOSPHATE 25 MILLIGRAM(S): 80 CAPSULE, EXTENDED RELEASE ORAL at 05:36

## 2022-09-07 RX ADMIN — HEPARIN SODIUM 5000 UNIT(S): 5000 INJECTION INTRAVENOUS; SUBCUTANEOUS at 05:36

## 2022-09-07 RX ADMIN — Medication 50 MILLIGRAM(S): at 05:36

## 2022-09-07 RX ADMIN — Medication 1: at 12:05

## 2022-09-07 RX ADMIN — Medication 40 MILLIGRAM(S): at 05:36

## 2022-09-07 RX ADMIN — AMLODIPINE BESYLATE 10 MILLIGRAM(S): 2.5 TABLET ORAL at 05:36

## 2022-09-07 RX ADMIN — HEPARIN SODIUM 5000 UNIT(S): 5000 INJECTION INTRAVENOUS; SUBCUTANEOUS at 21:20

## 2022-09-07 RX ADMIN — CARVEDILOL PHOSPHATE 25 MILLIGRAM(S): 80 CAPSULE, EXTENDED RELEASE ORAL at 17:24

## 2022-09-07 RX ADMIN — Medication 50 MILLIGRAM(S): at 14:56

## 2022-09-07 RX ADMIN — Medication 1: at 08:25

## 2022-09-07 RX ADMIN — GABAPENTIN 100 MILLIGRAM(S): 400 CAPSULE ORAL at 12:06

## 2022-09-07 RX ADMIN — COSYNTROPIN 0.25 MILLIGRAM(S): 0.25 INJECTION, SOLUTION INTRAVENOUS at 08:24

## 2022-09-07 NOTE — CHART NOTE - NSCHARTNOTEFT_GEN_A_CORE
Pt to have Cosyntropin stim test this am Pre level drawn however pt then was refusing additional lab testing and time passed for first draw to be done . Spoke with pt who was then agreable and #2 was drawn at appropriate time . Dr. Cramer aware of above Awaiting results

## 2022-09-07 NOTE — PROGRESS NOTE ADULT - ASSESSMENT
67F w/ PMHx of HTN, HLD, T2DM, sarcoidosis, CHF, presented to the ED for BL leg swelling and SOB. Endocrinology consulted for concern for Addisons Disease.          #Concern for Adrenal Insuffiency  #Hypothermia - now resolved  - presented with shortness of breath, swelling of her legs and AMS with acute episode of bradycardia, hypothermia and respiratory distress in ED prompting intubation  - patient reportedly hypothermic to 91F in ED, now off warmer and normothermic and extubated   - no darkening of skin on physical exam  - TSH 4.82  - 8/31 Na 137. K 4.2  - previous negative co-syntropin stim test 7/16 on prior admission: 8am cortisol 16.1, 8:30am cortisol 22.4, 9:15am cortisol 24.7.  - cortisol 8/30 at 7:40pm 7, although not obtained at ideal time of 8am, would expect to have high cortisol with acute decompensation  - possible patient could have had relative adrenal insufficiency, unclear other etiologies of secondary AI and less likely addisons disease without history of autoimmune conditions  - extubated 9/3 in am  -stim test 9/7= pt with cortisol of 17  PLAN  - Hydrocortisone was lowered to 20/10mg 9/6. pt did not recieve the pm dose of hydrocortisone 9/6 and 9.7  =pt passed stim test = does not have AI. no need for steroids   DC all hydrocortisone for now.    #T2DM  - HbA1c 6  - patient was previously on high doses of lantus and admelog as well as metformin and victoza in the past, however had presented to last hospitalization with frequent episodes of hypoglycemia  - C-peptide 5 on 7/17  - last admission, patient was on minimal amounts of insulin and discharged on lantus 2 units daily  PLAN  - low admelog correction scale premeal and low bed scale  - no lantus at this time  -nutrition consult   - Discharge recommendations:  if insulin requirements remain low can likely dc on metformin 1000mg BID (if GFR and LFTS are stable) if no contraindications    Please also send prescriptions for new glucometer per insurance, test strips, lancets, BD beverley needles, alcohol pads and glucose tabs to the pts pharamcy prior to DC.  please [facility]cometer [facility] and "ask for delivery to bedside" so that pt can be taught the glucometer being perscribed.  please ensure pt knows to followup DM outpt and nurses review DM education and goals  pt will need outpt PCP and optho fu for DM         #HLD  - on atorvastatin 40mg daily outpatient  PLAN  - hold atorvastatin per primary team        endocrine to sign off   insulin titration as per the primary team     Clementine Masterson MD  Attending Physician   Department of Endocrinology, Diabetes and Metabolism     Pager  [phone] [please provide 10 digit call back number]  LICHRISTAL 9-5  [email or web address]  Nights and weekends: [phone]  Please note that this patient may be followed by a different provider tomorrow.   If no answer or after hours, please contact [phone].  For final dc reccomendations, please call [phone] or page the endocrine fellow on call.   67F w/ PMHx of HTN, HLD, T2DM, sarcoidosis, CHF, presented to the ED for BL leg swelling and SOB. Endocrinology consulted for concern for Addisons Disease.          #Concern for Adrenal Insuffiency  #Hypothermia - now resolved  - presented with shortness of breath, swelling of her legs and AMS with acute episode of bradycardia, hypothermia and respiratory distress in ED prompting intubation  - patient reportedly hypothermic to 91F in ED, now off warmer and normothermic and extubated   - no darkening of skin on physical exam  - TSH 4.82  - 8/31 Na 137. K 4.2  - previous negative co-syntropin stim test 7/16 on prior admission: 8am cortisol 16.1, 8:30am cortisol 22.4, 9:15am cortisol 24.7.  - cortisol 8/30 at 7:40pm 7, although not obtained at ideal time of 8am, would expect to have high cortisol with acute decompensation  - possible patient could have had relative adrenal insufficiency, unclear other etiologies of secondary AI and less likely addisons disease without history of autoimmune conditions  - extubated 9/3 in am  -stim test 9/7= pt with cortisol of 17  PLAN  - Hydrocortisone was lowered to 20/10mg 9/6. pt did not recieve the pm dose of hydrocortisone 9/6 and 9.7  =pt passed stim test = does not have AI. no need for steroids   DC all hydrocortisone for now.    #T2DM  - HbA1c 6  - patient was previously on high doses of lantus and admelog as well as metformin and victoza in the past, however had presented to last hospitalization with frequent episodes of hypoglycemia  - C-peptide 5 on 7/17  - last admission, patient was on minimal amounts of insulin and discharged on lantus 2 units daily  PLAN  - low admelog correction scale premeal and low bed scale  - no lantus at this time  -nutrition consult   - Discharge recommendations:  if insulin requirements remain low can likely dc on metformin 1000mg BID (if GFR and LFTS are stable) if no contraindications    GFR currently low  may need basal insulin at dc vs victoza restart       Please also send prescriptions for new glucometer per insurance, test strips, lancets, BD beverley needles, alcohol pads and glucose tabs to the pts pharamcy prior to DC.  please [facility]cometer [facility] and "ask for delivery to bedside" so that pt can be taught the glucometer being perscribed.  please ensure pt knows to followup DM outpt and nurses review DM education and goals  pt will need outpt PCP and optho fu for DM         #HLD  - on atorvastatin 40mg daily outpatient  PLAN  - hold atorvastatin per primary team            Clementine Masterson MD  Attending Physician   Department of Endocrinology, Diabetes and Metabolism     Pager  [phone] [please provide 10 digit call back number]  LICHRISTAL 9-5  [email or web address]  Nights and weekends: [phone]  Please note that this patient may be followed by a different provider tomorrow.   If no answer or after hours, please contact [phone].  For final dc reccomendations, please call [phone] or page the endocrine fellow on call.

## 2022-09-07 NOTE — PROGRESS NOTE ADULT - PROBLEM SELECTOR PLAN 6
HSQ  Dispo: [facility], pt not agreeable, but dgt wants pt to go to [facility]. HSQ  Dispo: [facility], pt not agreeable, but dgt wants pt to go to [facility].      Updated pts dgt, Mindy ([phone]) on 9/6, 9/7.

## 2022-09-07 NOTE — PROGRESS NOTE ADULT - SUBJECTIVE AND OBJECTIVE BOX
Chief Complaint/Follow-up on:   AI      Subjective:    pt is well appearing   she is awake and mentating, stating she does not want to go to rehab  she did not agree to all lab draws for acth stim test   her cortisol resulted in 17 this morning         MEDICATIONS  (STANDING):  amLODIPine   Tablet 10 milliGRAM(s) Oral daily  carvedilol 25 milliGRAM(s) Oral every 12 hours  dextrose 5%. 1000 milliLiter(s) (50 mL/Hr) IV Continuous <Continuous>  dextrose 5%. 1000 milliLiter(s) (100 mL/Hr) IV Continuous <Continuous>  dextrose 50% Injectable 25 Gram(s) IV Push once  dextrose 50% Injectable 12.5 Gram(s) IV Push once  dextrose 50% Injectable 25 Gram(s) IV Push once  furosemide    Tablet 40 milliGRAM(s) Oral daily  gabapentin 100 milliGRAM(s) Oral daily  glucagon  Injectable 1 milliGRAM(s) IntraMuscular once  heparin   Injectable 5000 Unit(s) SubCutaneous every 8 hours  hydrALAZINE 75 milliGRAM(s) Oral three times a day  insulin lispro (ADMELOG) corrective regimen sliding scale   SubCutaneous three times a day before meals  insulin lispro (ADMELOG) corrective regimen sliding scale   SubCutaneous at bedtime    MEDICATIONS  (PRN):  acetaminophen     Tablet .. 650 milliGRAM(s) Oral every 6 hours PRN Temp greater or equal to 38C (100.4F), Mild Pain (1 - 3)  dextrose Oral Gel 15 Gram(s) Oral once PRN Blood Glucose LESS THAN 70 milliGRAM(s)/deciliter  dextrose Oral Gel 15 Gram(s) Oral once PRN Blood Glucose LESS THAN 70 milliGRAM(s)/deciliter      PHYSICAL EXAM:  VITALS: T(C): 36.6 (09-07-22 @ 05:30)  T(F): 97.8 (09-07-22 @ 05:30), Max: 98.3 (09-06-22 @ 17:15)  HR: 72 (09-07-22 @ 05:30) (72 - 78)  BP: 168/74 (09-07-22 @ 05:30) (153/86 - 168/74)  RR:  (18 - 18)  SpO2:  (97% - 98%)  Wt(kg): --  GENERAL: NAD, well-groomed, well-developed  RESPIRATORY: Clear to auscultation bilaterally; No rales, rhonchi, wheezing, or rubs  CARDIOVASCULAR: Regular rate and rhythm; No murmurs; no peripheral edema  GI: Soft, nontender, non distended, normal bowel sounds    POCT Blood Glucose.: 200 mg/dL (09-07-22 @ 11:25)  POCT Blood Glucose.: 166 mg/dL (09-07-22 @ 07:50)  POCT Blood Glucose.: 237 mg/dL (09-06-22 @ 21:13)  POCT Blood Glucose.: 236 mg/dL (09-06-22 @ 16:46)  POCT Blood Glucose.: 227 mg/dL (09-06-22 @ 11:37)  POCT Blood Glucose.: 199 mg/dL (09-06-22 @ 07:39)  POCT Blood Glucose.: 239 mg/dL (09-05-22 @ 21:17)  POCT Blood Glucose.: 239 mg/dL (09-05-22 @ 16:49)  POCT Blood Glucose.: 255 mg/dL (09-05-22 @ 11:12)  POCT Blood Glucose.: 154 mg/dL (09-05-22 @ 07:43)  POCT Blood Glucose.: 209 mg/dL (09-04-22 @ 21:12)  POCT Blood Glucose.: 158 mg/dL (09-04-22 @ 17:00)    09-07    138  |  104  |  40<H>  ----------------------------<  168<H>  3.8   |  27  |  1.23    eGFR: 48<L>    Ca    8.2<L>      09-07  Mg     1.80     09-07  Phos  2.3     09-07    TPro  5.7<L>  /  Alb  2.5<L>  /  TBili  <0.2  /  DBili  x   /  AST  10  /  ALT  10  /  AlkPhos  72  09-07          Thyroid Function Tests:  08-30 @ 19:40 TSH 4.82 FreeT4 -- T3 -- Anti TPO -- Anti Thyroglobulin Ab -- TSI --

## 2022-09-07 NOTE — PROGRESS NOTE ADULT - SUBJECTIVE AND OBJECTIVE BOX
[facility] Division of Hospital Medicine  Alysia Vo DO  Pager (M-F, 2V-5P): [phone]      Patient is a 67y old  Female who presents with a chief complaint of AMS/respiratory failure (06 Sep 2022 16:13)      SUBJECTIVE / OVERNIGHT EVENTS: no overnight events, pt underwent cosyn stim test this morning, but refused one of the blood draws.   Pt otherwise, feeling well, communicates by reading the text on my phone and then responding in full sentences. Adamantly refusing [facility]  ADDITIONAL REVIEW OF SYSTEMS: no cp/sob     MEDICATIONS  (STANDING):  amLODIPine   Tablet 10 milliGRAM(s) Oral daily  carvedilol 25 milliGRAM(s) Oral every 12 hours  dextrose 5%. 1000 milliLiter(s) (50 mL/Hr) IV Continuous <Continuous>  dextrose 5%. 1000 milliLiter(s) (100 mL/Hr) IV Continuous <Continuous>  dextrose 50% Injectable 25 Gram(s) IV Push once  dextrose 50% Injectable 12.5 Gram(s) IV Push once  dextrose 50% Injectable 25 Gram(s) IV Push once  furosemide    Tablet 40 milliGRAM(s) Oral daily  gabapentin 100 milliGRAM(s) Oral daily  glucagon  Injectable 1 milliGRAM(s) IntraMuscular once  heparin   Injectable 5000 Unit(s) SubCutaneous every 8 hours  hydrALAZINE 50 milliGRAM(s) Oral three times a day  insulin lispro (ADMELOG) corrective regimen sliding scale   SubCutaneous three times a day before meals  insulin lispro (ADMELOG) corrective regimen sliding scale   SubCutaneous at bedtime    MEDICATIONS  (PRN):  acetaminophen     Tablet .. 650 milliGRAM(s) Oral every 6 hours PRN Temp greater or equal to 38C (100.4F), Mild Pain (1 - 3)  dextrose Oral Gel 15 Gram(s) Oral once PRN Blood Glucose LESS THAN 70 milliGRAM(s)/deciliter  dextrose Oral Gel 15 Gram(s) Oral once PRN Blood Glucose LESS THAN 70 milliGRAM(s)/deciliter      CAPILLARY BLOOD GLUCOSE      POCT Blood Glucose.: 200 mg/dL (07 Sep 2022 11:25)  POCT Blood Glucose.: 166 mg/dL (07 Sep 2022 07:50)  POCT Blood Glucose.: 237 mg/dL (06 Sep 2022 21:13)  POCT Blood Glucose.: 236 mg/dL (06 Sep 2022 16:46)    I&O's Summary    06 Sep 2022 07:01  -  07 Sep 2022 07:00  --------------------------------------------------------  IN: 300 mL / OUT: 0 mL / NET: 300 mL        PHYSICAL EXAM:  Vital Signs Last 24 Hrs  T(C): 36.6 (07 Sep 2022 05:30), Max: 36.8 (06 Sep 2022 17:15)  T(F): 97.8 (07 Sep 2022 05:30), Max: 98.3 (06 Sep 2022 17:15)  HR: 72 (07 Sep 2022 05:30) (72 - 78)  BP: 168/74 (07 Sep 2022 05:30) (153/86 - 168/74)  BP(mean): --  RR: 18 (07 Sep 2022 05:30) (18 - 18)  SpO2: 98% (07 Sep 2022 05:30) (97% - 98%)    Parameters below as of 07 Sep 2022 05:30  Patient On (Oxygen Delivery Method): room air      GENERAL: NAD, well-appearing, sitting in bed  HEENT: EOMI, very hard of hearing, MMM  CHEST/LUNG: decreased breath sounds, normal resp effort   HEART: Regular rate and rhythm; No murmurs, rubs, or gallops  ABDOMEN: Soft, Nontender, Nondistended; Bowel sounds present  EXTREMITIES:  warm, No edema  SKIN: No rashes or lesions  NERVOUS SYSTEM: awake and alert, following commands, nonfocal     LABS:                        8.6    4.46  )-----------( 189      ( 07 Sep 2022 08:14 )             27.0     09-07    138  |  104  |  40<H>  ----------------------------<  168<H>  3.8   |  27  |  1.23    Ca    8.2<L>      07 Sep 2022 08:14  Phos  2.3     09-07  Mg     1.80     09-07    TPro  5.7<L>  /  Alb  2.5<L>  /  TBili  <0.2  /  DBili  x   /  AST  10  /  ALT  10  /  AlkPhos  72  09-07                RADIOLOGY & ADDITIONAL TESTS:  Results Reviewed:   Imaging Personally Reviewed:  Electrocardiogram Personally Reviewed:    COORDINATION OF CARE:  Care Discussed with Consultants/Other Providers [Y/N]: Y  Prior or Outpatient Records Reviewed [Y/N]: Y

## 2022-09-07 NOTE — PROGRESS NOTE ADULT - ASSESSMENT
67F PMHx of HTN, HLD, sarcoidosis, CHF, recent adm with PEA arrest x2, p/w BLE edema + SOB. Intubated 8/30 for AMS and resp distress. Bronchoscopy 8/31 showed a L sided mucus plug, BAL however grew normal respiratory ra. UCx w/klebsiella pneumonia and e. coli, s/p Cefepime, completed 9/6. Seen by endo for concern of adrenal insufficiency as she was hypothermic and bradycardic on arrival and started on stress dose steroids. She was weaned off of sedation and passed pressure support trials and was successfully extubated on 9/3 to [facility], Spo2 now stable on RA.

## 2022-09-08 LAB
ALBUMIN SERPL ELPH-MCNC: 2.7 G/DL — LOW (ref 3.3–5)
ALP SERPL-CCNC: 73 U/L — SIGNIFICANT CHANGE UP (ref 40–120)
ALT FLD-CCNC: 11 U/L — SIGNIFICANT CHANGE UP (ref 4–33)
ANION GAP SERPL CALC-SCNC: 8 MMOL/L — SIGNIFICANT CHANGE UP (ref 7–14)
AST SERPL-CCNC: 13 U/L — SIGNIFICANT CHANGE UP (ref 4–32)
BILIRUB SERPL-MCNC: <0.2 MG/DL — SIGNIFICANT CHANGE UP (ref 0.2–1.2)
BUN SERPL-MCNC: 35 MG/DL — HIGH (ref 7–23)
CALCIUM SERPL-MCNC: 8.4 MG/DL — SIGNIFICANT CHANGE UP (ref 8.4–10.5)
CHLORIDE SERPL-SCNC: 103 MMOL/L — SIGNIFICANT CHANGE UP (ref 98–107)
CO2 SERPL-SCNC: 27 MMOL/L — SIGNIFICANT CHANGE UP (ref 22–31)
CREAT SERPL-MCNC: 1.2 MG/DL — SIGNIFICANT CHANGE UP (ref 0.5–1.3)
EGFR: 49 ML/MIN/1.73M2 — LOW
GLUCOSE BLDC GLUCOMTR-MCNC: 167 MG/DL — HIGH (ref 70–99)
GLUCOSE BLDC GLUCOMTR-MCNC: 193 MG/DL — HIGH (ref 70–99)
GLUCOSE BLDC GLUCOMTR-MCNC: 208 MG/DL — HIGH (ref 70–99)
GLUCOSE BLDC GLUCOMTR-MCNC: 234 MG/DL — HIGH (ref 70–99)
GLUCOSE SERPL-MCNC: 206 MG/DL — HIGH (ref 70–99)
HCT VFR BLD CALC: 27.9 % — LOW (ref 34.5–45)
HGB BLD-MCNC: 8.8 G/DL — LOW (ref 11.5–15.5)
MAGNESIUM SERPL-MCNC: 1.7 MG/DL — SIGNIFICANT CHANGE UP (ref 1.6–2.6)
MCHC RBC-ENTMCNC: 26.1 PG — LOW (ref 27–34)
MCHC RBC-ENTMCNC: 31.5 GM/DL — LOW (ref 32–36)
MCV RBC AUTO: 82.8 FL — SIGNIFICANT CHANGE UP (ref 80–100)
NRBC # BLD: 0 /100 WBCS — SIGNIFICANT CHANGE UP (ref 0–0)
NRBC # FLD: 0 K/UL — SIGNIFICANT CHANGE UP (ref 0–0)
PHOSPHATE SERPL-MCNC: 2.4 MG/DL — LOW (ref 2.5–4.5)
PLATELET # BLD AUTO: 189 K/UL — SIGNIFICANT CHANGE UP (ref 150–400)
POTASSIUM SERPL-MCNC: 3.7 MMOL/L — SIGNIFICANT CHANGE UP (ref 3.5–5.3)
POTASSIUM SERPL-SCNC: 3.7 MMOL/L — SIGNIFICANT CHANGE UP (ref 3.5–5.3)
PROT SERPL-MCNC: 5.7 G/DL — LOW (ref 6–8.3)
RBC # BLD: 3.37 M/UL — LOW (ref 3.8–5.2)
RBC # FLD: 16.5 % — HIGH (ref 10.3–14.5)
SARS-COV-2 RNA SPEC QL NAA+PROBE: SIGNIFICANT CHANGE UP
SODIUM SERPL-SCNC: 138 MMOL/L — SIGNIFICANT CHANGE UP (ref 135–145)
WBC # BLD: 4.46 K/UL — SIGNIFICANT CHANGE UP (ref 3.8–10.5)
WBC # FLD AUTO: 4.46 K/UL — SIGNIFICANT CHANGE UP (ref 3.8–10.5)

## 2022-09-08 PROCEDURE — 99232 SBSQ HOSP IP/OBS MODERATE 35: CPT

## 2022-09-08 RX ORDER — SODIUM,POTASSIUM PHOSPHATES 278-250MG
1 POWDER IN PACKET (EA) ORAL THREE TIMES A DAY
Refills: 0 | Status: COMPLETED | OUTPATIENT
Start: 2022-09-08 | End: 2022-09-09

## 2022-09-08 RX ORDER — HYDRALAZINE HCL 50 MG
100 TABLET ORAL THREE TIMES A DAY
Refills: 0 | Status: DISCONTINUED | OUTPATIENT
Start: 2022-09-08 | End: 2022-09-09

## 2022-09-08 RX ORDER — INSULIN LISPRO 100/ML
VIAL (ML) SUBCUTANEOUS
Refills: 0 | Status: DISCONTINUED | OUTPATIENT
Start: 2022-09-08 | End: 2022-09-09

## 2022-09-08 RX ORDER — INSULIN LISPRO 100/ML
VIAL (ML) SUBCUTANEOUS AT BEDTIME
Refills: 0 | Status: DISCONTINUED | OUTPATIENT
Start: 2022-09-08 | End: 2022-09-09

## 2022-09-08 RX ADMIN — Medication 75 MILLIGRAM(S): at 05:06

## 2022-09-08 RX ADMIN — CARVEDILOL PHOSPHATE 25 MILLIGRAM(S): 80 CAPSULE, EXTENDED RELEASE ORAL at 17:57

## 2022-09-08 RX ADMIN — CARVEDILOL PHOSPHATE 25 MILLIGRAM(S): 80 CAPSULE, EXTENDED RELEASE ORAL at 05:06

## 2022-09-08 RX ADMIN — Medication 40 MILLIGRAM(S): at 05:06

## 2022-09-08 RX ADMIN — GABAPENTIN 100 MILLIGRAM(S): 400 CAPSULE ORAL at 11:30

## 2022-09-08 RX ADMIN — Medication 100 MILLIGRAM(S): at 16:04

## 2022-09-08 RX ADMIN — Medication 1: at 08:19

## 2022-09-08 RX ADMIN — Medication 4: at 17:54

## 2022-09-08 RX ADMIN — Medication 100 MILLIGRAM(S): at 21:28

## 2022-09-08 RX ADMIN — HEPARIN SODIUM 5000 UNIT(S): 5000 INJECTION INTRAVENOUS; SUBCUTANEOUS at 21:27

## 2022-09-08 RX ADMIN — Medication 2: at 12:15

## 2022-09-08 RX ADMIN — HEPARIN SODIUM 5000 UNIT(S): 5000 INJECTION INTRAVENOUS; SUBCUTANEOUS at 16:03

## 2022-09-08 RX ADMIN — Medication 1 TABLET(S): at 15:02

## 2022-09-08 RX ADMIN — Medication 1 TABLET(S): at 21:27

## 2022-09-08 RX ADMIN — Medication 650 MILLIGRAM(S): at 15:02

## 2022-09-08 RX ADMIN — HEPARIN SODIUM 5000 UNIT(S): 5000 INJECTION INTRAVENOUS; SUBCUTANEOUS at 05:06

## 2022-09-08 RX ADMIN — AMLODIPINE BESYLATE 10 MILLIGRAM(S): 2.5 TABLET ORAL at 05:06

## 2022-09-08 NOTE — PROGRESS NOTE ADULT - SUBJECTIVE AND OBJECTIVE BOX
Chief Complaint: DM2    History: reports enjoyed her lunch of salad, soup and cottage cheese  denies complaints  is hard of hearing    MEDICATIONS  (STANDING):  amLODIPine   Tablet 10 milliGRAM(s) Oral daily  carvedilol 25 milliGRAM(s) Oral every 12 hours  dextrose 5%. 1000 milliLiter(s) (100 mL/Hr) IV Continuous <Continuous>  dextrose 5%. 1000 milliLiter(s) (50 mL/Hr) IV Continuous <Continuous>  dextrose 50% Injectable 25 Gram(s) IV Push once  dextrose 50% Injectable 12.5 Gram(s) IV Push once  dextrose 50% Injectable 25 Gram(s) IV Push once  furosemide    Tablet 40 milliGRAM(s) Oral daily  gabapentin 100 milliGRAM(s) Oral daily  glucagon  Injectable 1 milliGRAM(s) IntraMuscular once  heparin   Injectable 5000 Unit(s) SubCutaneous every 8 hours  hydrALAZINE 100 milliGRAM(s) Oral three times a day  insulin lispro (ADMELOG) corrective regimen sliding scale   SubCutaneous three times a day before meals  insulin lispro (ADMELOG) corrective regimen sliding scale   SubCutaneous at bedtime  potassium phosphate / sodium phosphate Tablet (K-PHOS No. 2) 1 Tablet(s) Oral three times a day    MEDICATIONS  (PRN):  acetaminophen     Tablet .. 650 milliGRAM(s) Oral every 6 hours PRN Temp greater or equal to 38C (100.4F), Mild Pain (1 - 3)  dextrose Oral Gel 15 Gram(s) Oral once PRN Blood Glucose LESS THAN 70 milliGRAM(s)/deciliter  dextrose Oral Gel 15 Gram(s) Oral once PRN Blood Glucose LESS THAN 70 milliGRAM(s)/deciliter      Allergies    penicillin ([place])    Intolerances      Review of Systems:    ALL OTHER SYSTEMS REVIEWED AND NEGATIVE        PHYSICAL EXAM:  VITALS: T(C): 36.1 (09-08-22 @ 04:49)  T(F): 97 (09-08-22 @ 04:49), Max: 98.6 (09-07-22 @ 21:20)  HR: 61 (09-08-22 @ 04:49) (61 - 84)  BP: 145/65 (09-08-22 @ 04:49) (115/68 - 157/68)  RR:  (17 - 18)  SpO2:  (97% - 99%)  Wt(kg): --  GENERAL: NAD, well-groomed, well-developed  EYES: No proptosis, no lid lag, anicteric  HEENT:  Atraumatic, Normocephalic, moist mucous membranes, + hard of hearing  RESPIRATORY: nonlabored respirations, no wheezing  PSYCH: Alert and oriented x 3, normal affect, normal mood    CAPILLARY BLOOD GLUCOSE      POCT Blood Glucose.: 234 mg/dL (08 Sep 2022 11:27)  POCT Blood Glucose.: 193 mg/dL (08 Sep 2022 07:48)  POCT Blood Glucose.: 274 mg/dL (07 Sep 2022 21:23)  POCT Blood Glucose.: 214 mg/dL (07 Sep 2022 17:01)      09-08    138  |  103  |  35<H>  ----------------------------<  206<H>  3.7   |  27  |  1.20    eGFR: 49<L>    Ca    8.4      09-08  Mg     1.70     09-08  Phos  2.4     09-08    TPro  5.7<L>  /  Alb  2.7<L>  /  TBili  <0.2  /  DBili  x   /  AST  13  /  ALT  11  /  AlkPhos  73  09-08      A1C with Estimated Average Glucose Result: 6.0 % (08-31-22 @ 02:00)  A1C with Estimated Average Glucose Result: 7.3 % (06-05-22 @ 06:00)  A1C with Estimated Average Glucose Result: 7.7 % (02-04-22 @ 05:37)  A1C with Estimated Average Glucose Result: 9.2 % (11-29-21 @ 06:47)      Thyroid Function Tests:  08-30 @ 19:40 TSH 4.82 FreeT4 -- T3 -- Anti TPO -- Anti Thyroglobulin Ab -- TSI --

## 2022-09-08 NOTE — PROGRESS NOTE ADULT - ASSESSMENT
67F w/ PMHx of HTN, HLD, T2DM, sarcoidosis, CHF, presented to the ED for BL leg swelling and SOB. Endocrinology consulted for concern for adrenal insufficiency.    #Rule out Adrenal Insuffiency (now ruled out)  Hypothermia - now resolved  - previous negative co-syntropin stim test 7/16 on prior admission: 8am cortisol 16.1, 8:30am cortisol 22.4, 9:15am cortisol 24.7.  - cortisol 8/30 at 7:40pm 7, although not obtained at ideal time of 8am, would expect to have high cortisol with acute decompensation  - possible patient could have had relative adrenal insufficiency, unclear other etiologies of secondary AI and less likely addisons disease without history of autoimmune conditions  - extubated 9/3 in am  -stim test 9/7= pt with cortisol of 17.8 after stim, ACTH 30.4, stim test passed again,  -pt passed stim test = does not have AI. no need for steroids   -Continue off all hydrocortisone at this time.  Vitals are stable (BP mildly high). No further workup for this now.    #T2DM  - HbA1c 6  - patient was previously on high doses of lantus and admelog as well as metformin and victoza in the past, however had presented to last hospitalization with frequent episodes of hypoglycemia. Patient is not able to report details on home DM regimen.  - C-peptide 5 on 7/17  - last admission, patient was on minimal amounts of insulin and discharged on lantus 2 units daily  PLAN  - As BG are still above goal 100-180 off steroid will increase to admelog moderate correction scale premeal and moderate bed scale  - no lantus at this time  -nutrition consult   - Discharge recommendations:  if insulin requirements remain low can likely dc on metformin 1000mg BID (if GFR and LFTS are stable) if no contraindications    GFR currently 49, will trend    #HLD  - on atorvastatin 40mg daily outpatient  - held per primary team    #HTN  145/65 on hydralazine, furosemide, amlodipine, carvedilol    Louise Samson MD  Division of Endocrinology  Pager: [phone]    If after 6PM or before 9AM, or on weekends/holidays, please call endocrine answering service for assistance ([phone]).  For nonurgent matters email [email or web address] for assistance.

## 2022-09-08 NOTE — PROGRESS NOTE ADULT - ASSESSMENT
67F PMHx of HTN, HLD, sarcoidosis, CHF, recent adm with PEA arrest x2, p/w BLE edema + SOB. Intubated 8/30 for AMS and resp distress. Bronchoscopy 8/31 showed a L sided mucus plug, BAL however grew normal respiratory ra. UCx w/klebsiella pneumonia and e. coli, s/p Cefepime, completed 9/6. Seen by endo for concern of adrenal insufficiency as she was hypothermic and bradycardic on arrival and started on stress dose steroids. She was weaned off of sedation and passed pressure support trials and was successfully extubated on 9/3 to [facility], Spo2 now stable on RA.

## 2022-09-08 NOTE — PROGRESS NOTE ADULT - PROBLEM SELECTOR PLAN 1
2/2 sepsis, acute on chronic dchf. Intubated 8/30 on admission   -s/p bronch 8/31, mucous plug in L mainstem bronchus evacuated, bronchial edema    -extubated to facetent 9/3   -s/p IV diuresis, c/w PO Lasix 40mg -- per dgt, pt discharged off diuretics last admission, concerned about volume overload at [facility]   -Monitor O2, currently on room air
resolved   2/2 sepsis, acute on chronic dchf. Intubated 8/30 on admission   -s/p bronch 8/31, mucous plug in L mainstem bronchus evacuated, bronchial edema    -extubated to facetent 9/3   -s/p IV diuresis, c/w PO Lasix 40mg qD -- per dgt, pt discharged off diuretics last admission, concerned about volume overload at [facility]   -Monitor O2, currently on room air
2/2 sepsis, acute on chronic dchf. Intubated 8/30 on admission   -s/p bronch 8/31, mucous plug in L mainstem bronchus evacuated, bronchial edema    -extubated to facetent 9/3   -s/p IV diuresis, c/w PO Lasix 40mg -- per dgt, pt discharged off diuretics last admission, concerned about volume overload at [facility]   -Monitor O2, currently on room air

## 2022-09-08 NOTE — PROGRESS NOTE ADULT - SUBJECTIVE AND OBJECTIVE BOX
[facility] Division of Hospital Medicine  Alysia Vo DO  Pager (M-F, 2L-5X): [phone]      Patient is a 67y old  Female who presents with a chief complaint of AMS/respiratory failure (07 Sep 2022 16:02)      SUBJECTIVE / OVERNIGHT EVENTS: no overnight events   ADDITIONAL REVIEW OF SYSTEMS:    MEDICATIONS  (STANDING):  amLODIPine   Tablet 10 milliGRAM(s) Oral daily  carvedilol 25 milliGRAM(s) Oral every 12 hours  dextrose 5%. 1000 milliLiter(s) (100 mL/Hr) IV Continuous <Continuous>  dextrose 5%. 1000 milliLiter(s) (50 mL/Hr) IV Continuous <Continuous>  dextrose 50% Injectable 25 Gram(s) IV Push once  dextrose 50% Injectable 12.5 Gram(s) IV Push once  dextrose 50% Injectable 25 Gram(s) IV Push once  furosemide    Tablet 40 milliGRAM(s) Oral daily  gabapentin 100 milliGRAM(s) Oral daily  glucagon  Injectable 1 milliGRAM(s) IntraMuscular once  heparin   Injectable 5000 Unit(s) SubCutaneous every 8 hours  hydrALAZINE 100 milliGRAM(s) Oral three times a day  insulin lispro (ADMELOG) corrective regimen sliding scale   SubCutaneous three times a day before meals  insulin lispro (ADMELOG) corrective regimen sliding scale   SubCutaneous at bedtime  potassium phosphate / sodium phosphate Tablet (K-PHOS No. 2) 1 Tablet(s) Oral three times a day    MEDICATIONS  (PRN):  acetaminophen     Tablet .. 650 milliGRAM(s) Oral every 6 hours PRN Temp greater or equal to 38C (100.4F), Mild Pain (1 - 3)  dextrose Oral Gel 15 Gram(s) Oral once PRN Blood Glucose LESS THAN 70 milliGRAM(s)/deciliter  dextrose Oral Gel 15 Gram(s) Oral once PRN Blood Glucose LESS THAN 70 milliGRAM(s)/deciliter      CAPILLARY BLOOD GLUCOSE      POCT Blood Glucose.: 193 mg/dL (08 Sep 2022 07:48)  POCT Blood Glucose.: 274 mg/dL (07 Sep 2022 21:23)  POCT Blood Glucose.: 214 mg/dL (07 Sep 2022 17:01)  POCT Blood Glucose.: 200 mg/dL (07 Sep 2022 11:25)    I&O's Summary    07 Sep 2022 07:01  -  08 Sep 2022 07:00  --------------------------------------------------------  IN: 0 mL / OUT: 1500 mL / NET: -1500 mL        PHYSICAL EXAM:  Vital Signs Last 24 Hrs  T(C): 36.1 (08 Sep 2022 04:49), Max: 37 (07 Sep 2022 21:20)  T(F): 97 (08 Sep 2022 04:49), Max: 98.6 (07 Sep 2022 21:20)  HR: 61 (08 Sep 2022 04:49) (61 - 84)  BP: 145/65 (08 Sep 2022 04:49) (115/68 - 157/68)  BP(mean): --  RR: 18 (08 Sep 2022 04:49) (17 - 18)  SpO2: 98% (08 Sep 2022 04:49) (97% - 99%)    Parameters below as of 08 Sep 2022 04:49  Patient On (Oxygen Delivery Method): room air      CONSTITUTIONAL: NAD, well-developed, well-groomed  EYES: EOMI; conjunctiva and sclera clear  ENMT: Moist oral mucosa, no pharyngeal injection or exudates; normal dentition  NECK: Supple, no palpable masses; no thyromegaly  RESPIRATORY: Normal respiratory effort; lungs are clear to auscultation bilaterally  CARDIOVASCULAR: Regular rate and rhythm, normal S1 and S2, no murmur/rub/gallop; No lower extremity edema; Peripheral pulses are 2+ bilaterally  ABDOMEN: Nontender to palpation, normoactive bowel sounds, no rebound/guarding; No hepatosplenomegaly  MUSKULOSKELETAL:  no clubbing or cyanosis of digits; no joint swelling or tenderness to palpation  PSYCH: A+O to person, place, and time; affect appropriate  NEUROLOGY: CN 2-12 are intact and symmetric; no gross sensory deficits;   SKIN: No rashes; no palpable lesions    LABS:                        8.8    4.46  )-----------( 189      ( 08 Sep 2022 05:44 )             27.9     09-08    138  |  103  |  35<H>  ----------------------------<  206<H>  3.7   |  27  |  1.20    Ca    8.4      08 Sep 2022 05:44  Phos  2.4     09-08  Mg     1.70     09-08    TPro  5.7<L>  /  Alb  2.7<L>  /  TBili  <0.2  /  DBili  x   /  AST  13  /  ALT  11  /  AlkPhos  73  09-08                RADIOLOGY & ADDITIONAL TESTS:  Results Reviewed:   Imaging Personally Reviewed:  Electrocardiogram Personally Reviewed:    COORDINATION OF CARE:  Care Discussed with Consultants/Other Providers [Y/N]: Y  Prior or Outpatient Records Reviewed [Y/N]: Y   [facility] Division of Hospital Medicine  Alysia Vo DO  Pager (M-F, 2F-5P): [phone]      Patient is a 67y old  Female who presents with a chief complaint of AMS/respiratory failure (07 Sep 2022 16:02)      SUBJECTIVE / OVERNIGHT EVENTS: Pt severely hard of hearing, but reads what is written on cellphone or notepad, and responds appropriately.   States she is feeling well today, only has ankle pain, agreeable to try Tylenol   ADDITIONAL REVIEW OF SYSTEMS: no cp/sob     MEDICATIONS  (STANDING):  amLODIPine   Tablet 10 milliGRAM(s) Oral daily  carvedilol 25 milliGRAM(s) Oral every 12 hours  dextrose 5%. 1000 milliLiter(s) (100 mL/Hr) IV Continuous <Continuous>  dextrose 5%. 1000 milliLiter(s) (50 mL/Hr) IV Continuous <Continuous>  dextrose 50% Injectable 25 Gram(s) IV Push once  dextrose 50% Injectable 12.5 Gram(s) IV Push once  dextrose 50% Injectable 25 Gram(s) IV Push once  furosemide    Tablet 40 milliGRAM(s) Oral daily  gabapentin 100 milliGRAM(s) Oral daily  glucagon  Injectable 1 milliGRAM(s) IntraMuscular once  heparin   Injectable 5000 Unit(s) SubCutaneous every 8 hours  hydrALAZINE 100 milliGRAM(s) Oral three times a day  insulin lispro (ADMELOG) corrective regimen sliding scale   SubCutaneous three times a day before meals  insulin lispro (ADMELOG) corrective regimen sliding scale   SubCutaneous at bedtime  potassium phosphate / sodium phosphate Tablet (K-PHOS No. 2) 1 Tablet(s) Oral three times a day    MEDICATIONS  (PRN):  acetaminophen     Tablet .. 650 milliGRAM(s) Oral every 6 hours PRN Temp greater or equal to 38C (100.4F), Mild Pain (1 - 3)  dextrose Oral Gel 15 Gram(s) Oral once PRN Blood Glucose LESS THAN 70 milliGRAM(s)/deciliter  dextrose Oral Gel 15 Gram(s) Oral once PRN Blood Glucose LESS THAN 70 milliGRAM(s)/deciliter      CAPILLARY BLOOD GLUCOSE      POCT Blood Glucose.: 193 mg/dL (08 Sep 2022 07:48)  POCT Blood Glucose.: 274 mg/dL (07 Sep 2022 21:23)  POCT Blood Glucose.: 214 mg/dL (07 Sep 2022 17:01)  POCT Blood Glucose.: 200 mg/dL (07 Sep 2022 11:25)    I&O's Summary    07 Sep 2022 07:01  -  08 Sep 2022 07:00  --------------------------------------------------------  IN: 0 mL / OUT: 1500 mL / NET: -1500 mL        PHYSICAL EXAM:  Vital Signs Last 24 Hrs  T(C): 36.1 (08 Sep 2022 04:49), Max: 37 (07 Sep 2022 21:20)  T(F): 97 (08 Sep 2022 04:49), Max: 98.6 (07 Sep 2022 21:20)  HR: 61 (08 Sep 2022 04:49) (61 - 84)  BP: 145/65 (08 Sep 2022 04:49) (115/68 - 157/68)  BP(mean): --  RR: 18 (08 Sep 2022 04:49) (17 - 18)  SpO2: 98% (08 Sep 2022 04:49) (97% - 99%)    Parameters below as of 08 Sep 2022 04:49  Patient On (Oxygen Delivery Method): room air    GENERAL: NAD, well-appearing, sitting in bed  HEENT: EOMI, very hard of hearing, MMM  CHEST/LUNG: decreased breath sounds, normal resp effort   HEART: Regular rate and rhythm; No murmurs, rubs, or gallops  ABDOMEN: Soft, Nontender, Nondistended; Bowel sounds present  EXTREMITIES:  warm, No edema  SKIN: No rashes or lesions  NERVOUS SYSTEM: awake and alert, following commands, nonfocal   LABS:                        8.8    4.46  )-----------( 189      ( 08 Sep 2022 05:44 )             27.9     09-08    138  |  103  |  35<H>  ----------------------------<  206<H>  3.7   |  27  |  1.20    Ca    8.4      08 Sep 2022 05:44  Phos  2.4     09-08  Mg     1.70     09-08    TPro  5.7<L>  /  Alb  2.7<L>  /  TBili  <0.2  /  DBili  x   /  AST  13  /  ALT  11  /  AlkPhos  73  09-08                RADIOLOGY & ADDITIONAL TESTS:  Results Reviewed:   Imaging Personally Reviewed:  Electrocardiogram Personally Reviewed:    COORDINATION OF CARE:  Care Discussed with Consultants/Other Providers [Y/N]: Y  Prior or Outpatient Records Reviewed [Y/N]: Y

## 2022-09-08 NOTE — PROGRESS NOTE ADULT - PROBLEM SELECTOR PLAN 4
On statin outpt, currently on hold

## 2022-09-08 NOTE — PROGRESS NOTE ADULT - PROBLEM SELECTOR PLAN 2
resolved  - Intubated for AMS likely in setting of sepsis 2/2 UTI vs hypercapnia  - [facility] 8/30 w/o acute pathology  - s/p sedation w/ propofol/fentanyl, extubated 9/3.  - mentating well, hard of hearing.
resolved  - Intubated for AMS likely in setting of sepsis 2/2 UTI vs hypercapnia  - [facility] 8/30 w/o acute pathology  - s/p sedation w/ propofol/fentanyl, extubated 9/3.  - mentating well, hard of hearing.
resolved  - Intubated for AMS likely in setting of sepsis 2/2 UTI vs hypercapnia  - [facility] 8/30 w/o acute pathology  - s/p sedation w/ propofol/fentanyl, extubated 9/3.  - mentating well, hard of hearing.

## 2022-09-08 NOTE — PROGRESS NOTE ADULT - PROBLEM SELECTOR PLAN 7
p/w shortness of breath, swelling of her legs and AMS with acute episode of bradycardia, hypothermia and respiratory distress in ED prompting intubation  -Endocrine following, pt tapered off stress dose steroids on 8/6   -previous negative co-syntropin stim test 7/16 on prior admission  -Repeat test completed this morning
p/w shortness of breath, swelling of her legs and AMS with acute episode of bradycardia, hypothermia and respiratory distress in ED prompting intubation  -Endocrine following, pt tapered off stress dose steroids on 8/6   -previous negative co-syntropin stim test 7/16 on prior admission  -Repeat test completed this morning
p/w shortness of breath, swelling of her legs and AMS with acute episode of bradycardia, hypothermia and respiratory distress in ED prompting intubation  -Endocrine following, pt tapered off stress dose steroids on 8/6   -previous negative co-syntropin stim test 7/16 on prior admission  -planned for repeat test in AM

## 2022-09-08 NOTE — PROGRESS NOTE ADULT - PROBLEM SELECTOR PLAN 5
HbA1c 6, previously on high doses of lantus, admelog, metformin and victoza, however had presented to last hospitalization with frequent episodes of hypoglycemia  - Endocrine following, c/w LDSS and FS, no lantus needed at this time

## 2022-09-08 NOTE — PROGRESS NOTE ADULT - PROBLEM SELECTOR PLAN 3
s/p stress dose steroids   - BP elevated, increased Hydralazine to 75mg TID
s/p stress dose steroids   - BP elevated, increased Hydralazine to 100mg TID
s/p stress dose steroids   c/w BP meds

## 2022-09-09 ENCOUNTER — TRANSCRIPTION ENCOUNTER (OUTPATIENT)
Age: 67
End: 2022-09-09

## 2022-09-09 VITALS
DIASTOLIC BLOOD PRESSURE: 63 MMHG | RESPIRATION RATE: 18 BRPM | OXYGEN SATURATION: 98 % | SYSTOLIC BLOOD PRESSURE: 142 MMHG | HEART RATE: 70 BPM | TEMPERATURE: 98 F

## 2022-09-09 LAB
GLUCOSE BLDC GLUCOMTR-MCNC: 172 MG/DL — HIGH (ref 70–99)
GLUCOSE BLDC GLUCOMTR-MCNC: 211 MG/DL — HIGH (ref 70–99)

## 2022-09-09 PROCEDURE — 99239 HOSP IP/OBS DSCHRG MGMT >30: CPT

## 2022-09-09 PROCEDURE — 99232 SBSQ HOSP IP/OBS MODERATE 35: CPT

## 2022-09-09 RX ORDER — GABAPENTIN 400 MG/1
1 CAPSULE ORAL
Qty: 0 | Refills: 0 | DISCHARGE
Start: 2022-09-09

## 2022-09-09 RX ORDER — METFORMIN HYDROCHLORIDE 850 MG/1
1 TABLET ORAL
Qty: 60 | Refills: 0
Start: 2022-09-09 | End: 2022-10-08

## 2022-09-09 RX ORDER — FUROSEMIDE 40 MG
1 TABLET ORAL
Qty: 0 | Refills: 0 | DISCHARGE
Start: 2022-09-09

## 2022-09-09 RX ORDER — HEPARIN SODIUM 5000 [USP'U]/ML
5000 INJECTION INTRAVENOUS; SUBCUTANEOUS
Qty: 0 | Refills: 0 | DISCHARGE
Start: 2022-09-09

## 2022-09-09 RX ORDER — ACETAMINOPHEN 500 MG
2 TABLET ORAL
Qty: 0 | Refills: 0 | DISCHARGE
Start: 2022-09-09

## 2022-09-09 RX ADMIN — Medication 2: at 08:33

## 2022-09-09 RX ADMIN — GABAPENTIN 100 MILLIGRAM(S): 400 CAPSULE ORAL at 12:15

## 2022-09-09 RX ADMIN — Medication 1 TABLET(S): at 05:19

## 2022-09-09 RX ADMIN — HEPARIN SODIUM 5000 UNIT(S): 5000 INJECTION INTRAVENOUS; SUBCUTANEOUS at 05:19

## 2022-09-09 RX ADMIN — Medication 40 MILLIGRAM(S): at 05:20

## 2022-09-09 RX ADMIN — Medication 100 MILLIGRAM(S): at 05:19

## 2022-09-09 RX ADMIN — HEPARIN SODIUM 5000 UNIT(S): 5000 INJECTION INTRAVENOUS; SUBCUTANEOUS at 13:29

## 2022-09-09 RX ADMIN — Medication 100 MILLIGRAM(S): at 13:30

## 2022-09-09 RX ADMIN — AMLODIPINE BESYLATE 10 MILLIGRAM(S): 2.5 TABLET ORAL at 05:20

## 2022-09-09 RX ADMIN — Medication 4: at 12:12

## 2022-09-09 RX ADMIN — CARVEDILOL PHOSPHATE 25 MILLIGRAM(S): 80 CAPSULE, EXTENDED RELEASE ORAL at 05:19

## 2022-09-09 NOTE — PROGRESS NOTE ADULT - PROBLEM SELECTOR PROBLEM 4
HLD (hyperlipidemia)
HLD (hyperlipidemia)
Essential hypertension
Essential hypertension
HLD (hyperlipidemia)

## 2022-09-09 NOTE — DISCHARGE NOTE PROVIDER - NSDCMRMEDTOKEN_GEN_ALL_CORE_FT
acetaminophen 325 mg oral tablet: 2 tab(s) orally every 6 hours, As needed, Temp greater or equal to 38C (100.4F), Mild Pain (1 - 3)  amLODIPine 10 mg oral tablet: 1 tab(s) orally once a day  aspirin 81 mg oral tablet, chewable: 1 tab(s) orally once a day  atorvastatin 40 mg oral tablet: 1 tab(s) orally once a day  carvedilol 25 mg oral tablet: 1 tab(s) orally every 12 hours  furosemide 40 mg oral tablet: 1 tab(s) orally once a day  gabapentin 100 mg oral capsule: 1 cap(s) orally once a day  heparin: 5000 unit(s) subcutaneous every 8 hours  hydrALAZINE 100 mg oral tablet: 1 tab(s) orally 3 times a day  ipratropium-albuterol 0.5 mg-2.5 mg/3 mL inhalation solution: 3 milliliter(s) inhaled every 6 hours  melatonin 3 mg oral tablet: 2 tab(s) orally once a day (at bedtime)

## 2022-09-09 NOTE — PROGRESS NOTE ADULT - PROVIDER SPECIALTY LIST ADULT
Endocrinology
Endocrinology
Hospitalist
Hospitalist
MICU
Endocrinology
Endocrinology
MICU
MICU
Endocrinology
Hospitalist

## 2022-09-09 NOTE — DISCHARGE NOTE PROVIDER - NSDCFUSCHEDAPPT_GEN_ALL_CORE_FT
Monica Quick  [facility]  [address]ULMMED [address]  Scheduled Appointment: 09/29/2022    Mookie Johnson  [facility]  NEPHRO [address]  Scheduled Appointment: 10/20/2022

## 2022-09-09 NOTE — DISCHARGE NOTE NURSING/CASE MANAGEMENT/SOCIAL WORK - PATIENT PORTAL LINK FT
You can access the FollowMyHealth Patient Portal offered by [facility] by registering at the following website: [email or web address]. By joining [facility]’s FollowMyHealth portal, you will also be able to view your health information using other applications (apps) compatible with our system.

## 2022-09-09 NOTE — DISCHARGE NOTE PROVIDER - HOSPITAL COURSE
67F PMHx of HTN, HLD, sarcoidosis, CHF, recent adm with PEA arrest x2, p/w BLE edema + SOB. Intubated 8/30 for AMS and resp distress. Bronchoscopy 8/31 showed a L sided mucus plug, BAL however grew normal respiratory ra. UCx w/klebsiella pneumonia and e. coli, s/p Cefepime, completed 9/6. Seen by endo for concern of adrenal insufficiency as she was hypothermic and bradycardic on arrival and started on stress dose steroids. She was weaned off of sedation and passed pressure support trials and [facility]o2 now stable on RA.         Problem/Plan - 1:  ·  Problem: Acute respiratory failure with hypoxia.   ·  Plan: resolved   2/2 sepsis, acute on chronic dchf. Intubated 8/30 on admission   -s/p bronch 8/31, mucous plug in L mainstem bronchus evacuated, bronchial edema    [facility]   -s/p IV diuresis, c/w PO Lasix 40mg qD -- per dgt, pt discharged off diuretics last admission, concerned about volume overload at [facility]   -Monitor O2, currently on room air.     Problem/Plan - 2:  ·  Problem: Metabolic encephalopathy.   ·  Plan: resolved  - Intubated for AMS likely in setting of sepsis 2/2 UTI vs hypercapnia  - [facility] 8/30 w/o acute pathology  - s/p sedation w/ propofol/fentanyl, extubated 9/3.  - mentating well, hard of hearing.     Problem/Plan - 3:  ·  Problem: Essential hypertension.   ·  Plan: s/p stress dose steroids   - BP elevated, increased Hydralazine to 100mg TID.     Problem/Plan - 4:  ·  Problem: HLD (hyperlipidemia).   ·  Plan: On statin outpt, currently on hold.     Problem/Plan - 5:  ·  Problem: Type 2 diabetes mellitus.   ·  Plan: HbA1c 6, previously on high doses of lantus, admelog, metformin and victoza, however had presented to last hospitalization with frequent episodes of hypoglycemia  - Endocrine following, c/w LDSS and FS, no lantus needed at this time.     Problem/Plan - 6:  ·  Problem: DVT prophylaxis.   ·  Plan: HSQ  Dispo: Pending [facility]       Updated pts dgt, Mindy ([phone]) on 9/6, 9/7.     Problem/Plan - 7:  ·  Problem: R/O Adrenal insufficiency.   ·  Plan: p/w shortness of breath, swelling of her legs and AMS with acute episode of bradycardia, hypothermia and respiratory distress in ED prompting intubation  -Endocrine following, pt tapered off stress dose steroids on 8/6   -previous negative co-syntropin stim test 7/16 on prior admission  -Repeat test completed this morning.   67F PMHx of HTN, HLD, sarcoidosis, CHF, recent adm with PEA arrest x2, p/w BLE edema + SOB. Intubated 8/30 for AMS and resp distress. Bronchoscopy 8/31 showed a L sided mucus plug, BAL however grew normal respiratory ra. UCx w/klebsiella pneumonia and e. coli, s/p Cefepime, completed 9/6. Seen by endo for concern of adrenal insufficiency as she was hypothermic and bradycardic on arrival and started on stress dose steroids. She was weaned off of sedation and passed pressure support trials and was successfully extubated on 9/3 to [facility], Spo2 now stable on RA.         Problem/Plan - 1:  ·  Problem: Acute respiratory failure with hypoxia.   ·  Plan: resolved   2/2 sepsis, acute on chronic dchf. Intubated 8/30 on admission   -s/p bronch 8/31, mucous plug in L mainstem bronchus evacuated, bronchial edema    -extubated to [facility] 9/3   -s/p IV diuresis, c/w PO Lasix 40mg qD -- per dgt, pt discharged off diuretics last admission, concerned about volume overload at [facility]   -Monitor O2, currently on room air.     Problem/Plan - 2:  ·  Problem: Metabolic encephalopathy.   ·  Plan: resolved  - Intubated for AMS likely in setting of sepsis 2/2 UTI vs hypercapnia  - [facility] 8/30 w/o acute pathology  - s/p sedation w/ propofol/fentanyl, extubated 9/3.  - mentating well, hard of hearing.     Problem/Plan - 3:  ·  Problem: Essential hypertension.   ·  Plan: s/p stress dose steroids for hypotension   - c/w home meds     Problem/Plan - 4:  ·  Problem: HLD (hyperlipidemia).   ·  Plan: On statin outpt, currently on hold. - resume outpt      Problem/Plan - 5:  ·  Problem: Type 2 diabetes mellitus.   ·  Plan: HbA1c 6, previously on high doses of lantus, admelog, metformin and victoza, however had presented to last hospitalization with frequent episodes of hypoglycemia  - Endocrine following, c/w LDSS and FS, no lantus needed at this time.     Problem/Plan - 7:  ·  Problem: R/O Adrenal insufficiency. -- ruled out, negative co-syntropin test x2

## 2022-09-09 NOTE — DISCHARGE NOTE PROVIDER - NSDCCPCAREPLAN_GEN_ALL_CORE_FT
PRINCIPAL DISCHARGE DIAGNOSIS  Diagnosis: CHF exacerbation  Assessment and Plan of Treatment: Continue regimen from hospital. Follow heart healthy diet. Monitor weight. If you develop severe lower extremity swelling and shortness of breath please seek medial attention. Follow up with your PCP and cardiologist for further evaluation and managment. Please call to make an appointment.        SECONDARY DISCHARGE DIAGNOSES  Diagnosis: Acute respiratory failure with hypoxia  Assessment and Plan of Treatment: Required ventilation on admission /mucus plugs removed and able to be off ventilator and changed to nasal cannula   Now tolerating room air    Diagnosis: Type 2 diabetes mellitus  Assessment and Plan of Treatment: Consistant carbohydrate diet   Follow accuchecks before meals and bedtime   Metformin 1000mg bid before meals   Monitor creatnine with renal doctor    Diagnosis: Essential hypertension  Assessment and Plan of Treatment: Continue blood pressure medication regimen as directed. Monitor for any visual changes, headaches or dizziness.  Monitor blood pressure regularly.  Follow up with your PCP for further management for high blood pressure.      Diagnosis: HLD (hyperlipidemia)  Assessment and Plan of Treatment: Continue statin   DASH diet    Diagnosis: CHF, acute  Assessment and Plan of Treatment: Continue lasix daily- DO NOT STOP without careful monitoring   Continue regimen from hospital. Follow heart healthy diet. Monitor weight. If you develop severe lower extremity swelling and shortness of breath please seek medial attention. Follow up with your PCP and cardiologist for further evaluation and managment. Please call to make an appointment.       PRINCIPAL DISCHARGE DIAGNOSIS  Diagnosis: CHF exacerbation  Assessment and Plan of Treatment: Continue regimen from hospital. Follow heart healthy diet. Monitor weight. If you develop severe lower extremity swelling and shortness of breath please seek medial attention. Follow up with your PCP and cardiologist for further evaluation and managment. Please call to make an appointment.        SECONDARY DISCHARGE DIAGNOSES  Diagnosis: Acute respiratory failure with hypoxia  Assessment and Plan of Treatment: Required ventilation on admission /mucus plugs removed and able to be off ventilator and changed to nasal cannula   Now tolerating room air    Diagnosis: Type 2 diabetes mellitus  Assessment and Plan of Treatment: Consistant carbohydrate diet   Follow accuchecks before meals and bedtime   Metformin 1000mg bid before meals   Monitor creatnine every 1-2 weeks for medication dosing   follow up with renal MD Dr Johnson    Diagnosis: Essential hypertension  Assessment and Plan of Treatment: Continue blood pressure medication regimen as directed. Monitor for any visual changes, headaches or dizziness.  Monitor blood pressure regularly.  Follow up with your PCP for further management for high blood pressure.      Diagnosis: HLD (hyperlipidemia)  Assessment and Plan of Treatment: Continue statin   DASH diet    Diagnosis: CHF, acute  Assessment and Plan of Treatment: Continue lasix daily- DO NOT STOP without careful monitoring   Continue regimen from hospital. Follow heart healthy diet. Monitor weight. If you develop severe lower extremity swelling and shortness of breath please seek medial attention. Follow up with your PCP and cardiologist for further evaluation and managment. Please call to make an appointment.

## 2022-09-09 NOTE — PROGRESS NOTE ADULT - REASON FOR ADMISSION
AMS/respiratory failure

## 2022-09-09 NOTE — PROGRESS NOTE ADULT - PROBLEM SELECTOR PROBLEM 3
HLD (hyperlipidemia)
Essential hypertension
Essential hypertension
HLD (hyperlipidemia)
Essential hypertension
Left arm wound for several months, seen by MD Cortez and given DC paperwork to f/u with dermatology.

## 2022-09-09 NOTE — PROGRESS NOTE ADULT - PROBLEM SELECTOR PROBLEM 1
Adrenal insufficiency
Acute respiratory failure with hypoxia
Adrenal insufficiency
Acute respiratory failure with hypoxia
Acute respiratory failure with hypoxia

## 2022-09-09 NOTE — DISCHARGE NOTE NURSING/CASE MANAGEMENT/SOCIAL WORK - NSDCPEFALRISK_GEN_ALL_CORE
For information on Fall & Injury Prevention, visit: [email or web address] OR  [email or web address] OR  https://www.cdc.gov/steadi/patient.html

## 2022-09-09 NOTE — PROGRESS NOTE ADULT - SUBJECTIVE AND OBJECTIVE BOX
Chief Complaint: #T2DM    History: Pt seen at bedside. Pt tolerating oral intake. Pt denies nausea and vomiting/any signs of hypoglycemia. Pt reports an adequate appetite    MEDICATIONS  (STANDING):  amLODIPine   Tablet 10 milliGRAM(s) Oral daily  carvedilol 25 milliGRAM(s) Oral every 12 hours  dextrose 5%. 1000 milliLiter(s) (100 mL/Hr) IV Continuous <Continuous>  dextrose 5%. 1000 milliLiter(s) (50 mL/Hr) IV Continuous <Continuous>  dextrose 50% Injectable 25 Gram(s) IV Push once  dextrose 50% Injectable 12.5 Gram(s) IV Push once  dextrose 50% Injectable 25 Gram(s) IV Push once  furosemide    Tablet 40 milliGRAM(s) Oral daily  gabapentin 100 milliGRAM(s) Oral daily  glucagon  Injectable 1 milliGRAM(s) IntraMuscular once  heparin   Injectable 5000 Unit(s) SubCutaneous every 8 hours  hydrALAZINE 100 milliGRAM(s) Oral three times a day  insulin lispro (ADMELOG) corrective regimen sliding scale   SubCutaneous three times a day before meals  insulin lispro (ADMELOG) corrective regimen sliding scale   SubCutaneous at bedtime    MEDICATIONS  (PRN):  acetaminophen     Tablet .. 650 milliGRAM(s) Oral every 6 hours PRN Temp greater or equal to 38C (100.4F), Mild Pain (1 - 3)  dextrose Oral Gel 15 Gram(s) Oral once PRN Blood Glucose LESS THAN 70 milliGRAM(s)/deciliter  dextrose Oral Gel 15 Gram(s) Oral once PRN Blood Glucose LESS THAN 70 milliGRAM(s)/deciliter      Allergies: penicillin ([employer])    Review of Systems:  HEENT: No pain  Cardiovascular: No chest pain, palpitations  Respiratory: No SOB, no cough  GI: No nausea, vomiting, abdominal pain  Endocrine: no polyuria, polydipsia    PHYSICAL EXAM:  VITALS: T(C): 36.8 (09-09-22 @ 04:51)  T(F): 98.2 (09-09-22 @ 04:51), Max: 98.2 (09-09-22 @ 04:51)  HR: 71 (09-09-22 @ 04:51) (70 - 72)  BP: 163/69 (09-09-22 @ 04:51) (142/63 - 163/69)  RR:  (18 - 18)  SpO2:  (97% - 99%)  Wt(kg): --  GENERAL: NAD, well-groomed, well-developed  RESPIRATORY: No labored breathing   GI: Soft, nontender, non distended  PSYCH: Alert and oriented x 3, normal affect, normal mood      CAPILLARY BLOOD GLUCOSE  POCT Blood Glucose.: 211 mg/dL (09 Sep 2022 11:23)  POCT Blood Glucose.: 172 mg/dL (09 Sep 2022 08:02)  POCT Blood Glucose.: 167 mg/dL (08 Sep 2022 21:07)  POCT Blood Glucose.: 208 mg/dL (08 Sep 2022 17:26)    A1C with Estimated Average Glucose (08.31.22 @ 02:00)    A1C with Estimated Average Glucose Result: 6.0        09-08    138  |  103  |  35<H>  ----------------------------<  206<H>  3.7   |  27  |  1.20    eGFR: 49<L>    Ca    8.4      09-08  Mg     1.70     09-08  Phos  2.4     09-08    TPro  5.7<L>  /  Alb  2.7<L>  /  TBili  <0.2  /  DBili  x   /  AST  13  /  ALT  11  /  AlkPhos  73  09-08          Thyroid Function Tests:  08-30 @ 19:40 TSH 4.82 FreeT4 -- T3 -- Anti TPO -- Anti Thyroglobulin Ab -- TSI --

## 2022-09-09 NOTE — DISCHARGE NOTE PROVIDER - ATTENDING DISCHARGE PHYSICAL EXAMINATION:
GENERAL: NAD, well-appearing, sitting in bed  HEENT: EOMI, very hard of hearing, MMM  CHEST/LUNG: decreased breath sounds, normal resp effort   HEART: Regular rate and rhythm; No murmurs, rubs, or gallops  ABDOMEN: Soft, Nontender, Nondistended; Bowel sounds present  EXTREMITIES:  warm, No edema  SKIN: No rashes or lesions  NERVOUS SYSTEM: awake and alert, following commands, nonfocal

## 2022-09-09 NOTE — PROGRESS NOTE ADULT - ASSESSMENT
67F w/ PMHx of HTN, HLD, T2DM, sarcoidosis, CHF, presented to the ED for BL leg swelling and SOB. Endocrinology consulted for concern for adrenal insufficiency.    #Rule out Adrenal Insuffiency (now ruled out)  Hypothermia - now resolved  - previous negative co-syntropin stim test 7/16 on prior admission: 8am cortisol 16.1, 8:30am cortisol 22.4, 9:15am cortisol 24.7.  - cortisol 8/30 at 7:40pm 7, although not obtained at ideal time of 8am, would expect to have high cortisol with acute decompensation  - possible patient could have had relative adrenal insufficiency, unclear other etiologies of secondary AI and less likely addisons disease without history of autoimmune conditions  - extubated 9/3 in am  -stim test 9/7= pt with cortisol of 17.8 after stim, ACTH 30.4, stim test passed again,  -pt passed stim test = does not have AI. no need for steroids   -Continue off all hydrocortisone at this time.  Vitals are stable (BP mildly high). No further workup for this now.    #T2DM  - HbA1c 6  - patient was previously on high doses of lantus and admelog as well as metformin and victoza in the past, however had presented to last hospitalization with frequent episodes of hypoglycemia. Patient is not able to report details on home DM regimen.  - C-peptide 5 on 7/17  - last admission, patient was on minimal amounts of insulin and discharged on lantus 2 units daily  PLAN  - As BG are still above goal 100-180; Slightly above goal   - Continue admelog moderate correction scale premeal and moderate bed scale  - no lantus at this time  -nutrition consult   -Consistent carbohydrate diet   -Check FS before meals and at bedtime   - Discharge recommendations:  Discharge pt on metformin 1000mg BID please take with food(if GFR and LFTS are stable) if no contraindications    GFR currently 49  Please repeat creatinine level within 1-2 weeks of discharge to assess creatinine level (if gfr below 45 will need dose adjustments)  Ensure patient has working glucometer, test strips, lancets, alcohol pads, and BD beverley pen needles  If pt and family wishes can follow up with [facility] [place] ; [address] [place] [address], [address], [facility] [address], [phone]  Please follow up with PCP, Podiatry, and Opthalmology as an outpt    #HLD  - on atorvastatin 40mg daily outpatient  -Obtain lipid profile as an outpt  - held per primary team    #HTN  145/65 on hydralazine, furosemide, amlodipine, carvedilol  Check urine microalbumin cr ratio as an outpt       D/w Primary team and Dr. Bela Penaloza  Nurse Practitioner  Division of Endocrinology & Diabetes  In house pager #[phone]

## 2022-09-09 NOTE — PROGRESS NOTE ADULT - PROBLEM SELECTOR PROBLEM 2
At risk for hypothermia
HLD (hyperlipidemia)
Metabolic encephalopathy
At risk for hypothermia
Type 2 diabetes mellitus
Type 2 diabetes mellitus
At risk for hypothermia
At risk for hypothermia
Metabolic encephalopathy
Metabolic encephalopathy

## 2022-09-27 NOTE — SWALLOW BEDSIDE ASSESSMENT ADULT - ORAL PHASE
PRIMARY DIAGNOSIS: GENERALIZED WEAKNESS    OUTPATIENT/OBSERVATION GOALS TO BE MET BEFORE DISCHARGE  1. Orthostatic performed: N/A    2. Tolerating PO medications: Yes    3. Return to near baseline physical activity: No    4. Cleared for discharge by consultants (if involved): Yes    Discharge Planner Nurse   Safe discharge environment identified: Yes  Barriers to discharge: Yes       Entered by: Salima Guzman RN 09/27/2022 7:02 AM     Please review provider order for any additional goals.   Nurse to notify provider when observation goals have been met and patient is ready for discharge.      
Within functional limits
Within functional limits

## 2022-09-29 ENCOUNTER — APPOINTMENT (OUTPATIENT)
Dept: PULMONOLOGY | Facility: CLINIC | Age: 67
End: 2022-09-29

## 2022-10-20 ENCOUNTER — APPOINTMENT (OUTPATIENT)
Dept: NEPHROLOGY | Facility: CLINIC | Age: 67
End: 2022-10-20

## 2022-11-16 ENCOUNTER — LABORATORY RESULT (OUTPATIENT)
Age: 67
End: 2022-11-16

## 2022-11-16 ENCOUNTER — APPOINTMENT (OUTPATIENT)
Dept: PULMONOLOGY | Facility: CLINIC | Age: 67
End: 2022-11-16

## 2022-11-16 VITALS
HEIGHT: 64 IN | BODY MASS INDEX: 28.34 KG/M2 | WEIGHT: 166 LBS | DIASTOLIC BLOOD PRESSURE: 57 MMHG | TEMPERATURE: 97 F | HEART RATE: 52 BPM | RESPIRATION RATE: 15 BRPM | SYSTOLIC BLOOD PRESSURE: 94 MMHG | OXYGEN SATURATION: 90 %

## 2022-11-16 DIAGNOSIS — R06.00 DYSPNEA, UNSPECIFIED: ICD-10-CM

## 2022-11-16 DIAGNOSIS — Z86.74 PERSONAL HISTORY OF SUDDEN CARDIAC ARREST: ICD-10-CM

## 2022-11-16 DIAGNOSIS — J96.10 CHRONIC RESPIRATORY FAILURE, UNSPECIFIED WHETHER WITH HYPOXIA OR HYPERCAPNIA: ICD-10-CM

## 2022-11-16 PROBLEM — I50.810 RIGHT HEART FAILURE: Status: ACTIVE | Noted: 2022-11-16

## 2022-11-16 PROCEDURE — 99215 OFFICE O/P EST HI 40 MIN: CPT

## 2022-11-16 NOTE — REASON FOR VISIT
[Follow-Up] : a follow-up visit [Shortness of Breath] : shortness of breath [Sarcoidosis] : sarcoidosis [Family Member] : family member [TextBox_44] : r

## 2022-11-16 NOTE — PHYSICAL EXAM
[No Acute Distress] : no acute distress [Normal Oropharynx] : normal oropharynx [Normal Appearance] : normal appearance [No Neck Mass] : no neck mass [Normal Rate/Rhythm] : normal rate/rhythm [Normal S1, S2] : normal s1, s2 [No Murmurs] : no murmurs [No Resp Distress] : no resp distress [Clear to Auscultation Bilaterally] : clear to auscultation bilaterally [No Abnormalities] : no abnormalities [Benign] : benign [No Clubbing] : no clubbing [No Cyanosis] : no cyanosis [FROM] : FROM [Normal Color/ Pigmentation] : normal color/ pigmentation [No Focal Deficits] : no focal deficits [2+ Pitting] : 2+ pitting [Normal Mood] : normal mood [Normal Affect] : normal affect [TextBox_99] : Walks with walker

## 2022-11-16 NOTE — REVIEW OF SYSTEMS
[SOB on Exertion] : sob on exertion [Negative] : Psychiatric [Edema] : edema [Anemia] : anemia [TextBox_122] : Hearing loss

## 2022-11-16 NOTE — ASSESSMENT
[FreeTextEntry1] : 67 year old female sarcoidosis, right heart failure, diabetes, hypertension, hyperlipidemia, iron deficiency anemia, pancreatic pseudocyst s/p drainage, multiple admissions over the past several months including cardiac arrest x 2 here for a follow up visit.\par Patient is significantly deconditioned and in a different clinical state than when I last saw her as an outpatient in February. Pulmonary sarcoidosis is not the main issue at this time. She is fluid overloaded and will need diuresis. Lasix 40 mg to be taken once daily, was sent to pharmacy. Will check CBC/BMP today.\par Continue oxygen supplementation as needed.\par She has a follow up with her PMD on Wednesday.\par Advised to schedule appt with cardiology.\par Check echocardiogram.\par PFTs and ABG next visit.\par Will also check PSG with TCO2 monitoring given sleep issues. \par \par Flu and pneumococcal vaccinations received during recent hospitalization.\par \par Follow up in 2 - 3 months, sooner if needed.

## 2022-11-16 NOTE — HISTORY OF PRESENT ILLNESS
[Former] : former [TextBox_4] : 67 year old female with sarcoidosis, diabetes, hypertension, hyperlipidemia, iron deficiency anemia, pancreatic pseudocyst s/p drainage, multiple admissions over the past several months for heart failure and cardiac arrest x 2. \par \par Patient is accompanied by her daughter. She has been discharged to subacute rehab since last hospitalization (most recently at [facility]) and has been home for just under a week. She is using a walker to ambulate without assistance. Has oxygen which she only uses as needed. Since her discharge from rehab, she was not sent home with Lasix and her leg has been swelling. Daughter also notes that the patient is sleeping during the day and has poor sleep at night. \par \par \par TTE (8/31/2022) EF 55%. Low normal LV systolic function. Mild diastolic dysfunction. RV enlargement with decreased RV systolic function.

## 2022-11-17 ENCOUNTER — NON-APPOINTMENT (OUTPATIENT)
Age: 67
End: 2022-11-17

## 2022-11-17 ENCOUNTER — INPATIENT (INPATIENT)
Facility: HOSPITAL | Age: 67
LOS: 21 days | Discharge: HOME HEALTH | End: 2022-12-09
Attending: INTERNAL MEDICINE | Admitting: INTERNAL MEDICINE

## 2022-11-17 VITALS
DIASTOLIC BLOOD PRESSURE: 51 MMHG | SYSTOLIC BLOOD PRESSURE: 93 MMHG | RESPIRATION RATE: 21 BRPM | HEART RATE: 45 BPM | OXYGEN SATURATION: 96 %

## 2022-11-17 DIAGNOSIS — Z89.411 ACQUIRED ABSENCE OF RIGHT GREAT TOE: Chronic | ICD-10-CM

## 2022-11-17 DIAGNOSIS — Z90.49 ACQUIRED ABSENCE OF OTHER SPECIFIED PARTS OF DIGESTIVE TRACT: Chronic | ICD-10-CM

## 2022-11-17 DIAGNOSIS — Z98.891 HISTORY OF UTERINE SCAR FROM PREVIOUS SURGERY: Chronic | ICD-10-CM

## 2022-11-17 DIAGNOSIS — J96.01 ACUTE RESPIRATORY FAILURE WITH HYPOXIA: ICD-10-CM

## 2022-11-17 LAB
ALBUMIN SERPL ELPH-MCNC: 3.7 G/DL — SIGNIFICANT CHANGE UP (ref 3.3–5)
ALP SERPL-CCNC: 99 U/L — SIGNIFICANT CHANGE UP (ref 40–120)
ALT FLD-CCNC: 22 U/L — SIGNIFICANT CHANGE UP (ref 4–33)
AMMONIA BLD-MCNC: 12 UMOL/L — SIGNIFICANT CHANGE UP (ref 11–55)
ANION GAP SERPL CALC-SCNC: 11 MMOL/L — SIGNIFICANT CHANGE UP (ref 7–14)
ANION GAP SERPL CALC-SCNC: 17 MMOL/L
APPEARANCE UR: ABNORMAL
AST SERPL-CCNC: 26 U/L — SIGNIFICANT CHANGE UP (ref 4–32)
B PERT DNA SPEC QL NAA+PROBE: SIGNIFICANT CHANGE UP
B PERT+PARAPERT DNA PNL SPEC NAA+PROBE: SIGNIFICANT CHANGE UP
BACTERIA # UR AUTO: NEGATIVE — SIGNIFICANT CHANGE UP
BASE EXCESS BLDV CALC-SCNC: -1.3 MMOL/L — SIGNIFICANT CHANGE UP (ref -2–3)
BASE EXCESS BLDV CALC-SCNC: 0 MMOL/L — SIGNIFICANT CHANGE UP (ref -2–3)
BASOPHILS # BLD AUTO: 0.01 K/UL — SIGNIFICANT CHANGE UP (ref 0–0.2)
BASOPHILS NFR BLD AUTO: 0.4 % — SIGNIFICANT CHANGE UP (ref 0–2)
BILIRUB SERPL-MCNC: <0.2 MG/DL — SIGNIFICANT CHANGE UP (ref 0.2–1.2)
BILIRUB UR-MCNC: NEGATIVE — SIGNIFICANT CHANGE UP
BLD GP AB SCN SERPL QL: NEGATIVE — SIGNIFICANT CHANGE UP
BLOOD GAS VENOUS COMPREHENSIVE RESULT: SIGNIFICANT CHANGE UP
BLOOD GAS VENOUS COMPREHENSIVE RESULT: SIGNIFICANT CHANGE UP
BORDETELLA PARAPERTUSSIS (RAPRVP): SIGNIFICANT CHANGE UP
BUN SERPL-MCNC: 47 MG/DL
BUN SERPL-MCNC: 56 MG/DL — HIGH (ref 7–23)
C PNEUM DNA SPEC QL NAA+PROBE: SIGNIFICANT CHANGE UP
CALCIUM SERPL-MCNC: 8.9 MG/DL — SIGNIFICANT CHANGE UP (ref 8.4–10.5)
CALCIUM SERPL-MCNC: 9.2 MG/DL
CHLORIDE BLDV-SCNC: 108 MMOL/L — SIGNIFICANT CHANGE UP (ref 96–108)
CHLORIDE BLDV-SCNC: 108 MMOL/L — SIGNIFICANT CHANGE UP (ref 96–108)
CHLORIDE SERPL-SCNC: 105 MMOL/L — SIGNIFICANT CHANGE UP (ref 98–107)
CHLORIDE SERPL-SCNC: 106 MMOL/L
CO2 BLDV-SCNC: 29.1 MMOL/L — HIGH (ref 22–26)
CO2 BLDV-SCNC: 29.9 MMOL/L — HIGH (ref 22–26)
CO2 SERPL-SCNC: 21 MMOL/L
CO2 SERPL-SCNC: 26 MMOL/L — SIGNIFICANT CHANGE UP (ref 22–31)
COLOR SPEC: YELLOW — SIGNIFICANT CHANGE UP
CREAT SERPL-MCNC: 2.4 MG/DL
CREAT SERPL-MCNC: 3.01 MG/DL — HIGH (ref 0.5–1.3)
DIFF PNL FLD: ABNORMAL
EGFR: 16 ML/MIN/1.73M2 — LOW
EGFR: 21 ML/MIN/1.73M2
EOSINOPHIL # BLD AUTO: 0.11 K/UL — SIGNIFICANT CHANGE UP (ref 0–0.5)
EOSINOPHIL NFR BLD AUTO: 4.5 % — SIGNIFICANT CHANGE UP (ref 0–6)
EPI CELLS # UR: 7 /HPF — HIGH (ref 0–5)
FLUAV SUBTYP SPEC NAA+PROBE: SIGNIFICANT CHANGE UP
FLUBV RNA SPEC QL NAA+PROBE: SIGNIFICANT CHANGE UP
GAS PNL BLDV: 140 MMOL/L — SIGNIFICANT CHANGE UP (ref 136–145)
GAS PNL BLDV: 143 MMOL/L — SIGNIFICANT CHANGE UP (ref 136–145)
GAS PNL BLDV: SIGNIFICANT CHANGE UP
GLUCOSE BLDV-MCNC: 106 MG/DL — HIGH (ref 70–99)
GLUCOSE BLDV-MCNC: 111 MG/DL — HIGH (ref 70–99)
GLUCOSE SERPL-MCNC: 105 MG/DL
GLUCOSE SERPL-MCNC: 112 MG/DL — HIGH (ref 70–99)
GLUCOSE UR QL: ABNORMAL
HADV DNA SPEC QL NAA+PROBE: SIGNIFICANT CHANGE UP
HCO3 BLDV-SCNC: 27 MMOL/L — SIGNIFICANT CHANGE UP (ref 22–29)
HCO3 BLDV-SCNC: 28 MMOL/L — SIGNIFICANT CHANGE UP (ref 22–29)
HCOV 229E RNA SPEC QL NAA+PROBE: SIGNIFICANT CHANGE UP
HCOV HKU1 RNA SPEC QL NAA+PROBE: SIGNIFICANT CHANGE UP
HCOV NL63 RNA SPEC QL NAA+PROBE: SIGNIFICANT CHANGE UP
HCOV OC43 RNA SPEC QL NAA+PROBE: SIGNIFICANT CHANGE UP
HCT VFR BLD CALC: 24.6 % — LOW (ref 34.5–45)
HCT VFR BLDA CALC: 22 % — LOW (ref 34.5–46.5)
HCT VFR BLDA CALC: 24 % — LOW (ref 34.5–46.5)
HGB BLD CALC-MCNC: 7.4 G/DL — LOW (ref 11.5–15.5)
HGB BLD CALC-MCNC: 8.1 G/DL — LOW (ref 11.5–15.5)
HGB BLD-MCNC: 7.4 G/DL — LOW (ref 11.5–15.5)
HMPV RNA SPEC QL NAA+PROBE: SIGNIFICANT CHANGE UP
HPIV1 RNA SPEC QL NAA+PROBE: SIGNIFICANT CHANGE UP
HPIV2 RNA SPEC QL NAA+PROBE: SIGNIFICANT CHANGE UP
HPIV3 RNA SPEC QL NAA+PROBE: SIGNIFICANT CHANGE UP
HPIV4 RNA SPEC QL NAA+PROBE: SIGNIFICANT CHANGE UP
HYALINE CASTS # UR AUTO: 2 /LPF — SIGNIFICANT CHANGE UP (ref 0–7)
IANC: 1.37 K/UL — LOW (ref 1.8–7.4)
IMM GRANULOCYTES NFR BLD AUTO: 1.6 % — HIGH (ref 0–0.9)
KETONES UR-MCNC: NEGATIVE — SIGNIFICANT CHANGE UP
LACTATE BLDV-MCNC: 0.6 MMOL/L — SIGNIFICANT CHANGE UP (ref 0.5–2)
LACTATE BLDV-MCNC: 0.9 MMOL/L — SIGNIFICANT CHANGE UP (ref 0.5–2)
LEUKOCYTE ESTERASE UR-ACNC: ABNORMAL
LYMPHOCYTES # BLD AUTO: 0.64 K/UL — LOW (ref 1–3.3)
LYMPHOCYTES # BLD AUTO: 26 % — SIGNIFICANT CHANGE UP (ref 13–44)
M PNEUMO DNA SPEC QL NAA+PROBE: SIGNIFICANT CHANGE UP
MAGNESIUM SERPL-MCNC: 1.7 MG/DL
MCHC RBC-ENTMCNC: 26.1 PG — LOW (ref 27–34)
MCHC RBC-ENTMCNC: 30.1 GM/DL — LOW (ref 32–36)
MCV RBC AUTO: 86.9 FL — SIGNIFICANT CHANGE UP (ref 80–100)
MONOCYTES # BLD AUTO: 0.29 K/UL — SIGNIFICANT CHANGE UP (ref 0–0.9)
MONOCYTES NFR BLD AUTO: 11.8 % — SIGNIFICANT CHANGE UP (ref 2–14)
NEUTROPHILS # BLD AUTO: 1.37 K/UL — LOW (ref 1.8–7.4)
NEUTROPHILS NFR BLD AUTO: 55.7 % — SIGNIFICANT CHANGE UP (ref 43–77)
NITRITE UR-MCNC: NEGATIVE — SIGNIFICANT CHANGE UP
NRBC # BLD: 0 /100 WBCS — SIGNIFICANT CHANGE UP (ref 0–0)
NRBC # FLD: 0.02 K/UL — HIGH (ref 0–0)
NT-PROBNP SERPL-SCNC: 2081 PG/ML — HIGH
PCO2 BLDV: 65 MMHG — HIGH (ref 39–42)
PCO2 BLDV: 69 MMHG — HIGH (ref 39–42)
PH BLDV: 7.2 — LOW (ref 7.32–7.43)
PH BLDV: 7.24 — LOW (ref 7.32–7.43)
PH UR: 6 — SIGNIFICANT CHANGE UP (ref 5–8)
PHOSPHATE SERPL-MCNC: 5.5 MG/DL
PLATELET # BLD AUTO: 182 K/UL — SIGNIFICANT CHANGE UP (ref 150–400)
PO2 BLDV: 41 MMHG — SIGNIFICANT CHANGE UP
PO2 BLDV: 46 MMHG — SIGNIFICANT CHANGE UP
POTASSIUM BLDV-SCNC: 4.7 MMOL/L — SIGNIFICANT CHANGE UP (ref 3.5–5.1)
POTASSIUM BLDV-SCNC: 5.2 MMOL/L — HIGH (ref 3.5–5.1)
POTASSIUM SERPL-MCNC: 4.6 MMOL/L — SIGNIFICANT CHANGE UP (ref 3.5–5.3)
POTASSIUM SERPL-SCNC: 4.3 MMOL/L
POTASSIUM SERPL-SCNC: 4.6 MMOL/L — SIGNIFICANT CHANGE UP (ref 3.5–5.3)
PROT SERPL-MCNC: 7.7 G/DL — SIGNIFICANT CHANGE UP (ref 6–8.3)
PROT UR-MCNC: ABNORMAL
RAPID RVP RESULT: SIGNIFICANT CHANGE UP
RBC # BLD: 2.83 M/UL — LOW (ref 3.8–5.2)
RBC # FLD: 18.2 % — HIGH (ref 10.3–14.5)
RBC CASTS # UR COMP ASSIST: 184 /HPF — HIGH (ref 0–4)
RH IG SCN BLD-IMP: POSITIVE — SIGNIFICANT CHANGE UP
RSV RNA SPEC QL NAA+PROBE: SIGNIFICANT CHANGE UP
RV+EV RNA SPEC QL NAA+PROBE: SIGNIFICANT CHANGE UP
SAO2 % BLDV: 62.6 % — SIGNIFICANT CHANGE UP
SAO2 % BLDV: 78.5 % — SIGNIFICANT CHANGE UP
SARS-COV-2 RNA SPEC QL NAA+PROBE: SIGNIFICANT CHANGE UP
SODIUM SERPL-SCNC: 142 MMOL/L — SIGNIFICANT CHANGE UP (ref 135–145)
SODIUM SERPL-SCNC: 144 MMOL/L
SP GR SPEC: 1.03 — SIGNIFICANT CHANGE UP (ref 1.01–1.05)
T4 FREE SERPL-MCNC: 1.4 NG/DL — SIGNIFICANT CHANGE UP (ref 0.9–1.8)
TROPONIN T, HIGH SENSITIVITY RESULT: 100 NG/L — CRITICAL HIGH
UROBILINOGEN FLD QL: ABNORMAL
WBC # BLD: 2.46 K/UL — LOW (ref 3.8–10.5)
WBC # FLD AUTO: 2.46 K/UL — LOW (ref 3.8–10.5)
WBC UR QL: 248 /HPF — HIGH (ref 0–5)

## 2022-11-17 PROCEDURE — 99285 EMERGENCY DEPT VISIT HI MDM: CPT | Mod: 25

## 2022-11-17 PROCEDURE — 99291 CRITICAL CARE FIRST HOUR: CPT | Mod: GC

## 2022-11-17 PROCEDURE — 43762 RPLC GTUBE NO REVJ TRC: CPT

## 2022-11-17 PROCEDURE — 71045 X-RAY EXAM CHEST 1 VIEW: CPT | Mod: 26

## 2022-11-17 PROCEDURE — 93010 ELECTROCARDIOGRAM REPORT: CPT | Mod: 59

## 2022-11-17 PROCEDURE — 31500 INSERT EMERGENCY AIRWAY: CPT

## 2022-11-17 RX ORDER — PROPOFOL 10 MG/ML
10 INJECTION, EMULSION INTRAVENOUS
Qty: 1000 | Refills: 0 | Status: DISCONTINUED | OUTPATIENT
Start: 2022-11-17 | End: 2022-11-18

## 2022-11-17 RX ORDER — CHLORHEXIDINE GLUCONATE 213 G/1000ML
15 SOLUTION TOPICAL EVERY 12 HOURS
Refills: 0 | Status: DISCONTINUED | OUTPATIENT
Start: 2022-11-17 | End: 2022-11-18

## 2022-11-17 RX ORDER — SODIUM BICARBONATE 1 MEQ/ML
50 SYRINGE (ML) INTRAVENOUS ONCE
Refills: 0 | Status: COMPLETED | OUTPATIENT
Start: 2022-11-17 | End: 2022-11-17

## 2022-11-17 RX ORDER — NOREPINEPHRINE BITARTRATE/D5W 8 MG/250ML
0.05 PLASTIC BAG, INJECTION (ML) INTRAVENOUS
Qty: 8 | Refills: 0 | Status: DISCONTINUED | OUTPATIENT
Start: 2022-11-17 | End: 2022-11-18

## 2022-11-17 RX ORDER — SODIUM CHLORIDE 9 MG/ML
500 INJECTION INTRAMUSCULAR; INTRAVENOUS; SUBCUTANEOUS ONCE
Refills: 0 | Status: COMPLETED | OUTPATIENT
Start: 2022-11-17 | End: 2022-11-17

## 2022-11-17 RX ORDER — CHLORHEXIDINE GLUCONATE 213 G/1000ML
1 SOLUTION TOPICAL
Refills: 0 | Status: DISCONTINUED | OUTPATIENT
Start: 2022-11-17 | End: 2022-11-18

## 2022-11-17 RX ORDER — ROCURONIUM BROMIDE 10 MG/ML
70 VIAL (ML) INTRAVENOUS ONCE
Refills: 0 | Status: COMPLETED | OUTPATIENT
Start: 2022-11-17 | End: 2022-11-17

## 2022-11-17 RX ORDER — CEFTRIAXONE 500 MG/1
1000 INJECTION, POWDER, FOR SOLUTION INTRAMUSCULAR; INTRAVENOUS ONCE
Refills: 0 | Status: COMPLETED | OUTPATIENT
Start: 2022-11-17 | End: 2022-11-17

## 2022-11-17 RX ORDER — CHLORHEXIDINE GLUCONATE 213 G/1000ML
1 SOLUTION TOPICAL
Refills: 0 | Status: DISCONTINUED | OUTPATIENT
Start: 2022-11-17 | End: 2022-12-09

## 2022-11-17 RX ORDER — ETOMIDATE 2 MG/ML
20 INJECTION INTRAVENOUS ONCE
Refills: 0 | Status: COMPLETED | OUTPATIENT
Start: 2022-11-17 | End: 2022-11-17

## 2022-11-17 RX ADMIN — Medication 50 MILLILITER(S): at 20:14

## 2022-11-17 RX ADMIN — Medication 6.56 MICROGRAM(S)/KG/MIN: at 21:04

## 2022-11-17 RX ADMIN — SODIUM CHLORIDE 500 MILLILITER(S): 9 INJECTION INTRAMUSCULAR; INTRAVENOUS; SUBCUTANEOUS at 19:44

## 2022-11-17 RX ADMIN — ETOMIDATE 20 MILLIGRAM(S): 2 INJECTION INTRAVENOUS at 20:20

## 2022-11-17 RX ADMIN — CEFTRIAXONE 100 MILLIGRAM(S): 500 INJECTION, POWDER, FOR SOLUTION INTRAMUSCULAR; INTRAVENOUS at 18:49

## 2022-11-17 RX ADMIN — Medication 70 MILLIGRAM(S): at 20:20

## 2022-11-17 RX ADMIN — Medication 125 MILLIGRAM(S): at 19:35

## 2022-11-17 RX ADMIN — PROPOFOL 4.2 MICROGRAM(S)/KG/MIN: 10 INJECTION, EMULSION INTRAVENOUS at 21:03

## 2022-11-17 NOTE — H&P ADULT - ASSESSMENT
67F PMH HTN, HLD, sarcoidosis, CHF, PEA*2, sepsis 2/2 UTI (Klesiella, E. coli) ~9/2022 requiring intubation; presented to ED with MANDY on CKD found on outpatient labs and with abnormal VS, found to have AMS, AHRF, intubated, and admitted to MICU for further management. Possibly acute on chronic HF iso sepsis vs endstage sarcoidosis; r/o myxedema, Addisonian crisis; urine retention.    #Neuro  - baseline: A&O*4, ambulates with walker  - p/w AMS  - Sedation? Dose and trend  - Paralytics? Dose and trend    #Cardiovascular  - p/w bradycardia (45), hypotension (SBP 80), hypoxemia (68%), hypothermia (91F),   - VS q1h  - p/w Trop 100     - Trop q8h  - p/w BNP 2081  - CXR 11/17: "Perihilar haze and effusions consistent with CHF"    #Respiratory  - possibly AHRF 2/2 acute on chronic HF iso sepsis  - p/w pH 7.2, hypoxemia (68%)  - SBT? requires RASS < 2?, RR <30, vent rate <35, and SAT  - Vent trends?  - ABG     #GI/Nutrition  - OGT 11/17  - Tube feeds? If not, why?   - Ulcer prophylaxis?    #/Renal  - p/w MANDY on CKD (Cr 3, baseline 1.2)  - Significant electrolyte abnormalities?  - Urine output  - Standing Lasix? If not, why?    #Skin  - Ulcers?  - Perfusion status?    #ID  - possibly sepsis 2/2 UTI   - p/w hypothermia, leukopenia  - PMH sepsis 2/2 UTI Klebsiella and E. coli (ampicillin R)  - Current abx: Name, indication, start date and end date  - BCx*2, UCx  - ceftriaxone 11/17-    #Endocrine  - p/w bradycardia, hypotension  - r/o myxedema, Addisonian crisis  - s/p 1 dose Solumedrol (after cortisol drawn)  - cortisol 11/17  - TSH  - PMH diabetes, previously on insulin but not recently since hypoglycemic  - FS    #Hematologic/DVT ppx  - p/w leukopenia 2.46 and anemia (Hb 7.3, baseline ~8).   - DVT Prophylaxis: heparin sc q8h ****    #Ethics 67F PMH HTN, HLD, sarcoidosis, CHF, PEA*2, sepsis 2/2 UTI (Klesiella, E. coli) ~9/2022 requiring intubation; presented to ED with MANDY on CKD found on outpatient labs and with abnormal VS, found to have AMS, AHRF, intubated, and admitted to MICU for further management. Possibly acute on chronic HF iso sepsis vs endstage sarcoidosis; r/o myxedema, Addisonian crisis; urine retention.    #Neuro  - baseline: A&O*4, ambulates with walker  - p/w AMS  - Sedation? Dose and trend  - Paralytics? Dose and trend  - c/w home med gabapentin 100 qD, melatonin 3 qHS    #Cardiovascular  - possibly AHRF 2/2 acute on chronic HF iso sepsis  - p/w bradycardia (45), hypotension (SBP 80), hypoxemia (68%), hypothermia (91F),   - VS q1h  - p/w Trop 100     - Trop q8h  - p/w BNP 2081  - CXR 11/17: "Perihilar haze and effusions consistent with CHF"  - HTN     - home med: amlodipine 10 qD, carvedilol 25 q12h, furosemide 40 qD, hydralazine 100 tid  - home med: ASA 81 qD, atorvastatin 40 qD    #Respiratory  - possibly AHRF 2/2 acute on chronic HF iso sepsis  - p/w pH 7.2, hypoxemia (68%)  - SBT? requires RASS < 2?, RR <30, vent rate <35, and SAT  - Vent trends?  - ABG   - c/w home med: ipratropium-albuterol 0.5 mg-2.5 mg/3 mL inhalation solution: 3 milliliter(s) inhaled every 6 hours    #GI/Nutrition  - OGT 11/17  - Tube feeds? If not, why?   - Ulcer prophylaxis?    #/Renal  - p/w MANDY on CKD (Cr 3, baseline 1.2)  - Significant electrolyte abnormalities?  - Urine output  - Standing Lasix? If not, why?    #Skin  - Ulcers?  - Perfusion status?    #Immunologic  - possibly endstage sarcoidosis  - MINOO, ESR, CRP    #ID  - possibly sepsis 2/2 UTI 2/2 urinary retention  - p/w hypothermia, leukopenia  - PMH sepsis 2/2 UTI Klebsiella and E. coli (ampicillin R)  - Current abx: Name, indication, start date and end date  - BCx*2, UCx  - ceftriaxone 11/17-    #Endocrine  - p/w bradycardia, hypotension  - r/o myxedema, Addisonian crisis  - s/p 1 dose Solumedrol (after cortisol drawn)  - cortisol 11/17  - TSH  - PMH diabetes, previously on insulin but not recently since hypoglycemic  - FS    #Hematologic/DVT ppx  - p/w leukopenia 2.46 and anemia (Hb 7.3, baseline ~8).   - DVT Prophylaxis: heparin sc q8h    #Ethics 67F PMH HTN, HLD, sarcoidosis, CHF, PEA*2, sepsis 2/2 UTI (Klesiella, E. coli) ~9/2022 requiring intubation; presented to ED with MANDY on CKD found on outpatient labs and with abnormal VS, found to have AMS, AHRF, intubated, and admitted to MICU for further management. Possibly acute on chronic HF iso sepsis vs endstage sarcoidosis; r/o myxedema, Addisonian crisis; urine retention.    #Neuro  - baseline: A&O*4, ambulates with walker  - p/w AMS  - sedated  - c/w home med gabapentin 100 qD, melatonin 3 qHS    #Cardiovascular  - possibly acute on chronic HF iso sepsis  - p/w bradycardia (45), hypotension (SBP 80), hypoxemia (68%), hypothermia (91F),   - VS q1h  - p/w Trop 100     - Trop q8h  - p/w BNP 2081  - CXR 11/17: "Perihilar haze and effusions consistent with CHF"  - HTN     - hold home meds iso recent hypotension: amlodipine 10 qD, carvedilol 25 q12h, hydralazine 100 tid  - CHF     - hold home furosemide 40 po qD     - furosemide 40 IV qD  - c/w home med: ASA 81 qD, atorvastatin 40 qD    #Respiratory  - possibly AHRF 2/2 acute on chronic HF iso sepsis  - p/w pH 7.2, hypoxemia (68%)  - ABG   - Duonebs ATC    #GI/Nutrition  - OGT 11/17    #/Renal  - p/w MANDY on CKD (Cr 3, baseline 1.2)  - furosemide as above    #Skin  - no active issues    #Immunologic  - possibly endstage sarcoidosis  - s/p Solumedrol  - Duonebs as above  - MINOO, ESR, CRP    #ID  - possibly sepsis 2/2 UTI 2/2 urinary retention  - p/w hypothermia, leukopenia  - PMH sepsis 2/2 UTI Klebsiella and E. coli (ampicillin R)  - Current abx: Name, indication, start date and end date  - BCx*2, UCx  - ceftriaxone 11/17-    #Endocrine  - p/w bradycardia, hypotension  - r/o myxedema, Addisonian crisis  - s/p 1 dose Solumedrol (after cortisol drawn)  - cortisol 11/17  - TSH  - PMH diabetes, previously on insulin but not recently since hypoglycemic  - FS    #Hematologic/DVT ppx  - p/w leukopenia 2.46 and anemia (Hb 7.3, baseline ~8).   - DVT Prophylaxis: heparin sc q8h, home ASA    #Ethics    #Medication management  - review medication reconciliation when off sedation or when family available 67F PMH HTN, HLD, sarcoidosis, CHF, PEA*2, sepsis 2/2 UTI (Klesiella, E. coli) ~9/2022 requiring intubation; presented to ED with MANDY on CKD found on outpatient labs and with abnormal VS, found to have AMS, AHRF, intubated, and admitted to MICU for further management. Possibly acute on chronic HF iso sepsis vs endstage sarcoidosis; r/o hypothyroidism, Addisonian crisis; urine retention.    #Neuro  - baseline: A&O*4, ambulates with walker  - p/w AMS  - sedated  - c/w home med gabapentin 100 qD, melatonin 3 qHS    #Cardiovascular  - possibly acute on chronic HF iso sepsis  - p/w bradycardia (45), hypotension (SBP 80), hypoxemia (68%), hypothermia (91F),   - VS q1h  - p/w Trop 100 and BNP 2081 ISO of MANDY     - Trop q8h  - CXR 11/17: "Perihilar haze and effusions consistent with CHF"  - HTN     - hold home meds iso recent hypotension: amlodipine 10 qD, carvedilol 25 q12h, hydralazine 100 tid  - CHF     - hold home furosemide 40 po qD     - furosemide 40 IV qD  - c/w home med: ASA 81 qD, atorvastatin 40 qD    #Respiratory  - possibly AHRF 2/2 acute on chronic HF iso sepsis  - p/w pH 7.2, hypoxemia (68%)  - ABG   - Duonebs ATC    #GI/Nutrition  - OGT 11/17  - Diet: DASH/CC tube feeds    #/Renal  - p/w MANDY on CKD (Cr 3, baseline 1.2) - likely prerenal ISO sepsis 2/2 UTI   - urine studies FEUrea  - furosemide as above    #Skin  - no active issues    #Immunologic  - possibly endstage sarcoidosis  - s/p Solumedrol  - Duonebs as above  - MINOO, ESR, CRP    #ID  - possibly sepsis 2/2 UTI 2/2 urinary retention  - p/w hypothermia, leukopenia  - PMH sepsis 2/2 UTI Klebsiella and E. coli (ampicillin R)  - Current abx: Name, indication, start date and end date  - BCx*2, UCx  - ceftriaxone 11/17-    #Endocrine  - p/w bradycardia, hypotension  - r/o hypothyroidism, Addisonian crisis  - s/p 1 dose Solumedrol (after cortisol drawn)  - cortisol 11/17  - TSH 4.77   - f/u FT4  - PMH diabetes, previously on insulin but not recently since hypoglycemic  - FS    #Hematologic/DVT ppx  - p/w leukopenia 2.46 and normocytic anemia (Hb 7.3, baseline ~8)  - iron studies 11/18  - DVT Prophylaxis: heparin sc q8h, home ASA    #Ethics    #Medication management  - review medication reconciliation when off sedation or when family available

## 2022-11-17 NOTE — ED PROVIDER NOTE - OBJECTIVE STATEMENT
67-year-old female history of hypertension diabetes sarcoidosis diagnosed earlier this year, CHF here for abnormal outpatient labs.  Patient was seeing her pulmonologist Dr. Bradford, outpatient labs showing hemoglobin 7.3 from her baseline in the eights, and MANDY on CKD.  Patient denying all symptoms daughter at bedside saying that patient appears weaker than normal.  Did have a bout of diarrhea 3 days ago.  Patient denies fever, chest pain, shortness of breath, abdominal pain, blood in stools, melena, or urinary symptoms.

## 2022-11-17 NOTE — ED ADULT NURSE NOTE - NSIMPLEMENTINTERV_GEN_ALL_ED
Implemented All Fall Risk Interventions:  [place] to call system. Call bell, personal items and telephone within reach. Instruct patient to call for assistance. Room bathroom lighting operational. Non-slip footwear when patient is off stretcher. Physically safe environment: no spills, clutter or unnecessary equipment. Stretcher in lowest position, wheels locked, appropriate side rails in place. Provide visual cue, wrist band, yellow gown, etc. Monitor gait and stability. Monitor for mental status changes and reorient to person, place, and time. Review medications for side effects contributing to fall risk. Reinforce activity limits and safety measures with patient and family.

## 2022-11-17 NOTE — ED PROCEDURE NOTE - CPROC ED TIME OUT STATEMENT1
“Patient's name, , procedure and correct site were confirmed during the [place] Timeout.”
“Patient's name, , procedure and correct site were confirmed during the [place] Timeout.”

## 2022-11-17 NOTE — H&P ADULT - NSHPLABSRESULTS_GEN_ALL_CORE
LABS:                        7.4    2.46  )-----------( 182      ( 17 Nov 2022 18:00 )             24.6     Hgb Trend: 7.4<--  11-17    142  |  105  |  56<H>  ----------------------------<  112<H>  4.6   |  26  |  3.01<H>    Ca    8.9      17 Nov 2022 18:00    TPro  7.7  /  Alb  3.7  /  TBili  <0.2  /  DBili  x   /  AST  26  /  ALT  22  /  AlkPhos  99  11-17    Creatinine Trend: 3.01<--        Venous Blood Gas:  11-17 @ 20:23  7.20/69/41/27/62.6  VBG Lactate: 0.6  Venous Blood Gas:  11-17 @ 18:30  7.24/65/46/28/78.5  VBG Lactate: 0.9      MICROBIOLOGY:     RADIOLOGY & ADDITIONAL TESTS: LABS:  Leukopenia 2.46  Anemia 7.4 (baseline 8-9)  BUN 56  Cr 3.01, baseline 1.2  Trop 100  BNP 2081                        7.4    2.46  )-----------( 182      ( 17 Nov 2022 18:00 )             24.6     Hgb Trend: 7.4<--  11-17    142  |  105  |  56<H>  ----------------------------<  112<H>  4.6   |  26  |  3.01<H>    Ca    8.9      17 Nov 2022 18:00    TPro  7.7  /  Alb  3.7  /  TBili  <0.2  /  DBili  x   /  AST  26  /  ALT  22  /  AlkPhos  99  11-17    Creatinine Trend: 3.01<--        Venous Blood Gas:  11-17 @ 20:23  7.20/69/41/27/62.6  VBG Lactate: 0.6  Venous Blood Gas:  11-17 @ 18:30  7.24/65/46/28/78.5  VBG Lactate: 0.9      MICROBIOLOGY:     Prior micro 8/30/22 urine cath    -  Ampicillin: R >16 These ampicillin results predict results for amoxicillin    -  Ampicillin: S <=8 These ampicillin results predict results for amoxicillin  >100,000 CFU/ml Klebsiella pneumoniae  >100,000 CFU/ml Escherichia coli  Otherwise sensitive      RADIOLOGY & ADDITIONAL TESTS: Personally reviewed CXRs 11/17 (Prelim: ETT in place. IMPRESSION: Perihilar haze and effusions consistent with CHF unchanged from last study.) and prior EKG 8/30/22 (sinus eugenie to 40s, RBBB)

## 2022-11-17 NOTE — ED ADULT NURSE NOTE - OBJECTIVE STATEMENT
67 year old female, received to [facility]. Pt A&Ox4, ambulatory at baseline with a walker. Respirations equal and unlabored. Pt sent to the ED for abnormal lab results. Pt daughter states the doctor says the patients hemoglobin is low. Pt daughter states the pt has been weaker than normal. Pt denies CP, SOB, palpitations, dizziness, blurry vision, N/V, urinary frequency, burning or urgency. Neuro intact. PERRLA. +2 pulses radial and pedal. Sinus bradycardic on cardiac monitor. Pt found to be hypothermic. Skin dry an intact. Abdomen soft, nontender, nondistended. Pt placed on warming blanket. 20G IV placed to LAC. Labs drawn and sent. Bed in lowest position, call bell in reach. side rails raised. Will continue to monitor.

## 2022-11-17 NOTE — ED PROVIDER NOTE - PROGRESS NOTE DETAILS
hector: pt with worsening arousal, o2 sat dropped to 68, came up with sternal rub but pt also with acute resp acidosis.  discussed with daughter and plan is to intubate.  pt with hypotension, bradycardia, chest film looks like from last visit, ct revealed very large heart and pulm effusions bilat.  given dose of solumedrol after cortisol drawn, but was normal last time.  tsh consider for myxedema coma, but also same as last time (just a little past high normal).  treating for sepsis but I wonder if this is end stage sarcoid.  pt very [place], difficult to engage in conversation, discussed with daughter plan to intubate.  given repeat of last visit (which also included CA x 2) when pt improved should have GOC conversation with family and patient.

## 2022-11-17 NOTE — H&P ADULT - NSHPREVIEWOFSYSTEMS_GEN_ALL_CORE
Per chart:   Very hard of hearing at baseline  Patient had diarrhea 3 days prior to admission ~11/14. The following per ED notes:  General: Denied fevers, chills, dizziness  HEENT: +cataracts, +hard of hearing, denied blurry vision  CV: No chest pain, no palpitations  Respiratory: No shortness of breath  GI: Patient had diarrhea 3 days prior to admission ~11/14; no melena or hematochezia; no abdominal pain  : denied urinary frequency, burning, or urgency  MSK: ambulatory at baseline with walker  Neuro: baseline A&O*4 The following per ED notes:  General: Denied fevers, chills, dizziness  Eyes: +cataracts, denied blurry vision  ENT: +hard of hearing, +dysphagia  CV: No chest pain, no palpitations  Respiratory: No shortness of breath  GI: Patient had diarrhea 3 days prior to admission ~11/14; no melena or hematochezia; no abdominal pain  : denied urinary frequency, burning, or urgency  MSK: ambulatory at baseline with walker  Neuro: baseline A&O*4

## 2022-11-17 NOTE — ED ADULT TRIAGE NOTE - CHIEF COMPLAINT QUOTE
Pt c/o abnormal lab result of low hgb and low wbc count, unsure of level. Also c/o BLLE swelling. HR noted to be in the 40's, and hypotensive. Per daughter HR was low earlier this week. Denies SOB, chest pain, fevers, chills. Unable to obtain temp in triage. Phx: HTN, DM, cataracts, sarcoidosis, CHF. .

## 2022-11-17 NOTE — PATIENT PROFILE ADULT - STATED REASON FOR ADMISSION
Received Pt from ED, sedated and intubated with propofol and fentanyl. Unable to complete an accurate patient profile

## 2022-11-17 NOTE — ED PROVIDER NOTE - ATTENDING CONTRIBUTION TO CARE
hector: pt here with hr 45, bp 80/ (now 100/) and temp 91.  pt with relatively new dx sarcouid, found to have labs showing creatinine doubled with anemia.  sending additional labs here.  pt here is slow to respond.  no hx po steroid use, no hx thryroid disease. pt with mild edema of legs, mild crackles at the bases.  will send thyroid, check calcium as well as others. concern for sepsis as well given hypothermia and hypotension, also concerned for adesonian crisis, will see how bp responds to small fluid bolus, needs eval for retention of urine    I performed a history and physical exam of the patient and discussed their management with the resident and /or advanced care provider. I reviewed the resident and /or ACP's note and agree with the documented findings and plan of care. My medical decison making and observations are found above. hector: pt here with hr 45, bp 80/ (now 100/) and temp 91.  pt with relatively new dx sarcouid, found to have labs showing creatinine doubled with anemia.  sending additional labs here.  pt here is slow to respond.  no hx po steroid use, no hx thryroid disease. pt with mild edema of legs, mild crackles at the bases.  will send thyroid, check calcium as well as others. concern for sepsis as well given hypothermia and hypotension, also concerned for adesonian crisis, will see how bp responds to small fluid bolus, needs eval for retention of urine    hector: pt with worsening arousal, o2 sat dropped to 68, came up with sternal rub but pt also with acute resp acidosis.  discussed with daughter and plan is to intubate.  pt with hypotension, bradycardia, chest film looks like from last visit, ct revealed very large heart and pulm effusions bilat.  given dose of solumedrol after cortisol drawn, but was normal last time.  tsh consider for myxedema coma, but also same as last time (just a little past high normal).  treating for sepsis but I wonder if this is end stage sarcoid.  pt very [place], difficult to engage in conversation, discussed with daughter plan to intubate.  given repeat of last visit (which also included CA x 2) when pt improved should have GOC conversation with family and patient.    Upon my evaluation, this patient had a high probability of imminent or life-threatening deterioration due to respratory acidosis, which required my direct attention, intervention, and personal management.  The patient has a  medical condition that impairs one or more vital organ systems.  Frequent personal assessment and adjustment of medical interventions was performed.      I have personally provided 34 minutes of critical care time exclusive of time spent on separately billable procedures. Time includes review of laboratory data, radiology results, discussion with consultants, patient and family; monitoring for potential decompensation, as well as time spent retrieving data and reviewing the chart and documenting the visit. Interventions were performed as documented above.      I performed a history and physical exam of the patient and discussed their management with the resident and /or advanced care provider. I reviewed the resident and /or ACP's note and agree with the documented findings and plan of care. My medical decison making and observations are found above.

## 2022-11-17 NOTE — PATIENT PROFILE ADULT - FALL HARM RISK - HARM RISK INTERVENTIONS

## 2022-11-17 NOTE — H&P ADULT - NSHPPHYSICALEXAM_GEN_ALL_CORE
ICU Vital Signs Last 24 Hrs  T(C): 32.8 (17 Nov 2022 21:15), Max: 32.8 (17 Nov 2022 18:12)  T(F): 91 (17 Nov 2022 21:15), Max: 91.1 (17 Nov 2022 18:12)  HR: 54 (17 Nov 2022 21:15) (45 - 56)  BP: 141/84 (17 Nov 2022 21:15) (93/51 - 151/78)  BP(mean): 99 (17 Nov 2022 21:15) (93 - 104)  ABP: --  ABP(mean): --  RR: 16 (17 Nov 2022 21:15) (13 - 21)  SpO2: 100% (17 Nov 2022 21:15) (96% - 100%)    O2 Parameters below as of 17 Nov 2022 21:15  Patient On (Oxygen Delivery Method): ventilator    PHYSICAL EXAM:  General: Intubated/sedaed. Alert. Following commands? *****  HEENT: ET tube *****. No ulcerations, epistaxis, or signs of infection. Pupils constricted, ERRL. No scleral icterus or conjunctival pallor.  Neck: No JVD. Supple. No bruising or ecchymosis.  Chest/Lungs: CTAB, no wheezes, rhonchi, or rales. Normal excursion.   Heart: Regular rate and regular rhythm. No murmurs appreciated. *****.   Abdomen: Soft, non tender to palaption. No distension.   Extremities/skin: No significant extremity edema. Hands cool to touch. Cap refill < 2 sec. No unilateral leg swelling ****  Neuro: Following commands. Moving extremities spontaneously.  Psych: Sedated, appropriate mood *****    LINES:   - Location, insertion date, redness, indication?  - Murphy insertion date and last change? ICU Vital Signs Last 24 Hrs  T(C): 32.8 (17 Nov 2022 21:15), Max: 32.8 (17 Nov 2022 18:12)  T(F): 91 (17 Nov 2022 21:15), Max: 91.1 (17 Nov 2022 18:12)  HR: 56 (17 Nov 2022 22:00) (45 - 56)  BP: 136/77 (17 Nov 2022 22:00) (93/51 - 151/78)  BP(mean): 88 (17 Nov 2022 22:00) (88 - 104)  ABP: --  ABP(mean): --  RR: 16 (17 Nov 2022 22:00) (13 - 21)  SpO2: 100% (17 Nov 2022 22:00) (96% - 100%)    O2 Parameters below as of 17 Nov 2022 22:00  Patient On (Oxygen Delivery Method): ventilator,AC 16/350/+5    O2 Concentration (%): 100            PHYSICAL EXAM:  General: Intubated/sedated. Alert. Following commands? *****  HEENT: ET tube *****. Pupils constricted, ERRL. Extraocular movements grossly intact.***** No rhinorrhea.   Chest/Lungs: CTAB, no wheezes or crackles appreciated. Symmetric chest rise.  Heart: Regular rate and rhythm. S1/S2. No murmurs appreciated. *****.   Abdomen: Soft, nontender to palpation, all quadrants; no distension. No guarding.  : No suprapubic tenderness.   Extremities: No significant extremity edema. WWP.  Neuro: Alert, oriented.***** Sedated.**** Following commands. Moving extremities spontaneously.  Psych: Sedated, appropriate mood *****    LINES:   - Location, insertion date, redness, indication?  - Murphy insertion date and last change? ICU Vital Signs Last 24 Hrs  T(C): 32.8 (17 Nov 2022 21:15), Max: 32.8 (17 Nov 2022 18:12)  T(F): 91 (17 Nov 2022 21:15), Max: 91.1 (17 Nov 2022 18:12)  HR: 56 (17 Nov 2022 22:00) (45 - 56)  BP: 136/77 (17 Nov 2022 22:00) (93/51 - 151/78)  BP(mean): 88 (17 Nov 2022 22:00) (88 - 104)  ABP: --  ABP(mean): --  RR: 16 (17 Nov 2022 22:00) (13 - 21)  SpO2: 100% (17 Nov 2022 22:00) (96% - 100%)    O2 Parameters below as of 17 Nov 2022 22:00  Patient On (Oxygen Delivery Method): ventilator,AC 16/350/+5    O2 Concentration (%): 100            PHYSICAL EXAM:  General: Intubated/sedated. +bairhugger  HEENT: ET tube, OGT. Pupils constricted, equally round. No rhinorrhea.   Chest/Lungs: anterior lung fields CTAB, no wheezes or crackles appreciated, possibly reduced breath sounds in lower lateral fields.  Heart: Regular rate and rhythm. S1/S2. No murmurs appreciated.   Abdomen: Soft, nontender to palpation, all quadrants; no distension. No guarding.  : No suprapubic tenderness.   Extremities: WWP, all limbs. +pitting edema, +1 abdomen and thighs b/l, +2 calves.  Skin: No rashes or bruising of face, arms, legs b/l.  Neuro: Sedated. Not following commands. Moved L foot, symmetric grimace with abdominal exam, resisted eyelid opening b/l.  Psych: Sedated.    LINES:   - IV LUE, RUE. ICU Vital Signs Last 24 Hrs  T(C): 32.8 (17 Nov 2022 21:15), Max: 32.8 (17 Nov 2022 18:12)  T(F): 91 (17 Nov 2022 21:15), Max: 91.1 (17 Nov 2022 18:12)  HR: 56 (17 Nov 2022 22:00) (45 - 56)  BP: 136/77 (17 Nov 2022 22:00) (93/51 - 151/78)  BP(mean): 88 (17 Nov 2022 22:00) (88 - 104)  ABP: --  ABP(mean): --  RR: 16 (17 Nov 2022 22:00) (13 - 21)  SpO2: 100% (17 Nov 2022 22:00) (96% - 100%)    O2 Parameters below as of 17 Nov 2022 22:00  Patient On (Oxygen Delivery Method): ventilator,AC 16/350/+5    O2 Concentration (%): 100            PHYSICAL EXAM:  General: Intubated/sedated. +bairhugger  HEENT: ET tube, OGT. Pupils constricted, equally round. No rhinorrhea.   Chest/Lungs: anterior lung fields CTAB, no wheezes or crackles appreciated, possibly reduced breath sounds in lower lateral fields.  Heart: Regular rate and rhythm. S1/S2. No murmurs appreciated.   Abdomen: Soft, nontender to palpation, all quadrants; no distension. No guarding. Normal bowel sounds.  : No suprapubic tenderness.   Extremities: WWP, all limbs. +pitting edema, +1 abdomen and thighs b/l, +2 calves.  Skin: No rashes or bruising of face, arms, legs b/l.  Neuro: Sedated. Not following commands. Moved L foot, symmetric grimace with abdominal exam, resisted eyelid opening b/l.  Psych: Sedated.    LINES:   - IV LUE, RUE.

## 2022-11-17 NOTE — ED PROVIDER NOTE - CLINICAL SUMMARY MEDICAL DECISION MAKING FREE TEXT BOX
67-year-old female here for anemia and MANDY.  VSS.  Exam with trace crackles in the bases and bilateral lower extremity pitting edema.  Concern for CHF exacerbation versus worsening CKD which both may be secondary to worsening sarcoidosis.  Will obtain labs, type and screen, EKG, cardiac enzymes, x-ray chest.  Possible transfusion, possible diuresis.  Anticipate admission. 67-year-old female here for anemia and MANDY.  VSS.  Exam with trace crackles in the bases and bilateral lower extremity pitting edema.  Concern for CHF exacerbation versus worsening CKD which both may be secondary to worsening sarcoidosis.  Will obtain labs, type and screen, EKG, cardiac enzymes, x-ray chest.  Possible transfusion, possible diuresis.  Anticipate admission.    hector: pt here with hr 45, bp 80/ (now 100/) and temp 91.  pt with relatively new dx sarcouid, found to have labs showing creatinine doubled with anemia.  sending additional labs here.  pt here is slow to respond.  no hx po steroid use, no hx thryroid disease. pt with mild edema of legs, mild crackles at the bases.  will send thyroid, check calcium as well as others. concern for sepsis as well given hypothermia and hypotension, also concerned for adesonian crisis, will see how bp responds to small fluid bolus, needs eval for retention of urine 67-year-old female here for anemia and MANDY.  VSS.  Exam with trace crackles in the bases and bilateral lower extremity pitting edema.  Concern for CHF exacerbation versus worsening CKD which both may be secondary to worsening sarcoidosis.  Will obtain labs, type and screen, EKG, cardiac enzymes, x-ray chest.  Possible transfusion, possible diuresis.  Anticipate admission.    hector: pt here with hr 45, bp 80/ (now 100/) and temp 91.  pt with relatively new dx sarcouid, found to have labs showing creatinine doubled with anemia.  sending additional labs here.  pt here is slow to respond.  no hx po steroid use, no hx thryroid disease. pt with mild edema of legs, mild crackles at the bases.  will send thyroid, check calcium as well as others. concern for sepsis as well given hypothermia and hypotension, also concerned for adesonian crisis, will see how bp responds to small fluid bolus, needs eval for retention of urine    pt with increasing creatinine and signs of hypoxia and failure so fluids limited even though sepsis is on the diff dx

## 2022-11-17 NOTE — H&P ADULT - NSICDXPASTMEDICALHX_GEN_ALL_CORE_FT
PAST MEDICAL HISTORY:  Bilateral cataracts     Fluid in pleural cavity associated with pancreatitis     HLD (hyperlipidemia)     HTN (hypertension)     Pancreatic pseudocyst/cyst s/p drain placement and removal    Pulseless electrical activity     Sarcoid     Type 2 diabetes mellitus

## 2022-11-17 NOTE — H&P ADULT - ATTENDING COMMENTS
67F PMH HTN, HLD, sarcoidosis, CHF, PEA arrest x2, multiple admissions for sepsis 2/2 UTI (Klebsiella, E. coli)  and AMS requiring intubation; presented to ED with hypothermia, bradycardia, and hypotension and AMS. Pt with worsening mental status and hypercapnia requiring intubation.   pt recently admitted in september with similar presentation, pt seen and evaluated by endo, stim test negative, adrenal insufficiency ruled out at that time, and presentation was attributed to decompensated HF and sepsis.  vent support, check ABG, titrate down FiO2 as tolerated, Cpap trial  sepsis, not in shock, pancx, Abx (previous admission, Kleb and E Coli UTI pansensitive)  TTE, perdomo strict I and O, consider lasix  MANDY on CKD, will monitor UO, no need for HD at this time  DVT ppx heparin sq  Full code  overall guarded prognosis 67F PMH HTN, HLD, sarcoidosis, CHF, PEA arrest x2, multiple admissions for sepsis 2/2 UTI (Klebsiella, E. coli)  and AMS requiring intubation; presented to ED with hypothermia, bradycardia, and hypotension and AMS. Pt with worsening mental status and hypercapnia requiring intubation.   pt recently admitted in september with similar presentation, pt seen and evaluated by endo, stim test negative, adrenal insufficiency ruled out at that time, and presentation was attributed to decompensated HF and sepsis.  vent support, check ABG, titrate down FiO2 as tolerated, Cpap trial  sepsis, not in shock, pancx, Abx (previous admission, Kleb and E Coli UTI pansensitive)  TSH, cortisol   TTE, perdomo strict I and O, consider lasix  MANDY on CKD, will monitor UO, no need for HD at this time  DVT ppx heparin sq  Full code  overall guarded prognosis

## 2022-11-17 NOTE — ED ADULT NURSE REASSESSMENT NOTE - NS ED NURSE REASSESS COMMENT FT1
Mobile Critical Care RN: patient is 67/F sent to ED by pulmonologist for low Hgb. patient is [place] and has PMHx of HTN, DM, CHF and sarcoidosis. patient c/o weakness, B/l LE swelling. patient was bradycardic, hypotensive and hypothermic upon arrival. patient found to be decreasingly responsive and hypoxic, O2sat 68%. decision made to intubate due to acute respiratory acidosis. patient placed on HFNC 50L for optimization, intubated in TR-C with #7.5 ETT 23 @lip, vent settings 16/350/5/100%. ETCo2 monitored. patient is sedated on propofol gtt, initially started on levophed but maintaining adequate BP with levo held at this time. PIV x2. OGT placed, CXR confirmed. UA/UCx pending due to minimal urine output during straight cath. skin noted to be intact. Mobile Critical Care RN: patient is 67/F sent to ED by pulmonologist for low Hgb. patient is [place] and has PMHx of HTN, DM, CHF and sarcoidosis. patient c/o weakness, B/l LE swelling. patient was bradycardic, hypotensive and hypothermic upon arrival, placed on tracy hugger. patient found to be decreasingly responsive and hypoxic, O2sat 68%. decision made to intubate due to acute respiratory acidosis. patient placed on HFNC 50L for optimization, intubated in TR-C with #7.5 ETT 23 @lip, vent settings 16/350/5/100%. ETCo2 monitored. patient is sedated on propofol gtt, initially started on levophed but maintaining adequate BP with levo held at this time. PIV x2. OGT placed, CXR confirmed. UA/UCx pending due to minimal urine output during straight cath. skin noted to be intact.

## 2022-11-17 NOTE — H&P ADULT - HISTORY OF PRESENT ILLNESS
67F PMH HTN, HLD, sarcoidosis, CHF, PEA*2, sepsis 2/2 UTI (Klesiella, E. coli) ~9/2022 requiring intubation; p/w to ED with anemia (Hb 7.3, baseline ~8) and MANDY on CKD found on outpatient labs.     Per daughter, patient appeared weaker. In ED, patient had bradycardia (45), hypotension (SBP 80), hypoxemia (68%), hypothermia (91F), MANDY on CKD (Cr ), acute respiratory acidosis, worsening mental status, and was intubated. Cortisol drawn, then given Solumedrol. Patient was intubated.     Concern for sepsis and for CHF exacerbation vs worsening CKD, either possibly 2/2 endstage sarcoidosis; r/o myxedema, Addisonian crisis; urine retention. 67F PMH HTN, HLD, sarcoidosis, CHF, PEA*2, sepsis 2/2 UTI (Klesiella, E. coli) ~9/2022 requiring intubation; p/w to ED with anemia (Hb 7.3, baseline ~8) and MANDY on CKD found on outpatient labs.     Per daughter, patient appeared weaker. In ED, patient had bradycardia (45), hypotension (SBP 80), hypoxemia (68%), hypothermia (91F), MANDY on CKD (Cr ), acute respiratory acidosis, worsening mental status, and was intubated. Patient also received 1 dose solumedrol (after cortisol drawn).     Concern for sepsis and for CHF exacerbation vs worsening CKD, either possibly 2/2 endstage sarcoidosis; r/o myxedema, Addisonian crisis; urine retention. 67F PMH HTN, HLD, sarcoidosis, CHF, PEA*2, sepsis 2/2 UTI (Klesiella, E. coli) ~9/2022 requiring intubation; presented to ED with MANDY on CKD found on outpatient labs and with abnormal VS, found to have AMS, AHRF, intubated, and admitted to MICU for further management.    In ED, patient had bradycardia (45), hypotension (SBP 80), hypoxemia (68%), hypothermia (91F), MANDY on CKD (Cr 3, baseline 1.2), acute respiratory acidosis, worsening mental status, and was intubated. Patient also received 1 dose solumedrol (after cortisol drawn). Also found to have Trop 100 and BNP 2081. Additionally, anemic (Hb 7.3, baseline ~8).    67F PMH HTN, HLD, sarcoidosis, CHF, PEA*2, sepsis 2/2 UTI (Klesiella, E. coli) ~9/2022 requiring intubation; presented to ED with MANDY on CKD found on outpatient labs and with abnormal VS, found to have AMS, AHRF, intubated, and admitted to MICU for further management.    In ED, patient had bradycardia (45), hypotension (SBP 80), hypoxemia (68%), hypothermia (91F), MANDY on CKD (Cr 3, baseline 1.2), acute respiratory acidosis, worsening mental status, and was intubated. Patient also received 1 dose solumedrol (after cortisol drawn). Also found to have Trop 100 and BNP 2081. Additionally, anemic (Hb 7.3, baseline 8-9).

## 2022-11-17 NOTE — ED PROVIDER NOTE - PHYSICAL EXAMINATION
General: NAD  HEENT: NCAT  Cardiac: RRR, 2+ radial pulses  Chest: +basilar crackles L>R  Abdomen: soft, no ttp  Extremities: +peripheral edema but no calf tenderness  Skin: no rashes  Neuro: AAOx3, motor and sensory grossly intact  Psych: mood and affect appropriate

## 2022-11-17 NOTE — H&P ADULT - REASON FOR ADMISSION
altered mental status altered mental status, abnormal VS, MANDY on CKD MANDY on CKD, abnormal VS, altered mental status

## 2022-11-18 LAB
ALBUMIN SERPL ELPH-MCNC: 3.7 G/DL — SIGNIFICANT CHANGE UP (ref 3.3–5)
ALP SERPL-CCNC: 103 U/L — SIGNIFICANT CHANGE UP (ref 40–120)
ALT FLD-CCNC: 26 U/L — SIGNIFICANT CHANGE UP (ref 4–33)
ANION GAP SERPL CALC-SCNC: 17 MMOL/L — HIGH (ref 7–14)
ANISOCYTOSIS BLD QL: SLIGHT — SIGNIFICANT CHANGE UP
AST SERPL-CCNC: 32 U/L — SIGNIFICANT CHANGE UP (ref 4–32)
BASE EXCESS BLDV CALC-SCNC: -0.3 MMOL/L — SIGNIFICANT CHANGE UP (ref -2–3)
BASOPHILS # BLD AUTO: 0 K/UL — SIGNIFICANT CHANGE UP (ref 0–0.2)
BASOPHILS NFR BLD AUTO: 0 % — SIGNIFICANT CHANGE UP (ref 0–2)
BILIRUB SERPL-MCNC: 0.2 MG/DL — SIGNIFICANT CHANGE UP (ref 0.2–1.2)
BLOOD GAS ARTERIAL COMPREHENSIVE RESULT: SIGNIFICANT CHANGE UP
BUN SERPL-MCNC: 56 MG/DL — HIGH (ref 7–23)
CA-I SERPL-SCNC: 1.14 MMOL/L — LOW (ref 1.15–1.33)
CALCIUM SERPL-MCNC: 8.8 MG/DL — SIGNIFICANT CHANGE UP (ref 8.4–10.5)
CHLORIDE BLDV-SCNC: 108 MMOL/L — SIGNIFICANT CHANGE UP (ref 96–108)
CHLORIDE SERPL-SCNC: 102 MMOL/L — SIGNIFICANT CHANGE UP (ref 98–107)
CO2 BLDV-SCNC: 26.1 MMOL/L — HIGH (ref 22–26)
CO2 SERPL-SCNC: 22 MMOL/L — SIGNIFICANT CHANGE UP (ref 22–31)
CREAT ?TM UR-MCNC: 140 MG/DL — SIGNIFICANT CHANGE UP
CREAT SERPL-MCNC: 3.15 MG/DL — HIGH (ref 0.5–1.3)
CRP SERPL-MCNC: 6.1 MG/L — HIGH
EGFR: 15 ML/MIN/1.73M2 — LOW
EOSINOPHIL # BLD AUTO: 0.02 K/UL — SIGNIFICANT CHANGE UP (ref 0–0.5)
EOSINOPHIL NFR BLD AUTO: 0.9 % — SIGNIFICANT CHANGE UP (ref 0–6)
ERYTHROCYTE [SEDIMENTATION RATE] IN BLOOD: 54 MM/HR — HIGH (ref 4–25)
GAS PNL BLDA: SIGNIFICANT CHANGE UP
GAS PNL BLDV: 142 MMOL/L — SIGNIFICANT CHANGE UP (ref 136–145)
GAS PNL BLDV: SIGNIFICANT CHANGE UP
GIANT PLATELETS BLD QL SMEAR: PRESENT — SIGNIFICANT CHANGE UP
GLUCOSE BLDC GLUCOMTR-MCNC: 144 MG/DL — HIGH (ref 70–99)
GLUCOSE BLDC GLUCOMTR-MCNC: 173 MG/DL — HIGH (ref 70–99)
GLUCOSE BLDC GLUCOMTR-MCNC: 173 MG/DL — HIGH (ref 70–99)
GLUCOSE BLDV-MCNC: 121 MG/DL — HIGH (ref 70–99)
GLUCOSE SERPL-MCNC: 133 MG/DL — HIGH (ref 70–99)
HCO3 BLDV-SCNC: 25 MMOL/L — SIGNIFICANT CHANGE UP (ref 22–29)
HCT VFR BLD CALC: 26 % — LOW (ref 34.5–45)
HCT VFR BLDA CALC: 24 % — LOW (ref 34.5–46.5)
HGB BLD CALC-MCNC: 7.9 G/DL — LOW (ref 11.5–15.5)
HGB BLD-MCNC: 7.9 G/DL — LOW (ref 11.5–15.5)
HYPOCHROMIA BLD QL: SLIGHT — SIGNIFICANT CHANGE UP
IANC: 1.71 K/UL — LOW (ref 1.8–7.4)
LACTATE BLDV-MCNC: 0.5 MMOL/L — SIGNIFICANT CHANGE UP (ref 0.5–2)
LYMPHOCYTES # BLD AUTO: 0.24 K/UL — LOW (ref 1–3.3)
LYMPHOCYTES # BLD AUTO: 10.7 % — LOW (ref 13–44)
MAGNESIUM SERPL-MCNC: 1.7 MG/DL — SIGNIFICANT CHANGE UP (ref 1.6–2.6)
MANUAL SMEAR VERIFICATION: SIGNIFICANT CHANGE UP
MCHC RBC-ENTMCNC: 25.5 PG — LOW (ref 27–34)
MCHC RBC-ENTMCNC: 30.4 GM/DL — LOW (ref 32–36)
MCV RBC AUTO: 83.9 FL — SIGNIFICANT CHANGE UP (ref 80–100)
MONOCYTES # BLD AUTO: 0 K/UL — SIGNIFICANT CHANGE UP (ref 0–0.9)
MONOCYTES NFR BLD AUTO: 0 % — LOW (ref 2–14)
MYELOCYTES NFR BLD: 0.9 % — HIGH (ref 0–0)
NEUTROPHILS # BLD AUTO: 1.93 K/UL — SIGNIFICANT CHANGE UP (ref 1.8–7.4)
NEUTROPHILS NFR BLD AUTO: 86.6 % — HIGH (ref 43–77)
NEUTS BAND # BLD: 0.9 % — SIGNIFICANT CHANGE UP (ref 0–6)
NRBC # BLD: 2 /100 — HIGH (ref 0–0)
PCO2 BLDV: 42 MMHG — SIGNIFICANT CHANGE UP (ref 39–42)
PH BLDV: 7.38 — SIGNIFICANT CHANGE UP (ref 7.32–7.43)
PHOSPHATE SERPL-MCNC: 5.6 MG/DL — HIGH (ref 2.5–4.5)
PLAT MORPH BLD: NORMAL — SIGNIFICANT CHANGE UP
PLATELET # BLD AUTO: 203 K/UL — SIGNIFICANT CHANGE UP (ref 150–400)
PLATELET COUNT - ESTIMATE: NORMAL — SIGNIFICANT CHANGE UP
PO2 BLDV: 87 MMHG — SIGNIFICANT CHANGE UP
POLYCHROMASIA BLD QL SMEAR: SLIGHT — SIGNIFICANT CHANGE UP
POTASSIUM BLDV-SCNC: 4.5 MMOL/L — SIGNIFICANT CHANGE UP (ref 3.5–5.1)
POTASSIUM SERPL-MCNC: 4.5 MMOL/L — SIGNIFICANT CHANGE UP (ref 3.5–5.3)
POTASSIUM SERPL-SCNC: 4.5 MMOL/L — SIGNIFICANT CHANGE UP (ref 3.5–5.3)
POTASSIUM UR-SCNC: 68.7 MMOL/L — SIGNIFICANT CHANGE UP
PROT ?TM UR-MCNC: 530 MG/DL — SIGNIFICANT CHANGE UP
PROT SERPL-MCNC: 7.3 G/DL — SIGNIFICANT CHANGE UP (ref 6–8.3)
PROT/CREAT UR-RTO: 3.8 RATIO — HIGH (ref 0–0.2)
RBC # BLD: 3.1 M/UL — LOW (ref 3.8–5.2)
RBC # FLD: 18 % — HIGH (ref 10.3–14.5)
RBC BLD AUTO: ABNORMAL
SAO2 % BLDV: 97 % — SIGNIFICANT CHANGE UP
SODIUM SERPL-SCNC: 141 MMOL/L — SIGNIFICANT CHANGE UP (ref 135–145)
SODIUM UR-SCNC: <20 MMOL/L — SIGNIFICANT CHANGE UP
TROPONIN T, HIGH SENSITIVITY RESULT: 85 NG/L — CRITICAL HIGH
UUN UR-MCNC: 406.2 MG/DL — SIGNIFICANT CHANGE UP
WBC # BLD: 2.21 K/UL — LOW (ref 3.8–10.5)
WBC # FLD AUTO: 2.21 K/UL — LOW (ref 3.8–10.5)

## 2022-11-18 PROCEDURE — 93306 TTE W/DOPPLER COMPLETE: CPT | Mod: 26

## 2022-11-18 RX ORDER — ACETAMINOPHEN 500 MG
650 TABLET ORAL EVERY 6 HOURS
Refills: 0 | Status: DISCONTINUED | OUTPATIENT
Start: 2022-11-18 | End: 2022-12-09

## 2022-11-18 RX ORDER — HEPARIN SODIUM 5000 [USP'U]/ML
5000 INJECTION INTRAVENOUS; SUBCUTANEOUS EVERY 8 HOURS
Refills: 0 | Status: COMPLETED | OUTPATIENT
Start: 2022-11-18 | End: 2022-11-27

## 2022-11-18 RX ORDER — INSULIN LISPRO 100/ML
VIAL (ML) SUBCUTANEOUS
Refills: 0 | Status: DISCONTINUED | OUTPATIENT
Start: 2022-11-18 | End: 2022-11-18

## 2022-11-18 RX ORDER — GABAPENTIN 400 MG/1
100 CAPSULE ORAL DAILY
Refills: 0 | Status: DISCONTINUED | OUTPATIENT
Start: 2022-11-18 | End: 2022-11-22

## 2022-11-18 RX ORDER — DEXTROSE 50 % IN WATER 50 %
25 SYRINGE (ML) INTRAVENOUS ONCE
Refills: 0 | Status: DISCONTINUED | OUTPATIENT
Start: 2022-11-18 | End: 2022-12-09

## 2022-11-18 RX ORDER — DEXTROSE 50 % IN WATER 50 %
12.5 SYRINGE (ML) INTRAVENOUS ONCE
Refills: 0 | Status: DISCONTINUED | OUTPATIENT
Start: 2022-11-18 | End: 2022-12-09

## 2022-11-18 RX ORDER — INSULIN LISPRO 100/ML
VIAL (ML) SUBCUTANEOUS AT BEDTIME
Refills: 0 | Status: DISCONTINUED | OUTPATIENT
Start: 2022-11-18 | End: 2022-11-18

## 2022-11-18 RX ORDER — LANOLIN ALCOHOL/MO/W.PET/CERES
3 CREAM (GRAM) TOPICAL AT BEDTIME
Refills: 0 | Status: DISCONTINUED | OUTPATIENT
Start: 2022-11-18 | End: 2022-11-18

## 2022-11-18 RX ORDER — ASPIRIN/CALCIUM CARB/MAGNESIUM 324 MG
81 TABLET ORAL DAILY
Refills: 0 | Status: DISCONTINUED | OUTPATIENT
Start: 2022-11-18 | End: 2022-11-22

## 2022-11-18 RX ORDER — ATORVASTATIN CALCIUM 80 MG/1
40 TABLET, FILM COATED ORAL AT BEDTIME
Refills: 0 | Status: DISCONTINUED | OUTPATIENT
Start: 2022-11-18 | End: 2022-12-09

## 2022-11-18 RX ORDER — INSULIN HUMAN 100 [IU]/ML
INJECTION, SOLUTION SUBCUTANEOUS EVERY 6 HOURS
Refills: 0 | Status: DISCONTINUED | OUTPATIENT
Start: 2022-11-18 | End: 2022-11-18

## 2022-11-18 RX ORDER — ALBUTEROL 90 UG/1
2 AEROSOL, METERED ORAL EVERY 6 HOURS
Refills: 0 | Status: DISCONTINUED | OUTPATIENT
Start: 2022-11-18 | End: 2022-11-18

## 2022-11-18 RX ORDER — MAGNESIUM SULFATE 500 MG/ML
2 VIAL (ML) INJECTION ONCE
Refills: 0 | Status: COMPLETED | OUTPATIENT
Start: 2022-11-18 | End: 2022-11-18

## 2022-11-18 RX ORDER — SODIUM CHLORIDE 9 MG/ML
1000 INJECTION, SOLUTION INTRAVENOUS
Refills: 0 | Status: DISCONTINUED | OUTPATIENT
Start: 2022-11-18 | End: 2022-12-09

## 2022-11-18 RX ORDER — INSULIN LISPRO 100/ML
VIAL (ML) SUBCUTANEOUS EVERY 6 HOURS
Refills: 0 | Status: DISCONTINUED | OUTPATIENT
Start: 2022-11-18 | End: 2022-11-18

## 2022-11-18 RX ORDER — DEXTROSE 50 % IN WATER 50 %
15 SYRINGE (ML) INTRAVENOUS ONCE
Refills: 0 | Status: DISCONTINUED | OUTPATIENT
Start: 2022-11-18 | End: 2022-12-09

## 2022-11-18 RX ORDER — GLUCAGON INJECTION, SOLUTION 0.5 MG/.1ML
1 INJECTION, SOLUTION SUBCUTANEOUS ONCE
Refills: 0 | Status: DISCONTINUED | OUTPATIENT
Start: 2022-11-18 | End: 2022-12-09

## 2022-11-18 RX ORDER — IPRATROPIUM BROMIDE 0.2 MG/ML
1 SOLUTION, NON-ORAL INHALATION EVERY 6 HOURS
Refills: 0 | Status: DISCONTINUED | OUTPATIENT
Start: 2022-11-18 | End: 2022-12-09

## 2022-11-18 RX ORDER — IPRATROPIUM/ALBUTEROL SULFATE 18-103MCG
3 AEROSOL WITH ADAPTER (GRAM) INHALATION EVERY 6 HOURS
Refills: 0 | Status: DISCONTINUED | OUTPATIENT
Start: 2022-11-18 | End: 2022-11-18

## 2022-11-18 RX ORDER — BUMETANIDE 0.25 MG/ML
2 INJECTION INTRAMUSCULAR; INTRAVENOUS ONCE
Refills: 0 | Status: COMPLETED | OUTPATIENT
Start: 2022-11-18 | End: 2022-11-18

## 2022-11-18 RX ORDER — FUROSEMIDE 40 MG
40 TABLET ORAL DAILY
Refills: 0 | Status: DISCONTINUED | OUTPATIENT
Start: 2022-11-18 | End: 2022-11-18

## 2022-11-18 RX ORDER — GABAPENTIN 400 MG/1
100 CAPSULE ORAL DAILY
Refills: 0 | Status: DISCONTINUED | OUTPATIENT
Start: 2022-11-18 | End: 2022-11-18

## 2022-11-18 RX ORDER — CEFTRIAXONE 500 MG/1
1000 INJECTION, POWDER, FOR SOLUTION INTRAMUSCULAR; INTRAVENOUS EVERY 24 HOURS
Refills: 0 | Status: DISCONTINUED | OUTPATIENT
Start: 2022-11-18 | End: 2022-11-22

## 2022-11-18 RX ORDER — ALBUTEROL 90 UG/1
2 AEROSOL, METERED ORAL EVERY 6 HOURS
Refills: 0 | Status: DISCONTINUED | OUTPATIENT
Start: 2022-11-18 | End: 2022-12-09

## 2022-11-18 RX ORDER — BUMETANIDE 0.25 MG/ML
2 INJECTION INTRAMUSCULAR; INTRAVENOUS DAILY
Refills: 0 | Status: DISCONTINUED | OUTPATIENT
Start: 2022-11-18 | End: 2022-11-18

## 2022-11-18 RX ORDER — INSULIN LISPRO 100/ML
VIAL (ML) SUBCUTANEOUS EVERY 6 HOURS
Refills: 0 | Status: DISCONTINUED | OUTPATIENT
Start: 2022-11-18 | End: 2022-11-21

## 2022-11-18 RX ADMIN — ALBUTEROL 2 PUFF(S): 90 AEROSOL, METERED ORAL at 20:13

## 2022-11-18 RX ADMIN — BUMETANIDE 2 MILLIGRAM(S): 0.25 INJECTION INTRAMUSCULAR; INTRAVENOUS at 09:42

## 2022-11-18 RX ADMIN — Medication 1 PUFF(S): at 16:31

## 2022-11-18 RX ADMIN — Medication 1 PUFF(S): at 20:13

## 2022-11-18 RX ADMIN — HEPARIN SODIUM 5000 UNIT(S): 5000 INJECTION INTRAVENOUS; SUBCUTANEOUS at 21:15

## 2022-11-18 RX ADMIN — CHLORHEXIDINE GLUCONATE 15 MILLILITER(S): 213 SOLUTION TOPICAL at 05:01

## 2022-11-18 RX ADMIN — HEPARIN SODIUM 5000 UNIT(S): 5000 INJECTION INTRAVENOUS; SUBCUTANEOUS at 14:40

## 2022-11-18 RX ADMIN — ALBUTEROL 2 PUFF(S): 90 AEROSOL, METERED ORAL at 16:31

## 2022-11-18 RX ADMIN — Medication 40 MILLIGRAM(S): at 05:01

## 2022-11-18 RX ADMIN — Medication 1 PUFF(S): at 10:46

## 2022-11-18 RX ADMIN — PROPOFOL 4.2 MICROGRAM(S)/KG/MIN: 10 INJECTION, EMULSION INTRAVENOUS at 09:42

## 2022-11-18 RX ADMIN — CHLORHEXIDINE GLUCONATE 1 APPLICATION(S): 213 SOLUTION TOPICAL at 05:00

## 2022-11-18 RX ADMIN — CEFTRIAXONE 100 MILLIGRAM(S): 500 INJECTION, POWDER, FOR SOLUTION INTRAMUSCULAR; INTRAVENOUS at 18:12

## 2022-11-18 RX ADMIN — GABAPENTIN 100 MILLIGRAM(S): 400 CAPSULE ORAL at 13:21

## 2022-11-18 RX ADMIN — Medication 81 MILLIGRAM(S): at 12:55

## 2022-11-18 RX ADMIN — Medication 25 GRAM(S): at 09:41

## 2022-11-18 RX ADMIN — HEPARIN SODIUM 5000 UNIT(S): 5000 INJECTION INTRAVENOUS; SUBCUTANEOUS at 05:01

## 2022-11-18 RX ADMIN — Medication 1: at 17:37

## 2022-11-18 RX ADMIN — Medication 1: at 12:56

## 2022-11-18 RX ADMIN — ALBUTEROL 2 PUFF(S): 90 AEROSOL, METERED ORAL at 10:47

## 2022-11-18 NOTE — PROGRESS NOTE ADULT - SUBJECTIVE AND OBJECTIVE BOX
INTERVAL HPI/OVERNIGHT EVENTS:    SUBJECTIVE: Patient seen and examined at bedside.     ROS: All negative except as listed above.    VITAL SIGNS:  ICU Vital Signs Last 24 Hrs  T(C): 36.6 (2022 08:00), Max: 36.6 (2022 08:00)  T(F): 97.8 (2022 08:00), Max: 97.8 (2022 08:00)  HR: 74 (:) (45 - 76)  BP: 125/71 (:00) (93/51 - 151/78)  BP(mean): 85 (:) (80 - 104)  ABP: --  ABP(mean): --  RR: 20 (:) (13 - 21)  SpO2: 100% (:) (96% - 100%)    O2 Parameters below as of :  Patient On (Oxygen Delivery Method): ventilator          Mode: AC/ CMV (Assist Control/ Continuous Mandatory Ventilation), RR (machine): 20, TV (machine): 350, FiO2: 30, PEEP: 5, PS: 30, ITime: 0.7, MAP: 9, PIP: 22  Plateau pressure: <30     11 @ :  -   @ 07:00  --------------------------------------------------------  IN: 399.6 mL / OUT: 130 mL / NET: 269.6 mL     @ :  -   @ 10:42  --------------------------------------------------------  IN: 0 mL / OUT: 30 mL / NET: -30 mL      CAPILLARY BLOOD GLUCOSE      POCT Blood Glucose.: 107 mg/dL (2022 16:58)      ECG: reviewed.    PHYSICAL EXAM:    GENERAL: intubated and sedated   HEAD:  Atraumatic, normocephalic  EYES: EOMI, PERRLA  NECK: Supple, trachea midline, no JVD  HEART: Regular rate and rhythm, no murmurs, rubs, or gallops  LUNGS: Unlabored respirations.  Clear to auscultation bilaterally, no crackles, wheezing, or rhonchi  ABDOMEN: Soft, nontender, nondistended  EXTREMITIES: 2+ peripheral pulses bilaterally, cap refill<2 secs +1 pitting edema   NERVOUS SYSTEM:  arousable to voice, moving all extremities   SKIN: No rashes or lesions    MEDICATIONS:  MEDICATIONS  (STANDING):  albuterol    90 MICROgram(s) HFA Inhaler 2 Puff(s) Inhalation every 6 hours  aspirin  chewable 81 milliGRAM(s) Oral daily  atorvastatin 40 milliGRAM(s) Oral at bedtime  cefTRIAXone   IVPB 1000 milliGRAM(s) IV Intermittent every 24 hours  chlorhexidine 0.12% Liquid 15 milliLiter(s) Oral Mucosa every 12 hours  chlorhexidine 2% Cloths 1 Application(s) Topical <User Schedule>  dextrose 5%. 1000 milliLiter(s) (50 mL/Hr) IV Continuous <Continuous>  dextrose 5%. 1000 milliLiter(s) (100 mL/Hr) IV Continuous <Continuous>  dextrose 50% Injectable 25 Gram(s) IV Push once  dextrose 50% Injectable 12.5 Gram(s) IV Push once  dextrose 50% Injectable 25 Gram(s) IV Push once  gabapentin Solution 100 milliGRAM(s) Oral daily  glucagon  Injectable 1 milliGRAM(s) IntraMuscular once  heparin   Injectable 5000 Unit(s) SubCutaneous every 8 hours  insulin lispro (ADMELOG) corrective regimen sliding scale   SubCutaneous every 6 hours  ipratropium 17 MICROgram(s) HFA Inhaler 1 Puff(s) Inhalation every 6 hours  petrolatum Ophthalmic Ointment 1 Application(s) Both EYES two times a day  propofol Infusion 10 MICROgram(s)/kG/Min (4.2 mL/Hr) IV Continuous <Continuous>    MEDICATIONS  (PRN):  acetaminophen     Tablet .. 650 milliGRAM(s) Oral every 6 hours PRN Mild Pain (1 - 3)  dextrose Oral Gel 15 Gram(s) Oral once PRN Blood Glucose LESS THAN 70 milliGRAM(s)/deciliter      ALLERGIES:  Allergies    penicillin (Red Man Synd)    Intolerances        LABS:                        7.9    2.21  )-----------( 203      ( 2022 03:53 )             26.0         141  |  102  |  56<H>  ----------------------------<  133<H>  4.5   |  22  |  3.15<H>    Ca    8.8      2022 03:53  Phos  5.6       Mg     1.70         TPro  7.3  /  Alb  3.7  /  TBili  0.2  /  DBili  x   /  AST  32  /  ALT  26  /  AlkPhos  103        Urinalysis Basic - ( 2022 22:52 )    Color: Yellow / Appearance: Slightly Turbid / S.027 / pH: x  Gluc: x / Ketone: Negative  / Bili: Negative / Urobili: 3 mg/dL   Blood: x / Protein: 300 mg/dL / Nitrite: Negative   Leuk Esterase: Large / RBC: 184 /HPF /  /HPF   Sq Epi: x / Non Sq Epi: 7 /HPF / Bacteria: Negative      ABG:      vBG:  pH, Venous: 7.38 (22 @ 01:10)  pCO2, Venous: 42 mmHg (22 @ 01:10)  pO2, Venous: 87 mmHg (22 @ 01:10)  HCO3, Venous: 25 mmol/L (22 @ 01:10)  pCO2, Venous: 69 mmHg (22 @ 20:23)  HCO3, Venous: 27 mmol/L (22 @ 20:23)  pO2, Venous: 41 mmHg (22 @ 20:23)  pH, Venous: 7.20 (22 @ 20:23)  pH, Venous: 7.24 (22 @ 18:30)  pCO2, Venous: 65 mmHg (22 @ 18:30)  HCO3, Venous: 28 mmol/L (22 @ 18:30)  pO2, Venous: 46 mmHg (22 @ 18:30)    Micro:        RADIOLOGY & ADDITIONAL TESTS: Reviewed.   INTERVAL HPI/OVERNIGHT EVENTS:  67F PMH HTN, HLD, sarcoidosis, CHF, PEA*2, sepsis 2/2 UTI (Klesiella, E. coli) ~2022 requiring intubation; presented to ED with MANDY on CKD found on outpatient labs. Upon arrival to ED pt found to have AMS, hypoxic resp failure, and AHRF, intubated, and admitted to MICU for further management.    SUBJECTIVE: Patient seen and examined at bedside. Pt intubated and sedated.     ROS: unable to assess     VITAL SIGNS:  ICU Vital Signs Last 24 Hrs  T(C): 36.6 (2022 08:00), Max: 36.6 (2022 08:00)  T(F): 97.8 (2022 08:00), Max: 97.8 (2022 08:00)  HR: 74 (2022 08:00) (45 - 76)  BP: 125/71 (2022 08:00) (93/51 - 151/78)  BP(mean): 85 (:) (80 - 104)  ABP: --  ABP(mean): --  RR: 20 (2022 08:00) (13 - 21)  SpO2: 100% (:) (96% - 100%)    O2 Parameters below as of 2022 08:00  Patient On (Oxygen Delivery Method): ventilator          Mode: AC/ CMV (Assist Control/ Continuous Mandatory Ventilation), RR (machine): 20, TV (machine): 350, FiO2: 30, PEEP: 5, PS: 30, ITime: 0.7, MAP: 9, PIP: 22  Plateau pressure: <30     11 @ :  -   @ 07:00  --------------------------------------------------------  IN: 399.6 mL / OUT: 130 mL / NET: 269.6 mL    18 @ 07:  -   @ 10:42  --------------------------------------------------------  IN: 0 mL / OUT: 30 mL / NET: -30 mL      CAPILLARY BLOOD GLUCOSE      POCT Blood Glucose.: 107 mg/dL (2022 16:58)      ECG: reviewed.    PHYSICAL EXAM:    GENERAL: intubated and sedated   HEAD:  Atraumatic, normocephalic  EYES: EOMI, PERRLA  NECK: Supple, trachea midline, no JVD  HEART: Regular rate and rhythm, no murmurs, rubs, or gallops  LUNGS: Unlabored respirations.  Clear to auscultation bilaterally, no crackles, wheezing, or rhonchi  ABDOMEN: Soft, nontender, nondistended  EXTREMITIES: 2+ peripheral pulses bilaterally, cap refill<2 secs +1 pitting edema   NERVOUS SYSTEM:  arousable to voice, moving all extremities   SKIN: No rashes or lesions    MEDICATIONS:  MEDICATIONS  (STANDING):  albuterol    90 MICROgram(s) HFA Inhaler 2 Puff(s) Inhalation every 6 hours  aspirin  chewable 81 milliGRAM(s) Oral daily  atorvastatin 40 milliGRAM(s) Oral at bedtime  cefTRIAXone   IVPB 1000 milliGRAM(s) IV Intermittent every 24 hours  chlorhexidine 0.12% Liquid 15 milliLiter(s) Oral Mucosa every 12 hours  chlorhexidine 2% Cloths 1 Application(s) Topical <User Schedule>  dextrose 5%. 1000 milliLiter(s) (50 mL/Hr) IV Continuous <Continuous>  dextrose 5%. 1000 milliLiter(s) (100 mL/Hr) IV Continuous <Continuous>  dextrose 50% Injectable 25 Gram(s) IV Push once  dextrose 50% Injectable 12.5 Gram(s) IV Push once  dextrose 50% Injectable 25 Gram(s) IV Push once  gabapentin Solution 100 milliGRAM(s) Oral daily  glucagon  Injectable 1 milliGRAM(s) IntraMuscular once  heparin   Injectable 5000 Unit(s) SubCutaneous every 8 hours  insulin lispro (ADMELOG) corrective regimen sliding scale   SubCutaneous every 6 hours  ipratropium 17 MICROgram(s) HFA Inhaler 1 Puff(s) Inhalation every 6 hours  petrolatum Ophthalmic Ointment 1 Application(s) Both EYES two times a day  propofol Infusion 10 MICROgram(s)/kG/Min (4.2 mL/Hr) IV Continuous <Continuous>    MEDICATIONS  (PRN):  acetaminophen     Tablet .. 650 milliGRAM(s) Oral every 6 hours PRN Mild Pain (1 - 3)  dextrose Oral Gel 15 Gram(s) Oral once PRN Blood Glucose LESS THAN 70 milliGRAM(s)/deciliter      ALLERGIES:  Allergies    penicillin (Red Man Synd)    Intolerances        LABS:                        7.9    2.21  )-----------( 203      ( 2022 03:53 )             26.0         141  |  102  |  56<H>  ----------------------------<  133<H>  4.5   |  22  |  3.15<H>    Ca    8.8      2022 03:53  Phos  5.6       Mg     1.70         TPro  7.3  /  Alb  3.7  /  TBili  0.2  /  DBili  x   /  AST  32  /  ALT  26  /  AlkPhos  103        Urinalysis Basic - ( 2022 22:52 )    Color: Yellow / Appearance: Slightly Turbid / S.027 / pH: x  Gluc: x / Ketone: Negative  / Bili: Negative / Urobili: 3 mg/dL   Blood: x / Protein: 300 mg/dL / Nitrite: Negative   Leuk Esterase: Large / RBC: 184 /HPF /  /HPF   Sq Epi: x / Non Sq Epi: 7 /HPF / Bacteria: Negative      ABG:      vBG:  pH, Venous: 7.38 (22 @ 01:10)  pCO2, Venous: 42 mmHg (22 @ 01:10)  pO2, Venous: 87 mmHg (22 @ 01:10)  HCO3, Venous: 25 mmol/L (22 @ 01:10)  pCO2, Venous: 69 mmHg (22 @ 20:23)  HCO3, Venous: 27 mmol/L (22 @ 20:23)  pO2, Venous: 41 mmHg (22 @ 20:23)  pH, Venous: 7.20 (22 @ 20:23)  pH, Venous: 7.24 (22 @ 18:30)  pCO2, Venous: 65 mmHg (22 @ 18:30)  HCO3, Venous: 28 mmol/L (22 @ 18:30)  pO2, Venous: 46 mmHg (22 @ 18:30)    Micro:        RADIOLOGY & ADDITIONAL TESTS: Reviewed.

## 2022-11-18 NOTE — PROGRESS NOTE ADULT - ASSESSMENT
67F PMH HTN, HLD, sarcoidosis, CHF, PEA*2, sepsis 2/2 UTI (Klesiella, E. coli) ~9/2022 requiring intubation; presented to ED with MANDY on CKD found on outpatient labs and with abnormal VS, found to have AMS, AHRF, intubated, and admitted to MICU for further management. Possibly acute on chronic HF iso sepsis vs endstage sarcoidosis; r/o hypothyroidism, Addisonian crisis; urine retention.    #Neuro  - baseline: A&O*4, ambulates with walker  - p/w AMS  - sedated  - c/w home med gabapentin 100 qD, melatonin 3 qHS    #Cardiovascular  - possibly acute on chronic HF iso sepsis  - p/w bradycardia (45), hypotension (SBP 80), hypoxemia (68%), hypothermia (91F),   - VS q1h  - p/w Trop 100 and BNP 2081 ISO of MANDY     - Trop q8h  - CXR 11/17: "Perihilar haze and effusions consistent with CHF"  - HTN     - hold home meds iso recent hypotension: amlodipine 10 qD, carvedilol 25 q12h, hydralazine 100 tid  - CHF     - hold home furosemide 40 po qD     - furosemide 40 IV qD  - c/w home med: ASA 81 qD, atorvastatin 40 qD    #Respiratory  - possibly AHRF 2/2 acute on chronic HF iso sepsis  - p/w pH 7.2, hypoxemia (68%)  - ABG   - Duonebs ATC    #GI/Nutrition  - OGT 11/17  - Diet: DASH/CC tube feeds    #/Renal  - p/w MANDY on CKD (Cr 3, baseline 1.2) - likely prerenal ISO sepsis 2/2 UTI   - urine studies FEUrea  - furosemide as above    #Skin  - no active issues    #Immunologic  - possibly endstage sarcoidosis  - s/p Solumedrol  - Duonebs as above  - MINOO, ESR, CRP    #ID  - possibly sepsis 2/2 UTI 2/2 urinary retention  - p/w hypothermia, leukopenia  - PMH sepsis 2/2 UTI Klebsiella and E. coli (ampicillin R)  - Current abx: Name, indication, start date and end date  - BCx*2, UCx  - ceftriaxone 11/17-    #Endocrine  - p/w bradycardia, hypotension  - r/o hypothyroidism, Addisonian crisis  - s/p 1 dose Solumedrol (after cortisol drawn)  - cortisol 11/17  - TSH 4.77   - f/u FT4  - PMH diabetes, previously on insulin but not recently since hypoglycemic  - FS    #Hematologic/DVT ppx  - p/w leukopenia 2.46 and normocytic anemia (Hb 7.3, baseline ~8)  - iron studies 11/18  - DVT Prophylaxis: heparin sc q8h, home ASA 67F PMH HTN, HLD, sarcoidosis, CHF, PEA*2, sepsis 2/2 UTI (Klesiella, E. coli) ~9/2022 requiring intubation; presented to ED with MANDY on CKD found on outpatient labs and with abnormal VS, found to have AMS, AHRF, intubated, and admitted to MICU for further management. Possibly acute on chronic HF +/- sepsis precipitating MANDY on CKD.     #Neuro  - baseline: A&O*4  - p/w AMS  - sedated on prop  - c/w home med gabapentin 100 qD    #Cardiovascular  - possibly acute on chronic HF iso sepsis  - p/w Glenn traore.   - Has been HD stable since admission without pressors   - p/w Trop 100 and BNP 2081 ISO of MANDY     - Trop q8h  - did not respond that well to lasix ON. Will give dose of bumex this AM to help diureses \  - POCUS: VTI 23 with good systolic fx, grossly nl RV and IVC 1.8cm  - c/w home med: ASA 81 qD, atorvastatin 40 qD    #Respiratory  - hypoxic resp failure 2/2 volume overload?  - satting and ventilating well on minimal vent settings  - currently on AC. will PSV today  - cxr c/w CHF  - POCUS with B lines R>L with right small effusion     #GI/Nutrition  - OGT 11/17  - Diet: TF   - bowel regimen     #/Renal  - p/w MANDY on CKD (Cr 3, baseline 1.2) - likely 2/2 sepsis vs volume overload   - cont with diureses   - avoid nephrotoxic agents  - replete lytes as appropriate     #Skin  - no active issues    #Immunologic  - sarcoidosis   - s/p Solumedrol  - will hold off on further steroids   - MINOO, ESR, CRP    #ID  - currently afebrile and without leukocytosis   - possibly sepsis 2/2 UTI   - p/w hypothermia, leukopenia  - PMH sepsis 2/2 UTI Klebsiella and E. coli (ampicillin R)  - will cont with ceftriaxone   - f/u BCx*2, UCx    #Endocrine  - p/w bradycardia, hypotension  - r/o hypothyroidism, Addisonian crisis  - s/p 1 dose Solumedrol (after cortisol drawn)  - will hold off on further steroids as is normotensive   - cortisol 11/17  - TSH 4.77   - f/u FT4  - PMH diabetes, previously on insulin but not recently since hypoglycemic  - ISS    #Hematologic/DVT ppx  - cbc stable   - DVT Prophylaxis: heparin sc q8h, home ASA

## 2022-11-19 LAB
A1C WITH ESTIMATED AVERAGE GLUCOSE RESULT: 5.1 % — SIGNIFICANT CHANGE UP (ref 4–5.6)
ALBUMIN SERPL ELPH-MCNC: 3.3 G/DL — SIGNIFICANT CHANGE UP (ref 3.3–5)
ALP SERPL-CCNC: 92 U/L — SIGNIFICANT CHANGE UP (ref 40–120)
ALT FLD-CCNC: 25 U/L — SIGNIFICANT CHANGE UP (ref 4–33)
ANION GAP SERPL CALC-SCNC: 14 MMOL/L — SIGNIFICANT CHANGE UP (ref 7–14)
AST SERPL-CCNC: 33 U/L — HIGH (ref 4–32)
BASE EXCESS BLDV CALC-SCNC: -0.1 MMOL/L — SIGNIFICANT CHANGE UP (ref -2–3)
BASE EXCESS BLDV CALC-SCNC: -0.3 MMOL/L — SIGNIFICANT CHANGE UP (ref -2–3)
BASOPHILS # BLD AUTO: 0.01 K/UL — SIGNIFICANT CHANGE UP (ref 0–0.2)
BASOPHILS NFR BLD AUTO: 0.1 % — SIGNIFICANT CHANGE UP (ref 0–2)
BASOPHILS NFR BLD AUTO: 0.2 % — SIGNIFICANT CHANGE UP (ref 0–2)
BILIRUB SERPL-MCNC: <0.2 MG/DL — SIGNIFICANT CHANGE UP (ref 0.2–1.2)
BLOOD GAS VENOUS COMPREHENSIVE RESULT: SIGNIFICANT CHANGE UP
BUN SERPL-MCNC: 68 MG/DL — HIGH (ref 7–23)
CALCIUM SERPL-MCNC: 8 MG/DL — LOW (ref 8.4–10.5)
CHLORIDE BLDV-SCNC: 109 MMOL/L — HIGH (ref 96–108)
CHLORIDE BLDV-SCNC: 110 MMOL/L — HIGH (ref 96–108)
CHLORIDE SERPL-SCNC: 104 MMOL/L — SIGNIFICANT CHANGE UP (ref 98–107)
CO2 BLDV-SCNC: 27.8 MMOL/L — HIGH (ref 22–26)
CO2 BLDV-SCNC: 29.1 MMOL/L — HIGH (ref 22–26)
CO2 SERPL-SCNC: 23 MMOL/L — SIGNIFICANT CHANGE UP (ref 22–31)
CREAT SERPL-MCNC: 3.14 MG/DL — HIGH (ref 0.5–1.3)
CULTURE RESULTS: SIGNIFICANT CHANGE UP
CULTURE RESULTS: SIGNIFICANT CHANGE UP
EGFR: 16 ML/MIN/1.73M2 — LOW
EOSINOPHIL # BLD AUTO: 0 K/UL — SIGNIFICANT CHANGE UP (ref 0–0.5)
EOSINOPHIL # BLD AUTO: 0.01 K/UL — SIGNIFICANT CHANGE UP (ref 0–0.5)
EOSINOPHIL NFR BLD AUTO: 0 % — SIGNIFICANT CHANGE UP (ref 0–6)
EOSINOPHIL NFR BLD AUTO: 0.2 % — SIGNIFICANT CHANGE UP (ref 0–6)
ESTIMATED AVERAGE GLUCOSE: 100 — SIGNIFICANT CHANGE UP
FERRITIN SERPL-MCNC: 113 NG/ML — SIGNIFICANT CHANGE UP (ref 15–150)
GAS PNL BLDV: 141 MMOL/L — SIGNIFICANT CHANGE UP (ref 136–145)
GAS PNL BLDV: 142 MMOL/L — SIGNIFICANT CHANGE UP (ref 136–145)
GAS PNL BLDV: SIGNIFICANT CHANGE UP
GLUCOSE BLDC GLUCOMTR-MCNC: 111 MG/DL — HIGH (ref 70–99)
GLUCOSE BLDC GLUCOMTR-MCNC: 113 MG/DL — HIGH (ref 70–99)
GLUCOSE BLDC GLUCOMTR-MCNC: 118 MG/DL — HIGH (ref 70–99)
GLUCOSE BLDC GLUCOMTR-MCNC: 90 MG/DL — SIGNIFICANT CHANGE UP (ref 70–99)
GLUCOSE BLDV-MCNC: 117 MG/DL — HIGH (ref 70–99)
GLUCOSE BLDV-MCNC: 121 MG/DL — HIGH (ref 70–99)
GLUCOSE SERPL-MCNC: 121 MG/DL — HIGH (ref 70–99)
HCO3 BLDV-SCNC: 26 MMOL/L — SIGNIFICANT CHANGE UP (ref 22–29)
HCO3 BLDV-SCNC: 27 MMOL/L — SIGNIFICANT CHANGE UP (ref 22–29)
HCT VFR BLD CALC: 22.7 % — LOW (ref 34.5–45)
HCT VFR BLD CALC: 23.2 % — LOW (ref 34.5–45)
HCT VFR BLD CALC: 23.5 % — LOW (ref 34.5–45)
HCT VFR BLD CALC: 26 % — LOW (ref 34.5–45)
HCT VFR BLDA CALC: 19 % — CRITICAL LOW (ref 34.5–46.5)
HCT VFR BLDA CALC: 21 % — CRITICAL LOW (ref 34.5–46.5)
HGB BLD CALC-MCNC: 6.3 G/DL — CRITICAL LOW (ref 11.5–15.5)
HGB BLD CALC-MCNC: 7.1 G/DL — LOW (ref 11.5–15.5)
HGB BLD-MCNC: 6.8 G/DL — CRITICAL LOW (ref 11.5–15.5)
HGB BLD-MCNC: 6.9 G/DL — CRITICAL LOW (ref 11.5–15.5)
HGB BLD-MCNC: 7 G/DL — CRITICAL LOW (ref 11.5–15.5)
HGB BLD-MCNC: 7.9 G/DL — LOW (ref 11.5–15.5)
IANC: 4.44 K/UL — SIGNIFICANT CHANGE UP (ref 1.8–7.4)
IANC: 4.92 K/UL — SIGNIFICANT CHANGE UP (ref 1.8–7.4)
IANC: 4.93 K/UL — SIGNIFICANT CHANGE UP (ref 1.8–7.4)
IANC: 5.76 K/UL — SIGNIFICANT CHANGE UP (ref 1.8–7.4)
IMM GRANULOCYTES NFR BLD AUTO: 0.3 % — SIGNIFICANT CHANGE UP (ref 0–0.9)
IMM GRANULOCYTES NFR BLD AUTO: 0.5 % — SIGNIFICANT CHANGE UP (ref 0–0.9)
IMM GRANULOCYTES NFR BLD AUTO: 0.5 % — SIGNIFICANT CHANGE UP (ref 0–0.9)
IMM GRANULOCYTES NFR BLD AUTO: 0.7 % — SIGNIFICANT CHANGE UP (ref 0–0.9)
IRON SATN MFR SERPL: 22 % — SIGNIFICANT CHANGE UP (ref 14–50)
IRON SATN MFR SERPL: 68 UG/DL — SIGNIFICANT CHANGE UP (ref 30–160)
LACTATE BLDV-MCNC: 0.5 MMOL/L — SIGNIFICANT CHANGE UP (ref 0.5–2)
LACTATE BLDV-MCNC: 0.7 MMOL/L — SIGNIFICANT CHANGE UP (ref 0.5–2)
LYMPHOCYTES # BLD AUTO: 0.69 K/UL — LOW (ref 1–3.3)
LYMPHOCYTES # BLD AUTO: 0.74 K/UL — LOW (ref 1–3.3)
LYMPHOCYTES # BLD AUTO: 0.78 K/UL — LOW (ref 1–3.3)
LYMPHOCYTES # BLD AUTO: 0.94 K/UL — LOW (ref 1–3.3)
LYMPHOCYTES # BLD AUTO: 10.8 % — LOW (ref 13–44)
LYMPHOCYTES # BLD AUTO: 12 % — LOW (ref 13–44)
LYMPHOCYTES # BLD AUTO: 12.4 % — LOW (ref 13–44)
LYMPHOCYTES # BLD AUTO: 14.5 % — SIGNIFICANT CHANGE UP (ref 13–44)
MAGNESIUM SERPL-MCNC: 2.3 MG/DL — SIGNIFICANT CHANGE UP (ref 1.6–2.6)
MCHC RBC-ENTMCNC: 25.3 PG — LOW (ref 27–34)
MCHC RBC-ENTMCNC: 25.4 PG — LOW (ref 27–34)
MCHC RBC-ENTMCNC: 25.9 PG — LOW (ref 27–34)
MCHC RBC-ENTMCNC: 26.2 PG — LOW (ref 27–34)
MCHC RBC-ENTMCNC: 29.3 GM/DL — LOW (ref 32–36)
MCHC RBC-ENTMCNC: 29.8 GM/DL — LOW (ref 32–36)
MCHC RBC-ENTMCNC: 30.4 GM/DL — LOW (ref 32–36)
MCHC RBC-ENTMCNC: 30.4 GM/DL — LOW (ref 32–36)
MCV RBC AUTO: 84.8 FL — SIGNIFICANT CHANGE UP (ref 80–100)
MCV RBC AUTO: 85.3 FL — SIGNIFICANT CHANGE UP (ref 80–100)
MCV RBC AUTO: 86.1 FL — SIGNIFICANT CHANGE UP (ref 80–100)
MCV RBC AUTO: 86.6 FL — SIGNIFICANT CHANGE UP (ref 80–100)
MONOCYTES # BLD AUTO: 0.39 K/UL — SIGNIFICANT CHANGE UP (ref 0–0.9)
MONOCYTES # BLD AUTO: 0.46 K/UL — SIGNIFICANT CHANGE UP (ref 0–0.9)
MONOCYTES # BLD AUTO: 0.58 K/UL — SIGNIFICANT CHANGE UP (ref 0–0.9)
MONOCYTES # BLD AUTO: 0.68 K/UL — SIGNIFICANT CHANGE UP (ref 0–0.9)
MONOCYTES NFR BLD AUTO: 7 % — SIGNIFICANT CHANGE UP (ref 2–14)
MONOCYTES NFR BLD AUTO: 7.5 % — SIGNIFICANT CHANGE UP (ref 2–14)
MONOCYTES NFR BLD AUTO: 8.9 % — SIGNIFICANT CHANGE UP (ref 2–14)
MONOCYTES NFR BLD AUTO: 9.4 % — SIGNIFICANT CHANGE UP (ref 2–14)
MRSA PCR RESULT.: SIGNIFICANT CHANGE UP
NEUTROPHILS # BLD AUTO: 4.44 K/UL — SIGNIFICANT CHANGE UP (ref 1.8–7.4)
NEUTROPHILS # BLD AUTO: 4.92 K/UL — SIGNIFICANT CHANGE UP (ref 1.8–7.4)
NEUTROPHILS # BLD AUTO: 4.93 K/UL — SIGNIFICANT CHANGE UP (ref 1.8–7.4)
NEUTROPHILS # BLD AUTO: 5.76 K/UL — SIGNIFICANT CHANGE UP (ref 1.8–7.4)
NEUTROPHILS NFR BLD AUTO: 75.7 % — SIGNIFICANT CHANGE UP (ref 43–77)
NEUTROPHILS NFR BLD AUTO: 79.4 % — HIGH (ref 43–77)
NEUTROPHILS NFR BLD AUTO: 79.7 % — HIGH (ref 43–77)
NEUTROPHILS NFR BLD AUTO: 79.8 % — HIGH (ref 43–77)
NRBC # BLD: 0 /100 WBCS — SIGNIFICANT CHANGE UP (ref 0–0)
NRBC # FLD: 0.02 K/UL — HIGH (ref 0–0)
NRBC # FLD: 0.03 K/UL — HIGH (ref 0–0)
PCO2 BLDV: 52 MMHG — HIGH (ref 39–42)
PCO2 BLDV: 62 MMHG — HIGH (ref 39–42)
PH BLDV: 7.25 — LOW (ref 7.32–7.43)
PH BLDV: 7.31 — LOW (ref 7.32–7.43)
PHOSPHATE SERPL-MCNC: 7.5 MG/DL — HIGH (ref 2.5–4.5)
PLATELET # BLD AUTO: 190 K/UL — SIGNIFICANT CHANGE UP (ref 150–400)
PLATELET # BLD AUTO: 214 K/UL — SIGNIFICANT CHANGE UP (ref 150–400)
PLATELET # BLD AUTO: 227 K/UL — SIGNIFICANT CHANGE UP (ref 150–400)
PLATELET # BLD AUTO: 229 K/UL — SIGNIFICANT CHANGE UP (ref 150–400)
PO2 BLDV: 69 MMHG — SIGNIFICANT CHANGE UP
PO2 BLDV: 77 MMHG — SIGNIFICANT CHANGE UP
POTASSIUM BLDV-SCNC: 4.8 MMOL/L — SIGNIFICANT CHANGE UP (ref 3.5–5.1)
POTASSIUM BLDV-SCNC: 4.9 MMOL/L — SIGNIFICANT CHANGE UP (ref 3.5–5.1)
POTASSIUM SERPL-MCNC: 5.3 MMOL/L — SIGNIFICANT CHANGE UP (ref 3.5–5.3)
POTASSIUM SERPL-SCNC: 5.3 MMOL/L — SIGNIFICANT CHANGE UP (ref 3.5–5.3)
PROT SERPL-MCNC: 6.8 G/DL — SIGNIFICANT CHANGE UP (ref 6–8.3)
RBC # BLD: 2.66 M/UL — LOW (ref 3.8–5.2)
RBC # BLD: 2.68 M/UL — LOW (ref 3.8–5.2)
RBC # BLD: 2.77 M/UL — LOW (ref 3.8–5.2)
RBC # BLD: 3.02 M/UL — LOW (ref 3.8–5.2)
RBC # FLD: 17.5 % — HIGH (ref 10.3–14.5)
RBC # FLD: 18.5 % — HIGH (ref 10.3–14.5)
RBC # FLD: 18.6 % — HIGH (ref 10.3–14.5)
RBC # FLD: 18.9 % — HIGH (ref 10.3–14.5)
S AUREUS DNA NOSE QL NAA+PROBE: SIGNIFICANT CHANGE UP
SAO2 % BLDV: 95.7 % — SIGNIFICANT CHANGE UP
SAO2 % BLDV: 96 % — SIGNIFICANT CHANGE UP
SODIUM SERPL-SCNC: 141 MMOL/L — SIGNIFICANT CHANGE UP (ref 135–145)
SPECIMEN SOURCE: SIGNIFICANT CHANGE UP
SPECIMEN SOURCE: SIGNIFICANT CHANGE UP
T4 FREE SERPL-MCNC: 1.1 NG/DL — SIGNIFICANT CHANGE UP (ref 0.9–1.8)
TIBC SERPL-MCNC: 312 UG/DL — SIGNIFICANT CHANGE UP (ref 220–430)
TRANSFERRIN SERPL-MCNC: 188 MG/DL — LOW (ref 200–360)
UIBC SERPL-MCNC: 244 UG/DL — SIGNIFICANT CHANGE UP (ref 110–370)
WBC # BLD: 5.57 K/UL — SIGNIFICANT CHANGE UP (ref 3.8–10.5)
WBC # BLD: 6.16 K/UL — SIGNIFICANT CHANGE UP (ref 3.8–10.5)
WBC # BLD: 6.5 K/UL — SIGNIFICANT CHANGE UP (ref 3.8–10.5)
WBC # BLD: 7.25 K/UL — SIGNIFICANT CHANGE UP (ref 3.8–10.5)
WBC # FLD AUTO: 5.57 K/UL — SIGNIFICANT CHANGE UP (ref 3.8–10.5)
WBC # FLD AUTO: 6.16 K/UL — SIGNIFICANT CHANGE UP (ref 3.8–10.5)
WBC # FLD AUTO: 6.5 K/UL — SIGNIFICANT CHANGE UP (ref 3.8–10.5)
WBC # FLD AUTO: 7.25 K/UL — SIGNIFICANT CHANGE UP (ref 3.8–10.5)

## 2022-11-19 PROCEDURE — 99291 CRITICAL CARE FIRST HOUR: CPT

## 2022-11-19 RX ADMIN — ALBUTEROL 2 PUFF(S): 90 AEROSOL, METERED ORAL at 20:20

## 2022-11-19 RX ADMIN — GABAPENTIN 100 MILLIGRAM(S): 400 CAPSULE ORAL at 13:01

## 2022-11-19 RX ADMIN — HEPARIN SODIUM 5000 UNIT(S): 5000 INJECTION INTRAVENOUS; SUBCUTANEOUS at 05:22

## 2022-11-19 RX ADMIN — HEPARIN SODIUM 5000 UNIT(S): 5000 INJECTION INTRAVENOUS; SUBCUTANEOUS at 22:04

## 2022-11-19 RX ADMIN — Medication 1 PUFF(S): at 20:20

## 2022-11-19 RX ADMIN — CEFTRIAXONE 100 MILLIGRAM(S): 500 INJECTION, POWDER, FOR SOLUTION INTRAMUSCULAR; INTRAVENOUS at 18:05

## 2022-11-19 RX ADMIN — Medication 1 PUFF(S): at 12:10

## 2022-11-19 RX ADMIN — HEPARIN SODIUM 5000 UNIT(S): 5000 INJECTION INTRAVENOUS; SUBCUTANEOUS at 13:01

## 2022-11-19 RX ADMIN — ALBUTEROL 2 PUFF(S): 90 AEROSOL, METERED ORAL at 12:09

## 2022-11-19 RX ADMIN — Medication 1 PUFF(S): at 04:18

## 2022-11-19 RX ADMIN — ALBUTEROL 2 PUFF(S): 90 AEROSOL, METERED ORAL at 15:56

## 2022-11-19 RX ADMIN — ALBUTEROL 2 PUFF(S): 90 AEROSOL, METERED ORAL at 04:18

## 2022-11-19 RX ADMIN — CHLORHEXIDINE GLUCONATE 1 APPLICATION(S): 213 SOLUTION TOPICAL at 05:23

## 2022-11-19 RX ADMIN — Medication 1 PUFF(S): at 15:56

## 2022-11-19 RX ADMIN — Medication 81 MILLIGRAM(S): at 13:01

## 2022-11-19 NOTE — PROGRESS NOTE ADULT - SUBJECTIVE AND OBJECTIVE BOX
INTERVAL HPI/OVERNIGHT EVENTS:    SUBJECTIVE: Patient seen and examined at bedside.     CONSTITUTIONAL: No weakness, fevers or chills  EYES/ENT: No visual changes;  No vertigo or throat pain   NECK: No pain or stiffness  RESPIRATORY: No cough, wheezing, hemoptysis; No shortness of breath  CARDIOVASCULAR: No chest pain or palpitations  GASTROINTESTINAL: No abdominal or epigastric pain. No nausea, vomiting, or hematemesis; No diarrhea or constipation. No melena or hematochezia.  GENITOURINARY: No dysuria, frequency or hematuria  NEUROLOGICAL: No numbness or weakness  SKIN: No itching, rashes    OBJECTIVE:    VITAL SIGNS:  ICU Vital Signs Last 24 Hrs  T(C): 36.4 (2022 04:00), Max: 37 (2022 12:00)  T(F): 97.5 (2022 04:00), Max: 98.6 (2022 12:00)  HR: 67 (2022 07:00) (65 - 88)  BP: 122/70 (2022 07:00) (108/62 - 136/64)  BP(mean): 84 (2022 07:00) (73 - 92)  ABP: --  ABP(mean): --  RR: 17 (2022 07:00) (12 - 26)  SpO2: 96% (2022 07:00) (94% - 100%)    O2 Parameters below as of 2022 07:00  Patient On (Oxygen Delivery Method): BiPAP/CPAP    O2 Concentration (%): 30      Mode: AC/ CMV (Assist Control/ Continuous Mandatory Ventilation), RR (machine): 20, TV (machine): 350, FiO2: 30, PEEP: 5, PS: , ITime: 0.7, MAP: 9, PIP: 20     @ 07:01  -  - @ 07:00  --------------------------------------------------------  IN: 77 mL / OUT: 665 mL / NET: -588 mL      CAPILLARY BLOOD GLUCOSE      POCT Blood Glucose.: 118 mg/dL (2022 05:21)      PHYSICAL EXAM:    General: NAD  HEENT: NC/AT; PERRL, clear conjunctiva  Neck: supple  Respiratory: CTA b/l  Cardiovascular: +S1/S2; RRR  Abdomen: soft, NT/ND; +BS x4  Extremities: WWP, 2+ peripheral pulses b/l; no LE edema  Skin: normal color and turgor; no rash  Neurological:    MEDICATIONS:  MEDICATIONS  (STANDING):  albuterol    90 MICROgram(s) HFA Inhaler 2 Puff(s) Inhalation every 6 hours  aspirin  chewable 81 milliGRAM(s) Oral daily  atorvastatin 40 milliGRAM(s) Oral at bedtime  cefTRIAXone   IVPB 1000 milliGRAM(s) IV Intermittent every 24 hours  chlorhexidine 2% Cloths 1 Application(s) Topical <User Schedule>  dextrose 5%. 1000 milliLiter(s) (50 mL/Hr) IV Continuous <Continuous>  dextrose 5%. 1000 milliLiter(s) (100 mL/Hr) IV Continuous <Continuous>  dextrose 50% Injectable 25 Gram(s) IV Push once  dextrose 50% Injectable 12.5 Gram(s) IV Push once  dextrose 50% Injectable 25 Gram(s) IV Push once  gabapentin Solution 100 milliGRAM(s) Oral daily  glucagon  Injectable 1 milliGRAM(s) IntraMuscular once  heparin   Injectable 5000 Unit(s) SubCutaneous every 8 hours  insulin lispro (ADMELOG) corrective regimen sliding scale   SubCutaneous every 6 hours  ipratropium 17 MICROgram(s) HFA Inhaler 1 Puff(s) Inhalation every 6 hours    MEDICATIONS  (PRN):  acetaminophen     Tablet .. 650 milliGRAM(s) Oral every 6 hours PRN Mild Pain (1 - 3)  dextrose Oral Gel 15 Gram(s) Oral once PRN Blood Glucose LESS THAN 70 milliGRAM(s)/deciliter      ALLERGIES:  Allergies    penicillin (Red Man Synd)    Intolerances        LABS:                        7.0    6.16  )-----------( 227      ( 2022 03:25 )             23.5         141  |  104  |  68<H>  ----------------------------<  121<H>  5.3   |  23  |  3.14<H>    Ca    8.0<L>      2022 02:58  Phos  7.5       Mg     2.30         TPro  6.8  /  Alb  3.3  /  TBili  <0.2  /  DBili  x   /  AST  33<H>  /  ALT  25  /  AlkPhos  92        Urinalysis Basic - ( 2022 22:52 )    Color: Yellow / Appearance: Slightly Turbid / S.027 / pH: x  Gluc: x / Ketone: Negative  / Bili: Negative / Urobili: 3 mg/dL   Blood: x / Protein: 300 mg/dL / Nitrite: Negative   Leuk Esterase: Large / RBC: 184 /HPF /  /HPF   Sq Epi: x / Non Sq Epi: 7 /HPF / Bacteria: Negative        RADIOLOGY & ADDITIONAL TESTS: Reviewed. INTERVAL HPI/OVERNIGHT EVENTS:    SUBJECTIVE: Patient seen and examined at bedside. Overnight, patient was found to have altered mental status and was switched to BiPAP. Patient now alert and oriented in AM.     CONSTITUTIONAL: No weakness, fevers or chills  EYES/ENT: No visual changes;  No vertigo or throat pain   NECK: No pain or stiffness  RESPIRATORY: No cough, wheezing, hemoptysis; + shortness of breath  CARDIOVASCULAR: No chest pain or palpitations  GASTROINTESTINAL: No abdominal or epigastric pain. No nausea, vomiting, or hematemesis; No diarrhea or constipation. No melena or hematochezia.  GENITOURINARY: No dysuria, frequency or hematuria  NEUROLOGICAL: No numbness or weakness  SKIN: No itching, rashes    OBJECTIVE:    VITAL SIGNS:  ICU Vital Signs Last 24 Hrs  T(C): 36.4 (2022 04:00), Max: 37 (2022 12:00)  T(F): 97.5 (2022 04:00), Max: 98.6 (2022 12:00)  HR: 67 (2022 07:00) (65 - 88)  BP: 122/70 (2022 07:00) (108/62 - 136/64)  BP(mean): 84 (2022 07:00) (73 - 92)  ABP: --  ABP(mean): --  RR: 17 (2022 07:00) (12 - 26)  SpO2: 96% (2022 07:00) (94% - 100%)    O2 Parameters below as of 2022 07:00  Patient On (Oxygen Delivery Method): BiPAP/CPAP    O2 Concentration (%): 30      Mode: AC/ CMV (Assist Control/ Continuous Mandatory Ventilation), RR (machine): 20, TV (machine): 350, FiO2: 30, PEEP: 5, PS: , ITime: 0.7, MAP: 9, PIP: 20     @ 07:01  -   @ 07:00  --------------------------------------------------------  IN: 77 mL / OUT: 665 mL / NET: -588 mL      CAPILLARY BLOOD GLUCOSE      POCT Blood Glucose.: 118 mg/dL (2022 05:21)      PHYSICAL EXAM:    General: NAD  HEENT: NC/AT; PERRL, clear conjunctiva  Neck: supple  Respiratory: CTA b/l  Cardiovascular: +S1/S2; RRR  Abdomen: soft, NT/ND; +BS x4  Extremities: WWP, 2+ peripheral pulses b/l; no LE edema  Skin: normal color and turgor; no rash  Neurological: A&Ox3     MEDICATIONS:  MEDICATIONS  (STANDING):  albuterol    90 MICROgram(s) HFA Inhaler 2 Puff(s) Inhalation every 6 hours  aspirin  chewable 81 milliGRAM(s) Oral daily  atorvastatin 40 milliGRAM(s) Oral at bedtime  cefTRIAXone   IVPB 1000 milliGRAM(s) IV Intermittent every 24 hours  chlorhexidine 2% Cloths 1 Application(s) Topical <User Schedule>  dextrose 5%. 1000 milliLiter(s) (50 mL/Hr) IV Continuous <Continuous>  dextrose 5%. 1000 milliLiter(s) (100 mL/Hr) IV Continuous <Continuous>  dextrose 50% Injectable 25 Gram(s) IV Push once  dextrose 50% Injectable 12.5 Gram(s) IV Push once  dextrose 50% Injectable 25 Gram(s) IV Push once  gabapentin Solution 100 milliGRAM(s) Oral daily  glucagon  Injectable 1 milliGRAM(s) IntraMuscular once  heparin   Injectable 5000 Unit(s) SubCutaneous every 8 hours  insulin lispro (ADMELOG) corrective regimen sliding scale   SubCutaneous every 6 hours  ipratropium 17 MICROgram(s) HFA Inhaler 1 Puff(s) Inhalation every 6 hours    MEDICATIONS  (PRN):  acetaminophen     Tablet .. 650 milliGRAM(s) Oral every 6 hours PRN Mild Pain (1 - 3)  dextrose Oral Gel 15 Gram(s) Oral once PRN Blood Glucose LESS THAN 70 milliGRAM(s)/deciliter      ALLERGIES:  Allergies    penicillin (Red Man Synd)    Intolerances        LABS:                        7.0    6.16  )-----------( 227      ( 2022 03:25 )             23.5     -    141  |  104  |  68<H>  ----------------------------<  121<H>  5.3   |  23  |  3.14<H>    Ca    8.0<L>      2022 02:58  Phos  7.5       Mg     2.30         TPro  6.8  /  Alb  3.3  /  TBili  <0.2  /  DBili  x   /  AST  33<H>  /  ALT  25  /  AlkPhos  92        Urinalysis Basic - ( 2022 22:52 )    Color: Yellow / Appearance: Slightly Turbid / S.027 / pH: x  Gluc: x / Ketone: Negative  / Bili: Negative / Urobili: 3 mg/dL   Blood: x / Protein: 300 mg/dL / Nitrite: Negative   Leuk Esterase: Large / RBC: 184 /HPF /  /HPF   Sq Epi: x / Non Sq Epi: 7 /HPF / Bacteria: Negative        RADIOLOGY & ADDITIONAL TESTS: Reviewed.

## 2022-11-19 NOTE — PROGRESS NOTE ADULT - ASSESSMENT
67F PMH HTN, HLD, sarcoidosis, CHF, PEA*2, sepsis 2/2 UTI (Klesiella, E. coli) ~9/2022 requiring intubation; presented to ED with MANDY on CKD found on outpatient labs and with abnormal VS, found to have AMS, AHRF, intubated, and admitted to MICU for further management. Possibly acute on chronic HF +/- sepsis precipitating MANDY on CKD.     #Neuro  - baseline: A&O*4  - p/w AMS  - sedated on prop  - c/w home med gabapentin 100 qD    #Cardiovascular  - possibly acute on chronic HF iso sepsis  - p/w Glenn traore.   - Has been HD stable since admission without pressors   - p/w Trop 100 and BNP 2081 ISO of MANDY     - Trop q8h  - did not respond that well to lasix ON. Will give dose of bumex this AM to help diureses \  - POCUS: VTI 23 with good systolic fx, grossly nl RV and IVC 1.8cm  - c/w home med: ASA 81 qD, atorvastatin 40 qD    #Respiratory  - hypoxic resp failure 2/2 volume overload?  - satting and ventilating well on minimal vent settings  - currently on AC. will PSV today  - cxr c/w CHF  - POCUS with B lines R>L with right small effusion     #GI/Nutrition  - OGT 11/17  - Diet: TF   - bowel regimen     #/Renal  - p/w MANDY on CKD (Cr 3, baseline 1.2) - likely 2/2 sepsis vs volume overload   - cont with diureses   - avoid nephrotoxic agents  - replete lytes as appropriate     #Skin  - no active issues    #Immunologic  - sarcoidosis   - s/p Solumedrol  - will hold off on further steroids   - MINOO, ESR, CRP    #ID  - currently afebrile and without leukocytosis   - possibly sepsis 2/2 UTI   - p/w hypothermia, leukopenia  - PMH sepsis 2/2 UTI Klebsiella and E. coli (ampicillin R)  - will cont with ceftriaxone   - f/u BCx*2, UCx    #Endocrine  - p/w bradycardia, hypotension  - r/o hypothyroidism, Addisonian crisis  - s/p 1 dose Solumedrol (after cortisol drawn)  - will hold off on further steroids as is normotensive   - cortisol 11/17  - TSH 4.77   - f/u FT4  - PMH diabetes, previously on insulin but not recently since hypoglycemic  - ISS    #Hematologic/DVT ppx  - cbc stable   - DVT Prophylaxis: heparin sc q8h, home ASA 67F PMH HTN, HLD, sarcoidosis, CHF, PEA*2, sepsis 2/2 UTI (Klesiella, E. coli) ~9/2022 requiring intubation; presented to ED with MANDY on CKD found on outpatient labs and with abnormal VS, found to have AMS, AHRF, intubated, and admitted to MICU for further management. Possibly acute on chronic HF +/- sepsis precipitating MANDY on CKD.     #Neuro  Baseline: A&O*4  p/w AMS  Now at baseline mental status   - c/w home med gabapentin 100 qD    #Cardiovascular  Possibly acute on chronic HF iso sepsis  p/w eugenieGlenn   Has been HD stable since admission without pressors   p/w Trop 100 and BNP 2081 ISO of MANDY  - POCUS 11/18: VTI 23 with good systolic fx, grossly nl RV and IVC 1.8cm  - Trop q8h  - did not respond that well to lasix ON. Responded well to Bumex.   - c/w home med: ASA 81 qD, atorvastatin 40 qD    #Respiratory  Hypoxic resp failure 2/2 volume overload?   Now diuresed appropriately   CXR c/w CHF   POCUS 11/19: with B lines R>L with right small effusion   - Saturating well on BiPAP   - Tried nasal cannual today, but had altered mental status with lethargy. Switch back to BiPAP       #GI/Nutrition  No active issues   - Now on regular diet   - C/w bowel regimen     #/Renal  P/w MANDY on CKD (Cr 3, baseline 1.2) - likely 2/2 sepsis vs volume overload   - Hold diureses   - Avoid nephrotoxic agents  - Replete lytes as appropriate   - Order renal ultrasound to evaluate     #Skin  - No active issues    #Immunologic  PMHx sarcoidosis   s/p Solumedrol  - Will hold off on further steroids   - MINOO, ESR, CRP    #ID  Currently afebrile and without leukocytosis   Possibly sepsis 2/2 UTI   p/w hypothermia, leukopenia  PMH sepsis 2/2 UTI Klebsiella and E. coli (ampicillin R)  - Will cont with ceftriaxone   - Send procalcitonin   - f/u BCx*2   - UCx wnl     #Endocrine  P/w bradycardia, hypotension  R/o hypothyroidism, Addisonian crisis   - s/p 1 dose Solumedrol (after cortisol drawn)  - Will hold off on further steroids as is normotensive   - cortisol 11/17  - TSH 4.77   - FT4 wnl   - PMH diabetes, previously on insulin but not recently since hypoglycemic  - ISS     #Hematologic/DVT ppx  Anemia   - Transfuse to Hgb goal > 7   - Iron studies wnl   - Send PTT     DVT Prophylaxis: heparin sc q8h, home ASA

## 2022-11-19 NOTE — PROGRESS NOTE ADULT - ATTENDING COMMENTS
67F PMH HTN, HLD, sarcoidosis, CHF, PEA*2, sepsis 2/2 UTI (Klesiella, E. coli) ~9/2022 requiring intubation; presented to ED with MANDY on CKD found on outpatient labs and with abnormal VS, found to have AMS, AHRF, intubated, and admitted to MICU for further management. Pt got dose of bumex yesterday, does not appear fluid overloaded today, TTE Normal LV syst fx, mild diastolic dysfx, RV normal. bx of bradycardia, and altering MS, once able to tolerate NC and prolonged breaks off NIV, consider evaluation for CNS and cardiac sarcoidosis involvement. At this time, pt with hypercapnia, follows commands, re-evaluate. Anemia without obvious source of bleeding, ordered 1 unit PRBC. cultures NGTD.

## 2022-11-20 ENCOUNTER — TRANSCRIPTION ENCOUNTER (OUTPATIENT)
Age: 67
End: 2022-11-20

## 2022-11-20 DIAGNOSIS — D64.9 ANEMIA, UNSPECIFIED: ICD-10-CM

## 2022-11-20 DIAGNOSIS — N17.9 ACUTE KIDNEY FAILURE, UNSPECIFIED: ICD-10-CM

## 2022-11-20 LAB
ALBUMIN SERPL ELPH-MCNC: 3.5 G/DL — SIGNIFICANT CHANGE UP (ref 3.3–5)
ALP SERPL-CCNC: 93 U/L — SIGNIFICANT CHANGE UP (ref 40–120)
ALT FLD-CCNC: 25 U/L — SIGNIFICANT CHANGE UP (ref 4–33)
ANION GAP SERPL CALC-SCNC: 16 MMOL/L — HIGH (ref 7–14)
APTT BLD: 33.6 SEC — SIGNIFICANT CHANGE UP (ref 27–36.3)
AST SERPL-CCNC: 26 U/L — SIGNIFICANT CHANGE UP (ref 4–32)
BASOPHILS # BLD AUTO: 0.01 K/UL — SIGNIFICANT CHANGE UP (ref 0–0.2)
BASOPHILS NFR BLD AUTO: 0.2 % — SIGNIFICANT CHANGE UP (ref 0–2)
BILIRUB SERPL-MCNC: 0.3 MG/DL — SIGNIFICANT CHANGE UP (ref 0.2–1.2)
BLOOD GAS VENOUS COMPREHENSIVE RESULT: SIGNIFICANT CHANGE UP
BUN SERPL-MCNC: 73 MG/DL — HIGH (ref 7–23)
CALCIUM SERPL-MCNC: 8.3 MG/DL — LOW (ref 8.4–10.5)
CHLORIDE SERPL-SCNC: 102 MMOL/L — SIGNIFICANT CHANGE UP (ref 98–107)
CO2 SERPL-SCNC: 23 MMOL/L — SIGNIFICANT CHANGE UP (ref 22–31)
CREAT SERPL-MCNC: 3.27 MG/DL — HIGH (ref 0.5–1.3)
EGFR: 15 ML/MIN/1.73M2 — LOW
EOSINOPHIL # BLD AUTO: 0.04 K/UL — SIGNIFICANT CHANGE UP (ref 0–0.5)
EOSINOPHIL NFR BLD AUTO: 0.8 % — SIGNIFICANT CHANGE UP (ref 0–6)
GLUCOSE BLDC GLUCOMTR-MCNC: 102 MG/DL — HIGH (ref 70–99)
GLUCOSE BLDC GLUCOMTR-MCNC: 114 MG/DL — HIGH (ref 70–99)
GLUCOSE BLDC GLUCOMTR-MCNC: 87 MG/DL — SIGNIFICANT CHANGE UP (ref 70–99)
GLUCOSE BLDC GLUCOMTR-MCNC: 93 MG/DL — SIGNIFICANT CHANGE UP (ref 70–99)
GLUCOSE BLDC GLUCOMTR-MCNC: 94 MG/DL — SIGNIFICANT CHANGE UP (ref 70–99)
GLUCOSE SERPL-MCNC: 86 MG/DL — SIGNIFICANT CHANGE UP (ref 70–99)
HCT VFR BLD CALC: 25.7 % — LOW (ref 34.5–45)
HGB BLD-MCNC: 7.9 G/DL — LOW (ref 11.5–15.5)
IANC: 3.74 K/UL — SIGNIFICANT CHANGE UP (ref 1.8–7.4)
IMM GRANULOCYTES NFR BLD AUTO: 0.6 % — SIGNIFICANT CHANGE UP (ref 0–0.9)
INR BLD: 1.03 RATIO — SIGNIFICANT CHANGE UP (ref 0.88–1.16)
LYMPHOCYTES # BLD AUTO: 0.84 K/UL — LOW (ref 1–3.3)
LYMPHOCYTES # BLD AUTO: 16.2 % — SIGNIFICANT CHANGE UP (ref 13–44)
MAGNESIUM SERPL-MCNC: 2.3 MG/DL — SIGNIFICANT CHANGE UP (ref 1.6–2.6)
MCHC RBC-ENTMCNC: 26.2 PG — LOW (ref 27–34)
MCHC RBC-ENTMCNC: 30.7 GM/DL — LOW (ref 32–36)
MCV RBC AUTO: 85.1 FL — SIGNIFICANT CHANGE UP (ref 80–100)
MONOCYTES # BLD AUTO: 0.52 K/UL — SIGNIFICANT CHANGE UP (ref 0–0.9)
MONOCYTES NFR BLD AUTO: 10 % — SIGNIFICANT CHANGE UP (ref 2–14)
NEUTROPHILS # BLD AUTO: 3.74 K/UL — SIGNIFICANT CHANGE UP (ref 1.8–7.4)
NEUTROPHILS NFR BLD AUTO: 72.2 % — SIGNIFICANT CHANGE UP (ref 43–77)
NRBC # BLD: 0 /100 WBCS — SIGNIFICANT CHANGE UP (ref 0–0)
NRBC # FLD: 0 K/UL — SIGNIFICANT CHANGE UP (ref 0–0)
PHOSPHATE SERPL-MCNC: 6.5 MG/DL — HIGH (ref 2.5–4.5)
PLATELET # BLD AUTO: 199 K/UL — SIGNIFICANT CHANGE UP (ref 150–400)
POTASSIUM SERPL-MCNC: 4.5 MMOL/L — SIGNIFICANT CHANGE UP (ref 3.5–5.3)
POTASSIUM SERPL-SCNC: 4.5 MMOL/L — SIGNIFICANT CHANGE UP (ref 3.5–5.3)
PROCALCITONIN SERPL-MCNC: 0.13 NG/ML — HIGH (ref 0.02–0.1)
PROT SERPL-MCNC: 7 G/DL — SIGNIFICANT CHANGE UP (ref 6–8.3)
PROTHROM AB SERPL-ACNC: 11.9 SEC — SIGNIFICANT CHANGE UP (ref 10.5–13.4)
RBC # BLD: 3.02 M/UL — LOW (ref 3.8–5.2)
RBC # FLD: 17.9 % — HIGH (ref 10.3–14.5)
SODIUM SERPL-SCNC: 141 MMOL/L — SIGNIFICANT CHANGE UP (ref 135–145)
WBC # BLD: 5.18 K/UL — SIGNIFICANT CHANGE UP (ref 3.8–10.5)
WBC # FLD AUTO: 5.18 K/UL — SIGNIFICANT CHANGE UP (ref 3.8–10.5)

## 2022-11-20 PROCEDURE — 99233 SBSQ HOSP IP/OBS HIGH 50: CPT | Mod: GC

## 2022-11-20 RX ORDER — BUMETANIDE 0.25 MG/ML
2 INJECTION INTRAMUSCULAR; INTRAVENOUS ONCE
Refills: 0 | Status: COMPLETED | OUTPATIENT
Start: 2022-11-20 | End: 2022-11-20

## 2022-11-20 RX ADMIN — ATORVASTATIN CALCIUM 40 MILLIGRAM(S): 80 TABLET, FILM COATED ORAL at 22:01

## 2022-11-20 RX ADMIN — ALBUTEROL 2 PUFF(S): 90 AEROSOL, METERED ORAL at 22:49

## 2022-11-20 RX ADMIN — HEPARIN SODIUM 5000 UNIT(S): 5000 INJECTION INTRAVENOUS; SUBCUTANEOUS at 15:07

## 2022-11-20 RX ADMIN — ALBUTEROL 2 PUFF(S): 90 AEROSOL, METERED ORAL at 15:56

## 2022-11-20 RX ADMIN — CHLORHEXIDINE GLUCONATE 1 APPLICATION(S): 213 SOLUTION TOPICAL at 05:41

## 2022-11-20 RX ADMIN — Medication 81 MILLIGRAM(S): at 11:35

## 2022-11-20 RX ADMIN — Medication 1 PUFF(S): at 22:49

## 2022-11-20 RX ADMIN — GABAPENTIN 100 MILLIGRAM(S): 400 CAPSULE ORAL at 12:38

## 2022-11-20 RX ADMIN — HEPARIN SODIUM 5000 UNIT(S): 5000 INJECTION INTRAVENOUS; SUBCUTANEOUS at 22:00

## 2022-11-20 RX ADMIN — Medication 1 PUFF(S): at 15:56

## 2022-11-20 RX ADMIN — ALBUTEROL 2 PUFF(S): 90 AEROSOL, METERED ORAL at 09:39

## 2022-11-20 RX ADMIN — CEFTRIAXONE 100 MILLIGRAM(S): 500 INJECTION, POWDER, FOR SOLUTION INTRAMUSCULAR; INTRAVENOUS at 17:15

## 2022-11-20 RX ADMIN — BUMETANIDE 2 MILLIGRAM(S): 0.25 INJECTION INTRAMUSCULAR; INTRAVENOUS at 05:40

## 2022-11-20 RX ADMIN — Medication 1 PUFF(S): at 09:39

## 2022-11-20 RX ADMIN — ALBUTEROL 2 PUFF(S): 90 AEROSOL, METERED ORAL at 04:20

## 2022-11-20 RX ADMIN — Medication 1 PUFF(S): at 04:20

## 2022-11-20 RX ADMIN — HEPARIN SODIUM 5000 UNIT(S): 5000 INJECTION INTRAVENOUS; SUBCUTANEOUS at 05:41

## 2022-11-20 NOTE — DISCHARGE NOTE PROVIDER - NSDCFUSCHEDAPPT_GEN_ALL_CORE_FT
Marino Pearson  [facility] Physician [facility]  [address]  Scheduled Appointment: 12/06/2022    [facility] Physician [facility]  [address]LEEPLAB [address] [facility] [address]  Scheduled Appointment: 02/02/2023    Monica Quick  [facility] Physician [facility]  [address]ULMMED [address]  Scheduled Appointment: 02/10/2023     Monica Quick  [facility] Physician [facility]  [address]MMED [address]  Scheduled Appointment: 01/23/2023    [facility]  SLEEPLAB [address] [place] [address] D  Scheduled Appointment: 02/02/2023    Monica Quick  [facility]  [address]ED [address]  Scheduled Appointment: 02/10/2023    [facility]  [address]  Scheduled Appointment: 03/06/2023    [facility]  [address]  Scheduled Appointment: 03/06/2023

## 2022-11-20 NOTE — DISCHARGE NOTE PROVIDER - DETAILS OF MALNUTRITION DIAGNOSIS/DIAGNOSES
This patient has been assessed with a concern for Malnutrition and was treated during this hospitalization for the following Nutrition diagnosis/diagnoses:     -  11/29/2022: Severe protein-calorie malnutrition

## 2022-11-20 NOTE — DISCHARGE NOTE PROVIDER - HOSPITAL COURSE
67F PMH HTN, HLD, sarcoidosis, CHF, PEA*2, sepsis 2/2 UTI (Klesiella, E. coli) ~9/2022 requiring intubation; presented to ED with MANDY on CKD found on outpatient labs and with abnormal VS, found to have AMS, AHRF, intubated, and admitted to MICU for further management.    In ED, patient had bradycardia (45), hypotension (SBP 80), hypoxemia (68%), hypothermia (91F), MANDY on CKD (Cr 3, baseline 1.2), acute respiratory acidosis, worsening mental status, and was intubated. Patient also received 1 dose solumedrol (after cortisol drawn). Also found to have Trop 100 and BNP 2081. Additionally, anemic (Hb 7.3, baseline 8-9).     Patient extubated 11/18 to BiPAP. Patient has since been weaned to 4L NC and satting well. Patient reviewed 1 unit of pRBCs (Hb 6.8). Nephrology consulted for worsening MANDY.     #Neuro  Baseline: A&O*4  p/w AMS  Now at baseline mental status   - c/w home med gabapentin 100 qD    #Cardiovascular  Possibly acute on chronic HF iso sepsis  p/w eugenieGlenn   Has been HD stable since admission without pressors   p/w Trop 100 and BNP 2081 ISO of MANDY  - POCUS 11/18: VTI 23 with good systolic fx, grossly nl RV and IVC 1.8cm  - Trop q8h  - did not respond that well to lasix ON. Responded well to Bumex.   - c/w home med: ASA 81 qD, atorvastatin 40 qD    #Respiratory  Hypoxic resp failure 2/2 volume overload?   Now diuresed appropriately   CXR c/w CHF   POCUS 11/19: with B lines R>L with right small effusion   - Saturating well on BiPAP   - Tried nasal cannual today, but had altered mental status with lethargy. Switch back to BiPAP   - continue to monitor ABGs      #GI/Nutrition  No active issues   - Now on regular diet   - C/w bowel regimen     #/Renal  P/w MANDY on CKD (Cr 3, baseline 1.2) - likely 2/2 sepsis vs volume overload   - Hold diureses   - Avoid nephrotoxic agents  - Replete lytes as appropriate   - Order renal ultrasound to evaluate     #Skin  - No active issues    #Immunologic  PMHx sarcoidosis   s/p Solumedrol  - Will hold off on further steroids   - MINOO, ESR, CRP    #ID  Currently afebrile and without leukocytosis   Possibly sepsis 2/2 UTI   p/w hypothermia, leukopenia  PMH sepsis 2/2 UTI Klebsiella and E. coli (ampicillin R)  - Will cont with ceftriaxone   - f/u BCx*2   - UCx wnl     #Endocrine  P/w bradycardia, hypotension  R/o hypothyroidism, Addisonian crisis   - s/p 1 dose Solumedrol (after cortisol drawn)  - Will hold off on further steroids as is normotensive   - cortisol 11/17  - TSH 4.77   - FT4 wnl   - PMH diabetes, previously on insulin but not recently since hypoglycemic  - ISS     #Hematologic/DVT ppx  Anemia   - Transfuse to Hgb goal > 7   - Iron studies wnl   - Send PTT     DVT Prophylaxis: heparin sc q8h, home ASA     For Follow-Up:  [ ] f/u nephro recs  [ ] Continue to monitor blood gases  [ ] c/w ceftriaxone until 11/23.   68 yo F w/ HTN, HLD, sarcoidosis, HFpEF, hx PEA x2, a/w acute respiratory failure (s/p intubation/MICU stay) and septic shock 2/2 UTI (briefly on pressors, completed course of antibiotics), c/b metabolic encephalopathy (improved) and MANDY on CKD.     Chronic hypercapnic respiratory failure.   - pulm input appreciated, patient with persistent hypercapnia, placed on AVAPS,, they suspect patient with chronic hypercapnic respiratory failure 2/2 multifactorial etiology including OHS and sarcoidosis, feel that patient will benefit from AVAPS, improve morbidity and reduce risk of readmission, blood gas results noted (pCO2 45), appreciate CM/SW assistance with obtaining home AVAPS   - gave diamox for alkalosis (abg pH 7.53) on 12/3 as per pulm  - continue to monitor, encourage nocturnal AVAPS use, f/u further pulm recs.    Acute kidney injury superimposed on chronic kidney disease.   - MANDY on CKD, proteinuria, improving  - s/p renal biopsy 11/30. path c/w DM nephropathy  - avoid nephrotoxins, continue to monitor renal function/UOP.  - Pt to follow up with nephrology as outpatient     Metabolic encephalopathy.    metabolic encephalopathy 2/2 UTI  - mental status appears at baseline, MRI w/o acute intracranial pathology, EEG unrevealing, neuro input appreciated, suspect changes to mental status was in setting of recent UTI, hypercarbia  and MANDY  - swallow re-eval appreciated, can do regular solids and thin liquids.    Essential hypertension.   - on hydralazine, amlodipine, bumex, coreg  - BP better controlled, appreciate renal assistance  - monitor BP and titrate regimen further as needed.    Chronic heart failure with preserved ejection fraction.   - c/w bumex as per renal  - continue to monitor INOs  - comfortable on RA.    Anemia.   - stable overall, no overt signs of bleeding  - potentially anemia of chronic disease  - continue to monitor, hgb 8.7 today, no need for transfusion at this time.     Severe protein-calorie malnutrition.    - c/w glucerna shakes as recommended by nutrition.     Septic shock.   - Septic shock 2/2 suspected UTI c/b acute hypoxic/hypercapnic respiratory failure   - briefly on pressors, shock resolved, completed course of antibiotics, extubated, transferred out of MICU, respiratory status stable on room air.    Need for prophylactic measure.   - VTE ppx: HSQ  - continue to provide emotional support, /holistic visits, patient with multiple hospitalizations in recent history      On_________, discussed with __________, patient is medically cleared and optimized for discharge today. All medications were reviewed with attending, and sent to mutually agreed upon pharmacy.   66 yo F w/ HTN, HLD, sarcoidosis, HFpEF, hx PEA x2, a/w acute respiratory failure (s/p intubation/MICU stay) and septic shock 2/2 UTI (briefly on pressors, completed course of antibiotics), c/b metabolic encephalopathy (improved) and MANDY on CKD.     Chronic hypercapnic respiratory failure.   - pulm input appreciated, patient with persistent hypercapnia, placed on AVAPS,, they suspect patient with chronic hypercapnic respiratory failure 2/2 multifactorial etiology including OHS and sarcoidosis, feel that patient will benefit from AVAPS, improve morbidity and reduce risk of readmission, blood gas results noted (pCO2 45), appreciate CM/SW assistance with obtaining home AVAPS   - gave diamox for alkalosis (abg pH 7.53) on 12/3 as per pulm  - continue to monitor, encourage nocturnal AVAPS use, f/u further pulm recs.    Acute kidney injury superimposed on chronic kidney disease.   - MANDY on CKD, proteinuria, improving  - s/p renal biopsy 11/30. path c/w DM nephropathy  - avoid nephrotoxins, continue to monitor renal function/UOP.  - Pt to follow up with nephrology as outpatient     Metabolic encephalopathy.    metabolic encephalopathy 2/2 UTI  - mental status appears at baseline, MRI w/o acute intracranial pathology, EEG unrevealing, neuro input appreciated, suspect changes to mental status was in setting of recent UTI, hypercarbia  and MANDY  - swallow re-eval appreciated, can do regular solids and thin liquids.    Essential hypertension.   - on hydralazine, amlodipine, bumex, coreg  - BP better controlled, appreciate renal assistance  - monitor BP and titrate regimen further as needed.    Chronic heart failure with preserved ejection fraction.   - c/w bumex as per renal  - continue to monitor INOs  - comfortable on RA.    Anemia.   - stable overall, no overt signs of bleeding  - potentially anemia of chronic disease  - continue to monitor, hgb 8.7 today, no need for transfusion at this time.     Severe protein-calorie malnutrition.    - c/w glucerna shakes as recommended by nutrition.     Septic shock.   - Septic shock 2/2 suspected UTI c/b acute hypoxic/hypercapnic respiratory failure   - briefly on pressors, shock resolved, completed course of antibiotics, extubated, transferred out of MICU, respiratory status stable on room air.    Case discussed with Dr. Bradford on 12/9/22 and patient cleared for discharge. Reviewed discharge medications with patient; All new medications requiring new prescription sent to pharmacy of patients choice. Reviewed need for prescription for previous home medications and new prescriptions sent if requested. Patient in agreement and understands.

## 2022-11-20 NOTE — PROGRESS NOTE ADULT - ASSESSMENT
67F PMH HTN, HLD, sarcoidosis, CHF, PEA*2, sepsis 2/2 UTI (Klesiella, E. coli) ~9/2022 requiring intubation; presented to ED with MANDY on CKD found on outpatient labs and with abnormal VS, found to have AMS, AHRF, intubated, and admitted to MICU for further management. Possibly acute on chronic HF +/- sepsis precipitating MANDY on CKD.     #Neuro  Baseline: A&O*4  p/w AMS  Now at baseline mental status   - c/w home med gabapentin 100 qD    #Cardiovascular  Possibly acute on chronic HF iso sepsis  p/w eugenieGlenn   Has been HD stable since admission without pressors   p/w Trop 100 and BNP 2081 ISO of MANDY  - POCUS 11/18: VTI 23 with good systolic fx, grossly nl RV and IVC 1.8cm  - Trop q8h  - did not respond that well to lasix ON. Responded well to Bumex.   - c/w home med: ASA 81 qD, atorvastatin 40 qD    #Respiratory  Hypoxic resp failure 2/2 volume overload?   Now diuresed appropriately   CXR c/w CHF   POCUS 11/19: with B lines R>L with right small effusion   - Saturating well on BiPAP   - Tried nasal cannual today, but had altered mental status with lethargy. Switch back to BiPAP       #GI/Nutrition  No active issues   - Now on regular diet   - C/w bowel regimen     #/Renal  P/w MANDY on CKD (Cr 3, baseline 1.2) - likely 2/2 sepsis vs volume overload   - Hold diureses   - Avoid nephrotoxic agents  - Replete lytes as appropriate   - Order renal ultrasound to evaluate     #Skin  - No active issues    #Immunologic  PMHx sarcoidosis   s/p Solumedrol  - Will hold off on further steroids   - MINOO, ESR, CRP    #ID  Currently afebrile and without leukocytosis   Possibly sepsis 2/2 UTI   p/w hypothermia, leukopenia  PMH sepsis 2/2 UTI Klebsiella and E. coli (ampicillin R)  - Will cont with ceftriaxone   - Send procalcitonin   - f/u BCx*2   - UCx wnl     #Endocrine  P/w bradycardia, hypotension  R/o hypothyroidism, Addisonian crisis   - s/p 1 dose Solumedrol (after cortisol drawn)  - Will hold off on further steroids as is normotensive   - cortisol 11/17  - TSH 4.77   - FT4 wnl   - PMH diabetes, previously on insulin but not recently since hypoglycemic  - ISS     #Hematologic/DVT ppx  Anemia   - Transfuse to Hgb goal > 7   - Iron studies wnl   - Send PTT     DVT Prophylaxis: heparin sc q8h, home ASA    67F PMH HTN, HLD, sarcoidosis, CHF, PEA*2, sepsis 2/2 UTI (Klesiella, E. coli) ~9/2022 requiring intubation; presented to ED with MANDY on CKD found on outpatient labs and with abnormal VS, found to have AMS, AHRF, intubated, and admitted to MICU for further management. Possibly acute on chronic HF +/- sepsis precipitating MANDY on CKD.     #Neuro  Baseline: A&O*4  p/w AMS  Now at baseline mental status   - c/w home med gabapentin 100 qD    #Cardiovascular  Possibly acute on chronic HF iso sepsis  p/w eugenie, Glenn   Has been HD stable since admission without pressors   p/w Trop 100 and BNP 2081 ISO of MANDY  - POCUS 11/18: VTI 23 with good systolic fx, grossly nl RV and IVC 1.8cm  - Trop q8h  - did not respond that well to lasix ON. Responded well to Bumex.   - c/w home med: ASA 81 qD, atorvastatin 40 qD    #Respiratory  Hypoxic resp failure 2/2 volume overload?   Now diuresed appropriately   CXR c/w CHF   POCUS 11/19: with B lines R>L with right small effusion   - Saturating well on BiPAP   - Tried nasal cannual today, but had altered mental status with lethargy. Switch back to BiPAP   - continue to monitor ABGs      #GI/Nutrition  No active issues   - Now on regular diet   - C/w bowel regimen     #/Renal  P/w MANDY on CKD (Cr 3, baseline 1.2) - likely 2/2 sepsis vs volume overload   - Hold diureses   - Avoid nephrotoxic agents  - Replete lytes as appropriate   - Order renal ultrasound to evaluate     #Skin  - No active issues    #Immunologic  PMHx sarcoidosis   s/p Solumedrol  - Will hold off on further steroids   - MINOO, ESR, CRP    #ID  Currently afebrile and without leukocytosis   Possibly sepsis 2/2 UTI   p/w hypothermia, leukopenia  PMH sepsis 2/2 UTI Klebsiella and E. coli (ampicillin R)  - Will cont with ceftriaxone   - Send procalcitonin   - f/u BCx*2   - UCx wnl     #Endocrine  P/w bradycardia, hypotension  R/o hypothyroidism, Addisonian crisis   - s/p 1 dose Solumedrol (after cortisol drawn)  - Will hold off on further steroids as is normotensive   - cortisol 11/17  - TSH 4.77   - FT4 wnl   - PMH diabetes, previously on insulin but not recently since hypoglycemic  - ISS     #Hematologic/DVT ppx  Anemia   - Transfuse to Hgb goal > 7   - Iron studies wnl   - Send PTT     DVT Prophylaxis: heparin sc q8h, home ASA

## 2022-11-20 NOTE — CHART NOTE - NSCHARTNOTEFT_GEN_A_CORE
MICU Transfer Note  ---------------------------    Transfer from: MICU  Transfer to:  (  ) Medicine    (  ) Telemetry    ( X ) RCU    (  ) Palliative    (  ) Stroke Unit    (  ) _______________  Accepting Physician:      MICU COURSE  67F PMH HTN, HLD, sarcoidosis, CHF, PEA*2, sepsis 2/2 UTI (Klesiella, E. coli) ~9/2022 requiring intubation; presented to ED with MANDY on CKD found on outpatient labs and with abnormal VS, found to have AMS, AHRF, intubated, and admitted to MICU for further management.    In ED, patient had bradycardia (45), hypotension (SBP 80), hypoxemia (68%), hypothermia (91F), MANDY on CKD (Cr 3, baseline 1.2), acute respiratory acidosis, worsening mental status, and was intubated. Patient also received 1 dose solumedrol (after cortisol drawn). Also found to have Trop 100 and BNP 2081. Additionally, anemic (Hb 7.3, baseline 8-9).     Patient extubated 11/18 to BiPAP. Patient has since been weaned to 4L NC and satting well. Patient revieced 1 unit           OBJECTIVE --  Vital Signs Last 24 Hrs  T(C): 36.4 (20 Nov 2022 12:00), Max: 37.8 (19 Nov 2022 20:00)  T(F): 97.5 (20 Nov 2022 12:00), Max: 100 (19 Nov 2022 20:00)  HR: 82 (20 Nov 2022 12:00) (62 - 86)  BP: 141/64 (20 Nov 2022 12:00) (108/66 - 149/72)  BP(mean): 84 (20 Nov 2022 12:00) (75 - 94)  RR: 20 (20 Nov 2022 12:00) (16 - 27)  SpO2: 95% (20 Nov 2022 12:00) (91% - 98%)    Parameters below as of 20 Nov 2022 08:00  Patient On (Oxygen Delivery Method): nasal cannula  O2 Flow (L/min): 4      I&O's Summary    19 Nov 2022 07:01  -  20 Nov 2022 07:00  --------------------------------------------------------  IN: 350 mL / OUT: 490 mL / NET: -140 mL    20 Nov 2022 07:01  -  20 Nov 2022 15:05  --------------------------------------------------------  IN: 300 mL / OUT: 695 mL / NET: -395 mL        MEDICATIONS  (STANDING):  albuterol    90 MICROgram(s) HFA Inhaler 2 Puff(s) Inhalation every 6 hours  aspirin  chewable 81 milliGRAM(s) Oral daily  atorvastatin 40 milliGRAM(s) Oral at bedtime  cefTRIAXone   IVPB 1000 milliGRAM(s) IV Intermittent every 24 hours  chlorhexidine 2% Cloths 1 Application(s) Topical <User Schedule>  dextrose 5%. 1000 milliLiter(s) (100 mL/Hr) IV Continuous <Continuous>  dextrose 5%. 1000 milliLiter(s) (50 mL/Hr) IV Continuous <Continuous>  dextrose 50% Injectable 25 Gram(s) IV Push once  dextrose 50% Injectable 12.5 Gram(s) IV Push once  dextrose 50% Injectable 25 Gram(s) IV Push once  gabapentin Solution 100 milliGRAM(s) Oral daily  glucagon  Injectable 1 milliGRAM(s) IntraMuscular once  heparin   Injectable 5000 Unit(s) SubCutaneous every 8 hours  insulin lispro (ADMELOG) corrective regimen sliding scale   SubCutaneous every 6 hours  ipratropium 17 MICROgram(s) HFA Inhaler 1 Puff(s) Inhalation every 6 hours    MEDICATIONS  (PRN):  acetaminophen     Tablet .. 650 milliGRAM(s) Oral every 6 hours PRN Mild Pain (1 - 3)  dextrose Oral Gel 15 Gram(s) Oral once PRN Blood Glucose LESS THAN 70 milliGRAM(s)/deciliter        LABS                                            7.9                   Neurophils% (auto):   72.2   (11-20 @ 00:14):    5.18 )-----------(199          Lymphocytes% (auto):  16.2                                          25.7                   Eosinphils% (auto):   0.8      Manual%: Neutrophils x    ; Lymphocytes x    ; Eosinophils x    ; Bands%: x    ; Blasts x                                    141    |  102    |  73                  Calcium: 8.3   / iCa: x      (11-20 @ 00:14)    ----------------------------<  86        Magnesium: 2.30                             4.5     |  23     |  3.27             Phosphorous: 6.5      TPro  7.0    /  Alb  3.5    /  TBili  0.3    /  DBili  x      /  AST  26     /  ALT  25     /  AlkPhos  93     20 Nov 2022 00:14    ( 11-20 @ 00:14 )   PT: 11.9 sec;   INR: 1.03 ratio  aPTT: 33.6 sec          ASSESSMENT & PLAN:         For Follow-Up:  [ ]   [ ] MICU Transfer Note  ---------------------------    Transfer from: MICU  Transfer to:  (  ) Medicine    (  ) Telemetry    ( X ) RCU    (  ) Palliative    (  ) Stroke Unit    (  ) _______________  Accepting Physician:      MICU COURSE  67F PMH HTN, HLD, sarcoidosis, CHF, PEA*2, sepsis 2/2 UTI (Klesiella, E. coli) ~9/2022 requiring intubation; presented to ED with MANDY on CKD found on outpatient labs and with abnormal VS, found to have AMS, AHRF, intubated, and admitted to MICU for further management.    In ED, patient had bradycardia (45), hypotension (SBP 80), hypoxemia (68%), hypothermia (91F), MANDY on CKD (Cr 3, baseline 1.2), acute respiratory acidosis, worsening mental status, and was intubated. Patient also received 1 dose solumedrol (after cortisol drawn). Also found to have Trop 100 and BNP 2081. Additionally, anemic (Hb 7.3, baseline 8-9).     Patient extubated 11/18 to BiPAP. Patient has since been weaned to 4L NC and satting well. Patient reviewed 1 unit of pRBCs (Hb 6.8). Nephrology consulted for worsening MANDY.           OBJECTIVE --  Vital Signs Last 24 Hrs  T(C): 36.4 (20 Nov 2022 12:00), Max: 37.8 (19 Nov 2022 20:00)  T(F): 97.5 (20 Nov 2022 12:00), Max: 100 (19 Nov 2022 20:00)  HR: 82 (20 Nov 2022 12:00) (62 - 86)  BP: 141/64 (20 Nov 2022 12:00) (108/66 - 149/72)  BP(mean): 84 (20 Nov 2022 12:00) (75 - 94)  RR: 20 (20 Nov 2022 12:00) (16 - 27)  SpO2: 95% (20 Nov 2022 12:00) (91% - 98%)    Parameters below as of 20 Nov 2022 08:00  Patient On (Oxygen Delivery Method): nasal cannula  O2 Flow (L/min): 4      I&O's Summary    19 Nov 2022 07:01  -  20 Nov 2022 07:00  --------------------------------------------------------  IN: 350 mL / OUT: 490 mL / NET: -140 mL    20 Nov 2022 07:01  -  20 Nov 2022 15:05  --------------------------------------------------------  IN: 300 mL / OUT: 695 mL / NET: -395 mL        MEDICATIONS  (STANDING):  albuterol    90 MICROgram(s) HFA Inhaler 2 Puff(s) Inhalation every 6 hours  aspirin  chewable 81 milliGRAM(s) Oral daily  atorvastatin 40 milliGRAM(s) Oral at bedtime  cefTRIAXone   IVPB 1000 milliGRAM(s) IV Intermittent every 24 hours  chlorhexidine 2% Cloths 1 Application(s) Topical <User Schedule>  dextrose 5%. 1000 milliLiter(s) (100 mL/Hr) IV Continuous <Continuous>  dextrose 5%. 1000 milliLiter(s) (50 mL/Hr) IV Continuous <Continuous>  dextrose 50% Injectable 25 Gram(s) IV Push once  dextrose 50% Injectable 12.5 Gram(s) IV Push once  dextrose 50% Injectable 25 Gram(s) IV Push once  gabapentin Solution 100 milliGRAM(s) Oral daily  glucagon  Injectable 1 milliGRAM(s) IntraMuscular once  heparin   Injectable 5000 Unit(s) SubCutaneous every 8 hours  insulin lispro (ADMELOG) corrective regimen sliding scale   SubCutaneous every 6 hours  ipratropium 17 MICROgram(s) HFA Inhaler 1 Puff(s) Inhalation every 6 hours    MEDICATIONS  (PRN):  acetaminophen     Tablet .. 650 milliGRAM(s) Oral every 6 hours PRN Mild Pain (1 - 3)  dextrose Oral Gel 15 Gram(s) Oral once PRN Blood Glucose LESS THAN 70 milliGRAM(s)/deciliter        LABS                                            7.9                   Neurophils% (auto):   72.2   (11-20 @ 00:14):    5.18 )-----------(199          Lymphocytes% (auto):  16.2                                          25.7                   Eosinphils% (auto):   0.8      Manual%: Neutrophils x    ; Lymphocytes x    ; Eosinophils x    ; Bands%: x    ; Blasts x                                    141    |  102    |  73                  Calcium: 8.3   / iCa: x      (11-20 @ 00:14)    ----------------------------<  86        Magnesium: 2.30                             4.5     |  23     |  3.27             Phosphorous: 6.5      TPro  7.0    /  Alb  3.5    /  TBili  0.3    /  DBili  x      /  AST  26     /  ALT  25     /  AlkPhos  93     20 Nov 2022 00:14    ( 11-20 @ 00:14 )   PT: 11.9 sec;   INR: 1.03 ratio  aPTT: 33.6 sec          ASSESSMENT & PLAN:     · Assessment	  67F PMH HTN, HLD, sarcoidosis, CHF, PEA*2, sepsis 2/2 UTI (Klesiella, E. coli) ~9/2022 requiring intubation; presented to ED with MANDY on CKD found on outpatient labs and with abnormal VS, found to have AMS, AHRF, intubated, and admitted to MICU for further management. Possibly acute on chronic HF +/- sepsis precipitating MANDY on CKD.     #Neuro  Baseline: A&O*4  p/w AMS  Now at baseline mental status   - c/w home med gabapentin 100 qD    #Cardiovascular  Possibly acute on chronic HF iso sepsis  p/w eugenieGlenn   Has been HD stable since admission without pressors   p/w Trop 100 and BNP 2081 ISO of MANDY  - POCUS 11/18: VTI 23 with good systolic fx, grossly nl RV and IVC 1.8cm  - Trop q8h  - did not respond that well to lasix ON. Responded well to Bumex.   - c/w home med: ASA 81 qD, atorvastatin 40 qD    #Respiratory  Hypoxic resp failure 2/2 volume overload?   Now diuresed appropriately   CXR c/w CHF   POCUS 11/19: with B lines R>L with right small effusion   - Saturating well on BiPAP   - Tried nasal cannual today, but had altered mental status with lethargy. Switch back to BiPAP   - continue to monitor ABGs      #GI/Nutrition  No active issues   - Now on regular diet   - C/w bowel regimen     #/Renal  P/w MANDY on CKD (Cr 3, baseline 1.2) - likely 2/2 sepsis vs volume overload   - Hold diureses   - Avoid nephrotoxic agents  - Replete lytes as appropriate   - Order renal ultrasound to evaluate     #Skin  - No active issues    #Immunologic  PMHx sarcoidosis   s/p Solumedrol  - Will hold off on further steroids   - MINOO, ESR, CRP    #ID  Currently afebrile and without leukocytosis   Possibly sepsis 2/2 UTI   p/w hypothermia, leukopenia  PMH sepsis 2/2 UTI Klebsiella and E. coli (ampicillin R)  - Will cont with ceftriaxone   - f/u BCx*2   - UCx wnl     #Endocrine  P/w bradycardia, hypotension  R/o hypothyroidism, Addisonian crisis   - s/p 1 dose Solumedrol (after cortisol drawn)  - Will hold off on further steroids as is normotensive   - cortisol 11/17  - TSH 4.77   - FT4 wnl   - PMH diabetes, previously on insulin but not recently since hypoglycemic  - ISS     #Hematologic/DVT ppx  Anemia   - Transfuse to Hgb goal > 7   - Iron studies wnl   - Send PTT     DVT Prophylaxis: heparin sc q8h, home ASA     For Follow-Up:  [ ] f/u nephro recs  [ ] Continue to monitor blood gases  [ ] c/w ceftriaxone until 11/23 MICU Transfer Note  ---------------------------    Transfer from: MICU  Transfer to:  (  ) Medicine    (  ) Telemetry    ( X ) RCU    (  ) Palliative    (  ) Stroke Unit    (  ) _______________  Accepting Physician: Dr. Morris      MICU COURSE  67F PMH HTN, HLD, sarcoidosis, CHF, PEA*2, sepsis 2/2 UTI (Klesiella, E. coli) ~9/2022 requiring intubation; presented to ED with MANDY on CKD found on outpatient labs and with abnormal VS, found to have AMS, AHRF, intubated, and admitted to MICU for further management.    In ED, patient had bradycardia (45), hypotension (SBP 80), hypoxemia (68%), hypothermia (91F), MANDY on CKD (Cr 3, baseline 1.2), acute respiratory acidosis, worsening mental status, and was intubated. Patient also received 1 dose solumedrol (after cortisol drawn). Also found to have Trop 100 and BNP 2081. Additionally, anemic (Hb 7.3, baseline 8-9).     Patient extubated 11/18 to BiPAP. Patient has since been weaned to 4L NC and satting well. Patient reviewed 1 unit of pRBCs (Hb 6.8). Nephrology consulted for worsening MANDY.           OBJECTIVE --  Vital Signs Last 24 Hrs  T(C): 36.4 (20 Nov 2022 12:00), Max: 37.8 (19 Nov 2022 20:00)  T(F): 97.5 (20 Nov 2022 12:00), Max: 100 (19 Nov 2022 20:00)  HR: 82 (20 Nov 2022 12:00) (62 - 86)  BP: 141/64 (20 Nov 2022 12:00) (108/66 - 149/72)  BP(mean): 84 (20 Nov 2022 12:00) (75 - 94)  RR: 20 (20 Nov 2022 12:00) (16 - 27)  SpO2: 95% (20 Nov 2022 12:00) (91% - 98%)    Parameters below as of 20 Nov 2022 08:00  Patient On (Oxygen Delivery Method): nasal cannula  O2 Flow (L/min): 4      I&O's Summary    19 Nov 2022 07:01  -  20 Nov 2022 07:00  --------------------------------------------------------  IN: 350 mL / OUT: 490 mL / NET: -140 mL    20 Nov 2022 07:01  -  20 Nov 2022 15:05  --------------------------------------------------------  IN: 300 mL / OUT: 695 mL / NET: -395 mL        MEDICATIONS  (STANDING):  albuterol    90 MICROgram(s) HFA Inhaler 2 Puff(s) Inhalation every 6 hours  aspirin  chewable 81 milliGRAM(s) Oral daily  atorvastatin 40 milliGRAM(s) Oral at bedtime  cefTRIAXone   IVPB 1000 milliGRAM(s) IV Intermittent every 24 hours  chlorhexidine 2% Cloths 1 Application(s) Topical <User Schedule>  dextrose 5%. 1000 milliLiter(s) (100 mL/Hr) IV Continuous <Continuous>  dextrose 5%. 1000 milliLiter(s) (50 mL/Hr) IV Continuous <Continuous>  dextrose 50% Injectable 25 Gram(s) IV Push once  dextrose 50% Injectable 12.5 Gram(s) IV Push once  dextrose 50% Injectable 25 Gram(s) IV Push once  gabapentin Solution 100 milliGRAM(s) Oral daily  glucagon  Injectable 1 milliGRAM(s) IntraMuscular once  heparin   Injectable 5000 Unit(s) SubCutaneous every 8 hours  insulin lispro (ADMELOG) corrective regimen sliding scale   SubCutaneous every 6 hours  ipratropium 17 MICROgram(s) HFA Inhaler 1 Puff(s) Inhalation every 6 hours    MEDICATIONS  (PRN):  acetaminophen     Tablet .. 650 milliGRAM(s) Oral every 6 hours PRN Mild Pain (1 - 3)  dextrose Oral Gel 15 Gram(s) Oral once PRN Blood Glucose LESS THAN 70 milliGRAM(s)/deciliter        LABS                                            7.9                   Neurophils% (auto):   72.2   (11-20 @ 00:14):    5.18 )-----------(199          Lymphocytes% (auto):  16.2                                          25.7                   Eosinphils% (auto):   0.8      Manual%: Neutrophils x    ; Lymphocytes x    ; Eosinophils x    ; Bands%: x    ; Blasts x                                    141    |  102    |  73                  Calcium: 8.3   / iCa: x      (11-20 @ 00:14)    ----------------------------<  86        Magnesium: 2.30                             4.5     |  23     |  3.27             Phosphorous: 6.5      TPro  7.0    /  Alb  3.5    /  TBili  0.3    /  DBili  x      /  AST  26     /  ALT  25     /  AlkPhos  93     20 Nov 2022 00:14    ( 11-20 @ 00:14 )   PT: 11.9 sec;   INR: 1.03 ratio  aPTT: 33.6 sec          ASSESSMENT & PLAN:     · Assessment	  67F PMH HTN, HLD, sarcoidosis, CHF, PEA*2, sepsis 2/2 UTI (Klesiella, E. coli) ~9/2022 requiring intubation; presented to ED with MANDY on CKD found on outpatient labs and with abnormal VS, found to have AMS, AHRF, intubated, and admitted to MICU for further management. Possibly acute on chronic HF +/- sepsis precipitating MANDY on CKD.     #Neuro  Baseline: A&O*4  p/w AMS  Now at baseline mental status   - c/w home med gabapentin 100 qD    #Cardiovascular  Possibly acute on chronic HF iso sepsis  p/w eugenieGlenn   Has been HD stable since admission without pressors   p/w Trop 100 and BNP 2081 ISO of MANDY  - POCUS 11/18: VTI 23 with good systolic fx, grossly nl RV and IVC 1.8cm  - Trop q8h  - did not respond that well to lasix ON. Responded well to Bumex.   - c/w home med: ASA 81 qD, atorvastatin 40 qD    #Respiratory  Hypoxic resp failure 2/2 volume overload?   Now diuresed appropriately   CXR c/w CHF   POCUS 11/19: with B lines R>L with right small effusion   - Saturating well on BiPAP   - Tried nasal cannual today, but had altered mental status with lethargy. Switch back to BiPAP   - continue to monitor ABGs      #GI/Nutrition  No active issues   - Now on regular diet   - C/w bowel regimen     #/Renal  P/w MANDY on CKD (Cr 3, baseline 1.2) - likely 2/2 sepsis vs volume overload   - Hold diureses   - Avoid nephrotoxic agents  - Replete lytes as appropriate   - Order renal ultrasound to evaluate     #Skin  - No active issues    #Immunologic  PMHx sarcoidosis   s/p Solumedrol  - Will hold off on further steroids   - MINOO, ESR, CRP    #ID  Currently afebrile and without leukocytosis   Possibly sepsis 2/2 UTI   p/w hypothermia, leukopenia  PMH sepsis 2/2 UTI Klebsiella and E. coli (ampicillin R)  - Will cont with ceftriaxone   - f/u BCx*2   - UCx wnl     #Endocrine  P/w bradycardia, hypotension  R/o hypothyroidism, Addisonian crisis   - s/p 1 dose Solumedrol (after cortisol drawn)  - Will hold off on further steroids as is normotensive   - cortisol 11/17  - TSH 4.77   - FT4 wnl   - PMH diabetes, previously on insulin but not recently since hypoglycemic  - ISS     #Hematologic/DVT ppx  Anemia   - Transfuse to Hgb goal > 7   - Iron studies wnl   - Send PTT     DVT Prophylaxis: heparin sc q8h, home ASA     For Follow-Up:  [ ] f/u nephro recs  [ ] Continue to monitor blood gases  [ ] c/w ceftriaxone until 11/23

## 2022-11-20 NOTE — PROGRESS NOTE ADULT - ATTENDING COMMENTS
Patient is 67F PMH HTN, HLD, sarcoidosis, CHF, PEA*2, sepsis 2/2 UTI (Klesiella, E. coli) ~9/2022 requiring intubation; presented to ED with MANDY on CKD found on outpatient labs and with abnormal VS, found to have AMS, AHRF, intubated, and admitted to MICU for further management. Pt got dose of bumex yesterday, does not appear fluid overloaded today, TTE Normal LV syst fx, mild diastolic dysfx, RV normal. hx of bradycardia, and altering MS, now improved, tolerating NC and prolonged breaks off NIV, continue to use when sleeping and at night, switch to AVAPS today, consider evaluation for CNS and cardiac sarcoidosis involvement. MANDY worsening, renal consult.

## 2022-11-20 NOTE — CONSULT NOTE ADULT - PROBLEM SELECTOR RECOMMENDATION 9
Pt. with MANDY suspected in setting of hemodynamics as she was noted to be very hypotensive on arrival in ED vs volume overload. ATN? [facility] is noted to have a SCr of 1.2mg/dL in 9/2022, on admission it was elevated to 3.01mg/dL and has further increased to 3.27mg/dL.   UA with significant proteinuria (noted previously as well) and significant hematuria (new from previous urine studies). In the past all serological work up has been negative and it was presumed renal disease was in setting of her DM.  Given new hematuria and significant MANDY would recommend to repeat serological work up including: HIV, Hep Panel, MINOO, cANCA, pANCA, dsDNA, C3, C4, Serum immunofixation, SPEP, Serum free light chains, PLA2R ab, RPR, Rh Factor, and anti-GBM. Recommend renal sonogram.   Patient does not seem clinically volume overloaded, received one dose Bumex this morning with good response in UOP. Will assess daily for further need of diuresis. Pt. with MANDY suspected in setting of hemodynamics as she was noted to be very hypotensive on arrival in ED vs volume overload. ATN? On [facility] of [facility] the patient is noted to have a SCr of 1.2mg/dL in 9/2022, on admission it was elevated to 3.01mg/dL and has further increased to 3.27mg/dL.   UA with significant proteinuria (noted previously as well) and significant hematuria (new from previous urine studies). In the past all serological work up has been negative and it was presumed renal disease was in setting of her DM.  Given new hematuria and significant MANDY would recommend to repeat serological work up including: HIV, Hep Panel, MINOO, cANCA, pANCA, dsDNA, C3, C4, Serum immunofixation, SPEP, Serum free light chains, PLA2R ab, RPR, Rh Factor, and anti-GBM. Recommend renal sonogram.   Patient does not seem clinically volume overloaded, received one dose Bumex this morning with good response in UOP. Will assess daily for further need of diuresis.    If any questions, please feel free to contact me     Ar Silva  Nephrology Fellow  [facility] Pager: [phone]

## 2022-11-20 NOTE — DISCHARGE NOTE PROVIDER - NSDCMRMEDTOKEN_GEN_ALL_CORE_FT
acetaminophen 325 mg oral tablet: 2 tab(s) orally every 6 hours, As needed, Temp greater or equal to 38C (100.4F), Mild Pain (1 - 3)  amLODIPine 10 mg oral tablet: 1 tab(s) orally once a day  aspirin 81 mg oral tablet, chewable: 1 tab(s) orally once a day  atorvastatin 40 mg oral tablet: 1 tab(s) orally once a day  carvedilol 25 mg oral tablet: 1 tab(s) orally every 12 hours  furosemide 40 mg oral tablet: 1 tab(s) orally once a day  gabapentin 100 mg oral capsule: 1 cap(s) orally once a day  heparin: 5000 unit(s) subcutaneous every 8 hours  hydrALAZINE 100 mg oral tablet: 1 tab(s) orally 3 times a day  ipratropium-albuterol 0.5 mg-2.5 mg/3 mL inhalation solution: 3 milliliter(s) inhaled every 6 hours  melatonin 3 mg oral tablet: 2 tab(s) orally once a day (at bedtime)   acetaminophen 325 mg oral tablet: 2 tab(s) orally every 6 hours, As needed, Temp greater or equal to 38C (100.4F), Mild Pain (1 - 3)  amLODIPine 10 mg oral tablet: 1 tab(s) orally once a day  aspirin 81 mg oral tablet, chewable: 1 tab(s) orally once a day  atorvastatin 40 mg oral tablet: 1 tab(s) orally once a day (at bedtime)  carvedilol 25 mg oral tablet: 1 tab(s) orally every 12 hours  furosemide 40 mg oral tablet: 1 tab(s) orally once a day  gabapentin 100 mg oral capsule: 1 cap(s) orally once a day  heparin: 5000 unit(s) subcutaneous every 8 hours  hydrALAZINE 100 mg oral tablet: 1 tab(s) orally 3 times a day  ipratropium-albuterol 0.5 mg-2.5 mg/3 mL inhalation solution: 3 milliliter(s) inhaled every 6 hours  melatonin 3 mg oral tablet: 2 tab(s) orally once a day (at bedtime)   acetaminophen 325 mg oral tablet: 2 tab(s) orally every 6 hours, As needed, Mild Pain (1 - 3)  aspirin 81 mg oral delayed release tablet: 1 tab(s) orally once a day  atorvastatin 40 mg oral tablet: 1 tab(s) orally once a day (at bedtime)  gabapentin 100 mg oral capsule: 1 cap(s) orally once a day   acetaminophen 325 mg oral tablet: 2 tab(s) orally every 6 hours, As needed, Mild Pain (1 - 3)  albuterol 90 mcg/inh inhalation aerosol: 2 puff(s) inhaled every 6 hours  amLODIPine 10 mg oral tablet: 1 tab(s) orally once a day  aspirin 81 mg oral delayed release tablet: 1 tab(s) orally once a day  atorvastatin 40 mg oral tablet: 1 tab(s) orally once a day (at bedtime)  bumetanide 2 mg oral tablet: 1 tab(s) orally once a day  carvedilol 12.5 mg oral tablet: 1 tab(s) orally every 12 hours  hydrALAZINE 100 mg oral tablet: 1 tab(s) orally 3 times a day  ipratropium CFC free 17 mcg/inh inhalation aerosol: 1 puff(s) inhaled every 6 hours

## 2022-11-20 NOTE — DISCHARGE NOTE PROVIDER - NSDCCPCAREPLAN_GEN_ALL_CORE_FT
PRINCIPAL DISCHARGE DIAGNOSIS  Diagnosis: Acute respiratory failure with hypoxia  Assessment and Plan of Treatment:       SECONDARY DISCHARGE DIAGNOSES  Diagnosis: MANDY (acute kidney injury)  Assessment and Plan of Treatment:     Diagnosis: Sarcoid  Assessment and Plan of Treatment:     Diagnosis: Anemia  Assessment and Plan of Treatment:     Diagnosis: Acute on chronic systolic congestive heart failure  Assessment and Plan of Treatment:     Diagnosis: DM (diabetes mellitus)  Assessment and Plan of Treatment:      PRINCIPAL DISCHARGE DIAGNOSIS  Diagnosis: Acute respiratory failure with hypoxia  Assessment and Plan of Treatment: You were admitted to the hospital for respiratory failure. You were admitted to the ICU and had a breathing tube. You are to continue with AVAPs at night and follow up with  pulmonary as outpatient.      SECONDARY DISCHARGE DIAGNOSES  Diagnosis: Encephalopathy acute  Assessment and Plan of Treatment: You were found to have altered mental status, which imrpoved during your admission. This was likely from urinary tract infection and respiratory failure. Continue to use AVAPs at home and follow up with PCP as outpatient.    Diagnosis: MANDY (acute kidney injury)  Assessment and Plan of Treatment: Your kidney functions were found to be abnormal. You had a kidney biopsy during your admission, which showed kidney disease from diabetes.  Continue blood pressure, cholesterol and diabetic medications. Goal of hemoglobin A1C (HgbA1C) < 7%.  Avoid nephrotoxic drugs such as nonsteroidal anti-inflammatory agents (NSAIDs).   Please follow up with your nephrologist to monitor your kidney function, continue with low protein and potassium diet.      Diagnosis: Anemia  Assessment and Plan of Treatment: You were found to have low blood counts. You required blood transfusion during your hospitalization. You are to follow up with PCP as outpatient for further monitoring of blood counts as outpatient.    Diagnosis: DM (diabetes mellitus)  Assessment and Plan of Treatment: Monitor finger sticks pre-meal and bedtime, low salt, fat and carbohydrate diet, minimize glucose intake.  Exercise daily for at least 30 minutes and weight loss.  Follow up with primary care physician and endocrinologist for routine Hemoglobin A1C checks and management.  Follow up with your ophthalmologist for routine yearly vision exams.      Diagnosis: HTN (hypertension)  Assessment and Plan of Treatment: Low sodium and fat diet, continue anti-hypertensive medications, and follow up with primary care physician.      Diagnosis: Urinary tract infection  Assessment and Plan of Treatment: You were treated with antibiotics for a urinary tract infection.     PRINCIPAL DISCHARGE DIAGNOSIS  Diagnosis: Acute respiratory failure with hypoxia  Assessment and Plan of Treatment: You were admitted to the hospital for respiratory failure. You were admitted to the ICU and had a breathing tube. You are to continue with AVAPs at night and follow up with  pulmonary as outpatient. Please follow up with your primary care physician regarding your hospitalization        SECONDARY DISCHARGE DIAGNOSES  Diagnosis: MANDY (acute kidney injury)  Assessment and Plan of Treatment: Your kidney functions were found to be abnormal. You had a kidney biopsy during your admission, which showed kidney disease from diabetes.  Continue blood pressure, cholesterol and diabetic medications. Goal of hemoglobin A1C (HgbA1C) < 7%.  Avoid nephrotoxic drugs such as nonsteroidal anti-inflammatory agents (NSAIDs).   Please follow up with your nephrologist to monitor your kidney function, continue with low protein and potassium diet.      Diagnosis: Anemia  Assessment and Plan of Treatment: You were found to have low blood counts. You have no active bleed seen or reported. You required blood transfusion during your hospitalization. You are to follow up with PCP as outpatient for further monitoring of blood counts as outpatient.    Diagnosis: DM (diabetes mellitus)  Assessment and Plan of Treatment: Monitor finger sticks pre-meal and bedtime, low salt, fat and carbohydrate diet, minimize glucose intake.  Exercise daily for at least 30 minutes and weight loss.  Follow up with primary care physician and endocrinologist for routine Hemoglobin A1C checks and management.  Follow up with your ophthalmologist for routine yearly vision exams.      Diagnosis: Urinary tract infection  Assessment and Plan of Treatment: You were treated with antibiotics for a urinary tract infection. Please follow up with your primary care physician regarding your hospitalization.      Diagnosis: HTN (hypertension)  Assessment and Plan of Treatment: Low sodium and fat diet, continue anti-hypertensive medications, and follow up with primary care physician.    Diagnosis: Encephalopathy acute  Assessment and Plan of Treatment: You were found to have altered mental status, which imrpoved during your admission. This was likely from urinary tract infection and respiratory failure. Continue to use AVAPs at home and follow up with PCP as outpatient.

## 2022-11-20 NOTE — PROGRESS NOTE ADULT - SUBJECTIVE AND OBJECTIVE BOX
SUMMARY: 67y female in hospital for 3d transferred to the MICU for ***    OVERNIGHT EVENTS / SUBJECTIVE: No overnight events reported by the physician and nurse caring for the patient. Patient seen and examined at bedside.    VITAL SIGNS:  T(F): 97.6 (22 @ 08:00), Max: 100 (22 @ 20:00)  HR: 78 (22 @ 08:00) (59 - 78)  BP: 141/65 (22 @ 08:00) (108/66 - 145/69)  RR: 19 (22 @ 08:00) (14 - 27)  SpO2: 95% (22 @ 08:00) (91% - 99%)      INTAKE/OUTPUT   @ 07:01  -   @ 07:00  --------------------------------------------------------  IN: 350 mL / OUT: 490 mL / NET: -140 mL      FINGERSTICKS:  87 ( @ 05:35), 90 ( @ 23:08), 113 ( @ 16:34), 111 ( @ 11:21)    INSULIN REQUIREMENT:      PHYSICAL EXAM:  General: NAD  HEENT: normocephalic/atraumatic; pupils equally round and reactive to light; clear conjunctiva  Neck: supple, no jugular venous distension  Respiratory: clear lung sounds ascultated bilaterally  Cardiovascular: S1 and S2 ascultated; regular rate and rhythm  Abdomen: soft, nontender, nondistended; bowel sounds present  Extremities: 2+ peripheral pulses bilaterally; no lower extremity edema  Skin: normal color and turgor; no rashes or ulcers identified  Neurological: SILT in b/l UE/LE, strength 5/5 in b/l UE/LE, CN II-XII intact  Lines: ***    MEDICATIONS:  albuterol    90 MICROgram(s) HFA Inhaler 2 Puff(s) Inhalation every 6 hours  aspirin  chewable 81 milliGRAM(s) Oral daily  atorvastatin 40 milliGRAM(s) Oral at bedtime  cefTRIAXone   IVPB 1000 milliGRAM(s) IV Intermittent every 24 hours  chlorhexidine 2% Cloths 1 Application(s) Topical <User Schedule>  dextrose 5%. 1000 milliLiter(s) IV Continuous <Continuous>  dextrose 5%. 1000 milliLiter(s) IV Continuous <Continuous>  dextrose 50% Injectable 25 Gram(s) IV Push once  dextrose 50% Injectable 12.5 Gram(s) IV Push once  dextrose 50% Injectable 25 Gram(s) IV Push once  gabapentin Solution 100 milliGRAM(s) Oral daily  glucagon  Injectable 1 milliGRAM(s) IntraMuscular once  heparin   Injectable 5000 Unit(s) SubCutaneous every 8 hours  insulin lispro (ADMELOG) corrective regimen sliding scale   SubCutaneous every 6 hours  ipratropium 17 MICROgram(s) HFA Inhaler 1 Puff(s) Inhalation every 6 hours        ALLERGIES:  penicillin      LABS/IMAGING:  CBC:             7.9    5.18  )-----------( 199      (  @ 00:14 )             25.7     Tammie %: 72.2  / Lym %: 16.2  / Eos %: 0.8   / Band %: 0.6      WBC 72h Trend: 5.18 (-), 6.50 (-), 7.25 (), 6.16 (), 5.57 (), 2.21 ()  ---------------------------------------------------------------------------  Hemoglobin 24h Trend:  7.9 (00:14), 7.9 (16:32)  ---------------------------------------------------------------------------  CMP:  141  |  102  |  73<H>  ----------------------------( 86      @ 00:14  4.5   |  23  |  3.27<H>    Ca: 8.3<L>   Phos: 6.5<H>   M.30    TPro: 7.0 / Alb: 3.5 /  TBili 0.3 / DBili x  /  AST 26 /  ALT 25 /  AlkPhos  93  ---------------------------------------------------------------------------  Creatinine 48h Trend:  3.27 (), 3.14 ()  ---------------------------------------------------------------------------  Coags:   11.9  ------( 1.03        33.6  ---------------------------------------------------------------------------  ABG:  ( @ 21:45) pH: 7.30 / pCO2: 53 /  pO2: 147 / HCO3: 26 / SaO2: 97.8  ( @ 20:25) pH: 7.32 / pCO2: 50 /  pO2: 94 / HCO3: 26 / SaO2: 97.2    ---------------------------------------------------------------------------  UA:  ( @ 22:52)  Color: Yellow / Appearance: Slightly Turbid<!> / S.027 / pH: x / Gluc: x / Ketone: Negative  / Bili: Negative / Ubili: 3 mg/dL<!> / Blood: x / Prot: 300 mg/dL<!> / Nitrite: Negative / Leuk Liv: Large<!> / RBC: 184<H> / <H> / Sq Epi: x / Non Sq Epi: 7<H> / Bacteria: Negative  ---------------------------------------------------------------------------  CSF:    ---------------------------------------------------------------------------  MICROBIOLOGY:    Culture - Urine (collected 2022 01:18)  Source: Catheterized Catheterized  Final Report (2022 13:58):    <10,000 CFU/mL Normal Urogenital Elle    Culture - Urine (collected 2022 22:25)  Source: Clean Catch Clean Catch (Midstream)  Final Report (2022 10:22):    <10,000 CFU/mL Normal Urogenital Elle    Culture - Blood (collected 2022 18:45)  Source: .Blood Blood  Preliminary Report (2022 01:01):    No growth to date.    Culture - Blood (collected 2022 18:30)  Source: .Blood Blood  Preliminary Report (2022 01:01):    No growth to date.      ---------------------------------------------------------------------------  IMAGING/OTHER TESTING:  Labs, imaging, EKG personally reviewed OVERNIGHT EVENTS / SUBJECTIVE: No overnight events reported by the physician and nurse caring for the patient. Patient seen and examined at bedside.    VITAL SIGNS:  T(F): 97.6 (22 @ 08:00), Max: 100 (22 @ 20:00)  HR: 78 (22 @ 08:00) (59 - 78)  BP: 141/65 (22 @ 08:00) (108/66 - 145/69)  RR: 19 (22 @ 08:00) (14 - 27)  SpO2: 95% (22 @ 08:00) (91% - 99%)      INTAKE/OUTPUT   @ 07:01  -   07:00  --------------------------------------------------------  IN: 350 mL / OUT: 490 mL / NET: -140 mL      FINGERSTICKS:  87 ( @ 05:35), 90 ( @ 23:08), 113 ( @ 16:34), 111 ( @ 11:21)    INSULIN REQUIREMENT:      PHYSICAL EXAM:  General: NAD  HEENT: NC/AT; PERRL, clear conjunctiva  Neck: supple  Respiratory: CTA b/l  Cardiovascular: +S1/S2; RRR  Abdomen: soft, NT/ND; +BS x4  Extremities: WWP, 2+ peripheral pulses b/l; no LE edema  Skin: normal color and turgor; no rash  Neurological: A&Ox3    MEDICATIONS:  albuterol    90 MICROgram(s) HFA Inhaler 2 Puff(s) Inhalation every 6 hours  aspirin  chewable 81 milliGRAM(s) Oral daily  atorvastatin 40 milliGRAM(s) Oral at bedtime  cefTRIAXone   IVPB 1000 milliGRAM(s) IV Intermittent every 24 hours  chlorhexidine 2% Cloths 1 Application(s) Topical <User Schedule>  dextrose 5%. 1000 milliLiter(s) IV Continuous <Continuous>  dextrose 5%. 1000 milliLiter(s) IV Continuous <Continuous>  dextrose 50% Injectable 25 Gram(s) IV Push once  dextrose 50% Injectable 12.5 Gram(s) IV Push once  dextrose 50% Injectable 25 Gram(s) IV Push once  gabapentin Solution 100 milliGRAM(s) Oral daily  glucagon  Injectable 1 milliGRAM(s) IntraMuscular once  heparin   Injectable 5000 Unit(s) SubCutaneous every 8 hours  insulin lispro (ADMELOG) corrective regimen sliding scale   SubCutaneous every 6 hours  ipratropium 17 MICROgram(s) HFA Inhaler 1 Puff(s) Inhalation every 6 hours        ALLERGIES:  penicillin      LABS/IMAGING:  CBC:             7.9    5.18  )-----------( 199      (  @ 00:14 )             25.7     Tammie %: 72.2  / Lym %: 16.2  / Eos %: 0.8   / Band %: 0.6      WBC 72h Trend: 5.18 (), 6.50 (), 7.25 (), 6.16 (), 5.57 (), 2.21 ()  ---------------------------------------------------------------------------  Hemoglobin 24h Trend:  7.9 (00:14), 7.9 (16:32)  ---------------------------------------------------------------------------  CMP:  141  |  102  |  73<H>  ----------------------------( 86      @ 00:14  4.5   |  23  |  3.27<H>    Ca: 8.3<L>   Phos: 6.5<H>   M.30    TPro: 7.0 / Alb: 3.5 /  TBili 0.3 / DBili x  /  AST 26 /  ALT 25 /  AlkPhos  93  ---------------------------------------------------------------------------  Creatinine 48h Trend:  3.27 (), 3.14 ()  ---------------------------------------------------------------------------  Coags:   11.9  ------( 1.03        33.6  ---------------------------------------------------------------------------  ABG:  ( @ 21:45) pH: 7.30 / pCO2: 53 /  pO2: 147 / HCO3: 26 / SaO2: 97.8  ( @ 20:25) pH: 7.32 / pCO2: 50 /  pO2: 94 / HCO3: 26 / SaO2: 97.2    ---------------------------------------------------------------------------  UA:  ( @ 22:52)  Color: Yellow / Appearance: Slightly Turbid<!> / S.027 / pH: x / Gluc: x / Ketone: Negative  / Bili: Negative / Ubili: 3 mg/dL<!> / Blood: x / Prot: 300 mg/dL<!> / Nitrite: Negative / Leuk Liv: Large<!> / RBC: 184<H> / <H> / Sq Epi: x / Non Sq Epi: 7<H> / Bacteria: Negative  ---------------------------------------------------------------------------  CSF:    ---------------------------------------------------------------------------  MICROBIOLOGY:    Culture - Urine (collected 2022 01:18)  Source: Catheterized Catheterized  Final Report (2022 13:58):    <10,000 CFU/mL Normal Urogenital Elle    Culture - Urine (collected 2022 22:25)  Source: Clean Catch Clean Catch (Midstream)  Final Report (2022 10:22):    <10,000 CFU/mL Normal Urogenital Elle    Culture - Blood (collected 2022 18:45)  Source: .Blood Blood  Preliminary Report (2022 01:01):    No growth to date.    Culture - Blood (collected 2022 18:30)  Source: .Blood Blood  Preliminary Report (2022 01:01):    No growth to date.      ---------------------------------------------------------------------------  IMAGING/OTHER TESTING:  Labs, imaging, EKG personally reviewed

## 2022-11-20 NOTE — DISCHARGE NOTE PROVIDER - NSDCFUADDAPPT_GEN_ALL_CORE_FT
Follow up with your primary care physician for further monitoring in 1-2 weeks. Please call to arrange appointment.  Follow up with pulmonology within 1-2 weeks of discharge.

## 2022-11-20 NOTE — CONSULT NOTE ADULT - SUBJECTIVE AND OBJECTIVE BOX
[facility] DIVISION OF KIDNEY DISEASES AND HYPERTENSION -- [phone]  -- INITIAL CONSULT NOTE  --------------------------------------------------------------------------------  HPI:    Patient is a 67 year old female with PMH of HTN, HLD, Sarcoidosis who presented to the hospital after outpatient labs demonstrated an MANDY on CKD. The patient is unable to provide an accurate history so it was mostly obtained through chart review. On arrival to the ED the patient was noted to have AMS and was hypotensive; she was subsequently intubated and admitted to the MICU. She was successfully extubated shortly after. [facility] the patient is noted to have a SCr of 1.2mg/dL in 2022, on admission it was elevated to 3.01mg/dL and has further increased to 3.27mg/dL. The nephrology team was consulted for MANDY. During her previous admission the patient was followed by nephrology for MANDY and proteinuria. All serologic work up at that time was negative and it was presumed her renal disease was in the setting of her longstanding DM. She was advised to undergo a renal biopsy at that time, however the patient has not followed up with nephrology following discharge. The patient has had multiple admissions for hypoxic respiratory failure in setting of being volume overloaded and has required aggressive diuresis. The patient was seen and examined this afternoon in the MICU. She reported she wanted to go home and was feeling better.     PAST HISTORY  --------------------------------------------------------------------------------  PAST MEDICAL & SURGICAL HISTORY:  Type 2 diabetes mellitus      Bilateral cataracts      Fluid in pleural cavity associated with pancreatitis      Pancreatic pseudocyst/cyst  s/p drain placement and removal      HTN (hypertension)      HLD (hyperlipidemia)      Sarcoid      Pulseless electrical activity      History of amputation of right great toe        H/O:  section        History of laparoscopic cholecystectomy        FAMILY HISTORY:    PAST SOCIAL HISTORY:    ALLERGIES & MEDICATIONS  --------------------------------------------------------------------------------  Allergies    penicillin ([place])    Intolerances      Standing Inpatient Medications  albuterol    90 MICROgram(s) HFA Inhaler 2 Puff(s) Inhalation every 6 hours  aspirin  chewable 81 milliGRAM(s) Oral daily  atorvastatin 40 milliGRAM(s) Oral at bedtime  cefTRIAXone   IVPB 1000 milliGRAM(s) IV Intermittent every 24 hours  chlorhexidine 2% Cloths 1 Application(s) Topical <User Schedule>  dextrose 5%. 1000 milliLiter(s) IV Continuous <Continuous>  dextrose 5%. 1000 milliLiter(s) IV Continuous <Continuous>  dextrose 50% Injectable 25 Gram(s) IV Push once  dextrose 50% Injectable 12.5 Gram(s) IV Push once  dextrose 50% Injectable 25 Gram(s) IV Push once  gabapentin Solution 100 milliGRAM(s) Oral daily  glucagon  Injectable 1 milliGRAM(s) IntraMuscular once  heparin   Injectable 5000 Unit(s) SubCutaneous every 8 hours  insulin lispro (ADMELOG) corrective regimen sliding scale   SubCutaneous every 6 hours  ipratropium 17 MICROgram(s) HFA Inhaler 1 Puff(s) Inhalation every 6 hours    PRN Inpatient Medications  acetaminophen     Tablet .. 650 milliGRAM(s) Oral every 6 hours PRN  dextrose Oral Gel 15 Gram(s) Oral once PRN      REVIEW OF SYSTEMS    All other systems were reviewed and are negative, except as noted.    VITALS/PHYSICAL EXAM  --------------------------------------------------------------------------------  T(C): 36.4 (22 @ 12:00), Max: 37.8 (22 @ 20:00)  HR: 82 (22 @ 12:00) (62 - 86)  BP: 141/64 (22 @ 12:00) (108/66 - 149/72)  RR: 20 (22 @ 12:00) (14 - 27)  SpO2: 95% (22 12:00) (91% - 98%)  Wt(kg): --        22 @ 07:01  -  22 07:00  --------------------------------------------------------  IN: 350 mL / OUT: 490 mL / NET: -140 mL    22 @ 07:01  -  22 @ 14:51  --------------------------------------------------------  IN: 300 mL / OUT: 695 mL / NET: -395 mL      Physical Exam:  	Gen: ill appearing  	HEENT: MMM  	Pulm: crackles in lung fields  	CV: S1S2  	Abd: Soft, +BS   	Ext: No LE edema B/L  	Neuro: Awake and alert  	Skin: Warm and dry    LABS/STUDIES  --------------------------------------------------------------------------------              7.9    5.18  >-----------<  199      [22 00:14]              25.7     141  |  102  |  73  ----------------------------<  86      [22 00:14]  4.5   |  23  |  3.27        Ca     8.3     [22 00:14]      Mg     2.30     [22 00:14]      Phos  6.5     [22 00:14]    TPro  7.0  /  Alb  3.5  /  TBili  0.3  /  DBili  x   /  AST  26  /  ALT  25  /  AlkPhos  93  [22 00:14]    PT/INR: PT 11.9 , INR 1.03       [22 00:14]  PTT: 33.6       [22 00:14]      Creatinine Trend:  SCr 3.27 [:14]  SCr 3.14 [:58]  SCr 3.15 [:53]  SCr 3.01 [ 18:00]    Urinalysis - [22 22:52]      Color Yellow / Appearance Slightly Turbid / SG 1.027 / pH 6.0      Gluc Trace / Ketone Negative  / Bili Negative / Urobili 3 mg/dL       Blood Moderate / Protein 300 mg/dL / Leuk Est Large / Nitrite Negative       /  / Hyaline 2 / Gran  / Sq Epi  / Non Sq Epi 7 / Bacteria Negative    Urine Creatinine 140      [22 @ 03:35]  Urine Protein 530      [22 03:35]  Urine Sodium <20      [22 @ 03:35]  Urine Urea Nitrogen 406.2      [22 @ 03:35]  Urine Potassium 68.7      [22 @ 03:35]    Iron 68, TIBC 312, %sat 22      [22 @ 02:58]  Ferritin 113      [22 02:58]  TSH 4.77      [22 @ 18:00]  Lipid: chol 236, , HDL 37, LDL --      [21 @ 06:47]    HBsAg Nonreact      [22 @ 07:01]  HCV 0.24, Nonreact      [21 @ 09:19]  HIV Nonreact      [22 @ 04:30]    MINOO: titer Negative, pattern --      [22 @ 14:00]  C3 Complement 128      [22 @ 07:01]  C4 Complement 29      [22 @ 07:01]  ANCA: cANCA Negative, pANCA Negative, atypical ANCA Negative      [22 @ 14:00]  Syphilis Screen (Treponema Pallidum Ab) Negative      [21 @ 09:17]  PLA2R: DESHAWN <1.8, IFA --      [22 @ 14:00]  Free Light Chains: kappa 11.62, lambda 4.67, ratio = 2.49      [ @ 14:00]  Immunofixation Serum:   No Monoclonal Band Identified. NAVEED Rosenberg M.D.  Reference Range: None Detected      [22 @ 14:00]  SPEP Interpretation: Decreased serum Albumin and increased Polyclonal Gamma fraction  consistent with chronic inflammatory condition.  NAVEED Rosenberg M.D.      [22 @ 14:00]

## 2022-11-21 LAB
ANA TITR SER: NEGATIVE — SIGNIFICANT CHANGE UP
ANION GAP SERPL CALC-SCNC: 15 MMOL/L — HIGH (ref 7–14)
APTT BLD: 33 SEC — SIGNIFICANT CHANGE UP (ref 27–36.3)
BASOPHILS # BLD AUTO: 0.01 K/UL
BASOPHILS # BLD AUTO: 0.02 K/UL — SIGNIFICANT CHANGE UP (ref 0–0.2)
BASOPHILS NFR BLD AUTO: 0.4 %
BASOPHILS NFR BLD AUTO: 0.5 % — SIGNIFICANT CHANGE UP (ref 0–2)
BLOOD GAS VENOUS COMPREHENSIVE RESULT: SIGNIFICANT CHANGE UP
BUN SERPL-MCNC: 69 MG/DL — HIGH (ref 7–23)
CALCIUM SERPL-MCNC: 8.9 MG/DL — SIGNIFICANT CHANGE UP (ref 8.4–10.5)
CHLORIDE SERPL-SCNC: 103 MMOL/L — SIGNIFICANT CHANGE UP (ref 98–107)
CO2 SERPL-SCNC: 21 MMOL/L — LOW (ref 22–31)
CREAT SERPL-MCNC: 2.5 MG/DL — HIGH (ref 0.5–1.3)
EGFR: 20 ML/MIN/1.73M2 — LOW
EOSINOPHIL # BLD AUTO: 0.13 K/UL — SIGNIFICANT CHANGE UP (ref 0–0.5)
EOSINOPHIL # BLD AUTO: 0.18 K/UL
EOSINOPHIL NFR BLD AUTO: 3.3 % — SIGNIFICANT CHANGE UP (ref 0–6)
EOSINOPHIL NFR BLD AUTO: 6.6 %
GLUCOSE BLDC GLUCOMTR-MCNC: 101 MG/DL — HIGH (ref 70–99)
GLUCOSE BLDC GLUCOMTR-MCNC: 109 MG/DL — HIGH (ref 70–99)
GLUCOSE BLDC GLUCOMTR-MCNC: 132 MG/DL — HIGH (ref 70–99)
GLUCOSE BLDC GLUCOMTR-MCNC: 159 MG/DL — HIGH (ref 70–99)
GLUCOSE SERPL-MCNC: 97 MG/DL — SIGNIFICANT CHANGE UP (ref 70–99)
HCT VFR BLD CALC: 24.2 %
HCT VFR BLD CALC: 28.3 % — LOW (ref 34.5–45)
HGB BLD-MCNC: 7.3 G/DL
HGB BLD-MCNC: 8.4 G/DL — LOW (ref 11.5–15.5)
IANC: 2.69 K/UL — SIGNIFICANT CHANGE UP (ref 1.8–7.4)
IMM GRANULOCYTES NFR BLD AUTO: 1 % — HIGH (ref 0–0.9)
IMM GRANULOCYTES NFR BLD AUTO: 1.5 %
INR BLD: 1.03 RATIO — SIGNIFICANT CHANGE UP (ref 0.88–1.16)
LYMPHOCYTES # BLD AUTO: 0.56 K/UL — LOW (ref 1–3.3)
LYMPHOCYTES # BLD AUTO: 0.82 K/UL
LYMPHOCYTES # BLD AUTO: 14.3 % — SIGNIFICANT CHANGE UP (ref 13–44)
LYMPHOCYTES NFR BLD AUTO: 30 %
MAGNESIUM SERPL-MCNC: 2.2 MG/DL — SIGNIFICANT CHANGE UP (ref 1.6–2.6)
MAN DIFF?: NORMAL
MCHC RBC-ENTMCNC: 25.6 PG — LOW (ref 27–34)
MCHC RBC-ENTMCNC: 26.2 PG
MCHC RBC-ENTMCNC: 29.7 GM/DL — LOW (ref 32–36)
MCHC RBC-ENTMCNC: 30.2 GM/DL
MCV RBC AUTO: 86.3 FL — SIGNIFICANT CHANGE UP (ref 80–100)
MCV RBC AUTO: 86.7 FL
MONOCYTES # BLD AUTO: 0.32 K/UL
MONOCYTES # BLD AUTO: 0.47 K/UL — SIGNIFICANT CHANGE UP (ref 0–0.9)
MONOCYTES NFR BLD AUTO: 11.7 %
MONOCYTES NFR BLD AUTO: 12 % — SIGNIFICANT CHANGE UP (ref 2–14)
NEUTROPHILS # BLD AUTO: 1.36 K/UL
NEUTROPHILS # BLD AUTO: 2.69 K/UL — SIGNIFICANT CHANGE UP (ref 1.8–7.4)
NEUTROPHILS NFR BLD AUTO: 49.8 %
NEUTROPHILS NFR BLD AUTO: 68.9 % — SIGNIFICANT CHANGE UP (ref 43–77)
NRBC # BLD: 0 /100 WBCS — SIGNIFICANT CHANGE UP (ref 0–0)
NRBC # FLD: 0 K/UL — SIGNIFICANT CHANGE UP (ref 0–0)
PHOSPHATE SERPL-MCNC: 5.3 MG/DL — HIGH (ref 2.5–4.5)
PLATELET # BLD AUTO: 181 K/UL
PLATELET # BLD AUTO: 211 K/UL — SIGNIFICANT CHANGE UP (ref 150–400)
POTASSIUM SERPL-MCNC: 4.4 MMOL/L — SIGNIFICANT CHANGE UP (ref 3.5–5.3)
POTASSIUM SERPL-SCNC: 4.4 MMOL/L — SIGNIFICANT CHANGE UP (ref 3.5–5.3)
PROTHROM AB SERPL-ACNC: 11.9 SEC — SIGNIFICANT CHANGE UP (ref 10.5–13.4)
RBC # BLD: 2.79 M/UL
RBC # BLD: 3.28 M/UL — LOW (ref 3.8–5.2)
RBC # FLD: 17.7 % — HIGH (ref 10.3–14.5)
RBC # FLD: 17.9 %
SODIUM SERPL-SCNC: 139 MMOL/L — SIGNIFICANT CHANGE UP (ref 135–145)
WBC # BLD: 3.91 K/UL — SIGNIFICANT CHANGE UP (ref 3.8–10.5)
WBC # FLD AUTO: 2.73 K/UL
WBC # FLD AUTO: 3.91 K/UL — SIGNIFICANT CHANGE UP (ref 3.8–10.5)

## 2022-11-21 PROCEDURE — 99233 SBSQ HOSP IP/OBS HIGH 50: CPT

## 2022-11-21 PROCEDURE — 76770 US EXAM ABDO BACK WALL COMP: CPT | Mod: 26

## 2022-11-21 PROCEDURE — 99222 1ST HOSP IP/OBS MODERATE 55: CPT

## 2022-11-21 RX ORDER — INSULIN LISPRO 100/ML
VIAL (ML) SUBCUTANEOUS
Refills: 0 | Status: DISCONTINUED | OUTPATIENT
Start: 2022-11-21 | End: 2022-11-22

## 2022-11-21 RX ADMIN — ATORVASTATIN CALCIUM 40 MILLIGRAM(S): 80 TABLET, FILM COATED ORAL at 22:03

## 2022-11-21 RX ADMIN — ALBUTEROL 2 PUFF(S): 90 AEROSOL, METERED ORAL at 22:26

## 2022-11-21 RX ADMIN — ALBUTEROL 2 PUFF(S): 90 AEROSOL, METERED ORAL at 10:45

## 2022-11-21 RX ADMIN — Medication 81 MILLIGRAM(S): at 13:14

## 2022-11-21 RX ADMIN — GABAPENTIN 100 MILLIGRAM(S): 400 CAPSULE ORAL at 13:15

## 2022-11-21 RX ADMIN — Medication 1 PUFF(S): at 16:39

## 2022-11-21 RX ADMIN — HEPARIN SODIUM 5000 UNIT(S): 5000 INJECTION INTRAVENOUS; SUBCUTANEOUS at 22:03

## 2022-11-21 RX ADMIN — Medication 1 PUFF(S): at 04:46

## 2022-11-21 RX ADMIN — Medication 1 PUFF(S): at 10:45

## 2022-11-21 RX ADMIN — ALBUTEROL 2 PUFF(S): 90 AEROSOL, METERED ORAL at 04:45

## 2022-11-21 RX ADMIN — HEPARIN SODIUM 5000 UNIT(S): 5000 INJECTION INTRAVENOUS; SUBCUTANEOUS at 13:14

## 2022-11-21 RX ADMIN — ALBUTEROL 2 PUFF(S): 90 AEROSOL, METERED ORAL at 16:38

## 2022-11-21 RX ADMIN — HEPARIN SODIUM 5000 UNIT(S): 5000 INJECTION INTRAVENOUS; SUBCUTANEOUS at 05:03

## 2022-11-21 RX ADMIN — CEFTRIAXONE 100 MILLIGRAM(S): 500 INJECTION, POWDER, FOR SOLUTION INTRAMUSCULAR; INTRAVENOUS at 16:51

## 2022-11-21 RX ADMIN — Medication 1 PUFF(S): at 22:26

## 2022-11-21 RX ADMIN — CHLORHEXIDINE GLUCONATE 1 APPLICATION(S): 213 SOLUTION TOPICAL at 05:04

## 2022-11-21 NOTE — PROGRESS NOTE ADULT - NS ATTEND AMEND GEN_ALL_CORE FT
67 year old female with history of HTN, HLD, CHF, PEA, sepsis secondary to UTI, and sarcoidosis presented initially with MANDY on CKD. Found to have an acute encephalopathy, sepsis possibly due to UTI, and acute hypercapnic respiratory failure requiring brief period of intubation and mechanical ventilation. Following extubation required NIV. Transferred to RCU today (11/21). Mentation is now improved. Creatinine improving as well. Nephrology input appreciated. Follow up repeat serological work up. Maintain euvolemia. Monitor UOP. Continue empiric ceftriaxone. Follow up blood cultures. Urine culture is negative however. Continue supportive care, DVT PPX. Full Code. 67 year old female with history of HTN, HLD, CHF, PEA, sepsis secondary to UTI, and sarcoidosis presented initially with MANDY. Found to have an acute encephalopathy, sepsis possibly due to UTI, and acute  hypercapnic respiratory failure requiring brief period of intubation and mechanical ventilation. There is possible component of acute on chronic diastolic HF as well. Following extubation required NIV. Transferred to RCU today (11/21). Mentation is now improved. Creatinine improving as well. Nephrology input appreciated. Follow up repeat serological work up. Maintain euvolemia. Monitor UOP. Continue empiric ceftriaxone. Follow up blood cultures. Urine culture is negative however. Continue supportive care, DVT PPX. Full Code.

## 2022-11-21 NOTE — PROGRESS NOTE ADULT - SUBJECTIVE AND OBJECTIVE BOX
CHIEF COMPLAINT: Patient is a 67y old  Female who presents with a chief complaint of MANDY on CKD, abnormal VS, altered mental status (2022 15:43)      Interval Events:    REVIEW OF SYSTEMS:  [ ] All other systems negative  [ ] Unable to assess ROS because ________          OBJECTIVE:  ICU Vital Signs Last 24 Hrs  T(C): 36.7 (2022 05:19), Max: 36.7 (2022 18:00)  T(F): 98.1 (2022 05:19), Max: 98.1 (2022 05:19)  HR: 70 (2022 05:19) (69 - 86)  BP: 142/70 (2022 05:19) (141/64 - 152/73)  BP(mean): 87 (2022 18:00) (84 - 100)  ABP: --  ABP(mean): --  RR: 20 (2022 05:19) (16 - 20)  SpO2: 96% (2022 05:19) (93% - 98%)    O2 Parameters below as of 2022 05:19  Patient On (Oxygen Delivery Method): nasal cannula    O2 Concentration (%): 4          -20 @ 07:01  -  11- @ 07:00  --------------------------------------------------------  IN: 500 mL / OUT: 1635 mL / NET: -1135 mL      CAPILLARY BLOOD GLUCOSE      POCT Blood Glucose.: 114 mg/dL (2022 23:14)      HOSPITAL MEDICATIONS:  MEDICATIONS  (STANDING):  albuterol    90 MICROgram(s) HFA Inhaler 2 Puff(s) Inhalation every 6 hours  aspirin  chewable 81 milliGRAM(s) Oral daily  atorvastatin 40 milliGRAM(s) Oral at bedtime  cefTRIAXone   IVPB 1000 milliGRAM(s) IV Intermittent every 24 hours  chlorhexidine 2% Cloths 1 Application(s) Topical <User Schedule>  dextrose 5%. 1000 milliLiter(s) (50 mL/Hr) IV Continuous <Continuous>  dextrose 5%. 1000 milliLiter(s) (100 mL/Hr) IV Continuous <Continuous>  dextrose 50% Injectable 25 Gram(s) IV Push once  dextrose 50% Injectable 12.5 Gram(s) IV Push once  dextrose 50% Injectable 25 Gram(s) IV Push once  gabapentin Solution 100 milliGRAM(s) Oral daily  glucagon  Injectable 1 milliGRAM(s) IntraMuscular once  heparin   Injectable 5000 Unit(s) SubCutaneous every 8 hours  insulin lispro (ADMELOG) corrective regimen sliding scale   SubCutaneous Before meals and at bedtime  ipratropium 17 MICROgram(s) HFA Inhaler 1 Puff(s) Inhalation every 6 hours    MEDICATIONS  (PRN):  acetaminophen     Tablet .. 650 milliGRAM(s) Oral every 6 hours PRN Mild Pain (1 - 3)  dextrose Oral Gel 15 Gram(s) Oral once PRN Blood Glucose LESS THAN 70 milliGRAM(s)/deciliter      LABS:                        8.4    3.91  )-----------( 211      ( 2022 04:42 )             28.3         139  |  103  |  69<H>  ----------------------------<  97  4.4   |  21<L>  |  2.50<H>    Ca    8.9      2022 04:42  Phos  5.3       Mg     2.20         TPro  7.0  /  Alb  3.5  /  TBili  0.3  /  DBili  x   /  AST  26  /  ALT  25  /  AlkPhos  93  11-    PT/INR - ( 2022 04:42 )   PT: 11.9 sec;   INR: 1.03 ratio         PTT - ( 2022 04:42 )  PTT:33.0 sec      Venous Blood Gas:   @ 04:42  7.27/60/55/28/87.8  VBG Lactate: 0.5  Venous Blood Gas:   @ 00:35  7.28/57/58/27/90.1  VBG Lactate: 0.6  Venous Blood Gas:   @ 16:32  7.26/60/56/27/86.0  VBG Lactate: 0.6  Venous Blood Gas:   @ 08:13  7.25/62/77/27/96.0  VBG Lactate: 0.5      PAST MEDICAL & SURGICAL HISTORY:  Type 2 diabetes mellitus      Bilateral cataracts      Fluid in pleural cavity associated with pancreatitis      Pancreatic pseudocyst/cyst  s/p drain placement and removal      HTN (hypertension)      HLD (hyperlipidemia)      Sarcoid      Pulseless electrical activity      History of amputation of right great toe        H/O:  section        History of laparoscopic cholecystectomy          FAMILY HISTORY:      Social History:  Former smoker, quit , 5 pack years (2022 20:43)      RADIOLOGY:  [ ] Reviewed and interpreted by me    PULMONARY FUNCTION TESTS:    EKG: CHIEF COMPLAINT: Patient is a 67y old  Female who presents with a chief complaint of MANDY on CKD, abnormal VS, altered mental status (2022 15:43)      Interval Events:  Transferred from ICU  Wants to eat regular diet  Denies SOB    REVIEW OF SYSTEMS:  [x] All other systems negative  [ ] Unable to assess ROS because ________      OBJECTIVE:  ICU Vital Signs Last 24 Hrs  T(C): 36.7 (2022 05:19), Max: 36.7 (2022 18:00)  T(F): 98.1 (2022 05:19), Max: 98.1 (2022 05:19)  HR: 70 (2022 05:19) (69 - 86)  BP: 142/70 (2022 05:19) (141/64 - 152/73)  BP(mean): 87 (2022 18:00) (84 - 100)  ABP: --  ABP(mean): --  RR: 20 (2022 05:19) (16 - 20)  SpO2: 96% (2022 05:19) (93% - 98%)    O2 Parameters below as of 2022 05:19  Patient On (Oxygen Delivery Method): nasal cannula    O2 Concentration (%): 4    PHYSICAL EXAM:      Constitutional: NAD  Eyes: PERRL  Neck: Supple  Respiratory: CTAB anteriorly. Decreased at bases. No wheezing. Good air entry.  Cardiovascular: RRR, no murmur, no edema  Gastrointestinal: Soft, nontender, nondistended  Extremities: Warm  Neurological: Alert. Answering questions appropriately.  Skin: Intact       @ 07:01  -   @ 07:00  --------------------------------------------------------  IN: 500 mL / OUT: 1635 mL / NET: -1135 mL      CAPILLARY BLOOD GLUCOSE      POCT Blood Glucose.: 114 mg/dL (2022 23:14)      HOSPITAL MEDICATIONS:  MEDICATIONS  (STANDING):  albuterol    90 MICROgram(s) HFA Inhaler 2 Puff(s) Inhalation every 6 hours  aspirin  chewable 81 milliGRAM(s) Oral daily  atorvastatin 40 milliGRAM(s) Oral at bedtime  cefTRIAXone   IVPB 1000 milliGRAM(s) IV Intermittent every 24 hours  chlorhexidine 2% Cloths 1 Application(s) Topical <User Schedule>  dextrose 5%. 1000 milliLiter(s) (50 mL/Hr) IV Continuous <Continuous>  dextrose 5%. 1000 milliLiter(s) (100 mL/Hr) IV Continuous <Continuous>  dextrose 50% Injectable 25 Gram(s) IV Push once  dextrose 50% Injectable 12.5 Gram(s) IV Push once  dextrose 50% Injectable 25 Gram(s) IV Push once  gabapentin Solution 100 milliGRAM(s) Oral daily  glucagon  Injectable 1 milliGRAM(s) IntraMuscular once  heparin   Injectable 5000 Unit(s) SubCutaneous every 8 hours  insulin lispro (ADMELOG) corrective regimen sliding scale   SubCutaneous Before meals and at bedtime  ipratropium 17 MICROgram(s) HFA Inhaler 1 Puff(s) Inhalation every 6 hours    MEDICATIONS  (PRN):  acetaminophen     Tablet .. 650 milliGRAM(s) Oral every 6 hours PRN Mild Pain (1 - 3)  dextrose Oral Gel 15 Gram(s) Oral once PRN Blood Glucose LESS THAN 70 milliGRAM(s)/deciliter      LABS:                        8.4    3.91  )-----------( 211      ( 2022 04:42 )             28.3         139  |  103  |  69<H>  ----------------------------<  97  4.4   |  21<L>  |  2.50<H>    Ca    8.9      2022 04:42  Phos  5.3       Mg     2.20         TPro  7.0  /  Alb  3.5  /  TBili  0.3  /  DBili  x   /  AST  26  /  ALT  25  /  AlkPhos  93      PT/INR - ( 2022 04:42 )   PT: 11.9 sec;   INR: 1.03 ratio         PTT - ( 2022 04:42 )  PTT:33.0 sec      Venous Blood Gas:   @ 04:42  7.27/60/55/28/87.8  VBG Lactate: 0.5  Venous Blood Gas:   @ 00:35  7.28/57/58/27/90.1  VBG Lactate: 0.6  Venous Blood Gas:   @ 16:32  7.26/60/56/27/86.0  VBG Lactate: 0.6  Venous Blood Gas:   @ 08:13  7.25/62/77/27/96.0  VBG Lactate: 0.5      PAST MEDICAL & SURGICAL HISTORY:  Type 2 diabetes mellitus      Bilateral cataracts      Fluid in pleural cavity associated with pancreatitis      Pancreatic pseudocyst/cyst  s/p drain placement and removal      HTN (hypertension)      HLD (hyperlipidemia)      Sarcoid      Pulseless electrical activity      History of amputation of right great toe        H/O:  section        History of laparoscopic cholecystectomy      Social History:  Former smoker, quit , 5 pack years (2022 20:43)

## 2022-11-21 NOTE — PROGRESS NOTE ADULT - ATTENDING COMMENTS
long standing DM and proteinuria. new hematuria   A/CKD along with Acute hypercapneac respiratory failure, HF, sepsis, and hypotension s/p intubation and diuresis---> extubation   Sarcoidosis  currently on O2.   likely ATN but needs to pursue GN workup as well   suggest  serological workup as noted above   plan for kidney biopsy when stable.   Please stop Aspirin if deemed safe   continue with Bumex 2mg IV daily to keep O>I   avoid contrast studies, ACE-I, ARB, or NSAIDs  Assessment and plan as outlined above. Monitor labs and urine output. Avoid any potential nephrotoxins. Dose medications as per eGFR.  Further detailed plan of management and recommendation as outlined above by the renal fellow.

## 2022-11-21 NOTE — PROGRESS NOTE ADULT - ASSESSMENT
67F PMH HTN, HLD, sarcoidosis, CHF, PEA*2, sepsis 2/2 UTI (Klesiella, E. coli) ~9/2022 requiring intubation; presented to ED with MANDY on CKD found on outpatient labs and with abnormal VS, found to have AMS, AHRF, intubated, and admitted to MICU for further management. Possibly acute on chronic HF +/- sepsis precipitating MANDY on CKD.     #Neuro  Baseline: A&O*4  p/w AMS  Now at baseline mental status   - c/w home med gabapentin 100 qD    #Cardiovascular  Possibly acute on chronic HF iso sepsis  p/w eugenie, Glenn   Has been HD stable since admission without pressors   p/w Trop 100 and BNP 2081 ISO of MANDY  - POCUS 11/18: VTI 23 with good systolic fx, grossly nl RV and IVC 1.8cm  - Trop q8h  - did not respond that well to lasix ON. Responded well to Bumex.   - c/w home med: ASA 81 qD, atorvastatin 40 qD    #Respiratory  Hypoxic resp failure 2/2 volume overload?   Now diuresed appropriately   CXR c/w CHF   POCUS 11/19: with B lines R>L with right small effusion   - Saturating well on BiPAP   - Tried nasal cannual today, but had altered mental status with lethargy. Switch back to BiPAP   - continue to monitor ABGs      #GI/Nutrition  No active issues   - Now on regular diet   - C/w bowel regimen     #/Renal  P/w MANDY on CKD (Cr 3, baseline 1.2) - likely 2/2 sepsis vs volume overload   - Hold diureses   - Avoid nephrotoxic agents  - Replete lytes as appropriate   - Order renal ultrasound to evaluate     #Skin  - No active issues    #Immunologic  PMHx sarcoidosis   s/p Solumedrol  - Will hold off on further steroids   - MINOO, ESR, CRP    #ID  Currently afebrile and without leukocytosis   Possibly sepsis 2/2 UTI   p/w hypothermia, leukopenia  PMH sepsis 2/2 UTI Klebsiella and E. coli (ampicillin R)  - Will cont with ceftriaxone   - Send procalcitonin   - f/u BCx*2   - UCx wnl     #Endocrine  P/w bradycardia, hypotension  R/o hypothyroidism, Addisonian crisis   - s/p 1 dose Solumedrol (after cortisol drawn)  - Will hold off on further steroids as is normotensive   - cortisol 11/17  - TSH 4.77   - FT4 wnl   - PMH diabetes, previously on insulin but not recently since hypoglycemic  - ISS     #Hematologic/DVT ppx  Anemia   - Transfuse to Hgb goal > 7   - Iron studies wnl   - Send PTT     DVT Prophylaxis: heparin sc q8h, home ASA    67F PMH HTN, HLD, sarcoidosis, CHF, PEA*2, sepsis 2/2 UTI (Klesiella, E. coli) ~9/2022 requiring intubation; presented to ED with MANDY on CKD found on outpatient labs and with abnormal VS, found to have AMS, AHRF, intubated, and admitted to MICU for further management. Possibly acute on chronic HF +/- sepsis precipitating MANDY on CKD.     #Neuro  Baseline: A&O*4  p/w AMS  Now at baseline mental status   - c/w home med gabapentin 100 qD    #Cardiovascular  Possibly acute on chronic HF iso sepsis  p/w eugenieGlenn   Has been HD stable since admission without pressors   p/w Trop 100 and BNP 2081 ISO of MANDY  - POCUS 11/18: VTI 23 with good systolic fx, grossly nl RV and IVC 1.8cm  - did not respond that well to lasix ON. Responded well to Bumex.   - c/w home med: ASA 81 qD, atorvastatin 40 qD    #Respiratory  resp failure 2/2 volume overload?   Volume status improved   CXR c/w CHF   POCUS 11/19: with B lines R>L with right small effusion   - Continue NC. Wean to maintain SpO2 > 92%    #GI/Nutrition  No active issues   - Now on PO diet   - C/w bowel regimen     #/Renal  P/w MANDY on CKD (Cr 3, baseline 1.2) - likely 2/2 sepsis vs volume overload   - Avoid nephrotoxic agents  - Replete lytes as appropriate   - Order renal ultrasound to evaluate     #Skin  - No active issues    #Immunologic  PMHx sarcoidosis   s/p Solumedrol  - Will hold off on further steroids   - serological work up    #ID  Currently afebrile and without leukocytosis   Possibly sepsis 2/2 UTI   p/w hypothermia, leukopenia  PMH sepsis 2/2 UTI Klebsiella and E. coli (ampicillin R)  - Will cont with ceftriaxone   - Send procalcitonin   - f/u BCx*2   - UCx wnl     #Endocrine  P/w bradycardia, hypotension  R/o hypothyroidism, Addisonian crisis   - s/p 1 dose Solumedrol (after cortisol drawn)  - Will hold off on further steroids as is normotensive   - cortisol 11/17  - TSH 4.77   - FT4 wnl   - PMH diabetes, previously on insulin but not recently since hypoglycemic  - ISS     #Hematologic/DVT ppx  Anemia   - Transfuse to Hgb goal > 7   - Iron studies wnl   - Send PTT     DVT Prophylaxis: heparin sc q8h, home ASA

## 2022-11-21 NOTE — PROGRESS NOTE ADULT - SUBJECTIVE AND OBJECTIVE BOX
[facility] DIVISION OF KIDNEY DISEASES AND HYPERTENSION -- FOLLOW UP NOTE  --------------------------------------------------------------------------------  HPI: Patient is a 67 year old female with PMH of HTN, HLD, Sarcoidosis who presented to the hospital after outpatient labs demonstrated elevated Scr. On arrival to the ED the patient was noted to have AMS and was hypotensive; she was subsequently intubated and admitted to the MICU. She was successfully extubated shortly after. On review of [facility] the patient is noted to have a Scr of 1.2mg/dL in 9/2022, on admission it was elevated to 3.01mg/dL and has further increased to 3.27 on 11/20/22. Scr is elevated/stable at 2.50 today. Nephrology team following for MANDY.    On review of [facility], it was noted that the patient was followed by nephrology for MANDY and proteinuria. All serologic work up at that time was negative and it was presumed her renal disease was in the setting of her longstanding DM. She was advised to undergo a renal biopsy at that time, however the patient has not followed up with nephrology following discharge. The patient has had multiple admissions for hypoxic respiratory failure in setting of being volume overloaded and has required aggressive diuresis.     Pt. seen and examined at bedside. Pt. reports feeling well. Reports feeling hungry. Denies any SOB, CP, HA, N/V, abdominal pain , urinary symptoms or dizziness.     PAST HISTORY  --------------------------------------------------------------------------------  No significant changes to PMH, PSH, FHx, SHx, unless otherwise noted    ALLERGIES & MEDICATIONS  --------------------------------------------------------------------------------  Allergies    penicillin ([place] [facility])    Intolerances    Standing Inpatient Medications  albuterol    90 MICROgram(s) HFA Inhaler 2 Puff(s) Inhalation every 6 hours  aspirin  chewable 81 milliGRAM(s) Oral daily  atorvastatin 40 milliGRAM(s) Oral at bedtime  cefTRIAXone   IVPB 1000 milliGRAM(s) IV Intermittent every 24 hours  chlorhexidine 2% Cloths 1 Application(s) Topical <User Schedule>  dextrose 5%. 1000 milliLiter(s) IV Continuous <Continuous>  dextrose 5%. 1000 milliLiter(s) IV Continuous <Continuous>  dextrose 50% Injectable 25 Gram(s) IV Push once  dextrose 50% Injectable 25 Gram(s) IV Push once  dextrose 50% Injectable 12.5 Gram(s) IV Push once  gabapentin Solution 100 milliGRAM(s) Oral daily  glucagon  Injectable 1 milliGRAM(s) IntraMuscular once  heparin   Injectable 5000 Unit(s) SubCutaneous every 8 hours  insulin lispro (ADMELOG) corrective regimen sliding scale   SubCutaneous Before meals and at bedtime  ipratropium 17 MICROgram(s) HFA Inhaler 1 Puff(s) Inhalation every 6 hours    PRN Inpatient Medications  acetaminophen     Tablet .. 650 milliGRAM(s) Oral every 6 hours PRN  dextrose Oral Gel 15 Gram(s) Oral once PRN    REVIEW OF SYSTEMS  --------------------------------------------------------------------------------  Gen: No fevers   Respiratory: No dyspnea  CV: No chest pain  GI: No abdominal pain  : No dysuria  MSK: No  edema  Neuro: no dizziness   All other systems were reviewed and are negative, except as noted.    VITALS/PHYSICAL EXAM  --------------------------------------------------------------------------------  T(C): 36.7 (11-21-22 @ 08:00), Max: 36.7 (11-20-22 @ 18:00)  HR: 78 (11-21-22 @ 08:00) (69 - 82)  BP: 152/71 (11-21-22 @ 08:00) (141/64 - 152/73)  RR: 22 (11-21-22 @ 08:00) (16 - 22)  SpO2: 96% (11-21-22 @ 08:00) (93% - 98%)  Wt(kg): --    11-20-22 @ 07:01  -  11-21-22 @ 07:00  --------------------------------------------------------  IN: 500 mL / OUT: 1635 mL / NET: -1135 mL    Physical Exam:  	Gen: ill appearing  	HEENT: MMM  	Pulm: minimal basal crackles in lung fields  	CV: S1S2  	Abd: Soft, +BS   	Ext: No LE edema B/L  	Neuro: Awake and alert  	Skin: Warm and dry    LABS/STUDIES  --------------------------------------------------------------------------------              8.4    3.91  >-----------<  211      [11-21-22 @ 04:42]              28.3     139  |  103  |  69  ----------------------------<  97      [11-21-22 @ 04:42]  4.4   |  21  |  2.50        Ca     8.9     [11-21-22 @ 04:42]      Mg     2.20     [11-21-22 @ 04:42]      Phos  5.3     [11-21-22 @ 04:42]    TPro  7.0  /  Alb  3.5  /  TBili  0.3  /  DBili  x   /  AST  26  /  ALT  25  /  AlkPhos  93  [11-20-22 @ 00:14]    PT/INR: PT 11.9 , INR 1.03       [11-21-22 @ 04:42]  PTT: 33.0       [11-21-22 @ 04:42]    Creatinine Trend:  SCr 2.50 [11-21 @ 04:42]  SCr 3.27 [11-20 @ 00:14]  SCr 3.14 [11-19 @ 02:58]  SCr 3.15 [11-18 @ 03:53]  SCr 3.01 [11-17 @ 18:00]    Urinalysis - [11-17-22 @ 22:52]      Color Yellow / Appearance Slightly Turbid / SG 1.027 / pH 6.0      Gluc Trace / Ketone Negative  / Bili Negative / Urobili 3 mg/dL       Blood Moderate / Protein 300 mg/dL / Leuk Est Large / Nitrite Negative       /  / Hyaline 2 / Gran  / Sq Epi  / Non Sq Epi 7 / Bacteria Negative    Urine Creatinine 140      [11-18-22 @ 03:35]  Urine Protein 530      [11-18-22 @ 03:35]  Urine Sodium <20      [11-18-22 @ 03:35]  Urine Urea Nitrogen 406.2      [11-18-22 @ 03:35]  Urine Potassium 68.7      [11-18-22 @ 03:35]    Iron 68, TIBC 312, %sat 22      [11-19-22 @ 02:58]  Ferritin 113      [11-19-22 @ 02:58]  TSH 4.77      [11-17-22 @ 18:00]  Lipid: chol 236, , HDL 37, LDL --      [11-29-21 @ 06:47]

## 2022-11-21 NOTE — PROGRESS NOTE ADULT - PROBLEM SELECTOR PLAN 1
Pt. with MANDY suspected in setting of hemodynamics as she was noted to be very hypotensive on arrival in ED vs volume overload. ATN? [facility] patient is noted to have a SCr of 1.2mg/dL in 9/2022, on admission it was elevated to 3.01mg/dL and has further increased to 3.27mg/dL on 11/20/22. Scr is elevated/stable at 2.50 today.    UA with significant proteinuria (noted previously as well) and significant hematuria (new from previous urine studies). In the past all serological work up has been negative and it was presumed renal disease was in setting of her DM.Given new hematuria and significant MANDY would recommend to repeat serological work up including: HIV, Hep Panel, MINOO, cANCA, pANCA, dsDNA, C3, C4, Serum immunofixation, SPEP, Serum free light chains, PLA2R ab, RPR, Rh Factor, and anti-GBM. Kidney sonogram suggestive of B/L renal parenchymal disease.     Labs reviewed. Pt received bumex IV 2 mg once yesterday (11/20/22). Pt non oliguric, UOP ~ 1.6 L in last 24 hrs. Pt clinically in fluid overload. Recommend bumex 2 mg IV once today. Monitor labs and urine output. Avoid any potential nephrotoxins. Dose medications as per eGFR.     If you have any questions, please feel free to contact me  Desean De Leon  Nephrology Fellow  [phone]/ [employer] Teams(Preferred)  (After 5pm or on weekends please page the on-call fellow).

## 2022-11-22 LAB
ANION GAP SERPL CALC-SCNC: 12 MMOL/L — SIGNIFICANT CHANGE UP (ref 7–14)
APPEARANCE UR: CLEAR — SIGNIFICANT CHANGE UP
BACTERIA # UR AUTO: NEGATIVE — SIGNIFICANT CHANGE UP
BASE EXCESS BLDV CALC-SCNC: 1.8 MMOL/L — SIGNIFICANT CHANGE UP (ref -2–3)
BASOPHILS # BLD AUTO: 0 K/UL — SIGNIFICANT CHANGE UP (ref 0–0.2)
BASOPHILS NFR BLD AUTO: 0 % — SIGNIFICANT CHANGE UP (ref 0–2)
BILIRUB UR-MCNC: NEGATIVE — SIGNIFICANT CHANGE UP
BUN SERPL-MCNC: 59 MG/DL — HIGH (ref 7–23)
C3 SERPL-MCNC: 133 MG/DL — SIGNIFICANT CHANGE UP (ref 90–180)
C4 SERPL-MCNC: 33 MG/DL — SIGNIFICANT CHANGE UP (ref 10–40)
CA-I SERPL-SCNC: 1.21 MMOL/L — SIGNIFICANT CHANGE UP (ref 1.15–1.33)
CALCIUM SERPL-MCNC: 8.8 MG/DL — SIGNIFICANT CHANGE UP (ref 8.4–10.5)
CHLORIDE BLDV-SCNC: 108 MMOL/L — SIGNIFICANT CHANGE UP (ref 96–108)
CHLORIDE SERPL-SCNC: 105 MMOL/L — SIGNIFICANT CHANGE UP (ref 98–107)
CO2 BLDV-SCNC: 31.2 MMOL/L — HIGH (ref 22–26)
CO2 SERPL-SCNC: 26 MMOL/L — SIGNIFICANT CHANGE UP (ref 22–31)
COLOR SPEC: SIGNIFICANT CHANGE UP
CREAT ?TM UR-MCNC: 65 MG/DL — SIGNIFICANT CHANGE UP
CREAT SERPL-MCNC: 1.98 MG/DL — HIGH (ref 0.5–1.3)
DIFF PNL FLD: ABNORMAL
EGFR: 27 ML/MIN/1.73M2 — LOW
EOSINOPHIL # BLD AUTO: 0.06 K/UL — SIGNIFICANT CHANGE UP (ref 0–0.5)
EOSINOPHIL NFR BLD AUTO: 1.7 % — SIGNIFICANT CHANGE UP (ref 0–6)
EPI CELLS # UR: 2 /HPF — SIGNIFICANT CHANGE UP (ref 0–5)
GAS PNL BLDV: 142 MMOL/L — SIGNIFICANT CHANGE UP (ref 136–145)
GAS PNL BLDV: SIGNIFICANT CHANGE UP
GLUCOSE BLDC GLUCOMTR-MCNC: 124 MG/DL — HIGH (ref 70–99)
GLUCOSE BLDC GLUCOMTR-MCNC: 124 MG/DL — HIGH (ref 70–99)
GLUCOSE BLDC GLUCOMTR-MCNC: 137 MG/DL — HIGH (ref 70–99)
GLUCOSE BLDV-MCNC: 136 MG/DL — HIGH (ref 70–99)
GLUCOSE SERPL-MCNC: 152 MG/DL — HIGH (ref 70–99)
GLUCOSE UR QL: ABNORMAL
HAV IGM SER-ACNC: SIGNIFICANT CHANGE UP
HBV CORE IGM SER-ACNC: SIGNIFICANT CHANGE UP
HBV SURFACE AG SER-ACNC: SIGNIFICANT CHANGE UP
HCO3 BLDV-SCNC: 29 MMOL/L — SIGNIFICANT CHANGE UP (ref 22–29)
HCT VFR BLD CALC: 29.6 % — LOW (ref 34.5–45)
HCT VFR BLDA CALC: 29 % — LOW (ref 34.5–46.5)
HCV AB S/CO SERPL IA: 0.13 S/CO — SIGNIFICANT CHANGE UP (ref 0–0.99)
HCV AB SERPL-IMP: SIGNIFICANT CHANGE UP
HGB BLD CALC-MCNC: 9.7 G/DL — LOW (ref 11.5–15.5)
HGB BLD-MCNC: 9 G/DL — LOW (ref 11.5–15.5)
HIV 1+2 AB+HIV1 P24 AG SERPL QL IA: SIGNIFICANT CHANGE UP
HYALINE CASTS # UR AUTO: 2 /LPF — SIGNIFICANT CHANGE UP (ref 0–7)
IANC: 2.63 K/UL — SIGNIFICANT CHANGE UP (ref 1.8–7.4)
KAPPA LC SER QL IFE: 12.79 MG/DL — HIGH (ref 0.33–1.94)
KAPPA/LAMBDA FREE LIGHT CHAIN RATIO, SERUM: 2.75 RATIO — HIGH (ref 0.26–1.65)
KETONES UR-MCNC: NEGATIVE — SIGNIFICANT CHANGE UP
LACTATE BLDV-MCNC: 0.4 MMOL/L — LOW (ref 0.5–2)
LAMBDA LC SER QL IFE: 4.65 MG/DL — HIGH (ref 0.57–2.63)
LEUKOCYTE ESTERASE UR-ACNC: NEGATIVE — SIGNIFICANT CHANGE UP
LYMPHOCYTES # BLD AUTO: 0.5 K/UL — LOW (ref 1–3.3)
LYMPHOCYTES # BLD AUTO: 13.1 % — SIGNIFICANT CHANGE UP (ref 13–44)
MAGNESIUM SERPL-MCNC: 2.2 MG/DL — SIGNIFICANT CHANGE UP (ref 1.6–2.6)
MCHC RBC-ENTMCNC: 26 PG — LOW (ref 27–34)
MCHC RBC-ENTMCNC: 30.4 GM/DL — LOW (ref 32–36)
MCV RBC AUTO: 85.5 FL — SIGNIFICANT CHANGE UP (ref 80–100)
MONOCYTES # BLD AUTO: 0.2 K/UL — SIGNIFICANT CHANGE UP (ref 0–0.9)
MONOCYTES NFR BLD AUTO: 5.2 % — SIGNIFICANT CHANGE UP (ref 2–14)
NEUTROPHILS # BLD AUTO: 3 K/UL — SIGNIFICANT CHANGE UP (ref 1.8–7.4)
NEUTROPHILS NFR BLD AUTO: 79.1 % — HIGH (ref 43–77)
NITRITE UR-MCNC: NEGATIVE — SIGNIFICANT CHANGE UP
PCO2 BLDV: 61 MMHG — HIGH (ref 39–42)
PH BLDV: 7.29 — LOW (ref 7.32–7.43)
PH UR: 6 — SIGNIFICANT CHANGE UP (ref 5–8)
PHOSPHATE SERPL-MCNC: 4.2 MG/DL — SIGNIFICANT CHANGE UP (ref 2.5–4.5)
PLATELET # BLD AUTO: 210 K/UL — SIGNIFICANT CHANGE UP (ref 150–400)
PO2 BLDV: 73 MMHG — SIGNIFICANT CHANGE UP
POTASSIUM BLDV-SCNC: 4.6 MMOL/L — SIGNIFICANT CHANGE UP (ref 3.5–5.1)
POTASSIUM SERPL-MCNC: 4.7 MMOL/L — SIGNIFICANT CHANGE UP (ref 3.5–5.3)
POTASSIUM SERPL-SCNC: 4.7 MMOL/L — SIGNIFICANT CHANGE UP (ref 3.5–5.3)
PROT ?TM UR-MCNC: 522 MG/DL — SIGNIFICANT CHANGE UP
PROT SERPL-MCNC: 7.8 G/DL — SIGNIFICANT CHANGE UP (ref 6–8.3)
PROT UR-MCNC: ABNORMAL
PROT/CREAT UR-RTO: 8 RATIO — HIGH (ref 0–0.2)
RBC # BLD: 3.46 M/UL — LOW (ref 3.8–5.2)
RBC # FLD: 17.5 % — HIGH (ref 10.3–14.5)
RBC CASTS # UR COMP ASSIST: 4 /HPF — SIGNIFICANT CHANGE UP (ref 0–4)
RHEUMATOID FACT SERPL-ACNC: 17 IU/ML — HIGH (ref 0–13)
SAO2 % BLDV: 94.4 % — SIGNIFICANT CHANGE UP
SODIUM SERPL-SCNC: 143 MMOL/L — SIGNIFICANT CHANGE UP (ref 135–145)
SP GR SPEC: 1.02 — SIGNIFICANT CHANGE UP (ref 1.01–1.05)
T PALLIDUM AB TITR SER: NEGATIVE — SIGNIFICANT CHANGE UP
UROBILINOGEN FLD QL: SIGNIFICANT CHANGE UP
WBC # BLD: 3.79 K/UL — LOW (ref 3.8–10.5)
WBC # FLD AUTO: 3.79 K/UL — LOW (ref 3.8–10.5)
WBC UR QL: 9 /HPF — HIGH (ref 0–5)

## 2022-11-22 PROCEDURE — 99233 SBSQ HOSP IP/OBS HIGH 50: CPT | Mod: GC

## 2022-11-22 PROCEDURE — 86334 IMMUNOFIX E-PHORESIS SERUM: CPT | Mod: 26

## 2022-11-22 PROCEDURE — 84165 PROTEIN E-PHORESIS SERUM: CPT | Mod: 26

## 2022-11-22 PROCEDURE — 99233 SBSQ HOSP IP/OBS HIGH 50: CPT

## 2022-11-22 RX ORDER — AMLODIPINE BESYLATE 2.5 MG/1
10 TABLET ORAL DAILY
Refills: 0 | Status: DISCONTINUED | OUTPATIENT
Start: 2022-11-22 | End: 2022-12-09

## 2022-11-22 RX ORDER — HYDRALAZINE HCL 50 MG
5 TABLET ORAL ONCE
Refills: 0 | Status: COMPLETED | OUTPATIENT
Start: 2022-11-22 | End: 2022-11-22

## 2022-11-22 RX ORDER — AMLODIPINE BESYLATE 2.5 MG/1
10 TABLET ORAL DAILY
Refills: 0 | Status: DISCONTINUED | OUTPATIENT
Start: 2022-11-22 | End: 2022-11-22

## 2022-11-22 RX ORDER — ASPIRIN/CALCIUM CARB/MAGNESIUM 324 MG
81 TABLET ORAL DAILY
Refills: 0 | Status: DISCONTINUED | OUTPATIENT
Start: 2022-11-22 | End: 2022-11-24

## 2022-11-22 RX ORDER — GABAPENTIN 400 MG/1
100 CAPSULE ORAL DAILY
Refills: 0 | Status: DISCONTINUED | OUTPATIENT
Start: 2022-11-22 | End: 2022-11-25

## 2022-11-22 RX ADMIN — AMLODIPINE BESYLATE 10 MILLIGRAM(S): 2.5 TABLET ORAL at 17:35

## 2022-11-22 RX ADMIN — Medication 5 MILLIGRAM(S): at 17:04

## 2022-11-22 RX ADMIN — ALBUTEROL 2 PUFF(S): 90 AEROSOL, METERED ORAL at 22:55

## 2022-11-22 RX ADMIN — ALBUTEROL 2 PUFF(S): 90 AEROSOL, METERED ORAL at 04:41

## 2022-11-22 RX ADMIN — Medication 81 MILLIGRAM(S): at 11:39

## 2022-11-22 RX ADMIN — ATORVASTATIN CALCIUM 40 MILLIGRAM(S): 80 TABLET, FILM COATED ORAL at 22:54

## 2022-11-22 RX ADMIN — HEPARIN SODIUM 5000 UNIT(S): 5000 INJECTION INTRAVENOUS; SUBCUTANEOUS at 14:47

## 2022-11-22 RX ADMIN — ALBUTEROL 2 PUFF(S): 90 AEROSOL, METERED ORAL at 16:41

## 2022-11-22 RX ADMIN — HEPARIN SODIUM 5000 UNIT(S): 5000 INJECTION INTRAVENOUS; SUBCUTANEOUS at 22:54

## 2022-11-22 RX ADMIN — Medication 1 PUFF(S): at 16:41

## 2022-11-22 RX ADMIN — Medication 1 PUFF(S): at 22:55

## 2022-11-22 RX ADMIN — Medication 1 PUFF(S): at 04:41

## 2022-11-22 RX ADMIN — HEPARIN SODIUM 5000 UNIT(S): 5000 INJECTION INTRAVENOUS; SUBCUTANEOUS at 05:10

## 2022-11-22 RX ADMIN — GABAPENTIN 100 MILLIGRAM(S): 400 CAPSULE ORAL at 11:39

## 2022-11-22 RX ADMIN — Medication 1 PUFF(S): at 08:25

## 2022-11-22 RX ADMIN — CHLORHEXIDINE GLUCONATE 1 APPLICATION(S): 213 SOLUTION TOPICAL at 05:10

## 2022-11-22 RX ADMIN — Medication 5 MILLIGRAM(S): at 15:39

## 2022-11-22 RX ADMIN — ALBUTEROL 2 PUFF(S): 90 AEROSOL, METERED ORAL at 08:25

## 2022-11-22 NOTE — PROGRESS NOTE ADULT - ATTENDING COMMENTS
long standing DM and proteinuria. new hematuria   A/CKD along with Acute hypercapnic respiratory failure, HF, sepsis, and hypotension s/p intubation and diuresis---> extubation   Sarcoidosis  currently on O2.   likely ATN but needs to pursue GN workup as well   suggest  serological workup as noted above   plan for kidney biopsy when stable.   Please stop Aspirin if deemed safe   continue with Bumex 2mg IV daily to keep O>I   avoid contrast studies, ACE-I, ARB, or NSAIDs  Assessment and plan as outlined above. Monitor labs and urine output. Avoid any potential nephrotoxins. Dose medications as per eGFR.  Further detailed plan of management and recommendation as outlined above by the renal fellow.

## 2022-11-22 NOTE — CHART NOTE - NSCHARTNOTEFT_GEN_A_CORE
[facility] PA NOTE     Called by RN for patient with hypertension to /89 and asymptomatic.   Will given Hydral 5mg IVP x 1 dose and resume home Norvasc 10mg QD.   Monitor BP.    Philipp Christine PA-C  Department of Medicine/ [facility]  In house Beeper #[id number]  Reachable via teams

## 2022-11-22 NOTE — PHYSICAL THERAPY INITIAL EVALUATION ADULT - PERTINENT HX OF CURRENT PROBLEM, REHAB EVAL
67 year old female presented to ED with MANDY on CKD found on outpatient labs and with abnormal vital signs, found to have AMS, AHRF, intubated, and admitted to MICU for further management. Extubated 11/18/22.

## 2022-11-22 NOTE — PROGRESS NOTE ADULT - PROBLEM SELECTOR PLAN 1
Pt. with MANDY suspected in setting of hemodynamics as she was noted to be very hypotensive on arrival in ED vs volume overload. ATN? On review of [facility] the patient is noted to have a SCr of 1.2mg/dL in 9/2022, on admission it was elevated to 3.01mg/dL and has further increased to 3.27mg/dL on 11/20/22. Scr is elevated/improved to 1.98 today.    UA with significant proteinuria (noted previously as well) and significant hematuria (new from previous urine studies). In the past all serological work up has been negative and it was presumed renal disease was in setting of her DM. Given new hematuria and significant MANDY would recommend to repeat serological work up including: HIV (negative), Hep Panel, MINOO (negative), cANCA, pANCA, dsDNA, C3 (not low), C4 (not low), Serum immunofixation, SPEP, Serum free light chains, PLA2R ab, RPR, Rh Factor, and anti-GBM. Kidney sonogram suggestive of B/L renal parenchymal disease.     Labs reviewed. Pt received bumex IV 2 mg once on 11/20/22. Pt non oliguric, UOP ~ 1.1 L in last 24 hrs. Pt clinically in fluid overload. Recommend bumex 2 mg IV once today. Monitor labs and urine output. Avoid any potential nephrotoxins. Dose medications as per eGFR.     If you have any questions, please feel free to contact me  Desean De Leon  Nephrology Fellow  [phone]/ [employer] Teams(Preferred)  (After 5pm or on weekends please page the on-call fellow).

## 2022-11-22 NOTE — PROGRESS NOTE ADULT - NS ATTEND AMEND GEN_ALL_CORE FT
67 year old female with history of HTN, HLD, CHF, PEA, sepsis secondary to UTI, and sarcoidosis presented initially with MANDY. Found to have an acute encephalopathy, sepsis due to suspected UTI, and acute hypercapnic respiratory failure requiring brief period of intubation and mechanical ventilation. There is possible component of acute on chronic diastolic HF as well. Following extubation required NIV. Transferred to RCU 11/21. Mentation is now improved. Monitor off NIV. Creatinine improving as well. Nephrology input appreciated. Follow up repeat serological work up. Plan for renal biopsy. Maintain euvolemia. Monitor UOP. Continue empiric ceftriaxone. Follow up blood cultures. Urine culture is negative. Continue supportive care, DVT PPX. Full Code.

## 2022-11-22 NOTE — PROGRESS NOTE ADULT - SUBJECTIVE AND OBJECTIVE BOX
[facility] DIVISION OF KIDNEY DISEASES AND HYPERTENSION -- FOLLOW UP NOTE  --------------------------------------------------------------------------------  HPI: Patient is a 67 year old female with PMH of HTN, HLD, Sarcoidosis who presented to the hospital after outpatient labs demonstrated elevated Scr. On arrival to the ED the patient was noted to have AMS and was hypotensive; she was subsequently intubated and admitted to the MICU. She was successfully extubated shortly after. On review of [facility] the patient is noted to have a Scr of 1.2mg/dL in 9/2022, on admission it was elevated to 3.01mg/dL and has further increased to 3.27 on 11/20/22. Scr is elevated/improved to 1.98 today. Nephrology team following for MANDY.    On review of [facility], it was noted that the patient was followed by nephrology for MANDY and proteinuria. All serologic work up at that time was negative and it was presumed her renal disease was in the setting of her longstanding DM. She was advised to undergo a renal biopsy at that time, however the patient has not followed up with nephrology following discharge. The patient has had multiple admissions for hypoxic respiratory failure in setting of being volume overloaded and has required aggressive diuresis.     Pt. seen and examined at bedside. Pt. reports feeling well. Reports feeling hungry. Denies any SOB, CP, HA, N/V, abdominal pain , urinary symptoms or dizziness.    PAST HISTORY  --------------------------------------------------------------------------------  No significant changes to PMH, PSH, FHx, SHx, unless otherwise noted    ALLERGIES & MEDICATIONS  --------------------------------------------------------------------------------  Allergies    penicillin ([place] [facility])    Intolerances    Standing Inpatient Medications  albuterol    90 MICROgram(s) HFA Inhaler 2 Puff(s) Inhalation every 6 hours  aspirin enteric coated 81 milliGRAM(s) Oral daily  atorvastatin 40 milliGRAM(s) Oral at bedtime  chlorhexidine 2% Cloths 1 Application(s) Topical <User Schedule>  dextrose 5%. 1000 milliLiter(s) IV Continuous <Continuous>  dextrose 5%. 1000 milliLiter(s) IV Continuous <Continuous>  dextrose 50% Injectable 25 Gram(s) IV Push once  dextrose 50% Injectable 12.5 Gram(s) IV Push once  dextrose 50% Injectable 25 Gram(s) IV Push once  gabapentin 100 milliGRAM(s) Oral daily  glucagon  Injectable 1 milliGRAM(s) IntraMuscular once  heparin   Injectable 5000 Unit(s) SubCutaneous every 8 hours  ipratropium 17 MICROgram(s) HFA Inhaler 1 Puff(s) Inhalation every 6 hours    PRN Inpatient Medications  acetaminophen     Tablet .. 650 milliGRAM(s) Oral every 6 hours PRN  dextrose Oral Gel 15 Gram(s) Oral once PRN    REVIEW OF SYSTEMS  --------------------------------------------------------------------------------  Gen: No fevers   Respiratory: No dyspnea  CV: No chest pain  GI: No abdominal pain  : No dysuria  MSK: No  edema  Neuro: no dizziness   All other systems were reviewed and are negative, except as noted.    VITALS/PHYSICAL EXAM  --------------------------------------------------------------------------------  T(C): 37 (11-22-22 @ 04:46), Max: 37 (11-22-22 @ 04:46)  HR: 76 (11-22-22 @ 08:25) (70 - 80)  BP: 155/70 (11-22-22 @ 04:46) (155/70 - 160/70)  RR: 19 (11-22-22 @ 04:46) (19 - 22)  SpO2: 100% (11-22-22 @ 08:25) (96% - 100%)  Wt(kg): --    11-21-22 @ 07:01  -  11-22-22 @ 07:00  --------------------------------------------------------  IN: 360 mL / OUT: 1150 mL / NET: -790 mL    Physical Exam:  	Gen: ill appearing  	HEENT: MMM  	Pulm: minimal basal crackles in lung fields  	CV: S1S2  	Abd: Soft, +BS   	Ext: No LE edema B/L  	Neuro: Awake and alert  	Skin: Warm and dry    LABS/STUDIES  --------------------------------------------------------------------------------              9.0    3.79  >-----------<  210      [11-22-22 @ 06:45]              29.6     143  |  105  |  59  ----------------------------<  152      [11-22-22 @ 06:45]  4.7   |  26  |  1.98        Ca     8.8     [11-22-22 @ 06:45]      Mg     2.20     [11-22-22 @ 06:45]      Phos  4.2     [11-22-22 @ 06:45]    TPro  7.8  /  Alb  x   /  TBili  x   /  DBili  x   /  AST  x   /  ALT  x   /  AlkPhos  x   [11-22-22 @ 06:45]    PT/INR: PT 11.9 , INR 1.03       [11-21-22 @ 04:42]  PTT: 33.0       [11-21-22 @ 04:42]    Creatinine Trend:  SCr 1.98 [11-22 @ 06:45]  SCr 2.50 [11-21 @ 04:42]  SCr 3.27 [11-20 @ 00:14]  SCr 3.14 [11-19 @ 02:58]  SCr 3.15 [11-18 @ 03:53]    Urinalysis - [11-17-22 @ 22:52]      Color Yellow / Appearance Slightly Turbid / SG 1.027 / pH 6.0      Gluc Trace / Ketone Negative  / Bili Negative / Urobili 3 mg/dL       Blood Moderate / Protein 300 mg/dL / Leuk Est Large / Nitrite Negative       /  / Hyaline 2 / Gran  / Sq Epi  / Non Sq Epi 7 / Bacteria Negative    Urine Creatinine 140      [11-18-22 @ 03:35]  Urine Protein 530      [11-18-22 @ 03:35]  Urine Sodium <20      [11-18-22 @ 03:35]  Urine Urea Nitrogen 406.2      [11-18-22 @ 03:35]  Urine Potassium 68.7      [11-18-22 @ 03:35]    Iron 68, TIBC 312, %sat 22      [11-19-22 @ 02:58]  Ferritin 113      [11-19-22 @ 02:58]  TSH 4.77      [11-17-22 @ 18:00]  Lipid: chol 236, , HDL 37, LDL --      [11-29-21 @ 06:47]    HIV Nonreact      [11-22-22 @ 06:45]    MINOO: titer Negative, pattern --      [11-18-22 @ 03:53]  C3 Complement 133      [11-22-22 @ 06:45]  C4 Complement 33      [11-22-22 @ 06:45]  Rheumatoid Factor 17      [11-22-22 @ 06:45]

## 2022-11-22 NOTE — PROGRESS NOTE ADULT - ASSESSMENT
68 YO F with PMHx of HTN, HLD, Sarcoidosis, CHF, PEA, CKD (baseline 1.2) and recent admission UroSepsis (8/2022 with ECOLI and Klebsiella and requiring intubation) who presented with AMS and found with concern for urosepsis, acute on chronic hypoxic hypercarbia respiratory failure, and MANDY on CKD requiring ETT (11/17-11/18) and MICU stay. Patient ultimately transferred to RCU (11/20).       66 YO F with PMHx of HTN, HLD, Sarcoidosis, CHF, PEA, CKD (baseline 1.2) and recent admission UroSepsis (8/2022 with ECOLI and Klebsiella and requiring intubation) who presented with AMS and found with concern for urosepsis, acute on chronic hypoxic hypercarbia respiratory failure, and MANDY on CKD requiring ETT (11/17-11/18) and MICU stay. Patient ultimately transferred to RCU (11/20).      # NEUROLOGY  - AOx4 and no current issues.  - Pain and continued on baseline Neurontin 100 QD     # CARDIOVASCULAR  - Hx of HTN, HLD, PEA and HFpEF 67 with hypotension and bradycardia.  - ECG with SB and now resolved   - Concern of volume overload in ICU (proBNP 2K) however no improvement with Lasix.   - ECHO (11/2022) with EF 67, normal RVLVSF and mild ddfxn  - Hypotension likely vasoplegic vs septic shock, now resolved and BP now running high. Resume home Norvac 10mg QD and continue to hold home Hydral 100.   - Continue on ASA and Lipitor.     # RESPIRATORY  - Presented with urosepsis and AMS requiring ETT (11/17-11/18). Extubated to BIPAP and now weaned to NC and hold BIPAP overnight (11/21) with baseline VBG.   - Continue on 4L NC and wean as tolerated.   - Continue on Proventil and Atrovent.     # GI  - Continue on PO diet and passed SS (11/22)   - Continue on bowel regimen PRN.     # RENAL  - Presented with Urosepsis and MANDY on CKD (baseline 1.2s and presented in 3s) likely from acute tubular necrosis with sepsis vs prerenal with hypotension/ shock state.   - US RENAL with renal parenchyma disease bilaterally.   - Protein/ CRE ratio elevated and case discussed with Renal at length recommending renal bx. CRE coming down and clinically improving. Pending RPT urine protein/ CRE ratio.  - Monitor UOP and renal function.     # INFECTIOUS DISEASE  - RVP/COVID PCR (11/17) NGTD   - MRSA PCR (11/18) NGTD   - UCX (11/18) NGTD   - UCx (8/2022) with ECOLI and Klebsiella   - Completed CTX empirically (11/18-11/22)   - Monitor OFF ABX.     # HEME  - No acute issues.   - DVT PPX with HSQ    # ENDOCRINE  - Bradycardia and hypotension in ED/ early ICU with concern for [facility] Crisis.   - TFT checked and TSH high but T4 WNL.   - Cortisol WNL   - Hx of DM2 A1C 7.3 (6/2022) and now A1C 5.1 (11/2022) and continued on ISS. FS WNL and no demand for insulin noted. Hold ISS and monitor FS for now.    # ETHICS/ GOC    - FULL CODE     # DISPO - PT consult pending.

## 2022-11-22 NOTE — CHART NOTE - NSCHARTNOTEFT_GEN_A_CORE
RCU transfer note    HPI:     Patient is a 67y old F 67F PMH HTN, HLD, sarcoidosis, CHF, PEA*2, sepsis 2/2 UTI (Klesiella, E. coli) ~9/2022 requiring intubation; presented to ED with MANDY on CKD found on outpatient labs. Upon arrival to ED pt  found to have AMS, was satting 68% on RA, and bradycardic and was subsequently intubated, and admitted to MICU for further management on 11/17. Pt found to have UTI and started on ceftriaxone for suspected septic shock, likely precipitating MANDY. Pt was diuresed for suspected volume overload contributing to hypoxemia and subsequently extubated the next day to bipap and titrated quickly to nasal cannula. Repeat ECHO was found to be nl. Pt was then transferred to the RCU on 11/20 for use of bipap at night. Nephrology consulted for MANDY.     Since transfer to RCU, pt has been HD stable, and has been satting well on 4LNC and has not required further diureses. Last night bipap was d/pretty and pt was satting and mentating well throughout the night and day today. Crn continues to improve on daily BMP and pt continues to make good UO.   Renal US shows kidney disease without evidence of hydro. Repeat autoimmune labs sent as per nephro for further kidney injury work up.   Ceftriaxone d/pretty today for UTI. vBG showed resp acidosis but pt mentating well and non tachypnic, likley chronic.     Subjective: Pt seen and examined at the bedside. Pt endorses strong appetite and has no acute complaints.       REVIEW OF SYSTEMS:  CONSTITUTIONAL: No lethargy, fever, chills  RESPIRATORY: No cough, wheezing, chills or hemoptysis; No shortness of breath  CARDIOVASCULAR: No chest pain, palpitations  GASTROINTESTINAL: No abdominal pain. No nausea, vomiting, or diarrhea  GENITOURINARY: No dysuria  NEUROLOGICAL: No HA, No focal weakness   MUSCULOSKELETAL: No joint pain or swelling; No muscle, back, or extremity pain    MEDICATIONS:  acetaminophen     Tablet .. 650 milliGRAM(s) Oral every 6 hours PRN  albuterol    90 MICROgram(s) HFA Inhaler 2 Puff(s) Inhalation every 6 hours  amLODIPine   Tablet 10 milliGRAM(s) Oral daily  aspirin enteric coated 81 milliGRAM(s) Oral daily  atorvastatin 40 milliGRAM(s) Oral at bedtime  chlorhexidine 2% Cloths 1 Application(s) Topical <User Schedule>  dextrose 5%. 1000 milliLiter(s) IV Continuous <Continuous>  dextrose 5%. 1000 milliLiter(s) IV Continuous <Continuous>  dextrose 50% Injectable 12.5 Gram(s) IV Push once  dextrose 50% Injectable 25 Gram(s) IV Push once  dextrose 50% Injectable 25 Gram(s) IV Push once  dextrose Oral Gel 15 Gram(s) Oral once PRN  gabapentin 100 milliGRAM(s) Oral daily  glucagon  Injectable 1 milliGRAM(s) IntraMuscular once  heparin   Injectable 5000 Unit(s) SubCutaneous every 8 hours  ipratropium 17 MICROgram(s) HFA Inhaler 1 Puff(s) Inhalation every 6 hours      T(C): 36.7 (11-22-22 @ 14:46), Max: 37 (11-22-22 @ 04:46)  HR: 77 (11-22-22 @ 15:23) (73 - 80)  BP: 186/89 (11-22-22 @ 15:23) (155/70 - 186/89)  RR: 19 (11-22-22 @ 14:46) (19 - 20)  SpO2: 100% (11-22-22 @ 14:46) (97% - 100%)  Wt(kg): --Vital Signs Last 24 Hrs  T(C): 36.7 (22 Nov 2022 14:46), Max: 37 (22 Nov 2022 04:46)  T(F): 98 (22 Nov 2022 14:46), Max: 98.6 (22 Nov 2022 04:46)  HR: 77 (22 Nov 2022 15:23) (73 - 80)  BP: 186/89 (22 Nov 2022 15:23) (155/70 - 186/89)  BP(mean): 114 (22 Nov 2022 15:23) (101 - 114)  RR: 19 (22 Nov 2022 14:46) (19 - 20)  SpO2: 100% (22 Nov 2022 14:46) (97% - 100%)    Parameters below as of 22 Nov 2022 14:46  Patient On (Oxygen Delivery Method): nasal cannula  O2 Flow (L/min): 4      PHYSICAL EXAM:  GENERAL: NAD, breathing comfortably on 4LNC  HEAD:  Atraumatic, Normocephalic  EYES: EOMI, PERRLA, conjunctiva and sclera clear  ENMT:  Moist mucous membranes  NECK: Supple, No JVD,  CHEST/LUNG: Clear to auscultation bilaterally; No rales, rhonchi, wheezing, or rubs  HEART: Regular rate and rhythm; No murmurs, rubs, or gallops  ABDOMEN: Soft, Nontender, Nondistended; Bowel sounds present  NEURO: Alert & Oriented X3  EXTREMITIES: No LE edema, no calf tenderness  LYMPH: No lymphadenopathy noted  SKIN: No rashes or lesions    Consultant(s) Notes Reviewed:  [x ] YES  [ ] NO  Care Discussed with Consultants/Other Providers [ x] YES  [ ] NO    LABS:                        9.0    3.79  )-----------( 210      ( 22 Nov 2022 06:45 )             29.6     11-22    143  |  105  |  59<H>  ----------------------------<  152<H>  4.7   |  26  |  1.98<H>    Ca    8.8      22 Nov 2022 06:45  Phos  4.2     11-22  Mg     2.20     11-22    TPro  7.8  /  Alb  x   /  TBili  x   /  DBili  x   /  AST  x   /  ALT  x   /  AlkPhos  x   11-22    PT/INR - ( 21 Nov 2022 04:42 )   PT: 11.9 sec;   INR: 1.03 ratio         PTT - ( 21 Nov 2022 04:42 )  PTT:33.0 sec    CAPILLARY BLOOD GLUCOSE      POCT Blood Glucose.: 124 mg/dL (22 Nov 2022 11:36)  POCT Blood Glucose.: 137 mg/dL (22 Nov 2022 07:34)  POCT Blood Glucose.: 132 mg/dL (21 Nov 2022 22:00)  POCT Blood Glucose.: 101 mg/dL (21 Nov 2022 16:44)            RADIOLOGY & ADDITIONAL TESTS:    Imaging Personally Reviewed:  [x ] YES  [ ] NO    Patient is a 67y old F 67F PMH HTN, HLD, sarcoidosis, CHF, PEA*2, sepsis 2/2 UTI (Klesiella, E. coli) ~9/2022 requiring intubation; presented to ED with MANDY on CKD found on outpatient labs admitted to micu for hypoxic resp failure, MANDY, and septic shock 2/2 UTI and subsequently transferred to RCU for bipap use at night. Pt no longer requiring bipap stable for transfer to medicine service.     Accepting medicine physician: Dr. Enriquez     To follow up:  - cont to wean nasal cannula as tolerated   - restart home BP meds as tolerated   - cont to trend crn on BMP and f/u labs for MANDY work up  - f/u Renal recs  - tolerating diet well   - DVT ppx    case and plan discussed with Dr. Messina RCU transfer note    HPI:     Patient is a 67y old F 67F PMH HTN, HLD, sarcoidosis, CHF, PEA*2, sepsis 2/2 UTI (Klesiella, E. coli) ~9/2022 requiring intubation; presented to ED with MANDY on CKD found on outpatient labs. Upon arrival to ED pt  found to have AMS, was satting 68% on RA, and bradycardic and was subsequently intubated, and admitted to MICU for further management on 11/17. Pt found to have UTI and started on ceftriaxone for suspected septic shock, likely precipitating MANDY. Pt was diuresed for suspected volume overload contributing to hypoxemia and subsequently extubated the next day to bipap and titrated quickly to nasal cannula. Repeat ECHO was found to be nl. Pt was then transferred to the RCU on 11/20 for use of bipap at night. Nephrology consulted for MANDY.     Since transfer to RCU, pt has been HD stable, and has been satting well on 4LNC and has not required further diureses. Last night bipap was d/pretty and pt was satting and mentating well throughout the night and day today. Crn continues to improve on daily BMP and pt continues to make good UO.   Renal US shows kidney disease without evidence of hydro. Repeat autoimmune labs sent as per nephro for further kidney injury work up.   Ceftriaxone d/pretty today for UTI. vBG showed resp acidosis but pt mentating well and non tachypnic, likley chronic.     Subjective: Pt seen and examined at the bedside. Pt endorses strong appetite and has no acute complaints.       REVIEW OF SYSTEMS:  CONSTITUTIONAL: No lethargy, fever, chills  RESPIRATORY: No cough, wheezing, chills or hemoptysis; No shortness of breath  CARDIOVASCULAR: No chest pain, palpitations  GASTROINTESTINAL: No abdominal pain. No nausea, vomiting, or diarrhea  GENITOURINARY: No dysuria  NEUROLOGICAL: No HA, No focal weakness   MUSCULOSKELETAL: No joint pain or swelling; No muscle, back, or extremity pain    MEDICATIONS:  acetaminophen     Tablet .. 650 milliGRAM(s) Oral every 6 hours PRN  albuterol    90 MICROgram(s) HFA Inhaler 2 Puff(s) Inhalation every 6 hours  amLODIPine   Tablet 10 milliGRAM(s) Oral daily  aspirin enteric coated 81 milliGRAM(s) Oral daily  atorvastatin 40 milliGRAM(s) Oral at bedtime  chlorhexidine 2% Cloths 1 Application(s) Topical <User Schedule>  dextrose 5%. 1000 milliLiter(s) IV Continuous <Continuous>  dextrose 5%. 1000 milliLiter(s) IV Continuous <Continuous>  dextrose 50% Injectable 12.5 Gram(s) IV Push once  dextrose 50% Injectable 25 Gram(s) IV Push once  dextrose 50% Injectable 25 Gram(s) IV Push once  dextrose Oral Gel 15 Gram(s) Oral once PRN  gabapentin 100 milliGRAM(s) Oral daily  glucagon  Injectable 1 milliGRAM(s) IntraMuscular once  heparin   Injectable 5000 Unit(s) SubCutaneous every 8 hours  ipratropium 17 MICROgram(s) HFA Inhaler 1 Puff(s) Inhalation every 6 hours      T(C): 36.7 (11-22-22 @ 14:46), Max: 37 (11-22-22 @ 04:46)  HR: 77 (11-22-22 @ 15:23) (73 - 80)  BP: 186/89 (11-22-22 @ 15:23) (155/70 - 186/89)  RR: 19 (11-22-22 @ 14:46) (19 - 20)  SpO2: 100% (11-22-22 @ 14:46) (97% - 100%)  Wt(kg): --Vital Signs Last 24 Hrs  T(C): 36.7 (22 Nov 2022 14:46), Max: 37 (22 Nov 2022 04:46)  T(F): 98 (22 Nov 2022 14:46), Max: 98.6 (22 Nov 2022 04:46)  HR: 77 (22 Nov 2022 15:23) (73 - 80)  BP: 186/89 (22 Nov 2022 15:23) (155/70 - 186/89)  BP(mean): 114 (22 Nov 2022 15:23) (101 - 114)  RR: 19 (22 Nov 2022 14:46) (19 - 20)  SpO2: 100% (22 Nov 2022 14:46) (97% - 100%)    Parameters below as of 22 Nov 2022 14:46  Patient On (Oxygen Delivery Method): nasal cannula  O2 Flow (L/min): 4      PHYSICAL EXAM:  GENERAL: NAD, breathing comfortably on 4LNC  HEAD:  Atraumatic, Normocephalic  EYES: EOMI, PERRLA, conjunctiva and sclera clear  ENMT:  Moist mucous membranes  NECK: Supple, No JVD,  CHEST/LUNG: Clear to auscultation bilaterally; No rales, rhonchi, wheezing, or rubs  HEART: Regular rate and rhythm; No murmurs, rubs, or gallops  ABDOMEN: Soft, Nontender, Nondistended; Bowel sounds present  NEURO: Alert & Oriented X3  EXTREMITIES: No LE edema, no calf tenderness  LYMPH: No lymphadenopathy noted  SKIN: No rashes or lesions    Consultant(s) Notes Reviewed:  [x ] YES  [ ] NO  Care Discussed with Consultants/Other Providers [ x] YES  [ ] NO    LABS:                        9.0    3.79  )-----------( 210      ( 22 Nov 2022 06:45 )             29.6     11-22    143  |  105  |  59<H>  ----------------------------<  152<H>  4.7   |  26  |  1.98<H>    Ca    8.8      22 Nov 2022 06:45  Phos  4.2     11-22  Mg     2.20     11-22    TPro  7.8  /  Alb  x   /  TBili  x   /  DBili  x   /  AST  x   /  ALT  x   /  AlkPhos  x   11-22    PT/INR - ( 21 Nov 2022 04:42 )   PT: 11.9 sec;   INR: 1.03 ratio         PTT - ( 21 Nov 2022 04:42 )  PTT:33.0 sec    CAPILLARY BLOOD GLUCOSE      POCT Blood Glucose.: 124 mg/dL (22 Nov 2022 11:36)  POCT Blood Glucose.: 137 mg/dL (22 Nov 2022 07:34)  POCT Blood Glucose.: 132 mg/dL (21 Nov 2022 22:00)  POCT Blood Glucose.: 101 mg/dL (21 Nov 2022 16:44)    RADIOLOGY & ADDITIONAL TESTS:    Imaging Personally Reviewed:  [x ] YES  [ ] NO    Patient is a 67y old F 67F PMH HTN, HLD, sarcoidosis, CHF, PEA*2, sepsis 2/2 UTI (Klesiella, E. coli) ~9/2022 requiring intubation; presented to ED with MANDY on CKD found on outpatient labs admitted to micu for hypoxic resp failure, MANDY, and septic shock 2/2 UTI and subsequently transferred to RCU for bipap use at night. Pt no longer requiring bipap stable for transfer to medicine service.     # NEUROLOGY  - AOx4 and no current issues.  - Pain and continued on baseline Neurontin 100 QD     # CARDIOVASCULAR  - Hx of HTN, HLD, PEA and HFpEF 67 with hypotension and bradycardia.  - ECG with SB and now resolved   - Concern of volume overload in ICU (proBNP 2K) however no improvement with Lasix.   - ECHO (11/2022) with EF 67, normal RVLVSF and mild ddfxn  - Hypotension likely vasoplegic vs septic shock, now resolved and BP now running high. Resume home Norvac 10mg QD and continue to hold home Hydral 100.   - Continue on ASA and Lipitor.     # RESPIRATORY  - Presented with urosepsis and AMS requiring ETT (11/17-11/18). Extubated to BIPAP and now weaned to NC and hold BIPAP overnight (11/21) with baseline VBG.   - Continue on 4L NC and wean as tolerated.   - Continue on Proventil and Atrovent.     # GI  - Continue on PO diet and passed SS (11/22)   - Continue on bowel regimen PRN.     # RENAL  - Presented with Urosepsis and MANDY on CKD (baseline 1.2s and presented in 3s) likely from acute tubular necrosis with sepsis vs prerenal with hypotension/ shock state.   - US RENAL with renal parenchyma disease bilaterally.   - Protein/ CRE ratio elevated and case discussed with Renal at length recommending renal bx. CRE coming down and clinically improving. Pending RPT urine protein/ CRE ratio.  - Monitor UOP and renal function.     # INFECTIOUS DISEASE  - RVP/COVID PCR (11/17) NGTD   - MRSA PCR (11/18) NGTD   - UCX (11/18) NGTD   - UCx (8/2022) with ECOLI and Klebsiella   - Completed CTX empirically (11/18-11/22)   - Monitor OFF ABX.     # HEME  - No acute issues.   - DVT PPX with HSQ    # ENDOCRINE  - Bradycardia and hypotension in ED/ early ICU with concern for Addisonian Crisis.   - TFT checked and TSH high but T4 WNL.   - Cortisol WNL   - Hx of DM2 A1C 7.3 (6/2022) and now A1C 5.1 (11/2022) and continued on ISS. FS WNL and no demand for insulin noted. Hold ISS and monitor FS for now.    # ETHICS/ GOC    - FULL CODE     # DISPO - PT consult pending.     Accepting medicine physician: Dr. Enriquez     To follow up:  - cont to wean nasal cannula as tolerated   - monitor BP with current medication and increase as tolerated   - cont to trend CRE on BMP and follow up IR for renal bx (recc per renal given protein/ CRE ratio rising)  - tolerating diet well   - DVT ppx    case and plan discussed with Dr. Messina RCU transfer note    HPI:     Patient is a 67y old F 67F PMH HTN, HLD, sarcoidosis, CHF, PEA*2, sepsis 2/2 UTI (Klesiella, E. coli) ~9/2022 requiring intubation; presented to ED with MANDY on CKD found on outpatient labs. Upon arrival to ED pt  found to have AMS, was satting 68% on RA, and bradycardic and was subsequently intubated, and admitted to MICU for further management on 11/17. Pt found to have UTI and started on ceftriaxone for suspected septic shock, likely precipitating MANDY. Pt was diuresed for suspected volume overload contributing to hypoxemia and subsequently extubated the next day to bipap and titrated quickly to nasal cannula. Repeat ECHO was found to be nl. Pt was then transferred to the RCU on 11/20 for use of bipap at night. Nephrology consulted for MANDY.     Since transfer to RCU, pt has been HD stable, and has been satting well on 4LNC and has not required further diureses. Last night bipap was d/pretty and pt was satting and mentating well throughout the night and day today. Crn continues to improve on daily BMP and pt continues to make good UO.   Renal US shows kidney disease without evidence of hydro. Repeat autoimmune labs sent as per nephro for further kidney injury work up.   Ceftriaxone d/pretty today for UTI. vBG showed resp acidosis but pt mentating well and non tachypnic, likley chronic.     Subjective: Pt seen and examined at the bedside. Pt endorses strong appetite and has no acute complaints.       REVIEW OF SYSTEMS:  CONSTITUTIONAL: No lethargy, fever, chills  RESPIRATORY: No cough, wheezing, chills or hemoptysis; No shortness of breath  CARDIOVASCULAR: No chest pain, palpitations  GASTROINTESTINAL: No abdominal pain. No nausea, vomiting, or diarrhea  GENITOURINARY: No dysuria  NEUROLOGICAL: No HA, No focal weakness   MUSCULOSKELETAL: No joint pain or swelling; No muscle, back, or extremity pain    MEDICATIONS:  acetaminophen     Tablet .. 650 milliGRAM(s) Oral every 6 hours PRN  albuterol    90 MICROgram(s) HFA Inhaler 2 Puff(s) Inhalation every 6 hours  amLODIPine   Tablet 10 milliGRAM(s) Oral daily  aspirin enteric coated 81 milliGRAM(s) Oral daily  atorvastatin 40 milliGRAM(s) Oral at bedtime  chlorhexidine 2% Cloths 1 Application(s) Topical <User Schedule>  dextrose 5%. 1000 milliLiter(s) IV Continuous <Continuous>  dextrose 5%. 1000 milliLiter(s) IV Continuous <Continuous>  dextrose 50% Injectable 12.5 Gram(s) IV Push once  dextrose 50% Injectable 25 Gram(s) IV Push once  dextrose 50% Injectable 25 Gram(s) IV Push once  dextrose Oral Gel 15 Gram(s) Oral once PRN  gabapentin 100 milliGRAM(s) Oral daily  glucagon  Injectable 1 milliGRAM(s) IntraMuscular once  heparin   Injectable 5000 Unit(s) SubCutaneous every 8 hours  ipratropium 17 MICROgram(s) HFA Inhaler 1 Puff(s) Inhalation every 6 hours      T(C): 36.7 (11-22-22 @ 14:46), Max: 37 (11-22-22 @ 04:46)  HR: 77 (11-22-22 @ 15:23) (73 - 80)  BP: 186/89 (11-22-22 @ 15:23) (155/70 - 186/89)  RR: 19 (11-22-22 @ 14:46) (19 - 20)  SpO2: 100% (11-22-22 @ 14:46) (97% - 100%)  Wt(kg): --Vital Signs Last 24 Hrs  T(C): 36.7 (22 Nov 2022 14:46), Max: 37 (22 Nov 2022 04:46)  T(F): 98 (22 Nov 2022 14:46), Max: 98.6 (22 Nov 2022 04:46)  HR: 77 (22 Nov 2022 15:23) (73 - 80)  BP: 186/89 (22 Nov 2022 15:23) (155/70 - 186/89)  BP(mean): 114 (22 Nov 2022 15:23) (101 - 114)  RR: 19 (22 Nov 2022 14:46) (19 - 20)  SpO2: 100% (22 Nov 2022 14:46) (97% - 100%)    Parameters below as of 22 Nov 2022 14:46  Patient On (Oxygen Delivery Method): nasal cannula  O2 Flow (L/min): 4      PHYSICAL EXAM:  GENERAL: NAD, breathing comfortably on 4LNC  HEAD:  Atraumatic, Normocephalic  EYES: EOMI, PERRLA, conjunctiva and sclera clear  ENMT:  Moist mucous membranes  NECK: Supple, No JVD,  CHEST/LUNG: Clear to auscultation bilaterally; No rales, rhonchi, wheezing, or rubs  HEART: Regular rate and rhythm; No murmurs, rubs, or gallops  ABDOMEN: Soft, Nontender, Nondistended; Bowel sounds present  NEURO: Alert & Oriented X3  EXTREMITIES: No LE edema, no calf tenderness  LYMPH: No lymphadenopathy noted  SKIN: No rashes or lesions    Consultant(s) Notes Reviewed:  [x ] YES  [ ] NO  Care Discussed with Consultants/Other Providers [ x] YES  [ ] NO    LABS:                        9.0    3.79  )-----------( 210      ( 22 Nov 2022 06:45 )             29.6     11-22    143  |  105  |  59<H>  ----------------------------<  152<H>  4.7   |  26  |  1.98<H>    Ca    8.8      22 Nov 2022 06:45  Phos  4.2     11-22  Mg     2.20     11-22    TPro  7.8  /  Alb  x   /  TBili  x   /  DBili  x   /  AST  x   /  ALT  x   /  AlkPhos  x   11-22    PT/INR - ( 21 Nov 2022 04:42 )   PT: 11.9 sec;   INR: 1.03 ratio         PTT - ( 21 Nov 2022 04:42 )  PTT:33.0 sec    CAPILLARY BLOOD GLUCOSE      POCT Blood Glucose.: 124 mg/dL (22 Nov 2022 11:36)  POCT Blood Glucose.: 137 mg/dL (22 Nov 2022 07:34)  POCT Blood Glucose.: 132 mg/dL (21 Nov 2022 22:00)  POCT Blood Glucose.: 101 mg/dL (21 Nov 2022 16:44)    RADIOLOGY & ADDITIONAL TESTS:    Imaging Personally Reviewed:  [x ] YES  [ ] NO    Patient is a 67y old F 67F PMH HTN, HLD, sarcoidosis, CHF, PEA*2, sepsis 2/2 UTI (Klesiella, E. coli) ~9/2022 requiring intubation; presented to ED with MANDY on CKD found on outpatient labs admitted to micu for hypoxic resp failure, MANDY, and septic shock 2/2 UTI and subsequently transferred to RCU for bipap use at night. Pt no longer requiring bipap stable for transfer to medicine service.     # NEUROLOGY  - AOx4 and no current issues.  - Pain and continued on baseline Neurontin 100 QD     # CARDIOVASCULAR  - Hx of HTN, HLD, PEA and HFpEF 67 with hypotension and bradycardia.  - ECG with SB and now resolved   - Concern of volume overload in ICU (proBNP 2K) however no improvement with Lasix.   - ECHO (11/2022) with EF 67, normal RVLVSF and mild ddfxn  - Hypotension likely vasoplegic vs septic shock, now resolved and BP now running high. Resume home Norvac 10mg QD and continue to hold home Hydral 100.   - Continue on ASA and Lipitor.     # RESPIRATORY  - Presented with urosepsis and AMS requiring ETT (11/17-11/18). Extubated to BIPAP and now weaned to NC and hold BIPAP overnight (11/21) with baseline VBG.   - Continue on 4L NC and wean as tolerated.   - Continue on Proventil and Atrovent.     # GI  - Continue on PO diet and passed SS (11/22)   - Continue on bowel regimen PRN.     # RENAL  - Presented with Urosepsis and MANDY on CKD (baseline 1.2s and presented in 3s) likely from acute tubular necrosis with sepsis vs prerenal with hypotension/ shock state.   - US RENAL with renal parenchyma disease bilaterally.   - Protein/ CRE ratio elevated and case discussed with Renal at length recommending renal bx. CRE coming down and clinically improving, however protein/ CRE ratio rising and IR consulted for possible renal bx.   - Monitor UOP and renal function.     # INFECTIOUS DISEASE  - RVP/COVID PCR (11/17) NGTD   - MRSA PCR (11/18) NGTD   - UCX (11/18) NGTD   - UCx (8/2022) with ECOLI and Klebsiella   - Completed CTX empirically (11/18-11/22)   - Monitor OFF ABX.     # HEME  - No acute issues.   - DVT PPX with HSQ    # ENDOCRINE  - Bradycardia and hypotension in ED/ early ICU with concern for Addisonian Crisis.   - TFT checked and TSH high but T4 WNL.   - Cortisol WNL   - Hx of DM2 A1C 7.3 (6/2022) and now A1C 5.1 (11/2022) and continued on ISS. FS WNL and no demand for insulin noted. Hold ISS and monitor FS for now.    # ETHICS/ GOC    - FULL CODE     # DISPO - PT consult pending.     Accepting medicine physician: Dr. Enriquez     To follow up:  - cont to wean nasal cannula as tolerated   - monitor BP with current medication and increase as tolerated   - cont to trend CRE on BMP and follow up IR for renal bx (recc per renal given protein/ CRE ratio rising)  - tolerating diet well   - DVT ppx    case and plan discussed with Dr. Messina RCU transfer note    HPI:     Patient is a 67y old F 67F PMH HTN, HLD, sarcoidosis, CHF, PEA*2, sepsis 2/2 UTI (Klesiella, E. coli) ~9/2022 requiring intubation; presented to ED with MANDY on CKD found on outpatient labs. Upon arrival to ED pt  found to have AMS, was satting 68% on RA, and bradycardic and was subsequently intubated, and admitted to MICU for further management on 11/17. Pt found to have UTI and started on ceftriaxone for suspected septic shock, likely precipitating MANDY. Pt was diuresed for suspected volume overload contributing to hypoxemia and subsequently extubated the next day to bipap and titrated quickly to nasal cannula. Repeat ECHO was found to be nl. Pt was then transferred to the RCU on 11/20 for use of bipap at night. Nephrology consulted for MANDY.     Since transfer to RCU, pt has been HD stable, and has been satting well on 4LNC and has not required further diureses. Last night bipap was d/pretty and pt was satting and mentating well throughout the night and day today. Crn continues to improve on daily BMP and pt continues to make good UO.   Renal US shows kidney disease without evidence of hydro. Repeat autoimmune labs sent as per nephro for further kidney injury work up.   Ceftriaxone d/pretty today for UTI. vBG showed resp acidosis but pt mentating well and non tachypnic, likley chronic.     Subjective: Pt seen and examined at the bedside. Pt endorses strong appetite and has no acute complaints.       REVIEW OF SYSTEMS:  CONSTITUTIONAL: No lethargy, fever, chills  RESPIRATORY: No cough, wheezing, chills or hemoptysis; No shortness of breath  CARDIOVASCULAR: No chest pain, palpitations  GASTROINTESTINAL: No abdominal pain. No nausea, vomiting, or diarrhea  GENITOURINARY: No dysuria  NEUROLOGICAL: No HA, No focal weakness   MUSCULOSKELETAL: No joint pain or swelling; No muscle, back, or extremity pain    MEDICATIONS:  acetaminophen     Tablet .. 650 milliGRAM(s) Oral every 6 hours PRN  albuterol    90 MICROgram(s) HFA Inhaler 2 Puff(s) Inhalation every 6 hours  amLODIPine   Tablet 10 milliGRAM(s) Oral daily  aspirin enteric coated 81 milliGRAM(s) Oral daily  atorvastatin 40 milliGRAM(s) Oral at bedtime  chlorhexidine 2% Cloths 1 Application(s) Topical <User Schedule>  dextrose 5%. 1000 milliLiter(s) IV Continuous <Continuous>  dextrose 5%. 1000 milliLiter(s) IV Continuous <Continuous>  dextrose 50% Injectable 12.5 Gram(s) IV Push once  dextrose 50% Injectable 25 Gram(s) IV Push once  dextrose 50% Injectable 25 Gram(s) IV Push once  dextrose Oral Gel 15 Gram(s) Oral once PRN  gabapentin 100 milliGRAM(s) Oral daily  glucagon  Injectable 1 milliGRAM(s) IntraMuscular once  heparin   Injectable 5000 Unit(s) SubCutaneous every 8 hours  ipratropium 17 MICROgram(s) HFA Inhaler 1 Puff(s) Inhalation every 6 hours      T(C): 36.7 (11-22-22 @ 14:46), Max: 37 (11-22-22 @ 04:46)  HR: 77 (11-22-22 @ 15:23) (73 - 80)  BP: 186/89 (11-22-22 @ 15:23) (155/70 - 186/89)  RR: 19 (11-22-22 @ 14:46) (19 - 20)  SpO2: 100% (11-22-22 @ 14:46) (97% - 100%)  Wt(kg): --Vital Signs Last 24 Hrs  T(C): 36.7 (22 Nov 2022 14:46), Max: 37 (22 Nov 2022 04:46)  T(F): 98 (22 Nov 2022 14:46), Max: 98.6 (22 Nov 2022 04:46)  HR: 77 (22 Nov 2022 15:23) (73 - 80)  BP: 186/89 (22 Nov 2022 15:23) (155/70 - 186/89)  BP(mean): 114 (22 Nov 2022 15:23) (101 - 114)  RR: 19 (22 Nov 2022 14:46) (19 - 20)  SpO2: 100% (22 Nov 2022 14:46) (97% - 100%)    Parameters below as of 22 Nov 2022 14:46  Patient On (Oxygen Delivery Method): nasal cannula  O2 Flow (L/min): 4      PHYSICAL EXAM:  GENERAL: NAD, breathing comfortably on 4LNC  HEAD:  Atraumatic, Normocephalic  EYES: EOMI, PERRLA, conjunctiva and sclera clear  ENMT:  Moist mucous membranes  NECK: Supple, No JVD,  CHEST/LUNG: Clear to auscultation bilaterally; No rales, rhonchi, wheezing, or rubs  HEART: Regular rate and rhythm; No murmurs, rubs, or gallops  ABDOMEN: Soft, Nontender, Nondistended; Bowel sounds present  NEURO: Alert & Oriented X3  EXTREMITIES: No LE edema, no calf tenderness  LYMPH: No lymphadenopathy noted  SKIN: No rashes or lesions    Consultant(s) Notes Reviewed:  [x ] YES  [ ] NO  Care Discussed with Consultants/Other Providers [ x] YES  [ ] NO    LABS:                        9.0    3.79  )-----------( 210      ( 22 Nov 2022 06:45 )             29.6     11-22    143  |  105  |  59<H>  ----------------------------<  152<H>  4.7   |  26  |  1.98<H>    Ca    8.8      22 Nov 2022 06:45  Phos  4.2     11-22  Mg     2.20     11-22    TPro  7.8  /  Alb  x   /  TBili  x   /  DBili  x   /  AST  x   /  ALT  x   /  AlkPhos  x   11-22    PT/INR - ( 21 Nov 2022 04:42 )   PT: 11.9 sec;   INR: 1.03 ratio         PTT - ( 21 Nov 2022 04:42 )  PTT:33.0 sec    CAPILLARY BLOOD GLUCOSE      POCT Blood Glucose.: 124 mg/dL (22 Nov 2022 11:36)  POCT Blood Glucose.: 137 mg/dL (22 Nov 2022 07:34)  POCT Blood Glucose.: 132 mg/dL (21 Nov 2022 22:00)  POCT Blood Glucose.: 101 mg/dL (21 Nov 2022 16:44)    RADIOLOGY & ADDITIONAL TESTS:    Imaging Personally Reviewed:  [x ] YES  [ ] NO    Patient is a 67y old F 67F PMH HTN, HLD, sarcoidosis, CHF, PEA*2, sepsis 2/2 UTI (Klesiella, E. coli) ~9/2022 requiring intubation; presented to ED with MANDY on CKD found on outpatient labs admitted to micu for hypoxic resp failure, MANDY, and septic shock 2/2 UTI and subsequently transferred to RCU for bipap use at night. Pt no longer requiring bipap stable for transfer to medicine service.     # NEUROLOGY  - AOx4 and no current issues.  - Pain and continued on baseline Neurontin 100 QD     # CARDIOVASCULAR  - Hx of HTN, HLD, PEA and HFpEF 67 with hypotension and bradycardia.  - ECG with SB and now resolved   - Concern of volume overload in ICU (proBNP 2K) however no improvement with Lasix.   - ECHO (11/2022) with EF 67, normal RVLVSF and mild ddfxn  - Hypotension likely vasoplegic vs septic shock, now resolved and BP now running high. Resume home Norvac 10mg QD and continue to hold home Hydral 100.   - Continue on ASA and Lipitor.     # RESPIRATORY  - Presented with urosepsis and AMS requiring ETT (11/17-11/18). Extubated to BIPAP and now weaned to NC and hold BIPAP overnight (11/21) with baseline VBG.   - Continue on 4L NC and wean as tolerated.   - Continue on Proventil and Atrovent.     # GI  - Continue on PO diet and passed SS (11/22)   - Continue on bowel regimen PRN.     # RENAL  - Presented with Urosepsis and MANDY on CKD (baseline 1.2s and presented in 3s) likely from acute tubular necrosis with sepsis vs prerenal with hypotension/ shock state.   - US RENAL with renal parenchyma disease bilaterally.   - Protein/ CRE ratio elevated and case discussed with Renal at length recommending renal bx. CRE coming down and clinically improving, however protein/ CRE ratio rising and IR consulted for possible renal bx.   - Monitor UOP and renal function.     # INFECTIOUS DISEASE  - RVP/COVID PCR (11/17) NGTD   - MRSA PCR (11/18) NGTD   - UCX (11/18) NGTD   - UCx (8/2022) with ECOLI and Klebsiella   - Completed CTX empirically (11/18-11/22)   - Monitor OFF ABX.     # HEME  - No acute issues.   - DVT PPX with HSQ    # ENDOCRINE  - Bradycardia and hypotension in ED/ early ICU with concern for [facility].   - TFT checked and TSH high but T4 WNL.   - Cortisol WNL   - Hx of DM2 A1C 7.3 (6/2022) and now A1C 5.1 (11/2022) and continued on ISS. FS WNL and no demand for insulin noted. Hold ISS and monitor FS for now.    # ETHICS/ GOC    - FULL CODE     # DISPO - PT consult pending.     Accepting medicine physician: Dr. Enriquez     To follow up:  - cont to wean nasal cannula as tolerated   - monitor BP with resuming home medication   - cont to trend CRE on BMP and follow up IR for renal bx (recc per renal given protein/ CRE ratio rising)  - tolerating diet well   - DVT ppx    case and plan discussed with Dr. Messina

## 2022-11-22 NOTE — PROGRESS NOTE ADULT - SUBJECTIVE AND OBJECTIVE BOX
CHIEF COMPLAINT: Patient is a 67y old  Female who presents with a chief complaint of MANDY on CKD, abnormal VS, altered mental status (2022 11:25)      INTERVAL EVENTS:    REVIEW OF SYSTEMS: Seen by bedside during AM rounds and           OBJECTIVE:  ICU Vital Signs Last 24 Hrs  T(C): 37 (2022 04:46), Max: 37 (2022 04:46)  T(F): 98.6 (2022 04:46), Max: 98.6 (2022 04:46)  HR: 76 (2022 08:25) (70 - 80)  BP: 155/70 (2022 04:46) (155/70 - 160/70)  BP(mean): 93 (2022 16:00) (93 - 93)  ABP: --  ABP(mean): --  RR: 19 (2022 04:46) (19 - 22)  SpO2: 100% (2022 08:25) (94% - 100%)    O2 Parameters below as of 2022 08:25  Patient On (Oxygen Delivery Method): nasal cannula               @ 07:01  -   @ 07:00  --------------------------------------------------------  IN: 360 mL / OUT: 1150 mL / NET: -790 mL      CAPILLARY BLOOD GLUCOSE      POCT Blood Glucose.: 137 mg/dL (2022 07:34)      HOSPITAL MEDICATIONS:  MEDICATIONS  (STANDING):  albuterol    90 MICROgram(s) HFA Inhaler 2 Puff(s) Inhalation every 6 hours  aspirin  chewable 81 milliGRAM(s) Oral daily  atorvastatin 40 milliGRAM(s) Oral at bedtime  cefTRIAXone   IVPB 1000 milliGRAM(s) IV Intermittent every 24 hours  chlorhexidine 2% Cloths 1 Application(s) Topical <User Schedule>  dextrose 5%. 1000 milliLiter(s) (100 mL/Hr) IV Continuous <Continuous>  dextrose 5%. 1000 milliLiter(s) (50 mL/Hr) IV Continuous <Continuous>  dextrose 50% Injectable 25 Gram(s) IV Push once  dextrose 50% Injectable 12.5 Gram(s) IV Push once  dextrose 50% Injectable 25 Gram(s) IV Push once  gabapentin Solution 100 milliGRAM(s) Oral daily  glucagon  Injectable 1 milliGRAM(s) IntraMuscular once  heparin   Injectable 5000 Unit(s) SubCutaneous every 8 hours  insulin lispro (ADMELOG) corrective regimen sliding scale   SubCutaneous Before meals and at bedtime  ipratropium 17 MICROgram(s) HFA Inhaler 1 Puff(s) Inhalation every 6 hours    MEDICATIONS  (PRN):  acetaminophen     Tablet .. 650 milliGRAM(s) Oral every 6 hours PRN Mild Pain (1 - 3)  dextrose Oral Gel 15 Gram(s) Oral once PRN Blood Glucose LESS THAN 70 milliGRAM(s)/deciliter      PHYSICAL EXAMINATION    LABS:                        9.0    3.79  )-----------( 210      ( 2022 06:45 )             29.6         143  |  105  |  59<H>  ----------------------------<  152<H>  4.7   |  26  |  1.98<H>    Ca    8.8      2022 06:45  Phos  4.2       Mg     2.20         TPro  7.8  /  Alb  x   /  TBili  x   /  DBili  x   /  AST  x   /  ALT  x   /  AlkPhos  x       PT/INR - ( 2022 04:42 )   PT: 11.9 sec;   INR: 1.03 ratio         PTT - ( 2022 04:42 )  PTT:33.0 sec      Venous Blood Gas:   @ 04:42  7.27/60/55/28/87.8  VBG Lactate: 0.5      PAST MEDICAL & SURGICAL HISTORY:  Type 2 diabetes mellitus      Bilateral cataracts      Fluid in pleural cavity associated with pancreatitis      Pancreatic pseudocyst/cyst  s/p drain placement and removal      HTN (hypertension)      HLD (hyperlipidemia)      Sarcoid      Pulseless electrical activity      History of amputation of right great toe        H/O:  section        History of laparoscopic cholecystectomy          FAMILY HISTORY:      Social History:  Former smoker, quit , 5 pack years (2022 20:43)      RADIOLOGY:  [ ] Reviewed and interpreted by me    PULMONARY FUNCTION TESTS:    EKG: CHIEF COMPLAINT: Patient is a 67y old  Female who presents with a chief complaint of MANDY on CKD, abnormal VS, altered mental status (2022 11:25)    INTERVAL EVENTS:  - No interval events  - BP running high and home Aayush resumed     REVIEW OF SYSTEMS: Seen by bedside during AM rounds and denies SOB or pain. States, "I want better food."    OBJECTIVE:  ICU Vital Signs Last 24 Hrs  T(C): 37 (2022 04:46), Max: 37 (2022 04:46)  T(F): 98.6 (2022 04:46), Max: 98.6 (2022 04:46)  HR: 76 (2022 08:25) (70 - 80)  BP: 155/70 (2022 04:46) (155/70 - 160/70)  BP(mean): 93 (2022 16:00) (93 - 93)  ABP: --  ABP(mean): --  RR: 19 (2022 04:46) (19 - 22)  SpO2: 100% (2022 08:25) (94% - 100%)    O2 Parameters below as of 2022 08:25  Patient On (Oxygen Delivery Method): nasal cannula       @ 07:01  -   @ 07:00  --------------------------------------------------------  IN: 360 mL / OUT: 1150 mL / NET: -790 mL    CAPILLARY BLOOD GLUCOSE  POCT Blood Glucose.: 137 mg/dL (2022 07:34)    HOSPITAL MEDICATIONS:  MEDICATIONS  (STANDING):  albuterol    90 MICROgram(s) HFA Inhaler 2 Puff(s) Inhalation every 6 hours  aspirin  chewable 81 milliGRAM(s) Oral daily  atorvastatin 40 milliGRAM(s) Oral at bedtime  cefTRIAXone   IVPB 1000 milliGRAM(s) IV Intermittent every 24 hours  chlorhexidine 2% Cloths 1 Application(s) Topical <User Schedule>  dextrose 5%. 1000 milliLiter(s) (100 mL/Hr) IV Continuous <Continuous>  dextrose 5%. 1000 milliLiter(s) (50 mL/Hr) IV Continuous <Continuous>  dextrose 50% Injectable 25 Gram(s) IV Push once  dextrose 50% Injectable 12.5 Gram(s) IV Push once  dextrose 50% Injectable 25 Gram(s) IV Push once  gabapentin Solution 100 milliGRAM(s) Oral daily  glucagon  Injectable 1 milliGRAM(s) IntraMuscular once  heparin   Injectable 5000 Unit(s) SubCutaneous every 8 hours  insulin lispro (ADMELOG) corrective regimen sliding scale   SubCutaneous Before meals and at bedtime  ipratropium 17 MICROgram(s) HFA Inhaler 1 Puff(s) Inhalation every 6 hours    MEDICATIONS  (PRN):  acetaminophen     Tablet .. 650 milliGRAM(s) Oral every 6 hours PRN Mild Pain (1 - 3)  dextrose Oral Gel 15 Gram(s) Oral once PRN Blood Glucose LESS THAN 70 milliGRAM(s)/deciliter    PHYSICAL EXAMINATION  General: NAD   HEENT: NGT (+). Severely [place].   Cards: S1/S2, no murmurs   Pulm: CTA bilaterally. No wheezes.   Abdomen: Soft, NTND. BS (+)   Extremities: No pedal edema. HARIS of BL upper and lower extremities.  Neurology: Awake and alert with no focal neurological deficits     LABS:                        9.0    3.79  )-----------( 210      ( 2022 06:45 )             29.6         143  |  105  |  59<H>  ----------------------------<  152<H>  4.7   |  26  |  1.98<H>    Ca    8.8      2022 06:45  Phos  4.2       Mg     2.20         TPro  7.8  /  Alb  x   /  TBili  x   /  DBili  x   /  AST  x   /  ALT  x   /  AlkPhos  x       PT/INR - ( 2022 04:42 )   PT: 11.9 sec;   INR: 1.03 ratio       PTT - ( 2022 04:42 )  PTT:33.0 sec    Venous Blood Gas:   @ 04:42  7.27/60/55/28/87.8  VBG Lactate: 0.5    PAST MEDICAL & SURGICAL HISTORY:  Type 2 diabetes mellitus    Bilateral cataracts    Fluid in pleural cavity associated with pancreatitis    Pancreatic pseudocyst/cyst  s/p drain placement and removal    HTN (hypertension)    HLD (hyperlipidemia)    Sarcoid    Pulseless electrical activity    History of amputation of right great toe      H/O:  section      History of laparoscopic cholecystectomy    FAMILY HISTORY:    Social History:  Former smoker, quit , 5 pack years (2022 20:43)    RADIOLOGY:  [ ] Reviewed and interpreted by me    PULMONARY FUNCTION TESTS:    EKG:

## 2022-11-22 NOTE — PHYSICAL THERAPY INITIAL EVALUATION ADULT - ADDITIONAL COMMENTS
Pt states she lives with her daughter in a house with 4 steps to enter, remains on the main floor. Prior to admission pt was ambulating with a rolling walker and required assistance for ADLs and ambulation, has home health services 35 hours/week.    Following evaluation, pt was left semireclined in bed in no distress, all lines in tact, call gutierrez in reach. ELZBIETA iglesias.

## 2022-11-22 NOTE — CHART NOTE - NSCHARTNOTEFT_GEN_A_CORE
MAR Accept Note  Transfer to: Medicine  Accepting Attending Physician: Dr. Enriquez  Assigned Room: 5S    Patient seen and examined.   Labs and data reviewed.   No findings precluding transfer of service.       HPI/MICU COURSE:   Please refer to MICU transfer note for full details. Patient is a 67 year-old female with history of HTN, HLD, sarcoidosis, CHF, PEA (2X), sepsis 2/2 UTI (Klebsiella, E. Coli) ~2       FOR FOLLOW-UP:    Kade Rudd MD  Internal Medicine PGY-3  [id number] / [phone] MAR Accept Note  Transfer to: Medicine  Accepting Attending Physician: Dr. Enriquez  Assigned Room: 5S    Patient seen and examined.   Labs and data reviewed.   No findings precluding transfer of service.       HPI/MICU COURSE:   Please refer to MICU transfer note for full details. Patient is a 67 year-old female with history of HTN, HLD, sarcoidosis, CHF, PEA (2X), sepsis 2/2 UTI (Klebsiella, E. Coli) in 09/022 requiring intubation during that admission, presenting to the ED with MANDY on CKD on outpatient labs. In the ED, patient found to have AMS, with O2 saturation 68% on RA as well as being bradycardic and was subsequently intubated and admitted to MICU for further management on 11/17.     In the MICU, patient found to have UTI and started on Ceftriaxone and pressors for suspected septic shock, likely precipitating MANDY. Patient was also diuresed for suspected volume overload contributing to hypoxemia and subsequently extubated the next day to BIPAP and then weaned to nasal cannula. Patient was also weaned off vasopressors. Repeat ECHO was found to be normal. Patient was then transferred to RCU on 11/20 for BIPAP use at night and Nephrology consulted for MANDY.     In the RCU, patient has been hemodynamically stable and with good O2 saturation on 4L NC. Patient has not required further diuresis. BIPAP also discontinued and patient without further episodes of AMS. Cr downtrending since RCU transfer and patient with good urine output. Renal US was performed without evidence of hydronephrosis. Repeat autoimmuen labs sent per Nephro for further MANDY work-up. Ceftriaxone d/pretty today for UTI.       FOR FOLLOW-UP:  [ ] Wean O2 as tolerated  [ ] Restart home BP meds as tolerated  [ ] Cont trending Cr, f/u Nephro     Kade Rudd MD  Internal Medicine PGY-3  [phone] / [phone] MAR Accept Note  Transfer to: Medicine  Accepting Attending Physician: Dr. Enriquez  Assigned Room: 5S    Patient seen and examined.   Labs and data reviewed.   No findings precluding transfer of service.       HPI/MICU COURSE:   Please refer to MICU transfer note for full details. Patient is a 67 year-old female with history of HTN, HLD, sarcoidosis, CHF, PEA (2X), sepsis 2/2 UTI (Klebsiella, E. Coli) in 09/022 requiring intubation during that admission, presenting to the ED with MANDY on CKD on outpatient labs. In the ED, patient found to have AMS, with O2 saturation 68% on RA as well as being bradycardic and was subsequently intubated and admitted to MICU for further management on 11/17.     In the MICU, patient found to have UTI and started on Ceftriaxone and pressors for suspected septic shock, likely precipitating MANDY. Patient was also diuresed for suspected volume overload contributing to hypoxemia and subsequently extubated the next day to BIPAP and then weaned to nasal cannula. Patient was also weaned off vasopressors. Repeat ECHO was found to be normal. Patient was then transferred to RCU on 11/20 for BIPAP use at night and Nephrology consulted for MANDY.     In the RCU, patient has been hemodynamically stable and with good O2 saturation on 4L NC. Patient has not required further diuresis. BIPAP also discontinued and patient without further episodes of AMS. Cr downtrending since RCU transfer and patient with good urine output. Renal US was performed without evidence of hydronephrosis. Repeat autoimmuen labs sent per Nephro for further MANDY work-up. Ceftriaxone d/pretty today for UTI.       FOR FOLLOW-UP:  [ ] Wean O2 as tolerated  [ ] Restart home BP meds as tolerated  [ ] Cont trending Cr, f/u labs for MANDY work-up  [ ] F/u Nephro recs  [ ] DVT ppx      Kade Rudd MD  Internal Medicine PGY-3  [phone] / [phone]

## 2022-11-22 NOTE — PHYSICAL THERAPY INITIAL EVALUATION ADULT - PATIENT PROFILE REVIEW, REHAB EVAL
Activity - Increase as tolerated. Pt cleared for PT evaluation prior to session by ELZBIETA Moise./yes

## 2022-11-23 DIAGNOSIS — Z29.9 ENCOUNTER FOR PROPHYLACTIC MEASURES, UNSPECIFIED: ICD-10-CM

## 2022-11-23 DIAGNOSIS — N39.0 URINARY TRACT INFECTION, SITE NOT SPECIFIED: ICD-10-CM

## 2022-11-23 DIAGNOSIS — I50.30 UNSPECIFIED DIASTOLIC (CONGESTIVE) HEART FAILURE: ICD-10-CM

## 2022-11-23 DIAGNOSIS — A41.9 SEPSIS, UNSPECIFIED ORGANISM: ICD-10-CM

## 2022-11-23 DIAGNOSIS — I10 ESSENTIAL (PRIMARY) HYPERTENSION: ICD-10-CM

## 2022-11-23 DIAGNOSIS — E78.5 HYPERLIPIDEMIA, UNSPECIFIED: ICD-10-CM

## 2022-11-23 DIAGNOSIS — N17.9 ACUTE KIDNEY FAILURE, UNSPECIFIED: ICD-10-CM

## 2022-11-23 LAB
ANION GAP SERPL CALC-SCNC: 10 MMOL/L — SIGNIFICANT CHANGE UP (ref 7–14)
AUTO DIFF PNL BLD: NEGATIVE — SIGNIFICANT CHANGE UP
BASE EXCESS BLDV CALC-SCNC: 3.2 MMOL/L — HIGH (ref -2–3)
BLOOD GAS VENOUS COMPREHENSIVE RESULT: SIGNIFICANT CHANGE UP
BUN SERPL-MCNC: 51 MG/DL — HIGH (ref 7–23)
C-ANCA SER-ACNC: NEGATIVE — SIGNIFICANT CHANGE UP
CALCIUM SERPL-MCNC: 9.1 MG/DL — SIGNIFICANT CHANGE UP (ref 8.4–10.5)
CHLORIDE BLDV-SCNC: 107 MMOL/L — SIGNIFICANT CHANGE UP (ref 96–108)
CHLORIDE SERPL-SCNC: 106 MMOL/L — SIGNIFICANT CHANGE UP (ref 98–107)
CO2 BLDV-SCNC: 31.5 MMOL/L — HIGH (ref 22–26)
CO2 SERPL-SCNC: 27 MMOL/L — SIGNIFICANT CHANGE UP (ref 22–31)
CREAT SERPL-MCNC: 1.68 MG/DL — HIGH (ref 0.5–1.3)
CULTURE RESULTS: SIGNIFICANT CHANGE UP
CULTURE RESULTS: SIGNIFICANT CHANGE UP
EGFR: 33 ML/MIN/1.73M2 — LOW
GAS PNL BLDV: 143 MMOL/L — SIGNIFICANT CHANGE UP (ref 136–145)
GLUCOSE BLDC GLUCOMTR-MCNC: 106 MG/DL — HIGH (ref 70–99)
GLUCOSE BLDC GLUCOMTR-MCNC: 121 MG/DL — HIGH (ref 70–99)
GLUCOSE BLDC GLUCOMTR-MCNC: 134 MG/DL — HIGH (ref 70–99)
GLUCOSE BLDV-MCNC: 144 MG/DL — HIGH (ref 70–99)
GLUCOSE SERPL-MCNC: 134 MG/DL — HIGH (ref 70–99)
HCO3 BLDV-SCNC: 30 MMOL/L — HIGH (ref 22–29)
HCT VFR BLD CALC: 30.2 % — LOW (ref 34.5–45)
HCT VFR BLDA CALC: 34 % — LOW (ref 34.5–46.5)
HGB BLD CALC-MCNC: 11.3 G/DL — LOW (ref 11.5–15.5)
HGB BLD-MCNC: 9 G/DL — LOW (ref 11.5–15.5)
LACTATE BLDV-MCNC: 0.9 MMOL/L — SIGNIFICANT CHANGE UP (ref 0.5–2)
MAGNESIUM SERPL-MCNC: 2 MG/DL — SIGNIFICANT CHANGE UP (ref 1.6–2.6)
MCHC RBC-ENTMCNC: 25.4 PG — LOW (ref 27–34)
MCHC RBC-ENTMCNC: 29.8 GM/DL — LOW (ref 32–36)
MCV RBC AUTO: 85.3 FL — SIGNIFICANT CHANGE UP (ref 80–100)
NRBC # BLD: 0 /100 WBCS — SIGNIFICANT CHANGE UP (ref 0–0)
NRBC # FLD: 0 K/UL — SIGNIFICANT CHANGE UP (ref 0–0)
P-ANCA SER-ACNC: NEGATIVE — SIGNIFICANT CHANGE UP
PCO2 BLDV: 54 MMHG — HIGH (ref 39–42)
PH BLDV: 7.35 — SIGNIFICANT CHANGE UP (ref 7.32–7.43)
PHOSPHATE SERPL-MCNC: 2.6 MG/DL — SIGNIFICANT CHANGE UP (ref 2.5–4.5)
PLATELET # BLD AUTO: 227 K/UL — SIGNIFICANT CHANGE UP (ref 150–400)
PO2 BLDV: 42 MMHG — SIGNIFICANT CHANGE UP
POTASSIUM BLDV-SCNC: 4.7 MMOL/L — SIGNIFICANT CHANGE UP (ref 3.5–5.1)
POTASSIUM SERPL-MCNC: 4.4 MMOL/L — SIGNIFICANT CHANGE UP (ref 3.5–5.3)
POTASSIUM SERPL-SCNC: 4.4 MMOL/L — SIGNIFICANT CHANGE UP (ref 3.5–5.3)
RBC # BLD: 3.54 M/UL — LOW (ref 3.8–5.2)
RBC # FLD: 17.1 % — HIGH (ref 10.3–14.5)
SAO2 % BLDV: 75.3 % — SIGNIFICANT CHANGE UP
SODIUM SERPL-SCNC: 143 MMOL/L — SIGNIFICANT CHANGE UP (ref 135–145)
SPECIMEN SOURCE: SIGNIFICANT CHANGE UP
SPECIMEN SOURCE: SIGNIFICANT CHANGE UP
WBC # BLD: 3.74 K/UL — LOW (ref 3.8–10.5)
WBC # FLD AUTO: 3.74 K/UL — LOW (ref 3.8–10.5)

## 2022-11-23 PROCEDURE — 99233 SBSQ HOSP IP/OBS HIGH 50: CPT

## 2022-11-23 PROCEDURE — 99233 SBSQ HOSP IP/OBS HIGH 50: CPT | Mod: GC

## 2022-11-23 RX ORDER — BUMETANIDE 0.25 MG/ML
2 INJECTION INTRAMUSCULAR; INTRAVENOUS ONCE
Refills: 0 | Status: COMPLETED | OUTPATIENT
Start: 2022-11-23 | End: 2022-11-23

## 2022-11-23 RX ORDER — HYDRALAZINE HCL 50 MG
50 TABLET ORAL ONCE
Refills: 0 | Status: COMPLETED | OUTPATIENT
Start: 2022-11-23 | End: 2022-11-23

## 2022-11-23 RX ORDER — HYDRALAZINE HCL 50 MG
50 TABLET ORAL THREE TIMES A DAY
Refills: 0 | Status: DISCONTINUED | OUTPATIENT
Start: 2022-11-24 | End: 2022-11-28

## 2022-11-23 RX ORDER — BUMETANIDE 0.25 MG/ML
1 INJECTION INTRAMUSCULAR; INTRAVENOUS ONCE
Refills: 0 | Status: DISCONTINUED | OUTPATIENT
Start: 2022-11-23 | End: 2022-11-23

## 2022-11-23 RX ORDER — BUMETANIDE 0.25 MG/ML
2 INJECTION INTRAMUSCULAR; INTRAVENOUS DAILY
Refills: 0 | Status: DISCONTINUED | OUTPATIENT
Start: 2022-11-24 | End: 2022-12-09

## 2022-11-23 RX ADMIN — Medication 81 MILLIGRAM(S): at 14:10

## 2022-11-23 RX ADMIN — ATORVASTATIN CALCIUM 40 MILLIGRAM(S): 80 TABLET, FILM COATED ORAL at 21:28

## 2022-11-23 RX ADMIN — BUMETANIDE 2 MILLIGRAM(S): 0.25 INJECTION INTRAMUSCULAR; INTRAVENOUS at 16:46

## 2022-11-23 RX ADMIN — ALBUTEROL 2 PUFF(S): 90 AEROSOL, METERED ORAL at 04:22

## 2022-11-23 RX ADMIN — HEPARIN SODIUM 5000 UNIT(S): 5000 INJECTION INTRAVENOUS; SUBCUTANEOUS at 05:13

## 2022-11-23 RX ADMIN — ALBUTEROL 2 PUFF(S): 90 AEROSOL, METERED ORAL at 21:32

## 2022-11-23 RX ADMIN — Medication 1 PUFF(S): at 04:22

## 2022-11-23 RX ADMIN — AMLODIPINE BESYLATE 10 MILLIGRAM(S): 2.5 TABLET ORAL at 05:14

## 2022-11-23 RX ADMIN — CHLORHEXIDINE GLUCONATE 1 APPLICATION(S): 213 SOLUTION TOPICAL at 05:17

## 2022-11-23 RX ADMIN — HEPARIN SODIUM 5000 UNIT(S): 5000 INJECTION INTRAVENOUS; SUBCUTANEOUS at 14:05

## 2022-11-23 RX ADMIN — GABAPENTIN 100 MILLIGRAM(S): 400 CAPSULE ORAL at 14:05

## 2022-11-23 RX ADMIN — Medication 50 MILLIGRAM(S): at 18:38

## 2022-11-23 RX ADMIN — Medication 1 PUFF(S): at 21:25

## 2022-11-23 RX ADMIN — HEPARIN SODIUM 5000 UNIT(S): 5000 INJECTION INTRAVENOUS; SUBCUTANEOUS at 21:28

## 2022-11-23 NOTE — PROGRESS NOTE ADULT - PROBLEM SELECTOR PLAN 1
patient admitted to MICU for sepsis 2/2 UTI and CHF exacerbation s/p intubation now weaned to NC  -continue to wean oxygen as tolerated  -no longer required bipap per pulm  -c/w atroven and proventil

## 2022-11-23 NOTE — PROGRESS NOTE ADULT - SUBJECTIVE AND OBJECTIVE BOX
PROGRESS NOTE:     Patient is a 67y old  Female who presents with a chief complaint of MANDY on CKD, abnormal VS, altered mental status (2022 13:40)      SUBJECTIVE / OVERNIGHT EVENTS: no acute event overnight. Transferred from RCU to floor. Reports feeling frustrated that she was in the hallway for 2 hours prior getting to her room. Denies sob, chest pain, n/v/d. Ate breakfast okay.     ADDITIONAL REVIEW OF SYSTEMS:    MEDICATIONS  (STANDING):  albuterol    90 MICROgram(s) HFA Inhaler 2 Puff(s) Inhalation every 6 hours  amLODIPine   Tablet 10 milliGRAM(s) Oral daily  aspirin enteric coated 81 milliGRAM(s) Oral daily  atorvastatin 40 milliGRAM(s) Oral at bedtime  chlorhexidine 2% Cloths 1 Application(s) Topical <User Schedule>  dextrose 5%. 1000 milliLiter(s) (100 mL/Hr) IV Continuous <Continuous>  dextrose 5%. 1000 milliLiter(s) (50 mL/Hr) IV Continuous <Continuous>  dextrose 50% Injectable 25 Gram(s) IV Push once  dextrose 50% Injectable 12.5 Gram(s) IV Push once  dextrose 50% Injectable 25 Gram(s) IV Push once  gabapentin 100 milliGRAM(s) Oral daily  glucagon  Injectable 1 milliGRAM(s) IntraMuscular once  heparin   Injectable 5000 Unit(s) SubCutaneous every 8 hours  ipratropium 17 MICROgram(s) HFA Inhaler 1 Puff(s) Inhalation every 6 hours    MEDICATIONS  (PRN):  acetaminophen     Tablet .. 650 milliGRAM(s) Oral every 6 hours PRN Mild Pain (1 - 3)  dextrose Oral Gel 15 Gram(s) Oral once PRN Blood Glucose LESS THAN 70 milliGRAM(s)/deciliter      CAPILLARY BLOOD GLUCOSE      POCT Blood Glucose.: 106 mg/dL (2022 17:18)  POCT Blood Glucose.: 121 mg/dL (2022 12:13)  POCT Blood Glucose.: 124 mg/dL (2022 21:27)    I&O's Summary    2022 07:01  -  2022 07:00  --------------------------------------------------------  IN: 0 mL / OUT: 710 mL / NET: -710 mL        PHYSICAL EXAM:  Vital Signs Last 24 Hrs  T(C): 36.9 (2022 15:34), Max: 37 (2022 05:16)  T(F): 98.4 (2022 15:34), Max: 98.6 (2022 05:16)  HR: 92 (2022 18:24) (86 - 92)  BP: 177/75 (2022 18:24) (159/85 - 179/91)  BP(mean): --  RR: 18 (2022 18:24) (18 - 20)  SpO2: 100% (2022 18:24) (97% - 100%)    Parameters below as of 2022 18:24  Patient On (Oxygen Delivery Method): nasal cannula  O2 Flow (L/min): 4      CONSTITUTIONAL: NAD, well-developed  RESPIRATORY: Normal respiratory effort; diffuse coarse breath sound   CARDIOVASCULAR: Regular rate and rhythm, normal S1 and S2, no murmur/rub/gallop; No lower extremity edema  ABDOMEN: Nontender to palpation, normoactive bowel sounds, no rebound/guarding  MUSCLOSKELETAL: no clubbing or cyanosis of digits; no joint swelling or tenderness to palpation  SKIN: no rash, erythema, lesion  PSYCH: A+O to person, place, and time; affect appropriate    LABS:                        9.0    3.74  )-----------( 227      ( 2022 06:00 )             30.2     11-23    143  |  106  |  51<H>  ----------------------------<  134<H>  4.4   |  27  |  1.68<H>    Ca    9.1      2022 06:00  Phos  2.6     11-  Mg     2.00     -    TPro  7.8  /  Alb  x   /  TBili  x   /  DBili  x   /  AST  x   /  ALT  x   /  AlkPhos  x             Urinalysis Basic - ( 2022 14:52 )    Color: Light Yellow / Appearance: Clear / S.017 / pH: x  Gluc: x / Ketone: Negative  / Bili: Negative / Urobili: <2 mg/dL   Blood: x / Protein: 300 mg/dL / Nitrite: Negative   Leuk Esterase: Negative / RBC: 4 /HPF / WBC 9 /HPF   Sq Epi: x / Non Sq Epi: 2 /HPF / Bacteria: Negative          RADIOLOGY & ADDITIONAL TESTS:  Results Reviewed:   Imaging Personally Reviewed:  Electrocardiogram Personally Reviewed:    COORDINATION OF CARE:  Care Discussed with Consultants/Other Providers [Y/N]:  Prior or Outpatient Records Reviewed [Y/N]:

## 2022-11-23 NOTE — PROGRESS NOTE ADULT - ATTENDING COMMENTS
long standing DM and NS with 8 grams proteinuria. new hematuria   A/CKD along with Acute hypercapnic respiratory failure, HF, sepsis, and hypotension s/p intubation and diuresis---> extubation   Sarcoidosis  currently on O2.   likely ATN but needs to pursue GN workup as well   suggest  serological workup as noted above   plan for kidney biopsy when stable.   Please stop Aspirin if deemed safe   continue with Bumex 2mg IV daily to keep O>I   avoid contrast studies, ACE-I, ARB, or NSAIDs  Assessment and plan as outlined above. Monitor labs and urine output. Avoid any potential nephrotoxins. Dose medications as per eGFR.  Further detailed plan of management and recommendation as outlined above by the renal fellow.

## 2022-11-23 NOTE — PROGRESS NOTE ADULT - PROBLEM SELECTOR PLAN 2
s/p a course of ceftriaxone empirically  -urine cx and blood cx negative   -continue to monitor for symptoms

## 2022-11-23 NOTE — PROGRESS NOTE ADULT - SUBJECTIVE AND OBJECTIVE BOX
[facility] DIVISION OF KIDNEY DISEASES AND HYPERTENSION -- FOLLOW UP NOTE  --------------------------------------------------------------------------------  HPI: Patient is a 67 year old female with PMH of HTN, HLD, Sarcoidosis who presented to the hospital after outpatient labs demonstrated elevated Scr. On arrival to the ED the patient was noted to have AMS and was hypotensive; she was subsequently intubated and admitted to the MICU. She was successfully extubated shortly after. On review of [facility] the patient is noted to have a Scr of 1.2mg/dL in 9/2022, on admission it was elevated to 3.01mg/dL and has further increased to 3.27 on 11/20/22. Scr is elevated/improved to 1.68 today. Nephrology team following for MANDY.    On review of [facility], it was noted that the patient was followed by nephrology for MANDY and proteinuria. All serologic work up at that time was negative and it was presumed her renal disease was in the setting of her longstanding DM. She was advised to undergo a renal biopsy at that time, however the patient has not followed up with nephrology following discharge. The patient has had multiple admissions for hypoxic respiratory failure in setting of being volume overloaded and has required aggressive diuresis.     Pt. seen and examined at bedside. Pt. reports feeling well. Reports feeling hungry. Denies any SOB, CP, HA, N/V, abdominal pain , urinary symptoms or dizziness.    PAST HISTORY  --------------------------------------------------------------------------------  No significant changes to PMH, PSH, FHx, SHx, unless otherwise noted    ALLERGIES & MEDICATIONS  --------------------------------------------------------------------------------  Allergies    penicillin ([place] Synd)    Intolerances    Standing Inpatient Medications  albuterol    90 MICROgram(s) HFA Inhaler 2 Puff(s) Inhalation every 6 hours  amLODIPine   Tablet 10 milliGRAM(s) Oral daily  aspirin enteric coated 81 milliGRAM(s) Oral daily  atorvastatin 40 milliGRAM(s) Oral at bedtime  chlorhexidine 2% Cloths 1 Application(s) Topical <User Schedule>  dextrose 5%. 1000 milliLiter(s) IV Continuous <Continuous>  dextrose 5%. 1000 milliLiter(s) IV Continuous <Continuous>  dextrose 50% Injectable 25 Gram(s) IV Push once  dextrose 50% Injectable 12.5 Gram(s) IV Push once  dextrose 50% Injectable 25 Gram(s) IV Push once  gabapentin 100 milliGRAM(s) Oral daily  glucagon  Injectable 1 milliGRAM(s) IntraMuscular once  heparin   Injectable 5000 Unit(s) SubCutaneous every 8 hours  ipratropium 17 MICROgram(s) HFA Inhaler 1 Puff(s) Inhalation every 6 hours    PRN Inpatient Medications  acetaminophen     Tablet .. 650 milliGRAM(s) Oral every 6 hours PRN  dextrose Oral Gel 15 Gram(s) Oral once PRN    REVIEW OF SYSTEMS  --------------------------------------------------------------------------------  Gen: No fevers   Respiratory: No dyspnea  CV: No chest pain  GI: No abdominal pain  : No dysuria  MSK: No  edema  Neuro: no dizziness  All other systems were reviewed and are negative, except as noted.    VITALS/PHYSICAL EXAM  --------------------------------------------------------------------------------  T(C): 36.8 (11-23-22 @ 11:45), Max: 37 (11-23-22 @ 05:16)  HR: 88 (11-23-22 @ 11:45) (70 - 89)  BP: 179/91 (11-23-22 @ 11:45) (159/85 - 186/89)  RR: 18 (11-23-22 @ 11:45) (18 - 19)  SpO2: 100% (11-23-22 @ 11:45) (97% - 100%)  Wt(kg): --    11-22-22 @ 07:01  -  11-23-22 @ 07:00  --------------------------------------------------------  IN: 0 mL / OUT: 710 mL / NET: -710 mL    Physical Exam:  	Gen: ill appearing  	HEENT: MMM  	Pulm: minimal basal crackles in lung fields  	CV: S1S2  	Abd: Soft, +BS   	Ext: No LE edema B/L  	Neuro: Awake and alert  	Skin: Warm and dry    LABS/STUDIES  --------------------------------------------------------------------------------              9.0    3.74  >-----------<  227      [11-23-22 @ 06:00]              30.2     143  |  106  |  51  ----------------------------<  134      [11-23-22 @ 06:00]  4.4   |  27  |  1.68        Ca     9.1     [11-23-22 @ 06:00]      Mg     2.00     [11-23-22 @ 06:00]      Phos  2.6     [11-23-22 @ 06:00]    TPro  7.8  /  Alb  x   /  TBili  x   /  DBili  x   /  AST  x   /  ALT  x   /  AlkPhos  x   [11-22-22 @ 06:45]    Creatinine Trend:  SCr 1.68 [11-23 @ 06:00]  SCr 1.98 [11-22 @ 06:45]  SCr 2.50 [11-21 @ 04:42]  SCr 3.27 [11-20 @ 00:14]  SCr 3.14 [11-19 @ 02:58]    Urinalysis - [11-22-22 @ 14:52]      Color Light Yellow / Appearance Clear / SG 1.017 / pH 6.0      Gluc 100 mg/dL / Ketone Negative  / Bili Negative / Urobili <2 mg/dL       Blood Small / Protein 300 mg/dL / Leuk Est Negative / Nitrite Negative      RBC 4 / WBC 9 / Hyaline 2 / Gran  / Sq Epi  / Non Sq Epi 2 / Bacteria Negative    Urine Creatinine 65      [11-22-22 @ 17:00]  Urine Protein 522      [11-22-22 @ 17:00]  Urine Sodium <20      [11-18-22 @ 03:35]  Urine Urea Nitrogen 406.2      [11-18-22 @ 03:35]  Urine Potassium 68.7      [11-18-22 @ 03:35]    Iron 68, TIBC 312, %sat 22      [11-19-22 @ 02:58]  Ferritin 113      [11-19-22 @ 02:58]  TSH 4.77      [11-17-22 @ 18:00]  Lipid: chol 236, , HDL 37, LDL --      [11-29-21 @ 06:47]    HBsAg Nonreact      [11-22-22 @ 06:45]  HCV 0.13, Nonreact      [11-22-22 @ 06:45]  HIV Nonreact      [11-22-22 @ 06:45]    MINOO: titer Negative, pattern --      [11-18-22 @ 03:53]  C3 Complement 133      [11-22-22 @ 06:45]  C4 Complement 33      [11-22-22 @ 06:45]  Rheumatoid Factor 17      [11-22-22 @ 06:45]  Syphilis Screen (Treponema Pallidum Ab) Negative      [11-22-22 @ 06:45]  Free Light Chains: kappa 12.79, lambda 4.65, ratio = 2.75      [11-22 @ 06:45]

## 2022-11-23 NOTE — DIETITIAN INITIAL EVALUATION ADULT - PERTINENT LABORATORY DATA
11-23    143  |  106  |  51<H>  ----------------------------<  134<H>  4.4   |  27  |  1.68<H>    Ca    9.1      23 Nov 2022 06:00  Phos  2.6     11-23  Mg     2.00     11-23    TPro  7.8  /  Alb  x   /  TBili  x   /  DBili  x   /  AST  x   /  ALT  x   /  AlkPhos  x   11-22  POCT Blood Glucose.: 121 mg/dL (11-23-22 @ 12:13)  A1C with Estimated Average Glucose Result: 5.1 % (11-19-22 @ 04:02)  A1C with Estimated Average Glucose Result: 6.0 % (08-31-22 @ 02:00)  A1C with Estimated Average Glucose Result: 7.3 % (06-05-22 @ 06:00)

## 2022-11-23 NOTE — PROGRESS NOTE ADULT - PROBLEM SELECTOR PLAN 1
Pt. with MANDY suspected in setting of hemodynamics as she was noted to be very hypotensive on arrival in ED vs volume overload. ATN? On review of [facility] the patient is noted to have a SCr of 1.2mg/dL in 9/2022, on admission it was elevated to 3.01mg/dL and has further increased to 3.27mg/dL on 11/20/22. Scr is elevated/improved to 1.68 today.    UA with significant proteinuria (noted previously as well) and significant hematuria (new from previous urine studies). In the past all serological work up has been negative and it was presumed renal disease was in setting of her DM. Kidney sonogram suggestive of B/L renal parenchymal disease. Given Scr improving, would hold off on kidney biopsy for now.     Labs reviewed. Pt received bumex IV 2 mg once on 11/20/22. Pt non oliguric. Pt clinically in fluid overload. Recommend switching IV bumex to PO bumex 2 mg daily. Monitor labs and urine output. Avoid any potential nephrotoxins. Dose medications as per eGFR.     If you have any questions, please feel free to contact me  Desean De Leon  Nephrology Fellow  [phone]/ [employer] Teams(Preferred)  (After 5pm or on weekends please page the on-call fellow).

## 2022-11-23 NOTE — DIETITIAN INITIAL EVALUATION ADULT - WEIGHT USED FOR CALCULATIONS
Patient is requesting a refill on Cosntyx (injectible medication that she uses once a month), she states that another specialists prescribed this to her and completed the refills, but this would be Dr. Xenia Hatchet first time submitting a refill.  Patient st IBW

## 2022-11-23 NOTE — PRE PROCEDURE NOTE - PRE PROCEDURE EVALUATION
PRE-INTERVENTIONAL RADIOLOGY PROCEDURE NOTE      Patient Age: 67    Patient Gender: Female    Procedure: Renal parenchymal biopsy    Diagnosis/Indication: MANDY on CKD    Interventional Radiology Attending Physician: Dr. Negron    Ordering Attending Physician: Dr. Kirby    Pertinent Medical History: sarcoidosis, CHf, PEAx2, urosepsis, CKD  Pertinent labs:                      9.0    3.74  )-----------( 227      ( 23 Nov 2022 06:00 )             30.2       11-23    143  |  106  |  51<H>  ----------------------------<  134<H>  4.4   |  27  |  1.68<H>    Ca    9.1      23 Nov 2022 06:00  Phos  2.6     11-23  Mg     2.00     11-23    TPro  7.8  /  Alb  x   /  TBili  x   /  DBili  x   /  AST  x   /  ALT  x   /  AlkPhos  x   11-22              Patient and Family Aware ? Yes

## 2022-11-23 NOTE — PROGRESS NOTE ADULT - ASSESSMENT
67 year-old female with history of HTN, HLD, sarcoidosis, CHF, PEA (2X), sepsis 2/2 UTI (Klebsiella, E. Coli) in 09/022 requiring intubation during that admission, presenting to the ED with MANDY on CKD on outpatient labs and acute hypoxic respiratory failure and septic shock 2/2 UTI requiring intubation and MICU stay, now weaned to NC and transferred to RCU then floor.

## 2022-11-23 NOTE — DIETITIAN INITIAL EVALUATION ADULT - NS FNS DIET ORDER
Diet, Regular:   Consistent Carbohydrate {No Snacks} (CSTCHO)  DASH/TLC {Sodium & Cholesterol Restricted} (DASH) (11-22-22 @ 13:09)

## 2022-11-23 NOTE — DIETITIAN INITIAL EVALUATION ADULT - ADD RECOMMEND
1) Recommend liberalize diet to Consistent Carbohydrate only, d/c DASH/TLC to encourage PO intake.    2) Recommend add Glucerna 2x daily (440kcals, 20g protein).   3) Monitor PO intake, Labs, weights, BMs, and skin integrity.   4) RD to remain available for further nutritional interventions as indicated.

## 2022-11-23 NOTE — PROGRESS NOTE ADULT - PROBLEM SELECTOR PLAN 3
baseline 1.2. On admission 3s. Now improving.   -nephro following: recommend bumex 2mg po daily and can hold off biopsy per fellow's note (but to do bumex 2mg IV and pursue biopsy per attending?) Will need to clarify with nephro on 11/24 regarding nephro plan

## 2022-11-23 NOTE — DIETITIAN INITIAL EVALUATION ADULT - OTHER INFO
Per chart review, 67y Female with PMH of sarcoid, CHF, PEAx2 admitted with urosepsis, acute on chronic resp failure and MANDY on CKD. Patient now stable and IR was consulted for renal parenchymal biopsy.    Patient seen at bedside, appears agitated. Pt refused to engage with writer. Comprehensive chart review completed. PO intake in hospital 0-25% per RN flowsheet. Patient's PO intake likely is not meeting her protein calorie needs. Patient adm with TF Glucerna 1.2. NGT removed on 11/18 and diet changed to soft and bite sized. Pt s/p Swallow Bedside Assessment 11/22 with recommendation of regular diet with thin liquids per SLP. Recommend add Glucerna 2x daily (440kcals, 20g protein) to provide optimal nutrition.

## 2022-11-23 NOTE — DIETITIAN INITIAL EVALUATION ADULT - PERTINENT MEDS FT
MEDICATIONS  (STANDING):  albuterol    90 MICROgram(s) HFA Inhaler 2 Puff(s) Inhalation every 6 hours  amLODIPine   Tablet 10 milliGRAM(s) Oral daily  aspirin enteric coated 81 milliGRAM(s) Oral daily  atorvastatin 40 milliGRAM(s) Oral at bedtime  buMETAnide 2 milliGRAM(s) Oral once  chlorhexidine 2% Cloths 1 Application(s) Topical <User Schedule>  dextrose 5%. 1000 milliLiter(s) (100 mL/Hr) IV Continuous <Continuous>  dextrose 5%. 1000 milliLiter(s) (50 mL/Hr) IV Continuous <Continuous>  dextrose 50% Injectable 25 Gram(s) IV Push once  dextrose 50% Injectable 12.5 Gram(s) IV Push once  dextrose 50% Injectable 25 Gram(s) IV Push once  gabapentin 100 milliGRAM(s) Oral daily  glucagon  Injectable 1 milliGRAM(s) IntraMuscular once  heparin   Injectable 5000 Unit(s) SubCutaneous every 8 hours  ipratropium 17 MICROgram(s) HFA Inhaler 1 Puff(s) Inhalation every 6 hours    MEDICATIONS  (PRN):  acetaminophen     Tablet .. 650 milliGRAM(s) Oral every 6 hours PRN Mild Pain (1 - 3)  dextrose Oral Gel 15 Gram(s) Oral once PRN Blood Glucose LESS THAN 70 milliGRAM(s)/deciliter

## 2022-11-24 LAB
ANION GAP SERPL CALC-SCNC: 11 MMOL/L — SIGNIFICANT CHANGE UP (ref 7–14)
BUN SERPL-MCNC: 43 MG/DL — HIGH (ref 7–23)
CALCIUM SERPL-MCNC: 9.4 MG/DL — SIGNIFICANT CHANGE UP (ref 8.4–10.5)
CHLORIDE SERPL-SCNC: 105 MMOL/L — SIGNIFICANT CHANGE UP (ref 98–107)
CO2 SERPL-SCNC: 27 MMOL/L — SIGNIFICANT CHANGE UP (ref 22–31)
CREAT SERPL-MCNC: 1.53 MG/DL — HIGH (ref 0.5–1.3)
EGFR: 37 ML/MIN/1.73M2 — LOW
GBM IGG SER-ACNC: <0.2 — SIGNIFICANT CHANGE UP (ref 0–0.9)
GLUCOSE BLDC GLUCOMTR-MCNC: 118 MG/DL — HIGH (ref 70–99)
GLUCOSE BLDC GLUCOMTR-MCNC: 124 MG/DL — HIGH (ref 70–99)
GLUCOSE BLDC GLUCOMTR-MCNC: 135 MG/DL — HIGH (ref 70–99)
GLUCOSE BLDC GLUCOMTR-MCNC: 144 MG/DL — HIGH (ref 70–99)
GLUCOSE SERPL-MCNC: 145 MG/DL — HIGH (ref 70–99)
HCT VFR BLD CALC: 30.9 % — LOW (ref 34.5–45)
HGB BLD-MCNC: 9.2 G/DL — LOW (ref 11.5–15.5)
MAGNESIUM SERPL-MCNC: 1.9 MG/DL — SIGNIFICANT CHANGE UP (ref 1.6–2.6)
MCHC RBC-ENTMCNC: 25.3 PG — LOW (ref 27–34)
MCHC RBC-ENTMCNC: 29.8 GM/DL — LOW (ref 32–36)
MCV RBC AUTO: 84.9 FL — SIGNIFICANT CHANGE UP (ref 80–100)
NRBC # BLD: 0 /100 WBCS — SIGNIFICANT CHANGE UP (ref 0–0)
NRBC # FLD: 0 K/UL — SIGNIFICANT CHANGE UP (ref 0–0)
PHOSPHATE SERPL-MCNC: 2.9 MG/DL — SIGNIFICANT CHANGE UP (ref 2.5–4.5)
PHOSPHOLIPASE A2 RECEPTOR ELISA: <1.8 RU/ML — SIGNIFICANT CHANGE UP (ref 0–19.9)
PLATELET # BLD AUTO: 216 K/UL — SIGNIFICANT CHANGE UP (ref 150–400)
POTASSIUM SERPL-MCNC: 4 MMOL/L — SIGNIFICANT CHANGE UP (ref 3.5–5.3)
POTASSIUM SERPL-SCNC: 4 MMOL/L — SIGNIFICANT CHANGE UP (ref 3.5–5.3)
RBC # BLD: 3.64 M/UL — LOW (ref 3.8–5.2)
RBC # FLD: 17.1 % — HIGH (ref 10.3–14.5)
SODIUM SERPL-SCNC: 143 MMOL/L — SIGNIFICANT CHANGE UP (ref 135–145)
WBC # BLD: 3.57 K/UL — LOW (ref 3.8–10.5)
WBC # FLD AUTO: 3.57 K/UL — LOW (ref 3.8–10.5)

## 2022-11-24 PROCEDURE — 99233 SBSQ HOSP IP/OBS HIGH 50: CPT

## 2022-11-24 PROCEDURE — 99233 SBSQ HOSP IP/OBS HIGH 50: CPT | Mod: GC

## 2022-11-24 RX ADMIN — Medication 1 PUFF(S): at 21:28

## 2022-11-24 RX ADMIN — GABAPENTIN 100 MILLIGRAM(S): 400 CAPSULE ORAL at 13:54

## 2022-11-24 RX ADMIN — HEPARIN SODIUM 5000 UNIT(S): 5000 INJECTION INTRAVENOUS; SUBCUTANEOUS at 21:32

## 2022-11-24 RX ADMIN — ALBUTEROL 2 PUFF(S): 90 AEROSOL, METERED ORAL at 21:28

## 2022-11-24 RX ADMIN — ATORVASTATIN CALCIUM 40 MILLIGRAM(S): 80 TABLET, FILM COATED ORAL at 21:32

## 2022-11-24 RX ADMIN — Medication 1 PUFF(S): at 05:48

## 2022-11-24 RX ADMIN — Medication 50 MILLIGRAM(S): at 13:54

## 2022-11-24 RX ADMIN — ALBUTEROL 2 PUFF(S): 90 AEROSOL, METERED ORAL at 05:47

## 2022-11-24 RX ADMIN — CHLORHEXIDINE GLUCONATE 1 APPLICATION(S): 213 SOLUTION TOPICAL at 05:48

## 2022-11-24 RX ADMIN — Medication 50 MILLIGRAM(S): at 05:51

## 2022-11-24 RX ADMIN — AMLODIPINE BESYLATE 10 MILLIGRAM(S): 2.5 TABLET ORAL at 05:49

## 2022-11-24 RX ADMIN — HEPARIN SODIUM 5000 UNIT(S): 5000 INJECTION INTRAVENOUS; SUBCUTANEOUS at 05:51

## 2022-11-24 RX ADMIN — HEPARIN SODIUM 5000 UNIT(S): 5000 INJECTION INTRAVENOUS; SUBCUTANEOUS at 13:51

## 2022-11-24 RX ADMIN — BUMETANIDE 2 MILLIGRAM(S): 0.25 INJECTION INTRAMUSCULAR; INTRAVENOUS at 05:49

## 2022-11-24 RX ADMIN — Medication 50 MILLIGRAM(S): at 21:32

## 2022-11-24 NOTE — PROGRESS NOTE ADULT - PROBLEM SELECTOR PLAN 3
baseline 1.2. On admission 3s. Now improving.   - nephro following: recommend bumex 2mg po daily and can hold off biopsy per fellow's note (but to do bumex 2mg IV and pursue biopsy per attending?) Will need to clarify with nephro on 11/24 regarding nephro plan baseline 1.2. On admission 3s. Now improving.   - nephro following: recommend bumex 2mg po daily and can hold off biopsy per fellow's note (but to do bumex 2mg IV and pursue biopsy per attending?) Will need to clarify with nephro regarding plan  - will hold aspirin anticipating bx given it is being used for secondary stroke prevention only, no indication of hx of CAD

## 2022-11-24 NOTE — PROGRESS NOTE ADULT - PROBLEM SELECTOR PLAN 6
c/w home lipitor, asa c/w home lipitor  Seems aspirin has been given for secondary stroke prevention, but not for CAD - can be held temporarily for anticipated kidney bx

## 2022-11-24 NOTE — PROGRESS NOTE ADULT - PROBLEM SELECTOR PLAN 1
patient admitted to MICU for sepsis 2/2 UTI and CHF exacerbation s/p intubation now weaned to NC  - continue to wean oxygen as tolerated  - no longer required bipap per pulm  - c/w atroven and proventil

## 2022-11-24 NOTE — PROGRESS NOTE ADULT - PROBLEM SELECTOR PLAN 1
Pt. with MANDY suspected in setting of hemodynamics as she was noted to be very hypotensive on arrival in ED vs volume overload. ATN? On [facility] of [facility] the patient is noted to have a SCr of 1.2mg/dL in 9/2022, on admission it was elevated to 3.01mg/dL and has further increased to 3.27mg/dL on 11/20/22. Scr is elevated/improved to 1.5 today.    UA with significant proteinuria (noted previously as well) and significant hematuria (new from previous urine studies). In the past all serological work up has been negative and it was presumed renal disease was in setting of her DM. Kidney sonogram suggestive of B/L renal parenchymal disease. Given Scr improving,   Labs reviewed. Pt received bumex IV 2 mg once on 11/20/22. Pt non oliguric. Pt clinically in fluid overload but improving  continue PO bumex 2 mg daily.   Monitor labs and urine output. Avoid any potential nephrotoxins. Dose medications as per eGFR.

## 2022-11-24 NOTE — PROGRESS NOTE ADULT - SUBJECTIVE AND OBJECTIVE BOX
[facility] Division of Hospital Medicine  Pelon Renee DO  Available via MS Teams  In house pager [phone]    SUBJECTIVE / OVERNIGHT EVENTS:  No acute events overnight.  Patient is hard of hearing, so limited interview, but she denies acute complaints.    ADDITIONAL REVIEW OF SYSTEMS:    MEDICATIONS  (STANDING):  albuterol    90 MICROgram(s) HFA Inhaler 2 Puff(s) Inhalation every 6 hours  amLODIPine   Tablet 10 milliGRAM(s) Oral daily  aspirin enteric coated 81 milliGRAM(s) Oral daily  atorvastatin 40 milliGRAM(s) Oral at bedtime  buMETAnide 2 milliGRAM(s) Oral daily  chlorhexidine 2% Cloths 1 Application(s) Topical <User Schedule>  dextrose 5%. 1000 milliLiter(s) (100 mL/Hr) IV Continuous <Continuous>  dextrose 5%. 1000 milliLiter(s) (50 mL/Hr) IV Continuous <Continuous>  dextrose 50% Injectable 25 Gram(s) IV Push once  dextrose 50% Injectable 12.5 Gram(s) IV Push once  dextrose 50% Injectable 25 Gram(s) IV Push once  gabapentin 100 milliGRAM(s) Oral daily  glucagon  Injectable 1 milliGRAM(s) IntraMuscular once  heparin   Injectable 5000 Unit(s) SubCutaneous every 8 hours  hydrALAZINE 50 milliGRAM(s) Oral three times a day  ipratropium 17 MICROgram(s) HFA Inhaler 1 Puff(s) Inhalation every 6 hours    MEDICATIONS  (PRN):  acetaminophen     Tablet .. 650 milliGRAM(s) Oral every 6 hours PRN Mild Pain (1 - 3)  dextrose Oral Gel 15 Gram(s) Oral once PRN Blood Glucose LESS THAN 70 milliGRAM(s)/deciliter      I&O's Summary      PHYSICAL EXAM:  Vital Signs Last 24 Hrs  T(C): 36.8 (2022 05:40), Max: 36.9 (2022 15:34)  T(F): 98.2 (2022 05:40), Max: 98.5 (2022 21:30)  HR: 84 (2022 05:40) (84 - 92)  BP: 164/82 (2022 05:42) (164/82 - 177/75)  BP(mean): --  RR: 18 (2022 05:40) (17 - 20)  SpO2: 100% (2022 05:40) (95% - 100%)    Parameters below as of 2022 05:40  Patient On (Oxygen Delivery Method): nasal cannula  O2 Flow (L/min): 4    CONSTITUTIONAL: NAD, well-developed, well-groomed  EYES: PERRLA; conjunctiva and sclera clear  ENMT: Moist oral mucosa, no pharyngeal injection or exudates; normal dentition  NECK: Supple, no palpable masses; no thyromegaly  RESPIRATORY: Normal respiratory effort; lungs are clear to auscultation bilaterally  CARDIOVASCULAR: Regular rate and rhythm, normal S1 and S2, no murmur/rub/gallop; No lower extremity edema; Peripheral pulses are 2+ bilaterally  ABDOMEN: Nontender to palpation, normoactive bowel sounds, no rebound/guarding; No hepatosplenomegaly  MUSCULOSKELETAL:  Normal gait; no clubbing or cyanosis of digits; no joint swelling or tenderness to palpation  PSYCH: A+O to person, place, and time; affect appropriate  NEUROLOGY: CN 2-12 are intact and symmetric; no gross sensory deficits   SKIN: No rashes; no palpable lesions    LABS:                        9.2    3.57  )-----------( 216      ( 2022 05:20 )             30.9     11-24    143  |  105  |  43<H>  ----------------------------<  145<H>  4.0   |  27  |  1.53<H>    Ca    9.4      2022 05:20  Phos  2.9     11-24  Mg     1.90     11-24            Urinalysis Basic - ( 2022 14:52 )    Color: Light Yellow / Appearance: Clear / S.017 / pH: x  Gluc: x / Ketone: Negative  / Bili: Negative / Urobili: <2 mg/dL   Blood: x / Protein: 300 mg/dL / Nitrite: Negative   Leuk Esterase: Negative / RBC: 4 /HPF / WBC 9 /HPF   Sq Epi: x / Non Sq Epi: 2 /HPF / Bacteria: Negative        SARS-CoV-2: NotDetec (2022 18:21)  COVID-19 PCR: NotDetec (08 Sep 2022 21:20)  COVID-19 PCR: NotDetec (06 Sep 2022 06:40)  COVID-19 PCR: NotDetec (03 Aug 2022 19:16)  COVID-19 PCR: NotDetec (2022 17:18)  COVID-19 PCR: NotDetec (2022 07:08)  COVID-19 PCR: NotDetec (2022 07:36)  SARS-CoV-2: NotDetec (2022 00:56)

## 2022-11-24 NOTE — PROGRESS NOTE ADULT - PROBLEM SELECTOR PLAN 2
s/p a course of ceftriaxone empirically  - urine cx and blood cx negative   - vcontinue to monitor for symptoms s/p a course of ceftriaxone empirically  - urine cx and blood cx negative   - continue to monitor for symptoms

## 2022-11-24 NOTE — PROGRESS NOTE ADULT - SUBJECTIVE AND OBJECTIVE BOX
[facility] DIVISION OF KIDNEY DISEASES AND HYPERTENSION -- HEMODIALYSIS NOTE   Nehrology Office [phone]  available on [employer] teams--> Lyric Timomns   --------------------------------------------------------------------------------  Chief Complaint: ESRD/Ongoing hemodialysis requirement  denies sob   24 hour events/subjective:      ALLERGIES & MEDICATIONS  --------------------------------------------------------------------------------  Allergies    penicillin ([place] Synd)    Intolerances      Standing Inpatient Medications  albuterol    90 MICROgram(s) HFA Inhaler 2 Puff(s) Inhalation every 6 hours  amLODIPine   Tablet 10 milliGRAM(s) Oral daily  aspirin enteric coated 81 milliGRAM(s) Oral daily  atorvastatin 40 milliGRAM(s) Oral at bedtime  buMETAnide 2 milliGRAM(s) Oral daily  chlorhexidine 2% Cloths 1 Application(s) Topical <User Schedule>  dextrose 5%. 1000 milliLiter(s) IV Continuous <Continuous>  dextrose 5%. 1000 milliLiter(s) IV Continuous <Continuous>  dextrose 50% Injectable 25 Gram(s) IV Push once  dextrose 50% Injectable 12.5 Gram(s) IV Push once  dextrose 50% Injectable 25 Gram(s) IV Push once  gabapentin 100 milliGRAM(s) Oral daily  glucagon  Injectable 1 milliGRAM(s) IntraMuscular once  heparin   Injectable 5000 Unit(s) SubCutaneous every 8 hours  hydrALAZINE 50 milliGRAM(s) Oral three times a day  ipratropium 17 MICROgram(s) HFA Inhaler 1 Puff(s) Inhalation every 6 hours    PRN Inpatient Medications  acetaminophen     Tablet .. 650 milliGRAM(s) Oral every 6 hours PRN  dextrose Oral Gel 15 Gram(s) Oral once PRN      REVIEW OF SYSTEMS  --------------------------------------------------------------------------------  Gen: No  fevers/chills, decreased hearing   Skin: No rashes  Respiratory: no SOB  CV: No chest pain,   GI: No abdominal pain  :  + oliguria   MSK: No joint pain/  +swelling;   All other systems were reviewed and are negative, except as noted.    VITALS/PHYSICAL EXAM  --------------------------------------------------------------------------------  T(C): 36.8 (11-24-22 @ 05:40), Max: 36.9 (11-23-22 @ 15:34)  HR: 84 (11-24-22 @ 05:40) (84 - 92)  BP: 164/82 (11-24-22 @ 05:42) (164/82 - 179/91)  RR: 18 (11-24-22 @ 05:40) (17 - 20)  SpO2: 100% (11-24-22 @ 05:40) (95% - 100%)  Wt(kg): --        Physical Exam:  	Gen NAD  	HEENT: no JVD  	Pulm: CTABL  	CV: S1S2,  	Abd: Soft,   	Ext:  + edema B/L LE   	Neuro: Awake and alert  	Skin: Warm and dry      LABS/STUDIES  --------------------------------------------------------------------------------              9.2    3.57  >-----------<  216      [11-24-22 @ 05:20]              30.9     143  |  105  |  43  ----------------------------<  145      [11-24-22 @ 05:20]  4.0   |  27  |  1.53        Ca     9.4     [11-24-22 @ 05:20]      Mg     1.90     [11-24-22 @ 05:20]      Phos  2.9     [11-24-22 @ 05:20]            Iron 68, TIBC 312, %sat 22      [11-19-22 @ 02:58]  Ferritin 113      [11-19-22 @ 02:58]  TSH 4.77      [11-17-22 @ 18:00]  Lipid: chol 236, , HDL 37, LDL --      [11-29-21 @ 06:47]    HBsAg Nonreact      [11-22-22 @ 06:45]  HCV 0.13, Nonreact      [11-22-22 @ 06:45]  HIV Nonreact      [11-22-22 @ 06:45]

## 2022-11-25 DIAGNOSIS — G93.40 ENCEPHALOPATHY, UNSPECIFIED: ICD-10-CM

## 2022-11-25 LAB
ALBUMIN SERPL ELPH-MCNC: 3 G/DL — LOW (ref 3.3–5)
ALP SERPL-CCNC: 85 U/L — SIGNIFICANT CHANGE UP (ref 40–120)
ALT FLD-CCNC: 10 U/L — SIGNIFICANT CHANGE UP (ref 4–33)
AMMONIA BLD-MCNC: 24 UMOL/L — SIGNIFICANT CHANGE UP (ref 11–55)
AMPHET UR-MCNC: NEGATIVE — SIGNIFICANT CHANGE UP
ANION GAP SERPL CALC-SCNC: 6 MMOL/L — LOW (ref 7–14)
ANION GAP SERPL CALC-SCNC: 8 MMOL/L — SIGNIFICANT CHANGE UP (ref 7–14)
APPEARANCE UR: CLEAR — SIGNIFICANT CHANGE UP
AST SERPL-CCNC: 12 U/L — SIGNIFICANT CHANGE UP (ref 4–32)
BACTERIA # UR AUTO: NEGATIVE — SIGNIFICANT CHANGE UP
BARBITURATES UR SCN-MCNC: NEGATIVE — SIGNIFICANT CHANGE UP
BENZODIAZ UR-MCNC: NEGATIVE — SIGNIFICANT CHANGE UP
BILIRUB DIRECT SERPL-MCNC: <0.2 MG/DL — SIGNIFICANT CHANGE UP (ref 0–0.3)
BILIRUB INDIRECT FLD-MCNC: >0.1 MG/DL — SIGNIFICANT CHANGE UP (ref 0–1)
BILIRUB SERPL-MCNC: 0.3 MG/DL — SIGNIFICANT CHANGE UP (ref 0.2–1.2)
BILIRUB UR-MCNC: NEGATIVE — SIGNIFICANT CHANGE UP
BUN SERPL-MCNC: 42 MG/DL — HIGH (ref 7–23)
BUN SERPL-MCNC: 43 MG/DL — HIGH (ref 7–23)
CALCIUM SERPL-MCNC: 8.9 MG/DL — SIGNIFICANT CHANGE UP (ref 8.4–10.5)
CALCIUM SERPL-MCNC: 9.3 MG/DL — SIGNIFICANT CHANGE UP (ref 8.4–10.5)
CHLORIDE SERPL-SCNC: 107 MMOL/L — SIGNIFICANT CHANGE UP (ref 98–107)
CHLORIDE SERPL-SCNC: 109 MMOL/L — HIGH (ref 98–107)
CK SERPL-CCNC: 38 U/L — SIGNIFICANT CHANGE UP (ref 25–170)
CO2 SERPL-SCNC: 29 MMOL/L — SIGNIFICANT CHANGE UP (ref 22–31)
CO2 SERPL-SCNC: 31 MMOL/L — SIGNIFICANT CHANGE UP (ref 22–31)
COCAINE METAB.OTHER UR-MCNC: NEGATIVE — SIGNIFICANT CHANGE UP
COLOR SPEC: SIGNIFICANT CHANGE UP
CREAT SERPL-MCNC: 1.75 MG/DL — HIGH (ref 0.5–1.3)
CREAT SERPL-MCNC: 1.79 MG/DL — HIGH (ref 0.5–1.3)
CREATININE URINE RESULT, DAU: 54 MG/DL — SIGNIFICANT CHANGE UP
DIFF PNL FLD: NEGATIVE — SIGNIFICANT CHANGE UP
EGFR: 31 ML/MIN/1.73M2 — LOW
EGFR: 31 ML/MIN/1.73M2 — LOW
EPI CELLS # UR: 4 /HPF — SIGNIFICANT CHANGE UP (ref 0–5)
GAS PNL BLDA: SIGNIFICANT CHANGE UP
GLUCOSE BLDC GLUCOMTR-MCNC: 100 MG/DL — HIGH (ref 70–99)
GLUCOSE BLDC GLUCOMTR-MCNC: 79 MG/DL — SIGNIFICANT CHANGE UP (ref 70–99)
GLUCOSE BLDC GLUCOMTR-MCNC: 86 MG/DL — SIGNIFICANT CHANGE UP (ref 70–99)
GLUCOSE BLDC GLUCOMTR-MCNC: 91 MG/DL — SIGNIFICANT CHANGE UP (ref 70–99)
GLUCOSE BLDC GLUCOMTR-MCNC: 96 MG/DL — SIGNIFICANT CHANGE UP (ref 70–99)
GLUCOSE SERPL-MCNC: 85 MG/DL — SIGNIFICANT CHANGE UP (ref 70–99)
GLUCOSE SERPL-MCNC: 92 MG/DL — SIGNIFICANT CHANGE UP (ref 70–99)
GLUCOSE UR QL: NEGATIVE — SIGNIFICANT CHANGE UP
HCT VFR BLD CALC: 28.3 % — LOW (ref 34.5–45)
HCT VFR BLD CALC: 28.3 % — LOW (ref 34.5–45)
HGB BLD-MCNC: 8.4 G/DL — LOW (ref 11.5–15.5)
HGB BLD-MCNC: 8.5 G/DL — LOW (ref 11.5–15.5)
HYALINE CASTS # UR AUTO: 2 /LPF — SIGNIFICANT CHANGE UP (ref 0–7)
KETONES UR-MCNC: NEGATIVE — SIGNIFICANT CHANGE UP
LACTATE SERPL-SCNC: 0.6 MMOL/L — SIGNIFICANT CHANGE UP (ref 0.5–2)
LEUKOCYTE ESTERASE UR-ACNC: NEGATIVE — SIGNIFICANT CHANGE UP
MAGNESIUM SERPL-MCNC: 1.9 MG/DL — SIGNIFICANT CHANGE UP (ref 1.6–2.6)
MAGNESIUM SERPL-MCNC: 1.9 MG/DL — SIGNIFICANT CHANGE UP (ref 1.6–2.6)
MCHC RBC-ENTMCNC: 25.9 PG — LOW (ref 27–34)
MCHC RBC-ENTMCNC: 26.2 PG — LOW (ref 27–34)
MCHC RBC-ENTMCNC: 29.7 GM/DL — LOW (ref 32–36)
MCHC RBC-ENTMCNC: 30 GM/DL — LOW (ref 32–36)
MCV RBC AUTO: 87.1 FL — SIGNIFICANT CHANGE UP (ref 80–100)
MCV RBC AUTO: 87.3 FL — SIGNIFICANT CHANGE UP (ref 80–100)
METHADONE UR-MCNC: NEGATIVE — SIGNIFICANT CHANGE UP
NITRITE UR-MCNC: NEGATIVE — SIGNIFICANT CHANGE UP
NRBC # BLD: 0 /100 WBCS — SIGNIFICANT CHANGE UP (ref 0–0)
NRBC # BLD: 0 /100 WBCS — SIGNIFICANT CHANGE UP (ref 0–0)
NRBC # FLD: 0 K/UL — SIGNIFICANT CHANGE UP (ref 0–0)
NRBC # FLD: 0 K/UL — SIGNIFICANT CHANGE UP (ref 0–0)
OPIATES UR-MCNC: NEGATIVE — SIGNIFICANT CHANGE UP
OXYCODONE UR-MCNC: NEGATIVE — SIGNIFICANT CHANGE UP
PCP SPEC-MCNC: SIGNIFICANT CHANGE UP
PCP UR-MCNC: NEGATIVE — SIGNIFICANT CHANGE UP
PH UR: 6.5 — SIGNIFICANT CHANGE UP (ref 5–8)
PHOSPHATE SERPL-MCNC: 3.9 MG/DL — SIGNIFICANT CHANGE UP (ref 2.5–4.5)
PHOSPHATE SERPL-MCNC: 4.1 MG/DL — SIGNIFICANT CHANGE UP (ref 2.5–4.5)
PLATELET # BLD AUTO: 177 K/UL — SIGNIFICANT CHANGE UP (ref 150–400)
PLATELET # BLD AUTO: 182 K/UL — SIGNIFICANT CHANGE UP (ref 150–400)
POTASSIUM SERPL-MCNC: 4.2 MMOL/L — SIGNIFICANT CHANGE UP (ref 3.5–5.3)
POTASSIUM SERPL-MCNC: 4.2 MMOL/L — SIGNIFICANT CHANGE UP (ref 3.5–5.3)
POTASSIUM SERPL-SCNC: 4.2 MMOL/L — SIGNIFICANT CHANGE UP (ref 3.5–5.3)
POTASSIUM SERPL-SCNC: 4.2 MMOL/L — SIGNIFICANT CHANGE UP (ref 3.5–5.3)
PROT SERPL-MCNC: 6.5 G/DL — SIGNIFICANT CHANGE UP (ref 6–8.3)
PROT UR-MCNC: ABNORMAL
RBC # BLD: 3.24 M/UL — LOW (ref 3.8–5.2)
RBC # BLD: 3.25 M/UL — LOW (ref 3.8–5.2)
RBC # FLD: 17.1 % — HIGH (ref 10.3–14.5)
RBC # FLD: 17.2 % — HIGH (ref 10.3–14.5)
RBC CASTS # UR COMP ASSIST: 16 /HPF — HIGH (ref 0–4)
SODIUM SERPL-SCNC: 144 MMOL/L — SIGNIFICANT CHANGE UP (ref 135–145)
SODIUM SERPL-SCNC: 146 MMOL/L — HIGH (ref 135–145)
SP GR SPEC: 1.01 — SIGNIFICANT CHANGE UP (ref 1.01–1.05)
THC UR QL: NEGATIVE — SIGNIFICANT CHANGE UP
TSH SERPL-MCNC: 2.03 UIU/ML — SIGNIFICANT CHANGE UP (ref 0.27–4.2)
UROBILINOGEN FLD QL: SIGNIFICANT CHANGE UP
VIT B12 SERPL-MCNC: 712 PG/ML — SIGNIFICANT CHANGE UP (ref 200–900)
WBC # BLD: 2.75 K/UL — LOW (ref 3.8–10.5)
WBC # BLD: 3.18 K/UL — LOW (ref 3.8–10.5)
WBC # FLD AUTO: 2.75 K/UL — LOW (ref 3.8–10.5)
WBC # FLD AUTO: 3.18 K/UL — LOW (ref 3.8–10.5)
WBC UR QL: 24 /HPF — HIGH (ref 0–5)

## 2022-11-25 PROCEDURE — 99232 SBSQ HOSP IP/OBS MODERATE 35: CPT | Mod: GC

## 2022-11-25 PROCEDURE — 71045 X-RAY EXAM CHEST 1 VIEW: CPT | Mod: 26

## 2022-11-25 PROCEDURE — 93010 ELECTROCARDIOGRAM REPORT: CPT

## 2022-11-25 PROCEDURE — 70450 CT HEAD/BRAIN W/O DYE: CPT | Mod: 26

## 2022-11-25 PROCEDURE — 99223 1ST HOSP IP/OBS HIGH 75: CPT | Mod: GC

## 2022-11-25 PROCEDURE — 95720 EEG PHY/QHP EA INCR W/VEEG: CPT

## 2022-11-25 PROCEDURE — 99233 SBSQ HOSP IP/OBS HIGH 50: CPT

## 2022-11-25 RX ORDER — NALOXONE HYDROCHLORIDE 4 MG/.1ML
0.4 SPRAY NASAL ONCE
Refills: 0 | Status: DISCONTINUED | OUTPATIENT
Start: 2022-11-25 | End: 2022-11-25

## 2022-11-25 RX ORDER — NALOXONE HYDROCHLORIDE 4 MG/.1ML
0.4 SPRAY NASAL ONCE
Refills: 0 | Status: COMPLETED | OUTPATIENT
Start: 2022-11-25 | End: 2022-11-25

## 2022-11-25 RX ADMIN — NALOXONE HYDROCHLORIDE 0.4 MILLIGRAM(S): 4 SPRAY NASAL at 15:09

## 2022-11-25 RX ADMIN — HEPARIN SODIUM 5000 UNIT(S): 5000 INJECTION INTRAVENOUS; SUBCUTANEOUS at 13:08

## 2022-11-25 RX ADMIN — BUMETANIDE 2 MILLIGRAM(S): 0.25 INJECTION INTRAMUSCULAR; INTRAVENOUS at 05:21

## 2022-11-25 RX ADMIN — HEPARIN SODIUM 5000 UNIT(S): 5000 INJECTION INTRAVENOUS; SUBCUTANEOUS at 05:20

## 2022-11-25 RX ADMIN — AMLODIPINE BESYLATE 10 MILLIGRAM(S): 2.5 TABLET ORAL at 05:21

## 2022-11-25 RX ADMIN — HEPARIN SODIUM 5000 UNIT(S): 5000 INJECTION INTRAVENOUS; SUBCUTANEOUS at 22:02

## 2022-11-25 RX ADMIN — Medication 50 MILLIGRAM(S): at 05:21

## 2022-11-25 RX ADMIN — CHLORHEXIDINE GLUCONATE 1 APPLICATION(S): 213 SOLUTION TOPICAL at 05:20

## 2022-11-25 RX ADMIN — Medication 1 PUFF(S): at 04:40

## 2022-11-25 RX ADMIN — ALBUTEROL 2 PUFF(S): 90 AEROSOL, METERED ORAL at 04:40

## 2022-11-25 NOTE — PROGRESS NOTE ADULT - SUBJECTIVE AND OBJECTIVE BOX
[facility] DIVISION OF KIDNEY DISEASES AND HYPERTENSION -- FOLLOW UP NOTE  --------------------------------------------------------------------------------  HPI: Patient is a 67 year old female with PMH of HTN, HLD, Sarcoidosis who presented to the hospital after outpatient labs demonstrated elevated Scr. On arrival to the ED the patient was noted to have AMS and was hypotensive; she was subsequently intubated and admitted to the MICU. She was successfully extubated shortly after. On review of [facility] the patient is noted to have a Scr of 1.2mg/dL in 9/2022, on admission it was elevated to 3.01mg/dL and has further increased to 3.27 on 11/20/22. Scr is elevated/improved to 1.79 today. Nephrology team following for MANDY.    On review of [facility], it was noted that the patient was followed by nephrology for MANDY and proteinuria. All serologic work up at that time was negative and it was presumed her renal disease was in the setting of her longstanding DM. She was advised to undergo a renal biopsy at that time, however the patient has not followed up with nephrology following discharge. The patient has had multiple admissions for hypoxic respiratory failure in setting of being volume overloaded and has required aggressive diuresis.     Pt. seen and examined at bedside. Pt. reports feeling well. Reports feeling hungry. Denies any SOB, CP, HA, N/V, abdominal pain , urinary symptoms or dizziness.    PAST HISTORY  --------------------------------------------------------------------------------  No significant changes to PMH, PSH, FHx, SHx, unless otherwise noted    ALLERGIES & MEDICATIONS  --------------------------------------------------------------------------------  Allergies    penicillin (Red Man Synd)    Intolerances    Standing Inpatient Medications  albuterol    90 MICROgram(s) HFA Inhaler 2 Puff(s) Inhalation every 6 hours  amLODIPine   Tablet 10 milliGRAM(s) Oral daily  atorvastatin 40 milliGRAM(s) Oral at bedtime  buMETAnide 2 milliGRAM(s) Oral daily  chlorhexidine 2% Cloths 1 Application(s) Topical <User Schedule>  dextrose 5%. 1000 milliLiter(s) IV Continuous <Continuous>  dextrose 5%. 1000 milliLiter(s) IV Continuous <Continuous>  dextrose 50% Injectable 25 Gram(s) IV Push once  dextrose 50% Injectable 12.5 Gram(s) IV Push once  dextrose 50% Injectable 25 Gram(s) IV Push once  gabapentin 100 milliGRAM(s) Oral daily  glucagon  Injectable 1 milliGRAM(s) IntraMuscular once  heparin   Injectable 5000 Unit(s) SubCutaneous every 8 hours  hydrALAZINE 50 milliGRAM(s) Oral three times a day  ipratropium 17 MICROgram(s) HFA Inhaler 1 Puff(s) Inhalation every 6 hours    PRN Inpatient Medications  acetaminophen     Tablet .. 650 milliGRAM(s) Oral every 6 hours PRN  dextrose Oral Gel 15 Gram(s) Oral once PRN    REVIEW OF SYSTEMS  --------------------------------------------------------------------------------  Gen: No fevers   Respiratory: No dyspnea  CV: No chest pain  GI: No abdominal pain  : No dysuria  MSK: No  edema  Neuro: no dizziness   All other systems were reviewed and are negative, except as noted.    VITALS/PHYSICAL EXAM  --------------------------------------------------------------------------------  T(C): 36.6 (11-25-22 @ 05:27), Max: 36.8 (11-24-22 @ 13:01)  HR: 73 (11-25-22 @ 05:27) (73 - 87)  BP: 144/76 (11-25-22 @ 05:27) (144/76 - 177/87)  RR: 18 (11-25-22 @ 05:27) (17 - 18)  SpO2: 99% (11-25-22 @ 05:27) (99% - 100%)  Wt(kg): --    Physical Exam:  	Gen: ill appearing  	HEENT: MMM  	Pulm: minimal basal crackles in lung fields  	CV: S1S2  	Abd: Soft, +BS   	Ext: No LE edema B/L  	Neuro: Awake and alert  	Skin: Warm and dry    LABS/STUDIES  --------------------------------------------------------------------------------              8.5    2.75  >-----------<  182      [11-25-22 @ 06:32]              28.3     146  |  109  |  43  ----------------------------<  85      [11-25-22 @ 06:32]  4.2   |  29  |  1.79        Ca     9.3     [11-25-22 @ 06:32]      Mg     1.90     [11-25-22 @ 06:32]      Phos  3.9     [11-25-22 @ 06:32]    Creatinine Trend:  SCr 1.79 [11-25 @ 06:32]  SCr 1.53 [11-24 @ 05:20]  SCr 1.68 [11-23 @ 06:00]  SCr 1.98 [11-22 @ 06:45]  SCr 2.50 [11-21 @ 04:42]    Urinalysis - [11-22-22 @ 14:52]      Color Light Yellow / Appearance Clear / SG 1.017 / pH 6.0      Gluc 100 mg/dL / Ketone Negative  / Bili Negative / Urobili <2 mg/dL       Blood Small / Protein 300 mg/dL / Leuk Est Negative / Nitrite Negative      RBC 4 / WBC 9 / Hyaline 2 / Gran  / Sq Epi  / Non Sq Epi 2 / Bacteria Negative    Urine Creatinine 65      [11-22-22 @ 17:00]  Urine Protein 522      [11-22-22 @ 17:00]    Iron 68, TIBC 312, %sat 22      [11-19-22 @ 02:58]  Ferritin 113      [11-19-22 @ 02:58]  TSH 4.77      [11-17-22 @ 18:00]  Lipid: chol 236, , HDL 37, LDL --      [11-29-21 @ 06:47]    HBsAg Nonreact      [11-22-22 @ 06:45]  HCV 0.13, Nonreact      [11-22-22 @ 06:45]  HIV Nonreact      [11-22-22 @ 06:45]    MINOO: titer Negative, pattern --      [11-18-22 @ 03:53]  C3 Complement 133      [11-22-22 @ 06:45]  C4 Complement 33      [11-22-22 @ 06:45]  Rheumatoid Factor 17      [11-22-22 @ 06:45]  ANCA: cANCA Negative, pANCA Negative, atypical ANCA Negative      [11-22-22 @ 06:45]  anti-GBM <0.2      [11-22-22 @ 06:45]  Syphilis Screen (Treponema Pallidum Ab) Negative      [11-22-22 @ 06:45]  PLA2R: DESHAWN <1.8, IFA --      [11-22-22 @ 06:45]  Free Light Chains: kappa 12.79, lambda 4.65, ratio = 2.75      [11-22 @ 06:45]

## 2022-11-25 NOTE — PROGRESS NOTE ADULT - ATTENDING COMMENTS
pt evaluated this am  long standing DM and NS with 8 grams proteinuria. new hematuria   A/CKD along with Acute hypercapnic respiratory failure, HF, sepsis, and hypotension s/p intubation and diuresis---> extubation   Sarcoidosis  likely ATN but needs to pursue GN workup as well ( so far serology negative)  suggest  plan for kidney biopsy when stable.   continue with Bumex 2mg po daily to keep O>I   avoid contrast studies, ACE-I, ARB, or NSAIDs  Assessment and plan as outlined above. Monitor labs and urine output. Avoid any potential nephrotoxins. Dose medications as per eGFR.  Further detailed plan of management and recommendation as outlined above by the renal fellow.

## 2022-11-25 NOTE — CHART NOTE - NSCHARTNOTEFT_GEN_A_CORE
Upon rounding this morning, I noticed pt to be lethargic and nonresponsive to sternal rub.   Per collateral from RN pt was alert and responsive, answering questions yesterday.    Afebrile, VSS, o2 100% on 4 L NC,   Pupils pinpoint   Pt withdrawing all 4 extremities to pain     RRT called for unresponsiveness.   - Glucose 100   - ABG performed,   baseline for pt    Code Stroke called  - CT head performed wnl     Will obtain EEG per neuro recommendations   Will call pulmonary for further recs    Discussed case with Dr. Kirby, who was at bedside during RRT, agreeable with plan   Will continue to monitor pt, currently VSS and protecting ariway    Day He PA-C  Department of Medicine  Pager [phone] Upon rounding this morning, I noticed pt to be lethargic and nonresponsive to sternal rub.   Per collateral from RN pt was alert and responsive, answering questions yesterday.    Afebrile, VSS, o2 100% on 4 L NC,   Pupils pinpoint   Pt withdrawing all 4 extremities to pain     RRT called for unresponsiveness.   - Glucose 100   - ABG performed,   baseline for pt    Code Stroke called  - CT head performed wnl     sp Narcan x1- no immediate improvement in symptoms   Will obtain EEG, MRI and UA, Utox,, BCx, syphillis screen, NH3, lactate, CK, Folate, B12, TSH per neuro recommendations   BIPAP ordered    Discussed case with Dr. Kirby, who was at bedside during RRT, agreeable with plan   Updates provided to daughter via phone, all questions answered MRI safety sheet and jesus consent obtained and in chart   Will continue to monitor pt, currently VSS and protecting ariway    Day He PA-C  Department of Medicine  Pager [phone]

## 2022-11-25 NOTE — CONSULT NOTE ADULT - SUBJECTIVE AND OBJECTIVE BOX
Neurology - Consult Note    -  Spectra: [phone] ([facility]), [phone] ([facility])  -    HPI: Patient BEVERLY MEJIA is a 67y (1955) woman with a PMHx significant for HTN, HLD, sarcoidosis, CHF, PEA (2X), sepsis 2/2 UTI (Klebsiella, E. Coli) in 09/022 requiring intubation during that admission, H/o R cerebellar infarct on ASA but stopped on 11/25/22 for renal biopsy, presenting to the ED with MANDY on CKD on outpatient labs and acute hypoxic respiratory failure and septic shock 2/2 UTI requiring intubation and MICU stay, was on BIPAP while in MICU but currently on NC and transferred to RCU then floor on 11/22/22 currently being worked up for kidney disease per nephro with plan for renal biopsy next week. RRT called at 1024 due to AMS. Per Nurse pt at baseline was AA0x2-3 but noted to be unresponsive to questioning/sleeping and difficult to arouse. Pt was able to drink water with AM medications prior to AMS episode. Notable labs during RRT were VSS, Hb/Hct 8.4(28.3) downtrending from 9.2(30.9), WBC 3.18, Cr 1.75 (1.79), BUN 42(43), ABG PH 7.37, pCO2 59, P02 144, HCO3 34 satting 100% on NC.    Of note, pt was seen by Neurology on 8/22; last EEG 7/19/22 showed slowing but no seizures. Last echo 8/22 wnl, MRI brain done 8/4 showed no new findings; Old chronic appearing R cerebellar infarct noted   No TPA due to out of window  no lvo no mt  LNK: 530AM  MRS - 4  NIHSS 18       Review of Systems:    unable to assess due to AMS    Allergies:  penicillin (Red Man Synd)      PMHx/PSHx/Family Hx: As above, otherwise see below   Type 2 diabetes mellitus    Bilateral cataracts    Fluid in pleural cavity associated with pancreatitis    Pancreatic pseudocyst/cyst    HTN (hypertension)    HLD (hyperlipidemia)    Sarcoid    PEA (Pulseless electrical activity)    Pulseless electrical activity      Medications:  MEDICATIONS  (STANDING):  albuterol    90 MICROgram(s) HFA Inhaler 2 Puff(s) Inhalation every 6 hours  amLODIPine   Tablet 10 milliGRAM(s) Oral daily  atorvastatin 40 milliGRAM(s) Oral at bedtime  buMETAnide 2 milliGRAM(s) Oral daily  chlorhexidine 2% Cloths 1 Application(s) Topical <User Schedule>  dextrose 5%. 1000 milliLiter(s) (100 mL/Hr) IV Continuous <Continuous>  dextrose 5%. 1000 milliLiter(s) (50 mL/Hr) IV Continuous <Continuous>  dextrose 50% Injectable 25 Gram(s) IV Push once  dextrose 50% Injectable 12.5 Gram(s) IV Push once  dextrose 50% Injectable 25 Gram(s) IV Push once  gabapentin 100 milliGRAM(s) Oral daily  glucagon  Injectable 1 milliGRAM(s) IntraMuscular once  heparin   Injectable 5000 Unit(s) SubCutaneous every 8 hours  hydrALAZINE 50 milliGRAM(s) Oral three times a day  ipratropium 17 MICROgram(s) HFA Inhaler 1 Puff(s) Inhalation every 6 hours    MEDICATIONS  (PRN):  acetaminophen     Tablet .. 650 milliGRAM(s) Oral every 6 hours PRN Mild Pain (1 - 3)  dextrose Oral Gel 15 Gram(s) Oral once PRN Blood Glucose LESS THAN 70 milliGRAM(s)/deciliter      Vitals:  T(C): 36.6 (11-25-22 @ 05:27), Max: 36.8 (11-24-22 @ 13:01)  HR: 73 (11-25-22 @ 05:27) (73 - 87)  BP: 144/76 (11-25-22 @ 05:27) (144/76 - 177/87)  RR: 18 (11-25-22 @ 05:27) (17 - 18)  SpO2: 99% (11-25-22 @ 05:27) (99% - 100%)    Physical Examination:   General - NAD   Cardiovascular - Peripheral pulses palpable, no edema  Eyes -  Fundoscopy not performed due to safety precautions in the setting of the COVID-19 pandemic    Neurologic Exam:  Mental status - Awake, requires repeated stimulation to arouse, not oriented to person, place or time. Speech present with grunting/moaning during stimulation. unable to follow simple commands  Cranial nerves - 2mm b/l PERRL,  facial strength intact without obvious asymmetry b/l    Motor - Normal bulk and tone throughout.   Strength testing  All extremities at least antigravity 3/5     Sensation - withdraws to pain in all extremities     DTR's -             Biceps      Triceps     Brachioradialis      Patellar    Ankle    Toes/plantar response  R             2+             2+                  1+                       1+            1+                 withdraw  L              2+             1+                 1+                        1+           1+                 withdraw     Coordination - No tremors appreciated    Gait and station - unable to assess due to AMS    Labs:                        8.4    3.18  )-----------( 177      ( 25 Nov 2022 10:05 )             28.3     11-25    144  |  107  |  42<H>  ----------------------------<  92  4.2   |  31  |  1.75<H>    Ca    8.9      25 Nov 2022 10:05  Phos  4.1     11-25  Mg     1.90     11-25    TPro  6.5  /  Alb  3.0<L>  /  TBili  0.3  /  DBili  <0.2  /  AST  12  /  ALT  10  /  AlkPhos  85  11-25    CAPILLARY BLOOD GLUCOSE      POCT Blood Glucose.: 100 mg/dL (25 Nov 2022 09:52)    LIVER FUNCTIONS - ( 25 Nov 2022 10:05 )  Alb: 3.0 g/dL / Pro: 6.5 g/dL / ALK PHOS: 85 U/L / ALT: 10 U/L / AST: 12 U/L / GGT: x             Radiology:  < from: CT Brain Stroke Protocol (11.25.22 @ 11:08) >  PROCEDURE DATE:  11/25/2022          INTERPRETATION:  Clinical Indication: Code stroke    5mm axial sections of the brain were obtained from base to vertex,   without the intravenous administration of contrast material. Coronal and   sagittal computer generated reconstructed views are available.    Comparison is made with prior CT of 8/30/2022 and demonstrates no   significant change.      The ventricles and sulci are prominent consistent age appropriate   involutional changes. Small vessel white matter ischemic changes are   noted. There is no hemorrhage, mass, or shift of midline structures. Bone   window examination is u< from: MR Head No Cont (08.04.22 @ 19:00) >  PROCEDURE DATE:  08/04/2022        INTERPRETATION:  Clinical indication: Altered mental status    MRI of brain was performed using sagittal T1 axial T1 T2 T2 FLAIR   diffusion and susceptibility weighted sequence.    This exams compared prior head CT performed on July 17, 2022 and MRI   performed on December 1, 2021.    Parenchymal volume loss and chronic microvascular ischemic changes are   again seen.    There is no acute hemorrhage mass or mass effect seen.    Evaluation of the diffusion weighted sequence demonstrates no abnormal   areas of restricted diffusion to suggest acute infarct.    Old lacunar infarcts involving the medial right cerebellar region are   again seen and unchanged.    The large vessels demonstrate normal flow voids.    The visualized paranasal sinuses mastoid and middle ear regions appear   clear.    Mild inflammatory changes involving both mastoid and middle ear regions   are seen right greater than left.    IMPRESSION: No evidence of acute hemorrhage mass mass effect or acute   territorial infarcts seen.    < end of copied text >  nremarkable.    IMPRESSION: Age-appropriate involutional and ischemic gliotic changes. No   hemorrhage. No change from 8/30/2022.    < end of copied text > Neurology - Consult Note    -  Spectra: [phone] ([facility]), [phone] ([facility])  -    HPI: Patient BEVERLY MEJIA is a 67y (1955) woman with a PMHx significant for HTN, HLD, sarcoidosis, CHF, PEA (2X), sepsis 2/2 UTI (Klebsiella, E. Coli) in 09/022 requiring intubation during that admission, H/o R cerebellar infarct on ASA but stopped on 11/25/22 for renal biopsy, presenting to the ED with MANDY on CKD on outpatient labs and acute hypoxic respiratory failure and septic shock 2/2 UTI requiring intubation and MICU stay, was on BIPAP while in MICU but currently on NC and transferred to RCU then floor on 11/22/22 currently being worked up for kidney disease per nephro with plan for renal biopsy next week. RRT called at 1024 due to AMS. Per Nurse pt at baseline was AA0x2-3 but noted to be unresponsive to questioning/sleeping and difficult to arouse. Pt was able to drink water with AM medications prior to AMS episode. Notable labs during RRT were VSS, Hb/Hct 8.4(28.3) downtrending from 9.2(30.9), WBC 3.18, Cr 1.75 (1.79), BUN 42(43), ABG PH 7.37, pCO2 59, P02 144, HCO3 34 satting 100% on NC.    Of note, pt was seen by Neurology on 8/22; last EEG 7/19/22 showed slowing but no seizures. Last echo 8/22 wnl, MRI brain done 8/4 showed no new findings; Old chronic appearing R cerebellar infarct noted   Exam likely not consistent with LVO therefore CTA differed due to other comorbidities and concern for worsening renal fxn   no lvo no mt  LNK: 530AM  MRS - 4  NIHSS 18       Review of Systems:    unable to assess due to AMS    Allergies:  penicillin (Red Man Synd)      PMHx/PSHx/Family Hx: As above, otherwise see below   Type 2 diabetes mellitus    Bilateral cataracts    Fluid in pleural cavity associated with pancreatitis    Pancreatic pseudocyst/cyst    HTN (hypertension)    HLD (hyperlipidemia)    Sarcoid    PEA (Pulseless electrical activity)    Pulseless electrical activity      Medications:  MEDICATIONS  (STANDING):  albuterol    90 MICROgram(s) HFA Inhaler 2 Puff(s) Inhalation every 6 hours  amLODIPine   Tablet 10 milliGRAM(s) Oral daily  atorvastatin 40 milliGRAM(s) Oral at bedtime  buMETAnide 2 milliGRAM(s) Oral daily  chlorhexidine 2% Cloths 1 Application(s) Topical <User Schedule>  dextrose 5%. 1000 milliLiter(s) (100 mL/Hr) IV Continuous <Continuous>  dextrose 5%. 1000 milliLiter(s) (50 mL/Hr) IV Continuous <Continuous>  dextrose 50% Injectable 25 Gram(s) IV Push once  dextrose 50% Injectable 12.5 Gram(s) IV Push once  dextrose 50% Injectable 25 Gram(s) IV Push once  gabapentin 100 milliGRAM(s) Oral daily  glucagon  Injectable 1 milliGRAM(s) IntraMuscular once  heparin   Injectable 5000 Unit(s) SubCutaneous every 8 hours  hydrALAZINE 50 milliGRAM(s) Oral three times a day  ipratropium 17 MICROgram(s) HFA Inhaler 1 Puff(s) Inhalation every 6 hours    MEDICATIONS  (PRN):  acetaminophen     Tablet .. 650 milliGRAM(s) Oral every 6 hours PRN Mild Pain (1 - 3)  dextrose Oral Gel 15 Gram(s) Oral once PRN Blood Glucose LESS THAN 70 milliGRAM(s)/deciliter      Vitals:  T(C): 36.6 (11-25-22 @ 05:27), Max: 36.8 (11-24-22 @ 13:01)  HR: 73 (11-25-22 @ 05:27) (73 - 87)  BP: 144/76 (11-25-22 @ 05:27) (144/76 - 177/87)  RR: 18 (11-25-22 @ 05:27) (17 - 18)  SpO2: 99% (11-25-22 @ 05:27) (99% - 100%)    Physical Examination:   General - NAD   Cardiovascular - Peripheral pulses palpable, no edema  Eyes -  Fundoscopy not performed due to safety precautions in the setting of the COVID-19 pandemic    Neurologic Exam:  Mental status - Awake, requires repeated stimulation to arouse, not oriented to person, place or time. Speech present with grunting/moaning during stimulation. unable to follow simple commands  Cranial nerves - 2mm b/l PERRL,  facial strength intact without obvious asymmetry b/l    Motor - Normal bulk and tone throughout.   Strength testing  All extremities at least antigravity 3/5     Sensation - withdraws to pain in all extremities     DTR's -             Biceps      Triceps     Brachioradialis      Patellar    Ankle    Toes/plantar response  R             2+             2+                  1+                       1+            1+                 withdraw  L              2+             1+                 1+                        1+           1+                 withdraw     Coordination - No tremors appreciated    Gait and station - unable to assess due to AMS    Labs:                        8.4    3.18  )-----------( 177      ( 25 Nov 2022 10:05 )             28.3     11-25    144  |  107  |  42<H>  ----------------------------<  92  4.2   |  31  |  1.75<H>    Ca    8.9      25 Nov 2022 10:05  Phos  4.1     11-25  Mg     1.90     11-25    TPro  6.5  /  Alb  3.0<L>  /  TBili  0.3  /  DBili  <0.2  /  AST  12  /  ALT  10  /  AlkPhos  85  11-25    CAPILLARY BLOOD GLUCOSE      POCT Blood Glucose.: 100 mg/dL (25 Nov 2022 09:52)    LIVER FUNCTIONS - ( 25 Nov 2022 10:05 )  Alb: 3.0 g/dL / Pro: 6.5 g/dL / ALK PHOS: 85 U/L / ALT: 10 U/L / AST: 12 U/L / GGT: x             Radiology:  < from: CT Brain Stroke Protocol (11.25.22 @ 11:08) >  PROCEDURE DATE:  11/25/2022          INTERPRETATION:  Clinical Indication: Code stroke    5mm axial sections of the brain were obtained from base to vertex,   without the intravenous administration of contrast material. Coronal and   sagittal computer generated reconstructed views are available.    Comparison is made with prior CT of 8/30/2022 and demonstrates no   significant change.      The ventricles and sulci are prominent consistent age appropriate   involutional changes. Small vessel white matter ischemic changes are   noted. There is no hemorrhage, mass, or shift of midline structures. Bone   window examination is u< from: MR Head No Cont (08.04.22 @ 19:00) >  PROCEDURE DATE:  08/04/2022        INTERPRETATION:  Clinical indication: Altered mental status    MRI of brain was performed using sagittal T1 axial T1 T2 T2 FLAIR   diffusion and susceptibility weighted sequence.    This exams compared prior head CT performed on July 17, 2022 and MRI   performed on December 1, 2021.    Parenchymal volume loss and chronic microvascular ischemic changes are   again seen.    There is no acute hemorrhage mass or mass effect seen.    Evaluation of the diffusion weighted sequence demonstrates no abnormal   areas of restricted diffusion to suggest acute infarct.    Old lacunar infarcts involving the medial right cerebellar region are   again seen and unchanged.    The large vessels demonstrate normal flow voids.    The visualized paranasal sinuses mastoid and middle ear regions appear   clear.    Mild inflammatory changes involving both mastoid and middle ear regions   are seen right greater than left.    IMPRESSION: No evidence of acute hemorrhage mass mass effect or acute   territorial infarcts seen.    < end of copied text >  nremarkable.    IMPRESSION: Age-appropriate involutional and ischemic gliotic changes. No   hemorrhage. No change from 8/30/2022.    < end of copied text >

## 2022-11-25 NOTE — PROGRESS NOTE ADULT - PROBLEM SELECTOR PLAN 3
baseline 1.2. On admission 3s. Now improving.   - nephro following: recommend bumex 2mg po daily and to pursue renal biopsy  - holding aspirin anticipating bx given it is being used for secondary stroke prevention only, no indication of hx of CAD. However, given mental status change, will clarify with neuro whether it is okay to hold

## 2022-11-25 NOTE — CONSULT NOTE ADULT - ASSESSMENT
67F PMH HTN, HLD, sarcoidosis, CHF, PEA*2, sepsis 2/2 UTI (Klesiella, E. coli) ~9/2022 requiring intubation; presented to ED with MANDY on CKD found on outpatient labs and with abnormal VS, found to have AMS, AHRF, intubated, and admitted to MICU. Patient extubated 11/18 to BiPAP. Patient has since monitored in the RCU and has been weaned to 4L NC and satting well. Pt was mentating well without NIPPV with stable well-compensated hypercapnia with co2 in the 50's and 60's. Pt since transferred to floors on 4L NC. This morning, RRT for AMS occurred. Workup so far unrevealing. Pulmonology consulted for hypercapnia.     # Chronic well compensated hypercapnia  - Pt with stable hypercapnic in the 50's and low 60's with compensated ph and bicarb  - Pt's altered mental status unlikely to be 2/2 hypercapnia    # AMS  - Pt with pinpoint pupils and lethargy   - Consider Narcan  - Medication reconciliation. Hold all potentially sedating medications.     Pulmonary will sign off. Please call back with any questions  
67y (1955) woman with a PMHx significant for HTN, HLD, sarcoidosis, CHF, PEA (2X), sepsis 2/2 UTI (Klebsiella, E. Coli) in 09/022 requiring intubation during that admission, H/o R cerebellar infarct on ASA but stopped on 11/25/22 for renal biopsy, presenting to the ED with MANDY on CKD on outpatient labs and acute hypoxic respiratory failure and septic shock 2/2 UTI requiring intubation and MICU stay, was on BIPAP while in MICU but currently on NC and transferred to RCU then floor on 11/22/22 currently being worked up for kidney disease per nephro with plan for renal biopsy next week. RRT called at 1024 due to AMS. Per Nurse pt at baseline was AA0x2-3 but noted to be unresponsive to questioning/sleeping and difficult to arouse. Pt was able to drink water with AM medications prior to AMS episode. Notable labs during RRT were VSS, Hb/Hct 8.4(28.3) downtrending from 9.2(30.9), WBC 3.18, Cr 1.75 (1.79), BUN 42(43), ABG PH 7.37, pCO2 59, P02 144, HCO3 34 satting 100% on NC.    Of note, pt was seen by Neurology on 8/22; last EEG 7/19/22 showed slowing but no seizures. Last echo 8/22 wnl, MRI brain done 8/4 showed no new findings; Old chronic appearing R cerebellar infarct noted. CTH with no acute findings. Exam as above.     Impression:  AMS (lethargy, less responsive) 2/2 unclear etiology ddx includes matabolic/toxic or infectious vs seizure vs less likely stroke. Exam largely non focal. Noted pt stopped ASA yesterday 11/24 due to pending renal biopsy    Recommendations:  [] C/w ASA when able for stroke prevention   [] VEEG 24hr to evaluate for seizures   [] Further metabolic/infectious work up per primary team:  [] UA, Utox,, BCx, syphillis screen, NH3, lactate, CK, Folate, B12, TSH  [] MRI brain without contrast when able   [] Avoid sedative medications   [] Fall, seizure precautions    Pt to be discussed with and seen by Neurology attending during AM rounds. Plan to be finalized on attending attestation and subjected to change. 
Pt. with MANDY and proteinuria
Interventional Radiology    Evaluate for Procedure: renal parenchymal biopsy    HPI: 67y Female with PMH of sarcoid, CHF, PEAx2 admitted with urosepsis, acute on chronic resp failure and MANDY on CKD. Patient now stable and IR was consulted for renal parenchymal biopsy.    Allergies: penicillin ([employer] Synd)    Medications (Abx/Cardiac/Anticoagulation/Blood Products)    amLODIPine   Tablet: 10 milliGRAM(s) Oral (11-23 @ 05:14)  aspirin  chewable: 81 milliGRAM(s) Oral (11-21 @ 13:14)  aspirin enteric coated: 81 milliGRAM(s) Oral (11-22 @ 11:39)  cefTRIAXone   IVPB: 100 mL/Hr IV Intermittent (11-21 @ 16:51)  heparin   Injectable: 5000 Unit(s) SubCutaneous (11-23 @ 05:13)  hydrALAZINE Injectable: 5 milliGRAM(s) IV Push (11-22 @ 17:04)  hydrALAZINE Injectable: 5 milliGRAM(s) IV Push (11-22 @ 15:39)    Data:    T(C): 37  HR: 86  BP: 169/89  RR: 18  SpO2: 97%    -WBC 3.74 / HgB 9.0 / Hct 30.2 / Plt 227  -Na 143 / Cl 106 / BUN 51 / Glucose 134  -K 4.4 / CO2 27 / Cr 1.68  -ALT -- / Alk Phos -- / T.Bili --  -INR 1.03 / PTT 33.0      Radiology:     Assessment/Plan:   67y Female with PMH of sarcoid, CHF, PEAx2 admitted with urosepsis, acute on chronic resp failure and MANDY on CKD. Patient now stable and IR was consulted for renal parenchymal biopsy.  -- IR will plan to perform biopsy on Monday 11/28 (patient needs to be off of ASA for 5 days)  -- NPO at midnight on 11/27  -- hold aspirin starting today if that is safe   -- active covid pcr within 5 days of procedure   -- strict BP control, keep SBP<160  -- stat 4 am cbc, bmp, coags on 11/27  -- preprocedure note   -- please place IR procedure request order under Dr. Negron

## 2022-11-25 NOTE — PROGRESS NOTE ADULT - PROBLEM SELECTOR PLAN 2
s/p a course of ceftriaxone empirically  - urine cx and blood cx negative   - continue to monitor for symptoms  - repeat infection w/u given change in mental status

## 2022-11-25 NOTE — PROGRESS NOTE ADULT - PROBLEM SELECTOR PLAN 6
c/w home lipitor  Seems aspirin has been given for secondary stroke prevention, but not for CAD - can be held temporarily for anticipated kidney bx--will clarify with neuro

## 2022-11-25 NOTE — CONSULT NOTE ADULT - SUBJECTIVE AND OBJECTIVE BOX
Pulmonology Consult    CHIEF COMPLAINT:Patient is a 67y old  Female who presents with a chief complaint of MANDY on CKD, abnormal VS, altered mental status (2022 11:33)    HPI:  67F PMH HTN, HLD, sarcoidosis, CHF, PEA*2, sepsis 2/2 UTI (Klesiella, E. coli) ~2022 requiring intubation; presented to ED with MANDY on CKD found on outpatient labs and with abnormal VS, found to have AMS, AHRF, intubated, and admitted to MICU.    Patient extubated  to BiPAP. Patient has since monitored in the RCU been weaned to 4L NC and satting well. Pt was mentating well without NIPPV with stable well-compensated hypercapnia with co2 in the 50's and 60's.     Pt since transferred to floors on 4L NC. This morning, RRT for AMS occurred. Workup so far unrevealing. Pulmonology consulted for hypercapnia.     PAST MEDICAL & SURGICAL HISTORY:  Type 2 diabetes mellitus      Bilateral cataracts      Fluid in pleural cavity associated with pancreatitis      Pancreatic pseudocyst/cyst  s/p drain placement and removal      HTN (hypertension)      HLD (hyperlipidemia)      Sarcoid      Pulseless electrical activity      History of amputation of right great toe        H/O:  section        History of laparoscopic cholecystectomy          FAMILY HISTORY:      SOCIAL HISTORY:  Unable to get from pt 2/2 ams    Allergies    penicillin (Red Man Synd)    Intolerances        HOME MEDICATIONS:  Home Medications:  acetaminophen 325 mg oral tablet: 2 tab(s) orally every 6 hours, As needed, Temp greater or equal to 38C (100.4F), Mild Pain (1 - 3) (2022 00:01)  amLODIPine 10 mg oral tablet: 1 tab(s) orally once a day (2022 00:01)  aspirin 81 mg oral tablet, chewable: 1 tab(s) orally once a day (2022 00:01)  atorvastatin 40 mg oral tablet: 1 tab(s) orally once a day (2022 00:)  carvedilol 25 mg oral tablet: 1 tab(s) orally every 12 hours (2022 00:01)  furosemide 40 mg oral tablet: 1 tab(s) orally once a day (2022 00:01)  gabapentin 100 mg oral capsule: 1 cap(s) orally once a day (2022 00:01)  heparin: 5000 unit(s) subcutaneous every 8 hours (2022 00:01)  hydrALAZINE 100 mg oral tablet: 1 tab(s) orally 3 times a day (2022 00:)  ipratropium-albuterol 0.5 mg-2.5 mg/3 mL inhalation solution: 3 milliliter(s) inhaled every 6 hours (2022 00:)  melatonin 3 mg oral tablet: 2 tab(s) orally once a day (at bedtime) (2022 00:)      REVIEW OF SYSTEMS:  Constitutional: [ ] negative [ ] fevers [ ] chills [ ] weight loss [ ] weight gain  HEENT: [ ] negative [ ] dry eyes [ ] eye irritation [ ] postnasal drip [ ] nasal congestion  CV: [ ] negative  [ ] chest pain [ ] orthopnea [ ] palpitations [ ] murmur  Resp: [ ] negative [ ] cough [ ] shortness of breath [ ] dyspnea [ ] wheezing [ ] sputum [ ] hemoptysis  GI: [ ] negative [ ] nausea [ ] vomiting [ ] diarrhea [ ] constipation [ ] abd pain [ ] dysphagia   : [ ] negative [ ] dysuria [ ] nocturia [ ] hematuria [ ] increased urinary frequency  Musculoskeletal: [ ] negative [ ] back pain [ ] myalgias [ ] arthralgias [ ] fracture  Skin: [ ] negative [ ] rash [ ] itch  Neurological: [ ] negative [ ] headache [ ] dizziness [ ] syncope [ ] weakness [ ] numbness  Psychiatric: [ ] negative [ ] anxiety [ ] depression  Endocrine: [ ] negative [ ] diabetes [ ] thyroid problem  Hematologic/Lymphatic: [ ] negative [ ] anemia [ ] bleeding problem  Allergic/Immunologic: [ ] negative [ ] itchy eyes [ ] nasal discharge [ ] hives [ ] angioedema  [] All other systems negative  [x] Unable to assess ROS because of altered mental status.    OBJECTIVE:  ICU Vital Signs Last 24 Hrs  T(C): 36.3 (2022 15:14), Max: 36.7 (2022 21:33)  T(F): 97.4 (2022 15:14), Max: 98 (2022 21:33)  HR: 73 (2022 15:14) (73 - 85)  BP: 155/68 (2022 15:14) (144/76 - 177/87)  BP(mean): --  ABP: --  ABP(mean): --  RR: 18 (2022 15:14) (18 - 18)  SpO2: 97% (2022 15:14) (97% - 99%)    O2 Parameters below as of 2022 15:14  Patient On (Oxygen Delivery Method): nasal cannula  O2 Flow (L/min): 4            CAPILLARY BLOOD GLUCOSE      POCT Blood Glucose.: 96 mg/dL (2022 12:14)      PHYSICAL EXAM:  GENERAL: Arousable to sternal rub and pain. Swats examiner away, yelling stop. Says her name when asked.  HEAD:  Atraumatic, Normocephalic  EYES: Pinpoint pupils.   CHEST/LUNG: Clear to auscultation bilaterally   HEART: Regular rate and rhythm   ABDOMEN: Nondistended  VASCULAR: No  cyanosis, or edema  SKIN: No rashes or lesions  NERVOUS SYSTEM: as above    HOSPITAL MEDICATIONS:  Standing Meds:  albuterol    90 MICROgram(s) HFA Inhaler 2 Puff(s) Inhalation every 6 hours  amLODIPine   Tablet 10 milliGRAM(s) Oral daily  atorvastatin 40 milliGRAM(s) Oral at bedtime  buMETAnide 2 milliGRAM(s) Oral daily  chlorhexidine 2% Cloths 1 Application(s) Topical <User Schedule>  dextrose 5%. 1000 milliLiter(s) IV Continuous <Continuous>  dextrose 5%. 1000 milliLiter(s) IV Continuous <Continuous>  dextrose 50% Injectable 25 Gram(s) IV Push once  dextrose 50% Injectable 12.5 Gram(s) IV Push once  dextrose 50% Injectable 25 Gram(s) IV Push once  glucagon  Injectable 1 milliGRAM(s) IntraMuscular once  heparin   Injectable 5000 Unit(s) SubCutaneous every 8 hours  hydrALAZINE 50 milliGRAM(s) Oral three times a day  ipratropium 17 MICROgram(s) HFA Inhaler 1 Puff(s) Inhalation every 6 hours      PRN Meds:  acetaminophen     Tablet .. 650 milliGRAM(s) Oral every 6 hours PRN  dextrose Oral Gel 15 Gram(s) Oral once PRN      LABS:        144  |  107  |  42<H>  ----------------------------<  92  4.2   |  31  |  1.75<H>      146<H>  |  109<H>  |  43<H>  ----------------------------<  85  4.2   |  29  |  1.79<H>      143  |  105  |  43<H>  ----------------------------<  145<H>  4.0   |  27  |  1.53<H>    Ca    8.9      2022 10:05  Ca    9.3      2022 06:32  Ca    9.4      2022 05:20  Phos  4.1       Mg     1.90         TPro  6.5  /  Alb  3.0<L>  /  TBili  0.3  /  DBili  <0.2  /  AST  12  /  ALT  10  /  AlkPhos  85      Magnesium, Serum: 1.90 mg/dL (22 @ 10:05)  Magnesium, Serum: 1.90 mg/dL (22 @ 06:32)  Magnesium, Serum: 1.90 mg/dL (22 @ 05:20)  Magnesium, Serum: 2.00 mg/dL (22 @ 06:00)    Phosphorus Level, Serum: 4.1 mg/dL (22 @ 10:05)  Phosphorus Level, Serum: 3.9 mg/dL (22 @ 06:32)  Phosphorus Level, Serum: 2.9 mg/dL (22 @ 05:20)  Phosphorus Level, Serum: 2.6 mg/dL (22 @ 06:00)                      ABG - ( 2022 09:55 )  pH, Arterial: 7.37  pH, Blood: x     /  pCO2: 59    /  pO2: 144   / HCO3: 34    / Base Excess: 7.7   /  SaO2: 98.6                                    8.4    3.18  )-----------( 177      ( 2022 10:05 )             28.3                         8.5    2.75  )-----------( 182      ( 2022 06:32 )             28.3                         9.2    3.57  )-----------( 216      ( 2022 05:20 )             30.9     CAPILLARY BLOOD GLUCOSE      POCT Blood Glucose.: 96 mg/dL (2022 12:14)  POCT Blood Glucose.: 100 mg/dL (2022 09:52)  POCT Blood Glucose.: 91 mg/dL (2022 08:24)  POCT Blood Glucose.: 118 mg/dL (2022 22:20)  POCT Blood Glucose.: 135 mg/dL (2022 17:26)

## 2022-11-25 NOTE — PROGRESS NOTE ADULT - SUBJECTIVE AND OBJECTIVE BOX
PROGRESS NOTE:     Patient is a 67y old  Female who presents with a chief complaint of MANDY on CKD, abnormal VS, altered mental status (25 Nov 2022 15:28)      SUBJECTIVE / OVERNIGHT EVENTS: no acute event overnight. This morning, RRT called for AMS. Per nursing, patient was sleepy but arousable and conversive yesterday during the day. Per daughter, patient was able to text her yesterday. Patient also took her morning meds. Later in the morning, patient was difficult to arouse and RRT called (see RRT note for details). Code stroke called after ABG resulted. CTH done, no acute finding. Pulm consulted to comment on elevated pCO2 on her mental status. ROS unable to obtain due to AMS.     ADDITIONAL REVIEW OF SYSTEMS:    MEDICATIONS  (STANDING):  albuterol    90 MICROgram(s) HFA Inhaler 2 Puff(s) Inhalation every 6 hours  amLODIPine   Tablet 10 milliGRAM(s) Oral daily  atorvastatin 40 milliGRAM(s) Oral at bedtime  buMETAnide 2 milliGRAM(s) Oral daily  chlorhexidine 2% Cloths 1 Application(s) Topical <User Schedule>  dextrose 5%. 1000 milliLiter(s) (100 mL/Hr) IV Continuous <Continuous>  dextrose 5%. 1000 milliLiter(s) (50 mL/Hr) IV Continuous <Continuous>  dextrose 50% Injectable 25 Gram(s) IV Push once  dextrose 50% Injectable 12.5 Gram(s) IV Push once  dextrose 50% Injectable 25 Gram(s) IV Push once  glucagon  Injectable 1 milliGRAM(s) IntraMuscular once  heparin   Injectable 5000 Unit(s) SubCutaneous every 8 hours  hydrALAZINE 50 milliGRAM(s) Oral three times a day  ipratropium 17 MICROgram(s) HFA Inhaler 1 Puff(s) Inhalation every 6 hours    MEDICATIONS  (PRN):  acetaminophen     Tablet .. 650 milliGRAM(s) Oral every 6 hours PRN Mild Pain (1 - 3)  dextrose Oral Gel 15 Gram(s) Oral once PRN Blood Glucose LESS THAN 70 milliGRAM(s)/deciliter      CAPILLARY BLOOD GLUCOSE      POCT Blood Glucose.: 96 mg/dL (25 Nov 2022 12:14)  POCT Blood Glucose.: 100 mg/dL (25 Nov 2022 09:52)  POCT Blood Glucose.: 91 mg/dL (25 Nov 2022 08:24)  POCT Blood Glucose.: 118 mg/dL (24 Nov 2022 22:20)  POCT Blood Glucose.: 135 mg/dL (24 Nov 2022 17:26)    I&O's Summary      PHYSICAL EXAM:  Vital Signs Last 24 Hrs  T(C): 36.3 (25 Nov 2022 15:14), Max: 36.7 (24 Nov 2022 21:33)  T(F): 97.4 (25 Nov 2022 15:14), Max: 98 (24 Nov 2022 21:33)  HR: 73 (25 Nov 2022 15:14) (73 - 85)  BP: 155/68 (25 Nov 2022 15:14) (144/76 - 177/87)  BP(mean): --  RR: 18 (25 Nov 2022 15:14) (18 - 18)  SpO2: 97% (25 Nov 2022 15:14) (97% - 99%)    Parameters below as of 25 Nov 2022 15:14  Patient On (Oxygen Delivery Method): nasal cannula  O2 Flow (L/min): 4      CONSTITUTIONAL: lethargic, can't keep eyes open or engage in conversation. Pinpoint pupils  RESPIRATORY: Normal respiratory effort; lungs are clear to auscultation bilaterally  CARDIOVASCULAR: Regular rate and rhythm, normal S1 and S2, no murmur/rub/gallop  ABDOMEN: Nontender to palpation, normoactive bowel sounds, no rebound/guarding  MUSCLOSKELETAL: no clubbing or cyanosis of digits; no joint swelling or tenderness to palpation  SKIN: no rash, erythema, lesion  PSYCH: A+O to person, unable to elicit the rest     LABS:                        8.4    3.18  )-----------( 177      ( 25 Nov 2022 10:05 )             28.3     11-25    144  |  107  |  42<H>  ----------------------------<  92  4.2   |  31  |  1.75<H>    Ca    8.9      25 Nov 2022 10:05  Phos  4.1     11-25  Mg     1.90     11-25    TPro  6.5  /  Alb  3.0<L>  /  TBili  0.3  /  DBili  <0.2  /  AST  12  /  ALT  10  /  AlkPhos  85  11-25      CARDIAC MARKERS ( 25 Nov 2022 14:10 )  x     / x     / 38 U/L / x     / x                RADIOLOGY & ADDITIONAL TESTS:  Results Reviewed:   Imaging Personally Reviewed:  Electrocardiogram Personally Reviewed:    COORDINATION OF CARE:  Care Discussed with Consultants/Other Providers [Y/N]:  Prior or Outpatient Records Reviewed [Y/N]:

## 2022-11-25 NOTE — PROGRESS NOTE ADULT - PROBLEM SELECTOR PLAN 1
change in mental status this morning. Differentials: toxic metabolic vs neuro (seizure, stroke) vs infection vs cardiac  -CTH no acute finding  -will obtain MRI, vEEG  -neuro recs appreciated  -pulm recs appreciated  -infection w/u  -will check EKG

## 2022-11-25 NOTE — PROGRESS NOTE ADULT - PROBLEM SELECTOR PLAN 1
Pt. with MANDY suspected in setting of hemodynamics/ hypotension on arrival in ED vs volume overload. Pt with MANDY/ATN. On review of [facility] HIE/[facility] the patient is noted to have a Scr of 1.2mg/dL in 9/2022, on admission it was elevated to 3.01mg/dL and has further increased to 3.27mg/dL on 11/20/22. Scr is elevated/improved to 1.78 today.    UA with significant proteinuria (noted previously as well) and significant hematuria (new from previous urine studies). In the past all serological work up has been negative and it was presumed renal disease was in setting of her DM. Kidney sonogram suggestive of B/L renal parenchymal disease. Given nephrotic range proteinuria, plan is for kidney biopsy. Plan discussed with pt, pt agreed. ASA was discontinued in preparation for kidney biopsy. IR consult for kidney biopsy.     Labs reviewed. Pt currently on PO bumex 2 mg daily. Pt non oliguric. Pt clinically in fluid overload. Agree with PO bumex for now. Monitor labs and urine output. Avoid any potential nephrotoxins. Dose medications as per eGFR.     If you have any questions, please feel free to contact me  Desean De Leon  Nephrology Fellow  [phone]/ [employer] Teams(Preferred)  (After 5pm or on weekends please page the on-call fellow).

## 2022-11-25 NOTE — CONSULT NOTE ADULT - ATTENDING COMMENTS
Current alteration of mental status is not due to hypercapnia given that current ABG shows compensated hypercapnia. pH is 7.37, pCO2 59, pO2 144, SaO2 99%. She is lethargic but arousable. No evidence of active seizures. Moving all extremities. Has pinpoint pupils. Consider trial of naloxone, even though she has not received any opioids. Hold gabapentin. Consider BiPAP 12/5 at night and prn day. Discussed with primary team. Will sign off, please call back with questions.
AMS  unclear etiology ddx includes matabolic/toxic or infectious vs seizure vs less likely stroke. Exam largely non focal. Noted pt stopped ASA yesterday 11/24 due to pending renal biopsy
long standing DM and proteinuria. new hematuria   A/CKD along with Acute respiratory failure and hypotension s/p intubation and diuresis---> extubation   Sarcoidosis  currently on O2.   likely ATN but needs to pursue GN workup as well   suggest  serological workup as noted above   plan for kidney biopsy when stable. Please stop Aspirin if deemed safe   continue with Bumex 2mg IV daily to keep O>I   avoid contrast studies, ACE-I, ARB, or NSAIDs  Assessment and plan as outlined above. Monitor labs and urine output. Avoid any potential nephrotoxins. Dose medications as per eGFR.  Further detailed plan of management and recommendation as outlined above by the renal fellow.

## 2022-11-25 NOTE — RAPID RESPONSE TEAM SUMMARY - NSSITUATIONBACKGROUNDRRT_GEN_ALL_CORE
67 year-old female with history of HTN, HLD, sarcoidosis, CHF, PEA (2X), sepsis 2/2 UTI (Klebsiella, E. Coli) in 09/022 requiring intubation during that admission, presenting to the ED with MANDY on CKD on outpatient labs and acute hypoxic respiratory failure and septic shock 2/2 UTI requiring intubation and MICU stay, now weaned to NC and transferred to RCU then floor.    RRT called when PA found patient unresponsive. Patient's vitals WNL, fingerstick glucose 100. Afebrile, on 2L NC with O2 sat 98%. /90. Unresponsive to sternal rub. Withdrawing all except LUE. No sedating mediations given overnight or this AM (other than gabapentin which patient had been receiving). Last known normal was 5:30 AM when patient took medications. ABG with normal PH, PCO2 and PO2. Code stroke called, patient returned to normal baseline status during neurology exam. CT head ordered.

## 2022-11-25 NOTE — CONSULT NOTE ADULT - REASON FOR ADMISSION
MANDY on CKD, abnormal VS, altered mental status

## 2022-11-26 LAB
ANION GAP SERPL CALC-SCNC: 12 MMOL/L — SIGNIFICANT CHANGE UP (ref 7–14)
BUN SERPL-MCNC: 38 MG/DL — HIGH (ref 7–23)
CALCIUM SERPL-MCNC: 9.7 MG/DL — SIGNIFICANT CHANGE UP (ref 8.4–10.5)
CHLORIDE SERPL-SCNC: 105 MMOL/L — SIGNIFICANT CHANGE UP (ref 98–107)
CO2 SERPL-SCNC: 30 MMOL/L — SIGNIFICANT CHANGE UP (ref 22–31)
CREAT SERPL-MCNC: 1.62 MG/DL — HIGH (ref 0.5–1.3)
DSDNA AB SER-ACNC: <12 IU/ML — SIGNIFICANT CHANGE UP
EGFR: 34 ML/MIN/1.73M2 — LOW
GLUCOSE BLDC GLUCOMTR-MCNC: 104 MG/DL — HIGH (ref 70–99)
GLUCOSE BLDC GLUCOMTR-MCNC: 112 MG/DL — HIGH (ref 70–99)
GLUCOSE BLDC GLUCOMTR-MCNC: 90 MG/DL — SIGNIFICANT CHANGE UP (ref 70–99)
GLUCOSE BLDC GLUCOMTR-MCNC: 92 MG/DL — SIGNIFICANT CHANGE UP (ref 70–99)
GLUCOSE SERPL-MCNC: 84 MG/DL — SIGNIFICANT CHANGE UP (ref 70–99)
HCT VFR BLD CALC: 28.7 % — LOW (ref 34.5–45)
HGB BLD-MCNC: 8.8 G/DL — LOW (ref 11.5–15.5)
MAGNESIUM SERPL-MCNC: 1.8 MG/DL — SIGNIFICANT CHANGE UP (ref 1.6–2.6)
MCHC RBC-ENTMCNC: 25.8 PG — LOW (ref 27–34)
MCHC RBC-ENTMCNC: 30.7 GM/DL — LOW (ref 32–36)
MCV RBC AUTO: 84.2 FL — SIGNIFICANT CHANGE UP (ref 80–100)
NRBC # BLD: 0 /100 WBCS — SIGNIFICANT CHANGE UP (ref 0–0)
NRBC # FLD: 0 K/UL — SIGNIFICANT CHANGE UP (ref 0–0)
PHOSPHATE SERPL-MCNC: 3.9 MG/DL — SIGNIFICANT CHANGE UP (ref 2.5–4.5)
PLATELET # BLD AUTO: 185 K/UL — SIGNIFICANT CHANGE UP (ref 150–400)
POTASSIUM SERPL-MCNC: 4.5 MMOL/L — SIGNIFICANT CHANGE UP (ref 3.5–5.3)
POTASSIUM SERPL-SCNC: 4.5 MMOL/L — SIGNIFICANT CHANGE UP (ref 3.5–5.3)
RBC # BLD: 3.41 M/UL — LOW (ref 3.8–5.2)
RBC # FLD: 16.9 % — HIGH (ref 10.3–14.5)
SARS-COV-2 RNA SPEC QL NAA+PROBE: SIGNIFICANT CHANGE UP
SODIUM SERPL-SCNC: 147 MMOL/L — HIGH (ref 135–145)
WBC # BLD: 3.77 K/UL — LOW (ref 3.8–10.5)
WBC # FLD AUTO: 3.77 K/UL — LOW (ref 3.8–10.5)

## 2022-11-26 PROCEDURE — 99233 SBSQ HOSP IP/OBS HIGH 50: CPT

## 2022-11-26 PROCEDURE — 99222 1ST HOSP IP/OBS MODERATE 55: CPT | Mod: GC

## 2022-11-26 PROCEDURE — 99232 SBSQ HOSP IP/OBS MODERATE 35: CPT | Mod: GC

## 2022-11-26 RX ADMIN — Medication 1 PUFF(S): at 16:59

## 2022-11-26 RX ADMIN — ATORVASTATIN CALCIUM 40 MILLIGRAM(S): 80 TABLET, FILM COATED ORAL at 22:01

## 2022-11-26 RX ADMIN — Medication 50 MILLIGRAM(S): at 22:01

## 2022-11-26 RX ADMIN — Medication 1 PUFF(S): at 05:57

## 2022-11-26 RX ADMIN — Medication 1 PUFF(S): at 22:01

## 2022-11-26 RX ADMIN — ALBUTEROL 2 PUFF(S): 90 AEROSOL, METERED ORAL at 05:57

## 2022-11-26 RX ADMIN — AMLODIPINE BESYLATE 10 MILLIGRAM(S): 2.5 TABLET ORAL at 05:57

## 2022-11-26 RX ADMIN — ALBUTEROL 2 PUFF(S): 90 AEROSOL, METERED ORAL at 22:00

## 2022-11-26 RX ADMIN — HEPARIN SODIUM 5000 UNIT(S): 5000 INJECTION INTRAVENOUS; SUBCUTANEOUS at 22:01

## 2022-11-26 RX ADMIN — Medication 50 MILLIGRAM(S): at 05:57

## 2022-11-26 RX ADMIN — ALBUTEROL 2 PUFF(S): 90 AEROSOL, METERED ORAL at 16:59

## 2022-11-26 RX ADMIN — CHLORHEXIDINE GLUCONATE 1 APPLICATION(S): 213 SOLUTION TOPICAL at 05:56

## 2022-11-26 RX ADMIN — Medication 1 PUFF(S): at 09:29

## 2022-11-26 RX ADMIN — HEPARIN SODIUM 5000 UNIT(S): 5000 INJECTION INTRAVENOUS; SUBCUTANEOUS at 14:00

## 2022-11-26 RX ADMIN — Medication 50 MILLIGRAM(S): at 14:00

## 2022-11-26 RX ADMIN — ALBUTEROL 2 PUFF(S): 90 AEROSOL, METERED ORAL at 09:29

## 2022-11-26 RX ADMIN — BUMETANIDE 2 MILLIGRAM(S): 0.25 INJECTION INTRAMUSCULAR; INTRAVENOUS at 05:57

## 2022-11-26 RX ADMIN — HEPARIN SODIUM 5000 UNIT(S): 5000 INJECTION INTRAVENOUS; SUBCUTANEOUS at 05:57

## 2022-11-26 NOTE — PROGRESS NOTE ADULT - PROBLEM SELECTOR PLAN 1
Pt. with MANDY suspected in setting of hemodynamics/ hypotension on arrival in ED vs volume overload. Pt with MANDY/ATN. [facility] to have a Scr of 1.2mg/dL in 9/2022, on admission it was elevated to 3.01mg/dL and has further increased to 3.27mg/dL on 11/20/22. Scr is elevated/improved to 1.62 today.    UA with significant proteinuria (noted previously as well) and significant hematuria (new from previous urine studies). In the past all serological work up has been negative and it was presumed renal disease was in setting of her DM. Kidney sonogram suggestive of B/L renal parenchymal disease. Given nephrotic range proteinuria, plan is for kidney biopsy. Plan discussed with pt, pt agreed. ASA was discontinued in preparation for kidney biopsy. IR consult note from 11/23 regarding kidney biopsy reviewed. Pt planned for kidney biopsy on Monday (11/28/22).     Labs reviewed. Pt currently on PO bumex 2 mg daily. Pt non oliguric. Pt clinically in fluid overload. Agree with PO bumex for now. Monitor labs and urine output. Avoid any potential nephrotoxins. Dose medications as per eGFR.     If you have any questions, please feel free to contact me  Desean De Leon  Nephrology Fellow  [phone]/ [employer] Teams(Preferred)  (After 5pm or on weekends please page the on-call fellow).

## 2022-11-26 NOTE — PROGRESS NOTE ADULT - PROBLEM SELECTOR PLAN 1
change in mental status 11/25 (not responsive to sternal rub). Differentials: toxic metabolic vs neuro (seizure, stroke) vs infection vs cardiac  - mental status now seems to be at baseline  - CTH no acute finding  - will obtain MRI, vEEG  - neuro recs appreciated  - pulm recs appreciated  - infection w/u - thus far nonrevealing

## 2022-11-26 NOTE — PROGRESS NOTE ADULT - SUBJECTIVE AND OBJECTIVE BOX
[facility] DIVISION OF KIDNEY DISEASES AND HYPERTENSION -- FOLLOW UP NOTE  --------------------------------------------------------------------------------  HPI: Patient is a 67 year old female with PMH of HTN, HLD, Sarcoidosis who presented to the hospital after outpatient labs demonstrated elevated Scr. On arrival to the ED the patient was noted to have AMS and was hypotensive; she was subsequently intubated and admitted to the MICU. She was successfully extubated shortly after. On review of [facility] the patient is noted to have a Scr of 1.2mg/dL in 9/2022, on admission it was elevated to 3.01mg/dL and has further increased to 3.27 on 11/20/22. Scr is elevated/improved to 1.62 today. Nephrology team following for MANDY.    On review of [facility], it was noted that the patient was followed by nephrology for MANDY and proteinuria. All serologic work up at that time was negative and it was presumed her renal disease was in the setting of her longstanding DM. She was advised to undergo a renal biopsy at that time, however the patient has not followed up with nephrology following discharge. The patient has had multiple admissions for hypoxic respiratory failure in setting of being volume overloaded and has required aggressive diuresis.     Overnight events reviewed. Pt had RRT yesterday for AMS. Neurology note reviewed. Pt seen and examined at bedside. Pt reports feeling lethargic. Denies any SOB, CP, HA, N/V, abdominal pain , urinary symptoms or dizziness.    PAST HISTORY  --------------------------------------------------------------------------------  No significant changes to PMH, PSH, FHx, SHx, unless otherwise noted    ALLERGIES & MEDICATIONS  --------------------------------------------------------------------------------  Allergies    penicillin ([place] [facility])    Intolerances    Standing Inpatient Medications  albuterol    90 MICROgram(s) HFA Inhaler 2 Puff(s) Inhalation every 6 hours  amLODIPine   Tablet 10 milliGRAM(s) Oral daily  atorvastatin 40 milliGRAM(s) Oral at bedtime  buMETAnide 2 milliGRAM(s) Oral daily  chlorhexidine 2% Cloths 1 Application(s) Topical <User Schedule>  dextrose 5%. 1000 milliLiter(s) IV Continuous <Continuous>  dextrose 5%. 1000 milliLiter(s) IV Continuous <Continuous>  dextrose 50% Injectable 25 Gram(s) IV Push once  dextrose 50% Injectable 12.5 Gram(s) IV Push once  dextrose 50% Injectable 25 Gram(s) IV Push once  glucagon  Injectable 1 milliGRAM(s) IntraMuscular once  heparin   Injectable 5000 Unit(s) SubCutaneous every 8 hours  hydrALAZINE 50 milliGRAM(s) Oral three times a day  ipratropium 17 MICROgram(s) HFA Inhaler 1 Puff(s) Inhalation every 6 hours    PRN Inpatient Medications  acetaminophen     Tablet .. 650 milliGRAM(s) Oral every 6 hours PRN  dextrose Oral Gel 15 Gram(s) Oral once PRN    REVIEW OF SYSTEMS  --------------------------------------------------------------------------------  Gen: No fevers , lethargy+  Respiratory: No dyspnea  CV: No chest pain  GI: No abdominal pain  : No dysuria  MSK: No  edema  Neuro: no dizziness   All other systems were reviewed and are negative, except as noted.    VITALS/PHYSICAL EXAM  --------------------------------------------------------------------------------  T(C): 37.2 (11-26-22 @ 05:52), Max: 37.2 (11-26-22 @ 05:52)  HR: 68 (11-26-22 @ 08:15) (68 - 84)  BP: 162/74 (11-26-22 @ 06:43) (154/73 - 176/83)  RR: 17 (11-26-22 @ 05:52) (17 - 18)  SpO2: 96% (11-26-22 @ 08:15) (93% - 97%)  Wt(kg): --    11-25-22 @ 07:01  -  11-26-22 @ 07:00  --------------------------------------------------------  IN: 0 mL / OUT: 1000 mL / NET: -1000 mL    Physical Exam:  	Gen: ill appearing  	HEENT: MMM  	Pulm: minimal basal crackles in lung fields  	CV: S1S2  	Abd: Soft, +BS   	Ext: No LE edema B/L  	Neuro: Awake and alert  	Skin: Warm and dry    LABS/STUDIES  --------------------------------------------------------------------------------              8.8    3.77  >-----------<  185      [11-26-22 @ 10:10]              28.7     147  |  105  |  38  ----------------------------<  84      [11-26-22 @ 06:19]  4.5   |  30  |  1.62        Ca     9.7     [11-26-22 @ 06:19]      Mg     1.80     [11-26-22 @ 06:19]      Phos  3.9     [11-26-22 @ 06:19]    TPro  6.5  /  Alb  3.0  /  TBili  0.3  /  DBili  <0.2  /  AST  12  /  ALT  10  /  AlkPhos  85  [11-25-22 @ 10:05]    CK 38      [11-25-22 @ 14:10]    Creatinine Trend:  SCr 1.62 [11-26 @ 06:19]  SCr 1.75 [11-25 @ 10:05]  SCr 1.79 [11-25 @ 06:32]  SCr 1.53 [11-24 @ 05:20]  SCr 1.68 [11-23 @ 06:00]    Urinalysis - [11-25-22 @ 17:36]      Color Light Yellow / Appearance Clear / SG 1.012 / pH 6.5      Gluc Negative / Ketone Negative  / Bili Negative / Urobili <2 mg/dL       Blood Negative / Protein 100 mg/dL / Leuk Est Negative / Nitrite Negative      RBC 16 / WBC 24 / Hyaline 2 / Gran  / Sq Epi  / Non Sq Epi 4 / Bacteria Negative    Urine Creatinine 65      [11-22-22 @ 17:00]  Urine Protein 522      [11-22-22 @ 17:00]    Iron 68, TIBC 312, %sat 22      [11-19-22 @ 02:58]  Ferritin 113      [11-19-22 @ 02:58]  TSH 2.03      [11-25-22 @ 14:10]  Lipid: chol 236, , HDL 37, LDL --      [11-29-21 @ 06:47]    HBsAg Nonreact      [11-22-22 @ 06:45]  HCV 0.13, Nonreact      [11-22-22 @ 06:45]  HIV Nonreact      [11-22-22 @ 06:45]    MINOO: titer Negative, pattern --      [11-18-22 @ 03:53]  C3 Complement 133      [11-22-22 @ 06:45]  C4 Complement 33      [11-22-22 @ 06:45]  Rheumatoid Factor 17      [11-22-22 @ 06:45]  ANCA: cANCA Negative, pANCA Negative, atypical ANCA Negative      [11-22-22 @ 06:45]  anti-GBM <0.2      [11-22-22 @ 06:45]  Syphilis Screen (Treponema Pallidum Ab) Negative      [11-22-22 @ 06:45]  PLA2R: DESHAWN <1.8, IFA --      [11-22-22 @ 06:45]  Free Light Chains: kappa 12.79, lambda 4.65, ratio = 2.75      [11-22 @ 06:45]

## 2022-11-26 NOTE — PROGRESS NOTE ADULT - ATTENDING COMMENTS
Patient examined and ROS reviewed. A case of  MANDY on CKD (DM) with nephrotic range of proteinuria in the setting of hemodynamic factors. Serum creatinine is improved but  proteinuria is persisting. Patient has agreed for kidney biopsy. Patient is  non oliguric but fluid overloaded. Advised to continue Bumex.

## 2022-11-26 NOTE — PROGRESS NOTE ADULT - SUBJECTIVE AND OBJECTIVE BOX
[facility]  Pelon Renee DO  Available via MS Teams  In house pager [phone]    SUBJECTIVE / OVERNIGHT EVENTS:  Had RRT yesterday for altered mentation. Responsive to voice today (though hard of hearing). Wearing EEG leads with VEEG running.  Hard to elicit symptoms given her impaired hearing (particularly wearing the EEG dressings).     ADDITIONAL REVIEW OF SYSTEMS:    MEDICATIONS  (STANDING):  albuterol    90 MICROgram(s) HFA Inhaler 2 Puff(s) Inhalation every 6 hours  amLODIPine   Tablet 10 milliGRAM(s) Oral daily  atorvastatin 40 milliGRAM(s) Oral at bedtime  buMETAnide 2 milliGRAM(s) Oral daily  chlorhexidine 2% Cloths 1 Application(s) Topical <User Schedule>  dextrose 5%. 1000 milliLiter(s) (100 mL/Hr) IV Continuous <Continuous>  dextrose 5%. 1000 milliLiter(s) (50 mL/Hr) IV Continuous <Continuous>  dextrose 50% Injectable 25 Gram(s) IV Push once  dextrose 50% Injectable 12.5 Gram(s) IV Push once  dextrose 50% Injectable 25 Gram(s) IV Push once  glucagon  Injectable 1 milliGRAM(s) IntraMuscular once  heparin   Injectable 5000 Unit(s) SubCutaneous every 8 hours  hydrALAZINE 50 milliGRAM(s) Oral three times a day  ipratropium 17 MICROgram(s) HFA Inhaler 1 Puff(s) Inhalation every 6 hours    MEDICATIONS  (PRN):  acetaminophen     Tablet .. 650 milliGRAM(s) Oral every 6 hours PRN Mild Pain (1 - 3)  dextrose Oral Gel 15 Gram(s) Oral once PRN Blood Glucose LESS THAN 70 milliGRAM(s)/deciliter      I&O's Summary    2022 07:01  -  2022 07:00  --------------------------------------------------------  IN: 0 mL / OUT: 1000 mL / NET: -1000 mL        PHYSICAL EXAM:  Vital Signs Last 24 Hrs  T(C): 37.2 (2022 05:52), Max: 37.2 (2022 05:52)  T(F): 99 (2022 05:52), Max: 99 (2022 05:52)  HR: 68 (2022 08:15) (68 - 84)  BP: 162/74 (2022 06:43) (154/73 - 176/83)  BP(mean): --  RR: 17 (2022 05:52) (17 - 18)  SpO2: 96% (2022 08:15) (93% - 97%)    Parameters below as of 2022 05:52  Patient On (Oxygen Delivery Method): nasal cannula  O2 Flow (L/min): 1    CONSTITUTIONAL: NAD, well-developed, well-groomed, very hard of hearing, wearing EEG leads  EYES: PERRLA; conjunctiva and sclera clear  ENMT: Moist oral mucosa, no pharyngeal injection or exudates; normal dentition  NECK: Supple, no palpable masses; no thyromegaly  RESPIRATORY: Normal respiratory effort; lungs are clear to auscultation bilaterally  CARDIOVASCULAR: Regular rate and rhythm, normal S1 and S2, no murmur/rub/gallop; No lower extremity edema; Peripheral pulses are 2+ bilaterally  ABDOMEN: Nontender to palpation, normoactive bowel sounds, no rebound/guarding; No hepatosplenomegaly  MUSCULOSKELETAL:  Normal gait; no clubbing or cyanosis of digits; no joint swelling or tenderness to palpation  PSYCH: A+O to person, place, and time; affect appropriate  NEUROLOGY: CN 2-12 are intact and symmetric; no gross sensory deficits   SKIN: No rashes; no palpable lesions    LABS:                        8.8    3.77  )-----------( 185      ( 2022 10:10 )             28.7     11-    147<H>  |  105  |  38<H>  ----------------------------<  84  4.5   |  30  |  1.62<H>    Ca    9.7      2022 06:19  Phos  3.9     11-  Mg     1.80     -    TPro  6.5  /  Alb  3.0<L>  /  TBili  0.3  /  DBili  <0.2  /  AST  12  /  ALT  10  /  AlkPhos  85  11-25      CARDIAC MARKERS ( 2022 14:10 )  x     / x     / 38 U/L / x     / x          Urinalysis Basic - ( 2022 17:36 )    Color: Light Yellow / Appearance: Clear / S.012 / pH: x  Gluc: x / Ketone: Negative  / Bili: Negative / Urobili: <2 mg/dL   Blood: x / Protein: 100 mg/dL / Nitrite: Negative   Leuk Esterase: Negative / RBC: 16 /HPF / WBC 24 /HPF   Sq Epi: x / Non Sq Epi: 4 /HPF / Bacteria: Negative        COVID-19 PCR: NotDetec (2022 08:06)  SARS-CoV-2: NotDetec (2022 18:21)  COVID-19 PCR: NotDetec (08 Sep 2022 21:20)  COVID-19 PCR: NotDetec (06 Sep 2022 06:40)  COVID-19 PCR: NotDetec (03 Aug 2022 19:16)  COVID-19 PCR: NotDetec (2022 17:18)  COVID-19 PCR: NotDetec (2022 07:08)  COVID-19 PCR: NotDetec (2022 07:36)  SARS-CoV-2: NotDetec (2022 00:56)

## 2022-11-26 NOTE — PROGRESS NOTE ADULT - PROBLEM SELECTOR PLAN 3
baseline 1.2. On admission 3s. Now improving.   - nephro following: recommend bumex 2mg po daily and to pursue renal biopsy by IR, planned for 11/28  - holding aspirin anticipating bx given it is being used for secondary stroke prevention only, no indication of hx of CAD  - appreciate neuro input regarding ASA

## 2022-11-27 LAB
ANION GAP SERPL CALC-SCNC: 11 MMOL/L — SIGNIFICANT CHANGE UP (ref 7–14)
BUN SERPL-MCNC: 31 MG/DL — HIGH (ref 7–23)
CALCIUM SERPL-MCNC: 9.1 MG/DL — SIGNIFICANT CHANGE UP (ref 8.4–10.5)
CHLORIDE SERPL-SCNC: 100 MMOL/L — SIGNIFICANT CHANGE UP (ref 98–107)
CO2 SERPL-SCNC: 32 MMOL/L — HIGH (ref 22–31)
CREAT SERPL-MCNC: 1.5 MG/DL — HIGH (ref 0.5–1.3)
EGFR: 38 ML/MIN/1.73M2 — LOW
GLUCOSE BLDC GLUCOMTR-MCNC: 103 MG/DL — HIGH (ref 70–99)
GLUCOSE BLDC GLUCOMTR-MCNC: 105 MG/DL — HIGH (ref 70–99)
GLUCOSE BLDC GLUCOMTR-MCNC: 97 MG/DL — SIGNIFICANT CHANGE UP (ref 70–99)
GLUCOSE BLDC GLUCOMTR-MCNC: 98 MG/DL — SIGNIFICANT CHANGE UP (ref 70–99)
GLUCOSE SERPL-MCNC: 88 MG/DL — SIGNIFICANT CHANGE UP (ref 70–99)
HCT VFR BLD CALC: 28.4 % — LOW (ref 34.5–45)
HGB BLD-MCNC: 8.5 G/DL — LOW (ref 11.5–15.5)
MAGNESIUM SERPL-MCNC: 1.6 MG/DL — SIGNIFICANT CHANGE UP (ref 1.6–2.6)
MCHC RBC-ENTMCNC: 25.4 PG — LOW (ref 27–34)
MCHC RBC-ENTMCNC: 29.9 GM/DL — LOW (ref 32–36)
MCV RBC AUTO: 85 FL — SIGNIFICANT CHANGE UP (ref 80–100)
NRBC # BLD: 0 /100 WBCS — SIGNIFICANT CHANGE UP (ref 0–0)
NRBC # FLD: 0 K/UL — SIGNIFICANT CHANGE UP (ref 0–0)
PHOSPHATE SERPL-MCNC: 3.8 MG/DL — SIGNIFICANT CHANGE UP (ref 2.5–4.5)
PLATELET # BLD AUTO: 177 K/UL — SIGNIFICANT CHANGE UP (ref 150–400)
POTASSIUM SERPL-MCNC: 4.2 MMOL/L — SIGNIFICANT CHANGE UP (ref 3.5–5.3)
POTASSIUM SERPL-SCNC: 4.2 MMOL/L — SIGNIFICANT CHANGE UP (ref 3.5–5.3)
RBC # BLD: 3.34 M/UL — LOW (ref 3.8–5.2)
RBC # FLD: 17.1 % — HIGH (ref 10.3–14.5)
SODIUM SERPL-SCNC: 143 MMOL/L — SIGNIFICANT CHANGE UP (ref 135–145)
WBC # BLD: 3.69 K/UL — LOW (ref 3.8–10.5)
WBC # FLD AUTO: 3.69 K/UL — LOW (ref 3.8–10.5)

## 2022-11-27 PROCEDURE — 70551 MRI BRAIN STEM W/O DYE: CPT | Mod: 26

## 2022-11-27 PROCEDURE — 99233 SBSQ HOSP IP/OBS HIGH 50: CPT

## 2022-11-27 RX ADMIN — HEPARIN SODIUM 5000 UNIT(S): 5000 INJECTION INTRAVENOUS; SUBCUTANEOUS at 05:59

## 2022-11-27 RX ADMIN — ALBUTEROL 2 PUFF(S): 90 AEROSOL, METERED ORAL at 17:14

## 2022-11-27 RX ADMIN — Medication 50 MILLIGRAM(S): at 05:58

## 2022-11-27 RX ADMIN — Medication 50 MILLIGRAM(S): at 13:14

## 2022-11-27 RX ADMIN — Medication 1 PUFF(S): at 17:14

## 2022-11-27 RX ADMIN — ALBUTEROL 2 PUFF(S): 90 AEROSOL, METERED ORAL at 21:35

## 2022-11-27 RX ADMIN — BUMETANIDE 2 MILLIGRAM(S): 0.25 INJECTION INTRAMUSCULAR; INTRAVENOUS at 05:58

## 2022-11-27 RX ADMIN — Medication 50 MILLIGRAM(S): at 21:42

## 2022-11-27 RX ADMIN — ALBUTEROL 2 PUFF(S): 90 AEROSOL, METERED ORAL at 05:59

## 2022-11-27 RX ADMIN — ATORVASTATIN CALCIUM 40 MILLIGRAM(S): 80 TABLET, FILM COATED ORAL at 21:36

## 2022-11-27 RX ADMIN — HEPARIN SODIUM 5000 UNIT(S): 5000 INJECTION INTRAVENOUS; SUBCUTANEOUS at 13:15

## 2022-11-27 RX ADMIN — AMLODIPINE BESYLATE 10 MILLIGRAM(S): 2.5 TABLET ORAL at 05:59

## 2022-11-27 RX ADMIN — Medication 1 PUFF(S): at 05:59

## 2022-11-27 RX ADMIN — Medication 1 PUFF(S): at 21:35

## 2022-11-27 RX ADMIN — CHLORHEXIDINE GLUCONATE 1 APPLICATION(S): 213 SOLUTION TOPICAL at 05:59

## 2022-11-27 NOTE — PROGRESS NOTE ADULT - SUBJECTIVE AND OBJECTIVE BOX
[facility] Division of Hospital Medicine  Pelon Renee DO  Available via MS Teams  In house pager [phone]    SUBJECTIVE / OVERNIGHT EVENTS:  No acute events overnight.  Asking for diet to be switched to regular diet.     ADDITIONAL REVIEW OF SYSTEMS:    MEDICATIONS  (STANDING):  albuterol    90 MICROgram(s) HFA Inhaler 2 Puff(s) Inhalation every 6 hours  amLODIPine   Tablet 10 milliGRAM(s) Oral daily  atorvastatin 40 milliGRAM(s) Oral at bedtime  buMETAnide 2 milliGRAM(s) Oral daily  chlorhexidine 2% Cloths 1 Application(s) Topical <User Schedule>  dextrose 5%. 1000 milliLiter(s) (100 mL/Hr) IV Continuous <Continuous>  dextrose 5%. 1000 milliLiter(s) (50 mL/Hr) IV Continuous <Continuous>  dextrose 50% Injectable 25 Gram(s) IV Push once  dextrose 50% Injectable 12.5 Gram(s) IV Push once  dextrose 50% Injectable 25 Gram(s) IV Push once  glucagon  Injectable 1 milliGRAM(s) IntraMuscular once  heparin   Injectable 5000 Unit(s) SubCutaneous every 8 hours  hydrALAZINE 50 milliGRAM(s) Oral three times a day  ipratropium 17 MICROgram(s) HFA Inhaler 1 Puff(s) Inhalation every 6 hours    MEDICATIONS  (PRN):  acetaminophen     Tablet .. 650 milliGRAM(s) Oral every 6 hours PRN Mild Pain (1 - 3)  dextrose Oral Gel 15 Gram(s) Oral once PRN Blood Glucose LESS THAN 70 milliGRAM(s)/deciliter      I&O's Summary      PHYSICAL EXAM:  Vital Signs Last 24 Hrs  T(C): 36.8 (2022 05:58), Max: 37.3 (2022 22:00)  T(F): 98.2 (2022 05:58), Max: 99.1 (2022 22:00)  HR: 87 (2022 05:58) (78 - 87)  BP: 161/78 (2022 05:58) (160/78 - 165/78)  BP(mean): --  RR: 20 (2022 05:58) (18 - 20)  SpO2: 96% (2022 05:58) (95% - 96%)    Parameters below as of 2022 05:58  Patient On (Oxygen Delivery Method): nasal cannula  O2 Flow (L/min): 1    CONSTITUTIONAL: NAD, well-developed, well-groomed; hard of hearing  EYES: PERRLA; conjunctiva and sclera clear  ENMT: Moist oral mucosa, no pharyngeal injection or exudates; normal dentition  NECK: Supple, no palpable masses; no thyromegaly  RESPIRATORY: Normal respiratory effort; lungs are clear to auscultation bilaterally  CARDIOVASCULAR: Regular rate and rhythm, normal S1 and S2, no murmur/rub/gallop; No lower extremity edema; Peripheral pulses are 2+ bilaterally  ABDOMEN: Nontender to palpation, normoactive bowel sounds, no rebound/guarding; No hepatosplenomegaly  MUSCULOSKELETAL:  Normal gait; no clubbing or cyanosis of digits; no joint swelling or tenderness to palpation  PSYCH: A+O to person, place, and time; affect appropriate  NEUROLOGY: CN 2-12 are intact and symmetric; no gross sensory deficits   SKIN: No rashes; no palpable lesions    LABS:                        8.5    3.69  )-----------( 177      ( 2022 06:34 )             28.4     11-27    143  |  100  |  31<H>  ----------------------------<  88  4.2   |  32<H>  |  1.50<H>    Ca    9.1      2022 06:34  Phos  3.8     11-27  Mg     1.60     11-27        CARDIAC MARKERS ( 2022 14:10 )  x     / x     / 38 U/L / x     / x          Urinalysis Basic - ( 2022 17:36 )    Color: Light Yellow / Appearance: Clear / S.012 / pH: x  Gluc: x / Ketone: Negative  / Bili: Negative / Urobili: <2 mg/dL   Blood: x / Protein: 100 mg/dL / Nitrite: Negative   Leuk Esterase: Negative / RBC: 16 /HPF / WBC 24 /HPF   Sq Epi: x / Non Sq Epi: 4 /HPF / Bacteria: Negative        Culture - Blood (collected 2022 14:09)  Source: .Blood Blood-Venous  Preliminary Report (2022 17:02):    No growth to date.    Culture - Blood (collected 2022 13:59)  Source: .Blood Blood  Preliminary Report (2022 17:02):    No growth to date.      COVID-19 PCR: NotDetec (2022 08:06)  SARS-CoV-2: NotDetec (2022 18:21)  COVID-19 PCR: NotDetec (08 Sep 2022 21:20)  COVID-19 PCR: NotDetec (06 Sep 2022 06:40)  COVID-19 PCR: NotDetec (03 Aug 2022 19:16)  COVID-19 PCR: NotDetec (2022 17:18)  COVID-19 PCR: NotDetec (2022 07:08)  COVID-19 PCR: NotDetec (2022 07:36)  SARS-CoV-2: NotDetec (2022 00:56)

## 2022-11-27 NOTE — PROGRESS NOTE ADULT - PROBLEM SELECTOR PLAN 1
change in mental status 11/25 (not responsive to sternal rub). Differentials: toxic metabolic vs neuro (seizure, stroke) vs infection vs cardiac  - mental status now seems to be at baseline  - CTH no acute finding  - completed vEEG - mild generalized slowing; mild diffuse or multifocal cerebral dysfunction non-specific to etiology  - will obtain MRI  - neuro recs appreciated  - pulm recs appreciated  - infection w/u - thus far nonrevealing  - diet was adjusted after episode of AMS, though previously SLP recommended regular diet - will ask SLP to re-evaluate in the setting of possible new neurologic event

## 2022-11-27 NOTE — PROGRESS NOTE ADULT - PROBLEM SELECTOR PLAN 2
PALPITATIONS AND WEAK FEELING FOR 3-4 DAYS   s/p a course of ceftriaxone empirically  - urine cx and blood cx negative   - continue to monitor for symptoms  - repeat infection w/u given change in mental status

## 2022-11-28 LAB
ALBUMIN SERPL ELPH-MCNC: 3.5 G/DL — SIGNIFICANT CHANGE UP (ref 3.3–5)
ALP SERPL-CCNC: 98 U/L — SIGNIFICANT CHANGE UP (ref 40–120)
ALT FLD-CCNC: 10 U/L — SIGNIFICANT CHANGE UP (ref 4–33)
ANION GAP SERPL CALC-SCNC: 13 MMOL/L — SIGNIFICANT CHANGE UP (ref 7–14)
APTT BLD: 32.6 SEC — SIGNIFICANT CHANGE UP (ref 27–36.3)
AST SERPL-CCNC: 19 U/L — SIGNIFICANT CHANGE UP (ref 4–32)
BILIRUB SERPL-MCNC: 0.5 MG/DL — SIGNIFICANT CHANGE UP (ref 0.2–1.2)
BUN SERPL-MCNC: 28 MG/DL — HIGH (ref 7–23)
CALCIUM SERPL-MCNC: 9.3 MG/DL — SIGNIFICANT CHANGE UP (ref 8.4–10.5)
CHLORIDE SERPL-SCNC: 95 MMOL/L — LOW (ref 98–107)
CO2 SERPL-SCNC: 30 MMOL/L — SIGNIFICANT CHANGE UP (ref 22–31)
CREAT SERPL-MCNC: 1.47 MG/DL — HIGH (ref 0.5–1.3)
EGFR: 39 ML/MIN/1.73M2 — LOW
GLUCOSE BLDC GLUCOMTR-MCNC: 92 MG/DL — SIGNIFICANT CHANGE UP (ref 70–99)
GLUCOSE SERPL-MCNC: 86 MG/DL — SIGNIFICANT CHANGE UP (ref 70–99)
HCT VFR BLD CALC: 32.3 % — LOW (ref 34.5–45)
HGB BLD-MCNC: 10 G/DL — LOW (ref 11.5–15.5)
INR BLD: 0.97 RATIO — SIGNIFICANT CHANGE UP (ref 0.88–1.16)
MAGNESIUM SERPL-MCNC: 1.6 MG/DL — SIGNIFICANT CHANGE UP (ref 1.6–2.6)
MCHC RBC-ENTMCNC: 25.7 PG — LOW (ref 27–34)
MCHC RBC-ENTMCNC: 31 GM/DL — LOW (ref 32–36)
MCV RBC AUTO: 83 FL — SIGNIFICANT CHANGE UP (ref 80–100)
NRBC # BLD: 0 /100 WBCS — SIGNIFICANT CHANGE UP (ref 0–0)
NRBC # FLD: 0 K/UL — SIGNIFICANT CHANGE UP (ref 0–0)
PHOSPHATE SERPL-MCNC: 4.3 MG/DL — SIGNIFICANT CHANGE UP (ref 2.5–4.5)
PLATELET # BLD AUTO: 212 K/UL — SIGNIFICANT CHANGE UP (ref 150–400)
POTASSIUM SERPL-MCNC: 3.7 MMOL/L — SIGNIFICANT CHANGE UP (ref 3.5–5.3)
POTASSIUM SERPL-SCNC: 3.7 MMOL/L — SIGNIFICANT CHANGE UP (ref 3.5–5.3)
PROT SERPL-MCNC: 7.2 G/DL — SIGNIFICANT CHANGE UP (ref 6–8.3)
PROTHROM AB SERPL-ACNC: 11.2 SEC — SIGNIFICANT CHANGE UP (ref 10.5–13.4)
RBC # BLD: 3.89 M/UL — SIGNIFICANT CHANGE UP (ref 3.8–5.2)
RBC # FLD: 16.5 % — HIGH (ref 10.3–14.5)
SODIUM SERPL-SCNC: 138 MMOL/L — SIGNIFICANT CHANGE UP (ref 135–145)
WBC # BLD: 3.41 K/UL — LOW (ref 3.8–10.5)
WBC # FLD AUTO: 3.41 K/UL — LOW (ref 3.8–10.5)

## 2022-11-28 PROCEDURE — 99233 SBSQ HOSP IP/OBS HIGH 50: CPT | Mod: GC

## 2022-11-28 PROCEDURE — 99233 SBSQ HOSP IP/OBS HIGH 50: CPT

## 2022-11-28 RX ORDER — PSYLLIUM SEED (WITH DEXTROSE)
1 POWDER (GRAM) ORAL ONCE
Refills: 0 | Status: COMPLETED | OUTPATIENT
Start: 2022-11-28 | End: 2022-11-28

## 2022-11-28 RX ORDER — HYDRALAZINE HCL 50 MG
75 TABLET ORAL THREE TIMES A DAY
Refills: 0 | Status: DISCONTINUED | OUTPATIENT
Start: 2022-11-28 | End: 2022-11-29

## 2022-11-28 RX ADMIN — CHLORHEXIDINE GLUCONATE 1 APPLICATION(S): 213 SOLUTION TOPICAL at 05:41

## 2022-11-28 RX ADMIN — Medication 75 MILLIGRAM(S): at 21:09

## 2022-11-28 RX ADMIN — AMLODIPINE BESYLATE 10 MILLIGRAM(S): 2.5 TABLET ORAL at 05:40

## 2022-11-28 RX ADMIN — ALBUTEROL 2 PUFF(S): 90 AEROSOL, METERED ORAL at 17:15

## 2022-11-28 RX ADMIN — ATORVASTATIN CALCIUM 40 MILLIGRAM(S): 80 TABLET, FILM COATED ORAL at 21:09

## 2022-11-28 RX ADMIN — Medication 1 PACKET(S): at 05:40

## 2022-11-28 RX ADMIN — ALBUTEROL 2 PUFF(S): 90 AEROSOL, METERED ORAL at 05:41

## 2022-11-28 RX ADMIN — Medication 50 MILLIGRAM(S): at 05:39

## 2022-11-28 RX ADMIN — BUMETANIDE 2 MILLIGRAM(S): 0.25 INJECTION INTRAMUSCULAR; INTRAVENOUS at 05:39

## 2022-11-28 RX ADMIN — ALBUTEROL 2 PUFF(S): 90 AEROSOL, METERED ORAL at 11:59

## 2022-11-28 RX ADMIN — Medication 1 PUFF(S): at 05:41

## 2022-11-28 RX ADMIN — Medication 1 PUFF(S): at 12:02

## 2022-11-28 RX ADMIN — Medication 1 PUFF(S): at 17:17

## 2022-11-28 RX ADMIN — Medication 1 PUFF(S): at 21:09

## 2022-11-28 RX ADMIN — Medication 75 MILLIGRAM(S): at 13:27

## 2022-11-28 NOTE — CHART NOTE - NSCHARTNOTEFT_GEN_A_CORE
EEG reviewed and as below:    EEG CLASSIFICATION/SUMMARY:  Abnormal EEG in the awake, drowsy, and asleep states due to:  Mild generalized slowing  _____________________________________________________________  CLINICAL IMPRESSION:  Mild diffuse or multifocal cerebral dysfunction non-specific to etiology.   No epileptic pattern or seizure seen.    MRI reviewed and results as below:  FINDINGS: Multiple patchy confluent nonspecific foci of T2/FLAIR   hyperintensity are noted throughout the deepand periventricular white   matter of the cerebral hemispheres. A nonspecific rounded high signal   focus within the right medial cerebellar hemisphere adjacent to the   fourth ventricle is also noted. There is no associated mass effect. There   is no evidence of acute ischemia on the diffusion-weighted images.  There is diffuse cerebral volume loss with prominence of the sulci,   fissures, and cisternal spaces which is normal for the patient's age.   Ventricular size and configuration is unremarkable. Flow-voids are noted   throughout the major intracranial vessels, on the T2 weighted images,   consistent with their patency. The sellar region and posterior fossa   appear unremarkable.  Scattered mucosal thickening is seen throughout the paranasal sinuses.   Layering secretions within the left dominant sphenoid sinus is noted.   There is a nonspecific right-sided mastoid air cell effusion. Calvarial   signal is within normal limits. The orbits appear unremarkable.    IMPRESSION: No acute intracranial hemorrhage or evidence of acute   ischemia.    Similar-appearing mild chronic white matter microvascular type changes.      As per primary, patient mental status has improved and now appears at baseline.    EEG and MRI are unrevealing for any indication for patient's change in her mental status.   Likely all in the setting of patient's resolved UTI, MANDY.    At this time, no further neurological workup. Neurology to sign off. Please recall with any questions, Spectra [phone].

## 2022-11-28 NOTE — PROGRESS NOTE ADULT - ASSESSMENT
68 yo F w/ HTN, HLD, sarcoidosis, HFpEF, hx PEA x2, a/w acute hypoxic respiratory failure (s/p intubation/MICU stay) and septic shock 2/2 UTI (briefly on pressors, completed course of antibiotics), c/b metabolic encephalopathy (improved) and MANDY on CKD.     # metabolic encephalopathy 2/2 UTI  - mental status appears at baseline, MRI w/o acute intracranial pathology, EEG unrevealing, neuro input appreciated, suspect changes to mental status was in setting of recent UTI and MANDY  - f/u swallow re-eval    # MANDY on CKD, proteinuria  - being followed by nephrology for MANDY and proteinuria  - Cr downtrending today to 1.47 (peak of 3.27)  - plan for IR renal biopsy for further evaluation, asa on hold for procedure  - tentative scheduled for Tuesday, NPO after MN, continue optimization of BP control  - avoid nephrotoxins, continue to monitor renal function/UOP    # HTN  - increased dose of hydralazine tid, on amlodipine, bumex  - monitor BP and titrate regimen further as needed  - goal SBP <150 per IR to proceed with renal biopsy    # HFpEF  - c/w bumex as per renal  - continue to monitor INOs    # HLD  - on statin    # Anemia  - hgb 10, stable, no overt signs of bleeding  - continue to monitor     # Septic shock 2/2 suspected UTI c/b acute hypoxic respiratory failure (resolved)  - briefly on pressors, shock resolved, completed course of antibiotics, extubated, respiratory status stable on room air    Need for prophylactic measure  - VTE ppx: on hold for IR procedure    Dispo: pending workup/continued clinical improvement, PT recs home PT 11/28

## 2022-11-28 NOTE — PROGRESS NOTE ADULT - PROBLEM SELECTOR PLAN 1
Pt. with MANDY suspected in setting of hemodynamics/ hypotension on arrival in ED vs volume overload. Pt with MANDY/ATN. [facility] patient is noted to have a Scr of 1.2mg/dL in 9/2022, on admission it was elevated to 3.01mg/dL and has further increased to 3.27mg/dL on 11/20/22. Scr is elevated/improved to 1.47 today.    UA with significant proteinuria (noted previously as well) and significant hematuria (new from previous urine studies). In the past all serological work up has been negative and it was presumed renal disease was in setting of her DM. Kidney sonogram suggestive of B/L renal parenchymal disease. Given nephrotic range proteinuria, plan is for kidney biopsy. Plan discussed with pt, pt agreed. ASA was discontinued in preparation for kidney biopsy. IR consult note from 11/23 regarding kidney biopsy reviewed. Pt was planned for kidney biopsy today - although due to high BP , kidney biopsy canceled for now.    Labs reviewed. Pt currently on PO bumex 2 mg daily. Pt non oliguric. Pt clinically in fluid overload. Agree with PO bumex for now. Can increase hydralazine to 100 mg po TID for better BP control. Monitor labs and urine output. Avoid any potential nephrotoxins. Dose medications as per eGFR.     If you have any questions, please feel free to contact me  Desean De Leon  Nephrology Fellow  [phone]/ [employer] Teams(Preferred)  (After 5pm or on weekends please page the on-call fellow).

## 2022-11-28 NOTE — PROGRESS NOTE ADULT - ATTENDING COMMENTS
MANDY  Nephrotic range proteinuria  Uncontrolled HTN    Will need a renal biopsy once BP better controlled.  Monitor Is/Os and daily weights.

## 2022-11-28 NOTE — PROGRESS NOTE ADULT - SUBJECTIVE AND OBJECTIVE BOX
[facility] DIVISION OF KIDNEY DISEASES AND HYPERTENSION -- FOLLOW UP NOTE  --------------------------------------------------------------------------------  HPI: Patient is a 67 year old female with PMH of HTN, HLD, Sarcoidosis who presented to the hospital after outpatient labs demonstrated elevated Scr. On arrival to the ED the patient was noted to have AMS and was hypotensive; she was subsequently intubated and admitted to the MICU. She was successfully extubated shortly after. On review of [facility] the patient is noted to have a Scr of 1.2mg/dL in 9/2022, on admission it was elevated to 3.01mg/dL and has further increased to 3.27 on 11/20/22. Scr is elevated/improved to 1.47 today. Nephrology team following for MANDY.    On review of [facility], it was noted that the patient was followed by nephrology for MANDY and proteinuria. All serologic work up at that time was negative and it was presumed her renal disease was in the setting of her longstanding DM. She was advised to undergo a renal biopsy at that time, however the patient has not followed up with nephrology following discharge. The patient has had multiple admissions for hypoxic respiratory failure in setting of being volume overloaded and has required aggressive diuresis.     Pt seen and examined at bedside. Pt reports feeling lethargic. Denies any SOB, CP, HA, N/V, abdominal pain , urinary symptoms or dizziness.    PAST HISTORY  --------------------------------------------------------------------------------  No significant changes to PMH, PSH, FHx, SHx, unless otherwise noted    ALLERGIES & MEDICATIONS  --------------------------------------------------------------------------------  Allergies    penicillin (Red Man Synd)    Intolerances    Standing Inpatient Medications  albuterol    90 MICROgram(s) HFA Inhaler 2 Puff(s) Inhalation every 6 hours  amLODIPine   Tablet 10 milliGRAM(s) Oral daily  atorvastatin 40 milliGRAM(s) Oral at bedtime  buMETAnide 2 milliGRAM(s) Oral daily  chlorhexidine 2% Cloths 1 Application(s) Topical <User Schedule>  dextrose 5%. 1000 milliLiter(s) IV Continuous <Continuous>  dextrose 5%. 1000 milliLiter(s) IV Continuous <Continuous>  dextrose 50% Injectable 25 Gram(s) IV Push once  dextrose 50% Injectable 12.5 Gram(s) IV Push once  dextrose 50% Injectable 25 Gram(s) IV Push once  glucagon  Injectable 1 milliGRAM(s) IntraMuscular once  hydrALAZINE 75 milliGRAM(s) Oral three times a day  ipratropium 17 MICROgram(s) HFA Inhaler 1 Puff(s) Inhalation every 6 hours    PRN Inpatient Medications  acetaminophen     Tablet .. 650 milliGRAM(s) Oral every 6 hours PRN  dextrose Oral Gel 15 Gram(s) Oral once PRN    REVIEW OF SYSTEMS  --------------------------------------------------------------------------------  Gen: No fevers , lethargy+  Respiratory: No dyspnea  CV: No chest pain  GI: No abdominal pain  : No dysuria  MSK: No  edema  Neuro: no dizziness   All other systems were reviewed and are negative, except as noted.    VITALS/PHYSICAL EXAM  --------------------------------------------------------------------------------  T(C): 36.9 (11-28-22 @ 05:40), Max: 37 (11-27-22 @ 21:35)  HR: 78 (11-28-22 @ 05:40) (69 - 88)  BP: 168/87 (11-28-22 @ 05:40) (141/77 - 168/87)  RR: 18 (11-28-22 @ 05:40) (18 - 18)  SpO2: 100% (11-28-22 @ 05:40) (97% - 100%)  Wt(kg): --    Physical Exam:  	Gen: ill appearing  	HEENT: MMM  	Pulm: minimal basal crackles in lung fields  	CV: S1S2  	Abd: Soft, +BS   	Ext: No LE edema B/L  	Neuro: Awake and alert  	Skin: Warm and dry    LABS/STUDIES  --------------------------------------------------------------------------------              10.0   3.41  >-----------<  212      [11-28-22 @ 06:57]              32.3     138  |  95  |  28  ----------------------------<  86      [11-28-22 @ 06:57]  3.7   |  30  |  1.47        Ca     9.3     [11-28-22 @ 06:57]      Mg     1.60     [11-28-22 @ 06:57]      Phos  4.3     [11-28-22 @ 06:57]    TPro  7.2  /  Alb  3.5  /  TBili  0.5  /  DBili  x   /  AST  19  /  ALT  10  /  AlkPhos  98  [11-28-22 @ 06:57]    PT/INR: PT 11.2 , INR 0.97       [11-28-22 @ 06:57]  PTT: 32.6       [11-28-22 @ 06:57]    Creatinine Trend:  SCr 1.47 [11-28 @ 06:57]  SCr 1.50 [11-27 @ 06:34]  SCr 1.62 [11-26 @ 06:19]  SCr 1.75 [11-25 @ 10:05]  SCr 1.79 [11-25 @ 06:32]    Urinalysis - [11-25-22 @ 17:36]      Color Light Yellow / Appearance Clear / SG 1.012 / pH 6.5      Gluc Negative / Ketone Negative  / Bili Negative / Urobili <2 mg/dL       Blood Negative / Protein 100 mg/dL / Leuk Est Negative / Nitrite Negative      RBC 16 / WBC 24 / Hyaline 2 / Gran  / Sq Epi  / Non Sq Epi 4 / Bacteria Negative    Urine Creatinine 65      [11-22-22 @ 17:00]  Urine Protein 522      [11-22-22 @ 17:00]    Iron 68, TIBC 312, %sat 22      [11-19-22 @ 02:58]  Ferritin 113      [11-19-22 @ 02:58]  TSH 2.03      [11-25-22 @ 14:10]  Lipid: chol 236, , HDL 37, LDL --      [11-29-21 @ 06:47]    HBsAg Nonreact      [11-22-22 @ 06:45]  HCV 0.13, Nonreact      [11-22-22 @ 06:45]  HIV Nonreact      [11-22-22 @ 06:45]    MINOO: titer Negative, pattern --      [11-18-22 @ 03:53]  dsDNA <12      [11-22-22 @ 06:45]  C3 Complement 133      [11-22-22 @ 06:45]  C4 Complement 33      [11-22-22 @ 06:45]  Rheumatoid Factor 17      [11-22-22 @ 06:45]  ANCA: cANCA Negative, pANCA Negative, atypical ANCA Negative      [11-22-22 @ 06:45]  anti-GBM <0.2      [11-22-22 @ 06:45]  Syphilis Screen (Treponema Pallidum Ab) Negative      [11-22-22 @ 06:45]  PLA2R: DESHAWN <1.8, IFA --      [11-22-22 @ 06:45]  Free Light Chains: kappa 12.79, lambda 4.65, ratio = 2.75      [11-22 @ 06:45]

## 2022-11-28 NOTE — PROGRESS NOTE ADULT - SUBJECTIVE AND OBJECTIVE BOX
[facility] Medicine  Pager [phone]    Patient is a 67y old  Female who presents with a chief complaint of MANDY on CKD, abnormal VS, altered mental status (28 Nov 2022 12:15)      INTERVAL HPI/OVERNIGHT EVENTS:    MEDICATIONS  (STANDING):  albuterol    90 MICROgram(s) HFA Inhaler 2 Puff(s) Inhalation every 6 hours  amLODIPine   Tablet 10 milliGRAM(s) Oral daily  atorvastatin 40 milliGRAM(s) Oral at bedtime  buMETAnide 2 milliGRAM(s) Oral daily  chlorhexidine 2% Cloths 1 Application(s) Topical <User Schedule>  dextrose 5%. 1000 milliLiter(s) (50 mL/Hr) IV Continuous <Continuous>  dextrose 5%. 1000 milliLiter(s) (100 mL/Hr) IV Continuous <Continuous>  dextrose 50% Injectable 25 Gram(s) IV Push once  dextrose 50% Injectable 12.5 Gram(s) IV Push once  dextrose 50% Injectable 25 Gram(s) IV Push once  glucagon  Injectable 1 milliGRAM(s) IntraMuscular once  hydrALAZINE 75 milliGRAM(s) Oral three times a day  ipratropium 17 MICROgram(s) HFA Inhaler 1 Puff(s) Inhalation every 6 hours    MEDICATIONS  (PRN):  acetaminophen     Tablet .. 650 milliGRAM(s) Oral every 6 hours PRN Mild Pain (1 - 3)  dextrose Oral Gel 15 Gram(s) Oral once PRN Blood Glucose LESS THAN 70 milliGRAM(s)/deciliter      Allergies    penicillin (Red Man Synd)    Intolerances        REVIEW OF SYSTEMS:  Please see interval HPI:    Vital Signs Last 24 Hrs  T(C): 36.6 (28 Nov 2022 12:37), Max: 37 (27 Nov 2022 21:35)  T(F): 97.9 (28 Nov 2022 12:37), Max: 98.6 (27 Nov 2022 21:35)  HR: 79 (28 Nov 2022 12:37) (69 - 79)  BP: 147/78 (28 Nov 2022 12:37) (141/77 - 168/87)  BP(mean): --  RR: 18 (28 Nov 2022 12:37) (18 - 18)  SpO2: 97% (28 Nov 2022 12:37) (97% - 100%)    Parameters below as of 28 Nov 2022 12:37  Patient On (Oxygen Delivery Method): room air      I&O's Detail        PHYSICAL EXAM:  GENERAL:   HEAD:    EYES:   ENMT:   NECK:   NERVOUS SYSTEM:    CHEST/LUNG:   HEART:   ABDOMEN:   EXTREMITIES:    LYMPH:   SKIN:     LABS:                        10.0   3.41  )-----------( 212      ( 28 Nov 2022 06:57 )             32.3     28 Nov 2022 06:57    138    |  95     |  28     ----------------------------<  86     3.7     |  30     |  1.47     Ca    9.3        28 Nov 2022 06:57  Phos  4.3       28 Nov 2022 06:57  Mg     1.60      28 Nov 2022 06:57    TPro  7.2    /  Alb  3.5    /  TBili  0.5    /  DBili  x      /  AST  19     /  ALT  10     /  AlkPhos  98     28 Nov 2022 06:57    PT/INR - ( 28 Nov 2022 06:57 )   PT: 11.2 sec;   INR: 0.97 ratio         PTT - ( 28 Nov 2022 06:57 )  PTT:32.6 sec  CAPILLARY BLOOD GLUCOSE      POCT Blood Glucose.: 99 mg/dL (28 Nov 2022 12:17)  POCT Blood Glucose.: 92 mg/dL (28 Nov 2022 06:22)  POCT Blood Glucose.: 98 mg/dL (27 Nov 2022 22:26)  POCT Blood Glucose.: 105 mg/dL (27 Nov 2022 17:35)    BLOOD CULTURE  11-25 @ 14:09   No growth to date.  --  --  11-25 @ 13:59   No growth to date.  --  --    RADIOLOGY & ADDITIONAL TESTS:    Imaging Personally Reviewed:  [ ] YES     Consultant(s) Notes Reviewed:      Care Discussed with Consultants/Other Providers: [facility] Medicine  Pager [phone]    Patient is a 67y old  Female who presents with a chief complaint of MANDY on CKD, abnormal VS, altered mental status (28 Nov 2022 12:15)      INTERVAL HPI/OVERNIGHT EVENTS:  Patient somewhat hard of hearing, prefers provider write down answers on piece of paper. Reported some loose stool last night, feels like can't tolerate the "pasteurized food", is pending swallow re-eval. Wend over improvement in Cr level. Discussed plan for IR renal biopsy, patient verbalized understanding, procedure to be rescheduled per IR for further optimization of blood pressure, goal SBP <150 per IR. Dose hydralazine increased.     Attempted to update daughter at [phone] but returned busy signal.     MEDICATIONS  (STANDING):  albuterol    90 MICROgram(s) HFA Inhaler 2 Puff(s) Inhalation every 6 hours  amLODIPine   Tablet 10 milliGRAM(s) Oral daily  atorvastatin 40 milliGRAM(s) Oral at bedtime  buMETAnide 2 milliGRAM(s) Oral daily  chlorhexidine 2% Cloths 1 Application(s) Topical <User Schedule>  dextrose 5%. 1000 milliLiter(s) (50 mL/Hr) IV Continuous <Continuous>  dextrose 5%. 1000 milliLiter(s) (100 mL/Hr) IV Continuous <Continuous>  dextrose 50% Injectable 25 Gram(s) IV Push once  dextrose 50% Injectable 12.5 Gram(s) IV Push once  dextrose 50% Injectable 25 Gram(s) IV Push once  glucagon  Injectable 1 milliGRAM(s) IntraMuscular once  hydrALAZINE 75 milliGRAM(s) Oral three times a day  ipratropium 17 MICROgram(s) HFA Inhaler 1 Puff(s) Inhalation every 6 hours    MEDICATIONS  (PRN):  acetaminophen     Tablet .. 650 milliGRAM(s) Oral every 6 hours PRN Mild Pain (1 - 3)  dextrose Oral Gel 15 Gram(s) Oral once PRN Blood Glucose LESS THAN 70 milliGRAM(s)/deciliter    Allergies  penicillin (Red Man Synd)    Intolerances    REVIEW OF SYSTEMS:  Please see interval HPI:    Vital Signs Last 24 Hrs  T(C): 36.6 (28 Nov 2022 12:37), Max: 37 (27 Nov 2022 21:35)  T(F): 97.9 (28 Nov 2022 12:37), Max: 98.6 (27 Nov 2022 21:35)  HR: 79 (28 Nov 2022 12:37) (69 - 79)  BP: 147/78 (28 Nov 2022 12:37) (141/77 - 168/87)  RR: 18 (28 Nov 2022 12:37) (18 - 18)  SpO2: 97% (28 Nov 2022 12:37) (97% - 100%)    Parameters below as of 28 Nov 2022 12:37  Patient On (Oxygen Delivery Method): room air      I&O's Detail    PHYSICAL EXAM:  GENERAL: NAD, lying in bed  HEAD:  NC/AT  EYES: EOMI, clear sclera/conjunctiva  ENMT: MMM, poor dentition, hard of hearing  NECK: supple, no JVD  NERVOUS SYSTEM:  moving extremities, SILT grossly  CHEST/LUNG: CTAB, comfortable on RA, no wheeze appreciated  HEART: S1S2 RRR  ABDOMEN: soft, non-tender +BS  EXTREMITIES:  no c/c/e, wwp    LABS:                        10.0   3.41  )-----------( 212      ( 28 Nov 2022 06:57 )             32.3     28 Nov 2022 06:57    138    |  95     |  28     ----------------------------<  86     3.7     |  30     |  1.47     Ca    9.3        28 Nov 2022 06:57  Phos  4.3       28 Nov 2022 06:57  Mg     1.60      28 Nov 2022 06:57    TPro  7.2    /  Alb  3.5    /  TBili  0.5    /  DBili  x      /  AST  19     /  ALT  10     /  AlkPhos  98     28 Nov 2022 06:57    PT/INR - ( 28 Nov 2022 06:57 )   PT: 11.2 sec;   INR: 0.97 ratio         PTT - ( 28 Nov 2022 06:57 )  PTT:32.6 sec  CAPILLARY BLOOD GLUCOSE      POCT Blood Glucose.: 99 mg/dL (28 Nov 2022 12:17)  POCT Blood Glucose.: 92 mg/dL (28 Nov 2022 06:22)  POCT Blood Glucose.: 98 mg/dL (27 Nov 2022 22:26)  POCT Blood Glucose.: 105 mg/dL (27 Nov 2022 17:35)    BLOOD CULTURE  11-25 @ 14:09   No growth to date.  --  --  11-25 @ 13:59   No growth to date.  --  --    RADIOLOGY & ADDITIONAL TESTS:    Imaging Personally Reviewed:  [ ] YES   < from: MR Head No Cont (11.27.22 @ 11:40) >    FINDINGS: Multiple patchy confluent nonspecific foci of T2/FLAIR   hyperintensity are noted throughout the deepand periventricular white   matter of the cerebral hemispheres. A nonspecific rounded high signal   focus within the right medial cerebellar hemisphere adjacent to the   fourth ventricle is also noted. There is no associated mass effect. There   is no evidence of acute ischemia on the diffusion-weighted images.    There is diffuse cerebral volume loss with prominence of the sulci,   fissures, and cisternal spaces which is normal for the patient's age.   Ventricular size and configuration is unremarkable. Flow-voids are noted   throughout the major intracranial vessels, on the T2 weighted images,   consistent with their patency. The sellar region and posterior fossa   appear unremarkable.    Scattered mucosal thickening is seen throughout the paranasal sinuses.   Layering secretions within the left dominant sphenoid sinus is noted.   There is a nonspecific right-sided mastoid air cell effusion. Calvarial   signal is within normal limits. The orbits appear unremarkable.    IMPRESSION: No acute intracranial hemorrhage or evidence of acute   ischemia.    Similar-appearing mild chronic white matter microvascular type changes.    < end of copied text >      Consultant(s) Notes Reviewed:  Renal, IR, neurology    Care Discussed with Consultants/Other Providers: ACP Henrietta re: BP meds adjusted, IR bx rescheduled

## 2022-11-28 NOTE — CHART NOTE - NSCHARTNOTEFT_GEN_A_CORE
Patient persistently hypertensive.  Will cancel renal biopsy for today.    - Patient needs better BP control. Goal <150/90.  - Will tentatively plan for biopsy Tuesday.  - NPOpMN.        --  Al Li MD, PGY-6  Vascular and Interventional Radiology  Available on [employer] Teams    - Nonemergent consults:  [facility] order "Consult- Interventional Radiology"  - Emergent issues (pager): [facility] [phone]; [facility] [phone]  - Scheduling questions: [facility] [phone]; [facility] [phone]  - Clinic/outpatient booking: [facility] [phone]; [facility] [phone]

## 2022-11-29 LAB
ANION GAP SERPL CALC-SCNC: 13 MMOL/L — SIGNIFICANT CHANGE UP (ref 7–14)
APTT BLD: 34.8 SEC — SIGNIFICANT CHANGE UP (ref 27–36.3)
BUN SERPL-MCNC: 26 MG/DL — HIGH (ref 7–23)
CALCIUM SERPL-MCNC: 9.2 MG/DL — SIGNIFICANT CHANGE UP (ref 8.4–10.5)
CHLORIDE SERPL-SCNC: 96 MMOL/L — LOW (ref 98–107)
CO2 SERPL-SCNC: 31 MMOL/L — SIGNIFICANT CHANGE UP (ref 22–31)
CREAT SERPL-MCNC: 1.54 MG/DL — HIGH (ref 0.5–1.3)
EGFR: 37 ML/MIN/1.73M2 — LOW
GLUCOSE SERPL-MCNC: 96 MG/DL — SIGNIFICANT CHANGE UP (ref 70–99)
HCT VFR BLD CALC: 29.5 % — LOW (ref 34.5–45)
HGB BLD-MCNC: 9.2 G/DL — LOW (ref 11.5–15.5)
INR BLD: 0.99 RATIO — SIGNIFICANT CHANGE UP (ref 0.88–1.16)
MAGNESIUM SERPL-MCNC: 1.5 MG/DL — LOW (ref 1.6–2.6)
MCHC RBC-ENTMCNC: 25.6 PG — LOW (ref 27–34)
MCHC RBC-ENTMCNC: 31.2 GM/DL — LOW (ref 32–36)
MCV RBC AUTO: 82.2 FL — SIGNIFICANT CHANGE UP (ref 80–100)
NRBC # BLD: 0 /100 WBCS — SIGNIFICANT CHANGE UP (ref 0–0)
NRBC # FLD: 0 K/UL — SIGNIFICANT CHANGE UP (ref 0–0)
PHOSPHATE SERPL-MCNC: 3.9 MG/DL — SIGNIFICANT CHANGE UP (ref 2.5–4.5)
PLATELET # BLD AUTO: 216 K/UL — SIGNIFICANT CHANGE UP (ref 150–400)
POTASSIUM SERPL-MCNC: 3.6 MMOL/L — SIGNIFICANT CHANGE UP (ref 3.5–5.3)
POTASSIUM SERPL-SCNC: 3.6 MMOL/L — SIGNIFICANT CHANGE UP (ref 3.5–5.3)
PROTHROM AB SERPL-ACNC: 11.5 SEC — SIGNIFICANT CHANGE UP (ref 10.5–13.4)
RBC # BLD: 3.59 M/UL — LOW (ref 3.8–5.2)
RBC # FLD: 16.6 % — HIGH (ref 10.3–14.5)
SARS-COV-2 RNA SPEC QL NAA+PROBE: SIGNIFICANT CHANGE UP
SODIUM SERPL-SCNC: 140 MMOL/L — SIGNIFICANT CHANGE UP (ref 135–145)
WBC # BLD: 3.96 K/UL — SIGNIFICANT CHANGE UP (ref 3.8–10.5)
WBC # FLD AUTO: 3.96 K/UL — SIGNIFICANT CHANGE UP (ref 3.8–10.5)

## 2022-11-29 PROCEDURE — 99233 SBSQ HOSP IP/OBS HIGH 50: CPT | Mod: GC

## 2022-11-29 PROCEDURE — 99233 SBSQ HOSP IP/OBS HIGH 50: CPT

## 2022-11-29 RX ORDER — CARVEDILOL PHOSPHATE 80 MG/1
12.5 CAPSULE, EXTENDED RELEASE ORAL EVERY 12 HOURS
Refills: 0 | Status: DISCONTINUED | OUTPATIENT
Start: 2022-11-29 | End: 2022-12-09

## 2022-11-29 RX ORDER — MAGNESIUM OXIDE 400 MG ORAL TABLET 241.3 MG
400 TABLET ORAL ONCE
Refills: 0 | Status: COMPLETED | OUTPATIENT
Start: 2022-11-29 | End: 2022-11-29

## 2022-11-29 RX ORDER — HYDRALAZINE HCL 50 MG
100 TABLET ORAL THREE TIMES A DAY
Refills: 0 | Status: DISCONTINUED | OUTPATIENT
Start: 2022-11-29 | End: 2022-12-09

## 2022-11-29 RX ADMIN — Medication 100 MILLIGRAM(S): at 13:08

## 2022-11-29 RX ADMIN — CARVEDILOL PHOSPHATE 12.5 MILLIGRAM(S): 80 CAPSULE, EXTENDED RELEASE ORAL at 17:53

## 2022-11-29 RX ADMIN — MAGNESIUM OXIDE 400 MG ORAL TABLET 400 MILLIGRAM(S): 241.3 TABLET ORAL at 18:49

## 2022-11-29 RX ADMIN — Medication 75 MILLIGRAM(S): at 05:55

## 2022-11-29 RX ADMIN — Medication 1 PUFF(S): at 09:21

## 2022-11-29 RX ADMIN — AMLODIPINE BESYLATE 10 MILLIGRAM(S): 2.5 TABLET ORAL at 05:55

## 2022-11-29 RX ADMIN — Medication 1 PUFF(S): at 15:56

## 2022-11-29 RX ADMIN — CHLORHEXIDINE GLUCONATE 1 APPLICATION(S): 213 SOLUTION TOPICAL at 05:55

## 2022-11-29 RX ADMIN — BUMETANIDE 2 MILLIGRAM(S): 0.25 INJECTION INTRAMUSCULAR; INTRAVENOUS at 05:55

## 2022-11-29 RX ADMIN — ALBUTEROL 2 PUFF(S): 90 AEROSOL, METERED ORAL at 21:00

## 2022-11-29 RX ADMIN — ALBUTEROL 2 PUFF(S): 90 AEROSOL, METERED ORAL at 05:55

## 2022-11-29 RX ADMIN — ALBUTEROL 2 PUFF(S): 90 AEROSOL, METERED ORAL at 09:21

## 2022-11-29 RX ADMIN — ATORVASTATIN CALCIUM 40 MILLIGRAM(S): 80 TABLET, FILM COATED ORAL at 21:00

## 2022-11-29 RX ADMIN — Medication 100 MILLIGRAM(S): at 21:00

## 2022-11-29 RX ADMIN — ALBUTEROL 2 PUFF(S): 90 AEROSOL, METERED ORAL at 16:55

## 2022-11-29 RX ADMIN — Medication 1 PUFF(S): at 21:00

## 2022-11-29 RX ADMIN — Medication 1 PUFF(S): at 05:56

## 2022-11-29 NOTE — SWALLOW BEDSIDE ASSESSMENT ADULT - SWALLOW EVAL: DIAGNOSIS
1) Mild oral dysphagia for regular solids marked by adequate containment, and increased mastication time, however sufficient bolus collection prior to posterior transfer/transit. Adequate oral clearance observed. 2) Functional oral stage of swallow for puree, and thin fluids marked by adequate acceptance, bolus manipulation, and coordination. Anterior to posterior oral transit/transfer noted. Adequate oral clearance observed. 3) Functional pharyngeal stage of swallow for regular solids, puree, and thin fluids marked by initiation of pharyngeal swallow trigger and hyolaryngeal excursion noted upon digital palpation. No overt signs/symptoms of penetration/aspiration noted across all consistencies.
1. Functional oral stage for puree and thin liquids marked by an adequate oral acceptance, collection and transfer. 2. Mild oral dysphagia for regular solids marked by adequate acceptance with mildly prolonged chewing likely given sparse dentition with eventual transfer. 3. Functional pharyngeal phase for puree, regular solids and thin liquids marked by a present pharyngeal swallow trigger with hyolaryngeal elevation noted upon digital palpation without evidence of airway penetration/aspiration.

## 2022-11-29 NOTE — DIETITIAN NUTRITION RISK NOTIFICATION - TREATMENT: THE FOLLOWING DIET HAS BEEN RECOMMENDED
Diet, DASH/TLC:   Sodium & Cholesterol Restricted  Consistent Carbohydrate {No Snacks} (CSTCHO)  Supplement Feeding Modality:  Oral  Glucerna Shake Cans or Servings Per Day:  1       Frequency:  Two Times a day (11-29-22 @ 17:53) [Active]

## 2022-11-29 NOTE — PROGRESS NOTE ADULT - ATTENDING COMMENTS
67 year old female sarcoidosis, right heart failure, diabetes, hypertension, hyperlipidemia, iron deficiency anemia, pancreatic pseudocyst s/p drainage, multiple admissions over the past several months including cardiac arrest x 2 admitted with MANDY and heart failure exacerbation requiring intubation. Extubated to bilevel. Patient has chronic respiratory failure due multifactorial in etiology including OHS and sarcoidosis. She remains hypercapnic despite bilevel and would benefit from NIV. Will switch to AVAPS. She has had numerous hospital admissions within the past year and NIV with AVAPS-AE will improve morbidity and reduce risk of readmission. Please obtain CT chest to evaluate lung parenchyma given history of sarcoidosis. Follow up renal biopsy.

## 2022-11-29 NOTE — PROGRESS NOTE ADULT - ASSESSMENT
67F PMH HTN, HLD, sarcoidosis, CHF, PEA*2, sepsis 2/2 UTI (Klesiella, E. coli) ~9/2022 requiring intubation; presented to ED with MANDY on CKD found on outpatient labs and with abnormal VS, found to have AMS, AHRF, intubated, and admitted to MICU. Patient extubated 11/18 to BiPAP. Patient has since monitored in the RCU and has been weaned to 4L NC and satting well. Pt was mentating well without NIPPV with stable well-compensated hypercapnia in RCU. On the floors, patient with AMS few days prior, restarted on BiPAP with improvement in mental status and rest of AMS workup negative thus far. Pulmonology consulted for hypercapnia.     #Hypercapneic respiratory failure:  - patient admitted with hypercapneic respiratory failure on admission; improved after intubation and transition to BiPAP  - acute AMS at which time ABG shows 7.37 and pCO2 of 59 and started on BiPAP  - on evaluation today, patient mentating well, AxOx3  - prior blood gases on BiPAP show improvement in pCO2, however still elevated > 52    Recommendations:  - recommend to place patient on AVAPS given persistent hypercapnea  - AVAPS ordered: , EPAP 6, IPAP 10-25, RR 18 and FiO2 30   - please obtain blood gas in AM  - would work with CM for AVAPS approval

## 2022-11-29 NOTE — SWALLOW BEDSIDE ASSESSMENT ADULT - COMMENTS
Swallow evaluation consult received. Per discussion with ACP, patient is NPO for biopsy procedure today. This service to complete swallow evaluation tomorrow 11/29/22.
Swallow evaluation consult received. Per discussion with KIMBERLY Bernstein, patient is NPO for biopsy procedure today. This service to complete swallow evaluation tomorrow 11/30/22.
Hospitalist 11/28 "66 yo F w/ HTN, HLD, sarcoidosis, HFpEF, hx PEA x2, a/w acute hypoxic respiratory failure (s/p intubation/MICU stay) and septic shock 2/2 UTI (briefly on pressors, completed course of antibiotics), c/b metabolic encephalopathy (improved) and MANDY on CKD.   # metabolic encephalopathy 2/2 UTI".    CXR 11/25 "Bilateral pleural effusions."    Patient known to this service as patient was seen on previous admissions, and seen on this admission on 11/22/22 for swallow evaluation with recommendations for regular solids and thin liquids (See report for details).     Patient seen at bedside, awake/alert and cooperative, during clinical swallow evaluation this AM. Patient able to follow directions and answer questions. Patient denied pain and/or difficulty swallowing. SLP received clearance from [facility] to assess PO trials as patient is no longer NPO for biopsy procedure.
Pulmnology 11/22 "68 YO F with PMHx of HTN, HLD, Sarcoidosis, CHF, PEA, CKD (baseline 1.2) and recent admission UroSepsis (8/2022 with ECOLI and Klebsiella and requiring intubation) who presented with AMS and found with concern for urosepsis, acute on chronic hypoxic hypercarbia respiratory failure, and MANDY on CKD requiring ETT (11/17-11/18) and MICU stay. Patient ultimately transferred to RCU (11/20)."    CXR 11/17 "Again seen are bilateral moderate-sized effusions likely with underlying atelectasis."    Patient seen at bedside this afternoon for an initial swallow assessment, at which time patient was alert. Patient is hard of hearing and required frequent repetition of directives.

## 2022-11-29 NOTE — PROGRESS NOTE ADULT - SUBJECTIVE AND OBJECTIVE BOX
[facility] Medicine  Pager [phone]    Patient is a 67y old  Female who presents with a chief complaint of MANDY on CKD, abnormal VS, altered mental status (29 Nov 2022 10:57)      INTERVAL HPI/OVERNIGHT EVENTS:    MEDICATIONS  (STANDING):  albuterol    90 MICROgram(s) HFA Inhaler 2 Puff(s) Inhalation every 6 hours  amLODIPine   Tablet 10 milliGRAM(s) Oral daily  atorvastatin 40 milliGRAM(s) Oral at bedtime  buMETAnide 2 milliGRAM(s) Oral daily  chlorhexidine 2% Cloths 1 Application(s) Topical <User Schedule>  dextrose 5%. 1000 milliLiter(s) (100 mL/Hr) IV Continuous <Continuous>  dextrose 5%. 1000 milliLiter(s) (50 mL/Hr) IV Continuous <Continuous>  dextrose 50% Injectable 25 Gram(s) IV Push once  dextrose 50% Injectable 12.5 Gram(s) IV Push once  dextrose 50% Injectable 25 Gram(s) IV Push once  glucagon  Injectable 1 milliGRAM(s) IntraMuscular once  hydrALAZINE 100 milliGRAM(s) Oral three times a day  ipratropium 17 MICROgram(s) HFA Inhaler 1 Puff(s) Inhalation every 6 hours    MEDICATIONS  (PRN):  acetaminophen     Tablet .. 650 milliGRAM(s) Oral every 6 hours PRN Mild Pain (1 - 3)  dextrose Oral Gel 15 Gram(s) Oral once PRN Blood Glucose LESS THAN 70 milliGRAM(s)/deciliter      Allergies    penicillin (Red Man Synd)    Intolerances        REVIEW OF SYSTEMS:  Please see interval HPI:    Vital Signs Last 24 Hrs  T(C): 36.6 (29 Nov 2022 13:06), Max: 37 (28 Nov 2022 21:00)  T(F): 97.8 (29 Nov 2022 13:06), Max: 98.6 (28 Nov 2022 21:00)  HR: 78 (29 Nov 2022 13:06) (78 - 84)  BP: 154/81 (29 Nov 2022 13:06) (142/83 - 173/88)  BP(mean): --  RR: 16 (29 Nov 2022 08:48) (16 - 17)  SpO2: 97% (29 Nov 2022 11:57) (95% - 97%)    Parameters below as of 29 Nov 2022 08:48  Patient On (Oxygen Delivery Method): room air      I&O's Detail        PHYSICAL EXAM:  GENERAL:   HEAD:    EYES:   ENMT:   NECK:   NERVOUS SYSTEM:    CHEST/LUNG:   HEART:   ABDOMEN:   EXTREMITIES:    LYMPH:   SKIN:     LABS:                        9.2    3.96  )-----------( 216      ( 29 Nov 2022 05:43 )             29.5     29 Nov 2022 05:43    140    |  96     |  26     ----------------------------<  96     3.6     |  31     |  1.54     Ca    9.2        29 Nov 2022 05:43  Phos  3.9       29 Nov 2022 05:43  Mg     1.50      29 Nov 2022 05:43      PT/INR - ( 29 Nov 2022 05:43 )   PT: 11.5 sec;   INR: 0.99 ratio         PTT - ( 29 Nov 2022 05:43 )  PTT:34.8 sec  CAPILLARY BLOOD GLUCOSE      POCT Blood Glucose.: 104 mg/dL (29 Nov 2022 12:05)  POCT Blood Glucose.: 99 mg/dL (29 Nov 2022 08:44)  POCT Blood Glucose.: 115 mg/dL (29 Nov 2022 05:49)  POCT Blood Glucose.: 125 mg/dL (28 Nov 2022 22:11)  POCT Blood Glucose.: 101 mg/dL (28 Nov 2022 17:08)    BLOOD CULTURE  11-25 @ 14:09   No growth to date.  --  --    RADIOLOGY & ADDITIONAL TESTS:    Imaging Personally Reviewed:  [ ] YES     Consultant(s) Notes Reviewed:      Care Discussed with Consultants/Other Providers: [facility] Medicine  Pager [phone]    Patient is a 67y old  Female who presents with a chief complaint of MANDY on CKD, abnormal VS, altered mental status (29 Nov 2022 10:57)      INTERVAL HPI/OVERNIGHT EVENTS:  Discussed need to reschedule IR biopsy, continued efforts for BP optimization. Patient feels frustrated, feels better, wants to go home. No other acute complaints at this time.     Again attempted to reach daughter @ [phone] without success. tried alternate number for MIGUELANGEL in provider handoff ([phone]), unfortunately got message from [employer] (phone number may have changed, not in service),,,     MEDICATIONS  (STANDING):  albuterol    90 MICROgram(s) HFA Inhaler 2 Puff(s) Inhalation every 6 hours  amLODIPine   Tablet 10 milliGRAM(s) Oral daily  atorvastatin 40 milliGRAM(s) Oral at bedtime  buMETAnide 2 milliGRAM(s) Oral daily  chlorhexidine 2% Cloths 1 Application(s) Topical <User Schedule>  dextrose 5%. 1000 milliLiter(s) (100 mL/Hr) IV Continuous <Continuous>  dextrose 5%. 1000 milliLiter(s) (50 mL/Hr) IV Continuous <Continuous>  dextrose 50% Injectable 25 Gram(s) IV Push once  dextrose 50% Injectable 12.5 Gram(s) IV Push once  dextrose 50% Injectable 25 Gram(s) IV Push once  glucagon  Injectable 1 milliGRAM(s) IntraMuscular once  hydrALAZINE 100 milliGRAM(s) Oral three times a day  ipratropium 17 MICROgram(s) HFA Inhaler 1 Puff(s) Inhalation every 6 hours    MEDICATIONS  (PRN):  acetaminophen     Tablet .. 650 milliGRAM(s) Oral every 6 hours PRN Mild Pain (1 - 3)  dextrose Oral Gel 15 Gram(s) Oral once PRN Blood Glucose LESS THAN 70 milliGRAM(s)/deciliter    Allergies  penicillin (Red Man Synd)    Intolerances    REVIEW OF SYSTEMS:  Please see interval HPI:    Vital Signs Last 24 Hrs  T(C): 36.6 (29 Nov 2022 13:06), Max: 37 (28 Nov 2022 21:00)  T(F): 97.8 (29 Nov 2022 13:06), Max: 98.6 (28 Nov 2022 21:00)  HR: 78 (29 Nov 2022 13:06) (78 - 84)  BP: 154/81 (29 Nov 2022 13:06) (142/83 - 173/88)  BP(mean): --  RR: 16 (29 Nov 2022 08:48) (16 - 17)  SpO2: 97% (29 Nov 2022 11:57) (95% - 97%)    Parameters below as of 29 Nov 2022 08:48  Patient On (Oxygen Delivery Method): room air    I&O's Detail    PHYSICAL EXAM:  GENERAL: NAD, lying in bed  HEAD:  NC/AT  EYES: EOMI, clear sclera/conjunctiva  ENMT: MMM, poor dentition, hard of hearing  NECK: supple, no JVD  NERVOUS SYSTEM:  moving extremities, SILT grossly  CHEST/LUNG: CTAB, comfortable on RA, no wheeze appreciated  HEART: S1S2 RRR  ABDOMEN: soft, non-tender +BS  EXTREMITIES:  no c/c/e, wwp    LABS:                        9.2    3.96  )-----------( 216      ( 29 Nov 2022 05:43 )             29.5     29 Nov 2022 05:43    140    |  96     |  26     ----------------------------<  96     3.6     |  31     |  1.54     Ca    9.2        29 Nov 2022 05:43  Phos  3.9       29 Nov 2022 05:43  Mg     1.50      29 Nov 2022 05:43      PT/INR - ( 29 Nov 2022 05:43 )   PT: 11.5 sec;   INR: 0.99 ratio    PTT - ( 29 Nov 2022 05:43 )  PTT:34.8 sec    CAPILLARY BLOOD GLUCOSE  POCT Blood Glucose.: 104 mg/dL (29 Nov 2022 12:05)  POCT Blood Glucose.: 99 mg/dL (29 Nov 2022 08:44)  POCT Blood Glucose.: 115 mg/dL (29 Nov 2022 05:49)  POCT Blood Glucose.: 125 mg/dL (28 Nov 2022 22:11)  POCT Blood Glucose.: 101 mg/dL (28 Nov 2022 17:08)    BLOOD CULTURE  11-25 @ 14:09   No growth to date.  --  --    RADIOLOGY & ADDITIONAL TESTS:    Imaging Personally Reviewed:  [ ] YES     Consultant(s) Notes Reviewed:  Renal    Care Discussed with Consultants/Other Providers: KIMBERLY Bernstein re: her discussion with IR, they want SBP <150 for day prior to proceeding with renal biopsy, Renal Dr. De Leon re: above, hydralazine was increased to 100mg, can add on coreg bid for better control, hopeful that IR biopsy can occur soon

## 2022-11-29 NOTE — SWALLOW BEDSIDE ASSESSMENT ADULT - SWALLOW EVAL: RECOMMENDED FEEDING/EATING TECHNIQUES
allow for swallow between intakes/alternate food with liquid/check mouth frequently for oral residue/pocketing/crush medication (when feasible)/maintain upright posture during/after eating for 30 mins/oral hygiene/position upright (90 degrees)/small sips/bites Pfizer dose 1, 2, and 3

## 2022-11-29 NOTE — PROGRESS NOTE ADULT - SUBJECTIVE AND OBJECTIVE BOX
CHIEF COMPLAINT: sob    Interval Events: Patient seen and examined at bedside. No acute events overnight.   Pulmonary called back to evaluation for home BiPAP. Patient downgraded from RCU to medical floors, however became more lethargic and was restarted on NIV with improvement in her mental status.     REVIEW OF SYSTEMS:  Constitutional: [ X] negative [ ] fevers [ ] chills [ ] weight loss [ ] weight gain  HEENT: [ X] negative [ ] dry eyes [ ] eye irritation [ ] postnasal drip [ ] nasal congestion  CV: [X ] negative  [ ] chest pain [ ] orthopnea [ ] palpitations [ ] murmur  Resp: [ X] negative [ ] cough [ ] shortness of breath [ ] dyspnea [ ] wheezing [ ] sputum [ ] hemoptysis  GI: [ ]X negative [ ] nausea [ ] vomiting [ ] diarrhea [ ] constipation [ ] abd pain [ ] dysphagia   : [ X] negative [ ] dysuria [ ] nocturia [ ] hematuria [ ] increased urinary frequency  Musculoskeletal: [X ] negative [ ] back pain [ ] myalgias [ ] arthralgias [ ] fracture  Skin: [ X] negative [ ] rash [ ] itch  Neurological: [ ] negative [ ] headache [ ] dizziness [ ] syncope [ ] weakness [ ] numbness  Psychiatric: [ ] negative [ ] anxiety [ ] depression  Endocrine: [ ] negative [ ] diabetes [ ] thyroid problem  Hematologic/Lymphatic: [ ] negative [ ] anemia [ ] bleeding problem  Allergic/Immunologic: [ ] negative [ ] itchy eyes [ ] nasal discharge [ ] hives [ ] angioedema  [ ] All other systems negative  [ ] Unable to assess ROS because ________    OBJECTIVE:  ICU Vital Signs Last 24 Hrs  T(C): 36.6 (29 Nov 2022 13:06), Max: 37 (28 Nov 2022 21:00)  T(F): 97.8 (29 Nov 2022 13:06), Max: 98.6 (28 Nov 2022 21:00)  HR: 68 (29 Nov 2022 17:50) (68 - 84)  BP: 132/75 (29 Nov 2022 17:50) (132/75 - 173/88)  BP(mean): --  ABP: --  ABP(mean): --  RR: 18 (29 Nov 2022 17:50) (16 - 18)  SpO2: 98% (29 Nov 2022 17:50) (95% - 98%)    O2 Parameters below as of 29 Nov 2022 17:50  Patient On (Oxygen Delivery Method): room air              CAPILLARY BLOOD GLUCOSE      POCT Blood Glucose.: 130 mg/dL (29 Nov 2022 17:17)      PHYSICAL EXAM:  General: well appearing, NAD  HEENT: no icterus  Neck: supple   Respiratory: clear to ausculation bilaterally  Cardiovascular: S1/S2, RRR,   Abdomen: soft, nontender, nondistended  Extremities: no LE edema  Skin: no rashes  Neurological: AxOx3, hard of hearing  Psychiatry: no rashes     HOSPITAL MEDICATIONS:      amLODIPine   Tablet 10 milliGRAM(s) Oral daily  buMETAnide 2 milliGRAM(s) Oral daily  carvedilol 12.5 milliGRAM(s) Oral every 12 hours  hydrALAZINE 100 milliGRAM(s) Oral three times a day    atorvastatin 40 milliGRAM(s) Oral at bedtime  dextrose 50% Injectable 25 Gram(s) IV Push once  dextrose 50% Injectable 12.5 Gram(s) IV Push once  dextrose 50% Injectable 25 Gram(s) IV Push once  dextrose Oral Gel 15 Gram(s) Oral once PRN  glucagon  Injectable 1 milliGRAM(s) IntraMuscular once    albuterol    90 MICROgram(s) HFA Inhaler 2 Puff(s) Inhalation every 6 hours  ipratropium 17 MICROgram(s) HFA Inhaler 1 Puff(s) Inhalation every 6 hours    acetaminophen     Tablet .. 650 milliGRAM(s) Oral every 6 hours PRN          dextrose 5%. 1000 milliLiter(s) IV Continuous <Continuous>  dextrose 5%. 1000 milliLiter(s) IV Continuous <Continuous>  magnesium oxide 400 milliGRAM(s) Oral once      chlorhexidine 2% Cloths 1 Application(s) Topical <User Schedule>        LABS:                        9.2    3.96  )-----------( 216      ( 29 Nov 2022 05:43 )             29.5     Hgb Trend: 9.2<--, 10.0<--, 8.5<--, 8.8<--, 8.4<--  11-29    140  |  96<L>  |  26<H>  ----------------------------<  96  3.6   |  31  |  1.54<H>    Ca    9.2      29 Nov 2022 05:43  Phos  3.9     11-29  Mg     1.50     11-29    TPro  7.2  /  Alb  3.5  /  TBili  0.5  /  DBili  x   /  AST  19  /  ALT  10  /  AlkPhos  98  11-28    Creatinine Trend: 1.54<--, 1.47<--, 1.50<--, 1.62<--, 1.75<--, 1.79<--  PT/INR - ( 29 Nov 2022 05:43 )   PT: 11.5 sec;   INR: 0.99 ratio         PTT - ( 29 Nov 2022 05:43 )  PTT:34.8 sec          MICROBIOLOGY:     RADIOLOGY:  [ X] Reviewed and interpreted by me

## 2022-11-29 NOTE — PROGRESS NOTE ADULT - SUBJECTIVE AND OBJECTIVE BOX
[facility] DIVISION OF KIDNEY DISEASES AND HYPERTENSION -- FOLLOW UP NOTE  --------------------------------------------------------------------------------      PAST HISTORY  --------------------------------------------------------------------------------  No significant changes to PMH, PSH, FHx, SHx, unless otherwise noted    ALLERGIES & MEDICATIONS  --------------------------------------------------------------------------------  Allergies    penicillin (Red Man Synd)    Intolerances    Standing Inpatient Medications  albuterol    90 MICROgram(s) HFA Inhaler 2 Puff(s) Inhalation every 6 hours  amLODIPine   Tablet 10 milliGRAM(s) Oral daily  atorvastatin 40 milliGRAM(s) Oral at bedtime  buMETAnide 2 milliGRAM(s) Oral daily  chlorhexidine 2% Cloths 1 Application(s) Topical <User Schedule>  dextrose 5%. 1000 milliLiter(s) IV Continuous <Continuous>  dextrose 5%. 1000 milliLiter(s) IV Continuous <Continuous>  dextrose 50% Injectable 25 Gram(s) IV Push once  dextrose 50% Injectable 12.5 Gram(s) IV Push once  dextrose 50% Injectable 25 Gram(s) IV Push once  glucagon  Injectable 1 milliGRAM(s) IntraMuscular once  hydrALAZINE 100 milliGRAM(s) Oral three times a day  ipratropium 17 MICROgram(s) HFA Inhaler 1 Puff(s) Inhalation every 6 hours    PRN Inpatient Medications  acetaminophen     Tablet .. 650 milliGRAM(s) Oral every 6 hours PRN  dextrose Oral Gel 15 Gram(s) Oral once PRN    REVIEW OF SYSTEMS  --------------------------------------------------------------------------------    All other systems were reviewed and are negative, except as noted.    VITALS/PHYSICAL EXAM  --------------------------------------------------------------------------------  T(C): 36.8 (11-29-22 @ 05:47), Max: 37 (11-28-22 @ 21:00)  HR: 81 (11-29-22 @ 08:48) (79 - 84)  BP: 142/83 (11-29-22 @ 08:48) (142/83 - 173/88)  RR: 16 (11-29-22 @ 08:48) (16 - 18)  SpO2: 97% (11-29-22 @ 08:48) (95% - 97%)  Wt(kg): --    Physical Exam:  	    LABS/STUDIES  --------------------------------------------------------------------------------              9.2    3.96  >-----------<  216      [11-29-22 @ 05:43]              29.5     140  |  96  |  26  ----------------------------<  96      [11-29-22 @ 05:43]  3.6   |  31  |  1.54        Ca     9.2     [11-29-22 @ 05:43]      Mg     1.50     [11-29-22 @ 05:43]      Phos  3.9     [11-29-22 @ 05:43]    TPro  7.2  /  Alb  3.5  /  TBili  0.5  /  DBili  x   /  AST  19  /  ALT  10  /  AlkPhos  98  [11-28-22 @ 06:57]    PT/INR: PT 11.5 , INR 0.99       [11-29-22 @ 05:43]  PTT: 34.8       [11-29-22 @ 05:43]    Creatinine Trend:  SCr 1.54 [11-29 @ 05:43]  SCr 1.47 [11-28 @ 06:57]  SCr 1.50 [11-27 @ 06:34]  SCr 1.62 [11-26 @ 06:19]  SCr 1.75 [11-25 @ 10:05]    Urinalysis - [11-25-22 @ 17:36]      Color Light Yellow / Appearance Clear / SG 1.012 / pH 6.5      Gluc Negative / Ketone Negative  / Bili Negative / Urobili <2 mg/dL       Blood Negative / Protein 100 mg/dL / Leuk Est Negative / Nitrite Negative      RBC 16 / WBC 24 / Hyaline 2 / Gran  / Sq Epi  / Non Sq Epi 4 / Bacteria Negative    Urine Creatinine 65      [11-22-22 @ 17:00]  Urine Protein 522      [11-22-22 @ 17:00]    Iron 68, TIBC 312, %sat 22      [11-19-22 @ 02:58]  Ferritin 113      [11-19-22 @ 02:58]  TSH 2.03      [11-25-22 @ 14:10]    HBsAg Nonreact      [11-22-22 @ 06:45]  HCV 0.13, Nonreact      [11-22-22 @ 06:45]  HIV Nonreact      [11-22-22 @ 06:45]    MINOO: titer Negative, pattern --      [11-18-22 @ 03:53]  dsDNA <12      [11-22-22 @ 06:45]  C3 Complement 133      [11-22-22 @ 06:45]  C4 Complement 33      [11-22-22 @ 06:45]  Rheumatoid Factor 17      [11-22-22 @ 06:45]  ANCA: cANCA Negative, pANCA Negative, atypical ANCA Negative      [11-22-22 @ 06:45]  anti-GBM <0.2      [11-22-22 @ 06:45]  Syphilis Screen (Treponema Pallidum Ab) Negative      [11-22-22 @ 06:45]  PLA2R: DESHAWN <1.8, IFA --      [11-22-22 @ 06:45]  Free Light Chains: kappa 12.79, lambda 4.65, ratio = 2.75      [11-22 @ 06:45] [facility] DIVISION OF KIDNEY DISEASES AND HYPERTENSION -- FOLLOW UP NOTE  --------------------------------------------------------------------------------  HPI: Patient is a 67 year old female with PMH of HTN, HLD, Sarcoidosis who presented to the hospital after outpatient labs demonstrated elevated Scr. On arrival to the ED the patient was noted to have AMS and was hypotensive; she was subsequently intubated and admitted to the MICU. She was successfully extubated shortly after. On review of [facility] the patient is noted to have a Scr of 1.2mg/dL in 9/2022, on admission it was elevated to 3.01mg/dL and has further increased to 3.27 on 11/20/22. Scr is elevated/improved to 1.54 today. Nephrology team following for MANDY.    On review of [facility], it was noted that the patient was followed by nephrology for MANDY and proteinuria. All serologic work up at that time was negative and it was presumed her renal disease was in the setting of her longstanding DM. She was advised to undergo a renal biopsy at that time, however the patient has not followed up with nephrology following discharge. The patient has had multiple admissions for hypoxic respiratory failure in setting of being volume overloaded and has required aggressive diuresis.     Pt seen and examined at bedside. Pt reports feeling lethargic. Denies any SOB, CP, HA, N/V, abdominal pain , urinary symptoms or dizziness.    PAST HISTORY  --------------------------------------------------------------------------------  No significant changes to PMH, PSH, FHx, SHx, unless otherwise noted    ALLERGIES & MEDICATIONS  --------------------------------------------------------------------------------  Allergies    penicillin (Red Man Synd)    Intolerances    Standing Inpatient Medications  albuterol    90 MICROgram(s) HFA Inhaler 2 Puff(s) Inhalation every 6 hours  amLODIPine   Tablet 10 milliGRAM(s) Oral daily  atorvastatin 40 milliGRAM(s) Oral at bedtime  buMETAnide 2 milliGRAM(s) Oral daily  chlorhexidine 2% Cloths 1 Application(s) Topical <User Schedule>  dextrose 5%. 1000 milliLiter(s) IV Continuous <Continuous>  dextrose 5%. 1000 milliLiter(s) IV Continuous <Continuous>  dextrose 50% Injectable 25 Gram(s) IV Push once  dextrose 50% Injectable 12.5 Gram(s) IV Push once  dextrose 50% Injectable 25 Gram(s) IV Push once  glucagon  Injectable 1 milliGRAM(s) IntraMuscular once  hydrALAZINE 100 milliGRAM(s) Oral three times a day  ipratropium 17 MICROgram(s) HFA Inhaler 1 Puff(s) Inhalation every 6 hours    PRN Inpatient Medications  acetaminophen     Tablet .. 650 milliGRAM(s) Oral every 6 hours PRN  dextrose Oral Gel 15 Gram(s) Oral once PRN    REVIEW OF SYSTEMS  --------------------------------------------------------------------------------  Gen: No fevers , lethargy+  Respiratory: No dyspnea  CV: No chest pain  GI: No abdominal pain  : No dysuria  MSK: No  edema  Neuro: no dizziness   All other systems were reviewed and are negative, except as noted.    VITALS/PHYSICAL EXAM  --------------------------------------------------------------------------------  T(C): 36.8 (11-29-22 @ 05:47), Max: 37 (11-28-22 @ 21:00)  HR: 81 (11-29-22 @ 08:48) (79 - 84)  BP: 142/83 (11-29-22 @ 08:48) (142/83 - 173/88)  RR: 16 (11-29-22 @ 08:48) (16 - 18)  SpO2: 97% (11-29-22 @ 08:48) (95% - 97%)  Wt(kg): --    Physical Exam:  	Gen: ill appearing  	HEENT: MMM  	Pulm: minimal basal crackles in lung fields  	CV: S1S2  	Abd: Soft, +BS   	Ext: No LE edema B/L  	Neuro: Awake and alert  	Skin: Warm and dry    LABS/STUDIES  --------------------------------------------------------------------------------              9.2    3.96  >-----------<  216      [11-29-22 @ 05:43]              29.5     140  |  96  |  26  ----------------------------<  96      [11-29-22 @ 05:43]  3.6   |  31  |  1.54        Ca     9.2     [11-29-22 @ 05:43]      Mg     1.50     [11-29-22 @ 05:43]      Phos  3.9     [11-29-22 @ 05:43]    TPro  7.2  /  Alb  3.5  /  TBili  0.5  /  DBili  x   /  AST  19  /  ALT  10  /  AlkPhos  98  [11-28-22 @ 06:57]    PT/INR: PT 11.5 , INR 0.99       [11-29-22 @ 05:43]  PTT: 34.8       [11-29-22 @ 05:43]    Creatinine Trend:  SCr 1.54 [11-29 @ 05:43]  SCr 1.47 [11-28 @ 06:57]  SCr 1.50 [11-27 @ 06:34]  SCr 1.62 [11-26 @ 06:19]  SCr 1.75 [11-25 @ 10:05]    Urinalysis - [11-25-22 @ 17:36]      Color Light Yellow / Appearance Clear / SG 1.012 / pH 6.5      Gluc Negative / Ketone Negative  / Bili Negative / Urobili <2 mg/dL       Blood Negative / Protein 100 mg/dL / Leuk Est Negative / Nitrite Negative      RBC 16 / WBC 24 / Hyaline 2 / Gran  / Sq Epi  / Non Sq Epi 4 / Bacteria Negative    Urine Creatinine 65      [11-22-22 @ 17:00]  Urine Protein 522      [11-22-22 @ 17:00]    Iron 68, TIBC 312, %sat 22      [11-19-22 @ 02:58]  Ferritin 113      [11-19-22 @ 02:58]  TSH 2.03      [11-25-22 @ 14:10]    HBsAg Nonreact      [11-22-22 @ 06:45]  HCV 0.13, Nonreact      [11-22-22 @ 06:45]  HIV Nonreact      [11-22-22 @ 06:45]    MINOO: titer Negative, pattern --      [11-18-22 @ 03:53]  dsDNA <12      [11-22-22 @ 06:45]  C3 Complement 133      [11-22-22 @ 06:45]  C4 Complement 33      [11-22-22 @ 06:45]  Rheumatoid Factor 17      [11-22-22 @ 06:45]  ANCA: cANCA Negative, pANCA Negative, atypical ANCA Negative      [11-22-22 @ 06:45]  anti-GBM <0.2      [11-22-22 @ 06:45]  Syphilis Screen (Treponema Pallidum Ab) Negative      [11-22-22 @ 06:45]  PLA2R: DESHAWN <1.8, IFA --      [11-22-22 @ 06:45]  Free Light Chains: kappa 12.79, lambda 4.65, ratio = 2.75      [11-22 @ 06:45]

## 2022-11-29 NOTE — SWALLOW BEDSIDE ASSESSMENT ADULT - ADDITIONAL RECOMMENDATIONS
This department to follow up as schedule permits to assess for diet tolerance, possible diet advancement and/or need for objective assessment. Medical team further advised to reconsult if there is a change in medical status and/or observed change in patient's tolerance of recommended PO diet.
1. This service to follow up for diet tolerance as schedule permits. 2. Medical team advised to reconsult if change in medical status and/or patient's tolerance to recommended PO diet.

## 2022-11-29 NOTE — PROGRESS NOTE ADULT - PROBLEM SELECTOR PLAN 1
Pt. with MANDY suspected in setting of hemodynamics/ hypotension on arrival in ED vs volume overload. Pt with MANDY/ATN. [facility] is noted to have a Scr of 1.2mg/dL in 9/2022, on admission it was elevated to 3.01mg/dL and has further increased to 3.27mg/dL on 11/20/22. Scr is elevated/improved to 1.47 today.    UA with significant proteinuria (noted previously as well) and significant hematuria (new from previous urine studies). In the past all serological work up has been negative and it was presumed renal disease was in setting of her DM. Kidney sonogram suggestive of B/L renal parenchymal disease. Given nephrotic range proteinuria, plan is for kidney biopsy. Plan discussed with pt, pt agreed. ASA was discontinued in preparation for kidney biopsy. IR consult note from 11/23 regarding kidney biopsy reviewed. Pt was planned for kidney biopsy today - although due to high BP , kidney biopsy canceled for now.    Labs reviewed. Pt currently on PO bumex 2 mg daily. Pt non oliguric. Pt clinically in fluid overload. Agree with PO bumex for now. Can increase hydralazine to 100 mg po TID for better BP control. Monitor labs and urine output. Avoid any potential nephrotoxins. Dose medications as per eGFR.     If you have any questions, please feel free to contact me  Desean De Leon  Nephrology Fellow  [phone]/ [employer] Teams(Preferred)  (After 5pm or on weekends please page the on-call fellow). Pt. with MANDY suspected in setting of hemodynamics/ hypotension on arrival in ED vs volume overload. Pt with MANDY/ATN. [facility] is [facility] to have a Scr of 1.2mg/dL in 9/2022, on admission it was elevated to 3.01mg/dL and has further increased to 3.27mg/dL on 11/20/22. Scr is elevated/improved to 1.54 today.    UA with significant proteinuria (noted previously as well) and significant hematuria (new from previous urine studies). In the past all serological work up has been negative and it was presumed renal disease was in setting of her DM. Kidney sonogram suggestive of B/L renal parenchymal disease. Given nephrotic range proteinuria, plan is for kidney biopsy. Plan discussed with pt, pt agreed. ASA was discontinued in preparation for kidney biopsy. IR consult note from 11/23 regarding kidney biopsy reviewed. Pt was planned for kidney biopsy on 11/28/22 - although due to high BP , kidney biopsy canceled for now. Follow up with IR regarding Kidney biopsy.    Labs reviewed. Pt currently on PO Bumex 2 mg daily. Pt non oliguric. Agree with PO Bumex for now. BP better controlled now. Continue with hydralazine 00 mg po TID. Monitor labs and urine output. Avoid any potential nephrotoxins. Dose medications as per eGFR.     If you have any questions, please feel free to contact me  Desean De Leon  Nephrology Fellow  [phone]/ [employer] Teams(Preferred)  (After 5pm or on weekends please page the on-call fellow). Pt. with MANDY suspected in setting of hemodynamics/ hypotension on arrival in ED vs volume overload. Pt with MANDY/ATN. [facility] to have a Scr of 1.2mg/dL in 9/2022, on admission it was elevated to 3.01mg/dL and has further increased to 3.27mg/dL on 11/20/22. Scr is elevated/improved to 1.54 today.    UA with significant proteinuria (noted previously as well) and significant hematuria (new from previous urine studies). In the past all serological work up has been negative and it was presumed renal disease was in setting of her DM. Kidney sonogram suggestive of B/L renal parenchymal disease. Given nephrotic range proteinuria, plan is for kidney biopsy. Plan discussed with pt, pt agreed. ASA was discontinued in preparation for kidney biopsy. IR consult note from 11/23 regarding kidney biopsy reviewed. Pt was planned for kidney biopsy on 11/28/22 - although due to high BP , kidney biopsy canceled for now. Follow up with IR regarding Kidney biopsy.    Labs reviewed. Pt currently on PO Bumex 2 mg daily. Pt non oliguric. Agree with PO Bumex for now. BP better controlled now. Continue with hydralazine 100 mg po TID. Monitor labs and urine output. Avoid any potential nephrotoxins. Dose medications as per eGFR.     If you have any questions, please feel free to contact me  Desean De Leon  Nephrology Fellow  [phone]/ [employer] Teams(Preferred)  (After 5pm or on weekends please page the on-call fellow).

## 2022-11-29 NOTE — PROGRESS NOTE ADULT - ASSESSMENT
66 yo F w/ HTN, HLD, sarcoidosis, HFpEF, hx PEA x2, a/w acute hypoxic respiratory failure (s/p intubation/MICU stay) and septic shock 2/2 UTI (briefly on pressors, completed course of antibiotics), c/b metabolic encephalopathy (improved) and MANDY on CKD.     # metabolic encephalopathy 2/2 UTI  - mental status appears at baseline, MRI w/o acute intracranial pathology, EEG unrevealing, neuro input appreciated, suspect changes to mental status was in setting of recent UTI and MANDY  - swallow re-eval appreciated, can do regular solids and thin liquids    # MANDY on CKD, proteinuria  - being followed by nephrology for MANDY and proteinuria  - Cr downtrending overall, but with slight increase today to 1.54 (peak of 3.27, from 1.47 yesterday)  - plan for IR renal biopsy for further evaluation, asa on hold for procedure  - need SBP >150 for a day per ACP discussion with IR, no plan for biopsy today, will reassess tomorrow, NPO after MN in case  - avoid nephrotoxins, continue to monitor renal function/UOP    # HTN  - increased dose of hydralazine tid (to 100mg), on amlodipine, bumex  - monitor BP and titrate regimen further as needed  - goal SBP <150 per IR to proceed with renal biopsy  - renal input appreciated, will add on coreg 12.5 mg bid for better BP control as per discussion     # HFpEF  - c/w bumex as per renal  - continue to monitor INOs    # HLD  - on statin    # Anemia  - hgb 9.2, stable overall, no overt signs of bleeding  - continue to monitor     # Septic shock 2/2 suspected UTI c/b acute hypoxic respiratory failure (resolved)  - briefly on pressors, shock resolved, completed course of antibiotics, extubated, respiratory status stable on room air    Need for prophylactic measure  - VTE ppx: on hold for IR procedure    Dispo: pending workup/continued clinical improvement, PT recs home PT 11/28

## 2022-11-29 NOTE — SWALLOW BEDSIDE ASSESSMENT ADULT - ASR SWALLOW RECOMMEND DIAG
Objective testing NOT warranted given no overt signs of penetration/aspiration and CXR does not indicate pneumonia.
Objective testing NOT warranted given no overt signs of penetration/aspiration and CXR does not indicate pneumonia.

## 2022-11-29 NOTE — CHART NOTE - NSCHARTNOTEFT_GEN_A_CORE
Source: RN, Chart review,     Medical Course: 68 yo F w/ HTN, HLD, sarcoidosis, HFpEF, hx PEA x2, a/w acute hypoxic respiratory failure (s/p intubation/MICU stay) and septic shock 2/2 UTI (briefly on pressors, completed course of antibiotics), c/b metabolic encephalopathy (improved) and MANDY on CKD.      Nutrition Course: Patient was sleeping at the time of visit. Nutrition information obtained via RN at bedside, who reports Pt has been eating very minimal due to dislike the soft and bite sized diet. Swallow Bedside Assessment consult pending. Patient currently on NPO x2 days, awaiting for biopsy. Biopsy procedure was cancelled yesterday, and tentative for today (11/29). Recommend add Glucerna 2x daily (440kcals, 20g protein) ro provide optimal nutrition to meeting her needs. No GI distress reported by RN (nausea/vomiting/diarrhea/constipation.) Last BM per RN yesterday (11/28). Labs notable with low Mg 1.5, ordered magnesium oxide for repletion. Patient's weight 11/17 167.5lbs with 1+ generalized, 11/27 most recent weight 134.7lbs, weight trend reflects significant weight loss of 32.8/19.5% [id number] . Weight loss possibly due to poor PO intake and luid loss.      Diet, NPO after Midnight:      NPO Start Date: 29-Nov-2022,   NPO Start Time: 23:59 (11-29-22 @ 16:27)  Diet, DASH/TLC:   Sodium & Cholesterol Restricted  Consistent Carbohydrate {No Snacks} (CSTCHO) (11-29-22 @ 16:25)      GI: WDL. Last BM 11/28    PO intake:  < 50% [x ]  Anthropometrics: Height (cm): 165.1 (11-17)  Weight (kg): 75.9 (11-17), 82.2 (09-06)  BMI (kg/m2): 27.8 (11-17)    Edema: Perviously noted with  1+ generalized, resolved.   Pressure Injuries: None reported at present.     __________________ Pertinent Medications__________________   MEDICATIONS  (STANDING):  albuterol    90 MICROgram(s) HFA Inhaler 2 Puff(s) Inhalation every 6 hours  amLODIPine   Tablet 10 milliGRAM(s) Oral daily  atorvastatin 40 milliGRAM(s) Oral at bedtime  buMETAnide 2 milliGRAM(s) Oral daily  carvedilol 12.5 milliGRAM(s) Oral every 12 hours  chlorhexidine 2% Cloths 1 Application(s) Topical <User Schedule>  dextrose 5%. 1000 milliLiter(s) (100 mL/Hr) IV Continuous <Continuous>  dextrose 5%. 1000 milliLiter(s) (50 mL/Hr) IV Continuous <Continuous>  dextrose 50% Injectable 25 Gram(s) IV Push once  dextrose 50% Injectable 12.5 Gram(s) IV Push once  dextrose 50% Injectable 25 Gram(s) IV Push once  glucagon  Injectable 1 milliGRAM(s) IntraMuscular once  hydrALAZINE 100 milliGRAM(s) Oral three times a day  ipratropium 17 MICROgram(s) HFA Inhaler 1 Puff(s) Inhalation every 6 hours    MEDICATIONS  (PRN):  acetaminophen     Tablet .. 650 milliGRAM(s) Oral every 6 hours PRN Mild Pain (1 - 3)  dextrose Oral Gel 15 Gram(s) Oral once PRN Blood Glucose LESS THAN 70 milliGRAM(s)/deciliter      __________________ Pertinent Labs__________________   11-29 Na140 mmol/L Glu 96 mg/dL K+ 3.6 mmol/L Cr  1.54 mg/dL<H> BUN 26 mg/dL<H> 11-29 Phos 3.9 mg/dL 11-28 Alb 3.5 g/dL        POCT Blood Glucose.: 130 mg/dL (11-29-22 @ 17:17)  POCT Blood Glucose.: 104 mg/dL (11-29-22 @ 12:05)  POCT Blood Glucose.: 99 mg/dL (11-29-22 @ 08:44)  POCT Blood Glucose.: 115 mg/dL (11-29-22 @ 05:49)  POCT Blood Glucose.: 125 mg/dL (11-28-22 @ 22:11)  POCT Blood Glucose.: 101 mg/dL (11-28-22 @ 17:08)  POCT Blood Glucose.: 99 mg/dL (11-28-22 @ 12:17)  POCT Blood Glucose.: 92 mg/dL (11-28-22 @ 06:22)  POCT Blood Glucose.: 98 mg/dL (11-27-22 @ 22:26)  POCT Blood Glucose.: 105 mg/dL (11-27-22 @ 17:35)  POCT Blood Glucose.: 103 mg/dL (11-27-22 @ 12:19)  POCT Blood Glucose.: 97 mg/dL (11-27-22 @ 08:43)  POCT Blood Glucose.: 112 mg/dL (11-26-22 @ 22:30)          Estimated Needs:   [x ] no change since previous assessment        Previous Nutrition Diagnosis: decreased po intake     Nutrition Diagnosis is [x ] ongoing      New Nutrition Diagnosis:   Severe protein calorie malnutrition in context of acute on chronic illness related to inadequate oral intake as evidenced by >2% x 1 wk, <50% of EER >5 days.     Recommendations:  1) Recommend add Glucerna 2x daily (440kcals, 20g protein)   2) Monitor PO intake, Labs, weights, BMs, and skin integrity.   3) RD to remain available for further nutritional interventions as indicated.       Monitoring and Evaluation:      [ x] Tolerance to diet prescription [x ] weights [x ] follow up per protocol  [ ] other: Source: RN, Chart review,     Medical Course: 68 yo F w/ HTN, HLD, sarcoidosis, HFpEF, hx PEA x2, a/w acute hypoxic respiratory failure (s/p intubation/MICU stay) and septic shock 2/2 UTI (briefly on pressors, completed course of antibiotics), c/b metabolic encephalopathy (improved) and MANDY on CKD.      Nutrition Course: Patient was sleeping at the time of visit. Nutrition information obtained via RN at bedside, who reports Pt has been eating very minimal due to dislike the soft and bite sized diet. Swallow Bedside Assessment consult pending. Patient currently on NPO x2 days, awaiting for biopsy. Biopsy procedure was cancelled yesterday, and tentative for today (11/29). Recommend add Glucerna 2x daily (440kcals, 20g protein) ro provide optimal nutrition to meeting her needs. No GI distress reported by RN (nausea/vomiting/diarrhea/constipation.) Last BM per RN yesterday (11/28). Labs notable with low Mg 1.5, ordered magnesium oxide for repletion. Patient's weight 11/17 167.5lbs with 2+ generalized, 11/27 most recent weight 134.7lbs, weight trend reflects significant weight loss of 32.8/19.5% [id number] . Weight loss possibly due to poor PO intake and fluid loss.      Diet, NPO after Midnight:      NPO Start Date: 29-Nov-2022,   NPO Start Time: 23:59 (11-29-22 @ 16:27)  Diet, DASH/TLC:   Sodium & Cholesterol Restricted  Consistent Carbohydrate {No Snacks} (CSTCHO) (11-29-22 @ 16:25)      GI: WDL. Last BM 11/28    PO intake:  < 50% [x ]  Anthropometrics: Height (cm): 165.1 (11-17)  Weight (kg): 75.9 (11-17), 82.2 (09-06)  BMI (kg/m2): 27.8 (11-17)    Edema: Perviously noted with  1+ generalized, resolved.   Pressure Injuries: None reported at present.     __________________ Pertinent Medications__________________   MEDICATIONS  (STANDING):  albuterol    90 MICROgram(s) HFA Inhaler 2 Puff(s) Inhalation every 6 hours  amLODIPine   Tablet 10 milliGRAM(s) Oral daily  atorvastatin 40 milliGRAM(s) Oral at bedtime  buMETAnide 2 milliGRAM(s) Oral daily  carvedilol 12.5 milliGRAM(s) Oral every 12 hours  chlorhexidine 2% Cloths 1 Application(s) Topical <User Schedule>  dextrose 5%. 1000 milliLiter(s) (100 mL/Hr) IV Continuous <Continuous>  dextrose 5%. 1000 milliLiter(s) (50 mL/Hr) IV Continuous <Continuous>  dextrose 50% Injectable 25 Gram(s) IV Push once  dextrose 50% Injectable 12.5 Gram(s) IV Push once  dextrose 50% Injectable 25 Gram(s) IV Push once  glucagon  Injectable 1 milliGRAM(s) IntraMuscular once  hydrALAZINE 100 milliGRAM(s) Oral three times a day  ipratropium 17 MICROgram(s) HFA Inhaler 1 Puff(s) Inhalation every 6 hours    MEDICATIONS  (PRN):  acetaminophen     Tablet .. 650 milliGRAM(s) Oral every 6 hours PRN Mild Pain (1 - 3)  dextrose Oral Gel 15 Gram(s) Oral once PRN Blood Glucose LESS THAN 70 milliGRAM(s)/deciliter      __________________ Pertinent Labs__________________   11-29 Na140 mmol/L Glu 96 mg/dL K+ 3.6 mmol/L Cr  1.54 mg/dL<H> BUN 26 mg/dL<H> 11-29 Phos 3.9 mg/dL 11-28 Alb 3.5 g/dL        POCT Blood Glucose.: 130 mg/dL (11-29-22 @ 17:17)  POCT Blood Glucose.: 104 mg/dL (11-29-22 @ 12:05)  POCT Blood Glucose.: 99 mg/dL (11-29-22 @ 08:44)  POCT Blood Glucose.: 115 mg/dL (11-29-22 @ 05:49)  POCT Blood Glucose.: 125 mg/dL (11-28-22 @ 22:11)  POCT Blood Glucose.: 101 mg/dL (11-28-22 @ 17:08)  POCT Blood Glucose.: 99 mg/dL (11-28-22 @ 12:17)  POCT Blood Glucose.: 92 mg/dL (11-28-22 @ 06:22)  POCT Blood Glucose.: 98 mg/dL (11-27-22 @ 22:26)  POCT Blood Glucose.: 105 mg/dL (11-27-22 @ 17:35)  POCT Blood Glucose.: 103 mg/dL (11-27-22 @ 12:19)  POCT Blood Glucose.: 97 mg/dL (11-27-22 @ 08:43)  POCT Blood Glucose.: 112 mg/dL (11-26-22 @ 22:30)          Estimated Needs:   [x ] no change since previous assessment        Previous Nutrition Diagnosis: decreased po intake     Nutrition Diagnosis is [x ] ongoing      New Nutrition Diagnosis:   Severe protein calorie malnutrition in context of acute on chronic illness related to inadequate oral intake as evidenced by >2% x 1 wk, <50% of EER >5 days, moderate edema .     Recommendations:  1) Recommend add Glucerna 2x daily (440kcals, 20g protein)   2) Monitor PO intake, Labs, weights, BMs, and skin integrity.   3) RD to remain available for further nutritional interventions as indicated.       Monitoring and Evaluation:      [ x] Tolerance to diet prescription [x ] weights [x ] follow up per protocol  [ ] other:

## 2022-11-29 NOTE — DIETITIAN NUTRITION RISK NOTIFICATION - MALNUTRITION EVALUATION AS DEMONSTRATED BY (ADULTS > 20 YEARS OF AGE)
Weight loss.../Inadequate energy intake... Weight loss.../Inadequate energy intake.../Fluid accumulation...

## 2022-11-30 ENCOUNTER — RESULT REVIEW (OUTPATIENT)
Age: 67
End: 2022-11-30

## 2022-11-30 LAB
% ALBUMIN: 39.6 % — SIGNIFICANT CHANGE UP
% ALPHA 1: 4.5 % — SIGNIFICANT CHANGE UP
% ALPHA 2: 14 % — SIGNIFICANT CHANGE UP
% BETA: 13.6 % — SIGNIFICANT CHANGE UP
% GAMMA: 28.4 % — SIGNIFICANT CHANGE UP
ALBUMIN SERPL ELPH-MCNC: 3.09 G/DL — LOW (ref 3.3–4.4)
ALBUMIN/GLOB SERPL ELPH: 0.7 RATIO — SIGNIFICANT CHANGE UP
ALPHA1 GLOB SERPL ELPH-MCNC: 0.35 G/DL — HIGH (ref 0.1–0.3)
ALPHA2 GLOB SERPL ELPH-MCNC: 1.1 G/DL — HIGH (ref 0.6–1)
ANION GAP SERPL CALC-SCNC: 14 MMOL/L — SIGNIFICANT CHANGE UP (ref 7–14)
B-GLOBULIN SERPL ELPH-MCNC: 1.06 G/DL — SIGNIFICANT CHANGE UP (ref 0.6–1.1)
BUN SERPL-MCNC: 28 MG/DL — HIGH (ref 7–23)
CALCIUM SERPL-MCNC: 8.9 MG/DL — SIGNIFICANT CHANGE UP (ref 8.4–10.5)
CHLORIDE SERPL-SCNC: 99 MMOL/L — SIGNIFICANT CHANGE UP (ref 98–107)
CO2 SERPL-SCNC: 31 MMOL/L — SIGNIFICANT CHANGE UP (ref 22–31)
CREAT SERPL-MCNC: 1.81 MG/DL — HIGH (ref 0.5–1.3)
CULTURE RESULTS: SIGNIFICANT CHANGE UP
CULTURE RESULTS: SIGNIFICANT CHANGE UP
EGFR: 30 ML/MIN/1.73M2 — LOW
GAMMA GLOBULIN: 2.22 G/DL — HIGH (ref 0.7–1.7)
GLUCOSE SERPL-MCNC: 105 MG/DL — HIGH (ref 70–99)
HCT VFR BLD CALC: 30.8 % — LOW (ref 34.5–45)
HGB BLD-MCNC: 9.4 G/DL — LOW (ref 11.5–15.5)
MAGNESIUM SERPL-MCNC: 1.5 MG/DL — LOW (ref 1.6–2.6)
MCHC RBC-ENTMCNC: 25.3 PG — LOW (ref 27–34)
MCHC RBC-ENTMCNC: 30.5 GM/DL — LOW (ref 32–36)
MCV RBC AUTO: 82.8 FL — SIGNIFICANT CHANGE UP (ref 80–100)
NRBC # BLD: 0 /100 WBCS — SIGNIFICANT CHANGE UP (ref 0–0)
NRBC # FLD: 0 K/UL — SIGNIFICANT CHANGE UP (ref 0–0)
PHOSPHATE SERPL-MCNC: 4 MG/DL — SIGNIFICANT CHANGE UP (ref 2.5–4.5)
PLATELET # BLD AUTO: 218 K/UL — SIGNIFICANT CHANGE UP (ref 150–400)
POTASSIUM SERPL-MCNC: 3.6 MMOL/L — SIGNIFICANT CHANGE UP (ref 3.5–5.3)
POTASSIUM SERPL-SCNC: 3.6 MMOL/L — SIGNIFICANT CHANGE UP (ref 3.5–5.3)
PROT PATTERN SERPL ELPH-IMP: SIGNIFICANT CHANGE UP
PROT SERPL-MCNC: 7.8 G/DL — SIGNIFICANT CHANGE UP
RBC # BLD: 3.72 M/UL — LOW (ref 3.8–5.2)
RBC # FLD: 16.8 % — HIGH (ref 10.3–14.5)
SODIUM SERPL-SCNC: 144 MMOL/L — SIGNIFICANT CHANGE UP (ref 135–145)
SPECIMEN SOURCE: SIGNIFICANT CHANGE UP
SPECIMEN SOURCE: SIGNIFICANT CHANGE UP
WBC # BLD: 3.3 K/UL — LOW (ref 3.8–10.5)
WBC # FLD AUTO: 3.3 K/UL — LOW (ref 3.8–10.5)

## 2022-11-30 PROCEDURE — 88348 ELECTRON MICROSCOPY DX: CPT | Mod: 26

## 2022-11-30 PROCEDURE — 88313 SPECIAL STAINS GROUP 2: CPT | Mod: 26

## 2022-11-30 PROCEDURE — 77012 CT SCAN FOR NEEDLE BIOPSY: CPT | Mod: 26

## 2022-11-30 PROCEDURE — 71250 CT THORAX DX C-: CPT | Mod: 26

## 2022-11-30 PROCEDURE — 50200 RENAL BIOPSY PERQ: CPT | Mod: LT

## 2022-11-30 PROCEDURE — 88305 TISSUE EXAM BY PATHOLOGIST: CPT | Mod: 26

## 2022-11-30 PROCEDURE — 99233 SBSQ HOSP IP/OBS HIGH 50: CPT

## 2022-11-30 PROCEDURE — 88346 IMFLUOR 1ST 1ANTB STAIN PX: CPT | Mod: 26

## 2022-11-30 PROCEDURE — 88350 IMFLUOR EA ADDL 1ANTB STN PX: CPT | Mod: 26

## 2022-11-30 PROCEDURE — 99233 SBSQ HOSP IP/OBS HIGH 50: CPT | Mod: GC

## 2022-11-30 RX ORDER — MAGNESIUM OXIDE 400 MG ORAL TABLET 241.3 MG
400 TABLET ORAL ONCE
Refills: 0 | Status: COMPLETED | OUTPATIENT
Start: 2022-11-30 | End: 2022-11-30

## 2022-11-30 RX ADMIN — ALBUTEROL 2 PUFF(S): 90 AEROSOL, METERED ORAL at 05:00

## 2022-11-30 RX ADMIN — CARVEDILOL PHOSPHATE 12.5 MILLIGRAM(S): 80 CAPSULE, EXTENDED RELEASE ORAL at 17:28

## 2022-11-30 RX ADMIN — Medication 1 PUFF(S): at 21:31

## 2022-11-30 RX ADMIN — Medication 100 MILLIGRAM(S): at 21:30

## 2022-11-30 RX ADMIN — BUMETANIDE 2 MILLIGRAM(S): 0.25 INJECTION INTRAMUSCULAR; INTRAVENOUS at 05:01

## 2022-11-30 RX ADMIN — ALBUTEROL 2 PUFF(S): 90 AEROSOL, METERED ORAL at 15:11

## 2022-11-30 RX ADMIN — Medication 1 PUFF(S): at 15:11

## 2022-11-30 RX ADMIN — ATORVASTATIN CALCIUM 40 MILLIGRAM(S): 80 TABLET, FILM COATED ORAL at 21:30

## 2022-11-30 RX ADMIN — AMLODIPINE BESYLATE 10 MILLIGRAM(S): 2.5 TABLET ORAL at 05:01

## 2022-11-30 RX ADMIN — ALBUTEROL 2 PUFF(S): 90 AEROSOL, METERED ORAL at 21:30

## 2022-11-30 RX ADMIN — Medication 100 MILLIGRAM(S): at 15:11

## 2022-11-30 RX ADMIN — CARVEDILOL PHOSPHATE 12.5 MILLIGRAM(S): 80 CAPSULE, EXTENDED RELEASE ORAL at 05:02

## 2022-11-30 RX ADMIN — MAGNESIUM OXIDE 400 MG ORAL TABLET 400 MILLIGRAM(S): 241.3 TABLET ORAL at 15:11

## 2022-11-30 RX ADMIN — CHLORHEXIDINE GLUCONATE 1 APPLICATION(S): 213 SOLUTION TOPICAL at 05:02

## 2022-11-30 RX ADMIN — Medication 100 MILLIGRAM(S): at 05:01

## 2022-11-30 RX ADMIN — Medication 1 PUFF(S): at 05:00

## 2022-11-30 NOTE — PROCEDURE NOTE - NSINFORMCONSENT_GEN_A_CORE
from daughter - in chart/Benefits, risks, and possible complications of procedure explained to patient/caregiver who verbalized understanding and gave verbal consent.

## 2022-11-30 NOTE — PRE PROCEDURE NOTE - HISTORY OF PRESENT ILLNESS
Interventional Radiology  Pre-Procedure Note    This is a 67y  Female  presenting for renal parenchymal biopsy    HPI:  67F PMH HTN, HLD, sarcoidosis, CHF, PEA*2, sepsis 2/2 UTI (Klesiella, E. coli) ~2022 requiring intubation; presented to ED with MANDY on CKD found on outpatient labs and with abnormal VS, found to have AMS, AHRF, intubated, and admitted to MICU for further management.    In ED, patient had bradycardia (45), hypotension (SBP 80), hypoxemia (68%), hypothermia (91F), MANDY on CKD (Cr 3, baseline 1.2), acute respiratory acidosis, worsening mental status, and was intubated. Patient also received 1 dose solumedrol (after cortisol drawn). Also found to have Trop 100 and BNP . Additionally, anemic (Hb 7.3, baseline 8-9).    (2022 20:43)    As per nephrology note, Pt has MANDY suspected in setting of hemodynamics/ hypotension on arrival in ED vs volume overload. Pt with MANDY/ATN. On review of [facility] the patient is noted to have a Scr of 1.2mg/dL in 2022, on admission it was elevated to 3.01mg/dL and has further increased to 3.27mg/dL on 22. Scr is elevated/improved to 1.54 today.  UA with significant proteinuria (noted previously as well) and significant hematuria (new from previous urine studies). In the past all serological work up has been negative and it was presumed renal disease was in setting of her DM. Kidney sonogram suggestive of B/L renal parenchymal disease. Given nephrotic range proteinuria, plan is for kidney biopsy. Plan discussed with pt, pt agreed. ASA was discontinued in preparation for kidney biopsy. IR consult note from  regarding kidney biopsy reviewed. Pt was planned for kidney biopsy on 22 - although due to high BP , kidney biopsy canceled for now.     BP now improved. Plan for renal biopsy today        PAST MEDICAL & SURGICAL HISTORY:  Type 2 diabetes mellitus      Bilateral cataracts      Fluid in pleural cavity associated with pancreatitis      Pancreatic pseudocyst/cyst  s/p drain placement and removal      HTN (hypertension)      HLD (hyperlipidemia)      Sarcoid      Pulseless electrical activity      History of amputation of right great toe        H/O:  section        History of laparoscopic cholecystectomy          Social History:     FAMILY HISTORY:      Allergies: penicillin ([employer])      Current Medications: acetaminophen     Tablet .. 650 milliGRAM(s) Oral every 6 hours PRN  albuterol    90 MICROgram(s) HFA Inhaler 2 Puff(s) Inhalation every 6 hours  amLODIPine   Tablet 10 milliGRAM(s) Oral daily  atorvastatin 40 milliGRAM(s) Oral at bedtime  buMETAnide 2 milliGRAM(s) Oral daily  carvedilol 12.5 milliGRAM(s) Oral every 12 hours  chlorhexidine 2% Cloths 1 Application(s) Topical <User Schedule>  dextrose 5%. 1000 milliLiter(s) IV Continuous <Continuous>  dextrose 5%. 1000 milliLiter(s) IV Continuous <Continuous>  dextrose 50% Injectable 25 Gram(s) IV Push once  dextrose 50% Injectable 12.5 Gram(s) IV Push once  dextrose 50% Injectable 25 Gram(s) IV Push once  dextrose Oral Gel 15 Gram(s) Oral once PRN  glucagon  Injectable 1 milliGRAM(s) IntraMuscular once  hydrALAZINE 100 milliGRAM(s) Oral three times a day  ipratropium 17 MICROgram(s) HFA Inhaler 1 Puff(s) Inhalation every 6 hours  magnesium oxide 400 milliGRAM(s) Oral once      Labs:                         9.4    3.30  )-----------( 218      ( 2022 06:15 )             30.8       11-30    144  |  99  |  28<H>  ----------------------------<  105<H>  3.6   |  31  |  1.81<H>    Ca    8.9      2022 06:15  Phos  4.0     11-30  Mg     1.50     11-        HCG:       Assessment/Plan:   This is a 67y Female  presents with MANDY  Patient presents to IR for image guided renal biopsy.  Procedure/ risks/ benefits/ goals/ alternatives were explained to the patient's daughter. All questions answered. Informed content obtained from patient. Consent placed in chart.

## 2022-11-30 NOTE — PROGRESS NOTE ADULT - ATTENDING COMMENTS
MANDY  Nephrotic range proteinuria  Uncontrolled HTN    Renal biopsy today  Monitor Is/Os and daily weights.

## 2022-11-30 NOTE — PROGRESS NOTE ADULT - PROBLEM SELECTOR PLAN 1
Pt. with MANDY suspected in setting of hemodynamics/ hypotension on arrival in ED vs volume overload. Pt with MANDY/ATN. [facility] is noted to have a Scr of 1.2mg/dL in 9/2022, on admission it was elevated to 3.01mg/dL and has further increased to 3.27mg/dL on 11/20/22. Scr is elevated/improved to 1.81 today.      UA with significant proteinuria (noted previously as well) and significant hematuria (new from previous urine studies). In the past all serological work up has been negative and it was presumed renal disease was in setting of her DM. Kidney sonogram suggestive of B/L renal parenchymal disease. Given nephrotic range proteinuria, plan is for kidney biopsy. Plan discussed with pt, pt agreed. ASA was discontinued in preparation for kidney biopsy. IR consult note from 11/23 regarding kidney biopsy reviewed. Pt was planned for kidney biopsy on 11/28/22 - although due to high BP , kidney biopsy canceled for now.     Pt planned for kidney biopsy today. Labs reviewed. Pt currently on PO Bumex 2 mg daily. Pt non oliguric. Agree with PO Bumex for now. BP better controlled now. Continue with hydralazine 100 mg po TID. Monitor labs and urine output. Avoid any potential nephrotoxins. Dose medications as per eGFR.     If you have any questions, please feel free to contact me  Desean De Leon  Nephrology Fellow  [phone]/ [employer] Teams(Preferred)  (After 5pm or on weekends please page the on-call fellow).

## 2022-11-30 NOTE — PROGRESS NOTE ADULT - SUBJECTIVE AND OBJECTIVE BOX
[facility] Medicine  Pager [phone]    Patient is a 67y old  Female who presents with a chief complaint of MANDY on CKD, abnormal VS, altered mental status (30 Nov 2022 12:06)      INTERVAL HPI/OVERNIGHT EVENTS:    MEDICATIONS  (STANDING):  albuterol    90 MICROgram(s) HFA Inhaler 2 Puff(s) Inhalation every 6 hours  amLODIPine   Tablet 10 milliGRAM(s) Oral daily  atorvastatin 40 milliGRAM(s) Oral at bedtime  buMETAnide 2 milliGRAM(s) Oral daily  carvedilol 12.5 milliGRAM(s) Oral every 12 hours  chlorhexidine 2% Cloths 1 Application(s) Topical <User Schedule>  dextrose 5%. 1000 milliLiter(s) (100 mL/Hr) IV Continuous <Continuous>  dextrose 5%. 1000 milliLiter(s) (50 mL/Hr) IV Continuous <Continuous>  dextrose 50% Injectable 25 Gram(s) IV Push once  dextrose 50% Injectable 12.5 Gram(s) IV Push once  dextrose 50% Injectable 25 Gram(s) IV Push once  glucagon  Injectable 1 milliGRAM(s) IntraMuscular once  hydrALAZINE 100 milliGRAM(s) Oral three times a day  ipratropium 17 MICROgram(s) HFA Inhaler 1 Puff(s) Inhalation every 6 hours  magnesium oxide 400 milliGRAM(s) Oral once    MEDICATIONS  (PRN):  acetaminophen     Tablet .. 650 milliGRAM(s) Oral every 6 hours PRN Mild Pain (1 - 3)  dextrose Oral Gel 15 Gram(s) Oral once PRN Blood Glucose LESS THAN 70 milliGRAM(s)/deciliter      Allergies    penicillin (Red Man Synd)    Intolerances        REVIEW OF SYSTEMS:  Please see interval HPI:    Vital Signs Last 24 Hrs  T(C): 36.6 (30 Nov 2022 04:58), Max: 37.2 (29 Nov 2022 20:53)  T(F): 97.9 (30 Nov 2022 04:58), Max: 98.9 (29 Nov 2022 20:53)  HR: 70 (30 Nov 2022 04:58) (68 - 77)  BP: 145/77 (30 Nov 2022 04:58) (132/75 - 145/77)  BP(mean): --  RR: 18 (30 Nov 2022 04:58) (17 - 18)  SpO2: 97% (30 Nov 2022 04:58) (95% - 98%)    Parameters below as of 30 Nov 2022 04:58  Patient On (Oxygen Delivery Method): room air      I&O's Detail        PHYSICAL EXAM:  GENERAL:   HEAD:    EYES:   ENMT:   NECK:   NERVOUS SYSTEM:    CHEST/LUNG:   HEART:   ABDOMEN:   EXTREMITIES:    LYMPH:   SKIN:     LABS:                        9.4    3.30  )-----------( 218      ( 30 Nov 2022 06:15 )             30.8     30 Nov 2022 06:15    144    |  99     |  28     ----------------------------<  105    3.6     |  31     |  1.81     Ca    8.9        30 Nov 2022 06:15  Phos  4.0       30 Nov 2022 06:15  Mg     1.50      30 Nov 2022 06:15      PT/INR - ( 29 Nov 2022 05:43 )   PT: 11.5 sec;   INR: 0.99 ratio         PTT - ( 29 Nov 2022 05:43 )  PTT:34.8 sec  CAPILLARY BLOOD GLUCOSE      POCT Blood Glucose.: 116 mg/dL (30 Nov 2022 10:39)  POCT Blood Glucose.: 98 mg/dL (30 Nov 2022 06:19)  POCT Blood Glucose.: 115 mg/dL (29 Nov 2022 22:04)  POCT Blood Glucose.: 130 mg/dL (29 Nov 2022 17:17)    BLOOD CULTURE    RADIOLOGY & ADDITIONAL TESTS:    Imaging Personally Reviewed:  [ ] YES     Consultant(s) Notes Reviewed:      Care Discussed with Consultants/Other Providers: [facility] Medicine  Pager [phone]    Patient is a 67y old  Female who presents with a chief complaint of MANDY on CKD, abnormal VS, altered mental status (30 Nov 2022 12:06)      INTERVAL HPI/OVERNIGHT EVENTS:  Patient seen in interventional radiology recovery area earlier today, s/p renal biopsy. Arousable to voice, feels tired, wants to go back to sleep.     MEDICATIONS  (STANDING):  albuterol    90 MICROgram(s) HFA Inhaler 2 Puff(s) Inhalation every 6 hours  amLODIPine   Tablet 10 milliGRAM(s) Oral daily  atorvastatin 40 milliGRAM(s) Oral at bedtime  buMETAnide 2 milliGRAM(s) Oral daily  carvedilol 12.5 milliGRAM(s) Oral every 12 hours  chlorhexidine 2% Cloths 1 Application(s) Topical <User Schedule>  dextrose 5%. 1000 milliLiter(s) (100 mL/Hr) IV Continuous <Continuous>  dextrose 5%. 1000 milliLiter(s) (50 mL/Hr) IV Continuous <Continuous>  dextrose 50% Injectable 25 Gram(s) IV Push once  dextrose 50% Injectable 12.5 Gram(s) IV Push once  dextrose 50% Injectable 25 Gram(s) IV Push once  glucagon  Injectable 1 milliGRAM(s) IntraMuscular once  hydrALAZINE 100 milliGRAM(s) Oral three times a day  ipratropium 17 MICROgram(s) HFA Inhaler 1 Puff(s) Inhalation every 6 hours  magnesium oxide 400 milliGRAM(s) Oral once    MEDICATIONS  (PRN):  acetaminophen     Tablet .. 650 milliGRAM(s) Oral every 6 hours PRN Mild Pain (1 - 3)  dextrose Oral Gel 15 Gram(s) Oral once PRN Blood Glucose LESS THAN 70 milliGRAM(s)/deciliter      Allergies  penicillin (Red Man Synd)    Intolerances    REVIEW OF SYSTEMS:  Please see interval HPI:    Vital Signs Last 24 Hrs  T(C): 36.6 (30 Nov 2022 04:58), Max: 37.2 (29 Nov 2022 20:53)  T(F): 97.9 (30 Nov 2022 04:58), Max: 98.9 (29 Nov 2022 20:53)  HR: 70 (30 Nov 2022 04:58) (68 - 77)  BP: 145/77 (30 Nov 2022 04:58) (132/75 - 145/77)  BP(mean): --  RR: 18 (30 Nov 2022 04:58) (17 - 18)  SpO2: 97% (30 Nov 2022 04:58) (95% - 98%)    Parameters below as of 30 Nov 2022 04:58  Patient On (Oxygen Delivery Method): room air    I&O's Detail    PHYSICAL EXAM:  GENERAL: NAD, lying in recovery area, arousable to voice  HEAD:  NC/AT  EYES: clear sclera/conjunctiva  ENMT: MMM, slightly hard of hearing  NECK: supple, no JVD  NERVOUS SYSTEM:  tired appearing but perhaps 2/2 recent anesthesia  CHEST/LUNG: clear anteriorly, no wheeze appreciated, not in any respiratory distress  HEART: S1S2 RRR  ABDOMEN: soft, non-tender +BS  EXTREMITIES:  no c/c/e    LABS:                        9.4    3.30  )-----------( 218      ( 30 Nov 2022 06:15 )             30.8     30 Nov 2022 06:15    144    |  99     |  28     ----------------------------<  105    3.6     |  31     |  1.81     Ca    8.9        30 Nov 2022 06:15  Phos  4.0       30 Nov 2022 06:15  Mg     1.50      30 Nov 2022 06:15      PT/INR - ( 29 Nov 2022 05:43 )   PT: 11.5 sec;   INR: 0.99 ratio    PTT - ( 29 Nov 2022 05:43 )  PTT:34.8 sec    CAPILLARY BLOOD GLUCOSE  POCT Blood Glucose.: 116 mg/dL (30 Nov 2022 10:39)  POCT Blood Glucose.: 98 mg/dL (30 Nov 2022 06:19)  POCT Blood Glucose.: 115 mg/dL (29 Nov 2022 22:04)  POCT Blood Glucose.: 130 mg/dL (29 Nov 2022 17:17)    BLOOD CULTURE    RADIOLOGY & ADDITIONAL TESTS:    Imaging Personally Reviewed:  [ x] YES   < from: CT Chest No Cont (11.30.22 @ 10:28) >  FINDINGS:    LUNGS, AIRWAYS, PLEURA: Trachea and mainstem bronchi patent. Small   bilateral pleural effusions. Nodularity along the fissures unchanged   compared to 2/5/2022 CT chest. Bibasilar atelectasis.  MEDIASTINUM AND ANTONIO: Unchanged calcified, nonenlarged mediastinal nodes.  VESSELS: Coronary atherosclerosis. Main pulmonary artery diameter 3.4 cm.  HEART: Qualitatively enlarged left ventricle. No pericardial effusion.  CHEST WALL AND LOWER NECK: Diffuse subcutaneous edema most prevalent   below level of the diaphragm. Stable prominent axillary lymph nodes   bilaterally with normal morphology measuring under 1 cm in short axis,   likely reactive and unchanged in appearance when compared to 8/1/2022.  VISUALIZED UPPER ABDOMEN: Scattered colonic diverticula.  BONES: Degenerative changes.    IMPRESSION:    Small bilateral pleural effusions with associated bibasilar atelectasis.    < end of copied text >      Consultant(s) Notes Reviewed:  IR, Renal, Pulm    Care Discussed with Consultants/Other Providers: Pulm Dr. Quick and Dr. Antonio re: patient placed on AVAPS, to f/u ABG in AM, appreciate assistance in arranging home device, ACP Day re: undergoing renal biopsy today, f/u AM ABG, obtained CT chest as per pulm

## 2022-11-30 NOTE — PROGRESS NOTE ADULT - ASSESSMENT
68 yo F w/ HTN, HLD, sarcoidosis, HFpEF, hx PEA x2, a/w acute respiratory failure (s/p intubation/MICU stay) and septic shock 2/2 UTI (briefly on pressors, completed course of antibiotics), c/b metabolic encephalopathy (improved) and MANDY on CKD.     # metabolic encephalopathy 2/2 UTI  - mental status appears at baseline, MRI w/o acute intracranial pathology, EEG unrevealing, neuro input appreciated, suspect changes to mental status was in setting of recent UTI and MANDY  - swallow re-eval appreciated, can do regular solids and thin liquids    # MANDY on CKD, proteinuria  - being followed by nephrology for MANDY and proteinuria  - Cr downtrending overall, but with increase today to 1.81 (peak of 3.27, from 1.54 yesterday)  - s/p renal biopsy today (appreciate IR assistance), asa on hold for procedure  - f/u results of biopsy  - avoid nephrotoxins, continue to monitor renal function/UOP    # HTN  - on hydralazine, amlodipine, bumex, coreg  - BP better controlled today, appreciate renal assistance  - monitor BP and titrate regimen further as needed    # HFpEF  - c/w bumex as per renal  - continue to monitor INOs    # HLD  - on statin    # Anemia  - hgb 9.4, stable overall, no overt signs of bleeding  - continue to monitor     # Septic shock 2/2 suspected UTI c/b acute hypoxic/hypercapnic respiratory failure   - briefly on pressors, shock resolved, completed course of antibiotics, extubated, respiratory status stable on room air  - pulm input appreciated, patient with persistent hypercapnia, placed on AVAPS,, they suspect patient with chronic hypercapnic respiratory failure 2/2 multifactorial etiology including OHS and sarcoidosis, feel that patient will benefit from AVAPS, improve morbidity and reduce risk of readmission, f/u AM blood gas, appreciate assistance with obtaining home AVAPS     Need for prophylactic measure  - VTE ppx: on hold for IR procedure, to resume when OK w/ IR    Dispo: pending workup/continued clinical improvement, PT recs home PT 11/28, appreciate CM/SW assistance w/ setup of home AVAPS        Reached out to gisel Robertson [phone] and updated on hospital course (s/p kidney biopsy, plan to arrange AVAPS etc), In agreement with plan of care. Expresses frustration with frequent hospitalizations, agrees with importance with coming up with good plan for continued care of patient and to prevent readmission. No other questions at this time. Unclear what cause of busy signal I was encountering previously, she says can try secondary number [phone] if [phone] number not answering.

## 2022-11-30 NOTE — PROGRESS NOTE ADULT - ASSESSMENT
Attempted to evaluate patient at bedside however had left for IR procedure.  Will follow up tomorrow.

## 2022-11-30 NOTE — PROGRESS NOTE ADULT - SUBJECTIVE AND OBJECTIVE BOX
[facility] DIVISION OF KIDNEY DISEASES AND HYPERTENSION -- FOLLOW UP NOTE  --------------------------------------------------------------------------------  HPI: Patient is a 67 year old female with PMH of HTN, HLD, Sarcoidosis who presented to the hospital after outpatient labs demonstrated elevated Scr. On arrival to the ED the patient was noted to have AMS and was hypotensive; she was subsequently intubated and admitted to the MICU. She was successfully extubated shortly after. On review of [facility] the patient is noted to have a Scr of 1.2mg/dL in 9/2022, on admission it was elevated to 3.01mg/dL and has further increased to 3.27 on 11/20/22. Scr is elevated/improved to 1.81 today. Nephrology team following for MANDY.    On review of [facility], it was noted that the patient was followed by nephrology for MANDY and proteinuria. All serologic work up at that time was negative and it was presumed her renal disease was in the setting of her longstanding DM. She was advised to undergo a renal biopsy at that time, however the patient has not followed up with nephrology following discharge. The patient has had multiple admissions for hypoxic respiratory failure in setting of being volume overloaded and has required aggressive diuresis.     Pt seen and examined at bedside. Pt reports feeling lethargic. Denies any SOB, CP, HA, N/V, abdominal pain , urinary symptoms or dizziness. Plan for kidney biopsy today    PAST HISTORY  --------------------------------------------------------------------------------  No significant changes to PMH, PSH, FHx, SHx, unless otherwise noted    ALLERGIES & MEDICATIONS  --------------------------------------------------------------------------------  Allergies    penicillin (Red Man Synd)    Intolerances    Standing Inpatient Medications  albuterol    90 MICROgram(s) HFA Inhaler 2 Puff(s) Inhalation every 6 hours  amLODIPine   Tablet 10 milliGRAM(s) Oral daily  atorvastatin 40 milliGRAM(s) Oral at bedtime  buMETAnide 2 milliGRAM(s) Oral daily  carvedilol 12.5 milliGRAM(s) Oral every 12 hours  chlorhexidine 2% Cloths 1 Application(s) Topical <User Schedule>  dextrose 5%. 1000 milliLiter(s) IV Continuous <Continuous>  dextrose 5%. 1000 milliLiter(s) IV Continuous <Continuous>  dextrose 50% Injectable 25 Gram(s) IV Push once  dextrose 50% Injectable 12.5 Gram(s) IV Push once  dextrose 50% Injectable 25 Gram(s) IV Push once  glucagon  Injectable 1 milliGRAM(s) IntraMuscular once  hydrALAZINE 100 milliGRAM(s) Oral three times a day  ipratropium 17 MICROgram(s) HFA Inhaler 1 Puff(s) Inhalation every 6 hours  magnesium oxide 400 milliGRAM(s) Oral once    PRN Inpatient Medications  acetaminophen     Tablet .. 650 milliGRAM(s) Oral every 6 hours PRN  dextrose Oral Gel 15 Gram(s) Oral once PRN    REVIEW OF SYSTEMS  --------------------------------------------------------------------------------  Gen: No fevers , lethargy+  Respiratory: No dyspnea  CV: No chest pain  GI: No abdominal pain  : No dysuria  MSK: No  edema  Neuro: no dizziness   All other systems were reviewed and are negative, except as noted.    VITALS/PHYSICAL EXAM  --------------------------------------------------------------------------------  T(C): 36.6 (11-30-22 @ 04:58), Max: 37.2 (11-29-22 @ 20:53)  HR: 70 (11-30-22 @ 04:58) (68 - 78)  BP: 145/77 (11-30-22 @ 04:58) (132/75 - 154/81)  RR: 18 (11-30-22 @ 04:58) (17 - 18)  SpO2: 97% (11-30-22 @ 04:58) (95% - 98%)  Wt(kg): --    Physical Exam:  	Gen: ill appearing  	HEENT: MMM  	Pulm: minimal basal crackles in lung fields  	CV: S1S2  	Abd: Soft, +BS   	Ext: No LE edema B/L  	Neuro: Awake and alert  	Skin: Warm and dry    LABS/STUDIES  --------------------------------------------------------------------------------              9.4    3.30  >-----------<  218      [11-30-22 @ 06:15]              30.8     144  |  99  |  28  ----------------------------<  105      [11-30-22 @ 06:15]  3.6   |  31  |  1.81        Ca     8.9     [11-30-22 @ 06:15]      Mg     1.50     [11-30-22 @ 06:15]      Phos  4.0     [11-30-22 @ 06:15]    PT/INR: PT 11.5 , INR 0.99       [11-29-22 @ 05:43]  PTT: 34.8       [11-29-22 @ 05:43]    Creatinine Trend:  SCr 1.81 [11-30 @ 06:15]  SCr 1.54 [11-29 @ 05:43]  SCr 1.47 [11-28 @ 06:57]  SCr 1.50 [11-27 @ 06:34]  SCr 1.62 [11-26 @ 06:19]    Urinalysis - [11-25-22 @ 17:36]      Color Light Yellow / Appearance Clear / SG 1.012 / pH 6.5      Gluc Negative / Ketone Negative  / Bili Negative / Urobili <2 mg/dL       Blood Negative / Protein 100 mg/dL / Leuk Est Negative / Nitrite Negative      RBC 16 / WBC 24 / Hyaline 2 / Gran  / Sq Epi  / Non Sq Epi 4 / Bacteria Negative    Iron 68, TIBC 312, %sat 22      [11-19-22 @ 02:58]  Ferritin 113      [11-19-22 @ 02:58]  TSH 2.03      [11-25-22 @ 14:10]    HBsAg Nonreact      [11-22-22 @ 06:45]  HCV 0.13, Nonreact      [11-22-22 @ 06:45]  HIV Nonreact      [11-22-22 @ 06:45]    MINOO: titer Negative, pattern --      [11-18-22 @ 03:53]  dsDNA <12      [11-22-22 @ 06:45]  C3 Complement 133      [11-22-22 @ 06:45]  C4 Complement 33      [11-22-22 @ 06:45]  Rheumatoid Factor 17      [11-22-22 @ 06:45]  ANCA: cANCA Negative, pANCA Negative, atypical ANCA Negative      [11-22-22 @ 06:45]  anti-GBM <0.2      [11-22-22 @ 06:45]  Syphilis Screen (Treponema Pallidum Ab) Negative      [11-22-22 @ 06:45]  PLA2R: DESHAWN <1.8, IFA --      [11-22-22 @ 06:45]  Free Light Chains: kappa 12.79, lambda 4.65, ratio = 2.75      [11-22 @ 06:45]  SPEP Interpretation: Decreased serum Albumin and increased Polyclonal Gamma fraction  consistent with chronic inflammatory condition.  NAVEED Rosenberg M.D.      [11-22-22 @ 06:45]

## 2022-11-30 NOTE — PROCEDURE NOTE - PROCEDURE FINDINGS AND DETAILS
CT guided left lower pole renal parenchymal biopsy. small perinephric hematoma seen stable on delayed images. patient to be monitored in [facility].

## 2022-12-01 LAB
ANION GAP SERPL CALC-SCNC: 10 MMOL/L — SIGNIFICANT CHANGE UP (ref 7–14)
BUN SERPL-MCNC: 47 MG/DL — HIGH (ref 7–23)
CALCIUM SERPL-MCNC: 8.5 MG/DL — SIGNIFICANT CHANGE UP (ref 8.4–10.5)
CHLORIDE SERPL-SCNC: 96 MMOL/L — LOW (ref 98–107)
CO2 SERPL-SCNC: 29 MMOL/L — SIGNIFICANT CHANGE UP (ref 22–31)
CORTICOSTEROID BINDING GLOBULIN RESULT: 1.4 MG/DL — LOW
CORTIS F/TOTAL MFR SERPL: 46 % — SIGNIFICANT CHANGE UP
CORTIS SERPL-MCNC: 17 UG/DL — SIGNIFICANT CHANGE UP
CORTISOL, FREE RESULT: 7.8 UG/DL — HIGH
CREAT SERPL-MCNC: 2.39 MG/DL — HIGH (ref 0.5–1.3)
EGFR: 22 ML/MIN/1.73M2 — LOW
GAS PNL BLDA: SIGNIFICANT CHANGE UP
GLUCOSE SERPL-MCNC: 131 MG/DL — HIGH (ref 70–99)
HCT VFR BLD CALC: 30.1 % — LOW (ref 34.5–45)
HGB BLD-MCNC: 9.3 G/DL — LOW (ref 11.5–15.5)
INTERPRETATION SERPL IFE-IMP: SIGNIFICANT CHANGE UP
MAGNESIUM SERPL-MCNC: 1.8 MG/DL — SIGNIFICANT CHANGE UP (ref 1.6–2.6)
MCHC RBC-ENTMCNC: 25.6 PG — LOW (ref 27–34)
MCHC RBC-ENTMCNC: 30.9 GM/DL — LOW (ref 32–36)
MCV RBC AUTO: 82.9 FL — SIGNIFICANT CHANGE UP (ref 80–100)
NRBC # BLD: 0 /100 WBCS — SIGNIFICANT CHANGE UP (ref 0–0)
NRBC # FLD: 0 K/UL — SIGNIFICANT CHANGE UP (ref 0–0)
PHOSPHATE SERPL-MCNC: 4.7 MG/DL — HIGH (ref 2.5–4.5)
PLATELET # BLD AUTO: 240 K/UL — SIGNIFICANT CHANGE UP (ref 150–400)
POTASSIUM SERPL-MCNC: 4.3 MMOL/L — SIGNIFICANT CHANGE UP (ref 3.5–5.3)
POTASSIUM SERPL-SCNC: 4.3 MMOL/L — SIGNIFICANT CHANGE UP (ref 3.5–5.3)
RBC # BLD: 3.63 M/UL — LOW (ref 3.8–5.2)
RBC # FLD: 16.7 % — HIGH (ref 10.3–14.5)
SODIUM SERPL-SCNC: 135 MMOL/L — SIGNIFICANT CHANGE UP (ref 135–145)
WBC # BLD: 3.89 K/UL — SIGNIFICANT CHANGE UP (ref 3.8–10.5)
WBC # FLD AUTO: 3.89 K/UL — SIGNIFICANT CHANGE UP (ref 3.8–10.5)

## 2022-12-01 PROCEDURE — 99233 SBSQ HOSP IP/OBS HIGH 50: CPT

## 2022-12-01 PROCEDURE — 99233 SBSQ HOSP IP/OBS HIGH 50: CPT | Mod: GC

## 2022-12-01 RX ORDER — ASPIRIN/CALCIUM CARB/MAGNESIUM 324 MG
81 TABLET ORAL DAILY
Refills: 0 | Status: DISCONTINUED | OUTPATIENT
Start: 2022-12-01 | End: 2022-12-09

## 2022-12-01 RX ADMIN — Medication 100 MILLIGRAM(S): at 05:38

## 2022-12-01 RX ADMIN — AMLODIPINE BESYLATE 10 MILLIGRAM(S): 2.5 TABLET ORAL at 05:40

## 2022-12-01 RX ADMIN — Medication 100 MILLIGRAM(S): at 13:01

## 2022-12-01 RX ADMIN — Medication 1 PUFF(S): at 17:52

## 2022-12-01 RX ADMIN — ALBUTEROL 2 PUFF(S): 90 AEROSOL, METERED ORAL at 21:13

## 2022-12-01 RX ADMIN — Medication 1 PUFF(S): at 05:40

## 2022-12-01 RX ADMIN — Medication 1 PUFF(S): at 21:14

## 2022-12-01 RX ADMIN — CHLORHEXIDINE GLUCONATE 1 APPLICATION(S): 213 SOLUTION TOPICAL at 05:40

## 2022-12-01 RX ADMIN — Medication 100 MILLIGRAM(S): at 21:14

## 2022-12-01 RX ADMIN — Medication 81 MILLIGRAM(S): at 17:51

## 2022-12-01 RX ADMIN — ALBUTEROL 2 PUFF(S): 90 AEROSOL, METERED ORAL at 05:40

## 2022-12-01 RX ADMIN — BUMETANIDE 2 MILLIGRAM(S): 0.25 INJECTION INTRAMUSCULAR; INTRAVENOUS at 05:39

## 2022-12-01 RX ADMIN — ATORVASTATIN CALCIUM 40 MILLIGRAM(S): 80 TABLET, FILM COATED ORAL at 21:14

## 2022-12-01 RX ADMIN — CARVEDILOL PHOSPHATE 12.5 MILLIGRAM(S): 80 CAPSULE, EXTENDED RELEASE ORAL at 05:40

## 2022-12-01 RX ADMIN — CARVEDILOL PHOSPHATE 12.5 MILLIGRAM(S): 80 CAPSULE, EXTENDED RELEASE ORAL at 17:52

## 2022-12-01 RX ADMIN — ALBUTEROL 2 PUFF(S): 90 AEROSOL, METERED ORAL at 17:52

## 2022-12-01 NOTE — PROGRESS NOTE ADULT - PROBLEM SELECTOR PLAN 1
Pt. with MANDY suspected in setting of hemodynamics/ hypotension on arrival in ED vs volume overload. Pt with MANDY/ATN. On review of [facility] the patient is noted to have a Scr of 1.2mg/dL in 9/2022, on admission it was elevated to 3.01mg/dL and has further increased to 3.27mg/dL on 11/20/22. Scr is elevated to 2.39 today.      UA with significant proteinuria (noted previously as well) and significant hematuria (new from previous urine studies). In the past all serological work up has been negative and it was presumed renal disease was in setting of her DM. Kidney sonogram suggestive of B/L renal parenchymal disease. Given nephrotic range proteinuria, plan is for kidney biopsy. Plan discussed with pt, pt agreed. ASA was discontinued in preparation for kidney biopsy. IR consult note from 11/23 regarding kidney biopsy reviewed.     Pt underwent kidney biopsy 11/30; will follow up with results. Labs reviewed. Pt currently on PO Bumex 2 mg daily. Pt non oliguric. Agree with PO Bumex for now. BP better controlled now. Continue with hydralazine 100 mg po TID.   Please check serial bladder scans and monitor for retention given rise in SCr right now. Monitor labs and urine output. Avoid any potential nephrotoxins. Dose medications as per eGFR.     If any questions, please feel free to contact me     Ar Silva  Nephrology Fellow  [facility] Pager: [phone]

## 2022-12-01 NOTE — CHART NOTE - NSCHARTNOTEFT_GEN_A_CORE
AVAPS    67 year old female sarcoidosis, right heart failure, diabetes, hypertension, hyperlipidemia, iron deficiency anemia, pancreatic pseudocyst s/p drainage, multiple admissions over the past several months including cardiac arrest x 2 admitted with MANDY and heart failure exacerbation requiring intubation. Extubated to bilevel. Patient has chronic respiratory failure due multifactorial in etiology including OHS and sarcoidosis. She remains hypercapnic despite bilevel and would benefit from NIV. Patient will require AVAPs on discharge as she has had numerous hospital admissions within the past year and NIV with AVAPS-AE will improve morbidity and reduce risk of readmission. Due to patient's decreased ventilatory drive and high CO2 levels, without AVAPs, patient will experience rapid clinical deterioration    Day He PA-C  Department of Medicine  Pager [phone]

## 2022-12-01 NOTE — PROGRESS NOTE ADULT - SUBJECTIVE AND OBJECTIVE BOX
[facility]  Pager [phone]    Patient is a 67y old  Female who presents with a chief complaint of MANDY on CKD, abnormal VS, altered mental status (01 Dec 2022 09:31)      INTERVAL HPI/OVERNIGHT EVENTS:    MEDICATIONS  (STANDING):  albuterol    90 MICROgram(s) HFA Inhaler 2 Puff(s) Inhalation every 6 hours  amLODIPine   Tablet 10 milliGRAM(s) Oral daily  atorvastatin 40 milliGRAM(s) Oral at bedtime  buMETAnide 2 milliGRAM(s) Oral daily  carvedilol 12.5 milliGRAM(s) Oral every 12 hours  chlorhexidine 2% Cloths 1 Application(s) Topical <User Schedule>  dextrose 5%. 1000 milliLiter(s) (100 mL/Hr) IV Continuous <Continuous>  dextrose 5%. 1000 milliLiter(s) (50 mL/Hr) IV Continuous <Continuous>  dextrose 50% Injectable 25 Gram(s) IV Push once  dextrose 50% Injectable 12.5 Gram(s) IV Push once  dextrose 50% Injectable 25 Gram(s) IV Push once  glucagon  Injectable 1 milliGRAM(s) IntraMuscular once  hydrALAZINE 100 milliGRAM(s) Oral three times a day  ipratropium 17 MICROgram(s) HFA Inhaler 1 Puff(s) Inhalation every 6 hours    MEDICATIONS  (PRN):  acetaminophen     Tablet .. 650 milliGRAM(s) Oral every 6 hours PRN Mild Pain (1 - 3)  dextrose Oral Gel 15 Gram(s) Oral once PRN Blood Glucose LESS THAN 70 milliGRAM(s)/deciliter      Allergies    penicillin (Red Man Synd)    Intolerances        REVIEW OF SYSTEMS:  Please see interval HPI:    Vital Signs Last 24 Hrs  T(C): 37.1 (01 Dec 2022 12:42), Max: 37.3 (01 Dec 2022 05:40)  T(F): 98.8 (01 Dec 2022 12:42), Max: 99.1 (01 Dec 2022 05:40)  HR: 75 (01 Dec 2022 12:42) (64 - 86)  BP: 136/66 (01 Dec 2022 12:42) (136/66 - 149/80)  BP(mean): --  RR: 17 (01 Dec 2022 12:42) (16 - 18)  SpO2: 99% (01 Dec 2022 12:42) (96% - 99%)    Parameters below as of 01 Dec 2022 12:42  Patient On (Oxygen Delivery Method): room air      I&O's Detail        PHYSICAL EXAM:  GENERAL:   HEAD:    EYES:   ENMT:   NECK:   NERVOUS SYSTEM:    CHEST/LUNG:   HEART:   ABDOMEN:   EXTREMITIES:    LYMPH:   SKIN:     LABS:                        9.3    3.89  )-----------( 240      ( 01 Dec 2022 06:35 )             30.1     01 Dec 2022 06:35    135    |  96     |  47     ----------------------------<  131    4.3     |  29     |  2.39     Ca    8.5        01 Dec 2022 06:35  Phos  4.7       01 Dec 2022 06:35  Mg     1.80      01 Dec 2022 06:35        CAPILLARY BLOOD GLUCOSE      POCT Blood Glucose.: 178 mg/dL (01 Dec 2022 12:13)  POCT Blood Glucose.: 153 mg/dL (01 Dec 2022 08:42)  POCT Blood Glucose.: 185 mg/dL (30 Nov 2022 22:29)  POCT Blood Glucose.: 209 mg/dL (30 Nov 2022 17:11)    BLOOD CULTURE    RADIOLOGY & ADDITIONAL TESTS:    Imaging Personally Reviewed:  [ ] YES     Consultant(s) Notes Reviewed:      Care Discussed with Consultants/Other Providers: [facility]  Pager [phone]    Patient is a 67y old  Female who presents with a chief complaint of MANDY on CKD, abnormal VS, altered mental status (01 Dec 2022 09:31)      INTERVAL HPI/OVERNIGHT EVENTS:  No complaints, "I feel great", denies pain, no SOB, tolerated renal biopsy yesterday. Expressed desire to go home "I'm leaving tomorrow". Discussed concern from daughter and team re: frequent readmissions and importance of having plan in place to ensure adequate care, and ongoing efforts to setup home AVAPS, patient verbalized understanding.     MEDICATIONS  (STANDING):  albuterol    90 MICROgram(s) HFA Inhaler 2 Puff(s) Inhalation every 6 hours  amLODIPine   Tablet 10 milliGRAM(s) Oral daily  atorvastatin 40 milliGRAM(s) Oral at bedtime  buMETAnide 2 milliGRAM(s) Oral daily  carvedilol 12.5 milliGRAM(s) Oral every 12 hours  chlorhexidine 2% Cloths 1 Application(s) Topical <User Schedule>  dextrose 5%. 1000 milliLiter(s) (100 mL/Hr) IV Continuous <Continuous>  dextrose 5%. 1000 milliLiter(s) (50 mL/Hr) IV Continuous <Continuous>  dextrose 50% Injectable 25 Gram(s) IV Push once  dextrose 50% Injectable 12.5 Gram(s) IV Push once  dextrose 50% Injectable 25 Gram(s) IV Push once  glucagon  Injectable 1 milliGRAM(s) IntraMuscular once  hydrALAZINE 100 milliGRAM(s) Oral three times a day  ipratropium 17 MICROgram(s) HFA Inhaler 1 Puff(s) Inhalation every 6 hours    MEDICATIONS  (PRN):  acetaminophen     Tablet .. 650 milliGRAM(s) Oral every 6 hours PRN Mild Pain (1 - 3)  dextrose Oral Gel 15 Gram(s) Oral once PRN Blood Glucose LESS THAN 70 milliGRAM(s)/deciliter    Allergies  penicillin (Red Man Synd)    Intolerances    REVIEW OF SYSTEMS:  Please see interval HPI:    Vital Signs Last 24 Hrs  T(C): 37.1 (01 Dec 2022 12:42), Max: 37.3 (01 Dec 2022 05:40)  T(F): 98.8 (01 Dec 2022 12:42), Max: 99.1 (01 Dec 2022 05:40)  HR: 75 (01 Dec 2022 12:42) (64 - 86)  BP: 136/66 (01 Dec 2022 12:42) (136/66 - 149/80)  RR: 17 (01 Dec 2022 12:42) (16 - 18)  SpO2: 99% (01 Dec 2022 12:42) (96% - 99%)    Parameters below as of 01 Dec 2022 12:42  Patient On (Oxygen Delivery Method): room air    I&O's Detail    PHYSICAL EXAM:  GENERAL: NAD, lying in bed  HEAD:  NC/AT  EYES: EOMI, clear sclera/conjunctiva  ENMT: MMM, hard of hearing (provider writes down answers on piece of paper for patient to read)  NECK: supple, no JVD  NERVOUS SYSTEM:  moving extremities, non-focal  CHEST/LUNG: CTAB, no wheeze appreciated, not in respiratory distress  HEART: S1S2 RRR  ABDOMEN: soft, non-tender +BS  EXTREMITIES:  no c/c/e, wwp  LYMPH:   SKIN:     LABS:                        9.3    3.89  )-----------( 240      ( 01 Dec 2022 06:35 )             30.1     01 Dec 2022 06:35    135    |  96     |  47     ----------------------------<  131    4.3     |  29     |  2.39     Ca    8.5        01 Dec 2022 06:35  Phos  4.7       01 Dec 2022 06:35  Mg     1.80      01 Dec 2022 06:35        CAPILLARY BLOOD GLUCOSE      POCT Blood Glucose.: 178 mg/dL (01 Dec 2022 12:13)  POCT Blood Glucose.: 153 mg/dL (01 Dec 2022 08:42)  POCT Blood Glucose.: 185 mg/dL (30 Nov 2022 22:29)  POCT Blood Glucose.: 209 mg/dL (30 Nov 2022 17:11)    BLOOD CULTURE    RADIOLOGY & ADDITIONAL TESTS:    Imaging Personally Reviewed:  [ ] YES     Consultant(s) Notes Reviewed:  Renal    Care Discussed with Consultants/Other Providers: KIMBERLY Bernstein re: Cr increasing, f/u renal recs, pending blood gas/approval for AVAPS, importance of coordinating close followup to prevent readmission

## 2022-12-01 NOTE — PROGRESS NOTE ADULT - ASSESSMENT
66 yo F w/ HTN, HLD, sarcoidosis, HFpEF, hx PEA x2, a/w acute respiratory failure (s/p intubation/MICU stay) and septic shock 2/2 UTI (briefly on pressors, completed course of antibiotics), c/b metabolic encephalopathy (improved) and MANDY on CKD.     # metabolic encephalopathy 2/2 UTI  - mental status appears at baseline, MRI w/o acute intracranial pathology, EEG unrevealing, neuro input appreciated, suspect changes to mental status was in setting of recent UTI and MANDY  - swallow re-eval appreciated, can do regular solids and thin liquids    # MANDY on CKD, proteinuria  - being followed by nephrology for MANDY and proteinuria  - Cr with increase today to 2.39 from 1.81 (peak of 3.27 this admission)  - s/p renal biopsy 11/30 (appreciate IR assistance), can resume asa 24 hours post procedure as per IR  - f/u results of biopsy  - renal input appreciated, will f/u further recs re: rise in creatinine (check bladder scan/PVR to r/o urinary retention)  - avoid nephrotoxins, continue to monitor renal function/UOP    # HTN  - on hydralazine, amlodipine, bumex, coreg  - BP better controlled, -140s, appreciate renal assistance  - monitor BP and titrate regimen further as needed    # HFpEF  - c/w bumex as per renal  - continue to monitor INOs    # HLD  - on statin    # Anemia  - hgb 9.3, stable overall, no overt signs of bleeding  - continue to monitor     # Septic shock 2/2 suspected UTI c/b acute hypoxic/hypercapnic respiratory failure   - briefly on pressors, shock resolved, completed course of antibiotics, extubated, respiratory status stable on room air  - pulm input appreciated, patient with persistent hypercapnia, placed on AVAPS,, they suspect patient with chronic hypercapnic respiratory failure 2/2 multifactorial etiology including OHS and sarcoidosis, feel that patient will benefit from AVAPS, improve morbidity and reduce risk of readmission, f/u blood gas, appreciate assistance with obtaining home AVAPS     # Severe PCM  - c/w glucerna shakes as recommended by nutrition    Need for prophylactic measure  - VTE ppx: on hold for IR procedure, to resume when OK w/ IR    Dispo: pending workup/continued clinical improvement, PT recs home PT 11/28, appreciate CM/SW assistance w/ setup of home AVAPS

## 2022-12-01 NOTE — PROGRESS NOTE ADULT - SUBJECTIVE AND OBJECTIVE BOX
[facility] DIVISION OF KIDNEY DISEASES AND HYPERTENSION -- FOLLOW UP NOTE  --------------------------------------------------------------------------------  HPI: Patient is a 67 year old female with PMH of HTN, HLD, Sarcoidosis who presented to the hospital after outpatient labs demonstrated elevated Scr. On arrival to the ED the patient was noted to have AMS and was hypotensive; she was subsequently intubated and admitted to the MICU. She was successfully extubated shortly after. On review of [facility] the patient is noted to have a Scr of 1.2mg/dL in 9/2022, on admission it was elevated to 3.01mg/dL and has further increased to 3.27 on 11/20/22. Scr is elevated/improved to 1.81 today. Nephrology team following for MANDY.    On review of [facility], it was noted that the patient was followed by nephrology for MANDY and proteinuria. All serologic work up at that time was negative and it was presumed her renal disease was in the setting of her longstanding DM. She was advised to undergo a renal biopsy at that time, however the patient has not followed up with nephrology following discharge. The patient has had multiple admissions for hypoxic respiratory failure in setting of being volume overloaded and has required aggressive diuresis.     Pt seen and examined at bedside. Pt reports feeling okay. Denies any SOB, CP, HA, N/V, abdominal pain , urinary symptoms or dizziness.       PAST HISTORY  --------------------------------------------------------------------------------  No significant changes to PMH, PSH, FHx, SHx, unless otherwise noted    ALLERGIES & MEDICATIONS  --------------------------------------------------------------------------------  Allergies    penicillin (Red Man Synd)    Intolerances      Standing Inpatient Medications  albuterol    90 MICROgram(s) HFA Inhaler 2 Puff(s) Inhalation every 6 hours  amLODIPine   Tablet 10 milliGRAM(s) Oral daily  atorvastatin 40 milliGRAM(s) Oral at bedtime  buMETAnide 2 milliGRAM(s) Oral daily  carvedilol 12.5 milliGRAM(s) Oral every 12 hours  chlorhexidine 2% Cloths 1 Application(s) Topical <User Schedule>  dextrose 5%. 1000 milliLiter(s) IV Continuous <Continuous>  dextrose 5%. 1000 milliLiter(s) IV Continuous <Continuous>  dextrose 50% Injectable 25 Gram(s) IV Push once  dextrose 50% Injectable 12.5 Gram(s) IV Push once  dextrose 50% Injectable 25 Gram(s) IV Push once  glucagon  Injectable 1 milliGRAM(s) IntraMuscular once  hydrALAZINE 100 milliGRAM(s) Oral three times a day  ipratropium 17 MICROgram(s) HFA Inhaler 1 Puff(s) Inhalation every 6 hours    PRN Inpatient Medications  acetaminophen     Tablet .. 650 milliGRAM(s) Oral every 6 hours PRN  dextrose Oral Gel 15 Gram(s) Oral once PRN      REVIEW OF SYSTEMS    All other systems were reviewed and are negative, except as noted.    VITALS/PHYSICAL EXAM  --------------------------------------------------------------------------------  T(C): 37.3 (12-01-22 @ 05:40), Max: 37.3 (12-01-22 @ 05:40)  HR: 77 (12-01-22 @ 05:40) (64 - 86)  BP: 149/80 (12-01-22 @ 05:40) (138/71 - 149/80)  RR: 17 (12-01-22 @ 05:40) (16 - 18)  SpO2: 96% (12-01-22 @ 05:40) (96% - 99%)  Wt(kg): --          Physical Exam:  	Gen: ill appearing  	HEENT: MMM  	Pulm: CTA  	CV: S1S2  	Abd: Soft, +BS   	Ext: trace LE edema B/L  	Neuro: Awake and alert  	Skin: Warm and dry      LABS/STUDIES  --------------------------------------------------------------------------------              9.3    3.89  >-----------<  240      [12-01-22 @ 06:35]              30.1     135  |  96  |  47  ----------------------------<  131      [12-01-22 @ 06:35]  4.3   |  29  |  2.39        Ca     8.5     [12-01-22 @ 06:35]      Mg     1.80     [12-01-22 @ 06:35]      Phos  4.7     [12-01-22 @ 06:35]            Creatinine Trend:  SCr 2.39 [12-01 @ 06:35]  SCr 1.81 [11-30 @ 06:15]  SCr 1.54 [11-29 @ 05:43]  SCr 1.47 [11-28 @ 06:57]  SCr 1.50 [11-27 @ 06:34]    Urinalysis - [11-25-22 @ 17:36]      Color Light Yellow / Appearance Clear / SG 1.012 / pH 6.5      Gluc Negative / Ketone Negative  / Bili Negative / Urobili <2 mg/dL       Blood Negative / Protein 100 mg/dL / Leuk Est Negative / Nitrite Negative      RBC 16 / WBC 24 / Hyaline 2 / Gran  / Sq Epi  / Non Sq Epi 4 / Bacteria Negative      Iron 68, TIBC 312, %sat 22      [11-19-22 @ 02:58]  Ferritin 113      [11-19-22 @ 02:58]  TSH 2.03      [11-25-22 @ 14:10]    HBsAg Nonreact      [11-22-22 @ 06:45]  HCV 0.13, Nonreact      [11-22-22 @ 06:45]  HIV Nonreact      [11-22-22 @ 06:45]    MINOO: titer Negative, pattern --      [11-18-22 @ 03:53]  dsDNA <12      [11-22-22 @ 06:45]  C3 Complement 133      [11-22-22 @ 06:45]  C4 Complement 33      [11-22-22 @ 06:45]  Rheumatoid Factor 17      [11-22-22 @ 06:45]  ANCA: cANCA Negative, pANCA Negative, atypical ANCA Negative      [11-22-22 @ 06:45]  anti-GBM <0.2      [11-22-22 @ 06:45]  Syphilis Screen (Treponema Pallidum Ab) Negative      [11-22-22 @ 06:45]  PLA2R: DESHAWN <1.8, IFA --      [11-22-22 @ 06:45]  Free Light Chains: kappa 12.79, lambda 4.65, ratio = 2.75      [11-22 @ 06:45]  SPEP Interpretation: Decreased serum Albumin and increased Polyclonal Gamma fraction  consistent with chronic inflammatory condition.  NAVEED Rosenberg M.D.      [11-22-22 @ 06:45]

## 2022-12-01 NOTE — PROGRESS NOTE ADULT - ATTENDING COMMENTS
MANDY  Nephrotic range proteinuria  Uncontrolled HTN    follow up final read of Renal biopsy report  Monitor Is/Os and daily weights.

## 2022-12-02 LAB
ANION GAP SERPL CALC-SCNC: 11 MMOL/L — SIGNIFICANT CHANGE UP (ref 7–14)
BUN SERPL-MCNC: 54 MG/DL — HIGH (ref 7–23)
CALCIUM SERPL-MCNC: 8.6 MG/DL — SIGNIFICANT CHANGE UP (ref 8.4–10.5)
CALCIUM UR-MCNC: 2 MG/DL — SIGNIFICANT CHANGE UP
CHLORIDE SERPL-SCNC: 94 MMOL/L — LOW (ref 98–107)
CHLORIDE UR-SCNC: 52 MMOL/L — SIGNIFICANT CHANGE UP
CO2 SERPL-SCNC: 30 MMOL/L — SIGNIFICANT CHANGE UP (ref 22–31)
CREAT ?TM UR-MCNC: 104 MG/DL — SIGNIFICANT CHANGE UP
CREAT ?TM UR-MCNC: 105 MG/DL — SIGNIFICANT CHANGE UP
CREAT SERPL-MCNC: 2.5 MG/DL — HIGH (ref 0.5–1.3)
EGFR: 20 ML/MIN/1.73M2 — LOW
GLUCOSE SERPL-MCNC: 117 MG/DL — HIGH (ref 70–99)
HCT VFR BLD CALC: 28.7 % — LOW (ref 34.5–45)
HGB BLD-MCNC: 9 G/DL — LOW (ref 11.5–15.5)
MAGNESIUM SERPL-MCNC: 1.7 MG/DL — SIGNIFICANT CHANGE UP (ref 1.6–2.6)
MCHC RBC-ENTMCNC: 25.9 PG — LOW (ref 27–34)
MCHC RBC-ENTMCNC: 31.4 GM/DL — LOW (ref 32–36)
MCV RBC AUTO: 82.5 FL — SIGNIFICANT CHANGE UP (ref 80–100)
NRBC # BLD: 0 /100 WBCS — SIGNIFICANT CHANGE UP (ref 0–0)
NRBC # FLD: 0 K/UL — SIGNIFICANT CHANGE UP (ref 0–0)
PHOSPHATE 24H UR-MCNC: 42.3 MG/DL — SIGNIFICANT CHANGE UP
PHOSPHATE SERPL-MCNC: 4 MG/DL — SIGNIFICANT CHANGE UP (ref 2.5–4.5)
PLATELET # BLD AUTO: 301 K/UL — SIGNIFICANT CHANGE UP (ref 150–400)
POTASSIUM SERPL-MCNC: 4.3 MMOL/L — SIGNIFICANT CHANGE UP (ref 3.5–5.3)
POTASSIUM SERPL-SCNC: 4.3 MMOL/L — SIGNIFICANT CHANGE UP (ref 3.5–5.3)
POTASSIUM UR-SCNC: 46.1 MMOL/L — SIGNIFICANT CHANGE UP
PROT ?TM UR-MCNC: 307 MG/DL — SIGNIFICANT CHANGE UP
PROT ?TM UR-MCNC: 307 MG/DL — SIGNIFICANT CHANGE UP
PROT/CREAT UR-RTO: 2.9 RATIO — HIGH (ref 0–0.2)
RBC # BLD: 3.48 M/UL — LOW (ref 3.8–5.2)
RBC # FLD: 17 % — HIGH (ref 10.3–14.5)
SODIUM SERPL-SCNC: 135 MMOL/L — SIGNIFICANT CHANGE UP (ref 135–145)
SODIUM UR-SCNC: 69 MMOL/L — SIGNIFICANT CHANGE UP
UUN UR-MCNC: 532 MG/DL — SIGNIFICANT CHANGE UP
WBC # BLD: 5.52 K/UL — SIGNIFICANT CHANGE UP (ref 3.8–10.5)
WBC # FLD AUTO: 5.52 K/UL — SIGNIFICANT CHANGE UP (ref 3.8–10.5)

## 2022-12-02 PROCEDURE — 99233 SBSQ HOSP IP/OBS HIGH 50: CPT

## 2022-12-02 PROCEDURE — 99233 SBSQ HOSP IP/OBS HIGH 50: CPT | Mod: GC

## 2022-12-02 RX ORDER — HEPARIN SODIUM 5000 [USP'U]/ML
5000 INJECTION INTRAVENOUS; SUBCUTANEOUS EVERY 8 HOURS
Refills: 0 | Status: DISCONTINUED | OUTPATIENT
Start: 2022-12-02 | End: 2022-12-09

## 2022-12-02 RX ADMIN — ATORVASTATIN CALCIUM 40 MILLIGRAM(S): 80 TABLET, FILM COATED ORAL at 22:25

## 2022-12-02 RX ADMIN — CARVEDILOL PHOSPHATE 12.5 MILLIGRAM(S): 80 CAPSULE, EXTENDED RELEASE ORAL at 05:33

## 2022-12-02 RX ADMIN — Medication 81 MILLIGRAM(S): at 11:50

## 2022-12-02 RX ADMIN — Medication 100 MILLIGRAM(S): at 14:23

## 2022-12-02 RX ADMIN — ALBUTEROL 2 PUFF(S): 90 AEROSOL, METERED ORAL at 22:25

## 2022-12-02 RX ADMIN — Medication 100 MILLIGRAM(S): at 05:32

## 2022-12-02 RX ADMIN — BUMETANIDE 2 MILLIGRAM(S): 0.25 INJECTION INTRAMUSCULAR; INTRAVENOUS at 05:32

## 2022-12-02 RX ADMIN — CARVEDILOL PHOSPHATE 12.5 MILLIGRAM(S): 80 CAPSULE, EXTENDED RELEASE ORAL at 17:34

## 2022-12-02 RX ADMIN — ALBUTEROL 2 PUFF(S): 90 AEROSOL, METERED ORAL at 11:50

## 2022-12-02 RX ADMIN — Medication 1 PUFF(S): at 17:33

## 2022-12-02 RX ADMIN — AMLODIPINE BESYLATE 10 MILLIGRAM(S): 2.5 TABLET ORAL at 05:32

## 2022-12-02 RX ADMIN — Medication 1 PUFF(S): at 22:25

## 2022-12-02 RX ADMIN — CHLORHEXIDINE GLUCONATE 1 APPLICATION(S): 213 SOLUTION TOPICAL at 06:46

## 2022-12-02 RX ADMIN — HEPARIN SODIUM 5000 UNIT(S): 5000 INJECTION INTRAVENOUS; SUBCUTANEOUS at 22:27

## 2022-12-02 RX ADMIN — ALBUTEROL 2 PUFF(S): 90 AEROSOL, METERED ORAL at 17:33

## 2022-12-02 RX ADMIN — Medication 1 PUFF(S): at 11:50

## 2022-12-02 RX ADMIN — ALBUTEROL 2 PUFF(S): 90 AEROSOL, METERED ORAL at 05:31

## 2022-12-02 RX ADMIN — Medication 1 PUFF(S): at 05:31

## 2022-12-02 NOTE — PROGRESS NOTE ADULT - ASSESSMENT
67F with PMHx sarcoidosis, right heart failure, diabetes, hypertension, hyperlipidemia, iron deficiency anemia, pancreatic pseudocyst s/p drainage, multiple admissions over the past several months including cardiac arrest x 2 admitted with MANDY and heart failure exacerbation requiring intubation.    #Hypercapneic respiratory failure:  - patient with underlying sarcoid and OHS with chronic hypercapnea   - patient with persistent hypercapnea despite BiPAP and escalated to AVAPS  -  patient with improvement in CO2 with AVAPS  - yesterday, patient with alkalotic pH with elevated Bicarb    Recommendations:  - c/w AVAPS at current setting qhs and when sleeping  - if rising bicarb and persistent, can give acetazolamide as patient is being diuresed; repeat ABG to assess   - patient will require AVAPS for home--> she has hypercapneic respiratory failure unable to be corrected on BiPAP

## 2022-12-02 NOTE — PROGRESS NOTE ADULT - SUBJECTIVE AND OBJECTIVE BOX
CHIEF COMPLAINT:    Interval Events:    REVIEW OF SYSTEMS:  Constitutional: [ ] negative [ ] fevers [ ] chills [ ] weight loss [ ] weight gain  HEENT: [ ] negative [ ] dry eyes [ ] eye irritation [ ] postnasal drip [ ] nasal congestion  CV: [ ] negative  [ ] chest pain [ ] orthopnea [ ] palpitations [ ] murmur  Resp: [ ] negative [ ] cough [ ] shortness of breath [ ] dyspnea [ ] wheezing [ ] sputum [ ] hemoptysis  GI: [ ] negative [ ] nausea [ ] vomiting [ ] diarrhea [ ] constipation [ ] abd pain [ ] dysphagia   : [ ] negative [ ] dysuria [ ] nocturia [ ] hematuria [ ] increased urinary frequency  Musculoskeletal: [ ] negative [ ] back pain [ ] myalgias [ ] arthralgias [ ] fracture  Skin: [ ] negative [ ] rash [ ] itch  Neurological: [ ] negative [ ] headache [ ] dizziness [ ] syncope [ ] weakness [ ] numbness  Psychiatric: [ ] negative [ ] anxiety [ ] depression  Endocrine: [ ] negative [ ] diabetes [ ] thyroid problem  Hematologic/Lymphatic: [ ] negative [ ] anemia [ ] bleeding problem  Allergic/Immunologic: [ ] negative [ ] itchy eyes [ ] nasal discharge [ ] hives [ ] angioedema  [ ] All other systems negative  [ ] Unable to assess ROS because ________    OBJECTIVE:  ICU Vital Signs Last 24 Hrs  T(C): 36.6 (02 Dec 2022 05:30), Max: 37.1 (01 Dec 2022 12:42)  T(F): 97.9 (02 Dec 2022 05:30), Max: 98.8 (01 Dec 2022 12:42)  HR: 60 (02 Dec 2022 05:30) (60 - 75)  BP: 119/67 (02 Dec 2022 05:30) (119/67 - 136/73)  BP(mean): --  ABP: --  ABP(mean): --  RR: 18 (02 Dec 2022 05:30) (17 - 18)  SpO2: 100% (02 Dec 2022 05:30) (98% - 100%)    O2 Parameters below as of 02 Dec 2022 05:30  Patient On (Oxygen Delivery Method): BiPAP/CPAP          Mode: NIV (Noninvasive Ventilation), RR (machine): 12, TV (machine): 450, FiO2: 30, PEEP: 6, P-High: 25, P-Low: 10, PIP: 14    CAPILLARY BLOOD GLUCOSE      POCT Blood Glucose.: 176 mg/dL (01 Dec 2022 22:15)      PHYSICAL EXAM:  General:   HEENT:   Lymph Nodes:  Neck:   Respiratory:   Cardiovascular:   Abdomen:   Extremities:   Skin:   Neurological:  Psychiatry:    HOSPITAL MEDICATIONS:  aspirin enteric coated 81 milliGRAM(s) Oral daily      amLODIPine   Tablet 10 milliGRAM(s) Oral daily  buMETAnide 2 milliGRAM(s) Oral daily  carvedilol 12.5 milliGRAM(s) Oral every 12 hours  hydrALAZINE 100 milliGRAM(s) Oral three times a day    atorvastatin 40 milliGRAM(s) Oral at bedtime  dextrose 50% Injectable 25 Gram(s) IV Push once  dextrose 50% Injectable 12.5 Gram(s) IV Push once  dextrose 50% Injectable 25 Gram(s) IV Push once  dextrose Oral Gel 15 Gram(s) Oral once PRN  glucagon  Injectable 1 milliGRAM(s) IntraMuscular once    albuterol    90 MICROgram(s) HFA Inhaler 2 Puff(s) Inhalation every 6 hours  ipratropium 17 MICROgram(s) HFA Inhaler 1 Puff(s) Inhalation every 6 hours    acetaminophen     Tablet .. 650 milliGRAM(s) Oral every 6 hours PRN          dextrose 5%. 1000 milliLiter(s) IV Continuous <Continuous>  dextrose 5%. 1000 milliLiter(s) IV Continuous <Continuous>      chlorhexidine 2% Cloths 1 Application(s) Topical <User Schedule>        LABS:                        9.3    3.89  )-----------( 240      ( 01 Dec 2022 06:35 )             30.1     Hgb Trend: 9.3<--, 9.4<--, 9.2<--, 10.0<--, 8.5<--  12-01    135  |  96<L>  |  47<H>  ----------------------------<  131<H>  4.3   |  29  |  2.39<H>    Ca    8.5      01 Dec 2022 06:35  Phos  4.7     12-01  Mg     1.80     12-01      Creatinine Trend: 2.39<--, 1.81<--, 1.54<--, 1.47<--, 1.50<--, 1.62<--      Arterial Blood Gas:  12-01 @ 14:52  7.48/45/77/34/96.1/9.0  ABG lactate: --        MICROBIOLOGY:     RADIOLOGY:  [ ] Reviewed and interpreted by me    PULMONARY FUNCTION TESTS:    EKG: CHIEF COMPLAINT: sob    Interval Events: Patient seen and examined at bedside. No acute events overnight. Patient reports she feels well. She used her AVAPS overnight.     REVIEW OF SYSTEMS:  Constitutional: [X ] negative [ ] fevers [ ] chills [ ] weight loss [ ] weight gain  HEENT: [ X] negative [ ] dry eyes [ ] eye irritation [ ] postnasal drip [ ] nasal congestion  CV: [ X] negative  [ ] chest pain [ ] orthopnea [ ] palpitations [ ] murmur  Resp: [X ] negative [ ] cough [ ] shortness of breath [ ] dyspnea [ ] wheezing [ ] sputum [ ] hemoptysis  GI: [ X] negative [ ] nausea [ ] vomiting [ ] diarrhea [ ] constipation [ ] abd pain [ ] dysphagia   : [X ] negative [ ] dysuria [ ] nocturia [ ] hematuria [ ] increased urinary frequency  Musculoskeletal: [ X] negative [ ] back pain [ ] myalgias [ ] arthralgias [ ] fracture  Skin: [ ] negative [ ] rash [ ] itch  Neurological: [ ] negative [ ] headache [ ] dizziness [ ] syncope [ ] weakness [ ] numbness  Psychiatric: [ X] negative [ ] anxiety [ ] depression  Endocrine: [ ] negative [ ] diabetes [ ] thyroid problem  Hematologic/Lymphatic: [ ] negative [ ] anemia [ ] bleeding problem  Allergic/Immunologic: [ ] negative [ ] itchy eyes [ ] nasal discharge [ ] hives [ ] angioedema  [ ] All other systems negative  [ ] Unable to assess ROS because ________    OBJECTIVE:  ICU Vital Signs Last 24 Hrs  T(C): 36.6 (02 Dec 2022 05:30), Max: 37.1 (01 Dec 2022 12:42)  T(F): 97.9 (02 Dec 2022 05:30), Max: 98.8 (01 Dec 2022 12:42)  HR: 60 (02 Dec 2022 05:30) (60 - 75)  BP: 119/67 (02 Dec 2022 05:30) (119/67 - 136/73)  BP(mean): --  ABP: --  ABP(mean): --  RR: 18 (02 Dec 2022 05:30) (17 - 18)  SpO2: 100% (02 Dec 2022 05:30) (98% - 100%)    O2 Parameters below as of 02 Dec 2022 05:30  Patient On (Oxygen Delivery Method): BiPAP/CPAP          Mode: NIV (Noninvasive Ventilation), RR (machine): 12, TV (machine): 450, FiO2: 30, PEEP: 6, P-High: 25, P-Low: 10, PIP: 14    CAPILLARY BLOOD GLUCOSE      POCT Blood Glucose.: 176 mg/dL (01 Dec 2022 22:15)      PHYSICAL EXAM:  General:   HEENT:   Lymph Nodes:  Neck:   Respiratory:   Cardiovascular:   Abdomen:   Extremities:   Skin:   Neurological:  Psychiatry:    HOSPITAL MEDICATIONS:  aspirin enteric coated 81 milliGRAM(s) Oral daily      amLODIPine   Tablet 10 milliGRAM(s) Oral daily  buMETAnide 2 milliGRAM(s) Oral daily  carvedilol 12.5 milliGRAM(s) Oral every 12 hours  hydrALAZINE 100 milliGRAM(s) Oral three times a day    atorvastatin 40 milliGRAM(s) Oral at bedtime  dextrose 50% Injectable 25 Gram(s) IV Push once  dextrose 50% Injectable 12.5 Gram(s) IV Push once  dextrose 50% Injectable 25 Gram(s) IV Push once  dextrose Oral Gel 15 Gram(s) Oral once PRN  glucagon  Injectable 1 milliGRAM(s) IntraMuscular once    albuterol    90 MICROgram(s) HFA Inhaler 2 Puff(s) Inhalation every 6 hours  ipratropium 17 MICROgram(s) HFA Inhaler 1 Puff(s) Inhalation every 6 hours    acetaminophen     Tablet .. 650 milliGRAM(s) Oral every 6 hours PRN          dextrose 5%. 1000 milliLiter(s) IV Continuous <Continuous>  dextrose 5%. 1000 milliLiter(s) IV Continuous <Continuous>      chlorhexidine 2% Cloths 1 Application(s) Topical <User Schedule>        LABS:                        9.3    3.89  )-----------( 240      ( 01 Dec 2022 06:35 )             30.1     Hgb Trend: 9.3<--, 9.4<--, 9.2<--, 10.0<--, 8.5<--  12-01    135  |  96<L>  |  47<H>  ----------------------------<  131<H>  4.3   |  29  |  2.39<H>    Ca    8.5      01 Dec 2022 06:35  Phos  4.7     12-01  Mg     1.80     12-01      Creatinine Trend: 2.39<--, 1.81<--, 1.54<--, 1.47<--, 1.50<--, 1.62<--      Arterial Blood Gas:  12-01 @ 14:52  7.48/45/77/34/96.1/9.0  ABG lactate: --        MICROBIOLOGY:     RADIOLOGY:  [ ] Reviewed and interpreted by me    PULMONARY FUNCTION TESTS:    EKG: CHIEF COMPLAINT: sob    Interval Events: Patient seen and examined at bedside. No acute events overnight. Patient reports she feels well. She used her AVAPS overnight.     REVIEW OF SYSTEMS:  Constitutional: [X ] negative [ ] fevers [ ] chills [ ] weight loss [ ] weight gain  HEENT: [ X] negative [ ] dry eyes [ ] eye irritation [ ] postnasal drip [ ] nasal congestion  CV: [ X] negative  [ ] chest pain [ ] orthopnea [ ] palpitations [ ] murmur  Resp: [X ] negative [ ] cough [ ] shortness of breath [ ] dyspnea [ ] wheezing [ ] sputum [ ] hemoptysis  GI: [ X] negative [ ] nausea [ ] vomiting [ ] diarrhea [ ] constipation [ ] abd pain [ ] dysphagia   : [X ] negative [ ] dysuria [ ] nocturia [ ] hematuria [ ] increased urinary frequency  Musculoskeletal: [ X] negative [ ] back pain [ ] myalgias [ ] arthralgias [ ] fracture  Skin: [ ] negative [ ] rash [ ] itch  Neurological: [ ] negative [ ] headache [ ] dizziness [ ] syncope [ ] weakness [ ] numbness  Psychiatric: [ X] negative [ ] anxiety [ ] depression  Endocrine: [ ] negative [ ] diabetes [ ] thyroid problem  Hematologic/Lymphatic: [ ] negative [ ] anemia [ ] bleeding problem  Allergic/Immunologic: [ ] negative [ ] itchy eyes [ ] nasal discharge [ ] hives [ ] angioedema  [ ] All other systems negative  [ ] Unable to assess ROS because ________    OBJECTIVE:  ICU Vital Signs Last 24 Hrs  T(C): 36.6 (02 Dec 2022 05:30), Max: 37.1 (01 Dec 2022 12:42)  T(F): 97.9 (02 Dec 2022 05:30), Max: 98.8 (01 Dec 2022 12:42)  HR: 60 (02 Dec 2022 05:30) (60 - 75)  BP: 119/67 (02 Dec 2022 05:30) (119/67 - 136/73)  BP(mean): --  ABP: --  ABP(mean): --  RR: 18 (02 Dec 2022 05:30) (17 - 18)  SpO2: 100% (02 Dec 2022 05:30) (98% - 100%)    O2 Parameters below as of 02 Dec 2022 05:30  Patient On (Oxygen Delivery Method): BiPAP/CPAP          Mode: NIV (Noninvasive Ventilation), RR (machine): 12, TV (machine): 450, FiO2: 30, PEEP: 6, P-High: 25, P-Low: 10, PIP: 14    CAPILLARY BLOOD GLUCOSE      POCT Blood Glucose.: 176 mg/dL (01 Dec 2022 22:15)      PHYSICAL EXAM:  General: well appearing, NAD  HEENT: no icterus  Neck: supple  Respiratory: clear to auscultation bilaterally  Cardiovascular: S1/S2, RRR,   Abdomen: soft, nontender, nondistended   Extremities: no LE edema  Skin: no rashes  Neurological: AXOx3; hard of hearing  Psychiatry: normal mood    HOSPITAL MEDICATIONS:  aspirin enteric coated 81 milliGRAM(s) Oral daily      amLODIPine   Tablet 10 milliGRAM(s) Oral daily  buMETAnide 2 milliGRAM(s) Oral daily  carvedilol 12.5 milliGRAM(s) Oral every 12 hours  hydrALAZINE 100 milliGRAM(s) Oral three times a day    atorvastatin 40 milliGRAM(s) Oral at bedtime  dextrose 50% Injectable 25 Gram(s) IV Push once  dextrose 50% Injectable 12.5 Gram(s) IV Push once  dextrose 50% Injectable 25 Gram(s) IV Push once  dextrose Oral Gel 15 Gram(s) Oral once PRN  glucagon  Injectable 1 milliGRAM(s) IntraMuscular once    albuterol    90 MICROgram(s) HFA Inhaler 2 Puff(s) Inhalation every 6 hours  ipratropium 17 MICROgram(s) HFA Inhaler 1 Puff(s) Inhalation every 6 hours    acetaminophen     Tablet .. 650 milliGRAM(s) Oral every 6 hours PRN          dextrose 5%. 1000 milliLiter(s) IV Continuous <Continuous>  dextrose 5%. 1000 milliLiter(s) IV Continuous <Continuous>      chlorhexidine 2% Cloths 1 Application(s) Topical <User Schedule>        LABS:                        9.3    3.89  )-----------( 240      ( 01 Dec 2022 06:35 )             30.1     Hgb Trend: 9.3<--, 9.4<--, 9.2<--, 10.0<--, 8.5<--  12-01    135  |  96<L>  |  47<H>  ----------------------------<  131<H>  4.3   |  29  |  2.39<H>    Ca    8.5      01 Dec 2022 06:35  Phos  4.7     12-01  Mg     1.80     12-01      Creatinine Trend: 2.39<--, 1.81<--, 1.54<--, 1.47<--, 1.50<--, 1.62<--      Arterial Blood Gas:  12-01 @ 14:52  7.48/45/77/34/96.1/9.0  ABG lactate: --        MICROBIOLOGY:     RADIOLOGY:  [ X] Reviewed and interpreted by me

## 2022-12-02 NOTE — PROGRESS NOTE ADULT - SUBJECTIVE AND OBJECTIVE BOX
[facility] DIVISION OF KIDNEY DISEASES AND HYPERTENSION -- FOLLOW UP NOTE  --------------------------------------------------------------------------------  HPI: Patient is a 67 year old female with PMH of HTN, HLD, Sarcoidosis who presented to the hospital after outpatient labs demonstrated elevated Scr. On arrival to the ED the patient was noted to have AMS and was hypotensive; she was subsequently intubated and admitted to the MICU. She was successfully extubated shortly after. On review of [facility] the patient is noted to have a Scr of 1.2mg/dL in 9/2022, on admission it was elevated to 3.01mg/dL and has further increased to 3.27 on 11/20/22. Scr is elevated/improved to 1.81 today. Nephrology team following for MANDY.    On review of [facility], it was noted that the patient was followed by nephrology for MANDY and proteinuria. All serologic work up at that time was negative and it was presumed her renal disease was in the setting of her longstanding DM. She was advised to undergo a renal biopsy at that time, however the patient has not followed up with nephrology following discharge. The patient has had multiple admissions for hypoxic respiratory failure in setting of being volume overloaded and has required aggressive diuresis.     Pt seen and examined at bedside. Pt reports feeling okay. Denies any SOB, CP, HA, N/V, abdominal pain , urinary symptoms or dizziness.       PAST HISTORY  --------------------------------------------------------------------------------  No significant changes to PMH, PSH, FHx, SHx, unless otherwise noted    ALLERGIES & MEDICATIONS  --------------------------------------------------------------------------------  Allergies    penicillin ([facility] Synd)    Intolerances      Standing Inpatient Medications  albuterol    90 MICROgram(s) HFA Inhaler 2 Puff(s) Inhalation every 6 hours  amLODIPine   Tablet 10 milliGRAM(s) Oral daily  aspirin enteric coated 81 milliGRAM(s) Oral daily  atorvastatin 40 milliGRAM(s) Oral at bedtime  buMETAnide 2 milliGRAM(s) Oral daily  carvedilol 12.5 milliGRAM(s) Oral every 12 hours  chlorhexidine 2% Cloths 1 Application(s) Topical <User Schedule>  dextrose 5%. 1000 milliLiter(s) IV Continuous <Continuous>  dextrose 5%. 1000 milliLiter(s) IV Continuous <Continuous>  dextrose 50% Injectable 25 Gram(s) IV Push once  dextrose 50% Injectable 12.5 Gram(s) IV Push once  dextrose 50% Injectable 25 Gram(s) IV Push once  glucagon  Injectable 1 milliGRAM(s) IntraMuscular once  hydrALAZINE 100 milliGRAM(s) Oral three times a day  ipratropium 17 MICROgram(s) HFA Inhaler 1 Puff(s) Inhalation every 6 hours    PRN Inpatient Medications  acetaminophen     Tablet .. 650 milliGRAM(s) Oral every 6 hours PRN  dextrose Oral Gel 15 Gram(s) Oral once PRN      REVIEW OF SYSTEMS    All other systems were reviewed and are negative, except as noted.    VITALS/PHYSICAL EXAM  --------------------------------------------------------------------------------  T(C): 36.6 (12-02-22 @ 05:30), Max: 37.1 (12-01-22 @ 12:42)  HR: 60 (12-02-22 @ 05:30) (60 - 75)  BP: 119/67 (12-02-22 @ 05:30) (119/67 - 136/73)  RR: 18 (12-02-22 @ 05:30) (17 - 18)  SpO2: 100% (12-02-22 @ 05:30) (98% - 100%)  Wt(kg): --          Physical Exam:  	Gen: ill appearing  	HEENT: MMM  	Pulm: CTA  	CV: S1S2  	Abd: Soft, +BS   	Ext: trace LE edema B/L  	Neuro: Awake and alert  	Skin: Warm and dry    LABS/STUDIES  --------------------------------------------------------------------------------              9.0    5.52  >-----------<  301      [12-02-22 @ 05:04]              28.7     135  |  94  |  54  ----------------------------<  117      [12-02-22 @ 05:04]  4.3   |  30  |  2.50        Ca     8.6     [12-02-22 @ 05:04]      Mg     1.70     [12-02-22 @ 05:04]      Phos  4.0     [12-02-22 @ 05:04]            Creatinine Trend:  SCr 2.50 [12-02 @ 05:04]  SCr 2.39 [12-01 @ 06:35]  SCr 1.81 [11-30 @ 06:15]  SCr 1.54 [11-29 @ 05:43]  SCr 1.47 [11-28 @ 06:57]    Urinalysis - [11-25-22 @ 17:36]      Color Light Yellow / Appearance Clear / SG 1.012 / pH 6.5      Gluc Negative / Ketone Negative  / Bili Negative / Urobili <2 mg/dL       Blood Negative / Protein 100 mg/dL / Leuk Est Negative / Nitrite Negative      RBC 16 / WBC 24 / Hyaline 2 / Gran  / Sq Epi  / Non Sq Epi 4 / Bacteria Negative      Iron 68, TIBC 312, %sat 22      [11-19-22 @ 02:58]  Ferritin 113      [11-19-22 @ 02:58]  TSH 2.03      [11-25-22 @ 14:10]    HBsAg Nonreact      [11-22-22 @ 06:45]  HCV 0.13, Nonreact      [11-22-22 @ 06:45]  HIV Nonreact      [11-22-22 @ 06:45]    MINOO: titer Negative, pattern --      [11-18-22 @ 03:53]  dsDNA <12      [11-22-22 @ 06:45]  C3 Complement 133      [11-22-22 @ 06:45]  C4 Complement 33      [11-22-22 @ 06:45]  Rheumatoid Factor 17      [11-22-22 @ 06:45]  ANCA: cANCA Negative, pANCA Negative, atypical ANCA Negative      [11-22-22 @ 06:45]  anti-GBM <0.2      [11-22-22 @ 06:45]  Syphilis Screen (Treponema Pallidum Ab) Negative      [11-22-22 @ 06:45]  PLA2R: DESHAWN <1.8, IFA --      [11-22-22 @ 06:45]  Free Light Chains: kappa 12.79, lambda 4.65, ratio = 2.75      [11-22 @ 06:45]  Immunofixation Serum:   No Monoclonal Band Identified  Erik Rodriguez M.D.  Reference Range: None Detected      [11-22-22 @ 06:45]  SPEP Interpretation: Decreased serum Albumin and increased Polyclonal Gamma fraction  consistent with chronic inflammatory condition.  NAVEED Rosenberg M.D.      [11-22-22 @ 06:45]

## 2022-12-02 NOTE — PROGRESS NOTE ADULT - ATTENDING COMMENTS
MANDY on CKD    Diabetic nephropathy, Cr higher today.  Will continue to monitor Is/Os and daily weights

## 2022-12-02 NOTE — PROGRESS NOTE ADULT - SUBJECTIVE AND OBJECTIVE BOX
[facility]  Pager [phone]    Patient is a 67y old  Female who presents with a chief complaint of MANDY on CKD, abnormal VS, altered mental status (02 Dec 2022 10:01)      INTERVAL HPI/OVERNIGHT EVENTS:    MEDICATIONS  (STANDING):  albuterol    90 MICROgram(s) HFA Inhaler 2 Puff(s) Inhalation every 6 hours  amLODIPine   Tablet 10 milliGRAM(s) Oral daily  aspirin enteric coated 81 milliGRAM(s) Oral daily  atorvastatin 40 milliGRAM(s) Oral at bedtime  buMETAnide 2 milliGRAM(s) Oral daily  carvedilol 12.5 milliGRAM(s) Oral every 12 hours  chlorhexidine 2% Cloths 1 Application(s) Topical <User Schedule>  dextrose 5%. 1000 milliLiter(s) (50 mL/Hr) IV Continuous <Continuous>  dextrose 5%. 1000 milliLiter(s) (100 mL/Hr) IV Continuous <Continuous>  dextrose 50% Injectable 25 Gram(s) IV Push once  dextrose 50% Injectable 12.5 Gram(s) IV Push once  dextrose 50% Injectable 25 Gram(s) IV Push once  glucagon  Injectable 1 milliGRAM(s) IntraMuscular once  hydrALAZINE 100 milliGRAM(s) Oral three times a day  ipratropium 17 MICROgram(s) HFA Inhaler 1 Puff(s) Inhalation every 6 hours    MEDICATIONS  (PRN):  acetaminophen     Tablet .. 650 milliGRAM(s) Oral every 6 hours PRN Mild Pain (1 - 3)  dextrose Oral Gel 15 Gram(s) Oral once PRN Blood Glucose LESS THAN 70 milliGRAM(s)/deciliter      Allergies    penicillin (Red Man Synd)    Intolerances        REVIEW OF SYSTEMS:  Please see interval HPI:    Vital Signs Last 24 Hrs  T(C): 36.6 (02 Dec 2022 05:30), Max: 37.1 (01 Dec 2022 12:42)  T(F): 97.9 (02 Dec 2022 05:30), Max: 98.8 (01 Dec 2022 12:42)  HR: 60 (02 Dec 2022 05:30) (60 - 75)  BP: 119/67 (02 Dec 2022 05:30) (119/67 - 136/73)  BP(mean): --  RR: 18 (02 Dec 2022 05:30) (17 - 18)  SpO2: 100% (02 Dec 2022 05:30) (98% - 100%)    Parameters below as of 02 Dec 2022 05:30  Patient On (Oxygen Delivery Method): BiPAP/CPAP      I&O's Detail        PHYSICAL EXAM:  GENERAL:   HEAD:    EYES:   ENMT:   NECK:   NERVOUS SYSTEM:    CHEST/LUNG:   HEART:   ABDOMEN:   EXTREMITIES:    LYMPH:   SKIN:     LABS:                        9.0    5.52  )-----------( 301      ( 02 Dec 2022 05:04 )             28.7     02 Dec 2022 05:04    135    |  94     |  54     ----------------------------<  117    4.3     |  30     |  2.50     Ca    8.6        02 Dec 2022 05:04  Phos  4.0       02 Dec 2022 05:04  Mg     1.70      02 Dec 2022 05:04        CAPILLARY BLOOD GLUCOSE      POCT Blood Glucose.: 209 mg/dL (02 Dec 2022 12:24)  POCT Blood Glucose.: 129 mg/dL (02 Dec 2022 08:24)  POCT Blood Glucose.: 176 mg/dL (01 Dec 2022 22:15)  POCT Blood Glucose.: 156 mg/dL (01 Dec 2022 17:24)    BLOOD CULTURE    RADIOLOGY & ADDITIONAL TESTS:    Imaging Personally Reviewed:  [ ] YES     Consultant(s) Notes Reviewed:      Care Discussed with Consultants/Other Providers: [facility]  Pager [phone]    Patient is a 67y old  Female who presents with a chief complaint of MANDY on CKD, abnormal VS, altered mental status (02 Dec 2022 10:01)      INTERVAL HPI/OVERNIGHT EVENTS:  Feeling "not good" today, when asked to elaborate says it's because "I want to be home!". Patient upset at being in hospital, noting multiple hospitalizations, in and out of health care settings. Discussed team's attempts to optimize care to prevent readmissions and hopefully allow patient to be at home and do well there. Discussed ongoing efforts to arrange AVAPS at home. Emotional support provided.     MEDICATIONS  (STANDING):  albuterol    90 MICROgram(s) HFA Inhaler 2 Puff(s) Inhalation every 6 hours  amLODIPine   Tablet 10 milliGRAM(s) Oral daily  aspirin enteric coated 81 milliGRAM(s) Oral daily  atorvastatin 40 milliGRAM(s) Oral at bedtime  buMETAnide 2 milliGRAM(s) Oral daily  carvedilol 12.5 milliGRAM(s) Oral every 12 hours  chlorhexidine 2% Cloths 1 Application(s) Topical <User Schedule>  dextrose 5%. 1000 milliLiter(s) (50 mL/Hr) IV Continuous <Continuous>  dextrose 5%. 1000 milliLiter(s) (100 mL/Hr) IV Continuous <Continuous>  dextrose 50% Injectable 25 Gram(s) IV Push once  dextrose 50% Injectable 12.5 Gram(s) IV Push once  dextrose 50% Injectable 25 Gram(s) IV Push once  glucagon  Injectable 1 milliGRAM(s) IntraMuscular once  hydrALAZINE 100 milliGRAM(s) Oral three times a day  ipratropium 17 MICROgram(s) HFA Inhaler 1 Puff(s) Inhalation every 6 hours    MEDICATIONS  (PRN):  acetaminophen     Tablet .. 650 milliGRAM(s) Oral every 6 hours PRN Mild Pain (1 - 3)  dextrose Oral Gel 15 Gram(s) Oral once PRN Blood Glucose LESS THAN 70 milliGRAM(s)/deciliter    Allergies  penicillin (Red Man Synd)    Intolerances    REVIEW OF SYSTEMS:  Please see interval HPI:    Vital Signs Last 24 Hrs  T(C): 36.6 (02 Dec 2022 05:30), Max: 37.1 (01 Dec 2022 12:42)  T(F): 97.9 (02 Dec 2022 05:30), Max: 98.8 (01 Dec 2022 12:42)  HR: 60 (02 Dec 2022 05:30) (60 - 75)  BP: 119/67 (02 Dec 2022 05:30) (119/67 - 136/73)  RR: 18 (02 Dec 2022 05:30) (17 - 18)  SpO2: 100% (02 Dec 2022 05:30) (98% - 100%)    Parameters below as of 02 Dec 2022 05:30  Patient On (Oxygen Delivery Method): BiPAP/CPAP    I&O's Detail    PHYSICAL EXAM:  GENERAL: NAD, lying in bed, slightly depressed affect  HEAD:  NC/AT  EYES: EOMI, clear sclera/conjunctiva  ENMT: MMM, hard of hearing   NECK: supple, no JVD  NERVOUS SYSTEM:  moving extremities, non-focal  CHEST/LUNG: CTAB, no wheeze appreciated, not in respiratory distress  HEART: S1S2 RRR  ABDOMEN: soft, non-tender +BS  EXTREMITIES:  no c/c/e, wwp    LABS:                        9.0    5.52  )-----------( 301      ( 02 Dec 2022 05:04 )             28.7     02 Dec 2022 05:04    135    |  94     |  54     ----------------------------<  117    4.3     |  30     |  2.50     Ca    8.6        02 Dec 2022 05:04  Phos  4.0       02 Dec 2022 05:04  Mg     1.70      02 Dec 2022 05:04    CAPILLARY BLOOD GLUCOSE  POCT Blood Glucose.: 209 mg/dL (02 Dec 2022 12:24)  POCT Blood Glucose.: 129 mg/dL (02 Dec 2022 08:24)  POCT Blood Glucose.: 176 mg/dL (01 Dec 2022 22:15)  POCT Blood Glucose.: 156 mg/dL (01 Dec 2022 17:24)    BLOOD CULTURE    RADIOLOGY & ADDITIONAL TESTS:    Imaging Personally Reviewed:  [ ] YES     Consultant(s) Notes Reviewed:  Pulm, Renal    Care Discussed with Consultants/Other Providers: KIMBERLY Farr re: pending setup of home AVAPS, Cr worsening, f/u renal recs, patient appears down, emotional support provided, ?role for addition of holistic therapy

## 2022-12-02 NOTE — PROGRESS NOTE ADULT - PROBLEM SELECTOR PLAN 1
Pt. with MANDY suspected in setting of hemodynamics/ hypotension on arrival in ED vs volume overload. Pt with MANDY/ATN. [facility] is noted to have a Scr of 1.2mg/dL in 9/2022, on admission it was elevated to 3.01mg/dL and has further increased to 3.27mg/dL on 11/20/22. Scr is elevated to 2.5 today.      UA with significant proteinuria (noted previously as well) and significant hematuria (new from previous urine studies). In the past all serological work up has been negative and it was presumed renal disease was in setting of her DM. Kidney sonogram suggestive of B/L renal parenchymal disease. Given nephrotic range proteinuria  Pt underwent kidney biopsy 11/30 with prelim results demonstrating changes consistent with diabetic nephropathy. Will follow up final results. Labs reviewed. Pt currently on PO Bumex 2 mg daily. Pt non oliguric. BP better controlled now. Continue with hydralazine 100 mg po TID.   Please check serial bladder scans and monitor for retention given rise in SCr right now.   Please repeat urine electrolytes at this time.   Monitor labs and urine output. Avoid any potential nephrotoxins. Dose medications as per eGFR.     If any questions, please feel free to contact me     Ar Silva  Nephrology Fellow  [facility] Pager: [phone].

## 2022-12-02 NOTE — PROGRESS NOTE ADULT - TIME BILLING
patient care
chart and data review, clinical assessment, coordination of care
Medical management as above, review of results/records, discussion with patient and primary team, documentation.
chart and data review, clinical assessment, coordination of care
patient care

## 2022-12-02 NOTE — PROGRESS NOTE ADULT - ATTENDING COMMENTS
67 year old female sarcoidosis, right heart failure, diabetes, hypertension, hyperlipidemia, iron deficiency anemia, pancreatic pseudocyst s/p drainage, multiple admissions over the past several months including cardiac arrest x 2 admitted with MANDY and heart failure exacerbation requiring intubation. Extubated to bilevel. Patient has chronic respiratory failure due multifactorial in etiology including OHS and sarcoidosis. She remains hypercapnic despite bilevel and would benefit from NIV. Will switch to AVAPS. She has had numerous hospital admissions within the past year and NIV with AVAPS-AE will improve morbidity and reduce risk of readmission. Will need NIV set up for home. Please discuss with case management. 67 year old female sarcoidosis, right heart failure, diabetes, hypertension, hyperlipidemia, iron deficiency anemia, pancreatic pseudocyst s/p drainage, multiple admissions over the past several months including cardiac arrest x 2 admitted with MANDY and heart failure exacerbation requiring intubation. Extubated to bilevel. Patient has chronic respiratory failure due multifactorial in etiology including OHS and sarcoidosis. She remains hypercapnic despite bilevel and would benefit from NIV. Will switch to AVAPS. She has had numerous hospital admissions within the past year and NIV with AVAPS-AE will improve morbidity and reduce risk of readmission. Will need NIV set up for home. Please discuss with case management.  Repeat ABG. If persistently alkalotic, can consider dose of Diamox.

## 2022-12-02 NOTE — PROGRESS NOTE ADULT - NSPROGADDITIONALINFOA_GEN_ALL_CORE
Updated daughter [phone] re: pending setup of home AVAPS, patient seemed down today, agree w/ addition of holistic therapy, thinks there could be potential role for  visit as well. No other questions/concerns at this time.
long standing DM and NS with 8 grams proteinuria. new hematuria   A/CKD along with Acute hypercapnic respiratory failure, HF, sepsis, and hypotension s/p intubation and diuresis---> extubation   Sarcoidosis  currently on O2. still   likely ATN but needs to pursue GN workup as well   suggest  serological workup as noted above      Please stop Aspirin if deemed safe and plan for kidney biopsy when stable. ( for NS with >8grams of proteinuria )    continue with Bumex 2mg po daily to keep O>I   avoid contrast studies, ACE-I, ARB, or NSAIDs

## 2022-12-02 NOTE — PROGRESS NOTE ADULT - ASSESSMENT
66 yo F w/ HTN, HLD, sarcoidosis, HFpEF, hx PEA x2, a/w acute respiratory failure (s/p intubation/MICU stay) and septic shock 2/2 UTI (briefly on pressors, completed course of antibiotics), c/b metabolic encephalopathy (improved) and MANDY on CKD.     # metabolic encephalopathy 2/2 UTI  - mental status appears at baseline, MRI w/o acute intracranial pathology, EEG unrevealing, neuro input appreciated, suspect changes to mental status was in setting of recent UTI and MANDY  - swallow re-eval appreciated, can do regular solids and thin liquids    # MANDY on CKD, proteinuria  - being followed by nephrology for MANDY and proteinuria  - Cr with increase today to 2.5 from 2.39 (previously 1.81, Cr peak of 3.27 this admission)  - s/p renal biopsy 11/30 (appreciate IR assistance), asa resumed post procedure  - f/u results of biopsy  - renal input appreciated re: rising Cr, continue to check bladder scan/PVR to r/o urinary retention, repeat urine studies  - avoid nephrotoxins, continue to monitor renal function/UOP    # HTN  - on hydralazine, amlodipine, bumex, coreg  - BP better controlled, -120s, appreciate renal assistance  - monitor BP and titrate regimen further as needed    # HFpEF  - c/w bumex as per renal  - continue to monitor INOs    # HLD  - on statin    # Anemia  - hgb 9.0 stable overall, no overt signs of bleeding  - continue to monitor     # Septic shock 2/2 suspected UTI c/b acute hypoxic/hypercapnic respiratory failure   - briefly on pressors, shock resolved, completed course of antibiotics, extubated, respiratory status stable on room air  - pulm input appreciated, patient with persistent hypercapnia, placed on AVAPS,, they suspect patient with chronic hypercapnic respiratory failure 2/2 multifactorial etiology including OHS and sarcoidosis, feel that patient will benefit from AVAPS, improve morbidity and reduce risk of readmission, blood gas results noted (pCO2 45), appreciate assistance with obtaining home AVAPS     # Severe PCM  - c/w glucerna shakes as recommended by nutrition    Need for prophylactic measure  - VTE ppx: HSQ    Dispo: pending workup/continued clinical improvement, PT recs home PT 11/28, appreciate CM/SW assistance w/ setup of home AVAPS

## 2022-12-03 LAB
ANION GAP SERPL CALC-SCNC: 10 MMOL/L — SIGNIFICANT CHANGE UP (ref 7–14)
BUN SERPL-MCNC: 59 MG/DL — HIGH (ref 7–23)
CALCIUM SERPL-MCNC: 8.6 MG/DL — SIGNIFICANT CHANGE UP (ref 8.4–10.5)
CHLORIDE SERPL-SCNC: 96 MMOL/L — LOW (ref 98–107)
CO2 SERPL-SCNC: 30 MMOL/L — SIGNIFICANT CHANGE UP (ref 22–31)
CREAT SERPL-MCNC: 2.33 MG/DL — HIGH (ref 0.5–1.3)
EGFR: 22 ML/MIN/1.73M2 — LOW
GAS PNL BLDA: SIGNIFICANT CHANGE UP
GLUCOSE SERPL-MCNC: 183 MG/DL — HIGH (ref 70–99)
HCT VFR BLD CALC: 27.3 % — LOW (ref 34.5–45)
HGB BLD-MCNC: 8.4 G/DL — LOW (ref 11.5–15.5)
MAGNESIUM SERPL-MCNC: 1.5 MG/DL — LOW (ref 1.6–2.6)
MAGNESIUM UR-MCNC: 2.4 MG/DL — SIGNIFICANT CHANGE UP
MCHC RBC-ENTMCNC: 25.4 PG — LOW (ref 27–34)
MCHC RBC-ENTMCNC: 30.8 GM/DL — LOW (ref 32–36)
MCV RBC AUTO: 82.5 FL — SIGNIFICANT CHANGE UP (ref 80–100)
NRBC # BLD: 0 /100 WBCS — SIGNIFICANT CHANGE UP (ref 0–0)
NRBC # FLD: 0 K/UL — SIGNIFICANT CHANGE UP (ref 0–0)
PHOSPHATE SERPL-MCNC: 3.6 MG/DL — SIGNIFICANT CHANGE UP (ref 2.5–4.5)
PLATELET # BLD AUTO: 279 K/UL — SIGNIFICANT CHANGE UP (ref 150–400)
POTASSIUM SERPL-MCNC: 4.3 MMOL/L — SIGNIFICANT CHANGE UP (ref 3.5–5.3)
POTASSIUM SERPL-SCNC: 4.3 MMOL/L — SIGNIFICANT CHANGE UP (ref 3.5–5.3)
RBC # BLD: 3.31 M/UL — LOW (ref 3.8–5.2)
RBC # FLD: 16.9 % — HIGH (ref 10.3–14.5)
SODIUM SERPL-SCNC: 136 MMOL/L — SIGNIFICANT CHANGE UP (ref 135–145)
WBC # BLD: 4.09 K/UL — SIGNIFICANT CHANGE UP (ref 3.8–10.5)
WBC # FLD AUTO: 4.09 K/UL — SIGNIFICANT CHANGE UP (ref 3.8–10.5)

## 2022-12-03 PROCEDURE — 99232 SBSQ HOSP IP/OBS MODERATE 35: CPT

## 2022-12-03 RX ORDER — ACETAZOLAMIDE 250 MG/1
500 TABLET ORAL ONCE
Refills: 0 | Status: DISCONTINUED | OUTPATIENT
Start: 2022-12-03 | End: 2022-12-03

## 2022-12-03 RX ORDER — ACETAZOLAMIDE 250 MG/1
500 TABLET ORAL ONCE
Refills: 0 | Status: COMPLETED | OUTPATIENT
Start: 2022-12-03 | End: 2022-12-03

## 2022-12-03 RX ORDER — MAGNESIUM OXIDE 400 MG ORAL TABLET 241.3 MG
400 TABLET ORAL
Refills: 0 | Status: COMPLETED | OUTPATIENT
Start: 2022-12-03 | End: 2022-12-04

## 2022-12-03 RX ADMIN — ACETAZOLAMIDE 500 MILLIGRAM(S): 250 TABLET ORAL at 17:08

## 2022-12-03 RX ADMIN — BUMETANIDE 2 MILLIGRAM(S): 0.25 INJECTION INTRAMUSCULAR; INTRAVENOUS at 06:37

## 2022-12-03 RX ADMIN — Medication 1 PUFF(S): at 22:27

## 2022-12-03 RX ADMIN — MAGNESIUM OXIDE 400 MG ORAL TABLET 400 MILLIGRAM(S): 241.3 TABLET ORAL at 17:08

## 2022-12-03 RX ADMIN — ALBUTEROL 2 PUFF(S): 90 AEROSOL, METERED ORAL at 04:36

## 2022-12-03 RX ADMIN — ATORVASTATIN CALCIUM 40 MILLIGRAM(S): 80 TABLET, FILM COATED ORAL at 22:29

## 2022-12-03 RX ADMIN — ALBUTEROL 2 PUFF(S): 90 AEROSOL, METERED ORAL at 12:52

## 2022-12-03 RX ADMIN — MAGNESIUM OXIDE 400 MG ORAL TABLET 400 MILLIGRAM(S): 241.3 TABLET ORAL at 13:11

## 2022-12-03 RX ADMIN — Medication 81 MILLIGRAM(S): at 12:04

## 2022-12-03 RX ADMIN — ALBUTEROL 2 PUFF(S): 90 AEROSOL, METERED ORAL at 17:09

## 2022-12-03 RX ADMIN — HEPARIN SODIUM 5000 UNIT(S): 5000 INJECTION INTRAVENOUS; SUBCUTANEOUS at 06:38

## 2022-12-03 RX ADMIN — Medication 1 PUFF(S): at 17:09

## 2022-12-03 RX ADMIN — AMLODIPINE BESYLATE 10 MILLIGRAM(S): 2.5 TABLET ORAL at 06:36

## 2022-12-03 RX ADMIN — Medication 100 MILLIGRAM(S): at 13:11

## 2022-12-03 RX ADMIN — Medication 100 MILLIGRAM(S): at 06:37

## 2022-12-03 RX ADMIN — Medication 1 PUFF(S): at 04:37

## 2022-12-03 RX ADMIN — CHLORHEXIDINE GLUCONATE 1 APPLICATION(S): 213 SOLUTION TOPICAL at 06:30

## 2022-12-03 RX ADMIN — Medication 1 PUFF(S): at 12:52

## 2022-12-03 RX ADMIN — Medication 100 MILLIGRAM(S): at 22:28

## 2022-12-03 RX ADMIN — CARVEDILOL PHOSPHATE 12.5 MILLIGRAM(S): 80 CAPSULE, EXTENDED RELEASE ORAL at 06:37

## 2022-12-03 RX ADMIN — HEPARIN SODIUM 5000 UNIT(S): 5000 INJECTION INTRAVENOUS; SUBCUTANEOUS at 13:10

## 2022-12-03 RX ADMIN — ALBUTEROL 2 PUFF(S): 90 AEROSOL, METERED ORAL at 22:27

## 2022-12-03 RX ADMIN — HEPARIN SODIUM 5000 UNIT(S): 5000 INJECTION INTRAVENOUS; SUBCUTANEOUS at 22:29

## 2022-12-03 NOTE — PROGRESS NOTE ADULT - ASSESSMENT
68 yo F w/ HTN, HLD, sarcoidosis, HFpEF, hx PEA x2, a/w acute respiratory failure (s/p intubation/MICU stay) and septic shock 2/2 UTI (briefly on pressors, completed course of antibiotics), c/b metabolic encephalopathy (improved) and MANDY on CKD.     # metabolic encephalopathy 2/2 UTI  - mental status appears at baseline, MRI w/o acute intracranial pathology, EEG unrevealing, neuro input appreciated, suspect changes to mental status was in setting of recent UTI and MANDY  - swallow re-eval appreciated, can do regular solids and thin liquids    # MANDY on CKD, proteinuria  - being followed by nephrology for MANDY and proteinuria  - Cr with slight decrease today to 2.33 from 2.5 (Cr peak of 3.27 this admission)  - s/p renal biopsy 11/30 (appreciate IR assistance), asa resumed post procedure  - f/u results of biopsy  - continue to check bladder scan/PVR to r/o urinary retention, repeat urine studies as needed if renal function worsens, f/u further renal recs  - avoid nephrotoxins, continue to monitor renal function/UOP    # HTN  - on hydralazine, amlodipine, bumex, coreg  - BP better controlled, -140s, appreciate renal assistance  - monitor BP and titrate regimen further as needed    # HFpEF  - c/w bumex as per renal  - continue to monitor INOs    # HLD  - on statin    # Anemia  - hgb 8.4 stable overall, no overt signs of bleeding  - continue to monitor     # Septic shock 2/2 suspected UTI c/b acute hypoxic/hypercapnic respiratory failure   - briefly on pressors, shock resolved, completed course of antibiotics, extubated, respiratory status stable on room air  - pulm input appreciated, patient with persistent hypercapnia, placed on AVAPS,, they suspect patient with chronic hypercapnic respiratory failure 2/2 multifactorial etiology including OHS and sarcoidosis, feel that patient will benefit from AVAPS, improve morbidity and reduce risk of readmission, blood gas results noted (pCO2 45), appreciate assistance with obtaining home AVAPS   - pulm input appreciated, giving diamox for alkalosis (abg pH 7.53) as per pulm    # Severe PCM  - c/w glucerna shakes as recommended by nutrition    # hypomagnesemia  - replete mg    Need for prophylactic measure  - VTE ppx: HSQ  - continue to provide emotional support, /holistic visits, patient with multiple hospitalizations in recent history    Dispo: pending workup/continued clinical improvement, PT recs home PT 11/28, appreciate CM/SW assistance w/ setup of home AVAPS

## 2022-12-03 NOTE — PROGRESS NOTE ADULT - SUBJECTIVE AND OBJECTIVE BOX
[facility] Medicine  Pager [phone]    Patient is a 67y old  Female who presents with a chief complaint of MANDY on CKD, abnormal VS, altered mental status (02 Dec 2022 12:37)      INTERVAL HPI/OVERNIGHT EVENTS:    MEDICATIONS  (STANDING):  albuterol    90 MICROgram(s) HFA Inhaler 2 Puff(s) Inhalation every 6 hours  amLODIPine   Tablet 10 milliGRAM(s) Oral daily  aspirin enteric coated 81 milliGRAM(s) Oral daily  atorvastatin 40 milliGRAM(s) Oral at bedtime  buMETAnide 2 milliGRAM(s) Oral daily  carvedilol 12.5 milliGRAM(s) Oral every 12 hours  chlorhexidine 2% Cloths 1 Application(s) Topical <User Schedule>  dextrose 5%. 1000 milliLiter(s) (100 mL/Hr) IV Continuous <Continuous>  dextrose 5%. 1000 milliLiter(s) (50 mL/Hr) IV Continuous <Continuous>  dextrose 50% Injectable 25 Gram(s) IV Push once  dextrose 50% Injectable 12.5 Gram(s) IV Push once  dextrose 50% Injectable 25 Gram(s) IV Push once  glucagon  Injectable 1 milliGRAM(s) IntraMuscular once  heparin   Injectable 5000 Unit(s) SubCutaneous every 8 hours  hydrALAZINE 100 milliGRAM(s) Oral three times a day  ipratropium 17 MICROgram(s) HFA Inhaler 1 Puff(s) Inhalation every 6 hours    MEDICATIONS  (PRN):  acetaminophen     Tablet .. 650 milliGRAM(s) Oral every 6 hours PRN Mild Pain (1 - 3)  dextrose Oral Gel 15 Gram(s) Oral once PRN Blood Glucose LESS THAN 70 milliGRAM(s)/deciliter      Allergies    penicillin ([facility] Synd)    Intolerances        REVIEW OF SYSTEMS:  Please see interval HPI:    Vital Signs Last 24 Hrs  T(C): 36.8 (03 Dec 2022 12:26), Max: 36.8 (03 Dec 2022 12:26)  T(F): 98.2 (03 Dec 2022 12:26), Max: 98.2 (03 Dec 2022 12:26)  HR: 71 (03 Dec 2022 12:26) (65 - 71)  BP: 134/68 (03 Dec 2022 12:26) (108/65 - 146/71)  BP(mean): --  RR: 17 (03 Dec 2022 12:26) (17 - 18)  SpO2: 99% (03 Dec 2022 12:26) (97% - 99%)    Parameters below as of 03 Dec 2022 12:26  Patient On (Oxygen Delivery Method): room air      I&O's Detail        PHYSICAL EXAM:  GENERAL:   HEAD:    EYES:   ENMT:   NECK:   NERVOUS SYSTEM:    CHEST/LUNG:   HEART:   ABDOMEN:   EXTREMITIES:    LYMPH:   SKIN:     LABS:                        8.4    4.09  )-----------( 279      ( 03 Dec 2022 10:25 )             27.3     03 Dec 2022 10:25    136    |  96     |  59     ----------------------------<  183    4.3     |  30     |  2.33     Ca    8.6        03 Dec 2022 10:25  Phos  3.6       03 Dec 2022 10:25  Mg     1.50      03 Dec 2022 10:25        CAPILLARY BLOOD GLUCOSE      POCT Blood Glucose.: 136 mg/dL (03 Dec 2022 08:28)  POCT Blood Glucose.: 147 mg/dL (02 Dec 2022 22:32)  POCT Blood Glucose.: 154 mg/dL (02 Dec 2022 17:23)    BLOOD CULTURE    RADIOLOGY & ADDITIONAL TESTS:    Imaging Personally Reviewed:  [ ] YES     Consultant(s) Notes Reviewed:      Care Discussed with Consultants/Other Providers: [facility] Medicine  Pager [phone]    Patient is a 67y old  Female who presents with a chief complaint of MANDY on CKD, abnormal VS, altered mental status (02 Dec 2022 12:37)      INTERVAL HPI/OVERNIGHT EVENTS:  Wants to give gratitude to staff for taking care of her. Wants to be home but understands need to arrange care (including respiratory device) to hopefully prevent readmission. States "I would be ok with that". No other questions/concerns at this time.     MEDICATIONS  (STANDING):  albuterol    90 MICROgram(s) HFA Inhaler 2 Puff(s) Inhalation every 6 hours  amLODIPine   Tablet 10 milliGRAM(s) Oral daily  aspirin enteric coated 81 milliGRAM(s) Oral daily  atorvastatin 40 milliGRAM(s) Oral at bedtime  buMETAnide 2 milliGRAM(s) Oral daily  carvedilol 12.5 milliGRAM(s) Oral every 12 hours  chlorhexidine 2% Cloths 1 Application(s) Topical <User Schedule>  dextrose 5%. 1000 milliLiter(s) (100 mL/Hr) IV Continuous <Continuous>  dextrose 5%. 1000 milliLiter(s) (50 mL/Hr) IV Continuous <Continuous>  dextrose 50% Injectable 25 Gram(s) IV Push once  dextrose 50% Injectable 12.5 Gram(s) IV Push once  dextrose 50% Injectable 25 Gram(s) IV Push once  glucagon  Injectable 1 milliGRAM(s) IntraMuscular once  heparin   Injectable 5000 Unit(s) SubCutaneous every 8 hours  hydrALAZINE 100 milliGRAM(s) Oral three times a day  ipratropium 17 MICROgram(s) HFA Inhaler 1 Puff(s) Inhalation every 6 hours    MEDICATIONS  (PRN):  acetaminophen     Tablet .. 650 milliGRAM(s) Oral every 6 hours PRN Mild Pain (1 - 3)  dextrose Oral Gel 15 Gram(s) Oral once PRN Blood Glucose LESS THAN 70 milliGRAM(s)/deciliter    Allergies  penicillin ([facility] Synd)    Intolerances    REVIEW OF SYSTEMS:  Please see interval HPI:    Vital Signs Last 24 Hrs  T(C): 36.8 (03 Dec 2022 12:26), Max: 36.8 (03 Dec 2022 12:26)  T(F): 98.2 (03 Dec 2022 12:26), Max: 98.2 (03 Dec 2022 12:26)  HR: 71 (03 Dec 2022 12:26) (65 - 71)  BP: 134/68 (03 Dec 2022 12:26) (108/65 - 146/71)  BP(mean): --  RR: 17 (03 Dec 2022 12:26) (17 - 18)  SpO2: 99% (03 Dec 2022 12:26) (97% - 99%)    Parameters below as of 03 Dec 2022 12:26  Patient On (Oxygen Delivery Method): room air    I&O's Detail      PHYSICAL EXAM:  GENERAL: NAD, lying in bed, appears in better spirits today  HEAD:  NC/AT  EYES: EOMI, clear sclera/conjunctiva  ENMT: MMM, hard of hearing   NECK: supple, no JVD  NERVOUS SYSTEM:  moving extremities, non-focal  CHEST/LUNG: CTAB, no wheeze appreciated, not in respiratory distress  HEART: S1S2 RRR  ABDOMEN: soft, non-tender +BS  EXTREMITIES:  no c/c/e, wwp      LABS:                        8.4    4.09  )-----------( 279      ( 03 Dec 2022 10:25 )             27.3     03 Dec 2022 10:25    136    |  96     |  59     ----------------------------<  183    4.3     |  30     |  2.33     Ca    8.6        03 Dec 2022 10:25  Phos  3.6       03 Dec 2022 10:25  Mg     1.50      03 Dec 2022 10:25    CAPILLARY BLOOD GLUCOSE  POCT Blood Glucose.: 136 mg/dL (03 Dec 2022 08:28)  POCT Blood Glucose.: 147 mg/dL (02 Dec 2022 22:32)  POCT Blood Glucose.: 154 mg/dL (02 Dec 2022 17:23)    BLOOD CULTURE    RADIOLOGY & ADDITIONAL TESTS:    Imaging Personally Reviewed:  [ ] YES     Consultant(s) Notes Reviewed:      Care Discussed with Consultants/Other Providers: KIMBERLY Farr re: her d/w pulm re: blood gas results, giving diamox for alkalosis, repleting Mg, continuing to provide emotional support

## 2022-12-03 NOTE — CHART NOTE - NSCHARTNOTEFT_GEN_A_CORE
Reviewed ABG with pulmonary. Ordered Diamox x1 given persistent alkalosis as per discussion with pulmonary.     Chika Arellano PA-C  Department of Medicine   In-house pager #[phone]

## 2022-12-04 DIAGNOSIS — I10 ESSENTIAL (PRIMARY) HYPERTENSION: ICD-10-CM

## 2022-12-04 DIAGNOSIS — E83.42 HYPOMAGNESEMIA: ICD-10-CM

## 2022-12-04 DIAGNOSIS — G93.41 METABOLIC ENCEPHALOPATHY: ICD-10-CM

## 2022-12-04 DIAGNOSIS — N17.9 ACUTE KIDNEY FAILURE, UNSPECIFIED: ICD-10-CM

## 2022-12-04 DIAGNOSIS — Z29.9 ENCOUNTER FOR PROPHYLACTIC MEASURES, UNSPECIFIED: ICD-10-CM

## 2022-12-04 DIAGNOSIS — I50.32 CHRONIC DIASTOLIC (CONGESTIVE) HEART FAILURE: ICD-10-CM

## 2022-12-04 DIAGNOSIS — A41.9 SEPSIS, UNSPECIFIED ORGANISM: ICD-10-CM

## 2022-12-04 DIAGNOSIS — J96.12 CHRONIC RESPIRATORY FAILURE WITH HYPERCAPNIA: ICD-10-CM

## 2022-12-04 DIAGNOSIS — E43 UNSPECIFIED SEVERE PROTEIN-CALORIE MALNUTRITION: ICD-10-CM

## 2022-12-04 LAB
ANION GAP SERPL CALC-SCNC: 12 MMOL/L — SIGNIFICANT CHANGE UP (ref 7–14)
BLD GP AB SCN SERPL QL: NEGATIVE — SIGNIFICANT CHANGE UP
BUN SERPL-MCNC: 62 MG/DL — HIGH (ref 7–23)
CALCIUM SERPL-MCNC: 8.7 MG/DL — SIGNIFICANT CHANGE UP (ref 8.4–10.5)
CHLORIDE SERPL-SCNC: 95 MMOL/L — LOW (ref 98–107)
CO2 SERPL-SCNC: 29 MMOL/L — SIGNIFICANT CHANGE UP (ref 22–31)
CREAT SERPL-MCNC: 2.38 MG/DL — HIGH (ref 0.5–1.3)
EGFR: 22 ML/MIN/1.73M2 — LOW
GLUCOSE SERPL-MCNC: 144 MG/DL — HIGH (ref 70–99)
HCT VFR BLD CALC: 28.4 % — LOW (ref 34.5–45)
HGB BLD-MCNC: 8.7 G/DL — LOW (ref 11.5–15.5)
MAGNESIUM SERPL-MCNC: 1.5 MG/DL — LOW (ref 1.6–2.6)
MCHC RBC-ENTMCNC: 25.6 PG — LOW (ref 27–34)
MCHC RBC-ENTMCNC: 30.6 GM/DL — LOW (ref 32–36)
MCV RBC AUTO: 83.5 FL — SIGNIFICANT CHANGE UP (ref 80–100)
NRBC # BLD: 0 /100 WBCS — SIGNIFICANT CHANGE UP (ref 0–0)
NRBC # FLD: 0 K/UL — SIGNIFICANT CHANGE UP (ref 0–0)
PHOSPHATE SERPL-MCNC: 3.5 MG/DL — SIGNIFICANT CHANGE UP (ref 2.5–4.5)
PLATELET # BLD AUTO: 280 K/UL — SIGNIFICANT CHANGE UP (ref 150–400)
POTASSIUM SERPL-MCNC: 4.2 MMOL/L — SIGNIFICANT CHANGE UP (ref 3.5–5.3)
POTASSIUM SERPL-SCNC: 4.2 MMOL/L — SIGNIFICANT CHANGE UP (ref 3.5–5.3)
RBC # BLD: 3.4 M/UL — LOW (ref 3.8–5.2)
RBC # FLD: 17.4 % — HIGH (ref 10.3–14.5)
RH IG SCN BLD-IMP: POSITIVE — SIGNIFICANT CHANGE UP
SODIUM SERPL-SCNC: 136 MMOL/L — SIGNIFICANT CHANGE UP (ref 135–145)
WBC # BLD: 3.98 K/UL — SIGNIFICANT CHANGE UP (ref 3.8–10.5)
WBC # FLD AUTO: 3.98 K/UL — SIGNIFICANT CHANGE UP (ref 3.8–10.5)

## 2022-12-04 PROCEDURE — 99232 SBSQ HOSP IP/OBS MODERATE 35: CPT

## 2022-12-04 RX ORDER — MAGNESIUM OXIDE 400 MG ORAL TABLET 241.3 MG
400 TABLET ORAL ONCE
Refills: 0 | Status: COMPLETED | OUTPATIENT
Start: 2022-12-04 | End: 2022-12-04

## 2022-12-04 RX ADMIN — BUMETANIDE 2 MILLIGRAM(S): 0.25 INJECTION INTRAMUSCULAR; INTRAVENOUS at 09:13

## 2022-12-04 RX ADMIN — Medication 1 PUFF(S): at 15:55

## 2022-12-04 RX ADMIN — ALBUTEROL 2 PUFF(S): 90 AEROSOL, METERED ORAL at 21:09

## 2022-12-04 RX ADMIN — AMLODIPINE BESYLATE 10 MILLIGRAM(S): 2.5 TABLET ORAL at 09:13

## 2022-12-04 RX ADMIN — ALBUTEROL 2 PUFF(S): 90 AEROSOL, METERED ORAL at 09:15

## 2022-12-04 RX ADMIN — ALBUTEROL 2 PUFF(S): 90 AEROSOL, METERED ORAL at 15:55

## 2022-12-04 RX ADMIN — HEPARIN SODIUM 5000 UNIT(S): 5000 INJECTION INTRAVENOUS; SUBCUTANEOUS at 13:12

## 2022-12-04 RX ADMIN — Medication 100 MILLIGRAM(S): at 09:13

## 2022-12-04 RX ADMIN — HEPARIN SODIUM 5000 UNIT(S): 5000 INJECTION INTRAVENOUS; SUBCUTANEOUS at 07:03

## 2022-12-04 RX ADMIN — Medication 1 PUFF(S): at 09:16

## 2022-12-04 RX ADMIN — Medication 100 MILLIGRAM(S): at 22:20

## 2022-12-04 RX ADMIN — CHLORHEXIDINE GLUCONATE 1 APPLICATION(S): 213 SOLUTION TOPICAL at 07:09

## 2022-12-04 RX ADMIN — ATORVASTATIN CALCIUM 40 MILLIGRAM(S): 80 TABLET, FILM COATED ORAL at 21:11

## 2022-12-04 RX ADMIN — Medication 100 MILLIGRAM(S): at 13:09

## 2022-12-04 RX ADMIN — CARVEDILOL PHOSPHATE 12.5 MILLIGRAM(S): 80 CAPSULE, EXTENDED RELEASE ORAL at 09:13

## 2022-12-04 RX ADMIN — CARVEDILOL PHOSPHATE 12.5 MILLIGRAM(S): 80 CAPSULE, EXTENDED RELEASE ORAL at 17:47

## 2022-12-04 RX ADMIN — Medication 81 MILLIGRAM(S): at 09:13

## 2022-12-04 RX ADMIN — Medication 1 PUFF(S): at 21:10

## 2022-12-04 RX ADMIN — MAGNESIUM OXIDE 400 MG ORAL TABLET 400 MILLIGRAM(S): 241.3 TABLET ORAL at 09:12

## 2022-12-04 RX ADMIN — HEPARIN SODIUM 5000 UNIT(S): 5000 INJECTION INTRAVENOUS; SUBCUTANEOUS at 21:15

## 2022-12-04 NOTE — PROGRESS NOTE ADULT - PROBLEM SELECTOR PLAN 1
MANDY on CKD, proteinuria  - being followed by nephrology for MANDY and proteinuria  - Cr with overall slight decrease today to 2.38 from 2.5 on 12/2 (Cr peak of 3.27 this admission)  - s/p renal biopsy 11/30 (appreciate IR assistance), asa resumed post procedure  - f/u results of biopsy (prelim results c/w diabetic nephropathy per renal)  - continue to check bladder scan/PVR to r/o urinary retention, repeat urine studies as needed if renal function worsens, f/u further renal recs  - avoid nephrotoxins, continue to monitor renal function/UOP

## 2022-12-04 NOTE — PROGRESS NOTE ADULT - SUBJECTIVE AND OBJECTIVE BOX
[facility] Medicine  Pager [phone]    Patient is a 67y old  Female who presents with a chief complaint of MANDY on CKD, abnormal VS, altered mental status (03 Dec 2022 12:34)      INTERVAL HPI/OVERNIGHT EVENTS:    MEDICATIONS  (STANDING):  albuterol    90 MICROgram(s) HFA Inhaler 2 Puff(s) Inhalation every 6 hours  amLODIPine   Tablet 10 milliGRAM(s) Oral daily  aspirin enteric coated 81 milliGRAM(s) Oral daily  atorvastatin 40 milliGRAM(s) Oral at bedtime  buMETAnide 2 milliGRAM(s) Oral daily  carvedilol 12.5 milliGRAM(s) Oral every 12 hours  chlorhexidine 2% Cloths 1 Application(s) Topical <User Schedule>  dextrose 5%. 1000 milliLiter(s) (50 mL/Hr) IV Continuous <Continuous>  dextrose 5%. 1000 milliLiter(s) (100 mL/Hr) IV Continuous <Continuous>  dextrose 50% Injectable 25 Gram(s) IV Push once  dextrose 50% Injectable 12.5 Gram(s) IV Push once  dextrose 50% Injectable 25 Gram(s) IV Push once  glucagon  Injectable 1 milliGRAM(s) IntraMuscular once  heparin   Injectable 5000 Unit(s) SubCutaneous every 8 hours  hydrALAZINE 100 milliGRAM(s) Oral three times a day  ipratropium 17 MICROgram(s) HFA Inhaler 1 Puff(s) Inhalation every 6 hours    MEDICATIONS  (PRN):  acetaminophen     Tablet .. 650 milliGRAM(s) Oral every 6 hours PRN Mild Pain (1 - 3)  dextrose Oral Gel 15 Gram(s) Oral once PRN Blood Glucose LESS THAN 70 milliGRAM(s)/deciliter      Allergies    penicillin ([facility] Synd)    Intolerances        REVIEW OF SYSTEMS:  Please see interval HPI:    Vital Signs Last 24 Hrs  T(C): 36.5 (04 Dec 2022 06:25), Max: 37.1 (03 Dec 2022 22:21)  T(F): 97.7 (04 Dec 2022 06:25), Max: 98.8 (03 Dec 2022 22:21)  HR: 59 (04 Dec 2022 08:26) (58 - 75)  BP: 131/66 (04 Dec 2022 06:25) (109/69 - 131/66)  BP(mean): --  RR: 17 (04 Dec 2022 08:21) (17 - 18)  SpO2: 100% (04 Dec 2022 08:21) (97% - 100%)    Parameters below as of 04 Dec 2022 08:21  Patient On (Oxygen Delivery Method): nasal cannula,2      I&O's Detail        PHYSICAL EXAM:  GENERAL:   HEAD:    EYES:   ENMT:   NECK:   NERVOUS SYSTEM:    CHEST/LUNG:   HEART:   ABDOMEN:   EXTREMITIES:    LYMPH:   SKIN:     LABS:                        8.7    3.98  )-----------( 280      ( 04 Dec 2022 06:00 )             28.4     04 Dec 2022 06:00    136    |  95     |  62     ----------------------------<  144    4.2     |  29     |  2.38     Ca    8.7        04 Dec 2022 06:00  Phos  3.5       04 Dec 2022 06:00  Mg     1.50      04 Dec 2022 06:00        CAPILLARY BLOOD GLUCOSE        BLOOD CULTURE    RADIOLOGY & ADDITIONAL TESTS:    Imaging Personally Reviewed:  [ ] YES     Consultant(s) Notes Reviewed:      Care Discussed with Consultants/Other Providers: [facility] Medicine  Pager [phone]    Patient is a 67y old  Female who presents with a chief complaint of MANDY on CKD, abnormal VS, altered mental status (03 Dec 2022 12:34)      INTERVAL HPI/OVERNIGHT EVENTS:  Lying in bed, lunch just got delivered. No acute complaints at this time. Understands plan to arrange home AVAPS and continue efforts to prevent readmission to hospital.     MEDICATIONS  (STANDING):  albuterol    90 MICROgram(s) HFA Inhaler 2 Puff(s) Inhalation every 6 hours  amLODIPine   Tablet 10 milliGRAM(s) Oral daily  aspirin enteric coated 81 milliGRAM(s) Oral daily  atorvastatin 40 milliGRAM(s) Oral at bedtime  buMETAnide 2 milliGRAM(s) Oral daily  carvedilol 12.5 milliGRAM(s) Oral every 12 hours  chlorhexidine 2% Cloths 1 Application(s) Topical <User Schedule>  dextrose 5%. 1000 milliLiter(s) (50 mL/Hr) IV Continuous <Continuous>  dextrose 5%. 1000 milliLiter(s) (100 mL/Hr) IV Continuous <Continuous>  dextrose 50% Injectable 25 Gram(s) IV Push once  dextrose 50% Injectable 12.5 Gram(s) IV Push once  dextrose 50% Injectable 25 Gram(s) IV Push once  glucagon  Injectable 1 milliGRAM(s) IntraMuscular once  heparin   Injectable 5000 Unit(s) SubCutaneous every 8 hours  hydrALAZINE 100 milliGRAM(s) Oral three times a day  ipratropium 17 MICROgram(s) HFA Inhaler 1 Puff(s) Inhalation every 6 hours    MEDICATIONS  (PRN):  acetaminophen     Tablet .. 650 milliGRAM(s) Oral every 6 hours PRN Mild Pain (1 - 3)  dextrose Oral Gel 15 Gram(s) Oral once PRN Blood Glucose LESS THAN 70 milliGRAM(s)/deciliter    Allergies  penicillin (Red [facility] Synd)    Intolerances    REVIEW OF SYSTEMS:  Please see interval HPI:    Vital Signs Last 24 Hrs  T(C): 36.5 (04 Dec 2022 06:25), Max: 37.1 (03 Dec 2022 22:21)  T(F): 97.7 (04 Dec 2022 06:25), Max: 98.8 (03 Dec 2022 22:21)  HR: 59 (04 Dec 2022 08:26) (58 - 75)  BP: 131/66 (04 Dec 2022 06:25) (109/69 - 131/66)  RR: 17 (04 Dec 2022 08:21) (17 - 18)  SpO2: 100% (04 Dec 2022 08:21) (97% - 100%)    Parameters below as of 04 Dec 2022 08:21  Patient On (Oxygen Delivery Method): nasal cannula,2      I&O's Detail    PHYSICAL EXAM:  GENERAL: NAD, lying in bed  HEAD:  NC/AT  EYES: EOMI, clear sclera/conjunctiva  ENMT: MMM, hard of hearing   NECK: supple, no JVD  NERVOUS SYSTEM:  moving extremities, non-focal  CHEST/LUNG: CTAB, no wheeze appreciated, not in respiratory distress  HEART: S1S2 RRR  ABDOMEN: soft, non-tender +BS  EXTREMITIES:  no c/c/e, wwp    LABS:                        8.7    3.98  )-----------( 280      ( 04 Dec 2022 06:00 )             28.4     04 Dec 2022 06:00    136    |  95     |  62     ----------------------------<  144    4.2     |  29     |  2.38     Ca    8.7        04 Dec 2022 06:00  Phos  3.5       04 Dec 2022 06:00  Mg     1.50      04 Dec 2022 06:00        CAPILLARY BLOOD GLUCOSE    BLOOD CULTURE    RADIOLOGY & ADDITIONAL TESTS:    Imaging Personally Reviewed:  [ ] YES     Consultant(s) Notes Reviewed:      Care Discussed with Consultants/Other Providers: KIMBERLY Bernstein re: prelim renal bx results c/w diabetic nephropathy, continue to monitor renal function, coordinating home AVAPS and services

## 2022-12-04 NOTE — PROGRESS NOTE ADULT - ASSESSMENT
66 yo F w/ HTN, HLD, sarcoidosis, HFpEF, hx PEA x2, a/w acute respiratory failure (s/p intubation/MICU stay) and septic shock 2/2 UTI (briefly on pressors, completed course of antibiotics), c/b metabolic encephalopathy (improved) and MANDY on CKD.

## 2022-12-04 NOTE — PROGRESS NOTE ADULT - PROBLEM SELECTOR PLAN 3
metabolic encephalopathy 2/2 UTI  - mental status appears at baseline, MRI w/o acute intracranial pathology, EEG unrevealing, neuro input appreciated, suspect changes to mental status was in setting of recent UTI and MANDY  - swallow re-eval appreciated, can do regular solids and thin liquids

## 2022-12-04 NOTE — PROGRESS NOTE ADULT - PROBLEM SELECTOR PLAN 6
- stable overall, no overt signs of bleeding  - potentially anemia of chronic disease  - continue to monitor, hgb 8.7 today, no need for transfusion at this time

## 2022-12-04 NOTE — PROGRESS NOTE ADULT - PROBLEM SELECTOR PLAN 2
- pulm input appreciated, patient with persistent hypercapnia, placed on AVAPS,, they suspect patient with chronic hypercapnic respiratory failure 2/2 multifactorial etiology including OHS and sarcoidosis, feel that patient will benefit from AVAPS, improve morbidity and reduce risk of readmission, blood gas results noted (pCO2 45), appreciate CM/SW assistance with obtaining home AVAPS   - gave diamox for alkalosis (abg pH 7.53) on 12/3 as per pulm  - continue to monitor, encourage nocturnal AVAPS use, f/u further pulm recs

## 2022-12-05 LAB
ANION GAP SERPL CALC-SCNC: 11 MMOL/L — SIGNIFICANT CHANGE UP (ref 7–14)
BLOOD GAS VENOUS COMPREHENSIVE RESULT: SIGNIFICANT CHANGE UP
BUN SERPL-MCNC: 62 MG/DL — HIGH (ref 7–23)
CALCIUM SERPL-MCNC: 9.1 MG/DL — SIGNIFICANT CHANGE UP (ref 8.4–10.5)
CHLORIDE SERPL-SCNC: 97 MMOL/L — LOW (ref 98–107)
CO2 SERPL-SCNC: 29 MMOL/L — SIGNIFICANT CHANGE UP (ref 22–31)
CREAT SERPL-MCNC: 2.28 MG/DL — HIGH (ref 0.5–1.3)
EGFR: 23 ML/MIN/1.73M2 — LOW
GLUCOSE SERPL-MCNC: 142 MG/DL — HIGH (ref 70–99)
HCT VFR BLD CALC: 27.9 % — LOW (ref 34.5–45)
HGB BLD-MCNC: 8.3 G/DL — LOW (ref 11.5–15.5)
MAGNESIUM SERPL-MCNC: 1.8 MG/DL — SIGNIFICANT CHANGE UP (ref 1.6–2.6)
MCHC RBC-ENTMCNC: 25 PG — LOW (ref 27–34)
MCHC RBC-ENTMCNC: 29.7 GM/DL — LOW (ref 32–36)
MCV RBC AUTO: 84 FL — SIGNIFICANT CHANGE UP (ref 80–100)
NRBC # BLD: 0 /100 WBCS — SIGNIFICANT CHANGE UP (ref 0–0)
NRBC # FLD: 0 K/UL — SIGNIFICANT CHANGE UP (ref 0–0)
PHOSPHATE SERPL-MCNC: 3.9 MG/DL — SIGNIFICANT CHANGE UP (ref 2.5–4.5)
PLATELET # BLD AUTO: 303 K/UL — SIGNIFICANT CHANGE UP (ref 150–400)
POTASSIUM SERPL-MCNC: 4.7 MMOL/L — SIGNIFICANT CHANGE UP (ref 3.5–5.3)
POTASSIUM SERPL-SCNC: 4.7 MMOL/L — SIGNIFICANT CHANGE UP (ref 3.5–5.3)
RBC # BLD: 3.32 M/UL — LOW (ref 3.8–5.2)
RBC # FLD: 17.3 % — HIGH (ref 10.3–14.5)
SODIUM SERPL-SCNC: 137 MMOL/L — SIGNIFICANT CHANGE UP (ref 135–145)
SURGICAL PATHOLOGY STUDY: SIGNIFICANT CHANGE UP
WBC # BLD: 3.57 K/UL — LOW (ref 3.8–10.5)
WBC # FLD AUTO: 3.57 K/UL — LOW (ref 3.8–10.5)

## 2022-12-05 PROCEDURE — 99232 SBSQ HOSP IP/OBS MODERATE 35: CPT

## 2022-12-05 RX ADMIN — ALBUTEROL 2 PUFF(S): 90 AEROSOL, METERED ORAL at 05:50

## 2022-12-05 RX ADMIN — Medication 81 MILLIGRAM(S): at 11:10

## 2022-12-05 RX ADMIN — CARVEDILOL PHOSPHATE 12.5 MILLIGRAM(S): 80 CAPSULE, EXTENDED RELEASE ORAL at 17:15

## 2022-12-05 RX ADMIN — Medication 1 PUFF(S): at 05:50

## 2022-12-05 RX ADMIN — ATORVASTATIN CALCIUM 40 MILLIGRAM(S): 80 TABLET, FILM COATED ORAL at 21:15

## 2022-12-05 RX ADMIN — HEPARIN SODIUM 5000 UNIT(S): 5000 INJECTION INTRAVENOUS; SUBCUTANEOUS at 05:49

## 2022-12-05 RX ADMIN — CARVEDILOL PHOSPHATE 12.5 MILLIGRAM(S): 80 CAPSULE, EXTENDED RELEASE ORAL at 05:50

## 2022-12-05 RX ADMIN — Medication 1 PUFF(S): at 21:16

## 2022-12-05 RX ADMIN — BUMETANIDE 2 MILLIGRAM(S): 0.25 INJECTION INTRAMUSCULAR; INTRAVENOUS at 05:50

## 2022-12-05 RX ADMIN — AMLODIPINE BESYLATE 10 MILLIGRAM(S): 2.5 TABLET ORAL at 05:50

## 2022-12-05 RX ADMIN — HEPARIN SODIUM 5000 UNIT(S): 5000 INJECTION INTRAVENOUS; SUBCUTANEOUS at 21:21

## 2022-12-05 RX ADMIN — ALBUTEROL 2 PUFF(S): 90 AEROSOL, METERED ORAL at 21:16

## 2022-12-05 RX ADMIN — ALBUTEROL 2 PUFF(S): 90 AEROSOL, METERED ORAL at 17:16

## 2022-12-05 RX ADMIN — Medication 100 MILLIGRAM(S): at 21:15

## 2022-12-05 RX ADMIN — Medication 1 PUFF(S): at 11:12

## 2022-12-05 RX ADMIN — CHLORHEXIDINE GLUCONATE 1 APPLICATION(S): 213 SOLUTION TOPICAL at 05:49

## 2022-12-05 RX ADMIN — ALBUTEROL 2 PUFF(S): 90 AEROSOL, METERED ORAL at 11:11

## 2022-12-05 RX ADMIN — Medication 1 PUFF(S): at 17:15

## 2022-12-05 RX ADMIN — HEPARIN SODIUM 5000 UNIT(S): 5000 INJECTION INTRAVENOUS; SUBCUTANEOUS at 13:45

## 2022-12-05 RX ADMIN — Medication 100 MILLIGRAM(S): at 05:50

## 2022-12-05 NOTE — PROVIDER CONTACT NOTE (OTHER) - REASON
/89 All other vitals stable
Patient BP
Patient has no insulin order; PMHx of diabetes.
Patient's /96.
Patient BP 94/66
patient's /83
pt. having diarrhea
/89 All other vitals stable
refuse AVAPS

## 2022-12-05 NOTE — CHART NOTE - NSCHARTNOTEFT_GEN_A_CORE
Source: Patient A&Ox [4 ], Chart review,     Medical Course: 66 yo F w/ HTN, HLD, sarcoidosis, HFpEF, hx PEA x2, a/w acute respiratory failure (s/p intubation/MICU stay) and septic shock 2/2 UTI (briefly on pressors, completed course of antibiotics), c/b metabolic encephalopathy (improved) and MANDY on CKD.      Nutrition Course: Patient seen at bedside. Endorses appetite has been improving, good appetite in hospital. PO intake ~% per RN flowsheet. Patient continues supplementing with Glucerna 2x daily (440kcals, 20g protein) which patient able to finish 2 bottles daily. Denies chewing and swallowing difficulties. Denies any GI issues (nausea/vomiting/diarrhea/constipation.) last BM today. Labs notable with improved Magnesium. Patient's skin noted with surgical incision to left flanks area.    Weight trend: 11/.5lbs, 11/.7lbs, 11/.4lbs. Weight trend reflects weight loss of 33.1lbs/19.7% x 10days. Patient previously noted with 2+ generalized edema which resolved on 11/27. Weight loss most likely due to fluid loss. RD to remain available for further nutritional interventions as indicated.        Diet, DASH/TLC:   Sodium & Cholesterol Restricted  Consistent Carbohydrate {No Snacks} (CSTCHO)  Supplement Feeding Modality:  Oral  Glucerna Shake Cans or Servings Per Day:  1       Frequency:  Two Times a day (11-29-22 @ 17:53)      GI: WDL. Last BM today     PO intake:  50-75% [x ]   % [x ]      Anthropometrics: Height (cm): 165.1 (11-17)  Weight (kg): 75.9 (11-17)  BMI (kg/m2): 27.8 (11-17)    Edema: 2+ generalized edema resolved on 11/27  Pressure Injuries: None reported at present.     __________________ Pertinent Medications__________________   MEDICATIONS  (STANDING):  albuterol    90 MICROgram(s) HFA Inhaler 2 Puff(s) Inhalation every 6 hours  amLODIPine   Tablet 10 milliGRAM(s) Oral daily  aspirin enteric coated 81 milliGRAM(s) Oral daily  atorvastatin 40 milliGRAM(s) Oral at bedtime  buMETAnide 2 milliGRAM(s) Oral daily  carvedilol 12.5 milliGRAM(s) Oral every 12 hours  chlorhexidine 2% Cloths 1 Application(s) Topical <User Schedule>  dextrose 5%. 1000 milliLiter(s) (50 mL/Hr) IV Continuous <Continuous>  dextrose 5%. 1000 milliLiter(s) (100 mL/Hr) IV Continuous <Continuous>  dextrose 50% Injectable 25 Gram(s) IV Push once  dextrose 50% Injectable 25 Gram(s) IV Push once  dextrose 50% Injectable 12.5 Gram(s) IV Push once  glucagon  Injectable 1 milliGRAM(s) IntraMuscular once  heparin   Injectable 5000 Unit(s) SubCutaneous every 8 hours  hydrALAZINE 100 milliGRAM(s) Oral three times a day  ipratropium 17 MICROgram(s) HFA Inhaler 1 Puff(s) Inhalation every 6 hours    MEDICATIONS  (PRN):  acetaminophen     Tablet .. 650 milliGRAM(s) Oral every 6 hours PRN Mild Pain (1 - 3)  dextrose Oral Gel 15 Gram(s) Oral once PRN Blood Glucose LESS THAN 70 milliGRAM(s)/deciliter      __________________ Pertinent Labs__________________   12-05 Na137 mmol/L Glu 142 mg/dL<H> K+ 4.7 mmol/L Cr  2.28 mg/dL<H> BUN 62 mg/dL<H> 12-05 Phos 3.9 mg/dL        POCT Blood Glucose.: 136 mg/dL (12-03-22 @ 08:28)  POCT Blood Glucose.: 147 mg/dL (12-02-22 @ 22:32)  POCT Blood Glucose.: 154 mg/dL (12-02-22 @ 17:23)        Estimated Needs:   [ x] no change since previous assessment      Previous Nutrition Diagnosis: Malnutrition     Nutrition Diagnosis is [x] resolved     Recommendations:  1) Recommend continue with current diet, which remains appropriate at this time.   2) Encourage PO intake and honor food preferences as able.   3) Monitor PO intake, Labs, weights, BMs, and skin integrity.   4) RD to remain available for further nutritional interventions as indicated.       Monitoring and Evaluation:      [ x] Tolerance to diet prescription [x ] weights [x ] follow up per protocol  [ ] other: Source: Patient A&Ox [4 ], Chart review,     Medical Course: 66 yo F w/ HTN, HLD, sarcoidosis, HFpEF, hx PEA x2, a/w acute respiratory failure (s/p intubation/MICU stay) and septic shock 2/2 UTI (briefly on pressors, completed course of antibiotics), c/b metabolic encephalopathy (improved) and MANDY on CKD.      Nutrition Course: Patient seen at bedside. Endorses appetite has been improving, good appetite in hospital. PO intake ~% per RN flowsheet. Patient continues supplementing with Glucerna 2x daily (440kcals, 20g protein) which patient able to finish 2 bottles daily. Denies chewing and swallowing difficulties. Denies any GI issues (nausea/vomiting/diarrhea/constipation.) last BM today. Labs notable with improved Magnesium. Patient's skin noted with surgical incision to left flanks area.    Weight trend: 11/.5lbs, 11/.7lbs, 11/.4lbs. Weight trend reflects weight loss of 33.1lbs/19.7% x 10days. Patient previously noted with 2+ generalized edema which resolved on 11/27. Weight loss most likely due to fluid loss.  RD to remain available for further nutritional interventions as indicated.        Diet, DASH/TLC:   Sodium & Cholesterol Restricted  Consistent Carbohydrate {No Snacks} (CSTCHO)  Supplement Feeding Modality:  Oral  Glucerna Shake Cans or Servings Per Day:  1       Frequency:  Two Times a day (11-29-22 @ 17:53)      GI: WDL. Last BM today     PO intake:  50-75% [x ]   % [x ]      Anthropometrics: Height (cm): 165.1 (11-17)  Weight (kg): 75.9 (11-17)  BMI (kg/m2): 27.8 (11-17)    Edema: 2+ generalized edema resolved on 11/27  Pressure Injuries: None reported at present.     __________________ Pertinent Medications__________________   MEDICATIONS  (STANDING):  albuterol    90 MICROgram(s) HFA Inhaler 2 Puff(s) Inhalation every 6 hours  amLODIPine   Tablet 10 milliGRAM(s) Oral daily  aspirin enteric coated 81 milliGRAM(s) Oral daily  atorvastatin 40 milliGRAM(s) Oral at bedtime  buMETAnide 2 milliGRAM(s) Oral daily  carvedilol 12.5 milliGRAM(s) Oral every 12 hours  chlorhexidine 2% Cloths 1 Application(s) Topical <User Schedule>  dextrose 5%. 1000 milliLiter(s) (50 mL/Hr) IV Continuous <Continuous>  dextrose 5%. 1000 milliLiter(s) (100 mL/Hr) IV Continuous <Continuous>  dextrose 50% Injectable 25 Gram(s) IV Push once  dextrose 50% Injectable 25 Gram(s) IV Push once  dextrose 50% Injectable 12.5 Gram(s) IV Push once  glucagon  Injectable 1 milliGRAM(s) IntraMuscular once  heparin   Injectable 5000 Unit(s) SubCutaneous every 8 hours  hydrALAZINE 100 milliGRAM(s) Oral three times a day  ipratropium 17 MICROgram(s) HFA Inhaler 1 Puff(s) Inhalation every 6 hours    MEDICATIONS  (PRN):  acetaminophen     Tablet .. 650 milliGRAM(s) Oral every 6 hours PRN Mild Pain (1 - 3)  dextrose Oral Gel 15 Gram(s) Oral once PRN Blood Glucose LESS THAN 70 milliGRAM(s)/deciliter      __________________ Pertinent Labs__________________   12-05 Na137 mmol/L Glu 142 mg/dL<H> K+ 4.7 mmol/L Cr  2.28 mg/dL<H> BUN 62 mg/dL<H> 12-05 Phos 3.9 mg/dL        POCT Blood Glucose.: 136 mg/dL (12-03-22 @ 08:28)  POCT Blood Glucose.: 147 mg/dL (12-02-22 @ 22:32)  POCT Blood Glucose.: 154 mg/dL (12-02-22 @ 17:23)        Estimated Needs:   [ x] no change since previous assessment      Previous Nutrition Diagnosis: Malnutrition     Nutrition Diagnosis is [x] on going   Recommendations:  1) Recommend continue with current diet, which remains appropriate at this time.   2) Encourage PO intake and honor food preferences as able.   3) Monitor PO intake, Labs, weights, BMs, and skin integrity.   4) RD to remain available for further nutritional interventions as indicated.       Monitoring and Evaluation:      [ x] Tolerance to diet prescription [x ] weights [x ] follow up per protocol  [ ] other:

## 2022-12-05 NOTE — PROVIDER CONTACT NOTE (OTHER) - NAME OF MD/NP/PA/DO NOTIFIED:
Philipp HOPE
ACP Randal Gill
OSMIN HOPE
Kiara Aleman, PA
Wesley Denson (PA)
Wesley Denson ACP (PA)
Day He
Philipp HOPE
Kiara Aleman, PA

## 2022-12-05 NOTE — PROVIDER CONTACT NOTE (OTHER) - RECOMMENDATIONS
No recommendations
f/u with provider
explained risks involve to pt, verbalized understanding
No recommendations
continue to monitor
continue to monitor? BP meds?
Give morning BP meds and then recheck
hold standing po hydralazine
f/u with provider

## 2022-12-05 NOTE — PROVIDER CONTACT NOTE (OTHER) - BACKGROUND
Patient admitted for acute respiratory failure
Patient was admitted for acute respiratory failure with hypoxia
Patient admitted for acute respiratory failure with hypoxia
66yo F admitted for acute respiratory failure with hypoxia
pt. admitted with Acute respiratory failure.
66yo F admitted for acute respiratory failure with hpoxia
sarcoid
Patient was admitted for acute respiratory failure with hypoxia.
patient admitted for acute respiratory failure with hypoxia

## 2022-12-05 NOTE — PROGRESS NOTE ADULT - PROBLEM SELECTOR PLAN 10
- VTE ppx: HSQ  - continue to provide emotional support, /holistic visits, patient with multiple hospitalizations in recent history    Dispo: pending workup/continued clinical improvement, PT recs home PT 11/28, appreciate CM/SW assistance w/ setup of home AVAPS
- VTE ppx: HSQ  - continue to provide emotional support, /holistic visits, patient with multiple hospitalizations in recent history    Dispo: pending workup/continued clinical improvement, PT recs home PT 11/28, appreciate CM/SW assistance w/ setup of home AVAPS

## 2022-12-05 NOTE — PROGRESS NOTE ADULT - SUBJECTIVE AND OBJECTIVE BOX
CHIEF COMPLAINT:    Interval Events:    REVIEW OF SYSTEMS:  Constitutional: [ ] negative [ ] fevers [ ] chills [ ] weight loss [ ] weight gain  HEENT: [ ] negative [ ] dry eyes [ ] eye irritation [ ] postnasal drip [ ] nasal congestion  CV: [ ] negative  [ ] chest pain [ ] orthopnea [ ] palpitations [ ] murmur  Resp: [ ] negative [ ] cough [ ] shortness of breath [ ] dyspnea [ ] wheezing [ ] sputum [ ] hemoptysis  GI: [ ] negative [ ] nausea [ ] vomiting [ ] diarrhea [ ] constipation [ ] abd pain [ ] dysphagia   : [ ] negative [ ] dysuria [ ] nocturia [ ] hematuria [ ] increased urinary frequency  Musculoskeletal: [ ] negative [ ] back pain [ ] myalgias [ ] arthralgias [ ] fracture  Skin: [ ] negative [ ] rash [ ] itch  Neurological: [ ] negative [ ] headache [ ] dizziness [ ] syncope [ ] weakness [ ] numbness  Psychiatric: [ ] negative [ ] anxiety [ ] depression  Endocrine: [ ] negative [ ] diabetes [ ] thyroid problem  Hematologic/Lymphatic: [ ] negative [ ] anemia [ ] bleeding problem  Allergic/Immunologic: [ ] negative [ ] itchy eyes [ ] nasal discharge [ ] hives [ ] angioedema  [ ] All other systems negative  [ ] Unable to assess ROS because ________    OBJECTIVE:  ICU Vital Signs Last 24 Hrs  T(C): 36.4 (05 Dec 2022 05:50), Max: 36.8 (04 Dec 2022 15:17)  T(F): 97.5 (05 Dec 2022 05:50), Max: 98.2 (04 Dec 2022 15:17)  HR: 74 (05 Dec 2022 06:56) (58 - 78)  BP: 117/66 (05 Dec 2022 05:50) (102/64 - 141/79)  BP(mean): --  ABP: --  ABP(mean): --  RR: 17 (05 Dec 2022 05:50) (17 - 18)  SpO2: 97% (05 Dec 2022 06:56) (96% - 100%)    O2 Parameters below as of 05 Dec 2022 05:50  Patient On (Oxygen Delivery Method): room air          Mode: AVAPS, RR (machine): 12, TV (machine): 450, FiO2: 30, PEEP: 6, P-High: 25, P-Low: 10, PIP: 17    CAPILLARY BLOOD GLUCOSE      POCT Blood Glucose.: 136 mg/dL (03 Dec 2022 08:28)      PHYSICAL EXAM:  General:   HEENT:   Lymph Nodes:  Neck:   Respiratory:   Cardiovascular:   Abdomen:   Extremities:   Skin:   Neurological:  Psychiatry:    HOSPITAL MEDICATIONS:  aspirin enteric coated 81 milliGRAM(s) Oral daily  heparin   Injectable 5000 Unit(s) SubCutaneous every 8 hours      amLODIPine   Tablet 10 milliGRAM(s) Oral daily  buMETAnide 2 milliGRAM(s) Oral daily  carvedilol 12.5 milliGRAM(s) Oral every 12 hours  hydrALAZINE 100 milliGRAM(s) Oral three times a day    atorvastatin 40 milliGRAM(s) Oral at bedtime  dextrose 50% Injectable 25 Gram(s) IV Push once  dextrose 50% Injectable 25 Gram(s) IV Push once  dextrose 50% Injectable 12.5 Gram(s) IV Push once  dextrose Oral Gel 15 Gram(s) Oral once PRN  glucagon  Injectable 1 milliGRAM(s) IntraMuscular once    albuterol    90 MICROgram(s) HFA Inhaler 2 Puff(s) Inhalation every 6 hours  ipratropium 17 MICROgram(s) HFA Inhaler 1 Puff(s) Inhalation every 6 hours    acetaminophen     Tablet .. 650 milliGRAM(s) Oral every 6 hours PRN          dextrose 5%. 1000 milliLiter(s) IV Continuous <Continuous>  dextrose 5%. 1000 milliLiter(s) IV Continuous <Continuous>      chlorhexidine 2% Cloths 1 Application(s) Topical <User Schedule>        LABS:                        8.7    3.98  )-----------( 280      ( 04 Dec 2022 06:00 )             28.4     Hgb Trend: 8.7<--, 8.4<--, 9.0<--, 9.3<--, 9.4<--  12-05    137  |  97<L>  |  62<H>  ----------------------------<  142<H>  4.7   |  29  |  2.28<H>    Ca    9.1      05 Dec 2022 06:33  Phos  3.9     12-05  Mg     1.80     12-05      Creatinine Trend: 2.28<--, 2.38<--, 2.33<--, 2.50<--, 2.39<--, 1.81<--      Arterial Blood Gas:  12-03 @ 10:25  7.53/36/178/30/98.8/6.9  ABG lactate: --    Venous Blood Gas:  12-05 @ 06:33  7.36/55/100/31/98.2  VBG Lactate: 0.8      MICROBIOLOGY:     RADIOLOGY:  [ ] Reviewed and interpreted by me    PULMONARY FUNCTION TESTS:    EKG:

## 2022-12-05 NOTE — PROGRESS NOTE ADULT - SUBJECTIVE AND OBJECTIVE BOX
[facility] Division of Hospital Medicine  Joaquin Bradford MD  Pager [phone]      Patient is a 67y old  Female who presents with a chief complaint of MANDY on CKD, abnormal VS, altered mental status (05 Dec 2022 07:31)      SUBJECTIVE / OVERNIGHT EVENTS:    no acute event o/n. no new complaint     ADDITIONAL REVIEW OF SYSTEMS:    RESPIRATORY: No cough, wheezing, chills or hemoptysis; No shortness of breath  CARDIOVASCULAR: No chest pain, palpitations, dizziness, or leg swelling  GASTROINTESTINAL: No abdominal or epigastric pain. No nausea, vomiting, or hematemesis; No diarrhea or constipation. No melena or hematochezia.      MEDICATIONS  (STANDING):  albuterol    90 MICROgram(s) HFA Inhaler 2 Puff(s) Inhalation every 6 hours  amLODIPine   Tablet 10 milliGRAM(s) Oral daily  aspirin enteric coated 81 milliGRAM(s) Oral daily  atorvastatin 40 milliGRAM(s) Oral at bedtime  buMETAnide 2 milliGRAM(s) Oral daily  carvedilol 12.5 milliGRAM(s) Oral every 12 hours  chlorhexidine 2% Cloths 1 Application(s) Topical <User Schedule>  dextrose 5%. 1000 milliLiter(s) (100 mL/Hr) IV Continuous <Continuous>  dextrose 5%. 1000 milliLiter(s) (50 mL/Hr) IV Continuous <Continuous>  dextrose 50% Injectable 25 Gram(s) IV Push once  dextrose 50% Injectable 12.5 Gram(s) IV Push once  dextrose 50% Injectable 25 Gram(s) IV Push once  glucagon  Injectable 1 milliGRAM(s) IntraMuscular once  heparin   Injectable 5000 Unit(s) SubCutaneous every 8 hours  hydrALAZINE 100 milliGRAM(s) Oral three times a day  ipratropium 17 MICROgram(s) HFA Inhaler 1 Puff(s) Inhalation every 6 hours    MEDICATIONS  (PRN):  acetaminophen     Tablet .. 650 milliGRAM(s) Oral every 6 hours PRN Mild Pain (1 - 3)  dextrose Oral Gel 15 Gram(s) Oral once PRN Blood Glucose LESS THAN 70 milliGRAM(s)/deciliter      CAPILLARY BLOOD GLUCOSE        I&O's Summary      PHYSICAL EXAM:  Vital Signs Last 24 Hrs  T(C): 36.6 (05 Dec 2022 13:34), Max: 36.6 (05 Dec 2022 13:34)  T(F): 97.9 (05 Dec 2022 13:34), Max: 97.9 (05 Dec 2022 13:34)  HR: 63 (05 Dec 2022 13:34) (60 - 76)  BP: 94/66 (05 Dec 2022 13:34) (94/66 - 133/71)  BP(mean): --  RR: 18 (05 Dec 2022 13:34) (17 - 18)  SpO2: 100% (05 Dec 2022 13:34) (96% - 100%)    Parameters below as of 05 Dec 2022 13:34  Patient On (Oxygen Delivery Method): room air        CONSTITUTIONAL: NAD,  EYES: PERRLA; conjunctiva and sclera clear  ENMT: Moist oral mucosa, no pharyngeal injection or exudates;   NECK: Supple, no palpable masses;  RESPIRATORY: Normal respiratory effort; lungs are clear to auscultation bilaterally  CARDIOVASCULAR: Regular rate and rhythm, normal S1 and S2, no murmur/rub/gallop; No lower extremity edema; Peripheral pulses are 2+ bilaterally  ABDOMEN: Nontender to palpation, normoactive bowel sounds, no rebound/guarding;   MUSCLOSKELETAL:   no clubbing or cyanosis of digits; no joint swelling or tenderness to palpation  PSYCH: A+O to person, place, and time; affect appropriate  NEUROLOGY: CN 2-12 are intact and symmetric; no gross sensory deficits;   SKIN: No rashes;     LABS:                        8.3    3.57  )-----------( 303      ( 05 Dec 2022 06:33 )             27.9     12-05    137  |  97<L>  |  62<H>  ----------------------------<  142<H>  4.7   |  29  |  2.28<H>    Ca    9.1      05 Dec 2022 06:33  Phos  3.9     12-05  Mg     1.80     12-05                  RADIOLOGY & ADDITIONAL TESTS:  Results Reviewed:   Imaging Personally Reviewed:  Electrocardiogram Personally Reviewed:    COORDINATION OF CARE:  Care Discussed with Consultants/Other Providers [Y/N]:  Prior or Outpatient Records Reviewed [Y/N]:

## 2022-12-05 NOTE — PROVIDER CONTACT NOTE (OTHER) - DATE AND TIME:
22-Nov-2022 16:31
28-Nov-2022 03:35
23-Nov-2022 18:29
24-Nov-2022 06:15
01-Dec-2022 00:45
05-Dec-2022 13:35
23-Nov-2022 21:43
22-Nov-2022 17:38
23-Nov-2022 08:50

## 2022-12-05 NOTE — PROVIDER CONTACT NOTE (OTHER) - SITUATION
/85 All other vitals stable
patient's /83. Auscultated /80
/89 All other vitals stable
Patient's /96. Pt w/o headache. Pt's BP when auscultated 164/82.
pt refused AVAPS. States does not want to have a mask on face tonight.
Patient's BP is elevated
Patient has no insulin order; has pmhx of type 2 diabetes
Patient BP 94/66
pt. having diarrhea

## 2022-12-05 NOTE — PROVIDER CONTACT NOTE (OTHER) - ACTION/TREATMENT ORDERED:
Will look into chart/order to see if necessary.
continue to monitor as long as the patient is asymptomatic
x1 5mg hydralazine
Provider made aware, no orders at this time, recheck in an hour.
No new orders at this time. Give morning BP meds and continue to monitor
stool bulking medication to be ordered. Continue to monitor for watery stools.
Will continue to monitor, frequent Hourly rounding,
x1 5mg hydralazine, x1 amlodipine 30 minutes after IVP hydralazine
Will look into chart

## 2022-12-05 NOTE — PROVIDER CONTACT NOTE (OTHER) - ASSESSMENT
BP 94/66, all other VSS. Denies headache or dizziness.
Pt AxO 2-3. Patient's /96. Pt w/o headache. Pt's BP when auscultated 164/82.
no distress
2 episodes of diarrhea noted during my shift. 1 episode of diarrhea during day yesterday as per day RN and PCA in report.
Patient is AO3; 1 assist to turn; no s/s of acute distress noted.
patient's /83. Auscultated /80. Pt w/o complaints of headache
A/O x3, [place], elevated /85, all other vitals stable. Pt is not in distress.
A/O x3, [place], elevated /89, all other vitals stable. Pt is not in distress.
Patient is AO2-3; on 4LC NC;

## 2022-12-06 ENCOUNTER — APPOINTMENT (OUTPATIENT)
Dept: OTOLARYNGOLOGY | Facility: CLINIC | Age: 67
End: 2022-12-06

## 2022-12-06 LAB
ANION GAP SERPL CALC-SCNC: 9 MMOL/L — SIGNIFICANT CHANGE UP (ref 7–14)
BUN SERPL-MCNC: 63 MG/DL — HIGH (ref 7–23)
CALCIUM SERPL-MCNC: 9.3 MG/DL — SIGNIFICANT CHANGE UP (ref 8.4–10.5)
CHLORIDE SERPL-SCNC: 99 MMOL/L — SIGNIFICANT CHANGE UP (ref 98–107)
CO2 SERPL-SCNC: 27 MMOL/L — SIGNIFICANT CHANGE UP (ref 22–31)
CREAT SERPL-MCNC: 2.16 MG/DL — HIGH (ref 0.5–1.3)
EGFR: 24 ML/MIN/1.73M2 — LOW
GLUCOSE SERPL-MCNC: 163 MG/DL — HIGH (ref 70–99)
HCT VFR BLD CALC: 28.6 % — LOW (ref 34.5–45)
HGB BLD-MCNC: 8.7 G/DL — LOW (ref 11.5–15.5)
MAGNESIUM SERPL-MCNC: 1.6 MG/DL — SIGNIFICANT CHANGE UP (ref 1.6–2.6)
MCHC RBC-ENTMCNC: 25.6 PG — LOW (ref 27–34)
MCHC RBC-ENTMCNC: 30.4 GM/DL — LOW (ref 32–36)
MCV RBC AUTO: 84.1 FL — SIGNIFICANT CHANGE UP (ref 80–100)
NRBC # BLD: 0 /100 WBCS — SIGNIFICANT CHANGE UP (ref 0–0)
NRBC # FLD: 0 K/UL — SIGNIFICANT CHANGE UP (ref 0–0)
PHOSPHATE SERPL-MCNC: 3.4 MG/DL — SIGNIFICANT CHANGE UP (ref 2.5–4.5)
PLATELET # BLD AUTO: 306 K/UL — SIGNIFICANT CHANGE UP (ref 150–400)
POTASSIUM SERPL-MCNC: 4.7 MMOL/L — SIGNIFICANT CHANGE UP (ref 3.5–5.3)
POTASSIUM SERPL-SCNC: 4.7 MMOL/L — SIGNIFICANT CHANGE UP (ref 3.5–5.3)
RBC # BLD: 3.4 M/UL — LOW (ref 3.8–5.2)
RBC # FLD: 17.6 % — HIGH (ref 10.3–14.5)
SODIUM SERPL-SCNC: 135 MMOL/L — SIGNIFICANT CHANGE UP (ref 135–145)
WBC # BLD: 4.82 K/UL — SIGNIFICANT CHANGE UP (ref 3.8–10.5)
WBC # FLD AUTO: 4.82 K/UL — SIGNIFICANT CHANGE UP (ref 3.8–10.5)

## 2022-12-06 PROCEDURE — 99232 SBSQ HOSP IP/OBS MODERATE 35: CPT

## 2022-12-06 PROCEDURE — 99232 SBSQ HOSP IP/OBS MODERATE 35: CPT | Mod: GC

## 2022-12-06 RX ADMIN — ALBUTEROL 2 PUFF(S): 90 AEROSOL, METERED ORAL at 06:11

## 2022-12-06 RX ADMIN — CHLORHEXIDINE GLUCONATE 1 APPLICATION(S): 213 SOLUTION TOPICAL at 06:10

## 2022-12-06 RX ADMIN — ALBUTEROL 2 PUFF(S): 90 AEROSOL, METERED ORAL at 11:10

## 2022-12-06 RX ADMIN — ALBUTEROL 2 PUFF(S): 90 AEROSOL, METERED ORAL at 18:00

## 2022-12-06 RX ADMIN — Medication 1 PUFF(S): at 18:00

## 2022-12-06 RX ADMIN — HEPARIN SODIUM 5000 UNIT(S): 5000 INJECTION INTRAVENOUS; SUBCUTANEOUS at 22:49

## 2022-12-06 RX ADMIN — Medication 81 MILLIGRAM(S): at 11:08

## 2022-12-06 RX ADMIN — ATORVASTATIN CALCIUM 40 MILLIGRAM(S): 80 TABLET, FILM COATED ORAL at 22:49

## 2022-12-06 RX ADMIN — Medication 100 MILLIGRAM(S): at 06:04

## 2022-12-06 RX ADMIN — Medication 100 MILLIGRAM(S): at 14:12

## 2022-12-06 RX ADMIN — ALBUTEROL 2 PUFF(S): 90 AEROSOL, METERED ORAL at 22:48

## 2022-12-06 RX ADMIN — Medication 1 PUFF(S): at 11:08

## 2022-12-06 RX ADMIN — Medication 1 PUFF(S): at 06:11

## 2022-12-06 RX ADMIN — AMLODIPINE BESYLATE 10 MILLIGRAM(S): 2.5 TABLET ORAL at 06:04

## 2022-12-06 RX ADMIN — HEPARIN SODIUM 5000 UNIT(S): 5000 INJECTION INTRAVENOUS; SUBCUTANEOUS at 06:05

## 2022-12-06 RX ADMIN — HEPARIN SODIUM 5000 UNIT(S): 5000 INJECTION INTRAVENOUS; SUBCUTANEOUS at 14:12

## 2022-12-06 RX ADMIN — Medication 1 PUFF(S): at 22:47

## 2022-12-06 RX ADMIN — BUMETANIDE 2 MILLIGRAM(S): 0.25 INJECTION INTRAMUSCULAR; INTRAVENOUS at 06:04

## 2022-12-06 RX ADMIN — CARVEDILOL PHOSPHATE 12.5 MILLIGRAM(S): 80 CAPSULE, EXTENDED RELEASE ORAL at 06:04

## 2022-12-06 NOTE — PROGRESS NOTE ADULT - ATTENDING COMMENTS
Pt. with MANDY on CKD. Scr decreased to 2.16 today. Assessment and plan for MANDY on CKD as outlined above. Monitor labs and urine output. Avoid any potential nephrotoxins. Dose medications as per eGFR

## 2022-12-06 NOTE — PROGRESS NOTE ADULT - ASSESSMENT
-Patient's cough is likely 2/2 viral URI but given patient's PMHx (CHF, COPD, atrial fibrillation) her current red flag symptoms (using rescue inhaler more, wheezing, increased oxygen requirements, and needing more pillows at night behind her head to sleep) in addition to non-compliance with medications concern for COPD exacerbation vs. CHF exacerbation vs. PE.  Patient sent to ED for further evaluation.  ED was contacted.     66 yo F w/ HTN, HLD, sarcoidosis, HFpEF, hx PEA x2, a/w acute respiratory failure (s/p intubation/MICU stay) and septic shock 2/2 UTI (briefly on pressors, completed course of antibiotics), c/b metabolic encephalopathy (improved) and MANDY on CKD.

## 2022-12-06 NOTE — PROGRESS NOTE ADULT - ASSESSMENT
Pt. 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 with MANDY on CKD and nephrotic range proteinuria.

## 2022-12-06 NOTE — PROGRESS NOTE ADULT - SUBJECTIVE AND OBJECTIVE BOX
[facility] Division of Hospital Medicine  Joaquin Bradford MD  Pager [phone]      Patient is a 67y old  Female who presents with a chief complaint of MANDY on CKD, abnormal VS, altered mental status (06 Dec 2022 09:45)      SUBJECTIVE / OVERNIGHT EVENTS:    no acute event o/n. no new complaint     ADDITIONAL REVIEW OF SYSTEMS:    RESPIRATORY: No cough, wheezing, chills or hemoptysis; No shortness of breath  CARDIOVASCULAR: No chest pain, palpitations, dizziness, or leg swelling  GASTROINTESTINAL: No abdominal or epigastric pain. No nausea, vomiting, or hematemesis; No diarrhea or constipation. No melena or hematochezia.    MEDICATIONS  (STANDING):  albuterol    90 MICROgram(s) HFA Inhaler 2 Puff(s) Inhalation every 6 hours  amLODIPine   Tablet 10 milliGRAM(s) Oral daily  aspirin enteric coated 81 milliGRAM(s) Oral daily  atorvastatin 40 milliGRAM(s) Oral at bedtime  buMETAnide 2 milliGRAM(s) Oral daily  carvedilol 12.5 milliGRAM(s) Oral every 12 hours  chlorhexidine 2% Cloths 1 Application(s) Topical <User Schedule>  dextrose 5%. 1000 milliLiter(s) (100 mL/Hr) IV Continuous <Continuous>  dextrose 5%. 1000 milliLiter(s) (50 mL/Hr) IV Continuous <Continuous>  dextrose 50% Injectable 25 Gram(s) IV Push once  dextrose 50% Injectable 12.5 Gram(s) IV Push once  dextrose 50% Injectable 25 Gram(s) IV Push once  glucagon  Injectable 1 milliGRAM(s) IntraMuscular once  heparin   Injectable 5000 Unit(s) SubCutaneous every 8 hours  hydrALAZINE 100 milliGRAM(s) Oral three times a day  ipratropium 17 MICROgram(s) HFA Inhaler 1 Puff(s) Inhalation every 6 hours    MEDICATIONS  (PRN):  acetaminophen     Tablet .. 650 milliGRAM(s) Oral every 6 hours PRN Mild Pain (1 - 3)  dextrose Oral Gel 15 Gram(s) Oral once PRN Blood Glucose LESS THAN 70 milliGRAM(s)/deciliter      CAPILLARY BLOOD GLUCOSE        I&O's Summary      PHYSICAL EXAM:  Vital Signs Last 24 Hrs  T(C): 36.8 (06 Dec 2022 13:11), Max: 36.8 (06 Dec 2022 13:11)  T(F): 98.3 (06 Dec 2022 13:11), Max: 98.3 (06 Dec 2022 13:11)  HR: 66 (06 Dec 2022 13:11) (62 - 78)  BP: 120/65 (06 Dec 2022 13:11) (114/64 - 129/72)  BP(mean): --  RR: 17 (06 Dec 2022 13:11) (17 - 18)  SpO2: 100% (06 Dec 2022 13:11) (97% - 100%)    Parameters below as of 06 Dec 2022 13:11  Patient On (Oxygen Delivery Method): room air        CONSTITUTIONAL: NAD,  EYES: PERRLA; conjunctiva and sclera clear  ENMT: Moist oral mucosa, no pharyngeal injection or exudates;   NECK: Supple, no palpable masses;  RESPIRATORY: Normal respiratory effort; lungs are clear to auscultation bilaterally  CARDIOVASCULAR: Regular rate and rhythm, normal S1 and S2, no murmur/rub/gallop; No lower extremity edema; Peripheral pulses are 2+ bilaterally  ABDOMEN: Nontender to palpation, normoactive bowel sounds, no rebound/guarding;   MUSCLOSKELETAL:   no clubbing or cyanosis of digits; no joint swelling or tenderness to palpation  PSYCH: A+O to person, place  NEUROLOGY: [facility] no gross sensory deficits;   SKIN: No rashes;     LABS:                        8.7    4.82  )-----------( 306      ( 06 Dec 2022 06:05 )             28.6     12-06    135  |  99  |  63<H>  ----------------------------<  163<H>  4.7   |  27  |  2.16<H>    Ca    9.3      06 Dec 2022 06:05  Phos  3.4     12-06  Mg     1.60     12-06                  RADIOLOGY & ADDITIONAL TESTS:  Results Reviewed:   Imaging Personally Reviewed:  Electrocardiogram Personally Reviewed:    COORDINATION OF CARE:  Care Discussed with Consultants/Other Providers [Y/N]:  Prior or Outpatient Records Reviewed [Y/N]:

## 2022-12-06 NOTE — PROGRESS NOTE ADULT - PROBLEM SELECTOR PLAN 1
Pt. with MANDY suspected in setting of hemodynamics and volume overload. Pt with MANDY/ATN. On review of [facility] the patient is noted to have a Scr of 1.2mg/dL in 9/2022, on admission it was elevated to 3.01mg/dL, peaked to 3.27mg/dL on 11/20/22. UA was significant for hematuria and proteinuria with TP/CR of 8g on 11/22/22. Kidney sonogram suggestive of B/L renal parenchymal disease. Pt. underwent renal biopsy on 11/30 demonstrating diabetic nephropathy. Labs reviewed. Scr is elevated/stable to 2.16 today (12/6/22). Pt. clinically stable. Monitor labs and urine output. Avoid any potential nephrotoxins. Dose medications as per eGFR.    If any questions, please feel free to contact me     Ar Silva  Nephrology Fellow  [facility] Pager: [phone] Pt. with MANDY on CKD. MANDY likely hemodynamically mediated. On review of [facility], Scr was 1.2 in 9/2022. On current admission, Scr elevated to 3.01 and peaked to 3.27 on 11/20/22. Spot urine TP/CR was significantly elevated at 8.0 on 11/22. Kidney biopsy performed on 11/30 showed diabetic nephropathy. Scr decreased to 2.16 today. Monitor labs and urine output. Avoid any potential nephrotoxins. Dose medications as per eGFR.    If any questions, please feel free to contact me     Ar Silva  Nephrology Fellow  [facility] Pager: [phone]

## 2022-12-06 NOTE — PROGRESS NOTE ADULT - PROBLEM SELECTOR PLAN 2
MANDY on CKD, proteinuria, improving  - s/p renal biopsy 11/30. path c/w DM nephropathy  -  f/u further renal recs  - avoid nephrotoxins, continue to monitor renal function/UOP

## 2022-12-06 NOTE — PROGRESS NOTE ADULT - SUBJECTIVE AND OBJECTIVE BOX
[facility] DIVISION OF KIDNEY DISEASES AND HYPERTENSION -- FOLLOW UP NOTE  --------------------------------------------------------------------------------  HPI: Patient is a 67 year old female with PMH of HTN, HLD, Sarcoidosis who presented to the hospital after outpatient labs demonstrated elevated Scr. On arrival to the ED the patient was noted to have AMS and was hypotensive; she was subsequently intubated and admitted to the MICU. She was successfully extubated shortly after. On review of [facility] the patient is noted to have a Scr of 1.2mg/dL in 9/2022, on admission it was elevated to 3.01mg/dL and peaked to 3.27 on 11/20/22. The pt. was noted to have significant proteinuria with TP/CR ratio of 8.0 on 11/22. Pt underwent a kidney biopsy on 11/30 demonstrating diabetic nephropathy.    24 hour events/subjective:  Pt seen and examined this morning at bedside. Pt reports feeling okay. Denies any SOB, CP, HA, N/V, abdominal pain , urinary symptoms or dizziness.     PAST HISTORY  --------------------------------------------------------------------------------  No significant changes to PMH, PSH, FHx, SHx, unless otherwise noted    ALLERGIES & MEDICATIONS  --------------------------------------------------------------------------------  Allergies    penicillin ([place] Synd)    Intolerances    Standing Inpatient Medications  albuterol    90 MICROgram(s) HFA Inhaler 2 Puff(s) Inhalation every 6 hours  amLODIPine   Tablet 10 milliGRAM(s) Oral daily  aspirin enteric coated 81 milliGRAM(s) Oral daily  atorvastatin 40 milliGRAM(s) Oral at bedtime  buMETAnide 2 milliGRAM(s) Oral daily  carvedilol 12.5 milliGRAM(s) Oral every 12 hours  chlorhexidine 2% Cloths 1 Application(s) Topical <User Schedule>  dextrose 5%. 1000 milliLiter(s) IV Continuous <Continuous>  dextrose 5%. 1000 milliLiter(s) IV Continuous <Continuous>  dextrose 50% Injectable 25 Gram(s) IV Push once  dextrose 50% Injectable 12.5 Gram(s) IV Push once  dextrose 50% Injectable 25 Gram(s) IV Push once  glucagon  Injectable 1 milliGRAM(s) IntraMuscular once  heparin   Injectable 5000 Unit(s) SubCutaneous every 8 hours  hydrALAZINE 100 milliGRAM(s) Oral three times a day  ipratropium 17 MICROgram(s) HFA Inhaler 1 Puff(s) Inhalation every 6 hours    PRN Inpatient Medications  acetaminophen     Tablet .. 650 milliGRAM(s) Oral every 6 hours PRN  dextrose Oral Gel 15 Gram(s) Oral once PRN      REVIEW OF SYSTEMS  --------------------------------------------------------------------------------  Gen: No fever  Skin: No rash  Head/Eyes/Ears: no HA  Respiratory: No dyspnea, cough  CV: No chest pain  GI: No abdominal pain, diarrhea  : No dysuria, hematuria  MSK: No  edema    All other systems were reviewed and are negative, except as noted.    VITALS/PHYSICAL EXAM  --------------------------------------------------------------------------------  T(C): 36.5 (12-06-22 @ 04:48), Max: 36.6 (12-05-22 @ 13:34)  HR: 68 (12-06-22 @ 07:50) (62 - 78)  BP: 120/76 (12-06-22 @ 04:48) (94/66 - 129/72)  RR: 18 (12-06-22 @ 04:48) (18 - 18)  SpO2: 97% (12-06-22 @ 07:50) (97% - 100%)  Wt(kg): --    Physical Exam:  	Gen: ill appearing  	HEENT: MMM, hard of hearing  	Pulm: CTA b/l  	CV: S1S2  	Abd: Soft, +BS   	Ext: No LE edema B/L  	Neuro: Awake and alert  	Skin: Warm and dry    LABS/STUDIES  --------------------------------------------------------------------------------              8.7    4.82  >-----------<  306      [12-06-22 @ 06:05]              28.6     135  |  99  |  63  ----------------------------<  163      [12-06-22 @ 06:05]  4.7   |  27  |  2.16        Ca     9.3     [12-06-22 @ 06:05]      Mg     1.60     [12-06-22 @ 06:05]      Phos  3.4     [12-06-22 @ 06:05]    Creatinine Trend:  SCr 2.16 [12-06 @ 06:05]  SCr 2.28 [12-05 @ 06:33]  SCr 2.38 [12-04 @ 06:00]  SCr 2.33 [12-03 @ 10:25]  SCr 2.50 [12-02 @ 05:04] [facility] DIVISION OF KIDNEY DISEASES AND HYPERTENSION -- FOLLOW UP NOTE  --------------------------------------------------------------------------------  HPI: Patient is a 67 year old female with PMH of HTN, HLD, Sarcoidosis who presented to the hospital after outpatient labs demonstrated elevated Scr. On arrival to the ED the patient was noted to have AMS and was hypotensive; she was subsequently intubated and admitted to the MICU. She was successfully extubated shortly after. [facility] Scr was 1.2 in 9/2022. On current admission, Scr elevated to 3.01 and peaked to 3.27 on 11/20/22. Spot urine TP/CR was significantly elevated at 8.0 on 11/22. Kidney biopsy performed on 11/30 showed diabetic nephropathy. Scr decreased to 2.16 today.    Pt. seen and examined today. Pt. [place] but reports feeling okay. No fever, CP, SOB, HA or dizziness.     PAST HISTORY  --------------------------------------------------------------------------------  No significant changes to PMH, PSH, FHx, SHx, unless otherwise noted    ALLERGIES & MEDICATIONS  --------------------------------------------------------------------------------  Allergies    penicillin ([place] Synd)    Intolerances    Standing Inpatient Medications  albuterol    90 MICROgram(s) HFA Inhaler 2 Puff(s) Inhalation every 6 hours  amLODIPine   Tablet 10 milliGRAM(s) Oral daily  aspirin enteric coated 81 milliGRAM(s) Oral daily  atorvastatin 40 milliGRAM(s) Oral at bedtime  buMETAnide 2 milliGRAM(s) Oral daily  carvedilol 12.5 milliGRAM(s) Oral every 12 hours  chlorhexidine 2% Cloths 1 Application(s) Topical <User Schedule>  dextrose 5%. 1000 milliLiter(s) IV Continuous <Continuous>  dextrose 5%. 1000 milliLiter(s) IV Continuous <Continuous>  dextrose 50% Injectable 25 Gram(s) IV Push once  dextrose 50% Injectable 12.5 Gram(s) IV Push once  dextrose 50% Injectable 25 Gram(s) IV Push once  glucagon  Injectable 1 milliGRAM(s) IntraMuscular once  heparin   Injectable 5000 Unit(s) SubCutaneous every 8 hours  hydrALAZINE 100 milliGRAM(s) Oral three times a day  ipratropium 17 MICROgram(s) HFA Inhaler 1 Puff(s) Inhalation every 6 hours    PRN Inpatient Medications  acetaminophen     Tablet .. 650 milliGRAM(s) Oral every 6 hours PRN  dextrose Oral Gel 15 Gram(s) Oral once PRN    REVIEW OF SYSTEMS  --------------------------------------------------------------------------------  Gen: No fever  Skin: No rash  Head/Eyes/Ears: No HA  Respiratory: No dyspnea  CV: No chest pain  GI: No abdominal pain  : No dysuria  MSK: No LE edema    All other systems were reviewed and are negative, except as noted.    VITALS/PHYSICAL EXAM  --------------------------------------------------------------------------------  T(C): 36.5 (12-06-22 @ 04:48), Max: 36.6 (12-05-22 @ 13:34)  HR: 68 (12-06-22 @ 07:50) (62 - 78)  BP: 120/76 (12-06-22 @ 04:48) (94/66 - 129/72)  RR: 18 (12-06-22 @ 04:48) (18 - 18)  SpO2: 97% (12-06-22 @ 07:50) (97% - 100%)  Wt(kg): --    Physical Exam:  	Gen: resting  	HEENT: MMM, [place]  	Pulm: CTA B/L   	CV: S1S2+  	Abd: Soft, +BS   	Ext: No LE edema B/L  	Neuro: Awake and alert  	Skin: Warm and dry    LABS/STUDIES  --------------------------------------------------------------------------------              8.7    4.82  >-----------<  306      [12-06-22 @ 06:05]              28.6     135  |  99  |  63  ----------------------------<  163      [12-06-22 @ 06:05]  4.7   |  27  |  2.16        Ca     9.3     [12-06-22 @ 06:05]      Mg     1.60     [12-06-22 @ 06:05]      Phos  3.4     [12-06-22 @ 06:05]    Creatinine Trend:  SCr 2.16 [12-06 @ 06:05]  SCr 2.28 [12-05 @ 06:33]  SCr 2.38 [12-04 @ 06:00]  SCr 2.33 [12-03 @ 10:25]  SCr 2.50 [12-02 @ 05:04]

## 2022-12-07 PROCEDURE — 99232 SBSQ HOSP IP/OBS MODERATE 35: CPT

## 2022-12-07 RX ORDER — ATORVASTATIN CALCIUM 80 MG/1
1 TABLET, FILM COATED ORAL
Qty: 0 | Refills: 0 | DISCHARGE
Start: 2022-12-07

## 2022-12-07 RX ADMIN — ALBUTEROL 2 PUFF(S): 90 AEROSOL, METERED ORAL at 22:41

## 2022-12-07 RX ADMIN — Medication 1 PUFF(S): at 11:24

## 2022-12-07 RX ADMIN — HEPARIN SODIUM 5000 UNIT(S): 5000 INJECTION INTRAVENOUS; SUBCUTANEOUS at 06:50

## 2022-12-07 RX ADMIN — Medication 1 PUFF(S): at 18:16

## 2022-12-07 RX ADMIN — Medication 100 MILLIGRAM(S): at 06:50

## 2022-12-07 RX ADMIN — Medication 100 MILLIGRAM(S): at 22:41

## 2022-12-07 RX ADMIN — ALBUTEROL 2 PUFF(S): 90 AEROSOL, METERED ORAL at 18:16

## 2022-12-07 RX ADMIN — Medication 81 MILLIGRAM(S): at 11:24

## 2022-12-07 RX ADMIN — HEPARIN SODIUM 5000 UNIT(S): 5000 INJECTION INTRAVENOUS; SUBCUTANEOUS at 13:52

## 2022-12-07 RX ADMIN — AMLODIPINE BESYLATE 10 MILLIGRAM(S): 2.5 TABLET ORAL at 06:49

## 2022-12-07 RX ADMIN — HEPARIN SODIUM 5000 UNIT(S): 5000 INJECTION INTRAVENOUS; SUBCUTANEOUS at 22:54

## 2022-12-07 RX ADMIN — Medication 1 PUFF(S): at 22:40

## 2022-12-07 RX ADMIN — CARVEDILOL PHOSPHATE 12.5 MILLIGRAM(S): 80 CAPSULE, EXTENDED RELEASE ORAL at 18:16

## 2022-12-07 RX ADMIN — BUMETANIDE 2 MILLIGRAM(S): 0.25 INJECTION INTRAMUSCULAR; INTRAVENOUS at 06:49

## 2022-12-07 RX ADMIN — ATORVASTATIN CALCIUM 40 MILLIGRAM(S): 80 TABLET, FILM COATED ORAL at 22:41

## 2022-12-07 RX ADMIN — CARVEDILOL PHOSPHATE 12.5 MILLIGRAM(S): 80 CAPSULE, EXTENDED RELEASE ORAL at 06:50

## 2022-12-07 RX ADMIN — ALBUTEROL 2 PUFF(S): 90 AEROSOL, METERED ORAL at 11:24

## 2022-12-07 RX ADMIN — CHLORHEXIDINE GLUCONATE 1 APPLICATION(S): 213 SOLUTION TOPICAL at 07:00

## 2022-12-07 NOTE — PROGRESS NOTE ADULT - SUBJECTIVE AND OBJECTIVE BOX
[facility] Division of [facility]  Joaquin Bradford MD  Pager [phone]      Patient is a 67y old  Female who presents with a chief complaint of MANDY on CKD, abnormal VS, altered mental status (06 Dec 2022 14:27)      SUBJECTIVE / OVERNIGHT EVENTS:    no acute event o/n. no new complaint     ADDITIONAL REVIEW OF SYSTEMS:    RESPIRATORY: No cough, wheezing, chills or hemoptysis; No shortness of breath  CARDIOVASCULAR: No chest pain, palpitations, dizziness, or leg swelling  GASTROINTESTINAL: No abdominal or epigastric pain. No nausea, vomiting, or hematemesis; No diarrhea or constipation. No melena or hematochezia.      MEDICATIONS  (STANDING):  albuterol    90 MICROgram(s) HFA Inhaler 2 Puff(s) Inhalation every 6 hours  amLODIPine   Tablet 10 milliGRAM(s) Oral daily  aspirin enteric coated 81 milliGRAM(s) Oral daily  atorvastatin 40 milliGRAM(s) Oral at bedtime  buMETAnide 2 milliGRAM(s) Oral daily  carvedilol 12.5 milliGRAM(s) Oral every 12 hours  chlorhexidine 2% Cloths 1 Application(s) Topical <User Schedule>  dextrose 5%. 1000 milliLiter(s) (50 mL/Hr) IV Continuous <Continuous>  dextrose 5%. 1000 milliLiter(s) (100 mL/Hr) IV Continuous <Continuous>  dextrose 50% Injectable 25 Gram(s) IV Push once  dextrose 50% Injectable 12.5 Gram(s) IV Push once  dextrose 50% Injectable 25 Gram(s) IV Push once  glucagon  Injectable 1 milliGRAM(s) IntraMuscular once  heparin   Injectable 5000 Unit(s) SubCutaneous every 8 hours  hydrALAZINE 100 milliGRAM(s) Oral three times a day  ipratropium 17 MICROgram(s) HFA Inhaler 1 Puff(s) Inhalation every 6 hours    MEDICATIONS  (PRN):  acetaminophen     Tablet .. 650 milliGRAM(s) Oral every 6 hours PRN Mild Pain (1 - 3)  dextrose Oral Gel 15 Gram(s) Oral once PRN Blood Glucose LESS THAN 70 milliGRAM(s)/deciliter      CAPILLARY BLOOD GLUCOSE        I&O's Summary    06 Dec 2022 07:01  -  07 Dec 2022 07:00  --------------------------------------------------------  IN: 0 mL / OUT: 1000 mL / NET: -1000 mL        PHYSICAL EXAM:  Vital Signs Last 24 Hrs  T(C): 36.6 (07 Dec 2022 13:51), Max: 37.1 (06 Dec 2022 22:42)  T(F): 97.9 (07 Dec 2022 13:51), Max: 98.7 (06 Dec 2022 22:42)  HR: 66 (07 Dec 2022 13:51) (62 - 74)  BP: 102/58 (07 Dec 2022 13:51) (102/58 - 128/68)  BP(mean): --  RR: 18 (07 Dec 2022 13:51) (18 - 18)  SpO2: 99% (07 Dec 2022 13:51) (98% - 100%)    Parameters below as of 07 Dec 2022 13:51  Patient On (Oxygen Delivery Method): room air        CONSTITUTIONAL: NAD,  EYES: PERRLA; conjunctiva and sclera clear  ENMT: Moist oral mucosa, no pharyngeal injection or exudates;   NECK: Supple, no palpable masses;  RESPIRATORY: Normal respiratory effort; lungs are clear to auscultation bilaterally  CARDIOVASCULAR: Regular rate and rhythm, normal S1 and S2, no murmur/rub/gallop; No lower extremity edema; Peripheral pulses are 2+ bilaterally  ABDOMEN: Nontender to palpation, normoactive bowel sounds, no rebound/guarding;   MUSCLOSKELETAL:   no clubbing or cyanosis of digits; no joint swelling or tenderness to palpation  PSYCH: A+O to person, place  NEUROLOGY: [place] no gross sensory deficits;   SKIN: No rashes;     LABS:                        8.7    4.82  )-----------( 306      ( 06 Dec 2022 06:05 )             28.6     12-06    135  |  99  |  63<H>  ----------------------------<  163<H>  4.7   |  27  |  2.16<H>    Ca    9.3      06 Dec 2022 06:05  Phos  3.4     12-06  Mg     1.60     12-06                  RADIOLOGY & ADDITIONAL TESTS:  Results Reviewed:   Imaging Personally Reviewed:  Electrocardiogram Personally Reviewed:    COORDINATION OF CARE:  Care Discussed with Consultants/Other Providers [Y/N]:  Prior or Outpatient Records Reviewed [Y/N]:

## 2022-12-07 NOTE — PROGRESS NOTE ADULT - ASSESSMENT
68 yo F w/ HTN, HLD, sarcoidosis, HFpEF, hx PEA x2, a/w acute respiratory failure (s/p intubation/MICU stay) and septic shock 2/2 UTI (briefly on pressors, completed course of antibiotics), c/b metabolic encephalopathy (improved) and MANDY on CKD.

## 2022-12-08 PROCEDURE — 99232 SBSQ HOSP IP/OBS MODERATE 35: CPT

## 2022-12-08 PROCEDURE — 99232 SBSQ HOSP IP/OBS MODERATE 35: CPT | Mod: GC

## 2022-12-08 RX ADMIN — ALBUTEROL 2 PUFF(S): 90 AEROSOL, METERED ORAL at 13:28

## 2022-12-08 RX ADMIN — HEPARIN SODIUM 5000 UNIT(S): 5000 INJECTION INTRAVENOUS; SUBCUTANEOUS at 13:29

## 2022-12-08 RX ADMIN — Medication 1 PUFF(S): at 13:29

## 2022-12-08 RX ADMIN — Medication 650 MILLIGRAM(S): at 22:50

## 2022-12-08 RX ADMIN — HEPARIN SODIUM 5000 UNIT(S): 5000 INJECTION INTRAVENOUS; SUBCUTANEOUS at 21:53

## 2022-12-08 RX ADMIN — Medication 650 MILLIGRAM(S): at 21:54

## 2022-12-08 RX ADMIN — CHLORHEXIDINE GLUCONATE 1 APPLICATION(S): 213 SOLUTION TOPICAL at 06:59

## 2022-12-08 RX ADMIN — HEPARIN SODIUM 5000 UNIT(S): 5000 INJECTION INTRAVENOUS; SUBCUTANEOUS at 06:51

## 2022-12-08 RX ADMIN — AMLODIPINE BESYLATE 10 MILLIGRAM(S): 2.5 TABLET ORAL at 06:48

## 2022-12-08 RX ADMIN — ALBUTEROL 2 PUFF(S): 90 AEROSOL, METERED ORAL at 17:21

## 2022-12-08 RX ADMIN — CARVEDILOL PHOSPHATE 12.5 MILLIGRAM(S): 80 CAPSULE, EXTENDED RELEASE ORAL at 06:50

## 2022-12-08 RX ADMIN — Medication 81 MILLIGRAM(S): at 13:29

## 2022-12-08 RX ADMIN — Medication 1 PUFF(S): at 17:21

## 2022-12-08 RX ADMIN — Medication 1 PUFF(S): at 21:52

## 2022-12-08 RX ADMIN — Medication 100 MILLIGRAM(S): at 21:53

## 2022-12-08 RX ADMIN — ATORVASTATIN CALCIUM 40 MILLIGRAM(S): 80 TABLET, FILM COATED ORAL at 21:54

## 2022-12-08 RX ADMIN — ALBUTEROL 2 PUFF(S): 90 AEROSOL, METERED ORAL at 21:52

## 2022-12-08 NOTE — PROGRESS NOTE ADULT - PROBLEM SELECTOR PROBLEM 5
Chronic heart failure with preserved ejection fraction
HTN (hypertension)
Chronic heart failure with preserved ejection fraction
HTN (hypertension)
HTN (hypertension)
Chronic heart failure with preserved ejection fraction

## 2022-12-08 NOTE — PROGRESS NOTE ADULT - PROBLEM SELECTOR PLAN 5
septic on admission, weaned off pressor. Now hypertensive on floors  - c/w home amlodipine and reduced dose hydralazine 50mg TID (home 100mg TID; can uptitrate to home dose as needed)--will need to switch to IV meds if mental status doesn't improve  - c/w bumex--will need to switch to IV meds if mental status doesn't improve
- c/w bumex as per renal  - continue to monitor INOs  - comfortable on RA
septic on admission, weaned off pressor. Now hypertensive on floors  - c/w home amlodipine and reduced dose hydralazine 50mg TID (home 100mg TID; can uptitrate to home dose as needed)  - c/w bumex
septic on admission, weaned off pressor. Now hypertensive on floors  -c/w home amlodipine and reduced dose hydralazine 50mg TID (home 100mg TID; can uptitrate to home dose as needed)  -c/w bumex
septic on admission, weaned off pressor. Now hypertensive on floors  - c/w home amlodipine and reduced dose hydralazine 50mg TID (home 100mg TID; can uptitrate to home dose as needed)--will need to switch to IV meds if mental status doesn't improve  - c/w bumex--will need to switch to IV meds if mental status doesn't improve
- c/w bumex as per renal  - continue to monitor INOs  - comfortable on RA
septic on admission, weaned off pressor. Now hypertensive on floors  - c/w home amlodipine and reduced dose hydralazine 50mg TID (home 100mg TID; can uptitrate to home dose as needed)--will need to switch to IV meds if mental status doesn't improve  - c/w bumex--will need to switch to IV meds if mental status doesn't improve
- c/w bumex as per renal  - continue to monitor INOs  - comfortable on RA

## 2022-12-08 NOTE — PROGRESS NOTE ADULT - PROBLEM SELECTOR PROBLEM 6
Anemia
HLD (hyperlipidemia)
HLD (hyperlipidemia)
Anemia
HLD (hyperlipidemia)
Anemia

## 2022-12-08 NOTE — PROGRESS NOTE ADULT - PROBLEM SELECTOR PLAN 8
- c/w glucerna shakes as recommended by nutrition
Septic shock 2/2 suspected UTI c/b acute hypoxic/hypercapnic respiratory failure   - briefly on pressors, shock resolved, completed course of antibiotics, extubated, transferred out of MICU, respiratory status stable on room air
DVT ppx: heparin sub  dispo: pending clinical improvement, weaning O2 as tolerated
DVT ppx: heparin sub  dispo: pending clinical improvement
- c/w glucerna shakes as recommended by nutrition
DVT ppx: heparin sub  dispo: pending clinical improvement, weaning O2 as tolerated
DVT ppx: heparin sub  dispo: pending clinical improvement, weaning O2 as tolerated
Septic shock 2/2 suspected UTI c/b acute hypoxic/hypercapnic respiratory failure   - briefly on pressors, shock resolved, completed course of antibiotics, extubated, transferred out of MICU, respiratory status stable on room air
DVT ppx: heparin sub  dispo: pending clinical improvement, weaning O2 as tolerated
Septic shock 2/2 suspected UTI c/b acute hypoxic/hypercapnic respiratory failure   - briefly on pressors, shock resolved, completed course of antibiotics, extubated, transferred out of MICU, respiratory status stable on room air

## 2022-12-08 NOTE — PROGRESS NOTE ADULT - PROBLEM SELECTOR PROBLEM 3
Metabolic encephalopathy
Acute kidney injury superimposed on CKD
Metabolic encephalopathy
Acute kidney injury superimposed on CKD
Metabolic encephalopathy

## 2022-12-08 NOTE — PROGRESS NOTE ADULT - PROBLEM SELECTOR PLAN 9
Septic shock 2/2 suspected UTI c/b acute hypoxic/hypercapnic respiratory failure   - briefly on pressors, shock resolved, completed course of antibiotics, extubated, transferred out of MICU, respiratory status stable on room air
- VTE ppx: HSQ  - continue to provide emotional support, /holistic visits, patient with multiple hospitalizations in recent history    Dispo: pending workup/continued clinical improvement, PT recs home PT 11/28, appreciate CM/SW assistance w/ setup of home AVAPS
Septic shock 2/2 suspected UTI c/b acute hypoxic/hypercapnic respiratory failure   - briefly on pressors, shock resolved, completed course of antibiotics, extubated, transferred out of MICU, respiratory status stable on room air
- VTE ppx: HSQ  - continue to provide emotional support, /holistic visits, patient with multiple hospitalizations in recent history    Dispo: home with home PT and home AVAPS
- VTE ppx: HSQ  - continue to provide emotional support, /holistic visits, patient with multiple hospitalizations in recent history    Dispo: pending workup/continued clinical improvement, PT recs home PT 11/28, appreciate CM/SW assistance w/ setup of home AVAPS

## 2022-12-08 NOTE — PROGRESS NOTE ADULT - PROBLEM SELECTOR PROBLEM 4
Diastolic heart failure, unspecified HF chronicity
Essential hypertension
Diastolic heart failure, unspecified HF chronicity
Essential hypertension
Diastolic heart failure, unspecified HF chronicity
Essential hypertension

## 2022-12-08 NOTE — PROGRESS NOTE ADULT - PROBLEM SELECTOR PROBLEM 9
Need for prophylactic measure
Need for prophylactic measure
Septic shock
Septic shock
Need for prophylactic measure

## 2022-12-08 NOTE — PROGRESS NOTE ADULT - PROBLEM SELECTOR PLAN 4
- on hydralazine, amlodipine, bumex, coreg  - BP better controlled, appreciate renal assistance  - monitor BP and titrate regimen further as needed
Echo from this admission shows diastolic dysfx stage I. EF grossly normal  -c/w bumex  -strict I&Os
- on hydralazine, amlodipine, bumex, coreg  - BP better controlled, appreciate renal assistance  - monitor BP and titrate regimen further as needed
Echo from this admission shows diastolic dysfx stage I. EF grossly normal  - c/w bumex  - strict I&Os
Echo from this admission shows diastolic dysfx stage I. EF grossly normal  - c/w bumex  - strict I&Os
- on hydralazine, amlodipine, bumex, coreg  - BP better controlled, appreciate renal assistance  - monitor BP and titrate regimen further as needed
Echo from this admission shows diastolic dysfx stage I. EF grossly normal  - c/w bumex  - strict I&Os
Echo from this admission shows diastolic dysfx stage I. EF grossly normal  - c/w bumex  - strict I&Os

## 2022-12-08 NOTE — PROGRESS NOTE ADULT - PROBLEM SELECTOR PROBLEM 2
Urinary tract infection
Chronic hypercapnic respiratory failure
Chronic hypercapnic respiratory failure
Urinary tract infection
Urinary tract infection
Acute kidney injury superimposed on chronic kidney disease
Urinary tract infection
Urinary tract infection
Acute kidney injury superimposed on chronic kidney disease
Acute kidney injury superimposed on chronic kidney disease

## 2022-12-08 NOTE — PROGRESS NOTE ADULT - REASON FOR ADMISSION
MANDY on CKD, abnormal VS, altered mental status
hypoxic resp failure
MANDY on CKD, abnormal VS, altered mental status

## 2022-12-08 NOTE — PROGRESS NOTE ADULT - ATTENDING SUPERVISION STATEMENT
Resident
Fellow
Resident
Fellow

## 2022-12-08 NOTE — PROGRESS NOTE ADULT - NUTRITIONAL ASSESSMENT
This patient has been assessed with a concern for Malnutrition and has been determined to have a diagnosis/diagnoses of Severe protein-calorie malnutrition.    This patient is being managed with:   Diet DASH/TLC-  Sodium & Cholesterol Restricted  Consistent Carbohydrate {No Snacks} (CSTCHO)  Supplement Feeding Modality:  Oral  Glucerna Shake Cans or Servings Per Day:  1       Frequency:  Two Times a day  Entered: Nov 29 2022  5:54PM    
This patient has been assessed with a concern for Malnutrition and has been determined to have a diagnosis/diagnoses of Severe protein-calorie malnutrition.    This patient is being managed with:   Diet NPO after Midnight-     NPO Start Date: 29-Nov-2022   NPO Start Time: 23:59  Except Medications  With Ice Chips/Sips of Water  Entered: Nov 30 2022  1:56AM    Diet DASH/TLC-  Sodium & Cholesterol Restricted  Consistent Carbohydrate {No Snacks} (CSTCHO)  Supplement Feeding Modality:  Oral  Glucerna Shake Cans or Servings Per Day:  1       Frequency:  Two Times a day  Entered: Nov 29 2022  5:54PM    
This patient has been assessed with a concern for Malnutrition and has been determined to have a diagnosis/diagnoses of Severe protein-calorie malnutrition.    This patient is being managed with:   Diet DASH/TLC-  Sodium & Cholesterol Restricted  Consistent Carbohydrate {No Snacks} (CSTCHO)  Supplement Feeding Modality:  Oral  Glucerna Shake Cans or Servings Per Day:  1       Frequency:  Two Times a day  Entered: Nov 29 2022  5:54PM    

## 2022-12-08 NOTE — PROGRESS NOTE ADULT - PROBLEM SELECTOR PLAN 1
Pt. with MANDY on CKD. MANDY likely hemodynamically mediated. On [facility] of [facility], Scr was 1.2 in 9/2022. On current admission, Scr elevated to 3.01 and peaked to 3.27 on 11/20/22. Spot urine TP/CR was significantly elevated at 8.0 on 11/22. Kidney biopsy performed on 11/30 showed diabetic nephropathy. Scr stable at 2.16 on 12/6/22. Monitor labs and urine output. Avoid any potential nephrotoxins. Dose medications as per eGFR.    Will discontinue follow up. Reconsult as needed.     If any questions, please feel free to contact me     Ar Silva  Nephrology Fellow  [facility] Pager: [phone]. Pt. with MANDY on CKD. MANDY likely hemodynamically mediated. On review of [facility], Scr was 1.2 in 9/2022. On current admission, Scr elevated to 3.01 and peaked to 3.27 on 11/20/22. Spot urine TP/CR was significantly elevated at 8.0 on 11/22. Kidney biopsy performed on 11/30 showed diabetic nephropathy. Scr stable at 2.16 on 12/6/22. Monitor labs and urine output. Avoid any potential nephrotoxins. Dose medications as per eGFR.    Will discontinue follow up. Reconsult as needed. Pt. can follow-up in [facility] renal clinic ([facility]) upon discharge.     If any questions, please feel free to contact me     Ar Silva  Nephrology Fellow  [facility] Pager: [phone].

## 2022-12-08 NOTE — PROGRESS NOTE ADULT - PROBLEM SELECTOR PROBLEM 8
Prophylactic measure
Septic shock
Septic shock
Prophylactic measure
Prophylactic measure
Severe protein-calorie malnutrition
Septic shock
Prophylactic measure
Prophylactic measure
Severe protein-calorie malnutrition

## 2022-12-08 NOTE — PROGRESS NOTE ADULT - SUBJECTIVE AND OBJECTIVE BOX
[facility] Division of Hospital Medicine  Joaquin Bradford MD  Pager [phone]      Patient is a 67y old  Female who presents with a chief complaint of MANDY on CKD, abnormal VS, altered mental status (08 Dec 2022 10:28)      SUBJECTIVE / OVERNIGHT EVENTS:    no acute event o/n. no new complaint     ADDITIONAL REVIEW OF SYSTEMS:    RESPIRATORY: No cough, wheezing, chills or hemoptysis; No shortness of breath  CARDIOVASCULAR: No chest pain, palpitations, dizziness, or leg swelling  GASTROINTESTINAL: No abdominal or epigastric pain. No nausea, vomiting, or hematemesis; No diarrhea or constipation. No melena or hematochezia.      MEDICATIONS  (STANDING):  albuterol    90 MICROgram(s) HFA Inhaler 2 Puff(s) Inhalation every 6 hours  amLODIPine   Tablet 10 milliGRAM(s) Oral daily  aspirin enteric coated 81 milliGRAM(s) Oral daily  atorvastatin 40 milliGRAM(s) Oral at bedtime  buMETAnide 2 milliGRAM(s) Oral daily  carvedilol 12.5 milliGRAM(s) Oral every 12 hours  chlorhexidine 2% Cloths 1 Application(s) Topical <User Schedule>  dextrose 5%. 1000 milliLiter(s) (50 mL/Hr) IV Continuous <Continuous>  dextrose 5%. 1000 milliLiter(s) (100 mL/Hr) IV Continuous <Continuous>  dextrose 50% Injectable 12.5 Gram(s) IV Push once  dextrose 50% Injectable 25 Gram(s) IV Push once  dextrose 50% Injectable 25 Gram(s) IV Push once  glucagon  Injectable 1 milliGRAM(s) IntraMuscular once  heparin   Injectable 5000 Unit(s) SubCutaneous every 8 hours  hydrALAZINE 100 milliGRAM(s) Oral three times a day  ipratropium 17 MICROgram(s) HFA Inhaler 1 Puff(s) Inhalation every 6 hours    MEDICATIONS  (PRN):  acetaminophen     Tablet .. 650 milliGRAM(s) Oral every 6 hours PRN Mild Pain (1 - 3)  dextrose Oral Gel 15 Gram(s) Oral once PRN Blood Glucose LESS THAN 70 milliGRAM(s)/deciliter      CAPILLARY BLOOD GLUCOSE        I&O's Summary      PHYSICAL EXAM:  Vital Signs Last 24 Hrs  T(C): 36.8 (08 Dec 2022 13:11), Max: 36.8 (07 Dec 2022 22:38)  T(F): 98.2 (08 Dec 2022 13:11), Max: 98.2 (07 Dec 2022 22:38)  HR: 70 (08 Dec 2022 13:11) (59 - 70)  BP: 110/65 (08 Dec 2022 13:11) (102/58 - 136/63)  BP(mean): --  RR: 18 (08 Dec 2022 13:11) (18 - 18)  SpO2: 100% (08 Dec 2022 13:11) (95% - 100%)    Parameters below as of 08 Dec 2022 13:11  Patient On (Oxygen Delivery Method): room air        CONSTITUTIONAL: NAD,  EYES: PERRLA; conjunctiva and sclera clear  ENMT: Moist oral mucosa, no pharyngeal injection or exudates;   NECK: Supple, no palpable masses;  RESPIRATORY: Normal respiratory effort; lungs are clear to auscultation bilaterally  CARDIOVASCULAR: Regular rate and rhythm, normal S1 and S2, no murmur/rub/gallop; No lower extremity edema; Peripheral pulses are 2+ bilaterally  ABDOMEN: Nontender to palpation, normoactive bowel sounds, no rebound/guarding;   MUSCLOSKELETAL:   no clubbing or cyanosis of digits; no joint swelling or tenderness to palpation  PSYCH: A+O to person, place  NEUROLOGY: [place] no gross sensory deficits;   SKIN: No rashes;     LABS:                      RADIOLOGY & ADDITIONAL TESTS:  Results Reviewed:   Imaging Personally Reviewed:  Electrocardiogram Personally Reviewed:    COORDINATION OF CARE:  Care Discussed with Consultants/Other Providers [Y/N]:  Prior or Outpatient Records Reviewed [Y/N]:

## 2022-12-08 NOTE — PROGRESS NOTE ADULT - PROBLEM SELECTOR PLAN 6
- stable overall, no overt signs of bleeding  - potentially anemia of chronic disease  - continue to monitor, no need for transfusion at this time

## 2022-12-08 NOTE — PROGRESS NOTE ADULT - SUBJECTIVE AND OBJECTIVE BOX
[facility] DIVISION OF KIDNEY DISEASES AND HYPERTENSION -- FOLLOW UP NOTE  --------------------------------------------------------------------------------  HPI: Patient is a 67 year old female with PMH of HTN, HLD, Sarcoidosis who presented to the hospital after outpatient labs demonstrated elevated Scr. On arrival to the ED the patient was noted to have AMS and was hypotensive; she was subsequently intubated and admitted to the MICU. She was successfully extubated shortly after. [facility].2 in [facility]/20[facility]. On current admission, Scr elevated to 3.01 and peaked to 3.27 on 11/20/22. Spot urine TP/CR was significantly elevated at 8.0 on 11/22. Kidney biopsy performed on 11/30 showed diabetic nephropathy. Scr decreased to 2.16 on 12/6/22.    Pt. seen and examined today. Pt. is very hard of hearing but reports feeling okay. No fever, CP, SOB, HA or dizziness.     PAST HISTORY  --------------------------------------------------------------------------------  No significant changes to PMH, PSH, FHx, SHx, unless otherwise noted    ALLERGIES & MEDICATIONS  --------------------------------------------------------------------------------  Allergies    penicillin ([place] Synd)    Intolerances    Standing Inpatient Medications  albuterol    90 MICROgram(s) HFA Inhaler 2 Puff(s) Inhalation every 6 hours  amLODIPine   Tablet 10 milliGRAM(s) Oral daily  aspirin enteric coated 81 milliGRAM(s) Oral daily  atorvastatin 40 milliGRAM(s) Oral at bedtime  buMETAnide 2 milliGRAM(s) Oral daily  carvedilol 12.5 milliGRAM(s) Oral every 12 hours  chlorhexidine 2% Cloths 1 Application(s) Topical <User Schedule>  dextrose 5%. 1000 milliLiter(s) IV Continuous <Continuous>  dextrose 5%. 1000 milliLiter(s) IV Continuous <Continuous>  dextrose 50% Injectable 25 Gram(s) IV Push once  dextrose 50% Injectable 12.5 Gram(s) IV Push once  dextrose 50% Injectable 25 Gram(s) IV Push once  glucagon  Injectable 1 milliGRAM(s) IntraMuscular once  heparin   Injectable 5000 Unit(s) SubCutaneous every 8 hours  hydrALAZINE 100 milliGRAM(s) Oral three times a day  ipratropium 17 MICROgram(s) HFA Inhaler 1 Puff(s) Inhalation every 6 hours    PRN Inpatient Medications  acetaminophen     Tablet .. 650 milliGRAM(s) Oral every 6 hours PRN  dextrose Oral Gel 15 Gram(s) Oral once PRN    REVIEW OF SYSTEMS  --------------------------------------------------------------------------------  Gen: No fever  Skin: No rash  Head/Eyes/Ears: No HA  Respiratory: No dyspnea  CV: No chest pain  GI: No abdominal pain  : No dysuria  MSK: No LE edema    All other systems were reviewed and are negative, except as noted.    VITALS/PHYSICAL EXAM  --------------------------------------------------------------------------------  T(C): 36.3 (12-08-22 @ 06:44), Max: 36.8 (12-07-22 @ 22:38)  HR: 70 (12-08-22 @ 08:05) (59 - 70)  BP: 110/63 (12-08-22 @ 06:44) (102/58 - 136/63)  RR: 18 (12-08-22 @ 06:44) (18 - 18)  SpO2: 95% (12-08-22 @ 08:05) (95% - 100%)  Wt(kg): --    Physical Exam:  	Gen: resting  	HEENT: MMM, [place]  	Pulm: CTA B/L   	CV: S1S2+  	Abd: Soft, +BS   	Ext: No LE edema B/L  	Neuro: Awake and alert  	Skin: Warm and dry    LABS/STUDIES  --------------------------------------------------------------------------------  Creatinine Trend:  SCr 2.16 [12-06 @ 06:05]  SCr 2.28 [12-05 @ 06:33]  SCr 2.38 [12-04 @ 06:00]  SCr 2.33 [12-03 @ 10:25]  SCr 2.50 [12-02 @ 05:04] [facility] DIVISION OF KIDNEY DISEASES AND HYPERTENSION -- FOLLOW UP NOTE  --------------------------------------------------------------------------------  HPI: Patient is a 67 year old female with PMH of HTN, HLD, Sarcoidosis who presented to the hospital after outpatient labs demonstrated elevated Scr. On arrival to the ED the patient was noted to have AMS and was hypotensive; she was subsequently intubated and admitted to the MICU. She was successfully extubated shortly after. [facility].2 in [facility]/20[facility]. On current admission, Scr elevated to 3.01 and peaked to 3.27 on 11/20/22. Spot urine TP/CR was significantly elevated at 8.0 on 11/22. Kidney biopsy performed on 11/30/22 showed diabetic nephropathy. Scr decreased to 2.16 on 12/6/22.    Pt. seen and examined today. Pt. is very hard of hearing but reports feeling okay. Pt. says she wants to go home. No fever, CP, SOB, HA or dizziness.     PAST HISTORY  --------------------------------------------------------------------------------  No significant changes to PMH, PSH, FHx, SHx, unless otherwise noted    ALLERGIES & MEDICATIONS  --------------------------------------------------------------------------------  Allergies    penicillin (Red Man Synd)    Intolerances    Standing Inpatient Medications  albuterol    90 MICROgram(s) HFA Inhaler 2 Puff(s) Inhalation every 6 hours  amLODIPine   Tablet 10 milliGRAM(s) Oral daily  aspirin enteric coated 81 milliGRAM(s) Oral daily  atorvastatin 40 milliGRAM(s) Oral at bedtime  buMETAnide 2 milliGRAM(s) Oral daily  carvedilol 12.5 milliGRAM(s) Oral every 12 hours  chlorhexidine 2% Cloths 1 Application(s) Topical <User Schedule>  dextrose 5%. 1000 milliLiter(s) IV Continuous <Continuous>  dextrose 5%. 1000 milliLiter(s) IV Continuous <Continuous>  dextrose 50% Injectable 25 Gram(s) IV Push once  dextrose 50% Injectable 12.5 Gram(s) IV Push once  dextrose 50% Injectable 25 Gram(s) IV Push once  glucagon  Injectable 1 milliGRAM(s) IntraMuscular once  heparin   Injectable 5000 Unit(s) SubCutaneous every 8 hours  hydrALAZINE 100 milliGRAM(s) Oral three times a day  ipratropium 17 MICROgram(s) HFA Inhaler 1 Puff(s) Inhalation every 6 hours    PRN Inpatient Medications  acetaminophen     Tablet .. 650 milliGRAM(s) Oral every 6 hours PRN  dextrose Oral Gel 15 Gram(s) Oral once PRN    REVIEW OF SYSTEMS  --------------------------------------------------------------------------------  Gen: No fever  Skin: No rash  Head/Eyes/Ears: No HA  Respiratory: No dyspnea  CV: No chest pain  GI: No abdominal pain  MSK: No LE edema    All other systems were reviewed and are negative, except as noted.    VITALS/PHYSICAL EXAM  --------------------------------------------------------------------------------  T(C): 36.3 (12-08-22 @ 06:44), Max: 36.8 (12-07-22 @ 22:38)  HR: 70 (12-08-22 @ 08:05) (59 - 70)  BP: 110/63 (12-08-22 @ 06:44) (102/58 - 136/63)  RR: 18 (12-08-22 @ 06:44) (18 - 18)  SpO2: 95% (12-08-22 @ 08:05) (95% - 100%)  Wt(kg): --    Physical Exam:  	Gen: resting, NAD  	HEENT: MMM, [place]  	Pulm: CTA B/L   	CV: S1S2+  	Abd: Soft, +BS   	Ext: No LE edema B/L  	Neuro: Awake, alert  	Skin: Warm and dry    LABS/STUDIES  --------------------------------------------------------------------------------  Creatinine Trend:  SCr 2.16 [12-06 @ 06:05]  SCr 2.28 [12-05 @ 06:33]  SCr 2.38 [12-04 @ 06:00]  SCr 2.33 [12-03 @ 10:25]  SCr 2.50 [12-02 @ 05:04]

## 2022-12-08 NOTE — PROGRESS NOTE ADULT - ATTENDING COMMENTS
Pt. with MANDY on CKD. Scr decreased to 2.16 on 12/6/22. Assessment and plan for MANDY on CKD as outlined above. Monitor labs and urine output. Avoid any potential nephrotoxins. Dose medications as per eGFR.

## 2022-12-08 NOTE — PROGRESS NOTE ADULT - PROBLEM SELECTOR PROBLEM 7
Severe protein-calorie malnutrition
Severe protein-calorie malnutrition
Anemia
Anemia
Hypomagnesemia
Severe protein-calorie malnutrition
Anemia
Hypomagnesemia
Anemia
Anemia

## 2022-12-08 NOTE — PROGRESS NOTE ADULT - PROVIDER SPECIALTY LIST ADULT
Hospitalist
Nephrology
Nephrology
Hospitalist
Pulmonology
Internal Medicine
MICU
Nephrology
Pulmonology
Hospitalist
Hospitalist
MICU
MICU
Nephrology
Pulmonology
Nephrology
Nephrology
Pulmonology
Nephrology
Internal Medicine
Hospitalist
Hospitalist
Internal Medicine
Hospitalist
Internal Medicine
Hospitalist
Internal Medicine

## 2022-12-09 ENCOUNTER — TRANSCRIPTION ENCOUNTER (OUTPATIENT)
Age: 67
End: 2022-12-09

## 2022-12-09 VITALS
OXYGEN SATURATION: 100 % | DIASTOLIC BLOOD PRESSURE: 72 MMHG | RESPIRATION RATE: 17 BRPM | HEART RATE: 63 BPM | SYSTOLIC BLOOD PRESSURE: 121 MMHG

## 2022-12-09 PROCEDURE — 99239 HOSP IP/OBS DSCHRG MGMT >30: CPT

## 2022-12-09 RX ORDER — IPRATROPIUM BROMIDE 0.2 MG/ML
1 SOLUTION, NON-ORAL INHALATION
Qty: 30 | Refills: 0
Start: 2022-12-09 | End: 2023-01-07

## 2022-12-09 RX ORDER — HYDRALAZINE HCL 50 MG
1 TABLET ORAL
Qty: 90 | Refills: 0
Start: 2022-12-09 | End: 2023-01-07

## 2022-12-09 RX ORDER — ASPIRIN/CALCIUM CARB/MAGNESIUM 324 MG
1 TABLET ORAL
Refills: 0 | DISCHARGE
Start: 2022-12-09

## 2022-12-09 RX ORDER — CARVEDILOL PHOSPHATE 80 MG/1
1 CAPSULE, EXTENDED RELEASE ORAL
Qty: 60 | Refills: 0
Start: 2022-12-09 | End: 2023-01-07

## 2022-12-09 RX ORDER — ALBUTEROL 90 UG/1
2 AEROSOL, METERED ORAL
Qty: 30 | Refills: 0
Start: 2022-12-09 | End: 2023-01-07

## 2022-12-09 RX ORDER — AMLODIPINE BESYLATE 2.5 MG/1
1 TABLET ORAL
Qty: 30 | Refills: 0
Start: 2022-12-09 | End: 2023-01-07

## 2022-12-09 RX ORDER — ACETAMINOPHEN 500 MG
2 TABLET ORAL
Qty: 0 | Refills: 0 | DISCHARGE
Start: 2022-12-09

## 2022-12-09 RX ORDER — BUMETANIDE 0.25 MG/ML
1 INJECTION INTRAMUSCULAR; INTRAVENOUS
Qty: 30 | Refills: 0
Start: 2022-12-09 | End: 2023-01-07

## 2022-12-09 RX ADMIN — AMLODIPINE BESYLATE 10 MILLIGRAM(S): 2.5 TABLET ORAL at 05:45

## 2022-12-09 RX ADMIN — HEPARIN SODIUM 5000 UNIT(S): 5000 INJECTION INTRAVENOUS; SUBCUTANEOUS at 05:45

## 2022-12-09 RX ADMIN — Medication 100 MILLIGRAM(S): at 13:39

## 2022-12-09 RX ADMIN — ALBUTEROL 2 PUFF(S): 90 AEROSOL, METERED ORAL at 05:45

## 2022-12-09 RX ADMIN — Medication 1 PUFF(S): at 13:22

## 2022-12-09 RX ADMIN — Medication 81 MILLIGRAM(S): at 13:23

## 2022-12-09 RX ADMIN — ALBUTEROL 2 PUFF(S): 90 AEROSOL, METERED ORAL at 13:21

## 2022-12-09 RX ADMIN — Medication 1 PUFF(S): at 05:44

## 2022-12-09 RX ADMIN — CARVEDILOL PHOSPHATE 12.5 MILLIGRAM(S): 80 CAPSULE, EXTENDED RELEASE ORAL at 05:45

## 2022-12-09 RX ADMIN — CHLORHEXIDINE GLUCONATE 1 APPLICATION(S): 213 SOLUTION TOPICAL at 05:39

## 2022-12-09 NOTE — CHART NOTE - NSCHARTNOTEFT_GEN_A_CORE
pt seen and examined. vitals, labs reviewed. pt is stable for discharge home. AVPAS delivered. total time spent on dc 42min

## 2022-12-09 NOTE — DISCHARGE NOTE NURSING/CASE MANAGEMENT/SOCIAL WORK - NSSCTYPOFSERV_GEN_ALL_CORE
RN to visit the day after discharge for resumption of services. Physical therapy to call after RN visit for appointment

## 2022-12-09 NOTE — DISCHARGE NOTE NURSING/CASE MANAGEMENT/SOCIAL WORK - PATIENT PORTAL LINK FT
You can access the FollowMyHealth Patient Portal offered by [facility] by registering at the following website: [email or web address]. By joining [employer]’s FollowMyHealth portal, you will also be able to view your health information using other applications (apps) compatible with our system.

## 2022-12-09 NOTE — DISCHARGE NOTE NURSING/CASE MANAGEMENT/SOCIAL WORK - NSDCPEFALRISK_GEN_ALL_CORE
For information on Fall & Injury Prevention, visit: [email or web address] OR  [email or web address] OR  https://www.cdc.gov/steadi/patient.html

## 2023-01-06 ENCOUNTER — APPOINTMENT (OUTPATIENT)
Dept: OTOLARYNGOLOGY | Facility: CLINIC | Age: 68
End: 2023-01-06
Payer: MEDICARE

## 2023-01-06 VITALS
HEIGHT: 64 IN | DIASTOLIC BLOOD PRESSURE: 54 MMHG | HEART RATE: 66 BPM | SYSTOLIC BLOOD PRESSURE: 89 MMHG | BODY MASS INDEX: 28.34 KG/M2 | WEIGHT: 166 LBS | TEMPERATURE: 98.4 F

## 2023-01-06 DIAGNOSIS — E11.9 TYPE 2 DIABETES MELLITUS W/OUT COMPLICATIONS: ICD-10-CM

## 2023-01-06 DIAGNOSIS — H61.23 IMPACTED CERUMEN, BILATERAL: ICD-10-CM

## 2023-01-06 DIAGNOSIS — H90.3 SENSORINEURAL HEARING LOSS, BILATERAL: ICD-10-CM

## 2023-01-06 PROCEDURE — 92557 COMPREHENSIVE HEARING TEST: CPT

## 2023-01-06 PROCEDURE — 92567 TYMPANOMETRY: CPT

## 2023-01-06 PROCEDURE — 99214 OFFICE O/P EST MOD 30 MIN: CPT | Mod: 25

## 2023-01-06 NOTE — PHYSICAL EXAM
[de-identified] : b/l wax  [de-identified] : No tubes in ears//tm intact bilat [Midline] : trachea located in midline position [Normal] : no rashes

## 2023-01-06 NOTE — ASSESSMENT
[FreeTextEntry1] : Ms. 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 SHe has been using steroids drops daily. \par \par Bilateral Sudden SNHL:\par -wax cleaned from both ears, no evidence of MT, TM intact \par -Hearing Test performed to evaluate the extent of hearing loss and to explain pt's symptoms\par Rec bilateral sensorineural hearing loss-cleared for hearing aids\par consider CI w/ Dr Beyer\par \par \par f/u prn

## 2023-01-06 NOTE — HISTORY OF PRESENT ILLNESS
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 She has been using steroids drops daily. Daughter thinks there is a slight improvement in hearing since last visit. She has been admitted to the hospital several times this year for fluid retention.  [Hearing Loss] : hearing loss [Nasal Congestion] : no nasal congestion [None] : No associated symptoms are reported.

## 2023-01-06 NOTE — DATA REVIEWED
[de-identified] : Hearing Test performed to evaluate the extent of hearing loss and  to explain pt's symptoms\par today's hearing test was personally reviewed and revealed\par AD: Profound SNHL \par \par AS: Essentially severe SNHL\par  \par \par Tymps type A, AU.\par

## 2023-01-23 ENCOUNTER — APPOINTMENT (OUTPATIENT)
Dept: PULMONOLOGY | Facility: CLINIC | Age: 68
End: 2023-01-23
Payer: MEDICARE

## 2023-01-23 VITALS
WEIGHT: 151.38 LBS | HEART RATE: 74 BPM | RESPIRATION RATE: 15 BRPM | OXYGEN SATURATION: 97 % | HEIGHT: 64 IN | BODY MASS INDEX: 25.84 KG/M2 | SYSTOLIC BLOOD PRESSURE: 109 MMHG | TEMPERATURE: 97.7 F | DIASTOLIC BLOOD PRESSURE: 67 MMHG

## 2023-01-23 PROCEDURE — 94060 EVALUATION OF WHEEZING: CPT

## 2023-01-23 PROCEDURE — 36600 WITHDRAWAL OF ARTERIAL BLOOD: CPT | Mod: 59

## 2023-01-23 PROCEDURE — 94729 DIFFUSING CAPACITY: CPT

## 2023-01-23 PROCEDURE — ZZZZZ: CPT

## 2023-01-23 PROCEDURE — 99214 OFFICE O/P EST MOD 30 MIN: CPT | Mod: 25

## 2023-01-23 PROCEDURE — 82803 BLOOD GASES ANY COMBINATION: CPT

## 2023-01-23 PROCEDURE — 94726 PLETHYSMOGRAPHY LUNG VOLUMES: CPT

## 2023-01-23 RX ORDER — ALBUTEROL SULFATE 90 UG/1
108 (90 BASE) INHALANT RESPIRATORY (INHALATION)
Qty: 1 | Refills: 3 | Status: ACTIVE | COMMUNITY
Start: 2023-01-23 | End: 1900-01-01

## 2023-01-23 NOTE — ASSESSMENT
[FreeTextEntry1] : 67 year old female chronic respiratory failure on NIV, sarcoidosis, right heart failure, diabetes, hypertension, hyperlipidemia, iron deficiency anemia, pancreatic pseudocyst s/p drainage, multiple admissions over the past year including cardiac arrest x 2 here for a follow up visit.\par She is been doing well since the last hospitalization and has been home for more than a month.\par She is using her NIV nightly and is working with physical therapy. \par Her ambulation is limited mostly by her weakness rather than dyspnea.\par PFT today shows no significant obstructive ventilatory impairment though the spirometry is difficult to interpret and with moderate restriction and moderately reduced diffusing capacity.\par We will repeat CT chest to assess and follow-up the extent of her sarcoid.\par ABG shows normalization of hypercapnia that was seen during the hospitalization.\par We will cancel the PSG that was scheduled for next week as she now has a noninvasive ventilator which she was discharged with.  Unable to see data on Care  or [facility].  Unclear which device she was set up with.  We will contact community surgical to have her device connected. \par Follow-up in 3 months, sooner if needed.\par \par Daughter states that she received flu and pneumococcal vaccination at [facility].  She will try to obtain records.  She will schedule COVID vaccination at the pharmacy. 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

## 2023-01-23 NOTE — HISTORY OF PRESENT ILLNESS
[Former] : former [TextBox_4] : 67 year old female with chronic hypercapnic respiratory failure on NIV, sarcoidosis, diabetes, hypertension, hyperlipidemia, iron deficiency anemia, pancreatic pseudocyst s/p drainage, multiple admissions over the past several months for heart failure and cardiac arrest x 2. \par \par Since last hospitalization (11/17 - 12/9/2022) she has been doing well. Daughter feels that her life has significantly improved with the NIV and diuretics adjustment. She has been home for over a month, which is a longer she has been home in a while.  She is receiving physical therapy, walking with a walker but does not have much mobility.\par \par \par TTE (8/31/2022) EF 55%. Low normal LV systolic function. Mild diastolic dysfunction. RV enlargement with decreased RV systolic function.

## 2023-01-23 NOTE — PHYSICAL EXAM
[No Acute Distress] : no acute distress [Normal Oropharynx] : normal oropharynx [Normal Appearance] : normal appearance [No Neck Mass] : no neck mass [Normal Rate/Rhythm] : normal rate/rhythm [Normal S1, S2] : normal s1, s2 [No Murmurs] : no murmurs [No Resp Distress] : no resp distress [Clear to Auscultation Bilaterally] : clear to auscultation bilaterally [No Abnormalities] : no abnormalities [Benign] : benign [No Clubbing] : no clubbing [No Cyanosis] : no cyanosis [FROM] : FROM [2+ Pitting] : 2+ pitting [Normal Color/ Pigmentation] : normal color/ pigmentation [No Focal Deficits] : no focal deficits [Normal Mood] : normal mood [Normal Affect] : normal affect [TextBox_99] : Walks with walker

## 2023-01-23 NOTE — REVIEW OF SYSTEMS
[SOB on Exertion] : sob on exertion [Edema] : edema [Anemia] : anemia [Negative] : Psychiatric [TextBox_122] : Hearing loss

## 2023-01-23 NOTE — REASON FOR VISIT
[Follow-Up] : a follow-up visit [Shortness of Breath] : shortness of breath [Sarcoidosis] : sarcoidosis [Family Member] : family member

## 2023-01-25 NOTE — ED ADULT NURSE NOTE - CHIEF COMPLAINT QUOTE
Pt. c/o cough and sob  x few days. Diagnosed with PNA 2 months ago. Denies n/v/d or fevers. Calcipotriene Counseling: Cantharidin Counseling:  I discussed with the patient the risks of Cantharidin including but not limited to pain, redness, burning, itching, and blistering.

## 2023-02-02 ENCOUNTER — APPOINTMENT (OUTPATIENT)
Dept: SLEEP CENTER | Facility: CLINIC | Age: 68
End: 2023-02-02

## 2023-02-03 NOTE — PROGRESS NOTE ADULT - PROBLEM SELECTOR PLAN 4
BNP on arrival: 4021 with evidence of fluid overload with pleural effusions, b/l pitting edema seems given diuresis and fluids (IVC not c/w overload in ICU however intubated at that time)  - TTE (7/15/22): moderate LV systolic dysfunction, moderate diastolic dysfunction, RV enlargement with decreased RV systolic dysfunction  - TTE  (7/21/22): EF 65% with normal biventricular systolic function  - c/w Lasix 40 mg PO  - c/w BP control   - daily weights and strict I&Os [de-identified] : 6 year old girl presents for failed hearing test. \par Failed hearing test in December 2022. No concerns for hearing loss at this time from mother or patient. \par Followed up with PMD in December, did not confirm infection but saw fluid in one ear (unsure of which) - prescribed antibiotic for 10 days. Patient denies otalgia, otorrhea, tinnitus.\par Mother reports daughter frequently clearing throat since December. Pt states feels mucus there, but not coughing up anything. \par Denies dysphagia, odynophagia, dyspnea.

## 2023-02-10 ENCOUNTER — APPOINTMENT (OUTPATIENT)
Dept: PULMONOLOGY | Facility: CLINIC | Age: 68
End: 2023-02-10

## 2023-03-06 ENCOUNTER — APPOINTMENT (OUTPATIENT)
Dept: PULMONOLOGY | Facility: CLINIC | Age: 68
End: 2023-03-06

## 2023-03-06 NOTE — PATIENT PROFILE ADULT - VISION (WITH CORRECTIVE LENSES IF THE PATIENT USUALLY WEARS THEM):
3/6/2023      RE: Cyndie Marin  [address]  [place] [address]     Dear Colleague,    Thank you for referring your patient, Cyndie Marin, to the [facility] [place]. Please see a copy of my visit note below.    PROCEDURE ENCOUNTER    [facility]  Geovanny Mcneal, DO  2023     Name: Cyndie Marin  MRN: [id number]  Age: 78 year old  : 1944    Referring provider: Self  Diagnosis: L Shoulder OA    10/2022: Localized osteoarthritis of left shoulder     Arthrosis of left acromioclavicular joint        78-year-old female returning to clinic today for follow-up of the localized arthritis of the left shoulder as well as arthrosis of the acromioclavicular joint on the left.  We did an injection for the glenohumeral joint on the left and the patient has successfully reduced the amount of pain she is having.  I do think her to continue with physical therapy at this point to ensure prolonged benefit from the injection would be ideal.  The patient currently has her exercises and would like to do this on her own.  At this point.  The patient can follow-up with us on an as-needed basis.  Did discuss that injections are typically done every 3 months as needed.  She will contact the clinic if she is having any regression of her currently very good progress.      Glenohumeral Injection - Ultrasound Guided  The patient was informed of the risks and the benefits of the procedure and a written consent was signed.  The patient s left shoulder was prepped with chlorhexidine in sterile fashion.   40 mg of triamcinolone suspension was drawn up into a 5 mL syringe with 4 mL of 1% lidocaine w/o Epi.  Injection was performed using sterile technique.  Under ultrasound guidance a 3.5-inch 22-gauge needle was used to enter the left glenohumeral joint.  Posterior approach was used with the patient in lateral recumbent position, arm in neutral position at the side.  Needle  placement was visualized and documented with ultrasound.  Ultrasound visualization was necessary due to the small joint space entered.  Injection performed long axis to the probe.  Injection solution visualized within the joint space.  Images were permanently stored for the patient's record.  There were no complications. The patient tolerated the procedure well. There was negligible bleeding.   Therapy scheduled to follow for mobilization.  The patient was instructed to call or go to the emergency room with any unusual pain, swelling, redness, or if otherwise concerned.      Geovanny Mcneal D.O., [facility] [place]  , Sports Medicine  Department of Family [facility]    Large Joint Injection: L glenohumeral joint    Date/Time: 3/6/2023 2:01 PM  Performed by: Geovanny Mcneal DO  Authorized by: Geovanny Mcneal DO     Indications:  Pain  Needle Size:  22 G  Guidance: ultrasound    Approach:  Posterior  Location:  Shoulder      Site:  L glenohumeral joint  Medications:  40 mg triamcinolone 40 MG/ML; 4 mL lidocaine (PF) 1 %  Outcome:  Tolerated well, no immediate complications  Procedure discussed: discussed risks, benefits, and alternatives    Consent Given by:  Patient  Timeout: timeout called immediately prior to procedure    Prep: patient was prepped and draped in usual sterile fashion              Again, thank you for allowing me to participate in the care of your patient.      Sincerely,    Geovanny Mcneal DO     Partially impaired: cannot see medication labels or newsprint, but can see obstacles in path, and the surrounding layout; can count fingers at arm's length

## 2023-04-06 ENCOUNTER — APPOINTMENT (OUTPATIENT)
Dept: OTOLARYNGOLOGY | Facility: CLINIC | Age: 68
End: 2023-04-06
Payer: MEDICARE

## 2023-04-06 VITALS
DIASTOLIC BLOOD PRESSURE: 82 MMHG | SYSTOLIC BLOOD PRESSURE: 143 MMHG | WEIGHT: 154 LBS | HEIGHT: 64 IN | BODY MASS INDEX: 26.29 KG/M2 | HEART RATE: 77 BPM

## 2023-04-06 DIAGNOSIS — H91.23 SUDDEN IDIOPATHIC HEARING LOSS, BILATERAL: ICD-10-CM

## 2023-04-06 PROCEDURE — 99214 OFFICE O/P EST MOD 30 MIN: CPT

## 2023-04-06 PROCEDURE — 92567 TYMPANOMETRY: CPT

## 2023-04-06 PROCEDURE — 92557 COMPREHENSIVE HEARING TEST: CPT

## 2023-04-06 RX ORDER — ASPIRIN 81 MG
81 TABLET, DELAYED RELEASE (ENTERIC COATED) ORAL
Refills: 0 | Status: ACTIVE | COMMUNITY

## 2023-04-06 RX ORDER — ATORVASTATIN CALCIUM 40 MG/1
40 TABLET, FILM COATED ORAL
Refills: 0 | Status: ACTIVE | COMMUNITY

## 2023-04-06 RX ORDER — CHROMIUM 200 MCG
TABLET ORAL
Refills: 0 | Status: ACTIVE | COMMUNITY

## 2023-04-06 RX ORDER — DEXAMETHASONE SODIUM PHOSPHATE 1 MG/ML
0.1 SOLUTION/ DROPS OPHTHALMIC
Qty: 2 | Refills: 5 | Status: DISCONTINUED | COMMUNITY
Start: 2021-12-09 | End: 2023-04-06

## 2023-04-06 RX ORDER — FUROSEMIDE 40 MG/1
40 TABLET ORAL DAILY
Qty: 30 | Refills: 0 | Status: DISCONTINUED | COMMUNITY
Start: 2022-11-16 | End: 2023-04-06

## 2023-04-06 RX ORDER — METOPROLOL SUCCINATE 25 MG/1
25 TABLET, EXTENDED RELEASE ORAL
Refills: 0 | Status: DISCONTINUED | COMMUNITY
End: 2023-04-06

## 2023-04-06 NOTE — HISTORY OF PRESENT ILLNESS
[de-identified] : 66 yo F with hx of bilateral sudden SNHL as of Nov 2021 treated with IT steroids and steroid drops with slight improvement referred by Dr. Pearson for possible cochlear implant. No hearing aids. Hx of BMT. Has intermittent tinnitus AD. No otalgia, otorrhea, ear infections, dizziness or headaches. No head trauma or exposure to noises.  Sister with hearing loss as a child. Multiple MRI Brain and CT Head of AMS over the past two years. Last audio Jan 2023.

## 2023-04-24 ENCOUNTER — OUTPATIENT (OUTPATIENT)
Dept: OUTPATIENT SERVICES | Facility: HOSPITAL | Age: 68
LOS: 1 days | Discharge: HOME | End: 2023-04-24

## 2023-04-24 ENCOUNTER — APPOINTMENT (OUTPATIENT)
Dept: SPEECH THERAPY | Facility: CLINIC | Age: 68
End: 2023-04-24

## 2023-04-24 ENCOUNTER — NON-APPOINTMENT (OUTPATIENT)
Age: 68
End: 2023-04-24

## 2023-04-24 DIAGNOSIS — Z98.891 HISTORY OF UTERINE SCAR FROM PREVIOUS SURGERY: Chronic | ICD-10-CM

## 2023-04-24 DIAGNOSIS — Z89.411 ACQUIRED ABSENCE OF RIGHT GREAT TOE: Chronic | ICD-10-CM

## 2023-04-24 DIAGNOSIS — Z90.49 ACQUIRED ABSENCE OF OTHER SPECIFIED PARTS OF DIGESTIVE TRACT: Chronic | ICD-10-CM

## 2023-04-25 DIAGNOSIS — H90.3 SENSORINEURAL HEARING LOSS, BILATERAL: ICD-10-CM

## 2023-05-25 ENCOUNTER — APPOINTMENT (OUTPATIENT)
Dept: PULMONOLOGY | Facility: CLINIC | Age: 68
End: 2023-05-25

## 2023-06-06 NOTE — PROGRESS NOTE ADULT - PROBLEM SELECTOR PLAN 5
- c/w atorvastatin 40mg daily
no known allergies
- c/w atorvastatin 40mg daily

## 2023-06-29 ENCOUNTER — NON-APPOINTMENT (OUTPATIENT)
Age: 68
End: 2023-06-29

## 2023-07-19 ENCOUNTER — APPOINTMENT (OUTPATIENT)
Dept: PULMONOLOGY | Facility: CLINIC | Age: 68
End: 2023-07-19
Payer: MEDICARE

## 2023-07-19 VITALS
HEIGHT: 64 IN | WEIGHT: 156 LBS | SYSTOLIC BLOOD PRESSURE: 148 MMHG | OXYGEN SATURATION: 99 % | BODY MASS INDEX: 26.63 KG/M2 | DIASTOLIC BLOOD PRESSURE: 76 MMHG | RESPIRATION RATE: 16 BRPM | HEART RATE: 73 BPM | TEMPERATURE: 97.8 F

## 2023-07-19 PROCEDURE — 99214 OFFICE O/P EST MOD 30 MIN: CPT

## 2023-07-19 NOTE — PHYSICAL EXAM
[No Acute Distress] : no acute distress [Normal Oropharynx] : normal oropharynx [Normal Appearance] : normal appearance [No Neck Mass] : no neck mass [Normal Rate/Rhythm] : normal rate/rhythm [Normal S1, S2] : normal s1, s2 [No Murmurs] : no murmurs [No Resp Distress] : no resp distress [Clear to Auscultation Bilaterally] : clear to auscultation bilaterally [No Abnormalities] : no abnormalities [Benign] : benign [No Clubbing] : no clubbing [No Cyanosis] : no cyanosis [No Edema] : no edema [FROM] : FROM [Normal Color/ Pigmentation] : normal color/ pigmentation [No Focal Deficits] : no focal deficits [Normal Mood] : normal mood [Normal Affect] : normal affect [TextBox_11] : Hard of hearing [TextBox_99] : Walks with walker

## 2023-07-19 NOTE — HISTORY OF PRESENT ILLNESS
[Former] : former [TextBox_4] : 68 year old female with chronic hypercapnic respiratory failure on NIV, sarcoidosis, diabetes, hypertension, hyperlipidemia, iron deficiency anemia, pancreatic pseudocyst s/p drainage, heart failure, cardiac arrest x 2.\par \par Has been doing well overall. Longest time she has been out of the hospital.\par Using a cane for ambulation. \par Uses NIV nightly, but missed last few nights due to GI discomfort. \par Has appt with nephrologist tomorrow. \par Has not schedule CT chest\par \par \par TTE (8/31/2022) EF 55%. Low normal LV systolic function. Mild diastolic dysfunction. RV enlargement with decreased RV systolic function.

## 2023-07-19 NOTE — ASSESSMENT
[FreeTextEntry1] : 68 year old female chronic respiratory failure on NIV, sarcoidosis, right heart failure, diabetes, hypertension, hyperlipidemia, iron deficiency anemia, pancreatic pseudocyst s/p drainage, multiple admissions over the past year including cardiac arrest x 2 here for a follow up visit.\par Clinically stable.\par Using NIV nightly.\par Has not scheduled CT to assess and follow-up the extent of her sarcoid.\par ABG next visit.\par Still unable to see data on Care  or [facility].  Unclear which device she was set up with.  Will contact DME again to have device connected.\par Given names of cardiologists she can establish care with.\par Follow-up in 3 months, sooner if needed.\par \par UTD pneumococcal vaccination at [facility].  Has not schedule COVID vaccination - advised to. \par Flu shot for next season next visit.  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

## 2023-07-19 NOTE — REVIEW OF SYSTEMS
[SOB on Exertion] : sob on exertion [Edema] : no edema [Anemia] : anemia [Negative] : Psychiatric [TextBox_122] : Hearing loss

## 2023-07-20 ENCOUNTER — APPOINTMENT (OUTPATIENT)
Dept: CT IMAGING | Facility: IMAGING CENTER | Age: 68
End: 2023-07-20
Payer: MEDICARE

## 2023-07-25 NOTE — ED PROVIDER NOTE - NSTIMEPROVIDERCAREINITIATE_GEN_ER
Physical Therapy Initial Evaluation and Plan of Care      Patient: Joelle Clement   : 1986  Diagnosis/ICD-10 Code:  Pain, neck [M54.2]  Referring practitioner: Self Referring  Date of Initial Visit: 2023  Today's Date: 2023  Patient seen for 1 sessions         Visit Diagnoses:    ICD-10-CM ICD-9-CM   1. Pain, neck  M54.2 723.1   2. Acute pain of left shoulder  M25.512 719.41   3. Jaw pain  R68.84 784.92         Subjective Evaluation    History of Present Illness  Mechanism of injury: Joelle comes to OPPT with complaints of left side pain/tightness mainly in the neck/shoulder region, occasional jaw pain.  She notes increased awareness with her body mechanics and work ergonmoics.  She cannot sleep due to increase pain.      She woke up 23, she had left temporal headache.  She didn't think anything of it, took Tylenol for two days.  The pressure started behind the eye on the L side.  After about four days, the pressure went away behind her eye, but then started feeling tightness in the L upper trap and suboccipital region.  She could push behind her neck and feel it around her eye.  Orginially, she was having jaw pain over the last year, her masseter muscle spasm stated on the L side.  (She did note that was wearing a mouth guard off [employer] that didn't fit).     Joelle notes neck and back pain started in her early 20s.  She had an MRI, one of her disc in the lumbar region was bulging, but not enough to compress anything.  She had PT for scaitica like pains.          Patient Occupation: RN/MA at [employer]  Current pain rating: 3  At worst pain ratin  Quality: tight and pressure  Relieving factors: medications  Aggravating factors: keyboarding, prolonged positioning and sleeping    Social Support  Lives in: one-story house  Lives with: alone    Hand dominance: right    Treatments  Previous treatment: physical therapy and chiropractic  Patient Goals  Patient  goals for therapy: decreased pain, increased motion, increased strength, independence with ADLs/IADLs and return to sport/leisure activities           Objective           General Comments     Cervical/Thoracic Comments  Cervical AROM:  Flexion: 30 degrees  Extension: 30 degrees  RSB: 30 degrees  LSB: 35 degrees  R rotation: 60 degrees  L rotation: 55 degrees    Radiculopathy is not present down the B upper extremity.  Headaches/nerve like feeling in the temporal region, but if she relaxes/stretches, it will calm down.   Headaches are daily if she doesn't stretch.     Special tests:  Compression: negative  Distraction: positive    Posture: UT compensation with over compensation of postural awareness, forwards head    Full AROM B shoulder     Tenderness: L suboccipitals, L upper trap and levator, L rhomboids, temporal, jaw           Assessment & Plan     Assessment  Impairments: abnormal or restricted ROM, activity intolerance, impaired physical strength, lacks appropriate home exercise program and pain with function    Assessment details: Pt presents with limitations, noted below, that impede her ability to have headaches, heavy lifting, reading, concentrating, driving, and sleeping. The skills of a therapist will be required to safely and effectively implement the following treatment plan to restore maximal level of function.        Goals  Plan Goals: CERVICAL PROBLEMS    1. The patient has limited ROM.    LTG 1: 12 weeks:  The patient will demonstrate 80° of bilateral cervical spine rotation in order to adequately monitor blind spots while driving and improve ability to perform activities of daily living.    STATUS:  New  STG 1a: 4 weeks:  The patient will demonstrate 60° of bilateral cervical spine rotation, in order to adequately monitor blind spots while driving and improve ability to perform activities of daily living.       STATUS:  New   TREATMENT:  Manual therapy, therapeutic exercise, home exercise  instruction, cervical traction, and modalities as needed to include: moist heat, electrical stimulation, and ultrasound.     2. The patient has complaints of pain.   LTG 2: 12 weeks:  The patient will report 1/10 pain in order to more easily tolerate activities of daily living and improve sleep quality.    STATUS:  New   STG 2a: 4 weeks:  The patient will report 2/10 pain.    STATUS:  New  STG2b:  2 weeks:  The patient will be independent with home exercises.     STATUS:  New   TREATMENT: Manual therapy, therapeutic exercise, home exercise instruction, cervical traction, and modalities as needed to include: moist heat, electrical stimulation, and ultrasound.    3. Carrying, Moving, and Handling Objects Functional Limitation     LTG 3: 12 weeks:  The patient will demonstrate limitation by achieving a score of 5/50 on the Neck Disability Index.    STATUS:  New   STG 3a: 4 weeks:  The patient will demonstrate limitation by achieving a score of 10/50 on the Neck Disability Index.      STATUS:  New      Plan  Therapy options: will be seen for skilled therapy services  Planned modality interventions: cryotherapy, dry needling, TENS, thermotherapy (hydrocollator packs), ultrasound and traction  Planned therapy interventions: flexibility, functional ROM exercises, home exercise program, manual therapy, neuromuscular re-education, postural training, soft tissue mobilization, spinal/joint mobilization, strengthening, stretching, therapeutic activities, body mechanics training, ADL retraining and joint mobilization  Frequency: 1x week  Treatment plan discussed with: patient  Plan details: Review dry needling, Create an HEP, update as needed.    Progress with cervical and scapular strength, trunk control/postural awareness, increased stamina, decreased tightness, improved ROM/flexibility, decrease trigger points, decrease burning, education as needed.          History # of Personal Factors and/or Comorbidities: MODERATE  (1-2)  Examination of Body System(s): # of elements: LOW (1-2)  Clinical Presentation: STABLE   Clinical Decision Making: LOW     Timed:  Manual Therapy:         mins  65201;  Therapeutic Exercise:         mins  45769;     Neuromuscular Rosie:        mins  73909;    Therapeutic Activity:          mins  52185;     Gait Training:           mins  53528;     Ultrasound:          mins  80075;    Canalith Repos           ___  mins  83363      Untimed:  Electrical Stimulation:         mins  04684 ( );  Mechanical Traction:         mins  81720;   Dry Needlin     mins self pay  Low Eval:                      30     mins  97240;  Medium Eval:                     mins  83359;   High Eval:                          mins  20399       Timed Treatment:      mins   Total Treatment:     44   mins    PT SIGNATURE: Tara Mariano PT, DPT  [place] License: [id number]  Electronically signed by Tara Mariano PT, 23, 6:50 AM EDT      Initial Certification    Certification Period: 2023 thru 10/22/2023  I certify that the therapy services are furnished while this patient is under my care.  The services outlined above are required by this patient, and will be reviewed every 90 days.     PHYSICIAN: Referring, Self  NPI:             PHYSICIAN PRINT NAME: ______________________________________________      PHYSICIAN SIGNATURE: ______________________________________________         DATE:________________________________        Please sign and return via fax to [phone].  Thank you, [facility].         30-Aug-2022 11:35

## 2023-07-26 ENCOUNTER — APPOINTMENT (OUTPATIENT)
Dept: CT IMAGING | Facility: IMAGING CENTER | Age: 68
End: 2023-07-26
Payer: MEDICARE

## 2023-07-26 ENCOUNTER — OUTPATIENT (OUTPATIENT)
Dept: OUTPATIENT SERVICES | Facility: HOSPITAL | Age: 68
LOS: 1 days | End: 2023-07-26
Payer: MEDICARE

## 2023-07-26 DIAGNOSIS — Z89.411 ACQUIRED ABSENCE OF RIGHT GREAT TOE: Chronic | ICD-10-CM

## 2023-07-26 DIAGNOSIS — H90.3 SENSORINEURAL HEARING LOSS, BILATERAL: ICD-10-CM

## 2023-07-26 DIAGNOSIS — Z98.891 HISTORY OF UTERINE SCAR FROM PREVIOUS SURGERY: Chronic | ICD-10-CM

## 2023-07-26 DIAGNOSIS — Z90.49 ACQUIRED ABSENCE OF OTHER SPECIFIED PARTS OF DIGESTIVE TRACT: Chronic | ICD-10-CM

## 2023-07-26 DIAGNOSIS — D86.9 SARCOIDOSIS, UNSPECIFIED: ICD-10-CM

## 2023-07-26 PROCEDURE — 71250 CT THORAX DX C-: CPT | Mod: 26,MH

## 2023-07-26 PROCEDURE — 70480 CT ORBIT/EAR/FOSSA W/O DYE: CPT | Mod: 26,MH

## 2023-07-26 PROCEDURE — 71250 CT THORAX DX C-: CPT

## 2023-07-26 PROCEDURE — 70480 CT ORBIT/EAR/FOSSA W/O DYE: CPT

## 2023-08-07 ENCOUNTER — NON-APPOINTMENT (OUTPATIENT)
Age: 68
End: 2023-08-07

## 2023-08-09 NOTE — DISCHARGE NOTE PROVIDER - TIME SPENT: (MINUTES SPENT ON THE DISCHARGE SERVICE)
Bladder scan performed at this time 831 mL noted. Pt provided urinal to void at this time.      Catrachita Prabhakar RN  08/09/23 [id number] 40

## 2023-08-18 ENCOUNTER — OUTPATIENT (OUTPATIENT)
Dept: OUTPATIENT SERVICES | Facility: HOSPITAL | Age: 68
LOS: 1 days | End: 2023-08-18
Payer: MEDICARE

## 2023-08-18 ENCOUNTER — APPOINTMENT (OUTPATIENT)
Dept: MRI IMAGING | Facility: CLINIC | Age: 68
End: 2023-08-18
Payer: MEDICARE

## 2023-08-18 DIAGNOSIS — Z98.891 HISTORY OF UTERINE SCAR FROM PREVIOUS SURGERY: Chronic | ICD-10-CM

## 2023-08-18 DIAGNOSIS — H90.3 SENSORINEURAL HEARING LOSS, BILATERAL: ICD-10-CM

## 2023-08-18 DIAGNOSIS — Z90.49 ACQUIRED ABSENCE OF OTHER SPECIFIED PARTS OF DIGESTIVE TRACT: Chronic | ICD-10-CM

## 2023-08-18 DIAGNOSIS — Z89.411 ACQUIRED ABSENCE OF RIGHT GREAT TOE: Chronic | ICD-10-CM

## 2023-08-18 PROCEDURE — A9585: CPT

## 2023-08-18 PROCEDURE — 70553 MRI BRAIN STEM W/O & W/DYE: CPT

## 2023-08-18 PROCEDURE — 70553 MRI BRAIN STEM W/O & W/DYE: CPT | Mod: 26,MH

## 2023-10-18 ENCOUNTER — APPOINTMENT (OUTPATIENT)
Dept: PULMONOLOGY | Facility: CLINIC | Age: 68
End: 2023-10-18
Payer: MEDICARE

## 2023-10-18 PROCEDURE — 99214 OFFICE O/P EST MOD 30 MIN: CPT | Mod: 95

## 2023-10-23 ENCOUNTER — APPOINTMENT (OUTPATIENT)
Dept: OTOLARYNGOLOGY | Facility: CLINIC | Age: 68
End: 2023-10-23
Payer: MEDICARE

## 2023-10-23 DIAGNOSIS — H90.3 SENSORINEURAL HEARING LOSS, BILATERAL: ICD-10-CM

## 2023-10-23 PROCEDURE — 99214 OFFICE O/P EST MOD 30 MIN: CPT

## 2023-10-23 PROCEDURE — 92567 TYMPANOMETRY: CPT

## 2023-10-23 PROCEDURE — 92557 COMPREHENSIVE HEARING TEST: CPT

## 2023-10-25 PROBLEM — H90.3 ASYMMETRIC SENSORINEURAL DEAFNESS: Status: ACTIVE | Noted: 2023-04-06

## 2023-10-26 ENCOUNTER — INPATIENT (INPATIENT)
Facility: HOSPITAL | Age: 68
LOS: 8 days | Discharge: HOME | End: 2023-11-04
Attending: STUDENT IN AN ORGANIZED HEALTH CARE EDUCATION/TRAINING PROGRAM | Admitting: STUDENT IN AN ORGANIZED HEALTH CARE EDUCATION/TRAINING PROGRAM
Payer: MEDICARE

## 2023-10-26 ENCOUNTER — OUTPATIENT (OUTPATIENT)
Dept: OUTPATIENT SERVICES | Facility: HOSPITAL | Age: 68
LOS: 1 days | End: 2023-10-26

## 2023-10-26 VITALS
DIASTOLIC BLOOD PRESSURE: 75 MMHG | SYSTOLIC BLOOD PRESSURE: 145 MMHG | HEIGHT: 62 IN | RESPIRATION RATE: 18 BRPM | TEMPERATURE: 98 F | HEART RATE: 85 BPM | OXYGEN SATURATION: 100 %

## 2023-10-26 VITALS
OXYGEN SATURATION: 98 % | SYSTOLIC BLOOD PRESSURE: 132 MMHG | TEMPERATURE: 98 F | HEIGHT: 62 IN | HEART RATE: 78 BPM | RESPIRATION RATE: 18 BRPM | WEIGHT: 162.04 LBS | DIASTOLIC BLOOD PRESSURE: 70 MMHG

## 2023-10-26 DIAGNOSIS — H90.5 UNSPECIFIED SENSORINEURAL HEARING LOSS: ICD-10-CM

## 2023-10-26 DIAGNOSIS — H91.23 SUDDEN IDIOPATHIC HEARING LOSS, BILATERAL: ICD-10-CM

## 2023-10-26 DIAGNOSIS — Z98.891 HISTORY OF UTERINE SCAR FROM PREVIOUS SURGERY: Chronic | ICD-10-CM

## 2023-10-26 DIAGNOSIS — Z89.411 ACQUIRED ABSENCE OF RIGHT GREAT TOE: Chronic | ICD-10-CM

## 2023-10-26 DIAGNOSIS — Z86.79 PERSONAL HISTORY OF OTHER DISEASES OF THE CIRCULATORY SYSTEM: ICD-10-CM

## 2023-10-26 DIAGNOSIS — Z90.49 ACQUIRED ABSENCE OF OTHER SPECIFIED PARTS OF DIGESTIVE TRACT: Chronic | ICD-10-CM

## 2023-10-26 DIAGNOSIS — D64.9 ANEMIA, UNSPECIFIED: ICD-10-CM

## 2023-10-26 DIAGNOSIS — H90.3 SENSORINEURAL HEARING LOSS, BILATERAL: ICD-10-CM

## 2023-10-26 DIAGNOSIS — I10 ESSENTIAL (PRIMARY) HYPERTENSION: ICD-10-CM

## 2023-10-26 LAB
A1C WITH ESTIMATED AVERAGE GLUCOSE RESULT: 6.5 % — HIGH (ref 4–5.6)
A1C WITH ESTIMATED AVERAGE GLUCOSE RESULT: 6.5 % — HIGH (ref 4–5.6)
ALBUMIN SERPL ELPH-MCNC: 3.4 G/DL — SIGNIFICANT CHANGE UP (ref 3.3–5)
ALBUMIN SERPL ELPH-MCNC: 3.4 G/DL — SIGNIFICANT CHANGE UP (ref 3.3–5)
ALP SERPL-CCNC: 89 U/L — SIGNIFICANT CHANGE UP (ref 40–120)
ALP SERPL-CCNC: 89 U/L — SIGNIFICANT CHANGE UP (ref 40–120)
ALT FLD-CCNC: 8 U/L — SIGNIFICANT CHANGE UP (ref 4–33)
ALT FLD-CCNC: 8 U/L — SIGNIFICANT CHANGE UP (ref 4–33)
ANION GAP SERPL CALC-SCNC: 12 MMOL/L — SIGNIFICANT CHANGE UP (ref 7–14)
ANION GAP SERPL CALC-SCNC: 12 MMOL/L — SIGNIFICANT CHANGE UP (ref 7–14)
ANION GAP SERPL CALC-SCNC: 13 MMOL/L — SIGNIFICANT CHANGE UP (ref 7–14)
ANION GAP SERPL CALC-SCNC: 13 MMOL/L — SIGNIFICANT CHANGE UP (ref 7–14)
AST SERPL-CCNC: 16 U/L — SIGNIFICANT CHANGE UP (ref 4–32)
AST SERPL-CCNC: 16 U/L — SIGNIFICANT CHANGE UP (ref 4–32)
BASE EXCESS BLDV CALC-SCNC: -1.5 MMOL/L — SIGNIFICANT CHANGE UP (ref -2–3)
BASE EXCESS BLDV CALC-SCNC: -1.5 MMOL/L — SIGNIFICANT CHANGE UP (ref -2–3)
BASOPHILS # BLD AUTO: 0.02 K/UL — SIGNIFICANT CHANGE UP (ref 0–0.2)
BASOPHILS # BLD AUTO: 0.02 K/UL — SIGNIFICANT CHANGE UP (ref 0–0.2)
BASOPHILS NFR BLD AUTO: 0.5 % — SIGNIFICANT CHANGE UP (ref 0–2)
BASOPHILS NFR BLD AUTO: 0.5 % — SIGNIFICANT CHANGE UP (ref 0–2)
BILIRUB SERPL-MCNC: <0.2 MG/DL — SIGNIFICANT CHANGE UP (ref 0.2–1.2)
BILIRUB SERPL-MCNC: <0.2 MG/DL — SIGNIFICANT CHANGE UP (ref 0.2–1.2)
BLD GP AB SCN SERPL QL: NEGATIVE — SIGNIFICANT CHANGE UP
BLD GP AB SCN SERPL QL: NEGATIVE — SIGNIFICANT CHANGE UP
BLOOD GAS VENOUS COMPREHENSIVE RESULT: SIGNIFICANT CHANGE UP
BLOOD GAS VENOUS COMPREHENSIVE RESULT: SIGNIFICANT CHANGE UP
BUN SERPL-MCNC: 71 MG/DL — HIGH (ref 7–23)
BUN SERPL-MCNC: 71 MG/DL — HIGH (ref 7–23)
BUN SERPL-MCNC: 72 MG/DL — HIGH (ref 7–23)
BUN SERPL-MCNC: 72 MG/DL — HIGH (ref 7–23)
CALCIUM SERPL-MCNC: 8.7 MG/DL — SIGNIFICANT CHANGE UP (ref 8.4–10.5)
CHLORIDE BLDV-SCNC: 110 MMOL/L — HIGH (ref 96–108)
CHLORIDE BLDV-SCNC: 110 MMOL/L — HIGH (ref 96–108)
CHLORIDE SERPL-SCNC: 106 MMOL/L — SIGNIFICANT CHANGE UP (ref 98–107)
CO2 BLDV-SCNC: 25 MMOL/L — SIGNIFICANT CHANGE UP (ref 22–26)
CO2 BLDV-SCNC: 25 MMOL/L — SIGNIFICANT CHANGE UP (ref 22–26)
CO2 SERPL-SCNC: 22 MMOL/L — SIGNIFICANT CHANGE UP (ref 22–31)
CREAT SERPL-MCNC: 4.13 MG/DL — HIGH (ref 0.5–1.3)
CREAT SERPL-MCNC: 4.13 MG/DL — HIGH (ref 0.5–1.3)
CREAT SERPL-MCNC: 4.16 MG/DL — HIGH (ref 0.5–1.3)
CREAT SERPL-MCNC: 4.16 MG/DL — HIGH (ref 0.5–1.3)
EGFR: 11 ML/MIN/1.73M2 — LOW
EOSINOPHIL # BLD AUTO: 0.27 K/UL — SIGNIFICANT CHANGE UP (ref 0–0.5)
EOSINOPHIL # BLD AUTO: 0.27 K/UL — SIGNIFICANT CHANGE UP (ref 0–0.5)
EOSINOPHIL NFR BLD AUTO: 6.6 % — HIGH (ref 0–6)
EOSINOPHIL NFR BLD AUTO: 6.6 % — HIGH (ref 0–6)
ESTIMATED AVERAGE GLUCOSE: 140 — SIGNIFICANT CHANGE UP
ESTIMATED AVERAGE GLUCOSE: 140 — SIGNIFICANT CHANGE UP
GAS PNL BLDV: 140 MMOL/L — SIGNIFICANT CHANGE UP (ref 136–145)
GAS PNL BLDV: 140 MMOL/L — SIGNIFICANT CHANGE UP (ref 136–145)
GLUCOSE BLDV-MCNC: 139 MG/DL — HIGH (ref 70–99)
GLUCOSE BLDV-MCNC: 139 MG/DL — HIGH (ref 70–99)
GLUCOSE SERPL-MCNC: 182 MG/DL — HIGH (ref 70–99)
GLUCOSE SERPL-MCNC: 182 MG/DL — HIGH (ref 70–99)
GLUCOSE SERPL-MCNC: 196 MG/DL — HIGH (ref 70–99)
GLUCOSE SERPL-MCNC: 196 MG/DL — HIGH (ref 70–99)
HCO3 BLDV-SCNC: 24 MMOL/L — SIGNIFICANT CHANGE UP (ref 22–29)
HCO3 BLDV-SCNC: 24 MMOL/L — SIGNIFICANT CHANGE UP (ref 22–29)
HCT VFR BLD CALC: 21.2 % — LOW (ref 34.5–45)
HCT VFR BLD CALC: 21.2 % — LOW (ref 34.5–45)
HCT VFR BLD CALC: 21.3 % — LOW (ref 34.5–45)
HCT VFR BLD CALC: 21.3 % — LOW (ref 34.5–45)
HCT VFR BLDA CALC: 20 % — CRITICAL LOW (ref 34.5–46.5)
HCT VFR BLDA CALC: 20 % — CRITICAL LOW (ref 34.5–46.5)
HGB BLD CALC-MCNC: 6.5 G/DL — CRITICAL LOW (ref 11.7–16.1)
HGB BLD CALC-MCNC: 6.5 G/DL — CRITICAL LOW (ref 11.7–16.1)
HGB BLD-MCNC: 6.4 G/DL — CRITICAL LOW (ref 11.5–15.5)
HGB BLD-MCNC: 6.4 G/DL — CRITICAL LOW (ref 11.5–15.5)
HGB BLD-MCNC: 6.6 G/DL — CRITICAL LOW (ref 11.5–15.5)
HGB BLD-MCNC: 6.6 G/DL — CRITICAL LOW (ref 11.5–15.5)
IANC: 1.96 K/UL — SIGNIFICANT CHANGE UP (ref 1.8–7.4)
IANC: 1.96 K/UL — SIGNIFICANT CHANGE UP (ref 1.8–7.4)
IMM GRANULOCYTES NFR BLD AUTO: 1 % — HIGH (ref 0–0.9)
IMM GRANULOCYTES NFR BLD AUTO: 1 % — HIGH (ref 0–0.9)
LACTATE BLDV-MCNC: 0.8 MMOL/L — SIGNIFICANT CHANGE UP (ref 0.5–2)
LACTATE BLDV-MCNC: 0.8 MMOL/L — SIGNIFICANT CHANGE UP (ref 0.5–2)
LIDOCAIN IGE QN: 17 U/L — SIGNIFICANT CHANGE UP (ref 7–60)
LIDOCAIN IGE QN: 17 U/L — SIGNIFICANT CHANGE UP (ref 7–60)
LYMPHOCYTES # BLD AUTO: 1.3 K/UL — SIGNIFICANT CHANGE UP (ref 1–3.3)
LYMPHOCYTES # BLD AUTO: 1.3 K/UL — SIGNIFICANT CHANGE UP (ref 1–3.3)
LYMPHOCYTES # BLD AUTO: 31.9 % — SIGNIFICANT CHANGE UP (ref 13–44)
LYMPHOCYTES # BLD AUTO: 31.9 % — SIGNIFICANT CHANGE UP (ref 13–44)
MCHC RBC-ENTMCNC: 25.8 PG — LOW (ref 27–34)
MCHC RBC-ENTMCNC: 25.8 PG — LOW (ref 27–34)
MCHC RBC-ENTMCNC: 26.2 PG — LOW (ref 27–34)
MCHC RBC-ENTMCNC: 26.2 PG — LOW (ref 27–34)
MCHC RBC-ENTMCNC: 30.2 GM/DL — LOW (ref 32–36)
MCHC RBC-ENTMCNC: 30.2 GM/DL — LOW (ref 32–36)
MCHC RBC-ENTMCNC: 31 GM/DL — LOW (ref 32–36)
MCHC RBC-ENTMCNC: 31 GM/DL — LOW (ref 32–36)
MCV RBC AUTO: 84.5 FL — SIGNIFICANT CHANGE UP (ref 80–100)
MCV RBC AUTO: 84.5 FL — SIGNIFICANT CHANGE UP (ref 80–100)
MCV RBC AUTO: 85.5 FL — SIGNIFICANT CHANGE UP (ref 80–100)
MCV RBC AUTO: 85.5 FL — SIGNIFICANT CHANGE UP (ref 80–100)
MONOCYTES # BLD AUTO: 0.48 K/UL — SIGNIFICANT CHANGE UP (ref 0–0.9)
MONOCYTES # BLD AUTO: 0.48 K/UL — SIGNIFICANT CHANGE UP (ref 0–0.9)
MONOCYTES NFR BLD AUTO: 11.8 % — SIGNIFICANT CHANGE UP (ref 2–14)
MONOCYTES NFR BLD AUTO: 11.8 % — SIGNIFICANT CHANGE UP (ref 2–14)
NEUTROPHILS # BLD AUTO: 1.96 K/UL — SIGNIFICANT CHANGE UP (ref 1.8–7.4)
NEUTROPHILS # BLD AUTO: 1.96 K/UL — SIGNIFICANT CHANGE UP (ref 1.8–7.4)
NEUTROPHILS NFR BLD AUTO: 48.2 % — SIGNIFICANT CHANGE UP (ref 43–77)
NEUTROPHILS NFR BLD AUTO: 48.2 % — SIGNIFICANT CHANGE UP (ref 43–77)
NRBC # BLD: 0 /100 WBCS — SIGNIFICANT CHANGE UP (ref 0–0)
NRBC # FLD: 0 K/UL — SIGNIFICANT CHANGE UP (ref 0–0)
OB PNL STL: NEGATIVE — SIGNIFICANT CHANGE UP
OB PNL STL: NEGATIVE — SIGNIFICANT CHANGE UP
PCO2 BLDV: 41 MMHG — SIGNIFICANT CHANGE UP (ref 39–52)
PCO2 BLDV: 41 MMHG — SIGNIFICANT CHANGE UP (ref 39–52)
PH BLDV: 7.37 — SIGNIFICANT CHANGE UP (ref 7.32–7.43)
PH BLDV: 7.37 — SIGNIFICANT CHANGE UP (ref 7.32–7.43)
PLATELET # BLD AUTO: 195 K/UL — SIGNIFICANT CHANGE UP (ref 150–400)
PLATELET # BLD AUTO: 195 K/UL — SIGNIFICANT CHANGE UP (ref 150–400)
PLATELET # BLD AUTO: 205 K/UL — SIGNIFICANT CHANGE UP (ref 150–400)
PLATELET # BLD AUTO: 205 K/UL — SIGNIFICANT CHANGE UP (ref 150–400)
PO2 BLDV: 118 MMHG — HIGH (ref 25–45)
PO2 BLDV: 118 MMHG — HIGH (ref 25–45)
POTASSIUM BLDV-SCNC: 4.6 MMOL/L — SIGNIFICANT CHANGE UP (ref 3.5–5.1)
POTASSIUM BLDV-SCNC: 4.6 MMOL/L — SIGNIFICANT CHANGE UP (ref 3.5–5.1)
POTASSIUM SERPL-MCNC: 4.5 MMOL/L — SIGNIFICANT CHANGE UP (ref 3.5–5.3)
POTASSIUM SERPL-MCNC: 4.5 MMOL/L — SIGNIFICANT CHANGE UP (ref 3.5–5.3)
POTASSIUM SERPL-MCNC: 4.8 MMOL/L — SIGNIFICANT CHANGE UP (ref 3.5–5.3)
POTASSIUM SERPL-MCNC: 4.8 MMOL/L — SIGNIFICANT CHANGE UP (ref 3.5–5.3)
POTASSIUM SERPL-SCNC: 4.5 MMOL/L — SIGNIFICANT CHANGE UP (ref 3.5–5.3)
POTASSIUM SERPL-SCNC: 4.5 MMOL/L — SIGNIFICANT CHANGE UP (ref 3.5–5.3)
POTASSIUM SERPL-SCNC: 4.8 MMOL/L — SIGNIFICANT CHANGE UP (ref 3.5–5.3)
POTASSIUM SERPL-SCNC: 4.8 MMOL/L — SIGNIFICANT CHANGE UP (ref 3.5–5.3)
PROT SERPL-MCNC: 7.1 G/DL — SIGNIFICANT CHANGE UP (ref 6–8.3)
PROT SERPL-MCNC: 7.1 G/DL — SIGNIFICANT CHANGE UP (ref 6–8.3)
RBC # BLD: 2.48 M/UL — LOW (ref 3.8–5.2)
RBC # BLD: 2.48 M/UL — LOW (ref 3.8–5.2)
RBC # BLD: 2.52 M/UL — LOW (ref 3.8–5.2)
RBC # BLD: 2.52 M/UL — LOW (ref 3.8–5.2)
RBC # FLD: 16.8 % — HIGH (ref 10.3–14.5)
RBC # FLD: 16.8 % — HIGH (ref 10.3–14.5)
RBC # FLD: 16.9 % — HIGH (ref 10.3–14.5)
RBC # FLD: 16.9 % — HIGH (ref 10.3–14.5)
RH IG SCN BLD-IMP: POSITIVE — SIGNIFICANT CHANGE UP
RH IG SCN BLD-IMP: POSITIVE — SIGNIFICANT CHANGE UP
SAO2 % BLDV: 97.6 % — HIGH (ref 67–88)
SAO2 % BLDV: 97.6 % — HIGH (ref 67–88)
SODIUM SERPL-SCNC: 140 MMOL/L — SIGNIFICANT CHANGE UP (ref 135–145)
SODIUM SERPL-SCNC: 140 MMOL/L — SIGNIFICANT CHANGE UP (ref 135–145)
SODIUM SERPL-SCNC: 141 MMOL/L — SIGNIFICANT CHANGE UP (ref 135–145)
SODIUM SERPL-SCNC: 141 MMOL/L — SIGNIFICANT CHANGE UP (ref 135–145)
TROPONIN T, HIGH SENSITIVITY RESULT: 86 NG/L — CRITICAL HIGH
TROPONIN T, HIGH SENSITIVITY RESULT: 86 NG/L — CRITICAL HIGH
TSH SERPL-MCNC: 2.55 UIU/ML — SIGNIFICANT CHANGE UP (ref 0.27–4.2)
TSH SERPL-MCNC: 2.55 UIU/ML — SIGNIFICANT CHANGE UP (ref 0.27–4.2)
WBC # BLD: 3.73 K/UL — LOW (ref 3.8–10.5)
WBC # BLD: 3.73 K/UL — LOW (ref 3.8–10.5)
WBC # BLD: 4.07 K/UL — SIGNIFICANT CHANGE UP (ref 3.8–10.5)
WBC # BLD: 4.07 K/UL — SIGNIFICANT CHANGE UP (ref 3.8–10.5)
WBC # FLD AUTO: 3.73 K/UL — LOW (ref 3.8–10.5)
WBC # FLD AUTO: 3.73 K/UL — LOW (ref 3.8–10.5)
WBC # FLD AUTO: 4.07 K/UL — SIGNIFICANT CHANGE UP (ref 3.8–10.5)
WBC # FLD AUTO: 4.07 K/UL — SIGNIFICANT CHANGE UP (ref 3.8–10.5)

## 2023-10-26 PROCEDURE — 71045 X-RAY EXAM CHEST 1 VIEW: CPT | Mod: 26

## 2023-10-26 PROCEDURE — 99291 CRITICAL CARE FIRST HOUR: CPT

## 2023-10-26 PROCEDURE — 99223 1ST HOSP IP/OBS HIGH 75: CPT

## 2023-10-26 RX ORDER — FUROSEMIDE 40 MG
60 TABLET ORAL ONCE
Refills: 0 | Status: COMPLETED | OUTPATIENT
Start: 2023-10-26 | End: 2023-10-26

## 2023-10-26 RX ORDER — ACETAMINOPHEN 500 MG
650 TABLET ORAL EVERY 6 HOURS
Refills: 0 | Status: DISCONTINUED | OUTPATIENT
Start: 2023-10-26 | End: 2023-11-04

## 2023-10-26 RX ORDER — LANOLIN ALCOHOL/MO/W.PET/CERES
3 CREAM (GRAM) TOPICAL AT BEDTIME
Refills: 0 | Status: DISCONTINUED | OUTPATIENT
Start: 2023-10-26 | End: 2023-11-04

## 2023-10-26 RX ORDER — ONDANSETRON 8 MG/1
4 TABLET, FILM COATED ORAL EVERY 8 HOURS
Refills: 0 | Status: DISCONTINUED | OUTPATIENT
Start: 2023-10-26 | End: 2023-11-04

## 2023-10-26 RX ADMIN — Medication 60 MILLIGRAM(S): at 22:29

## 2023-10-26 NOTE — ED ADULT NURSE NOTE - OBJECTIVE STATEMENT
Pt sent in due to low Hemoglobin noted after having presurgical labs performed. Pt reports having some dizziness, sob worst with exertion, and some chest discomfort. Denies fever, nausea, vomiting, diarrhea. [place].

## 2023-10-26 NOTE — H&P ADULT - ASSESSMENT
66 yo F w/ HTN, HLD, sarcoidosis, HFpEF, hx PEA x2, b/l sensorineural hearing, sarcoidosis, and HTN presents to the ED with low Hgb, most likely AOCD.

## 2023-10-26 NOTE — ED ADULT NURSE NOTE - NSFALLRISKINTERV_ED_ALL_ED

## 2023-10-26 NOTE — ED PROVIDER NOTE - ATTENDING CONTRIBUTION TO CARE
Brief HPI:  68-year-old female past medical history of CHF, CKD, bilateral cataracts, hypertension, hyperlipidemia, pancreatic pseudocyst, peripheral vascular disease, sensorineural hearing loss, diabetes presents for low hemoglobin.  Other than decreased hearing which has been going on for some time, patient denies any other symptoms.  Denies bloody or dark stools.    Vitals:   Reviewed    Exam:    GEN:  Non-toxic appearing, non-distressed, speaking full sentences, non-diaphoretic, AAOx3  HEENT:  NCAT, neck supple, EOMI, PERRLA, sclera anicteric, no conjunctival pallor or injection, no stridor, normal voice, no tonsillar exudate, uvula midline  CV:  regular rhythm and rate, s1/s2 audible, no murmurs, rubs or gallops, peripheral pulses 2+ and symmetric  PULM:  non-labored respirations, lungs clear to auscultation bilaterally, no wheezes, crackles or rales  ABD:  non distended, non-tender, no rebound, no guarding, negative Horn's sign, bowel sounds normal, no cvat  MSK:  no gross deformity, non-tender extremities and joints, range of motion grossly normal appearing, no extremity edema, extremities warm and well perfused   NEURO:  AAOx3, CN II-XII intact, motor 5/5 in upper and lower extremities bilaterally, sensation grossly intact in extremities and trunk, finger to nose testing wnl, no nystagmus, negative Romberg, no pronator drift, no gait deficit  SKIN:  warm, dry, no rash or vesicles     A/P:  68-year-old female past medical history of CHF, CKD, bilateral cataracts, hypertension, hyperlipidemia, pancreatic pseudocyst, peripheral vascular disease, sensorineural hearing loss, diabetes presents for low hemoglobin.  Vital signs stable.  Patient denies bleeding.  Will send labs, possible PRBC transfusion.  Disposition pending.

## 2023-10-26 NOTE — ED PROVIDER NOTE - CLINICAL SUMMARY MEDICAL DECISION MAKING FREE TEXT BOX
8-year-old female past medical history of CHF, CKD, bilateral cataracts, hypertension, hyperlipidemia, pancreatic pseudocyst, peripheral vascular disease, sensorineural hearing loss, diabetes presents to the emergency department after going for routine medical prior to cochlear implant procedure and being told she is anemic. We will consent patient for blood, get blood work to evaluate for metabolic or electro abnormalities.  Due to comorbidities patient may be a good candidate for admission to evaluate for any other causes which may or may not be due to anemia of chronic disease, GI bleed, iron deficiency or other etiologies. patient currently hemodynamially stable.

## 2023-10-26 NOTE — H&P ADULT - NSHPLABSRESULTS_GEN_ALL_CORE
6.6    4.07  )-----------( 205      ( 26 Oct 2023 18:30 )             21.3     Hgb Trend: 6.6<--, 6.4<--  10-26    140  |  106  |  72<H>  ----------------------------<  182<H>  4.8   |  22  |  4.16<H>    Ca    8.7      26 Oct 2023 18:30    TPro  7.1  /  Alb  3.4  /  TBili  <0.2  /  DBili  x   /  AST  16  /  ALT  8   /  AlkPhos  89  10-26    Creatinine Trend: 4.16<--, 4.13<--        Urinalysis Basic - ( 26 Oct 2023 18:30 )    Color: x / Appearance: x / SG: x / pH: x  Gluc: 182 mg/dL / Ketone: x  / Bili: x / Urobili: x   Blood: x / Protein: x / Nitrite: x   Leuk Esterase: x / RBC: x / WBC x   Sq Epi: x / Non Sq Epi: x / Bacteria: x      EKG is reviewed by me: NSR with LVH and RBBB     CXR as reviewed by the radiologist: Cardiomegaly with prominent interstitial markings suggestive of pulmonary venous hypertension

## 2023-10-26 NOTE — ED PROVIDER NOTE - OBJECTIVE STATEMENT
68-year-old female past medical history of CHF, CKD, bilateral cataracts, hypertension, hyperlipidemia, pancreatic pseudocyst, peripheral vascular disease, sensorineural hearing loss, diabetes presents to the emergency department after going for routine medical prior to cochlear implant procedure and being told she is anemic around 6.  Patient has received blood in the past due to anemia of unknown etiology.  Patient denies systemic signs or symptoms, lightheadedness, dizziness, shortness of breath contrary to triage note.  Patient was seen at bedside with daughter who corroborates story.

## 2023-10-26 NOTE — H&P PST ADULT - MUSCULOSKELETAL
unsteady gait due to right foot toe amputation/normal/ROM intact/no joint swelling/no joint erythema/no joint warmth/no calf tenderness/normal gait/strength 5/5 bilateral upper extremities/strength 5/5 bilateral lower extremities/abnormal gait details… unsteady gait due to right foot toe amputation/normal/ROM intact/no joint swelling/no joint erythema/no joint warmth/no calf tenderness/strength 5/5 bilateral upper extremities/strength 5/5 bilateral lower extremities/abnormal gait

## 2023-10-26 NOTE — H&P PST ADULT - GENERAL
Assessment/Plan:  Diabetic patient with residual verruca plantar aspect left foot  Plan  Foot exam performed  Patient educated on condition  Lesion debrided  Cantharone applied  Patient educated on aftercare  Diagnoses and all orders for this visit:    Plantar warts    Left foot pain          Subjective:  Patient reaction last treatment  He presents for evaluation  Patient is diabetic      Allergies   Allergen Reactions    Penicillins Other (See Comments)      Ancef can tolerate         Current Outpatient Medications:     ACCU-CHEK FASTCLIX LANCETS MISC, by Does not apply route daily Dx: E11 40, Disp: 100 each, Rfl: 1    aspirin 81 mg chewable tablet, Chew 1 tablet, Disp: , Rfl:     Empagliflozin (Jardiance) 10 MG TABS, Take 1 tablet (10 mg total) by mouth every morning, Disp: 90 tablet, Rfl: 1    fluorouracil (EFUDEX) 5 % cream, Apply topically 2 (two) times a day For 4 weeks, Disp: 40 g, Rfl: 0    glucose blood (ACCU-CHEK GUIDE) test strip, 1 each by Other route daily DX: e11 40, Disp: 100 each, Rfl: 1    lisinopril-hydrochlorothiazide (PRINZIDE,ZESTORETIC) 20-12 5 MG per tablet, Take 1 tablet by mouth daily, Disp: 90 tablet, Rfl: 3    metFORMIN (GLUCOPHAGE-XR) 500 mg 24 hr tablet, Take 4 tablets (2,000 mg total) by mouth daily with breakfast, Disp: 360 tablet, Rfl: 1    omeprazole (PriLOSEC) 20 mg delayed release capsule, Take 20 mg by mouth daily, Disp: , Rfl:     rosuvastatin (CRESTOR) 20 MG tablet, TAKE 1 TABLET DAILY, Disp: 90 tablet, Rfl: 3    tamsulosin (FLOMAX) 0 4 mg, Take 1 capsule (0 4 mg total) by mouth daily with dinner, Disp: 90 capsule, Rfl: 1    traMADol (Ultram) 50 mg tablet, Take 1 tablet (50 mg total) by mouth every 6 (six) hours as needed for moderate pain, Disp: 25 tablet, Rfl: 0    VITAMIN E PO, Take by mouth daily  (Patient not taking: Reported on 1/25/2022 ), Disp: , Rfl:     Patient Active Problem List   Diagnosis    Abnormal EKG    History of prostate cancer  Arthropathy of multiple sites    Type 2 diabetes mellitus with diabetic neuropathy, without long-term current use of insulin (HCC)    Diverticulosis of sigmoid colon    Esophageal reflux    Fatty liver    Abdominal hernia    Mixed hyperlipidemia    Essential hypertension    Chronic bilateral low back pain without sciatica    Severe obesity (BMI 35 0-39  9) with comorbidity [facility])          Patient ID: Queta Castillo is a 76 y o  male  HPI    The following portions of the patient's history were reviewed and updated as appropriate:     family history includes Arthritis in his father and mother; Coronary artery disease in his father; Heart disease in his father; Hypertension in his father; Parkinsonism in his father and mother  reports that he quit smoking about 9 years ago  He smoked 0 00 packs per day for 0 00 years  He has never used smokeless tobacco  He reports current alcohol use of about 6 0 standard drinks of alcohol per week  He reports that he does not use drugs  Vitals:    03/04/22 1310   Resp: 17       Review of Systems      Objective:  Patient's shoes and socks removed  Foot ExamPhysical Exam      Patient's shoes and socks removed     Foot Exam     General  General Appearance: appears stated age and healthy   Orientation: alert and oriented to person, place, and time   Affect: appropriate   Gait: antalgic         Right Foot/Ankle      Inspection and Palpation  Tenderness: bony tenderness and metatarsals   Swelling: dorsum   Arch: pes planus  Hallux valgus: yes  Skin Exam: callus;      Neurovascular  Dorsalis pedis: 2+  Posterior tibial: 2+  Saphenous nerve sensation: diminished  Tibial nerve sensation: diminished  Superficial peroneal nerve sensation: diminished  Deep peroneal nerve sensation: diminished  Sural nerve sensation: diminished        Left Foot/Ankle       Inspection and Palpation  Tenderness: bony tenderness and metatarsals   Swelling: dorsum   Arch: pes planus  Hallux valgus: yes  Skin Exam: callus and warts;      Neurovascular  Dorsalis pedis: 2+  Posterior tibial: 2+  Saphenous nerve sensation: diminished  Tibial nerve sensation: diminished  Superficial peroneal nerve sensation: diminished  Deep peroneal nerve sensation: diminished  Sural nerve sensation: diminished           Physical Exam  Vitals and nursing note reviewed  Constitutional:       Appearance: Normal appearance  Cardiovascular:      Rate and Rhythm: Normal rate and regular rhythm  Pulses: no weak pulses          Dorsalis pedis pulses are 2+ on the right side and 2+ on the left side  Posterior tibial pulses are 2+ on the right side and 2+ on the left side  Musculoskeletal:      Right foot: Bunion and bony tenderness present  Left foot: Bunion and bony tenderness present  Feet:      Right foot:      Skin integrity: Callus present  Left foot:      Skin integrity: Callus present  Skin:     Capillary Refill: Capillary refill takes less than 2 seconds  Comments: Patient has xerosis of skin  He has dystrophy of nail  Hallux bilateral is wide incurvated toenail ingrown fibular aspect  Negative pus  Submetatarsal 5 of the left foot demonstrates small punctate lesion consistent with atypical verruca  It is approximately 0 2 centimeter squared in size   Neurological:      Mental Status: He is alert  Psychiatric:         Mood and Affect: Mood normal          Thought Content: Thought content normal          Judgment: Judgment normal       Patient's shoes and socks removed      Right Foot/Ankle   Right Foot Inspection  Skin Exam: callus and callus       Vascular  The right DP pulse is 2+  The right PT pulse is 2+       Right Toe  - Comprehensive Exam  Arch: pes planus  Hallux valgus: yes  Swelling: dorsum   Tenderness: bony tenderness and metatarsals            Left Foot/Ankle  Left Foot Inspection  Skin Exam: callus       Vascular  The left DP pulse is 2+  The left PT pulse is 2+     Left Toe  - Comprehensive Exam  Arch: pes planus  Hallux valgus: yes  Swelling: dorsum   Tenderness: bony tenderness and metatarsals               Assign Risk Category  Deformity present  Loss of protective sensation  No weak pulses  Risk: 2        details…

## 2023-10-26 NOTE — H&P PST ADULT - ENDOCRINE COMMENTS
Hx of DMII- FS <130 Q Am, Patient not on any medication at this time since 1 year ago Hx of DMII- -130s. Patient not on any medication at this time since 1 year ago

## 2023-10-26 NOTE — H&P ADULT - NSHPPHYSICALEXAM_GEN_ALL_CORE
Vital Signs Last 24 Hrs  T(C): 36.6 (26 Oct 2023 23:01), Max: 36.9 (26 Oct 2023 11:25)  T(F): 97.9 (26 Oct 2023 23:01), Max: 98.4 (26 Oct 2023 11:25)  HR: 76 (26 Oct 2023 23:01) (75 - 85)  BP: 164/76 (26 Oct 2023 23:01) (132/70 - 164/76)  BP(mean): 90 (26 Oct 2023 11:25) (90 - 90)  RR: 14 (26 Oct 2023 23:01) (13 - 18)  SpO2: 99% (26 Oct 2023 23:01) (98% - 100%)    Parameters below as of 26 Oct 2023 23:01  Patient On (Oxygen Delivery Method): room air    GENERAL: NAD, well-developed  HEENT:  Atraumatic, Normocephalic, Conjunctiva and sclera clear, oral mucosa moist, clear w/o any exudate   NECK: Supple, No JVD  CHEST/LUNG: Clear to auscultation bilaterally; No wheeze  HEART: Regular rate and rhythm; No murmurs, rubs, or gallops  ABDOMEN: Soft, Nontender, Nondistended; Bowel sounds present  EXTREMITIES:  2+ Peripheral Pulses, No clubbing, cyanosis, or edema  PSYCH: AAOx3  NEUROLOGY: non-focal, moving all extremities   SKIN: No rashes or lesions

## 2023-10-26 NOTE — ED ADULT NURSE NOTE - NSICDXPASTMEDICALHX_GEN_ALL_CORE_FT
PAST MEDICAL HISTORY:  Anemia     Bilateral cataracts     Chronic kidney disease (CKD)     Fluid in pleural cavity associated with pancreatitis     H/O CHF     H/O sarcoidosis     HLD (hyperlipidemia)     HTN (hypertension)     Pancreatic pseudocyst/cyst s/p drain placement and removal    PVD (peripheral vascular disease)     Sensorineural hearing loss, bilateral     Type 2 diabetes mellitus

## 2023-10-26 NOTE — ED PROVIDER NOTE - PROGRESS NOTE DETAILS
MD Ranjana (PGY-2) Received sign out on patient. In brief, 68-year-old female with past medical history of hypertension, hyperlipidemia, sarcoidosis, HFpEF, presenting with low hemoglobin.  Patient's hemoglobin 6.6.  Will transfuse 1 unit PRBCs.  Patient likely to be admitted for MANDY and CKD.

## 2023-10-26 NOTE — H&P ADULT - HISTORY OF PRESENT ILLNESS
66 yo F w/ HTN, HLD, sarcoidosis, HFpEF, hx PEA x2, b/l sensorineural hearing, sarcoidosis, and HTN presents to the ED with low Hgb. Patient is very hard of hearing but AAOX3 and was able to provide most of the hx. Patient reports that she was scheduled to undergo cochlear implant next week and as a result, she came to the outpatient preop testing center for lab work. When she went home, she received a call telling her to come to the ED for low Hgb. Currently, pt has some abdominal discomfort but denies any fever, chills, nausea, vomiting,  66 yo F w/ HTN, HLD, sarcoidosis, HFpEF, hx PEA x2, b/l sensorineural hearing, sarcoidosis, and HTN presents to the ED with low Hgb. Patient is very hard of hearing but AAOX3 and was able to provide most of the hx. Patient reports that she was scheduled to undergo cochlear implant next week and as a result, she came to the outpatient preop testing center for lab work. When she went home, she received a call telling her to come to the ED for low Hgb. Currently, pt has some abdominal discomfort but denies any fever, chills, nausea, vomiting, chest pain, SOB, melena, hematochezia or hematuria. She reports that her Hgb was low before and she had blood transfusion in the past.   In the ED, her vitals were notable for HTN. Labs were notable for anemia, elevated but stable trops 94->86, elevated BUN/Scr, elevated proBNP. CXR showed Cardiomegaly with prominent interstitial markings suggestive of pulmonary venous hypertension. EKG showed NSR with RBBB and LVH.

## 2023-10-26 NOTE — H&P PST ADULT - NSICDXPASTMEDICALHX_GEN_ALL_CORE_FT
PAST MEDICAL HISTORY:  Bilateral cataracts     Fluid in pleural cavity associated with pancreatitis     H/O CHF     H/O sarcoidosis     HLD (hyperlipidemia)     HTN (hypertension)     Pancreatic pseudocyst/cyst s/p drain placement and removal    PVD (peripheral vascular disease)     Sensorineural hearing loss, bilateral     Type 2 diabetes mellitus      PAST MEDICAL HISTORY:  Bilateral cataracts     Chronic kidney disease (CKD)     Fluid in pleural cavity associated with pancreatitis     H/O CHF     H/O sarcoidosis     HLD (hyperlipidemia)     HTN (hypertension)     Pancreatic pseudocyst/cyst s/p drain placement and removal    PVD (peripheral vascular disease)     Sensorineural hearing loss, bilateral     Type 2 diabetes mellitus      PAST MEDICAL HISTORY:  Anemia     Bilateral cataracts     Chronic kidney disease (CKD)     Fluid in pleural cavity associated with pancreatitis     H/O CHF     H/O sarcoidosis     HLD (hyperlipidemia)     HTN (hypertension)     Pancreatic pseudocyst/cyst s/p drain placement and removal    PVD (peripheral vascular disease)     Sensorineural hearing loss, bilateral     Type 2 diabetes mellitus

## 2023-10-26 NOTE — ED ADULT NURSE NOTE - NS_SISCREENINGSR_GEN_ALL_ED
Negative Interpolation Flap Text: A decision was made to reconstruct the defect utilizing an interpolation axial flap and a staged reconstruction.  A telfa template was made of the defect.  This telfa template was then used to outline the interpolation flap.  The donor area for the pedicle flap was then injected with anesthesia.  The flap was excised through the skin and subcutaneous tissue down to the layer of the underlying musculature.  The interpolation flap was carefully excised within this deep plane to maintain its blood supply.  The edges of the donor site were undermined.   The donor site was closed in a primary fashion.  The pedicle was then rotated into position and sutured.  Once the tube was sutured into place, adequate blood supply was confirmed with blanching and refill.  The pedicle was then wrapped with xeroform gauze and dressed appropriately with a telfa and gauze bandage to ensure continued blood supply and protect the attached pedicle.

## 2023-10-26 NOTE — H&P PST ADULT - FUNCTIONAL STATUS
METS 4- Able to climb up 2 flights of stairs, walk up hill, and do house chores without symptoms./4-10 METS

## 2023-10-26 NOTE — ED ADULT TRIAGE NOTE - CHIEF COMPLAINT QUOTE
As per Daughter" She was at Presurgical testing she is getting colcheal implant then did blood work and we were told her blood count is low now sure if they said hemoglobin or iron but told it is 6 and she has had blood transfusions before."  HX dm1, HTN, HARD OF HEARING, FLUID OVERLOAD. pT ST" I fell alittle dizzy, and alittle short of breath and I have alittle pain in chest. "

## 2023-10-26 NOTE — ED ADULT NURSE REASSESSMENT NOTE - NS ED NURSE REASSESS COMMENT FT1
Report given to [facility] RN aRmo. Pt stable for transport to [facility]. Safety precautions implemented as per protocol.
Received report from Day RN Mookie Saenz: Pt A&Ox4, Native, appears to be resting comfortably, NAD, CM in progress, respirations are even and unlabored. Denies any complaints at this moment, VS noted, Safety precautions implemented as per protocol, awaiting further MD orders, plan of care ongoing.

## 2023-10-26 NOTE — ED PROVIDER NOTE - CADM POA URETHRAL CATHETER
January 21, 2018      Rika June   [address] [place] [id number]             [facility]  [address] [facility] [address]  Phone: [phone]  Fax: [phone] Rika June    Was treated here on 01/21/2018    May Return to work/school on 01/24/2018    No Restrictions            Scott Curtis NP       
No

## 2023-10-26 NOTE — H&P ADULT - PROBLEM SELECTOR PLAN 1
Patient with acute on chronic anemia; normocytic   -occult blood feces is negative. Denies any other source of bleeding   -Given hx of CKD, most likely due to AOCD   -F/u with iron studies   -s/p 1 unit of pRBCs in the ED   -F/u with repeat CBC   -Consider GI consult if hgb continues to downtrend

## 2023-10-26 NOTE — H&P ADULT - PROBLEM SELECTOR PLAN 2
Patient with MANDY on CKD   -Baseline Cr is around 1.5-2   -F/u with US renal   -F/u with UA and urine lytes   -F/u with UPEP, SPEP, free light chains, immunofixation

## 2023-10-26 NOTE — H&P PST ADULT - NEGATIVE NEUROLOGICAL SYMPTOMS
no weakness/no paresthesias/no generalized seizures/no focal seizures/no tremors/no vertigo/no loss of sensation/no headache/no loss of consciousness

## 2023-10-26 NOTE — PROVIDER CONTACT NOTE (CRITICAL VALUE NOTIFICATION) - BACKGROUND
69 yo female pmhx hypertension, diabetes, dyslipidemia, Sarcoidosis, CKD, Sensory hearing loss came to [facility] Preop evaluation for cochlear implants

## 2023-10-26 NOTE — H&P PST ADULT - PROBLEM SELECTOR PLAN 1
Patient tentatively scheduled for Cochlear implant with facial nerve monitoring on 11/2/23.  Pre-op instructions provided. Pt given verbal and written instructions with teach back on  pepcid. Pt verbalized understanding.    CBC, BMP, HgbA1c and EKG were done at [facility].  Copy of Echocardiogram in chart.  No further evaluations requested.

## 2023-10-26 NOTE — H&P PST ADULT - HISTORY OF PRESENT ILLNESS
68 year old female with pmhx of HTN, DMII, HLD, Sarcoidosis (uses NIV at night), CHF, presents for pre-op evaluation for diagnosis of sensorineural hearing loss bilateral. Patient is scheduled for Cochlear implant with facial nerve monitoring. Patient had sudden hearing loss in 2021.  68 year old female with pmhx of HTN, DMII, HLD, Sarcoidosis (uses NIV at night), CHF, CKD, presents for pre-op evaluation for diagnosis of sensorineural hearing loss bilateral. Patient is scheduled for Cochlear implant with facial nerve monitoring. Patient had sudden hearing loss in 2021.  68 year old female with pmhx of HTN, DMII, HLD, Sarcoidosis (uses NIV at night), CHF, CKD, Anemia presents for pre-op evaluation for diagnosis of sensorineural hearing loss bilateral. Patient is scheduled for Cochlear implant with facial nerve monitoring. Patient had sudden hearing loss in 2021.

## 2023-10-26 NOTE — PROVIDER CONTACT NOTE (CRITICAL VALUE NOTIFICATION) - ACTION/TREATMENT ORDERED:
Dr. Connelly office, Dr. Wallace covering for Dr. Connelly recommended er for PRBC.  Pt called and was referred to [facility] ER. Dr. Valdivia office called spoke to Surgical coordinator regarding Hemoglobin hematocrit and referral to ER.

## 2023-10-26 NOTE — H&P PST ADULT - PROBLEM SELECTOR PLAN 2
Patient instructed to take Amlodipine, hydralazine and carvedilol with a sip of water on the morning of procedure.

## 2023-10-26 NOTE — ED PROVIDER NOTE - PHYSICAL EXAMINATION
PHYSICAL EXAM:  CONSTITUTIONAL: Well appearing, awake, alert, oriented to person, place, time/situation and in no apparent distress.  HEAD: Atraumatic  EYES: Clear bilaterally, pupils equal, round and reactive to light.  ENMT: Airway patent, Nasal mucosa clear. Mouth with normal mucosa. Uvula is midline.   CARDIAC: Normal rate, regular rhythm. +S1/S2. No murmurs, rubs or gallops.  RESPIRATORY: Breathing unlabored. Breath sounds clear and equal bilaterally.  ABDOMEN:  Soft, nontender, nondistended. No rebound tenderness or guarding. : no BRB or melena.   NEUROLOGICAL: Alert and oriented, no focal deficits, no motor or sensory deficits. Sensation intact x4 extremities.  SKIN: Skin warm and dry. No evidence of rashes or lesions.

## 2023-10-26 NOTE — H&P PST ADULT - NEGATIVE ENMT SYMPTOMS
Denies dentures. Denies loose teeth./no ear pain/no tinnitus/no vertigo/no sinus symptoms/no nasal congestion/no nose bleeds/no abnormal taste sensation/no gum bleeding/no dry mouth/no throat pain/no dysphagia

## 2023-10-26 NOTE — H&P ADULT - PROBLEM SELECTOR PLAN 3
Patient with hx of HFpEF   -CXR shows cardiomegaly with vascular congestion   -Euvolemic on exam   -S/p IV lasix 60 mg in the ED   -Hold off on further diuresis

## 2023-10-27 DIAGNOSIS — D64.9 ANEMIA, UNSPECIFIED: ICD-10-CM

## 2023-10-27 DIAGNOSIS — N17.9 ACUTE KIDNEY FAILURE, UNSPECIFIED: ICD-10-CM

## 2023-10-27 DIAGNOSIS — I10 ESSENTIAL (PRIMARY) HYPERTENSION: ICD-10-CM

## 2023-10-27 DIAGNOSIS — Z29.9 ENCOUNTER FOR PROPHYLACTIC MEASURES, UNSPECIFIED: ICD-10-CM

## 2023-10-27 DIAGNOSIS — I50.32 CHRONIC DIASTOLIC (CONGESTIVE) HEART FAILURE: ICD-10-CM

## 2023-10-27 DIAGNOSIS — Z79.899 OTHER LONG TERM (CURRENT) DRUG THERAPY: ICD-10-CM

## 2023-10-27 PROBLEM — Z86.2 PERSONAL HISTORY OF DISEASES OF THE BLOOD AND BLOOD-FORMING ORGANS AND CERTAIN DISORDERS INVOLVING THE IMMUNE MECHANISM: Chronic | Status: ACTIVE | Noted: 2023-10-26

## 2023-10-27 PROBLEM — H90.3 SENSORINEURAL HEARING LOSS, BILATERAL: Chronic | Status: ACTIVE | Noted: 2023-10-26

## 2023-10-27 PROBLEM — Z86.79 PERSONAL HISTORY OF OTHER DISEASES OF THE CIRCULATORY SYSTEM: Chronic | Status: ACTIVE | Noted: 2023-10-26

## 2023-10-27 PROBLEM — N18.9 CHRONIC KIDNEY DISEASE, UNSPECIFIED: Chronic | Status: ACTIVE | Noted: 2023-10-26

## 2023-10-27 PROBLEM — I73.9 PERIPHERAL VASCULAR DISEASE, UNSPECIFIED: Chronic | Status: ACTIVE | Noted: 2023-10-26

## 2023-10-27 LAB
A1C WITH ESTIMATED AVERAGE GLUCOSE RESULT: 6.3 % — HIGH (ref 4–5.6)
A1C WITH ESTIMATED AVERAGE GLUCOSE RESULT: 6.3 % — HIGH (ref 4–5.6)
ALBUMIN SERPL ELPH-MCNC: 3 G/DL — LOW (ref 3.3–5)
ALBUMIN SERPL ELPH-MCNC: 3 G/DL — LOW (ref 3.3–5)
ALP SERPL-CCNC: 81 U/L — SIGNIFICANT CHANGE UP (ref 40–120)
ALP SERPL-CCNC: 81 U/L — SIGNIFICANT CHANGE UP (ref 40–120)
ALT FLD-CCNC: 9 U/L — SIGNIFICANT CHANGE UP (ref 4–33)
ALT FLD-CCNC: 9 U/L — SIGNIFICANT CHANGE UP (ref 4–33)
ANION GAP SERPL CALC-SCNC: 10 MMOL/L — SIGNIFICANT CHANGE UP (ref 7–14)
ANION GAP SERPL CALC-SCNC: 10 MMOL/L — SIGNIFICANT CHANGE UP (ref 7–14)
APPEARANCE UR: ABNORMAL
APPEARANCE UR: ABNORMAL
AST SERPL-CCNC: 21 U/L — SIGNIFICANT CHANGE UP (ref 4–32)
AST SERPL-CCNC: 21 U/L — SIGNIFICANT CHANGE UP (ref 4–32)
BACTERIA # UR AUTO: ABNORMAL /HPF
BACTERIA # UR AUTO: ABNORMAL /HPF
BASOPHILS # BLD AUTO: 0.03 K/UL — SIGNIFICANT CHANGE UP (ref 0–0.2)
BASOPHILS # BLD AUTO: 0.03 K/UL — SIGNIFICANT CHANGE UP (ref 0–0.2)
BASOPHILS NFR BLD AUTO: 0.7 % — SIGNIFICANT CHANGE UP (ref 0–2)
BASOPHILS NFR BLD AUTO: 0.7 % — SIGNIFICANT CHANGE UP (ref 0–2)
BILIRUB SERPL-MCNC: 0.4 MG/DL — SIGNIFICANT CHANGE UP (ref 0.2–1.2)
BILIRUB SERPL-MCNC: 0.4 MG/DL — SIGNIFICANT CHANGE UP (ref 0.2–1.2)
BILIRUB UR-MCNC: NEGATIVE — SIGNIFICANT CHANGE UP
BILIRUB UR-MCNC: NEGATIVE — SIGNIFICANT CHANGE UP
BUN SERPL-MCNC: 70 MG/DL — HIGH (ref 7–23)
BUN SERPL-MCNC: 70 MG/DL — HIGH (ref 7–23)
CALCIUM SERPL-MCNC: 8.9 MG/DL — SIGNIFICANT CHANGE UP (ref 8.4–10.5)
CALCIUM SERPL-MCNC: 8.9 MG/DL — SIGNIFICANT CHANGE UP (ref 8.4–10.5)
CAST: 0 /LPF — SIGNIFICANT CHANGE UP (ref 0–4)
CAST: 0 /LPF — SIGNIFICANT CHANGE UP (ref 0–4)
CHLORIDE SERPL-SCNC: 108 MMOL/L — HIGH (ref 98–107)
CHLORIDE SERPL-SCNC: 108 MMOL/L — HIGH (ref 98–107)
CHOLEST SERPL-MCNC: 179 MG/DL — SIGNIFICANT CHANGE UP
CHOLEST SERPL-MCNC: 179 MG/DL — SIGNIFICANT CHANGE UP
CO2 SERPL-SCNC: 22 MMOL/L — SIGNIFICANT CHANGE UP (ref 22–31)
CO2 SERPL-SCNC: 22 MMOL/L — SIGNIFICANT CHANGE UP (ref 22–31)
COLOR SPEC: YELLOW — SIGNIFICANT CHANGE UP
COLOR SPEC: YELLOW — SIGNIFICANT CHANGE UP
CREAT ?TM UR-MCNC: 22 MG/DL — SIGNIFICANT CHANGE UP
CREAT ?TM UR-MCNC: 22 MG/DL — SIGNIFICANT CHANGE UP
CREAT SERPL-MCNC: 4.03 MG/DL — HIGH (ref 0.5–1.3)
CREAT SERPL-MCNC: 4.03 MG/DL — HIGH (ref 0.5–1.3)
DIFF PNL FLD: ABNORMAL
DIFF PNL FLD: ABNORMAL
EGFR: 11 ML/MIN/1.73M2 — LOW
EGFR: 11 ML/MIN/1.73M2 — LOW
EOSINOPHIL # BLD AUTO: 0.23 K/UL — SIGNIFICANT CHANGE UP (ref 0–0.5)
EOSINOPHIL # BLD AUTO: 0.23 K/UL — SIGNIFICANT CHANGE UP (ref 0–0.5)
EOSINOPHIL NFR BLD AUTO: 5.4 % — SIGNIFICANT CHANGE UP (ref 0–6)
EOSINOPHIL NFR BLD AUTO: 5.4 % — SIGNIFICANT CHANGE UP (ref 0–6)
ESTIMATED AVERAGE GLUCOSE: 134 — SIGNIFICANT CHANGE UP
ESTIMATED AVERAGE GLUCOSE: 134 — SIGNIFICANT CHANGE UP
FERRITIN SERPL-MCNC: 122 NG/ML — SIGNIFICANT CHANGE UP (ref 13–330)
FERRITIN SERPL-MCNC: 122 NG/ML — SIGNIFICANT CHANGE UP (ref 13–330)
FOLATE SERPL-MCNC: 10.4 NG/ML — SIGNIFICANT CHANGE UP (ref 3.1–17.5)
FOLATE SERPL-MCNC: 10.4 NG/ML — SIGNIFICANT CHANGE UP (ref 3.1–17.5)
GLUCOSE SERPL-MCNC: 138 MG/DL — HIGH (ref 70–99)
GLUCOSE SERPL-MCNC: 138 MG/DL — HIGH (ref 70–99)
GLUCOSE UR QL: 100 MG/DL
GLUCOSE UR QL: 100 MG/DL
HCT VFR BLD CALC: 23.8 % — LOW (ref 34.5–45)
HCT VFR BLD CALC: 23.8 % — LOW (ref 34.5–45)
HCT VFR BLD CALC: 24.3 % — LOW (ref 34.5–45)
HCT VFR BLD CALC: 24.3 % — LOW (ref 34.5–45)
HDLC SERPL-MCNC: 48 MG/DL — LOW
HDLC SERPL-MCNC: 48 MG/DL — LOW
HGB BLD-MCNC: 7.3 G/DL — LOW (ref 11.5–15.5)
IANC: 2.16 K/UL — SIGNIFICANT CHANGE UP (ref 1.8–7.4)
IANC: 2.16 K/UL — SIGNIFICANT CHANGE UP (ref 1.8–7.4)
IMM GRANULOCYTES NFR BLD AUTO: 0.5 % — SIGNIFICANT CHANGE UP (ref 0–0.9)
IMM GRANULOCYTES NFR BLD AUTO: 0.5 % — SIGNIFICANT CHANGE UP (ref 0–0.9)
IRON SATN MFR SERPL: 120 UG/DL — SIGNIFICANT CHANGE UP (ref 30–160)
IRON SATN MFR SERPL: 120 UG/DL — SIGNIFICANT CHANGE UP (ref 30–160)
IRON SATN MFR SERPL: 60 % — HIGH (ref 14–50)
IRON SATN MFR SERPL: 60 % — HIGH (ref 14–50)
KETONES UR-MCNC: NEGATIVE MG/DL — SIGNIFICANT CHANGE UP
KETONES UR-MCNC: NEGATIVE MG/DL — SIGNIFICANT CHANGE UP
LEUKOCYTE ESTERASE UR-ACNC: ABNORMAL
LEUKOCYTE ESTERASE UR-ACNC: ABNORMAL
LIPID PNL WITH DIRECT LDL SERPL: 108 MG/DL — HIGH
LIPID PNL WITH DIRECT LDL SERPL: 108 MG/DL — HIGH
LYMPHOCYTES # BLD AUTO: 1.24 K/UL — SIGNIFICANT CHANGE UP (ref 1–3.3)
LYMPHOCYTES # BLD AUTO: 1.24 K/UL — SIGNIFICANT CHANGE UP (ref 1–3.3)
LYMPHOCYTES # BLD AUTO: 29.2 % — SIGNIFICANT CHANGE UP (ref 13–44)
LYMPHOCYTES # BLD AUTO: 29.2 % — SIGNIFICANT CHANGE UP (ref 13–44)
MCHC RBC-ENTMCNC: 25.5 PG — LOW (ref 27–34)
MCHC RBC-ENTMCNC: 25.5 PG — LOW (ref 27–34)
MCHC RBC-ENTMCNC: 25.9 PG — LOW (ref 27–34)
MCHC RBC-ENTMCNC: 25.9 PG — LOW (ref 27–34)
MCHC RBC-ENTMCNC: 30 GM/DL — LOW (ref 32–36)
MCHC RBC-ENTMCNC: 30 GM/DL — LOW (ref 32–36)
MCHC RBC-ENTMCNC: 30.7 GM/DL — LOW (ref 32–36)
MCHC RBC-ENTMCNC: 30.7 GM/DL — LOW (ref 32–36)
MCV RBC AUTO: 84.4 FL — SIGNIFICANT CHANGE UP (ref 80–100)
MCV RBC AUTO: 84.4 FL — SIGNIFICANT CHANGE UP (ref 80–100)
MCV RBC AUTO: 85 FL — SIGNIFICANT CHANGE UP (ref 80–100)
MCV RBC AUTO: 85 FL — SIGNIFICANT CHANGE UP (ref 80–100)
MONOCYTES # BLD AUTO: 0.56 K/UL — SIGNIFICANT CHANGE UP (ref 0–0.9)
MONOCYTES # BLD AUTO: 0.56 K/UL — SIGNIFICANT CHANGE UP (ref 0–0.9)
MONOCYTES NFR BLD AUTO: 13.2 % — SIGNIFICANT CHANGE UP (ref 2–14)
MONOCYTES NFR BLD AUTO: 13.2 % — SIGNIFICANT CHANGE UP (ref 2–14)
NEUTROPHILS # BLD AUTO: 2.16 K/UL — SIGNIFICANT CHANGE UP (ref 1.8–7.4)
NEUTROPHILS # BLD AUTO: 2.16 K/UL — SIGNIFICANT CHANGE UP (ref 1.8–7.4)
NEUTROPHILS NFR BLD AUTO: 51 % — SIGNIFICANT CHANGE UP (ref 43–77)
NEUTROPHILS NFR BLD AUTO: 51 % — SIGNIFICANT CHANGE UP (ref 43–77)
NITRITE UR-MCNC: NEGATIVE — SIGNIFICANT CHANGE UP
NITRITE UR-MCNC: NEGATIVE — SIGNIFICANT CHANGE UP
NON HDL CHOLESTEROL: 131 MG/DL — HIGH
NON HDL CHOLESTEROL: 131 MG/DL — HIGH
NRBC # BLD: 0 /100 WBCS — SIGNIFICANT CHANGE UP (ref 0–0)
NRBC # FLD: 0 K/UL — SIGNIFICANT CHANGE UP (ref 0–0)
PH UR: 7 — SIGNIFICANT CHANGE UP (ref 5–8)
PH UR: 7 — SIGNIFICANT CHANGE UP (ref 5–8)
PLATELET # BLD AUTO: 185 K/UL — SIGNIFICANT CHANGE UP (ref 150–400)
PLATELET # BLD AUTO: 185 K/UL — SIGNIFICANT CHANGE UP (ref 150–400)
PLATELET # BLD AUTO: 186 K/UL — SIGNIFICANT CHANGE UP (ref 150–400)
PLATELET # BLD AUTO: 186 K/UL — SIGNIFICANT CHANGE UP (ref 150–400)
POTASSIUM SERPL-MCNC: 4.2 MMOL/L — SIGNIFICANT CHANGE UP (ref 3.5–5.3)
POTASSIUM SERPL-MCNC: 4.2 MMOL/L — SIGNIFICANT CHANGE UP (ref 3.5–5.3)
POTASSIUM SERPL-SCNC: 4.2 MMOL/L — SIGNIFICANT CHANGE UP (ref 3.5–5.3)
POTASSIUM SERPL-SCNC: 4.2 MMOL/L — SIGNIFICANT CHANGE UP (ref 3.5–5.3)
PROT ?TM UR-MCNC: 194 MG/DL — SIGNIFICANT CHANGE UP
PROT ?TM UR-MCNC: 194 MG/DL — SIGNIFICANT CHANGE UP
PROT ?TM UR-MCNC: 197 MG/DL — SIGNIFICANT CHANGE UP
PROT ?TM UR-MCNC: 197 MG/DL — SIGNIFICANT CHANGE UP
PROT SERPL-MCNC: 6.8 G/DL — SIGNIFICANT CHANGE UP (ref 6–8.3)
PROT UR-MCNC: 300 MG/DL
PROT UR-MCNC: 300 MG/DL
PROT/CREAT UR-RTO: 8.9 RATIO — HIGH (ref 0–0.2)
PROT/CREAT UR-RTO: 8.9 RATIO — HIGH (ref 0–0.2)
RBC # BLD: 2.82 M/UL — LOW (ref 3.8–5.2)
RBC # BLD: 2.82 M/UL — LOW (ref 3.8–5.2)
RBC # BLD: 2.86 M/UL — LOW (ref 3.8–5.2)
RBC # BLD: 2.86 M/UL — LOW (ref 3.8–5.2)
RBC # FLD: 16.6 % — HIGH (ref 10.3–14.5)
RBC # FLD: 16.6 % — HIGH (ref 10.3–14.5)
RBC # FLD: 16.8 % — HIGH (ref 10.3–14.5)
RBC # FLD: 16.8 % — HIGH (ref 10.3–14.5)
RBC CASTS # UR COMP ASSIST: 6 /HPF — HIGH (ref 0–4)
RBC CASTS # UR COMP ASSIST: 6 /HPF — HIGH (ref 0–4)
SODIUM SERPL-SCNC: 140 MMOL/L — SIGNIFICANT CHANGE UP (ref 135–145)
SODIUM SERPL-SCNC: 140 MMOL/L — SIGNIFICANT CHANGE UP (ref 135–145)
SODIUM UR-SCNC: 127 MMOL/L — SIGNIFICANT CHANGE UP
SODIUM UR-SCNC: 127 MMOL/L — SIGNIFICANT CHANGE UP
SP GR SPEC: 1.01 — SIGNIFICANT CHANGE UP (ref 1–1.03)
SP GR SPEC: 1.01 — SIGNIFICANT CHANGE UP (ref 1–1.03)
SQUAMOUS # UR AUTO: 1 /HPF — SIGNIFICANT CHANGE UP (ref 0–5)
SQUAMOUS # UR AUTO: 1 /HPF — SIGNIFICANT CHANGE UP (ref 0–5)
TIBC SERPL-MCNC: 200 UG/DL — LOW (ref 220–430)
TIBC SERPL-MCNC: 200 UG/DL — LOW (ref 220–430)
TRIGL SERPL-MCNC: 114 MG/DL — SIGNIFICANT CHANGE UP
TRIGL SERPL-MCNC: 114 MG/DL — SIGNIFICANT CHANGE UP
UIBC SERPL-MCNC: 80 UG/DL — LOW (ref 110–370)
UIBC SERPL-MCNC: 80 UG/DL — LOW (ref 110–370)
UROBILINOGEN FLD QL: 0.2 MG/DL — SIGNIFICANT CHANGE UP (ref 0.2–1)
UROBILINOGEN FLD QL: 0.2 MG/DL — SIGNIFICANT CHANGE UP (ref 0.2–1)
UUN UR-MCNC: 220.6 MG/DL — SIGNIFICANT CHANGE UP
UUN UR-MCNC: 220.6 MG/DL — SIGNIFICANT CHANGE UP
VIT B12 SERPL-MCNC: 833 PG/ML — SIGNIFICANT CHANGE UP (ref 200–900)
VIT B12 SERPL-MCNC: 833 PG/ML — SIGNIFICANT CHANGE UP (ref 200–900)
WBC # BLD: 3.92 K/UL — SIGNIFICANT CHANGE UP (ref 3.8–10.5)
WBC # BLD: 3.92 K/UL — SIGNIFICANT CHANGE UP (ref 3.8–10.5)
WBC # BLD: 4.24 K/UL — SIGNIFICANT CHANGE UP (ref 3.8–10.5)
WBC # BLD: 4.24 K/UL — SIGNIFICANT CHANGE UP (ref 3.8–10.5)
WBC # FLD AUTO: 3.92 K/UL — SIGNIFICANT CHANGE UP (ref 3.8–10.5)
WBC # FLD AUTO: 3.92 K/UL — SIGNIFICANT CHANGE UP (ref 3.8–10.5)
WBC # FLD AUTO: 4.24 K/UL — SIGNIFICANT CHANGE UP (ref 3.8–10.5)
WBC # FLD AUTO: 4.24 K/UL — SIGNIFICANT CHANGE UP (ref 3.8–10.5)
WBC UR QL: 248 /HPF — HIGH (ref 0–5)
WBC UR QL: 248 /HPF — HIGH (ref 0–5)

## 2023-10-27 PROCEDURE — 99233 SBSQ HOSP IP/OBS HIGH 50: CPT

## 2023-10-27 PROCEDURE — 84165 PROTEIN E-PHORESIS SERUM: CPT | Mod: 26

## 2023-10-27 PROCEDURE — 86334 IMMUNOFIX E-PHORESIS SERUM: CPT | Mod: 26

## 2023-10-27 PROCEDURE — 76770 US EXAM ABDO BACK WALL COMP: CPT | Mod: 26

## 2023-10-27 PROCEDURE — 84166 PROTEIN E-PHORESIS/URINE/CSF: CPT | Mod: 26

## 2023-10-27 PROCEDURE — 86335 IMMUNFIX E-PHORSIS/URINE/CSF: CPT | Mod: 26

## 2023-10-27 RX ORDER — CARVEDILOL PHOSPHATE 80 MG/1
1 CAPSULE, EXTENDED RELEASE ORAL
Refills: 0 | DISCHARGE

## 2023-10-27 RX ORDER — ATORVASTATIN CALCIUM 80 MG/1
40 TABLET, FILM COATED ORAL AT BEDTIME
Refills: 0 | Status: DISCONTINUED | OUTPATIENT
Start: 2023-10-27 | End: 2023-11-04

## 2023-10-27 RX ORDER — BUMETANIDE 0.25 MG/ML
2 INJECTION INTRAMUSCULAR; INTRAVENOUS DAILY
Refills: 0 | Status: DISCONTINUED | OUTPATIENT
Start: 2023-10-27 | End: 2023-10-27

## 2023-10-27 RX ORDER — CARVEDILOL PHOSPHATE 80 MG/1
12.5 CAPSULE, EXTENDED RELEASE ORAL EVERY 12 HOURS
Refills: 0 | Status: DISCONTINUED | OUTPATIENT
Start: 2023-10-27 | End: 2023-11-04

## 2023-10-27 RX ORDER — CHOLECALCIFEROL (VITAMIN D3) 125 MCG
1000 CAPSULE ORAL DAILY
Refills: 0 | Status: DISCONTINUED | OUTPATIENT
Start: 2023-10-27 | End: 2023-11-04

## 2023-10-27 RX ORDER — ASPIRIN/CALCIUM CARB/MAGNESIUM 324 MG
81 TABLET ORAL DAILY
Refills: 0 | Status: DISCONTINUED | OUTPATIENT
Start: 2023-10-27 | End: 2023-11-04

## 2023-10-27 RX ORDER — CHOLECALCIFEROL (VITAMIN D3) 125 MCG
1 CAPSULE ORAL
Refills: 0 | DISCHARGE

## 2023-10-27 RX ORDER — HYDRALAZINE HCL 50 MG
100 TABLET ORAL THREE TIMES A DAY
Refills: 0 | Status: DISCONTINUED | OUTPATIENT
Start: 2023-10-27 | End: 2023-11-04

## 2023-10-27 RX ORDER — AMLODIPINE BESYLATE 2.5 MG/1
10 TABLET ORAL DAILY
Refills: 0 | Status: DISCONTINUED | OUTPATIENT
Start: 2023-10-27 | End: 2023-11-04

## 2023-10-27 RX ORDER — CEFTRIAXONE 500 MG/1
1000 INJECTION, POWDER, FOR SOLUTION INTRAMUSCULAR; INTRAVENOUS EVERY 24 HOURS
Refills: 0 | Status: COMPLETED | OUTPATIENT
Start: 2023-10-27 | End: 2023-10-29

## 2023-10-27 RX ADMIN — Medication 1000 UNIT(S): at 11:34

## 2023-10-27 RX ADMIN — ATORVASTATIN CALCIUM 40 MILLIGRAM(S): 80 TABLET, FILM COATED ORAL at 22:07

## 2023-10-27 RX ADMIN — Medication 100 MILLIGRAM(S): at 10:40

## 2023-10-27 RX ADMIN — CEFTRIAXONE 100 MILLIGRAM(S): 500 INJECTION, POWDER, FOR SOLUTION INTRAMUSCULAR; INTRAVENOUS at 16:43

## 2023-10-27 RX ADMIN — Medication 100 MILLIGRAM(S): at 22:07

## 2023-10-27 RX ADMIN — AMLODIPINE BESYLATE 10 MILLIGRAM(S): 2.5 TABLET ORAL at 10:41

## 2023-10-27 RX ADMIN — CARVEDILOL PHOSPHATE 12.5 MILLIGRAM(S): 80 CAPSULE, EXTENDED RELEASE ORAL at 17:21

## 2023-10-27 RX ADMIN — Medication 81 MILLIGRAM(S): at 11:35

## 2023-10-27 NOTE — CONSULT NOTE ADULT - PROBLEM SELECTOR RECOMMENDATION 9
Pt with MANDY on CKD in the setting of severe anemia and uncontrolled hypertension. On review of labs on [place], last outpatient Scr was elevated at 4.13 on 10/26/23. Prior to this, the last Scr was 2.16 on 12/6/22 during previous hospitalization at [facility] from 11/17/22 to 12/9/22 for low Hgb. Pt did not follow up with a Nephrologist after previous hospitalization. UA with 300mg of protein. UPCR is elevated at 8.9gms. UOP is 300ccs since admission. Kidney US on 10/26/23 shows increased renal echogenicity with no evidence of hydronephrosis. Pt likely with progressive diabetic kidney disease. Monitor labs and urine output. Avoid nephrotoxins. Dose medications as per eGFR. Renal diet.

## 2023-10-27 NOTE — PROGRESS NOTE ADULT - ASSESSMENT
68 yo F w/ HTN, HLD, sarcoidosis, HFpEF, hx PEA x2, b/l sensorineural hearing, sarcoidosis, and HTN presents to the ED with low Hgb, most likely AOCD.

## 2023-10-27 NOTE — PROGRESS NOTE ADULT - PROBLEM SELECTOR PLAN 1
Patient with acute on chronic anemia; normocytic   -occult blood feces is negative. Denies any other source of bleeding   -Given hx of CKD, most likely due to AOCD   -F/u with iron studies   -s/p 1 unit of pRBCs in the ED

## 2023-10-27 NOTE — CONSULT NOTE ADULT - SUBJECTIVE AND OBJECTIVE BOX
[facility] DIVISION OF KIDNEY DISEASES AND HYPERTENSION -- [phone]  -- INITIAL CONSULT NOTE  --------------------------------------------------------------------------------  HPI: 68F with PMH of HTN, HLD, DM2, sarcoidosis, CKD, HF, anemia, sensorineural hearing loss, presenting to the ED after found to have low hemoglobin on pre-surgical workup prior to cochlear implantation surgery. Nephrology consulted for MANDY on CKD.         PAST HISTORY  --------------------------------------------------------------------------------  PAST MEDICAL & SURGICAL HISTORY:  Type 2 diabetes mellitus  Bilateral cataracts  Fluid in pleural cavity associated with pancreatitis  Pancreatic pseudocyst/cyst  s/p drain placement and removal  HTN (hypertension)  HLD (hyperlipidemia)  H/O sarcoidosis  PVD (peripheral vascular disease)  Sensorineural hearing loss, bilateral  H/O CHF  Chronic kidney disease (CKD)  Anemia  History of amputation of right great toe    H/O:  section    History of laparoscopic cholecystectomy    FAMILY HISTORY:  FH: hypertension (Mother)    PAST SOCIAL HISTORY:  Denies any hx of smoking or alcohol consumption. (26 Oct 2023 22:58)    ALLERGIES & MEDICATIONS  --------------------------------------------------------------------------------  Allergies  penicillin ([facility] [place] Synd)    Intolerances    Standing Inpatient MedicationsamLODIPine   Tablet 10 milliGRAM(s) Oral daily  aspirin enteric coated 81 milliGRAM(s) Oral daily  atorvastatin 40 milliGRAM(s) Oral at bedtime  carvedilol 12.5 milliGRAM(s) Oral every 12 hours  cholecalciferol 1000 Unit(s) Oral daily  hydrALAZINE 100 milliGRAM(s) Oral three times a day    PRN Inpatient Medicationsacetaminophen     Tablet .. 650 milliGRAM(s) Oral every 6 hours PRN  aluminum hydroxide/magnesium hydroxide/simethicone Suspension 30 milliLiter(s) Oral every 4 hours PRN  melatonin 3 milliGRAM(s) Oral at bedtime PRN  ondansetron Injectable 4 milliGRAM(s) IV Push every 8 hours PRN    REVIEW OF SYSTEMS  --------------------------------------------------------------------------------  Gen: No fevers/chills  Skin: No rashes  Head/Eyes/Ears: No HA, difficulty hearing  Respiratory: No dyspnea, cough  CV: No chest pain  GI: No abdominal pain, diarrhea  : No dysuria, hematuria  MSK: No edema    All other systems were reviewed and are negative, except as noted.    VITALS/PHYSICAL EXAM  --------------------------------------------------------------------------------  T(C): 36.6 (10-27-23 @ 12:04), Max: 36.8 (10-26-23 @ 17:05)  HR: 75 (10-27-23 @ 12:04) (68 - 85)  BP: 174/81 (10-27-23 @ 12:04) (145/75 - 193/90)  RR: 14 (10-27-23 @ 12:04) (13 - 18)  SpO2: 100% (10-27-23 @ 12:04) (99% - 100%)  Wt(kg): --  Height (cm): 157.5 (10-26-23 @ 17:05)  Weight (kg): 73.5 (10-26-23 @ 11:25)  BMI (kg/m2): 29.6 (10-26-23 @ 17:05)  BSA (m2): 1.75 (10-26-23 @ 17:05)    10-26-23 @ 07:01  -  10-27-23 @ 07:00  --------------------------------------------------------  IN: 0 mL / OUT: 300 mL / NET: -300 mL    Physical Exam:  Gen: NAD  HEENT: MMM, hard of hearing in both ears  Pulm: CTA B/L  CV: S1S2  Abd: Soft, +BS   Ext: Trace B/L LE edema  Neuro: Awake  Skin: Warm and dry    LABS/STUDIES  --------------------------------------------------------------------------------              7.3    4.24  >-----------<  186      [10-27-23 @ 04:50]              23.8     140  |  108  |  70  ----------------------------<  138      [10-27-23 @ 04:50]  4.2   |  22  |  4.03        Ca     8.9     [10-27-23 @ 04:50]    TPro  6.8  /  Alb  3.0  /  TBili  0.4  /  DBili  x   /  AST  21  /  ALT  9   /  AlkPhos  81  [10-27-23 @ 04:50]    Creatinine Trend:  SCr 4.03 [10-27 @ 04:50]  SCr 4.16 [10-26 @ 18:30]  SCr 4.13 [10-26 @ 12:00]    Urine Creatinine 22      [10-27-23 @ 09:00]  Urine Protein 194      [10-27-23 @ 09:00]  Urine Sodium 127      [10-27-23 @ 09:00]  Urine Urea Nitrogen 220.6      [10-27-23 @ 09:00]    Iron 120, TIBC 200, %sat 60      [10-27-23 @ 04:50]  Ferritin 122      [10-27-23 @ 04:50]  TSH 2.55      [10-26-23 @ 18:30]  Lipid: chol 179, , HDL 48, LDL --      [10-27-23 @ 04:50] [facility] DIVISION OF KIDNEY DISEASES AND HYPERTENSION -- [phone]  -- INITIAL CONSULT NOTE  --------------------------------------------------------------------------------  HPI: 68F with PMH of HTN, HLD, DM2, sarcoidosis, CKD, HF, anemia, sensorineural hearing loss, presenting to the ED after found to have low hemoglobin on pre-surgical workup prior to cochlear implantation surgery. Nephrology consulted for MANDY on CKD.     On review of labs on [place], last outpatient Scr was elevated at 4.13 on 10/26/23. Prior to this, the last Scr was 2.16 on 22 during previous hospitalization at [facility] from 22 to 22 for low Hgb. Pt with Hgb of 6.6 on admission on 10/26/23. Last Hgb was 6.4 on 10/26/23. Hgb on 22 was 8.7.     Pt seen and evaluated in the ED. Pt feels well and offers no complaints at this time. Pt is very hard of hearing. Discussed plan of care with daughter over the phone.    PAST HISTORY  --------------------------------------------------------------------------------  PAST MEDICAL & SURGICAL HISTORY:  Type 2 diabetes mellitus  Bilateral cataracts  Fluid in pleural cavity associated with pancreatitis  Pancreatic pseudocyst/cyst  s/p drain placement and removal  HTN (hypertension)  HLD (hyperlipidemia)  H/O sarcoidosis  PVD (peripheral vascular disease)  Sensorineural hearing loss, bilateral  H/O CHF  Chronic kidney disease (CKD)  Anemia  History of amputation of right great toe  2018  H/O:  section    History of laparoscopic cholecystectomy    FAMILY HISTORY:  FH: hypertension (Mother)    PAST SOCIAL HISTORY:  Denies any hx of smoking or alcohol consumption. (26 Oct 2023 22:58)    ALLERGIES & MEDICATIONS  --------------------------------------------------------------------------------  Allergies  penicillin ([facility] [place] Synd)    Intolerances    Standing Inpatient MedicationsamLODIPine   Tablet 10 milliGRAM(s) Oral daily  aspirin enteric coated 81 milliGRAM(s) Oral daily  atorvastatin 40 milliGRAM(s) Oral at bedtime  carvedilol 12.5 milliGRAM(s) Oral every 12 hours  cholecalciferol 1000 Unit(s) Oral daily  hydrALAZINE 100 milliGRAM(s) Oral three times a day    PRN Inpatient Medicationsacetaminophen     Tablet .. 650 milliGRAM(s) Oral every 6 hours PRN  aluminum hydroxide/magnesium hydroxide/simethicone Suspension 30 milliLiter(s) Oral every 4 hours PRN  melatonin 3 milliGRAM(s) Oral at bedtime PRN  ondansetron Injectable 4 milliGRAM(s) IV Push every 8 hours PRN    REVIEW OF SYSTEMS  --------------------------------------------------------------------------------  Gen: No fevers/chills  Skin: No rashes  Head/Eyes/Ears: No HA, difficulty hearing  Respiratory: No dyspnea, cough  CV: No chest pain  GI: No abdominal pain, diarrhea  : No dysuria, hematuria  MSK: No edema    All other systems were reviewed and are negative, except as noted.    VITALS/PHYSICAL EXAM  --------------------------------------------------------------------------------  T(C): 36.6 (10-27-23 @ 12:04), Max: 36.8 (10-26-23 @ 17:05)  HR: 75 (10-27-23 @ 12:) (68 - 85)  BP: 174/81 (10-27-23 @ 12:04) (145/75 - 193/90)  RR: 14 (10-27-23 @ 12:04) (13 - 18)  SpO2: 100% (10-27-23 @ 12:04) (99% - 100%)  Wt(kg): --  Height (cm): 157.5 (10-26-23 @ 17:05)  Weight (kg): 73.5 (10-26-23 @ 11:25)  BMI (kg/m2): 29.6 (10-26-23 @ 17:)  BSA (m2): 1.75 (10-26-23 @ 17:05)    10-26-23 @ 07:01  -  10-27-23 @ 07:00  --------------------------------------------------------  IN: 0 mL / OUT: 300 mL / NET: -300 mL    Physical Exam:  Gen: NAD  HEENT: MMM, hard of hearing in both ears  Pulm: CTA B/L  CV: S1S2  Abd: Soft, +BS   Ext: Trace B/L LE edema  Neuro: Awake  Skin: Warm and dry    LABS/STUDIES  --------------------------------------------------------------------------------              7.3    4.24  >-----------<  186      [10-27-23 @ 04:50]              23.8     140  |  108  |  70  ----------------------------<  138      [10-27-23 @ 04:50]  4.2   |  22  |  4.03        Ca     8.9     [10-27-23 @ 04:50]    TPro  6.8  /  Alb  3.0  /  TBili  0.4  /  DBili  x   /  AST  21  /  ALT  9   /  AlkPhos  81  [10-27-23 @ 04:50]    Creatinine Trend:  SCr 4.03 [10-27 @ 04:50]  SCr 4.16 [10-26 @ 18:30]  SCr 4.13 [10-26 @ 12:00]    Urine Creatinine 22      [10-27-23 @ 09:00]  Urine Protein 194      [10-27-23 @ 09:00]  Urine Sodium 127      [10-27-23 @ 09:00]  Urine Urea Nitrogen 220.6      [10-27-23 @ 09:00]    Iron 120, TIBC 200, %sat 60      [10-27-23 @ 04:50]  Ferritin 122      [10-27-23 @ 04:50]  TSH 2.55      [10-26-23 @ 18:30]  Lipid: chol 179, , HDL 48, LDL --      [10-27-23 @ 04:50]

## 2023-10-27 NOTE — PROGRESS NOTE ADULT - TIME BILLING
Face to face encounter. Reviewed labs, vitals, imaging. Reviewed consultant recs. Discussed plan of care with patient. Documentation in sunrise. Face to face encounter. Reviewed labs, vitals, imaging. Consulting nephrology. Discussed plan of care with patient. Documentation in sunrise.

## 2023-10-27 NOTE — CONSULT NOTE ADULT - PROBLEM SELECTOR RECOMMENDATION 2
Pt with anemia in the setting of CKD. Pt with Hgb of 6.6 on admission on 10/26/23. Last Hgb prior to ED visit was 6.4 on 10/26/23 outpatient labs. Hgb on 12/6/22 was 8.7. Recommend checking iron studies and ferritin levels. Transfusion per primary team. Monitor Hgb levels.     If you have any questions, please feel free to contact me.  Darrell Martinez MD  Nephrology Fellow  [id number] / [employer] Teams (Preferred)  (After 4pm or on weekends, please call the on-call Fellow)

## 2023-10-27 NOTE — PROGRESS NOTE ADULT - SUBJECTIVE AND OBJECTIVE BOX
PROGRESS NOTE:     Patient is a 68y old  Female who presents with a chief complaint of Low Hgb (26 Oct 2023 22:58)      SUBJECTIVE / OVERNIGHT EVENTS: no acute event overnight. Reports feeling ok. Denies dysuria, fever, chills. Reports increased urinary frequency. No complaints at this time.     ADDITIONAL REVIEW OF SYSTEMS:    MEDICATIONS  (STANDING):  amLODIPine   Tablet 10 milliGRAM(s) Oral daily  aspirin enteric coated 81 milliGRAM(s) Oral daily  atorvastatin 40 milliGRAM(s) Oral at bedtime  carvedilol 12.5 milliGRAM(s) Oral every 12 hours  cholecalciferol 1000 Unit(s) Oral daily  hydrALAZINE 100 milliGRAM(s) Oral three times a day    MEDICATIONS  (PRN):  acetaminophen     Tablet .. 650 milliGRAM(s) Oral every 6 hours PRN Temp greater or equal to 38C (100.4F), Mild Pain (1 - 3)  aluminum hydroxide/magnesium hydroxide/simethicone Suspension 30 milliLiter(s) Oral every 4 hours PRN Dyspepsia  melatonin 3 milliGRAM(s) Oral at bedtime PRN Insomnia  ondansetron Injectable 4 milliGRAM(s) IV Push every 8 hours PRN Nausea and/or Vomiting      CAPILLARY BLOOD GLUCOSE      POCT Blood Glucose.: 227 mg/dL (26 Oct 2023 17:16)    I&O's Summary    26 Oct 2023 07:01  -  27 Oct 2023 07:00  --------------------------------------------------------  IN: 0 mL / OUT: 300 mL / NET: -300 mL        PHYSICAL EXAM:  Vital Signs Last 24 Hrs  T(C): 36.6 (27 Oct 2023 12:04), Max: 36.8 (26 Oct 2023 17:05)  T(F): 97.9 (27 Oct 2023 12:04), Max: 98.3 (26 Oct 2023 17:05)  HR: 75 (27 Oct 2023 12:04) (68 - 85)  BP: 174/81 (27 Oct 2023 12:04) (145/75 - 193/90)  BP(mean): --  RR: 14 (27 Oct 2023 12:04) (13 - 18)  SpO2: 100% (27 Oct 2023 12:04) (99% - 100%)    Parameters below as of 27 Oct 2023 12:04  Patient On (Oxygen Delivery Method): room air        CONSTITUTIONAL: NAD, laying in bed comfortably  RESPIRATORY: Normal respiratory effort; lungs are clear to auscultation bilaterally  CARDIOVASCULAR: Regular rate and rhythm, normal S1 and S2, no murmur/rub/gallop; No lower extremity edema  ABDOMEN: Nontender to palpation, normoactive bowel sounds, no rebound/guarding  MUSCLOSKELETAL: no clubbing or cyanosis of digits; no joint swelling or tenderness to palpation  SKIN: no rash, erythema, lesion  PSYCH: A+O to person, place, and time; affect appropriate    LABS:                        7.3    4.24  )-----------( 186      ( 27 Oct 2023 04:50 )             23.8     10    140  |  108<H>  |  70<H>  ----------------------------<  138<H>  4.2   |  22  |  4.03<H>    Ca    8.9      27 Oct 2023 04:50    TPro  6.8  /  Alb  3.0<L>  /  TBili  0.4  /  DBili  x   /  AST  21  /  ALT  9   /  AlkPhos  81  10-27          Urinalysis Basic - ( 27 Oct 2023 09:00 )    Color: Yellow / Appearance: Cloudy / S.011 / pH: x  Gluc: x / Ketone: Negative mg/dL  / Bili: Negative / Urobili: 0.2 mg/dL   Blood: x / Protein: 300 mg/dL / Nitrite: Negative   Leuk Esterase: Moderate / RBC: 6 /HPF /  /HPF   Sq Epi: x / Non Sq Epi: 1 /HPF / Bacteria: Occasional /HPF          RADIOLOGY & ADDITIONAL TESTS:  Results Reviewed:   Imaging Personally Reviewed:  Electrocardiogram Personally Reviewed:    COORDINATION OF CARE:  Care Discussed with Consultants/Other Providers [Y/N]:  Prior or Outpatient Records Reviewed [Y/N]:

## 2023-10-27 NOTE — PROGRESS NOTE ADULT - PROBLEM SELECTOR PLAN 3
Patient with hx of HFpEF   -CXR shows cardiomegaly with vascular congestion   -Euvolemic on exam   -S/p IV lasix 60 mg in the ED   -consider resuming bumex Patient with hx of HFpEF   -CXR shows cardiomegaly with vascular congestion   -Euvolemic on exam   -S/p IV lasix 60 mg in the ED   -consider resuming bumex if ok per nephro

## 2023-10-27 NOTE — PHARMACOTHERAPY INTERVENTION NOTE - COMMENTS
Medication list in Outpatient Medication Review ([facility]) has been updated; list/dosages verified with outpatient pharmacy ([facility]).

## 2023-10-27 NOTE — PATIENT PROFILE ADULT - PACKS YRS CALCULATION
Use an antihistamine such as Claritin, Zyrtec or Allegra to dry you out.     Use pseudoephedrine (behind the counter) to decongest. Pseudoephedrine  30 mg up to 240 mg /day.     Use mucinex (guaifenisin) to break up mucous up to 2400mg/day to loosen any mucous.     Use Flonase 1-2 sprays/nostril per day. It is a local acting steroid nasal spray, if you develop a bloody nose, stop using the medication immediately.    Use warm salt water gargles to ease your throat pain. Hot tea with honey.     Take promethazine DM nightly as needed for cough suppression will make drowsy    Use albuterol inhaler every 4-6 hours as needed for chest tightness or shortness of breath.    A cool mist humidifier in bedroom may help with cough and relieve stuffy nose.     Over the counter you can use Tylenol (acetominophen) or Ibuprofen for your minor aches and pains as long as you have no contraindications.    Good nutrition. Lots of rest. Plenty of fluids     You must understand that you've received an Urgent Care treatment only and that you may be released before all your medical problems are known or treated. You, the patient, will arrange for follow up care as instructed.  Follow up with your PCP or specialty clinic as directed in the next 1-2 weeks if not improved or as needed.  You can call [phone] to schedule an appointment with the appropriate provider.  If your condition worsens we recommend that you receive another evaluation at the emergency room immediately or contact your primary medical clinics after hours call service to discuss your concerns.  Please return here or go to the Emergency Department for any concerns or worsening of condition.   
5

## 2023-10-27 NOTE — PATIENT PROFILE ADULT - FALL HARM RISK - HARM RISK INTERVENTIONS

## 2023-10-28 LAB
ANION GAP SERPL CALC-SCNC: 13 MMOL/L — SIGNIFICANT CHANGE UP (ref 7–14)
ANION GAP SERPL CALC-SCNC: 13 MMOL/L — SIGNIFICANT CHANGE UP (ref 7–14)
APPEARANCE UR: CLEAR — SIGNIFICANT CHANGE UP
APPEARANCE UR: CLEAR — SIGNIFICANT CHANGE UP
BACTERIA # UR AUTO: ABNORMAL /HPF
BACTERIA # UR AUTO: ABNORMAL /HPF
BILIRUB UR-MCNC: NEGATIVE — SIGNIFICANT CHANGE UP
BILIRUB UR-MCNC: NEGATIVE — SIGNIFICANT CHANGE UP
BUN SERPL-MCNC: 78 MG/DL — HIGH (ref 7–23)
BUN SERPL-MCNC: 78 MG/DL — HIGH (ref 7–23)
CALCIUM SERPL-MCNC: 8.7 MG/DL — SIGNIFICANT CHANGE UP (ref 8.4–10.5)
CALCIUM SERPL-MCNC: 8.7 MG/DL — SIGNIFICANT CHANGE UP (ref 8.4–10.5)
CAST: 2 /LPF — SIGNIFICANT CHANGE UP (ref 0–4)
CAST: 2 /LPF — SIGNIFICANT CHANGE UP (ref 0–4)
CHLORIDE SERPL-SCNC: 104 MMOL/L — SIGNIFICANT CHANGE UP (ref 98–107)
CHLORIDE SERPL-SCNC: 104 MMOL/L — SIGNIFICANT CHANGE UP (ref 98–107)
CO2 SERPL-SCNC: 21 MMOL/L — LOW (ref 22–31)
CO2 SERPL-SCNC: 21 MMOL/L — LOW (ref 22–31)
COLOR SPEC: YELLOW — SIGNIFICANT CHANGE UP
COLOR SPEC: YELLOW — SIGNIFICANT CHANGE UP
CREAT ?TM UR-MCNC: 90 MG/DL — SIGNIFICANT CHANGE UP
CREAT ?TM UR-MCNC: 90 MG/DL — SIGNIFICANT CHANGE UP
CREAT SERPL-MCNC: 4.42 MG/DL — HIGH (ref 0.5–1.3)
CREAT SERPL-MCNC: 4.42 MG/DL — HIGH (ref 0.5–1.3)
DIFF PNL FLD: NEGATIVE — SIGNIFICANT CHANGE UP
DIFF PNL FLD: NEGATIVE — SIGNIFICANT CHANGE UP
EGFR: 10 ML/MIN/1.73M2 — LOW
EGFR: 10 ML/MIN/1.73M2 — LOW
FERRITIN SERPL-MCNC: 140 NG/ML — SIGNIFICANT CHANGE UP (ref 13–330)
FERRITIN SERPL-MCNC: 140 NG/ML — SIGNIFICANT CHANGE UP (ref 13–330)
GLUCOSE BLDC GLUCOMTR-MCNC: 132 MG/DL — HIGH (ref 70–99)
GLUCOSE BLDC GLUCOMTR-MCNC: 132 MG/DL — HIGH (ref 70–99)
GLUCOSE BLDC GLUCOMTR-MCNC: 157 MG/DL — HIGH (ref 70–99)
GLUCOSE BLDC GLUCOMTR-MCNC: 157 MG/DL — HIGH (ref 70–99)
GLUCOSE BLDC GLUCOMTR-MCNC: 215 MG/DL — HIGH (ref 70–99)
GLUCOSE BLDC GLUCOMTR-MCNC: 215 MG/DL — HIGH (ref 70–99)
GLUCOSE SERPL-MCNC: 126 MG/DL — HIGH (ref 70–99)
GLUCOSE SERPL-MCNC: 126 MG/DL — HIGH (ref 70–99)
GLUCOSE UR QL: 250 MG/DL
GLUCOSE UR QL: 250 MG/DL
HCT VFR BLD CALC: 23.5 % — LOW (ref 34.5–45)
HCT VFR BLD CALC: 23.5 % — LOW (ref 34.5–45)
HGB BLD-MCNC: 7.4 G/DL — LOW (ref 11.5–15.5)
HGB BLD-MCNC: 7.4 G/DL — LOW (ref 11.5–15.5)
IRON SATN MFR SERPL: 14 % — SIGNIFICANT CHANGE UP (ref 14–50)
IRON SATN MFR SERPL: 14 % — SIGNIFICANT CHANGE UP (ref 14–50)
IRON SATN MFR SERPL: 26 UG/DL — LOW (ref 30–160)
IRON SATN MFR SERPL: 26 UG/DL — LOW (ref 30–160)
KETONES UR-MCNC: NEGATIVE MG/DL — SIGNIFICANT CHANGE UP
KETONES UR-MCNC: NEGATIVE MG/DL — SIGNIFICANT CHANGE UP
LEUKOCYTE ESTERASE UR-ACNC: ABNORMAL
LEUKOCYTE ESTERASE UR-ACNC: ABNORMAL
MAGNESIUM SERPL-MCNC: 1.5 MG/DL — LOW (ref 1.6–2.6)
MAGNESIUM SERPL-MCNC: 1.5 MG/DL — LOW (ref 1.6–2.6)
MCHC RBC-ENTMCNC: 26.4 PG — LOW (ref 27–34)
MCHC RBC-ENTMCNC: 26.4 PG — LOW (ref 27–34)
MCHC RBC-ENTMCNC: 31.5 GM/DL — LOW (ref 32–36)
MCHC RBC-ENTMCNC: 31.5 GM/DL — LOW (ref 32–36)
MCV RBC AUTO: 83.9 FL — SIGNIFICANT CHANGE UP (ref 80–100)
MCV RBC AUTO: 83.9 FL — SIGNIFICANT CHANGE UP (ref 80–100)
NITRITE UR-MCNC: NEGATIVE — SIGNIFICANT CHANGE UP
NITRITE UR-MCNC: NEGATIVE — SIGNIFICANT CHANGE UP
NRBC # BLD: 0 /100 WBCS — SIGNIFICANT CHANGE UP (ref 0–0)
NRBC # BLD: 0 /100 WBCS — SIGNIFICANT CHANGE UP (ref 0–0)
NRBC # FLD: 0 K/UL — SIGNIFICANT CHANGE UP (ref 0–0)
NRBC # FLD: 0 K/UL — SIGNIFICANT CHANGE UP (ref 0–0)
PH UR: 6 — SIGNIFICANT CHANGE UP (ref 5–8)
PH UR: 6 — SIGNIFICANT CHANGE UP (ref 5–8)
PHOSPHATE SERPL-MCNC: 5.4 MG/DL — HIGH (ref 2.5–4.5)
PHOSPHATE SERPL-MCNC: 5.4 MG/DL — HIGH (ref 2.5–4.5)
PLATELET # BLD AUTO: 186 K/UL — SIGNIFICANT CHANGE UP (ref 150–400)
PLATELET # BLD AUTO: 186 K/UL — SIGNIFICANT CHANGE UP (ref 150–400)
POTASSIUM SERPL-MCNC: 4.4 MMOL/L — SIGNIFICANT CHANGE UP (ref 3.5–5.3)
POTASSIUM SERPL-MCNC: 4.4 MMOL/L — SIGNIFICANT CHANGE UP (ref 3.5–5.3)
POTASSIUM SERPL-SCNC: 4.4 MMOL/L — SIGNIFICANT CHANGE UP (ref 3.5–5.3)
POTASSIUM SERPL-SCNC: 4.4 MMOL/L — SIGNIFICANT CHANGE UP (ref 3.5–5.3)
POTASSIUM UR-SCNC: 48.2 MMOL/L — SIGNIFICANT CHANGE UP
POTASSIUM UR-SCNC: 48.2 MMOL/L — SIGNIFICANT CHANGE UP
PROT ?TM UR-MCNC: 567 MG/DL — SIGNIFICANT CHANGE UP
PROT ?TM UR-MCNC: 567 MG/DL — SIGNIFICANT CHANGE UP
PROT ?TM UR-MCNC: 572 MG/DL — SIGNIFICANT CHANGE UP
PROT ?TM UR-MCNC: 572 MG/DL — SIGNIFICANT CHANGE UP
PROT UR-MCNC: >=1000 MG/DL
PROT UR-MCNC: >=1000 MG/DL
PROT/CREAT UR-RTO: 6.3 RATIO — HIGH (ref 0–0.2)
PROT/CREAT UR-RTO: 6.3 RATIO — HIGH (ref 0–0.2)
RBC # BLD: 2.8 M/UL — LOW (ref 3.8–5.2)
RBC # BLD: 2.8 M/UL — LOW (ref 3.8–5.2)
RBC # FLD: 17 % — HIGH (ref 10.3–14.5)
RBC # FLD: 17 % — HIGH (ref 10.3–14.5)
RBC CASTS # UR COMP ASSIST: 2 /HPF — SIGNIFICANT CHANGE UP (ref 0–4)
RBC CASTS # UR COMP ASSIST: 2 /HPF — SIGNIFICANT CHANGE UP (ref 0–4)
SODIUM SERPL-SCNC: 138 MMOL/L — SIGNIFICANT CHANGE UP (ref 135–145)
SODIUM SERPL-SCNC: 138 MMOL/L — SIGNIFICANT CHANGE UP (ref 135–145)
SODIUM UR-SCNC: 65 MMOL/L — SIGNIFICANT CHANGE UP
SODIUM UR-SCNC: 65 MMOL/L — SIGNIFICANT CHANGE UP
SP GR SPEC: 1.02 — SIGNIFICANT CHANGE UP (ref 1–1.03)
SP GR SPEC: 1.02 — SIGNIFICANT CHANGE UP (ref 1–1.03)
SQUAMOUS # UR AUTO: 1 /HPF — SIGNIFICANT CHANGE UP (ref 0–5)
SQUAMOUS # UR AUTO: 1 /HPF — SIGNIFICANT CHANGE UP (ref 0–5)
TIBC SERPL-MCNC: 180 UG/DL — LOW (ref 220–430)
TIBC SERPL-MCNC: 180 UG/DL — LOW (ref 220–430)
UIBC SERPL-MCNC: 154 UG/DL — SIGNIFICANT CHANGE UP (ref 110–370)
UIBC SERPL-MCNC: 154 UG/DL — SIGNIFICANT CHANGE UP (ref 110–370)
UROBILINOGEN FLD QL: 0.2 MG/DL — SIGNIFICANT CHANGE UP (ref 0.2–1)
UROBILINOGEN FLD QL: 0.2 MG/DL — SIGNIFICANT CHANGE UP (ref 0.2–1)
UUN UR-MCNC: 577.4 MG/DL — SIGNIFICANT CHANGE UP
UUN UR-MCNC: 577.4 MG/DL — SIGNIFICANT CHANGE UP
WBC # BLD: 3.87 K/UL — SIGNIFICANT CHANGE UP (ref 3.8–10.5)
WBC # BLD: 3.87 K/UL — SIGNIFICANT CHANGE UP (ref 3.8–10.5)
WBC # FLD AUTO: 3.87 K/UL — SIGNIFICANT CHANGE UP (ref 3.8–10.5)
WBC # FLD AUTO: 3.87 K/UL — SIGNIFICANT CHANGE UP (ref 3.8–10.5)
WBC UR QL: 48 /HPF — HIGH (ref 0–5)
WBC UR QL: 48 /HPF — HIGH (ref 0–5)

## 2023-10-28 PROCEDURE — 99232 SBSQ HOSP IP/OBS MODERATE 35: CPT

## 2023-10-28 PROCEDURE — 99232 SBSQ HOSP IP/OBS MODERATE 35: CPT | Mod: GC

## 2023-10-28 PROCEDURE — 84166 PROTEIN E-PHORESIS/URINE/CSF: CPT | Mod: 26

## 2023-10-28 RX ORDER — DEXTROSE 50 % IN WATER 50 %
25 SYRINGE (ML) INTRAVENOUS ONCE
Refills: 0 | Status: DISCONTINUED | OUTPATIENT
Start: 2023-10-28 | End: 2023-11-04

## 2023-10-28 RX ORDER — INSULIN LISPRO 100/ML
VIAL (ML) SUBCUTANEOUS AT BEDTIME
Refills: 0 | Status: DISCONTINUED | OUTPATIENT
Start: 2023-10-28 | End: 2023-11-04

## 2023-10-28 RX ORDER — SODIUM CHLORIDE 9 MG/ML
1000 INJECTION, SOLUTION INTRAVENOUS
Refills: 0 | Status: DISCONTINUED | OUTPATIENT
Start: 2023-10-28 | End: 2023-11-04

## 2023-10-28 RX ORDER — BUMETANIDE 0.25 MG/ML
2 INJECTION INTRAMUSCULAR; INTRAVENOUS DAILY
Refills: 0 | Status: DISCONTINUED | OUTPATIENT
Start: 2023-10-28 | End: 2023-11-04

## 2023-10-28 RX ORDER — DEXTROSE 50 % IN WATER 50 %
15 SYRINGE (ML) INTRAVENOUS ONCE
Refills: 0 | Status: DISCONTINUED | OUTPATIENT
Start: 2023-10-28 | End: 2023-11-04

## 2023-10-28 RX ORDER — MAGNESIUM SULFATE 500 MG/ML
2 VIAL (ML) INJECTION ONCE
Refills: 0 | Status: COMPLETED | OUTPATIENT
Start: 2023-10-28 | End: 2023-10-28

## 2023-10-28 RX ORDER — DEXTROSE 50 % IN WATER 50 %
12.5 SYRINGE (ML) INTRAVENOUS ONCE
Refills: 0 | Status: DISCONTINUED | OUTPATIENT
Start: 2023-10-28 | End: 2023-11-04

## 2023-10-28 RX ORDER — INSULIN LISPRO 100/ML
VIAL (ML) SUBCUTANEOUS
Refills: 0 | Status: DISCONTINUED | OUTPATIENT
Start: 2023-10-28 | End: 2023-11-04

## 2023-10-28 RX ORDER — GLUCAGON INJECTION, SOLUTION 0.5 MG/.1ML
1 INJECTION, SOLUTION SUBCUTANEOUS ONCE
Refills: 0 | Status: DISCONTINUED | OUTPATIENT
Start: 2023-10-28 | End: 2023-11-04

## 2023-10-28 RX ADMIN — Medication 100 MILLIGRAM(S): at 22:33

## 2023-10-28 RX ADMIN — Medication 2: at 13:18

## 2023-10-28 RX ADMIN — AMLODIPINE BESYLATE 10 MILLIGRAM(S): 2.5 TABLET ORAL at 05:54

## 2023-10-28 RX ADMIN — Medication 100 MILLIGRAM(S): at 13:22

## 2023-10-28 RX ADMIN — CARVEDILOL PHOSPHATE 12.5 MILLIGRAM(S): 80 CAPSULE, EXTENDED RELEASE ORAL at 17:28

## 2023-10-28 RX ADMIN — CARVEDILOL PHOSPHATE 12.5 MILLIGRAM(S): 80 CAPSULE, EXTENDED RELEASE ORAL at 05:54

## 2023-10-28 RX ADMIN — Medication 81 MILLIGRAM(S): at 13:22

## 2023-10-28 RX ADMIN — ATORVASTATIN CALCIUM 40 MILLIGRAM(S): 80 TABLET, FILM COATED ORAL at 22:34

## 2023-10-28 RX ADMIN — Medication 1000 UNIT(S): at 13:22

## 2023-10-28 RX ADMIN — Medication 25 GRAM(S): at 11:44

## 2023-10-28 RX ADMIN — Medication 100 MILLIGRAM(S): at 05:54

## 2023-10-28 RX ADMIN — CEFTRIAXONE 100 MILLIGRAM(S): 500 INJECTION, POWDER, FOR SOLUTION INTRAMUSCULAR; INTRAVENOUS at 16:14

## 2023-10-28 RX ADMIN — BUMETANIDE 2 MILLIGRAM(S): 0.25 INJECTION INTRAMUSCULAR; INTRAVENOUS at 17:28

## 2023-10-28 NOTE — PROGRESS NOTE ADULT - SUBJECTIVE AND OBJECTIVE BOX
PROGRESS NOTE:     Patient is a 68y old  Female who presents with a chief complaint of Low Hgb (28 Oct 2023 07:41)      SUBJECTIVE / OVERNIGHT EVENTS: no acute event overnight. Reports feeling fine. No complaints at this time. NO bleeding noted.     ADDITIONAL REVIEW OF SYSTEMS:    MEDICATIONS  (STANDING):  amLODIPine   Tablet 10 milliGRAM(s) Oral daily  aspirin enteric coated 81 milliGRAM(s) Oral daily  atorvastatin 40 milliGRAM(s) Oral at bedtime  buMETAnide 2 milliGRAM(s) Oral daily  carvedilol 12.5 milliGRAM(s) Oral every 12 hours  cefTRIAXone   IVPB 1000 milliGRAM(s) IV Intermittent every 24 hours  cholecalciferol 1000 Unit(s) Oral daily  dextrose 5%. 1000 milliLiter(s) (100 mL/Hr) IV Continuous <Continuous>  dextrose 5%. 1000 milliLiter(s) (50 mL/Hr) IV Continuous <Continuous>  dextrose 50% Injectable 12.5 Gram(s) IV Push once  dextrose 50% Injectable 25 Gram(s) IV Push once  dextrose 50% Injectable 25 Gram(s) IV Push once  glucagon  Injectable 1 milliGRAM(s) IntraMuscular once  hydrALAZINE 100 milliGRAM(s) Oral three times a day  insulin lispro (ADMELOG) corrective regimen sliding scale   SubCutaneous three times a day before meals  insulin lispro (ADMELOG) corrective regimen sliding scale   SubCutaneous at bedtime    MEDICATIONS  (PRN):  acetaminophen     Tablet .. 650 milliGRAM(s) Oral every 6 hours PRN Temp greater or equal to 38C (100.4F), Mild Pain (1 - 3)  aluminum hydroxide/magnesium hydroxide/simethicone Suspension 30 milliLiter(s) Oral every 4 hours PRN Dyspepsia  dextrose Oral Gel 15 Gram(s) Oral once PRN Blood Glucose LESS THAN 70 milliGRAM(s)/deciliter  melatonin 3 milliGRAM(s) Oral at bedtime PRN Insomnia  ondansetron Injectable 4 milliGRAM(s) IV Push every 8 hours PRN Nausea and/or Vomiting      CAPILLARY BLOOD GLUCOSE      POCT Blood Glucose.: 215 mg/dL (28 Oct 2023 12:24)    I&O's Summary      PHYSICAL EXAM:  Vital Signs Last 24 Hrs  T(C): 36.4 (28 Oct 2023 05:50), Max: 36.4 (28 Oct 2023 05:50)  T(F): 97.6 (28 Oct 2023 05:50), Max: 97.6 (28 Oct 2023 05:50)  HR: 70 (28 Oct 2023 14:38) (66 - 88)  BP: 125/65 (28 Oct 2023 13:26) (125/65 - 142/67)  BP(mean): --  RR: 16 (28 Oct 2023 05:50) (16 - 16)  SpO2: 98% (28 Oct 2023 14:38) (98% - 100%)    Parameters below as of 28 Oct 2023 05:50  Patient On (Oxygen Delivery Method): room air        CONSTITUTIONAL: NAD, sitting up in chair comfortably  RESPIRATORY: Normal respiratory effort; lungs are clear to auscultation bilaterally  CARDIOVASCULAR: Regular rate and rhythm, normal S1 and S2, no murmur/rub/gallop; No lower extremity edema  ABDOMEN: Nontender to palpation, normoactive bowel sounds, no rebound/guarding  MUSCLOSKELETAL: no clubbing or cyanosis of digits; no joint swelling or tenderness to palpation  SKIN: no rash, erythema, lesion  PSYCH: A+O to person, place, and time; affect appropriate    LABS:                        7.4    3.87  )-----------( 186      ( 28 Oct 2023 07:20 )             23.5     10-    138  |  104  |  78<H>  ----------------------------<  126<H>  4.4   |  21<L>  |  4.42<H>    Ca    8.7      28 Oct 2023 07:20  Phos  5.4     10-  Mg     1.50     10    TPro  6.8  /  Alb  3.0<L>  /  TBili  0.4  /  DBili  x   /  AST  21  /  ALT  9   /  AlkPhos  81  10-          Urinalysis Basic - ( 28 Oct 2023 10:15 )    Color: Yellow / Appearance: Clear / S.019 / pH: x  Gluc: x / Ketone: Negative mg/dL  / Bili: Negative / Urobili: 0.2 mg/dL   Blood: x / Protein: >=1000 mg/dL / Nitrite: Negative   Leuk Esterase: Moderate / RBC: 2 /HPF / WBC 48 /HPF   Sq Epi: x / Non Sq Epi: 1 /HPF / Bacteria: Occasional /HPF          RADIOLOGY & ADDITIONAL TESTS:  Results Reviewed:   Imaging Personally Reviewed:  Electrocardiogram Personally Reviewed:    COORDINATION OF CARE:  Care Discussed with Consultants/Other Providers [Y/N]:  Prior or Outpatient Records Reviewed [Y/N]:

## 2023-10-28 NOTE — PROGRESS NOTE ADULT - PROBLEM SELECTOR PLAN 1
Patient with acute on chronic anemia; normocytic   -occult blood feces is negative. Denies any other source of bleeding   -Given hx of CKD, most likely due to AOCD   -F/u with iron studies   -s/p 1 unit of pRBCs in the ED  -stable so gar

## 2023-10-28 NOTE — PROGRESS NOTE ADULT - PROBLEM SELECTOR PLAN 3
Patient with hx of HFpEF   -CXR shows cardiomegaly with vascular congestion   -Euvolemic on exam   -S/p IV lasix 60 mg in the ED   -ok to resume home bumex per nephro

## 2023-10-28 NOTE — PROGRESS NOTE ADULT - PROBLEM SELECTOR PLAN 1
Pt with MANDY on CKD in the setting of severe anemia and uncontrolled hypertension. On review of labs on [place], last outpatient Scr was elevated at 4.13 on 10/26/23. Prior to this, the last Scr was 2.16 on 12/6/22 during previous hospitalization at [facility] from 11/17/22 to 12/9/22 for low Hgb. Pt did not follow up with a Nephrologist after previous hospitalization. UA with 300mg of protein. UPCR is elevated at 8.9g. Kidney US on 10/26/23 shows increased renal echogenicity with no evidence of hydronephrosis.    UOP is 300ccs since admission - please bladder scan for PVR and record STRICT Is+Os. Pt likely with progressive diabetic kidney disease. Please resend urine studies and send serologic w/u: MINOO, anti-dsDNA, ANCAs w/reflex, C3, C4, SPEP, FLC, IF, Hep panel, and HIV. Monitor labs and urine output. Avoid nephrotoxins. Dose medications per eGFR. Renal diet. Pt with MANDY on CKD in the setting of severe anemia and uncontrolled hypertension. On review of labs on [place], last outpatient Scr was elevated at 4.13 on 10/26/23. Prior to this, the last Scr was 2.16 on 12/6/22 during previous hospitalization at [facility] from 11/17/22 to 12/9/22 for low Hgb. Pt did not follow up with a Nephrologist after previous hospitalization. UA with 300mg of protein. UPCR is elevated at 8.9g. Kidney US on 10/26/23 shows increased renal echogenicity with no evidence of hydronephrosis.    UOP is 300ccs since admission - please bladder scan for PVR and record STRICT Is+Os. Pt likely with progressive diabetic kidney disease as she reports diabetic retinopathy and significant FH of kidney disease. Please resend urine studies and send serologic w/u: MINOO, anti-dsDNA, ANCAs w/reflex, C3, C4, SPEP, UPEP, FLC, IF, Hep panel, and HIV. Ok to resume home Bumex since patient's BP elevated and this is likely not prerenal MANDY. Monitor labs and urine output. Avoid nephrotoxins. Dose medications per eGFR. Renal diet.

## 2023-10-28 NOTE — PROGRESS NOTE ADULT - TIME BILLING
Face to face encounter. Reviewed labs, vitals, imaging. Reviewed consultant recs. Discussed plan of care with patient. Documentation in sunrise.

## 2023-10-28 NOTE — PROGRESS NOTE ADULT - SUBJECTIVE AND OBJECTIVE BOX
[facility] DIVISION OF KIDNEY DISEASES AND HYPERTENSION   FOLLOW UP NOTE    --------------------------------------------------------------------------------    SUBJECTIVE / ROS / INTERVAL EVENTS:  -Patient seen and examined at bedside.        PAST HISTORY  --------------------------------------------------------------------------------  No significant changes to PMH, PSH, FHx, SHx, unless otherwise noted    ALLERGIES & MEDICATIONS  --------------------------------------------------------------------------------  Allergies    penicillin ([place] Synd)    Intolerances      Standing Inpatient Medications  amLODIPine   Tablet 10 milliGRAM(s) Oral daily  aspirin enteric coated 81 milliGRAM(s) Oral daily  atorvastatin 40 milliGRAM(s) Oral at bedtime  carvedilol 12.5 milliGRAM(s) Oral every 12 hours  cefTRIAXone   IVPB 1000 milliGRAM(s) IV Intermittent every 24 hours  cholecalciferol 1000 Unit(s) Oral daily  hydrALAZINE 100 milliGRAM(s) Oral three times a day    PRN Inpatient Medications  acetaminophen     Tablet .. 650 milliGRAM(s) Oral every 6 hours PRN  aluminum hydroxide/magnesium hydroxide/simethicone Suspension 30 milliLiter(s) Oral every 4 hours PRN  melatonin 3 milliGRAM(s) Oral at bedtime PRN  ondansetron Injectable 4 milliGRAM(s) IV Push every 8 hours PRN      VITALS  --------------------------------------------------------------------------------  T(C): 36.4 (10-28-23 @ 05:50), Max: 37.1 (10-27-23 @ 16:02)  HR: 88 (10-28-23 @ 07:22) (66 - 88)  BP: 142/67 (10-28-23 @ 05:50) (142/67 - 193/90)  RR: 16 (10-28-23 @ 05:50) (14 - 18)  SpO2: 98% (10-28-23 @ 07:22) (98% - 100%)  Wt(kg): --  Height (cm): 162.6 (10-27-23 @ 16:02)  Weight (kg): 69.4 (10-27-23 @ 16:02)  BMI (kg/m2): 26.2 (10-27-23 @ 16:02)  BSA (m2): 1.75 (10-27-23 @ 16:02)    PHYSICAL EXAM:  General: no acute distress  Neuro: no focal deficits  HEENT: anicteric, no JVD  Pulmonary: lungs CTA B/L  Cardiovascular/Chest: +S1S2, RRR, no murmurs/rubs/gallops  GI/Abdomen: soft, non-distended, non-tender, +bowel sounds  Extremities: bilateral LE pitting edema  : +Murphy  Skin: Warm and dry  Dialysis access:    LABS/STUDIES  --------------------------------------------------------------------------------              7.3    4.24  >-----------<  186      [10-27-23 @ 04:50]              23.8     140  |  108  |  70  ----------------------------<  138      [10-27-23 @ 04:50]  4.2   |  22  |  4.03        Ca     8.9     [10-27-23 @ 04:50]    TPro  6.8  /  Alb  3.0  /  TBili  0.4  /  DBili  x   /  AST  21  /  ALT  9   /  AlkPhos  81  [10-27-23 @ 04:50]          Creatinine Trend:  SCr 4.03 [10-27 @ 04:50]  SCr 4.16 [10-26 @ 18:30]  SCr 4.13 [10-26 @ 12:00]    Urinalysis - [10-27-23 @ 09:00]      Color Yellow / Appearance Cloudy / SG 1.011 / pH 7.0      Gluc 100 / Ketone Negative  / Bili Negative / Urobili 0.2       Blood Small / Protein 300 / Leuk Est Moderate / Nitrite Negative      RBC 6 /  / Hyaline  / Gran  / Sq Epi  / Non Sq Epi 1 / Bacteria Occasional    Urine Creatinine 22      [10-27-23 @ 09:00]  Urine Protein 194      [10-27-23 @ 09:00]  Urine Sodium 127      [10-27-23 @ 09:00]  Urine Urea Nitrogen 220.6      [10-27-23 @ 09:00]   [facility] DIVISION OF KIDNEY DISEASES AND HYPERTENSION   FOLLOW UP NOTE    --------------------------------------------------------------------------------    SUBJECTIVE / ROS / INTERVAL EVENTS:  -Patient seen and examined at bedside.  -Very hard of hearing, but has no complaints        PAST HISTORY  --------------------------------------------------------------------------------  No significant changes to PMH, PSH, FHx, SHx, unless otherwise noted    ALLERGIES & MEDICATIONS  --------------------------------------------------------------------------------  Allergies    penicillin (Red Man Synd)    Intolerances      Standing Inpatient Medications  amLODIPine   Tablet 10 milliGRAM(s) Oral daily  aspirin enteric coated 81 milliGRAM(s) Oral daily  atorvastatin 40 milliGRAM(s) Oral at bedtime  carvedilol 12.5 milliGRAM(s) Oral every 12 hours  cefTRIAXone   IVPB 1000 milliGRAM(s) IV Intermittent every 24 hours  cholecalciferol 1000 Unit(s) Oral daily  hydrALAZINE 100 milliGRAM(s) Oral three times a day    PRN Inpatient Medications  acetaminophen     Tablet .. 650 milliGRAM(s) Oral every 6 hours PRN  aluminum hydroxide/magnesium hydroxide/simethicone Suspension 30 milliLiter(s) Oral every 4 hours PRN  melatonin 3 milliGRAM(s) Oral at bedtime PRN  ondansetron Injectable 4 milliGRAM(s) IV Push every 8 hours PRN      VITALS  --------------------------------------------------------------------------------  T(C): 36.4 (10-28-23 @ 05:50), Max: 37.1 (10-27-23 @ 16:02)  HR: 88 (10-28-23 @ 07:22) (66 - 88)  BP: 142/67 (10-28-23 @ 05:50) (142/67 - 193/90)  RR: 16 (10-28-23 @ 05:50) (14 - 18)  SpO2: 98% (10-28-23 @ 07:22) (98% - 100%)  Wt(kg): --  Height (cm): 162.6 (10-27-23 @ 16:02)  Weight (kg): 69.4 (10-27-23 @ 16:02)  BMI (kg/m2): 26.2 (10-27-23 @ 16:02)  BSA (m2): 1.75 (10-27-23 @ 16:02)    PHYSICAL EXAM:  General: no acute distress  Neuro: no focal deficits  HEENT: anicteric, no JVD  Pulmonary: lungs CTA B/L  Cardiovascular/Chest: +S1S2, RRR  GI/Abdomen: soft, non-distended, non-tender, +bowel sounds  Extremities: no LE pitting edema  Skin: Warm and dry    LABS/STUDIES  --------------------------------------------------------------------------------              7.3    4.24  >-----------<  186      [10-27-23 @ 04:50]              23.8     140  |  108  |  70  ----------------------------<  138      [10-27-23 @ 04:50]  4.2   |  22  |  4.03        Ca     8.9     [10-27-23 @ 04:50]    TPro  6.8  /  Alb  3.0  /  TBili  0.4  /  DBili  x   /  AST  21  /  ALT  9   /  AlkPhos  81  [10-27-23 @ 04:50]          Creatinine Trend:  SCr 4.03 [10-27 @ 04:50]  SCr 4.16 [10-26 @ 18:30]  SCr 4.13 [10-26 @ 12:00]    Urinalysis - [10-27-23 @ 09:00]      Color Yellow / Appearance Cloudy / SG 1.011 / pH 7.0      Gluc 100 / Ketone Negative  / Bili Negative / Urobili 0.2       Blood Small / Protein 300 / Leuk Est Moderate / Nitrite Negative      RBC 6 /  / Hyaline  / Gran  / Sq Epi  / Non Sq Epi 1 / Bacteria Occasional    Urine Creatinine 22      [10-27-23 @ 09:00]  Urine Protein 194      [10-27-23 @ 09:00]  Urine Sodium 127      [10-27-23 @ 09:00]  Urine Urea Nitrogen 220.6      [10-27-23 @ 09:00]

## 2023-10-29 LAB
ANION GAP SERPL CALC-SCNC: 16 MMOL/L — HIGH (ref 7–14)
ANION GAP SERPL CALC-SCNC: 16 MMOL/L — HIGH (ref 7–14)
BUN SERPL-MCNC: 90 MG/DL — HIGH (ref 7–23)
BUN SERPL-MCNC: 90 MG/DL — HIGH (ref 7–23)
C3 SERPL-MCNC: 132 MG/DL — SIGNIFICANT CHANGE UP (ref 90–180)
C3 SERPL-MCNC: 132 MG/DL — SIGNIFICANT CHANGE UP (ref 90–180)
C4 SERPL-MCNC: 28 MG/DL — SIGNIFICANT CHANGE UP (ref 10–40)
C4 SERPL-MCNC: 28 MG/DL — SIGNIFICANT CHANGE UP (ref 10–40)
CALCIUM SERPL-MCNC: 9 MG/DL — SIGNIFICANT CHANGE UP (ref 8.4–10.5)
CALCIUM SERPL-MCNC: 9 MG/DL — SIGNIFICANT CHANGE UP (ref 8.4–10.5)
CHLORIDE SERPL-SCNC: 101 MMOL/L — SIGNIFICANT CHANGE UP (ref 98–107)
CHLORIDE SERPL-SCNC: 101 MMOL/L — SIGNIFICANT CHANGE UP (ref 98–107)
CO2 SERPL-SCNC: 20 MMOL/L — LOW (ref 22–31)
CO2 SERPL-SCNC: 20 MMOL/L — LOW (ref 22–31)
CREAT SERPL-MCNC: 4.74 MG/DL — HIGH (ref 0.5–1.3)
CREAT SERPL-MCNC: 4.74 MG/DL — HIGH (ref 0.5–1.3)
EGFR: 9 ML/MIN/1.73M2 — LOW
EGFR: 9 ML/MIN/1.73M2 — LOW
GLUCOSE BLDC GLUCOMTR-MCNC: 141 MG/DL — HIGH (ref 70–99)
GLUCOSE BLDC GLUCOMTR-MCNC: 141 MG/DL — HIGH (ref 70–99)
GLUCOSE BLDC GLUCOMTR-MCNC: 172 MG/DL — HIGH (ref 70–99)
GLUCOSE BLDC GLUCOMTR-MCNC: 172 MG/DL — HIGH (ref 70–99)
GLUCOSE BLDC GLUCOMTR-MCNC: 199 MG/DL — HIGH (ref 70–99)
GLUCOSE BLDC GLUCOMTR-MCNC: 199 MG/DL — HIGH (ref 70–99)
GLUCOSE BLDC GLUCOMTR-MCNC: 201 MG/DL — HIGH (ref 70–99)
GLUCOSE BLDC GLUCOMTR-MCNC: 201 MG/DL — HIGH (ref 70–99)
GLUCOSE SERPL-MCNC: 122 MG/DL — HIGH (ref 70–99)
GLUCOSE SERPL-MCNC: 122 MG/DL — HIGH (ref 70–99)
HAV IGM SER-ACNC: SIGNIFICANT CHANGE UP
HAV IGM SER-ACNC: SIGNIFICANT CHANGE UP
HBV CORE IGM SER-ACNC: SIGNIFICANT CHANGE UP
HBV CORE IGM SER-ACNC: SIGNIFICANT CHANGE UP
HBV SURFACE AG SER-ACNC: SIGNIFICANT CHANGE UP
HBV SURFACE AG SER-ACNC: SIGNIFICANT CHANGE UP
HCT VFR BLD CALC: 26 % — LOW (ref 34.5–45)
HCT VFR BLD CALC: 26 % — LOW (ref 34.5–45)
HCV AB S/CO SERPL IA: 0.11 S/CO — SIGNIFICANT CHANGE UP (ref 0–0.99)
HCV AB S/CO SERPL IA: 0.11 S/CO — SIGNIFICANT CHANGE UP (ref 0–0.99)
HCV AB SERPL-IMP: SIGNIFICANT CHANGE UP
HCV AB SERPL-IMP: SIGNIFICANT CHANGE UP
HGB BLD-MCNC: 7.9 G/DL — LOW (ref 11.5–15.5)
HGB BLD-MCNC: 7.9 G/DL — LOW (ref 11.5–15.5)
HIV 1+2 AB+HIV1 P24 AG SERPL QL IA: SIGNIFICANT CHANGE UP
HIV 1+2 AB+HIV1 P24 AG SERPL QL IA: SIGNIFICANT CHANGE UP
MAGNESIUM SERPL-MCNC: 2.2 MG/DL — SIGNIFICANT CHANGE UP (ref 1.6–2.6)
MAGNESIUM SERPL-MCNC: 2.2 MG/DL — SIGNIFICANT CHANGE UP (ref 1.6–2.6)
MCHC RBC-ENTMCNC: 25.8 PG — LOW (ref 27–34)
MCHC RBC-ENTMCNC: 25.8 PG — LOW (ref 27–34)
MCHC RBC-ENTMCNC: 30.4 GM/DL — LOW (ref 32–36)
MCHC RBC-ENTMCNC: 30.4 GM/DL — LOW (ref 32–36)
MCV RBC AUTO: 85 FL — SIGNIFICANT CHANGE UP (ref 80–100)
MCV RBC AUTO: 85 FL — SIGNIFICANT CHANGE UP (ref 80–100)
NRBC # BLD: 0 /100 WBCS — SIGNIFICANT CHANGE UP (ref 0–0)
NRBC # BLD: 0 /100 WBCS — SIGNIFICANT CHANGE UP (ref 0–0)
NRBC # FLD: 0 K/UL — SIGNIFICANT CHANGE UP (ref 0–0)
NRBC # FLD: 0 K/UL — SIGNIFICANT CHANGE UP (ref 0–0)
PHOSPHATE SERPL-MCNC: 6.5 MG/DL — HIGH (ref 2.5–4.5)
PHOSPHATE SERPL-MCNC: 6.5 MG/DL — HIGH (ref 2.5–4.5)
PLATELET # BLD AUTO: 210 K/UL — SIGNIFICANT CHANGE UP (ref 150–400)
PLATELET # BLD AUTO: 210 K/UL — SIGNIFICANT CHANGE UP (ref 150–400)
POTASSIUM SERPL-MCNC: 4.4 MMOL/L — SIGNIFICANT CHANGE UP (ref 3.5–5.3)
POTASSIUM SERPL-MCNC: 4.4 MMOL/L — SIGNIFICANT CHANGE UP (ref 3.5–5.3)
POTASSIUM SERPL-SCNC: 4.4 MMOL/L — SIGNIFICANT CHANGE UP (ref 3.5–5.3)
POTASSIUM SERPL-SCNC: 4.4 MMOL/L — SIGNIFICANT CHANGE UP (ref 3.5–5.3)
PROT ?TM UR-MCNC: 257 MG/DL — SIGNIFICANT CHANGE UP
PROT ?TM UR-MCNC: 257 MG/DL — SIGNIFICANT CHANGE UP
PROT SERPL-MCNC: 7.2 G/DL — SIGNIFICANT CHANGE UP (ref 6–8.3)
PROT SERPL-MCNC: 7.2 G/DL — SIGNIFICANT CHANGE UP (ref 6–8.3)
RBC # BLD: 3.06 M/UL — LOW (ref 3.8–5.2)
RBC # BLD: 3.06 M/UL — LOW (ref 3.8–5.2)
RBC # FLD: 16.7 % — HIGH (ref 10.3–14.5)
RBC # FLD: 16.7 % — HIGH (ref 10.3–14.5)
SODIUM SERPL-SCNC: 137 MMOL/L — SIGNIFICANT CHANGE UP (ref 135–145)
SODIUM SERPL-SCNC: 137 MMOL/L — SIGNIFICANT CHANGE UP (ref 135–145)
WBC # BLD: 3.98 K/UL — SIGNIFICANT CHANGE UP (ref 3.8–10.5)
WBC # BLD: 3.98 K/UL — SIGNIFICANT CHANGE UP (ref 3.8–10.5)
WBC # FLD AUTO: 3.98 K/UL — SIGNIFICANT CHANGE UP (ref 3.8–10.5)
WBC # FLD AUTO: 3.98 K/UL — SIGNIFICANT CHANGE UP (ref 3.8–10.5)

## 2023-10-29 PROCEDURE — 84166 PROTEIN E-PHORESIS/URINE/CSF: CPT | Mod: 26

## 2023-10-29 PROCEDURE — 99232 SBSQ HOSP IP/OBS MODERATE 35: CPT

## 2023-10-29 PROCEDURE — 99232 SBSQ HOSP IP/OBS MODERATE 35: CPT | Mod: GC

## 2023-10-29 PROCEDURE — 93306 TTE W/DOPPLER COMPLETE: CPT | Mod: 26

## 2023-10-29 PROCEDURE — 84165 PROTEIN E-PHORESIS SERUM: CPT | Mod: 26

## 2023-10-29 RX ORDER — SEVELAMER CARBONATE 2400 MG/1
800 POWDER, FOR SUSPENSION ORAL
Refills: 0 | Status: DISCONTINUED | OUTPATIENT
Start: 2023-10-29 | End: 2023-11-04

## 2023-10-29 RX ORDER — HEPARIN SODIUM 5000 [USP'U]/ML
5000 INJECTION INTRAVENOUS; SUBCUTANEOUS EVERY 8 HOURS
Refills: 0 | Status: DISCONTINUED | OUTPATIENT
Start: 2023-10-29 | End: 2023-11-04

## 2023-10-29 RX ADMIN — Medication 1: at 18:25

## 2023-10-29 RX ADMIN — CARVEDILOL PHOSPHATE 12.5 MILLIGRAM(S): 80 CAPSULE, EXTENDED RELEASE ORAL at 05:58

## 2023-10-29 RX ADMIN — Medication 100 MILLIGRAM(S): at 22:27

## 2023-10-29 RX ADMIN — CEFTRIAXONE 100 MILLIGRAM(S): 500 INJECTION, POWDER, FOR SOLUTION INTRAMUSCULAR; INTRAVENOUS at 17:14

## 2023-10-29 RX ADMIN — SEVELAMER CARBONATE 800 MILLIGRAM(S): 2400 POWDER, FOR SUSPENSION ORAL at 17:14

## 2023-10-29 RX ADMIN — ATORVASTATIN CALCIUM 40 MILLIGRAM(S): 80 TABLET, FILM COATED ORAL at 22:28

## 2023-10-29 RX ADMIN — Medication 81 MILLIGRAM(S): at 13:16

## 2023-10-29 RX ADMIN — AMLODIPINE BESYLATE 10 MILLIGRAM(S): 2.5 TABLET ORAL at 05:57

## 2023-10-29 RX ADMIN — BUMETANIDE 2 MILLIGRAM(S): 0.25 INJECTION INTRAMUSCULAR; INTRAVENOUS at 06:04

## 2023-10-29 RX ADMIN — HEPARIN SODIUM 5000 UNIT(S): 5000 INJECTION INTRAVENOUS; SUBCUTANEOUS at 22:15

## 2023-10-29 RX ADMIN — Medication 2: at 13:18

## 2023-10-29 RX ADMIN — Medication 100 MILLIGRAM(S): at 05:57

## 2023-10-29 RX ADMIN — Medication 100 MILLIGRAM(S): at 13:16

## 2023-10-29 RX ADMIN — CARVEDILOL PHOSPHATE 12.5 MILLIGRAM(S): 80 CAPSULE, EXTENDED RELEASE ORAL at 17:14

## 2023-10-29 RX ADMIN — Medication 1000 UNIT(S): at 13:16

## 2023-10-29 NOTE — PROGRESS NOTE ADULT - PROBLEM SELECTOR PLAN 1
Patient with acute on chronic anemia; normocytic   -occult blood feces is negative. Denies any other source of bleeding   -Given hx of CKD, most likely due to AOCD   -s/p 1 unit of pRBCs in the ED  -stable so far

## 2023-10-29 NOTE — PROGRESS NOTE ADULT - SUBJECTIVE AND OBJECTIVE BOX
PROGRESS NOTE:     Patient is a 68y old  Female who presents with a chief complaint of Low Hgb (29 Oct 2023 12:21)      SUBJECTIVE / OVERNIGHT EVENTS: no acute event overnight. Reports feeling alright. No complaints at this time.     ADDITIONAL REVIEW OF SYSTEMS:    MEDICATIONS  (STANDING):  amLODIPine   Tablet 10 milliGRAM(s) Oral daily  aspirin enteric coated 81 milliGRAM(s) Oral daily  atorvastatin 40 milliGRAM(s) Oral at bedtime  buMETAnide 2 milliGRAM(s) Oral daily  carvedilol 12.5 milliGRAM(s) Oral every 12 hours  cefTRIAXone   IVPB 1000 milliGRAM(s) IV Intermittent every 24 hours  cholecalciferol 1000 Unit(s) Oral daily  dextrose 5%. 1000 milliLiter(s) (100 mL/Hr) IV Continuous <Continuous>  dextrose 5%. 1000 milliLiter(s) (50 mL/Hr) IV Continuous <Continuous>  dextrose 50% Injectable 25 Gram(s) IV Push once  dextrose 50% Injectable 12.5 Gram(s) IV Push once  dextrose 50% Injectable 25 Gram(s) IV Push once  glucagon  Injectable 1 milliGRAM(s) IntraMuscular once  hydrALAZINE 100 milliGRAM(s) Oral three times a day  insulin lispro (ADMELOG) corrective regimen sliding scale   SubCutaneous three times a day before meals  insulin lispro (ADMELOG) corrective regimen sliding scale   SubCutaneous at bedtime  sevelamer carbonate 800 milliGRAM(s) Oral three times a day with meals    MEDICATIONS  (PRN):  acetaminophen     Tablet .. 650 milliGRAM(s) Oral every 6 hours PRN Temp greater or equal to 38C (100.4F), Mild Pain (1 - 3)  aluminum hydroxide/magnesium hydroxide/simethicone Suspension 30 milliLiter(s) Oral every 4 hours PRN Dyspepsia  dextrose Oral Gel 15 Gram(s) Oral once PRN Blood Glucose LESS THAN 70 milliGRAM(s)/deciliter  melatonin 3 milliGRAM(s) Oral at bedtime PRN Insomnia  ondansetron Injectable 4 milliGRAM(s) IV Push every 8 hours PRN Nausea and/or Vomiting      CAPILLARY BLOOD GLUCOSE      POCT Blood Glucose.: 201 mg/dL (29 Oct 2023 12:23)  POCT Blood Glucose.: 141 mg/dL (29 Oct 2023 08:36)  POCT Blood Glucose.: 157 mg/dL (28 Oct 2023 22:11)  POCT Blood Glucose.: 132 mg/dL (28 Oct 2023 17:31)    I&O's Summary    28 Oct 2023 07:01  -  29 Oct 2023 07:00  --------------------------------------------------------  IN: 200 mL / OUT: 950 mL / NET: -750 mL    29 Oct 2023 07:01  -  29 Oct 2023 16:56  --------------------------------------------------------  IN: 400 mL / OUT: 550 mL / NET: -150 mL        PHYSICAL EXAM:  Vital Signs Last 24 Hrs  T(C): 36.8 (29 Oct 2023 13:06), Max: 37.1 (28 Oct 2023 17:30)  T(F): 98.3 (29 Oct 2023 13:06), Max: 98.7 (28 Oct 2023 17:30)  HR: 70 (29 Oct 2023 16:01) (69 - 84)  BP: 123/66 (29 Oct 2023 13:06) (123/66 - 140/70)  BP(mean): --  RR: 17 (29 Oct 2023 13:06) (17 - 18)  SpO2: 98% (29 Oct 2023 16:01) (98% - 100%)    Parameters below as of 29 Oct 2023 13:06  Patient On (Oxygen Delivery Method): room air        CONSTITUTIONAL: NAD, sitting up in chair comfortably  RESPIRATORY: Normal respiratory effort; lungs are clear to auscultation bilaterally  CARDIOVASCULAR: Regular rate and rhythm, normal S1 and S2, no murmur/rub/gallop; No lower extremity edema  ABDOMEN: Nontender to palpation, normoactive bowel sounds, no rebound/guarding  MUSCLOSKELETAL: no clubbing or cyanosis of digits; no joint swelling or tenderness to palpation  SKIN: no rash, erythema, lesion  PSYCH: A+O to person, place, and time; affect appropriate    LABS:                        7.9    3.98  )-----------( 210      ( 29 Oct 2023 08:24 )             26.0     10-29    137  |  101  |  90<H>  ----------------------------<  122<H>  4.4   |  20<L>  |  4.74<H>    Ca    9.0      29 Oct 2023 08:24  Phos  6.5     10-29  Mg     2.20     10-29    TPro  7.2  /  Alb  x   /  TBili  x   /  DBili  x   /  AST  x   /  ALT  x   /  AlkPhos  x   10-29          Urinalysis Basic - ( 29 Oct 2023 08:24 )    Color: x / Appearance: x / SG: x / pH: x  Gluc: 122 mg/dL / Ketone: x  / Bili: x / Urobili: x   Blood: x / Protein: x / Nitrite: x   Leuk Esterase: x / RBC: x / WBC x   Sq Epi: x / Non Sq Epi: x / Bacteria: x          RADIOLOGY & ADDITIONAL TESTS:  Results Reviewed:   Imaging Personally Reviewed:  Electrocardiogram Personally Reviewed:    COORDINATION OF CARE:  Care Discussed with Consultants/Other Providers [Y/N]:  Prior or Outpatient Records Reviewed [Y/N]:

## 2023-10-29 NOTE — PROGRESS NOTE ADULT - SUBJECTIVE AND OBJECTIVE BOX
[facility] DIVISION OF KIDNEY DISEASES AND HYPERTENSION   FOLLOW UP NOTE    --------------------------------------------------------------------------------    SUBJECTIVE / ROS / INTERVAL EVENTS:  -Patient seen and examined at bedside.  -c/o peripheral numbness of hands and feet, which is worse than usual  -Appetite is good, no SOB or chest pain      PAST HISTORY  --------------------------------------------------------------------------------  No significant changes to PMH, PSH, FHx, SHx, unless otherwise noted    ALLERGIES & MEDICATIONS  --------------------------------------------------------------------------------  Allergies    penicillin ([place] Synd)    Intolerances      Standing Inpatient Medications  amLODIPine   Tablet 10 milliGRAM(s) Oral daily  aspirin enteric coated 81 milliGRAM(s) Oral daily  atorvastatin 40 milliGRAM(s) Oral at bedtime  buMETAnide 2 milliGRAM(s) Oral daily  carvedilol 12.5 milliGRAM(s) Oral every 12 hours  cefTRIAXone   IVPB 1000 milliGRAM(s) IV Intermittent every 24 hours  cholecalciferol 1000 Unit(s) Oral daily  dextrose 5%. 1000 milliLiter(s) IV Continuous <Continuous>  dextrose 5%. 1000 milliLiter(s) IV Continuous <Continuous>  dextrose 50% Injectable 12.5 Gram(s) IV Push once  dextrose 50% Injectable 25 Gram(s) IV Push once  dextrose 50% Injectable 25 Gram(s) IV Push once  glucagon  Injectable 1 milliGRAM(s) IntraMuscular once  hydrALAZINE 100 milliGRAM(s) Oral three times a day  insulin lispro (ADMELOG) corrective regimen sliding scale   SubCutaneous three times a day before meals  insulin lispro (ADMELOG) corrective regimen sliding scale   SubCutaneous at bedtime    PRN Inpatient Medications  acetaminophen     Tablet .. 650 milliGRAM(s) Oral every 6 hours PRN  aluminum hydroxide/magnesium hydroxide/simethicone Suspension 30 milliLiter(s) Oral every 4 hours PRN  dextrose Oral Gel 15 Gram(s) Oral once PRN  melatonin 3 milliGRAM(s) Oral at bedtime PRN  ondansetron Injectable 4 milliGRAM(s) IV Push every 8 hours PRN      VITALS  --------------------------------------------------------------------------------  T(C): 36.8 (10-29-23 @ 05:40), Max: 37.1 (10-28-23 @ 17:30)  HR: 78 (10-29-23 @ 07:50) (70 - 84)  BP: 130/72 (10-29-23 @ 05:40) (125/65 - 140/70)  RR: 18 (10-29-23 @ 05:40) (18 - 18)  SpO2: 98% (10-29-23 @ 07:50) (98% - 100%)  Wt(kg): --  Height (cm): 162.6 (10-27-23 @ 16:02)  Weight (kg): 69.4 (10-27-23 @ 16:02)  BMI (kg/m2): 26.2 (10-27-23 @ 16:02)  BSA (m2): 1.75 (10-27-23 @ 16:02)    10-28-23 @ 07:01  -  10-29-23 @ 07:00  --------------------------------------------------------  IN: 200 mL / OUT: 950 mL / NET: -750 mL      PHYSICAL EXAM:  General: no acute distress  Neuro: no focal deficits  HEENT: anicteric, no JVD  Pulmonary: lungs CTA B/L  Cardiovascular/Chest: +S1S2, RRR  GI/Abdomen: soft, non-distended, non-tender, +bowel sounds  Extremities: no LE pitting edema  Skin: Warm and dry    LABS/STUDIES  --------------------------------------------------------------------------------              7.9    3.98  >-----------<  210      [10-29-23 @ 08:24]              26.0     137  |  101  |  90  ----------------------------<  122      [10-29-23 @ 08:24]  4.4   |  20  |  4.74        Ca     9.0     [10-29-23 @ 08:24]      Mg     2.20     [10-29-23 @ 08:24]      Phos  6.5     [10-29-23 @ 08:24]    TPro  7.2  /  Alb  x   /  TBili  x   /  DBili  x   /  AST  x   /  ALT  x   /  AlkPhos  x   [10-29-23 @ 08:24]      Creatinine Trend:  SCr 4.74 [10-29 @ 08:24]  SCr 4.42 [10-28 @ 07:20]  SCr 4.03 [10-27 @ 04:50]  SCr 4.16 [10-26 @ 18:30]  SCr 4.13 [10-26 @ 12:00]    Urinalysis - [10-29-23 @ 08:24]      Color  / Appearance  / SG  / pH       Gluc 122 / Ketone   / Bili  / Urobili        Blood  / Protein  / Leuk Est  / Nitrite       RBC  / WBC  / Hyaline  / Gran  / Sq Epi  / Non Sq Epi  / Bacteria     Urine Creatinine 90      [10-28-23 @ 10:15]  Urine Protein 572      [10-28-23 @ 11:53]  Urine Sodium 65      [10-28-23 @ 10:15]  Urine Urea Nitrogen 577.4      [10-28-23 @ 10:15]  Urine Potassium 48.2      [10-28-23 @ 10:15]    HIV Nonreact      [10-29-23 @ 08:24]    C3 Complement 132      [10-29-23 @ 08:24]  C4 Complement 28      [10-29-23 @ 08:24]

## 2023-10-29 NOTE — PROGRESS NOTE ADULT - PROBLEM SELECTOR PLAN 1
Non-oliguric MANDY on CKD in the setting of severe anemia and uncontrolled hypertension. On review of labs on [place], last outpatient Scr was elevated at 4.13 on 10/26/23. Prior to this, the last Scr was 2.16 on 12/6/22 during previous hospitalization at [facility] from 11/17/22 to 12/9/22 for low Hgb. Pt did not follow up with a Nephrologist after previous hospitalization. UA with 300mg of protein. UPCR is elevated at 8.9g->6.3g. Kidney US on 10/26/23 shows increased renal echogenicity with no evidence of hydronephrosis.    Plan:  -UOP 950cc/24h. Continue to record STRICT Is+Os. Pt likely with progressive diabetic kidney disease as she reports diabetic retinopathy and PN, as well as significant FH of kidney disease.   -Serologic w/u pending: MINOO, anti-dsDNA, ANCAs w/reflex, C3, C4, SPEP, UPEP, FLC, IF, Hep panel, and HIV.   -Ok to resume home Bumex since patient's BP elevated and this is likely not prerenal MANDY.   -Will need to discuss dialysis options with patient as her gfr<10  -MBD: please check phos, PTH, and Vitamin D level in the AM  -Monitor labs and urine output. Avoid nephrotoxins. Dose medications per eGFR. Renal diet.

## 2023-10-29 NOTE — PROGRESS NOTE ADULT - NSPROGADDITIONALINFOA_GEN_ALL_CORE
Attempted to call daughter to give update at [phone]. No answer, left VM. Team to reach out again bethany.

## 2023-10-30 LAB
% ALBUMIN: 38.6 % — SIGNIFICANT CHANGE UP
% ALBUMIN: 38.6 % — SIGNIFICANT CHANGE UP
% ALBUMIN: 40.4 % — SIGNIFICANT CHANGE UP
% ALBUMIN: 40.4 % — SIGNIFICANT CHANGE UP
% ALPHA 1: 3.5 % — SIGNIFICANT CHANGE UP
% ALPHA 1: 3.5 % — SIGNIFICANT CHANGE UP
% ALPHA 1: 3.8 % — SIGNIFICANT CHANGE UP
% ALPHA 1: 3.8 % — SIGNIFICANT CHANGE UP
% ALPHA 2: 14.5 % — SIGNIFICANT CHANGE UP
% ALPHA 2: 14.5 % — SIGNIFICANT CHANGE UP
% ALPHA 2: 15 % — SIGNIFICANT CHANGE UP
% ALPHA 2: 15 % — SIGNIFICANT CHANGE UP
% BETA: 11.8 % — SIGNIFICANT CHANGE UP
% BETA: 11.8 % — SIGNIFICANT CHANGE UP
% BETA: 12.1 % — SIGNIFICANT CHANGE UP
% BETA: 12.1 % — SIGNIFICANT CHANGE UP
% GAMMA, URINE: PRESENT
% GAMMA, URINE: PRESENT
% GAMMA, URINE: SIGNIFICANT CHANGE UP
% GAMMA: 29.8 % — SIGNIFICANT CHANGE UP
% GAMMA: 29.8 % — SIGNIFICANT CHANGE UP
% GAMMA: 30.5 % — SIGNIFICANT CHANGE UP
% GAMMA: 30.5 % — SIGNIFICANT CHANGE UP
24R-OH-CALCIDIOL SERPL-MCNC: 13.3 NG/ML — LOW (ref 30–80)
24R-OH-CALCIDIOL SERPL-MCNC: 13.3 NG/ML — LOW (ref 30–80)
ALBUMIN 24H MFR UR ELPH: PRESENT
ALBUMIN SERPL ELPH-MCNC: 2.75 G/DL — LOW (ref 3.3–4.4)
ALBUMIN SERPL ELPH-MCNC: 2.75 G/DL — LOW (ref 3.3–4.4)
ALBUMIN SERPL ELPH-MCNC: 2.78 G/DL — LOW (ref 3.3–4.4)
ALBUMIN SERPL ELPH-MCNC: 2.78 G/DL — LOW (ref 3.3–4.4)
ALBUMIN/GLOB SERPL ELPH: 0.6 RATIO — SIGNIFICANT CHANGE UP
ALBUMIN/GLOB SERPL ELPH: 0.6 RATIO — SIGNIFICANT CHANGE UP
ALBUMIN/GLOB SERPL ELPH: 0.7 RATIO — SIGNIFICANT CHANGE UP
ALBUMIN/GLOB SERPL ELPH: 0.7 RATIO — SIGNIFICANT CHANGE UP
ALPHA1 GLOB 24H MFR UR ELPH: PRESENT
ALPHA1 GLOB SERPL ELPH-MCNC: 0.24 G/DL — SIGNIFICANT CHANGE UP (ref 0.1–0.3)
ALPHA1 GLOB SERPL ELPH-MCNC: 0.24 G/DL — SIGNIFICANT CHANGE UP (ref 0.1–0.3)
ALPHA1 GLOB SERPL ELPH-MCNC: 0.27 G/DL — SIGNIFICANT CHANGE UP (ref 0.1–0.3)
ALPHA1 GLOB SERPL ELPH-MCNC: 0.27 G/DL — SIGNIFICANT CHANGE UP (ref 0.1–0.3)
ALPHA2 GLOB 24H MFR UR ELPH: PRESENT
ALPHA2 GLOB SERPL ELPH-MCNC: 1 G/DL — SIGNIFICANT CHANGE UP (ref 0.6–1)
ALPHA2 GLOB SERPL ELPH-MCNC: 1 G/DL — SIGNIFICANT CHANGE UP (ref 0.6–1)
ALPHA2 GLOB SERPL ELPH-MCNC: 1.1 G/DL — HIGH (ref 0.6–1)
ALPHA2 GLOB SERPL ELPH-MCNC: 1.1 G/DL — HIGH (ref 0.6–1)
ANION GAP SERPL CALC-SCNC: 13 MMOL/L — SIGNIFICANT CHANGE UP (ref 7–14)
ANION GAP SERPL CALC-SCNC: 13 MMOL/L — SIGNIFICANT CHANGE UP (ref 7–14)
B-GLOBULIN 24H MFR UR ELPH: PRESENT
B-GLOBULIN SERPL ELPH-MCNC: 0.8 G/DL — SIGNIFICANT CHANGE UP (ref 0.6–1.1)
B-GLOBULIN SERPL ELPH-MCNC: 0.8 G/DL — SIGNIFICANT CHANGE UP (ref 0.6–1.1)
B-GLOBULIN SERPL ELPH-MCNC: 0.87 G/DL — SIGNIFICANT CHANGE UP (ref 0.6–1.1)
B-GLOBULIN SERPL ELPH-MCNC: 0.87 G/DL — SIGNIFICANT CHANGE UP (ref 0.6–1.1)
BUN SERPL-MCNC: 98 MG/DL — HIGH (ref 7–23)
BUN SERPL-MCNC: 98 MG/DL — HIGH (ref 7–23)
CALCIUM SERPL-MCNC: 8.6 MG/DL — SIGNIFICANT CHANGE UP (ref 8.4–10.5)
CALCIUM SERPL-MCNC: 8.6 MG/DL — SIGNIFICANT CHANGE UP (ref 8.4–10.5)
CHLORIDE SERPL-SCNC: 104 MMOL/L — SIGNIFICANT CHANGE UP (ref 98–107)
CHLORIDE SERPL-SCNC: 104 MMOL/L — SIGNIFICANT CHANGE UP (ref 98–107)
CO2 SERPL-SCNC: 21 MMOL/L — LOW (ref 22–31)
CO2 SERPL-SCNC: 21 MMOL/L — LOW (ref 22–31)
CREAT SERPL-MCNC: 5.14 MG/DL — HIGH (ref 0.5–1.3)
CREAT SERPL-MCNC: 5.14 MG/DL — HIGH (ref 0.5–1.3)
CULTURE RESULTS: SIGNIFICANT CHANGE UP
CULTURE RESULTS: SIGNIFICANT CHANGE UP
EGFR: 9 ML/MIN/1.73M2 — LOW
EGFR: 9 ML/MIN/1.73M2 — LOW
GAMMA GLOBULIN: 2.03 G/DL — HIGH (ref 0.7–1.7)
GAMMA GLOBULIN: 2.03 G/DL — HIGH (ref 0.7–1.7)
GAMMA GLOBULIN: 2.2 G/DL — HIGH (ref 0.7–1.7)
GAMMA GLOBULIN: 2.2 G/DL — HIGH (ref 0.7–1.7)
GLUCOSE BLDC GLUCOMTR-MCNC: 161 MG/DL — HIGH (ref 70–99)
GLUCOSE BLDC GLUCOMTR-MCNC: 161 MG/DL — HIGH (ref 70–99)
GLUCOSE BLDC GLUCOMTR-MCNC: 169 MG/DL — HIGH (ref 70–99)
GLUCOSE BLDC GLUCOMTR-MCNC: 169 MG/DL — HIGH (ref 70–99)
GLUCOSE BLDC GLUCOMTR-MCNC: 205 MG/DL — HIGH (ref 70–99)
GLUCOSE BLDC GLUCOMTR-MCNC: 205 MG/DL — HIGH (ref 70–99)
GLUCOSE BLDC GLUCOMTR-MCNC: 219 MG/DL — HIGH (ref 70–99)
GLUCOSE BLDC GLUCOMTR-MCNC: 219 MG/DL — HIGH (ref 70–99)
GLUCOSE SERPL-MCNC: 142 MG/DL — HIGH (ref 70–99)
GLUCOSE SERPL-MCNC: 142 MG/DL — HIGH (ref 70–99)
HCT VFR BLD CALC: 23.7 % — LOW (ref 34.5–45)
HCT VFR BLD CALC: 23.7 % — LOW (ref 34.5–45)
HGB BLD-MCNC: 7.4 G/DL — LOW (ref 11.5–15.5)
HGB BLD-MCNC: 7.4 G/DL — LOW (ref 11.5–15.5)
INTERPRETATION SERPL IFE-IMP: SIGNIFICANT CHANGE UP
INTERPRETATION SERPL IFE-IMP: SIGNIFICANT CHANGE UP
KAPPA LC SER QL IFE: 19.44 MG/DL — HIGH (ref 0.33–1.94)
KAPPA LC SER QL IFE: 19.44 MG/DL — HIGH (ref 0.33–1.94)
KAPPA/LAMBDA FREE LIGHT CHAIN RATIO, SERUM: 2.23 RATIO — HIGH (ref 0.26–1.65)
KAPPA/LAMBDA FREE LIGHT CHAIN RATIO, SERUM: 2.23 RATIO — HIGH (ref 0.26–1.65)
LAMBDA LC SER QL IFE: 8.71 MG/DL — HIGH (ref 0.57–2.63)
LAMBDA LC SER QL IFE: 8.71 MG/DL — HIGH (ref 0.57–2.63)
M PROTEIN 24H UR ELPH-MRATE: SIGNIFICANT CHANGE UP
MAGNESIUM SERPL-MCNC: 2.1 MG/DL — SIGNIFICANT CHANGE UP (ref 1.6–2.6)
MAGNESIUM SERPL-MCNC: 2.1 MG/DL — SIGNIFICANT CHANGE UP (ref 1.6–2.6)
MCHC RBC-ENTMCNC: 26.1 PG — LOW (ref 27–34)
MCHC RBC-ENTMCNC: 26.1 PG — LOW (ref 27–34)
MCHC RBC-ENTMCNC: 31.2 GM/DL — LOW (ref 32–36)
MCHC RBC-ENTMCNC: 31.2 GM/DL — LOW (ref 32–36)
MCV RBC AUTO: 83.7 FL — SIGNIFICANT CHANGE UP (ref 80–100)
MCV RBC AUTO: 83.7 FL — SIGNIFICANT CHANGE UP (ref 80–100)
NRBC # BLD: 0 /100 WBCS — SIGNIFICANT CHANGE UP (ref 0–0)
NRBC # BLD: 0 /100 WBCS — SIGNIFICANT CHANGE UP (ref 0–0)
NRBC # FLD: 0 K/UL — SIGNIFICANT CHANGE UP (ref 0–0)
NRBC # FLD: 0 K/UL — SIGNIFICANT CHANGE UP (ref 0–0)
PHOSPHATE SERPL-MCNC: 7 MG/DL — HIGH (ref 2.5–4.5)
PHOSPHATE SERPL-MCNC: 7 MG/DL — HIGH (ref 2.5–4.5)
PLATELET # BLD AUTO: 210 K/UL — SIGNIFICANT CHANGE UP (ref 150–400)
PLATELET # BLD AUTO: 210 K/UL — SIGNIFICANT CHANGE UP (ref 150–400)
POTASSIUM SERPL-MCNC: 4.7 MMOL/L — SIGNIFICANT CHANGE UP (ref 3.5–5.3)
POTASSIUM SERPL-MCNC: 4.7 MMOL/L — SIGNIFICANT CHANGE UP (ref 3.5–5.3)
POTASSIUM SERPL-SCNC: 4.7 MMOL/L — SIGNIFICANT CHANGE UP (ref 3.5–5.3)
POTASSIUM SERPL-SCNC: 4.7 MMOL/L — SIGNIFICANT CHANGE UP (ref 3.5–5.3)
PROT ?TM UR-MCNC: 194 MG/DL — SIGNIFICANT CHANGE UP
PROT ?TM UR-MCNC: 194 MG/DL — SIGNIFICANT CHANGE UP
PROT ?TM UR-MCNC: 257 MG/DL — SIGNIFICANT CHANGE UP
PROT ?TM UR-MCNC: 257 MG/DL — SIGNIFICANT CHANGE UP
PROT ?TM UR-MCNC: 572 MG/DL — SIGNIFICANT CHANGE UP
PROT ?TM UR-MCNC: 572 MG/DL — SIGNIFICANT CHANGE UP
PROT PATTERN 24H UR ELPH-IMP: SIGNIFICANT CHANGE UP
PROT PATTERN SERPL ELPH-IMP: SIGNIFICANT CHANGE UP
PROT SERPL-MCNC: 6.8 G/DL — SIGNIFICANT CHANGE UP
PROT SERPL-MCNC: 6.8 G/DL — SIGNIFICANT CHANGE UP
PROT SERPL-MCNC: 6.8 G/DL — SIGNIFICANT CHANGE UP (ref 6–8.3)
PROT SERPL-MCNC: 6.8 G/DL — SIGNIFICANT CHANGE UP (ref 6–8.3)
PROT SERPL-MCNC: 7.2 G/DL — SIGNIFICANT CHANGE UP
PROT SERPL-MCNC: 7.2 G/DL — SIGNIFICANT CHANGE UP
PTH-INTACT FLD-MCNC: 156 PG/ML — HIGH (ref 15–65)
PTH-INTACT FLD-MCNC: 156 PG/ML — HIGH (ref 15–65)
RBC # BLD: 2.83 M/UL — LOW (ref 3.8–5.2)
RBC # BLD: 2.83 M/UL — LOW (ref 3.8–5.2)
RBC # FLD: 16.1 % — HIGH (ref 10.3–14.5)
RBC # FLD: 16.1 % — HIGH (ref 10.3–14.5)
SODIUM SERPL-SCNC: 138 MMOL/L — SIGNIFICANT CHANGE UP (ref 135–145)
SODIUM SERPL-SCNC: 138 MMOL/L — SIGNIFICANT CHANGE UP (ref 135–145)
SPECIMEN SOURCE: SIGNIFICANT CHANGE UP
SPECIMEN SOURCE: SIGNIFICANT CHANGE UP
WBC # BLD: 4.27 K/UL — SIGNIFICANT CHANGE UP (ref 3.8–10.5)
WBC # BLD: 4.27 K/UL — SIGNIFICANT CHANGE UP (ref 3.8–10.5)
WBC # FLD AUTO: 4.27 K/UL — SIGNIFICANT CHANGE UP (ref 3.8–10.5)
WBC # FLD AUTO: 4.27 K/UL — SIGNIFICANT CHANGE UP (ref 3.8–10.5)

## 2023-10-30 PROCEDURE — 84165 PROTEIN E-PHORESIS SERUM: CPT | Mod: 26

## 2023-10-30 PROCEDURE — 99233 SBSQ HOSP IP/OBS HIGH 50: CPT | Mod: GC

## 2023-10-30 PROCEDURE — 99232 SBSQ HOSP IP/OBS MODERATE 35: CPT

## 2023-10-30 RX ORDER — IRON SUCROSE 20 MG/ML
200 INJECTION, SOLUTION INTRAVENOUS EVERY 24 HOURS
Refills: 0 | Status: COMPLETED | OUTPATIENT
Start: 2023-10-30 | End: 2023-11-03

## 2023-10-30 RX ADMIN — Medication 100 MILLIGRAM(S): at 06:19

## 2023-10-30 RX ADMIN — Medication 1000 UNIT(S): at 13:05

## 2023-10-30 RX ADMIN — SEVELAMER CARBONATE 800 MILLIGRAM(S): 2400 POWDER, FOR SUSPENSION ORAL at 09:54

## 2023-10-30 RX ADMIN — Medication 100 MILLIGRAM(S): at 22:08

## 2023-10-30 RX ADMIN — CARVEDILOL PHOSPHATE 12.5 MILLIGRAM(S): 80 CAPSULE, EXTENDED RELEASE ORAL at 06:19

## 2023-10-30 RX ADMIN — SEVELAMER CARBONATE 800 MILLIGRAM(S): 2400 POWDER, FOR SUSPENSION ORAL at 17:28

## 2023-10-30 RX ADMIN — Medication 2: at 13:05

## 2023-10-30 RX ADMIN — AMLODIPINE BESYLATE 10 MILLIGRAM(S): 2.5 TABLET ORAL at 06:19

## 2023-10-30 RX ADMIN — Medication 2: at 18:26

## 2023-10-30 RX ADMIN — HEPARIN SODIUM 5000 UNIT(S): 5000 INJECTION INTRAVENOUS; SUBCUTANEOUS at 06:20

## 2023-10-30 RX ADMIN — Medication 81 MILLIGRAM(S): at 13:05

## 2023-10-30 RX ADMIN — Medication 100 MILLIGRAM(S): at 13:05

## 2023-10-30 RX ADMIN — HEPARIN SODIUM 5000 UNIT(S): 5000 INJECTION INTRAVENOUS; SUBCUTANEOUS at 22:08

## 2023-10-30 RX ADMIN — Medication 1: at 09:04

## 2023-10-30 RX ADMIN — BUMETANIDE 2 MILLIGRAM(S): 0.25 INJECTION INTRAMUSCULAR; INTRAVENOUS at 06:19

## 2023-10-30 RX ADMIN — IRON SUCROSE 110 MILLIGRAM(S): 20 INJECTION, SOLUTION INTRAVENOUS at 17:08

## 2023-10-30 RX ADMIN — HEPARIN SODIUM 5000 UNIT(S): 5000 INJECTION INTRAVENOUS; SUBCUTANEOUS at 13:05

## 2023-10-30 RX ADMIN — CARVEDILOL PHOSPHATE 12.5 MILLIGRAM(S): 80 CAPSULE, EXTENDED RELEASE ORAL at 17:28

## 2023-10-30 RX ADMIN — ATORVASTATIN CALCIUM 40 MILLIGRAM(S): 80 TABLET, FILM COATED ORAL at 22:08

## 2023-10-30 RX ADMIN — SEVELAMER CARBONATE 800 MILLIGRAM(S): 2400 POWDER, FOR SUSPENSION ORAL at 13:05

## 2023-10-30 NOTE — PROGRESS NOTE ADULT - PROBLEM SELECTOR PLAN 1
Non-oliguric MANDY on CKD in the setting of severe anemia and uncontrolled hypertension. On review of labs on [place], last outpatient Scr was elevated at 4.13 on 10/26/23. Prior to this, the last Scr was 2.16 on 12/6/22 during previous hospitalization at [facility] from 11/17/22 to 12/9/22 for low Hgb. Pt did not follow up with a Nephrologist after previous hospitalization. UA with 300mg of protein. UPCR is elevated at 8.9g->6.3g. Kidney US on 10/26/23 shows increased renal echogenicity with no evidence of hydronephrosis.    Plan:  -UOP 1.1L/24h. Continue to record STRICT Is+Os. Pt likely with progressive diabetic kidney disease as she reports diabetic retinopathy and PN, as well as significant FH of kidney disease.   -Serologic w/u in process (labs thus far reviewed, continue to follow)   -Resumed home Bumex  -Will need to discuss dialysis options with patient as her gfr<10  -MBD: phos and PTH elevated. Vit D pending. Continue sevelamer.   -Monitor labs and urine output. Avoid nephrotoxins. Dose medications per eGFR. Renal diet.

## 2023-10-30 NOTE — PHYSICAL THERAPY INITIAL EVALUATION ADULT - ADDITIONAL COMMENTS
Pt. reports ambulating with a straight cane.     Pt. left post PT Evaluation in bedside chair, no apparent distress, call bell within reach. Heart rate 72bpm.

## 2023-10-30 NOTE — PROGRESS NOTE ADULT - NSPROGADDITIONALINFOA_GEN_ALL_CORE
Attempted to call daughter to give update at [phone]. No answer, left VM. Team to reach out again bethany. Spoke to daughter at bedside who expressed reluctance to initiate HD in the inpatient setting. Inquiring if she can follow up as an outpatient and initiate then. Recommended that HD catheter be placed soon as patient could develop electrolyte abnormalities/volume issues in the near future. Explained catheter placement and HD initiation is always preferably done on a non-emergent basis to minimize risk. Encouraged daughter to speak to family and continued to discuss.

## 2023-10-30 NOTE — PHYSICAL THERAPY INITIAL EVALUATION ADULT - PERTINENT HX OF CURRENT PROBLEM, REHAB EVAL
Pt. presented to ED with low hemoglobin. Per documentation, pt. with acute kidney injury superimposed on chronic kidney disease.

## 2023-10-30 NOTE — PROGRESS NOTE ADULT - SUBJECTIVE AND OBJECTIVE BOX
[facility] DIVISION OF KIDNEY DISEASES AND HYPERTENSION   FOLLOW UP NOTE  --------------------------------------------------------------------------------  HPI: 68F with PMH of HTN, HLD, DM2, sarcoidosis, CKD, HF, anemia, sensorineural hearing loss, presenting to the ED after found to have low hemoglobin on pre-surgical workup prior to cochlear implantation surgery. Nephrology following for MANDY on CKD.     24 hour events/subjective: Pt. was seen and examined today. Overnight events noted. Resting comfortably.     PAST HISTORY  --------------------------------------------------------------------------------  No significant changes to PMH, PSH, FHx, SHx, unless otherwise noted    ALLERGIES & MEDICATIONS  --------------------------------------------------------------------------------  Allergies  penicillin (Red Man Synd)    Intolerances    Standing Inpatient Medications  amLODIPine   Tablet 10 milliGRAM(s) Oral daily  aspirin enteric coated 81 milliGRAM(s) Oral daily  atorvastatin 40 milliGRAM(s) Oral at bedtime  buMETAnide 2 milliGRAM(s) Oral daily  carvedilol 12.5 milliGRAM(s) Oral every 12 hours  cholecalciferol 1000 Unit(s) Oral daily  dextrose 5%. 1000 milliLiter(s) IV Continuous <Continuous>  dextrose 5%. 1000 milliLiter(s) IV Continuous <Continuous>  dextrose 50% Injectable 25 Gram(s) IV Push once  dextrose 50% Injectable 25 Gram(s) IV Push once  dextrose 50% Injectable 12.5 Gram(s) IV Push once  glucagon  Injectable 1 milliGRAM(s) IntraMuscular once  heparin   Injectable 5000 Unit(s) SubCutaneous every 8 hours  hydrALAZINE 100 milliGRAM(s) Oral three times a day  insulin lispro (ADMELOG) corrective regimen sliding scale   SubCutaneous three times a day before meals  insulin lispro (ADMELOG) corrective regimen sliding scale   SubCutaneous at bedtime  sevelamer carbonate 800 milliGRAM(s) Oral three times a day with meals    PRN Inpatient Medications  acetaminophen     Tablet .. 650 milliGRAM(s) Oral every 6 hours PRN  aluminum hydroxide/magnesium hydroxide/simethicone Suspension 30 milliLiter(s) Oral every 4 hours PRN  dextrose Oral Gel 15 Gram(s) Oral once PRN  melatonin 3 milliGRAM(s) Oral at bedtime PRN  ondansetron Injectable 4 milliGRAM(s) IV Push every 8 hours PRN    REVIEW OF SYSTEMS  --------------------------------------------------------------------------------  All other systems were reviewed and are negative, except as noted.    VITALS/PHYSICAL EXAM  --------------------------------------------------------------------------------  T(C): 36.7 (10-30-23 @ 06:15), Max: 37.1 (10-29-23 @ 20:26)  HR: 76 (10-30-23 @ 07:43) (67 - 76)  BP: 130/64 (10-30-23 @ 06:15) (123/66 - 143/64)  RR: 18 (10-30-23 @ 06:15) (17 - 18)  SpO2: 98% (10-30-23 @ 07:43) (98% - 100%)  Wt(kg): --    10-29-23 @ 07:01  -  10-30-23 @ 07:00  --------------------------------------------------------  IN: 500 mL / OUT: 1100 mL / NET: -600 mL    Physical Exam:  General: no acute distress  Neuro: no focal deficits  HEENT: anicteric, no JVD  Pulmonary: lungs CTA B/L  Cardiovascular/Chest: +S1S2, RRR  GI/Abdomen: soft, non-distended, non-tender, +bowel sounds  Extremities: no LE pitting edema  Skin: Warm and dry    LABS/STUDIES  --------------------------------------------------------------------------------              7.4    4.27  >-----------<  210      [10-30-23 @ 05:55]              23.7     138  |  104  |  98  ----------------------------<  142      [10-30-23 @ 05:55]  4.7   |  21  |  5.14        Ca     8.6     [10-30-23 @ 05:55]      Mg     2.10     [10-30-23 @ 05:55]      Phos  7.0     [10-30-23 @ 05:55]    TPro  6.8  /  Alb  x   /  TBili  x   /  DBili  x   /  AST  x   /  ALT  x   /  AlkPhos  x   [10-30-23 @ 05:55]    Creatinine Trend:  SCr 5.14 [10-30 @ 05:55]  SCr 4.74 [10-29 @ 08:24]  SCr 4.42 [10-28 @ 07:20]  SCr 4.03 [10-27 @ 04:50]  SCr 4.16 [10-26 @ 18:30]    Urine Creatinine 90      [10-28-23 @ 10:15]  Urine Protein 257      [10-29-23 @ 15:12]  Urine Sodium 65      [10-28-23 @ 10:15]  Urine Urea Nitrogen 577.4      [10-28-23 @ 10:15]  Urine Potassium 48.2      [10-28-23 @ 10:15]    HBsAg Nonreact      [10-29-23 @ 08:24]  HCV 0.11, Nonreact      [10-29-23 @ 08:24]  HIV Nonreact      [10-29-23 @ 08:24]    C3 Complement 132      [10-29-23 @ 08:24]  C4 Complement 28      [10-29-23 @ 08:24]

## 2023-10-30 NOTE — PROGRESS NOTE ADULT - PROBLEM SELECTOR PLAN 1
Patient with acute on chronic anemia; normocytic   -occult blood feces is negative. Denies any other source of bleeding   - Cont IV iron   -s/p 1 unit of pRBCs in the ED  -stable so far

## 2023-10-30 NOTE — PROGRESS NOTE ADULT - SUBJECTIVE AND OBJECTIVE BOX
Merlin Mathew, MD   Hospitalist  Pager #[phone]    PROGRESS NOTE:     Patient is a 68y old  Female who presents with a chief complaint of Low Hgb (30 Oct 2023 09:51)      SUBJECTIVE / OVERNIGHT EVENTS: NEON   Patient upset because she was told she likely has to start HD   Reports a number of family members have passed away while on HD so she is upset at the thought of starting   Asked nephrologist to speak to her family and daughter who will help make a decision     ADDITIONAL REVIEW OF SYSTEMS:    MEDICATIONS  (STANDING):  amLODIPine   Tablet 10 milliGRAM(s) Oral daily  aspirin enteric coated 81 milliGRAM(s) Oral daily  atorvastatin 40 milliGRAM(s) Oral at bedtime  buMETAnide 2 milliGRAM(s) Oral daily  carvedilol 12.5 milliGRAM(s) Oral every 12 hours  cholecalciferol 1000 Unit(s) Oral daily  dextrose 5%. 1000 milliLiter(s) (50 mL/Hr) IV Continuous <Continuous>  dextrose 5%. 1000 milliLiter(s) (100 mL/Hr) IV Continuous <Continuous>  dextrose 50% Injectable 25 Gram(s) IV Push once  dextrose 50% Injectable 12.5 Gram(s) IV Push once  dextrose 50% Injectable 25 Gram(s) IV Push once  glucagon  Injectable 1 milliGRAM(s) IntraMuscular once  heparin   Injectable 5000 Unit(s) SubCutaneous every 8 hours  hydrALAZINE 100 milliGRAM(s) Oral three times a day  insulin lispro (ADMELOG) corrective regimen sliding scale   SubCutaneous at bedtime  insulin lispro (ADMELOG) corrective regimen sliding scale   SubCutaneous three times a day before meals  iron sucrose IVPB 200 milliGRAM(s) IV Intermittent every 24 hours  sevelamer carbonate 800 milliGRAM(s) Oral three times a day with meals    MEDICATIONS  (PRN):  acetaminophen     Tablet .. 650 milliGRAM(s) Oral every 6 hours PRN Temp greater or equal to 38C (100.4F), Mild Pain (1 - 3)  aluminum hydroxide/magnesium hydroxide/simethicone Suspension 30 milliLiter(s) Oral every 4 hours PRN Dyspepsia  dextrose Oral Gel 15 Gram(s) Oral once PRN Blood Glucose LESS THAN 70 milliGRAM(s)/deciliter  melatonin 3 milliGRAM(s) Oral at bedtime PRN Insomnia  ondansetron Injectable 4 milliGRAM(s) IV Push every 8 hours PRN Nausea and/or Vomiting      CAPILLARY BLOOD GLUCOSE      POCT Blood Glucose.: 219 mg/dL (30 Oct 2023 12:29)  POCT Blood Glucose.: 161 mg/dL (30 Oct 2023 08:42)  POCT Blood Glucose.: 172 mg/dL (29 Oct 2023 22:53)  POCT Blood Glucose.: 199 mg/dL (29 Oct 2023 17:52)    I&O's Summary    29 Oct 2023 07:01  -  30 Oct 2023 07:00  --------------------------------------------------------  IN: 500 mL / OUT: 1100 mL / NET: -600 mL        PHYSICAL EXAM:  Vital Signs Last 24 Hrs  T(C): 36.8 (30 Oct 2023 12:58), Max: 37.1 (29 Oct 2023 20:26)  T(F): 98.2 (30 Oct 2023 12:58), Max: 98.7 (29 Oct 2023 20:26)  HR: 65 (30 Oct 2023 12:58) (65 - 76)  BP: 112/60 (30 Oct 2023 12:58) (112/60 - 143/64)  BP(mean): --  RR: 18 (30 Oct 2023 12:58) (17 - 18)  SpO2: 100% (30 Oct 2023 12:58) (98% - 100%)    Parameters below as of 30 Oct 2023 12:58  Patient On (Oxygen Delivery Method): room air        CONSTITUTIONAL: NAD, resting comfortably, very [place]   RESPIRATORY: Normal respiratory effort; lungs are clear to auscultation bilaterally  CARDIOVASCULAR: Regular rate and rhythm, normal S1 and S2, no murmur/rub/gallop; 1+ lower extremity edema; Peripheral pulses are 2+ bilaterally  ABDOMEN: Nontender to palpation, normoactive bowel sounds, no rebound/guarding; No hepatosplenomegaly  MUSCULOSKELETAL no clubbing or cyanosis of digits; no joint swelling or tenderness to palpation  PSYCH: A+O to person, place, and time; affect appropriate    LABS:                        7.4    4.27  )-----------( 210      ( 30 Oct 2023 05:55 )             23.7     10-30    138  |  104  |  98<H>  ----------------------------<  142<H>  4.7   |  21<L>  |  5.14<H>    Ca    8.6      30 Oct 2023 05:55  Phos  7.0     10-30  Mg     2.10     10-30    TPro  6.8  /  Alb  x   /  TBili  x   /  DBili  x   /  AST  x   /  ALT  x   /  AlkPhos  x   10-30          Urinalysis Basic - ( 30 Oct 2023 05:55 )    Color: x / Appearance: x / SG: x / pH: x  Gluc: 142 mg/dL / Ketone: x  / Bili: x / Urobili: x   Blood: x / Protein: x / Nitrite: x   Leuk Esterase: x / RBC: x / WBC x   Sq Epi: x / Non Sq Epi: x / Bacteria: x        Culture - Urine (collected 29 Oct 2023 16:00)  Source: Clean Catch Clean Catch (Midstream)  Final Report (30 Oct 2023 14:29):    <10,000 CFU/mL Normal Urogenital Elle        RADIOLOGY & ADDITIONAL TESTS:  Results Reviewed:   Imaging Personally Reviewed:  Electrocardiogram Personally Reviewed:    COORDINATION OF CARE:  Care Discussed with Consultants/Other Providers [Y/N]:  Prior or Outpatient Records Reviewed [Y/N]:

## 2023-10-31 LAB
ANION GAP SERPL CALC-SCNC: 15 MMOL/L — HIGH (ref 7–14)
ANION GAP SERPL CALC-SCNC: 15 MMOL/L — HIGH (ref 7–14)
BASE EXCESS BLDV CALC-SCNC: -4.3 MMOL/L — LOW (ref -2–3)
BASE EXCESS BLDV CALC-SCNC: -4.3 MMOL/L — LOW (ref -2–3)
BUN SERPL-MCNC: 94 MG/DL — HIGH (ref 7–23)
BUN SERPL-MCNC: 94 MG/DL — HIGH (ref 7–23)
CALCIUM SERPL-MCNC: 8.7 MG/DL — SIGNIFICANT CHANGE UP (ref 8.4–10.5)
CALCIUM SERPL-MCNC: 8.7 MG/DL — SIGNIFICANT CHANGE UP (ref 8.4–10.5)
CHLORIDE SERPL-SCNC: 104 MMOL/L — SIGNIFICANT CHANGE UP (ref 98–107)
CHLORIDE SERPL-SCNC: 104 MMOL/L — SIGNIFICANT CHANGE UP (ref 98–107)
CO2 BLDV-SCNC: 22.9 MMOL/L — SIGNIFICANT CHANGE UP (ref 22–26)
CO2 BLDV-SCNC: 22.9 MMOL/L — SIGNIFICANT CHANGE UP (ref 22–26)
CO2 SERPL-SCNC: 21 MMOL/L — LOW (ref 22–31)
CO2 SERPL-SCNC: 21 MMOL/L — LOW (ref 22–31)
CREAT SERPL-MCNC: 4.75 MG/DL — HIGH (ref 0.5–1.3)
CREAT SERPL-MCNC: 4.75 MG/DL — HIGH (ref 0.5–1.3)
DSDNA AB SER-ACNC: <12 IU/ML — SIGNIFICANT CHANGE UP
DSDNA AB SER-ACNC: <12 IU/ML — SIGNIFICANT CHANGE UP
EGFR: 9 ML/MIN/1.73M2 — LOW
EGFR: 9 ML/MIN/1.73M2 — LOW
GLUCOSE BLDC GLUCOMTR-MCNC: 153 MG/DL — HIGH (ref 70–99)
GLUCOSE BLDC GLUCOMTR-MCNC: 153 MG/DL — HIGH (ref 70–99)
GLUCOSE BLDC GLUCOMTR-MCNC: 156 MG/DL — HIGH (ref 70–99)
GLUCOSE BLDC GLUCOMTR-MCNC: 156 MG/DL — HIGH (ref 70–99)
GLUCOSE BLDC GLUCOMTR-MCNC: 158 MG/DL — HIGH (ref 70–99)
GLUCOSE BLDC GLUCOMTR-MCNC: 158 MG/DL — HIGH (ref 70–99)
GLUCOSE BLDC GLUCOMTR-MCNC: 162 MG/DL — HIGH (ref 70–99)
GLUCOSE BLDC GLUCOMTR-MCNC: 162 MG/DL — HIGH (ref 70–99)
GLUCOSE SERPL-MCNC: 143 MG/DL — HIGH (ref 70–99)
GLUCOSE SERPL-MCNC: 143 MG/DL — HIGH (ref 70–99)
HCO3 BLDV-SCNC: 22 MMOL/L — SIGNIFICANT CHANGE UP (ref 22–29)
HCO3 BLDV-SCNC: 22 MMOL/L — SIGNIFICANT CHANGE UP (ref 22–29)
HCT VFR BLD CALC: 25.2 % — LOW (ref 34.5–45)
HCT VFR BLD CALC: 25.2 % — LOW (ref 34.5–45)
HGB BLD-MCNC: 7.7 G/DL — LOW (ref 11.5–15.5)
HGB BLD-MCNC: 7.7 G/DL — LOW (ref 11.5–15.5)
IF BLOCK AB SER-ACNC: 0.9 AU/ML — SIGNIFICANT CHANGE UP (ref 0–1.1)
IF BLOCK AB SER-ACNC: 0.9 AU/ML — SIGNIFICANT CHANGE UP (ref 0–1.1)
MAGNESIUM SERPL-MCNC: 2.1 MG/DL — SIGNIFICANT CHANGE UP (ref 1.6–2.6)
MAGNESIUM SERPL-MCNC: 2.1 MG/DL — SIGNIFICANT CHANGE UP (ref 1.6–2.6)
MCHC RBC-ENTMCNC: 26 PG — LOW (ref 27–34)
MCHC RBC-ENTMCNC: 26 PG — LOW (ref 27–34)
MCHC RBC-ENTMCNC: 30.6 GM/DL — LOW (ref 32–36)
MCHC RBC-ENTMCNC: 30.6 GM/DL — LOW (ref 32–36)
MCV RBC AUTO: 85.1 FL — SIGNIFICANT CHANGE UP (ref 80–100)
MCV RBC AUTO: 85.1 FL — SIGNIFICANT CHANGE UP (ref 80–100)
NRBC # BLD: 0 /100 WBCS — SIGNIFICANT CHANGE UP (ref 0–0)
NRBC # BLD: 0 /100 WBCS — SIGNIFICANT CHANGE UP (ref 0–0)
NRBC # FLD: 0 K/UL — SIGNIFICANT CHANGE UP (ref 0–0)
NRBC # FLD: 0 K/UL — SIGNIFICANT CHANGE UP (ref 0–0)
PCO2 BLDV: 42 MMHG — SIGNIFICANT CHANGE UP (ref 39–52)
PCO2 BLDV: 42 MMHG — SIGNIFICANT CHANGE UP (ref 39–52)
PH BLDV: 7.32 — SIGNIFICANT CHANGE UP (ref 7.32–7.43)
PH BLDV: 7.32 — SIGNIFICANT CHANGE UP (ref 7.32–7.43)
PHOSPHATE SERPL-MCNC: 5.9 MG/DL — HIGH (ref 2.5–4.5)
PHOSPHATE SERPL-MCNC: 5.9 MG/DL — HIGH (ref 2.5–4.5)
PLATELET # BLD AUTO: 187 K/UL — SIGNIFICANT CHANGE UP (ref 150–400)
PLATELET # BLD AUTO: 187 K/UL — SIGNIFICANT CHANGE UP (ref 150–400)
PO2 BLDV: 68 MMHG — HIGH (ref 25–45)
PO2 BLDV: 68 MMHG — HIGH (ref 25–45)
POTASSIUM SERPL-MCNC: 4.5 MMOL/L — SIGNIFICANT CHANGE UP (ref 3.5–5.3)
POTASSIUM SERPL-MCNC: 4.5 MMOL/L — SIGNIFICANT CHANGE UP (ref 3.5–5.3)
POTASSIUM SERPL-SCNC: 4.5 MMOL/L — SIGNIFICANT CHANGE UP (ref 3.5–5.3)
POTASSIUM SERPL-SCNC: 4.5 MMOL/L — SIGNIFICANT CHANGE UP (ref 3.5–5.3)
RBC # BLD: 2.96 M/UL — LOW (ref 3.8–5.2)
RBC # BLD: 2.96 M/UL — LOW (ref 3.8–5.2)
RBC # FLD: 16 % — HIGH (ref 10.3–14.5)
RBC # FLD: 16 % — HIGH (ref 10.3–14.5)
SAO2 % BLDV: 94.3 % — HIGH (ref 67–88)
SAO2 % BLDV: 94.3 % — HIGH (ref 67–88)
SODIUM SERPL-SCNC: 140 MMOL/L — SIGNIFICANT CHANGE UP (ref 135–145)
SODIUM SERPL-SCNC: 140 MMOL/L — SIGNIFICANT CHANGE UP (ref 135–145)
WBC # BLD: 3.34 K/UL — LOW (ref 3.8–10.5)
WBC # BLD: 3.34 K/UL — LOW (ref 3.8–10.5)
WBC # FLD AUTO: 3.34 K/UL — LOW (ref 3.8–10.5)
WBC # FLD AUTO: 3.34 K/UL — LOW (ref 3.8–10.5)

## 2023-10-31 PROCEDURE — 99232 SBSQ HOSP IP/OBS MODERATE 35: CPT

## 2023-10-31 PROCEDURE — 99223 1ST HOSP IP/OBS HIGH 75: CPT

## 2023-10-31 PROCEDURE — 99233 SBSQ HOSP IP/OBS HIGH 50: CPT | Mod: GC

## 2023-10-31 RX ADMIN — SEVELAMER CARBONATE 800 MILLIGRAM(S): 2400 POWDER, FOR SUSPENSION ORAL at 09:12

## 2023-10-31 RX ADMIN — IRON SUCROSE 110 MILLIGRAM(S): 20 INJECTION, SOLUTION INTRAVENOUS at 19:38

## 2023-10-31 RX ADMIN — CARVEDILOL PHOSPHATE 12.5 MILLIGRAM(S): 80 CAPSULE, EXTENDED RELEASE ORAL at 18:05

## 2023-10-31 RX ADMIN — HEPARIN SODIUM 5000 UNIT(S): 5000 INJECTION INTRAVENOUS; SUBCUTANEOUS at 14:07

## 2023-10-31 RX ADMIN — SEVELAMER CARBONATE 800 MILLIGRAM(S): 2400 POWDER, FOR SUSPENSION ORAL at 18:05

## 2023-10-31 RX ADMIN — ATORVASTATIN CALCIUM 40 MILLIGRAM(S): 80 TABLET, FILM COATED ORAL at 23:09

## 2023-10-31 RX ADMIN — Medication 1: at 09:11

## 2023-10-31 RX ADMIN — Medication 100 MILLIGRAM(S): at 15:07

## 2023-10-31 RX ADMIN — AMLODIPINE BESYLATE 10 MILLIGRAM(S): 2.5 TABLET ORAL at 05:50

## 2023-10-31 RX ADMIN — BUMETANIDE 2 MILLIGRAM(S): 0.25 INJECTION INTRAMUSCULAR; INTRAVENOUS at 05:51

## 2023-10-31 RX ADMIN — Medication 81 MILLIGRAM(S): at 12:53

## 2023-10-31 RX ADMIN — Medication 1: at 12:52

## 2023-10-31 RX ADMIN — Medication 100 MILLIGRAM(S): at 05:50

## 2023-10-31 RX ADMIN — HEPARIN SODIUM 5000 UNIT(S): 5000 INJECTION INTRAVENOUS; SUBCUTANEOUS at 05:51

## 2023-10-31 RX ADMIN — Medication 1: at 18:04

## 2023-10-31 RX ADMIN — CARVEDILOL PHOSPHATE 12.5 MILLIGRAM(S): 80 CAPSULE, EXTENDED RELEASE ORAL at 05:51

## 2023-10-31 RX ADMIN — Medication 1000 UNIT(S): at 12:54

## 2023-10-31 RX ADMIN — SEVELAMER CARBONATE 800 MILLIGRAM(S): 2400 POWDER, FOR SUSPENSION ORAL at 12:53

## 2023-10-31 RX ADMIN — HEPARIN SODIUM 5000 UNIT(S): 5000 INJECTION INTRAVENOUS; SUBCUTANEOUS at 23:09

## 2023-10-31 RX ADMIN — Medication 100 MILLIGRAM(S): at 23:09

## 2023-10-31 NOTE — PROGRESS NOTE ADULT - NSPROGADDITIONALINFOA_GEN_ALL_CORE
Spoke to daughter at bedside who expressed reluctance to initiate HD in the inpatient setting. Inquiring if she can follow up as an outpatient and initiate then. Recommended that HD catheter be placed soon as patient could develop electrolyte abnormalities/volume issues in the near future. Explained catheter placement and HD initiation is always preferably done on a non-emergent basis to minimize risk. Encouraged daughter to speak to family and continued to discuss.

## 2023-10-31 NOTE — CHART NOTE - NSCHARTNOTEFT_GEN_A_CORE
Notified by primary RN at 0500 am that patient refused AVAPS overnight which is ordered for sleep apnea. Patient was on RA al night without any noted episodes of hypoxia and SOB. Ordered continous pulse oximetry. Will continue to monitor

## 2023-10-31 NOTE — CONSULT NOTE ADULT - SUBJECTIVE AND OBJECTIVE BOX
BEVERLY MEJIA [id number]  68y Female  5d    HPI:  66 yo F w/ HTN, HLD, sarcoidosis, HFpEF, hx PEA x2, b/l sensorineural hearing, sarcoidosis, and HTN presents to the ED with low Hgb. Patient is very hard of hearing but AAOX3 and was able to provide most of the hx. Patient reports that she was scheduled to undergo cochlear implant next week and as a result, she came to the outpatient preop testing center for lab work. When she went home, she received a call telling her to come to the ED for low Hgb. Currently, pt has some abdominal discomfort but denies any fever, chills, nausea, vomiting, chest pain, SOB, melena, hematochezia or hematuria. She reports that her Hgb was low before and she had blood transfusion in the past.   In the ED, her vitals were notable for HTN. Labs were notable for anemia, elevated but stable trops 94->86, elevated BUN/Scr, elevated proBNP. CXR showed Cardiomegaly with prominent interstitial markings suggestive of pulmonary venous hypertension. EKG showed NSR with RBBB and LVH.  (26 Oct 2023 22:58)    Vascular consulted for AVF placement. Patient is right hand dominant. No implanted devices. Not on HD yet.      PAST MEDICAL & SURGICAL HISTORY:  Type 2 diabetes mellitus      Bilateral cataracts      Fluid in pleural cavity associated with pancreatitis      Pancreatic pseudocyst/cyst  s/p drain placement and removal      HTN (hypertension)      HLD (hyperlipidemia)      H/O sarcoidosis      PVD (peripheral vascular disease)      Sensorineural hearing loss, bilateral      H/O CHF      Chronic kidney disease (CKD)      Anemia      History of amputation of right great toe        H/O:  section        History of laparoscopic cholecystectomy            MEDICATIONS  (STANDING):  amLODIPine   Tablet 10 milliGRAM(s) Oral daily  aspirin enteric coated 81 milliGRAM(s) Oral daily  atorvastatin 40 milliGRAM(s) Oral at bedtime  buMETAnide 2 milliGRAM(s) Oral daily  carvedilol 12.5 milliGRAM(s) Oral every 12 hours  cholecalciferol 1000 Unit(s) Oral daily  dextrose 5%. 1000 milliLiter(s) (50 mL/Hr) IV Continuous <Continuous>  dextrose 5%. 1000 milliLiter(s) (100 mL/Hr) IV Continuous <Continuous>  dextrose 50% Injectable 25 Gram(s) IV Push once  dextrose 50% Injectable 25 Gram(s) IV Push once  dextrose 50% Injectable 12.5 Gram(s) IV Push once  glucagon  Injectable 1 milliGRAM(s) IntraMuscular once  heparin   Injectable 5000 Unit(s) SubCutaneous every 8 hours  hydrALAZINE 100 milliGRAM(s) Oral three times a day  insulin lispro (ADMELOG) corrective regimen sliding scale   SubCutaneous three times a day before meals  insulin lispro (ADMELOG) corrective regimen sliding scale   SubCutaneous at bedtime  iron sucrose IVPB 200 milliGRAM(s) IV Intermittent every 24 hours  sevelamer carbonate 800 milliGRAM(s) Oral three times a day with meals    MEDICATIONS  (PRN):  acetaminophen     Tablet .. 650 milliGRAM(s) Oral every 6 hours PRN Temp greater or equal to 38C (100.4F), Mild Pain (1 - 3)  aluminum hydroxide/magnesium hydroxide/simethicone Suspension 30 milliLiter(s) Oral every 4 hours PRN Dyspepsia  dextrose Oral Gel 15 Gram(s) Oral once PRN Blood Glucose LESS THAN 70 milliGRAM(s)/deciliter  melatonin 3 milliGRAM(s) Oral at bedtime PRN Insomnia  ondansetron Injectable 4 milliGRAM(s) IV Push every 8 hours PRN Nausea and/or Vomiting      Allergies    penicillin ([place] Synd)    Intolerances        REVIEW OF SYSTEMS     [x ] A ten-point review of systems was otherwise negative except as noted.  [ ] Due to altered mental status/intubation, subjective information were not able to be obtained from the patient. History was obtained, to the extent possible, from review of the chart and collateral sources of information.      Vital Signs Last 24 Hrs  T(C): 36.7 (31 Oct 2023 10:22), Max: 36.8 (31 Oct 2023 05:15)  T(F): 98 (31 Oct 2023 10:22), Max: 98.2 (31 Oct 2023 05:15)  HR: 63 (31 Oct 2023 10:22) (63 - 71)  BP: 132/67 (31 Oct 2023 10:22) (119/62 - 163/72)  BP(mean): --  RR: 17 (31 Oct 2023 10:22) (17 - 18)  SpO2: 100% (31 Oct 2023 10:22) (98% - 100%)    Parameters below as of 31 Oct 2023 05:15  Patient On (Oxygen Delivery Method): room air        PHYSICAL EXAM:  GENERAL: NAD, well-appearing  EXTREMITIES: palpable radial pulse and ulnar pulse left arm, Memo's test normal    LABS:  CAPILLARY BLOOD GLUCOSE  POCT Blood Glucose.: 162 mg/dL (31 Oct 2023 12:26)  POCT Blood Glucose.: 158 mg/dL (31 Oct 2023 08:44)  POCT Blood Glucose.: 169 mg/dL (30 Oct 2023 22:22)  POCT Blood Glucose.: 205 mg/dL (30 Oct 2023 17:52)                          7.7    3.34  )-----------( 187      ( 31 Oct 2023 07:34 )             25.2         10-31    140  |  104  |  94<H>  ----------------------------<  143<H>  4.5   |  21<L>  |  4.75<H>      Calcium: 8.7 mg/dL (10-31-23 @ 07:34)      LFTs:             6.8  | x    | x        ------------------[x       ( 30 Oct 2023 05:55 )  x    | x    | x           Lipase:x      Amylase:x        Coags:    Urinalysis Basic - ( 31 Oct 2023 07:34 )    Color: x / Appearance: x / SG: x / pH: x  Gluc: 143 mg/dL / Ketone: x  / Bili: x / Urobili: x   Blood: x / Protein: x / Nitrite: x   Leuk Esterase: x / RBC: x / WBC x   Sq Epi: x / Non Sq Epi: x / Bacteria: x        Culture - Urine (collected 29 Oct 2023 16:00)  Source: Clean Catch Clean Catch (Midstream)  Final Report (30 Oct 2023 14:29):    <10,000 CFU/mL Normal Urogenital Elle

## 2023-10-31 NOTE — PROGRESS NOTE ADULT - SUBJECTIVE AND OBJECTIVE BOX
[facility] DIVISION OF KIDNEY DISEASES AND HYPERTENSION   FOLLOW UP NOTE  --------------------------------------------------------------------------------  HPI: 68F with PMH of HTN, HLD, DM2, sarcoidosis, CKD, HF, anemia, sensorineural hearing loss, presenting to the ED after found to have low hemoglobin on pre-surgical workup prior to cochlear implantation surgery. Nephrology following for MANDY on CKD.     24 hour events/subjective: Pt. was seen and examined today. Overnight events noted. Resting comfortably. Feeling "fine". Discussed her care at length with her daughter Mckayla via telephone. Pt and family appear in agreement to proceed with long term HD planning (ie AVF v AVG planning) while inpatient.     PAST HISTORY  --------------------------------------------------------------------------------  No significant changes to PMH, PSH, FHx, SHx, unless otherwise noted    ALLERGIES & MEDICATIONS  --------------------------------------------------------------------------------  Allergies  penicillin ([place])    Intolerances    Standing Inpatient Medications  amLODIPine   Tablet 10 milliGRAM(s) Oral daily  aspirin enteric coated 81 milliGRAM(s) Oral daily  atorvastatin 40 milliGRAM(s) Oral at bedtime  buMETAnide 2 milliGRAM(s) Oral daily  carvedilol 12.5 milliGRAM(s) Oral every 12 hours  cholecalciferol 1000 Unit(s) Oral daily  dextrose 5%. 1000 milliLiter(s) IV Continuous <Continuous>  dextrose 5%. 1000 milliLiter(s) IV Continuous <Continuous>  dextrose 50% Injectable 12.5 Gram(s) IV Push once  dextrose 50% Injectable 25 Gram(s) IV Push once  dextrose 50% Injectable 25 Gram(s) IV Push once  glucagon  Injectable 1 milliGRAM(s) IntraMuscular once  heparin   Injectable 5000 Unit(s) SubCutaneous every 8 hours  hydrALAZINE 100 milliGRAM(s) Oral three times a day  insulin lispro (ADMELOG) corrective regimen sliding scale   SubCutaneous three times a day before meals  insulin lispro (ADMELOG) corrective regimen sliding scale   SubCutaneous at bedtime  iron sucrose IVPB 200 milliGRAM(s) IV Intermittent every 24 hours  sevelamer carbonate 800 milliGRAM(s) Oral three times a day with meals    PRN Inpatient Medications  acetaminophen     Tablet .. 650 milliGRAM(s) Oral every 6 hours PRN  aluminum hydroxide/magnesium hydroxide/simethicone Suspension 30 milliLiter(s) Oral every 4 hours PRN  dextrose Oral Gel 15 Gram(s) Oral once PRN  melatonin 3 milliGRAM(s) Oral at bedtime PRN  ondansetron Injectable 4 milliGRAM(s) IV Push every 8 hours PRN    REVIEW OF SYSTEMS  --------------------------------------------------------------------------------  All other systems were reviewed and are negative, except as noted.    VITALS/PHYSICAL EXAM  --------------------------------------------------------------------------------  T(C): 36.7 (10-31-23 @ 10:22), Max: 36.8 (10-31-23 @ 05:15)  HR: 63 (10-31-23 @ 10:22) (63 - 71)  BP: 132/67 (10-31-23 @ 10:22) (119/62 - 163/72)  RR: 17 (10-31-23 @ 10:22) (17 - 18)  SpO2: 100% (10-31-23 @ 10:22) (98% - 100%)  Wt(kg): --    10-30-23 @ 07:01  -  10-31-23 @ 07:00  --------------------------------------------------------  IN: 800 mL / OUT: 1500 mL / NET: -700 mL    Physical Exam:  General: no acute distress  Neuro: no focal deficits  HEENT: anicteric, no JVD  Pulmonary: lungs CTA B/L  Cardiovascular/Chest: +S1S2, RRR  GI/Abdomen: soft, non-distended, non-tender, +bowel sounds  Extremities: no LE pitting edema  Skin: Warm and dry    LABS/STUDIES  --------------------------------------------------------------------------------              7.7    3.34  >-----------<  187      [10-31-23 @ 07:34]              25.2     140  |  104  |  94  ----------------------------<  143      [10-31-23 @ 07:34]  4.5   |  21  |  4.75        Ca     8.7     [10-31-23 @ 07:34]      Mg     2.10     [10-31-23 @ 07:34]      Phos  5.9     [10-31-23 @ 07:34]    TPro  6.8  /  Alb  x   /  TBili  x   /  DBili  x   /  AST  x   /  ALT  x   /  AlkPhos  x   [10-30-23 @ 05:55]    Creatinine Trend:  SCr 4.75 [10-31 @ 07:34]  SCr 5.14 [10-30 @ 05:55]  SCr 4.74 [10-29 @ 08:24]  SCr 4.42 [10-28 @ 07:20]  SCr 4.03 [10-27 @ 04:50]    Urine Creatinine 90      [10-28-23 @ 10:15]  Urine Protein 257      [10-29-23 @ 15:12]  Urine Sodium 65      [10-28-23 @ 10:15]  Urine Urea Nitrogen 577.4      [10-28-23 @ 10:15]  Urine Potassium 48.2      [10-28-23 @ 10:15]    HBsAg Nonreact      [10-29-23 @ 08:24]  HCV 0.11, Nonreact      [10-29-23 @ 08:24]  HIV Nonreact      [10-29-23 @ 08:24]    C3 Complement 132      [10-29-23 @ 08:24]  C4 Complement 28      [10-29-23 @ 08:24]  Free Light Chains: kappa 19.44, lambda 8.71, ratio = 2.23      [10-29 @ 08:24]  Immunofixation Serum:   No Monoclonal Band Identified. NAVEED Rosenberg M.D.  Reference Range: None Detected      [10-27-23 @ 04:50]  SPEP Interpretation: Decreased serum Albumin and increased Polyclonal Gamma fraction  consistent with chronic inflammatory condition.  NAVEED Rosenberg M.D.      [10-29-23 @ 08:24]  UPEP Interpretation: Mild Selective Proteinuria. No Monoclonal band seen.  NAVEED Rosenberg M.D.      [10-29-23 @ 15:12]

## 2023-10-31 NOTE — PROGRESS NOTE ADULT - PROBLEM SELECTOR PLAN 1
Patient with acute on chronic anemia; normocytic   -occult blood feces is negative. Denies any other source of bleeding   - Cont IV iron   - s/p 1 unit of pRBCs in the ED  - stable so far

## 2023-10-31 NOTE — PROGRESS NOTE ADULT - PROBLEM SELECTOR PLAN 1
Non-oliguric MANDY on CKD in the setting of severe anemia and uncontrolled hypertension. On review of labs on [place], last outpatient Scr was elevated at 4.13 on 10/26/23. Prior to this, the last Scr was 2.16 on 12/6/22 during previous hospitalization at [facility] from 11/17/22 to 12/9/22 for low Hgb. Pt did not follow up with a Nephrologist after previous hospitalization. UA with 300mg of protein. UPCR is elevated at 8.9g->6.3g. Kidney US on 10/26/23 shows increased renal echogenicity with no evidence of hydronephrosis.    Plan:  - Remains non oliguric.  - Continue to record STRICT Is+Os. Pt likely with progressive diabetic kidney disease as she reports diabetic retinopathy and PN, as well as significant FH of kidney disease.   -Serologic w/u in process (labs thus far reviewed, continue to follow)   -Resumed home Bumex  -Pt and family now agreeable to long term HD planning, ie AVF v AVG placement. Recommend vascular consult.   -No urgent indication for HD at this time.   -MBD: phos and PTH elevated. Vit D 13. Continue sevelamer. Recommend Vit D supplementation.   -Monitor labs and urine output. Avoid nephrotoxins. Dose medications per eGFR. Renal diet.

## 2023-10-31 NOTE — PROGRESS NOTE ADULT - SUBJECTIVE AND OBJECTIVE BOX
Merlin Mathew, MD   Hospitalist  Pager #[phone]    PROGRESS NOTE:     Patient is a 68y old  Female who presents with a chief complaint of Low Hgb (31 Oct 2023 14:50)      SUBJECTIVE / OVERNIGHT EVENTS: NEON   Patient feels fine, no complaints   Is reluctant to start HD but reports she is making this decision with her daughter       ADDITIONAL REVIEW OF SYSTEMS:    MEDICATIONS  (STANDING):  amLODIPine   Tablet 10 milliGRAM(s) Oral daily  aspirin enteric coated 81 milliGRAM(s) Oral daily  atorvastatin 40 milliGRAM(s) Oral at bedtime  buMETAnide 2 milliGRAM(s) Oral daily  carvedilol 12.5 milliGRAM(s) Oral every 12 hours  cholecalciferol 1000 Unit(s) Oral daily  dextrose 5%. 1000 milliLiter(s) (50 mL/Hr) IV Continuous <Continuous>  dextrose 5%. 1000 milliLiter(s) (100 mL/Hr) IV Continuous <Continuous>  dextrose 50% Injectable 25 Gram(s) IV Push once  dextrose 50% Injectable 25 Gram(s) IV Push once  dextrose 50% Injectable 12.5 Gram(s) IV Push once  glucagon  Injectable 1 milliGRAM(s) IntraMuscular once  heparin   Injectable 5000 Unit(s) SubCutaneous every 8 hours  hydrALAZINE 100 milliGRAM(s) Oral three times a day  insulin lispro (ADMELOG) corrective regimen sliding scale   SubCutaneous at bedtime  insulin lispro (ADMELOG) corrective regimen sliding scale   SubCutaneous three times a day before meals  iron sucrose IVPB 200 milliGRAM(s) IV Intermittent every 24 hours  sevelamer carbonate 800 milliGRAM(s) Oral three times a day with meals    MEDICATIONS  (PRN):  acetaminophen     Tablet .. 650 milliGRAM(s) Oral every 6 hours PRN Temp greater or equal to 38C (100.4F), Mild Pain (1 - 3)  aluminum hydroxide/magnesium hydroxide/simethicone Suspension 30 milliLiter(s) Oral every 4 hours PRN Dyspepsia  dextrose Oral Gel 15 Gram(s) Oral once PRN Blood Glucose LESS THAN 70 milliGRAM(s)/deciliter  melatonin 3 milliGRAM(s) Oral at bedtime PRN Insomnia  ondansetron Injectable 4 milliGRAM(s) IV Push every 8 hours PRN Nausea and/or Vomiting      CAPILLARY BLOOD GLUCOSE      POCT Blood Glucose.: 162 mg/dL (31 Oct 2023 12:26)  POCT Blood Glucose.: 158 mg/dL (31 Oct 2023 08:44)  POCT Blood Glucose.: 169 mg/dL (30 Oct 2023 22:22)  POCT Blood Glucose.: 205 mg/dL (30 Oct 2023 17:52)    I&O's Summary    30 Oct 2023 07:01  -  31 Oct 2023 07:00  --------------------------------------------------------  IN: 800 mL / OUT: 1500 mL / NET: -700 mL        PHYSICAL EXAM:  Vital Signs Last 24 Hrs  T(C): 36.7 (31 Oct 2023 10:22), Max: 36.8 (31 Oct 2023 05:15)  T(F): 98 (31 Oct 2023 10:22), Max: 98.2 (31 Oct 2023 05:15)  HR: 63 (31 Oct 2023 10:22) (63 - 71)  BP: 132/67 (31 Oct 2023 10:22) (119/62 - 163/72)  BP(mean): --  RR: 17 (31 Oct 2023 10:22) (17 - 18)  SpO2: 100% (31 Oct 2023 10:22) (98% - 100%)    Parameters below as of 31 Oct 2023 05:15  Patient On (Oxygen Delivery Method): room air        CONSTITUTIONAL: NAD, resting comfortably, very [place]   RESPIRATORY: Normal respiratory effort; lungs are clear to auscultation bilaterally  CARDIOVASCULAR: Regular rate and rhythm, normal S1 and S2, no murmur/rub/gallop; No lower extremity edema; Peripheral pulses are 2+ bilaterally  ABDOMEN: Nontender to palpation, normoactive bowel sounds, no rebound/guarding; No hepatosplenomegaly  MUSCULOSKELETAL no clubbing or cyanosis of digits; no joint swelling or tenderness to palpation  PSYCH: A+O to person, place, and time; affect appropriate    LABS:                        7.7    3.34  )-----------( 187      ( 31 Oct 2023 07:34 )             25.2     10-31    140  |  104  |  94<H>  ----------------------------<  143<H>  4.5   |  21<L>  |  4.75<H>    Ca    8.7      31 Oct 2023 07:34  Phos  5.9     10-31  Mg     2.10     10-31    TPro  6.8  /  Alb  x   /  TBili  x   /  DBili  x   /  AST  x   /  ALT  x   /  AlkPhos  x   10-30          Urinalysis Basic - ( 31 Oct 2023 07:34 )    Color: x / Appearance: x / SG: x / pH: x  Gluc: 143 mg/dL / Ketone: x  / Bili: x / Urobili: x   Blood: x / Protein: x / Nitrite: x   Leuk Esterase: x / RBC: x / WBC x   Sq Epi: x / Non Sq Epi: x / Bacteria: x        Culture - Urine (collected 29 Oct 2023 16:00)  Source: Clean Catch Clean Catch (Midstream)  Final Report (30 Oct 2023 14:29):    <10,000 CFU/mL Normal Urogenital Elle        RADIOLOGY & ADDITIONAL TESTS:  Results Reviewed:   Imaging Personally Reviewed:  Electrocardiogram Personally Reviewed:    COORDINATION OF CARE:  Care Discussed with Consultants/Other Providers [Y/N]:  Prior or Outpatient Records Reviewed [Y/N]:

## 2023-11-01 LAB
ANA TITR SER: NEGATIVE — SIGNIFICANT CHANGE UP
ANA TITR SER: NEGATIVE — SIGNIFICANT CHANGE UP
ANION GAP SERPL CALC-SCNC: 13 MMOL/L — SIGNIFICANT CHANGE UP (ref 7–14)
ANION GAP SERPL CALC-SCNC: 13 MMOL/L — SIGNIFICANT CHANGE UP (ref 7–14)
AUTO DIFF PNL BLD: NEGATIVE — SIGNIFICANT CHANGE UP
AUTO DIFF PNL BLD: NEGATIVE — SIGNIFICANT CHANGE UP
BUN SERPL-MCNC: 101 MG/DL — HIGH (ref 7–23)
BUN SERPL-MCNC: 101 MG/DL — HIGH (ref 7–23)
C-ANCA SER-ACNC: NEGATIVE — SIGNIFICANT CHANGE UP
C-ANCA SER-ACNC: NEGATIVE — SIGNIFICANT CHANGE UP
CALCIUM SERPL-MCNC: 8.8 MG/DL — SIGNIFICANT CHANGE UP (ref 8.4–10.5)
CALCIUM SERPL-MCNC: 8.8 MG/DL — SIGNIFICANT CHANGE UP (ref 8.4–10.5)
CHLORIDE SERPL-SCNC: 105 MMOL/L — SIGNIFICANT CHANGE UP (ref 98–107)
CHLORIDE SERPL-SCNC: 105 MMOL/L — SIGNIFICANT CHANGE UP (ref 98–107)
CO2 SERPL-SCNC: 21 MMOL/L — LOW (ref 22–31)
CO2 SERPL-SCNC: 21 MMOL/L — LOW (ref 22–31)
CREAT SERPL-MCNC: 4.66 MG/DL — HIGH (ref 0.5–1.3)
CREAT SERPL-MCNC: 4.66 MG/DL — HIGH (ref 0.5–1.3)
EGFR: 10 ML/MIN/1.73M2 — LOW
EGFR: 10 ML/MIN/1.73M2 — LOW
GLUCOSE BLDC GLUCOMTR-MCNC: 129 MG/DL — HIGH (ref 70–99)
GLUCOSE BLDC GLUCOMTR-MCNC: 129 MG/DL — HIGH (ref 70–99)
GLUCOSE BLDC GLUCOMTR-MCNC: 131 MG/DL — HIGH (ref 70–99)
GLUCOSE BLDC GLUCOMTR-MCNC: 131 MG/DL — HIGH (ref 70–99)
GLUCOSE BLDC GLUCOMTR-MCNC: 155 MG/DL — HIGH (ref 70–99)
GLUCOSE BLDC GLUCOMTR-MCNC: 155 MG/DL — HIGH (ref 70–99)
GLUCOSE BLDC GLUCOMTR-MCNC: 175 MG/DL — HIGH (ref 70–99)
GLUCOSE BLDC GLUCOMTR-MCNC: 175 MG/DL — HIGH (ref 70–99)
GLUCOSE SERPL-MCNC: 123 MG/DL — HIGH (ref 70–99)
GLUCOSE SERPL-MCNC: 123 MG/DL — HIGH (ref 70–99)
HCT VFR BLD CALC: 25.1 % — LOW (ref 34.5–45)
HCT VFR BLD CALC: 25.1 % — LOW (ref 34.5–45)
HGB BLD-MCNC: 7.7 G/DL — LOW (ref 11.5–15.5)
HGB BLD-MCNC: 7.7 G/DL — LOW (ref 11.5–15.5)
MAGNESIUM SERPL-MCNC: 2.1 MG/DL — SIGNIFICANT CHANGE UP (ref 1.6–2.6)
MAGNESIUM SERPL-MCNC: 2.1 MG/DL — SIGNIFICANT CHANGE UP (ref 1.6–2.6)
MCHC RBC-ENTMCNC: 26 PG — LOW (ref 27–34)
MCHC RBC-ENTMCNC: 26 PG — LOW (ref 27–34)
MCHC RBC-ENTMCNC: 30.7 GM/DL — LOW (ref 32–36)
MCHC RBC-ENTMCNC: 30.7 GM/DL — LOW (ref 32–36)
MCV RBC AUTO: 84.8 FL — SIGNIFICANT CHANGE UP (ref 80–100)
MCV RBC AUTO: 84.8 FL — SIGNIFICANT CHANGE UP (ref 80–100)
MPO AB + PR3 PNL SER: SIGNIFICANT CHANGE UP
MPO AB + PR3 PNL SER: SIGNIFICANT CHANGE UP
NRBC # BLD: 0 /100 WBCS — SIGNIFICANT CHANGE UP (ref 0–0)
NRBC # BLD: 0 /100 WBCS — SIGNIFICANT CHANGE UP (ref 0–0)
NRBC # FLD: 0 K/UL — SIGNIFICANT CHANGE UP (ref 0–0)
NRBC # FLD: 0 K/UL — SIGNIFICANT CHANGE UP (ref 0–0)
P-ANCA SER-ACNC: NEGATIVE — SIGNIFICANT CHANGE UP
P-ANCA SER-ACNC: NEGATIVE — SIGNIFICANT CHANGE UP
PHOSPHATE SERPL-MCNC: 5.5 MG/DL — HIGH (ref 2.5–4.5)
PHOSPHATE SERPL-MCNC: 5.5 MG/DL — HIGH (ref 2.5–4.5)
PLATELET # BLD AUTO: 206 K/UL — SIGNIFICANT CHANGE UP (ref 150–400)
PLATELET # BLD AUTO: 206 K/UL — SIGNIFICANT CHANGE UP (ref 150–400)
POTASSIUM SERPL-MCNC: 4.5 MMOL/L — SIGNIFICANT CHANGE UP (ref 3.5–5.3)
POTASSIUM SERPL-MCNC: 4.5 MMOL/L — SIGNIFICANT CHANGE UP (ref 3.5–5.3)
POTASSIUM SERPL-SCNC: 4.5 MMOL/L — SIGNIFICANT CHANGE UP (ref 3.5–5.3)
POTASSIUM SERPL-SCNC: 4.5 MMOL/L — SIGNIFICANT CHANGE UP (ref 3.5–5.3)
RBC # BLD: 2.96 M/UL — LOW (ref 3.8–5.2)
RBC # BLD: 2.96 M/UL — LOW (ref 3.8–5.2)
RBC # FLD: 16.1 % — HIGH (ref 10.3–14.5)
RBC # FLD: 16.1 % — HIGH (ref 10.3–14.5)
SODIUM SERPL-SCNC: 139 MMOL/L — SIGNIFICANT CHANGE UP (ref 135–145)
SODIUM SERPL-SCNC: 139 MMOL/L — SIGNIFICANT CHANGE UP (ref 135–145)
WBC # BLD: 3.92 K/UL — SIGNIFICANT CHANGE UP (ref 3.8–10.5)
WBC # BLD: 3.92 K/UL — SIGNIFICANT CHANGE UP (ref 3.8–10.5)
WBC # FLD AUTO: 3.92 K/UL — SIGNIFICANT CHANGE UP (ref 3.8–10.5)
WBC # FLD AUTO: 3.92 K/UL — SIGNIFICANT CHANGE UP (ref 3.8–10.5)

## 2023-11-01 PROCEDURE — 99232 SBSQ HOSP IP/OBS MODERATE 35: CPT

## 2023-11-01 PROCEDURE — 99233 SBSQ HOSP IP/OBS HIGH 50: CPT | Mod: GC

## 2023-11-01 RX ADMIN — AMLODIPINE BESYLATE 10 MILLIGRAM(S): 2.5 TABLET ORAL at 05:54

## 2023-11-01 RX ADMIN — ATORVASTATIN CALCIUM 40 MILLIGRAM(S): 80 TABLET, FILM COATED ORAL at 22:24

## 2023-11-01 RX ADMIN — Medication 100 MILLIGRAM(S): at 22:24

## 2023-11-01 RX ADMIN — Medication 1000 UNIT(S): at 12:55

## 2023-11-01 RX ADMIN — SEVELAMER CARBONATE 800 MILLIGRAM(S): 2400 POWDER, FOR SUSPENSION ORAL at 18:08

## 2023-11-01 RX ADMIN — CARVEDILOL PHOSPHATE 12.5 MILLIGRAM(S): 80 CAPSULE, EXTENDED RELEASE ORAL at 05:54

## 2023-11-01 RX ADMIN — Medication 100 MILLIGRAM(S): at 15:40

## 2023-11-01 RX ADMIN — HEPARIN SODIUM 5000 UNIT(S): 5000 INJECTION INTRAVENOUS; SUBCUTANEOUS at 05:53

## 2023-11-01 RX ADMIN — Medication 3 MILLIGRAM(S): at 22:24

## 2023-11-01 RX ADMIN — BUMETANIDE 2 MILLIGRAM(S): 0.25 INJECTION INTRAMUSCULAR; INTRAVENOUS at 05:54

## 2023-11-01 RX ADMIN — HEPARIN SODIUM 5000 UNIT(S): 5000 INJECTION INTRAVENOUS; SUBCUTANEOUS at 22:24

## 2023-11-01 RX ADMIN — HEPARIN SODIUM 5000 UNIT(S): 5000 INJECTION INTRAVENOUS; SUBCUTANEOUS at 15:41

## 2023-11-01 RX ADMIN — Medication 1: at 12:53

## 2023-11-01 RX ADMIN — IRON SUCROSE 110 MILLIGRAM(S): 20 INJECTION, SOLUTION INTRAVENOUS at 18:47

## 2023-11-01 RX ADMIN — Medication 81 MILLIGRAM(S): at 12:54

## 2023-11-01 RX ADMIN — CARVEDILOL PHOSPHATE 12.5 MILLIGRAM(S): 80 CAPSULE, EXTENDED RELEASE ORAL at 18:09

## 2023-11-01 RX ADMIN — Medication 1: at 18:06

## 2023-11-01 RX ADMIN — SEVELAMER CARBONATE 800 MILLIGRAM(S): 2400 POWDER, FOR SUSPENSION ORAL at 12:55

## 2023-11-01 RX ADMIN — Medication 100 MILLIGRAM(S): at 05:54

## 2023-11-01 NOTE — PROGRESS NOTE ADULT - SUBJECTIVE AND OBJECTIVE BOX
Merlin Mathew, MD   Hospitalist  Pager #[phone]    PROGRESS NOTE:     Patient is a 68y old  Female who presents with a chief complaint of Low Hgb (01 Nov 2023 09:55)      SUBJECTIVE / OVERNIGHT EVENTS:  NEON   Patient feels fine, no complaints   Very [place]   Agreeable to AVF creation     ADDITIONAL REVIEW OF SYSTEMS:    MEDICATIONS  (STANDING):  amLODIPine   Tablet 10 milliGRAM(s) Oral daily  aspirin enteric coated 81 milliGRAM(s) Oral daily  atorvastatin 40 milliGRAM(s) Oral at bedtime  buMETAnide 2 milliGRAM(s) Oral daily  carvedilol 12.5 milliGRAM(s) Oral every 12 hours  cholecalciferol 1000 Unit(s) Oral daily  dextrose 5%. 1000 milliLiter(s) (100 mL/Hr) IV Continuous <Continuous>  dextrose 5%. 1000 milliLiter(s) (50 mL/Hr) IV Continuous <Continuous>  dextrose 50% Injectable 25 Gram(s) IV Push once  dextrose 50% Injectable 25 Gram(s) IV Push once  dextrose 50% Injectable 12.5 Gram(s) IV Push once  glucagon  Injectable 1 milliGRAM(s) IntraMuscular once  heparin   Injectable 5000 Unit(s) SubCutaneous every 8 hours  hydrALAZINE 100 milliGRAM(s) Oral three times a day  insulin lispro (ADMELOG) corrective regimen sliding scale   SubCutaneous three times a day before meals  insulin lispro (ADMELOG) corrective regimen sliding scale   SubCutaneous at bedtime  iron sucrose IVPB 200 milliGRAM(s) IV Intermittent every 24 hours  sevelamer carbonate 800 milliGRAM(s) Oral three times a day with meals    MEDICATIONS  (PRN):  acetaminophen     Tablet .. 650 milliGRAM(s) Oral every 6 hours PRN Temp greater or equal to 38C (100.4F), Mild Pain (1 - 3)  aluminum hydroxide/magnesium hydroxide/simethicone Suspension 30 milliLiter(s) Oral every 4 hours PRN Dyspepsia  dextrose Oral Gel 15 Gram(s) Oral once PRN Blood Glucose LESS THAN 70 milliGRAM(s)/deciliter  melatonin 3 milliGRAM(s) Oral at bedtime PRN Insomnia  ondansetron Injectable 4 milliGRAM(s) IV Push every 8 hours PRN Nausea and/or Vomiting      CAPILLARY BLOOD GLUCOSE      POCT Blood Glucose.: 175 mg/dL (01 Nov 2023 12:26)  POCT Blood Glucose.: 131 mg/dL (01 Nov 2023 08:47)  POCT Blood Glucose.: 153 mg/dL (31 Oct 2023 21:23)  POCT Blood Glucose.: 156 mg/dL (31 Oct 2023 17:46)    I&O's Summary    31 Oct 2023 07:01  -  01 Nov 2023 07:00  --------------------------------------------------------  IN: 800 mL / OUT: 600 mL / NET: 200 mL        PHYSICAL EXAM:  Vital Signs Last 24 Hrs  T(C): 36.5 (01 Nov 2023 11:54), Max: 36.9 (31 Oct 2023 14:50)  T(F): 97.7 (01 Nov 2023 11:54), Max: 98.4 (31 Oct 2023 14:50)  HR: 65 (01 Nov 2023 11:54) (64 - 80)  BP: 122/58 (01 Nov 2023 11:54) (120/62 - 158/77)  BP(mean): --  RR: 18 (01 Nov 2023 11:54) (15 - 18)  SpO2: 100% (01 Nov 2023 11:54) (99% - 100%)    Parameters below as of 01 Nov 2023 11:54  Patient On (Oxygen Delivery Method): room air        CONSTITUTIONAL: NAD, resting comfortably, very [place]   RESPIRATORY: Normal respiratory effort; lungs are clear to auscultation bilaterally  CARDIOVASCULAR: Regular rate and rhythm, normal S1 and S2, no murmur/rub/gallop; No lower extremity edema;   ABDOMEN: Nontender to palpation, normoactive bowel sounds, no rebound/guarding; No hepatosplenomegaly  MUSCULOSKELETAL no clubbing or cyanosis of digits; no joint swelling or tenderness to palpation  PSYCH: A+O to person, place, and time; affect appropriate      LABS:                        7.7    3.92  )-----------( 206      ( 01 Nov 2023 06:40 )             25.1     11-01    139  |  105  |  101<H>  ----------------------------<  123<H>  4.5   |  21<L>  |  4.66<H>    Ca    8.8      01 Nov 2023 06:40  Phos  5.5     11-01  Mg     2.10     11-01            Urinalysis Basic - ( 01 Nov 2023 06:40 )    Color: x / Appearance: x / SG: x / pH: x  Gluc: 123 mg/dL / Ketone: x  / Bili: x / Urobili: x   Blood: x / Protein: x / Nitrite: x   Leuk Esterase: x / RBC: x / WBC x   Sq Epi: x / Non Sq Epi: x / Bacteria: x        Culture - Urine (collected 29 Oct 2023 16:00)  Source: Clean Catch Clean Catch (Midstream)  Final Report (30 Oct 2023 14:29):    <10,000 CFU/mL Normal Urogenital Elle        RADIOLOGY & ADDITIONAL TESTS:  Results Reviewed:   Imaging Personally Reviewed:  Electrocardiogram Personally Reviewed:    COORDINATION OF CARE:  Care Discussed with Consultants/Other Providers [Y/N]:  Prior or Outpatient Records Reviewed [Y/N]:

## 2023-11-01 NOTE — PROGRESS NOTE ADULT - ASSESSMENT
68F with MANDY on CKD, needs AVF for long term HD planning. Right arm dominant, no implanted devices. Palpable radial and ulnar pulses. Memo's test normal.    Plan:  - Left arm precautions, no IV/blood draws  - Bilateral UE vein mapping  - Medical/cardiology clearance for AVF creation    Vascular, [id number]

## 2023-11-01 NOTE — CONSULT NOTE ADULT - SUBJECTIVE AND OBJECTIVE BOX
Ar Kruse MD  Interventional Cardiology / Advance Heart Failure and Cardiac Transplant Specialist  [place] Office : [address] [place] [id number]  Tel:   [place] Office : [address] [facility] [address] [address]  Tel: [phone]      HISTORY OF PRESENTING ILLNESS:  66 yo F w/ HTN, HLD, sarcoidosis, HFpEF, hx PEA x2, b/l sensorineural hearing, sarcoidosis, and HTN presents to the ED with low Hgb. Patient is very hard of hearing but AAOX3 and was able to provide most of the hx. Patient reports that she was scheduled to undergo cochlear implant next week and as a result, she came to the outpatient preop testing center for lab work. When she went home, she received a call telling her to come to the ED for low Hgb. Currently, pt has some abdominal discomfort but denies any fever, chills, nausea, vomiting, chest pain, SOB, melena, hematochezia or hematuria. She reports that her Hgb was low before and she had blood transfusion in the past.   Denies chest pain. Spoke with daughter over phone as well. Upon further chart review patient with PEA x2 in  with multiple MICU admissions requiring intubation in  due to AMS and respiratory distress. Bronchoscopy 2022 showed a L sided mucus plug and also has been concern for aspiration.    MEDICATIONS:  amLODIPine   Tablet 10 milliGRAM(s) Oral daily  aspirin enteric coated 81 milliGRAM(s) Oral daily  buMETAnide 2 milliGRAM(s) Oral daily  carvedilol 12.5 milliGRAM(s) Oral every 12 hours  heparin   Injectable 5000 Unit(s) SubCutaneous every 8 hours  hydrALAZINE 100 milliGRAM(s) Oral three times a day        acetaminophen     Tablet .. 650 milliGRAM(s) Oral every 6 hours PRN  melatonin 3 milliGRAM(s) Oral at bedtime PRN  ondansetron Injectable 4 milliGRAM(s) IV Push every 8 hours PRN    aluminum hydroxide/magnesium hydroxide/simethicone Suspension 30 milliLiter(s) Oral every 4 hours PRN    atorvastatin 40 milliGRAM(s) Oral at bedtime  dextrose 50% Injectable 12.5 Gram(s) IV Push once  dextrose 50% Injectable 25 Gram(s) IV Push once  dextrose 50% Injectable 25 Gram(s) IV Push once  dextrose Oral Gel 15 Gram(s) Oral once PRN  glucagon  Injectable 1 milliGRAM(s) IntraMuscular once  insulin lispro (ADMELOG) corrective regimen sliding scale   SubCutaneous three times a day before meals  insulin lispro (ADMELOG) corrective regimen sliding scale   SubCutaneous at bedtime    cholecalciferol 1000 Unit(s) Oral daily  dextrose 5%. 1000 milliLiter(s) IV Continuous <Continuous>  dextrose 5%. 1000 milliLiter(s) IV Continuous <Continuous>  iron sucrose IVPB 200 milliGRAM(s) IV Intermittent every 24 hours      PAST MEDICAL/SURGICAL HISTORY  PAST MEDICAL & SURGICAL HISTORY:  Type 2 diabetes mellitus      Bilateral cataracts      Fluid in pleural cavity associated with pancreatitis      Pancreatic pseudocyst/cyst  s/p drain placement and removal      HTN (hypertension)      HLD (hyperlipidemia)      H/O sarcoidosis      PVD (peripheral vascular disease)      Sensorineural hearing loss, bilateral      H/O CHF      Chronic kidney disease (CKD)      Anemia      History of amputation of right great toe        H/O:  section        History of laparoscopic cholecystectomy          SOCIAL HISTORY: Substance Use (street drugs): ( x ) never used  (  ) other:    FAMILY HISTORY:  FH: hypertension (Mother)        REVIEW OF SYSTEMS:  CONSTITUTIONAL: No fever, weight loss, or fatigue  EYES: No eye pain, visual disturbances, or discharge  ENMT:  No difficulty hearing, tinnitus, vertigo; No sinus or throat pain  BREASTS: No pain, masses, or nipple discharge  GASTROINTESTINAL: No abdominal or epigastric pain. No nausea, vomiting, or hematemesis; No diarrhea or constipation. No melena or hematochezia.  GENITOURINARY: No dysuria, frequency, hematuria, or incontinence  NEUROLOGICAL: No headaches, memory loss, loss of strength, numbness, or tremors  ENDOCRINE: No heat or cold intolerance; No hair loss  MUSCULOSKELETAL: No joint pain or swelling; No muscle, back, or extremity pain  PSYCHIATRIC: No depression, anxiety, mood swings, or difficulty sleeping  HEME/LYMPH: No easy bruising, or bleeding gums  All others negative    PHYSICAL EXAM:  T(C): 36.5 (23 @ 11:54), Max: 36.7 (10-31-23 @ 18:00)  HR: 65 (23 @ 11:54) (64 - 80)  BP: 122/58 (23 @ 11:54) (122/58 - 158/77)  RR: 18 (23 @ 11:54) (16 - 18)  SpO2: 100% (23 @ 11:54) (99% - 100%)  Wt(kg): --  I&O's Summary    31 Oct 2023 07:01  -  2023 07:00  --------------------------------------------------------  IN: 800 mL / OUT: 600 mL / NET: 200 mL          GENERAL: NAD  EYES: EOMI, PERRLA, conjunctiva and sclera clear  ENMT: No tonsillar erythema, exudates, or enlargement  Cardiovascular: Normal S1 S2, No JVD, No murmurs, No edema  Respiratory: Lungs clear to auscultation	  Gastrointestinal:  Soft, Non-tender, + BS	  Extremities: No edema                                     7.7    3.92  )-----------( 206      ( 2023 06:40 )             25.1     11    139  |  105  |  101<H>  ----------------------------<  123<H>  4.5   |  21<L>  |  4.66<H>    Ca    8.8      2023 06:40  Phos  5.5     11-  Mg     2.10           proBNP:   Lipid Profile:   HgA1c:   TSH:     Consultant(s) Notes Reviewed:  [x ] YES  [ ] NO    Care Discussed with Consultants/Other Providers [ x] YES  [ ] NO    Imaging Personally Reviewed independently:  [x] YES  [ ] NO    All labs, radiologic studies, vitals, orders and medications list reviewed. Patient is seen and examined at bedside. Case discussed with medical team.

## 2023-11-01 NOTE — PROGRESS NOTE ADULT - PROBLEM SELECTOR PLAN 1
Non-oliguric MANDY on CKD in the setting of severe anemia and uncontrolled hypertension. On review of labs on [place], last outpatient Scr was elevated at 4.13 on 10/26/23. Prior to this, the last Scr was 2.16 on 12/6/22 during previous hospitalization at [facility] from 11/17/22 to 12/9/22 for low Hgb. Pt did not follow up with a Nephrologist after previous hospitalization. UA with 300mg of protein. UPCR is elevated at 8.9g->6.3g. Kidney US on 10/26/23 shows increased renal echogenicity with no evidence of hydronephrosis.    Plan:  -Remains non oliguric.  -Continue to record STRICT Is+Os. Pt likely with progressive diabetic kidney disease as she reports diabetic retinopathy and PN, as well as significant FH of kidney disease.   -Serologic w/u in process (labs thus far reviewed, continue to follow)   -Resumed home Bumex  -Pt and family now agreeable to AVF placement/long term HD planning but no urgent indication for HD at this time.   -MBD: phos and PTH elevated. Vit D 13. Continue sevelamer. Recommend Vit D supplementation.   -Monitor labs and urine output. Avoid nephrotoxins. Dose medications per eGFR. Renal diet.

## 2023-11-01 NOTE — PROGRESS NOTE ADULT - SUBJECTIVE AND OBJECTIVE BOX
[facility] DIVISION OF KIDNEY DISEASES AND HYPERTENSION   FOLLOW UP NOTE  --------------------------------------------------------------------------------  HPI: 68F with PMH of HTN, HLD, DM2, sarcoidosis, CKD, HF, anemia, sensorineural hearing loss, presenting to the ED after found to have low hemoglobin on pre-surgical workup prior to cochlear implantation surgery. Nephrology following for MANDY on CKD.     24 hour events/subjective: Pt. was seen and examined today. Resting comfortably, no overt complaints. AVF planning in process.     PAST HISTORY  --------------------------------------------------------------------------------  No significant changes to PMH, PSH, FHx, SHx, unless otherwise noted    ALLERGIES & MEDICATIONS  --------------------------------------------------------------------------------  Allergies  penicillin ([facility] Synd)    Intolerances    Standing Inpatient Medications  amLODIPine   Tablet 10 milliGRAM(s) Oral daily  aspirin enteric coated 81 milliGRAM(s) Oral daily  atorvastatin 40 milliGRAM(s) Oral at bedtime  buMETAnide 2 milliGRAM(s) Oral daily  carvedilol 12.5 milliGRAM(s) Oral every 12 hours  cholecalciferol 1000 Unit(s) Oral daily  dextrose 5%. 1000 milliLiter(s) IV Continuous <Continuous>  dextrose 5%. 1000 milliLiter(s) IV Continuous <Continuous>  dextrose 50% Injectable 12.5 Gram(s) IV Push once  dextrose 50% Injectable 25 Gram(s) IV Push once  dextrose 50% Injectable 25 Gram(s) IV Push once  glucagon  Injectable 1 milliGRAM(s) IntraMuscular once  heparin   Injectable 5000 Unit(s) SubCutaneous every 8 hours  hydrALAZINE 100 milliGRAM(s) Oral three times a day  insulin lispro (ADMELOG) corrective regimen sliding scale   SubCutaneous at bedtime  insulin lispro (ADMELOG) corrective regimen sliding scale   SubCutaneous three times a day before meals  iron sucrose IVPB 200 milliGRAM(s) IV Intermittent every 24 hours  sevelamer carbonate 800 milliGRAM(s) Oral three times a day with meals    PRN Inpatient Medications  acetaminophen     Tablet .. 650 milliGRAM(s) Oral every 6 hours PRN  aluminum hydroxide/magnesium hydroxide/simethicone Suspension 30 milliLiter(s) Oral every 4 hours PRN  dextrose Oral Gel 15 Gram(s) Oral once PRN  melatonin 3 milliGRAM(s) Oral at bedtime PRN  ondansetron Injectable 4 milliGRAM(s) IV Push every 8 hours PRN    REVIEW OF SYSTEMS  --------------------------------------------------------------------------------  All other systems were reviewed and are negative, except as noted.    VITALS/PHYSICAL EXAM  --------------------------------------------------------------------------------  T(C): 36.3 (11-01-23 @ 05:13), Max: 36.9 (10-31-23 @ 14:50)  HR: 64 (11-01-23 @ 05:13) (63 - 80)  BP: 130/62 (11-01-23 @ 05:13) (120/62 - 158/77)  RR: 18 (11-01-23 @ 05:13) (15 - 18)  SpO2: 100% (11-01-23 @ 05:13) (99% - 100%)  Wt(kg): --    10-31-23 @ 07:01  -  11-01-23 @ 07:00  --------------------------------------------------------  IN: 800 mL / OUT: 600 mL / NET: 200 mL    Physical Exam:  General: no acute distress  Neuro: no focal deficits  HEENT: anicteric, no JVD  Pulmonary: lungs CTA B/L  Cardiovascular/Chest: +S1S2, RRR  GI/Abdomen: soft, non-distended, non-tender, +bowel sounds  Extremities: no LE pitting edema  Skin: Warm and dry    LABS/STUDIES  --------------------------------------------------------------------------------              7.7    3.92  >-----------<  206      [11-01-23 @ 06:40]              25.1     139  |  105  |  101  ----------------------------<  123      [11-01-23 @ 06:40]  4.5   |  21  |  4.66        Ca     8.8     [11-01-23 @ 06:40]      Mg     2.10     [11-01-23 @ 06:40]      Phos  5.5     [11-01-23 @ 06:40]    Creatinine Trend:  SCr 4.66 [11-01 @ 06:40]  SCr 4.75 [10-31 @ 07:34]  SCr 5.14 [10-30 @ 05:55]  SCr 4.74 [10-29 @ 08:24]  SCr 4.42 [10-28 @ 07:20]    Urine Creatinine 90      [10-28-23 @ 10:15]  Urine Protein 257      [10-29-23 @ 15:12]  Urine Sodium 65      [10-28-23 @ 10:15]  Urine Urea Nitrogen 577.4      [10-28-23 @ 10:15]  Urine Potassium 48.2      [10-28-23 @ 10:15]    HBsAg Nonreact      [10-29-23 @ 08:24]  HCV 0.11, Nonreact      [10-29-23 @ 08:24]  HIV Nonreact      [10-29-23 @ 08:24]    dsDNA <12      [10-29-23 @ 08:24]  C3 Complement 132      [10-29-23 @ 08:24]  C4 Complement 28      [10-29-23 @ 08:24]  Free Light Chains: kappa 19.44, lambda 8.71, ratio = 2.23      [10-29 @ 08:24]  Immunofixation Serum: No Monoclonal Band Identified. NAVEED Rosenberg M.D.  Reference Range: None Detected      [10-27-23 @ 04:50]  SPEP Interpretation: Decreased serum Albumin and increased Polyclonal Gamma fraction  consistent with chronic inflammatory condition.  NAVEED Rosenberg M.D.      [10-29-23 @ 08:24]  UPEP Interpretation: Mild Selective Proteinuria. No Monoclonal band seen.  NAVEED Rosenberg M.D.      [10-29-23 @ 15:12]

## 2023-11-01 NOTE — PROGRESS NOTE ADULT - SUBJECTIVE AND OBJECTIVE BOX
Surgery Progress Note    SUBJECTIVE: Pt seen and examined at bedside.     Vital Signs Last 24 Hrs  T(C): 36.3 (01 Nov 2023 05:13), Max: 36.9 (31 Oct 2023 14:50)  T(F): 97.4 (01 Nov 2023 05:13), Max: 98.4 (31 Oct 2023 14:50)  HR: 64 (01 Nov 2023 05:13) (63 - 80)  BP: 130/62 (01 Nov 2023 05:13) (120/62 - 158/77)  BP(mean): --  RR: 18 (01 Nov 2023 05:13) (15 - 18)  SpO2: 100% (01 Nov 2023 05:13) (99% - 100%)    Parameters below as of 01 Nov 2023 05:13  Patient On (Oxygen Delivery Method): room air      Physical Exam:  General Appearance: Appears well, NAD  Respiratory: No labored breathing  CV: Pulse regularly present      LABS:                        7.7    3.92  )-----------( 206      ( 01 Nov 2023 06:40 )             25.1     11-01    139  |  105  |  101<H>  ----------------------------<  123<H>  4.5   |  21<L>  |  4.66<H>    Ca    8.8      01 Nov 2023 06:40  Phos  5.5     11-01  Mg     2.10     11-01        Urinalysis Basic - ( 01 Nov 2023 06:40 )    Color: x / Appearance: x / SG: x / pH: x  Gluc: 123 mg/dL / Ketone: x  / Bili: x / Urobili: x   Blood: x / Protein: x / Nitrite: x   Leuk Esterase: x / RBC: x / WBC x   Sq Epi: x / Non Sq Epi: x / Bacteria: x        INs and OUTs:    10-31-23 @ 07:01  -  11-01-23 @ 07:00  --------------------------------------------------------  IN: 800 mL / OUT: 600 mL / NET: 200 mL     Surgery Progress Note    SUBJECTIVE: Pt seen and examined at bedside.     Vital Signs Last 24 Hrs  T(C): 36.3 (01 Nov 2023 05:13), Max: 36.9 (31 Oct 2023 14:50)  T(F): 97.4 (01 Nov 2023 05:13), Max: 98.4 (31 Oct 2023 14:50)  HR: 64 (01 Nov 2023 05:13) (63 - 80)  BP: 130/62 (01 Nov 2023 05:13) (120/62 - 158/77)  BP(mean): --  RR: 18 (01 Nov 2023 05:13) (15 - 18)  SpO2: 100% (01 Nov 2023 05:13) (99% - 100%)    Parameters below as of 01 Nov 2023 05:13  Patient On (Oxygen Delivery Method): room air      Physical Exam:  General Appearance: Appears well, NAD  Respiratory: No labored breathing  CV: Pulse regularly present  Extremities: LUE protected, palpable radial pulse, warm       LABS:                        7.7    3.92  )-----------( 206      ( 01 Nov 2023 06:40 )             25.1     11-01    139  |  105  |  101<H>  ----------------------------<  123<H>  4.5   |  21<L>  |  4.66<H>    Ca    8.8      01 Nov 2023 06:40  Phos  5.5     11-01  Mg     2.10     11-01        Urinalysis Basic - ( 01 Nov 2023 06:40 )    Color: x / Appearance: x / SG: x / pH: x  Gluc: 123 mg/dL / Ketone: x  / Bili: x / Urobili: x   Blood: x / Protein: x / Nitrite: x   Leuk Esterase: x / RBC: x / WBC x   Sq Epi: x / Non Sq Epi: x / Bacteria: x        INs and OUTs:    10-31-23 @ 07:01  -  11-01-23 @ 07:00  --------------------------------------------------------  IN: 800 mL / OUT: 600 mL / NET: 200 mL

## 2023-11-02 ENCOUNTER — TRANSCRIPTION ENCOUNTER (OUTPATIENT)
Age: 68
End: 2023-11-02

## 2023-11-02 ENCOUNTER — APPOINTMENT (OUTPATIENT)
Dept: OTOLARYNGOLOGY | Facility: HOSPITAL | Age: 68
End: 2023-11-02

## 2023-11-02 PROBLEM — D64.9 ANEMIA, UNSPECIFIED: Chronic | Status: ACTIVE | Noted: 2023-10-26

## 2023-11-02 LAB
ANION GAP SERPL CALC-SCNC: 13 MMOL/L — SIGNIFICANT CHANGE UP (ref 7–14)
ANION GAP SERPL CALC-SCNC: 13 MMOL/L — SIGNIFICANT CHANGE UP (ref 7–14)
BUN SERPL-MCNC: 105 MG/DL — HIGH (ref 7–23)
BUN SERPL-MCNC: 105 MG/DL — HIGH (ref 7–23)
CALCIUM SERPL-MCNC: 8.6 MG/DL — SIGNIFICANT CHANGE UP (ref 8.4–10.5)
CALCIUM SERPL-MCNC: 8.6 MG/DL — SIGNIFICANT CHANGE UP (ref 8.4–10.5)
CHLORIDE SERPL-SCNC: 105 MMOL/L — SIGNIFICANT CHANGE UP (ref 98–107)
CHLORIDE SERPL-SCNC: 105 MMOL/L — SIGNIFICANT CHANGE UP (ref 98–107)
CO2 SERPL-SCNC: 20 MMOL/L — LOW (ref 22–31)
CO2 SERPL-SCNC: 20 MMOL/L — LOW (ref 22–31)
CREAT SERPL-MCNC: 4.61 MG/DL — HIGH (ref 0.5–1.3)
CREAT SERPL-MCNC: 4.61 MG/DL — HIGH (ref 0.5–1.3)
EGFR: 10 ML/MIN/1.73M2 — LOW
EGFR: 10 ML/MIN/1.73M2 — LOW
GLUCOSE BLDC GLUCOMTR-MCNC: 127 MG/DL — HIGH (ref 70–99)
GLUCOSE BLDC GLUCOMTR-MCNC: 127 MG/DL — HIGH (ref 70–99)
GLUCOSE BLDC GLUCOMTR-MCNC: 137 MG/DL — HIGH (ref 70–99)
GLUCOSE BLDC GLUCOMTR-MCNC: 137 MG/DL — HIGH (ref 70–99)
GLUCOSE BLDC GLUCOMTR-MCNC: 162 MG/DL — HIGH (ref 70–99)
GLUCOSE BLDC GLUCOMTR-MCNC: 162 MG/DL — HIGH (ref 70–99)
GLUCOSE BLDC GLUCOMTR-MCNC: 193 MG/DL — HIGH (ref 70–99)
GLUCOSE BLDC GLUCOMTR-MCNC: 193 MG/DL — HIGH (ref 70–99)
GLUCOSE SERPL-MCNC: 105 MG/DL — HIGH (ref 70–99)
GLUCOSE SERPL-MCNC: 105 MG/DL — HIGH (ref 70–99)
HCT VFR BLD CALC: 24.2 % — LOW (ref 34.5–45)
HCT VFR BLD CALC: 24.2 % — LOW (ref 34.5–45)
HGB BLD-MCNC: 7.3 G/DL — LOW (ref 11.5–15.5)
HGB BLD-MCNC: 7.3 G/DL — LOW (ref 11.5–15.5)
MAGNESIUM SERPL-MCNC: 1.9 MG/DL — SIGNIFICANT CHANGE UP (ref 1.6–2.6)
MAGNESIUM SERPL-MCNC: 1.9 MG/DL — SIGNIFICANT CHANGE UP (ref 1.6–2.6)
MCHC RBC-ENTMCNC: 26.2 PG — LOW (ref 27–34)
MCHC RBC-ENTMCNC: 26.2 PG — LOW (ref 27–34)
MCHC RBC-ENTMCNC: 30.2 GM/DL — LOW (ref 32–36)
MCHC RBC-ENTMCNC: 30.2 GM/DL — LOW (ref 32–36)
MCV RBC AUTO: 86.7 FL — SIGNIFICANT CHANGE UP (ref 80–100)
MCV RBC AUTO: 86.7 FL — SIGNIFICANT CHANGE UP (ref 80–100)
NRBC # BLD: 0 /100 WBCS — SIGNIFICANT CHANGE UP (ref 0–0)
NRBC # BLD: 0 /100 WBCS — SIGNIFICANT CHANGE UP (ref 0–0)
NRBC # FLD: 0 K/UL — SIGNIFICANT CHANGE UP (ref 0–0)
NRBC # FLD: 0 K/UL — SIGNIFICANT CHANGE UP (ref 0–0)
PHOSPHATE SERPL-MCNC: 5.4 MG/DL — HIGH (ref 2.5–4.5)
PHOSPHATE SERPL-MCNC: 5.4 MG/DL — HIGH (ref 2.5–4.5)
PLATELET # BLD AUTO: 200 K/UL — SIGNIFICANT CHANGE UP (ref 150–400)
PLATELET # BLD AUTO: 200 K/UL — SIGNIFICANT CHANGE UP (ref 150–400)
POTASSIUM SERPL-MCNC: 4.7 MMOL/L — SIGNIFICANT CHANGE UP (ref 3.5–5.3)
POTASSIUM SERPL-MCNC: 4.7 MMOL/L — SIGNIFICANT CHANGE UP (ref 3.5–5.3)
POTASSIUM SERPL-SCNC: 4.7 MMOL/L — SIGNIFICANT CHANGE UP (ref 3.5–5.3)
POTASSIUM SERPL-SCNC: 4.7 MMOL/L — SIGNIFICANT CHANGE UP (ref 3.5–5.3)
RBC # BLD: 2.79 M/UL — LOW (ref 3.8–5.2)
RBC # BLD: 2.79 M/UL — LOW (ref 3.8–5.2)
RBC # FLD: 16 % — HIGH (ref 10.3–14.5)
RBC # FLD: 16 % — HIGH (ref 10.3–14.5)
SODIUM SERPL-SCNC: 138 MMOL/L — SIGNIFICANT CHANGE UP (ref 135–145)
SODIUM SERPL-SCNC: 138 MMOL/L — SIGNIFICANT CHANGE UP (ref 135–145)
WBC # BLD: 3.67 K/UL — LOW (ref 3.8–10.5)
WBC # BLD: 3.67 K/UL — LOW (ref 3.8–10.5)
WBC # FLD AUTO: 3.67 K/UL — LOW (ref 3.8–10.5)
WBC # FLD AUTO: 3.67 K/UL — LOW (ref 3.8–10.5)

## 2023-11-02 PROCEDURE — 99233 SBSQ HOSP IP/OBS HIGH 50: CPT | Mod: GC

## 2023-11-02 PROCEDURE — 93970 EXTREMITY STUDY: CPT | Mod: 26

## 2023-11-02 PROCEDURE — 99223 1ST HOSP IP/OBS HIGH 75: CPT | Mod: GC

## 2023-11-02 PROCEDURE — 99232 SBSQ HOSP IP/OBS MODERATE 35: CPT

## 2023-11-02 RX ADMIN — Medication 100 MILLIGRAM(S): at 21:22

## 2023-11-02 RX ADMIN — SEVELAMER CARBONATE 800 MILLIGRAM(S): 2400 POWDER, FOR SUSPENSION ORAL at 13:05

## 2023-11-02 RX ADMIN — CARVEDILOL PHOSPHATE 12.5 MILLIGRAM(S): 80 CAPSULE, EXTENDED RELEASE ORAL at 16:48

## 2023-11-02 RX ADMIN — Medication 1: at 18:06

## 2023-11-02 RX ADMIN — SEVELAMER CARBONATE 800 MILLIGRAM(S): 2400 POWDER, FOR SUSPENSION ORAL at 18:06

## 2023-11-02 RX ADMIN — HEPARIN SODIUM 5000 UNIT(S): 5000 INJECTION INTRAVENOUS; SUBCUTANEOUS at 13:05

## 2023-11-02 RX ADMIN — IRON SUCROSE 110 MILLIGRAM(S): 20 INJECTION, SOLUTION INTRAVENOUS at 16:48

## 2023-11-02 RX ADMIN — Medication 1000 UNIT(S): at 13:06

## 2023-11-02 RX ADMIN — Medication 100 MILLIGRAM(S): at 05:55

## 2023-11-02 RX ADMIN — Medication 100 MILLIGRAM(S): at 13:05

## 2023-11-02 RX ADMIN — Medication 1: at 13:06

## 2023-11-02 RX ADMIN — BUMETANIDE 2 MILLIGRAM(S): 0.25 INJECTION INTRAMUSCULAR; INTRAVENOUS at 05:55

## 2023-11-02 RX ADMIN — AMLODIPINE BESYLATE 10 MILLIGRAM(S): 2.5 TABLET ORAL at 05:56

## 2023-11-02 RX ADMIN — Medication 81 MILLIGRAM(S): at 13:05

## 2023-11-02 RX ADMIN — ATORVASTATIN CALCIUM 40 MILLIGRAM(S): 80 TABLET, FILM COATED ORAL at 21:21

## 2023-11-02 RX ADMIN — HEPARIN SODIUM 5000 UNIT(S): 5000 INJECTION INTRAVENOUS; SUBCUTANEOUS at 21:21

## 2023-11-02 RX ADMIN — SEVELAMER CARBONATE 800 MILLIGRAM(S): 2400 POWDER, FOR SUSPENSION ORAL at 09:09

## 2023-11-02 RX ADMIN — HEPARIN SODIUM 5000 UNIT(S): 5000 INJECTION INTRAVENOUS; SUBCUTANEOUS at 05:56

## 2023-11-02 RX ADMIN — CARVEDILOL PHOSPHATE 12.5 MILLIGRAM(S): 80 CAPSULE, EXTENDED RELEASE ORAL at 05:56

## 2023-11-02 NOTE — CONSULT NOTE ADULT - ATTENDING COMMENTS
68F with h/o sarcoidosis, chronic hypercapnic respiratory failure on AVAPS-AE, DM, HTN, HLD, multiple admissions over the past several months including cardiac arrest x 2, a/w anemia of chronic disease. Consulted for pulmonary pre-operative evaluation.     Recs:  Patient appears stable and optimized from a pulmonary standpoint. Would c/w AVAPS-AE when sleeping and postoperatively.   Incentive spirometer, DVT ppx

## 2023-11-02 NOTE — PROGRESS NOTE ADULT - SUBJECTIVE AND OBJECTIVE BOX
Ar Kruse MD  Interventional Cardiology / Advance Heart Failure and Cardiac Transplant Specialist  [place] Office : [address] [place] [id number]  Tel:   [place] Office : [address] [facility] [address]  Tel: [phone]      Subjective/Overnight events: Pt is lying in bed comfortable not in distress, no chest pains no SOB no palpitations  	  MEDICATIONS:  amLODIPine   Tablet 10 milliGRAM(s) Oral daily  aspirin enteric coated 81 milliGRAM(s) Oral daily  buMETAnide 2 milliGRAM(s) Oral daily  carvedilol 12.5 milliGRAM(s) Oral every 12 hours  heparin   Injectable 5000 Unit(s) SubCutaneous every 8 hours  hydrALAZINE 100 milliGRAM(s) Oral three times a day        acetaminophen     Tablet .. 650 milliGRAM(s) Oral every 6 hours PRN  melatonin 3 milliGRAM(s) Oral at bedtime PRN  ondansetron Injectable 4 milliGRAM(s) IV Push every 8 hours PRN    aluminum hydroxide/magnesium hydroxide/simethicone Suspension 30 milliLiter(s) Oral every 4 hours PRN    atorvastatin 40 milliGRAM(s) Oral at bedtime  dextrose 50% Injectable 25 Gram(s) IV Push once  dextrose 50% Injectable 25 Gram(s) IV Push once  dextrose 50% Injectable 12.5 Gram(s) IV Push once  dextrose Oral Gel 15 Gram(s) Oral once PRN  glucagon  Injectable 1 milliGRAM(s) IntraMuscular once  insulin lispro (ADMELOG) corrective regimen sliding scale   SubCutaneous at bedtime  insulin lispro (ADMELOG) corrective regimen sliding scale   SubCutaneous three times a day before meals    cholecalciferol 1000 Unit(s) Oral daily  dextrose 5%. 1000 milliLiter(s) IV Continuous <Continuous>  dextrose 5%. 1000 milliLiter(s) IV Continuous <Continuous>  iron sucrose IVPB 200 milliGRAM(s) IV Intermittent every 24 hours      PAST MEDICAL/SURGICAL HISTORY  PAST MEDICAL & SURGICAL HISTORY:  Type 2 diabetes mellitus      Bilateral cataracts      Fluid in pleural cavity associated with pancreatitis      Pancreatic pseudocyst/cyst  s/p drain placement and removal      HTN (hypertension)      HLD (hyperlipidemia)      H/O sarcoidosis      PVD (peripheral vascular disease)      Sensorineural hearing loss, bilateral      H/O CHF      Chronic kidney disease (CKD)      Anemia      History of amputation of right great toe        H/O:  section        History of laparoscopic cholecystectomy          SOCIAL HISTORY: Substance Use (street drugs): ( x ) never used  (  ) other:    FAMILY HISTORY:  FH: hypertension (Mother)            PHYSICAL EXAM:  T(C): 36.4 (23 @ 13:00), Max: 36.7 (23 @ 22:20)  HR: 67 (23 @ 13:00) (60 - 72)  BP: 115/57 (23 @ 13:00) (115/57 - 150/67)  RR: 18 (23 @ 13:00) (16 - 18)  SpO2: 100% (23 @ 13:00) (98% - 100%)  Wt(kg): --  I&O's Summary    2023 07:01  -  2023 15:13  --------------------------------------------------------  IN: 200 mL / OUT: 900 mL / NET: -700 mL          GENERAL: NAD  EYES: EOMI, PERRLA, conjunctiva and sclera clear  ENMT: No tonsillar erythema, exudates, or enlargement  Cardiovascular: Normal S1 S2, No JVD, No murmurs, No edema  Respiratory: Lungs clear to auscultation	  Gastrointestinal:  Soft, Non-tender, + BS	  Extremities: No edema                                     7.3    3.67  )-----------( 200      ( 2023 06:41 )             24.2         138  |  105  |  105<H>  ----------------------------<  105<H>  4.7   |  20<L>  |  4.61<H>    Ca    8.6      2023 06:41  Phos  5.4       Mg     1.90           proBNP:   Lipid Profile:   HgA1c:   TSH:     Consultant(s) Notes Reviewed:  [x ] YES  [ ] NO    Care Discussed with Consultants/Other Providers [ x] YES  [ ] NO    Imaging Personally Reviewed independently:  [x] YES  [ ] NO    All labs, radiologic studies, vitals, orders and medications list reviewed. Patient is seen and examined at bedside. Case discussed with medical team.

## 2023-11-02 NOTE — PROGRESS NOTE ADULT - SUBJECTIVE AND OBJECTIVE BOX
[facility] Division of Kidney Diseases & Hypertension  FOLLOW UP NOTE  [phone]--------------------------------------------------------------------------------  HPI: 68F with PMH of HTN, HLD, DM2, sarcoidosis, CKD, HF, anemia, sensorineural hearing loss, presenting to the ED after found to have low hemoglobin on pre-surgical workup prior to cochlear implantation surgery. Nephrology following for MANDY on CKD.     24 hour events/subjective: Pt. was seen and examined today. Resting comfortably, no overt complaints. AVF planning in process.     PAST HISTORY  --------------------------------------------------------------------------------  No significant changes to PMH, PSH, FHx, SHx, unless otherwise noted    ALLERGIES & MEDICATIONS  --------------------------------------------------------------------------------  Allergies  penicillin (Red Man Synd)  Intolerances    Standing Inpatient Medications  amLODIPine   Tablet 10 milliGRAM(s) Oral daily  aspirin enteric coated 81 milliGRAM(s) Oral daily  atorvastatin 40 milliGRAM(s) Oral at bedtime  buMETAnide 2 milliGRAM(s) Oral daily  carvedilol 12.5 milliGRAM(s) Oral every 12 hours  cholecalciferol 1000 Unit(s) Oral daily  dextrose 5%. 1000 milliLiter(s) IV Continuous <Continuous>  dextrose 5%. 1000 milliLiter(s) IV Continuous <Continuous>  dextrose 50% Injectable 25 Gram(s) IV Push once  dextrose 50% Injectable 12.5 Gram(s) IV Push once  dextrose 50% Injectable 25 Gram(s) IV Push once  glucagon  Injectable 1 milliGRAM(s) IntraMuscular once  heparin   Injectable 5000 Unit(s) SubCutaneous every 8 hours  hydrALAZINE 100 milliGRAM(s) Oral three times a day  insulin lispro (ADMELOG) corrective regimen sliding scale   SubCutaneous at bedtime  insulin lispro (ADMELOG) corrective regimen sliding scale   SubCutaneous three times a day before meals  iron sucrose IVPB 200 milliGRAM(s) IV Intermittent every 24 hours  sevelamer carbonate 800 milliGRAM(s) Oral three times a day with meals    PRN Inpatient Medications  acetaminophen     Tablet .. 650 milliGRAM(s) Oral every 6 hours PRN  aluminum hydroxide/magnesium hydroxide/simethicone Suspension 30 milliLiter(s) Oral every 4 hours PRN  dextrose Oral Gel 15 Gram(s) Oral once PRN  melatonin 3 milliGRAM(s) Oral at bedtime PRN  ondansetron Injectable 4 milliGRAM(s) IV Push every 8 hours PRN      REVIEW OF SYSTEMS  --------------------------------------------------------------------------------  as above    VITALS/PHYSICAL EXAM  --------------------------------------------------------------------------------  T(C): 36.4 (11-02-23 @ 13:00), Max: 36.7 (11-01-23 @ 22:20)  HR: 67 (11-02-23 @ 13:00) (60 - 72)  BP: 115/57 (11-02-23 @ 13:00) (115/57 - 150/67)  RR: 18 (11-02-23 @ 13:00) (16 - 18)  SpO2: 100% (11-02-23 @ 13:00) (98% - 100%)  Wt(kg): --    11-02-23 @ 07:01  -  11-02-23 @ 13:33  --------------------------------------------------------  IN: 0 mL / OUT: 900 mL / NET: -900 mL      Physical Exam:  General: no acute distress  Neuro: no focal deficits  HEENT: anicteric, no JVD  Pulmonary: lungs CTA B/L  Cardiovascular/Chest: +S1S2, RRR  GI/Abdomen: soft, non-distended, non-tender, +bowel sounds  Extremities: no LE pitting edema  Skin: Warm and dry      LABS/STUDIES  --------------------------------------------------------------------------------              7.3    3.67  >-----------<  200      [11-02-23 @ 06:41]              24.2     138  |  105  |  105  ----------------------------<  105      [11-02-23 @ 06:41]  4.7   |  20  |  4.61        Ca     8.6     [11-02-23 @ 06:41]      Mg     1.90     [11-02-23 @ 06:41]      Phos  5.4     [11-02-23 @ 06:41]            Creatinine Trend:  SCr 4.61 [11-02 @ 06:41]  SCr 4.66 [11-01 @ 06:40]  SCr 4.75 [10-31 @ 07:34]  SCr 5.14 [10-30 @ 05:55]  SCr 4.74 [10-29 @ 08:24]    Urinalysis - [11-02-23 @ 06:41]      Color  / Appearance  / SG  / pH       Gluc 105 / Ketone   / Bili  / Urobili        Blood  / Protein  / Leuk Est  / Nitrite       RBC  / WBC  / Hyaline  / Gran  / Sq Epi  / Non Sq Epi  / Bacteria     Urine Creatinine 90      [10-28-23 @ 10:15]  Urine Protein 257      [10-29-23 @ 15:12]  Urine Sodium 65      [10-28-23 @ 10:15]  Urine Urea Nitrogen 577.4      [10-28-23 @ 10:15]  Urine Potassium 48.2      [10-28-23 @ 10:15]    Iron 26, TIBC 180, %sat 14      [10-28-23 @ 07:20]  Ferritin 140      [10-28-23 @ 07:20]  PTH -- (Ca --)      [10-30-23 @ 05:55]   156  Vitamin D (25OH) 13.3      [10-30-23 @ 05:55]  TSH 2.55      [10-26-23 @ 18:30]  Lipid: chol 179, , HDL 48, LDL --      [10-27-23 @ 04:50]    HBsAg Nonreact      [10-29-23 @ 08:24]  HCV 0.11, Nonreact      [10-29-23 @ 08:24]  HIV Nonreact      [10-29-23 @ 08:24]    MINOO: titer Negative, pattern --      [10-29-23 @ 08:24]  dsDNA <12      [10-29-23 @ 08:24]  C3 Complement 132      [10-29-23 @ 08:24]  C4 Complement 28      [10-29-23 @ 08:24]  ANCA: cANCA Negative, pANCA Negative, atypical ANCA Negative      [10-29-23 @ 08:24]  Free Light Chains: kappa 19.44, lambda 8.71, ratio = 2.23      [10-29 @ 08:24]  Immunofixation Serum:   No Monoclonal Band Identified. NAVEED Rosenberg M.D.  Reference Range: None Detected      [10-27-23 @ 04:50]  SPEP Interpretation: Decreased serum Albumin and increased Polyclonal Gamma fraction  consistent with chronic inflammatory condition.  NAVEED Rosenberg M.D.      [10-29-23 @ 08:24]  UPEP Interpretation: Mild Selective Proteinuria. No Monoclonal band seen.  NAVEED Rosenberg M.D.      [10-29-23 @ 15:12]

## 2023-11-02 NOTE — CONSULT NOTE ADULT - SUBJECTIVE AND OBJECTIVE BOX
HPI:    69 yo F w/ HTN, HLD, sarcoidosis, HFpEF, hx PEA x2, b/l sensorineural hearing, sarcoidosis, and HTN presented to the ED with low Hgb. Patient is very hard of hearing but AAOX3 and was able to provide most of the hx. Patient reports that she was scheduled to undergo cochlear implant next week and as a result, she came to the outpatient preop testing center for lab work. When she went home, she received a call telling her to come to the ED for low Hgb. Currently, pt has no breathing complaints.     Denies chest pain. Spoke with daughter over phone as well. Upon further chart review patient with PEA x2 in  with multiple MICU admissions requiring intubation in  due to AMS and respiratory distress.    Patient with plan for AV fistula. Team requested pulmonary clearance. patient states she uses her avaps every night and has been tolerating it quite well.         PAST MEDICAL & SURGICAL HISTORY:  Type 2 diabetes mellitus      Bilateral cataracts      Fluid in pleural cavity associated with pancreatitis      Pancreatic pseudocyst/cyst  s/p drain placement and removal      HTN (hypertension)      HLD (hyperlipidemia)      H/O sarcoidosis      PVD (peripheral vascular disease)      Sensorineural hearing loss, bilateral      H/O CHF      Chronic kidney disease (CKD)      Anemia      History of amputation of right great toe        H/O:  section        History of laparoscopic cholecystectomy          FAMILY HISTORY:  FH: hypertension (Mother)        SOCIAL HISTORY:  Smoking: none  EtOH Use:  Marital Status:  Occupation:  Exposures:  Recent Travel:    Allergies    penicillin (Red Man Synd)    Intolerances        HOME MEDICATIONS:    REVIEW OF SYSTEMS:  Constitutional: No fevers or chills. No weight loss. No fatigue or generalised malaise.  Eyes: No itching or discharge from the eyes  ENT: No ear pain. No ear discharge. No nasal congestion. No post nasal drip. No epistaxis. No throat pain. No sore throat. No difficulty swallowing.   CV: No chest pain. No palpitations. No lightheadedness or dizziness.   Resp: No dyspnea at rest. No dyspnea on exertion. No orthopnea. No wheezing. No cough. No stridor. No sputum production. No chest pain with respiration.      [ ] All other systems negative  [ ] Unable to assess ROS because ________    OBJECTIVE:  ICU Vital Signs Last 24 Hrs  T(C): 36.3 (2023 11:20), Max: 36.7 (2023 22:20)  T(F): 97.3 (2023 11:20), Max: 98.1 (2023 22:20)  HR: 70 (:20) (60 - 72)  BP: 118/54 (2023 11:20) (118/54 - 150/67)  BP(mean): --  ABP: --  ABP(mean): --  RR: 18 (2023 11:20) (16 - 18)  SpO2: 100% (2023 11:20) (98% - 100%)    O2 Parameters below as of 2023 11:20  Patient On (Oxygen Delivery Method): room air          Mode: NIV (Noninvasive Ventilation), RR (machine): 15, TV (machine): 400, FiO2: 30, PEEP: 8, MAP: 15, P-High: 30, P-Low: 10     @ 07:01  -   @ 13:07  --------------------------------------------------------  IN: 0 mL / OUT: 900 mL / NET: -900 mL      CAPILLARY BLOOD GLUCOSE      POCT Blood Glucose.: 193 mg/dL (2023 12:24)      PHYSICAL EXAM:  General: Awake, alert, oriented X 3.   HEENT: Atraumatic, normocephalic.                  No tonsillar or pharyngeal exudates.  Lymph Nodes: No palpable lymphadenopathy  Neck: No JVD. No carotid bruit.   Respiratory: Normal chest expansion                         Normal percussion                         Normal and equal air entry                         No wheeze, rhonchi or rales.  Cardiovascular: S1 S2 normal. No murmurs, rubs or gallops.   Abdomen: Soft, non-tender, non-distended. No organomegaly.      HOSPITAL MEDICATIONS:  MEDICATIONS  (STANDING):  amLODIPine   Tablet 10 milliGRAM(s) Oral daily  aspirin enteric coated 81 milliGRAM(s) Oral daily  atorvastatin 40 milliGRAM(s) Oral at bedtime  buMETAnide 2 milliGRAM(s) Oral daily  carvedilol 12.5 milliGRAM(s) Oral every 12 hours  cholecalciferol 1000 Unit(s) Oral daily  dextrose 5%. 1000 milliLiter(s) (50 mL/Hr) IV Continuous <Continuous>  dextrose 5%. 1000 milliLiter(s) (100 mL/Hr) IV Continuous <Continuous>  dextrose 50% Injectable 12.5 Gram(s) IV Push once  dextrose 50% Injectable 25 Gram(s) IV Push once  dextrose 50% Injectable 25 Gram(s) IV Push once  glucagon  Injectable 1 milliGRAM(s) IntraMuscular once  heparin   Injectable 5000 Unit(s) SubCutaneous every 8 hours  hydrALAZINE 100 milliGRAM(s) Oral three times a day  insulin lispro (ADMELOG) corrective regimen sliding scale   SubCutaneous at bedtime  insulin lispro (ADMELOG) corrective regimen sliding scale   SubCutaneous three times a day before meals  iron sucrose IVPB 200 milliGRAM(s) IV Intermittent every 24 hours  sevelamer carbonate 800 milliGRAM(s) Oral three times a day with meals    MEDICATIONS  (PRN):  acetaminophen     Tablet .. 650 milliGRAM(s) Oral every 6 hours PRN Temp greater or equal to 38C (100.4F), Mild Pain (1 - 3)  aluminum hydroxide/magnesium hydroxide/simethicone Suspension 30 milliLiter(s) Oral every 4 hours PRN Dyspepsia  dextrose Oral Gel 15 Gram(s) Oral once PRN Blood Glucose LESS THAN 70 milliGRAM(s)/deciliter  melatonin 3 milliGRAM(s) Oral at bedtime PRN Insomnia  ondansetron Injectable 4 milliGRAM(s) IV Push every 8 hours PRN Nausea and/or Vomiting      LABS:                        7.3    3.67  )-----------( 200      ( 2023 06:41 )             24.2     11-    138  |  105  |  105<H>  ----------------------------<  105<H>  4.7   |  20<L>  |  4.61<H>    Ca    8.6      2023 06:41  Phos  5.4       Mg     1.90             Urinalysis Basic - ( 2023 06:41 )    Color: x / Appearance: x / SG: x / pH: x  Gluc: 105 mg/dL / Ketone: x  / Bili: x / Urobili: x   Blood: x / Protein: x / Nitrite: x   Leuk Esterase: x / RBC: x / WBC x   Sq Epi: x / Non Sq Epi: x / Bacteria: x            MICROBIOLOGY:     RADIOLOGY:  [ ] Reviewed and interpreted by me    Point of Care Ultrasound Findings;    PFT:    EKG:

## 2023-11-02 NOTE — DIETITIAN INITIAL EVALUATION ADULT - ORAL INTAKE PTA/DIET HISTORY
Patient has very hard of hearing, communicated with patient in writing. Patient reports usual body weight of 152lbs, denies any weight changes prior to hospitalization. Patient has no known food allergies.

## 2023-11-02 NOTE — DIETITIAN INITIAL EVALUATION ADULT - PERTINENT LABORATORY DATA
11-02    138  |  105  |  105<H>  ----------------------------<  105<H>  4.7   |  20<L>  |  4.61<H>    Ca    8.6      02 Nov 2023 06:41  Phos  5.4     11-02  Mg     1.90     11-02    POCT Blood Glucose.: 162 mg/dL (11-02-23 @ 17:19)  A1C with Estimated Average Glucose Result: 6.3 % (10-27-23 @ 04:50)  A1C with Estimated Average Glucose Result: 6.5 % (10-26-23 @ 12:00)  A1C with Estimated Average Glucose Result: 5.1 % (11-19-22 @ 04:02)

## 2023-11-02 NOTE — DIETITIAN INITIAL EVALUATION ADULT - OTHER INFO
66 yo F w/ HTN, HLD, sarcoidosis, HFpEF, hx PEA x2, b/l sensorineural hearing, sarcoidosis, and HTN presents to the ED with low Hgb, most likely AOCD, per chart.     Patient reports good appetite. As per RN flow sheet, patient is consuming % of meals. As per RN, no recent episodes of any difficulty chewing or swallowing, any GI distress (N/V/D/C) noted at this time. No bowel movement documented on RN flow sheet at his time. Continue to monitor bowel movement and consider adding bowel regimen PRN. Menu ordering system discussed with patient, food preferences obtained during visit. Current weight: 69.4kg (10/27, per RN flow sheet). Compared to reported usual body weight 152lbs, patient's weight is relatively stable. Patient is on cholecalciferol, for micronutrient support. Noted elevated phos- 5.4 (11/2), on phos binder. Last HgA1c- 6.3%, POCT- 127-175mg/dL(last 24 hrs). Patient is on renal and consistent carb diet restrictions.

## 2023-11-02 NOTE — PROGRESS NOTE ADULT - ASSESSMENT
68 yo F w/ HTN, HLD, sarcoidosis, HFpEF, hx PEA x2, b/l sensorineural hearing, sarcoidosis, and HTN presents to the ED with low Hgb. Cardiology consulted for pre-op eval prior to AVF      EKG: NSR RBBB  Tele: NSR 60s       1. Pre-op assessment  -denies chest pain or SOB  -EKG NSR RBBB  -TTE 10/2023 normal LV function  -Upon further chart review patient with PEA x2 in 2022 with multiple MICU admissions requiring intubation in 2022 due to AMS and respiratory distress. Bronchoscopy 8/31/2022 showed a L sided mucus plug and also has been concern for aspiration. given this and hx of sarcoidosis pulm eval appreciated  -optimized from cardiac standpoint for AVF creation, moderate risk for MACE    2. CHF  -patient with hx of PEAx 2 2022   -TTE in 7/15/2022 with moderate global LV dysfunction. denies ischemic eval. this was likely due to patient's PEA arrest as repeat TTE 7/21/2022 with normal LV function     3. MANDY on CKD  -will need eventual HD planned for AVF creation   -f/u renal

## 2023-11-02 NOTE — DIETITIAN INITIAL EVALUATION ADULT - PERTINENT MEDS FT
MEDICATIONS  (STANDING):  amLODIPine   Tablet 10 milliGRAM(s) Oral daily  aspirin enteric coated 81 milliGRAM(s) Oral daily  atorvastatin 40 milliGRAM(s) Oral at bedtime  buMETAnide 2 milliGRAM(s) Oral daily  carvedilol 12.5 milliGRAM(s) Oral every 12 hours  cholecalciferol 1000 Unit(s) Oral daily  dextrose 5%. 1000 milliLiter(s) (50 mL/Hr) IV Continuous <Continuous>  dextrose 5%. 1000 milliLiter(s) (100 mL/Hr) IV Continuous <Continuous>  dextrose 50% Injectable 25 Gram(s) IV Push once  dextrose 50% Injectable 12.5 Gram(s) IV Push once  dextrose 50% Injectable 25 Gram(s) IV Push once  glucagon  Injectable 1 milliGRAM(s) IntraMuscular once  heparin   Injectable 5000 Unit(s) SubCutaneous every 8 hours  hydrALAZINE 100 milliGRAM(s) Oral three times a day  insulin lispro (ADMELOG) corrective regimen sliding scale   SubCutaneous at bedtime  insulin lispro (ADMELOG) corrective regimen sliding scale   SubCutaneous three times a day before meals  iron sucrose IVPB 200 milliGRAM(s) IV Intermittent every 24 hours  sevelamer carbonate 800 milliGRAM(s) Oral three times a day with meals    MEDICATIONS  (PRN):  acetaminophen     Tablet .. 650 milliGRAM(s) Oral every 6 hours PRN Temp greater or equal to 38C (100.4F), Mild Pain (1 - 3)  aluminum hydroxide/magnesium hydroxide/simethicone Suspension 30 milliLiter(s) Oral every 4 hours PRN Dyspepsia  dextrose Oral Gel 15 Gram(s) Oral once PRN Blood Glucose LESS THAN 70 milliGRAM(s)/deciliter  melatonin 3 milliGRAM(s) Oral at bedtime PRN Insomnia  ondansetron Injectable 4 milliGRAM(s) IV Push every 8 hours PRN Nausea and/or Vomiting

## 2023-11-02 NOTE — PROGRESS NOTE ADULT - SUBJECTIVE AND OBJECTIVE BOX
Merlin Mathew, MD   Hospitalist  Pager #[phone]    PROGRESS NOTE:     Patient is a 68y old  Female who presents with a chief complaint of Low Hgb (02 Nov 2023 13:33)      SUBJECTIVE / OVERNIGHT EVENTS:  NEON   Patient feels fine, no complaints   Denies any SOB   Intermittently adherent to AVAPS therapy   Has some neck pain 2/2 sleeping position   ADDITIONAL REVIEW OF SYSTEMS:    MEDICATIONS  (STANDING):  amLODIPine   Tablet 10 milliGRAM(s) Oral daily  aspirin enteric coated 81 milliGRAM(s) Oral daily  atorvastatin 40 milliGRAM(s) Oral at bedtime  buMETAnide 2 milliGRAM(s) Oral daily  carvedilol 12.5 milliGRAM(s) Oral every 12 hours  cholecalciferol 1000 Unit(s) Oral daily  dextrose 5%. 1000 milliLiter(s) (100 mL/Hr) IV Continuous <Continuous>  dextrose 5%. 1000 milliLiter(s) (50 mL/Hr) IV Continuous <Continuous>  dextrose 50% Injectable 25 Gram(s) IV Push once  dextrose 50% Injectable 25 Gram(s) IV Push once  dextrose 50% Injectable 12.5 Gram(s) IV Push once  glucagon  Injectable 1 milliGRAM(s) IntraMuscular once  heparin   Injectable 5000 Unit(s) SubCutaneous every 8 hours  hydrALAZINE 100 milliGRAM(s) Oral three times a day  insulin lispro (ADMELOG) corrective regimen sliding scale   SubCutaneous three times a day before meals  insulin lispro (ADMELOG) corrective regimen sliding scale   SubCutaneous at bedtime  iron sucrose IVPB 200 milliGRAM(s) IV Intermittent every 24 hours  sevelamer carbonate 800 milliGRAM(s) Oral three times a day with meals    MEDICATIONS  (PRN):  acetaminophen     Tablet .. 650 milliGRAM(s) Oral every 6 hours PRN Temp greater or equal to 38C (100.4F), Mild Pain (1 - 3)  aluminum hydroxide/magnesium hydroxide/simethicone Suspension 30 milliLiter(s) Oral every 4 hours PRN Dyspepsia  dextrose Oral Gel 15 Gram(s) Oral once PRN Blood Glucose LESS THAN 70 milliGRAM(s)/deciliter  melatonin 3 milliGRAM(s) Oral at bedtime PRN Insomnia  ondansetron Injectable 4 milliGRAM(s) IV Push every 8 hours PRN Nausea and/or Vomiting      CAPILLARY BLOOD GLUCOSE      POCT Blood Glucose.: 193 mg/dL (02 Nov 2023 12:24)  POCT Blood Glucose.: 127 mg/dL (02 Nov 2023 08:39)  POCT Blood Glucose.: 129 mg/dL (01 Nov 2023 22:29)  POCT Blood Glucose.: 155 mg/dL (01 Nov 2023 17:38)    I&O's Summary    02 Nov 2023 07:01  -  02 Nov 2023 13:51  --------------------------------------------------------  IN: 0 mL / OUT: 900 mL / NET: -900 mL        PHYSICAL EXAM:  Vital Signs Last 24 Hrs  T(C): 36.4 (02 Nov 2023 13:00), Max: 36.7 (01 Nov 2023 22:20)  T(F): 97.5 (02 Nov 2023 13:00), Max: 98.1 (01 Nov 2023 22:20)  HR: 67 (02 Nov 2023 13:00) (60 - 72)  BP: 115/57 (02 Nov 2023 13:00) (115/57 - 150/67)  BP(mean): --  RR: 18 (02 Nov 2023 13:00) (16 - 18)  SpO2: 100% (02 Nov 2023 13:00) (98% - 100%)    Parameters below as of 02 Nov 2023 13:00  Patient On (Oxygen Delivery Method): room air        CONSTITUTIONAL: NAD, resting comfortably   HEENT: Full ROM, TTP over sternocleidomastoid muscle   RESPIRATORY: Normal respiratory effort; lungs are clear to auscultation bilaterally  CARDIOVASCULAR: Regular rate and rhythm, normal S1 and S2, no murmur/rub/gallop; No LE edema   ABDOMEN: Nontender to palpation, normoactive bowel sounds, no rebound/guarding; No hepatosplenomegaly  MUSCLOSKELETAL: no clubbing or cyanosis of digits; no joint swelling or tenderness to palpation  PSYCH: A+O to person, place, and time; affect appropriate; VERY [place]     LABS:                        7.3    3.67  )-----------( 200      ( 02 Nov 2023 06:41 )             24.2     11-02    138  |  105  |  105<H>  ----------------------------<  105<H>  4.7   |  20<L>  |  4.61<H>    Ca    8.6      02 Nov 2023 06:41  Phos  5.4     11-02  Mg     1.90     11-02            Urinalysis Basic - ( 02 Nov 2023 06:41 )    Color: x / Appearance: x / SG: x / pH: x  Gluc: 105 mg/dL / Ketone: x  / Bili: x / Urobili: x   Blood: x / Protein: x / Nitrite: x   Leuk Esterase: x / RBC: x / WBC x   Sq Epi: x / Non Sq Epi: x / Bacteria: x          RADIOLOGY & ADDITIONAL TESTS:  Results Reviewed:   Imaging Personally Reviewed:  Electrocardiogram Personally Reviewed:    COORDINATION OF CARE:  Care Discussed with Consultants/Other Providers [Y/N]:  Prior or Outpatient Records Reviewed [Y/N]:

## 2023-11-02 NOTE — DIETITIAN INITIAL EVALUATION ADULT - NS FNS DIET ORDER
Diet, NPO after Midnight:      NPO Start Date: 02-Nov-2023,   NPO Start Time: 23:59 (11-02-23 @ 14:28) [Active]  Diet, Renal Restrictions:   For patients receiving Renal Replacement - No Protein Restr, No Conc K, No Conc Phos, Low Sodium  Consistent Carbohydrate {No Snacks} (CSTCHO)  1200mL Fluid Restriction ([id number]) (10-28-23 @ 13:08) [Active]

## 2023-11-02 NOTE — CONSULT NOTE ADULT - ASSESSMENT
68F with MANDY on CKD, needs AVF for long term HD planning. Right arm dominant, no implanted devices. Palpable radial and ulnar pulses. Memo's test normal.    Plan:  - left arm precautions, no IV/blood draws  - bilateral UE vein mapping  - medical/cardiology clearance for AVF creation  - Patient still deciding on HD at this time, spoke to patient regarding AVF, she wants to talk to her daughter first before making final decision regarding HD    Vascular  [id number]
68FF with PMHx sarcoidosis, right heart failure, diabetes, hypertension, hyperlipidemia, iron deficiency anemia, pancreatic pseudocyst s/p drainage, multiple admissions over the past several months including cardiac arrest x 2 admitted with anemia of chornic diseae. Pulmonayr team consulted for pulmonary optimization for AVF surgery     #Chronic Hypercapneic respiratory failure:  - patient with underlying sarcoid and OHS with chronic hypercapnea   -  patient with improvement in CO2 with AVAPS     Recommendations:  - c/w AVAPS at current setting qhs and when sleeping  - patient will require AVAPS for home--> she has hypercapneic respiratory failure unable to be corrected on BiPAP -- follows with Dr. Quick  - In regards to surgery - she is at room air and has no pulmonary complaints. She is medically optimized. She should be extubated to AVAPs post anesthesia
66 yo F w/ HTN, HLD, sarcoidosis, HFpEF, hx PEA x2, b/l sensorineural hearing, sarcoidosis, and HTN presents to the ED with low Hgb. Cardiology consulted for pre-op eval prior to AVF      EKG: NSR RBBB  Tele: NSR 60s       1. Pre-op assessment  -denies chest pain or SOB  -EKG NSR RBBB  -TTE 10/2023 normal LV function  -Upon further chart review patient with PEA x2 in 2022 with multiple MICU admissions requiring intubation in 2022 due to AMS and respiratory distress. Bronchoscopy 8/31/2022 showed a L sided mucus plug and also has been concern for aspiration. given this and hx of sarcoidosis recommend pulm eval   -optimized from cardiac standpoint for AVF creation, moderate risk for MACE    2. CHF  -patient with hx of PEAx 2 2022   -TTE in 7/15/2022 with moderate global LV dysfunction. denies ischemic eval. this was likely due to patient's PEA arrest as repeat TTE 7/21/2022 with normal LV function     3. MANDY on CKD  -will need eventual HD planned for AVF creation   -f/u renal   
Pt with MANDY on CKD

## 2023-11-02 NOTE — PROGRESS NOTE ADULT - ASSESSMENT
68F with MANDY on CKD, needs AVF for long term HD planning. Right arm dominant, no implanted devices. Palpable radial and ulnar pulses. Memo's test normal.    Plan:  - OR TOMORROW FOR AVF CREATION   - Pre-op (NPO @ midnight, AM labs)   - Left arm precautions, no IV/blood draws  - Pending vein mapping  - Cleared by medical and cardiology   - Consent in chart (consent obtained from daughter d/t patient request)     Vascular, [id number]

## 2023-11-02 NOTE — PROGRESS NOTE ADULT - SUBJECTIVE AND OBJECTIVE BOX
Surgery Progress Note    SUBJECTIVE: Pt seen and examined at bedside.     Vital Signs Last 24 Hrs  T(C): 36.4 (02 Nov 2023 13:00), Max: 36.7 (01 Nov 2023 22:20)  T(F): 97.5 (02 Nov 2023 13:00), Max: 98.1 (01 Nov 2023 22:20)  HR: 67 (02 Nov 2023 13:00) (60 - 72)  BP: 115/57 (02 Nov 2023 13:00) (115/57 - 150/67)  BP(mean): --  RR: 18 (02 Nov 2023 13:00) (16 - 18)  SpO2: 100% (02 Nov 2023 13:00) (98% - 100%)    Parameters below as of 02 Nov 2023 13:00  Patient On (Oxygen Delivery Method): room air        Physical Exam:  General Appearance: Appears well, NAD  Respiratory: No labored breathing  CV: Pulse regularly present  Extremities: LUE warm, well perfused, protected       LABS:                        7.3    3.67  )-----------( 200      ( 02 Nov 2023 06:41 )             24.2     11-02    138  |  105  |  105<H>  ----------------------------<  105<H>  4.7   |  20<L>  |  4.61<H>    Ca    8.6      02 Nov 2023 06:41  Phos  5.4     11-02  Mg     1.90     11-02        Urinalysis Basic - ( 02 Nov 2023 06:41 )    Color: x / Appearance: x / SG: x / pH: x  Gluc: 105 mg/dL / Ketone: x  / Bili: x / Urobili: x   Blood: x / Protein: x / Nitrite: x   Leuk Esterase: x / RBC: x / WBC x   Sq Epi: x / Non Sq Epi: x / Bacteria: x        INs and OUTs:    11-02-23 @ 07:01  -  11-02-23 @ 14:21  --------------------------------------------------------  IN: 200 mL / OUT: 900 mL / NET: -700 mL

## 2023-11-02 NOTE — PROGRESS NOTE ADULT - PROBLEM SELECTOR PLAN 3
Patient with hx of HFpEF   - CXR shows cardiomegaly with vascular congestion   - Euvolemic on exam   - Cont Bumex

## 2023-11-02 NOTE — DIETITIAN INITIAL EVALUATION ADULT - ADD RECOMMEND
1. Monitor bowel movement and consider adding bowel regimen PRN.   2. Monitor weight, labs, po intake, bowel movement, skin integrity, labs.   3. Encourage PO intake and honor food preferences as able.

## 2023-11-02 NOTE — PROGRESS NOTE ADULT - PROBLEM SELECTOR PLAN 1
Non-oliguric MANDY on CKD in the setting of severe anemia and uncontrolled hypertension. On review of labs on [facility], last outpatient Scr was elevated at 4.13 on 10/26/23. Prior to this, the last Scr was 2.16 on 12/6/22 during previous hospitalization at [facility] from 11/17/22 to 12/9/22 for low Hgb. Pt did not follow up with a Nephrologist after previous hospitalization. UA with 300mg of protein. UPCR is elevated at 8.9g->6.3g. Kidney US on 10/26/23 shows increased renal echogenicity with no evidence of hydronephrosis.    Plan:  -Remains non oliguric.  -Continue to record STRICT Is+Os. Pt likely with progressive diabetic kidney disease as she reports diabetic retinopathy and PN, as well as significant FH of kidney disease.   -Serologic w/u in process (labs thus far reviewed, continue to follow)   -c/w home Bumex  -Pt and family now agreeable to AVF placement/long term HD planning but no urgent indication for HD at this time.   -MBD: phos and PTH elevated. Vit D 13. Continue sevelamer. Recommend Vit D supplementation.   -Monitor labs and urine output. Avoid nephrotoxins. Dose medications per eGFR. Renal diet.

## 2023-11-02 NOTE — PROGRESS NOTE ADULT - PROBLEM SELECTOR PLAN 1
Patient with acute on chronic anemia; normocytic   -occult blood feces is negative. Denies any other source of bleeding   - Cont IV iron   - s/p 1 unit of pRBCs in the ED

## 2023-11-03 ENCOUNTER — APPOINTMENT (OUTPATIENT)
Age: 68
End: 2023-11-03

## 2023-11-03 ENCOUNTER — TRANSCRIPTION ENCOUNTER (OUTPATIENT)
Age: 68
End: 2023-11-03

## 2023-11-03 DIAGNOSIS — N18.6 END STAGE RENAL DISEASE: ICD-10-CM

## 2023-11-03 LAB
ANION GAP SERPL CALC-SCNC: 13 MMOL/L — SIGNIFICANT CHANGE UP (ref 7–14)
ANION GAP SERPL CALC-SCNC: 13 MMOL/L — SIGNIFICANT CHANGE UP (ref 7–14)
APTT BLD: 41.2 SEC — HIGH (ref 24.5–35.6)
APTT BLD: 41.2 SEC — HIGH (ref 24.5–35.6)
BASOPHILS # BLD AUTO: 0.02 K/UL — SIGNIFICANT CHANGE UP (ref 0–0.2)
BASOPHILS # BLD AUTO: 0.02 K/UL — SIGNIFICANT CHANGE UP (ref 0–0.2)
BASOPHILS NFR BLD AUTO: 0.5 % — SIGNIFICANT CHANGE UP (ref 0–2)
BASOPHILS NFR BLD AUTO: 0.5 % — SIGNIFICANT CHANGE UP (ref 0–2)
BLD GP AB SCN SERPL QL: NEGATIVE — SIGNIFICANT CHANGE UP
BLD GP AB SCN SERPL QL: NEGATIVE — SIGNIFICANT CHANGE UP
BUN SERPL-MCNC: 106 MG/DL — HIGH (ref 7–23)
BUN SERPL-MCNC: 106 MG/DL — HIGH (ref 7–23)
CALCIUM SERPL-MCNC: 8.8 MG/DL — SIGNIFICANT CHANGE UP (ref 8.4–10.5)
CALCIUM SERPL-MCNC: 8.8 MG/DL — SIGNIFICANT CHANGE UP (ref 8.4–10.5)
CHLORIDE SERPL-SCNC: 106 MMOL/L — SIGNIFICANT CHANGE UP (ref 98–107)
CHLORIDE SERPL-SCNC: 106 MMOL/L — SIGNIFICANT CHANGE UP (ref 98–107)
CO2 SERPL-SCNC: 20 MMOL/L — LOW (ref 22–31)
CO2 SERPL-SCNC: 20 MMOL/L — LOW (ref 22–31)
CREAT SERPL-MCNC: 4.55 MG/DL — HIGH (ref 0.5–1.3)
CREAT SERPL-MCNC: 4.55 MG/DL — HIGH (ref 0.5–1.3)
EGFR: 10 ML/MIN/1.73M2 — LOW
EGFR: 10 ML/MIN/1.73M2 — LOW
EOSINOPHIL # BLD AUTO: 0.24 K/UL — SIGNIFICANT CHANGE UP (ref 0–0.5)
EOSINOPHIL # BLD AUTO: 0.24 K/UL — SIGNIFICANT CHANGE UP (ref 0–0.5)
EOSINOPHIL NFR BLD AUTO: 6.3 % — HIGH (ref 0–6)
EOSINOPHIL NFR BLD AUTO: 6.3 % — HIGH (ref 0–6)
GLUCOSE BLDC GLUCOMTR-MCNC: 118 MG/DL — HIGH (ref 70–99)
GLUCOSE BLDC GLUCOMTR-MCNC: 118 MG/DL — HIGH (ref 70–99)
GLUCOSE BLDC GLUCOMTR-MCNC: 120 MG/DL — HIGH (ref 70–99)
GLUCOSE BLDC GLUCOMTR-MCNC: 120 MG/DL — HIGH (ref 70–99)
GLUCOSE BLDC GLUCOMTR-MCNC: 145 MG/DL — HIGH (ref 70–99)
GLUCOSE BLDC GLUCOMTR-MCNC: 145 MG/DL — HIGH (ref 70–99)
GLUCOSE SERPL-MCNC: 110 MG/DL — HIGH (ref 70–99)
GLUCOSE SERPL-MCNC: 110 MG/DL — HIGH (ref 70–99)
HCT VFR BLD CALC: 24.6 % — LOW (ref 34.5–45)
HCT VFR BLD CALC: 24.6 % — LOW (ref 34.5–45)
HGB BLD-MCNC: 7.4 G/DL — LOW (ref 11.5–15.5)
HGB BLD-MCNC: 7.4 G/DL — LOW (ref 11.5–15.5)
IANC: 2.24 K/UL — SIGNIFICANT CHANGE UP (ref 1.8–7.4)
IANC: 2.24 K/UL — SIGNIFICANT CHANGE UP (ref 1.8–7.4)
IMM GRANULOCYTES NFR BLD AUTO: 1.6 % — HIGH (ref 0–0.9)
IMM GRANULOCYTES NFR BLD AUTO: 1.6 % — HIGH (ref 0–0.9)
INR BLD: 0.95 RATIO — SIGNIFICANT CHANGE UP (ref 0.85–1.18)
INR BLD: 0.95 RATIO — SIGNIFICANT CHANGE UP (ref 0.85–1.18)
INTERPRETATION 24H UR IFE-IMP: SIGNIFICANT CHANGE UP
LYMPHOCYTES # BLD AUTO: 0.89 K/UL — LOW (ref 1–3.3)
LYMPHOCYTES # BLD AUTO: 0.89 K/UL — LOW (ref 1–3.3)
LYMPHOCYTES # BLD AUTO: 23.4 % — SIGNIFICANT CHANGE UP (ref 13–44)
LYMPHOCYTES # BLD AUTO: 23.4 % — SIGNIFICANT CHANGE UP (ref 13–44)
MAGNESIUM SERPL-MCNC: 1.9 MG/DL — SIGNIFICANT CHANGE UP (ref 1.6–2.6)
MAGNESIUM SERPL-MCNC: 1.9 MG/DL — SIGNIFICANT CHANGE UP (ref 1.6–2.6)
MCHC RBC-ENTMCNC: 26 PG — LOW (ref 27–34)
MCHC RBC-ENTMCNC: 26 PG — LOW (ref 27–34)
MCHC RBC-ENTMCNC: 30.1 GM/DL — LOW (ref 32–36)
MCHC RBC-ENTMCNC: 30.1 GM/DL — LOW (ref 32–36)
MCV RBC AUTO: 86.3 FL — SIGNIFICANT CHANGE UP (ref 80–100)
MCV RBC AUTO: 86.3 FL — SIGNIFICANT CHANGE UP (ref 80–100)
MONOCYTES # BLD AUTO: 0.36 K/UL — SIGNIFICANT CHANGE UP (ref 0–0.9)
MONOCYTES # BLD AUTO: 0.36 K/UL — SIGNIFICANT CHANGE UP (ref 0–0.9)
MONOCYTES NFR BLD AUTO: 9.4 % — SIGNIFICANT CHANGE UP (ref 2–14)
MONOCYTES NFR BLD AUTO: 9.4 % — SIGNIFICANT CHANGE UP (ref 2–14)
NEUTROPHILS # BLD AUTO: 2.24 K/UL — SIGNIFICANT CHANGE UP (ref 1.8–7.4)
NEUTROPHILS # BLD AUTO: 2.24 K/UL — SIGNIFICANT CHANGE UP (ref 1.8–7.4)
NEUTROPHILS NFR BLD AUTO: 58.8 % — SIGNIFICANT CHANGE UP (ref 43–77)
NEUTROPHILS NFR BLD AUTO: 58.8 % — SIGNIFICANT CHANGE UP (ref 43–77)
NRBC # BLD: 0 /100 WBCS — SIGNIFICANT CHANGE UP (ref 0–0)
NRBC # BLD: 0 /100 WBCS — SIGNIFICANT CHANGE UP (ref 0–0)
NRBC # FLD: 0 K/UL — SIGNIFICANT CHANGE UP (ref 0–0)
NRBC # FLD: 0 K/UL — SIGNIFICANT CHANGE UP (ref 0–0)
PHOSPHATE SERPL-MCNC: 5.5 MG/DL — HIGH (ref 2.5–4.5)
PHOSPHATE SERPL-MCNC: 5.5 MG/DL — HIGH (ref 2.5–4.5)
PLATELET # BLD AUTO: 215 K/UL — SIGNIFICANT CHANGE UP (ref 150–400)
PLATELET # BLD AUTO: 215 K/UL — SIGNIFICANT CHANGE UP (ref 150–400)
POTASSIUM SERPL-MCNC: 5.2 MMOL/L — SIGNIFICANT CHANGE UP (ref 3.5–5.3)
POTASSIUM SERPL-MCNC: 5.2 MMOL/L — SIGNIFICANT CHANGE UP (ref 3.5–5.3)
POTASSIUM SERPL-SCNC: 5.2 MMOL/L — SIGNIFICANT CHANGE UP (ref 3.5–5.3)
POTASSIUM SERPL-SCNC: 5.2 MMOL/L — SIGNIFICANT CHANGE UP (ref 3.5–5.3)
PROTHROM AB SERPL-ACNC: 10.7 SEC — SIGNIFICANT CHANGE UP (ref 9.5–13)
PROTHROM AB SERPL-ACNC: 10.7 SEC — SIGNIFICANT CHANGE UP (ref 9.5–13)
RBC # BLD: 2.85 M/UL — LOW (ref 3.8–5.2)
RBC # BLD: 2.85 M/UL — LOW (ref 3.8–5.2)
RBC # FLD: 16 % — HIGH (ref 10.3–14.5)
RBC # FLD: 16 % — HIGH (ref 10.3–14.5)
RH IG SCN BLD-IMP: POSITIVE — SIGNIFICANT CHANGE UP
RH IG SCN BLD-IMP: POSITIVE — SIGNIFICANT CHANGE UP
SODIUM SERPL-SCNC: 139 MMOL/L — SIGNIFICANT CHANGE UP (ref 135–145)
SODIUM SERPL-SCNC: 139 MMOL/L — SIGNIFICANT CHANGE UP (ref 135–145)
WBC # BLD: 3.81 K/UL — SIGNIFICANT CHANGE UP (ref 3.8–10.5)
WBC # BLD: 3.81 K/UL — SIGNIFICANT CHANGE UP (ref 3.8–10.5)
WBC # FLD AUTO: 3.81 K/UL — SIGNIFICANT CHANGE UP (ref 3.8–10.5)
WBC # FLD AUTO: 3.81 K/UL — SIGNIFICANT CHANGE UP (ref 3.8–10.5)

## 2023-11-03 PROCEDURE — 99232 SBSQ HOSP IP/OBS MODERATE 35: CPT | Mod: GC

## 2023-11-03 PROCEDURE — 36821 AV FUSION DIRECT ANY SITE: CPT | Mod: LT

## 2023-11-03 PROCEDURE — 99232 SBSQ HOSP IP/OBS MODERATE 35: CPT

## 2023-11-03 DEVICE — SURGICEL FIBRILLAR 2 X 4": Type: IMPLANTABLE DEVICE | Site: LEFT | Status: FUNCTIONAL

## 2023-11-03 DEVICE — LIGATING CLIPS WECK HORIZON MEDIUM (BLUE) 24: Type: IMPLANTABLE DEVICE | Site: LEFT | Status: FUNCTIONAL

## 2023-11-03 DEVICE — LIGATING CLIPS WECK HORIZON SMALL-WIDE (RED) 24: Type: IMPLANTABLE DEVICE | Site: LEFT | Status: FUNCTIONAL

## 2023-11-03 RX ORDER — HYDROMORPHONE HYDROCHLORIDE 2 MG/ML
0.5 INJECTION INTRAMUSCULAR; INTRAVENOUS; SUBCUTANEOUS
Refills: 0 | Status: DISCONTINUED | OUTPATIENT
Start: 2023-11-03 | End: 2023-11-03

## 2023-11-03 RX ORDER — FENTANYL CITRATE 50 UG/ML
25 INJECTION INTRAVENOUS
Refills: 0 | Status: DISCONTINUED | OUTPATIENT
Start: 2023-11-03 | End: 2023-11-03

## 2023-11-03 RX ORDER — ONDANSETRON 8 MG/1
4 TABLET, FILM COATED ORAL ONCE
Refills: 0 | Status: DISCONTINUED | OUTPATIENT
Start: 2023-11-03 | End: 2023-11-03

## 2023-11-03 RX ADMIN — CARVEDILOL PHOSPHATE 12.5 MILLIGRAM(S): 80 CAPSULE, EXTENDED RELEASE ORAL at 05:18

## 2023-11-03 RX ADMIN — IRON SUCROSE 110 MILLIGRAM(S): 20 INJECTION, SOLUTION INTRAVENOUS at 21:06

## 2023-11-03 RX ADMIN — BUMETANIDE 2 MILLIGRAM(S): 0.25 INJECTION INTRAMUSCULAR; INTRAVENOUS at 05:18

## 2023-11-03 RX ADMIN — ATORVASTATIN CALCIUM 40 MILLIGRAM(S): 80 TABLET, FILM COATED ORAL at 22:20

## 2023-11-03 RX ADMIN — Medication 100 MILLIGRAM(S): at 05:18

## 2023-11-03 RX ADMIN — Medication 100 MILLIGRAM(S): at 22:20

## 2023-11-03 RX ADMIN — AMLODIPINE BESYLATE 10 MILLIGRAM(S): 2.5 TABLET ORAL at 05:19

## 2023-11-03 RX ADMIN — HEPARIN SODIUM 5000 UNIT(S): 5000 INJECTION INTRAVENOUS; SUBCUTANEOUS at 22:21

## 2023-11-03 NOTE — PRE-OP CHECKLIST - SURGICAL CONSENT
PAST MEDICAL HISTORY:  Basal cell carcinoma     Blood pressure instability     Cerebrovascular accident (CVA), unspecified mechanism     CHF (congestive heart failure)     Dyslipidemia     Edema, unspecified type     Essential hypertension     Hyponatremia ON HCTZ , h/o Hypokelemia    Inguinal hernia, right     Mitral valve insufficiency, unspecified etiology Cardiac cath on 11/21/18    Renal artery stenosis right side, stent    
done

## 2023-11-03 NOTE — PROGRESS NOTE ADULT - SUBJECTIVE AND OBJECTIVE BOX
Ar Kruse MD  Interventional Cardiology / Advance Heart Failure and Cardiac Transplant Specialist  [place] Office : [phone] [place] [address] [place] [address]  Tel:   [place] Office : [address] [facility] [place] [address]  Tel: [phone]       Pt is lying in bed comfortable not in distress, no chest pains no SOB no palpitations  	  MEDICATIONS:  amLODIPine   Tablet 10 milliGRAM(s) Oral daily  aspirin enteric coated 81 milliGRAM(s) Oral daily  buMETAnide 2 milliGRAM(s) Oral daily  carvedilol 12.5 milliGRAM(s) Oral every 12 hours  heparin   Injectable 5000 Unit(s) SubCutaneous every 8 hours  hydrALAZINE 100 milliGRAM(s) Oral three times a day        acetaminophen     Tablet .. 650 milliGRAM(s) Oral every 6 hours PRN  melatonin 3 milliGRAM(s) Oral at bedtime PRN  ondansetron Injectable 4 milliGRAM(s) IV Push every 8 hours PRN    aluminum hydroxide/magnesium hydroxide/simethicone Suspension 30 milliLiter(s) Oral every 4 hours PRN    atorvastatin 40 milliGRAM(s) Oral at bedtime  dextrose 50% Injectable 25 Gram(s) IV Push once  dextrose 50% Injectable 25 Gram(s) IV Push once  dextrose 50% Injectable 12.5 Gram(s) IV Push once  dextrose Oral Gel 15 Gram(s) Oral once PRN  glucagon  Injectable 1 milliGRAM(s) IntraMuscular once  insulin lispro (ADMELOG) corrective regimen sliding scale   SubCutaneous three times a day before meals  insulin lispro (ADMELOG) corrective regimen sliding scale   SubCutaneous at bedtime    cholecalciferol 1000 Unit(s) Oral daily  dextrose 5%. 1000 milliLiter(s) IV Continuous <Continuous>  dextrose 5%. 1000 milliLiter(s) IV Continuous <Continuous>  iron sucrose IVPB 200 milliGRAM(s) IV Intermittent every 24 hours      PAST MEDICAL/SURGICAL HISTORY  PAST MEDICAL & SURGICAL HISTORY:  Type 2 diabetes mellitus      Bilateral cataracts      Fluid in pleural cavity associated with pancreatitis      Pancreatic pseudocyst/cyst  s/p drain placement and removal      HTN (hypertension)      HLD (hyperlipidemia)      H/O sarcoidosis      PVD (peripheral vascular disease)      Sensorineural hearing loss, bilateral      H/O CHF      Chronic kidney disease (CKD)      Anemia      History of amputation of right great toe        H/O:  section        History of laparoscopic cholecystectomy          SOCIAL HISTORY: Substance Use (street drugs): ( x ) never used  (  ) other:    FAMILY HISTORY:  FH: hypertension (Mother)         PHYSICAL EXAM:  T(C): 37 (23 @ 11:59), Max: 37 (23 @ 11:59)  HR: 70 (23 @ 11:59) (64 - 70)  BP: 136/63 (23 @ 11:59) (115/57 - 136/63)  RR: 16 (23 @ 11:59) (16 - 18)  SpO2: 98% (23 @ 11:59) (98% - 100%)  Wt(kg): --  I&O's Summary    2023 07:01  -  2023 07:00  --------------------------------------------------------  IN: 200 mL / OUT: 1600 mL / NET: -1400 mL      Height (cm): 162 ( @ 11:59)  Weight (kg): 69.4 ( @ 11:59)  BMI (kg/m2): 26.4 ( @ 11:59)  BSA (m2): 1.74 ( @ 11:59)       EYES:   PERRLA   ENMT:   Moist mucous membranes, Good dentition, No lesions  Cardiovascular: Normal S1 S2, No JVD, No murmurs, No edema  Respiratory: Lungs clear to auscultation	  Gastrointestinal:  Soft, Non-tender, + BS	  Extremities: no edema                                    7.4    3.81  )-----------( 215      ( 2023 05:20 )             24.6         139  |  106  |  106<H>  ----------------------------<  110<H>  5.2   |  20<L>  |  4.55<H>    Ca    8.8      2023 05:20  Phos  5.5       Mg     1.90           proBNP:   Lipid Profile:   HgA1c:   TSH:     Consultant(s) Notes Reviewed:  [x ] YES  [ ] NO    Care Discussed with Consultants/Other Providers [ x] YES  [ ] NO    Imaging Personally Reviewed independently:  [x] YES  [ ] NO    All labs, radiologic studies, vitals, orders and medications list reviewed. Patient is seen and examined at bedside. Case discussed with medical team.

## 2023-11-03 NOTE — DISCHARGE NOTE NURSING/CASE MANAGEMENT/SOCIAL WORK - PATIENT PORTAL LINK FT
You can access the FollowMyHealth Patient Portal offered by [facility] by registering at the following website: [email or web address]. By joining [facility]’s FollowMyHealth portal, you will also be able to view your health information using other applications (apps) compatible with our system.

## 2023-11-03 NOTE — PROGRESS NOTE ADULT - ASSESSMENT
68 yo F w/ HTN, HLD, sarcoidosis, HFpEF, hx PEA x2, b/l sensorineural hearing, sarcoidosis, and HTN presents to the ED with low Hgb. Cardiology consulted for pre-op eval prior to AVF      EKG: NSR RBBB  Tele: NSR 60s       1. Pre-op assessment  -denies chest pain or SOB  -EKG NSR RBBB  -TTE 10/2023 normal LV function  -Upon further chart review patient with PEA x2 in 2022 with multiple MICU admissions requiring intubation in 2022 due to AMS and respiratory distress. Bronchoscopy 8/31/2022 showed a L sided mucus plug and also has been concern for aspiration. given this and hx of sarcoidosis pulm eval appreciated  -optimized from cardiac standpoint for AVF creation, moderate risk for MACE    2. CHF  -patient with hx of PEAx 2 2022   -TTE in 7/15/2022 with moderate global LV dysfunction. denies ischemic eval. this was likely due to patient's PEA arrest as repeat TTE 7/21/2022 with normal LV function     3. MANDY on CKD  -will need eventual HD planned for AVF creation   -f/u renal      Is This A New Presentation, Or A Follow-Up?: Rash

## 2023-11-03 NOTE — PROGRESS NOTE ADULT - SUBJECTIVE AND OBJECTIVE BOX
SKILLED REASON for visit: wound care/drain care  CAREGIVER INVOLVEMENT:spouse is available as needed for assistance with iadls, adls, meal prep, medication management, taking to md appointments  MEDICATIONS:eliquis- reviewed and all medications are available in the home this visit. Patient denies any medication changes at this time of assessment. EDUCATION PROVIDED: regarding medications, medication interactions, and look alike medications (specify): reviewed side effects, purposes, dosage, frequencies. High risk medication teaching regarding anticoagulants, hyperglycemic agents or opiod narcotics performed (specify) na Medications are effective at this time. SUPPLIES: by type and quantity ordered/delivered this visit include: n/a  PATIENT EDUCATION ON THIS VISIT: patient/cg instructed for drain care and flushing drain, to monitor for edema/increase in edema, to elevate extremity when edema occurs and to notify md if edema exceeds normal limits for patient, none noted at this re to limit sodium, no added sodium to diet. reviewed foods to avoid patient Instructed on repostioning every 2 hours as necessary for prevention of pressure sores   PATIENT LEVEL OF UNDERSTANDING: patient/cg has a partial understanding of education that was provided at this time by engaging in all education provided and is able to verbalize understanding, pt denies any questions or concerns at this time. SKILLED CARE PERFORMED THIS VISIT: wound care /cg taught wound care, drain care   PATIENT RESPONSE TO PROCEDURE PERFORMED: patient tolerated procedure with no signs of discomfort, grimacing or pain, no complications or concerns noted. Agency Progress toward goals: goals/teaching reviewed. patient is progressing towards goals at this time,   Patient's Progress towards personal goals: pt reporting they are progressing towards their goals at this time.    Home exercise program: ot/pt   Continued need for the following skills: Nursing Plan for next visit: wound care Patient and/or caregiver notified and agrees to changes in the Ptime. patient made aware to monitor for s/s of infection [increased swelling, increased redness around site, increased pain, foul smelling drainage, fever] aware who to report to/when. no s/s of infection noted. encouraged patient to get three nutritional meals daily and to stay hydrated. reviewed heart healthy diet- monitoring sodium intake, cholesterol and fat intake. The following discharge planning was discussed with the pt/caregiver:   Patient will be discharged once education has completed, patient is medically stable and independent with care.    Sharps-container used Merlin Mathew, MD   Hospitalist  Pager #[phone]    PROGRESS NOTE:     Patient is a 68y old  Female who presents with a chief complaint of Low Hgb (03 Nov 2023 11:58)      SUBJECTIVE / OVERNIGHT EVENTS:  S/p tooth extraction   Denies any pain   Feels well     ADDITIONAL REVIEW OF SYSTEMS:    MEDICATIONS  (STANDING):  amLODIPine   Tablet 10 milliGRAM(s) Oral daily  aspirin enteric coated 81 milliGRAM(s) Oral daily  atorvastatin 40 milliGRAM(s) Oral at bedtime  buMETAnide 2 milliGRAM(s) Oral daily  carvedilol 12.5 milliGRAM(s) Oral every 12 hours  cholecalciferol 1000 Unit(s) Oral daily  dextrose 5%. 1000 milliLiter(s) (50 mL/Hr) IV Continuous <Continuous>  dextrose 5%. 1000 milliLiter(s) (100 mL/Hr) IV Continuous <Continuous>  dextrose 50% Injectable 25 Gram(s) IV Push once  dextrose 50% Injectable 25 Gram(s) IV Push once  dextrose 50% Injectable 12.5 Gram(s) IV Push once  glucagon  Injectable 1 milliGRAM(s) IntraMuscular once  heparin   Injectable 5000 Unit(s) SubCutaneous every 8 hours  hydrALAZINE 100 milliGRAM(s) Oral three times a day  insulin lispro (ADMELOG) corrective regimen sliding scale   SubCutaneous three times a day before meals  insulin lispro (ADMELOG) corrective regimen sliding scale   SubCutaneous at bedtime  iron sucrose IVPB 200 milliGRAM(s) IV Intermittent every 24 hours  sevelamer carbonate 800 milliGRAM(s) Oral three times a day with meals    MEDICATIONS  (PRN):  acetaminophen     Tablet .. 650 milliGRAM(s) Oral every 6 hours PRN Temp greater or equal to 38C (100.4F), Mild Pain (1 - 3)  aluminum hydroxide/magnesium hydroxide/simethicone Suspension 30 milliLiter(s) Oral every 4 hours PRN Dyspepsia  dextrose Oral Gel 15 Gram(s) Oral once PRN Blood Glucose LESS THAN 70 milliGRAM(s)/deciliter  fentaNYL    Injectable 25 MICROGram(s) IV Push every 5 minutes PRN Moderate Pain (4 - 6)  HYDROmorphone  Injectable 0.5 milliGRAM(s) IV Push every 10 minutes PRN Severe Pain (7 - 10)  melatonin 3 milliGRAM(s) Oral at bedtime PRN Insomnia  ondansetron Injectable 4 milliGRAM(s) IV Push once PRN Nausea and/or Vomiting  ondansetron Injectable 4 milliGRAM(s) IV Push every 8 hours PRN Nausea and/or Vomiting      CAPILLARY BLOOD GLUCOSE      POCT Blood Glucose.: 120 mg/dL (03 Nov 2023 11:59)  POCT Blood Glucose.: 118 mg/dL (03 Nov 2023 06:53)  POCT Blood Glucose.: 137 mg/dL (02 Nov 2023 21:55)  POCT Blood Glucose.: 162 mg/dL (02 Nov 2023 17:19)    I&O's Summary    02 Nov 2023 07:01  -  03 Nov 2023 07:00  --------------------------------------------------------  IN: 200 mL / OUT: 1600 mL / NET: -1400 mL        PHYSICAL EXAM:  Vital Signs Last 24 Hrs  T(C): 37 (03 Nov 2023 11:59), Max: 37 (03 Nov 2023 11:59)  T(F): 98.6 (03 Nov 2023 11:59), Max: 98.6 (03 Nov 2023 11:59)  HR: 70 (03 Nov 2023 11:59) (64 - 70)  BP: 136/63 (03 Nov 2023 11:59) (118/83 - 136/63)  BP(mean): --  RR: 16 (03 Nov 2023 11:59) (16 - 18)  SpO2: 98% (03 Nov 2023 11:59) (98% - 100%)    Parameters below as of 03 Nov 2023 05:15  Patient On (Oxygen Delivery Method): room air      CONSTITUTIONAL: NAD, resting comfortably   HEENT: Full ROM, TTP over sternocleidomastoid muscle   RESPIRATORY: Normal respiratory effort; lungs are clear to auscultation bilaterally  CARDIOVASCULAR: Regular rate and rhythm, normal S1 and S2, no murmur/rub/gallop; No LE edema   ABDOMEN: Nontender to palpation, normoactive bowel sounds, no rebound/guarding; No hepatosplenomegaly  MUSCLOSKELETAL: no clubbing or cyanosis of digits; no joint swelling or tenderness to palpation  PSYCH: A+O to person, place, and time; affect appropriate; VERY [place]     LABS:                        7.4    3.81  )-----------( 215      ( 03 Nov 2023 05:20 )             24.6     11-03    139  |  106  |  106<H>  ----------------------------<  110<H>  5.2   |  20<L>  |  4.55<H>    Ca    8.8      03 Nov 2023 05:20  Phos  5.5     11-03  Mg     1.90     11-03      PT/INR - ( 03 Nov 2023 05:20 )   PT: 10.7 sec;   INR: 0.95 ratio         PTT - ( 03 Nov 2023 05:20 )  PTT:41.2 sec      Urinalysis Basic - ( 03 Nov 2023 05:20 )    Color: x / Appearance: x / SG: x / pH: x  Gluc: 110 mg/dL / Ketone: x  / Bili: x / Urobili: x   Blood: x / Protein: x / Nitrite: x   Leuk Esterase: x / RBC: x / WBC x   Sq Epi: x / Non Sq Epi: x / Bacteria: x          RADIOLOGY & ADDITIONAL TESTS:  Results Reviewed:   Imaging Personally Reviewed:  Electrocardiogram Personally Reviewed:    COORDINATION OF CARE:  Care Discussed with Consultants/Other Providers [Y/N]:  Prior or Outpatient Records Reviewed [Y/N]:

## 2023-11-03 NOTE — PROGRESS NOTE ADULT - ATTENDING COMMENTS
1. MANDY on CKD - likely diabetic given retinopathy and neuropathy.  Serologies pending.  PTH and vitamin D 25 levels should be sent as well.    2. Anemia - see above discussion.  3. Hypertension - continue with current regimen.  4. Hyperphosphatemia - would start on Sevelamer 800 mg tid with meals.
advanced CKD. HTN, Anemia, V/ol improving    pt is agreeable to  fistula placement. vascular on board   Further detailed plan of management and recommendation as outlined above.
advanced CKD. HTN, Anemia, V/ol improving    pt is agreeable to  fistula placement. vascular on board   monitor BP closely. may need to decrease meds if  range   Further detailed plan of management and recommendation as outlined above.
advanced CKD. HTN, Anemia, V/ol improving    pt is agreeable to fistula placement. planned for today. vascular on board.   monitor BP closely.   Further detailed plan of management and recommendation as outlined above.  discussed with pt and her daughter at bedside
progressive CKD sec to DN and HTN   continue with BP meds   continue with diuresis  continue sevelamer for hyperphosphatemia    start Venofer 200mg IV for 5 days.   I spoke with her about HD ( her mother, brother, and cousin on HD)  she asked me to speak with her daughter Rhonda.  I explained to Rhonda benefits and risks of HD and will discuss with her mom.
as per my conversation with daughter today, pt is agreeable to  fistula placement. will call vascular
1. MANDY on CKD - patient discussed significant family history of renal failure/dialysis.  Likely with DN (retinopathy).  Serologies as recommended above.    2. Anemia - see above discussion.  Transfusion per primary team  3. Hypertension - continue with current regimen.  Resume diuretic as noted above.   High risk for progression of renal failure and need of future RRT.  Further recommendations per clinical course.

## 2023-11-03 NOTE — PRE-OP CHECKLIST - SELECT TESTS ORDERED
CBC/CMP/PT/PTT CBC/CMP/PT/PTT/Type and Screen/POCT Blood Glucose fsbs 120 at 11:59am/CBC/CMP/PT/PTT/Type and Screen/POCT Blood Glucose

## 2023-11-03 NOTE — PROGRESS NOTE ADULT - SUBJECTIVE AND OBJECTIVE BOX
SURGERY DAILY PROGRESS NOTE    SUBJECTIVE: Patient seen and examined on AM rounds. Has been NPO since midnight in preparation for OR. Awaiting loose tooth removal before OR. Denies chest pain, SOB, palpitations, HA, fever, chills, N/V.      OBJECTIVE:  Vital Signs Last 24 Hrs  T(C): 36.7 (03 Nov 2023 05:15), Max: 36.8 (02 Nov 2023 21:20)  T(F): 98.1 (03 Nov 2023 05:15), Max: 98.2 (02 Nov 2023 21:20)  HR: 64 (03 Nov 2023 05:15) (64 - 70)  BP: 118/83 (03 Nov 2023 05:15) (115/57 - 132/57)  BP(mean): --  RR: 18 (03 Nov 2023 05:15) (16 - 18)  SpO2: 98% (03 Nov 2023 05:15) (98% - 100%)    Parameters below as of 03 Nov 2023 05:15  Patient On (Oxygen Delivery Method): room air        I&O's Summary    02 Nov 2023 07:01  -  03 Nov 2023 07:00  --------------------------------------------------------  IN: 200 mL / OUT: 1600 mL / NET: -1400 mL        Physical Exam:  General Appearance: Appears well, NAD, A& O x 3  Neck: Supple  Chest: Nonlabored breathing on room air  CV: Hemodynamically stable  Extremities: Grossly symmetric, LUE protected with palpable RA/UA    LABS:                        7.4    3.81  )-----------( 215      ( 03 Nov 2023 05:20 )             24.6     11-03    139  |  106  |  106<H>  ----------------------------<  110<H>  5.2   |  20<L>  |  4.55<H>    Ca    8.8      03 Nov 2023 05:20  Phos  5.5     11-03  Mg     1.90     11-03      PT/INR - ( 03 Nov 2023 05:20 )   PT: 10.7 sec;   INR: 0.95 ratio         PTT - ( 03 Nov 2023 05:20 )  PTT:41.2 sec  Urinalysis Basic - ( 03 Nov 2023 05:20 )    Color: x / Appearance: x / SG: x / pH: x  Gluc: 110 mg/dL / Ketone: x  / Bili: x / Urobili: x   Blood: x / Protein: x / Nitrite: x   Leuk Esterase: x / RBC: x / WBC x   Sq Epi: x / Non Sq Epi: x / Bacteria: x        RADIOLOGY & ADDITIONAL STUDIES:

## 2023-11-03 NOTE — CHART NOTE - NSCHARTNOTEFT_GEN_A_CORE
Post Operative Note  Patient: BEVERLY MEJIA 68y (1955) Female   MRN: [id number]  Location: [facility] [address]  Visit: 10-26-23 Inpatient  Date: 11-03-23 @ 22:20    Procedure: S/P LUE AV fistula creation     Subjective: Patient seen and examined post operatively. Reports pain as controlled. Denies nausea, vomiting, fever, chills, chest pain, SOB, cough.      Objective:  Vitals: T(F): 97.7 (11-03-23 @ 19:35), Max: 98.6 (11-03-23 @ 11:59)  HR: 68 (11-03-23 @ 21:16)  BP: 135/55 (11-03-23 @ 21:16) (115/57 - 150/61)  RR: 18 (11-03-23 @ 21:16)  SpO2: 100% (11-03-23 @ 21:16)  Vent Settings: Mode: NIV (Noninvasive Ventilation), RR (machine): 15, TV (machine): 400, FiO2: 30, PEEP: 8, P-High: 30, P-Low: 10, PIP: 13    In:   11-02-23 @ 07:01  -  11-03-23 @ 07:00  --------------------------------------------------------  IN: 200 mL      IV Fluids: cholecalciferol 1000 Unit(s) Oral daily  dextrose 5%. 1000 milliLiter(s) (100 mL/Hr) IV Continuous <Continuous>  dextrose 5%. 1000 milliLiter(s) (50 mL/Hr) IV Continuous <Continuous>      Out:   11-02-23 @ 07:01  -  11-03-23 @ 07:00  --------------------------------------------------------  OUT: 1600 mL      EBL:     Voided Urine:   11-02-23 @ 07:01  -  11-03-23 @ 07:00  --------------------------------------------------------  OUT: 1600 mL          Physical Examination:  General: NAD, resting comfortably in bed  Respiratory: Nonlabored respirations, normal CW expansion.  Cardio:  regular rate and rhythm.  Vascular: LUE incision dressing intact with pink soaked gauze, LUE with palpable thrill      Imaging:  No post-op imaging studies    Assessment:  68yFemale patient S/P  LUE AV fistula creation     Plan:    - Pain control PRN  - Care per primary team     Vascular Surgery     Date/Time: 11-03-23 @ 22:20

## 2023-11-03 NOTE — PROGRESS NOTE ADULT - SUBJECTIVE AND OBJECTIVE BOX
CC: 74 y/o presents with severely mobile tooth #24 on Lower anteriors requested to be extracted prior to Surgery for LUE AVF for ESRD      Med HX:Anemia    No pertinent family history in first degree relatives    FH: hypertension (Mother)    Handoff    MEWS Score    Type 2 diabetes mellitus    Bilateral cataracts    Fluid in pleural cavity associated with pancreatitis    Pancreatic pseudocyst/cyst    HTN (hypertension)    HLD (hyperlipidemia)    Sarcoid    PEA (Pulseless electrical activity)    Pulseless electrical activity    H/O sarcoidosis    PVD (peripheral vascular disease)    Sensorineural hearing loss, bilateral    H/O CHF    Chronic kidney disease (CKD)    Anemia    Anemia    Anemia    MANDY (acute kidney injury)    Essential hypertension    Medication management    Preventive measure    Chronic diastolic congestive heart failure    Acute kidney injury superimposed on CKD    No significant past surgical history    History of amputation of right great toe    H/O:  section    History of laparoscopic cholecystectomy    SENT BY MD    90+    Acute kidney injury superimposed on CKD    [email or web address]        RX:acetaminophen     Tablet .. 650 milliGRAM(s) Oral every 6 hours PRN  aluminum hydroxide/magnesium hydroxide/simethicone Suspension 30 milliLiter(s) Oral every 4 hours PRN  amLODIPine   Tablet 10 milliGRAM(s) Oral daily  aspirin enteric coated 81 milliGRAM(s) Oral daily  atorvastatin 40 milliGRAM(s) Oral at bedtime  buMETAnide 2 milliGRAM(s) Oral daily  carvedilol 12.5 milliGRAM(s) Oral every 12 hours  cholecalciferol 1000 Unit(s) Oral daily  dextrose 5%. 1000 milliLiter(s) IV Continuous <Continuous>  dextrose 5%. 1000 milliLiter(s) IV Continuous <Continuous>  dextrose 50% Injectable 12.5 Gram(s) IV Push once  dextrose 50% Injectable 25 Gram(s) IV Push once  dextrose 50% Injectable 25 Gram(s) IV Push once  dextrose Oral Gel 15 Gram(s) Oral once PRN  glucagon  Injectable 1 milliGRAM(s) IntraMuscular once  heparin   Injectable 5000 Unit(s) SubCutaneous every 8 hours  hydrALAZINE 100 milliGRAM(s) Oral three times a day  insulin lispro (ADMELOG) corrective regimen sliding scale   SubCutaneous three times a day before meals  insulin lispro (ADMELOG) corrective regimen sliding scale   SubCutaneous at bedtime  iron sucrose IVPB 200 milliGRAM(s) IV Intermittent every 24 hours  melatonin 3 milliGRAM(s) Oral at bedtime PRN  ondansetron Injectable 4 milliGRAM(s) IV Push every 8 hours PRN  sevelamer carbonate 800 milliGRAM(s) Oral three times a day with meals  amLODIPine 10 mg oral tablet: 1 tab(s) orally once a day  aspirin 81 mg oral delayed release tablet: 1 tab(s) orally once a day  atorvastatin 40 mg oral tablet: 1 tab(s) orally once a day (at bedtime)  bumetanide 2 mg oral tablet: 1 tab(s) orally once a day  carvedilol 12.5 mg oral tablet: 1 tab(s) orally 2 times a day  cholecalciferol 25 mcg (1000 intl units) oral tablet: 1 tab(s) orally once a day  hydrALAZINE 100 mg oral tablet: 1 tab(s) orally 3 times a day      Social Hx: non-contributory    EOE: (-) swelling  Dysphagia (-)    IOE: (-)swelling, (-)palpation, Grade 3 mobility on tooth #24, aspiration risk  Hard/Soft palate (WNL)  Tongue/Floor of Mouth (WNL)  Buccal Mucosa (WNL)  Percussion (-)    Assessment: mobility grade 3 on tooth #24 would pose aspiration risk during intubation    Treatment: Discussed clinical and radiographic findings. Written and verbal consent obtained. Protective stabalization. BB. Administered 2 carpules 4% septocaine 1:100k epi via local infiltration. Throat drape placed. Extracted #24 with gauze. Irrigated with sterile saline. Gauze hemostasis achieved. POIG including lip biting precautions. All questions answered.    Patient now cleared for intubation from a dental standpoint    Recommendations:   1. Soft diet. OTC pain meds as needed.  2. Comprehensive dental care with outpatient dentist.    Srinivas Lance, ELI #[id number]  Juanita Paez DDS #[id number]

## 2023-11-03 NOTE — PROGRESS NOTE ADULT - SUBJECTIVE AND OBJECTIVE BOX
[facility] Division of Kidney Diseases & Hypertension  FOLLOW UP NOTE  [phone]--------------------------------------------------------------------------------  Chief Complaint:CKD  HPI: 68F with PMH of HTN, HLD, DM2, sarcoidosis, CKD, HF, anemia, sensorineural hearing loss, presenting to the ED after found to have low hemoglobin on pre-surgical workup prior to cochlear implantation surgery. Nephrology following for MANDY on CKD.     24 hour events/subjective: Pt. was seen and examined today. Resting comfortably, no overt complaints. AVF creation planned for today     PAST HISTORY  --------------------------------------------------------------------------------  No significant changes to PMH, PSH, FHx, SHx, unless otherwise noted    ALLERGIES & MEDICATIONS  --------------------------------------------------------------------------------  Allergies    penicillin ([place])    Intolerances      Standing Inpatient Medications  amLODIPine   Tablet 10 milliGRAM(s) Oral daily  aspirin enteric coated 81 milliGRAM(s) Oral daily  atorvastatin 40 milliGRAM(s) Oral at bedtime  buMETAnide 2 milliGRAM(s) Oral daily  carvedilol 12.5 milliGRAM(s) Oral every 12 hours  cholecalciferol 1000 Unit(s) Oral daily  dextrose 5%. 1000 milliLiter(s) IV Continuous <Continuous>  dextrose 5%. 1000 milliLiter(s) IV Continuous <Continuous>  dextrose 50% Injectable 12.5 Gram(s) IV Push once  dextrose 50% Injectable 25 Gram(s) IV Push once  dextrose 50% Injectable 25 Gram(s) IV Push once  glucagon  Injectable 1 milliGRAM(s) IntraMuscular once  heparin   Injectable 5000 Unit(s) SubCutaneous every 8 hours  hydrALAZINE 100 milliGRAM(s) Oral three times a day  insulin lispro (ADMELOG) corrective regimen sliding scale   SubCutaneous at bedtime  insulin lispro (ADMELOG) corrective regimen sliding scale   SubCutaneous three times a day before meals  iron sucrose IVPB 200 milliGRAM(s) IV Intermittent every 24 hours  sevelamer carbonate 800 milliGRAM(s) Oral three times a day with meals    PRN Inpatient Medications  acetaminophen     Tablet .. 650 milliGRAM(s) Oral every 6 hours PRN  aluminum hydroxide/magnesium hydroxide/simethicone Suspension 30 milliLiter(s) Oral every 4 hours PRN  dextrose Oral Gel 15 Gram(s) Oral once PRN  melatonin 3 milliGRAM(s) Oral at bedtime PRN  ondansetron Injectable 4 milliGRAM(s) IV Push every 8 hours PRN    REVIEW OF SYSTEMS  --------------------------------------------------------------------------------  as above    VITALS/PHYSICAL EXAM  --------------------------------------------------------------------------------  T(C): 36.5 (11-03-23 @ 10:47), Max: 36.8 (11-02-23 @ 21:20)  HR: 70 (11-03-23 @ 10:47) (64 - 70)  BP: 136/63 (11-03-23 @ 10:47) (115/57 - 136/63)  RR: 18 (11-03-23 @ 10:47) (16 - 18)  SpO2: 100% (11-03-23 @ 10:47) (98% - 100%)  Wt(kg): --    11-02-23 @ 07:01  -  11-03-23 @ 07:00  --------------------------------------------------------  IN: 200 mL / OUT: 1600 mL / NET: -1400 mL    Physical Exam:  General: no acute distress  Neuro: no focal deficits  HEENT: anicteric, no JVD  Pulmonary: lungs CTA B/L  Cardiovascular/Chest: +S1S2, RRR  GI/Abdomen: soft, non-distended, non-tender, +bowel sounds  Extremities: no LE pitting edema  Skin: Warm and dry    LABS/STUDIES  --------------------------------------------------------------------------------              7.4    3.81  >-----------<  215      [11-03-23 @ 05:20]              24.6     139  |  106  |  106  ----------------------------<  110      [11-03-23 @ 05:20]  5.2   |  20  |  4.55        Ca     8.8     [11-03-23 @ 05:20]      Mg     1.90     [11-03-23 @ 05:20]      Phos  5.5     [11-03-23 @ 05:20]      PT/INR: PT 10.7 , INR 0.95       [11-03-23 @ 05:20]  PTT: 41.2       [11-03-23 @ 05:20]      Creatinine Trend:  SCr 4.55 [11-03 @ 05:20]  SCr 4.61 [11-02 @ 06:41]  SCr 4.66 [11-01 @ 06:40]  SCr 4.75 [10-31 @ 07:34]  SCr 5.14 [10-30 @ 05:55]    Urinalysis - [11-03-23 @ 05:20]      Color  / Appearance  / SG  / pH       Gluc 110 / Ketone   / Bili  / Urobili        Blood  / Protein  / Leuk Est  / Nitrite       RBC  / WBC  / Hyaline  / Gran  / Sq Epi  / Non Sq Epi  / Bacteria     Urine Creatinine 90      [10-28-23 @ 10:15]  Urine Protein 257      [10-29-23 @ 15:12]  Urine Sodium 65      [10-28-23 @ 10:15]  Urine Urea Nitrogen 577.4      [10-28-23 @ 10:15]  Urine Potassium 48.2      [10-28-23 @ 10:15]    Iron 26, TIBC 180, %sat 14      [10-28-23 @ 07:20]  Ferritin 140      [10-28-23 @ 07:20]  PTH -- (Ca --)      [10-30-23 @ 05:55]   156  Vitamin D (25OH) 13.3      [10-30-23 @ 05:55]    HBsAg Nonreact      [10-29-23 @ 08:24]  HCV 0.11, Nonreact      [10-29-23 @ 08:24]  HIV Nonreact      [10-29-23 @ 08:24]    MINOO: titer Negative, pattern --      [10-29-23 @ 08:24]  dsDNA <12      [10-29-23 @ 08:24]  C3 Complement 132      [10-29-23 @ 08:24]  C4 Complement 28      [10-29-23 @ 08:24]  ANCA: cANCA Negative, pANCA Negative, atypical ANCA Negative      [10-29-23 @ 08:24]  Free Light Chains: kappa 19.44, lambda 8.71, ratio = 2.23      [10-29 @ 08:24]  SPEP Interpretation: Decreased serum Albumin and increased Polyclonal Gamma fraction  consistent with chronic inflammatory condition.  NAVEED Rosenberg M.D.      [10-29-23 @ 08:24]  UPEP Interpretation: Mild Selective Proteinuria. No Monoclonal band seen.  NAVEED Rosenberg M.D.      [10-29-23 @ 15:12]

## 2023-11-03 NOTE — PROGRESS NOTE ADULT - ASSESSMENT
68F with MANDY on CKD, needs AVF for long term HD planning. Right arm dominant, no implanted devices. Palpable radial and ulnar pulses. Memo's test normal.     Plan:  - OR today for LUE AVF  - Pre-op (NPO @ midnight, AM labs)   - Left arm precautions, no IV/blood draws  - Vein mapping done 11/2  - Cleared by medical and cardiology   - Consent in chart (consent obtained from daughter d/t patient request)   - No HD yet per neprhrology    C Team Surgery  [id number] 68F with MANDY on CKD, needs AVF for long term HD planning. Right arm dominant, no implanted devices. Palpable radial and ulnar pulses. Memo's test normal.     Plan:  - OR today for LUE AVF  - Pre-op (NPO @ midnight, AM labs)   - Left arm precautions, no IV/blood draws  - Vein mapping done 11/2  - Cleared by medical and cardiology   - Consent in chart (consent obtained from daughter d/t patient request)   - No HD yet per nephrology    C Team Surgery  [id number]

## 2023-11-03 NOTE — PROGRESS NOTE ADULT - PROBLEM SELECTOR PLAN 4
resume home BP meds w/ hold parameter

## 2023-11-03 NOTE — PROGRESS NOTE ADULT - PROBLEM SELECTOR PLAN 1
Non-oliguric MANDY on CKD in the setting of severe anemia and uncontrolled hypertension. On review of labs on [place], last outpatient Scr was elevated at 4.13 on 10/26/23. Prior to this, the last Scr was 2.16 on 12/6/22 during previous hospitalization at [facility] from 11/17/22 to 12/9/22 for low Hgb. Pt did not follow up with a Nephrologist after previous hospitalization. UA with 300mg of protein. UPCR is elevated at 8.9g->6.3g. Kidney US on 10/26/23 shows increased renal echogenicity with no evidence of hydronephrosis.    Plan:  -Remains non oliguric.  -Continue to record STRICT Is+Os. Pt likely with progressive diabetic kidney disease as she reports diabetic retinopathy and PN, as well as significant FH of kidney disease.   -Serologic w/u in process (labs thus far reviewed, continue to follow)   -c/w home Bumex  -Pt and family now agreeable to AVF placement/long term HD planning but no urgent indication for HD at this time. OR today   -MBD: phos and PTH elevated. Vit D 13. Continue sevelamer. Recommend Vit D supplementation.   -Monitor labs and urine output. Avoid nephrotoxins. Dose medications per eGFR. Renal diet.

## 2023-11-04 ENCOUNTER — TRANSCRIPTION ENCOUNTER (OUTPATIENT)
Age: 68
End: 2023-11-04

## 2023-11-04 VITALS
HEART RATE: 72 BPM | TEMPERATURE: 99 F | SYSTOLIC BLOOD PRESSURE: 132 MMHG | RESPIRATION RATE: 16 BRPM | OXYGEN SATURATION: 99 % | DIASTOLIC BLOOD PRESSURE: 63 MMHG

## 2023-11-04 DIAGNOSIS — M79.645 PAIN IN LEFT FINGER(S): ICD-10-CM

## 2023-11-04 LAB
ANION GAP SERPL CALC-SCNC: 11 MMOL/L — SIGNIFICANT CHANGE UP (ref 7–14)
ANION GAP SERPL CALC-SCNC: 11 MMOL/L — SIGNIFICANT CHANGE UP (ref 7–14)
BASOPHILS # BLD AUTO: 0.02 K/UL — SIGNIFICANT CHANGE UP (ref 0–0.2)
BASOPHILS # BLD AUTO: 0.02 K/UL — SIGNIFICANT CHANGE UP (ref 0–0.2)
BASOPHILS NFR BLD AUTO: 0.5 % — SIGNIFICANT CHANGE UP (ref 0–2)
BASOPHILS NFR BLD AUTO: 0.5 % — SIGNIFICANT CHANGE UP (ref 0–2)
BUN SERPL-MCNC: 102 MG/DL — HIGH (ref 7–23)
BUN SERPL-MCNC: 102 MG/DL — HIGH (ref 7–23)
CALCIUM SERPL-MCNC: 8.8 MG/DL — SIGNIFICANT CHANGE UP (ref 8.4–10.5)
CALCIUM SERPL-MCNC: 8.8 MG/DL — SIGNIFICANT CHANGE UP (ref 8.4–10.5)
CHLORIDE SERPL-SCNC: 109 MMOL/L — HIGH (ref 98–107)
CHLORIDE SERPL-SCNC: 109 MMOL/L — HIGH (ref 98–107)
CO2 SERPL-SCNC: 20 MMOL/L — LOW (ref 22–31)
CO2 SERPL-SCNC: 20 MMOL/L — LOW (ref 22–31)
CREAT SERPL-MCNC: 4.62 MG/DL — HIGH (ref 0.5–1.3)
CREAT SERPL-MCNC: 4.62 MG/DL — HIGH (ref 0.5–1.3)
EGFR: 10 ML/MIN/1.73M2 — LOW
EGFR: 10 ML/MIN/1.73M2 — LOW
EOSINOPHIL # BLD AUTO: 0.24 K/UL — SIGNIFICANT CHANGE UP (ref 0–0.5)
EOSINOPHIL # BLD AUTO: 0.24 K/UL — SIGNIFICANT CHANGE UP (ref 0–0.5)
EOSINOPHIL NFR BLD AUTO: 6.3 % — HIGH (ref 0–6)
EOSINOPHIL NFR BLD AUTO: 6.3 % — HIGH (ref 0–6)
GLUCOSE BLDC GLUCOMTR-MCNC: 102 MG/DL — HIGH (ref 70–99)
GLUCOSE BLDC GLUCOMTR-MCNC: 102 MG/DL — HIGH (ref 70–99)
GLUCOSE BLDC GLUCOMTR-MCNC: 130 MG/DL — HIGH (ref 70–99)
GLUCOSE BLDC GLUCOMTR-MCNC: 130 MG/DL — HIGH (ref 70–99)
GLUCOSE BLDC GLUCOMTR-MCNC: 140 MG/DL — HIGH (ref 70–99)
GLUCOSE BLDC GLUCOMTR-MCNC: 140 MG/DL — HIGH (ref 70–99)
GLUCOSE SERPL-MCNC: 89 MG/DL — SIGNIFICANT CHANGE UP (ref 70–99)
GLUCOSE SERPL-MCNC: 89 MG/DL — SIGNIFICANT CHANGE UP (ref 70–99)
HCT VFR BLD CALC: 24.1 % — LOW (ref 34.5–45)
HCT VFR BLD CALC: 24.1 % — LOW (ref 34.5–45)
HGB BLD-MCNC: 7.2 G/DL — LOW (ref 11.5–15.5)
HGB BLD-MCNC: 7.2 G/DL — LOW (ref 11.5–15.5)
IANC: 2.04 K/UL — SIGNIFICANT CHANGE UP (ref 1.8–7.4)
IANC: 2.04 K/UL — SIGNIFICANT CHANGE UP (ref 1.8–7.4)
IMM GRANULOCYTES NFR BLD AUTO: 1.3 % — HIGH (ref 0–0.9)
IMM GRANULOCYTES NFR BLD AUTO: 1.3 % — HIGH (ref 0–0.9)
LYMPHOCYTES # BLD AUTO: 1.01 K/UL — SIGNIFICANT CHANGE UP (ref 1–3.3)
LYMPHOCYTES # BLD AUTO: 1.01 K/UL — SIGNIFICANT CHANGE UP (ref 1–3.3)
LYMPHOCYTES # BLD AUTO: 26.6 % — SIGNIFICANT CHANGE UP (ref 13–44)
LYMPHOCYTES # BLD AUTO: 26.6 % — SIGNIFICANT CHANGE UP (ref 13–44)
MAGNESIUM SERPL-MCNC: 2 MG/DL — SIGNIFICANT CHANGE UP (ref 1.6–2.6)
MAGNESIUM SERPL-MCNC: 2 MG/DL — SIGNIFICANT CHANGE UP (ref 1.6–2.6)
MCHC RBC-ENTMCNC: 25.7 PG — LOW (ref 27–34)
MCHC RBC-ENTMCNC: 25.7 PG — LOW (ref 27–34)
MCHC RBC-ENTMCNC: 29.9 GM/DL — LOW (ref 32–36)
MCHC RBC-ENTMCNC: 29.9 GM/DL — LOW (ref 32–36)
MCV RBC AUTO: 86.1 FL — SIGNIFICANT CHANGE UP (ref 80–100)
MCV RBC AUTO: 86.1 FL — SIGNIFICANT CHANGE UP (ref 80–100)
MONOCYTES # BLD AUTO: 0.43 K/UL — SIGNIFICANT CHANGE UP (ref 0–0.9)
MONOCYTES # BLD AUTO: 0.43 K/UL — SIGNIFICANT CHANGE UP (ref 0–0.9)
MONOCYTES NFR BLD AUTO: 11.3 % — SIGNIFICANT CHANGE UP (ref 2–14)
MONOCYTES NFR BLD AUTO: 11.3 % — SIGNIFICANT CHANGE UP (ref 2–14)
NEUTROPHILS # BLD AUTO: 2.04 K/UL — SIGNIFICANT CHANGE UP (ref 1.8–7.4)
NEUTROPHILS # BLD AUTO: 2.04 K/UL — SIGNIFICANT CHANGE UP (ref 1.8–7.4)
NEUTROPHILS NFR BLD AUTO: 54 % — SIGNIFICANT CHANGE UP (ref 43–77)
NEUTROPHILS NFR BLD AUTO: 54 % — SIGNIFICANT CHANGE UP (ref 43–77)
NRBC # BLD: 0 /100 WBCS — SIGNIFICANT CHANGE UP (ref 0–0)
NRBC # BLD: 0 /100 WBCS — SIGNIFICANT CHANGE UP (ref 0–0)
NRBC # FLD: 0 K/UL — SIGNIFICANT CHANGE UP (ref 0–0)
NRBC # FLD: 0 K/UL — SIGNIFICANT CHANGE UP (ref 0–0)
PHOSPHATE SERPL-MCNC: 5.3 MG/DL — HIGH (ref 2.5–4.5)
PHOSPHATE SERPL-MCNC: 5.3 MG/DL — HIGH (ref 2.5–4.5)
PLATELET # BLD AUTO: 204 K/UL — SIGNIFICANT CHANGE UP (ref 150–400)
PLATELET # BLD AUTO: 204 K/UL — SIGNIFICANT CHANGE UP (ref 150–400)
POTASSIUM SERPL-MCNC: 4.9 MMOL/L — SIGNIFICANT CHANGE UP (ref 3.5–5.3)
POTASSIUM SERPL-MCNC: 4.9 MMOL/L — SIGNIFICANT CHANGE UP (ref 3.5–5.3)
POTASSIUM SERPL-SCNC: 4.9 MMOL/L — SIGNIFICANT CHANGE UP (ref 3.5–5.3)
POTASSIUM SERPL-SCNC: 4.9 MMOL/L — SIGNIFICANT CHANGE UP (ref 3.5–5.3)
RBC # BLD: 2.8 M/UL — LOW (ref 3.8–5.2)
RBC # BLD: 2.8 M/UL — LOW (ref 3.8–5.2)
RBC # FLD: 16.3 % — HIGH (ref 10.3–14.5)
RBC # FLD: 16.3 % — HIGH (ref 10.3–14.5)
SODIUM SERPL-SCNC: 140 MMOL/L — SIGNIFICANT CHANGE UP (ref 135–145)
SODIUM SERPL-SCNC: 140 MMOL/L — SIGNIFICANT CHANGE UP (ref 135–145)
WBC # BLD: 3.79 K/UL — LOW (ref 3.8–10.5)
WBC # BLD: 3.79 K/UL — LOW (ref 3.8–10.5)
WBC # FLD AUTO: 3.79 K/UL — LOW (ref 3.8–10.5)
WBC # FLD AUTO: 3.79 K/UL — LOW (ref 3.8–10.5)

## 2023-11-04 PROCEDURE — 99232 SBSQ HOSP IP/OBS MODERATE 35: CPT

## 2023-11-04 PROCEDURE — 73140 X-RAY EXAM OF FINGER(S): CPT | Mod: 26,LT

## 2023-11-04 RX ORDER — SEVELAMER CARBONATE 2400 MG/1
1 POWDER, FOR SUSPENSION ORAL
Qty: 90 | Refills: 0
Start: 2023-11-04 | End: 2023-12-03

## 2023-11-04 RX ADMIN — Medication 100 MILLIGRAM(S): at 05:33

## 2023-11-04 RX ADMIN — AMLODIPINE BESYLATE 10 MILLIGRAM(S): 2.5 TABLET ORAL at 05:33

## 2023-11-04 RX ADMIN — SEVELAMER CARBONATE 800 MILLIGRAM(S): 2400 POWDER, FOR SUSPENSION ORAL at 08:58

## 2023-11-04 RX ADMIN — SEVELAMER CARBONATE 800 MILLIGRAM(S): 2400 POWDER, FOR SUSPENSION ORAL at 18:13

## 2023-11-04 RX ADMIN — HEPARIN SODIUM 5000 UNIT(S): 5000 INJECTION INTRAVENOUS; SUBCUTANEOUS at 16:00

## 2023-11-04 RX ADMIN — HEPARIN SODIUM 5000 UNIT(S): 5000 INJECTION INTRAVENOUS; SUBCUTANEOUS at 05:32

## 2023-11-04 RX ADMIN — SEVELAMER CARBONATE 800 MILLIGRAM(S): 2400 POWDER, FOR SUSPENSION ORAL at 12:30

## 2023-11-04 RX ADMIN — CARVEDILOL PHOSPHATE 12.5 MILLIGRAM(S): 80 CAPSULE, EXTENDED RELEASE ORAL at 05:34

## 2023-11-04 RX ADMIN — CARVEDILOL PHOSPHATE 12.5 MILLIGRAM(S): 80 CAPSULE, EXTENDED RELEASE ORAL at 18:13

## 2023-11-04 RX ADMIN — Medication 1000 UNIT(S): at 17:16

## 2023-11-04 RX ADMIN — Medication 100 MILLIGRAM(S): at 15:59

## 2023-11-04 RX ADMIN — BUMETANIDE 2 MILLIGRAM(S): 0.25 INJECTION INTRAMUSCULAR; INTRAVENOUS at 05:33

## 2023-11-04 RX ADMIN — Medication 81 MILLIGRAM(S): at 16:00

## 2023-11-04 NOTE — DISCHARGE NOTE PROVIDER - HOSPITAL COURSE
67 y.o. F w/a hx of HTN, sarcoidosis, HFpEF, hx of PEA x 2, b/l sensorineural hearing loss, HTN who presents to the hospital for anemia found on pre-op labs for cochlear implant.   While hospitalized, patient was given ion and 1 unit of blood for PRBC's. Patient was found to have MANDY on CKD and was seen by Nephrology. She underwent AVF creation on 11/3 with Vascular surgery. Patient was instructed to follow up with Nephrology as an outpatient to discuss HD initiation.

## 2023-11-04 NOTE — PROGRESS NOTE ADULT - PROVIDER SPECIALTY LIST ADULT
Internal Medicine
Internal Medicine
Nephrology
Nephrology
Vascular Surgery
Cardiology
Cardiology
Dental
Nephrology
Nephrology
Vascular Surgery
Vascular Surgery
Cardiology
Vascular Surgery
Internal Medicine
Nephrology
Hospitalist
Hospitalist
Internal Medicine
Hospitalist
Internal Medicine

## 2023-11-04 NOTE — DISCHARGE NOTE PROVIDER - CARE PROVIDERS DIRECT ADDRESSES
[email or web address] ,[email or web address]

## 2023-11-04 NOTE — DISCHARGE NOTE PROVIDER - NSFOLLOWUPCLINICS_GEN_ALL_ED_FT
Gastroenterology at [facility]  Gastroenterology  [address] [place] [address]  Phone: [phone]  Fax:     [facility]  Nephrology  [address] [place] [address]  Phone: [phone]  Fax:      Gastroenterology at [facility]  Gastroenterology  [address] [place] [address]  Phone: [phone]  Fax:     [facility]  Nephrology  [address] [place] [address]  Phone: [phone]  Fax:      Gastroenterology at [facility]  Gastroenterology  [address] [place] [address]  Phone: [phone]  Fax:     [facility]  Nephrology  [address]nd [place] [address]  Phone: [phone]  Fax:

## 2023-11-04 NOTE — PROGRESS NOTE ADULT - ASSESSMENT
68F with MANDY on CKD, needs AVF for long term HD planning. Right arm dominant, no implanted devices. Palpable radial and ulnar pulses. Memo's test normal. Now s/p left radiocephalic fistula creation on 11/3. Fistula with palpable thrill and hand well-perfused however patient with pain and weaknessin the arm.    Plan:  - Anesthesia team contacted regarding continued pain/weakness s/p regional block, appreciate evaluation/recs  - Left arm precautions, no IV/blood draws  - No HD yet per nephrology  - Remainder of care per primary team    C Team Surgery  [id number]

## 2023-11-04 NOTE — PROGRESS NOTE ADULT - SUBJECTIVE AND OBJECTIVE BOX
SURGERY DAILY PROGRESS NOTE    SUBJECTIVE: Patient seen and examined on AM rounds. Denies chest pain, SOB, palpitations, HA, fever, chills, N/V.      OBJECTIVE:  Vital Signs Last 24 Hrs  T(C): 36.4 (04 Nov 2023 05:26), Max: 36.8 (03 Nov 2023 16:50)  T(F): 97.6 (04 Nov 2023 05:26), Max: 98.2 (03 Nov 2023 16:50)  HR: 66 (04 Nov 2023 05:26) (62 - 86)  BP: 154/65 (04 Nov 2023 05:26) (115/57 - 154/65)  BP(mean): 71 (03 Nov 2023 17:45) (71 - 85)  RR: 18 (04 Nov 2023 05:26) (9 - 18)  SpO2: 100% (04 Nov 2023 05:26) (97% - 100%)    Parameters below as of 04 Nov 2023 05:26  Patient On (Oxygen Delivery Method): room air        I&O's Summary    03 Nov 2023 07:01  -  04 Nov 2023 07:00  --------------------------------------------------------  IN: 0 mL / OUT: 1200 mL / NET: -1200 mL        Physical Exam:  General Appearance: Appears well, NAD, A& O x 3  Neck: Supple  Chest: Equal expansion bilaterally, equal breath sounds  CV: Pulse regular presently  Abdomen: Soft, nontense, appropriate incisional tenderness, dressings clean and dry and intact  Extremities: Grossly symmetric, SCD's in place     LABS:                        7.2    3.79  )-----------( 204      ( 04 Nov 2023 04:59 )             24.1     11-04    140  |  109<H>  |  102<H>  ----------------------------<  89  4.9   |  20<L>  |  4.62<H>    Ca    8.8      04 Nov 2023 04:59  Phos  5.3     11-04  Mg     2.00     11-04      PT/INR - ( 03 Nov 2023 05:20 )   PT: 10.7 sec;   INR: 0.95 ratio         PTT - ( 03 Nov 2023 05:20 )  PTT:41.2 sec  Urinalysis Basic - ( 04 Nov 2023 04:59 )    Color: x / Appearance: x / SG: x / pH: x  Gluc: 89 mg/dL / Ketone: x  / Bili: x / Urobili: x   Blood: x / Protein: x / Nitrite: x   Leuk Esterase: x / RBC: x / WBC x   Sq Epi: x / Non Sq Epi: x / Bacteria: x        RADIOLOGY & ADDITIONAL STUDIES: SURGERY DAILY PROGRESS NOTE    SUBJECTIVE: Patient seen and examined on AM rounds. Complains of soreness at her surgical site as well as the entire arm. Notes persistent weakness and abnormal sensation of the hand/arm. Denies chest pain, SOB, palpitations, HA, fever, chills, N/V.      OBJECTIVE:  Vital Signs Last 24 Hrs  T(C): 36.4 (04 Nov 2023 05:26), Max: 36.8 (03 Nov 2023 16:50)  T(F): 97.6 (04 Nov 2023 05:26), Max: 98.2 (03 Nov 2023 16:50)  HR: 66 (04 Nov 2023 05:26) (62 - 86)  BP: 154/65 (04 Nov 2023 05:26) (115/57 - 154/65)  BP(mean): 71 (03 Nov 2023 17:45) (71 - 85)  RR: 18 (04 Nov 2023 05:26) (9 - 18)  SpO2: 100% (04 Nov 2023 05:26) (97% - 100%)    Parameters below as of 04 Nov 2023 05:26  Patient On (Oxygen Delivery Method): room air        I&O's Summary    03 Nov 2023 07:01  -  04 Nov 2023 07:00  --------------------------------------------------------  IN: 0 mL / OUT: 1200 mL / NET: -1200 mL        Physical Exam:  General Appearance: Appears well, NAD   Chest: Nonlabored breathing on RA  CV: RRR  Extremities: Grossly symmetric, LUE dressing C/D/I. Some motor function returned however decreased L hand/arm strength   Vascular: LUE warm and well perfused, good capillary refill in hand. Palpable L RA pulse, palpable thrill    LABS:                        7.2    3.79  )-----------( 204      ( 04 Nov 2023 04:59 )             24.1     11-04    140  |  109<H>  |  102<H>  ----------------------------<  89  4.9   |  20<L>  |  4.62<H>    Ca    8.8      04 Nov 2023 04:59  Phos  5.3     11-04  Mg     2.00     11-04      PT/INR - ( 03 Nov 2023 05:20 )   PT: 10.7 sec;   INR: 0.95 ratio         PTT - ( 03 Nov 2023 05:20 )  PTT:41.2 sec  Urinalysis Basic - ( 04 Nov 2023 04:59 )    Color: x / Appearance: x / SG: x / pH: x  Gluc: 89 mg/dL / Ketone: x  / Bili: x / Urobili: x   Blood: x / Protein: x / Nitrite: x   Leuk Esterase: x / RBC: x / WBC x   Sq Epi: x / Non Sq Epi: x / Bacteria: x

## 2023-11-04 NOTE — DISCHARGE NOTE PROVIDER - CARE PROVIDER_API CALL
Kayla Coronel  Vascular Surgery  [address], [address]  [place] [address]  Phone: [phone]  Fax: [phone]  Follow Up Time: 2 weeks   Kayla Coronel  Vascular Surgery  [address], [address]  [place] [address]  Phone: [phone]  Fax: [phone]  Follow Up Time: 2 weeks   Kayla Coronel  Vascular Surgery  [address], [address]  [place] [address]  Phone: [phone]  Fax: [phone]  Follow Up Time: 2 weeks

## 2023-11-04 NOTE — PROGRESS NOTE ADULT - PROBLEM SELECTOR PLAN 2
Patient with MANDY on CKD   -Baseline Cr is around 1.5-2   -US renal result noted, no obstruction   -UCx neg   -nephrology consulted, discussing HD with family  - Vascular following   [ ] Plan for AVF on Friday   - Cardiac risk stratification documented 11/1  - Pulmonary stratification documented 11/2   - No other intervention/therapy can minimize risk. Patient advised to use AVAPS tonight in preparation for OR tomorrow. Advised to continue current medication regimen. Patient can proceed to OR.
Patient with MANDY on CKD   -Baseline Cr is around 1.5-2   -F/u with US renal   -F/u with UA and urine lytes--given bacteria and WBC, increased urinary frequency, would treat for UTI for now  -F/u with UPEP, SPEP, free light chains, immunofixation  -nephrology consulted
Patient with MANDY on CKD   -Baseline Cr is around 1.5-2   -US renal result noted  -UCx neg   -nephrology consulted, discussing HD with family
Patient with MANDY on CKD   -Baseline Cr is around 1.5-2   -US renal result noted  -UCx neg   -nephrology consulted, discussing HD with family  - Vascular following   [ ] Plan for AVF on Friday   [ ] Cardiology consulted
Pt with anemia in the setting of CKD. Pt with Hgb of 6.6 on admission on 10/26/23. Last Hgb prior to ED visit was 6.4 on 10/26/23 outpatient labs. Hgb on 12/6/22 was 8.7. Iron studies c/w MAGALY. Receiving IV iron. Transfusion per primary team. Monitor Hgb levels.
Pt with anemia in the setting of CKD. Pt with Hgb of 6.6 on admission on 10/26/23. Last Hgb prior to ED visit was 6.4 on 10/26/23 outpatient labs. Hgb on 12/6/22 was 8.7. Iron studies c/w MAGALY. She would benefit from IV iron. Transfusion per primary team. Monitor Hgb levels.    Jeny Tsang,   Nephrology Fellow  Feel free to contact me directly on TEAMS with any questions.  (After 5pm or on weekends, please call the on-call fellow).
Pt with anemia in the setting of CKD. Pt with Hgb of 6.6 on admission on 10/26/23. Last Hgb prior to ED visit was 6.4 on 10/26/23 outpatient labs. Hgb on 12/6/22 was 8.7. Iron studies c/w MAGALY. She would likely benefit from IV iron. Transfusion per primary team. Monitor Hgb levels.
Pt with anemia in the setting of CKD. Pt with Hgb of 6.6 on admission on 10/26/23. Last Hgb prior to ED visit was 6.4 on 10/26/23 outpatient labs. Hgb on 12/6/22 was 8.7. Iron studies c/x anemia of chronic inflammation. Transfusion per primary team. Monitor Hgb levels.    Jeny Tsang DO  Nephrology Fellow  Feel free to contact me directly on TEAMS with any questions.  (After 5pm or on weekends, please call the on-call fellow).
Patient with MANDY on CKD   -Baseline Cr is around 1.5-2   -US renal result noted  -UCx neg   -nephrology consulted, discussing HD with family  [ ] Vascular consult
Patient with MANDY on CKD   -Baseline Cr is around 1.5-2   -US renal result noted, no obstruction   -UCx neg   -nephrology consulted, discussing HD with family  - Vascular following   [ ] Plan for AVF today
Pt with anemia in the setting of CKD. Pt with Hgb of 6.6 on admission on 10/26/23. Last Hgb prior to ED visit was 6.4 on 10/26/23 outpatient labs. Hgb on 12/6/22 was 8.7. Iron studies c/w MAGALY. Receiving IV iron. Transfusion per primary team. Monitor Hgb levels.
Pt with anemia in the setting of CKD. Pt with Hgb of 6.6 on admission on 10/26/23. Was iron deficient.  Now 7.2 s/p IV iron would monitor.
Pt with anemia in the setting of CKD. Pt with Hgb of 6.6 on admission on 10/26/23. Last Hgb prior to ED visit was 6.4 on 10/26/23 outpatient labs. Hgb on 12/6/22 was 8.7. Iron studies c/w MAGALY. Receiving IV iron. Transfusion per primary team. Monitor Hgb levels.
Pt with anemia in the setting of CKD. Pt with Hgb of 6.6 on admission on 10/26/23. Last Hgb prior to ED visit was 6.4 on 10/26/23 outpatient labs. Hgb on 12/6/22 was 8.7. Iron studies c/w MAGALY. Receiving IV iron. Transfusion per primary team. Monitor Hgb levels.
Patient with MANDY on CKD   -Baseline Cr is around 1.5-2   -F/u with US renal   -F/u with UA and urine lytes--given bacteria and WBC, increased urinary frequency, would treat for UTI for now  -F/u with UPEP, SPEP, free light chains, immunofixation  -nephrology consulted
Patient with MANDY on CKD   -Baseline Cr is around 1.5-2   -US renal result noted  -F/u with UA and urine lytes--given bacteria and WBC, increased urinary frequency, would treat for UTI for now  -F/u with UPEP, SPEP, free light chains, immunofixation  -nephrology consulted

## 2023-11-04 NOTE — PROGRESS NOTE ADULT - PROBLEM SELECTOR PROBLEM 1
Acute kidney injury superimposed on CKD
Acute kidney injury superimposed on CKD
Anemia
Acute kidney injury superimposed on CKD
Acute kidney injury superimposed on CKD
Anemia
Anemia
Acute kidney injury superimposed on CKD
Anemia
Acute kidney injury superimposed on CKD
Anemia

## 2023-11-04 NOTE — DISCHARGE NOTE PROVIDER - NSFOLLOWUPCLINICSTOKEN_GEN_ALL_ED_FT
[id number]: || ||00\01||False;[id number]: || ||00\01||False; [id number]: || ||00\01||False;[id number]: || ||00\01||False; [id number]: || ||00\01||False;[id number]: || ||00\01||False;

## 2023-11-04 NOTE — DISCHARGE NOTE PROVIDER - NSDCFUSCHEDAPPT_GEN_ALL_CORE_FT
[facility]  DISPENSARY [address]   Scheduled Appointment: 11/13/2023    [facility]  PULED [address]  Scheduled Appointment: 01/11/2024    [facility]  PULED [address]  Scheduled Appointment: 01/11/2024    Monica Quick  [facility]  PULED [address]  Scheduled Appointment: 01/11/2024     [facility]  DISPENSARY [address]   Scheduled Appointment: 11/13/2023    [facility]  PULED [address]  Scheduled Appointment: 01/11/2024    [facility]  PULED [address]  Scheduled Appointment: 01/11/2024    Monica Quick  [facility]  PULED [address]  Scheduled Appointment: 01/11/2024     [facility]  DISPENSARY [address]   Scheduled Appointment: 11/13/2023    [facility]  PULED [address]  Scheduled Appointment: 01/11/2024    [facility]  PULED [address]  Scheduled Appointment: 01/11/2024    Monica Quick  [facility]  PULED [address]  Scheduled Appointment: 01/11/2024

## 2023-11-04 NOTE — PROGRESS NOTE ADULT - ASSESSMENT
66 yo F w/ HTN, HLD, sarcoidosis, HFpEF, hx PEA x2, b/l sensorineural hearing, sarcoidosis, and HTN presents to the ED with low Hgb. Cardiology consulted for pre-op eval prior to AVF      EKG: NSR RBBB  Tele: NSR 60s       1. Pre-op assessment  -denies chest pain or SOB  -EKG NSR RBBB  -TTE 10/2023 normal LV function  -Upon further chart review patient with PEA x2 in 2022 with multiple MICU admissions requiring intubation in 2022 due to AMS and respiratory distress. Bronchoscopy 8/31/2022 showed a L sided mucus plug and also has been concern for aspiration. given this and hx of sarcoidosis pulm eval appreciated  -optimized from cardiac standpoint for AVF creation, moderate risk for MACE  - s/p AVF doing well    2. CHF  -patient with hx of PEAx 2 2022   -TTE in 7/15/2022 with moderate global LV dysfunction. denies ischemic eval. this was likely due to patient's PEA arrest as repeat TTE 7/21/2022 with normal LV function     3. MANDY on CKD  -s/p AVF doing well

## 2023-11-04 NOTE — PROGRESS NOTE ADULT - SUBJECTIVE AND OBJECTIVE BOX
[facility] Division of Kidney Diseases & Hypertension  FOLLOW UP NOTE  --------------------------------------------------------------------------------  Chief Complaint: Anemia Advanced CKD    24 hour events/subjective: Patient seen and evaluated. No chest pain no shortness of breath.  Tolerated AVF well no complaints.    PAST HISTORY  --------------------------------------------------------------------------------  No significant changes to PMH, PSH, FHx, SHx, unless otherwise noted    ALLERGIES & MEDICATIONS  --------------------------------------------------------------------------------  Allergies    penicillin ([place])    Intolerances      Standing Inpatient Medications  amLODIPine   Tablet 10 milliGRAM(s) Oral daily  aspirin enteric coated 81 milliGRAM(s) Oral daily  atorvastatin 40 milliGRAM(s) Oral at bedtime  buMETAnide 2 milliGRAM(s) Oral daily  carvedilol 12.5 milliGRAM(s) Oral every 12 hours  cholecalciferol 1000 Unit(s) Oral daily  dextrose 5%. 1000 milliLiter(s) IV Continuous <Continuous>  dextrose 5%. 1000 milliLiter(s) IV Continuous <Continuous>  dextrose 50% Injectable 25 Gram(s) IV Push once  dextrose 50% Injectable 12.5 Gram(s) IV Push once  dextrose 50% Injectable 25 Gram(s) IV Push once  glucagon  Injectable 1 milliGRAM(s) IntraMuscular once  heparin   Injectable 5000 Unit(s) SubCutaneous every 8 hours  hydrALAZINE 100 milliGRAM(s) Oral three times a day  insulin lispro (ADMELOG) corrective regimen sliding scale   SubCutaneous three times a day before meals  insulin lispro (ADMELOG) corrective regimen sliding scale   SubCutaneous at bedtime  sevelamer carbonate 800 milliGRAM(s) Oral three times a day with meals    PRN Inpatient Medications  acetaminophen     Tablet .. 650 milliGRAM(s) Oral every 6 hours PRN  aluminum hydroxide/magnesium hydroxide/simethicone Suspension 30 milliLiter(s) Oral every 4 hours PRN  dextrose Oral Gel 15 Gram(s) Oral once PRN  melatonin 3 milliGRAM(s) Oral at bedtime PRN  ondansetron Injectable 4 milliGRAM(s) IV Push every 8 hours PRN      REVIEW OF SYSTEMS  --------------------------------------------------------------------------------  Gen: no fever  Respiratory: no cp  CV: no ab pain  GI: no complaints  : no pain  MSK: no complaints    VITALS/PHYSICAL EXAM  --------------------------------------------------------------------------------  T(C): 36.4 (11-04-23 @ 05:26), Max: 37 (11-03-23 @ 11:59)  HR: 66 (11-04-23 @ 05:26) (62 - 86)  BP: 154/65 (11-04-23 @ 05:26) (115/57 - 154/65)  ABP: --  ABP(mean): --  RR: 18 (11-04-23 @ 05:26) (9 - 18)  SpO2: 100% (11-04-23 @ 05:26) (97% - 100%)  CVP(mm Hg): --  Height (cm): 162 (11-03-23 @ 11:59)  Weight (kg): 69.4 (11-03-23 @ 11:59)  BMI (kg/m2): 26.4 (11-03-23 @ 11:59)  BSA (m2): 1.74 (11-03-23 @ 11:59)      11-03-23 @ 07:01  -  11-04-23 @ 07:00  --------------------------------------------------------  IN: 0 mL / OUT: 1200 mL / NET: -1200 mL    Physical Exam:  General: no acute distress  Neuro: no focal deficits  HEENT: anicteric, no JVD  Pulmonary: lungs CTA B/L  Cardiovascular/Chest: +S1S2, RRR  GI/Abdomen: soft, non-distended, non-tender, +bowel sounds  Extremities: no LE pitting edema  Skin: Warm and dry  Vascular: LUE AVF with  bruit and thrill    LABS/STUDIES  --------------------------------------------------------------------------------              7.2    3.79  >-----------<  204      [11-04-23 @ 04:59]              24.1     140  |  109  |  102  ----------------------------<  89      [11-04-23 @ 04:59]  4.9   |  20  |  4.62        Ca     8.8     [11-04-23 @ 04:59]      Mg     2.00     [11-04-23 @ 04:59]      Phos  5.3     [11-04-23 @ 04:59]      PT/INR: PT 10.7 , INR 0.95       [11-03-23 @ 05:20]  PTT: 41.2       [11-03-23 @ 05:20]      Creatinine Trend:  SCr 4.62 [11-04 @ 04:59]  SCr 4.55 [11-03 @ 05:20]  SCr 4.61 [11-02 @ 06:41]  SCr 4.66 [11-01 @ 06:40]  SCr 4.75 [10-31 @ 07:34]    Urinalysis - [11-04-23 @ 04:59]      Color  / Appearance  / SG  / pH       Gluc 89 / Ketone   / Bili  / Urobili        Blood  / Protein  / Leuk Est  / Nitrite       RBC  / WBC  / Hyaline  / Gran  / Sq Epi  / Non Sq Epi  / Bacteria     Urine Protein 257      [10-29-23 @ 15:12]    PTH -- (Ca --)      [10-30-23 @ 05:55]   156  Vitamin D (25OH) 13.3      [10-30-23 @ 05:55]    HBsAg Nonreact      [10-29-23 @ 08:24]  HCV 0.11, Nonreact      [10-29-23 @ 08:24]  HIV Nonreact      [10-29-23 @ 08:24]    MINOO: titer Negative, pattern --      [10-29-23 @ 08:24]  dsDNA <12      [10-29-23 @ 08:24]  C3 Complement 132      [10-29-23 @ 08:24]  C4 Complement 28      [10-29-23 @ 08:24]  ANCA: cANCA Negative, pANCA Negative, atypical ANCA Negative      [10-29-23 @ 08:24]  Free Light Chains: kappa 19.44, lambda 8.71, ratio = 2.23      [10-29 @ 08:24]  SPEP Interpretation: Decreased serum Albumin and increased Polyclonal Gamma fraction  consistent with chronic inflammatory condition.  NAVEED Rosenberg M.D.      [10-29-23 @ 08:24]  Immunofixation Urine: DUPLICATE ORDER      [10-29-23 @ 15:12]  UPEP Interpretation: Mild Selective Proteinuria. No Monoclonal band seen.  NAVEED Rosenberg M.D.      [10-29-23 @ 15:12]

## 2023-11-04 NOTE — DISCHARGE NOTE PROVIDER - NSDCCPCAREPLAN_GEN_ALL_CORE_FT
PRINCIPAL DISCHARGE DIAGNOSIS  Diagnosis: Acute kidney injury superimposed on CKD  Assessment and Plan of Treatment: We found that your kidney numbers were higher than usual indicating that your kidneys were not functioning as well. You had a fistula created in your arm so that you can get dialysis in the future. Please follow up with the nephrologist in the clinic and they will discuss when you need to start dialysis.   Please see the nephrologist (kidney doctor)   Please see the vascular surgeon so they can check on the fistula      SECONDARY DISCHARGE DIAGNOSES  Diagnosis: Anemia  Assessment and Plan of Treatment: Your blood count was a little low in the hospital. You were given 1 unit of blood. We also found that your iron levels were low so we gave you iron. Please see the GI doctor when you leave the hospital to get a colonoscopy done.

## 2023-11-04 NOTE — PROGRESS NOTE ADULT - PROBLEM SELECTOR PLAN 1
Non-oliguric MANDY on CKD in the setting of severe anemia and uncontrolled hypertension. On review of labs on [facility], last outpatient Scr was elevated at 4.13 on 10/26/23. Prior to this, the last Scr was 2.16 on 12/6/22 during previous hospitalization at [facility] from 11/17/22 to 12/9/22 for low Hgb. Pt did not follow up with a Nephrologist after previous hospitalization. UA with 300mg of protein. UPCR is elevated at 8.9g->6.3g. Kidney US on 10/26/23 shows increased renal echogenicity with no evidence of hydronephrosis.  Suspect advanced CKD now s/p AVF with no acute indications to start dialysis today - not uremic, hyperkalemic, nor volume overloaded.  Can monitor closely outpatient for HD indications.

## 2023-11-04 NOTE — PROGRESS NOTE ADULT - REASON FOR ADMISSION
Low Hgb

## 2023-11-04 NOTE — CHART NOTE - NSCHARTNOTEFT_GEN_A_CORE
Informed by vascular team that pt endorsing continued pain/weakness in LUE after AVF creation yesterday 11/3/23. Pt had L sided supraclavicular block with 30mL Mepivicaine 1.5%.     Pt evaluated at bedside. Endorsing pain in the left 5th digit and at surgical site in L forearm. Denies similar pain in other digits or remainder of arm. Also denies numbness and tingling. Pt states that L 5th digit pain has been present since "some time, several days," although cannot remember any trauma or inciting event that caused the pain. Able to range the L 5th digit although limited by pain. No limitations to movement of rest of the arm.     On exam, diffuse tenderness of L 5th digit, although no notable erythema, warmth, or edema of the joint. Pt able to range 5th digit but endorsing pain with movement. Tenderness surrounding surgical site, covered by dressing which is clean/dry/intact. No tenderness to palpation of remainder of LUE, L shoulder elbow, and L hand. ROM and strength grossly intact in b/l UE with flexion and extension. Hand  in L hand with some limitations compared to R hand due to L 5th digit pain. Sensation to light tough intact and symmetrical in b/l UE.     Clinical findings and exam discussed with attending, Dr. Coronado.     A/P:  - based on conversation with pt and clinical findings, does not appear at this time to be related to regional block as symptoms appeared prior to surgery   - continue close monitoring of symptoms in L hand  - if continued or worsening pain, development of other symptoms, would recommend neurology consult as appropriate  - also discussed with vascular surgery team [id number] Informed by vascular team that pt endorsing continued pain/weakness in LUE after AVF creation yesterday 11/3/23. Pt had L sided supraclavicular block with 30mL Mepivicaine 1.5%.     Pt evaluated at bedside. Endorsing pain in the left 5th digit and at surgical site in L forearm. Denies similar pain in other digits or remainder of arm. Also denies numbness and tingling. Pt states that L 5th digit pain has been present since "some time, several days," although cannot remember any trauma or inciting event that caused the pain. Able to range the L 5th digit although limited by pain. No limitations to movement of rest of the arm.     On exam, diffuse tenderness of L 5th digit, although no notable erythema, warmth, or edema of the joint. Pt able to range 5th digit but endorsing pain with movement. Tenderness surrounding surgical site, covered by dressing which is clean/dry/intact. No tenderness to palpation of remainder of LUE, L shoulder elbow, and L hand. ROM and strength grossly intact in b/l UE with flexion and extension. Hand  in L hand with some limitations compared to R hand due to L 5th digit pain. Sensation to light tough intact and symmetrical in b/l UE.     Clinical findings and exam discussed with attending, Dr. Coronado.     A/P:  - based on conversation with pt and clinical findings, does not appear at this time to be related to regional block as symptoms have been present prior to surgery   - continue close monitoring of symptoms in L hand  - if continued or worsening pain, development of other symptoms, would recommend neurology consult as appropriate  - also discussed with vascular surgery team [id number] Informed by vascular team that pt endorsing continued pain/weakness in LUE after AVF creation yesterday 11/3/23. Pt had L sided supraclavicular block with 30mL Mepivicaine 1.5%.     Pt evaluated at bedside. Endorsing pain in the left 5th digit and at surgical site in L forearm. Denies similar pain in other digits or remainder of arm. Also denies numbness and tingling. Pt states that L 5th digit pain has been present since "some time, several days," although cannot remember any trauma or inciting event that caused the pain. Able to range the L 5th digit although limited by pain. No limitations to movement of rest of the arm.     On exam, diffuse tenderness of L 5th digit, although no notable erythema, warmth, or edema of the joint. Pt able to range 5th digit but endorsing pain with movement. Tenderness surrounding surgical site, covered by dressing which is clean/dry/intact. No tenderness to palpation of remainder of LUE, L shoulder elbow, and L hand. ROM and strength grossly intact in b/l UE with flexion and extension. Hand  in L hand with some limitations compared to R hand due to L 5th digit pain. Sensation to light tough intact and symmetrical in b/l UE.     Clinical findings and exam discussed with attending, Dr. Coronado.     A/P:  - based on conversation with pt, symptoms appear to have been present prior to surgery, less likely that they are related to anesthesia/regional block at this time   - continue close monitoring of symptoms in L hand  - if continued or worsening pain, development of other symptoms, would recommend neurology consult as appropriate  - also discussed with vascular surgery team [id number] Informed by vascular team that pt is endorsing pain/weakness in LUE after AVF creation yesterday 11/3/23. Pt had L sided supraclavicular block with 30mL Mepivicaine 1.5% per anesthesia record.     Pt evaluated at bedside. Endorsing pain in the left 5th digit and at surgical site in L forearm. Denies similar pain in other digits or remainder of arm. Also denies numbness and tingling. Pt states that L 5th digit pain has been present since "some time, several days," although cannot remember any trauma or inciting event that caused the pain.     On exam, diffuse tenderness of L 5th digit, although no notable erythema, warmth, or edema of the joint. Pt able to range 5th digit but endorsing pain with movement. Tenderness at surgical site, covered by dressing which is clean/dry/intact. No tenderness to palpation of remainder of LUE, L shoulder elbow, and L hand. Sensation to light tough grossly intact and symmetrical in b/l UE. ROM and strength grossly intact in b/l UE with flexion and extension. Hand  in L hand with some limitations compared to R hand due to L 5th digit pain.     Clinical findings and exam discussed with attending, Dr. Coronado.     A/P:  - based on conversation with pt, symptoms have been present prior to surgery, at this time less likely that symptoms are related to anesthesia/regional block  - continue close monitoring of symptoms in L hand  - supportive care/analgesia per primary team   - if continued or worsening pain, development of additional symptoms, would recommend neurology consult as appropriate  - also discussed with vascular surgery [id number] Informed by vascular team that pt is endorsing pain/weakness in LUE after AVF creation yesterday 11/3/23. Pt had L sided supraclavicular block per anesthesia record.     Pt evaluated at bedside. Endorsing pain in the left 5th digit and at surgical site in L forearm. Denies similar pain in other digits or remainder of arm. Also denies numbness and tingling. Pt states that L 5th digit pain has been present since "some time, several days," although cannot remember any trauma or inciting event that caused the pain.     On exam, diffuse tenderness of L 5th digit, although no notable erythema, warmth, or edema of the joint. Pt able to range 5th digit but endorsing pain with movement. Tenderness at surgical site, covered by dressing which is clean/dry/intact. No tenderness to palpation of remainder of LUE, L shoulder elbow, and L hand. Sensation to light tough grossly intact and symmetrical in b/l UE. ROM and strength grossly intact in b/l UE with flexion and extension. Hand  in L hand with some limitations compared to R hand due to L 5th digit pain.     Clinical findings and exam discussed with attending, Dr. Coronado.     A/P:  - based on conversation with pt, symptoms have been present prior to surgery, at this time less likely that symptoms are related to anesthesia/regional block  - continue close monitoring of symptoms in L hand  - supportive care/analgesia per primary team   - if continued or worsening pain, development of additional symptoms, would recommend neurology consult as appropriate  - also discussed with vascular surgery [id number] Informed by vascular team that pt is endorsing pain/weakness in LUE after AVF creation yesterday 11/3/23. Pt had L sided supraclavicular block per anesthesia record.     Pt evaluated at bedside. Endorsing pain in the left 5th digit and at surgical site in L forearm. Denies similar pain in other digits or remainder of arm. Also denies numbness and tingling. Pt states that L 5th digit pain has been present since "some time, several days," although cannot remember any trauma or inciting event that caused the pain.     On exam, diffuse tenderness of L 5th digit, although no notable erythema, warmth, or edema of the joint. Pt able to range 5th digit but endorsing pain with movement. Tenderness at surgical site, covered by dressing which is clean/dry/intact. No tenderness to palpation of remainder of LUE, L shoulder elbow, and L hand. Sensation to light tough grossly intact and symmetrical in b/l UE. ROM and strength grossly intact in b/l UE with flexion and extension. Hand  in L hand with some limitations compared to R hand due to L 5th digit pain.     Clinical findings and exam discussed with attending, Dr. Coronado.     A/P:  - based on conversation with pt, symptoms have been present prior to surgery and clinical findings, at this time less likely that symptoms are related to anesthesia/regional block  - continue close monitoring of symptoms in L hand  - supportive care/analgesia per primary team   - if continued or worsening pain, development of additional symptoms, would recommend neurology consult as appropriate  - also discussed with vascular surgery [id number] Informed by vascular team that pt is endorsing pain/weakness in LUE after AVF creation yesterday 11/3/23. Pt had L sided supraclavicular block with Mepivicaine 1.5% 30mL per anesthesia record.     Pt evaluated at bedside. Endorsing pain in the left 5th digit and at surgical site in L forearm. Denies similar pain in other digits or remainder of arm. Also denies numbness and tingling. Pt states that L 5th digit pain has been present since "some time, several days," although cannot remember any trauma or inciting event that caused the pain.     On exam, diffuse tenderness of L 5th digit, although no notable erythema, warmth, or edema of the joint. Pt able to range 5th digit but endorsing pain with movement. Tenderness at surgical site, covered by dressing which is clean/dry/intact. No tenderness to palpation of remainder of LUE, L shoulder elbow, and L hand. Sensation to light tough grossly intact and symmetrical in b/l UE. ROM and strength grossly intact in b/l UE with flexion and extension. Hand  in L hand with some limitations compared to R hand due to L 5th digit pain.     Clinical findings and exam discussed with attending, Dr. Coronado.     A/P:  - based on conversation with pt, symptoms have been present prior to surgery and clinical findings, at this time less likely that symptoms are related to anesthesia/regional block  - continue close monitoring of symptoms in L hand  - supportive care/analgesia per primary team   - if continued or worsening pain, development of additional symptoms, would recommend neurology consult as appropriate  - also discussed with vascular surgery [id number] Informed by vascular team that pt is endorsing pain/weakness in LUE after AVF creation yesterday 11/3/23. Pt had L sided supraclavicular block with Mepivicaine 1.5% 30mL per anesthesia record.     Pt evaluated at bedside. Endorsing pain in the left 5th digit and at surgical site in L forearm. Denies similar pain in other digits or remainder of arm. Also denies numbness and tingling. Pt states that L 5th digit pain has been present since "some time, several days," although cannot remember any trauma or inciting event that caused the pain.     On exam, diffuse tenderness of L 5th digit, although no notable erythema, warmth, or edema of the joint. Pt able to range 5th digit but endorsing pain with movement. Tenderness at surgical site, covered by dressing which is clean/dry/intact. No tenderness to palpation of remainder of LUE, L shoulder elbow, and L hand. Sensation to light tough grossly intact and symmetrical in b/l UE. ROM and strength grossly intact in b/l UE with flexion and extension. Hand  in L hand with some limitations compared to R hand due to L 5th digit pain.     Clinical findings/exam and plan discussed with attending, Dr. Coronado.     A/P:  - based on conversation with pt, symptoms have been present prior to surgery and clinical findings, at this time less likely that symptoms are related to anesthesia/regional block  - continue close monitoring of symptoms in L hand  - supportive care/analgesia per primary team   - if continued or worsening pain, development of additional symptoms, would recommend neurology consult as appropriate  - also discussed with vascular surgery [id number]

## 2023-11-04 NOTE — DISCHARGE NOTE PROVIDER - NSDCFUADDAPPT_GEN_ALL_CORE_FT
Please see the following doctors:     1. Dr. Coronel, Vascular surgery for follow up of your fistula   2. Nephrology- to monitor your kidney function and discuss when to start dialysis   3. Pulmonary- for your sarcoidosis   4. Gastroenterology- to get a colonoscopy because you are anemic (low blood count)

## 2023-11-04 NOTE — PROGRESS NOTE ADULT - SUBJECTIVE AND OBJECTIVE BOX
Ar Kruse MD  Interventional Cardiology / [facility]  [place] Office : [phone] [place] [address] [place] [address]  Tel:   [place] Office : [phone] [facility] [place] [address]  Tel: [phone]       Pt is lying in bed comfortable not in distress, no chest pains no SOB no palpitations  	  MEDICATIONS:  amLODIPine   Tablet 10 milliGRAM(s) Oral daily  aspirin enteric coated 81 milliGRAM(s) Oral daily  buMETAnide 2 milliGRAM(s) Oral daily  carvedilol 12.5 milliGRAM(s) Oral every 12 hours  heparin   Injectable 5000 Unit(s) SubCutaneous every 8 hours  hydrALAZINE 100 milliGRAM(s) Oral three times a day        acetaminophen     Tablet .. 650 milliGRAM(s) Oral every 6 hours PRN  melatonin 3 milliGRAM(s) Oral at bedtime PRN  ondansetron Injectable 4 milliGRAM(s) IV Push every 8 hours PRN    aluminum hydroxide/magnesium hydroxide/simethicone Suspension 30 milliLiter(s) Oral every 4 hours PRN    atorvastatin 40 milliGRAM(s) Oral at bedtime  dextrose 50% Injectable 25 Gram(s) IV Push once  dextrose 50% Injectable 12.5 Gram(s) IV Push once  dextrose 50% Injectable 25 Gram(s) IV Push once  dextrose Oral Gel 15 Gram(s) Oral once PRN  glucagon  Injectable 1 milliGRAM(s) IntraMuscular once  insulin lispro (ADMELOG) corrective regimen sliding scale   SubCutaneous three times a day before meals  insulin lispro (ADMELOG) corrective regimen sliding scale   SubCutaneous at bedtime    cholecalciferol 1000 Unit(s) Oral daily  dextrose 5%. 1000 milliLiter(s) IV Continuous <Continuous>  dextrose 5%. 1000 milliLiter(s) IV Continuous <Continuous>      PAST MEDICAL/SURGICAL HISTORY  PAST MEDICAL & SURGICAL HISTORY:  Type 2 diabetes mellitus      Bilateral cataracts      Fluid in pleural cavity associated with pancreatitis      Pancreatic pseudocyst/cyst  s/p drain placement and removal      HTN (hypertension)      HLD (hyperlipidemia)      H/O sarcoidosis      PVD (peripheral vascular disease)      Sensorineural hearing loss, bilateral      H/O CHF      Chronic kidney disease (CKD)      Anemia      History of amputation of right great toe  2018      H/O:  section        History of laparoscopic cholecystectomy          SOCIAL HISTORY: Substance Use (street drugs): ( x ) never used  (  ) other:    FAMILY HISTORY:  FH: hypertension (Mother)       PHYSICAL EXAM:  T(C): 36.7 (23 @ 15:50), Max: 36.7 (23 @ 15:50)  HR: 78 (23 @ 15:50) (66 - 80)  BP: 140/61 (23 @ 15:50) (133/64 - 154/65)  RR: 17 (23 @ 15:50) (17 - 18)  SpO2: 99% (23 @ 15:50) (98% - 100%)  Wt(kg): --  I&O's Summary    2023 07:01  -  2023 07:00  --------------------------------------------------------  IN: 0 mL / OUT: 1200 mL / NET: -1200 mL          GENERAL: NAD  EYES:   PERRLA   ENMT:   Moist mucous membranes, Good dentition, No lesions  Cardiovascular: Normal S1 S2, No JVD, No murmurs, No edema  Respiratory: Lungs clear to auscultation	  Gastrointestinal:  Soft, Non-tender, + BS	  Extremities: no edema s/p AVF                                     7.2    3.79  )-----------( 204      ( 2023 04:59 )             24.1     11-04    140  |  109<H>  |  102<H>  ----------------------------<  89  4.9   |  20<L>  |  4.62<H>    Ca    8.8      2023 04:59  Phos  5.3     11-04  Mg     2.00     11-04      proBNP:   Lipid Profile:   HgA1c:   TSH:     Consultant(s) Notes Reviewed:  [x ] YES  [ ] NO    Care Discussed with Consultants/Other Providers [ x] YES  [ ] NO    Imaging Personally Reviewed independently:  [x] YES  [ ] NO    All labs, radiologic studies, vitals, orders and medications list reviewed. Patient is seen and examined at bedside. Case discussed with medical team.

## 2023-11-04 NOTE — DISCHARGE NOTE PROVIDER - NSDCMRMEDTOKEN_GEN_ALL_CORE_FT
amLODIPine 10 mg oral tablet: 1 tab(s) orally once a day  aspirin 81 mg oral delayed release tablet: 1 tab(s) orally once a day  atorvastatin 40 mg oral tablet: 1 tab(s) orally once a day (at bedtime)  bumetanide 2 mg oral tablet: 1 tab(s) orally once a day  carvedilol 12.5 mg oral tablet: 1 tab(s) orally 2 times a day  cholecalciferol 25 mcg (1000 intl units) oral tablet: 1 tab(s) orally once a day  hydrALAZINE 100 mg oral tablet: 1 tab(s) orally 3 times a day   amLODIPine 10 mg oral tablet: 1 tab(s) orally once a day  aspirin 81 mg oral delayed release tablet: 1 tab(s) orally once a day  atorvastatin 40 mg oral tablet: 1 tab(s) orally once a day (at bedtime)  bumetanide 2 mg oral tablet: 1 tab(s) orally once a day  carvedilol 12.5 mg oral tablet: 1 tab(s) orally 2 times a day  cholecalciferol 25 mcg (1000 intl units) oral tablet: 1 tab(s) orally once a day  hydrALAZINE 100 mg oral tablet: 1 tab(s) orally 3 times a day  sevelamer carbonate 800 mg oral tablet: 1 tab(s) orally 3 times a day (with meals)

## 2023-11-04 NOTE — PROGRESS NOTE ADULT - PROBLEM SELECTOR PROBLEM 2
Anemia
MANDY (acute kidney injury)
Anemia
MANDY (acute kidney injury)
MANDY (acute kidney injury)
Anemia
MANDY (acute kidney injury)

## 2023-11-04 NOTE — DISCHARGE NOTE PROVIDER - PROVIDER TOKENS
PROVIDER:[TOKEN:[[id number]],FOLLOWUP:[2 weeks]] PROVIDER:[TOKEN:[[id number]],FOLLOWUP:[2 weeks]] PROVIDER:[TOKEN:[[id number]],FOLLOWUP:[2 weeks]]

## 2023-11-08 ENCOUNTER — APPOINTMENT (OUTPATIENT)
Dept: OTOLARYNGOLOGY | Facility: CLINIC | Age: 68
End: 2023-11-08

## 2023-11-08 LAB
% ALBUMIN: 39.8 % — SIGNIFICANT CHANGE UP
% ALBUMIN: 39.8 % — SIGNIFICANT CHANGE UP
% ALPHA 1: 3.9 % — SIGNIFICANT CHANGE UP
% ALPHA 1: 3.9 % — SIGNIFICANT CHANGE UP
% ALPHA 2: 14.9 % — SIGNIFICANT CHANGE UP
% ALPHA 2: 14.9 % — SIGNIFICANT CHANGE UP
% BETA: 11.8 % — SIGNIFICANT CHANGE UP
% BETA: 11.8 % — SIGNIFICANT CHANGE UP
% GAMMA: 29.7 % — SIGNIFICANT CHANGE UP
% GAMMA: 29.7 % — SIGNIFICANT CHANGE UP
ALBUMIN SERPL ELPH-MCNC: 2.71 G/DL — LOW (ref 3.3–4.4)
ALBUMIN SERPL ELPH-MCNC: 2.71 G/DL — LOW (ref 3.3–4.4)
ALBUMIN/GLOB SERPL ELPH: 0.7 RATIO — SIGNIFICANT CHANGE UP
ALBUMIN/GLOB SERPL ELPH: 0.7 RATIO — SIGNIFICANT CHANGE UP
ALPHA1 GLOB SERPL ELPH-MCNC: 0.27 G/DL — SIGNIFICANT CHANGE UP (ref 0.1–0.3)
ALPHA1 GLOB SERPL ELPH-MCNC: 0.27 G/DL — SIGNIFICANT CHANGE UP (ref 0.1–0.3)
ALPHA2 GLOB SERPL ELPH-MCNC: 1 G/DL — SIGNIFICANT CHANGE UP (ref 0.6–1)
ALPHA2 GLOB SERPL ELPH-MCNC: 1 G/DL — SIGNIFICANT CHANGE UP (ref 0.6–1)
B-GLOBULIN SERPL ELPH-MCNC: 0.8 G/DL — SIGNIFICANT CHANGE UP (ref 0.6–1.1)
B-GLOBULIN SERPL ELPH-MCNC: 0.8 G/DL — SIGNIFICANT CHANGE UP (ref 0.6–1.1)
GAMMA GLOBULIN: 2.02 G/DL — HIGH (ref 0.7–1.7)
GAMMA GLOBULIN: 2.02 G/DL — HIGH (ref 0.7–1.7)
PROT PATTERN SERPL ELPH-IMP: SIGNIFICANT CHANGE UP
PROT PATTERN SERPL ELPH-IMP: SIGNIFICANT CHANGE UP
PROT SERPL-MCNC: 6.8 G/DL — SIGNIFICANT CHANGE UP
PROT SERPL-MCNC: 6.8 G/DL — SIGNIFICANT CHANGE UP

## 2023-11-13 ENCOUNTER — APPOINTMENT (OUTPATIENT)
Dept: PHARMACY | Facility: CLINIC | Age: 68
End: 2023-11-13

## 2023-11-21 ENCOUNTER — APPOINTMENT (OUTPATIENT)
Dept: VASCULAR SURGERY | Facility: CLINIC | Age: 68
End: 2023-11-21
Payer: MEDICARE

## 2023-11-21 VITALS
HEART RATE: 76 BPM | DIASTOLIC BLOOD PRESSURE: 80 MMHG | WEIGHT: 156 LBS | HEIGHT: 64 IN | TEMPERATURE: 98.2 F | SYSTOLIC BLOOD PRESSURE: 164 MMHG | BODY MASS INDEX: 26.63 KG/M2

## 2023-11-21 DIAGNOSIS — N18.9 CHRONIC KIDNEY DISEASE, UNSPECIFIED: ICD-10-CM

## 2023-11-21 PROCEDURE — 99024 POSTOP FOLLOW-UP VISIT: CPT

## 2023-12-06 ENCOUNTER — APPOINTMENT (OUTPATIENT)
Dept: NEPHROLOGY | Facility: CLINIC | Age: 68
End: 2023-12-06
Payer: MEDICARE

## 2023-12-06 VITALS
HEART RATE: 72 BPM | DIASTOLIC BLOOD PRESSURE: 71 MMHG | SYSTOLIC BLOOD PRESSURE: 133 MMHG | OXYGEN SATURATION: 99 % | TEMPERATURE: 97.7 F | BODY MASS INDEX: 28.42 KG/M2 | WEIGHT: 166.45 LBS | HEIGHT: 64 IN

## 2023-12-06 PROCEDURE — 99214 OFFICE O/P EST MOD 30 MIN: CPT

## 2023-12-07 LAB
ALBUMIN SERPL ELPH-MCNC: 3.4 G/DL
ANION GAP SERPL CALC-SCNC: 14 MMOL/L
BUN SERPL-MCNC: 80 MG/DL
CALCIUM SERPL-MCNC: 8.7 MG/DL
CHLORIDE SERPL-SCNC: 108 MMOL/L
CO2 SERPL-SCNC: 21 MMOL/L
CREAT SERPL-MCNC: 4.25 MG/DL
CREAT SPEC-SCNC: 54 MG/DL
EGFR: 11 ML/MIN/1.73M2
GLUCOSE SERPL-MCNC: 151 MG/DL
HCT VFR BLD CALC: 26 %
HGB BLD-MCNC: 7.8 G/DL
MCHC RBC-ENTMCNC: 27.1 PG
MCHC RBC-ENTMCNC: 30 GM/DL
MCV RBC AUTO: 90.3 FL
MICROALBUMIN 24H UR DL<=1MG/L-MCNC: 217.4 MG/DL
MICROALBUMIN/CREAT 24H UR-RTO: 4063 MG/G
PHOSPHATE SERPL-MCNC: 5.1 MG/DL
PLATELET # BLD AUTO: 196 K/UL
POTASSIUM SERPL-SCNC: 4.3 MMOL/L
RBC # BLD: 2.88 M/UL
RBC # FLD: 16.8 %
SODIUM SERPL-SCNC: 143 MMOL/L
WBC # FLD AUTO: 3.96 K/UL

## 2023-12-15 LAB
FERRITIN SERPL-MCNC: 320 NG/ML
HCT VFR BLD CALC: 25.7 %
HGB BLD-MCNC: 7.8 G/DL
IRON SATN MFR SERPL: 29 %
IRON SERPL-MCNC: 69 UG/DL
MCHC RBC-ENTMCNC: 27.4 PG
MCHC RBC-ENTMCNC: 30.4 GM/DL
MCV RBC AUTO: 90.2 FL
PLATELET # BLD AUTO: 167 K/UL
RBC # BLD: 2.85 M/UL
RBC # FLD: 16.4 %
TIBC SERPL-MCNC: 241 UG/DL
UIBC SERPL-MCNC: 172 UG/DL
WBC # FLD AUTO: 3.75 K/UL

## 2023-12-15 RX ORDER — METFORMIN HYDROCHLORIDE 500 MG/1
500 TABLET, COATED ORAL
Refills: 0 | Status: DISCONTINUED | COMMUNITY
End: 2023-12-15

## 2023-12-20 ENCOUNTER — APPOINTMENT (OUTPATIENT)
Dept: VASCULAR SURGERY | Facility: CLINIC | Age: 68
End: 2023-12-20
Payer: MEDICARE

## 2023-12-20 VITALS
TEMPERATURE: 98.3 F | DIASTOLIC BLOOD PRESSURE: 84 MMHG | BODY MASS INDEX: 28.34 KG/M2 | HEART RATE: 81 BPM | SYSTOLIC BLOOD PRESSURE: 181 MMHG | HEIGHT: 64 IN | WEIGHT: 166 LBS

## 2023-12-20 DIAGNOSIS — Z78.9 OTHER SPECIFIED HEALTH STATUS: ICD-10-CM

## 2023-12-20 DIAGNOSIS — Z82.49 FAMILY HISTORY OF ISCHEMIC HEART DISEASE AND OTHER DISEASES OF THE CIRCULATORY SYSTEM: ICD-10-CM

## 2023-12-20 DIAGNOSIS — Z84.1 FAMILY HISTORY OF DISORDERS OF KIDNEY AND URETER: ICD-10-CM

## 2023-12-20 PROCEDURE — 99213 OFFICE O/P EST LOW 20 MIN: CPT

## 2023-12-20 PROCEDURE — 93990 DOPPLER FLOW TESTING: CPT

## 2023-12-20 NOTE — ASSESSMENT
[Arterial/Venous Disease] : arterial/venous disease [Other: _____] : [unfilled] [FreeTextEntry1] : Impression - chronic kidney disease not on HD yet, left avf maturing well  Plan LUE restrictions - no bp, IV, or blood draw Left avf maturing well with good volume flow but vein sizes are still small Return to office when closer to dialysis with repeat LUE dialysis ultrasound

## 2023-12-20 NOTE — PHYSICAL EXAM
[2+] : left 2+ [No Rash or Lesion] : No rash or lesion [Alert] : alert [Oriented to Person] : oriented to person [Oriented to Place] : oriented to place [Oriented to Time] : oriented to time [Calm] : calm [de-identified] : NAD [de-identified] : NCAT [de-identified] : unlabored breathing [FreeTextEntry1] : LUE incision well healed no swelling, bleeding or drainage palpable left radial and ulnar pulses good palpable thrill over left avf brisk capillary refill < 2 sec [de-identified] : EARLL [de-identified] : LUE incision well healed [de-identified] : elin cranial nerves 2-12 elin grossly intact [de-identified] : cooperative

## 2023-12-20 NOTE — DATA REVIEWED
[FreeTextEntry1] : 12/20/2023 LUE dialysis ultrasound left patent radiocephalic avf without stenosis volume flow 734 ml/min diam prox 5 mm, diam mid 5.2 mm, diam distal 4.1 mm

## 2023-12-20 NOTE — HISTORY OF PRESENT ILLNESS
[FreeTextEntry1] : Pt presents for post op visit. She is s/p left radiocephalic avf on 11/3/2023. Accompanied by daughter. OSMAR dotson c/d/i. Reports mild incisional pain. Reports numbness and tingling in all 10 fingers. Daughter states pt had numbness and tingling in all fingers even before surgery. Denies bleeding, drainage or motor deficit. She is not on HD yet. Denies problems with lower extremities. [de-identified] : 12/20/2023 Pt is here to evaluate maturity of left avf. Accompanied by daughter. She is s/p left radiocephalic avf on 11/3/2023. Not on HD yet. No new complaints. LUE incision well healed.

## 2023-12-27 ENCOUNTER — APPOINTMENT (OUTPATIENT)
Dept: NEPHROLOGY | Facility: CLINIC | Age: 68
End: 2023-12-27
Payer: MEDICARE

## 2023-12-27 VITALS
DIASTOLIC BLOOD PRESSURE: 62 MMHG | SYSTOLIC BLOOD PRESSURE: 104 MMHG | OXYGEN SATURATION: 98 % | HEIGHT: 64 IN | WEIGHT: 165.34 LBS | HEART RATE: 77 BPM | TEMPERATURE: 97.2 F | BODY MASS INDEX: 28.23 KG/M2

## 2023-12-27 PROCEDURE — 96372 THER/PROPH/DIAG INJ SC/IM: CPT

## 2023-12-27 RX ORDER — DARBEPOETIN ALFA 40 UG/ML
40 SOLUTION INTRAVENOUS; SUBCUTANEOUS
Qty: 0 | Refills: 0 | Status: COMPLETED | OUTPATIENT
Start: 2023-12-15

## 2023-12-27 NOTE — ASSESSMENT
[FreeTextEntry1] : Hct 25.7 /Hgb 7.8 BP acceptable Aranesp 40 mcg SQ x 1 Pt tolerated procedure well Labs today

## 2023-12-29 LAB
ALBUMIN SERPL ELPH-MCNC: 3.6 G/DL
ANION GAP SERPL CALC-SCNC: 16 MMOL/L
BUN SERPL-MCNC: 81 MG/DL
CALCIUM SERPL-MCNC: 8.8 MG/DL
CHLORIDE SERPL-SCNC: 109 MMOL/L
CO2 SERPL-SCNC: 21 MMOL/L
CREAT SERPL-MCNC: 4.69 MG/DL
EGFR: 10 ML/MIN/1.73M2
FERRITIN SERPL-MCNC: 310 NG/ML
FOLATE SERPL-MCNC: 6.1 NG/ML
GLUCOSE SERPL-MCNC: 153 MG/DL
HCT VFR BLD CALC: 25.3 %
HGB BLD-MCNC: 7.6 G/DL
IRON SATN MFR SERPL: 28 %
IRON SERPL-MCNC: 60 UG/DL
MCHC RBC-ENTMCNC: 27.1 PG
MCHC RBC-ENTMCNC: 30 GM/DL
MCV RBC AUTO: 90.4 FL
PHOSPHATE SERPL-MCNC: 5.1 MG/DL
PLATELET # BLD AUTO: 187 K/UL
POTASSIUM SERPL-SCNC: 4.7 MMOL/L
RBC # BLD: 2.8 M/UL
RBC # FLD: 16.4 %
SODIUM SERPL-SCNC: 146 MMOL/L
TIBC SERPL-MCNC: 212 UG/DL
UIBC SERPL-MCNC: 152 UG/DL
VIT B12 SERPL-MCNC: 844 PG/ML
WBC # FLD AUTO: 3.57 K/UL

## 2024-01-09 LAB
ALBUMIN SERPL ELPH-MCNC: 4 G/DL
ANION GAP SERPL CALC-SCNC: 13 MMOL/L
BUN SERPL-MCNC: 75 MG/DL
CALCIUM SERPL-MCNC: 8.9 MG/DL
CHLORIDE SERPL-SCNC: 104 MMOL/L
CO2 SERPL-SCNC: 23 MMOL/L
CREAT SERPL-MCNC: 4.2 MG/DL
EGFR: 11 ML/MIN/1.73M2
GLUCOSE SERPL-MCNC: 151 MG/DL
HCT VFR BLD CALC: 30 %
HGB BLD-MCNC: 9.1 G/DL
MCHC RBC-ENTMCNC: 28 PG
MCHC RBC-ENTMCNC: 30.3 GM/DL
MCV RBC AUTO: 92.3 FL
PHOSPHATE SERPL-MCNC: 5.3 MG/DL
PLATELET # BLD AUTO: 217 K/UL
POTASSIUM SERPL-SCNC: 5.2 MMOL/L
RBC # BLD: 3.25 M/UL
RBC # FLD: 16.6 %
SODIUM SERPL-SCNC: 141 MMOL/L
WBC # FLD AUTO: 3.44 K/UL

## 2024-01-09 NOTE — H&P PST ADULT - TYMPANIC MEMBRANE R
Bed: H301  Expected date:   Expected time:   Means of arrival:   Comments:  24   normal light reflex

## 2024-01-10 ENCOUNTER — APPOINTMENT (OUTPATIENT)
Dept: NEPHROLOGY | Facility: CLINIC | Age: 69
End: 2024-01-10

## 2024-01-11 ENCOUNTER — APPOINTMENT (OUTPATIENT)
Dept: PULMONOLOGY | Facility: CLINIC | Age: 69
End: 2024-01-11
Payer: MEDICARE

## 2024-01-11 VITALS
OXYGEN SATURATION: 100 % | HEIGHT: 64 IN | BODY MASS INDEX: 27.66 KG/M2 | HEART RATE: 79 BPM | DIASTOLIC BLOOD PRESSURE: 69 MMHG | WEIGHT: 162 LBS | SYSTOLIC BLOOD PRESSURE: 138 MMHG

## 2024-01-11 DIAGNOSIS — G47.36 SLEEP RELATED HYPOVENTILATION IN CONDITIONS CLASSIFIED ELSEWHERE: ICD-10-CM

## 2024-01-11 DIAGNOSIS — D86.9 SARCOIDOSIS, UNSPECIFIED: ICD-10-CM

## 2024-01-11 PROCEDURE — 82803 BLOOD GASES ANY COMBINATION: CPT

## 2024-01-11 PROCEDURE — 99214 OFFICE O/P EST MOD 30 MIN: CPT | Mod: 25

## 2024-01-11 PROCEDURE — 36600 WITHDRAWAL OF ARTERIAL BLOOD: CPT | Mod: 59

## 2024-01-11 PROCEDURE — ZZZZZ: CPT

## 2024-01-11 NOTE — REASON FOR VISIT
[Follow-Up] : a follow-up visit [Shortness of Breath] : shortness of breath [Sarcoidosis] : sarcoidosis [Family Member] : family member [Home] : at home, [unfilled] , at the time of the visit. [Medical Office: ([facility])___] : at the medical office located in

## 2024-01-11 NOTE — HISTORY OF PRESENT ILLNESS
[Former] : former [TextBox_4] : 68 year old female with chronic hypercapnic respiratory failure on NIV, sarcoidosis, diabetes, hypertension, hyperlipidemia, iron deficiency anemia, pancreatic pseudocyst s/p drainage, heart failure, cardiac arrest x 2.  Patient is accompanied by her daughter.  She had an AV fistula placed in November.  She has not yet started dialysis but being closely monitored by nephrology.  Daughter states patient seems lethargic at times, attributes this to anemia.  States that family is putting the NIV on her most nights but may miss 1 to 2 days a week.  ABG (1/11/2024) 7.41/36/101/22/96% TTE (8/31/2022) EF 55%. Low normal LV systolic function. Mild diastolic dysfunction. RV enlargement with decreased RV systolic function.  Device: Trilogy Settings: AVAPS-AE, TV: 400, EPAP 5 - 15, PS 10 -25, Max pressure 30

## 2024-01-11 NOTE — ASSESSMENT
[FreeTextEntry1] : 68-year-old female chronic respiratory failure on NIV, sarcoidosis, right heart failure, diabetes, hypertension, hyperlipidemia, iron deficiency anemia, pancreatic pseudocyst s/p drainage, including cardiac arrest x 2 here for a follow up visit. Clinically stable. Reviewed compliance data and usage has not been consistent.  ABG however does not show evidence of hypercapnia. Advised to increase use.  Will check PSG with TCO2 monitoring to evaluate severity and continued need for NIV.  Patient was unable to perform PFTs today. Daughter to have results of TTE faxed over.   Follow-up in 3 months, sooner if needed.   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

## 2024-01-11 NOTE — PHYSICAL EXAM
[No Acute Distress] : no acute distress [Normal Oropharynx] : normal oropharynx [Normal Appearance] : normal appearance [No Neck Mass] : no neck mass [Normal Rate/Rhythm] : normal rate/rhythm [Normal S1, S2] : normal s1, s2 [No Murmurs] : no murmurs [No Resp Distress] : no resp distress [Clear to Auscultation Bilaterally] : clear to auscultation bilaterally [No Abnormalities] : no abnormalities [Benign] : benign [No Clubbing] : no clubbing [No Cyanosis] : no cyanosis [No Edema] : no edema [FROM] : FROM [Normal Color/ Pigmentation] : normal color/ pigmentation [No Focal Deficits] : no focal deficits [Normal Mood] : normal mood [Normal Affect] : normal affect [TextBox_11] : Hard of hearing [TextBox_99] : Walks with cane

## 2024-01-29 NOTE — PATIENT PROFILE ADULT - NSPROHARDOFHEAROTHER_GEN_ALL_CORE
[facility] Locations - No appointment necessary.  ? indicates the location is open Saturdays in addition to Monday through Friday.   Call your preferred location for test preparation, business hours and other information you need.   [facility] accepts all insurances.  CENTRAL  [place]AST  [place]    ? [facility]   [address]   [address]   [place] [address]    Ph: [phone]  [facility]   [address] [place] [address]    Ph: [phone]   ? [facility]   [address]    [place] [address]    Ph: [phone]     [facility]   [address] [place] [address]    Ph: [phone] ? [address] [place] [address]   Ph: [phone]  ? [facility]   [address] [place] [address]    Ph: [phone]      [place]   [address] [place] [address]   Ph: [phone]    NORTH    ? [facility]   [address] [place] [address]    Ph: [phone]  [facility]   [address] [place] [address]   Ph: [phone]  [place]   [address], [address]    PH: [phone]    [facility]   [address] [place] [address]    Ph: [phone]  [place]  [address] [place] [address]  Ph: [phone]  [facility]   [address] [place] [address]    Ph: [phone]       
Pt is currently intubated

## 2024-01-31 NOTE — ASU DISCHARGE PLAN (ADULT/PEDIATRIC) - CARE PROVIDER_API CALL
MEDICARE WELLNESS VISIT NOTE    HISTORY OF PRESENT ILLNESS:   Storm presents for his Subsequent Annual Medicare Wellness Visit.   He has complaints or concerns which include productive deep cough; UC follow up-sciatic pain; right eye pain ongoing; rectal bleeding x 5 months intermittently.      Patient Care Team:  Flex Jensen MD as PCP - General (Internal Medicine)  Jose Daniel Peterson, Gomez LEVINE MD (Gastroenterology)  Ashley Shaver OD (Optometry)  Provider, Outside (Ophthalmology Retina Specialist)        Patient Active Problem List   Diagnosis    Sciatica    Genital herpes, unspecified    CHRONIC LOW BACK PAIN    Mixed hyperlipidemia    Cancer of skin    Essential hypertension    Mild intermittent asthma without complication    CKD stage 3 secondary to diabetes (CMD)    Fasting hyperglycemia    Obesity, morbid (CMD)    Agatston coronary artery calcium score greater than 400         Past Medical History:   Diagnosis Date    Cancer of skin     Carpal tunnel syndrome, bilateral 2018    CHRONIC LOW BACK PAIN     Essential (primary) hypertension     Family history of malignant neoplasm of gastrointestinal tract 09/25/2017    sister    Gastritis 09/25/2017    Genital herpes, unspecified     GERD     Helicobacter pylori infection 09/25/2017    positive    Left knee pain 2017    Personal history of colonic polyps 09/25/2017    tubular adenoma    Pneumonia 10/2019    left lower lung by exam    Sciatica     Squamous cell carcinoma     Tobacco use disorder          Past Surgical History:   Procedure Laterality Date    Appendectomy  1981    Carpal tunnel release Right 12/03/2018    Dr Quintero    Colonoscopy diagnostic  09/25/2017    Dr. Sky f/u 9/2022    Esophagogastroduodenoscopy transoral flex w/bx single or mult  09/25/2017    Dr. Sky    Eye surgery      Strabismus x 2    Hemorhoidectomy int ext single column group  1988    Hemorrhoidectomy    Knee arthroscopy w/ meniscectomy Left 11/22/2017    Laminectomy,lumbar  1996     Laminectomy L4-5    Mohs 1 stage upto5 tissue blocks hnhfg N/A 2018    SCC left medial lower lip vermillion     Other surgical history  2020    brachial cleft cyst excision    Removal of sperm duct(s)      Vasectomy    Remove tonsils/adenoids,<12 y/o  1960    T & A         Social History     Tobacco Use    Smoking status: Former     Current packs/day: 0.00     Average packs/day: 0.3 packs/day for 40.0 years (10.0 ttl pk-yrs)     Types: Cigarettes     Start date: 2021     Quit date: 9/15/2022     Years since quittin.3    Smokeless tobacco: Never    Tobacco comments:     Patient states that he was a 2ppd smoker then down to 1ppd for the last 5yrs of his smoking history.   Vaping Use    Vaping Use: never used   Substance Use Topics    Alcohol use: No     Comment: None since ; previous occasional binge drinking    Drug use: No     Drug use:    Drug Use:    No              Family History   Problem Relation Age of Onset    Glaucoma Mother     Cataracts Mother     Diabetes Mother     Hypertension Mother     Osteoarthritis Mother     Stroke Mother     Other Mother         GALLSTONES/ULCERS    Congestive Heart Failure Mother     Heart Father         CABG,    Osteoarthritis Father     Cancer Father     Cancer, Colon Sister     Cancer Sister         LUNG    Cancer Sister         SKIN    Other Sister         IBS    Cancer Brother         bladder    Alcohol Abuse Brother     Migraine Daughter     Other Daughter         KIDNEY DISEASE    Cancer Other         COLON    Migraine Other     Blindness Neg Hx        Current Outpatient Medications   Medication Sig Dispense Refill    esomeprazole (NexIUM) 20 MG capsule Take 1 capsule by mouth daily (before breakfast). 90 capsule 3    timolol (TIMOPTIC) 0.5 % ophthalmic solution Place 1 drop into both eyes daily. 15 mL 1    gabapentin (NEURONTIN) 300 MG capsule Take 1 capsule by mouth at bedtime. 90 capsule 0    amLODIPine (NORVASC) 5 MG tablet Take 1 tablet by  mouth once daily 90 tablet 0    latanoprost (XALATAN) 0.005 % ophthalmic solution Place 1 drop into both eyes nightly. 2.5 mL 6    losartan (COZAAR) 50 MG tablet Take 1 tablet by mouth daily. 90 tablet 3    rosuvastatin (CRESTOR) 10 MG tablet Take 1 tablet by mouth daily. 90 tablet 3    carvedilol (COREG) 6.25 MG tablet Take 1 tablet by mouth in the morning and 1 tablet in the evening. Take with meals. 180 tablet 3     No current facility-administered medications for this visit.        The following items on the Medicare Health Risk Assessment were found to be positive  1.) Do you have an Advance directive, living will, or power of  for health care document that contains your wishes for end of life care?: No     3.) During the past 4 weeks, how would you rate your health?: Fair     6 a.) How many servings of Fruits and Vegetables do you have each day ( 1 serving = 1 piece of fruit, 1/2 cup fruits or vegetables): 1 per day     6 b.) How many servings of High Fiber / Whole Grain Foods to you have each day ( 1 serving = 1 cup cold cereal, 1/2 cup cooked cereal, 1 slice bread): 1 per day     6 d.) How many servings of Sugar Sweetened Beverages do you have each day ( 1 serving = 1 can or 12 oz cup of sode or juice): 2 per day     7c.) Do you worry about falling?: Yes     11d.) Bodily pain: Often     11i.) Problems using the telephone: Sometimes     15.) How confident are you that you can control and manage most of your health problems?: Somewhat confident          Hearing screens: Hearing Screening - Comments:: Whisper screening test results:  Pt uses hearing aids    Advance care planning documents on file - no     Cognitive/Functional Status: no evidence of cognitive dysfunction by direct observation    Opioid Review: Storm is not taking opioid medications.    Recent PHQ 2/9 Score:    PHQ 2:  PHQ 2 Score Adult PHQ 2 Score Adult PHQ 2 Interpretation Little interest or pleasure in activity?   1/24/2024  11:50 AM 2  No further screening needed 1       PHQ 9:       DEPRESSION ASSESSMENT/PLAN:  Depression screening is negative no further plan needed.     Body mass index is 41.43 kg/m².    BMI FOLLOW-UP/PLAN:  Patient is obese.    Pcp to address       Needed Screening/Treatment:   Colorectal cancer screening  and Immunizations reviewed and patient needs: COVID-19  Needed follow up:  None    See orders.   See Patient Instructions section.   Return in about 1 year (around 1/31/2025) for Medicare Wellness Visit.     Health Maintenance Due   Topic Date Due    Colorectal Cancer Risk - Colonoscopy  09/25/2022    COVID-19 Vaccine (4 - 2023-24 season) 09/01/2023    Medicare Advantage- Medicare Wellness Visit  01/01/2024       Patient is due for topics listed above, he wishes to proceed with Colorectal Cancer Screening: Colonoscopy, Depression Screening , and MWV (Medicare Wellness Visit), but is not proceeding with Immunization(s) COVID-19 at this time.      Depression Screening-2 Questions: Patient was asked \"Over the past 2 weeks, how often have you been bothered by any of the following problems? Little interest or pleasure in doing things? and Feeling down, depressed, or hopeless?  Recent PHQ 2/9 Score    PHQ 2:  PHQ 2 Score Adult PHQ 2 Score Adult PHQ 2 Interpretation Little interest or pleasure in activity?   1/24/2024  11:50 AM 2 No further screening needed 1       PHQ 9:      Ambulation/Fall Risk: Pt. walks independently without restrictions Patient is steady on their feet and has a Low Risk for falls.     Refills needed? yes    Hearing and vision: pt has hearing aids     Monica Quick)  Critical Care Medicine; Internal Medicine; Pulmonary Disease; Sleep Medicine  [address] [place] [address]  Phone: [phone]  Fax: [phone]  Follow Up Time:

## 2024-03-01 ENCOUNTER — APPOINTMENT (OUTPATIENT)
Dept: NEPHROLOGY | Facility: CLINIC | Age: 69
End: 2024-03-01

## 2024-03-08 LAB
ALBUMIN SERPL ELPH-MCNC: 3.6 G/DL
ANION GAP SERPL CALC-SCNC: 13 MMOL/L
BUN SERPL-MCNC: 73 MG/DL
CALCIUM SERPL-MCNC: 8.6 MG/DL
CHLORIDE SERPL-SCNC: 109 MMOL/L
CO2 SERPL-SCNC: 21 MMOL/L
CREAT SERPL-MCNC: 4.64 MG/DL
EGFR: 10 ML/MIN/1.73M2
GLUCOSE SERPL-MCNC: 99 MG/DL
HCT VFR BLD CALC: 28.5 %
HGB BLD-MCNC: 8.4 G/DL
MCHC RBC-ENTMCNC: 26.8 PG
MCHC RBC-ENTMCNC: 29.5 GM/DL
MCV RBC AUTO: 91.1 FL
PHOSPHATE SERPL-MCNC: 4.2 MG/DL
PLATELET # BLD AUTO: 211 K/UL
POTASSIUM SERPL-SCNC: 4.4 MMOL/L
RBC # BLD: 3.13 M/UL
RBC # FLD: 14.8 %
SODIUM SERPL-SCNC: 143 MMOL/L
WBC # FLD AUTO: 4.01 K/UL

## 2024-03-13 ENCOUNTER — APPOINTMENT (OUTPATIENT)
Dept: NEPHROLOGY | Facility: CLINIC | Age: 69
End: 2024-03-13
Payer: MEDICAID

## 2024-03-13 VITALS
DIASTOLIC BLOOD PRESSURE: 79 MMHG | TEMPERATURE: 97.4 F | HEART RATE: 86 BPM | HEIGHT: 64 IN | WEIGHT: 163.14 LBS | BODY MASS INDEX: 27.85 KG/M2 | OXYGEN SATURATION: 100 % | SYSTOLIC BLOOD PRESSURE: 194 MMHG

## 2024-03-13 VITALS — DIASTOLIC BLOOD PRESSURE: 76 MMHG | SYSTOLIC BLOOD PRESSURE: 170 MMHG

## 2024-03-13 PROCEDURE — G2211 COMPLEX E/M VISIT ADD ON: CPT

## 2024-03-13 PROCEDURE — 99214 OFFICE O/P EST MOD 30 MIN: CPT

## 2024-03-13 RX ORDER — DEXAMETHASONE SODIUM PHOSPHATE 1 MG/ML
0.1 SOLUTION/ DROPS OPHTHALMIC DAILY
Qty: 2 | Refills: 2 | Status: DISCONTINUED | COMMUNITY
Start: 2023-01-02 | End: 2024-03-13

## 2024-03-13 RX ORDER — TERBINAFINE HYDROCHLORIDE 250 MG/1
250 MG & 1% TABLET ORAL
Refills: 0 | Status: DISCONTINUED | COMMUNITY
End: 2024-03-13

## 2024-03-13 RX ORDER — AMLODIPINE BESYLATE 10 MG/1
10 TABLET ORAL DAILY
Qty: 90 | Refills: 3 | Status: ACTIVE | COMMUNITY

## 2024-03-13 RX ORDER — INSULIN GLARGINE 100 [IU]/ML
100 INJECTION, SOLUTION SUBCUTANEOUS
Refills: 0 | Status: DISCONTINUED | COMMUNITY
End: 2024-03-13

## 2024-03-13 RX ORDER — SEVELAMER CARBONATE 800 MG/1
800 TABLET, FILM COATED ORAL
Qty: 270 | Refills: 2 | Status: ACTIVE | COMMUNITY
Start: 2024-03-13 | End: 1900-01-01

## 2024-03-13 RX ORDER — BUMETANIDE 2 MG/1
2 TABLET ORAL
Qty: 90 | Refills: 3 | Status: ACTIVE | COMMUNITY
Start: 2024-03-13

## 2024-03-13 NOTE — HISTORY OF PRESENT ILLNESS
[FreeTextEntry1] : BEVERLY MEJIA is a 68-year female with a history of CKD5, HTN, DM2, HF, sarcoidosis, HLD, hearing loss here for follow up with her daughter.   She did not take her antihypertensive medications this morning and was rushing to this appt.  Medications reconciled. no chest pain no sob, profoundly hard of hearing.  Extensive family history of ESKD. Daughter lives with her and provides support.

## 2024-03-13 NOTE — REVIEW OF SYSTEMS
[Loss Of Hearing] : hearing loss [Fever] : no fever [Feeling Poorly] : not feeling poorly [Eyesight Problems] : no eyesight problems [Nosebleeds] : no nosebleeds [Chest Pain] : no chest pain [Abdominal Pain] : no abdominal pain [Cough] : no cough [Joint Swelling] : no joint swelling [Vomiting] : no vomiting [As Noted in HPI] : as noted in HPI [Joint Stiffness] : no joint stiffness [Anxiety] : no anxiety [Depression] : no depression [Negative] : Heme/Lymph

## 2024-03-13 NOTE — ASSESSMENT
[FreeTextEntry1] : 67 y/o woman with CKD here for f/u.  CKD5: CKD secondary to diabetes w/wo genetic component given strong family history. Creatinine trend reviewed with patient and daughter; renal function advanced but stable.   HTN: BP high. Med compliance stressed. On Amlodipine 10mg, Hydralazine 25mg BID and Coreg 12.5mg as well as Bumex 2mg.  Advised she chart various home BPS over the next week and return next Tuesday for BP check.   Volume status: no edema today. Cont Bumex 2 mg daily.   Metabolic Acidosis: trending at this time Anemia Management:  Aranesp cannot be given today due to high BP.  Take BP meds before EPO visits.  Diabetes: A1C ordered for next blood draw - Importance of diabetes control stressed Renal bone disease: Phos level ok. Cont sevelamer with meals.  check iPTH, Vit D with next blood draw.   The patient was educated on the importance of good blood pressure, diabetes control and to avoid NSAIDs. Lifestyle modifications encouraged including low Na diet and moderate protein intake. Pt educated on uremic symptoms and advised to go to ER for any concerning symptoms including but not limited to shortness of breath, chest pain, excessive swelling, persistent nausea and vomiting or mental status changes and pt expresses understanding.  RTC Tuesday 3/19 for BP check and EPO inj.  Make appt with DR. Johnson for May.

## 2024-03-13 NOTE — PHYSICAL EXAM
[General Appearance - Alert] : alert [Sclera] : the sclera and conjunctiva were normal [Neck Cervical Mass (___cm)] : no neck mass was observed [Auscultation Breath Sounds / Voice Sounds] : lungs were clear to auscultation bilaterally [Exaggerated Use Of Accessory Muscles For Inspiration] : no accessory muscle use [Heart Sounds Pericardial Friction Rub] : no pericardial rub [Heart Sounds] : normal S1 and S2 [Edema] : there was no peripheral edema [Abdomen Soft] : soft [Abnormal Walk] : normal gait [Impaired Insight] : insight and judgment were intact [Outer Ear] : the ears and nose were normal in appearance [Hearing Loss Right Only] : diminished [Hearing Loss Left Only] : dimished [Heart Rate And Rhythm] : heart rate was normal and rhythm regular [Murmurs] : no murmurs [Involuntary Movements] : no involuntary movements were seen [___ (cm) Fistula] : [unfilled] (cm) fistula [Bruit] : a bruit was present [Thrill] : a thrill was present [Benign] : appears benign [] : no rash [No Focal Deficits] : no focal deficits [Affect] : the affect was normal

## 2024-03-19 ENCOUNTER — APPOINTMENT (OUTPATIENT)
Dept: NEPHROLOGY | Facility: CLINIC | Age: 69
End: 2024-03-19
Payer: MEDICARE

## 2024-03-19 VITALS
DIASTOLIC BLOOD PRESSURE: 70 MMHG | BODY MASS INDEX: 27.85 KG/M2 | HEART RATE: 76 BPM | SYSTOLIC BLOOD PRESSURE: 114 MMHG | HEIGHT: 64 IN | OXYGEN SATURATION: 98 % | TEMPERATURE: 97.5 F | WEIGHT: 163.14 LBS

## 2024-03-19 PROCEDURE — 96372 THER/PROPH/DIAG INJ SC/IM: CPT

## 2024-03-19 RX ORDER — DARBEPOETIN ALFA 40 UG/ML
40 SOLUTION INTRAVENOUS; SUBCUTANEOUS
Refills: 0 | Status: COMPLETED | OUTPATIENT
Start: 2024-03-19

## 2024-03-19 RX ADMIN — DARBEPOETIN ALFA 1 MCG/ML: 40 SOLUTION INTRAVENOUS; SUBCUTANEOUS at 00:00

## 2024-03-19 NOTE — ASSESSMENT
[FreeTextEntry1] : Hct 28.5/ Hgb 8.4 BP improved  Aranesp 40 mcg SQ x 1 Pt tolerated procedure well Labs week of April 1st

## 2024-04-08 ENCOUNTER — APPOINTMENT (OUTPATIENT)
Dept: SLEEP CENTER | Facility: CLINIC | Age: 69
End: 2024-04-08
Payer: MEDICARE

## 2024-04-08 ENCOUNTER — OUTPATIENT (OUTPATIENT)
Dept: OUTPATIENT SERVICES | Facility: HOSPITAL | Age: 69
LOS: 1 days | End: 2024-04-08
Payer: MEDICARE

## 2024-04-08 DIAGNOSIS — Z98.891 HISTORY OF UTERINE SCAR FROM PREVIOUS SURGERY: Chronic | ICD-10-CM

## 2024-04-08 DIAGNOSIS — Z90.49 ACQUIRED ABSENCE OF OTHER SPECIFIED PARTS OF DIGESTIVE TRACT: Chronic | ICD-10-CM

## 2024-04-08 DIAGNOSIS — Z89.411 ACQUIRED ABSENCE OF RIGHT GREAT TOE: Chronic | ICD-10-CM

## 2024-04-08 PROCEDURE — 95810 POLYSOM 6/> YRS 4/> PARAM: CPT | Mod: 26

## 2024-04-08 PROCEDURE — 95810 POLYSOM 6/> YRS 4/> PARAM: CPT

## 2024-04-12 LAB
ALBUMIN SERPL ELPH-MCNC: 3.6 G/DL
ANION GAP SERPL CALC-SCNC: 14 MMOL/L
BUN SERPL-MCNC: 67 MG/DL
CALCIUM SERPL-MCNC: 8 MG/DL
CHLORIDE SERPL-SCNC: 105 MMOL/L
CO2 SERPL-SCNC: 22 MMOL/L
CREAT SERPL-MCNC: 4.96 MG/DL
EGFR: 9 ML/MIN/1.73M2
GLUCOSE SERPL-MCNC: 167 MG/DL
HCT VFR BLD CALC: 30.2 %
HGB BLD-MCNC: 8.8 G/DL
MCHC RBC-ENTMCNC: 26.8 PG
MCHC RBC-ENTMCNC: 29.1 GM/DL
MCV RBC AUTO: 92.1 FL
PHOSPHATE SERPL-MCNC: 5.6 MG/DL
PLATELET # BLD AUTO: 183 K/UL
POTASSIUM SERPL-SCNC: 5 MMOL/L
RBC # BLD: 3.28 M/UL
RBC # FLD: 14.9 %
SODIUM SERPL-SCNC: 141 MMOL/L
WBC # FLD AUTO: 3.81 K/UL

## 2024-04-15 DIAGNOSIS — G47.33 OBSTRUCTIVE SLEEP APNEA (ADULT) (PEDIATRIC): ICD-10-CM

## 2024-04-16 ENCOUNTER — APPOINTMENT (OUTPATIENT)
Dept: PULMONOLOGY | Facility: CLINIC | Age: 69
End: 2024-04-16
Payer: MEDICARE

## 2024-04-16 PROCEDURE — 99441: CPT

## 2024-04-17 ENCOUNTER — APPOINTMENT (OUTPATIENT)
Dept: NEPHROLOGY | Facility: CLINIC | Age: 69
End: 2024-04-17
Payer: MEDICARE

## 2024-04-17 VITALS
BODY MASS INDEX: 27.31 KG/M2 | TEMPERATURE: 97.3 F | DIASTOLIC BLOOD PRESSURE: 84 MMHG | SYSTOLIC BLOOD PRESSURE: 179 MMHG | HEIGHT: 64 IN | HEART RATE: 76 BPM | WEIGHT: 160 LBS | OXYGEN SATURATION: 97 %

## 2024-04-17 VITALS — DIASTOLIC BLOOD PRESSURE: 90 MMHG | SYSTOLIC BLOOD PRESSURE: 180 MMHG

## 2024-04-17 DIAGNOSIS — E08.29 DIABETES MELLITUS DUE TO UNDERLYING CONDITION WITH OTHER DIABETIC KIDNEY COMPLICATION: ICD-10-CM

## 2024-04-17 DIAGNOSIS — Z79.4 DIABETES MELLITUS DUE TO UNDERLYING CONDITION WITH OTHER DIABETIC KIDNEY COMPLICATION: ICD-10-CM

## 2024-04-17 DIAGNOSIS — R80.9 DIABETES MELLITUS DUE TO UNDERLYING CONDITION WITH OTHER DIABETIC KIDNEY COMPLICATION: ICD-10-CM

## 2024-04-17 PROCEDURE — G2211 COMPLEX E/M VISIT ADD ON: CPT

## 2024-04-17 PROCEDURE — 99213 OFFICE O/P EST LOW 20 MIN: CPT

## 2024-04-17 RX ORDER — CARVEDILOL 12.5 MG/1
12.5 TABLET, FILM COATED ORAL
Qty: 180 | Refills: 3 | Status: ACTIVE | COMMUNITY

## 2024-04-17 NOTE — PHYSICAL EXAM
[General Appearance - Alert] : alert [General Appearance - In No Acute Distress] : in no acute distress [Sclera] : the sclera and conjunctiva were normal [Outer Ear] : the ears and nose were normal in appearance [Hearing Loss Right Only] : diminished [Hearing Loss Left Only] : dimished [Neck Cervical Mass (___cm)] : no neck mass was observed [Exaggerated Use Of Accessory Muscles For Inspiration] : no accessory muscle use [FreeTextEntry1] : fine rales left base [Heart Rate And Rhythm] : heart rate was normal and rhythm regular [Heart Sounds] : normal S1 and S2 [Murmurs] : no murmurs [Heart Sounds Pericardial Friction Rub] : no pericardial rub [___ +] : bilateral [unfilled]+ pitting edema to the ankles [Abdomen Soft] : soft [Abnormal Walk] : normal gait [Involuntary Movements] : no involuntary movements were seen [___ (cm) Fistula] : [unfilled] (cm) fistula [Bruit] : a bruit was present [Thrill] : a thrill was present [Benign] : appears benign [] : no rash [No Focal Deficits] : no focal deficits [Oriented To Time, Place, And Person] : oriented to person, place, and time [Impaired Insight] : insight and judgment were intact [Affect] : the affect was normal

## 2024-04-17 NOTE — HISTORY OF PRESENT ILLNESS
[FreeTextEntry1] : BEVERLY MEJIA is a 68-year female with a history of CKD5, HTN, DM2, HF, sarcoidosis, HLD, hearing loss here for follow up with her daughter.   She took her BP meds this morning. Had pizza for dinner last night. Denies SOB, excessive swelling. Reports the anemia injections have been helping with fatigue.  Needs [facility] follow up for AVF.  Extensive family history of ESKD.  Daughter lives with her and is primary caregiver.

## 2024-04-17 NOTE — ASSESSMENT
[FreeTextEntry1] : 69 y/o woman with CKD here for f/u.  CKD5: CKD secondary to diabetes w/wo genetic component given strong family history. Creatinine trend reviewed with patient and daughter; renal function advanced but stable.   HTN: BP high. Increase Hydralazine 50mg BID. Continue Amlodipine 10mg and Coreg 12.5mg BID as well as Bumex 2mg.    Volume status: Some ankle edema today. Cont Bumex 2 mg daily. Low Na diet stressed.   Metabolic Acidosis: trending at this time Anemia Management:  Aranesp cannot be given today due to high BP.  Take BP meds before EPO visits.  Diabetes: A1C ordered for next blood draw - Importance of diabetes control stressed Renal bone disease: Phos level ok. Cont sevelamer with meals.  check iPTH, Vit D with next blood draw.   The patient was educated on the importance of good blood pressure, diabetes control and to avoid NSAIDs. Lifestyle modifications encouraged including low Na diet and moderate protein intake. Pt educated on uremic symptoms and advised to go to ER for any concerning symptoms including but not limited to shortness of breath, chest pain, excessive swelling, persistent nausea and vomiting or mental status changes and pt expresses understanding.  RTC Tuesday 4/23 for BP check and EPO inj.  Keep appt with DR. Johnson for May.

## 2024-04-17 NOTE — REVIEW OF SYSTEMS
[Fever] : no fever [Feeling Poorly] : not feeling poorly [Eyesight Problems] : no eyesight problems [Loss Of Hearing] : hearing loss [Nosebleeds] : no nosebleeds [Chest Pain] : no chest pain [Cough] : no cough [Abdominal Pain] : no abdominal pain [Vomiting] : no vomiting [Joint Swelling] : no joint swelling [Joint Stiffness] : no joint stiffness [As Noted in HPI] : as noted in HPI [Anxiety] : no anxiety [Depression] : no depression [Negative] : Heme/Lymph

## 2024-04-24 ENCOUNTER — APPOINTMENT (OUTPATIENT)
Dept: NEPHROLOGY | Facility: CLINIC | Age: 69
End: 2024-04-24
Payer: MEDICARE

## 2024-04-24 VITALS
WEIGHT: 164 LBS | HEART RATE: 66 BPM | BODY MASS INDEX: 28 KG/M2 | SYSTOLIC BLOOD PRESSURE: 122 MMHG | OXYGEN SATURATION: 96 % | TEMPERATURE: 97.7 F | HEIGHT: 64 IN | DIASTOLIC BLOOD PRESSURE: 71 MMHG

## 2024-04-24 PROCEDURE — 96372 THER/PROPH/DIAG INJ SC/IM: CPT

## 2024-04-24 RX ORDER — DARBEPOETIN ALFA 40 UG/ML
40 SOLUTION INTRAVENOUS; SUBCUTANEOUS
Refills: 0 | Status: COMPLETED | OUTPATIENT
Start: 2024-04-24

## 2024-04-24 RX ADMIN — DARBEPOETIN ALFA 1 MCG/ML: 40 SOLUTION INTRAVENOUS; SUBCUTANEOUS at 00:00

## 2024-04-24 NOTE — ASSESSMENT
[FreeTextEntry1] : Hct 30.2/ Hgb 8.8 BP improved with increased hydralazine dose Aranesp 40 mcg SQ x 1 Pt tolerated procedure well Labs week of May 6th.

## 2024-04-30 ENCOUNTER — APPOINTMENT (OUTPATIENT)
Dept: VASCULAR SURGERY | Facility: CLINIC | Age: 69
End: 2024-04-30
Payer: MEDICARE

## 2024-04-30 VITALS
SYSTOLIC BLOOD PRESSURE: 213 MMHG | BODY MASS INDEX: 28.17 KG/M2 | WEIGHT: 165 LBS | HEIGHT: 64 IN | HEART RATE: 80 BPM | TEMPERATURE: 98.6 F | DIASTOLIC BLOOD PRESSURE: 106 MMHG

## 2024-04-30 PROCEDURE — 93990 DOPPLER FLOW TESTING: CPT

## 2024-04-30 PROCEDURE — 99214 OFFICE O/P EST MOD 30 MIN: CPT

## 2024-04-30 NOTE — ASSESSMENT
[FreeTextEntry1] : Impression - chronic kidney disease not on HD yet, left avf maturing well, > 50% stenosis mid avf   Plan LUE restrictions - no bp, IV, or blood draw not on HD and left avf is not ready for HD use veins are still small and left avf >50% stenosis mid avf will need left arm avf fistulogram/BAM advised daughter contrast dye is nephrotoxic and fistulogram will speed up the process to dialysis advised to hold off on any intervention until closer to HD daughter verbalizes understanding continue to follow up with nephrology advised daughter to call the office when pt will be starting dialysis [Arterial/Venous Disease] : arterial/venous disease [Other: _____] : [unfilled]

## 2024-04-30 NOTE — PHYSICAL EXAM
[2+] : left 2+ [No Rash or Lesion] : No rash or lesion [Alert] : alert [Oriented to Person] : oriented to person [Oriented to Place] : oriented to place [Oriented to Time] : oriented to time [Calm] : calm [de-identified] : NAD [de-identified] : NCAT [de-identified] : unlabored breathing [FreeTextEntry1] : LUE incision well healed palpable left radial and ulnar pulses good palpable thrill over left avf brisk capillary refill < 2 sec [de-identified] : EARLL [de-identified] : LUE incision well healed [de-identified] : elin cranial nerves 2-12 elin grossly intact [de-identified] : cooperative

## 2024-04-30 NOTE — HISTORY OF PRESENT ILLNESS
[FreeTextEntry1] : Pt presents for left avf surveillance. She is s/p left radiocephalic avf on 11/3/2023. Accompanied by daughter. LUE well healed. Denies LUE pain, swelling, numbness or tingling. Not on HD yet. States HD may start soon pending on bloodwork. GFR 9 ml/min from 4/12/24. No estimated date for HD yet. Has follow up with nephrology next month for bloodwork.

## 2024-04-30 NOTE — DATA REVIEWED
[FreeTextEntry1] : 12/20/2023 LUE dialysis ultrasound left patent radiocephalic avf without stenosis volume flow 734 ml/min diam prox 5 mm, diam mid 5.2 mm, diam distal 4.1 mm  4/30/2024 LUE dialysis ultrasound left patent avf with increased velocities mid avf (>600 cm) secondary to thickened valve cusp suggestive >50% stenosis volume flow 823 ml/min diam prox 6.2 mm, diam mid 5.3 mm, diam distal 5.4 mm

## 2024-05-06 ENCOUNTER — LABORATORY RESULT (OUTPATIENT)
Age: 69
End: 2024-05-06

## 2024-05-09 ENCOUNTER — APPOINTMENT (OUTPATIENT)
Dept: NEPHROLOGY | Facility: CLINIC | Age: 69
End: 2024-05-09
Payer: MEDICARE

## 2024-05-09 VITALS — SYSTOLIC BLOOD PRESSURE: 109 MMHG | DIASTOLIC BLOOD PRESSURE: 70 MMHG

## 2024-05-09 VITALS — SYSTOLIC BLOOD PRESSURE: 121 MMHG | DIASTOLIC BLOOD PRESSURE: 77 MMHG

## 2024-05-09 LAB
25(OH)D3 SERPL-MCNC: 16.3 NG/ML
ALBUMIN SERPL ELPH-MCNC: 3.6 G/DL
ANION GAP SERPL CALC-SCNC: 13 MMOL/L
APPEARANCE: CLEAR
BILIRUBIN URINE: NEGATIVE
BLOOD URINE: ABNORMAL
BUN SERPL-MCNC: 66 MG/DL
CALCIUM SERPL-MCNC: 8.6 MG/DL
CALCIUM SERPL-MCNC: 8.6 MG/DL
CHLORIDE SERPL-SCNC: 109 MMOL/L
CO2 SERPL-SCNC: 21 MMOL/L
COLOR: YELLOW
CREAT SERPL-MCNC: 4.84 MG/DL
CREAT SPEC-SCNC: 49 MG/DL
CREAT SPEC-SCNC: 49 MG/DL
CREAT/PROT UR: 12.1 RATIO
EGFR: 9 ML/MIN/1.73M2
ESTIMATED AVERAGE GLUCOSE: 140 MG/DL
FERRITIN SERPL-MCNC: 191 NG/ML
FOLATE SERPL-MCNC: 7.9 NG/ML
GLUCOSE QUALITATIVE U: 250 MG/DL
GLUCOSE SERPL-MCNC: 114 MG/DL
HBA1C MFR BLD HPLC: 6.5 %
HCT VFR BLD CALC: 30.2 %
HGB BLD-MCNC: 9 G/DL
IRON SATN MFR SERPL: 20 %
IRON SERPL-MCNC: 49 UG/DL
KETONES URINE: NEGATIVE MG/DL
LEUKOCYTE ESTERASE URINE: ABNORMAL
MAGNESIUM SERPL-MCNC: 1.6 MG/DL
MCHC RBC-ENTMCNC: 26.8 PG
MCHC RBC-ENTMCNC: 29.8 GM/DL
MCV RBC AUTO: 89.9 FL
MICROALBUMIN 24H UR DL<=1MG/L-MCNC: 318.4 MG/DL
MICROALBUMIN/CREAT 24H UR-RTO: 6547 MG/G
NITRITE URINE: NEGATIVE
PARATHYROID HORMONE INTACT: 295 PG/ML
PH URINE: 6.5
PHOSPHATE SERPL-MCNC: 5.2 MG/DL
PLATELET # BLD AUTO: 204 K/UL
POTASSIUM SERPL-SCNC: 5.4 MMOL/L
PROT UR-MCNC: 586 MG/DL
PROTEIN URINE: 300 MG/DL
RBC # BLD: 3.36 M/UL
RBC # FLD: 15.6 %
SODIUM SERPL-SCNC: 143 MMOL/L
SPECIFIC GRAVITY URINE: 1.02
TIBC SERPL-MCNC: 246 UG/DL
UIBC SERPL-MCNC: 197 UG/DL
URATE SERPL-MCNC: 6 MG/DL
UROBILINOGEN URINE: 0.2 MG/DL
VIT B12 SERPL-MCNC: 981 PG/ML
WBC # FLD AUTO: 3.84 K/UL

## 2024-05-09 PROCEDURE — 99215 OFFICE O/P EST HI 40 MIN: CPT

## 2024-05-09 RX ORDER — HYDRALAZINE HYDROCHLORIDE 100 MG/1
100 TABLET ORAL 3 TIMES DAILY
Refills: 0 | Status: ACTIVE | COMMUNITY

## 2024-05-09 RX ORDER — SODIUM BICARBONATE 650 MG/1
650 TABLET ORAL
Qty: 30 | Refills: 3 | Status: ACTIVE | COMMUNITY
Start: 2024-05-09 | End: 1900-01-01

## 2024-05-09 NOTE — PHYSICAL EXAM
[General Appearance - Alert] : alert [Sclera] : the sclera and conjunctiva were normal [Neck Cervical Mass (___cm)] : no neck mass was observed [Exaggerated Use Of Accessory Muscles For Inspiration] : no accessory muscle use [Auscultation Breath Sounds / Voice Sounds] : lungs were clear to auscultation bilaterally [Heart Sounds] : normal S1 and S2 [Heart Sounds Pericardial Friction Rub] : no pericardial rub [Edema] : there was no peripheral edema [Abdomen Soft] : soft [Abnormal Walk] : normal gait [Musculoskeletal - Swelling] : no joint swelling seen [___ (cm) Fistula] : [unfilled] (cm) fistula [Oriented To Time, Place, And Person] : oriented to person, place, and time [Impaired Insight] : insight and judgment were intact

## 2024-05-09 NOTE — REVIEW OF SYSTEMS
[Fever] : no fever [Feeling Poorly] : not feeling poorly [Eyesight Problems] : no eyesight problems [Loss Of Hearing] : hearing loss [Nosebleeds] : no nosebleeds [Chest Pain] : no chest pain [Cough] : no cough [Abdominal Pain] : no abdominal pain [Vomiting] : no vomiting [Joint Swelling] : no joint swelling [Joint Stiffness] : no joint stiffness [Dizziness] : dizziness [Fainting] : no fainting [Anxiety] : no anxiety [Depression] : no depression

## 2024-05-09 NOTE — ASSESSMENT
[FreeTextEntry1] : 69 y/o woman with CKD here for evaluation.  Chronic Kidney Disease Stage V -- Secondary to diabetes w/wo genetic component give strong family history.  We spent most of the visit discussing chronic kidney disease, its stages, risk for progression and strategies for prevention including avoidance of NSAIDs and notifying me of medication changes.  Today we had a discussion about dialysis planning and we discussed the various options and the need for advanced planning as well as the risks and benefits of each form of dialysis.  The patient has opted for in center and has had AVF placed but is not ready for use.  She will need fistulogram and assistance with maturation.  There is a concern for DUSTIN but if needed we can monitor BMP pre and post.  Will discuss with vascular surgery.  Hyperkalemia -- Patient's potassium was elevated.  Reviewed dietary adherence.  start sodium bicarbonate repeat next week.    Hypertension -- BP was somewhat low today.  Advised hold bumex X 2 days.  monitor at home.  If patient's dizzyness worsens or does not resolve she should go to the ED.  Daughter is aware.  Anemia -- With respect to anemia she should continue to get her ARASELI injections as needed.  Last hgb 9  urine culture + but asymptomatic - advised patient to notify me if symptoms occur.  Patient should see me again in mid and late summer.  The time spent is inclusive of the time it took to see, evaluate, and manage the patient.  Time is inclusive of the time taken to review chart, reconcile medications, document findings, and communicate with other providers (when applicable).

## 2024-05-09 NOTE — HISTORY OF PRESENT ILLNESS
[FreeTextEntry1] : 12/6/23 -- Today I met Raisa Fragoso 69 y/o woman history of poorly controlled diabetes diabetic neuropathy.  The patient has history of critical illness with respiratory failure in past poor hearing.  Advanced CKD this is her first outpatient nephrology follow up.  AVF was placed during hospitalization at [facility] in 11/2023.  At present feels well.  Medications reconciled no chest pain no sob + hard of hearing.  extensive family history of ESKD.  Daughter present during visit today.  Daughter lives with her and provides support.  5/9/24 -- Raisa Kelly does not feel well today.  She felt great yesterday but awoke this morning with  systolic, took her meds, didn't eat and now feels dizzy.  She has been coming in for ARASELI injections.  She has seen vascular and fistula is not mature yet.  Patient denies chest pain dysuria

## 2024-05-09 NOTE — PRE-OP CHECKLIST - COMMENTS
I will START or STAY ON the medications listed below when I get home from the hospital:    Prenatal Multivitamins with Folic Acid 1 mg oral tablet  -- 1 tab(s) by mouth once a day  -- Indication: For pregnancy    ferrous sulfate 325 mg (65 mg elemental iron) oral delayed release tablet  -- 1 tab(s) by mouth once a day  -- Indication: For pregnancy  
am medications sips of water

## 2024-05-10 ENCOUNTER — NON-APPOINTMENT (OUTPATIENT)
Age: 69
End: 2024-05-10

## 2024-05-16 ENCOUNTER — APPOINTMENT (OUTPATIENT)
Dept: NEPHROLOGY | Facility: CLINIC | Age: 69
End: 2024-05-16

## 2024-05-21 ENCOUNTER — NON-APPOINTMENT (OUTPATIENT)
Age: 69
End: 2024-05-21

## 2024-05-22 ENCOUNTER — APPOINTMENT (OUTPATIENT)
Dept: TRANSPLANT | Facility: CLINIC | Age: 69
End: 2024-05-22
Payer: COMMERCIAL

## 2024-05-22 ENCOUNTER — APPOINTMENT (OUTPATIENT)
Dept: NEPHROLOGY | Facility: CLINIC | Age: 69
End: 2024-05-22
Payer: COMMERCIAL

## 2024-05-22 VITALS
DIASTOLIC BLOOD PRESSURE: 71 MMHG | HEIGHT: 64 IN | SYSTOLIC BLOOD PRESSURE: 133 MMHG | WEIGHT: 168 LBS | HEART RATE: 81 BPM | OXYGEN SATURATION: 96 % | BODY MASS INDEX: 28.68 KG/M2 | TEMPERATURE: 98 F

## 2024-05-22 DIAGNOSIS — Z01.818 ENCOUNTER FOR OTHER PREPROCEDURAL EXAMINATION: ICD-10-CM

## 2024-05-22 PROCEDURE — 99205 OFFICE O/P NEW HI 60 MIN: CPT

## 2024-05-22 NOTE — PHYSICAL EXAM
[General Appearance - Alert] : alert [General Appearance - In No Acute Distress] : in no acute distress [Sclera] : the sclera and conjunctiva were normal [PERRL With Normal Accommodation] : pupils were equal in size, round, and reactive to light [FreeTextEntry1] : She cannot hear  [Extraocular Movements] : extraocular movements were intact [Neck Appearance] : the appearance of the neck was normal [Neck Cervical Mass (___cm)] : no neck mass was observed [Jugular Venous Distention Increased] : there was no jugular-venous distention [Thyroid Diffuse Enlargement] : the thyroid was not enlarged [Thyroid Nodule] : there were no palpable thyroid nodules [Auscultation Breath Sounds / Voice Sounds] : lungs were clear to auscultation bilaterally [Heart Rate And Rhythm] : heart rate was normal and rhythm regular [Heart Sounds] : normal S1 and S2 [Heart Sounds Gallop] : no gallops [Murmurs] : no murmurs [Heart Sounds Pericardial Friction Rub] : no pericardial rub [Full Pulse] : the pedal pulses are present [Edema] : there was no peripheral edema [Bowel Sounds] : normal bowel sounds [Abdomen Soft] : soft [Abdomen Tenderness] : non-tender [Abdomen Mass (___ Cm)] : no abdominal mass palpated [Abnormal Walk] : normal gait [Nail Clubbing] : no clubbing  or cyanosis of the fingernails [Musculoskeletal - Swelling] : no joint swelling seen [Motor Tone] : muscle strength and tone were normal [Skin Color & Pigmentation] : normal skin color and pigmentation [Skin Turgor] : normal skin turgor [] : no rash [Normal] : normal [Deep Tendon Reflexes (DTR)] : deep tendon reflexes were 2+ and symmetric [Sensation] : the sensory exam was normal to light touch and pinprick [No Focal Deficits] : no focal deficits

## 2024-05-22 NOTE — HISTORY OF PRESENT ILLNESS
[TextBox_42] : BEVERLY MEJIA is a 68 year old female who presents for kidney transplant evaluation.  Cause of ESRD : DM Nephrologist:  Dr Johnson Preemptive candidate    Other PMH includes DM HTN h/o  sarcoidosis Sensory hearing loss   no h/o MI , CHF, CAD no h/o TB, HIV, Hepatitis   no h/o CVA no h/o renal stones  Sensitization events-  has had blood transfusions . no h/o previous transplant  Surgical h/o : right big toe amputation, cholecystectomy  Functional status: ambulates with a cane. can walk a few blocks without difficulty . Independent for ADL Social history: lives with daughter. quit smoking 33 years ago. quit drinking in 2017. no illicit drug use.  Family history:  very strong family h/o kidney disease- Mother, 2 siblings and 1 nephew were on dialysis

## 2024-05-22 NOTE — REVIEW OF SYSTEMS
[Sclera anicteric] : sclera anicteric [Fever] : no fever [Chest Pain] : no chest pain [SOB] : no shortness of breath [Abdominal Pain] : no abdominal pain [Dysuria] : no dysuria [Hematuria] : no hematuria

## 2024-05-22 NOTE — PHYSICAL EXAM
[No Acute Distress] : no acute distress [Sclera Anicteric] : sclera anicteric [Clear to Auscultation] : clear to auscultation [Breathing Comfortably on RA] : breathing comfortably on room air [Normal Rate] : normal rate [Regular Rhythm] : regular rhythm [Soft] : soft [Non-tender] : non-tender [] : right dorsalis pedis palpable [Normal] : normal [TextBox_34] : 1+ edema at ankles. No ulcers b/l LE. R 1at digit amputation site well-healed [TextBox_86] : No inguinal lymphadenopathy

## 2024-05-22 NOTE — ASSESSMENT
[Fair candidate] : a fair candidate. We should proceed with our protocol for evaluation for kidney transplantation. [FreeTextEntry1] : Reported history of heart failure. Needs cardiology eval and optimization. Needs colonoscopy CT Chest, abd/pel without contrast Mammogram/Pap smear Routine transplant workup

## 2024-05-22 NOTE — PLAN
[FreeTextEntry1] : 1. Advanced CKD:  Ms. BEVERLY MEJIA is a fair candidate for kidney transplantation  2.Cardiac risk: echo, stress test and cardiac evaluation  3. Cancer screening: colonoscopy,  Mammo, Pap  4. ID: Serology for acute and chronic viral infections. Screening for latent TB 5. Imaging: CT chest, CT A/P 6.Diabetes Mellitus: check HbA1c   I have personally discussed the risks and benefits of transplantation and patient attended transplant education class where the following was disclosed:   Reviewed factors affecting survival and morbidity while on dialysis, the transplant wait list and reviewed henok-operative and long-term risk factors affecting outcome in kidney transplantation.    One year SRTR outcomes for national and [facility] were discussed in regards to patient survival and graft survival after transplantation.    Details of transplant surgery, including complications were discussed. Immunosuppression and complications including infection including life threatening sepsis and opportunistic infections, malignancy and new onset diabetes were discussed.    Benefits of live donor transplantation as well as variability in wait times across regions and multiple listing were discussed.  KDPI >85% and PHS high risk criteria donors were discussed.  HCV kidney transplantation was discussed.   Will proceed with completing/ updating work up and listing for transplant/ live donor transplant once work up is reviewed and found to be acceptable by multidisciplinary listing committee.

## 2024-05-22 NOTE — HISTORY OF PRESENT ILLNESS
[Diabetes Mellitus] : Diabetes Mellitus [Blood Transfusion] : prior blood transfusion [Diabetes] : diabetes [Previous Kidney Transplant] : no previous kidney transplant [Cardiac History] : no cardiac history [Claudication/Angina] : no claudication/angina [Hx of DVT/Thrombosis/Miscarriage] : no history of dvt/thrombosis/miscarriage [Retinopathy] : no retinopathy [Neuropathy] : no neuropathy [TextBox_16] : n/a [TextBox_30] : few blocks with cane [de-identified] : 68F with DM, sensory hearing loss, CKD 5 not yet on HD for evaluation for kidney transplant. Recent Cr 4.8, GFR 9  HD - pre-emptive Living Donor - possible   PMH: DM, HTN, CKD 5, sensory hearing loss, Sarcoidosis, ?HF PSH: right 1st toe amputation, lap cholecystectomy. Meds: See list Allergies: PCN Social: Quit tobacco 30 years ago; Quit etoh 7 years ago. Denies drugs Lives with daughter FMH: Mother, 2 siblings and nephew were on HD No malignancy   ROS:  Cardiac - no MI, CHF, CAD Pulmonary- no h/o COPD, Asthma, or pneumonia Infections- no h/o TB, HIV, Hepatitis.  Heme- no h/o malignancy or bleeding disorders. No DVT/PE Neuro- no h/o CVA or seizures. Sensitization events- +previous blood transfusions; No previous transplant No infections or hospitalizations in the last few months  - No UTI or Kidney stones. Makes normal amount of urine GI - No melena or blood per rectum. Not sure of last colonoscopy

## 2024-05-22 NOTE — REASON FOR VISIT
[Initial] : an initial visit for [Kidney Transplant Evaluation] : kidney transplant evaluation [Family Member] : family member [FreeTextEntry1] : CKD 5 [FreeTextEntry2] : Dr Mookie Johnson

## 2024-05-27 ENCOUNTER — NON-APPOINTMENT (OUTPATIENT)
Age: 69
End: 2024-05-27

## 2024-05-28 LAB
ABO + RH PNL BLD: NORMAL
ALBUMIN SERPL ELPH-MCNC: 3.8 G/DL
ALP BLD-CCNC: 114 U/L
ALT SERPL-CCNC: 10 U/L
ANION GAP SERPL CALC-SCNC: 18 MMOL/L
APPEARANCE: CLEAR
AST SERPL-CCNC: 13 U/L
BACTERIA: NEGATIVE /HPF
BASOPHILS # BLD AUTO: 0.03 K/UL
BASOPHILS NFR BLD AUTO: 0.9 %
BILIRUB SERPL-MCNC: 0.2 MG/DL
BILIRUBIN URINE: NEGATIVE
BLOOD URINE: ABNORMAL
BUN SERPL-MCNC: 84 MG/DL
C PEPTIDE SERPL-MCNC: 5.4 NG/ML
CALCIUM SERPL-MCNC: 8.5 MG/DL
CAST: 1 /LPF
CHLORIDE SERPL-SCNC: 105 MMOL/L
CHOLEST SERPL-MCNC: 249 MG/DL
CMV IGG SERPL QL: 6.1 U/ML
CMV IGG SERPL-IMP: POSITIVE
CO2 SERPL-SCNC: 18 MMOL/L
COLOR: YELLOW
COVID-19 NUCLEOCAPSID  GAM ANTIBODY INTERPRETATION: POSITIVE
COVID-19 SPIKE DOMAIN ANTIBODY INTERPRETATION: POSITIVE
CREAT SERPL-MCNC: 5.95 MG/DL
CREAT SPEC-SCNC: 22 MG/DL
CREAT/PROT UR: 11.2 RATIO
EBV EA AB SER IA-ACNC: 23.7 U/ML
EBV EA AB TITR SER IF: POSITIVE
EBV EA IGG SER QL IA: >600 U/ML
EBV EA IGG SER-ACNC: POSITIVE
EBV EA IGM SER IA-ACNC: NEGATIVE
EBV PATRN SPEC IB-IMP: NORMAL
EBV VCA IGG SER IA-ACNC: 715 U/ML
EBV VCA IGM SER QL IA: 34 U/ML
EGFR: 7 ML/MIN/1.73M2
EOSINOPHIL # BLD AUTO: 0.26 K/UL
EOSINOPHIL NFR BLD AUTO: 7.6 %
EPITHELIAL CELLS: 2 /HPF
EPSTEIN-BARR VIRUS CAPSID ANTIGEN IGG: POSITIVE
ESTIMATED AVERAGE GLUCOSE: 143 MG/DL
GLUCOSE QUALITATIVE U: 100 MG/DL
GLUCOSE SERPL-MCNC: 163 MG/DL
HAV IGM SER QL: NONREACTIVE
HBA1C MFR BLD HPLC: 6.6 %
HBV CORE IGG+IGM SER QL: NONREACTIVE
HBV SURFACE AB SER QL: NONREACTIVE
HBV SURFACE AB SERPL IA-ACNC: <3 MIU/ML
HBV SURFACE AG SER QL: NONREACTIVE
HCG SERPL-MCNC: 4 MIU/ML
HCT VFR BLD CALC: 31.5 %
HCV AB SER QL: NONREACTIVE
HCV S/CO RATIO: 0.12 S/CO
HDLC SERPL-MCNC: 55 MG/DL
HEPATITIS A IGG ANTIBODY: NONREACTIVE
HGB BLD-MCNC: 9.5 G/DL
HIV1+2 AB SPEC QL IA.RAPID: NONREACTIVE
HSV 1+2 IGG SER IA-IMP: NEGATIVE
HSV 1+2 IGG SER IA-IMP: POSITIVE
HSV1 IGG SER QL: 50 INDEX
HSV2 IGG SER QL: 0.17 INDEX
IMM GRANULOCYTES NFR BLD AUTO: 0.6 %
KETONES URINE: NEGATIVE MG/DL
LDLC SERPL CALC-MCNC: 151 MG/DL
LEUKOCYTE ESTERASE URINE: ABNORMAL
LYMPHOCYTES # BLD AUTO: 0.98 K/UL
LYMPHOCYTES NFR BLD AUTO: 28.6 %
M TB IFN-G BLD-IMP: NEGATIVE
MAGNESIUM SERPL-MCNC: 1.7 MG/DL
MAN DIFF?: NORMAL
MCHC RBC-ENTMCNC: 27 PG
MCHC RBC-ENTMCNC: 30.2 GM/DL
MCV RBC AUTO: 89.5 FL
MICROSCOPIC-UA: NORMAL
MONOCYTES # BLD AUTO: 0.27 K/UL
MONOCYTES NFR BLD AUTO: 7.9 %
NEUTROPHILS # BLD AUTO: 1.87 K/UL
NEUTROPHILS NFR BLD AUTO: 54.4 %
NITRITE URINE: NEGATIVE
NONHDLC SERPL-MCNC: 193 MG/DL
PH URINE: 6.5
PHOSPHATE SERPL-MCNC: 5.2 MG/DL
PLATELET # BLD AUTO: 214 K/UL
POTASSIUM SERPL-SCNC: 4.9 MMOL/L
PROT SERPL-MCNC: 7.1 G/DL
PROT UR-MCNC: 251 MG/DL
PROTEIN URINE: 300 MG/DL
QUANTIFERON TB PLUS MITOGEN MINUS NIL: 1.24 IU/ML
QUANTIFERON TB PLUS NIL: 0.04 IU/ML
QUANTIFERON TB PLUS TB1 MINUS NIL: 0.01 IU/ML
QUANTIFERON TB PLUS TB2 MINUS NIL: 0 IU/ML
RBC # BLD: 3.52 M/UL
RBC # FLD: 15.1 %
RED BLOOD CELLS URINE: 4 /HPF
RUBV IGG FLD-ACNC: 11.5 INDEX
RUBV IGG SER-IMP: POSITIVE
SARS-COV-2 AB SERPL IA-ACNC: >250 U/ML
SARS-COV-2 AB SERPL QL IA: 46.1 INDEX
SODIUM SERPL-SCNC: 141 MMOL/L
SPECIFIC GRAVITY URINE: 1.01
T GONDII AB SER-IMP: NEGATIVE
T GONDII IGG SER QL: <3 IU/ML
T PALLIDUM AB SER QL IA: NEGATIVE
TRIGL SERPL-MCNC: 235 MG/DL
UROBILINOGEN URINE: 0.2 MG/DL
VZV AB TITR SER: POSITIVE
VZV IGG SER IF-ACNC: 2340 INDEX
WBC # FLD AUTO: 3.43 K/UL
WHITE BLOOD CELLS URINE: 55 /HPF

## 2024-05-29 ENCOUNTER — NON-APPOINTMENT (OUTPATIENT)
Age: 69
End: 2024-05-29

## 2024-06-05 ENCOUNTER — APPOINTMENT (OUTPATIENT)
Dept: NEPHROLOGY | Facility: CLINIC | Age: 69
End: 2024-06-05
Payer: MEDICARE

## 2024-06-05 VITALS
HEART RATE: 68 BPM | TEMPERATURE: 97.8 F | HEIGHT: 64 IN | OXYGEN SATURATION: 96 % | SYSTOLIC BLOOD PRESSURE: 136 MMHG | DIASTOLIC BLOOD PRESSURE: 85 MMHG

## 2024-06-05 PROCEDURE — 96372 THER/PROPH/DIAG INJ SC/IM: CPT

## 2024-06-05 RX ORDER — DARBEPOETIN ALFA 40 UG/ML
40 SOLUTION INTRAVENOUS; SUBCUTANEOUS
Refills: 0 | Status: COMPLETED | OUTPATIENT
Start: 2024-06-05

## 2024-06-05 RX ADMIN — DARBEPOETIN ALFA 1 MCG/ML: 40 SOLUTION INTRAVENOUS; SUBCUTANEOUS at 00:00

## 2024-06-05 NOTE — ASSESSMENT
[FreeTextEntry1] : Hct 31.5/ Hgb 9.5 BP acceptable Creatinine elevated on transplant blood work. No edema. No SOB. Advised to reduce Bumex to 2mg every other day. Aranesp 40mcg SQ x 1 today. See procedure note below. Labs today as well. Pt tolerated procedure well

## 2024-06-07 ENCOUNTER — NON-APPOINTMENT (OUTPATIENT)
Age: 69
End: 2024-06-07

## 2024-06-07 LAB
ALBUMIN SERPL ELPH-MCNC: 3.5 G/DL
ANION GAP SERPL CALC-SCNC: 14 MMOL/L
BUN SERPL-MCNC: 83 MG/DL
CALCIUM SERPL-MCNC: 8.8 MG/DL
CHLORIDE SERPL-SCNC: 108 MMOL/L
CO2 SERPL-SCNC: 19 MMOL/L
CREAT SERPL-MCNC: 6.16 MG/DL
EGFR: 7 ML/MIN/1.73M2
GLUCOSE SERPL-MCNC: 143 MG/DL
HCT VFR BLD CALC: 29.5 %
HGB BLD-MCNC: 8.9 G/DL
MCHC RBC-ENTMCNC: 26.6 PG
MCHC RBC-ENTMCNC: 30.2 GM/DL
MCV RBC AUTO: 88.1 FL
PHOSPHATE SERPL-MCNC: 4.9 MG/DL
PLATELET # BLD AUTO: 190 K/UL
POTASSIUM SERPL-SCNC: 5.2 MMOL/L
RBC # BLD: 3.35 M/UL
RBC # FLD: 14.7 %
SODIUM SERPL-SCNC: 142 MMOL/L
WBC # FLD AUTO: 4.2 K/UL

## 2024-06-11 ENCOUNTER — NON-APPOINTMENT (OUTPATIENT)
Age: 69
End: 2024-06-11

## 2024-06-12 ENCOUNTER — APPOINTMENT (OUTPATIENT)
Dept: CARDIOLOGY | Facility: CLINIC | Age: 69
End: 2024-06-12

## 2024-06-20 ENCOUNTER — APPOINTMENT (OUTPATIENT)
Dept: CARDIOLOGY | Facility: CLINIC | Age: 69
End: 2024-06-20

## 2024-06-24 ENCOUNTER — APPOINTMENT (OUTPATIENT)
Dept: NEPHROLOGY | Facility: CLINIC | Age: 69
End: 2024-06-24
Payer: MEDICARE

## 2024-06-24 VITALS
BODY MASS INDEX: 28.44 KG/M2 | SYSTOLIC BLOOD PRESSURE: 179 MMHG | WEIGHT: 166.55 LBS | HEART RATE: 80 BPM | RESPIRATION RATE: 16 BRPM | HEIGHT: 64 IN | OXYGEN SATURATION: 97 % | TEMPERATURE: 97.6 F | DIASTOLIC BLOOD PRESSURE: 83 MMHG

## 2024-06-24 DIAGNOSIS — N18.5 CHRONIC KIDNEY DISEASE, STAGE 5: ICD-10-CM

## 2024-06-24 DIAGNOSIS — D63.8 ANEMIA IN OTHER CHRONIC DISEASES CLASSIFIED ELSEWHERE: ICD-10-CM

## 2024-06-24 DIAGNOSIS — I10 ESSENTIAL (PRIMARY) HYPERTENSION: ICD-10-CM

## 2024-06-24 DIAGNOSIS — E83.39 OTHER DISORDERS OF PHOSPHORUS METABOLISM: ICD-10-CM

## 2024-06-24 DIAGNOSIS — D63.1 CHRONIC KIDNEY DISEASE, STAGE 5: ICD-10-CM

## 2024-06-24 PROCEDURE — G2211 COMPLEX E/M VISIT ADD ON: CPT

## 2024-06-24 PROCEDURE — 99214 OFFICE O/P EST MOD 30 MIN: CPT

## 2024-06-26 LAB
ALBUMIN SERPL ELPH-MCNC: 3.6 G/DL
ANION GAP SERPL CALC-SCNC: 12 MMOL/L
BUN SERPL-MCNC: 64 MG/DL
CALCIUM SERPL-MCNC: 8.8 MG/DL
CHLORIDE SERPL-SCNC: 109 MMOL/L
CO2 SERPL-SCNC: 20 MMOL/L
CREAT SERPL-MCNC: 5.4 MG/DL
EGFR: 8 ML/MIN/1.73M2
FERRITIN SERPL-MCNC: 283 NG/ML
FOLATE SERPL-MCNC: 8.9 NG/ML
GLUCOSE SERPL-MCNC: 111 MG/DL
HBV CORE IGG+IGM SER QL: NONREACTIVE
HBV SURFACE AB SER QL: NONREACTIVE
HBV SURFACE AG SER QL: NONREACTIVE
HCT VFR BLD CALC: 31.8 %
HGB BLD-MCNC: 9.1 G/DL
IRON SATN MFR SERPL: 22 %
IRON SERPL-MCNC: 47 UG/DL
MCHC RBC-ENTMCNC: 26.4 PG
MCHC RBC-ENTMCNC: 28.6 GM/DL
MCV RBC AUTO: 92.2 FL
PHOSPHATE SERPL-MCNC: 5 MG/DL
PLATELET # BLD AUTO: 205 K/UL
POTASSIUM SERPL-SCNC: 5.4 MMOL/L
RBC # BLD: 3.45 M/UL
RBC # FLD: 15.3 %
SODIUM SERPL-SCNC: 141 MMOL/L
TIBC SERPL-MCNC: 217 UG/DL
UIBC SERPL-MCNC: 170 UG/DL
VIT B12 SERPL-MCNC: 787 PG/ML
WBC # FLD AUTO: 3.5 K/UL

## 2024-06-27 DIAGNOSIS — E87.5 HYPERKALEMIA: ICD-10-CM

## 2024-07-18 LAB
ALBUMIN SERPL ELPH-MCNC: 3.3 G/DL
ANION GAP SERPL CALC-SCNC: 13 MMOL/L
APPEARANCE: CLEAR
BACTERIA: ABNORMAL /HPF
CALCIUM SERPL-MCNC: 8.2 MG/DL
CALCIUM SERPL-MCNC: 8.2 MG/DL
CHLORIDE SERPL-SCNC: 109 MMOL/L
CO2 SERPL-SCNC: 17 MMOL/L
COLOR: NORMAL
CREAT SERPL-MCNC: 5.93 MG/DL
CREAT SPEC-SCNC: 69 MG/DL
CREAT/PROT UR: 6.1 RATIO
EGFR: 7 ML/MIN/1.73M2
GLUCOSE QUALITATIVE U: NEGATIVE
GLUCOSE SERPL-MCNC: 102 MG/DL
HBV SURFACE AB SER QL: NONREACTIVE
HBV SURFACE AG SER QL: NONREACTIVE
HGB BLD-MCNC: 8.4 G/DL
KETONES URINE: NEGATIVE
LEUKOCYTE ESTERASE URINE: ABNORMAL
MCHC RBC-ENTMCNC: 26.4 PG
MCHC RBC-ENTMCNC: 30.1 GM/DL
MCV RBC AUTO: 87.7 FL
MICROSCOPIC-UA: NORMAL
PH URINE: 6
PHOSPHATE SERPL-MCNC: 5.2 MG/DL
PLATELET # BLD AUTO: 178 K/UL
POTASSIUM SERPL-SCNC: 4.6 MMOL/L
PROT UR-MCNC: 416 MG/DL
PROTEIN URINE: ABNORMAL
RBC # BLD: 3.18 M/UL
RBC # FLD: 15.3 %
RED BLOOD CELLS URINE: 2 /HPF
SODIUM SERPL-SCNC: 139 MMOL/L
SPECIFIC GRAVITY URINE: 1.02
SQUAMOUS EPITHELIAL CELLS: PRESENT
UROBILINOGEN URINE: NORMAL
WBC # FLD AUTO: 3.7 K/UL
WHITE BLOOD CELLS URINE: 12 /HPF

## 2024-07-19 ENCOUNTER — APPOINTMENT (OUTPATIENT)
Dept: NEPHROLOGY | Facility: CLINIC | Age: 69
End: 2024-07-19
Payer: MEDICARE

## 2024-07-19 DIAGNOSIS — N18.5 CHRONIC KIDNEY DISEASE, STAGE 5: ICD-10-CM

## 2024-07-19 DIAGNOSIS — E87.5 HYPERKALEMIA: ICD-10-CM

## 2024-07-19 PROCEDURE — 99213 OFFICE O/P EST LOW 20 MIN: CPT

## 2024-07-22 LAB
M TB IFN-G BLD-IMP: NEGATIVE
QUANTIFERON TB PLUS MITOGEN MINUS NIL: 5.91 IU/ML
QUANTIFERON TB PLUS NIL: 0.07 IU/ML
QUANTIFERON TB PLUS TB1 MINUS NIL: 0 IU/ML
QUANTIFERON TB PLUS TB2 MINUS NIL: 0.01 IU/ML

## 2024-08-01 ENCOUNTER — APPOINTMENT (OUTPATIENT)
Dept: VASCULAR SURGERY | Facility: CLINIC | Age: 69
End: 2024-08-01
Payer: MEDICARE

## 2024-08-01 DIAGNOSIS — Z98.890 OTHER SPECIFIED POSTPROCEDURAL STATES: ICD-10-CM

## 2024-08-01 PROCEDURE — 99213 OFFICE O/P EST LOW 20 MIN: CPT

## 2024-08-01 PROCEDURE — 93990 DOPPLER FLOW TESTING: CPT

## 2024-08-01 PROCEDURE — G2211 COMPLEX E/M VISIT ADD ON: CPT

## 2024-08-01 NOTE — PHYSICAL EXAM
[Normal] : normal rate, regular rhythm, normal S1/S2, no murmur [Pulsatile Thrill] : pulsatile thrill [Hand well perfused] : hand well perfused [Warm Extremities] : warm extremities [2+] : left 2+ [Aneurysm] : no aneurysm [Bleeding] : no bleeding [de-identified] : Intact

## 2024-08-01 NOTE — HISTORY OF PRESENT ILLNESS
[FreeTextEntry1] : Patient is a 69-year-old female with ESRD currently on hemodialysis via left radiocephalic AV fistula who presents the office today for follow-up.  Patient reports difficulty with cannulation and was unable to proceed with last dialysis session.

## 2024-08-02 ENCOUNTER — RESULT REVIEW (OUTPATIENT)
Age: 69
End: 2024-08-02

## 2024-08-02 ENCOUNTER — APPOINTMENT (OUTPATIENT)
Dept: ENDOVASCULAR SURGERY | Facility: CLINIC | Age: 69
End: 2024-08-02
Payer: MEDICARE

## 2024-08-02 VITALS
HEART RATE: 77 BPM | WEIGHT: 160 LBS | OXYGEN SATURATION: 100 % | RESPIRATION RATE: 16 BRPM | BODY MASS INDEX: 27.31 KG/M2 | TEMPERATURE: 98.1 F | SYSTOLIC BLOOD PRESSURE: 187 MMHG | DIASTOLIC BLOOD PRESSURE: 94 MMHG | HEIGHT: 64 IN

## 2024-08-02 DIAGNOSIS — T82.898A OTHER SPECIFIED COMPLICATION OF VASCULAR PROSTHETIC DEVICES, IMPLANTS AND GRAFTS, INITIAL ENCOUNTER: ICD-10-CM

## 2024-08-02 DIAGNOSIS — N18.6 END STAGE RENAL DISEASE: ICD-10-CM

## 2024-08-02 DIAGNOSIS — Z99.2 END STAGE RENAL DISEASE: ICD-10-CM

## 2024-08-02 PROCEDURE — 36902Z: CUSTOM

## 2024-08-02 NOTE — ASSESSMENT
[FreeTextEntry1] : referred from dialysis for difficult cannulation-plan for fistulogram and possible intervention

## 2024-08-02 NOTE — PAST MEDICAL HISTORY
[Increasing age ( >40 years old)] : Increasing age ( >40 years old) [No therapy indicated for cases scheduled for less than one hour] : No therapy indicated for cases scheduled for less than one hour. [FreeTextEntry1] : Malignant Hyperthermia Screening Tool and Risk of Bleeding Assessment  Ms. BEVERLY MEJIA denies family history of unexpected death following Anesthesia or Exercise. Denies Family history of Malignant Hyperthermia, Muscle or Neuromuscular disorder and High Temperature following exercise.  Ms. BEVERLY MEJIA denies history of Muscle Spasm, Dark or Chocolate - Colored urine and Unanticipated fever immediately following anesthesia or serious exercise.  Ms. MEJIA also denies bleeding tendencies/ Risks of Bleeding.

## 2024-08-02 NOTE — HISTORY OF PRESENT ILLNESS
[FreeTextEntry1] : 11/3/2023 Dr. Coronel [FreeTextEntry4] : Tuesday 7/30 [FreeTextEntry5] : yesterday at 1000pm [FreeTextEntry6] : Dr. Fraga

## 2024-08-12 ENCOUNTER — APPOINTMENT (OUTPATIENT)
Dept: GASTROENTEROLOGY | Facility: CLINIC | Age: 69
End: 2024-08-12
Payer: COMMERCIAL

## 2024-08-12 VITALS
TEMPERATURE: 98.1 F | DIASTOLIC BLOOD PRESSURE: 78 MMHG | SYSTOLIC BLOOD PRESSURE: 157 MMHG | HEIGHT: 64 IN | WEIGHT: 169 LBS | BODY MASS INDEX: 28.85 KG/M2 | HEART RATE: 75 BPM | OXYGEN SATURATION: 92 %

## 2024-08-12 PROCEDURE — 99213 OFFICE O/P EST LOW 20 MIN: CPT

## 2024-08-12 RX ORDER — POLYETHYLENE GLYCOL 3350, SODIUM SULFATE, SODIUM CHLORIDE, POTASSIUM CHLORIDE, ASCORBIC ACID, SODIUM ASCORBATE 140-9-5.2G
140 KIT ORAL
Qty: 1 | Refills: 0 | Status: ACTIVE | COMMUNITY
Start: 2024-08-12 | End: 1900-01-01

## 2024-08-12 NOTE — PHYSICAL EXAM

## 2024-08-15 ENCOUNTER — APPOINTMENT (OUTPATIENT)
Dept: PULMONOLOGY | Facility: CLINIC | Age: 69
End: 2024-08-15
Payer: MEDICARE

## 2024-08-15 VITALS
RESPIRATION RATE: 16 BRPM | HEIGHT: 64 IN | HEART RATE: 82 BPM | BODY MASS INDEX: 28.51 KG/M2 | TEMPERATURE: 98.7 F | SYSTOLIC BLOOD PRESSURE: 186 MMHG | OXYGEN SATURATION: 93 % | DIASTOLIC BLOOD PRESSURE: 87 MMHG | WEIGHT: 167 LBS

## 2024-08-15 DIAGNOSIS — D86.9 SARCOIDOSIS, UNSPECIFIED: ICD-10-CM

## 2024-08-15 PROCEDURE — 99214 OFFICE O/P EST MOD 30 MIN: CPT

## 2024-08-15 NOTE — ASSESSMENT
[FreeTextEntry1] : 69-year-old female with sarcoidosis, diabetes, hypertension, hyperlipidemia, iron deficiency anemia, pancreatic pseudocyst s/p drainage, heart failure, cardiac arrest x 2, CKD s/p AV fistula placement (11/2023) and started HD 3 weeks prior, history of chronic hypercapnic respiratory failure previously on NIV - now resolved post weight loss. Clinically stable. Check PFT next visit. Repeat CT chest for monitoring of sarcoidosis  Follow-up in 6 months, sooner if needed.

## 2024-08-15 NOTE — HISTORY OF PRESENT ILLNESS
[Former] : former [TextBox_4] : 69-year-old female with sarcoidosis, diabetes, hypertension, hyperlipidemia, iron deficiency anemia, pancreatic pseudocyst s/p drainage, heart failure, cardiac arrest x 2, CKD s/p AV fistula placement (11/2023) and started HD 3 weeks prior, history of chronic hypercapnic respiratory failure previously on NIV - now resolved post weight loss.  ABG (1/11/2024) 7.41/36/101/22/96% TTE (8/31/2022) EF 55%. Low normal LV systolic function. Mild diastolic dysfunction. RV enlargement with decreased RV systolic function.  PSG (4/8/2024) which showed an AHI of 2.6/hr, T90 0%.  She is doing well. Started HD 3 weeks prior. No significant change in her respiratory status.

## 2024-08-15 NOTE — HISTORY OF PRESENT ILLNESS
[FreeTextEntry1] : 69 y BF with ESRD on HD for transplant listing  Hearing loss from previous Abx use Ref by Tx unit for CRC screening No NVCD BPR karen

## 2024-08-22 ENCOUNTER — NON-APPOINTMENT (OUTPATIENT)
Age: 69
End: 2024-08-22

## 2024-08-23 DIAGNOSIS — Z01.818 ENCOUNTER FOR OTHER PREPROCEDURAL EXAMINATION: ICD-10-CM

## 2024-08-27 ENCOUNTER — RESULT REVIEW (OUTPATIENT)
Age: 69
End: 2024-08-27

## 2024-08-27 ENCOUNTER — APPOINTMENT (OUTPATIENT)
Dept: CT IMAGING | Facility: IMAGING CENTER | Age: 69
End: 2024-08-27
Payer: MEDICARE

## 2024-08-27 ENCOUNTER — OUTPATIENT (OUTPATIENT)
Dept: OUTPATIENT SERVICES | Facility: HOSPITAL | Age: 69
LOS: 1 days | End: 2024-08-27
Payer: COMMERCIAL

## 2024-08-27 DIAGNOSIS — Z01.818 ENCOUNTER FOR OTHER PREPROCEDURAL EXAMINATION: ICD-10-CM

## 2024-08-27 DIAGNOSIS — Z89.411 ACQUIRED ABSENCE OF RIGHT GREAT TOE: Chronic | ICD-10-CM

## 2024-08-27 DIAGNOSIS — Z98.891 HISTORY OF UTERINE SCAR FROM PREVIOUS SURGERY: Chronic | ICD-10-CM

## 2024-08-27 PROCEDURE — 71250 CT THORAX DX C-: CPT

## 2024-08-27 PROCEDURE — 74176 CT ABD & PELVIS W/O CONTRAST: CPT

## 2024-08-27 PROCEDURE — 71250 CT THORAX DX C-: CPT | Mod: 26

## 2024-08-27 PROCEDURE — 74176 CT ABD & PELVIS W/O CONTRAST: CPT | Mod: 26

## 2024-08-29 ENCOUNTER — OUTPATIENT (OUTPATIENT)
Dept: OUTPATIENT SERVICES | Facility: HOSPITAL | Age: 69
LOS: 1 days | End: 2024-08-29

## 2024-08-29 ENCOUNTER — APPOINTMENT (OUTPATIENT)
Dept: GASTROENTEROLOGY | Facility: CLINIC | Age: 69
End: 2024-08-29

## 2024-08-29 VITALS
DIASTOLIC BLOOD PRESSURE: 84 MMHG | OXYGEN SATURATION: 96 % | WEIGHT: 165 LBS | HEART RATE: 78 BPM | SYSTOLIC BLOOD PRESSURE: 177 MMHG | BODY MASS INDEX: 28.17 KG/M2 | HEIGHT: 64 IN

## 2024-08-29 DIAGNOSIS — J96.10 CHRONIC RESPIRATORY FAILURE, UNSPECIFIED WHETHER WITH HYPOXIA OR HYPERCAPNIA: ICD-10-CM

## 2024-08-29 DIAGNOSIS — D63.1 CHRONIC KIDNEY DISEASE, STAGE 5: ICD-10-CM

## 2024-08-29 DIAGNOSIS — N18.5 CHRONIC KIDNEY DISEASE, STAGE 5: ICD-10-CM

## 2024-08-29 DIAGNOSIS — Z12.11 ENCOUNTER FOR SCREENING FOR MALIGNANT NEOPLASM OF COLON: ICD-10-CM

## 2024-08-29 PROCEDURE — 99214 OFFICE O/P EST MOD 30 MIN: CPT

## 2024-08-29 RX ORDER — POLYETHYLENE GLYCOL 3350 AND ELECTROLYTES WITH LEMON FLAVOR 236; 22.74; 6.74; 5.86; 2.97 G/4L; G/4L; G/4L; G/4L; G/4L
236 POWDER, FOR SOLUTION ORAL
Qty: 1 | Refills: 0 | Status: ACTIVE | COMMUNITY
Start: 2024-08-29 | End: 1900-01-01

## 2024-08-30 NOTE — ASSESSMENT
[FreeTextEntry1] : 69-year-old female with sarcoidosis (not on therapy), diabetes, hypertension, hyperlipidemia, chronic anemia, pancreatic pseudocyst s/p drainage (2 years ago), BiV heart failure (improved as of 10/2023, no PPM or ICD), cardiac arrest x 2 (2 years ago), CKD s/p AV fistula placement (11/2023) and started HD [address]806 (MWF), history of chronic hypercapnic respiratory failure previously on NIV - now resolved post weight loss, presented for pre-transplant colon cancer screening.   #Colon cancer screening #ESRD on HD, pending renal tx evaluation #Hx of cardiac arrest and HFrEF #Hx of chronic respiratory failure (improved) - Appeared to be euvolemic at this time with improved TTE 10/2023 - On room air, no respiratory distress  Plan: - F/u cardiology on 9/12 for clearance prior to colonoscopy given extensive cardiac hx - PST prior to colonoscopy - Risks and benefits of colonoscopy including but not limited to bleeding, infection, missed lesions, perforation, injury to internal organs, anesthesia/drug side effects were discussed with the patient and daughter. Instructions for colon prep and the day of procedure were given to the patient and daughter. All questions were answered. Patient and daughter are agreeable to the procedure. Pending scheduling after cardiac clearance. - PEG 3350 (4L) prep sent to pharmacy  Case d/w Dr. Alfie Moseley, PGY-6

## 2024-08-30 NOTE — ASSESSMENT
[FreeTextEntry1] : 69-year-old female with sarcoidosis (not on therapy), diabetes, hypertension, hyperlipidemia, chronic anemia, pancreatic pseudocyst s/p drainage (2 years ago), BiV heart failure (improved as of 10/2023, no PPM or ICD), cardiac arrest x 2 (2 years ago), CKD s/p AV fistula placement (11/2023) and started HD [address] (MWF), history of chronic hypercapnic respiratory failure previously on NIV - now resolved post weight loss, presented for pre-transplant colon cancer screening.   #Colon cancer screening #ESRD on HD, pending renal tx evaluation #Hx of cardiac arrest and HFrEF #Hx of chronic respiratory failure (improved) - Appeared to be euvolemic at this time with improved TTE 10/2023 - On room air, no respiratory distress  Plan: - F/u cardiology on 9/12 for clearance prior to colonoscopy given extensive cardiac hx - PST prior to colonoscopy - Risks and benefits of colonoscopy including but not limited to bleeding, infection, missed lesions, perforation, injury to internal organs, anesthesia/drug side effects were discussed with the patient and daughter. Instructions for colon prep and the day of procedure were given to the patient and daughter. All questions were answered. Patient and daughter are agreeable to the procedure. Pending scheduling after cardiac clearance. - PEG 3350 (4L) prep sent to pharmacy  Case d/w Dr. Alfie Moseley, PGY-6

## 2024-08-30 NOTE — HISTORY OF PRESENT ILLNESS
[FreeTextEntry1] : 69-year-old female with sarcoidosis (not on therapy), diabetes, hypertension, hyperlipidemia, chronic anemia, pancreatic pseudocyst s/p drainage (2 years ago), BiV heart failure (improved as of 10/2023, no PPM or ICD), cardiac arrest x 2 (2 years ago), CKD s/p AV fistula placement (11/2023) and started HD [address] (MWF), history of chronic hypercapnic respiratory failure previously on NIV - now resolved post weight loss, presented for pre-transplant colon cancer screening.   Patient was seen by Dr. Cao on 8/12/2024 with plan to proceed with colonoscopy but was limited by insurance coverage.  Patient is very hard of hearing, and daughter helped to provide information. Patient denied fever, chills, chest pain, shortness of breath, unintentional weight loss, dysphagia, odynophagia, nausea, vomiting, decreased appetite, early satiety, gas/bloating, abdominal pain, diarrhea, constipation, melena, hematochezia, and hematemesis. Daughter denied family history of colon cancer, colon polyp, peptic ulcer disease, liver disease, cholelithiasis, pancreatic disease, pancreatic cancer. Patient had EGD 2 years ago and had pancreatic cyst drainage at that time. Never had colonoscopy prior.  Patient has chronic anemia, receiving injections with nephro, and hgb has stabilized per daughter. Not on iron supplement. Patient had hx of recurrent admissions with ICU admissions due to fluid overload, c/b cardiac arrest x 2 (2 years ago). Had BiV systolic dysfunction in 2022, and improved on TTE 10/2023. Has cardiology appointment 9/12/2024.  She is currently not on any A/C (no ASA as well) or diabetes medications.

## 2024-08-30 NOTE — PHYSICAL EXAM
[Alert] : alert [No Acute Distress] : no acute distress [No Respiratory Distress] : no respiratory distress [Auscultation Breath Sounds / Voice Sounds] : lungs were clear to auscultation bilaterally [Heart Rate And Rhythm] : heart rate was normal and rhythm regular [Normal S1, S2] : normal S1 and S2 [None] : no edema [Abdomen Tenderness] : non-tender [Abdomen Soft] : soft [de-identified] : Very hard of hearing

## 2024-08-30 NOTE — ASSESSMENT
[FreeTextEntry1] : 69-year-old female with sarcoidosis (not on therapy), diabetes, hypertension, hyperlipidemia, chronic anemia, pancreatic pseudocyst s/p drainage (2 years ago), BiV heart failure (improved as of 10/2023, no PPM or ICD), cardiac arrest x 2 (2 years ago), CKD s/p AV fistula placement (11/2023) and started HD [address] (MWF), history of chronic hypercapnic respiratory failure previously on NIV - now resolved post weight loss, presented for pre-transplant colon cancer screening.   #Colon cancer screening #ESRD on HD, pending renal tx evaluation #Hx of cardiac arrest and HFrEF #Hx of chronic respiratory failure (improved) - Appeared to be euvolemic at this time with improved TTE 10/2023 - On room air, no respiratory distress  Plan: - F/u cardiology on 9/12 for clearance prior to colonoscopy given extensive cardiac hx - PST prior to colonoscopy - Risks and benefits of colonoscopy including but not limited to bleeding, infection, missed lesions, perforation, injury to internal organs, anesthesia/drug side effects were discussed with the patient and daughter. Instructions for colon prep and the day of procedure were given to the patient and daughter. All questions were answered. Patient and daughter are agreeable to the procedure. Pending scheduling after cardiac clearance. - PEG 3350 (4L) prep sent to pharmacy  Case d/w Dr. Alfie Moseley, PGY-6

## 2024-08-30 NOTE — END OF VISIT
[] : Fellow [Time Spent: ___ minutes] : I have spent [unfilled] minutes of time on the encounter which excludes teaching and separately reported services. [FreeTextEntry3] : Patient seen/examined.  Assessment/recommendations as noted per GI fellow. Need for cardiac clearance prior to colonoscopy discussed with patient in view of significant medical and cardiac comorbidity.

## 2024-08-30 NOTE — PHYSICAL EXAM
[Alert] : alert [No Acute Distress] : no acute distress [No Respiratory Distress] : no respiratory distress [Auscultation Breath Sounds / Voice Sounds] : lungs were clear to auscultation bilaterally [Heart Rate And Rhythm] : heart rate was normal and rhythm regular [Normal S1, S2] : normal S1 and S2 [None] : no edema [Abdomen Tenderness] : non-tender [Abdomen Soft] : soft [de-identified] : Very hard of hearing

## 2024-08-30 NOTE — HISTORY OF PRESENT ILLNESS
[FreeTextEntry1] : 69-year-old female with sarcoidosis (not on therapy), diabetes, hypertension, hyperlipidemia, chronic anemia, pancreatic pseudocyst s/p drainage (2 years ago), BiV heart failure (improved as of 10/2023, no PPM or ICD), cardiac arrest x 2 (2 years ago), CKD s/p AV fistula placement (11/2023) and started HD [address] (MWF), history of chronic hypercapnic respiratory failure previously on NIV - now resolved post weight loss, presented for pre-transplant colon cancer screening.   Patient was seen by Dr. Cao on 8/12/2024 with plan to proceed with colonoscopy but was limited by insurance coverage.  Patient is very hard of hearing, and daughter helped to provide information. Patient denied fever, chills, chest pain, shortness of breath, unintentional weight loss, dysphagia, odynophagia, nausea, vomiting, decreased appetite, early satiety, gas/bloating, abdominal pain, diarrhea, constipation, melena, hematochezia, and hematemesis. Daughter denied family history of colon cancer, colon polyp, peptic ulcer disease, liver disease, cholelithiasis, pancreatic disease, pancreatic cancer. Patient had EGD 2 years ago and had pancreatic cyst drainage at that time. Never had colonoscopy prior.  Patient has chronic anemia, receiving injections with nephro, and hgb has stabilized per daughter. Not on iron supplement. Patient had hx of recurrent admissions with ICU admissions due to fluid overload, c/b cardiac arrest x 2 (2 years ago). Had BiV systolic dysfunction in 2022, and improved on TTE 10/2023. Has cardiology appointment 9/12/2024.  She is currently not on any A/C (no ASA as well) or diabetes medications.

## 2024-08-30 NOTE — PHYSICAL EXAM
[Alert] : alert [No Acute Distress] : no acute distress [No Respiratory Distress] : no respiratory distress [Auscultation Breath Sounds / Voice Sounds] : lungs were clear to auscultation bilaterally [Heart Rate And Rhythm] : heart rate was normal and rhythm regular [Normal S1, S2] : normal S1 and S2 [None] : no edema [Abdomen Tenderness] : non-tender [Abdomen Soft] : soft [de-identified] : Very hard of hearing

## 2024-09-05 DIAGNOSIS — Z12.11 ENCOUNTER FOR SCREENING FOR MALIGNANT NEOPLASM OF COLON: ICD-10-CM

## 2024-09-05 DIAGNOSIS — N18.5 CHRONIC KIDNEY DISEASE, STAGE 5: ICD-10-CM

## 2024-09-05 DIAGNOSIS — J96.10 CHRONIC RESPIRATORY FAILURE, UNSPECIFIED WHETHER WITH HYPOXIA OR HYPERCAPNIA: ICD-10-CM

## 2024-09-10 ENCOUNTER — APPOINTMENT (OUTPATIENT)
Dept: GASTROENTEROLOGY | Facility: HOSPITAL | Age: 69
End: 2024-09-10

## 2024-09-12 ENCOUNTER — NON-APPOINTMENT (OUTPATIENT)
Age: 69
End: 2024-09-12

## 2024-09-13 ENCOUNTER — NON-APPOINTMENT (OUTPATIENT)
Age: 69
End: 2024-09-13

## 2024-09-17 ENCOUNTER — NON-APPOINTMENT (OUTPATIENT)
Age: 69
End: 2024-09-17

## 2024-09-17 NOTE — PROGRESS NOTE ADULT - PROBLEM SELECTOR PROBLEM 7
[FreeTextEntry1] : Insect bite on left shin. concerns for possible Lyme disease 2/2 pattern of the rash. No other symptoms.  Discussed Lyme disease and recommended he get a quick blood test now and then obtain a onetime dose of prophylactic antibiotics. If the rash returns come back to office and will start him on a full 10-day course.  Discussed other symptoms such as joint pains, headaches, fatigue, swollen lymphnodes and neurological facial palsy. If anything such as these emerge- call office for urgent appointment.  All questions answered. Caretaker understands and agrees with plan. Metabolic encephalopathy

## 2024-10-03 ENCOUNTER — APPOINTMENT (OUTPATIENT)
Dept: NEPHROLOGY | Facility: CLINIC | Age: 69
End: 2024-10-03

## 2024-10-03 ENCOUNTER — NON-APPOINTMENT (OUTPATIENT)
Age: 69
End: 2024-10-03

## 2024-10-03 ENCOUNTER — APPOINTMENT (OUTPATIENT)
Dept: CARDIOLOGY | Facility: CLINIC | Age: 69
End: 2024-10-03

## 2024-10-03 VITALS
OXYGEN SATURATION: 100 % | HEART RATE: 77 BPM | WEIGHT: 164 LBS | BODY MASS INDEX: 28.15 KG/M2 | SYSTOLIC BLOOD PRESSURE: 130 MMHG | DIASTOLIC BLOOD PRESSURE: 84 MMHG

## 2024-10-03 DIAGNOSIS — Z99.2 END STAGE RENAL DISEASE: ICD-10-CM

## 2024-10-03 DIAGNOSIS — I10 ESSENTIAL (PRIMARY) HYPERTENSION: ICD-10-CM

## 2024-10-03 DIAGNOSIS — N18.6 END STAGE RENAL DISEASE: ICD-10-CM

## 2024-10-03 DIAGNOSIS — Z01.818 ENCOUNTER FOR OTHER PREPROCEDURAL EXAMINATION: ICD-10-CM

## 2024-10-03 PROCEDURE — 99215 OFFICE O/P EST HI 40 MIN: CPT

## 2024-10-03 PROCEDURE — G2211 COMPLEX E/M VISIT ADD ON: CPT

## 2024-10-03 PROCEDURE — 93000 ELECTROCARDIOGRAM COMPLETE: CPT | Mod: NC

## 2024-10-03 NOTE — REASON FOR VISIT
[Other: _____] : [unfilled] [FreeTextEntry1] : born in [place]  CVA? 3 years ago, details unclear sarcoidosis, sleep study test.. taken off machine?  former smoker, quit 30 years ago, previously smoking 1 PPD   chronic hypertension,  DM2, diagnosed , previously on insulin, A1c 6.6%, diabetic retinopathy, peripheral neuropathy ESRD on HD, started 2024, M-W- at [facility] on [facility], left arm AV fistula  Surgical history includes , right toe amputation, cholecystectomy, pancreatic stent?   Her mother is .  in her 60's, heart failure, HTN, DM2, cancer.  Her father is also . Tumor?  She has siblings; 2 brothers and 1 sister passed due to renal failure, HIV and cancer. Older sister and older brother survive. Breast cancer x 2.  Not . She has 2 children, ages 38 and 32.  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   Walking for exercise, uses a straight cane. Walker for long distances.

## 2024-10-03 NOTE — REASON FOR VISIT
[Other: _____] : [unfilled] [FreeTextEntry1] : born in [place]  CVA? 3 years ago, details unclear sarcoidosis, sleep study test.. taken off machine?  former smoker, quit 30 years ago, previously smoking 1 PPD   chronic hypertension,  DM2, diagnosed , previously on insulin, A1c 6.6%, diabetic retinopathy, peripheral neuropathy ESRD on HD, started 2024, M-W- at [facility] on [facility], left arm AV fistula  Surgical history includes , right toe amputation, cholecystectomy, pancreatic stent?   Her mother is .  in her 60's, heart failure, HTN, DM2, cancer.  Her father is also . Tumor?  She has siblings; 2 brothers and 1 sister passed due to renal failure, HIV and cancer. Older sister and older brother survive. Breast cancer x 2.  Not . She has 2 children, ages 38 and 32.  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   Walking for exercise, uses a straight cane. Walker for long distances.

## 2024-10-04 NOTE — DISCUSSION/SUMMARY
[FreeTextEntry1] : She is a 69-year-old who presents today for pretransplant cardiac evaluation prior to potential renal transplant with complex medical history as above.  Will need to clarify current doses and medications.  She will schedule an echocardiogram for structural heart evaluation.  A nuclear stress test will evaluate for any evidence of inducible ischemia. She walks with a cane and will require a pharmacologic exam. She is also pending routine screening colonoscopy, however, given her host of cardiac risk factors, would favor holding off on colonoscopy pending review of cardiac testing results.   Follow up pending results.  [EKG obtained to assist in diagnosis and management of assessed problem(s)] : EKG obtained to assist in diagnosis and management of assessed problem(s)

## 2024-10-04 NOTE — HISTORY OF PRESENT ILLNESS
[FreeTextEntry1] : Aurora Fragoso presents today for pretransplant cardiac evaluation prior to potential renal transplant.   She is a 69-year-old, with known cardiac risk factors of prior stroke (3 years ago? during hospitalization, details unclear per report), chronic hypertension, diabetes mellitus (diagnosed , previously on insulin, A1c 6.6%, diabetic retinopathy, peripheral neuropathy), being a former smoker (quit 30 years ago, previously smoking 1 PPD) and end stage renal disease on hemodialysis (started 2024, -W- at [facility], left arm AV fistula).   Her history is also significant for sarcoidosis, pancreatic pseudocyst (s/p drainage 2 years ago), biventricular heart failure (no PPM or AICD, improved as of 10/2023) and cardiac arrest (x 2?). She also has a history of hypercapnic respiratory failure (now off therapy/NIV).   Surgical history includes , right toe amputation, cholecystectomy, pancreatic cyst drainage.  Her mother is .  in her 60's, heart failure, HTN, DM2, cancer.  Her father is also . Tumor?  She has siblings; 2 brothers and 1 sister passed due to renal failure, HIV and cancer. Older sister and older brother survive. Breast cancer x 2.  Not . She has 2 children, ages 38 and 32.  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   Walking for exercise, uses a straight cane. Walker for long distances. No chest discomfort, shortness of breath, palpitations, lightheadedness or syncope.

## 2024-10-04 NOTE — HISTORY OF PRESENT ILLNESS
[FreeTextEntry1] : Aurora Fragoso presents today for pretransplant cardiac evaluation prior to potential renal transplant.   She is a 69-year-old, with known cardiac risk factors of prior stroke (3 years ago? during hospitalization, details unclear per report), chronic hypertension, diabetes mellitus (diagnosed , previously on insulin, A1c 6.6%, diabetic retinopathy, peripheral neuropathy), being a former smoker (quit 30 years ago, previously smoking 1 PPD) and end stage renal disease on hemodialysis (started 2024, -W- at [facility], left arm AV fistula).   Her history is also significant for sarcoidosis, pancreatic pseudocyst (s/p drainage 2 years ago), biventricular heart failure (no PPM or AICD, improved as of 10/2023) and cardiac arrest (x 2?). She also has a history of hypercapnic respiratory failure (now off therapy/NIV).   Surgical history includes , right toe amputation, cholecystectomy, pancreatic cyst drainage.  Her mother is .  in her 60's, heart failure, HTN, DM2, cancer.  Her father is also . Tumor?  She has siblings; 2 brothers and 1 sister passed due to renal failure, HIV and cancer. Older sister and older brother survive. Breast cancer x 2.  Not . She has 2 children, ages 38 and 32.  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   Walking for exercise, uses a straight cane. Walker for long distances. No chest discomfort, shortness of breath, palpitations, lightheadedness or syncope.

## 2024-10-04 NOTE — REVIEW OF SYSTEMS
Case Management Assessment           Initial Evaluation                Date / Time of Evaluation: 4/30/2020 2:57 PM                 Assessment Completed by: Diane Lomas    Patient Name: Jaida Tello     YOB: 1952  Diagnosis: Sepsis ([address] [A41.9]  Sepsis [facility]) [A41.9]     Date / Time: 4/28/2020 11:51 AM    Patient Admission Status: Inpatient    If patient is discharged prior to next notation, then this note serves as note for discharge by case management. Current PCP: [address] [place] [address] Road Patient: No    Chart Reviewed: Yes  Patient/ Family Interviewed: No    Initial assessment completed at bedside with: chart review; attempted to call daughterLizy [phone]    Hospitalization in the last 30 days: No    Emergency Contacts:  Extended Emergency Contact Information  Primary Emergency Contact: [facility] Phone: [phone]  Relation: Child  Secondary Emergency Contact: [facility] Phone: [phone]  Relation: Child    Advance Directives:   Code Status: Full [address]: No      Copy present: No     In paper Chart: No    Scanned into EMR No    Financial  Payor: Fabricio Ruth / Plan: MEDICARE PART A AND B / Product Type: *No Product type* /     Pre-cert required for SNF: No    Pharmacy    [facility] [address], [address] [facility] [phone] Viralitielisa Rideau [phone]  [address] [address]  Phone: [phone] Fax: [phone] [facility] [place], Maribel Barrios  [phone] Linard Rideau [phone]  [address] [place] [address]  Phone: [phone] Fax: [phone]      Potential assistance Purchasing Medications: Potential Assistance Purchasing Medications: Yes  Does Patient want to participate in local refill/ meds to beds program?: Yes    Meds To Beds General Rules:  1. Can ONLY be done Monday- Friday between 8:30am-5pm  2.  Prescription(s) must be in pharmacy by 3pm to be filled same day  3. Copy of patient's insurance/ prescription drug card and patient face sheet must be sent along with the prescription(s)  4. Cost of Rx cannot be added to hospital bill. If financial assistance is needed, please contact unit  or ;  or  CANNOT provide pharmacy voucher for patients co-pays  5. Patients can then  the prescription on their way out of the hospital at discharge, or pharmacy can deliver to the bedside if staff is available. (payment due at time of pick-up or delivery - cash, check, or card accepted)     Able to afford home medications/ co-pay costs: Yes    ADLS  Support Systems: Spouse/Significant Other    PT AM-PAC:   /24  OT AM-PAC:   /24    [place]: home  Steps:      Plans to RETURN to current housing: Yes  Barriers to RETURNING to current housing: medical stability    Home Care Information  Currently ACTIVE with [address]: No  Home Care Agency: Not Applicable    Currently ACTIVE with [place] on Aging: No  Passport/ Waiver: No  Passport/ Waiver Services: Not Applicable        Durable Medical Equipment  DME Provider: na  Equipment:      Home Oxygen and [address] prior to admission: No  [address]: Not Applicable  Other Respiratory Equipment:        Dialysis  Active with HD/PD prior to admission: No  Nephrologist:      HD Center:  Not Applicable    DISCHARGE PLAN:  Disposition: Home- No Services Needed    Transportation PLAN for discharge: friend     Factors facilitating achievement of predicted outcomes: Friend support    Barriers to discharge: medical stability    Additional Case Management Notes:   CM reviewed chart. Attempted to call, daughter, Bernadette Castro. Left voice mail at [phone] asking for return call. From home. Found down by girlfriend. Unsure if pt lives with girlfriend. It is documented that physician has called girlfriend but cannot find phone #.      Will continue to follow for dcp needs. The Plan for Transition of Care is related to the following treatment goals of Sepsis ([facility] [address] [A41.9]  Sepsis ([facility] [address] [facility] [A41.9]    The Patient and/or patient representative Stacia Latif and his family were provided with a choice of provider and agrees with the discharge plan Not Indicated    Freedom of choice list was provided with basic dialogue that supports the patient's individualized plan of care/goals and shares the quality data associated with the providers.  Not Indicated    Care Transition patient: No    Ulysses Cortes RN  [facility]  Case Management Department  Ph: [phone] [Negative] : Heme/Lymph [FreeTextEntry2] : incredibly hard of hearing

## 2024-10-13 NOTE — BRIEF OPERATIVE NOTE - NSICDXBRIEFPROCEDURE_GEN_ALL_CORE_FT
Emergency Department Encounter   NAME: Praful Chávez  AGE: 19 month old female  YOB: 2024  MRN: [id number]    PCP: Nena wSanson  ED PROVIDER: Rose Marie Noyola Rose Marie QUINTERO PA-C attest that Cinthia Butt is acting in a scribe capacity, has observed my performance of the services and has documented them in accordance with my direction.       Rose Marie Noyola PA-C   Emergency Medicine   [facility] EMERGENCY DEPARTMENT       Rose Marie Noyola  [place]  10/12/24 2945     PROCEDURES:  EBUS, with fine needle aspiration 23-Feb-2022 11:09:02  Kim, Dae Hyeon  Flexible bronchoscopy 23-Feb-2022 11:09:11  Kim, Dae Hyeon

## 2024-11-15 NOTE — ED ADULT NURSE NOTE - DOES PATIENT HAVE ADVANCE DIRECTIVE
Goal Outcome Evaluation:        NPO since midnight for cardioversion at 10:30.  Consent signed.  2L O2 - sats around 92%.   A. Fib controlled rate.  Last /87.                                      unk

## 2025-03-04 NOTE — PROGRESS NOTE ADULT - TIME-BASED BILLING (NON-CRITICAL CARE)
Time-based billing (NON-critical care)
none

## 2025-03-31 NOTE — DIETITIAN INITIAL EVALUATION ADULT - ADD RECOMMEND
Opt out --- Monitor tolerance to TF (GI status, electrolytes, glucose, triglycerides)  --- Advise ordering corrective insulin considering Hx of DM, Pt. initiated on TF and receiving corticosteroid.

## 2025-05-01 NOTE — H&P ADULT - PROBLEM SELECTOR PROBLEM 3
Can you please ask the pharmacy how much Breztri Aerosphere would cost for patient?      Diabetes mellitus

## 2025-07-14 NOTE — H&P ADULT - PROBLEM/PLAN-6
Patient Education     Anal Abscess and Fistula Discharge Instructions, Adult   About this topic   An anal abscess is an infected area filled with pus. It is located near your anus or rectum. It happens when germs get inside the small glands inside your anus. An anal abscess needs to be drained. Your doctor can do this in the office. It is done by making a small cut near the infected area.  Sometimes, a small tunnel forms from the anal gland to the outside of the skin. This is a fistula. These often form at the same time or just after you have had an abscess. An anal fistula needs surgery. You can most often go home the same day of the surgery.  What care is needed at home?   Ask your doctor what you need to do when you go home. Make sure you ask questions if you do not understand what the doctor says. This way you will know what you need to do.  Use stool softeners to make bowel movements less painful.  Take a sitz bath. Sit in 2 to 3 inches (5 to 7.5 cm) of warm water in the tub for 10 to 15 minutes each time. Do this 3 to 4 times a day. Carefully wipe your bottom afterwards.  Use baby wipes or other moist wipes instead of toilet tissue.  What follow-up care is needed?   Your doctor may ask you to make visits to the office to check on your progress. Be sure to keep these visits.   What drugs may be needed?   The doctor may order drugs to:  Help with pain  Fight an infection  Keep your stools soft  Will physical activity be limited?   You may have to limit your activity. Talk to your doctor about the right amount of activity for you.  What changes to diet are needed?   Your doctor may suggest eating a high fiber diet. This means eating:  Whole grain foods and foods high in fiber.  Many different fruits and vegetables. Fresh or frozen is best.  What can be done to prevent this health problem?   You cannot always prevent these problems. These things may help:  Use condoms. This is very important during anal sex.  Seek  care for signs of sexually transmitted infections, like burning, itching, or a rash on your genitals.  When do I need to call the doctor?   Signs of infection. These include a fever of 100.4°F (38°C) or higher, chills, wound that will not heal, or anal itching or pain.  Pain in your rectum  A painful bump or swelling near the anus  Pus or blood coming from the anus  Teach Back: Helping You Understand   The Teach Back Method helps you understand the information we are giving you. After you talk with the staff, tell them in your own words what you learned. This helps to make sure the staff has described each thing clearly. It also helps to explain things that may have been confusing. Before going home, make sure you can do these:  I can tell you about my condition.  I can tell you what may help ease my pain.  I can tell you what I will do if I have pain in my rectum, a bump or swelling near my anus, or pus or blood coming from my anus.  Last Reviewed Date   2021-05-17  Consumer Information Use and Disclaimer   This generalized information is a limited summary of diagnosis, treatment, and/or medication information. It is not meant to be comprehensive and should be used as a tool to help the user understand and/or assess potential diagnostic and treatment options. It does NOT include all information about conditions, treatments, medications, side effects, or risks that may apply to a specific patient. It is not intended to be medical advice or a substitute for the medical advice, diagnosis, or treatment of a health care provider based on the health care provider's examination and assessment of a patient’s specific and unique circumstances. Patients must speak with a health care provider for complete information about their health, medical questions, and treatment options, including any risks or benefits regarding use of medications. This information does not endorse any treatments or medications as safe, effective, or  approved for treating a specific patient. [employer], Inc[employer] and its affiliates disclaim any warranty or liability relating to this information or the use thereof. The use of this information is governed by the Terms of Use, available at https://www.Music[employer].com/en/know/clinical-effectiveness-terms   Copyright   Copyright © 2024 [employer], [employer]. and its affiliates and/or licensors. All rights reserved.     DISPLAY PLAN FREE TEXT

## (undated) DEVICE — NDL ASPIRATION VIZISHOT2 22G

## (undated) DEVICE — DRAPE 3/4 SHEET 52X76"

## (undated) DEVICE — GLV 7 PROTEXIS (WHITE)

## (undated) DEVICE — VENODYNE/SCD SLEEVE CALF MEDIUM

## (undated) DEVICE — BALLOON SINGLE FOR BF-UC160F

## (undated) DEVICE — SUT PROLENE 6-0 24" BV-1

## (undated) DEVICE — WARMING BLANKET LOWER ADULT

## (undated) DEVICE — SOL IRR POUR H2O 500ML

## (undated) DEVICE — SUT SILK 3-0 18" TIES

## (undated) DEVICE — SUT VICRYL 3-0 27" SH UNDYED

## (undated) DEVICE — STOPCOCK 4-WAY (BLUE) DISCOFIX SPIN-LOCK CONNECTOR

## (undated) DEVICE — TRAP SPECIMEN SPUTUM 40CC

## (undated) DEVICE — DRSG CURITY GAUZE SPONGE 4 X 4" 12-PLY

## (undated) DEVICE — SOL ANTI FOG

## (undated) DEVICE — DRAPE TOWEL BLUE 17" X 24"

## (undated) DEVICE — SUT SILK 4-0 17-18"

## (undated) DEVICE — SUT SILK 2-0 18" TIES

## (undated) DEVICE — SYR LUER SLIP TIP 30CC

## (undated) DEVICE — SOL IRR NS 0.9% 250ML

## (undated) DEVICE — TUBING CANNULA SALTER LABS NASAL ADULT 7FT

## (undated) DEVICE — DRSG TAPE TRANSPORE 1"

## (undated) DEVICE — SYR LUER LOK 20CC

## (undated) DEVICE — SOL INJ NS 0.9% 100ML

## (undated) DEVICE — SOL IRR BAG NS 0.9% 1000ML

## (undated) DEVICE — VALVE SUCTION EVIS 160/200/240

## (undated) DEVICE — LABELS BLANK W PEN

## (undated) DEVICE — PACK BRONCHOSCOPY

## (undated) DEVICE — SUTURE REMOVAL KIT

## (undated) DEVICE — PACK AV FISTULA

## (undated) DEVICE — SYR LUER LOK 10CC

## (undated) DEVICE — NDL ENDOBRONCHIAL ULTRASOUND FINE BIOPSY DEVICE 22GA

## (undated) DEVICE — VALVE BIOPSY BRONCHOVIDEOSCOPE

## (undated) DEVICE — SOL IRR POUR NS 0.9% 500ML

## (undated) DEVICE — BRUSH CYTO DISP

## (undated) DEVICE — SUT MONOCRYL 4-0 27" PS-2 UNDYED

## (undated) DEVICE — CLAMP BULLDOG MIDI 45 DEGREE (GREEN) DISP

## (undated) DEVICE — GEL AQUSNC PACKET 20GR

## (undated) DEVICE — DRSG TEGADERM 2.5X3"

## (undated) DEVICE — DURABLE MEDICAL EQUIPMENT: Type: DURABLE MEDICAL EQUIPMENT

## (undated) DEVICE — SOL BAG NS 0.9% 1000ML

## (undated) DEVICE — ELCTR GROUNDING PAD ADULT COVIDIEN

## (undated) DEVICE — FORCEP BIOPSY 1.8MM JAW X 100CM DISP

## (undated) DEVICE — NDL HYPO SAFE 25G X 5/8" (ORANGE)

## (undated) DEVICE — POSITIONER STRAP ARMBOARD VELCRO TS-30

## (undated) DEVICE — FORCEP BIOPSY BRONCHOSCOPE DISP

## (undated) DEVICE — SUT PROLENE 7-0 24" BV-1

## (undated) DEVICE — ADAPTER FIBEROPTIC BRONCHOSCOPE DUAL AXIS SWIVEL

## (undated) DEVICE — MASK SURGICAL WITH EYESHIELD ANTIFOG (ORANGE)

## (undated) DEVICE — SYNOVIS VASCULAR PROBE 1.5MM 15CM

## (undated) DEVICE — VESSEL LOOP MINI-BLUE 0.075" X 16"

## (undated) DEVICE — PREP CHLORAPREP HI-LITE ORANGE 26ML

## (undated) DEVICE — DRAPE HAND 77" X 146"

## (undated) DEVICE — BAG DECANTER DISP